# Patient Record
Sex: MALE | Race: WHITE | NOT HISPANIC OR LATINO | Employment: OTHER | URBAN - METROPOLITAN AREA
[De-identification: names, ages, dates, MRNs, and addresses within clinical notes are randomized per-mention and may not be internally consistent; named-entity substitution may affect disease eponyms.]

---

## 2017-03-17 ENCOUNTER — APPOINTMENT (EMERGENCY)
Dept: CT IMAGING | Facility: HOSPITAL | Age: 52
End: 2017-03-17
Payer: COMMERCIAL

## 2017-03-17 ENCOUNTER — HOSPITAL ENCOUNTER (EMERGENCY)
Facility: HOSPITAL | Age: 52
Discharge: HOME | End: 2017-03-17
Attending: EMERGENCY MEDICINE | Admitting: EMERGENCY MEDICINE
Payer: COMMERCIAL

## 2017-03-17 VITALS
SYSTOLIC BLOOD PRESSURE: 157 MMHG | RESPIRATION RATE: 18 BRPM | WEIGHT: 225.09 LBS | OXYGEN SATURATION: 94 % | BODY MASS INDEX: 33.34 KG/M2 | HEIGHT: 69 IN | HEART RATE: 88 BPM | TEMPERATURE: 97.6 F | DIASTOLIC BLOOD PRESSURE: 80 MMHG

## 2017-03-17 DIAGNOSIS — K21.9 ESOPHAGEAL REFLUX: ICD-10-CM

## 2017-03-17 DIAGNOSIS — K52.9 GASTROENTERITIS: ICD-10-CM

## 2017-03-17 DIAGNOSIS — R07.9 CHEST PAIN: Primary | ICD-10-CM

## 2017-03-17 LAB
ALBUMIN SERPL BCP-MCNC: 4.3 G/DL (ref 3.5–5)
ALP SERPL-CCNC: 58 U/L (ref 46–116)
ALT SERPL W P-5'-P-CCNC: 32 U/L (ref 12–78)
ANION GAP SERPL CALCULATED.3IONS-SCNC: 13 MMOL/L (ref 4–13)
AST SERPL W P-5'-P-CCNC: 16 U/L (ref 5–45)
ATRIAL RATE: 65 BPM
BASOPHILS # BLD AUTO: 0.02 THOUSANDS/ΜL (ref 0–0.1)
BASOPHILS NFR BLD AUTO: 0 % (ref 0–1)
BILIRUB DIRECT SERPL-MCNC: 0.1 MG/DL (ref 0–0.2)
BILIRUB SERPL-MCNC: 0.3 MG/DL (ref 0.2–1)
BUN SERPL-MCNC: 18 MG/DL (ref 5–25)
CALCIUM SERPL-MCNC: 9.1 MG/DL (ref 8.3–10.1)
CHLORIDE SERPL-SCNC: 101 MMOL/L (ref 100–108)
CO2 SERPL-SCNC: 26 MMOL/L (ref 21–32)
CREAT SERPL-MCNC: 1.15 MG/DL (ref 0.6–1.3)
EOSINOPHIL # BLD AUTO: 0.01 THOUSAND/ΜL (ref 0–0.61)
EOSINOPHIL NFR BLD AUTO: 0 % (ref 0–6)
ERYTHROCYTE [DISTWIDTH] IN BLOOD BY AUTOMATED COUNT: 12.7 % (ref 11.6–15.1)
GFR SERPL CREATININE-BSD FRML MDRD: >60 ML/MIN/1.73SQ M
GLUCOSE SERPL-MCNC: 157 MG/DL (ref 65–140)
HCT VFR BLD AUTO: 47.2 % (ref 36.5–49.3)
HGB BLD-MCNC: 16 G/DL (ref 12–17)
INR PPP: 1.01 (ref 0.86–1.16)
LIPASE SERPL-CCNC: 90 U/L (ref 73–393)
LYMPHOCYTES # BLD AUTO: 1.2 THOUSANDS/ΜL (ref 0.6–4.47)
LYMPHOCYTES NFR BLD AUTO: 10 % (ref 14–44)
MCH RBC QN AUTO: 30.2 PG (ref 26.8–34.3)
MCHC RBC AUTO-ENTMCNC: 33.9 G/DL (ref 31.4–37.4)
MCV RBC AUTO: 89 FL (ref 82–98)
MONOCYTES # BLD AUTO: 0.51 THOUSAND/ΜL (ref 0.17–1.22)
MONOCYTES NFR BLD AUTO: 4 % (ref 4–12)
NEUTROPHILS # BLD AUTO: 10.85 THOUSANDS/ΜL (ref 1.85–7.62)
NEUTS SEG NFR BLD AUTO: 86 % (ref 43–75)
NRBC BLD AUTO-RTO: 0 /100 WBCS
P AXIS: 65 DEGREES
PLATELET # BLD AUTO: 305 THOUSANDS/UL (ref 149–390)
PMV BLD AUTO: 9.7 FL (ref 8.9–12.7)
POTASSIUM SERPL-SCNC: 3.8 MMOL/L (ref 3.5–5.3)
PR INTERVAL: 166 MS
PROT SERPL-MCNC: 7.9 G/DL (ref 6.4–8.2)
PROTHROMBIN TIME: 13.2 SECONDS (ref 12–14.3)
QRS AXIS: -3 DEGREES
QRSD INTERVAL: 92 MS
QT INTERVAL: 372 MS
QTC INTERVAL: 386 MS
RBC # BLD AUTO: 5.3 MILLION/UL (ref 3.88–5.62)
SODIUM SERPL-SCNC: 140 MMOL/L (ref 136–145)
T WAVE AXIS: 55 DEGREES
TROPONIN I SERPL-MCNC: <0.02 NG/ML
VENTRICULAR RATE: 65 BPM
WBC # BLD AUTO: 12.66 THOUSAND/UL (ref 4.31–10.16)

## 2017-03-17 PROCEDURE — 85025 COMPLETE CBC W/AUTO DIFF WBC: CPT | Performed by: EMERGENCY MEDICINE

## 2017-03-17 PROCEDURE — 36415 COLL VENOUS BLD VENIPUNCTURE: CPT | Performed by: EMERGENCY MEDICINE

## 2017-03-17 PROCEDURE — 80076 HEPATIC FUNCTION PANEL: CPT | Performed by: EMERGENCY MEDICINE

## 2017-03-17 PROCEDURE — 71275 CT ANGIOGRAPHY CHEST: CPT

## 2017-03-17 PROCEDURE — 84484 ASSAY OF TROPONIN QUANT: CPT | Performed by: EMERGENCY MEDICINE

## 2017-03-17 PROCEDURE — 80048 BASIC METABOLIC PNL TOTAL CA: CPT | Performed by: EMERGENCY MEDICINE

## 2017-03-17 PROCEDURE — 83690 ASSAY OF LIPASE: CPT | Performed by: EMERGENCY MEDICINE

## 2017-03-17 PROCEDURE — 93005 ELECTROCARDIOGRAM TRACING: CPT | Performed by: EMERGENCY MEDICINE

## 2017-03-17 PROCEDURE — 85610 PROTHROMBIN TIME: CPT | Performed by: EMERGENCY MEDICINE

## 2017-03-17 PROCEDURE — 96375 TX/PRO/DX INJ NEW DRUG ADDON: CPT

## 2017-03-17 PROCEDURE — 74175 CTA ABDOMEN W/CONTRAST: CPT

## 2017-03-17 PROCEDURE — 96361 HYDRATE IV INFUSION ADD-ON: CPT

## 2017-03-17 PROCEDURE — 96374 THER/PROPH/DIAG INJ IV PUSH: CPT

## 2017-03-17 PROCEDURE — 99285 EMERGENCY DEPT VISIT HI MDM: CPT

## 2017-03-17 RX ORDER — ONDANSETRON 4 MG/1
4 TABLET, ORALLY DISINTEGRATING ORAL EVERY 8 HOURS PRN
Qty: 20 TABLET | Refills: 0 | Status: SHIPPED | OUTPATIENT
Start: 2017-03-17 | End: 2017-08-14

## 2017-03-17 RX ORDER — ONDANSETRON 2 MG/ML
4 INJECTION INTRAMUSCULAR; INTRAVENOUS ONCE
Status: COMPLETED | OUTPATIENT
Start: 2017-03-17 | End: 2017-03-17

## 2017-03-17 RX ORDER — SERTRALINE HYDROCHLORIDE 100 MG/1
100 TABLET, FILM COATED ORAL DAILY
COMMUNITY
End: 2022-03-08

## 2017-03-17 RX ORDER — OMEPRAZOLE 20 MG/1
20 CAPSULE, DELAYED RELEASE ORAL DAILY
COMMUNITY
End: 2018-12-11 | Stop reason: HOSPADM

## 2017-03-17 RX ORDER — BUPROPION HYDROCHLORIDE 75 MG/1
75 TABLET ORAL DAILY
COMMUNITY
End: 2020-02-08

## 2017-03-17 RX ORDER — AMLODIPINE BESYLATE 2.5 MG/1
2.5 TABLET ORAL DAILY
COMMUNITY
End: 2018-12-08

## 2017-03-17 RX ORDER — LORAZEPAM 2 MG/ML
1 INJECTION INTRAMUSCULAR ONCE
Status: COMPLETED | OUTPATIENT
Start: 2017-03-17 | End: 2017-03-17

## 2017-03-17 RX ADMIN — IOHEXOL 100 ML: 350 INJECTION, SOLUTION INTRAVENOUS at 11:19

## 2017-03-17 RX ADMIN — LORAZEPAM 1 MG: 2 INJECTION INTRAMUSCULAR; INTRAVENOUS at 10:39

## 2017-03-17 RX ADMIN — SODIUM CHLORIDE 1000 ML: 0.9 INJECTION, SOLUTION INTRAVENOUS at 12:15

## 2017-03-17 RX ADMIN — SODIUM CHLORIDE 1000 ML: 0.9 INJECTION, SOLUTION INTRAVENOUS at 10:39

## 2017-03-17 RX ADMIN — ONDANSETRON 4 MG: 2 INJECTION INTRAMUSCULAR; INTRAVENOUS at 10:51

## 2017-08-14 ENCOUNTER — APPOINTMENT (EMERGENCY)
Dept: CT IMAGING | Facility: HOSPITAL | Age: 52
End: 2017-08-14
Payer: COMMERCIAL

## 2017-08-14 ENCOUNTER — HOSPITAL ENCOUNTER (EMERGENCY)
Facility: HOSPITAL | Age: 52
Discharge: HOME | End: 2017-08-14
Payer: COMMERCIAL

## 2017-08-14 VITALS
HEART RATE: 82 BPM | HEIGHT: 69 IN | SYSTOLIC BLOOD PRESSURE: 130 MMHG | DIASTOLIC BLOOD PRESSURE: 70 MMHG | TEMPERATURE: 98.8 F | RESPIRATION RATE: 16 BRPM | OXYGEN SATURATION: 94 % | WEIGHT: 225.09 LBS | BODY MASS INDEX: 33.34 KG/M2

## 2017-08-14 DIAGNOSIS — R11.2 NAUSEA AND VOMITING: Primary | ICD-10-CM

## 2017-08-14 LAB
ALBUMIN SERPL BCP-MCNC: 4 G/DL (ref 3.5–5)
ALP SERPL-CCNC: 66 U/L (ref 46–116)
ALT SERPL W P-5'-P-CCNC: 30 U/L (ref 12–78)
ANION GAP SERPL CALCULATED.3IONS-SCNC: 10 MMOL/L (ref 4–13)
AST SERPL W P-5'-P-CCNC: 13 U/L (ref 5–45)
BASOPHILS # BLD AUTO: 0.03 THOUSANDS/ΜL (ref 0–0.1)
BASOPHILS NFR BLD AUTO: 0 % (ref 0–1)
BILIRUB SERPL-MCNC: 0.3 MG/DL (ref 0.2–1)
BILIRUB UR QL STRIP: NEGATIVE
BUN SERPL-MCNC: 20 MG/DL (ref 5–25)
CALCIUM SERPL-MCNC: 9.1 MG/DL (ref 8.3–10.1)
CHLORIDE SERPL-SCNC: 102 MMOL/L (ref 100–108)
CLARITY UR: CLEAR
CO2 SERPL-SCNC: 25 MMOL/L (ref 21–32)
COLOR UR: YELLOW
CREAT SERPL-MCNC: 1.08 MG/DL (ref 0.6–1.3)
EOSINOPHIL # BLD AUTO: 0 THOUSAND/ΜL (ref 0–0.61)
EOSINOPHIL NFR BLD AUTO: 0 % (ref 0–6)
ERYTHROCYTE [DISTWIDTH] IN BLOOD BY AUTOMATED COUNT: 12.5 % (ref 11.6–15.1)
GFR SERPL CREATININE-BSD FRML MDRD: 78 ML/MIN/1.73SQ M
GLUCOSE SERPL-MCNC: 133 MG/DL (ref 65–140)
GLUCOSE UR STRIP-MCNC: NEGATIVE MG/DL
HCT VFR BLD AUTO: 44.4 % (ref 36.5–49.3)
HGB BLD-MCNC: 15.2 G/DL (ref 12–17)
HGB UR QL STRIP.AUTO: NEGATIVE
KETONES UR STRIP-MCNC: NEGATIVE MG/DL
LEUKOCYTE ESTERASE UR QL STRIP: NEGATIVE
LIPASE SERPL-CCNC: 95 U/L (ref 73–393)
LYMPHOCYTES # BLD AUTO: 1.38 THOUSANDS/ΜL (ref 0.6–4.47)
LYMPHOCYTES NFR BLD AUTO: 10 % (ref 14–44)
MCH RBC QN AUTO: 30.3 PG (ref 26.8–34.3)
MCHC RBC AUTO-ENTMCNC: 34.2 G/DL (ref 31.4–37.4)
MCV RBC AUTO: 88 FL (ref 82–98)
MONOCYTES # BLD AUTO: 0.48 THOUSAND/ΜL (ref 0.17–1.22)
MONOCYTES NFR BLD AUTO: 3 % (ref 4–12)
NEUTROPHILS # BLD AUTO: 12.1 THOUSANDS/ΜL (ref 1.85–7.62)
NEUTS SEG NFR BLD AUTO: 86 % (ref 43–75)
NITRITE UR QL STRIP: NEGATIVE
NRBC BLD AUTO-RTO: 0 /100 WBCS
PH UR STRIP.AUTO: 6 [PH] (ref 4.5–8)
PLATELET # BLD AUTO: 287 THOUSANDS/UL (ref 149–390)
PMV BLD AUTO: 9.7 FL (ref 8.9–12.7)
POTASSIUM SERPL-SCNC: 4.1 MMOL/L (ref 3.5–5.3)
PROT SERPL-MCNC: 7.4 G/DL (ref 6.4–8.2)
PROT UR STRIP-MCNC: NEGATIVE MG/DL
RBC # BLD AUTO: 5.02 MILLION/UL (ref 3.88–5.62)
SODIUM SERPL-SCNC: 137 MMOL/L (ref 136–145)
SP GR UR STRIP.AUTO: <=1.005 (ref 1–1.03)
UROBILINOGEN UR QL STRIP.AUTO: 0.2 E.U./DL
WBC # BLD AUTO: 14.06 THOUSAND/UL (ref 4.31–10.16)

## 2017-08-14 PROCEDURE — 96375 TX/PRO/DX INJ NEW DRUG ADDON: CPT

## 2017-08-14 PROCEDURE — 80053 COMPREHEN METABOLIC PANEL: CPT | Performed by: NURSE PRACTITIONER

## 2017-08-14 PROCEDURE — 74177 CT ABD & PELVIS W/CONTRAST: CPT

## 2017-08-14 PROCEDURE — 96361 HYDRATE IV INFUSION ADD-ON: CPT

## 2017-08-14 PROCEDURE — 83690 ASSAY OF LIPASE: CPT | Performed by: NURSE PRACTITIONER

## 2017-08-14 PROCEDURE — 96374 THER/PROPH/DIAG INJ IV PUSH: CPT

## 2017-08-14 PROCEDURE — 99284 EMERGENCY DEPT VISIT MOD MDM: CPT

## 2017-08-14 PROCEDURE — 81003 URINALYSIS AUTO W/O SCOPE: CPT

## 2017-08-14 PROCEDURE — 85025 COMPLETE CBC W/AUTO DIFF WBC: CPT | Performed by: NURSE PRACTITIONER

## 2017-08-14 PROCEDURE — 36415 COLL VENOUS BLD VENIPUNCTURE: CPT | Performed by: NURSE PRACTITIONER

## 2017-08-14 PROCEDURE — 96376 TX/PRO/DX INJ SAME DRUG ADON: CPT

## 2017-08-14 RX ORDER — MORPHINE SULFATE 4 MG/ML
4 INJECTION, SOLUTION INTRAMUSCULAR; INTRAVENOUS ONCE
Status: COMPLETED | OUTPATIENT
Start: 2017-08-14 | End: 2017-08-14

## 2017-08-14 RX ORDER — ONDANSETRON 2 MG/ML
4 INJECTION INTRAMUSCULAR; INTRAVENOUS ONCE
Status: COMPLETED | OUTPATIENT
Start: 2017-08-14 | End: 2017-08-14

## 2017-08-14 RX ORDER — ONDANSETRON 4 MG/1
4 TABLET, ORALLY DISINTEGRATING ORAL EVERY 8 HOURS PRN
Qty: 20 TABLET | Refills: 0 | Status: SHIPPED | OUTPATIENT
Start: 2017-08-14 | End: 2018-12-08

## 2017-08-14 RX ORDER — DICYCLOMINE HCL 20 MG
20 TABLET ORAL 2 TIMES DAILY
Qty: 20 TABLET | Refills: 0 | Status: SHIPPED | OUTPATIENT
Start: 2017-08-14 | End: 2018-02-15 | Stop reason: ALTCHOICE

## 2017-08-14 RX ADMIN — IOHEXOL 100 ML: 350 INJECTION, SOLUTION INTRAVENOUS at 13:36

## 2017-08-14 RX ADMIN — SODIUM CHLORIDE 1000 ML: 0.9 INJECTION, SOLUTION INTRAVENOUS at 13:44

## 2017-08-14 RX ADMIN — MORPHINE SULFATE 4 MG: 4 INJECTION, SOLUTION INTRAMUSCULAR; INTRAVENOUS at 13:44

## 2017-08-14 RX ADMIN — MORPHINE SULFATE 4 MG: 4 INJECTION, SOLUTION INTRAMUSCULAR; INTRAVENOUS at 14:51

## 2017-08-14 RX ADMIN — ONDANSETRON 4 MG: 2 INJECTION INTRAMUSCULAR; INTRAVENOUS at 14:50

## 2017-08-14 RX ADMIN — ONDANSETRON 4 MG: 2 INJECTION INTRAMUSCULAR; INTRAVENOUS at 12:44

## 2017-12-05 ENCOUNTER — HOSPITAL ENCOUNTER (EMERGENCY)
Facility: HOSPITAL | Age: 52
Discharge: HOME | End: 2017-12-05
Attending: EMERGENCY MEDICINE | Admitting: EMERGENCY MEDICINE
Payer: COMMERCIAL

## 2017-12-05 ENCOUNTER — APPOINTMENT (EMERGENCY)
Dept: CT IMAGING | Facility: HOSPITAL | Age: 52
End: 2017-12-05
Payer: COMMERCIAL

## 2017-12-05 ENCOUNTER — APPOINTMENT (EMERGENCY)
Dept: RADIOLOGY | Facility: HOSPITAL | Age: 52
End: 2017-12-05
Payer: COMMERCIAL

## 2017-12-05 VITALS
SYSTOLIC BLOOD PRESSURE: 158 MMHG | HEART RATE: 65 BPM | BODY MASS INDEX: 32.21 KG/M2 | OXYGEN SATURATION: 98 % | RESPIRATION RATE: 18 BRPM | DIASTOLIC BLOOD PRESSURE: 96 MMHG | TEMPERATURE: 97.6 F | WEIGHT: 225 LBS | HEIGHT: 70 IN

## 2017-12-05 DIAGNOSIS — R11.2 NAUSEA AND VOMITING: ICD-10-CM

## 2017-12-05 DIAGNOSIS — R07.9 CHEST PAIN: ICD-10-CM

## 2017-12-05 DIAGNOSIS — R10.30 LOWER ABDOMINAL PAIN: ICD-10-CM

## 2017-12-05 DIAGNOSIS — K52.9 ENTERITIS: Primary | ICD-10-CM

## 2017-12-05 LAB
ALBUMIN SERPL BCP-MCNC: 4 G/DL (ref 3.5–5)
ALP SERPL-CCNC: 62 U/L (ref 46–116)
ALT SERPL W P-5'-P-CCNC: 34 U/L (ref 12–78)
ANION GAP SERPL CALCULATED.3IONS-SCNC: 11 MMOL/L (ref 4–13)
AST SERPL W P-5'-P-CCNC: 16 U/L (ref 5–45)
BACTERIA UR QL AUTO: ABNORMAL /HPF
BASOPHILS # BLD MANUAL: 0 THOUSAND/UL (ref 0–0.1)
BASOPHILS NFR MAR MANUAL: 0 % (ref 0–1)
BILIRUB SERPL-MCNC: 0.3 MG/DL (ref 0.2–1)
BILIRUB UR QL STRIP: NEGATIVE
BUN SERPL-MCNC: 16 MG/DL (ref 5–25)
CALCIUM SERPL-MCNC: 9.3 MG/DL (ref 8.3–10.1)
CHLORIDE SERPL-SCNC: 103 MMOL/L (ref 100–108)
CLARITY UR: CLEAR
CO2 SERPL-SCNC: 27 MMOL/L (ref 21–32)
COLOR UR: YELLOW
CREAT SERPL-MCNC: 0.87 MG/DL (ref 0.6–1.3)
EOSINOPHIL # BLD MANUAL: 0 THOUSAND/UL (ref 0–0.4)
EOSINOPHIL NFR BLD MANUAL: 0 % (ref 0–6)
ERYTHROCYTE [DISTWIDTH] IN BLOOD BY AUTOMATED COUNT: 12.4 % (ref 11.6–15.1)
FLUAV AG SPEC QL IA: NEGATIVE
FLUBV AG SPEC QL IA: NEGATIVE
GFR SERPL CREATININE-BSD FRML MDRD: 99 ML/MIN/1.73SQ M
GLUCOSE SERPL-MCNC: 148 MG/DL (ref 65–140)
GLUCOSE UR STRIP-MCNC: NEGATIVE MG/DL
HCT VFR BLD AUTO: 44.2 % (ref 36.5–49.3)
HGB BLD-MCNC: 14.8 G/DL (ref 12–17)
HGB UR QL STRIP.AUTO: NEGATIVE
KETONES UR STRIP-MCNC: ABNORMAL MG/DL
LACTATE SERPL-SCNC: 1.1 MMOL/L (ref 0.5–2)
LEUKOCYTE ESTERASE UR QL STRIP: NEGATIVE
LIPASE SERPL-CCNC: 110 U/L (ref 73–393)
LYMPHOCYTES # BLD AUTO: 0.54 THOUSAND/UL (ref 0.6–4.47)
LYMPHOCYTES # BLD AUTO: 5 % (ref 14–44)
MAGNESIUM SERPL-MCNC: 1.6 MG/DL (ref 1.6–2.6)
MCH RBC QN AUTO: 29.4 PG (ref 26.8–34.3)
MCHC RBC AUTO-ENTMCNC: 33.5 G/DL (ref 31.4–37.4)
MCV RBC AUTO: 88 FL (ref 82–98)
MONOCYTES # BLD AUTO: 0.22 THOUSAND/UL (ref 0–1.22)
MONOCYTES NFR BLD: 2 % (ref 4–12)
NEUTROPHILS # BLD MANUAL: 9.96 THOUSAND/UL (ref 1.85–7.62)
NEUTS BAND NFR BLD MANUAL: 6 % (ref 0–8)
NEUTS SEG NFR BLD AUTO: 86 % (ref 43–75)
NITRITE UR QL STRIP: NEGATIVE
NON-SQ EPI CELLS URNS QL MICRO: ABNORMAL /HPF
NRBC BLD AUTO-RTO: 0 /100 WBCS
PH UR STRIP.AUTO: 6 [PH] (ref 4.5–8)
PLATELET # BLD AUTO: 275 THOUSANDS/UL (ref 149–390)
PLATELET BLD QL SMEAR: ADEQUATE
PMV BLD AUTO: 10.1 FL (ref 8.9–12.7)
POTASSIUM SERPL-SCNC: 4 MMOL/L (ref 3.5–5.3)
PROT SERPL-MCNC: 7.5 G/DL (ref 6.4–8.2)
PROT UR STRIP-MCNC: ABNORMAL MG/DL
RBC # BLD AUTO: 5.04 MILLION/UL (ref 3.88–5.62)
RBC #/AREA URNS AUTO: ABNORMAL /HPF
SODIUM SERPL-SCNC: 141 MMOL/L (ref 136–145)
SP GR UR STRIP.AUTO: 1.02 (ref 1–1.03)
TOTAL CELLS COUNTED SPEC: 100
TROPONIN I SERPL-MCNC: <0.02 NG/ML
TROPONIN I SERPL-MCNC: <0.02 NG/ML
UROBILINOGEN UR QL STRIP.AUTO: 0.2 E.U./DL
VARIANT LYMPHS # BLD AUTO: 1 %
WBC # BLD AUTO: 10.83 THOUSAND/UL (ref 4.31–10.16)
WBC #/AREA URNS AUTO: ABNORMAL /HPF

## 2017-12-05 PROCEDURE — 71020 HB CHEST X-RAY 2VW FRONTAL&LATL: CPT

## 2017-12-05 PROCEDURE — 81001 URINALYSIS AUTO W/SCOPE: CPT | Performed by: EMERGENCY MEDICINE

## 2017-12-05 PROCEDURE — 99285 EMERGENCY DEPT VISIT HI MDM: CPT

## 2017-12-05 PROCEDURE — 83735 ASSAY OF MAGNESIUM: CPT | Performed by: EMERGENCY MEDICINE

## 2017-12-05 PROCEDURE — 80053 COMPREHEN METABOLIC PANEL: CPT | Performed by: EMERGENCY MEDICINE

## 2017-12-05 PROCEDURE — 83690 ASSAY OF LIPASE: CPT | Performed by: EMERGENCY MEDICINE

## 2017-12-05 PROCEDURE — 74177 CT ABD & PELVIS W/CONTRAST: CPT

## 2017-12-05 PROCEDURE — 83605 ASSAY OF LACTIC ACID: CPT | Performed by: EMERGENCY MEDICINE

## 2017-12-05 PROCEDURE — 36415 COLL VENOUS BLD VENIPUNCTURE: CPT | Performed by: EMERGENCY MEDICINE

## 2017-12-05 PROCEDURE — 96375 TX/PRO/DX INJ NEW DRUG ADDON: CPT

## 2017-12-05 PROCEDURE — 93005 ELECTROCARDIOGRAM TRACING: CPT | Performed by: EMERGENCY MEDICINE

## 2017-12-05 PROCEDURE — 84484 ASSAY OF TROPONIN QUANT: CPT | Performed by: EMERGENCY MEDICINE

## 2017-12-05 PROCEDURE — 96374 THER/PROPH/DIAG INJ IV PUSH: CPT

## 2017-12-05 PROCEDURE — 87798 DETECT AGENT NOS DNA AMP: CPT | Performed by: EMERGENCY MEDICINE

## 2017-12-05 PROCEDURE — 85007 BL SMEAR W/DIFF WBC COUNT: CPT | Performed by: EMERGENCY MEDICINE

## 2017-12-05 PROCEDURE — 85027 COMPLETE CBC AUTOMATED: CPT | Performed by: EMERGENCY MEDICINE

## 2017-12-05 PROCEDURE — 96361 HYDRATE IV INFUSION ADD-ON: CPT

## 2017-12-05 PROCEDURE — 87400 INFLUENZA A/B EACH AG IA: CPT | Performed by: EMERGENCY MEDICINE

## 2017-12-05 RX ORDER — ONDANSETRON 4 MG/1
4 TABLET, ORALLY DISINTEGRATING ORAL EVERY 6 HOURS PRN
Qty: 20 TABLET | Refills: 0 | Status: SHIPPED | OUTPATIENT
Start: 2017-12-05 | End: 2019-03-22

## 2017-12-05 RX ORDER — ONDANSETRON 2 MG/ML
INJECTION INTRAMUSCULAR; INTRAVENOUS
Status: COMPLETED
Start: 2017-12-05 | End: 2017-12-05

## 2017-12-05 RX ORDER — KETOROLAC TROMETHAMINE 30 MG/ML
15 INJECTION, SOLUTION INTRAMUSCULAR; INTRAVENOUS ONCE
Status: COMPLETED | OUTPATIENT
Start: 2017-12-05 | End: 2017-12-05

## 2017-12-05 RX ORDER — ONDANSETRON 2 MG/ML
4 INJECTION INTRAMUSCULAR; INTRAVENOUS ONCE
Status: COMPLETED | OUTPATIENT
Start: 2017-12-05 | End: 2017-12-05

## 2017-12-05 RX ORDER — ASPIRIN 81 MG/1
324 TABLET, CHEWABLE ORAL ONCE
Status: COMPLETED | OUTPATIENT
Start: 2017-12-05 | End: 2017-12-05

## 2017-12-05 RX ORDER — DICYCLOMINE HCL 20 MG
20 TABLET ORAL EVERY 8 HOURS PRN
Qty: 12 TABLET | Refills: 0 | Status: SHIPPED | OUTPATIENT
Start: 2017-12-05 | End: 2018-12-08

## 2017-12-05 RX ORDER — MAGNESIUM HYDROXIDE/ALUMINUM HYDROXICE/SIMETHICONE 120; 1200; 1200 MG/30ML; MG/30ML; MG/30ML
30 SUSPENSION ORAL ONCE
Status: COMPLETED | OUTPATIENT
Start: 2017-12-05 | End: 2017-12-05

## 2017-12-05 RX ORDER — TRAMADOL HYDROCHLORIDE 50 MG/1
50 TABLET ORAL EVERY 6 HOURS PRN
Qty: 10 TABLET | Refills: 0 | Status: SHIPPED | OUTPATIENT
Start: 2017-12-05 | End: 2017-12-05 | Stop reason: ALTCHOICE

## 2017-12-05 RX ORDER — HYDROCODONE BITARTRATE AND ACETAMINOPHEN 5; 325 MG/1; MG/1
1 TABLET ORAL EVERY 6 HOURS PRN
Qty: 10 TABLET | Refills: 0 | Status: SHIPPED | OUTPATIENT
Start: 2017-12-05 | End: 2017-12-15

## 2017-12-05 RX ADMIN — IOHEXOL 100 ML: 350 INJECTION, SOLUTION INTRAVENOUS at 18:01

## 2017-12-05 RX ADMIN — ONDANSETRON 4 MG: 2 INJECTION INTRAMUSCULAR; INTRAVENOUS at 17:12

## 2017-12-05 RX ADMIN — KETOROLAC TROMETHAMINE 15 MG: 30 INJECTION, SOLUTION INTRAMUSCULAR at 20:44

## 2017-12-05 RX ADMIN — HYDROMORPHONE HYDROCHLORIDE 1 MG: 1 INJECTION, SOLUTION INTRAMUSCULAR; INTRAVENOUS; SUBCUTANEOUS at 17:17

## 2017-12-05 RX ADMIN — SODIUM CHLORIDE 1000 ML: 0.9 INJECTION, SOLUTION INTRAVENOUS at 17:14

## 2017-12-05 RX ADMIN — SODIUM CHLORIDE 1000 ML: 0.9 INJECTION, SOLUTION INTRAVENOUS at 18:43

## 2017-12-05 RX ADMIN — ALUMINUM HYDROXIDE, MAGNESIUM HYDROXIDE, AND SIMETHICONE 30 ML: 200; 200; 20 SUSPENSION ORAL at 20:44

## 2017-12-05 RX ADMIN — LIDOCAINE HYDROCHLORIDE 10 ML: 20 SOLUTION ORAL; TOPICAL at 20:44

## 2017-12-05 RX ADMIN — ASPIRIN 81 MG 324 MG: 81 TABLET ORAL at 17:17

## 2017-12-05 NOTE — ED NOTES
Patient vomited the dose of asprin  Dr Israel Ramirez made aware       Trang Patrick, KASI  12/05/17 6898

## 2017-12-05 NOTE — ED PROVIDER NOTES
History  Chief Complaint   Patient presents with    Chest Pain     "I have chills, weakness, chest pain, nausea, vomiting, I just feel fucking horrible"     Patient is a 47year old male with past medical history of hypertension, ulcerative colitis, prior diverticulitis, GERD, presents to the emergency department with multiple complaints  Patient states early this morning he began having nausea and vomiting as well as abdominal pain  He states he has had at least 20 episodes of nonbilious and nonbloody vomiting since this morning  He also reports mid to lower central abdominal pain that comes and goes  He states the pain feels somewhat similar to prior diverticulitis attacks as well as prior ulcerative colitis attacks  He denies radiation of pain  No alleviating or aggravating factors  He also reports having chest pain that started 2 hours ago  He states he feels a tightness in the center of his chest as well as pain in the left side of his chest   He is unsure if it is worsened by exertion  He does report over the weekend he was doing a lot of heavy lifting and transferring of heavy logs and felt pain all over including his chest which required him to take a break and stop  He does report over the past 1 month he has had intermittent discomfort in his chest but again cannot comment on whether not it is exertional   He also reports feeling generally weak, malaise, and having subjective fever and chills and sweats today  He denies taking his temperature  On review of systems, he also reports mild dry cough for the past several days to 1 week as well as worsening shortness of breath today    He denies headache, dizziness or near syncope, URI symptoms, visual changes, diarrhea, constipation, bright red blood per rectum, melena, dysuria, frequency, hematuria, flank pain, testicular pain or swelling, skin rash or color change, leg swelling or pain, lateralizing extremity weakness or paresthesia or other focal neurologic deficits  He denies prior abdominal surgical history  Denies any known cardiac history  Denies smoking history  Denies alcohol use and states he quit 1 5 years ago  History provided by:  Patient   used: No    Chest Pain   Associated symptoms: abdominal pain, diaphoresis, fever, nausea, shortness of breath and vomiting    Associated symptoms: no back pain, no cough, no dizziness, no fatigue, no headache, no numbness, no palpitations and no weakness        Prior to Admission Medications   Prescriptions Last Dose Informant Patient Reported? Taking? amLODIPine (NORVASC) 2 5 mg tablet   Yes No   Sig: Take 2 5 mg by mouth daily   buPROPion (WELLBUTRIN) 75 mg tablet   Yes No   Sig: Take 75 mg by mouth 2 (two) times a day   dicyclomine (BENTYL) 20 mg tablet   No No   Sig: Take 1 tablet by mouth 2 (two) times a day   omeprazole (PriLOSEC) 20 mg delayed release capsule   Yes No   Sig: Take 20 mg by mouth daily   ondansetron (ZOFRAN-ODT) 4 mg disintegrating tablet   No No   Sig: Take 1 tablet by mouth every 8 (eight) hours as needed for nausea or vomiting for up to 20 doses   sertraline (ZOLOFT) 100 mg tablet   Yes No   Sig: Take 200 mg by mouth daily      Facility-Administered Medications: None       Past Medical History:   Diagnosis Date    Diverticulitis     GERD (gastroesophageal reflux disease)     Hypertension     UC (ulcerative colitis) ([facility] )        History reviewed  No pertinent surgical history  History reviewed  No pertinent family history  I have reviewed and agree with the history as documented  Social History   Substance Use Topics    Smoking status: Never Smoker    Smokeless tobacco: Not on file    Alcohol use No      Comment: quit 1 5 yrs ago        Review of Systems   Constitutional: Positive for chills, diaphoresis and fever  Negative for fatigue  HENT: Negative for congestion, ear pain, rhinorrhea and sore throat      Eyes: Negative for photophobia, pain and visual disturbance  Respiratory: Positive for chest tightness and shortness of breath  Negative for cough and wheezing  Cardiovascular: Positive for chest pain  Negative for palpitations  Gastrointestinal: Positive for abdominal pain, nausea and vomiting  Negative for constipation and diarrhea  Genitourinary: Negative for decreased urine volume, dysuria, flank pain, frequency and hematuria  Musculoskeletal: Negative for back pain, neck pain and neck stiffness  Skin: Negative for color change, pallor and rash  Allergic/Immunologic: Negative for immunocompromised state  Neurological: Negative for dizziness, syncope, weakness, light-headedness, numbness and headaches  Hematological: Negative for adenopathy  Psychiatric/Behavioral: Negative for confusion and decreased concentration  All other systems reviewed and are negative  Physical Exam  ED Triage Vitals   Temperature Pulse Respirations Blood Pressure SpO2   12/05/17 1636 12/05/17 1636 12/05/17 1636 12/05/17 1636 12/05/17 1636   97 6 °F (36 4 °C) 58 18 (!) 179/82 96 %      Temp src Heart Rate Source Patient Position - Orthostatic VS BP Location FiO2 (%)   -- 12/05/17 1829 12/05/17 1829 12/05/17 1829 --    Monitor Lying Right arm       Pain Score       12/05/17 1636       7         Vitals:    12/05/17 1636 12/05/17 1829 12/05/17 2047   BP: (!) 179/82 143/86 158/96   Pulse: 58 66 65   Resp: 18 17 18   Temp: 97 6 °F (36 4 °C)     SpO2: 96% 96% 98%   Weight: 102 kg (225 lb)     Height: 5' 10" (1 778 m)         Physical Exam   Constitutional: He is oriented to person, place, and time  He appears well-developed and well-nourished  No distress  HENT:   Head: Normocephalic and atraumatic  Mouth/Throat: Oropharynx is clear and moist  No oropharyngeal exudate  Eyes: Conjunctivae and EOM are normal  Pupils are equal, round, and reactive to light  Neck: Normal range of motion  Neck supple  No JVD present     Cardiovascular: Normal rate, regular rhythm, normal heart sounds and intact distal pulses  Exam reveals no gallop and no friction rub  No murmur heard  Pulmonary/Chest: Effort normal and breath sounds normal  No respiratory distress  He has no wheezes  He has no rales  Abdominal: Soft  Bowel sounds are normal  He exhibits no distension  There is tenderness  There is no rebound and no guarding  Mid-lower abdominal tenderness  Musculoskeletal: Normal range of motion  He exhibits no edema or tenderness  Lymphadenopathy:     He has no cervical adenopathy  Neurological: He is alert and oriented to person, place, and time  No cranial nerve deficit  No gross motor or sensory deficits  Skin: Skin is warm and dry  No rash noted  He is not diaphoretic  No pallor  Psychiatric: He has a normal mood and affect  His behavior is normal    Nursing note and vitals reviewed        ED Medications  Medications   sodium chloride 0 9 % bolus 1,000 mL (0 mL Intravenous Stopped 12/5/17 1829)   ondansetron (ZOFRAN) injection 4 mg (4 mg Intravenous Given 12/5/17 1712)   HYDROmorphone (DILAUDID) 1 mg/mL injection 1 mg (1 mg Intravenous Given 12/5/17 1717)   aspirin chewable tablet 324 mg (324 mg Oral Given 12/5/17 1717)   iohexol (OMNIPAQUE) 350 MG/ML injection (MULTI-DOSE) 100 mL (100 mL Intravenous Given 12/5/17 1801)   sodium chloride 0 9 % bolus 1,000 mL (0 mL Intravenous Stopped 12/5/17 2012)   ketorolac (TORADOL) 30 mg/mL injection 15 mg (15 mg Intravenous Given 12/5/17 2044)   aluminum-magnesium hydroxide-simethicone (MYLANTA) 200-200-20 mg/5 mL oral suspension 30 mL (30 mL Oral Given 12/5/17 2044)   lidocaine viscous (XYLOCAINE) 2 % mucosal solution 10 mL (10 mL Swish & Swallow Given 12/5/17 2044)       Diagnostic Studies  Results Reviewed     Procedure Component Value Units Date/Time    Troponin I [[id number]]  (Normal) Collected:  12/05/17 2002    Lab Status:  Final result Specimen:  Blood from Arm, Left Updated:  12/05/17 2028 Troponin I <0 02 ng/mL     Narrative:         [employer] Chemistry analyzer 99% cutoff is > 0 04 ng/mL in network labs    o cTnI 99% cutoff is useful only when applied to patients in the clinical setting of myocardial ischemia  o cTnI 99% cutoff should be interpreted in the context of clinical history, ECG findings and possibly cardiac imaging to establish correct diagnosis  o cTnI 99% cutoff may be suggestive but clearly not indicative of a coronary event without the clinical setting of myocardial ischemia  Urine Microscopic [[id number]]  (Abnormal) Collected:  12/05/17 1838    Lab Status:  Final result Specimen:  Urine from Urine, Clean Catch Updated:  12/05/17 1855     RBC, UA None Seen /hpf      WBC, UA 0-1 (A) /hpf      Epithelial Cells Occasional /hpf      Bacteria, UA Occasional /hpf     UA w Reflex to Microscopic w Reflex to Culture [[id number]]  (Abnormal) Collected:  12/05/17 1838    Lab Status:  Final result Specimen:  Urine from Urine, Clean Catch Updated:  12/05/17 1847     Color, UA Yellow     Clarity, UA Clear     Specific Gravity, UA 1 025     pH, UA 6 0     Leukocytes, UA Negative     Nitrite, UA Negative     Protein, UA 30 (1+) (A) mg/dl      Glucose, UA Negative mg/dl      Ketones, UA Trace (A) mg/dl      Urobilinogen, UA 0 2 E U /dl      Bilirubin, UA Negative     Blood, UA Negative    CBC and differential [[id number]]  (Abnormal) Collected:  12/05/17 1714    Lab Status:  Final result Specimen:  Blood from Arm, Right Updated:  12/05/17 1753     WBC 10 83 (H) Thousand/uL      RBC 5 04 Million/uL      Hemoglobin 14 8 g/dL      Hematocrit 44 2 %      MCV 88 fL      MCH 29 4 pg      MCHC 33 5 g/dL      RDW 12 4 %      MPV 10 1 fL      Platelets 558 Thousands/uL      nRBC 0 /100 WBCs     Narrative: This is an appended report  These results have been appended to a previously verified report      Lactic acid, plasma [[id number]]  (Normal) Collected:  12/05/17 1714    Lab Status:  Final result Specimen: Blood from Arm, Right Updated:  12/05/17 1746     LACTIC ACID 1 1 mmol/L     Narrative:         Result may be elevated if tourniquet was used during collection  Troponin I [[id number]]  (Normal) Collected:  12/05/17 1714    Lab Status:  Final result Specimen:  Blood from Arm, Right Updated:  12/05/17 1744     Troponin I <0 02 ng/mL     Narrative:         [employer] Chemistry analyzer 99% cutoff is > 0 04 ng/mL in network labs    o cTnI 99% cutoff is useful only when applied to patients in the clinical setting of myocardial ischemia  o cTnI 99% cutoff should be interpreted in the context of clinical history, ECG findings and possibly cardiac imaging to establish correct diagnosis  o cTnI 99% cutoff may be suggestive but clearly not indicative of a coronary event without the clinical setting of myocardial ischemia  Rapid Influenza Screen with Reflex PCR (indicated for patients <2mo of age) [[id number]]  (Normal) Collected:  12/05/17 1714    Lab Status:  Final result Specimen:  Nasopharyngeal from Nasopharyngeal Swab Updated:  12/05/17 1744     Rapid Influenza A Ag Negative     Rapid Influenza B Ag Negative    Influenza A/B and RSV by PCR (Indicated for patients > 2 mo of age) [[id number]] Collected:  12/05/17 1714    Lab Status:   In process Specimen:  Nasopharyngeal from Nasopharyngeal Swab Updated:  12/05/17 1744    Magnesium [[id number]]  (Normal) Collected:  12/05/17 1714    Lab Status:  Final result Specimen:  Blood from Arm, Right Updated:  12/05/17 1740     Magnesium 1 6 mg/dL     Comprehensive metabolic panel [[id number]]  (Abnormal) Collected:  12/05/17 1714    Lab Status:  Final result Specimen:  Blood from Arm, Right Updated:  12/05/17 1740     Sodium 141 mmol/L      Potassium 4 0 mmol/L      Chloride 103 mmol/L      CO2 27 mmol/L      Anion Gap 11 mmol/L      BUN 16 mg/dL      Creatinine 0 87 mg/dL      Glucose 148 (H) mg/dL      Calcium 9 3 mg/dL      AST 16 U/L      ALT 34 U/L      Alkaline Phosphatase 62 U/L Total Protein 7 5 g/dL      Albumin 4 0 g/dL      Total Bilirubin 0 30 mg/dL      eGFR 99 ml/min/1 73sq m     Narrative:         National Kidney Disease Education Program recommendations are as follows:  GFR calculation is accurate only with a steady state creatinine  Chronic Kidney disease less than 60 ml/min/1 73 sq  meters  Kidney failure less than 15 ml/min/1 73 sq  meters  Lipase [[id number]]  (Normal) Collected:  12/05/17 1714    Lab Status:  Final result Specimen:  Blood from Arm, Right Updated:  12/05/17 1740     Lipase 110 u/L                  XR chest 2 views   ED Interpretation by Royal Elayne DO (12/05 1829)   No acute abnormality in the chest       Final Result by Cherylene Maiers, MD (12/05 2114)      No active pulmonary disease  Workstation performed: [id number]         CT abdomen pelvis with contrast   Final Result by Eva Harris MD (12/05 3274)      Mild scattered areas of small bowel and proximal colonic thickening in keeping with a nonspecific enteritis/colitis  Extensive, predominantly distal, colonic diverticulosis without acute diverticulitis           Workstation performed: [id number]                    Procedures  ECG 12 Lead Documentation  Date/Time: 12/5/2017 4:57 PM  Performed by: [employer]  by: Lily Rucker     ECG reviewed by me, the ED Provider: yes    Patient location:  ED  Previous ECG:     Previous ECG:  Compared to current    Comparison ECG info:  3-    Similarity:  No change  Rate:     ECG rate:  52    ECG rate assessment: bradycardic    Rhythm:     Rhythm: sinus bradycardia      Rhythm comment:  With sinus arrhythmia  Ectopy:     Ectopy: none    QRS:     QRS axis:  Normal    QRS intervals:  Normal  Conduction:     Conduction: normal    ST segments:     ST segments:  Normal  T waves:     T waves: normal      ECG 12 Lead Documentation  Date/Time: 12/5/2017 8:56 PM  Performed by: [employer]  by: Awilda Ortiz E     ECG reviewed by me, the ED Provider: yes    Patient location:  ED  Previous ECG:     Previous ECG:  Compared to current    Similarity:  No change  Rate:     ECG rate:  61    ECG rate assessment: normal    Rhythm:     Rhythm: sinus rhythm      Rhythm comment:  Marked sinus arrhythmia  Ectopy:     Ectopy: none    QRS:     QRS axis:  Normal    QRS intervals:  Normal  Conduction:     Conduction: normal    ST segments:     ST segments:  Normal  T waves:     T waves: normal             Phone Contacts  ED Phone Contact    ED Course  ED Course as of Dec 06 0149   Tue Dec 05, 2017   2028 Repeat troponin and EKG unchanged  Troponin I: <0 02         HEART Risk Score    Flowsheet Row Most Recent Value   History  0 Filed at: 12/05/2017 1723   ECG  0 Filed at: 12/05/2017 1723   Age  1 Filed at: 12/05/2017 1723   Risk Factors  1 Filed at: 12/05/2017 1723   Troponin  0 Filed at: 12/05/2017 1723   Heart Score Risk Calculator   History  0 Filed at: 12/05/2017 1723   ECG  0 Filed at: 12/05/2017 1723   Age  1 Filed at: 12/05/2017 1723   Risk Factors  1 Filed at: 12/05/2017 1723   Troponin  0 Filed at: 12/05/2017 1723   HEART Score  2 Filed at: 12/05/2017 1723   HEART Score  2 Filed at: 12/05/2017 1723                            MDM  Number of Diagnoses or Management Options  Diagnosis management comments: Nausea and vomiting that started early this morning as well as abdominal pain  Chest pain started 2 hours ago  Most likely chest pain is musculoskeletal in origin given the constant retching all day  Patient does have cardiac risk factors so will ultimately check cardiac enzymes, EKG and keep for 3 hour delta troponin  HEART score 2  Will check abdominal labs and obtain a CT scan of the abdomen and pelvis  Will hydrate with IV fluid bolus, provide Zofran and Dilaudid for symptom relief  Dispo pending         Amount and/or Complexity of Data Reviewed  Clinical lab tests: ordered and reviewed  Tests in the radiology section of CPT®: ordered and reviewed  Tests in the medicine section of CPT®: ordered and reviewed  Independent visualization of images, tracings, or specimens: yes      CritCare Time    Disposition  Final diagnoses:   Enteritis   Nausea and vomiting   Lower abdominal pain   Chest pain     Time reflects when diagnosis was documented in both MDM as applicable and the Disposition within this note     Time User Action Codes Description Comment    12/5/2017  8:57 PM Joce Rosalba E Add [K52 9] Enteritis     12/5/2017  8:57 PM Joce Rosalba E Add [R11 2] Nausea and vomiting     12/5/2017  8:57 PM Joce Rosalba E Add [R10 30] Lower abdominal pain     12/5/2017  8:57 PM Joce Rosalba E Add [R07 9] Chest pain       ED Disposition     ED Disposition Condition Comment    Discharge  Koreen Burkitt discharge to home/self care      Condition at discharge: Stable        Follow-up Information     Follow up With Specialties Details Why Contact Info Additional 4 Terence Jeff MD Internal Medicine Schedule an appointment as soon as possible for a visit  [address] [phone]       [facility] Emergency Department Emergency Medicine Go to If symptoms worsen [address] [facility] [address] [place] ED, [address] [place], [place], [address] Jv Austin MD Gastroenterology Schedule an appointment as soon as possible for a visit As needed [address]/[facility] [address] [facility] [address]  [address]           Discharge Medication List as of 12/5/2017  9:01 PM      START taking these medications    Details   !! dicyclomine (BENTYL) 20 mg tablet Take 1 tablet by mouth every 8 (eight) hours as needed (abdominal pain), Starting Tue 12/5/2017, Print      !! ondansetron (ZOFRAN-ODT) 4 mg disintegrating tablet Take 1 tablet by mouth every 6 (six) hours as needed for nausea or vomiting, Starting Tue 12/5/2017, Print      traMADol (ULTRAM) 50 mg tablet Take 1 tablet by mouth every 6 (six) hours as needed for severe pain for up to 10 days, Starting Tue 12/5/2017, Until Fri 12/15/2017, Print       !! - Potential duplicate medications found  Please discuss with provider  CONTINUE these medications which have NOT CHANGED    Details   amLODIPine (NORVASC) 2 5 mg tablet Take 2 5 mg by mouth daily, Until Discontinued, Historical Med      buPROPion (WELLBUTRIN) 75 mg tablet Take 75 mg by mouth 2 (two) times a day, Until Discontinued, Historical Med      !! dicyclomine (BENTYL) 20 mg tablet Take 1 tablet by mouth 2 (two) times a day, Starting Mon 8/14/2017, Print      omeprazole (PriLOSEC) 20 mg delayed release capsule Take 20 mg by mouth daily, Until Discontinued, Historical Med      !! ondansetron (ZOFRAN-ODT) 4 mg disintegrating tablet Take 1 tablet by mouth every 8 (eight) hours as needed for nausea or vomiting for up to 20 doses, Starting Mon 8/14/2017, Print      sertraline (ZOLOFT) 100 mg tablet Take 200 mg by mouth daily, Until Discontinued, Historical Med       !! - Potential duplicate medications found  Please discuss with provider  No discharge procedures on file      ED Provider  Electronically Signed by           Shelby Sanches DO  12/06/17 8704

## 2017-12-06 LAB
ATRIAL RATE: 52 BPM
ATRIAL RATE: 61 BPM
FLUAV AG SPEC QL: NORMAL
FLUBV AG SPEC QL: NORMAL
P AXIS: 59 DEGREES
P AXIS: 64 DEGREES
PR INTERVAL: 160 MS
PR INTERVAL: 166 MS
QRS AXIS: 35 DEGREES
QRS AXIS: 42 DEGREES
QRSD INTERVAL: 88 MS
QRSD INTERVAL: 92 MS
QT INTERVAL: 412 MS
QT INTERVAL: 446 MS
QTC INTERVAL: 414 MS
QTC INTERVAL: 414 MS
RSV B RNA SPEC QL NAA+PROBE: NORMAL
T WAVE AXIS: 57 DEGREES
T WAVE AXIS: 64 DEGREES
VENTRICULAR RATE: 52 BPM
VENTRICULAR RATE: 61 BPM

## 2017-12-06 NOTE — DISCHARGE INSTRUCTIONS
Chest Pain   WHAT YOU NEED TO KNOW:   Chest pain can be caused by a range of conditions, from not serious to life-threatening  Chest pain can be a symptom of a digestive problem, such as acid reflux or a stomach ulcer  An anxiety attack or a strong emotion, such as anger, can also cause chest pain  Infection, inflammation, or a fracture in the bones or cartilage in your chest can cause pain or discomfort  Sometimes chest pain or pressure is caused by poor blood flow to your heart (angina)  Chest pain may also be caused by life-threatening conditions such as a heart attack or blood clot in your lungs  DISCHARGE INSTRUCTIONS:   Call 911 if:   · You have any of the following signs of a heart attack:      ¨ Squeezing, pressure, or pain in your chest that lasts longer than 5 minutes or returns    ¨ Discomfort or pain in your back, neck, jaw, stomach, or arm     ¨ Trouble breathing    ¨ Nausea or vomiting    ¨ Lightheadedness or a sudden cold sweat, especially with chest pain or trouble breathing    Return to the emergency department if:   · You have chest discomfort that gets worse, even with medicine  · You cough or vomit blood  · Your bowel movements are black or bloody  · You cannot stop vomiting, or it hurts to swallow  Contact your healthcare provider if:   · You have questions or concerns about your condition or care  Medicines:   · Medicines  may be given to treat the cause of your chest pain  Examples include pain medicine, anxiety medicine, or medicines to increase blood flow to your heart  · Do not take certain medicines without asking your healthcare provider first   These include NSAIDs, herbal or vitamin supplements, or hormones (estrogen or progestin)  · Take your medicine as directed  Contact your healthcare provider if you think your medicine is not helping or if you have side effects  Tell him or her if you are allergic to any medicine   Keep a list of the medicines, vitamins, and herbs you take  Include the amounts, and when and why you take them  Bring the list or the pill bottles to follow-up visits  Carry your medicine list with you in case of an emergency  Follow up with your healthcare provider within 72 hours, or as directed: You may need to return for more tests to find the cause of your chest pain  You may be referred to a specialist, such as a cardiologist or gastroenterologist  Write down your questions so you remember to ask them during your visits  Healthy living tips: The following are general healthy guidelines  If your chest pain is caused by a heart problem, your healthcare provider will give you specific guidelines to follow  · Do not smoke  Nicotine and other chemicals in cigarettes and cigars can cause lung and heart damage  Ask your healthcare provider for information if you currently smoke and need help to quit  E-cigarettes or smokeless tobacco still contain nicotine  Talk to your healthcare provider before you use these products  · Eat a variety of healthy, low-fat foods  Healthy foods include fruits, vegetables, whole-grain breads, low-fat dairy products, beans, lean meats, and fish  Ask for more information about a heart healthy diet  · Ask about activity  Your healthcare provider will tell you which activities to limit or avoid  Ask when you can drive, return to work, and have sex  Ask about the best exercise plan for you  · Maintain a healthy weight  Ask your healthcare provider how much you should weigh  Ask him or her to help you create a weight loss plan if you are overweight  · Get the flu and pneumonia vaccines  All adults should get the influenza (flu) vaccine  Get it every year as soon as it becomes available  The pneumococcal vaccine is given to adults aged 72 years or older  The vaccine is given every 5 years to prevent pneumococcal disease, such as pneumonia    © 2017 [employer] Information is for End User's use only and may not be sold, redistributed or otherwise used for commercial purposes  All illustrations and images included in CareNotes® are the copyrighted property of [employer]  or Mihir Castro  The above information is an  only  It is not intended as medical advice for individual conditions or treatments  Talk to your doctor, nurse or pharmacist before following any medical regimen to see if it is safe and effective for you  Acute Nausea and Vomiting   WHAT YOU NEED TO KNOW:   Acute nausea and vomiting start suddenly, worsen quickly, and last a short time  DISCHARGE INSTRUCTIONS:   Seek care immediately if:   · You see blood in your vomit or your bowel movements  · You have sudden, severe pain in your chest and upper abdomen after hard vomiting or retching  · You have swelling in your neck and chest      · You are dizzy, cold, and thirsty and your eyes and mouth are dry  · You are urinating very little or not at all  · You have muscle weakness, leg cramps, and trouble breathing  · Your heart is beating much faster than normal      · You continue to vomit for more than 48 hours  Contact your healthcare provider if:   · You have frequent dry heaves (vomiting but nothing comes out)  · Your nausea and vomiting does not get better or go away after you use medicine  · You have questions or concerns about your condition or treatment  Medicines: You may need any of the following:  · Medicines  may be given to calm your stomach and stop your vomiting  You may also need medicines to help you feel more relaxed or to stop nausea and vomiting caused by motion sickness  · Gastrointestinal stimulants  are used to help empty your stomach and bowels  This may help decrease nausea and vomiting  · Take your medicine as directed  Contact your healthcare provider if you think your medicine is not helping or if you have side effects   Tell him or her if you are allergic to any medicine  Keep a list of the medicines, vitamins, and herbs you take  Include the amounts, and when and why you take them  Bring the list or the pill bottles to follow-up visits  Carry your medicine list with you in case of an emergency  Prevent or manage acute nausea and vomiting:   · Do not drink alcohol  Alcohol may upset or irritate your stomach  Too much alcohol can also cause acute nausea and vomiting  · Control stress  Headaches due to stress may cause nausea and vomiting  Find ways to relax and manage your stress  Get more rest and sleep  · Drink more liquids as directed  Vomiting can lead to dehydration  It is important to drink more liquids to help replace lost body fluids  Ask your healthcare provider how much liquid to drink each day and which liquids are best for you  Your provider may recommend that you drink an oral rehydration solution (ORS)  ORS contains water, salts, and sugar that are needed to replace the lost body fluids  Ask what kind of ORS to use, how much to drink, and where to get it  · Eat smaller meals, more often  Eat small amounts of food every 2 to 3 hours, even if you are not hungry  Food in your stomach may decrease your nausea  · Talk to your healthcare provider before you take over-the-counter (OTC) medicines  These medicines can cause serious problems if you use certain other medicines, or you have a medical condition  You may have problems if you use too much or use them for longer than the label says  Follow directions on the label carefully  Follow up with your healthcare provider as directed:  Write down your questions so you remember to ask them during your visits  © 2017 [employer]  Information is for End User's use only and may not be sold, redistributed or otherwise used for commercial purposes   All illustrations and images included in CareNotes® are the copyrighted property of [employer] M [employer]  or [employer]  The above information is an  only  It is not intended as medical advice for individual conditions or treatments  Talk to your doctor, nurse or pharmacist before following any medical regimen to see if it is safe and effective for you  Enteritis   WHAT YOU NEED TO KNOW:   Enteritis is inflammation of the small intestine  It may be caused by eating foods or drinking liquids contaminated with a virus, bacteria, or parasites  It may also be caused by certain medicines, damage from radiation, and medical conditions such as Crohn disease  DISCHARGE INSTRUCTIONS:   Seek care immediately if:   · You cannot stop vomiting  · You have not urinated for 12 hours  Contact your healthcare provider if:   · You have a fever over 101 5  · You have blood or mucus in your bowel movements  · You continue to vomit or have diarrhea for more than 3 days, even after treatment  · You have a dry mouth and eyes, you are urinating less than usual, and you feel dizzy when you stand up  · Your mouth or eyes are dry  You are not urinating as much or as often  · You are losing weight without trying  · You have questions or concerns about your condition or care  Medicines:   · Medicines  may be given to fight an infection caused by bacteria or a parasite  You may also need medicines to slow or stop your diarrhea or vomiting  Do not take these medicines unless your healthcare provider say it is okay  Other medicines may be needed to treat medical conditions that are causing enteritis  · Take your medicine as directed  Contact your healthcare provider if you think your medicine is not helping or if you have side effects  Tell him of her if you are allergic to any medicine  Keep a list of the medicines, vitamins, and herbs you take  Include the amounts, and when and why you take them  Bring the list or the pill bottles to follow-up visits   Carry your medicine list with you in case of an emergency  Manage enteritis:   · Eat foods that help to decrease symptoms  Limit or avoid foods and liquids that are high in sugar, fat, and fiber to help relieve diarrhea  It may be helpful to avoid lactose  Lactose is a type of sugar that is found in milk products  You may be able to tolerate soups, broths, well-cooked vegetables, canned fruit, and baked or broiled meats  Ask your dietitian or healthcare provider if you should follow a special diet  You may need to avoid other foods if you have certain medical conditions such as celiac disease  · Drink liquids as directed  Ask how much liquid to drink each day and which liquids are best for you  It is important to prevent or treat dehydration  Even if you have been vomiting, suck on ice chips or take small sips of clear liquids often  Slowly increase the amount of clear liquids you drink  If you become dehydrated, you may need IV liquids  · Drink an oral rehydration solution (ORS) as directed  An ORS contains water, salts, and sugar that are needed to replace lost body fluids  Ask what kind of ORS to use, how much to drink, and where to get it  Prevent enteritis:  Enteritis that is caused by bacteria, parasites, or viruses can be prevented  The following may help to prevent this type of enteritis:  · Wash your hands often  Use soap and water  Wash your hands after you use the bathroom, change a child's diapers, or sneeze  Wash your hands before you prepare or eat food  · Clean surfaces and do laundry often  Wash your clothes and towels separately from the rest of the laundry  Clean surfaces in your home with antibacterial  or bleach  · Clean food thoroughly and cook safely  Wash raw vegetables before you cook  Cook meat, fish, and eggs fully  Do not use the same dishes for raw meat as you do for other foods  Refrigerate any leftover food immediately  · Be aware when you camp or travel  Drink only clean water   Do not drink from rivers or lakes unless you purify or boil the water first  When you travel, drink bottled water and do not add ice  Do not eat fruit that has not been peeled  Do not eat raw fish or meat that is not fully cooked  Follow up with your healthcare provider as directed:  Write down your questions so you remember to ask them during your visits  © 2017 [employer] Information is for End User's use only and may not be sold, redistributed or otherwise used for commercial purposes  All illustrations and images included in CareNotes® are the copyrighted property of [employer]  or Mihir Castro  The above information is an  only  It is not intended as medical advice for individual conditions or treatments  Talk to your doctor, nurse or pharmacist before following any medical regimen to see if it is safe and effective for you  Noncardiac Chest Pain   WHAT YOU NEED TO KNOW:   Noncardiac chest pain is pain or discomfort in your chest not caused by a heart problem  It may be caused by acid reflux, nerve or muscle problems within your esophagus, or chest wall, muscle, or rib pain  It may also be caused by panic attacks, anxiety, or depression  DISCHARGE INSTRUCTIONS:   Seek care immediately if:   · You have severe chest pain  Contact your healthcare provider if:   · Your chest pain does not get better, even with treatment  · You have questions or concerns about your condition or care  Medicines:   · Medicines  may be given to treat the cause of your chest pain  You may be given medicines to decrease pain, relieve anxiety, decrease acid reflux, or relax muscles in your esophagus  · Take your medicine as directed  Contact your healthcare provider if you think your medicine is not helping or if you have side effects  Tell him or her if you are allergic to any medicine  Keep a list of the medicines, vitamins, and herbs you take   Include the amounts, and when and why you take them  Bring the list or the pill bottles to follow-up visits  Carry your medicine list with you in case of an emergency  Cognitive therapy:  Cognitive therapy may be helpful if you have panic attacks, anxiety, or depression  It can help you change how you react to situations that tend to trigger your chest pain  Follow up with your healthcare provider as directed:  Write down your questions so you remember to ask them during your visits  © 2017 [employer] Information is for End User's use only and may not be sold, redistributed or otherwise used for commercial purposes  All illustrations and images included in CareNotes® are the copyrighted property of [employer]  or Mihir Castro  The above information is an  only  It is not intended as medical advice for individual conditions or treatments  Talk to your doctor, nurse or pharmacist before following any medical regimen to see if it is safe and effective for you

## 2017-12-06 NOTE — ED NOTES
D/c instructions and rx reviewed w/ pt  All questions and concerns addressed at this time         Scooby Pettit RN  12/05/17 2707

## 2017-12-09 ENCOUNTER — HOSPITAL ENCOUNTER (EMERGENCY)
Facility: HOSPITAL | Age: 52
Discharge: HOME | End: 2017-12-09
Attending: EMERGENCY MEDICINE
Payer: COMMERCIAL

## 2017-12-09 ENCOUNTER — APPOINTMENT (EMERGENCY)
Dept: RADIOLOGY | Facility: HOSPITAL | Age: 52
End: 2017-12-09
Payer: COMMERCIAL

## 2017-12-09 ENCOUNTER — APPOINTMENT (EMERGENCY)
Dept: CT IMAGING | Facility: HOSPITAL | Age: 52
End: 2017-12-09
Payer: COMMERCIAL

## 2017-12-09 VITALS
DIASTOLIC BLOOD PRESSURE: 92 MMHG | RESPIRATION RATE: 14 BRPM | SYSTOLIC BLOOD PRESSURE: 188 MMHG | TEMPERATURE: 98.4 F | HEART RATE: 49 BPM | OXYGEN SATURATION: 94 %

## 2017-12-09 DIAGNOSIS — R07.89 MUSCULOSKELETAL CHEST PAIN: ICD-10-CM

## 2017-12-09 DIAGNOSIS — R10.9 NONSPECIFIC ABDOMINAL PAIN: Primary | ICD-10-CM

## 2017-12-09 LAB
ALBUMIN SERPL BCP-MCNC: 4.4 G/DL (ref 3.5–5)
ALBUMIN SERPL BCP-MCNC: 4.4 G/DL (ref 3.5–5)
ALP SERPL-CCNC: 63 U/L (ref 46–116)
ALP SERPL-CCNC: 63 U/L (ref 46–116)
ALT SERPL W P-5'-P-CCNC: 33 U/L (ref 12–78)
ALT SERPL W P-5'-P-CCNC: 33 U/L (ref 12–78)
ANION GAP SERPL CALCULATED.3IONS-SCNC: 9 MMOL/L (ref 4–13)
AST SERPL W P-5'-P-CCNC: 12 U/L (ref 5–45)
AST SERPL W P-5'-P-CCNC: 12 U/L (ref 5–45)
BASOPHILS # BLD AUTO: 0.02 THOUSANDS/ΜL (ref 0–0.1)
BASOPHILS NFR BLD AUTO: 0 % (ref 0–1)
BILIRUB DIRECT SERPL-MCNC: 0.11 MG/DL (ref 0–0.2)
BILIRUB SERPL-MCNC: 0.3 MG/DL (ref 0.2–1)
BILIRUB SERPL-MCNC: 0.3 MG/DL (ref 0.2–1)
BUN SERPL-MCNC: 16 MG/DL (ref 5–25)
CALCIUM SERPL-MCNC: 9.9 MG/DL (ref 8.3–10.1)
CHLORIDE SERPL-SCNC: 101 MMOL/L (ref 100–108)
CO2 SERPL-SCNC: 30 MMOL/L (ref 21–32)
CREAT SERPL-MCNC: 1.1 MG/DL (ref 0.6–1.3)
EOSINOPHIL # BLD AUTO: 0.02 THOUSAND/ΜL (ref 0–0.61)
EOSINOPHIL NFR BLD AUTO: 0 % (ref 0–6)
ERYTHROCYTE [DISTWIDTH] IN BLOOD BY AUTOMATED COUNT: 12.2 % (ref 11.6–15.1)
GFR SERPL CREATININE-BSD FRML MDRD: 76 ML/MIN/1.73SQ M
GLUCOSE SERPL-MCNC: 146 MG/DL (ref 65–140)
HCT VFR BLD AUTO: 49.2 % (ref 36.5–49.3)
HGB BLD-MCNC: 16.4 G/DL (ref 12–17)
LIPASE SERPL-CCNC: 596 U/L (ref 73–393)
LYMPHOCYTES # BLD AUTO: 0.98 THOUSANDS/ΜL (ref 0.6–4.47)
LYMPHOCYTES NFR BLD AUTO: 10 % (ref 14–44)
MCH RBC QN AUTO: 29.4 PG (ref 26.8–34.3)
MCHC RBC AUTO-ENTMCNC: 33.3 G/DL (ref 31.4–37.4)
MCV RBC AUTO: 88 FL (ref 82–98)
MONOCYTES # BLD AUTO: 0.44 THOUSAND/ΜL (ref 0.17–1.22)
MONOCYTES NFR BLD AUTO: 4 % (ref 4–12)
NEUTROPHILS # BLD AUTO: 8.64 THOUSANDS/ΜL (ref 1.85–7.62)
NEUTS SEG NFR BLD AUTO: 85 % (ref 43–75)
NRBC BLD AUTO-RTO: 0 /100 WBCS
PLATELET # BLD AUTO: 326 THOUSANDS/UL (ref 149–390)
PMV BLD AUTO: 9.7 FL (ref 8.9–12.7)
POTASSIUM SERPL-SCNC: 4.2 MMOL/L (ref 3.5–5.3)
PROT SERPL-MCNC: 8.1 G/DL (ref 6.4–8.2)
PROT SERPL-MCNC: 8.1 G/DL (ref 6.4–8.2)
RBC # BLD AUTO: 5.58 MILLION/UL (ref 3.88–5.62)
SODIUM SERPL-SCNC: 140 MMOL/L (ref 136–145)
TROPONIN I SERPL-MCNC: <0.02 NG/ML
WBC # BLD AUTO: 10.17 THOUSAND/UL (ref 4.31–10.16)

## 2017-12-09 PROCEDURE — 83690 ASSAY OF LIPASE: CPT | Performed by: EMERGENCY MEDICINE

## 2017-12-09 PROCEDURE — 74177 CT ABD & PELVIS W/CONTRAST: CPT

## 2017-12-09 PROCEDURE — 71020 HB CHEST X-RAY 2VW FRONTAL&LATL: CPT

## 2017-12-09 PROCEDURE — 96361 HYDRATE IV INFUSION ADD-ON: CPT

## 2017-12-09 PROCEDURE — 84484 ASSAY OF TROPONIN QUANT: CPT

## 2017-12-09 PROCEDURE — 93005 ELECTROCARDIOGRAM TRACING: CPT

## 2017-12-09 PROCEDURE — 93005 ELECTROCARDIOGRAM TRACING: CPT | Performed by: EMERGENCY MEDICINE

## 2017-12-09 PROCEDURE — 85025 COMPLETE CBC W/AUTO DIFF WBC: CPT

## 2017-12-09 PROCEDURE — C9113 INJ PANTOPRAZOLE SODIUM, VIA: HCPCS | Performed by: EMERGENCY MEDICINE

## 2017-12-09 PROCEDURE — 96374 THER/PROPH/DIAG INJ IV PUSH: CPT

## 2017-12-09 PROCEDURE — 99285 EMERGENCY DEPT VISIT HI MDM: CPT

## 2017-12-09 PROCEDURE — 80076 HEPATIC FUNCTION PANEL: CPT | Performed by: EMERGENCY MEDICINE

## 2017-12-09 PROCEDURE — 36415 COLL VENOUS BLD VENIPUNCTURE: CPT

## 2017-12-09 PROCEDURE — 80053 COMPREHEN METABOLIC PANEL: CPT

## 2017-12-09 PROCEDURE — 96375 TX/PRO/DX INJ NEW DRUG ADDON: CPT

## 2017-12-09 RX ORDER — PANTOPRAZOLE SODIUM 40 MG/1
40 INJECTION, POWDER, FOR SOLUTION INTRAVENOUS ONCE
Status: COMPLETED | OUTPATIENT
Start: 2017-12-09 | End: 2017-12-09

## 2017-12-09 RX ORDER — ONDANSETRON 2 MG/ML
4 INJECTION INTRAMUSCULAR; INTRAVENOUS ONCE
Status: DISCONTINUED | OUTPATIENT
Start: 2017-12-09 | End: 2017-12-09

## 2017-12-09 RX ORDER — METOCLOPRAMIDE 10 MG/1
10 TABLET ORAL EVERY 6 HOURS
Qty: 30 TABLET | Refills: 0 | Status: SHIPPED | OUTPATIENT
Start: 2017-12-09 | End: 2018-02-15 | Stop reason: ALTCHOICE

## 2017-12-09 RX ORDER — METOCLOPRAMIDE HYDROCHLORIDE 5 MG/ML
10 INJECTION INTRAMUSCULAR; INTRAVENOUS ONCE
Status: COMPLETED | OUTPATIENT
Start: 2017-12-09 | End: 2017-12-09

## 2017-12-09 RX ORDER — DIPHENHYDRAMINE HYDROCHLORIDE 50 MG/ML
25 INJECTION INTRAMUSCULAR; INTRAVENOUS ONCE
Status: COMPLETED | OUTPATIENT
Start: 2017-12-09 | End: 2017-12-09

## 2017-12-09 RX ADMIN — DIPHENHYDRAMINE HYDROCHLORIDE 25 MG: 50 INJECTION, SOLUTION INTRAMUSCULAR; INTRAVENOUS at 14:14

## 2017-12-09 RX ADMIN — PANTOPRAZOLE SODIUM 40 MG: 40 INJECTION, POWDER, FOR SOLUTION INTRAVENOUS at 14:08

## 2017-12-09 RX ADMIN — IOHEXOL 100 ML: 350 INJECTION, SOLUTION INTRAVENOUS at 14:42

## 2017-12-09 RX ADMIN — SODIUM CHLORIDE 1000 ML: 0.9 INJECTION, SOLUTION INTRAVENOUS at 14:04

## 2017-12-09 RX ADMIN — METOCLOPRAMIDE 10 MG: 5 INJECTION, SOLUTION INTRAMUSCULAR; INTRAVENOUS at 14:21

## 2017-12-09 NOTE — DISCHARGE INSTRUCTIONS
Continue on parents all  Reglan every 6 hours if needed for nausea  Follow up with Gastroenterology for further diagnostic testing    Abdominal Pain   WHAT YOU NEED TO KNOW:   Abdominal pain can be dull, achy, or sharp  You may have pain in one area of your abdomen, or in your entire abdomen  Your pain may be caused by a condition such as constipation, food sensitivity or poisoning, infection, or a blockage  Abdominal pain can also be from a hernia, appendicitis, or an ulcer  Liver, gallbladder, or kidney conditions can also cause abdominal pain  The cause of your abdominal pain may be unknown  DISCHARGE INSTRUCTIONS:   Return to the emergency department if:   · You have new chest pain or shortness of breath  · You have pulsing pain in your upper abdomen or lower back that suddenly becomes constant  · Your pain is in the right lower abdominal area and worsens with movement  · You have a fever over 100 4°F (38°C) or shaking chills  · You are vomiting and cannot keep food or liquids down  · Your pain does not improve or gets worse over the next 8 to 12 hours  · You see blood in your vomit or bowel movements, or they look black and tarry  · Your skin or the whites of your eyes turn yellow  · You are a woman and have a large amount of vaginal bleeding that is not your monthly period  Contact your healthcare provider if:   · You have pain in your lower back  · You are a man and have pain in your testicles  · You have pain when you urinate  · You have questions or concerns about your condition or care  Follow up with your healthcare provider within 24 hours or as directed:  Write down your questions so you remember to ask them during your visits  Medicines:   · Medicines  may be given to calm your stomach and prevent vomiting or to decrease pain  Ask how to take pain medicine safely  · Take your medicine as directed    Contact your healthcare provider if you think your medicine is not helping or if you have side effects  Tell him of her if you are allergic to any medicine  Keep a list of the medicines, vitamins, and herbs you take  Include the amounts, and when and why you take them  Bring the list or the pill bottles to follow-up visits  Carry your medicine list with you in case of an emergency  © 2017 [employer] Information is for End User's use only and may not be sold, redistributed or otherwise used for commercial purposes  All illustrations and images included in CareNotes® are the copyrighted property of [employer]  or [employer]ihir [employer]  The above information is an  only  It is not intended as medical advice for individual conditions or treatments  Talk to your doctor, nurse or pharmacist before following any medical regimen to see if it is safe and effective for you

## 2017-12-09 NOTE — ED PROVIDER NOTES
History  Chief Complaint   Patient presents with    Chest Pain     Patient c/o chest pain that started this morning  Patients states he has also been vomiting on and off since Thursday  HPI patient is a 50-year-old male, history of previous problems with his stomach and colon in the past describes ulcerative colitis  Patient apparently seen here on December 5th, had a workup at that time no intra abdominal pathology found other than the patient's colitis and the patient also had chest pain at that time felt possibly to be musculoskeletal   He returns due to persistent epigastric pain which now he reports radiates up to his chest   He reports intermittent vomiting since being here on Thursday  Denies any shortness of breath  Denies any diaphoresis  Denies any known heart disease  He reports that his abdominal pain is a crampy left upper quadrant pain without radiation  Patient's chest pain is worse with breathing and somewhat with movement of his arms  Past medical history ulcerative colitis diverticulitis  Family history noncontributory  Social history, never smoker, denies drug abuse    Prior to Admission Medications   Prescriptions Last Dose Informant Patient Reported? Taking?    HYDROcodone-acetaminophen (NORCO) 5-325 mg per tablet   No No   Sig: Take 1 tablet by mouth every 6 (six) hours as needed for pain for up to 10 days Max Daily Amount: 4 tablets   amLODIPine (NORVASC) 2 5 mg tablet   Yes No   Sig: Take 2 5 mg by mouth daily   buPROPion (WELLBUTRIN) 75 mg tablet   Yes No   Sig: Take 75 mg by mouth 2 (two) times a day   dicyclomine (BENTYL) 20 mg tablet   No No   Sig: Take 1 tablet by mouth 2 (two) times a day   dicyclomine (BENTYL) 20 mg tablet   No No   Sig: Take 1 tablet by mouth every 8 (eight) hours as needed (abdominal pain)   omeprazole (PriLOSEC) 20 mg delayed release capsule   Yes No   Sig: Take 20 mg by mouth daily   ondansetron (ZOFRAN-ODT) 4 mg disintegrating tablet   No No   Sig: Take 1 tablet by mouth every 8 (eight) hours as needed for nausea or vomiting for up to 20 doses   ondansetron (ZOFRAN-ODT) 4 mg disintegrating tablet   No No   Sig: Take 1 tablet by mouth every 6 (six) hours as needed for nausea or vomiting   sertraline (ZOLOFT) 100 mg tablet   Yes No   Sig: Take 200 mg by mouth daily      Facility-Administered Medications: None       Past Medical History:   Diagnosis Date    Diverticulitis     GERD (gastroesophageal reflux disease)     Hypertension     [facility]        History reviewed  No pertinent surgical history  History reviewed  No pertinent family history  I have reviewed and agree with the history as documented  Social History   Substance Use Topics    Smoking status: Never Smoker    Smokeless tobacco: Never Used    Alcohol use No      Comment: quit 1 5 yrs ago        Review of Systems   Constitutional: Negative for diaphoresis, fatigue and fever  HENT: Negative for congestion, ear pain, nosebleeds and sore throat  Eyes: Negative for photophobia, pain, discharge and visual disturbance  Respiratory: Negative for cough, choking, chest tightness, shortness of breath and wheezing  Cardiovascular: Positive for chest pain  Negative for palpitations  Gastrointestinal: Positive for abdominal pain  Negative for abdominal distention, diarrhea and vomiting  Genitourinary: Negative for dysuria, flank pain and frequency  Musculoskeletal: Negative for back pain, gait problem and joint swelling  Skin: Negative for color change and rash  Neurological: Negative for dizziness, syncope and headaches  Psychiatric/Behavioral: Negative for behavioral problems and confusion  The patient is not nervous/anxious  All other systems reviewed and are negative        Physical Exam  ED Triage Vitals   Temperature Pulse Respirations Blood Pressure SpO2   12/09/17 1350 12/09/17 1343 12/09/17 1343 12/09/17 1349 12/09/17 1343   98 4 °F (36 9 °C) 55 18 (!) 204/107 97 %      Temp Source Heart Rate Source Patient Position - Orthostatic VS BP Location FiO2 (%)   12/09/17 1350 12/09/17 1343 -- -- --   Oral Monitor         Pain Score       --                  Orthostatic Vital Signs  Vitals:    12/09/17 1430 12/09/17 1500 12/09/17 1515 12/09/17 1530   BP: (!) 179/90 (!) 188/92     Pulse: 84 61 77 (!) 49       Physical Exam   Constitutional: He is oriented to person, place, and time  He appears well-developed and well-nourished  HENT:   Head: Normocephalic  Right Ear: External ear normal    Left Ear: External ear normal    Nose: Nose normal    Mouth/Throat: Oropharynx is clear and moist    Eyes: EOM and lids are normal  Pupils are equal, round, and reactive to light  Neck: Normal range of motion  Neck supple  Cardiovascular: Normal rate, regular rhythm, normal heart sounds and intact distal pulses  Pulmonary/Chest: Effort normal and breath sounds normal  No respiratory distress  Abdominal: Soft  Bowel sounds are normal  He exhibits no distension and no mass  There is tenderness  There is no rebound and no guarding  No hernia  There is mild epigastric pain without rebound or guarding,   Musculoskeletal: Normal range of motion  He exhibits no deformity  Neurological: He is alert and oriented to person, place, and time  Skin: Skin is warm and dry  Psychiatric: He has a normal mood and affect  Nursing note and vitals reviewed     Pulse oximetry 94% on room air adequate oxygenation, no hypoxia    ED Medications  Medications   sodium chloride 0 9 % bolus 1,000 mL (0 mL Intravenous Stopped 12/9/17 1523)   pantoprazole (PROTONIX) injection 40 mg (40 mg Intravenous Given 12/9/17 1408)   metoclopramide (REGLAN) injection 10 mg (10 mg Intravenous Given 12/9/17 1421)   diphenhydrAMINE (BENADRYL) injection 25 mg (25 mg Intravenous Given 12/9/17 1414)   iohexol (OMNIPAQUE) 350 MG/ML injection (MULTI-DOSE) 100 mL (100 mL Intravenous Given 12/9/17 1442) Diagnostic Studies  Results Reviewed     Procedure Component Value Units Date/Time    Troponin I [[id number]]  (Normal) Collected:  12/09/17 1351    Lab Status:  Final result Specimen:  Blood from Arm, Left Updated:  12/09/17 1425     Troponin I <0 02 ng/mL     Narrative:         [employer] Chemistry analyzer 99% cutoff is > 0 04 ng/mL in network labs    o cTnI 99% cutoff is useful only when applied to patients in the clinical setting of myocardial ischemia  o cTnI 99% cutoff should be interpreted in the context of clinical history, ECG findings and possibly cardiac imaging to establish correct diagnosis  o cTnI 99% cutoff may be suggestive but clearly not indicative of a coronary event without the clinical setting of myocardial ischemia  Comprehensive metabolic panel [[id number]]  (Abnormal) Collected:  12/09/17 1351    Lab Status:  Final result Specimen:  Blood from Arm, Left Updated:  12/09/17 1422     Sodium 140 mmol/L      Potassium 4 2 mmol/L      Chloride 101 mmol/L      CO2 30 mmol/L      Anion Gap 9 mmol/L      BUN 16 mg/dL      Creatinine 1 10 mg/dL      Glucose 146 (H) mg/dL      Calcium 9 9 mg/dL      AST 12 U/L      ALT 33 U/L      Alkaline Phosphatase 63 U/L      Total Protein 8 1 g/dL      Albumin 4 4 g/dL      Total Bilirubin 0 30 mg/dL      eGFR 76 ml/min/1 73sq m     Narrative:         National Kidney Disease Education Program recommendations are as follows:  GFR calculation is accurate only with a steady state creatinine  Chronic Kidney disease less than 60 ml/min/1 73 sq  meters  Kidney failure less than 15 ml/min/1 73 sq  meters      Lipase [[id number]]  (Abnormal) Collected:  12/09/17 1351    Lab Status:  Final result Specimen:  Blood from Arm, Left Updated:  12/09/17 1422     Lipase 596 (H) u/L     Hepatic function panel [[id number]]  (Normal) Collected:  12/09/17 1351    Lab Status:  Final result Specimen:  Blood from Arm, Left Updated:  12/09/17 1422     Total Bilirubin 0 30 mg/dL Bilirubin, Direct 0 11 mg/dL      Alkaline Phosphatase 63 U/L      AST 12 U/L      ALT 33 U/L      Total Protein 8 1 g/dL      Albumin 4 4 g/dL     CBC and differential [[id number]]  (Abnormal) Collected:  12/09/17 1351    Lab Status:  Final result Specimen:  Blood from Arm, Left Updated:  12/09/17 1401     WBC 10 17 (H) Thousand/uL      RBC 5 58 Million/uL      Hemoglobin 16 4 g/dL      Hematocrit 49 2 %      MCV 88 fL      MCH 29 4 pg      MCHC 33 3 g/dL      RDW 12 2 %      MPV 9 7 fL      Platelets 542 Thousands/uL      nRBC 0 /100 WBCs      Neutrophils Relative 85 (H) %      Lymphocytes Relative 10 (L) %      Monocytes Relative 4 %      Eosinophils Relative 0 %      Basophils Relative 0 %      Neutrophils Absolute 8 64 (H) Thousands/µL      Lymphocytes Absolute 0 98 Thousands/µL      Monocytes Absolute 0 44 Thousand/µL      Eosinophils Absolute 0 02 Thousand/µL      Basophils Absolute 0 02 Thousands/µL     Basic metabolic panel [[id number]]     Lab Status:  No result Specimen:  Blood                  CT abdomen pelvis w contrast   Final Result by Braxton Nielsen DO (12/09 1509)      No acute intra-abdominal abnormality  Colonic diverticulosis without evidence of diverticulitis  Mild prostatomegaly  Workstation performed: [id number]         X-ray chest 2 views   Final Result by Len Santizo MD (12/09 9223)      No active pulmonary disease           Workstation performed: [id number]                    Procedures  ECG 12 Lead Documentation  Date/Time: 12/9/2017 1:57 PM  Performed by: Su Ricci by: Monserrat Villanueva     Indications / Diagnosis:  Chest pain  ECG reviewed by me, the ED Provider: yes    Patient location:  ED  Previous ECG:     Previous ECG:  Compared to current    Comparison ECG info:  December 5, 2017    Similarity:  No change  Interpretation:     Interpretation: non-specific    Quality:     Tracing quality:  Limited by artifact  Rate:     ECG rate:  Sixty-two    ECG rate assessment: normal    Rhythm:     Rhythm: sinus rhythm    Ectopy:     Ectopy: none    Conduction:     Conduction: normal    ST segments:     ST segments:  Non-specific  Comments:      Normal sinus rhythm no acute ST-T wave changes no real change from prior EKG  Phone Contacts  ED Phone Contact    ED Course  ED Course         Chest x-ray: Chest x-ray showed a normal cardiac silhouette, no pneumothorax no infiltrates, No sign of pathology, interpreted by me, I was the primary   CT scan abdomen pelvis showed no acute pathology  Cardiac troponin was negative despite several days of symptoms  Patient's electrolytes were within normal limits  Patient's liver functions were normal    patient's white count was minimally elevated at 10 1, hemoglobin was normal at 16 4 no sign of anemia  patient has slightly elevated lipase at 596, nonspecific finding  No pancreatitis seen on CT scan  because the patient returned had persistent pain I repeated a CT scan showed no acute pathology  Patient was given Reglan here there was no further vomiting he felt markedly improved  We discussed outpatient treatment with Gastroenterology  MDM medical decision making 62-year-old male with recurrent abdominal pain, patient has history of feels of colitis, will restart his colitis medicines, minimal elevation in the lipase, discussed the patient he really needs to see Gastroenterology he agrees  Chest pain is worse with movement, EKG showed no acute changes, patient has normal cardiac troponin no sign of cardiac ischemia  I believe his pain is referred from his abdomen and also there is a component reflux  Patient improved with Reglan will treat with Reglan at home  We discussed follow-up with Gastroenterology  No indication for admission or further diagnostic testing    CritCare Time    Disposition  Final diagnoses:   Nonspecific abdominal pain   Musculoskeletal chest pain     Time reflects when diagnosis was documented in both MDM as applicable and the Disposition within this note     Time User Action Codes Description Comment    12/9/2017  3:34 PM Cesar Leggett Add [R10 9] Nonspecific abdominal pain     12/9/2017  3:34 PM Cesar Leggett Add [R07 89] Musculoskeletal chest pain       ED Disposition     ED Disposition Condition Comment    Discharge  Yue Gaston discharge to home/self care      Condition at discharge: Good        Follow-up Information     Follow up With Specialties Details Why Reagan Gallego III, MD Gastroenterology   [facility] [address]/[facility] [address]  [phone]          Discharge Medication List as of 12/9/2017  3:35 PM      START taking these medications    Details   metoclopramide (REGLAN) 10 mg tablet Take 1 tablet by mouth every 6 (six) hours, Starting Sat 12/9/2017, Print         CONTINUE these medications which have NOT CHANGED    Details   amLODIPine (NORVASC) 2 5 mg tablet Take 2 5 mg by mouth daily, Until Discontinued, Historical Med      buPROPion (WELLBUTRIN) 75 mg tablet Take 75 mg by mouth 2 (two) times a day, Until Discontinued, Historical Med      !! dicyclomine (BENTYL) 20 mg tablet Take 1 tablet by mouth 2 (two) times a day, Starting Mon 8/14/2017, Print      !! dicyclomine (BENTYL) 20 mg tablet Take 1 tablet by mouth every 8 (eight) hours as needed (abdominal pain), Starting Tue 12/5/2017, Print      HYDROcodone-acetaminophen (NORCO) 5-325 mg per tablet Take 1 tablet by mouth every 6 (six) hours as needed for pain for up to 10 days Max Daily Amount: 4 tablets, Starting Tue 12/5/2017, Until Fri 12/15/2017, Print      omeprazole (PriLOSEC) 20 mg delayed release capsule Take 20 mg by mouth daily, Until Discontinued, Historical Med      !! ondansetron (ZOFRAN-ODT) 4 mg disintegrating tablet Take 1 tablet by mouth every 8 (eight) hours as needed for nausea or vomiting for up to 20 doses, Starting Mon 8/14/2017, Print      !! ondansetron (ZOFRAN-ODT) 4 mg disintegrating tablet Take 1 tablet by mouth every 6 (six) hours as needed for nausea or vomiting, Starting Tue 12/5/2017, Print      sertraline (ZOLOFT) 100 mg tablet Take 200 mg by mouth daily, Until Discontinued, Historical Med       !! - Potential duplicate medications found  Please discuss with provider  No discharge procedures on file      ED Provider  Electronically Signed by           Eligio Garcia MD  12/09/17 4283

## 2017-12-10 LAB
ATRIAL RATE: 62 BPM
P AXIS: 67 DEGREES
PR INTERVAL: 160 MS
QRS AXIS: 22 DEGREES
QRSD INTERVAL: 86 MS
QT INTERVAL: 400 MS
QTC INTERVAL: 406 MS
T WAVE AXIS: 62 DEGREES
VENTRICULAR RATE: 62 BPM

## 2018-02-15 ENCOUNTER — ANESTHESIA EVENT (EMERGENCY)
Dept: PERIOP | Facility: HOSPITAL | Age: 53
End: 2018-02-15
Payer: COMMERCIAL

## 2018-02-15 ENCOUNTER — APPOINTMENT (EMERGENCY)
Dept: CT IMAGING | Facility: HOSPITAL | Age: 53
End: 2018-02-15
Payer: COMMERCIAL

## 2018-02-15 ENCOUNTER — HOSPITAL ENCOUNTER (OUTPATIENT)
Facility: HOSPITAL | Age: 53
Setting detail: OBSERVATION
LOS: 1 days | Discharge: HOME | End: 2018-02-15
Attending: EMERGENCY MEDICINE | Admitting: INTERNAL MEDICINE
Payer: COMMERCIAL

## 2018-02-15 ENCOUNTER — ANESTHESIA (EMERGENCY)
Dept: PERIOP | Facility: HOSPITAL | Age: 53
End: 2018-02-15
Payer: COMMERCIAL

## 2018-02-15 ENCOUNTER — TELEPHONE (OUTPATIENT)
Dept: GASTROENTEROLOGY | Facility: AMBULARY SURGERY CENTER | Age: 53
End: 2018-02-15

## 2018-02-15 VITALS
TEMPERATURE: 97.8 F | BODY MASS INDEX: 31.57 KG/M2 | OXYGEN SATURATION: 94 % | HEART RATE: 92 BPM | DIASTOLIC BLOOD PRESSURE: 77 MMHG | WEIGHT: 220 LBS | SYSTOLIC BLOOD PRESSURE: 118 MMHG | RESPIRATION RATE: 18 BRPM

## 2018-02-15 DIAGNOSIS — R10.9 ABDOMINAL PAIN: ICD-10-CM

## 2018-02-15 DIAGNOSIS — K92.0 VOMITING BLOOD: Primary | ICD-10-CM

## 2018-02-15 PROBLEM — K51.90 ULCERATIVE COLITIS WITHOUT COMPLICATIONS (HCC): Status: ACTIVE | Noted: 2018-02-15

## 2018-02-15 PROBLEM — K21.00 GASTROESOPHAGEAL REFLUX DISEASE WITH ESOPHAGITIS: Status: ACTIVE | Noted: 2018-02-15

## 2018-02-15 PROBLEM — E66.811 OBESITY (BMI 30.0-34.9): Status: ACTIVE | Noted: 2018-02-15

## 2018-02-15 PROBLEM — E66.9 OBESITY (BMI 30.0-34.9): Status: ACTIVE | Noted: 2018-02-15

## 2018-02-15 PROBLEM — E78.5 DYSLIPIDEMIA: Status: ACTIVE | Noted: 2018-02-15

## 2018-02-15 PROBLEM — I10 ESSENTIAL HYPERTENSION: Status: ACTIVE | Noted: 2018-02-15

## 2018-02-15 PROBLEM — F32.A ANXIETY AND DEPRESSION: Status: ACTIVE | Noted: 2018-02-15

## 2018-02-15 PROBLEM — K22.70 BARRETT'S ESOPHAGUS: Status: ACTIVE | Noted: 2018-02-15

## 2018-02-15 PROBLEM — F41.9 ANXIETY AND DEPRESSION: Status: ACTIVE | Noted: 2018-02-15

## 2018-02-15 LAB
ABO GROUP BLD: NORMAL
ALBUMIN SERPL BCP-MCNC: 4.4 G/DL (ref 3.5–5)
ALP SERPL-CCNC: 74 U/L (ref 46–116)
ALT SERPL W P-5'-P-CCNC: 29 U/L (ref 12–78)
ANION GAP SERPL CALCULATED.3IONS-SCNC: 15 MMOL/L (ref 4–13)
APTT PPP: 30 SECONDS (ref 23–35)
AST SERPL W P-5'-P-CCNC: 14 U/L (ref 5–45)
BASE EXCESS BLDA CALC-SCNC: -3 MMOL/L (ref -2–3)
BASOPHILS # BLD MANUAL: 0 THOUSAND/UL (ref 0–0.1)
BASOPHILS NFR MAR MANUAL: 0 % (ref 0–1)
BILIRUB SERPL-MCNC: 0.3 MG/DL (ref 0.2–1)
BLD GP AB SCN SERPL QL: NEGATIVE
BUN SERPL-MCNC: 20 MG/DL (ref 5–25)
CA-I BLD-SCNC: 1.23 MMOL/L (ref 1.12–1.32)
CALCIUM SERPL-MCNC: 9.7 MG/DL (ref 8.3–10.1)
CHLORIDE SERPL-SCNC: 101 MMOL/L (ref 100–108)
CO2 SERPL-SCNC: 23 MMOL/L (ref 21–32)
CREAT SERPL-MCNC: 1.19 MG/DL (ref 0.6–1.3)
EOSINOPHIL # BLD MANUAL: 0.15 THOUSAND/UL (ref 0–0.4)
EOSINOPHIL NFR BLD MANUAL: 1 % (ref 0–6)
ERYTHROCYTE [DISTWIDTH] IN BLOOD BY AUTOMATED COUNT: 12.4 % (ref 11.6–15.1)
GFR SERPL CREATININE-BSD FRML MDRD: 69 ML/MIN/1.73SQ M
GLUCOSE SERPL-MCNC: 158 MG/DL (ref 65–140)
GLUCOSE SERPL-MCNC: 180 MG/DL (ref 65–140)
HCO3 BLDA-SCNC: 24.3 MMOL/L (ref 22–28)
HCT VFR BLD AUTO: 48.3 % (ref 36.5–49.3)
HCT VFR BLD CALC: 43 % (ref 36.5–49.3)
HGB BLD-MCNC: 16.6 G/DL (ref 12–17)
HGB BLDA-MCNC: 14.6 G/DL (ref 12–17)
HOLD SPECIMEN: NORMAL
INR PPP: 1.03 (ref 0.86–1.16)
LACTATE SERPL-SCNC: 1.9 MMOL/L (ref 0.5–2)
LG PLATELETS BLD QL SMEAR: PRESENT
LIPASE SERPL-CCNC: 446 U/L (ref 73–393)
LYMPHOCYTES # BLD AUTO: 1.05 THOUSAND/UL (ref 0.6–4.47)
LYMPHOCYTES # BLD AUTO: 7 % (ref 14–44)
MCH RBC QN AUTO: 29.9 PG (ref 26.8–34.3)
MCHC RBC AUTO-ENTMCNC: 34.4 G/DL (ref 31.4–37.4)
MCV RBC AUTO: 87 FL (ref 82–98)
MONOCYTES # BLD AUTO: 0.45 THOUSAND/UL (ref 0–1.22)
MONOCYTES NFR BLD: 3 % (ref 4–12)
NEUTROPHILS # BLD MANUAL: 13.32 THOUSAND/UL (ref 1.85–7.62)
NEUTS SEG NFR BLD AUTO: 89 % (ref 43–75)
NRBC BLD AUTO-RTO: 0 /100 WBCS
PCO2 BLD: 26 MMOL/L (ref 21–32)
PCO2 BLD: 49 MM HG (ref 36–44)
PH BLD: 7.3 [PH] (ref 7.35–7.45)
PLATELET # BLD AUTO: 343 THOUSANDS/UL (ref 149–390)
PLATELET BLD QL SMEAR: ADEQUATE
PMV BLD AUTO: 9.8 FL (ref 8.9–12.7)
PO2 BLD: 223 MM HG (ref 75–129)
POTASSIUM BLD-SCNC: 4.1 MMOL/L (ref 3.5–5.3)
POTASSIUM SERPL-SCNC: 3.6 MMOL/L (ref 3.5–5.3)
PROT SERPL-MCNC: 8.1 G/DL (ref 6.4–8.2)
PROTHROMBIN TIME: 13.7 SECONDS (ref 12.1–14.4)
RBC # BLD AUTO: 5.55 MILLION/UL (ref 3.88–5.62)
RH BLD: POSITIVE
SAO2 % BLD FROM PO2: 100 % (ref 95–98)
SODIUM BLD-SCNC: 138 MMOL/L (ref 136–145)
SODIUM SERPL-SCNC: 139 MMOL/L (ref 136–145)
SPECIMEN EXPIRATION DATE: NORMAL
SPECIMEN SOURCE: ABNORMAL
TOTAL CELLS COUNTED SPEC: 100
WBC # BLD AUTO: 14.97 THOUSAND/UL (ref 4.31–10.16)

## 2018-02-15 PROCEDURE — 85014 HEMATOCRIT: CPT

## 2018-02-15 PROCEDURE — 74176 CT ABD & PELVIS W/O CONTRAST: CPT

## 2018-02-15 PROCEDURE — 83605 ASSAY OF LACTIC ACID: CPT | Performed by: EMERGENCY MEDICINE

## 2018-02-15 PROCEDURE — 99244 OFF/OP CNSLTJ NEW/EST MOD 40: CPT | Performed by: INTERNAL MEDICINE

## 2018-02-15 PROCEDURE — 84295 ASSAY OF SERUM SODIUM: CPT

## 2018-02-15 PROCEDURE — 86850 RBC ANTIBODY SCREEN: CPT | Performed by: EMERGENCY MEDICINE

## 2018-02-15 PROCEDURE — 96365 THER/PROPH/DIAG IV INF INIT: CPT

## 2018-02-15 PROCEDURE — 85730 THROMBOPLASTIN TIME PARTIAL: CPT | Performed by: EMERGENCY MEDICINE

## 2018-02-15 PROCEDURE — 82947 ASSAY GLUCOSE BLOOD QUANT: CPT

## 2018-02-15 PROCEDURE — 96361 HYDRATE IV INFUSION ADD-ON: CPT

## 2018-02-15 PROCEDURE — 96368 THER/DIAG CONCURRENT INF: CPT

## 2018-02-15 PROCEDURE — 85027 COMPLETE CBC AUTOMATED: CPT | Performed by: EMERGENCY MEDICINE

## 2018-02-15 PROCEDURE — 85007 BL SMEAR W/DIFF WBC COUNT: CPT | Performed by: EMERGENCY MEDICINE

## 2018-02-15 PROCEDURE — 43235 EGD DIAGNOSTIC BRUSH WASH: CPT | Performed by: INTERNAL MEDICINE

## 2018-02-15 PROCEDURE — C9113 INJ PANTOPRAZOLE SODIUM, VIA: HCPCS | Performed by: PHYSICIAN ASSISTANT

## 2018-02-15 PROCEDURE — 83690 ASSAY OF LIPASE: CPT | Performed by: EMERGENCY MEDICINE

## 2018-02-15 PROCEDURE — 86900 BLOOD TYPING SEROLOGIC ABO: CPT | Performed by: EMERGENCY MEDICINE

## 2018-02-15 PROCEDURE — 99235 HOSP IP/OBS SAME DATE MOD 70: CPT | Performed by: INTERNAL MEDICINE

## 2018-02-15 PROCEDURE — 84132 ASSAY OF SERUM POTASSIUM: CPT

## 2018-02-15 PROCEDURE — 86901 BLOOD TYPING SEROLOGIC RH(D): CPT | Performed by: EMERGENCY MEDICINE

## 2018-02-15 PROCEDURE — 96375 TX/PRO/DX INJ NEW DRUG ADDON: CPT

## 2018-02-15 PROCEDURE — 96366 THER/PROPH/DIAG IV INF ADDON: CPT

## 2018-02-15 PROCEDURE — 71250 CT THORAX DX C-: CPT

## 2018-02-15 PROCEDURE — 99285 EMERGENCY DEPT VISIT HI MDM: CPT

## 2018-02-15 PROCEDURE — C9113 INJ PANTOPRAZOLE SODIUM, VIA: HCPCS | Performed by: EMERGENCY MEDICINE

## 2018-02-15 PROCEDURE — 85610 PROTHROMBIN TIME: CPT | Performed by: EMERGENCY MEDICINE

## 2018-02-15 PROCEDURE — 82330 ASSAY OF CALCIUM: CPT

## 2018-02-15 PROCEDURE — 80053 COMPREHEN METABOLIC PANEL: CPT | Performed by: EMERGENCY MEDICINE

## 2018-02-15 PROCEDURE — 36415 COLL VENOUS BLD VENIPUNCTURE: CPT | Performed by: EMERGENCY MEDICINE

## 2018-02-15 PROCEDURE — 96376 TX/PRO/DX INJ SAME DRUG ADON: CPT

## 2018-02-15 PROCEDURE — 82803 BLOOD GASES ANY COMBINATION: CPT

## 2018-02-15 RX ORDER — ATORVASTATIN CALCIUM 20 MG/1
20 TABLET, FILM COATED ORAL DAILY
Status: DISCONTINUED | OUTPATIENT
Start: 2018-02-15 | End: 2018-02-15 | Stop reason: HOSPADM

## 2018-02-15 RX ORDER — PROPOFOL 10 MG/ML
INJECTION, EMULSION INTRAVENOUS AS NEEDED
Status: DISCONTINUED | OUTPATIENT
Start: 2018-02-15 | End: 2018-02-15 | Stop reason: SURG

## 2018-02-15 RX ORDER — SERTRALINE HYDROCHLORIDE 100 MG/1
200 TABLET, FILM COATED ORAL DAILY
Status: DISCONTINUED | OUTPATIENT
Start: 2018-02-15 | End: 2018-02-15 | Stop reason: HOSPADM

## 2018-02-15 RX ORDER — ONDANSETRON 2 MG/ML
4 INJECTION INTRAMUSCULAR; INTRAVENOUS ONCE
Status: COMPLETED | OUTPATIENT
Start: 2018-02-15 | End: 2018-02-15

## 2018-02-15 RX ORDER — SUCCINYLCHOLINE CHLORIDE 20 MG/ML
INJECTION INTRAMUSCULAR; INTRAVENOUS AS NEEDED
Status: DISCONTINUED | OUTPATIENT
Start: 2018-02-15 | End: 2018-02-15 | Stop reason: SURG

## 2018-02-15 RX ORDER — SODIUM CHLORIDE 9 MG/ML
75 INJECTION, SOLUTION INTRAVENOUS CONTINUOUS
Status: DISCONTINUED | OUTPATIENT
Start: 2018-02-15 | End: 2018-02-15 | Stop reason: HOSPADM

## 2018-02-15 RX ORDER — DICYCLOMINE HCL 20 MG
20 TABLET ORAL EVERY 8 HOURS PRN
Status: DISCONTINUED | OUTPATIENT
Start: 2018-02-15 | End: 2018-02-15 | Stop reason: HOSPADM

## 2018-02-15 RX ORDER — ALBUTEROL SULFATE 2.5 MG/3ML
2.5 SOLUTION RESPIRATORY (INHALATION) ONCE
Status: COMPLETED | OUTPATIENT
Start: 2018-02-15 | End: 2018-02-15

## 2018-02-15 RX ORDER — SODIUM CHLORIDE 9 MG/ML
INJECTION, SOLUTION INTRAVENOUS CONTINUOUS PRN
Status: DISCONTINUED | OUTPATIENT
Start: 2018-02-15 | End: 2018-02-15 | Stop reason: SURG

## 2018-02-15 RX ORDER — PANTOPRAZOLE SODIUM 40 MG/1
40 INJECTION, POWDER, FOR SOLUTION INTRAVENOUS EVERY 12 HOURS SCHEDULED
Status: DISCONTINUED | OUTPATIENT
Start: 2018-02-15 | End: 2018-02-15 | Stop reason: HOSPADM

## 2018-02-15 RX ORDER — PANTOPRAZOLE SODIUM 40 MG/1
40 INJECTION, POWDER, FOR SOLUTION INTRAVENOUS ONCE
Status: COMPLETED | OUTPATIENT
Start: 2018-02-15 | End: 2018-02-15

## 2018-02-15 RX ORDER — HYDROMORPHONE HCL 110MG/55ML
2 PATIENT CONTROLLED ANALGESIA SYRINGE INTRAVENOUS ONCE
Status: COMPLETED | OUTPATIENT
Start: 2018-02-15 | End: 2018-02-15

## 2018-02-15 RX ORDER — AMLODIPINE BESYLATE 2.5 MG/1
2.5 TABLET ORAL DAILY
Status: DISCONTINUED | OUTPATIENT
Start: 2018-02-15 | End: 2018-02-15 | Stop reason: HOSPADM

## 2018-02-15 RX ORDER — METOCLOPRAMIDE HYDROCHLORIDE 5 MG/ML
10 INJECTION INTRAMUSCULAR; INTRAVENOUS ONCE
Status: COMPLETED | OUTPATIENT
Start: 2018-02-15 | End: 2018-02-15

## 2018-02-15 RX ORDER — ATORVASTATIN CALCIUM 20 MG/1
20 TABLET, FILM COATED ORAL DAILY
COMMUNITY
End: 2019-01-07 | Stop reason: SDUPTHER

## 2018-02-15 RX ORDER — EPINEPHRINE 1 MG/ML
INJECTION, SOLUTION, CONCENTRATE INTRAVENOUS
Status: DISCONTINUED
Start: 2018-02-15 | End: 2018-02-15 | Stop reason: WASHOUT

## 2018-02-15 RX ORDER — BUPROPION HYDROCHLORIDE 75 MG/1
75 TABLET ORAL 2 TIMES DAILY
Status: DISCONTINUED | OUTPATIENT
Start: 2018-02-15 | End: 2018-02-15 | Stop reason: HOSPADM

## 2018-02-15 RX ADMIN — OCTREOTIDE ACETATE 50 MCG/HR: 500 INJECTION, SOLUTION INTRAVENOUS; SUBCUTANEOUS at 12:14

## 2018-02-15 RX ADMIN — SODIUM CHLORIDE 1000 ML: 0.9 INJECTION, SOLUTION INTRAVENOUS at 11:15

## 2018-02-15 RX ADMIN — PANTOPRAZOLE SODIUM 40 MG: 40 INJECTION, POWDER, FOR SOLUTION INTRAVENOUS at 11:14

## 2018-02-15 RX ADMIN — METOCLOPRAMIDE 10 MG: 5 INJECTION, SOLUTION INTRAMUSCULAR; INTRAVENOUS at 11:14

## 2018-02-15 RX ADMIN — HYDROMORPHONE HYDROCHLORIDE 2 MG: 2 INJECTION, SOLUTION INTRAMUSCULAR; INTRAVENOUS; SUBCUTANEOUS at 11:15

## 2018-02-15 RX ADMIN — PROPOFOL 200 MG: 10 INJECTION, EMULSION INTRAVENOUS at 14:15

## 2018-02-15 RX ADMIN — SODIUM CHLORIDE: 0.9 INJECTION, SOLUTION INTRAVENOUS at 14:04

## 2018-02-15 RX ADMIN — SODIUM CHLORIDE 500 ML: 0.9 INJECTION, SOLUTION INTRAVENOUS at 10:49

## 2018-02-15 RX ADMIN — SUCCINYLCHOLINE CHLORIDE 100 MG: 20 INJECTION, SOLUTION INTRAMUSCULAR; INTRAVENOUS at 14:15

## 2018-02-15 RX ADMIN — ONDANSETRON 4 MG: 2 INJECTION INTRAMUSCULAR; INTRAVENOUS at 10:51

## 2018-02-15 RX ADMIN — ALBUTEROL SULFATE 2.5 MG: 2.5 SOLUTION RESPIRATORY (INHALATION) at 14:45

## 2018-02-15 RX ADMIN — OCTREOTIDE ACETATE 50 MCG/HR: 500 INJECTION, SOLUTION INTRAVENOUS; SUBCUTANEOUS at 12:19

## 2018-02-15 RX ADMIN — SODIUM CHLORIDE 8 MG/HR: 9 INJECTION, SOLUTION INTRAVENOUS at 11:35

## 2018-02-15 NOTE — H&P
History and Physical - [facility]    Patient Information: Keven Dumont 47 y o  male MRN: [id number]  Unit/Bed#: [facility]IR[facility] OTOOLE Encounter: [id number]  Admitting Physician: Sariah Ledesma MD  PCP: Giuseppe Agosto MD  Date of Admission:  02/15/18    Assessment/Plan:    Hospital Problem List:     Principal Problem:    Vomiting blood  Active Problems:    Abdominal pain    Anxiety and depression    Diaz's esophagus    Essential hypertension    Dyslipidemia    Obesity (BMI 30 0-34 9)    Ulcerative colitis without complications ([facility] )    Gastroesophageal reflux disease with esophagitis    Present on Admission:   Vomiting blood   Abdominal pain   Anxiety and depression   Diaz's esophagus   Essential hypertension   Dyslipidemia   Obesity (BMI 30 0-34  9)   Ulcerative colitis without complications ([facility] )      Plan for the Primary Problem(s):  · Possible vomited blood/abdominal pain  Patient currently has no abdominal pain  His hemoglobin is stable  He is going to go for EGD  If his EGD is unremarkable, he could be discharged home on his home PPI  · History of Diaz's esophagus with dysplasia and previous treatments  Patient to follow up with his primary care physician and gastroenterologist    Plan for Additional Problems:   ·  anxiety/depression  Continue with home med  · Dyslipidemia  Continue with statin  · GERD:  Continue with PPI  · Obesity  Encouraged on healthy diet and exercise    VTE Prophylaxis: Pharmacologic VTE Prophylaxis contraindicated due to Possible hematemesis  / sequential compression device   Code Status:  Full Code  POLST: There is no POLST form on file for this patient (pre-hospital)    Anticipated Length of Stay:  Patient will be admitted on an Observation basis with an anticipated length of stay of  less than 2 midnights  Justification for Hospital Stay:  Possible hematemesis    Total Time for Visit, including Counseling / Coordination of Care: 1 hour    Greater than 50% of this total time spent on direct patient counseling and coordination of care  Chief Complaint:   Vomited blood    History of Present Illness:    Sunny Grossman is a 47 y o  male who presents with possibly vomiting blood/red material associated with abdominal pain  As per patient, he had EGD and colonoscopy yesterday at [place]  Both of these procedures were for surveillance  He also had couple of polyps removed from his colon  He comes in to the hospital today with upper abdominal pain and vomited-red material and possible blood  When I saw the patient in [facility], patient was totally asymptomatic  He had no abdominal pain  He was not even feeling nauseous  He denies any chest pain  He denies any fever or chills  Denies any headache  Denies being dizzy or lightheaded  Review of Systems    All systems are reviewed  Positive as per history of presenting illness  Patient answered no to all other questions  Past Medical and Surgical History:     Past Medical History:   Diagnosis Date    Diaz's esophagus     Diverticulitis     GERD (gastroesophageal reflux disease)     Hemorrhoid     Hyperlipidemia     Hypertension     [facility]ul[facility]) ([facility]        Past Surgical History:   Procedure Laterality Date    ABDOMINAL SURGERY      radio frequency ablation    CARPAL TUNNEL RELEASE      COLONOSCOPY      EGD AND COLONOSCOPY      TONSILLECTOMY         Meds/Allergies:    Prior to Admission medications    Medication Sig Start Date End Date Taking?  Authorizing Provider   atorvastatin (LIPITOR) 20 mg tablet Take 20 mg by mouth daily   Yes Historical Provider, MD   omeprazole (PriLOSEC) 20 mg delayed release capsule Take 20 mg by mouth daily   Yes Historical Provider, MD   ondansetron (ZOFRAN-ODT) 4 mg disintegrating tablet Take 1 tablet by mouth every 8 (eight) hours as needed for nausea or vomiting for up to 20 doses 8/14/17  Yes RICKEY Yan   ondansetron (ZOFRAN-ODT) 4 mg disintegrating tablet Take 1 tablet by mouth every 6 (six) hours as needed for nausea or vomiting 12/5/17  Yes Susi Spring,    sertraline (ZOLOFT) 100 mg tablet Take 200 mg by mouth daily   Yes Historical Provider, MD   amLODIPine (NORVASC) 2 5 mg tablet Take 2 5 mg by mouth daily    Historical Provider, MD   buPROPion (WELLBUTRIN) 75 mg tablet Take 75 mg by mouth 2 (two) times a day    Historical Provider, MD   dicyclomine (BENTYL) 20 mg tablet Take 1 tablet by mouth every 8 (eight) hours as needed (abdominal pain) 12/5/17   Davis Tierney, DO   dicyclomine (BENTYL) 20 mg tablet Take 1 tablet by mouth 2 (two) times a day 8/14/17 2/15/18  RICKEY Burgos   metoclopramide (REGLAN) 10 mg tablet Take 1 tablet by mouth every 6 (six) hours 12/9/17 2/15/18  Josy Smith MD     Reviewed as documented in admission med rec    Allergies: No Known Allergies    Social History:     Marital Status: /Civil Union   Occupation:  Currently not working  Patient Pre-hospital Living Situation:  With family  Patient Pre-hospital Level of Mobility:  Independent  Patient Pre-hospital Diet Restrictions:  Regular  Substance Use History:   History   Alcohol Use No     Comment: quit 1 5 yrs ago     History   Smoking Status    Former Smoker   Smokeless Tobacco    Never Used     History   Drug Use    Types: Marijuana     Comment: Occasional       Family History:    Family history is reviewed and is not contributory to current illness        Physical Exam      Vitals:   Blood Pressure: 110/56 (02/15/18 1445)  Pulse: 85 (02/15/18 1445)  Temperature: (!) 97 3 °F (36 3 °C) (02/15/18 1432)  Temp Source: Temporal (02/15/18 1310)  Respirations: 16 (02/15/18 1445)  Weight - Scale: 99 8 kg (220 lb) (02/15/18 1017)  SpO2: 95 % (02/15/18 1445)    Vital signs are reviewed as above  Constitutional:  Patient in bed  Patient laying comfortably in the stretcher in [facility]  Eyes: EOM grossly intact   Conjunctivae are normal   Patient has anicteric sclera  HENT: Oropharynx are slightly dry   Did not notice any significant lesions on the tongue  Head normocephalic  Neck: Neck is supple  I was not able to visualize any JVD at 90°  There is no significant lymphadenopathy  I also did not notice any significant thyromegaly  Cardiac: I did not hear any rubs or gallop  Patient appears to be in sinus rhythm  Respiratory: Patient not in significant respiratory distress  Air entry in general is good  GI: Abdomen is soft  It is grossly nontender  Bowel sounds are adequate  I was not able to appreciate any hepatosplenomegaly  Neurologic:  Patient is awake and alert  Neurological examination is grossly intact  No obvious focal neurological deficit noticed  Skin: Skin is warm and dry  Psychiatric: Mood and affect are pleasant  Musculoskeletal  Patient moving all extremities while in bed  Extremities: Patient has no significant cyanosis, clubbing, or lower extremity edema           Additional Data:     Lab Results: Reviewed as below      Results from last 7 days  Lab Units 02/15/18  1051   WBC Thousand/uL 14 97*   HEMOGLOBIN g/dL 16 6   HEMATOCRIT % 48 3   PLATELETS Thousands/uL 343   LYMPHO PCT % 7*   MONO PCT MAN % 3*   EOSINO PCT MANUAL % 1       Results from last 7 days  Lab Units 02/15/18  1051   SODIUM mmol/L 139   POTASSIUM mmol/L 3 6   CHLORIDE mmol/L 101   CO2 mmol/L 23   BUN mg/dL 20   CREATININE mg/dL 1 19   CALCIUM mg/dL 9 7   TOTAL PROTEIN g/dL 8 1   BILIRUBIN TOTAL mg/dL 0 30   ALK PHOS U/L 74   ALT U/L 29   AST U/L 14   GLUCOSE RANDOM mg/dL 180*       Results from last 7 days  Lab Units 02/15/18  1114   INR  1 03       Imaging: I have personally reviewed pertinent reports  Ct Chest Abdomen Pelvis Wo Contrast    Result Date: 2/15/2018  Narrative: CT CHEST, ABDOMEN AND PELVIS WITHOUT IV CONTRAST INDICATION:  Abdominal pain and vomiting blood  Colonoscopy yesterday  COMPARISON: CT scans from 3/17/2017 and 12/9/2017   TECHNIQUE: CT examination of the chest, abdomen and pelvis was performed without intravenous contrast   Axial, sagittal, and coronal 2D reformatted images were created from the source data and submitted for interpretation  Radiation dose length product (DLP) for this visit:  585 mGy-cm   This examination, like all CT scans performed in the [facility], was performed utilizing techniques to minimize radiation dose exposure, including the use of iterative reconstruction and automated exposure control  Enteric contrast was not administered  FINDINGS: CHEST LUNGS:  Lungs are clear  There is no tracheal or endobronchial lesion  PLEURA:  Unremarkable  HEART/GREAT VESSELS:  Unremarkable for patient's age  MEDIASTINUM AND RACHAEL:  Unremarkable  CHEST WALL AND LOWER NECK:   Unremarkable  ABDOMEN LIVER/BILIARY TREE:  Unremarkable  GALLBLADDER:  No calcified gallstones  No pericholecystic inflammatory change  SPLEEN:  Unremarkable  PANCREAS:  Unremarkable  ADRENAL GLANDS:  Unremarkable  KIDNEYS/URETERS:  Unremarkable  No hydronephrosis  STOMACH AND BOWEL:  There is colonic diverticulosis without evidence of acute diverticulitis  APPENDIX:  No findings to suggest appendicitis  ABDOMINOPELVIC CAVITY:  No ascites or free intraperitoneal air  No lymphadenopathy  VESSELS:  Unremarkable for patient's age  PELVIS REPRODUCTIVE ORGANS:  Unremarkable for patient's age  URINARY BLADDER:  Unremarkable  ABDOMINAL WALL/INGUINAL REGIONS:  Unremarkable  OSSEOUS STRUCTURES:  No acute fracture or destructive osseous lesion  Impression: No acute inflammatory process  Stable colonic diverticulosis  Workstation performed: [id number]       EKG, Pathology, and Other Studies Reviewed on Admission:   · EKG:  Sinus rhythm  Do not see any acute ischemic changes    Allscripts Records Reviewed: No     ** Please Note: Dragon 360 Dictation voice to text software may have been used in the creation of this document   **

## 2018-02-15 NOTE — ED PROVIDER NOTES
History  Chief Complaint   Patient presents with    Vomiting Blood     Pt had an EEG and Colonoscopy yesterday and has been vomiting blood ever since with abdominal pain mostly epigastric area     HPI   24-year-old white male with a history of having an EGD and colonoscopy yesterday at the [place] presents with abdominal pain and  vomiting of blood since this morning  Patient arrives with 2 bags full of the blood  Patient states he has a history of Diaz's esophagus and also some microcytic ulcerative colitis and was having abdominal pain starting this morning  Patient is lying prone on the stretcher and writhing in pain  Prior to Admission Medications   Prescriptions Last Dose Informant Patient Reported? Taking?    amLODIPine (NORVASC) 2 5 mg tablet 2/13/2018  Yes No   Sig: Take 2 5 mg by mouth daily   atorvastatin (LIPITOR) 20 mg tablet 2/12/2018  Yes Yes   Sig: Take 20 mg by mouth daily   buPROPion (WELLBUTRIN) 75 mg tablet 2/13/2018  Yes No   Sig: Take 75 mg by mouth 2 (two) times a day   dicyclomine (BENTYL) 20 mg tablet   No No   Sig: Take 1 tablet by mouth every 8 (eight) hours as needed (abdominal pain)   omeprazole (PriLOSEC) 20 mg delayed release capsule 2/14/2018 at 2000  Yes Yes   Sig: Take 20 mg by mouth daily   ondansetron (ZOFRAN-ODT) 4 mg disintegrating tablet 2/15/2018 at 0900  No Yes   Sig: Take 1 tablet by mouth every 8 (eight) hours as needed for nausea or vomiting for up to 20 doses   ondansetron (ZOFRAN-ODT) 4 mg disintegrating tablet 2/15/2018 at 0900  No Yes   Sig: Take 1 tablet by mouth every 6 (six) hours as needed for nausea or vomiting   sertraline (ZOLOFT) 100 mg tablet 2/14/2018 at 2000  Yes Yes   Sig: Take 200 mg by mouth daily      Facility-Administered Medications: None       Past Medical History:   Diagnosis Date    Diaz's esophagus     Diverticulitis     GERD (gastroesophageal reflux disease)     Hemorrhoid     Hyperlipidemia     Hypertension     [facility]ative [facility])        Past Surgical History:   Procedure Laterality Date    ABDOMINAL SURGERY      radio frequency ablation    CARPAL TUNNEL RELEASE      COLONOSCOPY      EGD AND COLONOSCOPY      TONSILLECTOMY         History reviewed  No pertinent family history  I have reviewed and agree with the history as documented  Social History   Substance Use Topics    Smoking status: Former Smoker    Smokeless tobacco: Never Used    Alcohol use No      Comment: quit 1 5 yrs ago        Review of Systems   Constitutional: Negative for chills and fever  HENT: Negative for congestion and rhinorrhea  Eyes: Negative for discharge and visual disturbance  Respiratory: Negative for shortness of breath and wheezing  Cardiovascular: Negative for chest pain and palpitations  Gastrointestinal: Positive for abdominal pain, nausea and vomiting  Negative for abdominal distention  Positive vomiting of blood   Endocrine: Negative for polydipsia and polyuria  Genitourinary: Negative for dysuria and hematuria  Musculoskeletal: Negative for arthralgias, gait problem and neck stiffness  Skin: Negative for rash and wound  Neurological: Negative for dizziness and headaches  Psychiatric/Behavioral: Negative for confusion and suicidal ideas  Physical Exam  ED Triage Vitals [02/15/18 1017]   Temperature Pulse Respirations Blood Pressure SpO2   97 6 °F (36 4 °C) 71 18 (!) 146/113 96 %      Temp Source Heart Rate Source Patient Position - Orthostatic VS BP Location FiO2 (%)   Oral Monitor Sitting Left arm --      Pain Score       Worst Possible Pain           Orthostatic Vital Signs  Vitals:    02/15/18 1500 02/15/18 1506 02/15/18 1515 02/15/18 1600   BP: 112/60  114/67 118/77   Pulse: 89 88 89 92   Patient Position - Orthostatic VS:           Physical Exam   Constitutional: He is oriented to person, place, and time  He appears well-developed and well-nourished  He appears distressed     50-year-old white male lying prone on the stretcher writhing in pain   HENT:   Head: Normocephalic and atraumatic  Mouth/Throat: Oropharynx is clear and moist    Eyes: EOM are normal  Pupils are equal, round, and reactive to light  Neck: Normal range of motion  Neck supple  Cardiovascular: Normal rate, regular rhythm and normal heart sounds  Pulmonary/Chest: Effort normal and breath sounds normal  No respiratory distress  He has no wheezes  He exhibits no tenderness  Abdominal: Soft  He exhibits no distension  There is tenderness  There is no rebound and no guarding  Slightly hyperactive bowel sounds; there is tenderness to the midepigastric region of the abdomen   Musculoskeletal: Normal range of motion  Neurological: He is alert and oriented to person, place, and time  No cranial nerve deficit  He exhibits normal muscle tone  Coordination normal    Skin: Skin is warm and dry  Psychiatric: He has a normal mood and affect  Nursing note and vitals reviewed        ED Medications  Medications   octreotide (SandoSTATIN) 500 mcg in sodium chloride 0 9 % 250 mL infusion (50 mcg/hr Intravenous New Bag 2/15/18 1219)   amLODIPine (NORVASC) tablet 2 5 mg (not administered)   atorvastatin (LIPITOR) tablet 20 mg (not administered)   buPROPion (WELLBUTRIN) tablet 75 mg (not administered)   dicyclomine (BENTYL) tablet 20 mg (not administered)   sertraline (ZOLOFT) tablet 200 mg (not administered)   sodium chloride 0 9 % infusion (not administered)   pantoprazole (PROTONIX) injection 40 mg (not administered)   ondansetron (ZOFRAN) injection 4 mg (4 mg Intravenous Given 2/15/18 1051)   sodium chloride 0 9 % bolus 500 mL (0 mL Intravenous Stopped 2/15/18 1119)   pantoprazole (PROTONIX) injection 40 mg (40 mg Intravenous Given 2/15/18 1114)   HYDROmorphone (DILAUDID) injection 2 mg (2 mg Intravenous Given 2/15/18 1115)   sodium chloride 0 9 % bolus 1,000 mL (0 mL Intravenous Stopped 2/15/18 1215)   metoclopramide (REGLAN) injection 10 mg (10 mg Intravenous Given 2/15/18 1114)   albuterol inhalation solution 2 5 mg (2 5 mg Nebulization Given 2/15/18 1445)       Diagnostic Studies  Results Reviewed     Procedure Component Value Units Date/Time    Comprehensive metabolic panel [[id number]]  (Abnormal) Collected:  02/15/18 1051    Lab Status:  Final result Specimen:  Blood from Arm, Left Updated:  02/15/18 1146     Sodium 139 mmol/L      Potassium 3 6 mmol/L      Chloride 101 mmol/L      CO2 23 mmol/L      Anion Gap 15 (H) mmol/L      BUN 20 mg/dL      Creatinine 1 19 mg/dL      Glucose 180 (H) mg/dL      Calcium 9 7 mg/dL      AST 14 U/L      ALT 29 U/L      Alkaline Phosphatase 74 U/L      Total Protein 8 1 g/dL      Albumin 4 4 g/dL      Total Bilirubin 0 30 mg/dL      eGFR 69 ml/min/1 73sq m     Narrative:         National Kidney Disease Education Program recommendations are as follows:  GFR calculation is accurate only with a steady state creatinine  Chronic Kidney disease less than 60 ml/min/1 73 sq  meters  Kidney failure less than 15 ml/min/1 73 sq  meters  Lipase [[id number]]  (Abnormal) Collected:  02/15/18 1051    Lab Status:  Final result Specimen:  Blood from Arm, Left Updated:  02/15/18 1146     Lipase 446 (H) u/L     Lactic acid, plasma [[id number]]  (Normal) Collected:  02/15/18 1105    Lab Status:  Final result Specimen:  Blood from Arm, Right Updated:  02/15/18 1139     LACTIC ACID 1 9 mmol/L     Narrative:         Result may be elevated if tourniquet was used during collection      CBC and differential [[id number]]  (Abnormal) Collected:  02/15/18 1051    Lab Status:  Final result Specimen:  Blood from Arm, Left Updated:  02/15/18 1127     WBC 14 97 (H) Thousand/uL      RBC 5 55 Million/uL      Hemoglobin 16 6 g/dL      Hematocrit 48 3 %      MCV 87 fL      MCH 29 9 pg      MCHC 34 4 g/dL      RDW 12 4 %      MPV 9 8 fL      Platelets 613 Thousands/uL      nRBC 0 /100 WBCs     Protime-INR [[id number]]  (Normal) Collected:  02/15/18 1114 Lab Status:  Final result Specimen:  Blood Updated:  02/15/18 1122     Protime 13 7 seconds      INR 1 03    APTT [[id number]]  (Normal) Collected:  02/15/18 1114    Lab Status:  Final result Specimen:  Blood Updated:  02/15/18 1122     PTT 30 seconds     Narrative: Therapeutic Heparin Range = 60-90 seconds    Trauma tubes on hold [[id number]] Collected:  02/15/18 1055    Lab Status:  Final result Specimen:  Blood Updated:  02/15/18 1059    Narrative: The following orders were created for panel order Trauma tubes on hold  Procedure                               Abnormality         Status                     ---------                               -----------         ------                     Gracia Cheko Top on EBYV[[id number]]                            Final result                 Please view results for these tests on the individual orders  CT chest abdomen pelvis wo contrast   Final Result by James Alaniz MD (02/15 1224)      No acute inflammatory process  Stable colonic diverticulosis  Workstation performed: [id number]                    Procedures  Procedures       Phone Contacts  ED Phone Contact    ED Course  ED Course      10:58 AM:  Consult to GI - spoke with Juan Harris, states she would like a Protonix drip, and she will be down to see pt  She states that pt  Will be the first on the list to have a scope this am    11:00 a m :  Spoke with Dr Nazario Grady  from [facility]  He states that he would like GI to call him if patient needs to be admitted after the exam     12:00 p m :  I re-examined patient and patient is feeling much better and is much more comfortable  MDM  The patient presented with a condition in which there was a high probability of imminent or life-threatening deterioration, and critical care services (excluding separately billable procedures) totalled 30-74 minutes  Differential diagnosis includes:  1  Hematemesis  2  Vomiting blood status post EGD yesterday/colonoscopy  3  Abdominal pain  4  Rule out bleeding ulcer  5  Rule out esophageal varices    Disposition  Final diagnoses:   Vomiting blood   Abdominal pain     Time reflects when diagnosis was documented in both MDM as applicable and the Disposition within this note     Time User Action Codes Description Comment    2/15/2018 11:07 AM Paige Kayser L Add [K92 0] Vomiting blood     2/15/2018 11:11 AM Efrem Escobar Add [R10 9] Abdominal pain       ED Disposition     ED Disposition Condition Comment    Transfer to Another [facility] should be transferred out to GI lab      Follow-up Information     Follow up With Specialties Details Why Susannah Suh MD Gastroenterology Follow up  [address] [facility] [place]  , MD Internal Medicine Call in 1 week(s)  [address] [facility] [place] [address]          Current Discharge Medication List      CONTINUE these medications which have NOT CHANGED    Details   atorvastatin (LIPITOR) 20 mg tablet Take 20 mg by mouth daily      omeprazole (PriLOSEC) 20 mg delayed release capsule Take 20 mg by mouth daily      !! ondansetron (ZOFRAN-ODT) 4 mg disintegrating tablet Take 1 tablet by mouth every 8 (eight) hours as needed for nausea or vomiting for up to 20 doses  Qty: 20 tablet, Refills: 0      !! ondansetron (ZOFRAN-ODT) 4 mg disintegrating tablet Take 1 tablet by mouth every 6 (six) hours as needed for nausea or vomiting  Qty: 20 tablet, Refills: 0      sertraline (ZOLOFT) 100 mg tablet Take 200 mg by mouth daily      amLODIPine (NORVASC) 2 5 mg tablet Take 2 5 mg by mouth daily      buPROPion (WELLBUTRIN) 75 mg tablet Take 75 mg by mouth 2 (two) times a day      dicyclomine (BENTYL) 20 mg tablet Take 1 tablet by mouth every 8 (eight) hours as needed (abdominal pain)  Qty: 12 tablet, Refills: 0       !! - Potential duplicate medications found  Please discuss with provider  No discharge procedures on file      ED Provider  Electronically Signed by           Dre Xavier DO  02/15/18 5700

## 2018-02-15 NOTE — ANESTHESIA PREPROCEDURE EVALUATION
Review of Systems/Medical History  Patient summary reviewed  Chart reviewed  No history of anesthetic complications     Cardiovascular  Exercise tolerance: good,  Hyperlipidemia, Hypertension ,    Pulmonary  Smoker (quit smoking cigarettes 1 5 years ago, occasional marijuana) , No recent URI ,        GI/Hepatic    GI bleeding (Actively vomiting bright red blood in ED, last episode ~12:00 today) , active and > 500ml blood loss, GERD ,   Comment: Had EGD yesterday at [place], do not have access to records  Ulcerative colitis and diverticulitis  Diaz's esophagus  Former heavy EtOH use, quit 2 5 years ago  Hx colonic polyps     Negative  ROS        Endo/Other  Negative endo/other ROS   Obesity    GYN       Hematology  No anemia ,  No coagulation disorder ,    Musculoskeletal  Negative musculoskeletal ROS        Neurology  Negative neurology ROS      Psychology   Negative psychology ROS              Physical Exam    Airway    Mallampati score: II  TM Distance: >3 FB  Neck ROM: full     Dental   No notable dental hx     Cardiovascular  Rhythm: regular, Rate: normal, Cardiovascular exam normal    Pulmonary  Pulmonary exam normal Breath sounds clear to auscultation,     Other Findings        Anesthesia Plan  ASA Score- 2 Emergent    Anesthesia Type- general with ASA Monitors  Additional Monitors:   Airway Plan: ETT  Plan Factors-    Induction- rapid sequence induction and intravenous  Postoperative Plan-     Informed Consent- Anesthetic plan and risks discussed with patient            Lab Results   Component Value Date    WBC 14 97 (H) 02/15/2018    HGB 16 6 02/15/2018    HCT 48 3 02/15/2018    MCV 87 02/15/2018     02/15/2018     Lab Results   Component Value Date    GLUCOSE 180 (H) 02/15/2018    CALCIUM 9 7 02/15/2018     02/15/2018    K 3 6 02/15/2018    CO2 23 02/15/2018     02/15/2018    BUN 20 02/15/2018    CREATININE 1 19 02/15/2018     Lab Results   Component Value Date    ALT 29 02/15/2018    AST 14 02/15/2018    ALKPHOS 74 02/15/2018    BILITOT 0 30 02/15/2018     Lab Results   Component Value Date    INR 1 03 02/15/2018    INR 1 01 03/17/2017    PROTIME 13 7 02/15/2018    PROTIME 13 2 03/17/2017     No results found for: HGBA1C

## 2018-02-15 NOTE — CONSULTS
Consultation - [facility]) [facility]  Keven Chrissiepepe 47 y o  male MRN: [id number]  Unit/Bed#: ED 21 Encounter: [id number]        Consults    Reason for Consult / Principal Problem: Hematemesis    HPI: Mr Kar Vasquez with a PMH of HTN, Diaz's Esophagus s/p RFA years ago, possible ulcerative colitis, internal hemorrhoids, HLD, presenting with multiple episodes of bloody emesis since this morning with associated epigastric pain  He had an EGD and colonoscopy yesterday at the [address] [place] [address] for a screening and he believes they removed 3 polyps from below and took biopsies in his esophagus  He denies the presence of gastric ulcers or esophageal varices to his knowledge  He denies any known liver problems but was previously an alcoholic  He has been sober for 2 5 years  He is not on anticoagulation or antiplatelet therapy  He denies any NSAID use except for occasional aspirin  REVIEW OF SYSTEMS: Negative except for as stated above    Historical Information   Past Medical History:   Diagnosis Date    Diaz's esophagus     Diverticulitis     GERD (gastroesophageal reflux disease)     Hemorrhoid     Hypertension     UC [facility]ulcerative colitis) ([facility] [address] )      Past Surgical History:   Procedure Laterality Date    ABDOMINAL SURGERY       Social History   History   Alcohol Use No     Comment: quit 1 5 yrs ago     History   Drug Use No     History   Smoking Status    Never Smoker   Smokeless Tobacco    Never Used     History reviewed  No pertinent family history      Meds/Allergies       (Not in a hospital admission)  Current Facility-Administered Medications   Medication Dose Route Frequency    octreotide (SandoSTATIN) 500 mcg in sodium chloride 0 9 % 250 mL infusion  50 mcg/hr Intravenous Continuous    pantoprazole (PROTONIX) 80 mg in sodium chloride 0 9 % 100 mL infusion  8 mg/hr Intravenous Continuous    sodium chloride 0 9 % bolus 1,000 mL  1,000 mL Intravenous Once       No Known Allergies        Objective     Blood pressure 167/92, pulse (!) 53, temperature 97 6 °F (36 4 °C), temperature source Oral, resp  rate 19, weight 99 8 kg (220 lb), SpO2 96 %  No intake or output data in the 24 hours ending 02/15/18 1132      PHYSICAL EXAM:      General Appearance:   Alert, oriented x3, (+) uncomfortable, holding abdomen   HENT[de-identified]   Eyes:  Normocephalic, atraumatic  Anicteric   Neck:  Supple, symmetrical, trachea midline   Lungs:   Clear to auscultation bilaterally, no respiratory distress   Heart[de-identified]   RRR, no murmur   Abdomen:   Non-distended, soft, BS active, (+) TTP in the epigastric region without guarding or rigidity   Rectal:   Deferred    Extremities:  No cyanosis or edema    Pulses:  2+ and symmetric all extremities    Skin:  No jaundice or pallor      Lab Results:     Results from last 7 days  Lab Units 02/15/18  1051   WBC Thousand/uL 14 97*   HEMOGLOBIN g/dL 16 6   HEMATOCRIT % 48 3   PLATELETS Thousands/uL 343   LYMPHO PCT % 7*   MONO PCT MAN % 3*   EOSINO PCT MANUAL % 1           Invalid input(s): LABALBU    Results from last 7 days  Lab Units 02/15/18  1114   INR  1 03           Imaging Studies: I have personally reviewed pertinent imaging studies  No results found      ASSESSMENT and PLAN:      Hematemesis  Epigastric Pain  - Acute onset of hematemesis this morning s/p EGD/colonoscopy yesterday at the [place], unclear what exactly was done in terms of biopsies v clip placement v APC  - Differential includes bleeding vessel in the area of biopsy v AVM v esophagitis v less likely bleeding esophageal varices   - STAT CT to rule out perforation from scopes yesterday due to epigastric pain, discussed with ER  - Ericka Hb is 16 6 and he is HD stable with appropriate blood pressure and heart rate  - Type and screen  - Keep NPO  - Plan for emergent EGD in the OR today  - Start protonix gtt and octreotide gtt  - Give 10 mg reglan to empty the stomach  - If ascites is present on CT, will give 1 dose of ceftriaxone 1 g for SBP prophylaxis      Patient will be seen and examined by Dr Ashley Howard  All vargas medical decisions were made by Dr Ashley Howard  Thank you for allowing us to participate in the care of this patient  We will follow with you closely

## 2018-02-15 NOTE — OP NOTE
**** GI/ENDOSCOPY REPORT ****     PATIENT NAME: Yoko Gar - VISIT ID:  Patient ID: [id number] YOB: 1965     INTRODUCTION: Esophagogastroduodenoscopy - A 47 male patient presents for   an inpatient Esophagogastroduodenoscopy at [facility]  INDICATIONS: Recent hematemesis  CONSENT: The benefits, risks, and alternatives to the procedure were   discussed and informed consent was obtained from the patient  PREPARATION:  EKG, pulse, pulse oximetry and blood pressure were monitored   throughout the procedure  ASA Classification: Class 2 - Patient has mild   to moderate systemic disturbance that may or may not be related to the   disorder requiring surgery  MEDICATIONS: Anesthesia administered by anesthesiologist      PROCEDURE:  The endoscope was passed without difficulty through the mouth   under direct visualization and advanced to the 2nd portion of the   duodenum  The scope was withdrawn and the mucosa was carefully examined  FINDINGS:   Esophagus: Moderately severe LA Class B esophagitis was found   in the distal third of the esophagus  Whtitish ulcerations noted in the   distal esophagus- suspect this is the biopsy site  Stomach: On   retroflexed view, the stomach appeared to be normal  The antrum, body of   the stomach, cardia, and fundus appeared to be normal   Duodenum: The   duodenal bulb, 1st portion of the duodenum, and 2nd portion of the   duodenum appeared to be normal      COMPLICATIONS: There were no complications  IMPRESSIONS: Moderately severe esophagitis seen in the distal third of the   esophagus  Whitish ulcerations noted in the distal esophagus- suspect   this is the biopsy site  Normal stomach  Normal antrum, body of the   stomach, cardia, and fundus  There was no evidence of old or fresh blood   seen  Normal duodenal bulb, 1st portion of the duodenum, and 2nd portion   of the duodenum  RECOMMENDATIONS: Return to floor   Avoid all non-steroidal   anti-inflammatory drugs (NSAID's) including but not limited to Ibuprofen,   Advil, Motrin, and Nuprin  Anti-reflux measures: Raise the head of the bed   4 to 6 inches  Avoid smoking  Avoid excess coffee, tea or other   caffeinated beverages  Avoid garments that fit tightly through the   abdomen  Avoid eating before bed  Start anti-reflux diet  Continue   protonix 40mg BID  Stop the octreotide gtt  Monitor H/H      ESTIMATED BLOOD LOSS: None  PATHOLOGY SPECIMENS: No     PROCEDURE CODES:     ICD-9 Codes: 578 0 Hematemesis 530 10 Esophagitis, unspecified     ICD-10 Codes: K92 0 Hematemesis K20 9 Esophagitis, unspecified     PERFORMED BY: RACHEL Mckeon  on 02/15/2018  Version 1, electronically signed by RACHEL Huitron  on 02/15/2018   at 14:28

## 2018-02-15 NOTE — DISCHARGE SUMMARY
Discharge Summary - [facility]    Patient Information: Sunny Grossman 47 y o  male MRN: [id number]  Unit/Bed#: OR [place] Encounter: [id number]    Discharging Physician / Practitioner: Real Serna MD  PCP: Janeth Cordero MD  Admission Date: 2/15/2018  Discharge Date: 02/15/18    Reason for Admission:  Vomited blood    Discharge Diagnoses:     Principal Problem:    Gastroesophageal reflux disease with esophagitis  Active Problems:    Anxiety and depression    Diaz's esophagus    Essential hypertension    Dyslipidemia    Obesity (BMI 30 0-34 9)    Ulcerative colitis without complications ([address] )  Resolved Problems:    Vomiting blood    Abdominal pain    Present on Admission:   (Resolved) Vomiting blood   (Resolved) Abdominal pain   Anxiety and depression   Diaz's esophagus   Essential hypertension   Dyslipidemia   Obesity (BMI 30 0-34  9)   [facility]ative [address] without complications [address] [address] [address]   Gastroesophageal reflux disease with esophagitis    Consultations During Hospital Stay:  · GI    Procedures Performed:     · EGD unremarkable    Significant Findings:     · Nothing major    Incidental Findings:   · As above     Test Results Pending at Discharge (will require follow up): · None     Outpatient Tests Requested:  · None    Complications:  None    Hospital Course:     Sunny Grossman is a 47 y o  male patient who originally presented to the hospital on 2/15/2018 due to vomited red material   He also had upper abdominal pain  It was thought that he was having some hematemesis  GI consult was obtained from the ER  He underwent EGD which really was unremarkable  He likely had some cranberry juice which he vomited with some upper abdominal pain  His symptoms are completely resolved now  As per GI, he can be discharge home with outpatient follow-up safely  I examined the patient in [facility] initially today  He would resume his home medications on discharge      Condition at Discharge: good Discharge Day Visit / Exam:     Please refer to full examination in history and physical         Discharge instructions/Information to patient and family:   See after visit summary for information provided to patient and family  Provisions for Follow-Up Care:  See after visit summary for information related to follow-up care and any pertinent home health orders  Disposition:     Home    For Discharges to [facility] SNF:   · Not Applicable to this Patient - Not Applicable to this Patient    Planned Readmission:  None     Discharge Statement:  I spent 25+ minutes in addition to time spent on history and physical in discharging the patient  This time was spent on the day of discharge  I had direct contact with the patient on the day of discharge  Greater than 50% of the total time was spent examining patient, answering all patient questions, arranging and discussing plan of care with patient as well as directly providing post-discharge instructions  Additional time then spent on discharge activities  Discharge Medications:  See after visit summary for reconciled discharge medications provided to patient and family  ** Please Note: Dragon 360 Dictation voice to text software may have been used in the creation of this document   **

## 2018-02-16 NOTE — CASE MANAGEMENT
Initial Clinical Review      Admission: Date/Time/Statement: 2/15/18 @ 1455    Orders Placed This Encounter   Procedures    Place in Observation     Standing Status:   Standing     Number of Occurrences:   1     Order Specific Question:   Admitting Physician     Answer:   Tawanna Cisneros [[id number]]     Order Specific Question:   Level of Care     Answer:   Med Surg [16]         ED: Date/Time/Mode of Arrival:   ED Arrival Information     Expected Arrival Acuity Means of Arrival Escorted By Service Admission Type    - 2/15/2018 10:05 Emergent Walk-In Family Member General Medicine Emergency    Arrival Complaint    Vomiting blood          Chief Complaint:   Chief Complaint   Patient presents with    Vomiting Blood     Pt had an EEG and Colonoscopy yesterday and has been vomiting blood ever since with abdominal pain mostly epigastric area       History of Vignesh Monk is a 47 y o  male who presents with possibly vomiting blood/red material associated with abdominal pain  As per patient, he had EGD and colonoscopy yesterday at [place]  Both of these procedures were for surveillance  He also had couple of polyps removed from his colon    He comes in to the hospital today with upper abdominal pain and vomited-red material and possible blood         ED Vital Signs:   ED Triage Vitals [02/15/18 1017]   Temperature Pulse Respirations Blood Pressure SpO2   97 6 °F (36 4 °C) 71 18 (!) 146/113 96 %      Temp Source Heart Rate Source Patient Position - Orthostatic VS BP Location FiO2 (%)   Oral Monitor Sitting Left arm --      Pain Score       Worst Possible Pain        Wt Readings from Last 1 Encounters:   02/15/18 99 8 kg (220 lb)       Vital Signs (abnormal):     Date/Time  Temp  Pulse  Resp  BP  SpO2  O2 Device     02/15/18 1600  97 8 °F (36 6 °C)  92  18  118/77  94 %  Nasal cannula     02/15/18 1515  --  89  19  114/67  96 %  Nasal cannula     02/15/18 1506  --  88   25  --  95 %  Nasal cannula     02/15/18 1500   97 4 °F (36 3 °C)  89  16  112/60  96 %  Nasal cannula     02/15/18 1445  --  85  16  110/56  95 %  Nasal cannula     02/15/18 1432   97 3 °F (36 3 °C)  86  16   84/52  95 %  Nasal cannula     02/15/18 1310  97 9 °F (36 6 °C)  80  22  131/73  95 %  None (Room air)     02/15/18 1130  --   50   26  165/72  94 %  --     02/15/18 1109  --   53  19  167/92  96 %  None (Room air)     02/15/18 1017  97 6 °F (36 4 °C)  71  18   146/113  96 %  None (Room air)           Abnormal Labs/Diagnostic Test Results:   Lab Units 02/15/18  1051   WBC Thousand/uL 14 97*   HEMOGLOBIN g/dL 16 6   HEMATOCRIT % 48 3   PLATELETS Thousands/uL 343     Lab Units 02/15/18  1051   SODIUM mmol/L 139   POTASSIUM mmol/L 3 6   CHLORIDE mmol/L 101   CO2 mmol/L 23   BUN mg/dL 20   CREATININE mg/dL 1 19   CALCIUM mg/dL 9 7   TOTAL PROTEIN g/dL 8 1   BILIRUBIN TOTAL mg/dL 0 30   ALK PHOS U/L 74   ALT U/L 29   AST U/L 14   GLUCOSE RANDOM mg/dL 180*      INR 1 03    CT chest, abd, pelvis  Impression: No acute inflammatory process   Stable colonic diverticulosis    ED Treatment:   Medication Administration from 02/15/2018 1005 to 02/15/2018 1408       Date/Time Order Dose Route Action Comments     02/15/2018 1051 ondansetron (ZOFRAN) injection 4 mg 4 mg Intravenous Given      02/15/2018 1049 sodium chloride 0 9 % bolus 500 mL 500 mL Intravenous New Bag      02/15/2018 1114 pantoprazole (PROTONIX) injection 40 mg 40 mg Intravenous Given      02/15/2018 1115 HYDROmorphone (DILAUDID) injection 2 mg 2 mg Intravenous Given      02/15/2018 1115 sodium chloride 0 9 % bolus 1,000 mL 1,000 mL Intravenous New Bag      02/15/2018 1135 pantoprazole (PROTONIX) 80 mg in sodium chloride 0 9 % 100 mL infusion 8 mg/hr Intravenous New Bag      02/15/2018 1114 metoclopramide (REGLAN) injection 10 mg 10 mg Intravenous Given      02/15/2018 1219 octreotide (SandoSTATIN) 500 mcg in sodium chloride 0 9 % 250 mL infusion 50 mcg/hr Intravenous New Bag      02/15/2018 1214 octreotide (SandoSTATIN) 500 mcg in sodium chloride 0 9 % 250 mL infusion 50 mcg/hr Intravenous New Bag           Past Medical/Surgical History: Active Ambulatory Problems     Diagnosis Date Noted    No Active Ambulatory Problems     Resolved Ambulatory Problems     Diagnosis Date Noted    No Resolved Ambulatory Problems     Past Medical History:   Diagnosis Date    Diaz's esophagus     Diverticulitis     GERD (gastroesophageal reflux disease)     Hemorrhoid     Hyperlipidemia     Hypertension     UC (ulcerative colitis) ([facility] )        Admitting Diagnosis: Vomiting blood [K92 0]  Abdominal pain [R10 9]    Age/Sex: 47 y o  male    Assessment/Plan  :   Hospital Problem List:    Principal Problem:    Vomiting blood  Active Problems:    Abdominal pain    Anxiety and depression    Diaz's esophagus    Essential hypertension    Dyslipidemia    Obesity (BMI 30 0-34 9)    Ulcerative colitis without complications ([facility])    Gastroesophageal reflux disease with esophagitis     Present on Admission:   Vomiting blood   Abdominal pain   Anxiety and depression   Diaz's esophagus   Essential hypertension   Dyslipidemia   Obesity (BMI 30 0-34  9)   Ulcerative colitis without complications ([facility] )        Plan for the Primary Problem(s):  · Possible vomited blood/abdominal pain  Patient currently has no abdominal pain  His hemoglobin is stable  He is going to go for EGD  If his EGD is unremarkable, he could be discharged home on his home PPI  ? History of Diaz's esophagus with dysplasia and previous treatments  Patient to follow up with his primary care physician and gastroenterologist     Plan for Additional Problems:   ·  anxiety/depression  Continue with home med  · Dyslipidemia  Continue with statin  · GERD:  Continue with PPI  · Obesity    Encouraged on healthy diet and exercise     VTE Prophylaxis: Pharmacologic VTE Prophylaxis contraindicated due to Possible hematemesis  / sequential compression device Anticipated Length of Stay:  Patient will be admitted on an Observation basis with an anticipated length of stay of  less than 2 midnights  Justification for Hospital Stay:  Possible hematemesis    Admission Orders:    Scheduled Meds:   Norvasc 2 5 mg PO q day  Lipitor 20 mg PO q day  Wellbutrin 75 mg PO BID    Continuous Infusions:   Medication Dose Route Frequency    octreotide (SandoSTATIN) 500 mcg in sodium chloride 0 9 % 250 mL infusion  50 mcg/hr Intravenous Continuous    pantoprazole (PROTONIX) 80 mg in sodium chloride 0 9 % 100 mL infusion  8 mg/hr Intravenous Continuous    sodium chloride 0 9 % bolus 1,000 mL  1,000 mL Intravenous Once       PRN Meds:  Dicyclomine 20 mg PO q 8 hrs prn abdominal pain    Gastroenterology consult note  Patient was seen and examined  I agree with the Advanced Practitioner's note and plan  47Year old male with Hx of Diaz's esophagus with HGD s/p RFA, microscopic colitis, HLD, with EGD and colonoscopy with biopsies at [place] yesterday comes in with hematemesis  He is not on any AC or antiplatelet therapy  Vitals on admission were stable, Hgb at 16 6, BUN 20      1  Hemetemsis: likely due to gastric / esophageal biopsies vs  PUD vs  MWT   -agree with continuing Protonix gtt  - monitor H/H and transfuse as necessary   - NPO    - IVF resuscitation as you are doing    - will plan for urgent EGD today for control of bleeding   - CT reviewed no acute pathology      Hematemesis  Epigastric Pain  - Acute onset of hematemesis this morning s/p EGD/colonoscopy yesterday at the [place], unclear what exactly was done in terms of biopsies v clip placement v APC  - Differential includes bleeding vessel in the area of biopsy v AVM v esophagitis v less likely bleeding esophageal varices   - STAT CT to rule out perforation from scopes yesterday due to epigastric pain, discussed with ER  - Trevkily Hb is 16 6 and he is HD stable with appropriate blood pressure and heart rate  - Type and screen  - Keep NPO  - Plan for emergent EGD in the OR today  - Start protonix gtt and octreotide gtt  - Give 10 mg reglan to empty the stomach  - If ascites is present on CT, will give 1 dose of ceftriaxone 1 g for SBP prophylaxis      EGD  IMPRESSIONS: Moderately severe esophagitis seen in the distal third of the   esophagus  Whitish ulcerations noted in the distal esophagus- suspect   this is the biopsy site  Normal stomach  Normal antrum, body of the   stomach, cardia, and fundus  There was no evidence of old or fresh blood   seen  Normal duodenal bulb, 1st portion of the duodenum, and 2nd portion   of the duodenum  RECOMMENDATIONS: Return to floor  Avoid all non-steroidal   anti-inflammatory drugs (NSAID's) including but not limited to Ibuprofen,   Advil, Motrin, and Nuprin  Anti-reflux measures: Raise the head of the bed   4 to 6 inches  Avoid smoking  Avoid excess coffee, tea or other   caffeinated beverages  Avoid garments that fit tightly through the   abdomen  Avoid eating before bed  Start anti-reflux diet  Continue   protonix 40mg BID  Stop the octreotide gtt  Monitor H/H      ESTIMATED BLOOD LOSS: None  PATHOLOGY SPECIMENS: No     PROCEDURE CODES:     ICD-9 Codes: 578 0 Hematemesis 530 10 Esophagitis     ICD-10 Codes: K92 0 Hematemesis K20 9 Esophagitis    Thank you,  [facility] in the [facility] by Mihir Castro for 2017  Network Utilization Review Department  Phone: [phone]; Fax [phone]  ATTENTION: The Network Utilization Review Department is now centralized for our 7 Facilities  Make a note that we have a new phone and fax numbers for our Department  Please call with any questions or concerns to [phone] and carefully follow the prompts so that you are directed to the right person   All voicemails are confidential  Fax any determinations, approvals, denials, and requests for initial or continue stay review clinical to [phone]  Due to HIGH CALL volume, it would be easier if you could please send faxed requests to expedite your requests and in part, help us provide discharge notifications faster

## 2018-02-17 ENCOUNTER — APPOINTMENT (EMERGENCY)
Dept: CT IMAGING | Facility: HOSPITAL | Age: 53
End: 2018-02-17
Payer: COMMERCIAL

## 2018-02-17 ENCOUNTER — HOSPITAL ENCOUNTER (EMERGENCY)
Facility: HOSPITAL | Age: 53
Discharge: HOME | End: 2018-02-17
Attending: EMERGENCY MEDICINE | Admitting: EMERGENCY MEDICINE
Payer: COMMERCIAL

## 2018-02-17 VITALS
HEART RATE: 78 BPM | DIASTOLIC BLOOD PRESSURE: 82 MMHG | BODY MASS INDEX: 31.84 KG/M2 | WEIGHT: 215 LBS | RESPIRATION RATE: 20 BRPM | OXYGEN SATURATION: 97 % | TEMPERATURE: 97.8 F | HEIGHT: 69 IN | SYSTOLIC BLOOD PRESSURE: 144 MMHG

## 2018-02-17 DIAGNOSIS — R10.9 ABDOMINAL PAIN: ICD-10-CM

## 2018-02-17 DIAGNOSIS — R11.10 VOMITING: ICD-10-CM

## 2018-02-17 DIAGNOSIS — R93.89 ABNORMAL FINDING ON CT SCAN: ICD-10-CM

## 2018-02-17 DIAGNOSIS — K20.90 ESOPHAGITIS: ICD-10-CM

## 2018-02-17 DIAGNOSIS — K29.70 GASTRITIS: Primary | ICD-10-CM

## 2018-02-17 LAB
ALBUMIN SERPL BCP-MCNC: 3.8 G/DL (ref 3.5–5)
ALP SERPL-CCNC: 60 U/L (ref 46–116)
ALT SERPL W P-5'-P-CCNC: 23 U/L (ref 12–78)
ANION GAP SERPL CALCULATED.3IONS-SCNC: 12 MMOL/L (ref 4–13)
AST SERPL W P-5'-P-CCNC: 11 U/L (ref 5–45)
BACTERIA UR QL AUTO: ABNORMAL /HPF
BASOPHILS # BLD AUTO: 0.04 THOUSANDS/ΜL (ref 0–0.1)
BASOPHILS NFR BLD AUTO: 0 % (ref 0–1)
BILIRUB SERPL-MCNC: 0.3 MG/DL (ref 0.2–1)
BILIRUB UR QL STRIP: NEGATIVE
BUN SERPL-MCNC: 15 MG/DL (ref 5–25)
CALCIUM SERPL-MCNC: 8.9 MG/DL (ref 8.3–10.1)
CHLORIDE SERPL-SCNC: 104 MMOL/L (ref 100–108)
CLARITY UR: CLEAR
CO2 SERPL-SCNC: 24 MMOL/L (ref 21–32)
COLOR UR: YELLOW
CREAT SERPL-MCNC: 1.02 MG/DL (ref 0.6–1.3)
EOSINOPHIL # BLD AUTO: 0.06 THOUSAND/ΜL (ref 0–0.61)
EOSINOPHIL NFR BLD AUTO: 0 % (ref 0–6)
ERYTHROCYTE [DISTWIDTH] IN BLOOD BY AUTOMATED COUNT: 12.2 % (ref 11.6–15.1)
GFR SERPL CREATININE-BSD FRML MDRD: 84 ML/MIN/1.73SQ M
GLUCOSE SERPL-MCNC: 155 MG/DL (ref 65–140)
GLUCOSE UR STRIP-MCNC: NEGATIVE MG/DL
HCT VFR BLD AUTO: 43.3 % (ref 36.5–49.3)
HGB BLD-MCNC: 14.9 G/DL (ref 12–17)
HGB UR QL STRIP.AUTO: ABNORMAL
KETONES UR STRIP-MCNC: ABNORMAL MG/DL
LEUKOCYTE ESTERASE UR QL STRIP: NEGATIVE
LIPASE SERPL-CCNC: 197 U/L (ref 73–393)
LYMPHOCYTES # BLD AUTO: 0.98 THOUSANDS/ΜL (ref 0.6–4.47)
LYMPHOCYTES NFR BLD AUTO: 7 % (ref 14–44)
MCH RBC QN AUTO: 29.9 PG (ref 26.8–34.3)
MCHC RBC AUTO-ENTMCNC: 34.4 G/DL (ref 31.4–37.4)
MCV RBC AUTO: 87 FL (ref 82–98)
MONOCYTES # BLD AUTO: 0.69 THOUSAND/ΜL (ref 0.17–1.22)
MONOCYTES NFR BLD AUTO: 5 % (ref 4–12)
MUCOUS THREADS UR QL AUTO: ABNORMAL
NEUTROPHILS # BLD AUTO: 12.5 THOUSANDS/ΜL (ref 1.85–7.62)
NEUTS SEG NFR BLD AUTO: 87 % (ref 43–75)
NITRITE UR QL STRIP: NEGATIVE
NON-SQ EPI CELLS URNS QL MICRO: ABNORMAL /HPF
NRBC BLD AUTO-RTO: 0 /100 WBCS
PH UR STRIP.AUTO: 5.5 [PH] (ref 4.5–8)
PLATELET # BLD AUTO: 259 THOUSANDS/UL (ref 149–390)
PMV BLD AUTO: 9.4 FL (ref 8.9–12.7)
POTASSIUM SERPL-SCNC: 3.1 MMOL/L (ref 3.5–5.3)
PROT SERPL-MCNC: 7 G/DL (ref 6.4–8.2)
PROT UR STRIP-MCNC: NEGATIVE MG/DL
RBC # BLD AUTO: 4.98 MILLION/UL (ref 3.88–5.62)
RBC #/AREA URNS AUTO: ABNORMAL /HPF
SODIUM SERPL-SCNC: 140 MMOL/L (ref 136–145)
SP GR UR STRIP.AUTO: 1.01 (ref 1–1.03)
UROBILINOGEN UR QL STRIP.AUTO: 0.2 E.U./DL
WBC # BLD AUTO: 14.33 THOUSAND/UL (ref 4.31–10.16)
WBC #/AREA URNS AUTO: ABNORMAL /HPF

## 2018-02-17 PROCEDURE — C9113 INJ PANTOPRAZOLE SODIUM, VIA: HCPCS | Performed by: EMERGENCY MEDICINE

## 2018-02-17 PROCEDURE — 74177 CT ABD & PELVIS W/CONTRAST: CPT

## 2018-02-17 PROCEDURE — 99284 EMERGENCY DEPT VISIT MOD MDM: CPT

## 2018-02-17 PROCEDURE — 96375 TX/PRO/DX INJ NEW DRUG ADDON: CPT

## 2018-02-17 PROCEDURE — 85025 COMPLETE CBC W/AUTO DIFF WBC: CPT | Performed by: EMERGENCY MEDICINE

## 2018-02-17 PROCEDURE — 96374 THER/PROPH/DIAG INJ IV PUSH: CPT

## 2018-02-17 PROCEDURE — 96361 HYDRATE IV INFUSION ADD-ON: CPT

## 2018-02-17 PROCEDURE — 80053 COMPREHEN METABOLIC PANEL: CPT | Performed by: EMERGENCY MEDICINE

## 2018-02-17 PROCEDURE — 36415 COLL VENOUS BLD VENIPUNCTURE: CPT | Performed by: EMERGENCY MEDICINE

## 2018-02-17 PROCEDURE — 81001 URINALYSIS AUTO W/SCOPE: CPT | Performed by: EMERGENCY MEDICINE

## 2018-02-17 PROCEDURE — 83690 ASSAY OF LIPASE: CPT | Performed by: EMERGENCY MEDICINE

## 2018-02-17 RX ORDER — MAGNESIUM HYDROXIDE/ALUMINUM HYDROXICE/SIMETHICONE 120; 1200; 1200 MG/30ML; MG/30ML; MG/30ML
30 SUSPENSION ORAL ONCE
Status: COMPLETED | OUTPATIENT
Start: 2018-02-17 | End: 2018-02-17

## 2018-02-17 RX ORDER — PANTOPRAZOLE SODIUM 40 MG/1
40 INJECTION, POWDER, FOR SOLUTION INTRAVENOUS ONCE
Status: COMPLETED | OUTPATIENT
Start: 2018-02-17 | End: 2018-02-17

## 2018-02-17 RX ORDER — HYDROCODONE BITARTRATE AND ACETAMINOPHEN 5; 325 MG/1; MG/1
1 TABLET ORAL EVERY 6 HOURS PRN
Qty: 6 TABLET | Refills: 0 | Status: SHIPPED | OUTPATIENT
Start: 2018-02-17 | End: 2018-02-27

## 2018-02-17 RX ORDER — ONDANSETRON 2 MG/ML
4 INJECTION INTRAMUSCULAR; INTRAVENOUS ONCE
Status: COMPLETED | OUTPATIENT
Start: 2018-02-17 | End: 2018-02-17

## 2018-02-17 RX ORDER — POTASSIUM CHLORIDE 20 MEQ/1
40 TABLET, EXTENDED RELEASE ORAL ONCE
Status: COMPLETED | OUTPATIENT
Start: 2018-02-17 | End: 2018-02-17

## 2018-02-17 RX ORDER — METOCLOPRAMIDE 10 MG/1
10 TABLET ORAL EVERY 6 HOURS PRN
Qty: 15 TABLET | Refills: 0 | Status: SHIPPED | OUTPATIENT
Start: 2018-02-17 | End: 2018-12-08

## 2018-02-17 RX ORDER — MAGNESIUM HYDROXIDE/ALUMINUM HYDROXICE/SIMETHICONE 120; 1200; 1200 MG/30ML; MG/30ML; MG/30ML
15 SUSPENSION ORAL EVERY 4 HOURS PRN
Qty: 355 ML | Refills: 0 | Status: SHIPPED | OUTPATIENT
Start: 2018-02-17 | End: 2018-12-08

## 2018-02-17 RX ADMIN — ONDANSETRON 4 MG: 2 INJECTION INTRAMUSCULAR; INTRAVENOUS at 05:40

## 2018-02-17 RX ADMIN — LIDOCAINE HYDROCHLORIDE 10 ML: 20 SOLUTION ORAL; TOPICAL at 05:53

## 2018-02-17 RX ADMIN — SODIUM CHLORIDE 1000 ML: 0.9 INJECTION, SOLUTION INTRAVENOUS at 05:39

## 2018-02-17 RX ADMIN — IOHEXOL 100 ML: 350 INJECTION, SOLUTION INTRAVENOUS at 06:27

## 2018-02-17 RX ADMIN — HYDROMORPHONE HYDROCHLORIDE 0.5 MG: 1 INJECTION, SOLUTION INTRAMUSCULAR; INTRAVENOUS; SUBCUTANEOUS at 05:54

## 2018-02-17 RX ADMIN — PANTOPRAZOLE SODIUM 40 MG: 40 INJECTION, POWDER, FOR SOLUTION INTRAVENOUS at 05:54

## 2018-02-17 RX ADMIN — POTASSIUM CHLORIDE 40 MEQ: 1500 TABLET, EXTENDED RELEASE ORAL at 07:48

## 2018-02-17 RX ADMIN — ALUMINUM HYDROXIDE, MAGNESIUM HYDROXIDE, AND SIMETHICONE 30 ML: 200; 200; 20 SUSPENSION ORAL at 05:53

## 2018-02-17 NOTE — ED PROVIDER NOTES
History  Chief Complaint   Patient presents with    Vomiting     Pt presents complaining of vomiting since wednesday with some blood in it   States he was seen here and scoped and told there was no blood  Pt presents to ED with garbage bags full of claimed vomit that appears orange  Pt also has an orange smoothie with him     Patient is a 59-year-old male with past medical history of esophagitis, Diaz's esophagus, hypertension, hyperlipidemia, depression, presents to the emergency department for diffuse abdominal pain as well as vomiting what he thinks is blood  According to medical records, patient was seen here on 02/15 for epigastric pain and vomiting blood  He was admitted to have an EGD performed which was performed on the same day and showed esophagitis and esophageal ulcers  There was no active bleeding and no old blood during endoscopy  He was discharged the same day with instruction to avoid NSAIDs, other blood thinners and to take omeprazole  He states his pain in the epigastric region has been coming and going however he woke up this morning with more diffuse and severe pain  He states he woke up at around 1:30 a m  and the pain was radiating all throughout his abdomen  He states it is an aching pain that waxes and wanes in severity  He also had 3 additional episodes of vomiting  He states he vomited blood and brought bags of his vomitus  The vomitus to my visualization appears orange in color and patient was noted to be drinking a peach colored drink this morning  There is no bright red blood, coffee grounds or evidence of blood clots  Patient also states he has been having subjective fever and chills but has not taken his temperature at home    He denies headache, dizziness or lightheadedness, syncope, URI symptoms, productive cough or hemoptysis, chest pain, palpitations, shortness of breath, change in bowel habits, bright red blood per rectum, melena, dysuria, frequency, hematuria, flank pain, skin rash or color change, extremity weakness or paresthesia or other focal neurologic deficits  He denies any recent travel outside the country or sick contacts  He reports he has been compliant with avoiding NSAIDs and has been taking his omeprazole  History provided by:  Patient and medical records   used: No    Vomiting   Associated symptoms: abdominal pain, chills and fever    Associated symptoms: no cough, no diarrhea, no headaches and no sore throat        Prior to Admission Medications   Prescriptions Last Dose Informant Patient Reported? Taking?    amLODIPine (NORVASC) 2 5 mg tablet   Yes No   Sig: Take 2 5 mg by mouth daily   atorvastatin (LIPITOR) 20 mg tablet   Yes No   Sig: Take 20 mg by mouth daily   buPROPion (WELLBUTRIN) 75 mg tablet   Yes No   Sig: Take 75 mg by mouth 2 (two) times a day   dicyclomine (BENTYL) 20 mg tablet   No No   Sig: Take 1 tablet by mouth every 8 (eight) hours as needed (abdominal pain)   omeprazole (PriLOSEC) 20 mg delayed release capsule   Yes No   Sig: Take 20 mg by mouth daily   ondansetron (ZOFRAN-ODT) 4 mg disintegrating tablet   No No   Sig: Take 1 tablet by mouth every 8 (eight) hours as needed for nausea or vomiting for up to 20 doses   ondansetron (ZOFRAN-ODT) 4 mg disintegrating tablet   No No   Sig: Take 1 tablet by mouth every 6 (six) hours as needed for nausea or vomiting   sertraline (ZOLOFT) 100 mg tablet   Yes No   Sig: Take 200 mg by mouth daily      Facility-Administered Medications: None       Past Medical History:   Diagnosis Date    Diaz's esophagus     Diverticulitis     GERD (gastroesophageal reflux disease)     Hemorrhoid     Hyperlipidemia     Hypertension     [facility]        Past Surgical History:   Procedure Laterality Date    ABDOMINAL SURGERY      radio frequency ablation    CARPAL TUNNEL RELEASE      COLONOSCOPY      EGD AND COLONOSCOPY      ESOPHAGOGASTRODUODENOSCOPY N/A 2/15/2018    Procedure: ESOPHAGOGASTRODUODENOSCOPY (EGD); Surgeon: Virginia Cerda MD;  Location: [facility];  Service: Gastroenterology    TONSILLECTOMY         History reviewed  No pertinent family history  I have reviewed and agree with the history as documented  Social History   Substance Use Topics    Smoking status: Former Smoker    Smokeless tobacco: Never Used    Alcohol use No      Comment: quit 1 5 yrs ago        Review of Systems   Constitutional: Positive for chills and fever  Negative for fatigue  HENT: Negative for congestion, ear pain, hearing loss, rhinorrhea, sore throat and tinnitus  Eyes: Negative for photophobia, pain and visual disturbance  Respiratory: Negative for cough, chest tightness, shortness of breath and wheezing  Cardiovascular: Negative for chest pain and palpitations  Gastrointestinal: Positive for abdominal pain, nausea and vomiting  Negative for abdominal distention, blood in stool, constipation and diarrhea         + Vomiting blood   Genitourinary: Negative for dysuria, flank pain, frequency and hematuria  Musculoskeletal: Negative for back pain, neck pain and neck stiffness  Skin: Negative for color change, pallor, rash and wound  Allergic/Immunologic: Negative for immunocompromised state  Neurological: Negative for dizziness, syncope, weakness, light-headedness, numbness and headaches  Hematological: Does not bruise/bleed easily  Psychiatric/Behavioral: Negative for confusion and decreased concentration  All other systems reviewed and are negative        Physical Exam  ED Triage Vitals [02/17/18 0512]   Temperature Pulse Respirations Blood Pressure SpO2   97 8 °F (36 6 °C) (!) 53 17 (!) 186/84 99 %      Temp Source Heart Rate Source Patient Position - Orthostatic VS BP Location FiO2 (%)   Oral Monitor Lying Right arm --      Pain Score       9         Vitals:    02/17/18 0512 02/17/18 0743   BP: (!) 186/84 144/82   BP Location: Right arm Right arm   Pulse: (!) 53 78   Resp: 17 20   Temp: 97 8 °F (36 6 °C)    TempSrc: Oral    SpO2: 99% 97%   Weight: 97 5 kg (215 lb)    Height: 5' 9" (1 753 m)      Physical Exam   Constitutional: He is oriented to person, place, and time  He appears well-developed and well-nourished  No distress  HENT:   Head: Normocephalic and atraumatic  Mouth/Throat: Oropharynx is clear and moist  No oropharyngeal exudate  Eyes: Conjunctivae and EOM are normal  Pupils are equal, round, and reactive to light  Neck: Normal range of motion  Neck supple  No JVD present  Cardiovascular: Normal rate, regular rhythm, normal heart sounds and intact distal pulses  Exam reveals no gallop and no friction rub  No murmur heard  Pulmonary/Chest: Effort normal and breath sounds normal  No respiratory distress  He has no wheezes  He has no rales  He exhibits no tenderness  Abdominal: Soft  Bowel sounds are normal  He exhibits no distension  There is tenderness  There is no rebound and no guarding  Diffuse upper abdominal tenderness, most notable in the epigastric region  Musculoskeletal: Normal range of motion  He exhibits no edema or tenderness  Neurological: He is alert and oriented to person, place, and time  No cranial nerve deficit  No gross motor or sensory deficits  Skin: Skin is warm and dry  No rash noted  He is not diaphoretic  No pallor  Psychiatric: He has a normal mood and affect  His behavior is normal    Nursing note and vitals reviewed        ED Medications  Medications   potassium chloride (K-DUR,KLOR-CON) CR tablet 40 mEq (not administered)   sodium chloride 0 9 % bolus 1,000 mL (0 mL Intravenous Stopped 2/17/18 8971)   ondansetron (ZOFRAN) injection 4 mg (4 mg Intravenous Given 2/17/18 6840)   HYDROmorphone (DILAUDID) injection 0 5 mg (0 5 mg Intravenous Given 2/17/18 2037)   lidocaine viscous (XYLOCAINE) 2 % mucosal solution 10 mL (10 mL Swish & Swallow Given 2/17/18 0398)   aluminum-magnesium hydroxide-simethicone (MYLANTA) 200-200-20 mg/5 mL oral suspension 30 mL (30 mL Oral Given 2/17/18 0553)   pantoprazole (PROTONIX) injection 40 mg (40 mg Intravenous Given 2/17/18 0554)   iohexol (OMNIPAQUE) 350 MG/ML injection (MULTI-DOSE) 100 mL (100 mL Intravenous Given 2/17/18 0627)       Diagnostic Studies  Results Reviewed     Procedure Component Value Units Date/Time    Comprehensive metabolic panel [[id number]]  (Abnormal) Collected:  02/17/18 0540    Lab Status:  Final result Specimen:  Blood from Arm, Right Updated:  02/17/18 0600     Sodium 140 mmol/L      Potassium 3 1 (L) mmol/L      Chloride 104 mmol/L      CO2 24 mmol/L      Anion Gap 12 mmol/L      BUN 15 mg/dL      Creatinine 1 02 mg/dL      Glucose 155 (H) mg/dL      Calcium 8 9 mg/dL      AST 11 U/L      ALT 23 U/L      Alkaline Phosphatase 60 U/L      Total Protein 7 0 g/dL      Albumin 3 8 g/dL      Total Bilirubin 0 30 mg/dL      eGFR 84 ml/min/1 73sq m     Narrative:         National Kidney Disease Education Program recommendations are as follows:  GFR calculation is accurate only with a steady state creatinine  Chronic Kidney disease less than 60 ml/min/1 73 sq  meters  Kidney failure less than 15 ml/min/1 73 sq  meters      Lipase [[id number]]  (Normal) Collected:  02/17/18 0540    Lab Status:  Final result Specimen:  Blood from Arm, Right Updated:  02/17/18 0600     Lipase 197 u/L     CBC and differential [[id number]]  (Abnormal) Collected:  02/17/18 0540    Lab Status:  Final result Specimen:  Blood from Arm, Right Updated:  02/17/18 0545     WBC 14 33 (H) Thousand/uL      RBC 4 98 Million/uL      Hemoglobin 14 9 g/dL      Hematocrit 43 3 %      MCV 87 fL      MCH 29 9 pg      MCHC 34 4 g/dL      RDW 12 2 %      MPV 9 4 fL      Platelets 821 Thousands/uL      nRBC 0 /100 WBCs      Neutrophils Relative 87 (H) %      Lymphocytes Relative 7 (L) %      Monocytes Relative 5 %      Eosinophils Relative 0 %      Basophils Relative 0 %      Neutrophils Absolute 12 50 (H) Thousands/µL      Lymphocytes Absolute 0 98 Thousands/µL      Monocytes Absolute 0 69 Thousand/µL      Eosinophils Absolute 0 06 Thousand/µL      Basophils Absolute 0 04 Thousands/µL     UA w Reflex to Microscopic [[id number]]     Lab Status:  No result Specimen:  Urine                  CT abdomen pelvis with contrast   Final Result by Savana Read MD (02/17 3340)      1  Increased dilatation of the pancreatic duct measuring up to 7 mm in the pancreatic head/neck  No obstructing lesion is identified and this is of uncertain etiology, particularly as the pancreatic duct within the body and tail appears normal   A    follow-up MRI of the pancreas with MRCP is recommended to exclude a ductal lesion  2   Diverticulosis coli  3   Stable mild distal esophageal thickening  4   Prostatomegaly  Workstation performed: [id number]                    Procedures  Procedures       Phone Contacts  ED Phone Contact    ED Course  ED Course as of Feb 17 0745   Sat Feb 17, 2018   0344 Leukocytosis unchanged from recent hospitalization  WBC: (!) 14 33   [id number] Updated patient about CT findings  Recommended he follow up with Dr Dereck Ferro with GI on Monday in regards to scheduling either an MRI or MRCP  I do not feel patient needs readmission at this time  His symptoms are improved and he has not had any further vomiting since my initial assessment  Discussed ED return parameters  MDM  Number of Diagnoses or Management Options  Diagnosis management comments: 50-year-old male re-presented to the emergency department complaining of epigastric and more diffuse abdominal pain as well as additional episodes of vomiting what he thinks is blood  Based on the bags of vomit he brought to the ED, I do not see evidence of hematemesis or coffee-ground emesis  Vomitus appears liquidy and orange in color consistent with his pH flavored shake his been drinking    He does have upper abdominal tenderness  Will repeat blood work and repeat abdominal CT scan  Will give IV fluids, GI cocktail, Zofran, Protonix and 0 5 mg of Dilaudid  If labs and CT unremarkable and patient's symptoms improved, I do not feel patient needs readmission and will have patient follow up with GI as an outpatient  Amount and/or Complexity of Data Reviewed  Clinical lab tests: reviewed and ordered  Tests in the radiology section of CPT®: ordered and reviewed  Review and summarize past medical records: yes  Independent visualization of images, tracings, or specimens: yes      CritCare Time    Disposition  Final diagnoses:   Gastritis   Esophagitis   Vomiting   Abdominal pain   Abnormal finding on CT scan     Time reflects when diagnosis was documented in both MDM as applicable and the Disposition within this note     Time User Action Codes Description Comment    2/17/2018  7:39 AM Sherrilee Glynn E Add [K29 70] Gastritis     2/17/2018  7:39 AM Sherrilee Glynn E Add [K20 9] Esophagitis     2/17/2018  7:39 AM Sherrilee Glynn E Add [R11 10] Vomiting     2/17/2018  7:39 AM Sherrilee Glynn E Add [R10 9] Abdominal pain     2/17/2018  7:40 AM Sherrilee [place] E Add [R93 8] Abnormal finding on CT scan       ED Disposition     ED Disposition Condition Comment    Discharge  Doloris Porras discharge to home/self care      Condition at discharge: Stable        Follow-up Information     Follow up With Specialties Details Why Contact Info    Minoo Zafar MD Internal Medicine Schedule an appointment as soon as possible for a visit   [address] [facility] [address]      Clarisa Aguilar MD Gastroenterology Schedule an appointment as soon as possible for a visit She discussed the abnormal CT scan findings [address]          Patient's Medications   Discharge Prescriptions    ALUMINUM-MAGNESIUM HYDROXIDE-SIMETHICONE (MYLANTA) 200-200-20 MG/5 ML SUSPENSION Take 15 mL by mouth every 4 (four) hours as needed for indigestion or heartburn       Start Date: 2/17/2018 End Date: --       Order Dose: 15 mL       Quantity: 355 mL    Refills: 0    HYDROCODONE-ACETAMINOPHEN (NORCO) 5-325 MG PER TABLET    Take 1 tablet by mouth every 6 (six) hours as needed (severe pain) for up to 10 days Max Daily Amount: 4 tablets       Start Date: 2/17/2018 End Date: 2/27/2018       Order Dose: 1 tablet       Quantity: 6 tablet    Refills: 0    METOCLOPRAMIDE (REGLAN) 10 MG TABLET    Take 1 tablet (10 mg total) by mouth every 6 (six) hours as needed (nausea or vomiting)       Start Date: 2/17/2018 End Date: --       Order Dose: 10 mg       Quantity: 15 tablet    Refills: 0     No discharge procedures on file      ED Provider  Electronically Signed by           Winifred Napier,   02/17/18 [address] [place] [address], DO  02/17/18 5098

## 2018-02-17 NOTE — DISCHARGE INSTRUCTIONS
CT ABDOMEN AND PELVIS WITH IV CONTRAST    INDICATION:  Vomiting    COMPARISON: 2/15/2018    TECHNIQUE:  CT examination of the abdomen and pelvis was performed  Axial, sagittal, and coronal 2D reformatted images were created from the source data and submitted for interpretation  Radiation dose length product (DLP) for this visit: [phone]  87 mGy-cm    This examination, like all CT scans performed in the [facility], was performed utilizing techniques to minimize radiation dose exposure, including the use of iterative   reconstruction and automated exposure control  IV Contrast:  100 mL of iohexol (OMNIPAQUE)  Enteric Contrast:  Enteric contrast was not administered  FINDINGS:    ABDOMEN    LOWER CHEST: Dillon Rodriguez is stable mild distal esophageal thickening  LIVER/BILIARY TREE:  Unremarkable  GALLBLADDER:  No calcified gallstones  No pericholecystic inflammatory change  SPLEEN:  Unremarkable  PANCREAS: Dillon Rodriguez is increased dilatation of the pancreatic duct within the pancreatic head and neck measuring up to 7 mm   The pancreatic duct within the body and tail appears normal in caliber   There is no common bile duct dilatation  ADRENAL GLANDS:  Unremarkable  KIDNEYS/URETERS:  Unremarkable  No hydronephrosis  STOMACH AND BOWEL: Dillon Rodriguez is colonic diverticulosis without evidence of acute diverticulitis  APPENDIX:  No findings to suggest appendicitis  ABDOMINOPELVIC CAVITY:  No ascites or free intraperitoneal air  No lymphadenopathy  VESSELS:  Unremarkable for patient's age  PELVIS    REPRODUCTIVE ORGANS:  The prostate is enlarged  URINARY BLADDER:  Unremarkable  ABDOMINAL WALL/INGUINAL REGIONS:  Unremarkable  OSSEOUS STRUCTURES:  No acute fracture or destructive osseous lesion     Impression       1   Increased dilatation of the pancreatic duct measuring up to 7 mm in the pancreatic head/neck   No obstructing lesion is identified and this is of uncertain etiology, particularly as the pancreatic duct within the body and tail appears normal   A   follow-up MRI of the pancreas with MRCP is recommended to exclude a ductal lesion  2   Diverticulosis coli  3   Stable mild distal esophageal thickening       4   Prostatomegaly  Esophagitis   WHAT YOU NEED TO KNOW:   Esophagitis is inflammation or irritation of the lining of the esophagus  DISCHARGE INSTRUCTIONS:   Call 911 for any of the following:   · You have chest pain that does not go away within a few minutes or gets worse  Seek care immediately if:   · You feel like you have food stuck in your throat and you cannot cough it out  Contact your healthcare provider if:   · You have new or worsening symptoms, even after treatment  · You have questions or concerns about your condition or care  Medicines:   · Medicines  may be given to fight an infection or to control stomach acid  · Take your medicine as directed  Contact your healthcare provider if you think your medicine is not helping or if you have side effects  Tell him or her if you are allergic to any medicine  Keep a list of the medicines, vitamins, and herbs you take  Include the amounts, and when and why you take them  Bring the list or the pill bottles to follow-up visits  Carry your medicine list with you in case of an emergency  Follow up with your healthcare provider as directed: You may need ongoing tests or treatment  Write down your questions so you remember to ask them during your visits  Do not smoke:  Nicotine and other chemicals in cigarettes and cigars can cause blood vessel and lung damage  Ask your healthcare provider for information if you currently smoke and need help to quit  E-cigarettes or smokeless tobacco still contain nicotine  Talk to your healthcare provider before you use these products  Do not drink alcohol:  Alcohol can irritate your esophagus   Talk to your healthcare provider if you need help to stop drinking  Keep batteries and similar objects out of the reach of children:  Babies often put items in their mouths to explore them  Button batteries are easy to swallow and can cause serious damage  Keep the battery covers of electronic devices such as remote controls taped closed  Store all batteries and toxic materials where children cannot get to them  Use childproof locks to keep children away from dangerous materials  Manage or prevent esophagitis:   · Limit or do not eat foods that can lead to esophagitis  Foods such as oranges and salsa can irritate your esophagus  Caffeine and chocolate can cause acid reflux  High-fat and fried foods make your stomach digest food more slowly  This increases the amount of stomach acid your esophagus is exposed to  Eat small meals, and drink water with your meals  Soft foods such as yogurt and applesauce may help soothe your throat  Do not eat for at least 3 hours before you go to bed  · Prevent acid reflux  Do not bend over unless it is necessary  Acid may back up into your esophagus when you bend over  If possible, keep the head of your bed elevated while you sleep  This will help keep acid from backing up  Manage stress  Stress can make your symptoms worse or cause stomach acid to back up  · Drink more liquid when you take pills  Drink a full glass of water when you take your pills  Ask your healthcare provider if you can take your pills at least an hour before you go to bed  © 2017 [employer] [employer] Information is for End User's use only and may not be sold, redistributed or otherwise used for commercial purposes  All illustrations and images included in CareNotes® are the copyrighted property of [employer]  or Mihir Castro  The above information is an  only  It is not intended as medical advice for individual conditions or treatments   Talk to your doctor, nurse or pharmacist before following any medical regimen to see if it is safe and effective for you  Gastritis   WHAT YOU NEED TO KNOW:   Gastritis is inflammation or irritation of the lining of your stomach  DISCHARGE INSTRUCTIONS:   Call 911 for any of the following:   · You develop chest pain or shortness of breath  Seek care immediately if:   · You vomit blood  · You have black or bloody bowel movements  · You have severe stomach or back pain  Contact your healthcare provider if:   · You have a fever  · You have new or worsening symptoms, even after treatment  · You have questions or concerns about your condition or care  Medicines:   · Medicines  may be given to help treat a bacterial infection or decrease stomach acid  · Take your medicine as directed  Contact your healthcare provider if you think your medicine is not helping or if you have side effects  Tell him or her if you are allergic to any medicine  Keep a list of the medicines, vitamins, and herbs you take  Include the amounts, and when and why you take them  Bring the list or the pill bottles to follow-up visits  Carry your medicine list with you in case of an emergency  Manage or prevent gastritis:   · Do not smoke  Nicotine and other chemicals in cigarettes and cigars can make your symptoms worse and cause lung damage  Ask your healthcare provider for information if you currently smoke and need help to quit  E-cigarettes or smokeless tobacco still contain nicotine  Talk to your healthcare provider before you use these products  · Do not drink alcohol  Alcohol can prevent healing and make your gastritis worse  Talk to your healthcare provider if you need help to stop drinking  · Do not take NSAIDs or aspirin unless directed  These and similar medicines can cause irritation  If your healthcare provider says it is okay to take NSAIDs, take them with food  · Do not eat foods that cause irritation  Foods such as oranges and salsa can cause burning or pain   Eat a variety of healthy foods  Examples include fruits (not citrus), vegetables, low-fat dairy products, beans, whole-grain breads, and lean meats and fish  Try to eat small meals, and drink water with your meals  Do not eat for at least 3 hours before you go to bed  · Find ways to relax and decrease stress  Stress can increase stomach acid and make gastritis worse  Activities such as yoga, meditation, or listening to music can help you relax  Spend time with friends, or do things you enjoy  Follow up with your healthcare provider as directed: You may need ongoing tests or treatment, or referral to a gastroenterologist  Write down your questions so you remember to ask them during your visits  © 2017 [employer] [employer] Information is for End User's use only and may not be sold, redistributed or otherwise used for commercial purposes  All illustrations and images included in CareNotes® are the copyrighted property of [employer]  or Mihir Castro  The above information is an  only  It is not intended as medical advice for individual conditions or treatments  Talk to your doctor, nurse or pharmacist before following any medical regimen to see if it is safe and effective for you  Acute Nausea and Vomiting   WHAT YOU NEED TO KNOW:   Acute nausea and vomiting start suddenly, worsen quickly, and last a short time  DISCHARGE INSTRUCTIONS:   Seek care immediately if:   · You see blood in your vomit or your bowel movements  · You have sudden, severe pain in your chest and upper abdomen after hard vomiting or retching  · You have swelling in your neck and chest      · You are dizzy, cold, and thirsty and your eyes and mouth are dry  · You are urinating very little or not at all  · You have muscle weakness, leg cramps, and trouble breathing  · Your heart is beating much faster than normal      · You continue to vomit for more than 48 hours    Contact your healthcare provider if:   · You have frequent dry heaves (vomiting but nothing comes out)  · Your nausea and vomiting does not get better or go away after you use medicine  · You have questions or concerns about your condition or treatment  Medicines: You may need any of the following:  · Medicines  may be given to calm your stomach and stop your vomiting  You may also need medicines to help you feel more relaxed or to stop nausea and vomiting caused by motion sickness  · Gastrointestinal stimulants  are used to help empty your stomach and bowels  This may help decrease nausea and vomiting  · Take your medicine as directed  Contact your healthcare provider if you think your medicine is not helping or if you have side effects  Tell him or her if you are allergic to any medicine  Keep a list of the medicines, vitamins, and herbs you take  Include the amounts, and when and why you take them  Bring the list or the pill bottles to follow-up visits  Carry your medicine list with you in case of an emergency  Prevent or manage acute nausea and vomiting:   · Do not drink alcohol  Alcohol may upset or irritate your stomach  Too much alcohol can also cause acute nausea and vomiting  · Control stress  Headaches due to stress may cause nausea and vomiting  Find ways to relax and manage your stress  Get more rest and sleep  · Drink more liquids as directed  Vomiting can lead to dehydration  It is important to drink more liquids to help replace lost body fluids  Ask your healthcare provider how much liquid to drink each day and which liquids are best for you  Your provider may recommend that you drink an oral rehydration solution (ORS)  ORS contains water, salts, and sugar that are needed to replace the lost body fluids  Ask what kind of ORS to use, how much to drink, and where to get it  · Eat smaller meals, more often  Eat small amounts of food every 2 to 3 hours, even if you are not hungry   Food in your stomach may decrease your nausea  · Talk to your healthcare provider before you take over-the-counter (OTC) medicines  These medicines can cause serious problems if you use certain other medicines, or you have a medical condition  You may have problems if you use too much or use them for longer than the label says  Follow directions on the label carefully  Follow up with your healthcare provider as directed:  Write down your questions so you remember to ask them during your visits  © 2017 [address] [facility] Information is for End User's use only and may not be sold, redistributed or otherwise used for commercial purposes  All illustrations and images included in CareNotes® are the copyrighted property of [employer]  or Mihir Castro  The above information is an  only  It is not intended as medical advice for individual conditions or treatments  Talk to your doctor, nurse or pharmacist before following any medical regimen to see if it is safe and effective for you

## 2018-02-20 ENCOUNTER — TELEPHONE (OUTPATIENT)
Dept: GASTROENTEROLOGY | Facility: CLINIC | Age: 53
End: 2018-02-20

## 2018-02-20 NOTE — TELEPHONE ENCOUNTER
He was seen in the hospital and started on medications  He should continue the medications the ER gave him which include omeprazole and mylanta, these can take a few weeks to fully work and improve his symptoms   We can make an appt for him but he also has a GI doctor at the [place] so he should make an appt with them if possible as that is his primary team

## 2018-11-09 ENCOUNTER — TELEPHONE (OUTPATIENT)
Dept: CARDIOLOGY CLINIC | Facility: CLINIC | Age: 53
End: 2018-11-09

## 2018-11-09 NOTE — TELEPHONE ENCOUNTER
Received referral from [place] for pt to schedule with one of our cardiologist, nany to call back   Was going to schedule with Dr Alan Toussaint

## 2018-12-08 ENCOUNTER — APPOINTMENT (EMERGENCY)
Dept: CT IMAGING | Facility: HOSPITAL | Age: 53
End: 2018-12-08
Payer: COMMERCIAL

## 2018-12-08 ENCOUNTER — APPOINTMENT (EMERGENCY)
Dept: RADIOLOGY | Facility: HOSPITAL | Age: 53
End: 2018-12-08
Payer: COMMERCIAL

## 2018-12-08 ENCOUNTER — HOSPITAL ENCOUNTER (OUTPATIENT)
Facility: HOSPITAL | Age: 53
Setting detail: OBSERVATION
Discharge: HOME | End: 2018-12-11
Attending: EMERGENCY MEDICINE | Admitting: INTERNAL MEDICINE
Payer: COMMERCIAL

## 2018-12-08 DIAGNOSIS — R93.5 ABNORMAL CT OF THE ABDOMEN: ICD-10-CM

## 2018-12-08 DIAGNOSIS — R10.9 ABDOMINAL PAIN: ICD-10-CM

## 2018-12-08 DIAGNOSIS — K52.9 ENTERITIS: Primary | ICD-10-CM

## 2018-12-08 DIAGNOSIS — R11.10 VOMITING: ICD-10-CM

## 2018-12-08 PROBLEM — R00.1 SINUS BRADYCARDIA: Status: ACTIVE | Noted: 2018-12-08

## 2018-12-08 PROBLEM — R11.2 NON-INTRACTABLE VOMITING WITH NAUSEA: Status: ACTIVE | Noted: 2018-12-08

## 2018-12-08 PROBLEM — D72.829 LEUKOCYTOSIS: Status: ACTIVE | Noted: 2018-12-08

## 2018-12-08 LAB
ALBUMIN SERPL BCP-MCNC: 3.9 G/DL (ref 3.5–5)
ALP SERPL-CCNC: 68 U/L (ref 46–116)
ALT SERPL W P-5'-P-CCNC: 45 U/L (ref 12–78)
ANION GAP SERPL CALCULATED.3IONS-SCNC: 9 MMOL/L (ref 4–13)
AST SERPL W P-5'-P-CCNC: 17 U/L (ref 5–45)
BASOPHILS # BLD AUTO: 0.03 THOUSANDS/ΜL (ref 0–0.1)
BASOPHILS NFR BLD AUTO: 0 % (ref 0–1)
BILIRUB SERPL-MCNC: 0.3 MG/DL (ref 0.2–1)
BUN SERPL-MCNC: 14 MG/DL (ref 5–25)
CALCIUM SERPL-MCNC: 9.1 MG/DL (ref 8.3–10.1)
CHLORIDE SERPL-SCNC: 104 MMOL/L (ref 100–108)
CO2 SERPL-SCNC: 27 MMOL/L (ref 21–32)
CREAT SERPL-MCNC: 1.01 MG/DL (ref 0.6–1.3)
EOSINOPHIL # BLD AUTO: 0 THOUSAND/ΜL (ref 0–0.61)
EOSINOPHIL NFR BLD AUTO: 0 % (ref 0–6)
ERYTHROCYTE [DISTWIDTH] IN BLOOD BY AUTOMATED COUNT: 12.5 % (ref 11.6–15.1)
GFR SERPL CREATININE-BSD FRML MDRD: 84 ML/MIN/1.73SQ M
GLUCOSE SERPL-MCNC: 133 MG/DL (ref 65–140)
HCT VFR BLD AUTO: 43.9 % (ref 36.5–49.3)
HGB BLD-MCNC: 14.8 G/DL (ref 12–17)
IMM GRANULOCYTES # BLD AUTO: 0.06 THOUSAND/UL (ref 0–0.2)
IMM GRANULOCYTES NFR BLD AUTO: 1 % (ref 0–2)
LIPASE SERPL-CCNC: 88 U/L (ref 73–393)
LYMPHOCYTES # BLD AUTO: 0.85 THOUSANDS/ΜL (ref 0.6–4.47)
LYMPHOCYTES NFR BLD AUTO: 7 % (ref 14–44)
MCH RBC QN AUTO: 30.1 PG (ref 26.8–34.3)
MCHC RBC AUTO-ENTMCNC: 33.7 G/DL (ref 31.4–37.4)
MCV RBC AUTO: 89 FL (ref 82–98)
MONOCYTES # BLD AUTO: 0.33 THOUSAND/ΜL (ref 0.17–1.22)
MONOCYTES NFR BLD AUTO: 3 % (ref 4–12)
NEUTROPHILS # BLD AUTO: 11.46 THOUSANDS/ΜL (ref 1.85–7.62)
NEUTS SEG NFR BLD AUTO: 89 % (ref 43–75)
NRBC BLD AUTO-RTO: 0 /100 WBCS
PLATELET # BLD AUTO: 303 THOUSANDS/UL (ref 149–390)
PMV BLD AUTO: 10.5 FL (ref 8.9–12.7)
POTASSIUM SERPL-SCNC: 4 MMOL/L (ref 3.5–5.3)
PROT SERPL-MCNC: 7.4 G/DL (ref 6.4–8.2)
RBC # BLD AUTO: 4.91 MILLION/UL (ref 3.88–5.62)
SODIUM SERPL-SCNC: 140 MMOL/L (ref 136–145)
TROPONIN I SERPL-MCNC: <0.02 NG/ML
TROPONIN I SERPL-MCNC: <0.02 NG/ML
WBC # BLD AUTO: 12.73 THOUSAND/UL (ref 4.31–10.16)

## 2018-12-08 PROCEDURE — 84484 ASSAY OF TROPONIN QUANT: CPT | Performed by: EMERGENCY MEDICINE

## 2018-12-08 PROCEDURE — 85025 COMPLETE CBC W/AUTO DIFF WBC: CPT | Performed by: EMERGENCY MEDICINE

## 2018-12-08 PROCEDURE — 74177 CT ABD & PELVIS W/CONTRAST: CPT

## 2018-12-08 PROCEDURE — 93005 ELECTROCARDIOGRAM TRACING: CPT

## 2018-12-08 PROCEDURE — 99285 EMERGENCY DEPT VISIT HI MDM: CPT

## 2018-12-08 PROCEDURE — 36415 COLL VENOUS BLD VENIPUNCTURE: CPT | Performed by: EMERGENCY MEDICINE

## 2018-12-08 PROCEDURE — 83690 ASSAY OF LIPASE: CPT | Performed by: EMERGENCY MEDICINE

## 2018-12-08 PROCEDURE — 96375 TX/PRO/DX INJ NEW DRUG ADDON: CPT

## 2018-12-08 PROCEDURE — 96376 TX/PRO/DX INJ SAME DRUG ADON: CPT

## 2018-12-08 PROCEDURE — 96365 THER/PROPH/DIAG IV INF INIT: CPT

## 2018-12-08 PROCEDURE — 96366 THER/PROPH/DIAG IV INF ADDON: CPT

## 2018-12-08 PROCEDURE — 99219 PR INITIAL OBSERVATION CARE/DAY 50 MINUTES: CPT | Performed by: INTERNAL MEDICINE

## 2018-12-08 PROCEDURE — 71046 X-RAY EXAM CHEST 2 VIEWS: CPT

## 2018-12-08 PROCEDURE — 96361 HYDRATE IV INFUSION ADD-ON: CPT

## 2018-12-08 PROCEDURE — 80053 COMPREHEN METABOLIC PANEL: CPT | Performed by: EMERGENCY MEDICINE

## 2018-12-08 RX ORDER — CLONAZEPAM 1 MG/1
1 TABLET ORAL
Status: DISCONTINUED | OUTPATIENT
Start: 2018-12-08 | End: 2018-12-11 | Stop reason: HOSPADM

## 2018-12-08 RX ORDER — MAGNESIUM SULFATE HEPTAHYDRATE 40 MG/ML
2 INJECTION, SOLUTION INTRAVENOUS ONCE
Status: COMPLETED | OUTPATIENT
Start: 2018-12-08 | End: 2018-12-08

## 2018-12-08 RX ORDER — ONDANSETRON 2 MG/ML
4 INJECTION INTRAMUSCULAR; INTRAVENOUS EVERY 6 HOURS PRN
Status: DISCONTINUED | OUTPATIENT
Start: 2018-12-08 | End: 2018-12-11 | Stop reason: HOSPADM

## 2018-12-08 RX ORDER — MORPHINE SULFATE 4 MG/ML
4 INJECTION, SOLUTION INTRAMUSCULAR; INTRAVENOUS ONCE
Status: COMPLETED | OUTPATIENT
Start: 2018-12-08 | End: 2018-12-08

## 2018-12-08 RX ORDER — DIPHENHYDRAMINE HYDROCHLORIDE 50 MG/ML
12.5 INJECTION INTRAMUSCULAR; INTRAVENOUS ONCE
Status: COMPLETED | OUTPATIENT
Start: 2018-12-08 | End: 2018-12-08

## 2018-12-08 RX ORDER — METOCLOPRAMIDE HYDROCHLORIDE 5 MG/ML
10 INJECTION INTRAMUSCULAR; INTRAVENOUS ONCE
Status: COMPLETED | OUTPATIENT
Start: 2018-12-08 | End: 2018-12-08

## 2018-12-08 RX ORDER — SODIUM CHLORIDE 9 MG/ML
100 INJECTION, SOLUTION INTRAVENOUS CONTINUOUS
Status: DISCONTINUED | OUTPATIENT
Start: 2018-12-08 | End: 2018-12-11 | Stop reason: HOSPADM

## 2018-12-08 RX ORDER — PANTOPRAZOLE SODIUM 40 MG/1
40 INJECTION, POWDER, FOR SOLUTION INTRAVENOUS EVERY 12 HOURS SCHEDULED
Status: DISCONTINUED | OUTPATIENT
Start: 2018-12-08 | End: 2018-12-11 | Stop reason: HOSPADM

## 2018-12-08 RX ORDER — ONDANSETRON 2 MG/ML
4 INJECTION INTRAMUSCULAR; INTRAVENOUS ONCE
Status: COMPLETED | OUTPATIENT
Start: 2018-12-08 | End: 2018-12-08

## 2018-12-08 RX ORDER — ASPIRIN 81 MG/1
81 TABLET ORAL DAILY
COMMUNITY
End: 2020-02-08

## 2018-12-08 RX ORDER — ACETAMINOPHEN 325 MG/1
650 TABLET ORAL EVERY 6 HOURS PRN
Status: DISCONTINUED | OUTPATIENT
Start: 2018-12-08 | End: 2018-12-11 | Stop reason: HOSPADM

## 2018-12-08 RX ADMIN — DIPHENHYDRAMINE HYDROCHLORIDE 12.5 MG: 50 INJECTION, SOLUTION INTRAMUSCULAR; INTRAVENOUS at 20:20

## 2018-12-08 RX ADMIN — METOCLOPRAMIDE 10 MG: 5 INJECTION, SOLUTION INTRAMUSCULAR; INTRAVENOUS at 20:21

## 2018-12-08 RX ADMIN — SODIUM CHLORIDE 1000 ML: 0.9 INJECTION, SOLUTION INTRAVENOUS at 19:00

## 2018-12-08 RX ADMIN — IOHEXOL 100 ML: 350 INJECTION, SOLUTION INTRAVENOUS at 21:07

## 2018-12-08 RX ADMIN — MORPHINE SULFATE 4 MG: 4 INJECTION INTRAVENOUS at 19:03

## 2018-12-08 RX ADMIN — MORPHINE SULFATE 4 MG: 4 INJECTION INTRAVENOUS at 20:17

## 2018-12-08 RX ADMIN — ONDANSETRON 4 MG: 2 INJECTION INTRAMUSCULAR; INTRAVENOUS at 19:02

## 2018-12-08 RX ADMIN — MAGNESIUM SULFATE IN WATER 2 G: 40 INJECTION, SOLUTION INTRAVENOUS at 20:28

## 2018-12-09 PROBLEM — R11.10 VOMITING: Status: ACTIVE | Noted: 2018-12-08

## 2018-12-09 LAB
ANION GAP SERPL CALCULATED.3IONS-SCNC: 7 MMOL/L (ref 4–13)
ATRIAL RATE: 54 BPM
BUN SERPL-MCNC: 8 MG/DL (ref 5–25)
CALCIUM SERPL-MCNC: 8.2 MG/DL (ref 8.3–10.1)
CHLORIDE SERPL-SCNC: 107 MMOL/L (ref 100–108)
CO2 SERPL-SCNC: 28 MMOL/L (ref 21–32)
CREAT SERPL-MCNC: 0.89 MG/DL (ref 0.6–1.3)
ERYTHROCYTE [DISTWIDTH] IN BLOOD BY AUTOMATED COUNT: 12.8 % (ref 11.6–15.1)
GFR SERPL CREATININE-BSD FRML MDRD: 97 ML/MIN/1.73SQ M
GLUCOSE SERPL-MCNC: 105 MG/DL (ref 65–140)
HCT VFR BLD AUTO: 38.8 % (ref 36.5–49.3)
HGB BLD-MCNC: 13 G/DL (ref 12–17)
INR PPP: 1.06 (ref 0.86–1.17)
MAGNESIUM SERPL-MCNC: 1.9 MG/DL (ref 1.6–2.6)
MCH RBC QN AUTO: 30.7 PG (ref 26.8–34.3)
MCHC RBC AUTO-ENTMCNC: 33.5 G/DL (ref 31.4–37.4)
MCV RBC AUTO: 92 FL (ref 82–98)
P AXIS: 44 DEGREES
PHOSPHATE SERPL-MCNC: 2.6 MG/DL (ref 2.7–4.5)
PLATELET # BLD AUTO: 259 THOUSANDS/UL (ref 149–390)
PMV BLD AUTO: 9.5 FL (ref 8.9–12.7)
POTASSIUM SERPL-SCNC: 3.7 MMOL/L (ref 3.5–5.3)
PR INTERVAL: 168 MS
PROTHROMBIN TIME: 13.7 SECONDS (ref 11.8–14.2)
QRS AXIS: 11 DEGREES
QRSD INTERVAL: 90 MS
QT INTERVAL: 428 MS
QTC INTERVAL: 405 MS
RBC # BLD AUTO: 4.24 MILLION/UL (ref 3.88–5.62)
SODIUM SERPL-SCNC: 142 MMOL/L (ref 136–145)
T WAVE AXIS: 52 DEGREES
VENTRICULAR RATE: 54 BPM
WBC # BLD AUTO: 9.67 THOUSAND/UL (ref 4.31–10.16)

## 2018-12-09 PROCEDURE — 84100 ASSAY OF PHOSPHORUS: CPT | Performed by: INTERNAL MEDICINE

## 2018-12-09 PROCEDURE — C9113 INJ PANTOPRAZOLE SODIUM, VIA: HCPCS | Performed by: INTERNAL MEDICINE

## 2018-12-09 PROCEDURE — 83735 ASSAY OF MAGNESIUM: CPT | Performed by: INTERNAL MEDICINE

## 2018-12-09 PROCEDURE — 93010 ELECTROCARDIOGRAM REPORT: CPT | Performed by: INTERNAL MEDICINE

## 2018-12-09 PROCEDURE — 85027 COMPLETE CBC AUTOMATED: CPT | Performed by: INTERNAL MEDICINE

## 2018-12-09 PROCEDURE — 99244 OFF/OP CNSLTJ NEW/EST MOD 40: CPT | Performed by: INTERNAL MEDICINE

## 2018-12-09 PROCEDURE — 99225 PR SBSQ OBSERVATION CARE/DAY 25 MINUTES: CPT | Performed by: INTERNAL MEDICINE

## 2018-12-09 PROCEDURE — 85610 PROTHROMBIN TIME: CPT | Performed by: INTERNAL MEDICINE

## 2018-12-09 PROCEDURE — 80048 BASIC METABOLIC PNL TOTAL CA: CPT | Performed by: INTERNAL MEDICINE

## 2018-12-09 RX ADMIN — MORPHINE SULFATE 2 MG: 2 INJECTION, SOLUTION INTRAMUSCULAR; INTRAVENOUS at 09:41

## 2018-12-09 RX ADMIN — ENOXAPARIN SODIUM 40 MG: 40 INJECTION SUBCUTANEOUS at 09:42

## 2018-12-09 RX ADMIN — SODIUM CHLORIDE 100 ML/HR: 0.9 INJECTION, SOLUTION INTRAVENOUS at 13:37

## 2018-12-09 RX ADMIN — PANTOPRAZOLE SODIUM 40 MG: 40 INJECTION, POWDER, FOR SOLUTION INTRAVENOUS at 00:15

## 2018-12-09 RX ADMIN — CLONAZEPAM 1 MG: 1 TABLET ORAL at 00:00

## 2018-12-09 RX ADMIN — PANTOPRAZOLE SODIUM 40 MG: 40 INJECTION, POWDER, FOR SOLUTION INTRAVENOUS at 21:09

## 2018-12-09 RX ADMIN — MORPHINE SULFATE 2 MG: 2 INJECTION, SOLUTION INTRAMUSCULAR; INTRAVENOUS at 15:57

## 2018-12-09 RX ADMIN — CLONAZEPAM 1 MG: 1 TABLET ORAL at 21:11

## 2018-12-09 RX ADMIN — ACETAMINOPHEN 650 MG: 325 TABLET ORAL at 21:11

## 2018-12-09 RX ADMIN — PANTOPRAZOLE SODIUM 40 MG: 40 INJECTION, POWDER, FOR SOLUTION INTRAVENOUS at 09:42

## 2018-12-09 RX ADMIN — SODIUM CHLORIDE 100 ML/HR: 0.9 INJECTION, SOLUTION INTRAVENOUS at 00:15

## 2018-12-09 NOTE — ED PROVIDER NOTES
History  Chief Complaint   Patient presents with    Chest Pain     pt states hes been having chest pain; states hes been vomiting all day; reports palpitations     HPI     68-year-old male with history of Diaz's esophagus followed by Gastroenterology, hypertension, hyperlipidemia, ulcerative colitis on budesonide  He presents for evaluation of epigastric abdominal pain radiating to his chest and left upper quadrant, as well as more than 15 episodes of nonbloody nonbilious vomiting  Also endorses palpitations  Abdominal pain started this morning when he started vomiting  Described as a severe aching sensation  Does radiate up to the chest especially with vomiting  Denies radiation of the pain to the back, arms, or jaw  No history of MI or other cardiac issues  Reports that he feels as if he is intermittently skipping a beat  Denies specific chest pain, or shortness of breath  No hematemesis  He has been having normal bowel movements, without diarrhea, blood in his stool, or black tarry stools  Denies fevers or chills  Of note, patient had a CT of his abdomen pelvis performed here in the emergency department for epigastric pain in February of 2018  At that point he had dilated pancreatic ducts, and was told to follow-up for MRCP but never did  Prior to Admission Medications   Prescriptions Last Dose Informant Patient Reported? Taking?    BUDESONIDE PO   Yes Yes   Sig: Take 3 mg by mouth 2 (two) times a day   aspirin (ECOTRIN LOW STRENGTH) 81 mg EC tablet   Yes Yes   Sig: Take 81 mg by mouth daily   atorvastatin (LIPITOR) 20 mg tablet   Yes Yes   Sig: Take 20 mg by mouth daily   buPROPion (WELLBUTRIN) 75 mg tablet   Yes Yes   Sig: Take 75 mg by mouth 2 (two) times a day   omeprazole (PriLOSEC) 20 mg delayed release capsule   Yes Yes   Sig: Take 20 mg by mouth daily   ondansetron (ZOFRAN-ODT) 4 mg disintegrating tablet   No No   Sig: Take 1 tablet by mouth every 6 (six) hours as needed for nausea or vomiting   sertraline (ZOLOFT) 100 mg tablet   Yes Yes   Sig: Take 200 mg by mouth daily      Facility-Administered Medications: None       Past Medical History:   Diagnosis Date    Diaz's esophagus     Diverticulitis     GERD (gastroesophageal reflux disease)     Hemorrhoid     Hyperlipidemia     Hypertension     UC (ulcerative colitis) ([facility] [address] )        Past Surgical History:   Procedure Laterality Date    ABDOMINAL SURGERY      radio frequency ablation    CARPAL TUNNEL RELEASE      COLONOSCOPY      EGD AND COLONOSCOPY      ESOPHAGOGASTRODUODENOSCOPY N/A 2/15/2018    Procedure: ESOPHAGOGASTRODUODENOSCOPY (EGD); Surgeon: Jack Villalobos MD;  Location: [facility];  Service: Gastroenterology    TONSILLECTOMY         No family history on file  I have reviewed and agree with the history as documented  Social History   Substance Use Topics    Smoking status: Former Smoker    Smokeless tobacco: Never Used    Alcohol use No      Comment: quit 1 5 yrs ago        Review of Systems   Constitutional: Negative for chills and fever  HENT: Negative for congestion  Eyes: Negative for visual disturbance  Respiratory: Negative for cough and shortness of breath  Cardiovascular: Positive for palpitations  Negative for chest pain and leg swelling  Gastrointestinal: Positive for abdominal pain, nausea and vomiting  Negative for blood in stool, constipation and diarrhea  Genitourinary: Negative for dysuria, frequency and hematuria  Musculoskeletal: Negative for arthralgias, back pain, neck pain and neck stiffness  Skin: Negative for rash  Neurological: Positive for headaches  Negative for dizziness, weakness, light-headedness and numbness  Psychiatric/Behavioral: Negative for agitation, behavioral problems and confusion  Physical Exam  Physical Exam   Constitutional: He is oriented to person, place, and time  He appears well-developed and well-nourished  No distress     HENT:   Head: Normocephalic and atraumatic  Right Ear: External ear normal    Left Ear: External ear normal    Nose: Nose normal    Mouth/Throat: Oropharynx is clear and moist    Eyes: Conjunctivae are normal    Neck: Normal range of motion  Neck supple  Cardiovascular: Normal heart sounds and intact distal pulses  Exam reveals no gallop and no friction rub  No murmur heard  Bradycardic to 54 bpm, then tachycardic to 100 bpm  Sinus arrhythmia  Pulmonary/Chest: Effort normal and breath sounds normal  No respiratory distress  He has no wheezes  He has no rales  Abdominal: Soft  Bowel sounds are normal  He exhibits no distension  There is tenderness (epigastrium)  There is guarding (voluntary)  Musculoskeletal: Normal range of motion  He exhibits no edema or deformity  Neurological: He is alert and oriented to person, place, and time  He exhibits normal muscle tone  Face symmetric, tongue midline, 5/5 strength in the proximal and distal upper and lower extremities bilaterally with intact sensation to light touch throughout  CN II-XII intact  Normal speech, normal gait  Skin: Skin is warm and dry  He is not diaphoretic         Vital Signs  ED Triage Vitals   Temperature Pulse Respirations Blood Pressure SpO2   12/08/18 1839 12/08/18 1836 12/08/18 1836 12/08/18 1836 12/08/18 1836   98 6 °F (37 °C) (!) 52 20 (!) 185/92 95 %      Temp Source Heart Rate Source Patient Position - Orthostatic VS BP Location FiO2 (%)   12/08/18 1839 12/08/18 1836 12/08/18 1836 12/08/18 1836 --   Oral Monitor Lying Right arm       Pain Score       12/08/18 1836       8           Vitals:    12/08/18 2000 12/08/18 2030 12/08/18 2130 12/08/18 2200   BP: (!) 172/86 153/75 119/61 112/64   Pulse: 59 90 104 104   Patient Position - Orthostatic VS: Lying Lying Lying Lying       Visual Acuity      ED Medications  Medications   ondansetron (ZOFRAN) injection 4 mg (4 mg Intravenous Given 12/8/18 1902)   sodium chloride 0 9 % bolus 1,000 mL (0 mL Intravenous Stopped 12/8/18 2027)   morphine (PF) 4 mg/mL injection 4 mg (4 mg Intravenous Given 12/8/18 1903)   morphine (PF) 4 mg/mL injection 4 mg (4 mg Intravenous Given 12/8/18 2017)   diphenhydrAMINE (BENADRYL) injection 12 5 mg (12 5 mg Intravenous Given 12/8/18 2020)   metoclopramide (REGLAN) injection 10 mg (10 mg Intravenous Given 12/8/18 2021)   magnesium sulfate 2 g/50 mL IVPB (premix) 2 g (0 g Intravenous Stopped 12/8/18 2159)   iohexol (OMNIPAQUE) 350 MG/ML injection (MULTI-DOSE) 100 mL (100 mL Intravenous Given 12/8/18 2107)       Diagnostic Studies  Results Reviewed     Procedure Component Value Units Date/Time    Troponin I [[id number]] Collected:  12/08/18 2202    Lab Status: In process Specimen:  Blood from Arm, Left Updated:  12/08/18 2209    Troponin I [[id number]]  (Normal) Collected:  12/08/18 1900    Lab Status:  Final result Specimen:  Blood from Arm, Left Updated:  12/08/18 2044     Troponin I <0 02 ng/mL     Comprehensive metabolic panel [[id number]] Collected:  12/08/18 1900    Lab Status:  Final result Specimen:  Blood from Arm, Left Updated:  12/08/18 2043     Sodium 140 mmol/L      Potassium 4 0 mmol/L      Chloride 104 mmol/L      CO2 27 mmol/L      ANION GAP 9 mmol/L      BUN 14 mg/dL      Creatinine 1 01 mg/dL      Glucose 133 mg/dL      Calcium 9 1 mg/dL      AST 17 U/L      ALT 45 U/L      Alkaline Phosphatase 68 U/L      Total Protein 7 4 g/dL      Albumin 3 9 g/dL      Total Bilirubin 0 30 mg/dL      eGFR 84 ml/min/1 73sq m     Narrative:         National Kidney Disease Education Program recommendations are as follows:  GFR calculation is accurate only with a steady state creatinine  Chronic Kidney disease less than 60 ml/min/1 73 sq  meters  Kidney failure less than 15 ml/min/1 73 sq  meters      Lipase [[id number]]  (Normal) Collected:  12/08/18 1900    Lab Status:  Final result Specimen:  Blood from Arm, Left Updated:  12/08/18 2043     Lipase 88 u/L     CBC and differential [[id number]]  (Abnormal) Collected:  12/08/18 1900    Lab Status:  Final result Specimen:  Blood from Arm, Left Updated:  12/08/18 2023     WBC 12 73 (H) Thousand/uL      RBC 4 91 Million/uL      Hemoglobin 14 8 g/dL      Hematocrit 43 9 %      MCV 89 fL      MCH 30 1 pg      MCHC 33 7 g/dL      RDW 12 5 %      MPV 10 5 fL      Platelets 495 Thousands/uL      nRBC 0 /100 WBCs      Neutrophils Relative 89 (H) %      Immat GRANS % 1 %      Lymphocytes Relative 7 (L) %      Monocytes Relative 3 (L) %      Eosinophils Relative 0 %      Basophils Relative 0 %      Neutrophils Absolute 11 46 (H) Thousands/µL      Immature Grans Absolute 0 06 Thousand/uL      Lymphocytes Absolute 0 85 Thousands/µL      Monocytes Absolute 0 33 Thousand/µL      Eosinophils Absolute 0 00 Thousand/µL      Basophils Absolute 0 03 Thousands/µL                  CT abdomen pelvis with contrast   Final Result by Caryle Fine, MD (12/08 2203)      1  Mild to moderate distention of the duodenum and proximal jejunum, nonspecific  Findings could be related to enteritis  Recommend continued follow-up if there is concern for developing small bowel obstruction  2  Persistent dilatation of the pancreatic duct at the head  Recommend follow-up with MRI/MRCP if not previously performed to exclude an underlying lesion  The study was marked in EPIC for significant notification  Workstation performed: [id number]         XR chest 2 views    (Results Pending)              Procedures  Procedures       Phone Contacts  ED Phone Contact    ED Course                               MDM  Number of Diagnoses or Management Options  Abdominal pain: new and requires workup  Enteritis: new and requires workup  Vomiting: new and requires workup  Diagnosis management comments: Patient appears uncomfortable  He is dry heaving  Afebrile and hemodynamically stable  Also endorsing palpitations    I personally interpreted his EKG, which shows sinus arrhythmia, rate bradycardic to this 54 beats per minute, normal axis, normal intervals, no ischemic changes  Patient then became tachycardic to 100 beats per minute  Abdominal exam with significant tenderness in the epigastric region  Initial troponin obtained which is undetectable, will obtain 2nd troponin, but doubt underlying cardiac etiology  Labs with leukocytosis to 12 73, normal hemoglobin  CMP and lipase unremarkable  CT abdomen pelvis obtained with mild to moderate distention of the duodenum and proximal jejunum which is nonspecific, likely related to enteritis, patient had a bowel movement this morning so I have a lower suspicion for small bowel obstruction  He has persistent dilatation of the pancreatic duct at the head, with recommendation for MRCP  The importance of this was discussed with the patient at bedside with recommendation that this be performed in follow-up  Patient required multiple doses of Zofran, reglan, and morphine in the ED for pain and vomiting  He continues to be nauseous but is no longer dry heaving  He did developed a headache in the emergency department that completely resolved with migraine cocktail, knee of nonfocal neurologic exam     Plan to admit for observation to Medicine for continued antiemetics and fluid administration as the patient is not yet tolerating oral intake           Amount and/or Complexity of Data Reviewed  Clinical lab tests: ordered and reviewed  Tests in the radiology section of CPT®: ordered and reviewed  Review and summarize past medical records: yes  Independent visualization of images, tracings, or specimens: yes    Patient Progress  Patient progress: improved    CritCare Time         Disposition  Final diagnoses:   Enteritis   Vomiting   Abdominal pain     Time reflects when diagnosis was documented in both MDM as applicable and the Disposition within this note     Time User Action Codes Description Comment    12/8/2018 10:20 PM Pat Albrecht [K52 9] Enteritis     12/8/2018 10:20 PM Ronnie Kami Add [R11 10] Vomiting     12/8/2018 10:20 PM Ronnie Jordiory Add [R10 9] Abdominal pain       ED Disposition     ED Disposition Condition Comment    Admit  Case was discussed with BRITTANY and the patient's admission status was agreed to be Admission Status: observation status to the service of Dr Miki Jang   Follow-up Information    None         Patient's Medications   Discharge Prescriptions    No medications on file     No discharge procedures on file      ED Provider  Electronically Signed by           Emmy Thomas MD  12/08/18 3083

## 2018-12-09 NOTE — PROGRESS NOTES
Pt refused SCD pumps  Explained why they were ordered and the possible risks of not having them on  Pt understood  He is ambulatory and independent

## 2018-12-09 NOTE — H&P
H&P- Glenny Candelario 1965, 46 y o  male MRN: [id number]  Unit/Bed#: ED 06 Encounter: [id number]  Primary Care Provider: David Elliott MD   Date and time admitted to hospital: 12/8/2018  6:33 PM        * Non-intractable vomiting with nausea   Assessment & Plan    Admit to observation services  Continue NPO, IV fluids, IV Protonix  Consult Gastroenterology, acute Care surgery  Hold most of the p o  Medications for tonight  Abdominal pain   Assessment & Plan    Most likely secondary to above  Patient had a bowel movement in the morning and is passing flatus  Acute Care surgery will be consulted  Anxiety and depression   Assessment & Plan    Patient will continue Klonopin as he takes it on a daily basis  Restart Zoloft once he is able to take by mouth  Obesity (BMI 30 0-34  9)   Assessment & Plan    Lifestyle modifications  Diaz's esophagus   Assessment & Plan    Continue the Protonix IV  Ulcerative colitis without complications (HCC)   Assessment & Plan    Resume budesonide once patient is able to take by mouth  Abnormal imaging of pancreas  Assessment & Plan  Persistent dilatation of the pancreatic duct at the head , unchanged from 02/17/2018  Consult GI for further recommendations  Leukocytosis   Assessment & Plan    Most likely secondary to nausea vomiting  Trend CBCD in a m  VTE PROPHYLAXIS:  Lovenox + SCDs    CODE STATUS: FULL    Anticipated Length of Stay:  Patient will be admitted on an Observation basis with an anticipated length of stay of  less than 2 midnights  Justification for Hospital Stay:  Intractable nausea vomiting abdominal pain      CHIEF COMPLAINT    · Intractable nausea vomiting with abdominal pain since today morning  HISTORY OF PRESENT ILLNESS  Glenny Candelario is a delightful 58-year-old gentleman with past medical history as below came to the hospital for intractable nausea vomiting abdominal pain since today morning    Patient states that he woke up today with extreme nausea  Blames it on the coffee that he had last night and again in the morning  Since then he has been vomiting and not being able to keep any of his medications of food down  As symptoms have been improved, patient is came to the ED  He states he passed flatus and had a bowel movement earlier today  Associated abdominal discomfort  Patient denies chest pain, PND, orthopnea, nausea, vomiting, diarrhea, constipation, blood in urine, fevers, chills, dysuria, new onset weakness, slurred speech, seizure-like activity,dizziness, syncope, fall, trauma to the head, recent travel or recent sick contacts  Unfortunately, CT abdomen pelvis reveals mild to moderate distention of the duodenum and proximal jejunum, nonspecific  Findings could be related to enteritis  Recommend continued follow-up if there is concern for developing small bowel obstruction  Persistent dilatation of the pancreatic duct at the head  Recommend follow-up with MRI/MRCP  REVIEW OF SYSTEMS  · A comprehensive 10 point review system conducted all negative except as mentioned in HPI       PMH/PSH    Past Medical History:   Diagnosis Date    Diaz's esophagus     Diverticulitis     GERD (gastroesophageal reflux disease)     Hemorrhoid     Hyperlipidemia     Hypertension     UC (ulcerative colitis) ([facility] [address] )        Past Surgical History:   Procedure Laterality Date    ABDOMINAL SURGERY      radio frequency ablation    CARPAL TUNNEL RELEASE      COLONOSCOPY      EGD AND COLONOSCOPY      ESOPHAGOGASTRODUODENOSCOPY N/A 2/15/2018    Procedure: ESOPHAGOGASTRODUODENOSCOPY (EGD); Surgeon: Rad Dickson MD;  [facility];  Service: Gastroenterology    TONSILLECTOMY         ALLERGIES  No Known Allergies    HOME MEDICATIONS  No current facility-administered medications on file prior to encounter        Current Outpatient Prescriptions on File Prior to Encounter   Medication Sig    atorvastatin (LIPITOR) 20 mg tablet Take 20 mg by mouth daily    buPROPion (WELLBUTRIN) 75 mg tablet Take 75 mg by mouth 2 (two) times a day    omeprazole (PriLOSEC) 20 mg delayed release capsule Take 20 mg by mouth daily    sertraline (ZOLOFT) 100 mg tablet Take 200 mg by mouth daily    ondansetron (ZOFRAN-ODT) 4 mg disintegrating tablet Take 1 tablet by mouth every 6 (six) hours as needed for nausea or vomiting    [DISCONTINUED] aluminum-magnesium hydroxide-simethicone (MYLANTA) 200-200-20 mg/5 mL suspension Take 15 mL by mouth every 4 (four) hours as needed for indigestion or heartburn    [DISCONTINUED] amLODIPine (NORVASC) 2 5 mg tablet Take 2 5 mg by mouth daily    [DISCONTINUED] dicyclomine (BENTYL) 20 mg tablet Take 1 tablet by mouth every 8 (eight) hours as needed (abdominal pain)    [DISCONTINUED] metoclopramide (REGLAN) 10 mg tablet Take 1 tablet (10 mg total) by mouth every 6 (six) hours as needed (nausea or vomiting)    [DISCONTINUED] ondansetron (ZOFRAN-ODT) 4 mg disintegrating tablet Take 1 tablet by mouth every 8 (eight) hours as needed for nausea or vomiting for up to 20 doses         SOCIAL HISTORY     Marital Status: /Civil Union   Substance Use History:   History   Alcohol Use No     Comment: quit 1 5 yrs ago     History   Smoking Status    Former Smoker   Smokeless Tobacco    Never Used     History   Drug Use    Types: Marijuana     Comment: Occasional       FAMILY HISTORY  · Reviewed noncontributory    OBJECTIVE    Vitals:   Blood Pressure: 119/72 (12/08/18 2230)  Pulse: 99 (12/08/18 2230)  Temperature: 98 6 °F (37 °C) (12/08/18 1839)  Temp Source: Oral (12/08/18 1839)  Respirations: 20 (12/08/18 2230)  Weight - Scale: 102 kg (225 lb 15 5 oz) (12/08/18 1840)  SpO2: 93 % (12/08/18 2230)    GENERAL: AAO x 3, obese  HEENT: atraumatic, normocephalic  Oral mucosa moist, no icterus, pallor  PERRLA +  Neck supple, no JVD, no lymphadenopathy, no thryomegaly  CHEST: B/L breath sounds heard  CVS: S1, S2   No cyanosis/clubbing or edema  ABDOMEN: Soft/obese/mild tenderness and jersey umbilical right region/high pitched bowel sounds  NEUROLOGICAL: CN II -XI grossly intact  No focal motor or sensory deficits  No signs of meningeal irritation or cerebellar dysfunction  EXTREMITIES: No cyanosis/clubbing or edema  LAB DATA  Ref   Range 12/8/2018 19:00 12/8/2018 19:14 12/8/2018 22:02   Latest Units: ml/min/1 73sq m 84     Latest Ref Range: 136 - 145 mmol/L 140     Latest Ref Range: 3 5 - 5 3 mmol/L 4 0     Latest Ref Range: 100 - 108 mmol/L 104     Latest Ref Range: 21 - 32 mmol/L 27     Latest Ref Range: 4 - 13 mmol/L 9     Latest Ref Range: 5 - 25 mg/dL 14     Latest Ref Range: 0 60 - 1 30 mg/dL 1 01     Latest Ref Range: 65 - 140 mg/dL 133     Latest Ref Range: 8 3 - 10 1 mg/dL 9 1     Latest Ref Range: 5 - 45 U/L 17     Latest Ref Range: 12 - 78 U/L 45     Latest Ref Range: 46 - 116 U/L 68     Latest Ref Range: 6 4 - 8 2 g/dL 7 4     Latest Ref Range: 3 5 - 5 0 g/dL 3 9     Latest Ref Range: 0 20 - 1 00 mg/dL 0 30     Latest Ref Range: 73 - 393 u/L 88     Latest Ref Range: <=0 04 ng/mL <0 02  <0 02   Latest Ref Range: 4 31 - 10 16 Thousand/uL 12 73 (H)     Latest Ref Range: 3 88 - 5 62 Million/uL 4 91     Latest Ref Range: 12 0 - 17 0 g/dL 14 8     Latest Ref Range: 36 5 - 49 3 % 43 9     Latest Ref Range: 82 - 98 fL 89     Latest Ref Range: 26 8 - 34 3 pg 30 1     Latest Ref Range: 31 4 - 37 4 g/dL 33 7     Latest Ref Range: 11 6 - 15 1 % 12 5     Latest Ref Range: 149 - 390 Thousands/uL 303     Latest Ref Range: 8 9 - 12 7 fL 10 5     Latest Units: /100 WBCs 0     Latest Ref Range: 43 - 75 % 89 (H)     Latest Ref Range: 0 - 2 % 1     Latest Ref Range: 14 - 44 % 7 (L)     Latest Ref Range: 4 - 12 % 3 (L)     Latest Ref Range: 0 - 6 % 0     Latest Ref Range: 0 - 1 % 0     Latest Ref Range: 0 00 - 0 20 Thousand/uL 0 06     Latest Ref Range: 1 85 - 7 62 Thousands/µL 11 46 (H)     Latest Ref Range: 0 60 - 4 47 Thousands/µL 0 85     Latest Ref Range: 0 17 - 1 22 Thousand/µL 0 33     Latest Ref Range: 0 00 - 0 61 Thousand/µL 0 00     Latest Ref Range: 0 00 - 0 10 Thousands/µL 0 03     Unknown  Rpt ((NONE))        Ct Abdomen Pelvis With Contrast    Result Date: 12/8/2018  Narrative: CT ABDOMEN AND PELVIS WITH IV CONTRAST INDICATION:   Epigastric abdominal pain, vomiting  COMPARISON:  CT of 2/17/2018 TECHNIQUE:  CT examination of the abdomen and pelvis was performed  Axial, sagittal, and coronal 2D reformatted images were created from the source data and submitted for interpretation  Radiation dose length product (DLP) for this visit:  691 mGy-cm   This examination, like all CT scans performed in the [facility], was performed utilizing techniques to minimize radiation dose exposure, including the use of iterative reconstruction and automated exposure control  IV Contrast:  100 mL of iohexol (OMNIPAQUE) Enteric Contrast:  Enteric contrast was not administered  FINDINGS: ABDOMEN LOWER CHEST:  Mild atelectatic changes noted at the left lung base  LIVER/BILIARY TREE:  Unremarkable  GALLBLADDER:  No calcified gallstones  No pericholecystic inflammatory change  SPLEEN:  Unremarkable  PANCREAS:  Persistent prominence of the pancreatic duct at the head measuring up to 6 mm in diameter, similar to the prior exam  ADRENAL GLANDS:  Unremarkable  KIDNEYS/URETERS:  Unremarkable  No hydronephrosis  STOMACH AND BOWEL:  Limited evaluation without enteric contrast   Mild-to-moderate distention of the duodenum and proximal jejunum  Colonic diverticulosis without acute diverticulitis  APPENDIX:  No findings to suggest appendicitis  ABDOMINOPELVIC CAVITY:  No ascites or free intraperitoneal air  No lymphadenopathy  VESSELS:  Unremarkable for patient's age  PELVIS REPRODUCTIVE ORGANS:  Prostate is mildly prominent  URINARY BLADDER:  Unremarkable  ABDOMINAL WALL/INGUINAL REGIONS:  Unremarkable   OSSEOUS STRUCTURES:  No acute fracture or destructive osseous lesion  Scattered degenerative spurring is noted of the spine  Impression: 1  Mild to moderate distention of the duodenum and proximal jejunum, nonspecific  Findings could be related to enteritis  Recommend continued follow-up if there is concern for developing small bowel obstruction  2  Persistent dilatation of the pancreatic duct at the head  Recommend follow-up with MRI/MRCP if not previously performed to exclude an underlying lesion  The study was marked in EPIC for significant notification  Workstation performed: [id number]       EKG, Pathology, and Other Studies Reviewed on Admission:  Sinus bradycardia  Total time spent in the process of admission, completion of records, counseling, coordination of care, discussion with patient/family was approximately 35 minutes  Sherren Curia, MD  HOSPITALIST SERVICES  12/8/2018      PLEASE NOTE:  This encounter was completed utilizing the [employer] Direct Speech Voice Recognition Software  Grammatical errors, random word insertions, pronoun errors and incomplete sentences are occasional consequences of the system due to software limitations, ambient noise and hardware issues  These may be missed by proof reading prior to affixing electronic signature  Any questions or concerns about the content, text or information contained within the body of this dictation should be directly addressed to the physician for clarification  Please do not hesitate to call me directly if you have any any questions or concerns

## 2018-12-09 NOTE — PROGRESS NOTES
[facility] Progress Note  Patient: Best Ambrose 46 y o  male   MRN: [id number]  PCP: Harley Titus MD  Unit/Bed#: -81 Encounter: [id number]  Date Of Visit: 18    Assessment:    Principal Problem:    Non-intractable vomiting with nausea  Active Problems:    Abdominal pain    Anxiety and depression    Diaz's esophagus    Obesity (BMI 30 0-34 9)    Ulcerative colitis without complications (HCC)    Sinus bradycardia    Leukocytosis    Abnormal CT of the abdomen    Vomiting      Plan:    · 1  Intractable nausea and vomiting in a patient with Diaz's esophagus high-grade dysplasia and hiatal hernia s/p radiofrequency ablation- GI following  Patient for EGD tomorrow to assess the duodenum and upper jejunum  On protonix  Will continue IVF  · 2  History of microscopic colitis  · 3  Diaz's esophagus- on protonix       VTE Pharmacologic Prophylaxis:   Pharmacologic: Enoxaparin (Lovenox)  Mechanical VTE Prophylaxis in Place: Yes    Patient Centered Rounds: I have performed bedside rounds with nursing staff today  Discussions with Specialists or Other Care Team Provider:     Education and Discussions with Family / Patient:     Time Spent for Care: 20 minutes  More than 50% of total time spent on counseling and coordination of care as described above  Current Length of Stay: 0 day(s)    Current Patient Status: Observation   Certification Statement: The patient will continue to require additional inpatient hospital stay due to Intractable nausea an vomiting  Discharge Plan / Estimated Discharge Date:     Code Status: Level 1 - Full Code      Subjective:   Patient seen and examined at bedside  Patient has no new complaints      Objective:     Vitals:   Temp (24hrs), Av 6 °F (37 °C), Min:98 5 °F (36 9 °C), Max:98 7 °F (37 1 °C)    Temp:  [98 5 °F (36 9 °C)-98 7 °F (37 1 °C)] 98 7 °F (37 1 °C)  HR:  [] 76  Resp:  [17-20] 20  BP: (112-185)/(61-92) 127/61  SpO2:  [91 %-97 %] 91 %  Body mass index is 33 37 kg/m²  Input and Output Summary (last 24 hours): Intake/Output Summary (Last 24 hours) at 12/09/18 1117  Last data filed at 12/08/18 2159   Gross per 24 hour   Intake             1050 ml   Output                0 ml   Net             1050 ml       Physical Exam:     Physical Exam   Constitutional: He is oriented to person, place, and time  He appears well-developed and well-nourished  HENT:   Head: Normocephalic and atraumatic  Eyes: Pupils are equal, round, and reactive to light  Conjunctivae and EOM are normal    Neck: Normal range of motion  Neck supple  No JVD present  No tracheal deviation present  No thyromegaly present  Cardiovascular: Normal rate, regular rhythm and normal heart sounds  Exam reveals no gallop  No murmur heard  Pulmonary/Chest: Effort normal and breath sounds normal  No respiratory distress  He has no wheezes  He has no rales  Abdominal: Soft  Bowel sounds are normal  He exhibits no distension  There is no tenderness  There is no rebound  Musculoskeletal: Normal range of motion  He exhibits no edema  Neurological: He is alert and oriented to person, place, and time  Skin: Skin is warm and dry  No rash noted  No erythema  No pallor  Vitals reviewed            Additional Data:     Labs:      Results from last 7 days  Lab Units 12/09/18  0633 12/08/18  1900   WBC Thousand/uL 9 67 12 73*   HEMOGLOBIN g/dL 13 0 14 8   HEMATOCRIT % 38 8 43 9   PLATELETS Thousands/uL 259 303   NEUTROS PCT %  --  89*   LYMPHS PCT %  --  7*   MONOS PCT %  --  3*   EOS PCT %  --  0       Results from last 7 days  Lab Units 12/09/18  0633 12/08/18  1900   POTASSIUM mmol/L 3 7 4 0   CHLORIDE mmol/L 107 104   CO2 mmol/L 28 27   BUN mg/dL 8 14   CREATININE mg/dL 0 89 1 01   CALCIUM mg/dL 8 2* 9 1   ALK PHOS U/L  --  68   ALT U/L  --  45   AST U/L  --  17       Results from last 7 days  Lab Units 12/09/18  0633   INR  1 06       * I Have Reviewed All Lab Data Listed Above  * Additional Pertinent Lab Tests Reviewed: [facility] Admission Reviewed    Imaging:    Imaging Reports Reviewed Today Include:   Imaging Personally Reviewed by Myself Includes:      Recent Cultures (last 7 days):           Last 24 Hours Medication List:     Current Facility-Administered Medications:  acetaminophen 650 mg Oral Q6H PRN Sadia Santoyo MD    clonazePAM 1 mg Oral HS Sadia Santoyo MD    enoxaparin 40 mg Subcutaneous Daily Rijesh R Romayne Dawson, MD    morphine injection 2 mg Intravenous Q4H PRN Sadia Santoyo MD    ondansetron 4 mg Intravenous Q6H PRN Shayne Caceres MD    pantoprazole 40 mg Intravenous Q12H [address] Sadia Santoyo MD    sodium chloride 100 mL/hr Intravenous Continuous Shayne Caceres MD Last Rate: 100 mL/hr (12/09/18 0015)        Today, Patient Was Seen By: Doroteo Meehan MD    ** Please Note: This note has been constructed using a voice recognition system   **

## 2018-12-09 NOTE — PLAN OF CARE
Knowledge Deficit     Patient/family/caregiver demonstrates understanding of disease process, treatment plan, medications, and discharge instructions Progressing        Nutrition/Hydration-ADULT     Nutrient/Hydration intake appropriate for improving, restoring or maintaining nutritional needs Progressing        PAIN - ADULT     Verbalizes/displays adequate comfort level or baseline comfort level Progressing

## 2018-12-09 NOTE — ASSESSMENT & PLAN NOTE
Admit to observation services  Continue NPO, IV fluids, IV Protonix  Consult Gastroenterology, acute Care surgery  Hold most of the p o  Medications for tonight

## 2018-12-09 NOTE — ASSESSMENT & PLAN NOTE
Patient will continue Klonopin as he takes it on a daily basis  Restart Zoloft once he is able to take by mouth

## 2018-12-09 NOTE — CONSULTS
GENERAL SURGERY CONSULT      Shawn Tyler 46 y o  male MRN: [id number]  Unit/Bed#: -01 Encounter: [id number]      Assessment/Plan   Patient Active Problem List   Diagnosis    Abdominal pain    Anxiety and depression    Diaz's esophagus    Essential hypertension    Dyslipidemia    Obesity (BMI 30 0-34  9)    Ulcerative colitis without complications (HCC)    Gastroesophageal reflux disease with esophagitis    Non-intractable vomiting with nausea    Sinus bradycardia    Leukocytosis    Abnormal CT of the abdomen    Vomiting     Patient with chronic history of abdominal pain nausea vomiting  I see no evidence on CT scan many concerning intra-abdominal pathology to assess taking surgical intervention  Patient seen by GI planning for endoscopy  Further management per GI no further surgical issues, please call if any future concerns  Chief Complaint nausea vomiting abdominal pain    HPI: Shawn Tyler is a 46y o  year old male who presents with acute onset of the nausea vomiting abdominal pain  Patient has complex previous GI history with UC Diaz's esophagus previous RFA  Patient states he has known hiatal hernia that was addressed during his RFA treatments  Patient has had persistent flatus  Denies any intra-abdominal surgical interventions  ROS:  12 Point ROS reviewed and negative except for:  Abdominal pain nausea vomiting    Historical Information   Past Medical History:   Diagnosis Date    Diaz's esophagus     Diverticulitis     GERD (gastroesophageal reflux disease)     Hemorrhoid     Hyperlipidemia     Hypertension     UC (ulcerative colitis) ([address] )      Past Surgical History:   Procedure Laterality Date    ABDOMINAL SURGERY      radio frequency ablation    CARPAL TUNNEL RELEASE      COLONOSCOPY      EGD AND COLONOSCOPY      ESOPHAGOGASTRODUODENOSCOPY N/A 2/15/2018    Procedure: ESOPHAGOGASTRODUODENOSCOPY (EGD);   Surgeon: Manny Flaherty MD;  Location: [facility];  Service: Gastroenterology    TONSILLECTOMY       Social History   History   Alcohol Use No     Comment: quit 1 5 yrs ago     History   Drug Use    Types: Marijuana     Comment: Occasional     History   Smoking Status    Former Smoker   Smokeless Tobacco    Never Used     Family History: no pertinent family history  No Known Allergies  Meds/Allergies   all current active meds have been reviewed      Objective   Vitals: Blood pressure 127/61, pulse 76, temperature 98 7 °F (37 1 °C), temperature source Oral, resp  rate 20, weight 102 kg (225 lb 15 5 oz), SpO2 91 %  ,Body mass index is 33 37 kg/m²  Intake/Output Summary (Last 24 hours) at 12/09/18 1148  Last data filed at 12/08/18 2159   Gross per 24 hour   Intake             1050 ml   Output                0 ml   Net             1050 ml     Invasive Devices     Peripheral Intravenous Line            Peripheral IV 12/08/18 Left Antecubital less than 1 day                Physical Exam:    General appearance: Awake alert oriented no acute distress  HEENT: Sclera clear, anicterus, no discharge  Neck: Trachea is midline, no adenopathy  Lungs: No respiratory distress, clear to auscultation bilaterally  Heart[de-identified] RRR  Abdomen: soft, nondistended, nontender  Extremities: No edema, nontender  Skin:No rashes or lesions  Neurologic: No weakness or loss of sensation  Psych: Affect appropriate    Imaging:  CT scan shows no significant bowel dilation no wall thickening  No evidence of any free fluid or air  No obvious acute intra-abdominal process      Raul Quiles MD  12/9/2018

## 2018-12-09 NOTE — UTILIZATION REVIEW
Initial Clinical Review    Admission: Date/Time/Statement: Observation 12/8 @ 2223    Orders Placed This Encounter   Procedures    Place in Observation (expected length of stay for this patient is less than two midnights)     Standing Status:   Standing     Number of Occurrences:   1     Order Specific Question:   Admitting Physician     Answer:   Marlon Hendrix [[id number]]     Order Specific Question:   Level of Care     Answer:   Med Surg [16]       ED: Date/Time/Mode of Arrival:   ED Arrival Information     Expected Arrival Acuity Means of Arrival Escorted By Service Admission Type    - 12/8/2018 18:31 Emergent Walk-In Family Member General Medicine Emergency    Arrival Complaint    chest pain          Chief Complaint:   Chief Complaint   Patient presents with    Chest Pain     pt states hes been having chest pain; states hes been vomiting all day; reports palpitations       History of Illness: 45y Male present for evaluation of epigastric abdominal pain radiating to his chest and left upper quadrant, as well as more than 15 episodes of nonbloody nonbilious vomiting  Pt with history of Diaz's esophagus    ED Vital Signs:   ED Triage Vitals   Temperature Pulse Respirations Blood Pressure SpO2   12/08/18 1839 12/08/18 1836 12/08/18 1836 12/08/18 1836 12/08/18 1836   98 6 °F (37 °C) (!) 52 20 (!) 185/92 95 %      Temp Source Heart Rate Source Patient Position - Orthostatic VS BP Location FiO2 (%)   12/08/18 1839 12/08/18 1836 12/08/18 1836 12/08/18 1836 --   Oral Monitor Lying Right arm       Pain Score       12/08/18 1836       8        Wt Readings from Last 1 Encounters:   12/08/18 102 kg (225 lb 15 5 oz)       Vital Signs (abnormal): B/P = 172/86    Abnormal Labs: Phos = 2 6  Wbc = 12 73    Diagnostic Test Results: CT Abd/ Pelvis -   Mild to moderate distention of the duodenum and proximal jejunum, nonspecific  Findings could be related to enteritis    Recommend continued follow-up if there is concern for developing small bowel obstruction  Persistent dilatation of the pancreatic duct at the head  Recommend follow-up with MRI/MRCP if not previously performed to exclude an underlying lesion  ED Treatment:   Medication Administration from 12/08/2018 1831 to 12/08/2018 2328       Date/Time Order Dose Route Action     12/08/2018 1902 ondansetron (ZOFRAN) injection 4 mg 4 mg Intravenous Given     12/08/2018 1900 sodium chloride 0 9 % bolus 1,000 mL 1,000 mL Intravenous New Bag     12/08/2018 1903 morphine (PF) 4 mg/mL injection 4 mg 4 mg Intravenous Given     12/08/2018 2017 morphine (PF) 4 mg/mL injection 4 mg 4 mg Intravenous Given     12/08/2018 2020 diphenhydrAMINE (BENADRYL) injection 12 5 mg 12 5 mg Intravenous Given     12/08/2018 2021 metoclopramide (REGLAN) injection 10 mg 10 mg Intravenous Given     12/08/2018 2028 magnesium sulfate 2 g/50 mL IVPB (premix) 2 g 2 g Intravenous New Bag     12/08/2018 2107 iohexol (OMNIPAQUE) 350 MG/ML injection (MULTI-DOSE) 100 mL 100 mL Intravenous Given          Past Medical/Surgical History:    Active Ambulatory Problems     Diagnosis Date Noted    Abdominal pain 02/15/2018    Anxiety and depression 02/15/2018    Diaz's esophagus 02/15/2018    Essential hypertension 02/15/2018    Dyslipidemia 02/15/2018    Obesity (BMI 30 0-34 9) 02/15/2018    Ulcerative colitis without complications ([facility] [address] ) 69/18/1734    Gastroesophageal reflux disease with esophagitis 02/15/2018     Resolved Ambulatory Problems     Diagnosis Date Noted    Vomiting blood 02/15/2018     Past Medical History:   Diagnosis Date    Diaz's esophagus     Diverticulitis     GERD (gastroesophageal reflux disease)     Hemorrhoid     Hyperlipidemia     Hypertension     UC (ulcerative colitis) ([facility] [address] )        Admitting Diagnosis: Enteritis [K52 9]  Vomiting [R11 10]  Chest pain [R07 9]  Abdominal pain [R10 9]  Abnormal CT of the abdomen [R93 5]    Age/Sex: 46 y o  male    Assessment/Plan:  45y Male to ED with history of Diaz's esophagus presents with intractable nausea, vomiting and abdominal pain since today  Patient states that he woke up today with extreme nausea  Blames it on the coffee that he had last night and again in the morning  Since then he has been vomiting and not being able to keep any of his medications of food down  He did have BM today  Unfortunately, CT abdomen pelvis reveals mild to moderate distention of the duodenum and proximal jejunum, nonspecific   Findings could be related to enteritis       Pt is being admitted with Non-intractable Vomiting with Nausea/ Abdominal pian/ Diaz's Esophagus/ History of Ulcerative colitis/ Leukocytosis  NPO  IVF  Iv Protonix  Gastroenterology and Acute Cre Surgery consult       12/9 Gastroenterology cons:      Admission Orders:  NPO; Sips with meds  12/10 EGD/ Enteroscopy -  to assess the duodenum and upper jejunum and his GE junction given his history of high-grade dysplasia status post radiofrequency ablation in 2010  Gastroenterology cons  Acute Care Surgery cons    Scheduled Meds:   Current Facility-Administered Medications:  clonazePAM 1 mg Oral HS   enoxaparin 40 mg Subcutaneous Daily     Continuous Infusions:   sodium chloride 100 mL/hr Last Rate: 100 mL/hr (12/09/18 0015)     PRN Meds:   acetaminophen    morphine injection    ondansetron

## 2018-12-09 NOTE — ASSESSMENT & PLAN NOTE
Persistent dilatation of the pancreatic duct at the head, since February 2018  Recommended MRI/MRCP, however, patient did not follow-up  Follow-up GI recommendations -- most likely outpatient workup

## 2018-12-09 NOTE — ASSESSMENT & PLAN NOTE
Most likely secondary to above  Patient had a bowel movement in the morning and is passing flatus  Acute Care surgery will be consulted

## 2018-12-10 ENCOUNTER — ANESTHESIA EVENT (OUTPATIENT)
Dept: PERIOP | Facility: HOSPITAL | Age: 53
End: 2018-12-10
Payer: COMMERCIAL

## 2018-12-10 ENCOUNTER — ANESTHESIA (OUTPATIENT)
Dept: PERIOP | Facility: HOSPITAL | Age: 53
End: 2018-12-10
Payer: COMMERCIAL

## 2018-12-10 PROCEDURE — 88305 TISSUE EXAM BY PATHOLOGIST: CPT | Performed by: PATHOLOGY

## 2018-12-10 PROCEDURE — 88342 IMHCHEM/IMCYTCHM 1ST ANTB: CPT | Performed by: PATHOLOGY

## 2018-12-10 PROCEDURE — C9113 INJ PANTOPRAZOLE SODIUM, VIA: HCPCS | Performed by: INTERNAL MEDICINE

## 2018-12-10 PROCEDURE — 99225 PR SBSQ OBSERVATION CARE/DAY 25 MINUTES: CPT | Performed by: INTERNAL MEDICINE

## 2018-12-10 PROCEDURE — 43239 EGD BIOPSY SINGLE/MULTIPLE: CPT | Performed by: INTERNAL MEDICINE

## 2018-12-10 RX ORDER — METOCLOPRAMIDE HYDROCHLORIDE 5 MG/ML
10 INJECTION INTRAMUSCULAR; INTRAVENOUS ONCE
Status: COMPLETED | OUTPATIENT
Start: 2018-12-10 | End: 2018-12-10

## 2018-12-10 RX ORDER — SODIUM CHLORIDE, SODIUM LACTATE, POTASSIUM CHLORIDE, CALCIUM CHLORIDE 600; 310; 30; 20 MG/100ML; MG/100ML; MG/100ML; MG/100ML
INJECTION, SOLUTION INTRAVENOUS CONTINUOUS PRN
Status: DISCONTINUED | OUTPATIENT
Start: 2018-12-10 | End: 2018-12-10 | Stop reason: SURG

## 2018-12-10 RX ORDER — BUPROPION HYDROCHLORIDE 75 MG/1
75 TABLET ORAL 2 TIMES DAILY
Status: DISCONTINUED | OUTPATIENT
Start: 2018-12-10 | End: 2018-12-11 | Stop reason: HOSPADM

## 2018-12-10 RX ORDER — PROPOFOL 10 MG/ML
INJECTION, EMULSION INTRAVENOUS AS NEEDED
Status: DISCONTINUED | OUTPATIENT
Start: 2018-12-10 | End: 2018-12-10 | Stop reason: SURG

## 2018-12-10 RX ORDER — LIDOCAINE HYDROCHLORIDE 10 MG/ML
INJECTION, SOLUTION INFILTRATION; PERINEURAL AS NEEDED
Status: DISCONTINUED | OUTPATIENT
Start: 2018-12-10 | End: 2018-12-10 | Stop reason: SURG

## 2018-12-10 RX ADMIN — ONDANSETRON 4 MG: 2 INJECTION INTRAMUSCULAR; INTRAVENOUS at 16:42

## 2018-12-10 RX ADMIN — MORPHINE SULFATE 2 MG: 2 INJECTION, SOLUTION INTRAMUSCULAR; INTRAVENOUS at 16:42

## 2018-12-10 RX ADMIN — CLONAZEPAM 1 MG: 1 TABLET ORAL at 22:27

## 2018-12-10 RX ADMIN — BUPROPION HYDROCHLORIDE 75 MG: 75 TABLET, FILM COATED ORAL at 20:34

## 2018-12-10 RX ADMIN — METOCLOPRAMIDE 10 MG: 5 INJECTION, SOLUTION INTRAMUSCULAR; INTRAVENOUS at 10:07

## 2018-12-10 RX ADMIN — PANTOPRAZOLE SODIUM 40 MG: 40 INJECTION, POWDER, FOR SOLUTION INTRAVENOUS at 20:35

## 2018-12-10 RX ADMIN — LIDOCAINE HYDROCHLORIDE 50 MG: 10 INJECTION, SOLUTION INFILTRATION; PERINEURAL at 09:45

## 2018-12-10 RX ADMIN — ONDANSETRON 4 MG: 2 INJECTION INTRAMUSCULAR; INTRAVENOUS at 09:10

## 2018-12-10 RX ADMIN — ACETAMINOPHEN 650 MG: 325 TABLET ORAL at 21:00

## 2018-12-10 RX ADMIN — MORPHINE SULFATE 2 MG: 2 INJECTION, SOLUTION INTRAMUSCULAR; INTRAVENOUS at 11:13

## 2018-12-10 RX ADMIN — PROPOFOL 150 MG: 10 INJECTION, EMULSION INTRAVENOUS at 09:46

## 2018-12-10 RX ADMIN — PROPOFOL 30 MG: 10 INJECTION, EMULSION INTRAVENOUS at 09:48

## 2018-12-10 RX ADMIN — SODIUM CHLORIDE, SODIUM LACTATE, POTASSIUM CHLORIDE, AND CALCIUM CHLORIDE: .6; .31; .03; .02 INJECTION, SOLUTION INTRAVENOUS at 09:43

## 2018-12-10 RX ADMIN — SERTRALINE HYDROCHLORIDE 100 MG: 50 TABLET ORAL at 15:34

## 2018-12-10 RX ADMIN — MORPHINE SULFATE 2 MG: 2 INJECTION, SOLUTION INTRAMUSCULAR; INTRAVENOUS at 09:11

## 2018-12-10 RX ADMIN — PANTOPRAZOLE SODIUM 40 MG: 40 INJECTION, POWDER, FOR SOLUTION INTRAVENOUS at 09:13

## 2018-12-10 NOTE — OP NOTE
ESOPHAGOGASTRODUODENOSCOPY    PROCEDURE: EGD/ Biopsy    INDICATIONS: Nausea and Vomiting    POST-OP DIAGNOSIS: See the impression below    SEDATION: Monitored anesthesia care, check anesthesia records    PHYSICAL EXAM:  Vitals:    12/10/18 0939   BP: (!) 172/95   Pulse: (!) 48   Resp: 16   Temp: 98 9 °F (37 2 °C)   SpO2: 98%     Body mass index is 33 37 kg/m²  General: NAD  Heart: S1 & S2 normal, RRR  Lungs: CTA, No rales or rhonchi  Abdomen: Soft, nontender, nondistended, good bowel sounds    CONSENT:  Informed consent was obtained for the procedure, including sedation after explaining the risks and benefits of the procedure  Risks including but not limited to bleeding, perforation, infection, aspiration were discussed in detail  Also explained about less than 100% sensitivity with the exam and other alternatives  PREPARATION:   EKG tracing, pulse oximetry, blood pressure were monitored throughout the procedure  Patient was identified by myself both verbally and by visual inspection of ID band  DESCRIPTION:   Patient was placed in the left lateral decubitus position and was sedated with the above medication  The gastroscope was introduced in to the oropharynx and the esophagus was intubated under direct visualization  Scope was passed down the esophagus up to 2nd part of the duodenum  A careful inspection was made as the gastroscope was withdrawn, including a retroflexed view of the stomach; findings and interventions are described below  The blood loss was minimal     FINDINGS:    #1  Esophagus and GEJ- the esophageal body appeared normal   A small 1 cm projection from the GE junction was noted and consistent with Diaz's esophagus  It was biopsied  A 2 cm hiatal hernia was noted  #2  Stomach- the cardia fundus body and antrum appeared normal   Multiple random biopsies were obtained      #3  Duodenum- the bulb and 2nd portion of the duodenum appeared normal   The upper jejunum appeared normal  Multiple random biopsies were obtained  IMPRESSIONS:    Short-segment Diaz's esophagus  Hiatal hernia  Normal duodenum on upper jejunum    RECOMMENDATIONS:   Continue PPI  Continue antiemetics  Clears diet  He can go home if he can tolerate liquids    COMPLICATIONS:  None; patient tolerated the procedure well    DISPOSITION: PACU  CONDITION: Stable    Karen Barriga MD  12/10/2018,9:53 AM

## 2018-12-10 NOTE — ANESTHESIA POSTPROCEDURE EVALUATION
Post-Op Assessment Note      CV Status:  Stable    Mental Status:  Alert and awake    Hydration Status:  Euvolemic    PONV Controlled:  Controlled    Airway Patency:  Patent    Post Op Vitals Reviewed: Yes          Staff: CRNA, Anesthesiologist           BP (!) 172/95 (12/10/18 0939)    Temp 98 9 °F (37 2 °C) (12/10/18 0939)    Pulse (!) 48 (12/10/18 0939)   Resp 16 (12/10/18 0939)    SpO2 98 % (12/10/18 0939)

## 2018-12-10 NOTE — UTILIZATION REVIEW
Continued Stay Review    Date: 12/10    Vital Signs: BP (!) 193/95 (BP Location: Left arm)   Pulse (!) 46   Temp 98 9 °F (37 2 °C) (Oral)   Resp 20   Wt 102 kg (225 lb 15 5 oz)   SpO2 96%   BMI 33 37 kg/m²     Medications:   Scheduled Meds:   Current Facility-Administered Medications:  acetaminophen 650 mg Oral Q6H PRN     clonazePAM 1 mg Oral HS     enoxaparin 40 mg Subcutaneous Daily     morphine injection 2 mg Intravenous Q4H PRN x2    ondansetron 4 mg Intravenous Q6H PRN x2    pantoprazole 40 mg Intravenous Q12H [address]     sodium chloride 100 mL/hr Intravenous Continuous  Last Rate: Stopped (12/10/18 1052)   Iv reglan x1    Abnormal Labs/Diagnostic Results: none     Age/Sex: 46 y o  male     · Assessment/Plan: 1  Intractable nausea and vomiting in a patient with Diaz's esophagus high-grade dysplasia and hiatal hernia s/p radiofrequency ablation- GI following  Patient for EGD today  · 2  History of microscopic colitis  · 3  Diaz's esophagus- on protonix  Discharge Plan: TBD     EGD  12/10  #1  Esophagus and GEJ- the esophageal body appeared normal   A small 1 cm projection from the GE junction was noted and consistent with Diaz's esophagus  It was biopsied  A 2 cm hiatal hernia was noted    #2  Stomach- the cardia fundus body and antrum appeared normal   Multiple random biopsies were obtained    #3  Duodenum- the bulb and 2nd portion of the duodenum appeared normal   The upper jejunum appeared normal   Multiple random biopsies were obtained      12/10  Adv to clear liq diet

## 2018-12-10 NOTE — NURSING NOTE
Received patient post EGD, c/o abdominal pain  PRN morphine was given at 0911 scheduled every 4 hours  [place]gent[place] text Dr Melva Sloan to notify patients requesting to speak with him and complaining of pain that is not tolerable  Patient is walking around room, yelling and irritated

## 2018-12-10 NOTE — PROGRESS NOTES
[facility] Progress Note  Patient: Yoav Strange 46 y o  male   MRN: [id number]  PCP: Dameon Ha MD  Unit/Bed#: -41 Encounter: [id number]  Date Of Visit: 12/10/18    Assessment:    Principal Problem:    Non-intractable vomiting with nausea  Active Problems:    Abdominal pain    Anxiety and depression    Diaz's esophagus    Obesity (BMI 30 0-34 9)    Ulcerative colitis without complications (HCC)    Sinus bradycardia    Leukocytosis    Abnormal CT of the abdomen    Vomiting      Plan:    · 1  Intractable nausea and vomiting in a patient with Diaz's esophagus high-grade dysplasia and hiatal hernia s/p radiofrequency ablation- GI following  Patient for EGD today  · 2  History of microscopic colitis  · 3  Diaz's esophagus- on protonix       VTE Pharmacologic Prophylaxis:   Pharmacologic: Enoxaparin (Lovenox)  Mechanical VTE Prophylaxis in Place: Yes    Patient Centered Rounds: I have performed bedside rounds with nursing staff today  Discussions with Specialists or Other Care Team Provider:     Education and Discussions with Family / Patient:     Time Spent for Care: 20 minutes  More than 50% of total time spent on counseling and coordination of care as described above  Current Length of Stay: 0 day(s)    Current Patient Status: Observation   Certification Statement: The patient will continue to require additional inpatient hospital stay due to Intractable nausea an vomiting  Discharge Plan / Estimated Discharge Date:     Code Status: Level 1 - Full Code      Subjective:   Patient seen and examined at bedside  Patient has no new complaints  Objective:     Vitals:   Temp (24hrs), Av °F (37 2 °C), Min:98 4 °F (36 9 °C), Max:99 5 °F (37 5 °C)    Temp:  [98 4 °F (36 9 °C)-99 5 °F (37 5 °C)] 98 9 °F (37 2 °C)  HR:  [46-77] 46  Resp:  [16-20] 20  BP: (115-193)/(59-96) 193/95  SpO2:  [92 %-98 %] 96 %  Body mass index is 33 37 kg/m²       Input and Output Summary (last 24 hours): Intake/Output Summary (Last 24 hours) at 12/10/18 1127  Last data filed at 12/10/18 1046   Gross per 24 hour   Intake             1120 ml   Output                0 ml   Net             1120 ml       Physical Exam:     Physical Exam   Constitutional: He is oriented to person, place, and time  He appears well-developed and well-nourished  HENT:   Head: Normocephalic and atraumatic  Eyes: Pupils are equal, round, and reactive to light  Conjunctivae and EOM are normal    Neck: Normal range of motion  Neck supple  No JVD present  No tracheal deviation present  No thyromegaly present  Cardiovascular: Normal rate, regular rhythm and normal heart sounds  Exam reveals no gallop  No murmur heard  Pulmonary/Chest: Effort normal and breath sounds normal  No respiratory distress  He has no wheezes  He has no rales  Abdominal: Soft  Bowel sounds are normal  He exhibits no distension  There is no tenderness  There is no rebound  Musculoskeletal: Normal range of motion  He exhibits no edema  Neurological: He is alert and oriented to person, place, and time  Skin: Skin is warm and dry  No rash noted  No erythema  No pallor  Vitals reviewed  Additional Data:     Labs:      Results from last 7 days  Lab Units 12/09/18  0633 12/08/18  1900   WBC Thousand/uL 9 67 12 73*   HEMOGLOBIN g/dL 13 0 14 8   HEMATOCRIT % 38 8 43 9   PLATELETS Thousands/uL 259 303   NEUTROS PCT %  --  89*   LYMPHS PCT %  --  7*   MONOS PCT %  --  3*   EOS PCT %  --  0       Results from last 7 days  Lab Units 12/09/18  0633 12/08/18  1900   POTASSIUM mmol/L 3 7 4 0   CHLORIDE mmol/L 107 104   CO2 mmol/L 28 27   BUN mg/dL 8 14   CREATININE mg/dL 0 89 1 01   CALCIUM mg/dL 8 2* 9 1   ALK PHOS U/L  --  68   ALT U/L  --  45   AST U/L  --  17       Results from last 7 days  Lab Units 12/09/18  0633   INR  1 06       * I Have Reviewed All Lab Data Listed Above  * Additional Pertinent Lab Tests Reviewed:  All Labs For Current Hospital Admission Reviewed    Imaging:    Imaging Reports Reviewed Today Include:   Imaging Personally Reviewed by Myself Includes:      Recent Cultures (last 7 days):           Last 24 Hours Medication List:     Current Facility-Administered Medications:  acetaminophen 650 mg Oral Q6H PRN Sadia Ryder MD    clonazePAM 1 mg Oral HS Sadia Gibson MD    enoxaparin 40 mg Subcutaneous Daily Sadia Gibson MD    morphine injection 2 mg Intravenous Q4H PRN Sadia Gibson MD    ondansetron 4 mg Intravenous Q6H PRN Ciro Hall MD    pantoprazole 40 mg Intravenous Q12H [address] Sadia Ryder MD    sodium chloride 100 mL/hr Intravenous Continuous Ciro Hall MD Last Rate: Stopped (12/10/18 1052)        Today, Patient Was Seen By: Ekta Hoover MD    ** Please Note: This note has been constructed using a voice recognition system   **

## 2018-12-10 NOTE — ANESTHESIA PREPROCEDURE EVALUATION
Review of Systems/Medical History  Patient summary reviewed  Chart reviewed  No history of anesthetic complications     Cardiovascular  EKG reviewed, Negative cardio ROS Exercise tolerance (METS): good,  Hyperlipidemia, Hypertension ,    Pulmonary  Negative pulmonary ROS Smoker (quit smoking cigarettes 1 5 years ago, occasional marijuana) , No recent URI ,        GI/Hepatic  Negative GI/hepatic ROS   GI bleeding (Actively vomiting bright red blood in ED, last episode ~12:00 today) , active and > 500ml blood loss, GERD , Esophageal disease hinojosa esophagus,        Negative  ROS        Endo/Other  Negative endo/other ROS   Obesity    GYN  Negative gynecology ROS          Hematology  Negative hematology ROS No anemia ,  No coagulation disorder ,    Musculoskeletal  Negative musculoskeletal ROS        Neurology  Negative neurology ROS      Psychology   Anxiety,              Physical Exam    Airway    Mallampati score: II  TM Distance: >3 FB  Neck ROM: full     Dental   No notable dental hx     Cardiovascular  Comment: Negative ROS, Rhythm: regular, Rate: normal, Cardiovascular exam normal    Pulmonary  Pulmonary exam normal Breath sounds clear to auscultation,     Other Findings        Anesthesia Plan  ASA Score- 2     Anesthesia Type- IV sedation with anesthesia with ASA Monitors  Additional Monitors:   Airway Plan:         Plan Factors-    Induction- rapid sequence induction and intravenous  Postoperative Plan-     Informed Consent- Anesthetic plan and risks discussed with patient  I personally reviewed this patient with the CRNA  Discussed and agreed on the Anesthesia Plan with the CRNA             Lab Results   Component Value Date    WBC 9 67 12/09/2018    HGB 13 0 12/09/2018    HCT 38 8 12/09/2018    MCV 92 12/09/2018     12/09/2018     Lab Results   Component Value Date    GLUCOSE 158 (H) 02/15/2018    CALCIUM 8 2 (L) 12/09/2018    K 3 7 12/09/2018    CO2 28 12/09/2018     12/09/2018 BUN 8 12/09/2018    CREATININE 0 89 12/09/2018     Lab Results   Component Value Date    ALT 45 12/08/2018    AST 17 12/08/2018    ALKPHOS 68 12/08/2018     Lab Results   Component Value Date    INR 1 06 12/09/2018    INR 1 03 02/15/2018    INR 1 01 03/17/2017    PROTIME 13 7 12/09/2018    PROTIME 13 7 02/15/2018    PROTIME 13 2 03/17/2017     No results found for: HGBA1C

## 2018-12-11 VITALS
OXYGEN SATURATION: 92 % | BODY MASS INDEX: 32.21 KG/M2 | TEMPERATURE: 99.2 F | DIASTOLIC BLOOD PRESSURE: 58 MMHG | SYSTOLIC BLOOD PRESSURE: 119 MMHG | HEIGHT: 70 IN | HEART RATE: 67 BPM | WEIGHT: 225 LBS | RESPIRATION RATE: 18 BRPM

## 2018-12-11 PROCEDURE — C9113 INJ PANTOPRAZOLE SODIUM, VIA: HCPCS | Performed by: INTERNAL MEDICINE

## 2018-12-11 PROCEDURE — 99217 PR OBSERVATION CARE DISCHARGE MANAGEMENT: CPT | Performed by: FAMILY MEDICINE

## 2018-12-11 RX ORDER — PANTOPRAZOLE SODIUM 40 MG/1
40 TABLET, DELAYED RELEASE ORAL DAILY
Qty: 30 TABLET | Refills: 0 | Status: SHIPPED | OUTPATIENT
Start: 2018-12-11 | End: 2019-03-22

## 2018-12-11 RX ADMIN — SODIUM CHLORIDE 100 ML/HR: 0.9 INJECTION, SOLUTION INTRAVENOUS at 01:34

## 2018-12-11 RX ADMIN — SERTRALINE HYDROCHLORIDE 100 MG: 50 TABLET ORAL at 08:46

## 2018-12-11 RX ADMIN — PANTOPRAZOLE SODIUM 40 MG: 40 INJECTION, POWDER, FOR SOLUTION INTRAVENOUS at 08:46

## 2018-12-11 RX ADMIN — BUPROPION HYDROCHLORIDE 75 MG: 75 TABLET, FILM COATED ORAL at 08:58

## 2018-12-11 NOTE — DISCHARGE SUMMARY
Discharge Summary - [facility]    Patient Information: Mila Aleman 46 y o  male MRN: [id number]  Unit/Bed#: -01 Encounter: [id number]    Discharging Physician / Practitioner: Jet Benítez MD  PCP: Margarito Iglesias MD  Admission Date: 12/8/2018  Discharge Date: 12/11/18    Disposition:     Home    Discharge Diagnoses:     Principal Problem:    Non-intractable vomiting with nausea  Active Problems:    Abdominal pain    Anxiety and depression    Diaz's esophagus    Obesity (BMI 30 0-34 9)    Ulcerative colitis without complications (HCC)    Sinus bradycardia    Leukocytosis    Abnormal CT of the abdomen    Vomiting  Resolved Problems:    * No resolved hospital problems  *      Consultations During Hospital Stay:  · Gastroenterology  · General surgery    Procedures Performed:     · Upper endoscopy    Significant Findings / Test Results:     CT abdomen: IMPRESSION:     1  Mild to moderate distention of the duodenum and proximal jejunum, nonspecific  Findings could be related to enteritis  Recommend continued follow-up if there is concern for developing small bowel obstruction  2  Persistent dilatation of the pancreatic duct at the head  Recommend follow-up with MRI/MRCP if not previously performed to exclude an underlying lesion  Incidental Findings:   ·     Test Results Pending at Discharge (will require follow up):   ·      Outpatient Tests Requested:    Complications:      Hospital Course:     Mila Aleman is a 46 y o  male patient who originally presented to the hospital on 12/8/2018 due to intractable nausea and vomiting, accompanied with abdominal pain  Patient admitted to the hospital started on IV fluid  Patient was found to have son CT finding reason why she was evaluated by General surgery which did not suggest any intervention  Patient was taken by Gastroenterology Department for upper endoscopy    Finding of short-segment Diaz esophagus and Hiatal hernia  Patient will be discharged on PPI  Condition at Discharge: stable     Discharge Day Visit / Exam:     Subjective:  I am better, no nausea or vomiting  Vitals: Blood Pressure: 119/58 (12/11/18 0654)  Pulse: 67 (12/11/18 0654)  Temperature: 99 2 °F (37 3 °C) (12/11/18 0654)  Temp Source: Oral (12/11/18 0654)  Respirations: 18 (12/11/18 0654)  Height: 5' 10" (177 8 cm) (12/10/18 2100)  Weight - Scale: 102 kg (225 lb) (12/10/18 2100)  SpO2: 92 % (12/11/18 0654)  Exam:   Physical Exam   Constitutional: He is oriented to person, place, and time  No distress  Neck: No JVD present  No tracheal deviation present  No thyromegaly present  Cardiovascular: Normal rate, regular rhythm, normal heart sounds and intact distal pulses  Exam reveals no gallop and no friction rub  No murmur heard  Pulmonary/Chest: Effort normal and breath sounds normal  No respiratory distress  He has no wheezes  He has no rales  He exhibits no tenderness  Abdominal: Soft  Bowel sounds are normal  He exhibits no distension and no mass  There is no tenderness  There is no rebound and no guarding  obese   Musculoskeletal: He exhibits no edema, tenderness or deformity  Lymphadenopathy:     He has no cervical adenopathy  Neurological: He is alert and oriented to person, place, and time  He has normal reflexes  He displays normal reflexes  No cranial nerve deficit  Coordination normal    Skin: Skin is warm  He is not diaphoretic  Psychiatric: He has a normal mood and affect  Discussion with Family:     Discharge instructions/Information to patient and family:   See after visit summary for information provided to patient and family  Provisions for Follow-Up Care:  See after visit summary for information related to follow-up care and any pertinent home health orders  Planned Readmission:    Discharge Statement:  I spent 30   minutes discharging the patient  This time was spent on the day of discharge   I had direct contact with the patient on the day of dischage  Greater than 50% of the total time was spent examining patient, answering all patient questions, arranging and discussing plan of care with patient as well as directly providing post-discharge instructions  Additional time then spent on discharge activities  Discharge Medications:  See after visit summary for reconciled discharge medications provided to patient and family        ** Please Note: This note has been constructed using a voice recognition system **

## 2018-12-11 NOTE — SOCIAL WORK
[facility]  Not readmission  Cm met w pt who is ind w adls  Has support at home  No POA or advanced directive  No other needs reported at this time  Observation notice provided    CM reviewed discharge planning process including the following: identifying help at home, patient preference for discharge planning needs, pharmacy preference, and availability of treatment team to discuss questions or concerns patient and/or family may have regarding understanding medications and recognizing signs and symptoms once discharged  CM also encouraged patient to follow up with all recommended appointments after discharge  Patient advised of importance for patient and family to participate in managing patients medical well being  CM name and role reviewed  Discharge Checklist reviewed and CM will continue to monitor for progress toward discharge goals in nursing and provider rounds

## 2018-12-11 NOTE — PLAN OF CARE
Problem: DISCHARGE PLANNING - CARE MANAGEMENT  Goal: Discharge to post-acute care or home with appropriate resources  INTERVENTIONS:  - Conduct assessment to determine patient/family and health care team treatment goals, and need for post-acute services based on payer coverage, community resources, and patient preferences, and barriers to discharge  - Address psychosocial, clinical, and financial barriers to discharge as identified in assessment in conjunction with the patient/family and health care team  - Arrange appropriate level of post-acute services according to patients   needs and preference and payer coverage in collaboration with the physician and health care team  - Communicate with and update the patient/family, physician, and health care team regarding progress on the discharge plan  - Arrange appropriate transportation to post-acute venues  Outcome: [facility]  Not readmission  Cm met w pt who is ind w adls  Has support at home  No POA or advanced directive  No other needs reported at this time  Observation notice provided    CM reviewed discharge planning process including the following: identifying help at home, patient preference for discharge planning needs, pharmacy preference, and availability of treatment team to discuss questions or concerns patient and/or family may have regarding understanding medications and recognizing signs and symptoms once discharged  CM also encouraged patient to follow up with all recommended appointments after discharge  Patient advised of importance for patient and family to participate in managing patients medical well being  CM name and role reviewed  Discharge Checklist reviewed and CM will continue to monitor for progress toward discharge goals in nursing and provider rounds

## 2019-01-07 ENCOUNTER — CONSULT (OUTPATIENT)
Dept: CARDIOLOGY CLINIC | Facility: CLINIC | Age: 54
End: 2019-01-07
Payer: COMMERCIAL

## 2019-01-07 VITALS
SYSTOLIC BLOOD PRESSURE: 124 MMHG | DIASTOLIC BLOOD PRESSURE: 80 MMHG | WEIGHT: 207 LBS | OXYGEN SATURATION: 97 % | HEIGHT: 70 IN | BODY MASS INDEX: 29.63 KG/M2 | HEART RATE: 91 BPM

## 2019-01-07 DIAGNOSIS — I20.9 ANGINA PECTORIS (HCC): Primary | ICD-10-CM

## 2019-01-07 DIAGNOSIS — E66.9 OBESITY (BMI 30.0-34.9): ICD-10-CM

## 2019-01-07 DIAGNOSIS — E78.5 DYSLIPIDEMIA: ICD-10-CM

## 2019-01-07 DIAGNOSIS — I10 ESSENTIAL HYPERTENSION: ICD-10-CM

## 2019-01-07 PROCEDURE — 99245 OFF/OP CONSLTJ NEW/EST HI 55: CPT | Performed by: INTERNAL MEDICINE

## 2019-01-07 RX ORDER — MIRTAZAPINE 7.5 MG/1
7.5 TABLET, FILM COATED ORAL
COMMUNITY
End: 2019-04-17

## 2019-01-07 RX ORDER — ATORVASTATIN CALCIUM 40 MG/1
40 TABLET, FILM COATED ORAL DAILY
Qty: 90 TABLET | Refills: 3 | Status: SHIPPED | OUTPATIENT
Start: 2019-01-07 | End: 2020-02-08

## 2019-01-07 RX ORDER — CLONAZEPAM 0.5 MG/1
0.5 TABLET ORAL
COMMUNITY

## 2019-01-07 RX ORDER — OMEPRAZOLE 10 MG/1
CAPSULE, DELAYED RELEASE ORAL
COMMUNITY
End: 2019-01-16 | Stop reason: HOSPADM

## 2019-01-07 RX ORDER — NITROGLYCERIN 0.4 MG/1
0.4 TABLET SUBLINGUAL
Qty: 30 TABLET | Refills: 3 | Status: SHIPPED | OUTPATIENT
Start: 2019-01-07 | End: 2021-04-12

## 2019-01-07 NOTE — LETTER
January 7, 2019     MD Fauzia Bee [address]  [address] [facility]    Patient: Sunny Grossman   YOB: 1965   Date of Visit: 1/7/2019       Dear Dr Israel Ramos: Thank you for referring Sunny Grossman to me for evaluation  Below are my notes for this consultation  If you have questions, please do not hesitate to call me  I look forward to following your patient along with you  Sincerely,        Autumn Rivero DO        CC: No Recipients  Autumn Rivero DO  1/7/2019  4:50 PM  Sign at close encounter                                             Cardiology Consultation     Sunny Grossman  [id number]  1965  [address]  [address]  [facility] [address]    Dear Dr Nora Acosta is a 58-year-old gentleman who has been referred to the [address] [place] [address] Place of Cardiology in [place] with the following issues:    1  Typical angina, accelerating  2  Dizziness with exertion, 3 episodes  3  Palpitations occurring in the setting of dehydration, after intractable nausea and vomiting  4  Hyperlipidemia  5  Hypertension  6  Strong family history of cardiovascular disease    Mr Autumn Thomson has noticed in the past several weeks that he is developing chest tightness  This chest tightness is typically induced by exertion or emotional distress  It is relieved with rest   He acknowledges some mild shortness of breath with it  He also admits to 3 episodes of dizziness with exertion  This dizziness was accompanied by his chest tightness  The most notable issue occurred [place] his [place]  He required several periods of rest and recovery prior to returning home  He denies any specific syncope or presyncope  He does admit to occasional palpitations, occurring in the setting of dehydration  The patient has a history of Diaz's esophagus and ulcerative colitis which have led to several significant GI issues    He has had episodes in the past of intractable nausea and vomiting  These typically lead to dehydration  When dehydrated, he is known to have palpitations and skipped beats  Again, no syncope  His chest tightness and dizziness does not appear to correlate with these episodes  Patient Active Problem List   Diagnosis    Abdominal pain    Anxiety and depression    Diaz's esophagus    Essential hypertension    Dyslipidemia    Obesity (BMI 30 0-34  9)    Ulcerative colitis without complications (HCC)    Gastroesophageal reflux disease with esophagitis    Non-intractable vomiting with nausea    Sinus bradycardia    Leukocytosis    Abnormal CT of the abdomen    Vomiting     Past Medical History:   Diagnosis Date    Diaz's esophagus     Diverticulitis     GERD (gastroesophageal reflux disease)     Hemorrhoid     Hyperlipidemia     Hypertension     [facility]      Social History     Social History    Marital status: /Civil Union     Spouse name: N/A    Number of children: N/A    Years of education: N/A     Occupational History    Not on file  Social History Main Topics    Smoking status: Former Smoker    Smokeless tobacco: Never Used    Alcohol use No      Comment: quit 1 5 yrs ago    Drug use: Yes     Types: Marijuana      Comment: Occasional    Sexual activity: No     Other Topics Concern    Not on file     Social History Narrative    No narrative on file      History reviewed  No pertinent family history  Past Surgical History:   Procedure Laterality Date    ABDOMINAL SURGERY      radio frequency ablation    CARPAL TUNNEL RELEASE      COLONOSCOPY      EGD AND COLONOSCOPY      ESOPHAGOGASTRODUODENOSCOPY N/A 2/15/2018    Procedure: ESOPHAGOGASTRODUODENOSCOPY (EGD); Surgeon: Tadeo Graham MD;  Location: [facility];  Service: Gastroenterology    ESOPHAGOGASTRODUODENOSCOPY N/A 12/10/2018    Procedure: ESOPHAGOGASTRODUODENOSCOPY (EGD);   Surgeon: Sammy Lucas MD;  Location: [facility];   Service: Gastroenterology    TONSILLECTOMY         Current Outpatient Prescriptions:     aspirin (ECOTRIN LOW STRENGTH) 81 mg EC tablet, Take 81 mg by mouth daily, Disp: , Rfl:     atorvastatin (LIPITOR) 40 mg tablet, Take 1 tablet (40 mg total) by mouth daily, Disp: 90 tablet, Rfl: 3    BUDESONIDE PO, Take 3 mg by mouth 2 (two) times a day, Disp: , Rfl:     buPROPion (WELLBUTRIN) 75 mg tablet, Take 75 mg by mouth 2 (two) times a day, Disp: , Rfl:     clonazePAM (KLONOPIN) 1 mg tablet, Take 0 5 mg by mouth 2 (two) times a day, Disp: , Rfl:     mirtazapine (REMERON) 7 5 MG tablet, Take 7 5 mg by mouth daily at bedtime, Disp: , Rfl:     omeprazole (PriLOSEC) 10 mg delayed release capsule, omeprazole 20 mg capsule,delayed release, Disp: , Rfl:     ondansetron (ZOFRAN-ODT) 4 mg disintegrating tablet, Take 1 tablet by mouth every 6 (six) hours as needed for nausea or vomiting, Disp: 20 tablet, Rfl: 0    pantoprazole (PROTONIX) 40 mg tablet, Take 1 tablet (40 mg total) by mouth daily, Disp: 30 tablet, Rfl: 0    sertraline (ZOLOFT) 100 mg tablet, Take 200 mg by mouth daily, Disp: , Rfl:     metoprolol tartrate (LOPRESSOR) 25 mg tablet, Take 1 tablet (25 mg total) by mouth every 12 (twelve) hours, Disp: 180 tablet, Rfl: 3    nitroglycerin (NITROSTAT) 0 4 mg SL tablet, Place 1 tablet (0 4 mg total) under the tongue every 5 (five) minutes as needed for chest pain, Disp: 30 tablet, Rfl: 3       Allergies   Allergen Reactions    No Active Allergies        Vitals:    01/07/19 1540   BP: 124/80   Pulse: 91   SpO2: 97%   Weight: 93 9 kg (207 lb)   Height: 5' 10" (1 778 m)       Labs:  Results for Echo Macario (MRN [id number]) as of 1/7/2019 16:43   12/9/2018 06:33   Sodium 142   Potassium 3 7   Chloride 107   CO2 28   Anion Gap 7   BUN 8   Creatinine 0 89   Glucose, Random 105   Calcium 8 2 (L)     Results for Echo Macario (MRN [id number]) as of 1/7/2019 16:43 12/9/2018 06:33   Phosphorus 2 6 (L)   Magnesium 1 9     Results for Ej Viera (MRN [id number]) as of 1/7/2019 16:43   12/9/2018 06:33   WBC 9 67   Red Blood Cell Count 4 24   Hemoglobin 13 0   HCT 38 8   MCV 92   MCH 30 7   MCHC 33 5   RDW 12 8   Platelet Count 634       Imaging:  No recent relevant imaging  Review of Systems:  Review of Systems   Constitutional: Negative  Respiratory: Positive for chest tightness  Cardiovascular: Positive for chest pain and palpitations  Gastrointestinal: Negative  Endocrine: Negative  Musculoskeletal: Negative  Skin: Negative  Neurological: Positive for dizziness  Hematological: Negative  Physical Exam:  Physical Exam   Constitutional: He is oriented to person, place, and time  He appears well-developed and well-nourished  HENT:   Head: Normocephalic and atraumatic  Eyes: Conjunctivae and EOM are normal    Neck: No hepatojugular reflux and no JVD present  Carotid bruit is not present  No tracheal deviation present  No thyromegaly present  Cardiovascular: Normal rate, regular rhythm, S1 normal and S2 normal   PMI is not displaced  Exam reveals no gallop  No murmur heard  Pulses:       Carotid pulses are 2+ on the right side, and 2+ on the left side  Radial pulses are 2+ on the right side, and 2+ on the left side  Femoral pulses are 2+ on the right side, and 2+ on the left side  Dorsalis pedis pulses are 2+ on the right side, and 2+ on the left side  Posterior tibial pulses are 2+ on the right side, and 2+ on the left side  Pulmonary/Chest: Breath sounds normal  No accessory muscle usage  No tachypnea  No respiratory distress  Abdominal: Soft  Bowel sounds are normal  He exhibits no distension  There is no tenderness  Musculoskeletal: He exhibits no edema     Lymphadenopathy:        Head (right side): No submental, no submandibular, no tonsillar, no preauricular, no posterior auricular and no occipital adenopathy present  Head (left side): No submental, no submandibular, no tonsillar, no preauricular, no posterior auricular and no occipital adenopathy present  He has no cervical adenopathy  Neurological: He is alert and oriented to person, place, and time  Skin: Skin is warm and dry  Psychiatric: He has a normal mood and affect  His behavior is normal  Judgment and thought content normal        Discussion/Summary:  Accelerating typical angina  Dizziness on exertion  Palpitations in the setting of dehydration  Hyperlipidemia  Hypertension  Strong family history of cardiovascular disease    Mr Angelique Florence has a very high pretest probability of obstructive coronary disease, with essentially 3/3 abeba Donato criteria met  At this point, I am not certain of the utility of noninvasive stress testing  We will proceed to coronary angiography to define his coronary anatomy  In the interim, start metoprolol 25 mg b i d  And increase atorvastatin to 40 mg daily  Nitroglycerin sublingual was prescribed as well  In regards to his dizziness, this may be a component of an anginal equivalent, however we should also rule out structural heart disease with an echocardiogram     Palpitations appear to be related to electrolyte disturbance, though again may be part of his underlying suspected ischemic burden  Hypertension is well controlled today  The patient is being transitioned to a high dose of a high potency statin  He will return to this office in 1-2 months, sometime after his coronary angiography  Thank you for the opportunity to consult on this patient  If you have any questions, please feel free to call my office

## 2019-01-07 NOTE — PROGRESS NOTES
Cardiology Consultation     Lui Ramos  [id number]  1965  [address]  [address]  [facility] [address]    Dear Dr Isidoro Parrish is a 48-year-old gentleman who has been referred to the [address] [place] [address] Place of Cardiology in [place] with the following issues:    1  Typical angina, accelerating  2  Dizziness with exertion, 3 episodes  3  Palpitations occurring in the setting of dehydration, after intractable nausea and vomiting  4  Hyperlipidemia  5  Hypertension  6  Strong family history of cardiovascular disease    Mr Javier Fuller has noticed in the past several weeks that he is developing chest tightness  This chest tightness is typically induced by exertion or emotional distress  It is relieved with rest   He acknowledges some mild shortness of breath with it  He also admits to 3 episodes of dizziness with exertion  This dizziness was accompanied by his chest tightness  The most notable issue occurred [place]  He required several periods of rest and recovery prior to returning home  He denies any specific syncope or presyncope  He does admit to occasional palpitations, occurring in the setting of dehydration  The patient has a history of Diaz's esophagus and ulcerative colitis which have led to several significant GI issues  He has had episodes in the past of intractable nausea and vomiting  These typically lead to dehydration  When dehydrated, he is known to have palpitations and skipped beats  Again, no syncope  His chest tightness and dizziness does not appear to correlate with these episodes  Patient Active Problem List   Diagnosis    Abdominal pain    Anxiety and depression    Diaz's esophagus    Essential hypertension    Dyslipidemia    Obesity (BMI 30 0-34  9)    Ulcerative colitis without complications (HCC)    Gastroesophageal reflux disease with esophagitis    Non-intractable vomiting with nausea    Sinus bradycardia    Leukocytosis    Abnormal CT of the abdomen    Vomiting     Past Medical History:   Diagnosis Date    Diaz's esophagus     Diverticulitis     GERD (gastroesophageal reflux disease)     Hemorrhoid     Hyperlipidemia     Hypertension     [facility] ([facility] )      Social History     Social History    Marital status: /Civil Union     Spouse name: N/A    Number of children: N/A    Years of education: N/A     Occupational History    Not on file  Social History Main Topics    Smoking status: Former Smoker    Smokeless tobacco: Never Used    Alcohol use No      Comment: quit 1 5 yrs ago    Drug use: Yes     Types: Marijuana      Comment: Occasional    Sexual activity: No     Other Topics Concern    Not on file     Social History Narrative    No narrative on file      History reviewed  No pertinent family history  Past Surgical History:   Procedure Laterality Date    ABDOMINAL SURGERY      radio frequency ablation    CARPAL TUNNEL RELEASE      COLONOSCOPY      EGD AND COLONOSCOPY      ESOPHAGOGASTRODUODENOSCOPY N/A 2/15/2018    Procedure: ESOPHAGOGASTRODUODENOSCOPY (EGD); Surgeon: Jack Villalobos MD;  [facility];  Service: Gastroenterology    ESOPHAGOGASTRODUODENOSCOPY N/A 12/10/2018    Procedure: ESOPHAGOGASTRODUODENOSCOPY (EGD); Surgeon: Sunshine Adhikari MD;  [facility];   Service: Gastroenterology    TONSILLECTOMY         Current Outpatient Prescriptions:     aspirin (ECOTRIN LOW STRENGTH) 81 mg EC tablet, Take 81 mg by mouth daily, Disp: , Rfl:     atorvastatin (LIPITOR) 40 mg tablet, Take 1 tablet (40 mg total) by mouth daily, Disp: 90 tablet, Rfl: 3    BUDESONIDE PO, Take 3 mg by mouth 2 (two) times a day, Disp: , Rfl:     buPROPion (WELLBUTRIN) 75 mg tablet, Take 75 mg by mouth 2 (two) times a day, Disp: , Rfl:     clonazePAM (KLONOPIN) 1 mg tablet, Take 0 5 mg by mouth 2 (two) times a day, Disp: , Rfl:     mirtazapine (REMERON) 7 5 MG tablet, Take 7 5 mg by mouth daily at bedtime, Disp: , Rfl:     omeprazole (PriLOSEC) 10 mg delayed release capsule, omeprazole 20 mg capsule,delayed release, Disp: , Rfl:     ondansetron (ZOFRAN-ODT) 4 mg disintegrating tablet, Take 1 tablet by mouth every 6 (six) hours as needed for nausea or vomiting, Disp: 20 tablet, Rfl: 0    pantoprazole (PROTONIX) 40 mg tablet, Take 1 tablet (40 mg total) by mouth daily, Disp: 30 tablet, Rfl: 0    sertraline (ZOLOFT) 100 mg tablet, Take 200 mg by mouth daily, Disp: , Rfl:     metoprolol tartrate (LOPRESSOR) 25 mg tablet, Take 1 tablet (25 mg total) by mouth every 12 (twelve) hours, Disp: 180 tablet, Rfl: 3    nitroglycerin (NITROSTAT) 0 4 mg SL tablet, Place 1 tablet (0 4 mg total) under the tongue every 5 (five) minutes as needed for chest pain, Disp: 30 tablet, Rfl: 3       Allergies   Allergen Reactions    No Active Allergies        Vitals:    01/07/19 1540   BP: 124/80   Pulse: 91   SpO2: 97%   Weight: 93 9 kg (207 lb)   Height: 5' 10" (1 778 m)       Labs:  Results for Renae Haddad (MRN [id number]) as of 1/7/2019 16:43   12/9/2018 06:33   Sodium 142   Potassium 3 7   Chloride 107   CO2 28   Anion Gap 7   BUN 8   Creatinine 0 89   Glucose, Random 105   Calcium 8 2 (L)     Results for Renae Haddad (MRN [id number]) as of 1/7/2019 16:43   12/9/2018 06:33   Phosphorus 2 6 (L)   Magnesium 1 9     Results for Renae Haddad (MRN [id number]) as of 1/7/2019 16:43   12/9/2018 06:33   WBC 9 67   Red Blood Cell Count 4 24   Hemoglobin 13 0   HCT 38 8   MCV 92   MCH 30 7   MCHC 33 5   RDW 12 8   Platelet Count 157       Imaging:  No recent relevant imaging  Review of Systems:  Review of Systems   Constitutional: Negative  Respiratory: Positive for chest tightness  Cardiovascular: Positive for chest pain and palpitations  Gastrointestinal: Negative      Endocrine: Negative  Musculoskeletal: Negative  Skin: Negative  Neurological: Positive for dizziness  Hematological: Negative  Physical Exam:  Physical Exam   Constitutional: He is oriented to person, place, and time  He appears well-developed and well-nourished  HENT:   Head: Normocephalic and atraumatic  Eyes: Conjunctivae and EOM are normal    Neck: No hepatojugular reflux and no JVD present  Carotid bruit is not present  No tracheal deviation present  No thyromegaly present  Cardiovascular: Normal rate, regular rhythm, S1 normal and S2 normal   PMI is not displaced  Exam reveals no gallop  No murmur heard  Pulses:       Carotid pulses are 2+ on the right side, and 2+ on the left side  Radial pulses are 2+ on the right side, and 2+ on the left side  Femoral pulses are 2+ on the right side, and 2+ on the left side  Dorsalis pedis pulses are 2+ on the right side, and 2+ on the left side  Posterior tibial pulses are 2+ on the right side, and 2+ on the left side  Pulmonary/Chest: Breath sounds normal  No accessory muscle usage  No tachypnea  No respiratory distress  Abdominal: Soft  Bowel sounds are normal  He exhibits no distension  There is no tenderness  Musculoskeletal: He exhibits no edema  Lymphadenopathy:        Head (right side): No submental, no submandibular, no tonsillar, no preauricular, no posterior auricular and no occipital adenopathy present  Head (left side): No submental, no submandibular, no tonsillar, no preauricular, no posterior auricular and no occipital adenopathy present  He has no cervical adenopathy  Neurological: He is alert and oriented to person, place, and time  Skin: Skin is warm and dry  Psychiatric: He has a normal mood and affect   His behavior is normal  Judgment and thought content normal        Discussion/Summary:  Accelerating typical angina  Dizziness on exertion  Palpitations in the setting of dehydration  Hyperlipidemia  Hypertension  Strong family history of cardiovascular disease    Mr Lian Mendieta has a very high pretest probability of obstructive coronary disease, with essentially 3/3 abeba Donato criteria met  At this point, I am not certain of the utility of noninvasive stress testing  We will proceed to coronary angiography to define his coronary anatomy  In the interim, start metoprolol 25 mg b i d  And increase atorvastatin to 40 mg daily  Nitroglycerin sublingual was prescribed as well  In regards to his dizziness, this may be a component of an anginal equivalent, however we should also rule out structural heart disease with an echocardiogram     Palpitations appear to be related to electrolyte disturbance, though again may be part of his underlying suspected ischemic burden  Hypertension is well controlled today  The patient is being transitioned to a high dose of a high potency statin  He will return to this office in 1-2 months, sometime after his coronary angiography  Thank you for the opportunity to consult on this patient  If you have any questions, please feel free to call my office

## 2019-01-08 ENCOUNTER — TELEPHONE (OUTPATIENT)
Dept: CARDIOLOGY CLINIC | Facility: CLINIC | Age: 54
End: 2019-01-08

## 2019-01-10 DIAGNOSIS — I20.9 ANGINA PECTORIS (HCC): Primary | ICD-10-CM

## 2019-01-14 ENCOUNTER — TELEPHONE (OUTPATIENT)
Dept: INTERVENTIONAL RADIOLOGY/VASCULAR | Facility: HOSPITAL | Age: 54
End: 2019-01-14

## 2019-01-14 RX ORDER — SODIUM CHLORIDE 9 MG/ML
50 INJECTION, SOLUTION INTRAVENOUS CONTINUOUS
Status: CANCELLED | OUTPATIENT
Start: 2019-01-16

## 2019-01-15 ENCOUNTER — TELEPHONE (OUTPATIENT)
Dept: SURGERY | Facility: HOSPITAL | Age: 54
End: 2019-01-15

## 2019-01-16 ENCOUNTER — HOSPITAL ENCOUNTER (OUTPATIENT)
Dept: INTERVENTIONAL RADIOLOGY/VASCULAR | Facility: HOSPITAL | Age: 54
Discharge: HOME | End: 2019-01-16
Attending: INTERNAL MEDICINE
Payer: OTHER GOVERNMENT

## 2019-01-16 VITALS
OXYGEN SATURATION: 93 % | HEIGHT: 69 IN | RESPIRATION RATE: 20 BRPM | TEMPERATURE: 98.2 F | HEART RATE: 67 BPM | SYSTOLIC BLOOD PRESSURE: 115 MMHG | DIASTOLIC BLOOD PRESSURE: 72 MMHG | WEIGHT: 216.71 LBS | BODY MASS INDEX: 32.1 KG/M2

## 2019-01-16 DIAGNOSIS — I20.9 ANGINA PECTORIS (HCC): ICD-10-CM

## 2019-01-16 PROCEDURE — 99152 MOD SED SAME PHYS/QHP 5/>YRS: CPT | Performed by: INTERNAL MEDICINE

## 2019-01-16 PROCEDURE — 93458 L HRT ARTERY/VENTRICLE ANGIO: CPT | Performed by: INTERNAL MEDICINE

## 2019-01-16 PROCEDURE — 99153 MOD SED SAME PHYS/QHP EA: CPT | Performed by: INTERNAL MEDICINE

## 2019-01-16 PROCEDURE — C1894 INTRO/SHEATH, NON-LASER: HCPCS | Performed by: INTERNAL MEDICINE

## 2019-01-16 PROCEDURE — C1769 GUIDE WIRE: HCPCS | Performed by: INTERNAL MEDICINE

## 2019-01-16 RX ORDER — LIDOCAINE HYDROCHLORIDE 10 MG/ML
INJECTION, SOLUTION INFILTRATION; PERINEURAL CODE/TRAUMA/SEDATION MEDICATION
Status: COMPLETED | OUTPATIENT
Start: 2019-01-16 | End: 2019-01-16

## 2019-01-16 RX ORDER — AMLODIPINE BESYLATE 5 MG/1
5 TABLET ORAL DAILY
Status: DISCONTINUED | OUTPATIENT
Start: 2019-01-16 | End: 2019-01-20 | Stop reason: HOSPADM

## 2019-01-16 RX ORDER — ATORVASTATIN CALCIUM 40 MG/1
80 TABLET, FILM COATED ORAL EVERY EVENING
Status: DISCONTINUED | OUTPATIENT
Start: 2019-01-16 | End: 2019-01-20 | Stop reason: HOSPADM

## 2019-01-16 RX ORDER — NITROGLYCERIN 20 MG/100ML
INJECTION INTRAVENOUS CODE/TRAUMA/SEDATION MEDICATION
Status: COMPLETED | OUTPATIENT
Start: 2019-01-16 | End: 2019-01-16

## 2019-01-16 RX ORDER — VERAPAMIL HCL 2.5 MG/ML
AMPUL (ML) INTRAVENOUS CODE/TRAUMA/SEDATION MEDICATION
Status: COMPLETED | OUTPATIENT
Start: 2019-01-16 | End: 2019-01-16

## 2019-01-16 RX ORDER — SODIUM CHLORIDE 9 MG/ML
50 INJECTION, SOLUTION INTRAVENOUS CONTINUOUS
Status: DISCONTINUED | OUTPATIENT
Start: 2019-01-16 | End: 2019-01-20 | Stop reason: HOSPADM

## 2019-01-16 RX ORDER — SODIUM CHLORIDE 9 MG/ML
125 INJECTION, SOLUTION INTRAVENOUS CONTINUOUS
Status: DISPENSED | OUTPATIENT
Start: 2019-01-16 | End: 2019-01-16

## 2019-01-16 RX ORDER — MIDAZOLAM HYDROCHLORIDE 1 MG/ML
INJECTION INTRAMUSCULAR; INTRAVENOUS CODE/TRAUMA/SEDATION MEDICATION
Status: COMPLETED | OUTPATIENT
Start: 2019-01-16 | End: 2019-01-16

## 2019-01-16 RX ORDER — FENTANYL CITRATE 50 UG/ML
INJECTION, SOLUTION INTRAMUSCULAR; INTRAVENOUS CODE/TRAUMA/SEDATION MEDICATION
Status: COMPLETED | OUTPATIENT
Start: 2019-01-16 | End: 2019-01-16

## 2019-01-16 RX ORDER — HEPARIN SODIUM 1000 [USP'U]/ML
INJECTION, SOLUTION INTRAVENOUS; SUBCUTANEOUS CODE/TRAUMA/SEDATION MEDICATION
Status: COMPLETED | OUTPATIENT
Start: 2019-01-16 | End: 2019-01-16

## 2019-01-16 RX ADMIN — HEPARIN SODIUM 5000 UNITS: 1000 INJECTION INTRAVENOUS; SUBCUTANEOUS at 09:40

## 2019-01-16 RX ADMIN — NITROGLYCERIN 200 MCG: 20 INJECTION INTRAVENOUS at 09:34

## 2019-01-16 RX ADMIN — LIDOCAINE HYDROCHLORIDE 2 ML: 10 INJECTION, SOLUTION INFILTRATION; PERINEURAL at 09:30

## 2019-01-16 RX ADMIN — IOHEXOL 80 ML: 350 INJECTION, SOLUTION INTRAVENOUS at 09:50

## 2019-01-16 RX ADMIN — SODIUM CHLORIDE 50 ML/HR: 0.9 INJECTION, SOLUTION INTRAVENOUS at 08:42

## 2019-01-16 RX ADMIN — FENTANYL CITRATE 25 MCG: 50 INJECTION, SOLUTION INTRAMUSCULAR; INTRAVENOUS at 09:31

## 2019-01-16 RX ADMIN — MIDAZOLAM HYDROCHLORIDE 0.5 MG: 1 INJECTION, SOLUTION INTRAMUSCULAR; INTRAVENOUS at 09:29

## 2019-01-16 RX ADMIN — MIDAZOLAM HYDROCHLORIDE 0.5 MG: 1 INJECTION, SOLUTION INTRAMUSCULAR; INTRAVENOUS at 09:31

## 2019-01-16 RX ADMIN — FENTANYL CITRATE 25 MCG: 50 INJECTION, SOLUTION INTRAMUSCULAR; INTRAVENOUS at 09:29

## 2019-01-16 RX ADMIN — VERAPAMIL HYDROCHLORIDE 2.5 MG: 2.5 INJECTION, SOLUTION INTRAVENOUS at 09:34

## 2019-01-16 NOTE — INTERVAL H&P NOTE
Update: (This section must be completed if the H&P was completed greater than 24 hrs to procedure or admission)    H&P reviewed  After examining the patient, I find no changed to the H&P since it had been written  Patient re-evaluated   Accept as history and physical     Manisha Linder, DO/January 16, 2019/9:04 AM

## 2019-01-16 NOTE — H&P (VIEW-ONLY)
Cardiology Consultation     Aman Light  [id number]  1965  [address]  [address] [place] [employer] [address]    Dear Dr Theron Fleischer is a 41-year-old gentleman who has been referred to the [address] [place] [address] Place of Cardiology in [place] with the following issues:    1  Typical angina, accelerating  2  Dizziness with exertion, 3 episodes  3  Palpitations occurring in the setting of dehydration, after intractable nausea and vomiting  4  Hyperlipidemia  5  Hypertension  6  Strong family history of cardiovascular disease    Mr Terry Sutherland has noticed in the past several weeks that he is developing chest tightness  This chest tightness is typically induced by exertion or emotional distress  It is relieved with rest   He acknowledges some mild shortness of breath with it  He also admits to 3 episodes of dizziness with exertion  This dizziness was accompanied by his chest tightness  The most notable issue [place]  He required several periods of rest and recovery prior to returning home  He denies any specific syncope or presyncope  He does admit to occasional palpitations, occurring in the setting of dehydration  The patient has a history of Diaz's esophagus and ulcerative colitis which have led to several significant GI issues  He has had episodes in the past of intractable nausea and vomiting  These typically lead to dehydration  When dehydrated, he is known to have palpitations and skipped beats  Again, no syncope  His chest tightness and dizziness does not appear to correlate with these episodes  Patient Active Problem List   Diagnosis    Abdominal pain    Anxiety and depression    Diaz's esophagus    Essential hypertension    Dyslipidemia    Obesity (BMI 30 0-34  9)    Ulcerative colitis without complications (HCC)    Gastroesophageal reflux disease with esophagitis    Non-intractable vomiting with nausea    Sinus bradycardia    Leukocytosis    Abnormal CT of the abdomen    Vomiting     Past Medical History:   Diagnosis Date    Diaz's esophagus     Diverticulitis     GERD (gastroesophageal reflux disease)     Hemorrhoid     Hyperlipidemia     Hypertension     [facility] )      Social History     Social History    Marital status: /Civil Union     Spouse name: N/A    Number of children: N/A    Years of education: N/A     Occupational History    Not on file  Social History Main Topics    Smoking status: Former Smoker    Smokeless tobacco: Never Used    Alcohol use No      Comment: quit 1 5 yrs ago    Drug use: Yes     Types: Marijuana      Comment: Occasional    Sexual activity: No     Other Topics Concern    Not on file     Social History Narrative    No narrative on file      History reviewed  No pertinent family history  Past Surgical History:   Procedure Laterality Date    ABDOMINAL SURGERY      radio frequency ablation    CARPAL TUNNEL RELEASE      COLONOSCOPY      EGD AND COLONOSCOPY      ESOPHAGOGASTRODUODENOSCOPY N/A 2/15/2018    Procedure: ESOPHAGOGASTRODUODENOSCOPY (EGD); Surgeon: Cathi Camilo MD;  [facility];  Service: Gastroenterology    ESOPHAGOGASTRODUODENOSCOPY N/A 12/10/2018    Procedure: ESOPHAGOGASTRODUODENOSCOPY (EGD); Surgeon: Adrianne Morales MD;  [facility];   Service: Gastroenterology    TONSILLECTOMY         Current Outpatient Prescriptions:     aspirin (ECOTRIN LOW STRENGTH) 81 mg EC tablet, Take 81 mg by mouth daily, Disp: , Rfl:     atorvastatin (LIPITOR) 40 mg tablet, Take 1 tablet (40 mg total) by mouth daily, Disp: 90 tablet, Rfl: 3    BUDESONIDE PO, Take 3 mg by mouth 2 (two) times a day, Disp: , Rfl:     buPROPion (WELLBUTRIN) 75 mg tablet, Take 75 mg by mouth 2 (two) times a day, Disp: , Rfl:     clonazePAM (KLONOPIN) 1 mg tablet, Take 0 5 mg by mouth 2 (two) times a day, Disp: , Rfl:     mirtazapine (REMERON) 7 5 MG tablet, Take 7 5 mg by mouth daily at bedtime, Disp: , Rfl:     omeprazole (PriLOSEC) 10 mg delayed release capsule, omeprazole 20 mg capsule,delayed release, Disp: , Rfl:     ondansetron (ZOFRAN-ODT) 4 mg disintegrating tablet, Take 1 tablet by mouth every 6 (six) hours as needed for nausea or vomiting, Disp: 20 tablet, Rfl: 0    pantoprazole (PROTONIX) 40 mg tablet, Take 1 tablet (40 mg total) by mouth daily, Disp: 30 tablet, Rfl: 0    sertraline (ZOLOFT) 100 mg tablet, Take 200 mg by mouth daily, Disp: , Rfl:     metoprolol tartrate (LOPRESSOR) 25 mg tablet, Take 1 tablet (25 mg total) by mouth every 12 (twelve) hours, Disp: 180 tablet, Rfl: 3    nitroglycerin (NITROSTAT) 0 4 mg SL tablet, Place 1 tablet (0 4 mg total) under the tongue every 5 (five) minutes as needed for chest pain, Disp: 30 tablet, Rfl: 3       Allergies   Allergen Reactions    No Active Allergies        Vitals:    01/07/19 1540   BP: 124/80   Pulse: 91   SpO2: 97%   Weight: 93 9 kg (207 lb)   Height: 5' 10" (1 778 m)       Labs:  Results for Ericka Light (MRN [id number]) as of 1/7/2019 16:43   12/9/2018 06:33   Sodium 142   Potassium 3 7   Chloride 107   CO2 28   Anion Gap 7   BUN 8   Creatinine 0 89   Glucose, Random 105   Calcium 8 2 (L)     Results for Ericka Light (MRN [id number]) as of 1/7/2019 16:43   12/9/2018 06:33   Phosphorus 2 6 (L)   Magnesium 1 9     Results for Ericka Light (MRN [id number]) as of 1/7/2019 16:43   12/9/2018 06:33   WBC 9 67   Red Blood Cell Count 4 24   Hemoglobin 13 0   HCT 38 8   MCV 92   MCH 30 7   MCHC 33 5   RDW 12 8   Platelet Count 724       Imaging:  No recent relevant imaging  Review of Systems:  Review of Systems   Constitutional: Negative  Respiratory: Positive for chest tightness  Cardiovascular: Positive for chest pain and palpitations  Gastrointestinal: Negative      Endocrine: Negative  Musculoskeletal: Negative  Skin: Negative  Neurological: Positive for dizziness  Hematological: Negative  Physical Exam:  Physical Exam   Constitutional: He is oriented to person, place, and time  He appears well-developed and well-nourished  HENT:   Head: Normocephalic and atraumatic  Eyes: Conjunctivae and EOM are normal    Neck: No hepatojugular reflux and no JVD present  Carotid bruit is not present  No tracheal deviation present  No thyromegaly present  Cardiovascular: Normal rate, regular rhythm, S1 normal and S2 normal   PMI is not displaced  Exam reveals no gallop  No murmur heard  Pulses:       Carotid pulses are 2+ on the right side, and 2+ on the left side  Radial pulses are 2+ on the right side, and 2+ on the left side  Femoral pulses are 2+ on the right side, and 2+ on the left side  Dorsalis pedis pulses are 2+ on the right side, and 2+ on the left side  Posterior tibial pulses are 2+ on the right side, and 2+ on the left side  Pulmonary/Chest: Breath sounds normal  No accessory muscle usage  No tachypnea  No respiratory distress  Abdominal: Soft  Bowel sounds are normal  He exhibits no distension  There is no tenderness  Musculoskeletal: He exhibits no edema  Lymphadenopathy:        Head (right side): No submental, no submandibular, no tonsillar, no preauricular, no posterior auricular and no occipital adenopathy present  Head (left side): No submental, no submandibular, no tonsillar, no preauricular, no posterior auricular and no occipital adenopathy present  He has no cervical adenopathy  Neurological: He is alert and oriented to person, place, and time  Skin: Skin is warm and dry  Psychiatric: He has a normal mood and affect   His behavior is normal  Judgment and thought content normal        Discussion/Summary:  Accelerating typical angina  Dizziness on exertion  Palpitations in the setting of dehydration  Hyperlipidemia  Hypertension  Strong family history of cardiovascular disease    Mr Sanford Hui has a very high pretest probability of obstructive coronary disease, with essentially 3/3 abeba Donato criteria met  At this point, I am not certain of the utility of noninvasive stress testing  We will proceed to coronary angiography to define his coronary anatomy  In the interim, start metoprolol 25 mg b i d  And increase atorvastatin to 40 mg daily  Nitroglycerin sublingual was prescribed as well  In regards to his dizziness, this may be a component of an anginal equivalent, however we should also rule out structural heart disease with an echocardiogram     Palpitations appear to be related to electrolyte disturbance, though again may be part of his underlying suspected ischemic burden  Hypertension is well controlled today  The patient is being transitioned to a high dose of a high potency statin  He will return to this office in 1-2 months, sometime after his coronary angiography  Thank you for the opportunity to consult on this patient  If you have any questions, please feel free to call my office

## 2019-01-17 ENCOUNTER — TELEPHONE (OUTPATIENT)
Dept: CARDIOLOGY CLINIC | Facility: CLINIC | Age: 54
End: 2019-01-17

## 2019-01-17 NOTE — TELEPHONE ENCOUNTER
Patient called [address] office  Dr Dc Davidson did a cath yesterday and was told he had a 70% blockage  Patient would like a call back w/ information on what the next step is    21  is where he can be reached

## 2019-01-18 NOTE — TELEPHONE ENCOUNTER
Returned patient's call  Updated him on condition  No medication changes at this time      [address]

## 2019-01-31 ENCOUNTER — TELEPHONE (OUTPATIENT)
Dept: CARDIOLOGY CLINIC | Facility: CLINIC | Age: 54
End: 2019-01-31

## 2019-01-31 NOTE — TELEPHONE ENCOUNTER
Call transferred to me, pt states that he is experiencing chest pain in the center of his chest, is feeling skipped beats, he is exhausted and is experiencing SOB and his son is home with the flu  Pt states that he took 2 Nitro earlier in the day and is going to have his wife take him to the ER

## 2019-02-11 ENCOUNTER — TELEPHONE (OUTPATIENT)
Dept: CARDIOLOGY CLINIC | Facility: CLINIC | Age: 54
End: 2019-02-11

## 2019-02-11 NOTE — TELEPHONE ENCOUNTER
Please start imdur 30 mg daily and re-evaluate  If CP/SOB persists after 2 weeks of therapy, we will perform PCI to the distal RCA      [address]

## 2019-02-11 NOTE — TELEPHONE ENCOUNTER
Pt stated that a blockage was found & that his medication was upped but he states that this isn't going to get rid of a blockage & that he does not feel any better  Pt is still experiencing SOB & would like to discuss more details with Dr Alana Johnson   Please advise

## 2019-02-13 DIAGNOSIS — I20.8 ANGINA AT REST (HCC): Primary | ICD-10-CM

## 2019-02-13 RX ORDER — ISOSORBIDE MONONITRATE 30 MG/1
30 TABLET, EXTENDED RELEASE ORAL DAILY
Qty: 14 TABLET | Refills: 0 | Status: SHIPPED | OUTPATIENT
Start: 2019-02-13 | End: 2019-03-22

## 2019-02-13 NOTE — TELEPHONE ENCOUNTER
Med pending  Pt verbally understood per Dr Myla López, IMDUR 30mg daily will be sent to the [employer] [facility] [employer] in [facility]  If chest pain and shortness of breath continue within those 2 weeks of therapy, he is make appt with Dr Myla López

## 2019-02-20 ENCOUNTER — APPOINTMENT (EMERGENCY)
Dept: CT IMAGING | Facility: HOSPITAL | Age: 54
End: 2019-02-20
Payer: COMMERCIAL

## 2019-02-20 ENCOUNTER — TELEPHONE (OUTPATIENT)
Dept: CARDIOLOGY CLINIC | Facility: CLINIC | Age: 54
End: 2019-02-20

## 2019-02-20 ENCOUNTER — APPOINTMENT (EMERGENCY)
Dept: RADIOLOGY | Facility: HOSPITAL | Age: 54
End: 2019-02-20
Payer: COMMERCIAL

## 2019-02-20 ENCOUNTER — HOSPITAL ENCOUNTER (OUTPATIENT)
Facility: HOSPITAL | Age: 54
Setting detail: OBSERVATION
Discharge: HOME | End: 2019-02-21
Attending: EMERGENCY MEDICINE | Admitting: GENERAL PRACTICE
Payer: COMMERCIAL

## 2019-02-20 DIAGNOSIS — R10.13 EPIGASTRIC PAIN: ICD-10-CM

## 2019-02-20 DIAGNOSIS — K22.711 BARRETT'S ESOPHAGUS WITH HIGH GRADE DYSPLASIA: ICD-10-CM

## 2019-02-20 DIAGNOSIS — I25.10 CAD IN NATIVE ARTERY: ICD-10-CM

## 2019-02-20 DIAGNOSIS — R93.5 ABNORMAL CT OF THE ABDOMEN: ICD-10-CM

## 2019-02-20 DIAGNOSIS — R07.9 CHEST PAIN: Primary | ICD-10-CM

## 2019-02-20 DIAGNOSIS — K57.92 DIVERTICULITIS: ICD-10-CM

## 2019-02-20 DIAGNOSIS — D72.829 LEUKOCYTOSIS: ICD-10-CM

## 2019-02-20 PROBLEM — I25.119 CHEST PAIN DUE TO CAD (HCC): Status: ACTIVE | Noted: 2019-02-20

## 2019-02-20 LAB
ALBUMIN SERPL BCP-MCNC: 3.8 G/DL (ref 3.5–5)
ALP SERPL-CCNC: 77 U/L (ref 46–116)
ALT SERPL W P-5'-P-CCNC: 22 U/L (ref 12–78)
ANION GAP SERPL CALCULATED.3IONS-SCNC: 11 MMOL/L (ref 4–13)
AST SERPL W P-5'-P-CCNC: 13 U/L (ref 5–45)
ATRIAL RATE: 49 BPM
ATRIAL RATE: 85 BPM
ATRIAL RATE: 90 BPM
ATRIAL RATE: 99 BPM
BASOPHILS # BLD AUTO: 0.03 THOUSANDS/ΜL (ref 0–0.1)
BASOPHILS NFR BLD AUTO: 0 % (ref 0–1)
BILIRUB DIRECT SERPL-MCNC: 0.09 MG/DL (ref 0–0.2)
BILIRUB SERPL-MCNC: 0.2 MG/DL (ref 0.2–1)
BUN SERPL-MCNC: 16 MG/DL (ref 5–25)
CALCIUM SERPL-MCNC: 9.3 MG/DL (ref 8.3–10.1)
CHLORIDE SERPL-SCNC: 103 MMOL/L (ref 100–108)
CHOLEST SERPL-MCNC: 287 MG/DL (ref 50–200)
CO2 SERPL-SCNC: 27 MMOL/L (ref 21–32)
CREAT SERPL-MCNC: 1.02 MG/DL (ref 0.6–1.3)
EOSINOPHIL # BLD AUTO: 0.03 THOUSAND/ΜL (ref 0–0.61)
EOSINOPHIL NFR BLD AUTO: 0 % (ref 0–6)
ERYTHROCYTE [DISTWIDTH] IN BLOOD BY AUTOMATED COUNT: 14.5 % (ref 11.6–15.1)
GFR SERPL CREATININE-BSD FRML MDRD: 83 ML/MIN/1.73SQ M
GLUCOSE SERPL-MCNC: 140 MG/DL (ref 65–140)
HCT VFR BLD AUTO: 42.7 % (ref 36.5–49.3)
HDLC SERPL-MCNC: 64 MG/DL (ref 40–60)
HGB BLD-MCNC: 13.6 G/DL (ref 12–17)
IMM GRANULOCYTES # BLD AUTO: 0.07 THOUSAND/UL (ref 0–0.2)
IMM GRANULOCYTES NFR BLD AUTO: 1 % (ref 0–2)
LDLC SERPL CALC-MCNC: 191 MG/DL (ref 0–100)
LIPASE SERPL-CCNC: 118 U/L (ref 73–393)
LYMPHOCYTES # BLD AUTO: 1.08 THOUSANDS/ΜL (ref 0.6–4.47)
LYMPHOCYTES NFR BLD AUTO: 10 % (ref 14–44)
MCH RBC QN AUTO: 28.8 PG (ref 26.8–34.3)
MCHC RBC AUTO-ENTMCNC: 31.9 G/DL (ref 31.4–37.4)
MCV RBC AUTO: 91 FL (ref 82–98)
MONOCYTES # BLD AUTO: 0.4 THOUSAND/ΜL (ref 0.17–1.22)
MONOCYTES NFR BLD AUTO: 4 % (ref 4–12)
NEUTROPHILS # BLD AUTO: 8.95 THOUSANDS/ΜL (ref 1.85–7.62)
NEUTS SEG NFR BLD AUTO: 85 % (ref 43–75)
NONHDLC SERPL-MCNC: 223 MG/DL
NRBC BLD AUTO-RTO: 0 /100 WBCS
P AXIS: 53 DEGREES
P AXIS: 64 DEGREES
P AXIS: 74 DEGREES
P AXIS: 77 DEGREES
PLATELET # BLD AUTO: 292 THOUSANDS/UL (ref 149–390)
PMV BLD AUTO: 9.7 FL (ref 8.9–12.7)
POTASSIUM SERPL-SCNC: 4.1 MMOL/L (ref 3.5–5.3)
PR INTERVAL: 166 MS
PR INTERVAL: 186 MS
PR INTERVAL: 192 MS
PR INTERVAL: 192 MS
PROT SERPL-MCNC: 7.7 G/DL (ref 6.4–8.2)
QRS AXIS: -19 DEGREES
QRS AXIS: 28 DEGREES
QRS AXIS: 6 DEGREES
QRS AXIS: 9 DEGREES
QRSD INTERVAL: 84 MS
QRSD INTERVAL: 88 MS
QRSD INTERVAL: 88 MS
QRSD INTERVAL: 90 MS
QT INTERVAL: 364 MS
QT INTERVAL: 404 MS
QT INTERVAL: 414 MS
QT INTERVAL: 440 MS
QTC INTERVAL: 397 MS
QTC INTERVAL: 453 MS
QTC INTERVAL: 460 MS
QTC INTERVAL: 467 MS
RBC # BLD AUTO: 4.72 MILLION/UL (ref 3.88–5.62)
SODIUM SERPL-SCNC: 141 MMOL/L (ref 136–145)
T WAVE AXIS: 41 DEGREES
T WAVE AXIS: 56 DEGREES
T WAVE AXIS: 57 DEGREES
T WAVE AXIS: 60 DEGREES
TRIGL SERPL-MCNC: 158 MG/DL
TROPONIN I SERPL-MCNC: <0.02 NG/ML
TROPONIN I SERPL-MCNC: <0.02 NG/ML
TSH SERPL DL<=0.05 MIU/L-ACNC: 1.97 UIU/ML (ref 0.36–3.74)
VENTRICULAR RATE: 49 BPM
VENTRICULAR RATE: 72 BPM
VENTRICULAR RATE: 78 BPM
VENTRICULAR RATE: 99 BPM
WBC # BLD AUTO: 10.56 THOUSAND/UL (ref 4.31–10.16)

## 2019-02-20 PROCEDURE — 96374 THER/PROPH/DIAG INJ IV PUSH: CPT

## 2019-02-20 PROCEDURE — 74177 CT ABD & PELVIS W/CONTRAST: CPT

## 2019-02-20 PROCEDURE — 80076 HEPATIC FUNCTION PANEL: CPT | Performed by: EMERGENCY MEDICINE

## 2019-02-20 PROCEDURE — 99220 PR INITIAL OBSERVATION CARE/DAY 70 MINUTES: CPT | Performed by: GENERAL PRACTICE

## 2019-02-20 PROCEDURE — 96361 HYDRATE IV INFUSION ADD-ON: CPT

## 2019-02-20 PROCEDURE — 84484 ASSAY OF TROPONIN QUANT: CPT | Performed by: EMERGENCY MEDICINE

## 2019-02-20 PROCEDURE — 84484 ASSAY OF TROPONIN QUANT: CPT | Performed by: PHYSICIAN ASSISTANT

## 2019-02-20 PROCEDURE — 96375 TX/PRO/DX INJ NEW DRUG ADDON: CPT

## 2019-02-20 PROCEDURE — 83690 ASSAY OF LIPASE: CPT | Performed by: EMERGENCY MEDICINE

## 2019-02-20 PROCEDURE — 80061 LIPID PANEL: CPT | Performed by: PHYSICIAN ASSISTANT

## 2019-02-20 PROCEDURE — 71046 X-RAY EXAM CHEST 2 VIEWS: CPT

## 2019-02-20 PROCEDURE — 84443 ASSAY THYROID STIM HORMONE: CPT | Performed by: EMERGENCY MEDICINE

## 2019-02-20 PROCEDURE — 93005 ELECTROCARDIOGRAM TRACING: CPT

## 2019-02-20 PROCEDURE — 93010 ELECTROCARDIOGRAM REPORT: CPT | Performed by: INTERNAL MEDICINE

## 2019-02-20 PROCEDURE — 85025 COMPLETE CBC W/AUTO DIFF WBC: CPT | Performed by: EMERGENCY MEDICINE

## 2019-02-20 PROCEDURE — 99285 EMERGENCY DEPT VISIT HI MDM: CPT

## 2019-02-20 PROCEDURE — 36415 COLL VENOUS BLD VENIPUNCTURE: CPT | Performed by: EMERGENCY MEDICINE

## 2019-02-20 PROCEDURE — 80048 BASIC METABOLIC PNL TOTAL CA: CPT | Performed by: EMERGENCY MEDICINE

## 2019-02-20 RX ORDER — ATORVASTATIN CALCIUM 40 MG/1
40 TABLET, FILM COATED ORAL DAILY
Status: DISCONTINUED | OUTPATIENT
Start: 2019-02-21 | End: 2019-02-21 | Stop reason: HOSPADM

## 2019-02-20 RX ORDER — CLONAZEPAM 0.5 MG/1
0.5 TABLET ORAL 2 TIMES DAILY
Status: DISCONTINUED | OUTPATIENT
Start: 2019-02-20 | End: 2019-02-21 | Stop reason: HOSPADM

## 2019-02-20 RX ORDER — SODIUM CHLORIDE 9 MG/ML
100 INJECTION, SOLUTION INTRAVENOUS CONTINUOUS
Status: DISCONTINUED | OUTPATIENT
Start: 2019-02-20 | End: 2019-02-21 | Stop reason: HOSPADM

## 2019-02-20 RX ORDER — ONDANSETRON 2 MG/ML
4 INJECTION INTRAMUSCULAR; INTRAVENOUS ONCE
Status: COMPLETED | OUTPATIENT
Start: 2019-02-20 | End: 2019-02-20

## 2019-02-20 RX ORDER — MIRTAZAPINE 15 MG/1
7.5 TABLET, FILM COATED ORAL
Status: DISCONTINUED | OUTPATIENT
Start: 2019-02-20 | End: 2019-02-21 | Stop reason: HOSPADM

## 2019-02-20 RX ORDER — PANTOPRAZOLE SODIUM 40 MG/1
40 TABLET, DELAYED RELEASE ORAL DAILY
Status: DISCONTINUED | OUTPATIENT
Start: 2019-02-21 | End: 2019-02-21 | Stop reason: HOSPADM

## 2019-02-20 RX ORDER — ISOSORBIDE MONONITRATE 30 MG/1
30 TABLET, EXTENDED RELEASE ORAL DAILY
Status: DISCONTINUED | OUTPATIENT
Start: 2019-02-21 | End: 2019-02-21 | Stop reason: HOSPADM

## 2019-02-20 RX ORDER — MAGNESIUM HYDROXIDE/ALUMINUM HYDROXICE/SIMETHICONE 120; 1200; 1200 MG/30ML; MG/30ML; MG/30ML
30 SUSPENSION ORAL ONCE
Status: COMPLETED | OUTPATIENT
Start: 2019-02-20 | End: 2019-02-20

## 2019-02-20 RX ORDER — SERTRALINE HYDROCHLORIDE 100 MG/1
200 TABLET, FILM COATED ORAL DAILY
Status: DISCONTINUED | OUTPATIENT
Start: 2019-02-21 | End: 2019-02-21 | Stop reason: HOSPADM

## 2019-02-20 RX ORDER — ASPIRIN 81 MG/1
81 TABLET ORAL DAILY
Status: DISCONTINUED | OUTPATIENT
Start: 2019-02-20 | End: 2019-02-21 | Stop reason: HOSPADM

## 2019-02-20 RX ORDER — NITROGLYCERIN 0.4 MG/1
0.4 TABLET SUBLINGUAL
Status: DISCONTINUED | OUTPATIENT
Start: 2019-02-20 | End: 2019-02-21 | Stop reason: HOSPADM

## 2019-02-20 RX ORDER — MORPHINE SULFATE 4 MG/ML
4 INJECTION, SOLUTION INTRAMUSCULAR; INTRAVENOUS ONCE
Status: COMPLETED | OUTPATIENT
Start: 2019-02-20 | End: 2019-02-20

## 2019-02-20 RX ORDER — HYDROMORPHONE HCL/PF 1 MG/ML
0.5 SYRINGE (ML) INJECTION ONCE
Status: COMPLETED | OUTPATIENT
Start: 2019-02-20 | End: 2019-02-20

## 2019-02-20 RX ORDER — ONDANSETRON 2 MG/ML
4 INJECTION INTRAMUSCULAR; INTRAVENOUS EVERY 6 HOURS PRN
Status: DISCONTINUED | OUTPATIENT
Start: 2019-02-20 | End: 2019-02-21 | Stop reason: HOSPADM

## 2019-02-20 RX ORDER — BUPROPION HYDROCHLORIDE 75 MG/1
75 TABLET ORAL 2 TIMES DAILY
Status: DISCONTINUED | OUTPATIENT
Start: 2019-02-20 | End: 2019-02-21 | Stop reason: HOSPADM

## 2019-02-20 RX ADMIN — SODIUM CHLORIDE 1000 ML: 0.9 INJECTION, SOLUTION INTRAVENOUS at 14:00

## 2019-02-20 RX ADMIN — MORPHINE SULFATE 4 MG: 4 INJECTION INTRAVENOUS at 14:02

## 2019-02-20 RX ADMIN — HYDROMORPHONE HYDROCHLORIDE 0.5 MG: 1 INJECTION, SOLUTION INTRAMUSCULAR; INTRAVENOUS; SUBCUTANEOUS at 16:14

## 2019-02-20 RX ADMIN — BUPROPION HYDROCHLORIDE 75 MG: 75 TABLET, FILM COATED ORAL at 19:04

## 2019-02-20 RX ADMIN — ONDANSETRON 4 MG: 2 INJECTION INTRAMUSCULAR; INTRAVENOUS at 14:01

## 2019-02-20 RX ADMIN — MIRTAZAPINE 7.5 MG: 15 TABLET, FILM COATED ORAL at 21:07

## 2019-02-20 RX ADMIN — CLONAZEPAM 0.5 MG: 0.5 TABLET ORAL at 19:01

## 2019-02-20 RX ADMIN — HYDROMORPHONE HYDROCHLORIDE 0.5 MG: 1 INJECTION, SOLUTION INTRAMUSCULAR; INTRAVENOUS; SUBCUTANEOUS at 15:48

## 2019-02-20 RX ADMIN — IOHEXOL 85 ML: 350 INJECTION, SOLUTION INTRAVENOUS at 16:10

## 2019-02-20 RX ADMIN — LIDOCAINE HYDROCHLORIDE 15 ML: 20 SOLUTION ORAL; TOPICAL at 14:04

## 2019-02-20 RX ADMIN — ALUMINUM HYDROXIDE, MAGNESIUM HYDROXIDE, AND SIMETHICONE 30 ML: 200; 200; 20 SUSPENSION ORAL at 14:04

## 2019-02-20 RX ADMIN — ASPIRIN 81 MG: 81 TABLET, COATED ORAL at 19:01

## 2019-02-20 RX ADMIN — METOPROLOL TARTRATE 12.5 MG: 25 TABLET ORAL at 21:07

## 2019-02-20 NOTE — ASSESSMENT & PLAN NOTE
Patient had a cardiac catheterization done last month without PCI which was significant for 1 vessel disease with up to 70% tubular stenosis of the distal RCA  He was recommended medical management at that time for that disease no intervention was performed    · Patient continues to have chest pain EKG did not show any ischemia  · Keep on telemetry monitoring  · Monitored troponins, initial troponin is 0 02  · Cardiology consulted  · Continue cardiac meds, aspirin daily

## 2019-02-20 NOTE — ED NOTES
Patient transported to [address] [place] [address], [address] [place] [address]  02/20/19 6822

## 2019-02-20 NOTE — PLAN OF CARE
Problem: PAIN - ADULT  Goal: Verbalizes/displays adequate comfort level or baseline comfort level  Description  Interventions:  - Encourage patient to monitor pain and request assistance  - Assess pain using appropriate pain scale  - Administer analgesics based on type and severity of pain and evaluate response  - Implement non-pharmacological measures as appropriate and evaluate response  - Consider cultural and social influences on pain and pain management  - Notify physician/advanced practitioner if interventions unsuccessful or patient reports new pain  Outcome: Progressing     Problem: INFECTION - ADULT  Goal: Absence or prevention of progression during hospitalization  Description  INTERVENTIONS:  - Assess and monitor for signs and symptoms of infection  - Monitor lab/diagnostic results  - Monitor all insertion sites, i e  indwelling lines, tubes, and drains  - Monitor endotracheal (as able) and nasal secretions for changes in amount and color  - [place] appropriate cooling/warming therapies per order  - Administer medications as ordered  - Instruct and encourage patient and family to use good hand hygiene technique  - Identify and instruct in appropriate isolation precautions for identified infection/condition  Outcome: Progressing     Problem: SAFETY ADULT  Goal: Patient will remain free of falls  Description  INTERVENTIONS:  - Assess patient frequently for physical needs  -  Identify cognitive and physical deficits and behaviors that affect risk of falls    -  [place] fall precautions as indicated by assessment   - Educate patient/family on patient safety including physical limitations  - Instruct patient to call for assistance with activity based on assessment  - Modify environment to reduce risk of injury  - Consider OT/PT consult to assist with strengthening/mobility  Outcome: Progressing  Goal: Maintain or return to baseline ADL function  Description  INTERVENTIONS:  -  Assess patient's ability to carry out ADLs; assess patient's baseline for ADL function and identify physical deficits which impact ability to perform ADLs (bathing, care of mouth/teeth, toileting, grooming, dressing, etc )  - Assess/evaluate cause of self-care deficits   - Assess range of motion  - Assess patient's mobility; develop plan if impaired  - Assess patient's need for assistive devices and provide as appropriate  - Encourage maximum independence but intervene and supervise when necessary  ¯ Involve family in performance of ADLs  ¯ Assess for home care needs following discharge   ¯ Request OT consult to assist with ADL evaluation and planning for discharge  ¯ Provide patient education as appropriate  Outcome: Progressing  Goal: Maintain or return mobility status to optimal level  Description  INTERVENTIONS:  - Assess patient's baseline mobility status (ambulation, transfers, stairs, etc )    - Identify cognitive and physical deficits and behaviors that affect mobility  - Identify mobility aids required to assist with transfers and/or ambulation (gait belt, sit-to-stand, lift, walker, cane, etc )  - [place] fall precautions as indicated by assessment  - Record patient progress and toleration of activity level on Mobility SBAR; progress patient to next Phase/Stage  - Instruct patient to call for assistance with activity based on assessment  - Request Rehabilitation consult to assist with strengthening/weightbearing, etc   Outcome: Progressing     Problem: DISCHARGE PLANNING  Goal: Discharge to home or other facility with appropriate resources  Description  INTERVENTIONS:  - Identify barriers to discharge w/patient and caregiver  - Arrange for needed discharge resources and transportation as appropriate  - Identify discharge learning needs (meds, wound care, etc )  - Arrange for interpretive services to assist at discharge as needed  - Refer to Case Management Department for coordinating discharge planning if the patient needs post-hospital services based on physician/advanced practitioner order or complex needs related to functional status, cognitive ability, or social support system  Outcome: Progressing     Problem: Knowledge Deficit  Goal: Patient/family/caregiver demonstrates understanding of disease process, treatment plan, medications, and discharge instructions  Description  Complete learning assessment and assess knowledge base    Interventions:  - Provide teaching at level of understanding  - Provide teaching via preferred learning methods  Outcome: Progressing

## 2019-02-20 NOTE — ED PROVIDER NOTES
History  Chief Complaint   Patient presents with    Chest Pain     pt c/o chest pain and vomiting that started about 0700 this morning  states that he took two nitro pills with some relief     41-year-old male with a history of CAD status post cardiac catheterization 1/19, Diaz's esophagus ulcerative colitis presenting for evaluation of chest pain nausea vomiting  Patient reports that around 7 o'clock this morning he drank coffee as he typically does he states that almost immediately after drinking coffee he had some discomfort in his chest he developed multiple episodes of nausea nonbloody nonbilious vomiting he states that he felt unwell he also states that he had 3-4 episodes of loose nonbloody stool generalized abdominal pain  He states that around 9 or 10 o'clock this morning developed discomfort in his chest and upper abdomen, he felt this was from vomiting, the discomfort in his chest is sometimes central sometimes left-sided described as uncomfortable, sometimes a pressure sensation it has been persistent since 9 or 10 o'clock this morning, he took a nitro with some improvement of his symptoms is here for further evaluation  Patient notes that he has ulcerative colitis and sometimes gets loose stool, he also has a history of Diaz's esophagus he is most concerned about his chest at this time  He did have cardiac catheterization done last month without PCI which was significant for 1 vessel disease with up to 70% tubular stenosis of the distal RCA  He was recommended medical management at that time for that disease no intervention was performed  He also had an EGD in December of 2018 which demonstrated small area consistent with Diaz's esophagus    In regards to his chest pain he reports that he had anginal-type symptoms prior to the cardiac catheterization he been doing well post cardiac catheterization without intervention until about a week ago he had recurrent of pain he was started on beta-blocker at that time he has had no discomfort since that time  Patient otherwise denies fevers recent viral symptoms complaints of headache cough production hemoptysis near syncope flank pain urinary symptoms joint pain swelling rashes history or risk factors signs symptoms for DVT or PE, patient has no other complaints or concerns otherwise denies a complete review systems as noted          Prior to Admission Medications   Prescriptions Last Dose Informant Patient Reported? Taking?    BUDESONIDE PO 2/20/2019 at Unknown time Self Yes Yes   Sig: Take 3 mg by mouth 2 (two) times a day   aspirin (ECOTRIN LOW STRENGTH) 81 mg EC tablet 2/20/2019 at Unknown time Self Yes Yes   Sig: Take 81 mg by mouth daily   atorvastatin (LIPITOR) 40 mg tablet 2/20/2019 at Unknown time  No Yes   Sig: Take 1 tablet (40 mg total) by mouth daily   buPROPion (WELLBUTRIN) 75 mg tablet 2/20/2019 at Unknown time Self Yes Yes   Sig: Take 75 mg by mouth 2 (two) times a day   clonazePAM (KLONOPIN) 1 mg tablet 2/20/2019 at Unknown time  Yes Yes   Sig: Take 0 5 mg by mouth 2 (two) times a day   isosorbide mononitrate (IMDUR) 30 mg 24 hr tablet 2/20/2019 at Unknown time  No Yes   Sig: Take 1 tablet (30 mg total) by mouth daily   metoprolol tartrate (LOPRESSOR) 25 mg tablet 2/20/2019 at Unknown time  No Yes   Sig: Take 1 tablet (25 mg total) by mouth every 12 (twelve) hours   mirtazapine (REMERON) 7 5 MG tablet 2/20/2019 at Unknown time  Yes Yes   Sig: Take 7 5 mg by mouth daily at bedtime   nitroglycerin (NITROSTAT) 0 4 mg SL tablet 2/20/2019 at Unknown time  No Yes   Sig: Place 1 tablet (0 4 mg total) under the tongue every 5 (five) minutes as needed for chest pain   ondansetron (ZOFRAN-ODT) 4 mg disintegrating tablet 2/20/2019 at Unknown time Self No Yes   Sig: Take 1 tablet by mouth every 6 (six) hours as needed for nausea or vomiting   pantoprazole (PROTONIX) 40 mg tablet 2/20/2019 at Unknown time Self No Yes   Sig: Take 1 tablet (40 mg total) by mouth daily   sertraline (ZOLOFT) 100 mg tablet 2019 at Unknown time Self Yes Yes   Sig: Take 200 mg by mouth daily      Facility-Administered Medications: None       Past Medical History:   Diagnosis Date    Diaz's esophagus     Coronary artery disease     Depression     Diverticulitis     GERD (gastroesophageal reflux disease)     Hemorrhoid     Hyperlipidemia     Hypertension     UC [facility]ulcerative colitis) ([facility] [address] )        Past Surgical History:   Procedure Laterality Date    ABDOMINAL SURGERY      radio frequency ablation    BONE GRAFT Left 2018    CARPAL TUNNEL RELEASE Right     COLONOSCOPY      EGD AND COLONOSCOPY      ESOPHAGOGASTRODUODENOSCOPY N/A 2/15/2018    Procedure: ESOPHAGOGASTRODUODENOSCOPY (EGD); Surgeon: Nereida Johnson MD;  Location: [facility];  Service: Gastroenterology    ESOPHAGOGASTRODUODENOSCOPY N/A 12/10/2018    Procedure: ESOPHAGOGASTRODUODENOSCOPY (EGD); Surgeon: Froylan Soto MD;  Location: [facility]; Service: Gastroenterology    TONSILLECTOMY         Family History   Problem Relation Age of Onset    Heart disease Father     Cancer Sister      I have reviewed and agree with the history as documented  Social History     Tobacco Use    Smoking status: Former Smoker     Last attempt to quit: 2007     Years since quittin 1    Smokeless tobacco: Former User     Quit date: 1998   Substance Use Topics    Alcohol use: No     Comment: quit 3 years    Drug use: Yes     Frequency: 3 0 times per week     Types: Marijuana     Comment: 1/15/2019        Review of Systems   Constitutional: Positive for appetite change and diaphoresis  Negative for chills and fever  HENT: Negative for rhinorrhea, sore throat, trouble swallowing and voice change  Eyes: Negative for photophobia and pain  Respiratory: Positive for shortness of breath  Negative for cough  Cardiovascular: Positive for chest pain  Negative for palpitations  Gastrointestinal: Positive for abdominal pain, diarrhea, nausea and vomiting  Genitourinary: Negative for dysuria, frequency and urgency  Musculoskeletal: Negative for arthralgias, back pain and myalgias  Skin: Negative for color change and rash  Neurological: Negative for dizziness, weakness and headaches  Hematological: Negative for adenopathy  Does not bruise/bleed easily  Psychiatric/Behavioral: Negative for agitation and behavioral problems  All other systems reviewed and are negative  Physical Exam  Physical Exam   Constitutional: He is oriented to person, place, and time  He appears well-developed and well-nourished  No distress  Well-appearing sitting upright conversational no acute distress   HENT:   Head: Normocephalic and atraumatic  Eyes: Pupils are equal, round, and reactive to light  EOM are normal    Neck: Normal range of motion  Neck supple  No tracheal deviation present  Cardiovascular: Normal rate, regular rhythm and normal heart sounds  Exam reveals no gallop and no friction rub  No murmur heard  Pulmonary/Chest: Effort normal and breath sounds normal  He has no wheezes  He has no rales  Abdominal: Soft  Bowel sounds are normal  He exhibits no distension  There is no tenderness  There is no rebound and no guarding  Patient has no abdominal tenderness on exam his abdomen is soft nontender nondistended without rebound or guarding   Musculoskeletal: Normal range of motion  He exhibits no edema or tenderness  No peripheral edema or calf pain or tenderness   Neurological: He is alert and oriented to person, place, and time  No cranial nerve deficit  He exhibits normal muscle tone  Coordination normal    Skin: Skin is warm and dry  No rash noted  Psychiatric: He has a normal mood and affect  His behavior is normal    Nursing note and vitals reviewed        Vital Signs  ED Triage Vitals [02/20/19 1337]   Temperature Pulse Respirations Blood Pressure SpO2   98 2 °F (36 8 °C) (!) 45 19 (!) 180/96 97 %      Temp Source Heart Rate Source Patient Position - Orthostatic VS BP Location FiO2 (%)   Oral Monitor Sitting Right arm --      Pain Score       9           Vitals:    02/20/19 1652 02/20/19 1842 02/20/19 1843 02/20/19 1852   BP: 129/73 138/77 138/77 138/77   Pulse: 96 87 91 86   Patient Position - Orthostatic VS: Lying          Visual Acuity      ED Medications  Medications   aspirin (ECOTRIN LOW STRENGTH) EC tablet 81 mg (81 mg Oral Given 2/20/19 1901)   atorvastatin (LIPITOR) tablet 40 mg (has no administration in time range)   buPROPion (WELLBUTRIN) tablet 75 mg (75 mg Oral Given 2/20/19 1904)   clonazePAM (KlonoPIN) tablet 0 5 mg (0 5 mg Oral Given 2/20/19 1901)   isosorbide mononitrate (IMDUR) 24 hr tablet 30 mg (has no administration in time range)   mirtazapine (REMERON) tablet 7 5 mg (7 5 mg Oral Given 2/20/19 2107)   nitroglycerin (NITROSTAT) SL tablet 0 4 mg (has no administration in time range)   pantoprazole (PROTONIX) EC tablet 40 mg (has no administration in time range)   sertraline (ZOLOFT) tablet 200 mg (has no administration in time range)   sodium chloride 0 9 % infusion (0 mL/hr Intravenous Hold 2/20/19 1858)   ondansetron (ZOFRAN) injection 4 mg (has no administration in time range)   enoxaparin (LOVENOX) subcutaneous injection 40 mg (has no administration in time range)   metoprolol tartrate (LOPRESSOR) partial tablet 12 5 mg (12 5 mg Oral Given 2/20/19 2107)   sodium chloride 0 9 % bolus 1,000 mL (0 mL Intravenous Stopped 2/20/19 1551)   ondansetron (ZOFRAN) injection 4 mg (4 mg Intravenous Given 2/20/19 1401)   morphine (PF) 4 mg/mL injection 4 mg (4 mg Intravenous Given 2/20/19 1402)   lidocaine viscous (XYLOCAINE) 2 % mucosal solution 15 mL (15 mL Swish & Swallow Given 2/20/19 1404)   aluminum-magnesium hydroxide-simethicone (MYLANTA) 200-200-20 mg/5 mL oral suspension 30 mL (30 mL Oral Given 2/20/19 3334)   HYDROmorphone (DILAUDID) injection 0 5 mg (0 5 mg Intravenous Given 2/20/19 1548)   HYDROmorphone (DILAUDID) injection 0 5 mg (0 5 mg Intravenous Given 2/20/19 1614)   iohexol (OMNIPAQUE) 350 MG/ML injection (MULTI-DOSE) 100 mL (85 mL Intravenous Given 2/20/19 1610)       Diagnostic Studies  Results Reviewed     Procedure Component Value Units Date/Time    Lipid panel [[id number]]  (Abnormal) Collected:  02/20/19 1400    Lab Status:  Final result Specimen:  Blood from Arm, Right Updated:  02/20/19 1849     Cholesterol 287 mg/dL      Triglycerides 158 mg/dL      HDL, Direct 64 mg/dL      LDL Calculated 191 mg/dL      Non-HDL-Chol (CHOL-HDL) 223 mg/dl     Hepatic function panel [[id number]]  (Normal) Collected:  02/20/19 1400    Lab Status:  Final result Specimen:  Blood from Arm, Right Updated:  02/20/19 1435     Total Bilirubin 0 20 mg/dL      Bilirubin, Direct 0 09 mg/dL      Alkaline Phosphatase 77 U/L      AST 13 U/L      ALT 22 U/L      Total Protein 7 7 g/dL      Albumin 3 8 g/dL     TSH [[id number]]  (Normal) Collected:  02/20/19 1400    Lab Status:  Final result Specimen:  Blood from Arm, Right Updated:  02/20/19 1435     TSH 3RD GENERATON 1 972 uIU/mL     Narrative:       Patients undergoing fluorescein dye angiography may retain small amounts of fluorescein in the body for 48-72 hours post procedure  Samples containing fluorescein can produce falsely depressed TSH values  If the patient had this procedure,a specimen should be resubmitted post fluorescein clearance      Lipase [[id number]]  (Normal) Collected:  02/20/19 1400    Lab Status:  Final result Specimen:  Blood from Arm, Right Updated:  02/20/19 1435     Lipase 118 u/L     Troponin I [[id number]]  (Normal) Collected:  02/20/19 1400    Lab Status:  Final result Specimen:  Blood from Arm, Right Updated:  02/20/19 1429     Troponin I <0 02 ng/mL     Basic metabolic panel [[id number]] Collected:  02/20/19 1400    Lab Status:  Final result Specimen:  Blood from Arm, Right Updated:  02/20/19 1428 Sodium 141 mmol/L      Potassium 4 1 mmol/L      Chloride 103 mmol/L      CO2 27 mmol/L      ANION GAP 11 mmol/L      BUN 16 mg/dL      Creatinine 1 02 mg/dL      Glucose 140 mg/dL      Calcium 9 3 mg/dL      eGFR 83 ml/min/1 73sq m     Narrative:       National Kidney Disease Education Program recommendations are as follows:  GFR calculation is accurate only with a steady state creatinine  Chronic Kidney disease less than 60 ml/min/1 73 sq  meters  Kidney failure less than 15 ml/min/1 73 sq  meters  CBC and differential [[id number]]  (Abnormal) Collected:  02/20/19 1400    Lab Status:  Final result Specimen:  Blood from Arm, Right Updated:  02/20/19 1411     WBC 10 56 Thousand/uL      RBC 4 72 Million/uL      Hemoglobin 13 6 g/dL      Hematocrit 42 7 %      MCV 91 fL      MCH 28 8 pg      MCHC 31 9 g/dL      RDW 14 5 %      MPV 9 7 fL      Platelets 478 Thousands/uL      nRBC 0 /100 WBCs      Neutrophils Relative 85 %      Immat GRANS % 1 %      Lymphocytes Relative 10 %      Monocytes Relative 4 %      Eosinophils Relative 0 %      Basophils Relative 0 %      Neutrophils Absolute 8 95 Thousands/µL      Immature Grans Absolute 0 07 Thousand/uL      Lymphocytes Absolute 1 08 Thousands/µL      Monocytes Absolute 0 40 Thousand/µL      Eosinophils Absolute 0 03 Thousand/µL      Basophils Absolute 0 03 Thousands/µL                  CT abdomen pelvis with contrast   Final Result by Tyrese Matos MD (02/20 1622)      Findings which could be compatible with minimal diverticulitis near the junction of sigmoid and descending colon  No suspicious secondary complications  Workstation performed: [id number]         XR chest 2 views   ED Interpretation by Giacomo Marcano DO (02/20 1640)   No definite acute abnormality      Final Result by Asiya Duran DO (02/20 1701)   No acute cardiopulmonary disease              Workstation performed: [id number]                    Procedures  Procedures       Phone Contacts  ED Phone Contact    ED Course  ED Course as of Feb 21 0014   Wed Feb 20, 2019   1411 EKG reviewed sinus bradycardia 49 beats per minute with sinus arrhythmia normal access normal intervals no acute ST or T-wave abnormality unchanged from EKG December 2018      1545 Patient now has morePain in his abdomen with nausea vomiting he is requesting pain medicine he also states intermittently has some mild discomfort in his chest as well, will repeat EKG, CT abdomen and pelvis is he is not improved, disposition pending      1549 Repeat EKG reviewed sinus rhythm 76 beats per minute mild increasing QT interval but no other acute ST or T-wave abnormality      1719 On re-evaluation the patient's main concern is acute onset chest pain after the symptoms started at the patient described as a central pressure with shortness of breath and diaphoresis after further discussion with the patient, use concerned of of his heart, his main concern is his chest discomfort again, his 1st troponin is negative, EKG is unchanged will observed with this known history of CAD heart score of 4            HEART Risk Score      Most Recent Value   History  1 Filed at: 02/20/2019 1719   ECG  0 Filed at: 02/20/2019 1719   Age  1 Filed at: 02/20/2019 1719   Risk Factors  2 Filed at: 02/20/2019 1719   Troponin  0 Filed at: 02/20/2019 1719   Heart Score Risk Calculator   History  1 Filed at: 02/20/2019 1719   ECG  0 Filed at: 02/20/2019 1719   Age  1 Filed at: 02/20/2019 1719   Risk Factors  2 Filed at: 02/20/2019 1719   Troponin  0 Filed at: 02/20/2019 1719   HEART Score  4 Filed at: 02/20/2019 1719   HEART Score  4 Filed at: 02/20/2019 1719                            MDM    Disposition  Final diagnoses:   Chest pain   CAD in native artery     Time reflects when diagnosis was documented in both MDM as applicable and the Disposition within this note     Time User Action Codes Description Comment    2/20/2019  5:20 PM Liza Pineda [R07 9] Chest pain     2/20/2019  5:21 PM Emiliano Leroy Add [I25 10] CAD in native artery     2/20/2019  6:07 PM Omar Winter Add [R10 13] Epigastric pain     2/20/2019  6:07 PM BereketgersonMattie Showers [J71 503] Diaz's esophagus with high grade dysplasia     2/20/2019  6:07 PM Omar Winter Add [F25 980] Leukocytosis     2/20/2019  6:07 PM RejiNighatlayla Add [R93 5] Abnormal CT of the abdomen       ED Disposition     ED Disposition Condition Date/Time Comment    Admit Stable Wed Feb 20, 2019  5:20 PM Case was discussed with Rayne and the patient's admission status was agreed to be Admission Status: observation status to the service of Dr Franny Paul           Follow-up Information    None         Current Discharge Medication List      CONTINUE these medications which have NOT CHANGED    Details   aspirin (ECOTRIN LOW STRENGTH) 81 mg EC tablet Take 81 mg by mouth daily      atorvastatin (LIPITOR) 40 mg tablet Take 1 tablet (40 mg total) by mouth daily  Qty: 90 tablet, Refills: 3    Associated Diagnoses: Angina pectoris (HCC)      BUDESONIDE PO Take 3 mg by mouth 2 (two) times a day      buPROPion (WELLBUTRIN) 75 mg tablet Take 75 mg by mouth 2 (two) times a day      clonazePAM (KLONOPIN) 1 mg tablet Take 0 5 mg by mouth 2 (two) times a day      isosorbide mononitrate (IMDUR) 30 mg 24 hr tablet Take 1 tablet (30 mg total) by mouth daily  Qty: 14 tablet, Refills: 0    Associated Diagnoses: Angina at rest (HCC)      metoprolol tartrate (LOPRESSOR) 25 mg tablet Take 1 tablet (25 mg total) by mouth every 12 (twelve) hours  Qty: 180 tablet, Refills: 3    Associated Diagnoses: Angina pectoris (HCC)      mirtazapine (REMERON) 7 5 MG tablet Take 7 5 mg by mouth daily at bedtime      nitroglycerin (NITROSTAT) 0 4 mg SL tablet Place 1 tablet (0 4 mg total) under the tongue every 5 (five) minutes as needed for chest pain  Qty: 30 tablet, Refills: 3    Associated Diagnoses: Angina pectoris (HCC)      ondansetron (ZOFRAN-ODT) 4 mg disintegrating tablet Take 1 tablet by mouth every 6 (six) hours as needed for nausea or vomiting  Qty: 20 tablet, Refills: 0      pantoprazole (PROTONIX) 40 mg tablet Take 1 tablet (40 mg total) by mouth daily  Qty: 30 tablet, Refills: 0    Associated Diagnoses: Enteritis      sertraline (ZOLOFT) 100 mg tablet Take 200 mg by mouth daily           No discharge procedures on file      ED Provider  Electronically Signed by           Agatha Taylor DO  02/21/19 0224

## 2019-02-20 NOTE — H&P
H&P- Megan Swan 1965, 46 y o  male MRN: [id number]    Unit/Bed#: -Ana Cristina Encounter: [id number]    Primary Care Provider: Kristen Sosa MD   Date and time admitted to hospital: 2/20/2019  1:36 PM        * Chest pain due to CAD  Assessment & Plan  Patient had a cardiac catheterization done last month without PCI which was significant for 1 vessel disease with up to 70% tubular stenosis of the distal RCA  He was recommended medical management at that time for that disease no intervention was performed  · Patient continues to have chest pain EKG did not show any ischemia  · Keep on telemetry monitoring  · Monitored troponins, initial troponin is 0 02  · Cardiology consulted  · Continue cardiac meds, aspirin daily      Abdominal pain  Assessment & Plan  · CT scan abdomen and pelvis revealed Findings compatible with minimal diverticulitis near the junction of sigmoid and descending colon   No suspicious secondary complications  This could be contributing to his leukocytosis  Liver enzymes and lipase are normal  · Patient does have a history of ulcerative colitis and Diaz's esophagus he follows up with his GI physician outpatient  · Will order Zofran p r n  · Clear liquid diet  · Will consult GI for further recommendations      Non-intractable vomiting with nausea  Assessment & Plan  · Clear liquid diet  · Ordered Zofran Q4h p r n   · IV fluids normal saline at 100 mL/hour    Essential hypertension  Assessment & Plan  · Stable at this time  · Continue home meds    Dyslipidemia  Assessment & Plan  · Continue with atorvastatin 40 daily  · Check lipid panel    Gastroesophageal reflux disease with esophagitis  Assessment & Plan  · Continue with Protonix daily  · Continue with Zofran p r n        VTE Prophylaxis: Enoxaparin (Lovenox)  / sequential compression device   Code Status:  Full code  POLST: There is no POLST form on file for this patient (pre-hospital)  Discussion with family: Patient    Anticipated Length of Stay:  Patient will be admitted on an Observation basis with an anticipated length of stay of   2 midnights  Justification for Hospital Stay:  Telemetry monitoring,  serial troponin, cardiology consult GI consult    Total Time for Visit, including Counseling / Coordination of Care: 1 hour  Greater than 50% of this total time spent on direct patient counseling and coordination of care  Chief Complaint:   Chest pain, nausea vomiting    History of Present Illness:    Glenny Candelario is a 46 y o  male with history of CAD status post cardiac catheterization 1 month ago, Diaz's esophagus, ulcerative colitis who presents with chest pain, nausea and vomiting, abdominal cramping  Patient states the symptoms started early this morning  He states that he developed some discomfort in his chest along with multiple episodes of vomiting  He denies hematemesis  He did believe that the chest pain was from vomiting however however he did feel left-sided discomfort and a pressure-like sensation which resolved after taking sublingual nitroglycerin  Patient had a cardiac catheterization done about a month ago which revealed 1 vessel disease with 70% stenosis of the distal RCA  No intervention was performed at the time it was recommended that it was managed medically  He is compliant with taking his medications except this morning because he said that he vomited all his morning meds  patient denies any fever, upper respiratory symptoms, syncope, dizziness  Review of Systems:    Review of Systems   Constitutional: Positive for appetite change  Negative for activity change, chills, diaphoresis, fatigue, fever and unexpected weight change  HENT: Negative  Eyes: Negative  Respiratory: Positive for chest tightness and shortness of breath  Negative for apnea, cough, choking, wheezing and stridor  Cardiovascular: Positive for chest pain and palpitations  Negative for leg swelling  Gastrointestinal: Positive for abdominal pain, nausea and vomiting  Negative for abdominal distention, anal bleeding, blood in stool, constipation, diarrhea and rectal pain  Endocrine: Negative  Genitourinary: Negative  Musculoskeletal: Negative  Skin: Negative  Allergic/Immunologic: Negative  Neurological: Negative  Psychiatric/Behavioral: Negative  Past Medical and Surgical History:     Past Medical History:   Diagnosis Date    Diaz's esophagus     Coronary artery disease     Depression     Diverticulitis     GERD (gastroesophageal reflux disease)     Hemorrhoid     Hyperlipidemia     Hypertension     UC (ulcerative colitis) ([facility] [address] )        Past Surgical History:   Procedure Laterality Date    ABDOMINAL SURGERY      radio frequency ablation    BONE GRAFT Left 2018    CARPAL TUNNEL RELEASE Right     COLONOSCOPY      EGD AND COLONOSCOPY      ESOPHAGOGASTRODUODENOSCOPY N/A 2/15/2018    Procedure: ESOPHAGOGASTRODUODENOSCOPY (EGD); Surgeon: Bisi Nava MD;  Location: [facility];  Service: Gastroenterology    ESOPHAGOGASTRODUODENOSCOPY N/A 12/10/2018    Procedure: ESOPHAGOGASTRODUODENOSCOPY (EGD); Surgeon: Soha Chung MD;  Location: [facility]; Service: Gastroenterology    TONSILLECTOMY         Meds/Allergies:    Prior to Admission medications    Medication Sig Start Date End Date Taking?  Authorizing Provider   aspirin (ECOTRIN LOW STRENGTH) 81 mg EC tablet Take 81 mg by mouth daily   Yes Historical Provider, MD   atorvastatin (LIPITOR) 40 mg tablet Take 1 tablet (40 mg total) by mouth daily 1/7/19  Yes Zeina Kennedy,    BUDESONIDE PO Take 3 mg by mouth 2 (two) times a day   Yes Historical Provider, MD   buPROPion (WELLBUTRIN) 75 mg tablet Take 75 mg by mouth 2 (two) times a day   Yes Historical Provider, MD   clonazePAM (KLONOPIN) 1 mg tablet Take 0 5 mg by mouth 2 (two) times a day   Yes Historical Provider, MD   isosorbide mononitrate (IMDUR) 30 mg 24 hr tablet Take 1 tablet (30 mg total) by mouth daily 19  Yes David Child, DO   metoprolol tartrate (LOPRESSOR) 25 mg tablet Take 1 tablet (25 mg total) by mouth every 12 (twelve) hours 19  Yes David Child, DO   mirtazapine (REMERON) 7 5 MG tablet Take 7 5 mg by mouth daily at bedtime   Yes Historical Provider, MD   nitroglycerin (NITROSTAT) 0 4 mg SL tablet Place 1 tablet (0 4 mg total) under the tongue every 5 (five) minutes as needed for chest pain 19  Yes David Child, DO   ondansetron (ZOFRAN-ODT) 4 mg disintegrating tablet Take 1 tablet by mouth every 6 (six) hours as needed for nausea or vomiting 17  Yes Susi Spring, DO   pantoprazole (PROTONIX) 40 mg tablet Take 1 tablet (40 mg total) by mouth daily 18  Yes Onur Denis MD   sertraline (ZOLOFT) 100 mg tablet Take 200 mg by mouth daily   Yes Historical Provider, MD     I have reviewed home medications with patient personally  Allergies:    Allergies   Allergen Reactions    No Active Allergies        Social History:     Marital Status: /Civil Union   Occupation:   Patient Pre-hospital Living Situation:  Lives at home with wife and kids  Patient Pre-hospital Level of Mobility:  Ambulates independently  Patient Pre-hospital Diet Restrictions:  No restrictions on diet  Substance Use History:   Social History     Substance and Sexual Activity   Alcohol Use No    Comment: quit 3 years     Social History     Tobacco Use   Smoking Status Former Smoker    Last attempt to quit: 2007    Years since quittin 1   Smokeless Tobacco Former User    Quit date: 1998     Social History     Substance and Sexual Activity   Drug Use Yes    Frequency: 3 0 times per week    Types: Marijuana    Comment: 1/15/2019       Family History:    Family History   Problem Relation Age of Onset    Heart disease Father     Cancer Sister        Physical Exam:     Vitals:   Blood Pressure: 129/73 (19 1652)  Pulse: 96 (02/20/19 1652)  Temperature: 98 2 °F (36 8 °C) (02/20/19 1337)  Temp Source: Oral (02/20/19 1337)  Respirations: 18 (02/20/19 1652)  Weight - Scale: 100 kg (220 lb 14 4 oz) (02/20/19 1337)  SpO2: 96 % (02/20/19 1652)    Physical Exam   Constitutional: He is oriented to person, place, and time  He appears well-developed and well-nourished  No distress  HENT:   Head: Normocephalic and atraumatic  Eyes: Pupils are equal, round, and reactive to light  Neck: Normal range of motion  Cardiovascular: Normal rate, regular rhythm, normal heart sounds and intact distal pulses  Exam reveals no gallop and no friction rub  No murmur heard  Pulmonary/Chest: Effort normal and breath sounds normal    Abdominal: Soft  He exhibits no mass  There is no rebound and no guarding  Mild left lower quadrant tenderness to palpation   Musculoskeletal: Normal range of motion  He exhibits no edema or deformity  Neurological: He is alert and oriented to person, place, and time  Skin: Skin is warm and dry  He is not diaphoretic  Additional Data:     Lab Results: I have personally reviewed pertinent reports  Results from last 7 days   Lab Units 02/20/19  1400   WBC Thousand/uL 10 56*   HEMOGLOBIN g/dL 13 6   HEMATOCRIT % 42 7   PLATELETS Thousands/uL 292   NEUTROS PCT % 85*   LYMPHS PCT % 10*   MONOS PCT % 4   EOS PCT % 0     Results from last 7 days   Lab Units 02/20/19  1400   SODIUM mmol/L 141   POTASSIUM mmol/L 4 1   CHLORIDE mmol/L 103   CO2 mmol/L 27   BUN mg/dL 16   CREATININE mg/dL 1 02   ANION GAP mmol/L 11   CALCIUM mg/dL 9 3   ALBUMIN g/dL 3 8   TOTAL BILIRUBIN mg/dL 0 20   ALK PHOS U/L 77   ALT U/L 22   AST U/L 13   GLUCOSE RANDOM mg/dL 140                       Imaging: I have personally reviewed pertinent reports        CT abdomen pelvis with contrast   Final Result by Rudi Litten, MD (02/20 1622)      Findings which could be compatible with minimal diverticulitis near the junction of sigmoid and descending colon  No suspicious secondary complications  Workstation performed: [id number]         XR chest 2 views   ED Interpretation by Houston Rodriguez DO (02/20 1640)   No definite acute abnormality      Final Result by Sharri Infante DO (02/20 1701)   No acute cardiopulmonary disease  Workstation performed: [id number]             EKG, Pathology, and Other Studies Reviewed on Admission:   · EKG:  NSR    Allscripts / Epic Records Reviewed: Yes     ** Please Note: This note has been constructed using a voice recognition system   **

## 2019-02-20 NOTE — ED NOTES
Attempted to call report to [facility] [place]; states giving meds and to please call back in 2-3minutes        Cassia Stinson RN  02/20/19 0096

## 2019-02-20 NOTE — ASSESSMENT & PLAN NOTE
· CT scan abdomen and pelvis revealed Findings compatible with minimal diverticulitis near the junction of sigmoid and descending colon   No suspicious secondary complications  This could be contributing to his leukocytosis  Liver enzymes and lipase are normal  · Patient does have a history of ulcerative colitis and Diaz's esophagus he follows up with his GI physician outpatient  · Will order Zofran p r n    · Clear liquid diet  · Will consult GI for further recommendations

## 2019-02-20 NOTE — TELEPHONE ENCOUNTER
Pt called [place] nurse line after ES office closed  He c/o chest pain, nausea  I told him to call 911 and go to ER  He said he was going to go to [place]  Please follow up with the patient      Thanks    Pt cb:  21 [phone]

## 2019-02-21 ENCOUNTER — TELEPHONE (OUTPATIENT)
Dept: GASTROENTEROLOGY | Facility: CLINIC | Age: 54
End: 2019-02-21

## 2019-02-21 VITALS
TEMPERATURE: 98.6 F | RESPIRATION RATE: 18 BRPM | DIASTOLIC BLOOD PRESSURE: 80 MMHG | WEIGHT: 220.9 LBS | HEART RATE: 84 BPM | SYSTOLIC BLOOD PRESSURE: 118 MMHG | OXYGEN SATURATION: 94 % | BODY MASS INDEX: 32.62 KG/M2

## 2019-02-21 LAB
ANION GAP SERPL CALCULATED.3IONS-SCNC: 6 MMOL/L (ref 4–13)
BASOPHILS # BLD AUTO: 0.03 THOUSANDS/ΜL (ref 0–0.1)
BASOPHILS NFR BLD AUTO: 0 % (ref 0–1)
BUN SERPL-MCNC: 11 MG/DL (ref 5–25)
CALCIUM SERPL-MCNC: 8.7 MG/DL (ref 8.3–10.1)
CHLORIDE SERPL-SCNC: 106 MMOL/L (ref 100–108)
CO2 SERPL-SCNC: 29 MMOL/L (ref 21–32)
CREAT SERPL-MCNC: 0.8 MG/DL (ref 0.6–1.3)
EOSINOPHIL # BLD AUTO: 0.15 THOUSAND/ΜL (ref 0–0.61)
EOSINOPHIL NFR BLD AUTO: 2 % (ref 0–6)
ERYTHROCYTE [DISTWIDTH] IN BLOOD BY AUTOMATED COUNT: 14.6 % (ref 11.6–15.1)
EST. AVERAGE GLUCOSE BLD GHB EST-MCNC: 114 MG/DL
GFR SERPL CREATININE-BSD FRML MDRD: 101 ML/MIN/1.73SQ M
GLUCOSE P FAST SERPL-MCNC: 95 MG/DL (ref 65–99)
GLUCOSE SERPL-MCNC: 95 MG/DL (ref 65–140)
HBA1C MFR BLD: 5.6 % (ref 4.2–6.3)
HCT VFR BLD AUTO: 37.7 % (ref 36.5–49.3)
HGB BLD-MCNC: 12 G/DL (ref 12–17)
IMM GRANULOCYTES # BLD AUTO: 0.05 THOUSAND/UL (ref 0–0.2)
IMM GRANULOCYTES NFR BLD AUTO: 1 % (ref 0–2)
LYMPHOCYTES # BLD AUTO: 2.46 THOUSANDS/ΜL (ref 0.6–4.47)
LYMPHOCYTES NFR BLD AUTO: 25 % (ref 14–44)
MAGNESIUM SERPL-MCNC: 1.9 MG/DL (ref 1.6–2.6)
MCH RBC QN AUTO: 29.1 PG (ref 26.8–34.3)
MCHC RBC AUTO-ENTMCNC: 31.8 G/DL (ref 31.4–37.4)
MCV RBC AUTO: 91 FL (ref 82–98)
MONOCYTES # BLD AUTO: 0.69 THOUSAND/ΜL (ref 0.17–1.22)
MONOCYTES NFR BLD AUTO: 7 % (ref 4–12)
NEUTROPHILS # BLD AUTO: 6.61 THOUSANDS/ΜL (ref 1.85–7.62)
NEUTS SEG NFR BLD AUTO: 65 % (ref 43–75)
NRBC BLD AUTO-RTO: 0 /100 WBCS
PHOSPHATE SERPL-MCNC: 2.3 MG/DL (ref 2.7–4.5)
PLATELET # BLD AUTO: 244 THOUSANDS/UL (ref 149–390)
PMV BLD AUTO: 10 FL (ref 8.9–12.7)
POTASSIUM SERPL-SCNC: 3.6 MMOL/L (ref 3.5–5.3)
RBC # BLD AUTO: 4.13 MILLION/UL (ref 3.88–5.62)
SODIUM SERPL-SCNC: 141 MMOL/L (ref 136–145)
TROPONIN I SERPL-MCNC: <0.02 NG/ML
WBC # BLD AUTO: 9.99 THOUSAND/UL (ref 4.31–10.16)

## 2019-02-21 PROCEDURE — 83735 ASSAY OF MAGNESIUM: CPT | Performed by: PHYSICIAN ASSISTANT

## 2019-02-21 PROCEDURE — 83036 HEMOGLOBIN GLYCOSYLATED A1C: CPT | Performed by: PHYSICIAN ASSISTANT

## 2019-02-21 PROCEDURE — 84484 ASSAY OF TROPONIN QUANT: CPT | Performed by: PHYSICIAN ASSISTANT

## 2019-02-21 PROCEDURE — 99245 OFF/OP CONSLTJ NEW/EST HI 55: CPT | Performed by: INTERNAL MEDICINE

## 2019-02-21 PROCEDURE — 80048 BASIC METABOLIC PNL TOTAL CA: CPT | Performed by: PHYSICIAN ASSISTANT

## 2019-02-21 PROCEDURE — 99217 PR OBSERVATION CARE DISCHARGE MANAGEMENT: CPT | Performed by: NURSE PRACTITIONER

## 2019-02-21 PROCEDURE — 99244 OFF/OP CNSLTJ NEW/EST MOD 40: CPT | Performed by: INTERNAL MEDICINE

## 2019-02-21 PROCEDURE — 84100 ASSAY OF PHOSPHORUS: CPT | Performed by: PHYSICIAN ASSISTANT

## 2019-02-21 PROCEDURE — 85025 COMPLETE CBC W/AUTO DIFF WBC: CPT | Performed by: PHYSICIAN ASSISTANT

## 2019-02-21 RX ORDER — CIPROFLOXACIN 500 MG/1
500 TABLET, FILM COATED ORAL EVERY 12 HOURS SCHEDULED
Qty: 14 TABLET | Refills: 0 | Status: SHIPPED | OUTPATIENT
Start: 2019-02-21 | End: 2019-02-21 | Stop reason: HOSPADM

## 2019-02-21 RX ORDER — METRONIDAZOLE 500 MG/1
500 TABLET ORAL EVERY 8 HOURS SCHEDULED
Qty: 21 TABLET | Refills: 0 | Status: SHIPPED | OUTPATIENT
Start: 2019-02-21 | End: 2019-02-21

## 2019-02-21 RX ORDER — METRONIDAZOLE 500 MG/1
500 TABLET ORAL EVERY 8 HOURS SCHEDULED
Qty: 21 TABLET | Refills: 0 | Status: SHIPPED | OUTPATIENT
Start: 2019-02-21 | End: 2019-02-21 | Stop reason: HOSPADM

## 2019-02-21 RX ORDER — CIPROFLOXACIN 500 MG/1
500 TABLET, FILM COATED ORAL EVERY 12 HOURS SCHEDULED
Qty: 14 TABLET | Refills: 0 | Status: SHIPPED | OUTPATIENT
Start: 2019-02-21 | End: 2019-02-21

## 2019-02-21 RX ORDER — METRONIDAZOLE 500 MG/1
500 TABLET ORAL EVERY 8 HOURS SCHEDULED
Status: DISCONTINUED | OUTPATIENT
Start: 2019-02-21 | End: 2019-02-21 | Stop reason: HOSPADM

## 2019-02-21 RX ORDER — AMOXICILLIN AND CLAVULANATE POTASSIUM 875; 125 MG/1; MG/1
1 TABLET, FILM COATED ORAL EVERY 12 HOURS SCHEDULED
Qty: 20 TABLET | Refills: 0 | OUTPATIENT
Start: 2019-02-21 | End: 2019-03-03

## 2019-02-21 RX ORDER — CIPROFLOXACIN 500 MG/1
500 TABLET, FILM COATED ORAL EVERY 12 HOURS SCHEDULED
Status: DISCONTINUED | OUTPATIENT
Start: 2019-02-21 | End: 2019-02-21 | Stop reason: HOSPADM

## 2019-02-21 RX ADMIN — METOPROLOL TARTRATE 12.5 MG: 25 TABLET ORAL at 10:38

## 2019-02-21 RX ADMIN — BUPROPION HYDROCHLORIDE 75 MG: 75 TABLET, FILM COATED ORAL at 10:38

## 2019-02-21 RX ADMIN — ENOXAPARIN SODIUM 40 MG: 40 INJECTION SUBCUTANEOUS at 10:39

## 2019-02-21 RX ADMIN — ASPIRIN 81 MG: 81 TABLET, COATED ORAL at 10:38

## 2019-02-21 RX ADMIN — CIPROFLOXACIN HYDROCHLORIDE 500 MG: 500 TABLET, FILM COATED ORAL at 11:06

## 2019-02-21 RX ADMIN — CLONAZEPAM 0.5 MG: 0.5 TABLET ORAL at 10:37

## 2019-02-21 RX ADMIN — DIBASIC SODIUM PHOSPHATE, MONOBASIC POTASSIUM PHOSPHATE AND MONOBASIC SODIUM PHOSPHATE 1 TABLET: 852; 155; 130 TABLET ORAL at 12:11

## 2019-02-21 RX ADMIN — SERTRALINE HYDROCHLORIDE 200 MG: 100 TABLET ORAL at 10:39

## 2019-02-21 RX ADMIN — PANTOPRAZOLE SODIUM 40 MG: 40 TABLET, DELAYED RELEASE ORAL at 05:41

## 2019-02-21 RX ADMIN — ISOSORBIDE MONONITRATE 30 MG: 30 TABLET, EXTENDED RELEASE ORAL at 10:37

## 2019-02-21 NOTE — DISCHARGE INSTR - AVS FIRST PAGE
Please complete your course of antibiotics as prescribed, please follow-up with your providers as instructed thank you for choosing [facility]

## 2019-02-21 NOTE — ASSESSMENT & PLAN NOTE
· CT scan abdomen and pelvis revealed Findings compatible with minimal diverticulitis near the junction of sigmoid and descending colon   No suspicious secondary complications  This could be contributing to his leukocytosis  Liver enzymes and lipase are normal  · Patient does have a history of ulcerative colitis and Diaz's esophagus he follows up with his GI physician outpatient    · Patient is now tolerating a regular diet  · Reports improvement in resolution of abdominal pain nausea and vomiting  · Patient is clear for discharge from a GI standpoint, will completed 10 day course of antibiotics

## 2019-02-21 NOTE — CONSULTS
Consultation - Cardiology Team Vandana Richardson 46 y o  male MRN: [id number]  Unit/Bed#: -01 Encounter: [id number]    Inpatient consult to Cardiology  Consult performed by: RICKEY Gillis  Consult ordered by: Lonny Massey PA-C          Physician Requesting Consult: Sofi Chun, *  Reason for Consult / Principal Problem:  Chest pain with history of CAD    HPI: Cardiologist Dr Bernarda Torres is a 46y o  year old male who has a history of hypertension, dyslipidemia, CAD with a catheterization with Dr Armando Gregory 1 month ago with findings of 48- 70% stenosis in the RCA, GERD, Diaz's esophagus, ulcerative colitis, diverticulosis who presents to the ED from home with nausea vomiting and chest pain  Patient describes the chest pain as tight and pressure-like in nature in the  epigastric area and was relieved with 2 nitroglycerin tabs  Post catheterization  the patient did continue to experience chest pain and shortness of breath and was started on Imdur 30 mg by Dr Armando Gregory on February 11th 2019  CT of the abdomen showed mild diverticulitis in the sigmoid and descending colon, chest x-ray was negative, EKGs showed sinus bradycardia and sinus arrhythmia    Troponins are negative x3     REVIEW OF SYSTEMS:  Constitutional:  Denies fever or chills   Eyes:  Denies change in visual acuity   HENT:  Denies nasal congestion or sore throat   Respiratory:  Denies cough, + shortness of breath   Cardiovascular:  + chest pain or edema   GI:  Denies abdominal pain,+ nausea, + vomiting, bloody stools or diarrhea   :  Denies dysuria, frequency, difficulty in micturition and nocturia  Musculoskeletal:  Denies back pain or joint pain   Neurologic:  Denies headache, focal weakness or sensory changes   Endocrine:  Denies polyuria or polydipsia   Lymphatic:  Denies swollen glands   Psychiatric:  Denies depression or anxiety     Historical Information   Past Medical History:   Diagnosis Date    Diaz's esophagus     Coronary artery disease     Depression     Diverticulitis     GERD (gastroesophageal reflux disease)     Hemorrhoid     Hyperlipidemia     Hypertension     [facility]ul[facility]ative [facility]) ([facility] [address] )      Past Surgical History:   Procedure Laterality Date    ABDOMINAL SURGERY      radio frequency ablation    BONE GRAFT Left 2018    CARPAL TUNNEL RELEASE Right     COLONOSCOPY      EGD AND COLONOSCOPY      ESOPHAGOGASTRODUODENOSCOPY N/A 2/15/2018    Procedure: ESOPHAGOGASTRODUODENOSCOPY (EGD); Surgeon: Cathi Camilo MD;  Location: [facility];  Service: Gastroenterology    ESOPHAGOGASTRODUODENOSCOPY N/A 12/10/2018    Procedure: ESOPHAGOGASTRODUODENOSCOPY (EGD); Surgeon: Adrianne Morales MD;  [facility];   Service: Gastroenterology    TONSILLECTOMY       Social History     Substance and Sexual Activity   Alcohol Use No    Comment: quit 3 years     Social History     Substance and Sexual Activity   Drug Use Yes    Frequency: 3 0 times per week    Types: Marijuana    Comment: 1/15/2019     Social History     Tobacco Use   Smoking Status Former Smoker    Last attempt to quit: 2007    Years since quittin 1   Smokeless Tobacco Former User    Quit date: 1998       Family History:   Family History   Problem Relation Age of Onset    Heart disease Father     Cancer Sister        MEDS & ALLERGIES:  all current active meds have been reviewed and current meds: Current Facility-Administered Medications   Medication Dose Route Frequency    aspirin (ECOTRIN LOW STRENGTH) EC tablet 81 mg  81 mg Oral Daily    atorvastatin (LIPITOR) tablet 40 mg  40 mg Oral Daily    buPROPion (WELLBUTRIN) tablet 75 mg  75 mg Oral BID    clonazePAM (KlonoPIN) tablet 0 5 mg  0 5 mg Oral BID    enoxaparin (LOVENOX) subcutaneous injection 40 mg  40 mg Subcutaneous Daily    isosorbide mononitrate (IMDUR) 24 hr tablet 30 mg  30 mg Oral Daily    metoprolol tartrate (LOPRESSOR) partial tablet 12 5 mg  12 5 mg Oral Q12H [address]    mirtazapine (REMERON) tablet 7 5 mg  7 5 mg Oral HS    nitroglycerin (NITROSTAT) SL tablet 0 4 mg  0 4 mg Sublingual Q5 Min PRN    ondansetron (ZOFRAN) injection 4 mg  4 mg Intravenous Q6H PRN    pantoprazole (PROTONIX) EC tablet 40 mg  40 mg Oral Daily    sertraline (ZOLOFT) tablet 200 mg  200 mg Oral Daily    sodium chloride 0 9 % infusion  100 mL/hr Intravenous Continuous       sodium chloride 100 mL/hr Last Rate: Stopped (19 1858)     Allergies   Allergen Reactions    No Active Allergies        OBJECTIVE:  Vitals:   Vitals:    19 0450   BP: 118/72   Pulse: 60   Resp:    Temp:    SpO2: 93%     Body mass index is 32 62 kg/m²  Systolic (68CSG), FF , Min:107 , WW     Diastolic (98HZS), VYY:63, Min:61, Max:96      Intake/Output Summary (Last 24 hours) at 2019 0951  Last data filed at 2019 1551  Gross per 24 hour   Intake 1000 ml   Output --   Net 1000 ml     Weight (last 2 days)     Date/Time   Weight    19 1337   100 (220 9)            Invasive Devices     Peripheral Intravenous Line            Peripheral IV 19 Right Antecubital less than 1 day                PHYSICAL EXAMS:  General:  Patient is not in acute distress, laying in the bed comfortably, awake, alert responding to commands  Head: Normocephalic, Atraumatic  HEENT: White sclera, pink conjunctiva,  PERRLA, pharynx benign  Neck:  Supple, no neck vein distention, thyromegaly, adenopathy  Respiratory: clear to P/A  Cardiovascular S1-S2 normal, No  Murmurs, thrills, gallops, rubs   Regular rhythm  GI:  Abdomen soft nontender   No hepatosplenomegaly, adenopathy, ascites,or rebound tenderness  Extremities: No edema, normal pulses, no calf tenderness, no joint deformities, no venous disease   Integument:  No skin rashes or ulceration  Lymphatic:  No cervical or inguinal lymphadenopathy  Neurologic:  Patient is awake alert, responding to command, well-oriented to time and place and person moving all extremities    LABORATORY RESULTS:  Results from last 7 days   Lab Units 02/21/19  0445 02/20/19  2135 02/20/19  1400   TROPONIN I ng/mL <0 02 <0 02 <0 02     CBC with diff: Results from last 7 days   Lab Units 02/21/19  0445 02/20/19  1400   WBC Thousand/uL 9 99 10 56*   HEMOGLOBIN g/dL 12 0 13 6   HEMATOCRIT % 37 7 42 7   MCV fL 91 91   PLATELETS Thousands/uL 244 292   MCH pg 29 1 28 8   MCHC g/dL 31 8 31 9   RDW % 14 6 14 5   MPV fL 10 0 9 7   NRBC AUTO /100 WBCs 0 0       CMP:  Results from last 7 days   Lab Units 02/21/19  0445 02/20/19  1400   POTASSIUM mmol/L 3 6 4 1   CHLORIDE mmol/L 106 103   CO2 mmol/L 29 27   BUN mg/dL 11 16   CREATININE mg/dL 0 80 1 02   CALCIUM mg/dL 8 7 9 3   AST U/L  --  13   ALT U/L  --  22   ALK PHOS U/L  --  77   EGFR ml/min/1 73sq m 101 83       BMP:  Results from last 7 days   Lab Units 02/21/19  0445 02/20/19  1400   POTASSIUM mmol/L 3 6 4 1   CHLORIDE mmol/L 106 103   CO2 mmol/L 29 27   BUN mg/dL 11 16   CREATININE mg/dL 0 80 1 02   CALCIUM mg/dL 8 7 9 3            Results from last 7 days   Lab Units 02/21/19  0445   MAGNESIUM mg/dL 1 9     Results from last 7 days   Lab Units 02/21/19  0445   HEMOGLOBIN A1C % 5 6     Results from last 7 days   Lab Units 02/20/19  1400   TSH 3RD GENERATON uIU/mL 1 972           Lipid Profile:   No results found for: CHOL  Lab Results   Component Value Date    HDL 64 (H) 02/20/2019     Lab Results   Component Value Date    LDLCALC 191 (H) 02/20/2019     Lab Results   Component Value Date    TRIG 158 (H) 02/20/2019           Imaging:   I have personally reviewed pertinent reports  EKG reviewed personally:  Sinus bradycardia and sinus arrhythmia      Telemetry reviewed personally:   Normal Sinus rhythm    Assessment/Plan:  1  Chest pain with history of CAD - suspected to be GI related in the setting of nausea and vomiting  Continue aspirin, Imdur, Lopressor, and p r n   Nitroglycerin  Follow-up with Cardiology, Dr Shahbaz Tom in 1 week    2  Dyslipidemia-  Continue Lipitor 40 mg    3  HTN- current we controlled  Continue metoprolol  25 mg b i d     4  Abdominal pain, nausea and vomiting with evidence of diverticulitis on CT scan  Continue treatment per primary service    This treatment plan was created by Yolie Body  Code Status: Level 1 - Full Code    Counseling / Coordination of Care  Total floor / unit time spent today 30 minutes  Greater than 50% of total time was spent with the patient and / or family counseling and / or coordination of care  A description of the counseling / coordination of care: Review of history, current assessment, development of a plan      [facility]  2/21/2019,9:51 AM

## 2019-02-21 NOTE — TELEPHONE ENCOUNTER
----- Message from Maryetta Meigs, PA-C sent at 2/21/2019 10:47 AM EST -----  Please set this patient up for a OV with me or Eloina in 4-6 weeks for hospital follow up  He will be discharged today  Thanks!

## 2019-02-21 NOTE — DISCHARGE INSTRUCTIONS
Abdominal Pain, Ambulatory Care   GENERAL INFORMATION:   Abdominal pain  can be dull, achy, or sharp  You may have pain in one area of your abdomen, or in your entire abdomen  Your pain may be caused by constipation, food sensitivity or poisoning, infection, or a blockage  Abdominal pain can also be caused by a hernia, appendicitis, or an ulcer  The cause of your abdominal pain may be unknown  Seek immediate care for the following symptoms:   · New chest pain or shortness of breath    · Pulsing pain in your upper abdomen or lower back that suddenly becomes constant    · Pain in the right lower abdominal area that worsens with movement    · Fever over 100 4°F (38°C) or shaking chills    · Vomiting and you cannot keep food or fluids down    · Pain does not improve or gets worse over the next 8 to 12 hours    · Blood in your vomit or bowel movements, or they look black and tarry    · Skin or the whites of your eyes turn yellow    · Large amount of vaginal bleeding that is not your monthly period  Treatment for abdominal pain  may include medicine to calm your stomach, prevent vomiting, or decrease pain  Follow up with your healthcare provider as directed:  Write down your questions so you remember to ask them during your visits  CARE AGREEMENT:   You have the right to help plan your care  Learn about your health condition and how it may be treated  Discuss treatment options with your caregivers to decide what care you want to receive  You always have the right to refuse treatment  The above information is an  only  It is not intended as medical advice for individual conditions or treatments  Talk to your doctor, nurse or pharmacist before following any medical regimen to see if it is safe and effective for you  © 2014 [address] is for End User's use only and may not be sold, redistributed or otherwise used for commercial purposes   All illustrations and images included in [facility] are the copyrighted property of [employer] M [employer]  or Mihir Castro  Acute Nausea and Vomiting   WHAT YOU NEED TO KNOW:   What is acute nausea and vomiting? Acute nausea and vomiting starts suddenly, gets worse quickly, and lasts a short time  What are some common causes of acute nausea and vomiting? · Food poisoning    · Large amounts of alcohol    · Certain medicines, too much of any medicine, or stopping a regular medicine too quickly    · Early stages of pregnancy    · Infection in the stomach, intestines, or other organs    · Trauma to the head    · Anxiety or stress    · Gastroparesis (a condition that prevents your stomach from emptying properly)    · Metabolic disorders, such as uremia or adrenal insufficiency  What causes acute nausea and vomiting with stomach pain? · Inflammation of the appendix, gallbladder, stomach, pancreas, kidneys, or other organs    · Gallstones    · Bacteria or a parasite in the digestive system    · Heart attack    · Stomach ulcers, or bowel blockage or twisting  What causes acute nausea and vomiting with other signs and symptoms? You may be sweating and have pale skin, problems with digestion, and more saliva than usual  These signs and symptoms may be caused by the following:  · Problems with your heart rate, blood flow to your heart muscle, blood pressure, or stomach fluid    · Increased pressure or bleeding in the brain    · Swelling of the tissue covering the brain    · Migraine or seizures    · Inner ear disorders that cause problems with balance  How is the cause of acute nausea and vomiting diagnosed? Your healthcare provider will examine you and ask about your medical history  Tell your provider about other signs and symptoms you have  Also tell your provider when you last had nausea and vomiting and how long it continued  Include if it happened before, during, or after a meal, and how much you ate   Your provider will need to know how much came out when you vomited, and if it was fast and forceful  Tell your provider if your vomit smelled like bowel movement or had blood or food in it  Tell your provider if the vomit was bright yellow  You may need any of the following:  · Blood tests  may be used to check for infection or inflammation  · X-ray, CT, or MRI pictures  may be used to find an injury or blockage  How may acute nausea and vomiting be treated? The first goal of treatment for nausea and vomiting is to prevent or treat dehydration  Treatment also depends on the cause of the nausea and vomiting  Any medical condition causing your nausea and vomiting will also be treated  Treatment is also aimed at stopping or preventing your signs and symptoms  You may need one or more of the following:  · Medicines  may be given to calm your stomach and stop your vomiting  You may also need medicines to help you feel more relaxed or to stop nausea and vomiting caused by motion sickness  Gastrointestinal stimulants may be used to help empty your stomach and bowels  This can help decrease your nausea and vomiting  · IV fluids  may be given to replace lost fluids and electrolytes  This may be needed it you cannot drink liquids  · Nasogastric (NG) tube: An NG tube is put into your nose, and passes down your throat until it reaches your stomach  Food and medicine may be given through an NG tube if you cannot take anything by mouth  The tube may instead be attached to suction if caregivers need to keep your stomach empty  What can I do to prevent or manage acute nausea and vomiting? · Do not drink alcohol  Alcohol may upset or irritate your stomach  Too much alcohol can also cause acute nausea and vomiting  · Control stress  Headaches due to stress may cause nausea and vomiting  Find ways to relax and manage your stress  Get more rest and sleep  · Drink more liquids as directed  Vomiting can lead to dehydration   It is important to drink more liquids to help replace lost body fluids  Ask your healthcare provider how much liquid to drink each day and which liquids are best for you  Your provider may recommend that you drink an oral rehydration solution (ORS)  ORS contains water, salts, and sugar that are needed to replace the lost body fluids  Ask what kind of ORS to use, how much to drink, and where to get it  · Eat smaller meals, more often  Eat small amounts of food every 2 to 3 hours, even if you are not hungry  Food in your stomach may decrease your nausea  · Talk to your healthcare provider before you take over-the-counter (OTC) medicines  These medicines can cause serious problems if you use certain other medicines, or you have a medical condition  You may have problems if you use too much or use them for longer than the label says  Follow directions on the label carefully  When should I seek immediate care? · You see blood in your vomit or your bowel movements  · You have sudden, severe pain in your chest and upper abdomen after hard vomiting or retching  · You have swelling in your neck and chest      · You are dizzy, cold, and thirsty, and your eyes and mouth are dry  · You are urinating very little or not at all  · You have muscle weakness, leg cramps, and trouble breathing  · Your heart is beating much faster than normal     · You continue to vomit for more than 48 hours  When should I contact my healthcare provider? · You have frequent dry heaves (vomiting but nothing comes out)  · Your nausea and vomiting does not get better or go away after you use medicine  · You have questions or concerns about your condition or care  CARE AGREEMENT:   You have the right to help plan your care  Learn about your health condition and how it may be treated  Discuss treatment options with your caregivers to decide what care you want to receive  You always have the right to refuse treatment   The above information is an  only  It is not intended as medical advice for individual conditions or treatments  Talk to your doctor, nurse or pharmacist before following any medical regimen to see if it is safe and effective for you  © 2017 [address] is for End User's use only and may not be sold, redistributed or otherwise used for commercial purposes  All illustrations and images included in CareNotes® are the copyrighted property of [employer] M [employer]  or Mihir Castro  Chest Pain, Ambulatory Care   Christiano MH: Evaluation of chest pain in primary care patients  Am Fam Physician 2011; 83(5):603-605  Sheila MC, Dmitry FOX, & Gia Ha: Emergency department and office-based evaluation of patients with chest pain  [facility] Clin Proc 2010; 85(3):284-299  Adhikari JS, Heath Sandhu et al: NICE guidance  Chest pain of recent onset: assessment and diagnosis of recent onset chest pain or discomfort of suspected cardiac origin  Heart 2010; 96(12):974-978  Desi Abreu E: Chest Pain  In: Ninfa Velasquez, Alan HENDRICKSON, [employer], et al, eds  [address] Emergency Medicine Consult, 4th ed  [address] [facility] [place], [place], 2011  [address] [facility]: An algorithm for the diagnosis and management of chest pain in primary care  Med Clin [place] 2010; 56(2):271-205  © 2014 [address] is for End User's use only and may not be sold, redistributed or otherwise used for commercial purposes  All illustrations and images included in CareNotes® are the copyrighted property of A D [employer] M [employer] Inc  or Mihir Castro  The above information is an  only  It is not intended as medical advice for individual conditions or treatments  Talk to your doctor, nurse or pharmacist before following any medical regimen to see if it is safe and effective for you      Chest Pain   WHAT YOU NEED TO KNOW:   Chest pain can be caused by a range of conditions, from not serious to life-threatening  Chest pain can be a symptom of a digestive problem, such as acid reflux or a stomach ulcer  An anxiety attack or a strong emotion, such as anger, can also cause chest pain  Infection, inflammation, or a fracture in the bones or cartilage in your chest can cause pain or discomfort  Sometimes chest pain or pressure is caused by poor blood flow to your heart (angina)  Chest pain may also be caused by life-threatening conditions such as a heart attack or blood clot in your lungs  DISCHARGE INSTRUCTIONS:   Call 911 if:   · You have any of the following signs of a heart attack:      ¨ Squeezing, pressure, or pain in your chest that lasts longer than 5 minutes or returns    ¨ Discomfort or pain in your back, neck, jaw, stomach, or arm     ¨ Trouble breathing    ¨ Nausea or vomiting    ¨ Lightheadedness or a sudden cold sweat, especially with chest pain or trouble breathing    Seek care immediately if:   · You have chest discomfort that gets worse, even with medicine  · You cough or vomit blood  · Your bowel movements are black or bloody  · You cannot stop vomiting, or it hurts to swallow  Contact your healthcare provider if:   · You have questions or concerns about your condition or care  Medicines:   · Medicines  may be given to treat the cause of your chest pain  Examples include pain medicine, anxiety medicine, or medicines to increase blood flow to your heart  · Do not take certain medicines without asking your healthcare provider first   These include NSAIDs, herbal or vitamin supplements, or hormones (estrogen or progestin)  · Take your medicine as directed  Contact your healthcare provider if you think your medicine is not helping or if you have side effects  Tell him or her if you are allergic to any medicine  Keep a list of the medicines, vitamins, and herbs you take  Include the amounts, and when and why you take them   Bring the list or the pill bottles to follow-up visits  Carry your medicine list with you in case of an emergency  Follow up with your healthcare provider within 72 hours, or as directed: You may need to return for more tests to find the cause of your chest pain  You may be referred to a specialist, such as a cardiologist or gastroenterologist  Write down your questions so you remember to ask them during your visits  Healthy living tips: The following are general healthy guidelines  If your chest pain is caused by a heart problem, your healthcare provider will give you specific guidelines to follow  · Do not smoke  Nicotine and other chemicals in cigarettes and cigars can cause lung and heart damage  Ask your healthcare provider for information if you currently smoke and need help to quit  E-cigarettes or smokeless tobacco still contain nicotine  Talk to your healthcare provider before you use these products  · Eat a variety of healthy, low-fat foods  Healthy foods include fruits, vegetables, whole-grain breads, low-fat dairy products, beans, lean meats, and fish  Ask for more information about a heart healthy diet  · Ask about activity  Your healthcare provider will tell you which activities to limit or avoid  Ask when you can drive, return to work, and have sex  Ask about the best exercise plan for you  · Maintain a healthy weight  Ask your healthcare provider how much you should weigh  Ask him or her to help you create a weight loss plan if you are overweight  © 2017 [address] Information is for End User's use only and may not be sold, redistributed or otherwise used for commercial purposes  All illustrations and images included in CareNotes® are the copyrighted property of [employer] Inc  or Mihir Castro  The above information is an  only  It is not intended as medical advice for individual conditions or treatments   Talk to your doctor, nurse or pharmacist before following any medical regimen to see if it is safe and effective for you  Coronary Artery Disease   WHAT YOU NEED TO KNOW:   Coronary artery disease (CAD) is narrowing of the arteries to your heart caused by a buildup of plaque  Plaque is made up of cholesterol and other substances  The narrowing in your arteries decreases the amount of blood that can flow to your heart  This causes your heart to get less oxygen  DISCHARGE INSTRUCTIONS:   Call 911 for any of the following:   · You have any of the following signs of a heart attack:      ¨ Squeezing, pressure, or pain in your chest that lasts longer than 5 minutes or returns    ¨ Discomfort or pain in your back, neck, jaw, stomach, or arm     ¨ Trouble breathing    ¨ Nausea or vomiting    ¨ Lightheadedness or a sudden cold sweat, especially with chest pain or trouble breathing    Contact your healthcare provider if:   · You have chest pain that is more frequent, or you have chest pain at rest     · You have questions or concerns about your condition or care  Cardiac rehabilitation:  Your healthcare provider may recommend that you attend cardiac rehabilitation (rehab)  This is a program run by specialists who will help you safely strengthen your heart and reduce the risk for more heart disease  The plan includes exercise, relaxation, stress management, and heart-healthy nutrition  Healthcare providers will also check to make sure any medicines you are taking are working  Medicines: You may  need any of the following:  · Blood pressure medicines  are given to lower your blood pressure  These medicines may include ACE inhibitors and beta-blockers  ACE inhibitors help keep your blood vessels relaxed and open, which helps keep blood flowing into your heart  Beta-blockers keep your heart pumping strongly and regularly  This helps keep your heart from working too hard to get oxygen  · Cholesterol medicines  help lower blood cholesterol levels      · Nitrates , such as nitroglycerin, relax the arteries of your heart so it gets more oxygen  They help to relieve your chest pain  · Antiplatelets , such as aspirin, help prevent blood clots  Take your antiplatelet medicine exactly as directed  These medicines make it more likely for you to bleed or bruise  If you are told to take aspirin, do not take acetaminophen or ibuprofen instead  · Blood thinners    help prevent blood clots  Examples of blood thinners include heparin and warfarin  Clots can cause strokes, heart attacks, and death  The following are general safety guidelines to follow while you are taking a blood thinner:    ¨ Watch for bleeding and bruising while you take blood thinners  Watch for bleeding from your gums or nose  Watch for blood in your urine and bowel movements  Use a soft washcloth on your skin, and a soft toothbrush to brush your teeth  This can keep your skin and gums from bleeding  If you shave, use an electric shaver  Do not play contact sports  ¨ Tell your dentist and other healthcare providers that you take anticoagulants  Wear a bracelet or necklace that says you take this medicine  ¨ Do not start or stop any medicines unless your healthcare provider tells you to  Many medicines cannot be used with blood thinners  ¨ Tell your healthcare provider right away if you forget to take the medicine, or if you take too much  ¨ Warfarin  is a blood thinner that you may need to take  The following are things you should be aware of if you take warfarin  § Foods and medicines can affect the amount of warfarin in your blood  Do not make major changes to your diet while you take warfarin  Warfarin works best when you eat about the same amount of vitamin K every day  Vitamin K is found in green leafy vegetables and certain other foods  Ask for more information about what to eat when you are taking warfarin      § You will need to see your healthcare provider for follow-up visits when you are on warfarin  You will need regular blood tests  These tests are used to decide how much medicine you need  · Do not take certain medicines without asking your healthcare provider first   These include NSAIDs, herbal or vitamin supplements, or hormones (estrogen or progestin)  · Take your medicine as directed  Contact your healthcare provider if you think your medicine is not helping or if you have side effects  Tell him or her if you are allergic to any medicine  Keep a list of the medicines, vitamins, and herbs you take  Include the amounts, and when and why you take them  Bring the list or the pill bottles to follow-up visits  Carry your medicine list with you in case of an emergency  Follow up with your healthcare provider as directed: You may need to return for other tests  You may also be referred to a cardiac surgeon  Write down your questions so you remember to ask them during your visits  Manage CAD:   · Do not smoke  Nicotine and other chemicals in cigarettes and cigars can cause heart and lung damage  Ask your healthcare provider for information if you currently smoke and need help to quit  E-cigarettes or smokeless tobacco still contain nicotine  Talk to your healthcare provider before you use these products  · Exercise regularly  Exercise at least 30 minutes each day, on most days of the week  Exercise helps to lower high cholesterol and high blood pressure  It can also help you maintain a healthy weight  Ask your healthcare provider about the kind of exercise you should do and how to get started  · Maintain a healthy weight  If you are overweight, talk to your healthcare provider about how to lose weight  A weight loss of 10% can improve your heart health  · Eat heart-healthy foods  Include fresh fruits and vegetables in your meal plan  Choose low-fat foods, such as skim or 1% fat milk, low-fat cheese and yogurt, fish, chicken (without skin), and lean meats   Eat two 4-ounce servings of fish high in omega-3 fats each week, such as salmon, fresh tuna, and herring  Do not eat foods that are high in sodium, such as canned foods, potato chips, salty snacks, and cold cuts  Put less table salt on your food  · Limit or do not drink alcohol  A drink of alcohol is 12 ounces of beer, 5 ounces of wine, or 1½ ounces of liquor  · Manage other health conditions  Follow your healthcare provider's advice on how to manage other conditions that can affect your heart health  These include diabetes, high blood pressure, and high cholesterol  You may need to take medicines for these conditions and make other lifestyle changes  · Ask if you should have a flu vaccine  The flu can be dangerous for a person who has CAD  The flu vaccine is available every year in the fall © 2017 [address] Information is for End User's use only and may not be sold, redistributed or otherwise used for commercial purposes  All illustrations and images included in CareNotes® are the copyrighted property of A [employer] A M [employer] Inc  or Mihir Castro  The above information is an  only  It is not intended as medical advice for individual conditions or treatments  Talk to your doctor, nurse or pharmacist before following any medical regimen to see if it is safe and effective for you  Gastroesophageal Reflux Disease   WHAT YOU NEED TO KNOW:   What is gastroesophageal reflux disease? Gastroesophageal reflux occurs when acid and food in the stomach back up into the esophagus  Gastroesophageal reflux disease (GERD) is reflux that occurs more than twice a week for a few weeks  It usually causes heartburn and other symptoms  GERD can cause other health problems over time if it is not treated  What causes GERD? GERD often occurs when the lower muscle (sphincter) of the esophagus does not close properly  The sphincter normally opens to let food into the stomach   It then closes to keep food and stomach acid in the stomach  If the sphincter does not close properly, stomach acid and food back up (reflux) into the esophagus  The following may increase your risk for GERD:  · Certain foods such as spicy foods, chocolate, foods that contain caffeine, peppermint, and fried foods    · Hiatal hernia    · Certain medicines such as calcium channel blockers (used to treat high blood pressure), allergy medicines, sedatives, or antidepressants    · Pregnancy or obesity    · Lying down after a meal    · Drinking alcohol or smoking  What are the signs and symptoms of GERD? Heartburn is the most common symptom of GERD  You may feel burning pain in your chest or below the breast bone  This usually occurs after meals and spreads to your neck, jaw, or shoulder  The pain gets better when you change positions  You may also have any of the following:  · Bitter or acid taste in your mouth    · Dry cough    · Trouble swallowing or pain with swallowing    · Hoarseness or sore throat    · Frequent burping or hiccups    · Feeling of fullness soon after you start eating  How is GERD diagnosed? Your healthcare provider will ask about your symptoms and when they started  Tell him or her about other medical conditions you have, your eating habits, and your activities  You may also need any of the following:  · Esophageal pH monitoring  is used to place a small probe inside your esophagus and stomach to check the amount of acid  · An endoscopy  is a procedure used to look at the inside of your esophagus and stomach  An endoscope is a bendable tube with a light and camera on the end  Your healthcare provider may remove a small sample of tissue and send it to a lab for tests  · Upper GI x-rays  are done to take pictures of your stomach and intestines (bowel)  You may be given a chalky liquid to drink before the pictures are taken  This liquid helps your stomach and intestines show up better on the x-rays  · Esophageal manometry  is a test that shows how your esophagus pushes food and fluid to your stomach  It also shows the pressures in your esophagus and stomach  It may show a hiatal hernia  How is GERD treated? · Medicines  are used to decrease stomach acid  Medicine may also be used to help your lower esophageal sphincter and stomach contract (tighten) more  · Surgery  is done to wrap the upper part of the stomach around the esophageal sphincter  This will strengthen the sphincter and prevent reflux  How can I help manage GERD? · Do not have foods or drinks that may increase heartburn  These include chocolate, peppermint, fried or fatty foods, drinks that contain caffeine, or carbonated drinks (soda)  Other foods include spicy foods, onions, tomatoes, and tomato-based foods  Do not have foods or drinks that can irritate your esophagus, such as citrus fruits, juices, and alcohol  · Do not eat large meals  When you eat a lot of food at one time, your stomach needs more acid to digest it  Eat 6 small meals each day instead of 3 large ones, and eat slowly  Do not eat meals 2 to 3 hours before bedtime  · Elevate the head of your bed  Place 6-inch blocks under the head of your bed frame  You may also use more than one pillow under your head and shoulders while you sleep  · Maintain a healthy weight  If you are overweight, weight loss may help relieve symptoms of GERD  · Do not smoke  Smoking weakens the lower esophageal sphincter and increases the risk of GERD  Ask your healthcare provider for information if you currently smoke and need help to quit  E-cigarettes or smokeless tobacco still contain nicotine  Talk to your healthcare provider before you use these products  · Do not wear clothing that is tight around your waist   Tight clothing can put pressure on your stomach and cause or worsen GERD symptoms  When should I seek immediate care?    · You feel full and cannot burp or vomit     · You have severe chest pain and sudden trouble breathing  · Your bowel movements are black, bloody, or tarry-looking  · Your vomit looks like coffee grounds or has blood in it  When should I contact my healthcare provider? · You vomit large amounts, or you vomit often  · You have trouble breathing after you vomit  · You have trouble swallowing, or pain with swallowing  · You are losing weight without trying  · Your symptoms get worse or do not improve with treatment  · You have questions or concerns about your condition or care  CARE AGREEMENT:   You have the right to help plan your care  Learn about your health condition and how it may be treated  Discuss treatment options with your caregivers to decide what care you want to receive  You always have the right to refuse treatment  The above information is an  only  It is not intended as medical advice for individual conditions or treatments  Talk to your doctor, nurse or pharmacist before following any medical regimen to see if it is safe and effective for you  © 2017 [employer] Information is for End User's use only and may not be sold, redistributed or otherwise used for commercial purposes  All illustrations and images included in CareNotes® are the copyrighted property of A D A M , Inc  or Mihir Castro

## 2019-02-21 NOTE — CONSULTS
Consultation - [facility]  Patrick Yoni 46 y o  male MRN: [id number]  Unit/Bed#: -01 Encounter: [id number]        Consults    Reason for Consult / Principal Problem: LLQ Pain, Nausea/Vomiting, Chest Pain    HPI: Mr Yanelis Burnham is a 45 yo M with a PMH of microscopic colitis on chronic budesonide, Diaz's esophagus s/p RFA in [place] in 2010, prior history of diverticulitis, remote history of alcohol abuse, CAD with cardiac cath 1 month ago showing 70% stenosis in the RCA without any intervention, presenting with acute onset of nausea, vomiting, and LLQ pain yesterday  He developed chest pain as well during his nausea/vomiting and due to his history of CAD he presented to the hospital  He reports since getting the CT scan his symptoms have resolved  He had a normal BM this morning, he no longer has LLQ pain  He denies and nausea, vomiting, melena, or hematochezia  He is going to try clear liquids right now  He is financially stressed currently and is supposed to be meeting a client today and is anxious to try to get to the meeting  His last colonoscopy was about 2 years ago at the [address] [place] [address] and he doesn't recall the results  He is concerned because his sister is currently being treated for stage IV colon cancer  His had an EGD/enteroscopy in December 2018 with Dr Jaylen Abernathy and this showed SSBE, 2 cm hiatal hernia, and normal duodenum/jejunum  His reflux symptoms are generally well controlled on omeprazole 20 mg daily      REVIEW OF SYSTEMS: Negative except for as stated above    Historical Information   Past Medical History:   Diagnosis Date    Diaz's esophagus     Coronary artery disease     Depression     Diverticulitis     GERD (gastroesophageal reflux disease)     Hemorrhoid     Hyperlipidemia     Hypertension     UC (ulcerative colitis) ([facility] [address] )      Past Surgical History:   Procedure Laterality Date    ABDOMINAL SURGERY      radio frequency ablation    BONE GRAFT Left 2018    CARPAL TUNNEL RELEASE Right     COLONOSCOPY      EGD AND COLONOSCOPY      ESOPHAGOGASTRODUODENOSCOPY N/A 2/15/2018    Procedure: ESOPHAGOGASTRODUODENOSCOPY (EGD); Surgeon: Ermias Mcdaniel MD;  Location: [facility];  Service: Gastroenterology    ESOPHAGOGASTRODUODENOSCOPY N/A 12/10/2018    Procedure: ESOPHAGOGASTRODUODENOSCOPY (EGD); Surgeon: Mitchel Saldana MD;  Location: [facility];   Service: Gastroenterology    TONSILLECTOMY       Social History   Social History     Substance and Sexual Activity   Alcohol Use No    Comment: quit 3 years     Social History     Substance and Sexual Activity   Drug Use Yes    Frequency: 3 0 times per week    Types: Marijuana    Comment: 1/15/2019     Social History     Tobacco Use   Smoking Status Former Smoker    Last attempt to quit: 2007    Years since quittin 1   Smokeless Tobacco Former User    Quit date: 1998     Family History   Problem Relation Age of Onset    Heart disease Father     Cancer Sister        Meds/Allergies     Medications Prior to Admission   Medication    aspirin (ECOTRIN LOW STRENGTH) 81 mg EC tablet    atorvastatin (LIPITOR) 40 mg tablet    BUDESONIDE PO    buPROPion (WELLBUTRIN) 75 mg tablet    clonazePAM (KLONOPIN) 1 mg tablet    isosorbide mononitrate (IMDUR) 30 mg 24 hr tablet    metoprolol tartrate (LOPRESSOR) 25 mg tablet    mirtazapine (REMERON) 7 5 MG tablet    nitroglycerin (NITROSTAT) 0 4 mg SL tablet    ondansetron (ZOFRAN-ODT) 4 mg disintegrating tablet    pantoprazole (PROTONIX) 40 mg tablet    sertraline (ZOLOFT) 100 mg tablet     Current Facility-Administered Medications   Medication Dose Route Frequency    aspirin (ECOTRIN LOW STRENGTH) EC tablet 81 mg  81 mg Oral Daily    atorvastatin (LIPITOR) tablet 40 mg  40 mg Oral Daily    buPROPion (WELLBUTRIN) tablet 75 mg  75 mg Oral BID    ciprofloxacin (CIPRO) tablet 500 mg  500 mg Oral Q12H [facility]    clonazePAM (KlonoPIN) tablet 0 5 mg  0 5 mg Oral BID    enoxaparin (LOVENOX) subcutaneous injection 40 mg  40 mg Subcutaneous Daily    isosorbide mononitrate (IMDUR) 24 hr tablet 30 mg  30 mg Oral Daily    metoprolol tartrate (LOPRESSOR) partial tablet 12 5 mg  12 5 mg Oral Q12H [address]    metroNIDAZOLE (FLAGYL) tablet 500 mg  500 mg Oral Q8H [address]    mirtazapine (REMERON) tablet 7 5 mg  7 5 mg Oral HS    nitroglycerin (NITROSTAT) SL tablet 0 4 mg  0 4 mg Sublingual Q5 Min PRN    ondansetron (ZOFRAN) injection 4 mg  4 mg Intravenous Q6H PRN    pantoprazole (PROTONIX) EC tablet 40 mg  40 mg Oral Daily    sertraline (ZOLOFT) tablet 200 mg  200 mg Oral Daily    sodium chloride 0 9 % infusion  100 mL/hr Intravenous Continuous       Allergies   Allergen Reactions    No Active Allergies            Objective     Blood pressure 118/72, pulse 60, temperature 98 6 °F (37 °C), resp  rate 18, weight 100 kg (220 lb 14 4 oz), SpO2 93 %        Intake/Output Summary (Last 24 hours) at 2/21/2019 1016  Last data filed at 2/20/2019 1551  Gross per 24 hour   Intake 1000 ml   Output --   Net 1000 ml         PHYSICAL EXAM:      General Appearance:   Alert, oriented x3, no acute distress, nontoxic and well appearing    HEENT:   Normocephalic, atraumatic   Neck:  Supple, symmetrical, trachea midline   Lungs:   Clear to auscultation bilaterally, no respiratory distress   Heart[de-identified]   RRR, no murmur   Abdomen:   Non-distended, soft, BS active, NTTP   Rectal:   Deferred    Extremities:  No cyanosis, clubbing or edema    Pulses:  2+ and symmetric all extremities    Skin:  No jaundice or pallor      Lab Results:   Results from last 7 days   Lab Units 02/21/19  0445   WBC Thousand/uL 9 99   HEMOGLOBIN g/dL 12 0   HEMATOCRIT % 37 7   PLATELETS Thousands/uL 244   NEUTROS PCT % 65   LYMPHS PCT % 25   MONOS PCT % 7   EOS PCT % 2     Results from last 7 days   Lab Units 02/21/19  0445 02/20/19  1400   POTASSIUM mmol/L 3 6 4 1   CHLORIDE mmol/L 106 103   CO2 mmol/L 29 27   BUN mg/dL 11 16   CREATININE mg/dL 0 80 1 02   CALCIUM mg/dL 8 7 9 3   ALK PHOS U/L  --  77   ALT U/L  --  22   AST U/L  --  13         Results from last 7 days   Lab Units 02/20/19  1400   LIPASE u/L 118       Imaging Studies: I have personally reviewed pertinent imaging studies  Xr Chest 2 Views  Result Date: 2/20/2019  Impression: No acute cardiopulmonary disease  Ct Abdomen Pelvis With Contrast  Result Date: 2/20/2019  Impression: Findings which could be compatible with minimal diverticulitis near the junction of sigmoid and descending colon  No suspicious secondary complications         ASSESSMENT and PLAN:    Principal Problem:    Chest pain due to CAD  Active Problems:    Abdominal pain    Essential hypertension    Dyslipidemia    Gastroesophageal reflux disease with esophagitis    Non-intractable vomiting with nausea    LLQ Abdominal Pain  Nausea/Vomiting  - CT on admission showed possible mild diverticulitis at the junction of the sigmoid/descending colon without any evidence for perforation or abscess  - Currently he is asymptomatic, benign abdominal exam, and trying a clear liquid diet  - Would start cipro/flagyl and plan for a 10 day course total  - If he tolerates cipro/flagyl as well as his clear liquid diet, he can safely be discharged from a GI Standpoint on a low fiber diet for then next 3-4 weeks  - We will arrange OP follow in 4-6 weeks and then a colonoscopy in 6-8 weeks, especially given his sister's history of colon cancer    Microscopic Colitis  - [facility]  - Will discuss this further as outpatient but generally use intermittent courses of budesonide with microscopic colitis  - Currently no active symptoms    History of Diaz's Esophagus  - Last EGD in December 2010 which visually appeared as SSBE but this was negative  - S/P RFA in 2010  - Continue omeprazole 20 mg daily indefinitely given his history of Diaz's    History of PD Prominence  - Noted on a CT in December, no mention of it on current admission CT  - Will schedule for MRI/MRCP at outpatient follow up v EUS       Patient will be seen and examined by Dr Demetra Melton  All vargas medical decisions were made by Dr Demetra Melton  Thank you for allowing us to participate in the care of this patient  We will follow with you closely

## 2019-02-21 NOTE — ASSESSMENT & PLAN NOTE
Patient had a cardiac catheterization done last month without PCI which was significant for 1 vessel disease with up to 70% tubular stenosis of the distal RCA  He was recommended medical management at that time for that disease no intervention was performed    · EKG did not show any ischemia  · No ectopy on telemetry  · Troponins are negative  · Cardiology consulted-no further interventions from Cardiology standpoint, follow-up with Dr Omar Desai 1-2 weeks  · Continue cardiac meds, aspirin daily

## 2019-02-21 NOTE — DISCHARGE SUMMARY
[address] [facility]  Discharge- Merlinda Carder 1965, 46 y o  male MRN: [id number]    Unit/Bed#: -Ana Cristina Encounter: [id number]    Primary Care Provider: Aaron Flannery MD   Date and time admitted to hospital: 2/20/2019  1:36 PM        * Chest pain due to CAD  Assessment & Plan  Patient had a cardiac catheterization done last month without PCI which was significant for 1 vessel disease with up to 70% tubular stenosis of the distal RCA  He was recommended medical management at that time for that disease no intervention was performed  · EKG did not show any ischemia  · No ectopy on telemetry  · Troponins are negative  · Cardiology consulted-no further interventions from Cardiology standpoint, follow-up with Dr Angy Caban 1-2 weeks  · Continue cardiac meds, aspirin daily      Abdominal pain  Assessment & Plan  · CT scan abdomen and pelvis revealed Findings compatible with minimal diverticulitis near the junction of sigmoid and descending colon   No suspicious secondary complications  This could be contributing to his leukocytosis  Liver enzymes and lipase are normal  · Patient does have a history of ulcerative colitis and Diaz's esophagus he follows up with his GI physician outpatient    · Patient is now tolerating a regular diet  · Reports improvement in resolution of abdominal pain nausea and vomiting  · Patient is clear for discharge from a GI standpoint, will completed 10 day course of antibiotics      Non-intractable vomiting with nausea  Assessment & Plan  · Tolerating a regular diet    Gastroesophageal reflux disease with esophagitis  Assessment & Plan  · Continue with Protonix daily      Dyslipidemia  Assessment & Plan  · Continue with atorvastatin 40 daily  · Cholesterol and triglycerides are still elevated recommended patient to follow up with Cardiology and his primary care provider within 1-2 weeks of discharge    Essential hypertension  Assessment & Plan  · Stable at this time  · Continue home meds     Discharging Physician / Practitioner: Daniel Greenwood, [facility]  PCP: Tasneem Roberts MD  Admission Date:   Admission Orders (From admission, onward)    Ordered        02/20/19 1722  Place in Observation  Once     Order ID Start Status   [id number] 02/20/19 1722 Completed              Discharge Date: 02/21/19    Resolved Problems  Date Reviewed: 2/20/2019    None          Consultations During Hospital Stay:  · Cardiology, GI    Procedures Performed:   · CT of abdomen and pelvis    Significant Findings / Test Results:   · Findings consistent with diverticulitis    Incidental Findings:   · None     Test Results Pending at Discharge (will require follow up): · None     Outpatient Tests Requested:  · Colonoscopy in 4-6 weeks    Complications:  None    Reason for Admission:  Chest pain/abdominal pain, nausea vomiting    Hospital Course:     Aman Light is a 46 y o  male patient who originally presented to the hospital on 2/20/2019 due to chest pain abdominal pain nausea and vomiting  Patient was evaluated for chest pain with negative troponins  No EKG changes  GI evaluated the patient believed to have pain related to minimal diverticulitis  Will be discharged on 10 days of antibiotics  Patient's rectal to follow up with GI Cardiology outpatient  Instructed to get colonoscopy in 4-6 weeks      Please see above list of diagnoses and related plan for additional information  Condition at Discharge: good     Discharge Day Visit / Exam:     Subjective:  Denies any chest pain chest tightness shortness of breath or difficulty breathing  Reports nausea vomiting abdominal pain have resolved  He was able to tolerate his diet  He appears comfortable sitting in bed  He understands follow-up in will do so    Vitals: Blood Pressure: 118/80 (02/21/19 0900)  Pulse: 84 (02/21/19 1037)  Temperature: 98 6 °F (37 °C) (02/20/19 1843)  Temp Source: Oral (02/20/19 1337)  Respirations: 18 (02/20/19 1843)  Weight - Scale: 100 kg (220 lb 14 4 oz) (02/20/19 1337)  SpO2: 94 % (02/21/19 0900)  Exam:   Physical Exam   Constitutional: He is oriented to person, place, and time  He appears well-developed  HENT:   Head: Normocephalic  Eyes: Pupils are equal, round, and reactive to light  Neck: Normal range of motion  Cardiovascular: Normal rate  Pulmonary/Chest: Effort normal    Abdominal: Soft  Musculoskeletal: Normal range of motion  Neurological: He is alert and oriented to person, place, and time  Skin: Skin is warm and dry  Psychiatric: He has a normal mood and affect  His behavior is normal    Nursing note and vitals reviewed  Discussion with Family: not availabe    Discharge instructions/Information to patient and family:   See after visit summary for information provided to patient and family  Provisions for Follow-Up Care:  See after visit summary for information related to follow-up care and any pertinent home health orders  Disposition:     Home    For Discharges to [facility] SNF:   · Not Applicable to this Patient - Not Applicable to this Patient    Planned Readmission: no     Discharge Statement:  I spent 30 minutes discharging the patient  This time was spent on the day of discharge  I had direct contact with the patient on the day of discharge  Greater than 50% of the total time was spent examining patient, answering all patient questions, arranging and discussing plan of care with patient as well as directly providing post-discharge instructions  Additional time then spent on discharge activities  Discharge Medications:See after visit summary for reconciled discharge medications provided to patient and family        ** Please Note: This note has been constructed using a voice recognition system **

## 2019-02-21 NOTE — ASSESSMENT & PLAN NOTE
· Continue with atorvastatin 40 daily  · Cholesterol and triglycerides are still elevated recommended patient to follow up with Cardiology and his primary care provider within 1-2 weeks of discharge

## 2019-02-21 NOTE — UTILIZATION REVIEW
Initial Clinical Review    Admission: Date/Time/Statement: OBS 2/20/19 @1722  Orders Placed This Encounter   Procedures    Place in Observation     Standing Status:   Standing     Number of Occurrences:   1     Order Specific Question:   Admitting Physician     Answer:   Alicia Babcock [[id number]]     Order Specific Question:   Level of Care     Answer:   Med Surg [16]     ED: Date/Time/Mode of Arrival:   ED Arrival Information     Expected Arrival Acuity Means of Arrival Escorted By Service Admission Type    - 2/20/2019 13:34 Emergent Walk-In Self General Medicine Emergency    Arrival Complaint    VOMITING        Chief Complaint:   Chief Complaint   Patient presents with    Chest Pain     pt c/o chest pain and vomiting that started about 0700 this morning  states that he took two nitro pills with some relief     Assessment/Plan: 46year old male to ED from home with complaints of chest pain and vomiting since this morning  Admitted under Observation for chest pain due to CAD, abdominal pain, and intraactable vomiting  Took nitro at home which offered some relief of chest pain  Known CAD, tele, trend trops, cards consult  Clear liquid diet with IV fluids, consult GI, IV zofran  ED Vital Signs:   ED Triage Vitals [02/20/19 1337]   Temperature Pulse Respirations Blood Pressure SpO2   98 2 °F (36 8 °C) (!) 45 19 (!) 180/96 97 %      Temp Source Heart Rate Source Patient Position - Orthostatic VS BP Location FiO2 (%)   Oral Monitor Sitting Right arm --      Pain Score       9        Wt Readings from Last 1 Encounters:   02/20/19 100 kg (220 lb 14 4 oz)     Vital Signs (abnormal):   180/96, 164/85, 138/77, 118/72  HR 45, 49, 48, 53,87, 81, 60  Pertinent Labs/Diagnostic Test Results:   Micaela 287  Triglycerides 158  HDL 64  LDL Direct 191  WBC 10 56, 9 99  Trop negative x3  EKG:  Sinus bradycardia with sinus arrhythmia  CXR:  No acute cardiopulmonary disease    CT A/P:  Findings which could be compatible with minimal diverticulitis near the junction of sigmoid and descending colon   ED Treatment:   Medication Administration from 02/20/2019 1334 to 02/20/2019 1804       Date/Time Order Dose Route Action     02/20/2019 1400 sodium chloride 0 9 % bolus 1,000 mL 1,000 mL Intravenous New Bag     02/20/2019 1401 ondansetron (ZOFRAN) injection 4 mg 4 mg Intravenous Given     02/20/2019 1402 morphine (PF) 4 mg/mL injection 4 mg 4 mg Intravenous Given     02/20/2019 1404 lidocaine viscous (XYLOCAINE) 2 % mucosal solution 15 mL 15 mL Swish & Swallow Given     02/20/2019 1404 aluminum-magnesium hydroxide-simethicone (MYLANTA) 200-200-20 mg/5 mL oral suspension 30 mL 30 mL Oral Given     02/20/2019 1548 HYDROmorphone (DILAUDID) injection 0 5 mg 0 5 mg Intravenous Given     02/20/2019 1614 HYDROmorphone (DILAUDID) injection 0 5 mg 0 5 mg Intravenous Given        Past Medical/Surgical History:    Active Ambulatory Problems     Diagnosis Date Noted    Abdominal pain 02/15/2018    Anxiety and depression 02/15/2018    Diaz's esophagus 02/15/2018    Essential hypertension 02/15/2018    Dyslipidemia 02/15/2018    Obesity (BMI 30 0-34 9) 02/15/2018    Ulcerative colitis without complications ([facility] [address] ) 49/41/5153    Gastroesophageal reflux disease with esophagitis 02/15/2018    Non-intractable vomiting with nausea 12/08/2018    Sinus bradycardia 12/08/2018    Leukocytosis 12/08/2018    Abnormal CT of the abdomen 12/08/2018    Vomiting 12/08/2018     Past Medical History:   Diagnosis Date    Diaz's esophagus     Coronary artery disease     Depression     Diverticulitis     GERD (gastroesophageal reflux disease)     Hemorrhoid     Hyperlipidemia     Hypertension     UC (ulcerative colitis) ([facility] [address] )      Admitting Diagnosis: Chest pain [R07 9]  CAD in native artery [I25 10]  Age/Sex: 46 y o  male    Admission Orders:  Tele  OOB as senthil  SCDs  Diet:  Clears  GI cons  Cards cons    Scheduled Meds:   Current Facility-Administered Medications:  aspirin 81 mg Oral Daily    atorvastatin 40 mg Oral Daily    buPROPion 75 mg Oral BID    clonazePAM 0 5 mg Oral BID    enoxaparin 40 mg Subcutaneous Daily    isosorbide mononitrate 30 mg Oral Daily    metoprolol tartrate 12 5 mg Oral Q12H [address]    mirtazapine 7 5 mg Oral HS    nitroglycerin 0 4 mg Sublingual Q5 Min PRN    ondansetron 4 mg Intravenous Q6H PRN    pantoprazole 40 mg Oral Daily    sertraline 200 mg Oral Daily    sodium chloride 100 mL/hr Intravenous Continuous Last Rate: Stopped (02/20/19 1176)

## 2019-02-22 ENCOUNTER — HOSPITAL ENCOUNTER (INPATIENT)
Facility: HOSPITAL | Age: 54
LOS: 4 days | Discharge: HOME | DRG: 244 | End: 2019-02-26
Attending: EMERGENCY MEDICINE | Admitting: INTERNAL MEDICINE
Payer: COMMERCIAL

## 2019-02-22 ENCOUNTER — APPOINTMENT (INPATIENT)
Dept: CT IMAGING | Facility: HOSPITAL | Age: 54
DRG: 244 | End: 2019-02-22
Payer: COMMERCIAL

## 2019-02-22 ENCOUNTER — TELEPHONE (OUTPATIENT)
Dept: GASTROENTEROLOGY | Facility: CLINIC | Age: 54
End: 2019-02-22

## 2019-02-22 ENCOUNTER — APPOINTMENT (EMERGENCY)
Dept: RADIOLOGY | Facility: HOSPITAL | Age: 54
DRG: 244 | End: 2019-02-22
Payer: COMMERCIAL

## 2019-02-22 DIAGNOSIS — R11.10 INTRACTABLE VOMITING: ICD-10-CM

## 2019-02-22 DIAGNOSIS — R10.32 LEFT LOWER QUADRANT PAIN: ICD-10-CM

## 2019-02-22 DIAGNOSIS — R00.1 SINUS BRADYCARDIA: ICD-10-CM

## 2019-02-22 DIAGNOSIS — K57.92 ACUTE DIVERTICULITIS: ICD-10-CM

## 2019-02-22 DIAGNOSIS — R07.9 CHEST PAIN: ICD-10-CM

## 2019-02-22 DIAGNOSIS — I25.119 CHEST PAIN DUE TO CAD (HCC): Primary | ICD-10-CM

## 2019-02-22 LAB
ALBUMIN SERPL BCP-MCNC: 3.7 G/DL (ref 3.5–5)
ALP SERPL-CCNC: 70 U/L (ref 46–116)
ALT SERPL W P-5'-P-CCNC: 27 U/L (ref 12–78)
ANION GAP SERPL CALCULATED.3IONS-SCNC: 11 MMOL/L (ref 4–13)
AST SERPL W P-5'-P-CCNC: 14 U/L (ref 5–45)
ATRIAL RATE: 49 BPM
BACTERIA UR QL AUTO: ABNORMAL /HPF
BASOPHILS # BLD AUTO: 0.02 THOUSANDS/ΜL (ref 0–0.1)
BASOPHILS NFR BLD AUTO: 0 % (ref 0–1)
BILIRUB SERPL-MCNC: 0.4 MG/DL (ref 0.2–1)
BILIRUB UR QL STRIP: NEGATIVE
BUN SERPL-MCNC: 17 MG/DL (ref 5–25)
CALCIUM SERPL-MCNC: 9.4 MG/DL (ref 8.3–10.1)
CAOX CRY URNS QL MICRO: ABNORMAL /HPF
CHLORIDE SERPL-SCNC: 104 MMOL/L (ref 100–108)
CLARITY UR: CLEAR
CO2 SERPL-SCNC: 25 MMOL/L (ref 21–32)
COLOR UR: YELLOW
CREAT SERPL-MCNC: 0.84 MG/DL (ref 0.6–1.3)
EOSINOPHIL # BLD AUTO: 0.01 THOUSAND/ΜL (ref 0–0.61)
EOSINOPHIL NFR BLD AUTO: 0 % (ref 0–6)
ERYTHROCYTE [DISTWIDTH] IN BLOOD BY AUTOMATED COUNT: 14.3 % (ref 11.6–15.1)
GFR SERPL CREATININE-BSD FRML MDRD: 99 ML/MIN/1.73SQ M
GLUCOSE SERPL-MCNC: 135 MG/DL (ref 65–140)
GLUCOSE UR STRIP-MCNC: NEGATIVE MG/DL
HCT VFR BLD AUTO: 40.9 % (ref 36.5–49.3)
HGB BLD-MCNC: 13.2 G/DL (ref 12–17)
HGB UR QL STRIP.AUTO: ABNORMAL
IMM GRANULOCYTES # BLD AUTO: 0.06 THOUSAND/UL (ref 0–0.2)
IMM GRANULOCYTES NFR BLD AUTO: 1 % (ref 0–2)
KETONES UR STRIP-MCNC: NEGATIVE MG/DL
LEUKOCYTE ESTERASE UR QL STRIP: NEGATIVE
LIPASE SERPL-CCNC: 255 U/L (ref 73–393)
LYMPHOCYTES # BLD AUTO: 0.82 THOUSANDS/ΜL (ref 0.6–4.47)
LYMPHOCYTES NFR BLD AUTO: 8 % (ref 14–44)
MCH RBC QN AUTO: 28.8 PG (ref 26.8–34.3)
MCHC RBC AUTO-ENTMCNC: 32.3 G/DL (ref 31.4–37.4)
MCV RBC AUTO: 89 FL (ref 82–98)
MONOCYTES # BLD AUTO: 0.36 THOUSAND/ΜL (ref 0.17–1.22)
MONOCYTES NFR BLD AUTO: 3 % (ref 4–12)
MUCOUS THREADS UR QL AUTO: ABNORMAL
NEUTROPHILS # BLD AUTO: 9.67 THOUSANDS/ΜL (ref 1.85–7.62)
NEUTS SEG NFR BLD AUTO: 88 % (ref 43–75)
NITRITE UR QL STRIP: NEGATIVE
NON-SQ EPI CELLS URNS QL MICRO: ABNORMAL /HPF
NRBC BLD AUTO-RTO: 0 /100 WBCS
P AXIS: 52 DEGREES
PH UR STRIP.AUTO: 6 [PH] (ref 4.5–8)
PLATELET # BLD AUTO: 268 THOUSANDS/UL (ref 149–390)
PMV BLD AUTO: 9.7 FL (ref 8.9–12.7)
POTASSIUM SERPL-SCNC: 4 MMOL/L (ref 3.5–5.3)
PR INTERVAL: 164 MS
PROT SERPL-MCNC: 7.5 G/DL (ref 6.4–8.2)
PROT UR STRIP-MCNC: ABNORMAL MG/DL
QRS AXIS: 19 DEGREES
QRSD INTERVAL: 86 MS
QT INTERVAL: 434 MS
QTC INTERVAL: 392 MS
RBC # BLD AUTO: 4.59 MILLION/UL (ref 3.88–5.62)
RBC #/AREA URNS AUTO: ABNORMAL /HPF
SODIUM SERPL-SCNC: 140 MMOL/L (ref 136–145)
SP GR UR STRIP.AUTO: >=1.03 (ref 1–1.03)
T WAVE AXIS: 39 DEGREES
TROPONIN I SERPL-MCNC: <0.02 NG/ML
UROBILINOGEN UR QL STRIP.AUTO: 0.2 E.U./DL
VENTRICULAR RATE: 49 BPM
WBC # BLD AUTO: 10.94 THOUSAND/UL (ref 4.31–10.16)
WBC #/AREA URNS AUTO: ABNORMAL /HPF

## 2019-02-22 PROCEDURE — 96375 TX/PRO/DX INJ NEW DRUG ADDON: CPT

## 2019-02-22 PROCEDURE — 36415 COLL VENOUS BLD VENIPUNCTURE: CPT | Performed by: EMERGENCY MEDICINE

## 2019-02-22 PROCEDURE — 93005 ELECTROCARDIOGRAM TRACING: CPT

## 2019-02-22 PROCEDURE — 93010 ELECTROCARDIOGRAM REPORT: CPT | Performed by: INTERNAL MEDICINE

## 2019-02-22 PROCEDURE — C9113 INJ PANTOPRAZOLE SODIUM, VIA: HCPCS | Performed by: INTERNAL MEDICINE

## 2019-02-22 PROCEDURE — 71046 X-RAY EXAM CHEST 2 VIEWS: CPT

## 2019-02-22 PROCEDURE — 85025 COMPLETE CBC W/AUTO DIFF WBC: CPT | Performed by: EMERGENCY MEDICINE

## 2019-02-22 PROCEDURE — 96374 THER/PROPH/DIAG INJ IV PUSH: CPT

## 2019-02-22 PROCEDURE — 81001 URINALYSIS AUTO W/SCOPE: CPT | Performed by: EMERGENCY MEDICINE

## 2019-02-22 PROCEDURE — 99223 1ST HOSP IP/OBS HIGH 75: CPT | Performed by: INTERNAL MEDICINE

## 2019-02-22 PROCEDURE — 99285 EMERGENCY DEPT VISIT HI MDM: CPT

## 2019-02-22 PROCEDURE — 74177 CT ABD & PELVIS W/CONTRAST: CPT

## 2019-02-22 PROCEDURE — 80053 COMPREHEN METABOLIC PANEL: CPT | Performed by: EMERGENCY MEDICINE

## 2019-02-22 PROCEDURE — 84484 ASSAY OF TROPONIN QUANT: CPT | Performed by: EMERGENCY MEDICINE

## 2019-02-22 PROCEDURE — 83690 ASSAY OF LIPASE: CPT | Performed by: EMERGENCY MEDICINE

## 2019-02-22 RX ORDER — ASPIRIN 81 MG/1
81 TABLET ORAL DAILY
Status: DISCONTINUED | OUTPATIENT
Start: 2019-02-22 | End: 2019-02-26 | Stop reason: HOSPADM

## 2019-02-22 RX ORDER — ONDANSETRON 2 MG/ML
4 INJECTION INTRAMUSCULAR; INTRAVENOUS ONCE
Status: COMPLETED | OUTPATIENT
Start: 2019-02-22 | End: 2019-02-22

## 2019-02-22 RX ORDER — MIRTAZAPINE 15 MG/1
7.5 TABLET, FILM COATED ORAL
Status: DISCONTINUED | OUTPATIENT
Start: 2019-02-22 | End: 2019-02-26 | Stop reason: HOSPADM

## 2019-02-22 RX ORDER — PANTOPRAZOLE SODIUM 40 MG/1
40 INJECTION, POWDER, FOR SOLUTION INTRAVENOUS
Status: DISCONTINUED | OUTPATIENT
Start: 2019-02-22 | End: 2019-02-23

## 2019-02-22 RX ORDER — MORPHINE SULFATE 4 MG/ML
4 INJECTION, SOLUTION INTRAMUSCULAR; INTRAVENOUS ONCE
Status: COMPLETED | OUTPATIENT
Start: 2019-02-22 | End: 2019-02-22

## 2019-02-22 RX ORDER — ACETAMINOPHEN 325 MG/1
650 TABLET ORAL EVERY 6 HOURS PRN
Status: DISCONTINUED | OUTPATIENT
Start: 2019-02-22 | End: 2019-02-26 | Stop reason: HOSPADM

## 2019-02-22 RX ORDER — ONDANSETRON 2 MG/ML
4 INJECTION INTRAMUSCULAR; INTRAVENOUS EVERY 6 HOURS PRN
Status: DISCONTINUED | OUTPATIENT
Start: 2019-02-22 | End: 2019-02-26 | Stop reason: HOSPADM

## 2019-02-22 RX ORDER — CIPROFLOXACIN 2 MG/ML
400 INJECTION, SOLUTION INTRAVENOUS EVERY 12 HOURS
Status: DISCONTINUED | OUTPATIENT
Start: 2019-02-22 | End: 2019-02-23

## 2019-02-22 RX ORDER — BUPROPION HYDROCHLORIDE 75 MG/1
75 TABLET ORAL 2 TIMES DAILY
Status: DISCONTINUED | OUTPATIENT
Start: 2019-02-22 | End: 2019-02-26 | Stop reason: HOSPADM

## 2019-02-22 RX ORDER — ISOSORBIDE MONONITRATE 30 MG/1
30 TABLET, EXTENDED RELEASE ORAL DAILY
Status: DISCONTINUED | OUTPATIENT
Start: 2019-02-22 | End: 2019-02-22

## 2019-02-22 RX ORDER — HYDRALAZINE HYDROCHLORIDE 20 MG/ML
5 INJECTION INTRAMUSCULAR; INTRAVENOUS EVERY 6 HOURS PRN
Status: DISCONTINUED | OUTPATIENT
Start: 2019-02-22 | End: 2019-02-26 | Stop reason: HOSPADM

## 2019-02-22 RX ORDER — ATORVASTATIN CALCIUM 40 MG/1
40 TABLET, FILM COATED ORAL DAILY
Status: DISCONTINUED | OUTPATIENT
Start: 2019-02-22 | End: 2019-02-26 | Stop reason: HOSPADM

## 2019-02-22 RX ORDER — SODIUM CHLORIDE 9 MG/ML
75 INJECTION, SOLUTION INTRAVENOUS CONTINUOUS
Status: DISCONTINUED | OUTPATIENT
Start: 2019-02-22 | End: 2019-02-24

## 2019-02-22 RX ADMIN — CIPROFLOXACIN 400 MG: 2 INJECTION, SOLUTION INTRAVENOUS at 16:04

## 2019-02-22 RX ADMIN — ONDANSETRON 4 MG: 2 INJECTION INTRAMUSCULAR; INTRAVENOUS at 12:36

## 2019-02-22 RX ADMIN — ASPIRIN 81 MG: 81 TABLET, COATED ORAL at 16:05

## 2019-02-22 RX ADMIN — SERTRALINE HYDROCHLORIDE 200 MG: 50 TABLET ORAL at 16:05

## 2019-02-22 RX ADMIN — BUPROPION HYDROCHLORIDE 75 MG: 75 TABLET, FILM COATED ORAL at 18:35

## 2019-02-22 RX ADMIN — ENOXAPARIN SODIUM 40 MG: 40 INJECTION SUBCUTANEOUS at 16:06

## 2019-02-22 RX ADMIN — SODIUM CHLORIDE 1000 ML: 0.9 INJECTION, SOLUTION INTRAVENOUS at 12:36

## 2019-02-22 RX ADMIN — PANTOPRAZOLE SODIUM 40 MG: 40 INJECTION, POWDER, FOR SOLUTION INTRAVENOUS at 16:05

## 2019-02-22 RX ADMIN — ACETAMINOPHEN 650 MG: 325 TABLET, FILM COATED ORAL at 19:58

## 2019-02-22 RX ADMIN — CLONAZEPAM 1.5 MG: 1 TABLET ORAL at 22:31

## 2019-02-22 RX ADMIN — METRONIDAZOLE 500 MG: 500 INJECTION, SOLUTION INTRAVENOUS at 20:00

## 2019-02-22 RX ADMIN — MORPHINE SULFATE 4 MG: 4 INJECTION INTRAVENOUS at 12:36

## 2019-02-22 RX ADMIN — ATORVASTATIN CALCIUM 40 MG: 40 TABLET, FILM COATED ORAL at 16:05

## 2019-02-22 RX ADMIN — IOHEXOL 100 ML: 350 INJECTION, SOLUTION INTRAVENOUS at 17:39

## 2019-02-22 RX ADMIN — MIRTAZAPINE 7.5 MG: 15 TABLET, FILM COATED ORAL at 22:31

## 2019-02-22 RX ADMIN — SODIUM CHLORIDE 75 ML/HR: 0.9 INJECTION, SOLUTION INTRAVENOUS at 16:04

## 2019-02-22 RX ADMIN — ONDANSETRON 4 MG: 2 INJECTION INTRAMUSCULAR; INTRAVENOUS at 15:25

## 2019-02-22 RX ADMIN — MORPHINE SULFATE 2 MG: 2 INJECTION, SOLUTION INTRAMUSCULAR; INTRAVENOUS at 15:24

## 2019-02-22 NOTE — TELEPHONE ENCOUNTER
Patient phone states "The number you dialed has calling restrictions"  Unable to leave   FYI: Patient is going to ED

## 2019-02-22 NOTE — PLAN OF CARE
Problem: PAIN - ADULT  Goal: Verbalizes/displays adequate comfort level or baseline comfort level  Description  Interventions:  - Encourage patient to monitor pain and request assistance  - Assess pain using appropriate pain scale  - Administer analgesics based on type and severity of pain and evaluate response  - Implement non-pharmacological measures as appropriate and evaluate response  - Consider cultural and social influences on pain and pain management  - Notify physician/advanced practitioner if interventions unsuccessful or patient reports new pain  Outcome: Progressing     Problem: INFECTION - ADULT  Goal: Absence or prevention of progression during hospitalization  Description  INTERVENTIONS:  - Assess and monitor for signs and symptoms of infection  - Monitor lab/diagnostic results  - Monitor all insertion sites, i e  indwelling lines, tubes, and drains  - Monitor endotracheal (as able) and nasal secretions for changes in amount and color  - [place] appropriate cooling/warming therapies per order  - Administer medications as ordered  - Instruct and encourage patient and family to use good hand hygiene technique  - Identify and instruct in appropriate isolation precautions for identified infection/condition  Outcome: Progressing     Problem: SAFETY ADULT  Goal: Patient will remain free of falls  Description  INTERVENTIONS:  - Assess patient frequently for physical needs  -  Identify cognitive and physical deficits and behaviors that affect risk of falls    -  [place] fall precautions as indicated by assessment   - Educate patient/family on patient safety including physical limitations  - Instruct patient to call for assistance with activity based on assessment  - Modify environment to reduce risk of injury  - Consider OT/PT consult to assist with strengthening/mobility  Outcome: Progressing  Goal: Maintain or return to baseline ADL function  Description  INTERVENTIONS:  -  Assess patient's ability to carry out ADLs; assess patient's baseline for ADL function and identify physical deficits which impact ability to perform ADLs (bathing, care of mouth/teeth, toileting, grooming, dressing, etc )  - Assess/evaluate cause of self-care deficits   - Assess range of motion  - Assess patient's mobility; develop plan if impaired  - Assess patient's need for assistive devices and provide as appropriate  - Encourage maximum independence but intervene and supervise when necessary  ¯ Involve family in performance of ADLs  ¯ Assess for home care needs following discharge   ¯ Request OT consult to assist with ADL evaluation and planning for discharge  ¯ Provide patient education as appropriate  Outcome: Progressing  Goal: Maintain or return mobility status to optimal level  Description  INTERVENTIONS:  - Assess patient's baseline mobility status (ambulation, transfers, stairs, etc )    - Identify cognitive and physical deficits and behaviors that affect mobility  - Identify mobility aids required to assist with transfers and/or ambulation (gait belt, sit-to-stand, lift, walker, cane, etc )  - [place] fall precautions as indicated by assessment  - Record patient progress and toleration of activity level on Mobility SBAR; progress patient to next Phase/Stage  - Instruct patient to call for assistance with activity based on assessment  - Request Rehabilitation consult to assist with strengthening/weightbearing, etc   Outcome: Progressing     Problem: DISCHARGE PLANNING  Goal: Discharge to home or other facility with appropriate resources  Description  INTERVENTIONS:  - Identify barriers to discharge w/patient and caregiver  - Arrange for needed discharge resources and transportation as appropriate  - Identify discharge learning needs (meds, wound care, etc )  - Arrange for interpretive services to assist at discharge as needed  - Refer to Case Management Department for coordinating discharge planning if the patient needs post-hospital services based on physician/advanced practitioner order or complex needs related to functional status, cognitive ability, or social support system  Outcome: Progressing     Problem: Knowledge Deficit  Goal: Patient/family/caregiver demonstrates understanding of disease process, treatment plan, medications, and discharge instructions  Description  Complete learning assessment and assess knowledge base    Interventions:  - Provide teaching at level of understanding  - Provide teaching via preferred learning methods  Outcome: Progressing

## 2019-02-22 NOTE — ED PROVIDER NOTES
History  Chief Complaint   Patient presents with    Vomiting     pt c/o vomiting since this morning  was recently seen here for the same     C/o nausea/vomiting with lower abd  pain since 2-3 days ago, then worse since this am       Pt  Was admitted for one night on 2/20/19 for chest pain and abd  Pain (discharged yest ) -  He had a CT a/p on 2/20 which showed:  Findings which could be compatible with minimal diverticulitis near the junction of sigmoid and descending colon  No suspicious secondary complications  He was placed on cipro and flagyl but isn't able to tolerate those meds due to vomiting  +fevers, feels sweaty  He's getting chest pain on and off since he was admitted  Recent cardiac cath  On 1/16/19 by Dr Judith Quintana:  Angiographic findings  Native coronary lesions:  ·Proximal RCA: Lesion 1: 20 % stenosis  ·Mid RCA: Lesion 1: 30 % stenosis  ·Distal RCA: Lesion 1: 50 % stenosis  Lesion 2: tubular, 70 % stenosis  Prior to Admission Medications   Prescriptions Last Dose Informant Patient Reported? Taking?    BUDESONIDE PO  Self Yes No   Sig: Take 3 mg by mouth 2 (two) times a day   amoxicillin-clavulanate (AUGMENTIN) 875-125 mg per tablet   No No   Sig: Take 1 tablet by mouth every 12 (twelve) hours for 10 days   aspirin (ECOTRIN LOW STRENGTH) 81 mg EC tablet  Self Yes No   Sig: Take 81 mg by mouth daily   atorvastatin (LIPITOR) 40 mg tablet   No No   Sig: Take 1 tablet (40 mg total) by mouth daily   buPROPion (WELLBUTRIN) 75 mg tablet  Self Yes No   Sig: Take 75 mg by mouth 2 (two) times a day   clonazePAM (KLONOPIN) 1 mg tablet   Yes No   Sig: Take 0 5 mg by mouth 2 (two) times a day   isosorbide mononitrate (IMDUR) 30 mg 24 hr tablet   No No   Sig: Take 1 tablet (30 mg total) by mouth daily   metoprolol tartrate (LOPRESSOR) 25 mg tablet   No No   Sig: Take 1 tablet (25 mg total) by mouth every 12 (twelve) hours   mirtazapine (REMERON) 7 5 MG tablet   Yes No   Sig: Take 7 5 mg by mouth daily at bedtime   nitroglycerin (NITROSTAT) 0 4 mg SL tablet   No No   Sig: Place 1 tablet (0 4 mg total) under the tongue every 5 (five) minutes as needed for chest pain   ondansetron (ZOFRAN-ODT) 4 mg disintegrating tablet  Self No No   Sig: Take 1 tablet by mouth every 6 (six) hours as needed for nausea or vomiting   pantoprazole (PROTONIX) 40 mg tablet  Self No No   Sig: Take 1 tablet (40 mg total) by mouth daily   sertraline (ZOLOFT) 100 mg tablet  Self Yes No   Sig: Take 200 mg by mouth daily      Facility-Administered Medications: None       Past Medical History:   Diagnosis Date    Diaz's esophagus     Coronary artery disease     Depression     Diverticulitis     GERD (gastroesophageal reflux disease)     Hemorrhoid     Hyperlipidemia     Hypertension     [facility]        Past Surgical History:   Procedure Laterality Date    ABDOMINAL SURGERY      radio frequency ablation    BONE GRAFT Left 2018    CARPAL TUNNEL RELEASE Right     COLONOSCOPY      EGD AND COLONOSCOPY      ESOPHAGOGASTRODUODENOSCOPY N/A 2/15/2018    Procedure: ESOPHAGOGASTRODUODENOSCOPY (EGD); Surgeon: Victorino Rucker MD;  Location: [facility];  Service: Gastroenterology    ESOPHAGOGASTRODUODENOSCOPY N/A 12/10/2018    Procedure: ESOPHAGOGASTRODUODENOSCOPY (EGD); Surgeon: Ignacia Fuentes MD;  Location: [facility]; Service: Gastroenterology    TONSILLECTOMY         Family History   Problem Relation Age of Onset    Heart disease Father     Cancer Sister      I have reviewed and agree with the history as documented      Social History     Tobacco Use    Smoking status: Former Smoker     Last attempt to quit: 2007     Years since quittin 1    Smokeless tobacco: Former User     Quit date: 1998   Substance Use Topics    Alcohol use: No     Comment: quit 3 years    Drug use: Yes     Frequency: 3 0 times per week     Types: Marijuana     Comment: 1/15/2019        Review of Systems Constitutional: Negative for appetite change, fatigue and fever  HENT: Negative for rhinorrhea and sore throat  Respiratory: Negative for cough, shortness of breath and wheezing  Cardiovascular: Negative for chest pain and leg swelling  Gastrointestinal: Positive for abdominal pain, nausea and vomiting  Negative for diarrhea  Genitourinary: Negative for dysuria and flank pain  Musculoskeletal: Negative for back pain and neck pain  Skin: Negative for rash  Neurological: Negative for syncope and headaches  Psychiatric/Behavioral:        Mood normal       Physical Exam  Physical Exam   Constitutional: He is oriented to person, place, and time  He appears well-developed and well-nourished  HENT:   Head: Normocephalic and atraumatic  Neck: Normal range of motion  Neck supple  Cardiovascular: Normal rate and regular rhythm  Pulmonary/Chest: Effort normal and breath sounds normal    Abdominal: Soft  Lower abdominal tenderness, no rebound/guarding   Musculoskeletal: Normal range of motion  Neurological: He is alert and oriented to person, place, and time  Skin: Skin is warm and dry  Nursing note and vitals reviewed        Vital Signs  ED Triage Vitals [02/22/19 1045]   Temperature Pulse Respirations Blood Pressure SpO2   98 3 °F (36 8 °C) (!) 45 16 (!) 192/94 97 %      Temp Source Heart Rate Source Patient Position - Orthostatic VS BP Location FiO2 (%)   Oral Monitor Lying Right arm --      Pain Score       8           Vitals:    02/22/19 1045 02/22/19 1048 02/22/19 1230 02/22/19 1403   BP: (!) 192/94 (!) 181/87 (!) 179/90 163/79   Pulse: (!) 45  (!) 50 (!) 50   Patient Position - Orthostatic VS: Lying Lying Lying Lying       Visual Acuity      ED Medications  Medications   aspirin (ECOTRIN LOW STRENGTH) EC tablet 81 mg (has no administration in time range)   atorvastatin (LIPITOR) tablet 40 mg (has no administration in time range)   buPROPion (WELLBUTRIN) tablet 75 mg (has no administration in time range)   mirtazapine (REMERON) tablet 7 5 mg (has no administration in time range)   sertraline (ZOLOFT) tablet 200 mg (has no administration in time range)   enoxaparin (LOVENOX) subcutaneous injection 40 mg (has no administration in time range)   acetaminophen (TYLENOL) tablet 650 mg (has no administration in time range)   ondansetron (ZOFRAN) injection 4 mg (4 mg Intravenous Given 2/22/19 1525)   pantoprazole (PROTONIX) injection 40 mg (has no administration in time range)   sodium chloride 0 9 % infusion (has no administration in time range)   ciprofloxacin (CIPRO) IVPB (premix) 400 mg (has no administration in time range)   metroNIDAZOLE (FLAGYL) IVPB (premix) 500 mg (has no administration in time range)   morphine injection 2 mg (2 mg Intravenous Given 2/22/19 1524)   hydrALAZINE (APRESOLINE) injection 5 mg (has no administration in time range)   sodium chloride 0 9 % bolus 1,000 mL (0 mL Intravenous Stopped 2/22/19 1336)   ondansetron (ZOFRAN) injection 4 mg (4 mg Intravenous Given 2/22/19 1236)   morphine (PF) 4 mg/mL injection 4 mg (4 mg Intravenous Given 2/22/19 1236)       Diagnostic Studies  Results Reviewed     Procedure Component Value Units Date/Time    Urine Microscopic [[id number]]  (Abnormal) Collected:  02/22/19 1405    Lab Status:  Final result Specimen:  Urine, Clean Catch Updated:  02/22/19 1428     RBC, UA 1-2 /hpf      WBC, UA None Seen /hpf      Epithelial Cells Occasional /hpf      Bacteria, UA Occasional /hpf      Ca Oxalate Christina, UA Occasional /hpf      MUCUS THREADS Occasional    UA w Reflex to Microscopic [[id number]]  (Abnormal) Collected:  02/22/19 1405    Lab Status:  Final result Specimen:  Urine, Clean Catch Updated:  02/22/19 1414     Color, UA Yellow     Clarity, UA Clear     Specific Gravity, UA >=1 030     pH, UA 6 0     Leukocytes, UA Negative     Nitrite, UA Negative     Protein, UA 30 (1+) mg/dl      Glucose, UA Negative mg/dl      Ketones, UA Negative mg/dl      Urobilinogen, UA 0 2 E U /dl      Bilirubin, UA Negative     Blood, UA Trace-Intact    Troponin I [[id number]]  (Normal) Collected:  02/22/19 1235    Lab Status:  Final result Specimen:  Blood from Arm, Right Updated:  02/22/19 1305     Troponin I <0 02 ng/mL     Comprehensive metabolic panel [[id number]] Collected:  02/22/19 1235    Lab Status:  Final result Specimen:  Blood from Arm, Right Updated:  02/22/19 1303     Sodium 140 mmol/L      Potassium 4 0 mmol/L      Chloride 104 mmol/L      CO2 25 mmol/L      ANION GAP 11 mmol/L      BUN 17 mg/dL      Creatinine 0 84 mg/dL      Glucose 135 mg/dL      Calcium 9 4 mg/dL      AST 14 U/L      ALT 27 U/L      Alkaline Phosphatase 70 U/L      Total Protein 7 5 g/dL      Albumin 3 7 g/dL      Total Bilirubin 0 40 mg/dL      eGFR 99 ml/min/1 73sq m     Narrative:       National Kidney Disease Education Program recommendations are as follows:  GFR calculation is accurate only with a steady state creatinine  Chronic Kidney disease less than 60 ml/min/1 73 sq  meters  Kidney failure less than 15 ml/min/1 73 sq  meters      Lipase [[id number]]  (Normal) Collected:  02/22/19 1235    Lab Status:  Final result Specimen:  Blood from Arm, Right Updated:  02/22/19 1303     Lipase 255 u/L     CBC and differential [[id number]]  (Abnormal) Collected:  02/22/19 1235    Lab Status:  Final result Specimen:  Blood from Arm, Right Updated:  02/22/19 1242     WBC 10 94 Thousand/uL      RBC 4 59 Million/uL      Hemoglobin 13 2 g/dL      Hematocrit 40 9 %      MCV 89 fL      MCH 28 8 pg      MCHC 32 3 g/dL      RDW 14 3 %      MPV 9 7 fL      Platelets 963 Thousands/uL      nRBC 0 /100 WBCs      Neutrophils Relative 88 %      Immat GRANS % 1 %      Lymphocytes Relative 8 %      Monocytes Relative 3 %      Eosinophils Relative 0 %      Basophils Relative 0 %      Neutrophils Absolute 9 67 Thousands/µL      Immature Grans Absolute 0 06 Thousand/uL      Lymphocytes Absolute 0 82 Thousands/µL      Monocytes Absolute 0 36 Thousand/µL      Eosinophils Absolute 0 01 Thousand/µL      Basophils Absolute 0 02 Thousands/µL                  XR chest 2 views   Final Result by Luis Manuel Morin MD (02/22 0844)      Patchy left basilar subsegmental atelectasis              Workstation performed: [id number]         CT abdomen pelvis w contrast    (Results Pending)              Procedures  ECG 12 Lead Documentation  Date/Time: 2/22/2019 11:47 AM  Performed by: Masood Underwood MD  Authorized by: Masood Underwood MD     Rate:     ECG rate:  49    ECG rate assessment: normal    Rhythm:     Rhythm: sinus rhythm    Comments:      No st elevation or depression           Phone Contacts  ED Phone Contact    ED Course                               MDM  Number of Diagnoses or Management Options     Amount and/or Complexity of Data Reviewed  Clinical lab tests: ordered and reviewed  Tests in the radiology section of CPT®: reviewed    Risk of Complications, Morbidity, and/or Mortality  Presenting problems: moderate  General comments: Patient admitted for IV antibiotics and further workup        Disposition  Final diagnoses:   Acute diverticulitis   Intractable vomiting     Time reflects when diagnosis was documented in both MDM as applicable and the Disposition within this note     Time User Action Codes Description Comment    2/22/2019  3:11 PM Starlene Emperor Add [R07 9,  I25 10] Chest pain due to CAD     2/22/2019  3:11 PM Starlene Emperor Modify [R07 9,  I25 10] Chest pain due to CAD     2/22/2019  3:11 PM Starlene Emperor Remove [R07 9,  I25 10] Chest pain due to CAD     2/22/2019  3:11 PM Starlene Emperor Add [R07 9,  I25 10] Chest pain due to CAD     2/22/2019  3:11 PM Starlene Emperor Modify [R07 9,  I25 10] Chest pain due to CAD     2/22/2019  3:36 PM Celia Gómez Add [K57 92] Acute diverticulitis     2/22/2019  3:36 PM Dejon Norwood Add [R11 10] Intractable vomiting       ED Disposition     ED Disposition Condition Date/Time Comment    Admit Stable Fri Feb 22, 2019  1:03 PM Case was discussed with Dr Brandy Israel, and the patient's admission status was agreed to be inpt /tele        Follow-up Information    None         Current Discharge Medication List      CONTINUE these medications which have NOT CHANGED    Details   amoxicillin-clavulanate (AUGMENTIN) 875-125 mg per tablet Take 1 tablet by mouth every 12 (twelve) hours for 10 days  Qty: 20 tablet, Refills: 0    Associated Diagnoses: Diverticulitis      aspirin (ECOTRIN LOW STRENGTH) 81 mg EC tablet Take 81 mg by mouth daily      atorvastatin (LIPITOR) 40 mg tablet Take 1 tablet (40 mg total) by mouth daily  Qty: 90 tablet, Refills: 3    Associated Diagnoses: Angina pectoris (HCC)      BUDESONIDE PO Take 3 mg by mouth 2 (two) times a day      buPROPion (WELLBUTRIN) 75 mg tablet Take 75 mg by mouth 2 (two) times a day      clonazePAM (KLONOPIN) 1 mg tablet Take 0 5 mg by mouth 2 (two) times a day      isosorbide mononitrate (IMDUR) 30 mg 24 hr tablet Take 1 tablet (30 mg total) by mouth daily  Qty: 14 tablet, Refills: 0    Associated Diagnoses: Angina at rest (HCC)      metoprolol tartrate (LOPRESSOR) 25 mg tablet Take 1 tablet (25 mg total) by mouth every 12 (twelve) hours  Qty: 180 tablet, Refills: 3    Associated Diagnoses: Angina pectoris (HCC)      mirtazapine (REMERON) 7 5 MG tablet Take 7 5 mg by mouth daily at bedtime      nitroglycerin (NITROSTAT) 0 4 mg SL tablet Place 1 tablet (0 4 mg total) under the tongue every 5 (five) minutes as needed for chest pain  Qty: 30 tablet, Refills: 3    Associated Diagnoses: Angina pectoris (HCC)      ondansetron (ZOFRAN-ODT) 4 mg disintegrating tablet Take 1 tablet by mouth every 6 (six) hours as needed for nausea or vomiting  Qty: 20 tablet, Refills: 0      pantoprazole (PROTONIX) 40 mg tablet Take 1 tablet (40 mg total) by mouth daily  Qty: 30 tablet, Refills: 0    Associated Diagnoses: Enteritis      sertraline (ZOLOFT) 100 mg tablet Take 200 mg by mouth daily           No discharge procedures on file      ED Provider  Electronically Signed by           Delmar Oliva MD  02/22/19 3701

## 2019-02-22 NOTE — H&P
History and Physical - [facility] Internal Medicine    Patient Information: Nila Tse 46 y o  male MRN: [id number]  Unit/Bed#: -01 Encounter: [id number]  Admitting Physician: Vijay Larson MD  PCP: Paloma Hussein MD  Date of Admission:  02/22/19    Assessment/Plan:    Hospital Problem List:     Principal Problem:    Abdominal pain  Active Problems:    Anxiety and depression    Diaz's esophagus    Essential hypertension    Dyslipidemia    Sinus bradycardia      1 ) Abdominal pain  -CT abdomen pelvis on prior admission revealed minimal diverticulitis new junction of sigmoid and descending colon, no suspicious secondary complications  -given patient's worsening symptoms and pain, will repeat CT abdomen pelvis to evaluate for abscess  -NPO, IV fluids, pain control  -empiric antibiotics with Cipro and Flagyl for diverticulitis    2 ) Chest pain  -possibly secondary to GERD from retching and diverticulitis however will rule out ACS given history of CAD  -history of cardiac catheterization without PCI significant for 70% tubular stenosis of distal RCA at which point patient was recommended for medical management  -patient troponins negative, no acute EKG changes  -will continue to monitor on telemetry, trend troponin  -continue aspirin, statin  -if patient has progressively worsening chest pain or change in troponins, will likely need cardiology evaluation    3 ) History of GERD  -continue Protonix IV    4 ) Hypertension  -will hold patient's Imdur and Lopressor given episodes of bradycardia, monitor blood pressure    5 ) Dyslipidemia  -continue statin    6 ) Depression  -continue with Zoloft, Wellbutrin    VTE Prophylaxis: Enoxaparin (Lovenox)  / sequential compression device   Code Status: FULL  POLST: There is no POLST form on file for this patient (pre-hospital)    Anticipated Length of Stay:  Patient will be admitted on an Inpatient basis with an anticipated length of stay of  Greater than 2 midnights  Justification for Hospital Stay: abdominal pain    Total Time for Visit, including Counseling / Coordination of Care: 30 minutes  Greater than 50% of this total time spent on direct patient counseling and coordination of care  Chief Complaint:   Abdominal pain, chest    History of Present Illness:    Mila Aleman is a 46 y o  male who presents with abdominal pain and chest pain  Patient has history of CAD status post cardiac catheterization 1 month ago, Diaz's esophagus, ulcerative colitis who was recently admitted and discharged on 02/21/2019  Patient was treated for chest pain, abdominal pain with GI and Cardiology consulted  Patient had some mild diverticulitis for which she was discharged on p o  Antibiotics  Patient also had cardiology evaluation with negative troponins and no EKG changes  Patient states upon returning home he had nausea, vomiting, fevers  He also states that he had left lower quadrant abdominal pain radiating to his entire abdomen and was unable to take his antibiotics  Patient also describes having midsternal chest pain, pressure-like in nature, 3/10 on pain scale, nonradiating with some associated dyspnea  Denies any palpitations, diaphoresis  Denies any diarrhea, dysuria  Upon arrival to the ED patient's vitals are stable, blood work is within normal limits  Patient will be admitted for further management and evaluation  Review of Systems:    Review of Systems   Constitutional: Positive for appetite change and fever  HENT: Negative  Eyes: Negative  Respiratory: Negative  Cardiovascular: Positive for chest pain  Gastrointestinal: Positive for abdominal pain and vomiting  Endocrine: Negative  Genitourinary: Negative  Musculoskeletal: Negative  Skin: Negative  Allergic/Immunologic: Negative  Neurological: Negative  Hematological: Negative  Psychiatric/Behavioral: Negative          Past Medical and Surgical History:     Past Medical History:   Diagnosis Date    Diaz's esophagus     Coronary artery disease     Depression     Diverticulitis     GERD (gastroesophageal reflux disease)     Hemorrhoid     Hyperlipidemia     Hypertension     UC (ulcerative colitis) ([address] )        Past Surgical History:   Procedure Laterality Date    ABDOMINAL SURGERY      radio frequency ablation    BONE GRAFT Left 2018    CARPAL TUNNEL RELEASE Right     COLONOSCOPY      EGD AND COLONOSCOPY      ESOPHAGOGASTRODUODENOSCOPY N/A 2/15/2018    Procedure: ESOPHAGOGASTRODUODENOSCOPY (EGD); Surgeon: Shelby Neri MD;  Location: [facility];  Service: Gastroenterology    ESOPHAGOGASTRODUODENOSCOPY N/A 12/10/2018    Procedure: ESOPHAGOGASTRODUODENOSCOPY (EGD); Surgeon: Ninfa Solo MD;  Location: [facility]; Service: Gastroenterology    TONSILLECTOMY         Meds/Allergies:    Prior to Admission medications    Medication Sig Start Date End Date Taking?  Authorizing Provider   amoxicillin-clavulanate (AUGMENTIN) 875-125 mg per tablet Take 1 tablet by mouth every 12 (twelve) hours for 10 days 2/21/19 3/3/19  RICKEY Baca   aspirin (ECOTRIN LOW STRENGTH) 81 mg EC tablet Take 81 mg by mouth daily    Historical Provider, MD   atorvastatin (LIPITOR) 40 mg tablet Take 1 tablet (40 mg total) by mouth daily 1/7/19   Kip Krabbe, DO   BUDESONIDE PO Take 3 mg by mouth 2 (two) times a day    Historical Provider, MD   buPROPion [facility]) 75 mg tablet Take 75 mg by mouth 2 (two) times a day    Historical Provider, MD   clonazePAM (KLONOPIN) 1 mg tablet Take 0 5 mg by mouth 2 (two) times a day    Historical Provider, MD   isosorbide mononitrate (IMDUR) 30 mg 24 hr tablet Take 1 tablet (30 mg total) by mouth daily 2/13/19   Kip Krabbe, DO   metoprolol tartrate (LOPRESSOR) 25 mg tablet Take 1 tablet (25 mg total) by mouth every 12 (twelve) hours 1/7/19   Kip Krabbe, DO   mirtazapine (REMERON) 7 5 MG tablet Take 7 5 mg by mouth daily at bedtime    Historical Provider, MD   nitroglycerin (NITROSTAT) 0 4 mg SL tablet Place 1 tablet (0 4 mg total) under the tongue every 5 (five) minutes as needed for chest pain 19   Keke Palma DO   ondansetron (ZOFRAN-ODT) 4 mg disintegrating tablet Take 1 tablet by mouth every 6 (six) hours as needed for nausea or vomiting 17   Verna Borden DO   pantoprazole (PROTONIX) 40 mg tablet Take 1 tablet (40 mg total) by mouth daily 18   Diane Garza MD   sertraline (ZOLOFT) 100 mg tablet Take 200 mg by mouth daily    Historical Provider, MD     I have reviewed home medications with patient personally  Allergies: Allergies   Allergen Reactions    No Active Allergies        Social History:     Social History     Substance and Sexual Activity   Alcohol Use No    Comment: quit 3 years     Social History     Tobacco Use   Smoking Status Former Smoker    Last attempt to quit: 2007    Years since quittin 1   Smokeless Tobacco Former User    Quit date: 1998     Social History     Substance and Sexual Activity   Drug Use Yes    Frequency: 3 0 times per week    Types: Marijuana    Comment: 1/15/2019       Family History:    Family History   Problem Relation Age of Onset    Heart disease Father     Cancer Sister        Physical Exam:     Vitals:   Blood Pressure: 163/79 (19 1403)  Pulse: (!) 50 (19 1403)  Temperature: 98 3 °F (36 8 °C) (19 1045)  Temp Source: Oral (19 1045)  Respirations: 22 (19 1403)  Weight - Scale: 101 kg (222 lb 7 1 oz) (19 1045)  SpO2: 93 % (19 1403)    Constitutional: Patient is oriented to person, place and time, no acute distress  HEENT:  Normocephalic, atraumatic, EOMI, PERRLA, no scleral icterus, no pallor, moist oral mucosa  Neck:  Supple, no masses, no thyromegaly, no bruits Normal range of motion     Lymph nodes:  No lymphadenopathy  Cardiovascular: Normal S1S2, RRR, No murmurs/rubs/gallops appreciated  Pulmonary:  Bilateral air entry, No rhonchi/rales/wheezing appreciated  Abdominal: Soft, Bowel sounds present, Non-tender, Non-distended, No rebound/guarding, no hepatomegaly   Musculoskeletal: No tenderness/abnormality   Extremities:  No cyanosis, clubbing or edema  Peripheral pulses palpable and equal bilaterally  Neurological: Cranial nerves II-XII grossly intact, sensation intact, otherwise no focal neurological symptoms  Skin: Skin is warm and dry, no rashes  Additional Data:     Lab Results: I have personally reviewed pertinent reports  Results from last 7 days   Lab Units 02/22/19  1235   WBC Thousand/uL 10 94*   HEMOGLOBIN g/dL 13 2   HEMATOCRIT % 40 9   PLATELETS Thousands/uL 268   NEUTROS PCT % 88*   LYMPHS PCT % 8*   MONOS PCT % 3*   EOS PCT % 0     Results from last 7 days   Lab Units 02/22/19  1235   POTASSIUM mmol/L 4 0   CHLORIDE mmol/L 104   CO2 mmol/L 25   BUN mg/dL 17   CREATININE mg/dL 0 84   CALCIUM mg/dL 9 4   ALK PHOS U/L 70   ALT U/L 27   AST U/L 14           Imaging: I have personally reviewed pertinent reports  Xr Chest 2 Views    Result Date: 2/22/2019  Narrative: CHEST INDICATION:   chest pain  COMPARISON:  2/20/2019 EXAM PERFORMED/VIEWS:  XR CHEST PA & LATERAL FINDINGS: Cardiomediastinal silhouette appears unremarkable  Patchy left basilar atelectasis  No pneumothorax or pleural effusion  Osseous structures appear within normal limits for patient age  Impression: Patchy left basilar subsegmental atelectasis  Workstation performed: [id number]     Xr Chest 2 Views    Result Date: 2/20/2019  Narrative: CHEST INDICATION:   Chest pain  COMPARISON:  December 8, 2018 EXAM PERFORMED/VIEWS:  XR CHEST PA & LATERAL Images: 2 FINDINGS:  The lungs are suboptimally aerated  No consolidation or large effusion  Cardiac size within normal limits  No vascular congestion or peribronchial thickening  No pneumothorax or free air       Impression: No acute cardiopulmonary disease  Workstation performed: [id number]     Ct Abdomen Pelvis With Contrast    Result Date: 2/20/2019  Narrative: CT ABDOMEN AND PELVIS WITH IV CONTRAST INDICATION:   Abdominal pain nausea vomiting  COMPARISON:  December 8, 2018 TECHNIQUE:  CT examination of the abdomen and pelvis was performed  Axial, sagittal, and coronal 2D reformatted images were created from the source data and submitted for interpretation  Radiation dose length product (DLP) for this visit:  706 65 mGy-cm   This examination, like all CT scans performed in the [facility], was performed utilizing techniques to minimize radiation dose exposure, including the use of iterative  reconstruction and automated exposure control  IV Contrast:  85 mL of iohexol (OMNIPAQUE) Enteric Contrast:  Enteric contrast was not administered  FINDINGS: ABDOMEN LOWER CHEST:  Atelectatic changes are present in the lung bases, left side more so than right  LIVER/BILIARY TREE:  Unremarkable  GALLBLADDER:  No calcified gallstones  No pericholecystic inflammatory change  SPLEEN:  Unremarkable  PANCREAS:  Unremarkable  ADRENAL GLANDS:  Unremarkable  KIDNEYS/URETERS:  Unremarkable  No hydronephrosis  STOMACH AND BOWEL:  There is sigmoid diverticulosis  There are questionable subtle inflammatory changes near the junction of the descending and sigmoid colon which could indicate very minimal diverticulitis  This is characterized as some minimal localized fat stranding near the colon best seen on image 79 series 601  The remainder of the bowel appears to be within normal limits  APPENDIX:  No findings to suggest appendicitis  ABDOMINOPELVIC CAVITY:  No ascites or free intraperitoneal air  No lymphadenopathy  VESSELS:  Unremarkable for patient's age  PELVIS REPRODUCTIVE ORGANS:  There is mild prostatic prominence  Prostate measures 4 4 x 5 6 cm  URINARY BLADDER:  Unremarkable  ABDOMINAL WALL/INGUINAL REGIONS:  Unremarkable   OSSEOUS STRUCTURES:  No acute fracture or destructive osseous lesion  Impression: Findings which could be compatible with minimal diverticulitis near the junction of sigmoid and descending colon  No suspicious secondary complications  Workstation performed: [id number]       EKG, Pathology, and Other Studies Reviewed on Admission:   · EKG: NSR at 70bmp, , no acute ST-T changes    Allscripts / Epic Records Reviewed: Yes     ** Please Note: This note has been constructed using a voice recognition system   **

## 2019-02-22 NOTE — TELEPHONE ENCOUNTER
Patient is still experiencing a serious vomiting issue and left side abdominal pain  Dara Soulier He is going to the ER  Dara Soulier

## 2019-02-23 PROBLEM — K52.839 MICROSCOPIC COLITIS: Status: ACTIVE | Noted: 2019-02-23

## 2019-02-23 PROBLEM — K57.92 DIVERTICULITIS: Status: ACTIVE | Noted: 2019-02-23

## 2019-02-23 PROBLEM — R07.9 CHEST PAIN: Status: ACTIVE | Noted: 2019-02-20

## 2019-02-23 LAB
ALBUMIN SERPL BCP-MCNC: 3 G/DL (ref 3.5–5)
ALP SERPL-CCNC: 50 U/L (ref 46–116)
ALT SERPL W P-5'-P-CCNC: 20 U/L (ref 12–78)
ANION GAP SERPL CALCULATED.3IONS-SCNC: 7 MMOL/L (ref 4–13)
AST SERPL W P-5'-P-CCNC: 11 U/L (ref 5–45)
BASOPHILS # BLD AUTO: 0.03 THOUSANDS/ΜL (ref 0–0.1)
BASOPHILS NFR BLD AUTO: 0 % (ref 0–1)
BILIRUB SERPL-MCNC: 0.5 MG/DL (ref 0.2–1)
BUN SERPL-MCNC: 11 MG/DL (ref 5–25)
CALCIUM SERPL-MCNC: 8.4 MG/DL (ref 8.3–10.1)
CHLORIDE SERPL-SCNC: 106 MMOL/L (ref 100–108)
CO2 SERPL-SCNC: 28 MMOL/L (ref 21–32)
CREAT SERPL-MCNC: 0.95 MG/DL (ref 0.6–1.3)
EOSINOPHIL # BLD AUTO: 0.14 THOUSAND/ΜL (ref 0–0.61)
EOSINOPHIL NFR BLD AUTO: 2 % (ref 0–6)
ERYTHROCYTE [DISTWIDTH] IN BLOOD BY AUTOMATED COUNT: 14.5 % (ref 11.6–15.1)
GFR SERPL CREATININE-BSD FRML MDRD: 90 ML/MIN/1.73SQ M
GLUCOSE SERPL-MCNC: 101 MG/DL (ref 65–140)
HCT VFR BLD AUTO: 34.2 % (ref 36.5–49.3)
HGB BLD-MCNC: 11 G/DL (ref 12–17)
IMM GRANULOCYTES # BLD AUTO: 0.03 THOUSAND/UL (ref 0–0.2)
IMM GRANULOCYTES NFR BLD AUTO: 0 % (ref 0–2)
LYMPHOCYTES # BLD AUTO: 2.11 THOUSANDS/ΜL (ref 0.6–4.47)
LYMPHOCYTES NFR BLD AUTO: 26 % (ref 14–44)
MCH RBC QN AUTO: 29.1 PG (ref 26.8–34.3)
MCHC RBC AUTO-ENTMCNC: 32.2 G/DL (ref 31.4–37.4)
MCV RBC AUTO: 91 FL (ref 82–98)
MONOCYTES # BLD AUTO: 0.75 THOUSAND/ΜL (ref 0.17–1.22)
MONOCYTES NFR BLD AUTO: 9 % (ref 4–12)
NEUTROPHILS # BLD AUTO: 5.06 THOUSANDS/ΜL (ref 1.85–7.62)
NEUTS SEG NFR BLD AUTO: 63 % (ref 43–75)
NRBC BLD AUTO-RTO: 0 /100 WBCS
PLATELET # BLD AUTO: 225 THOUSANDS/UL (ref 149–390)
PMV BLD AUTO: 9.8 FL (ref 8.9–12.7)
POTASSIUM SERPL-SCNC: 3.3 MMOL/L (ref 3.5–5.3)
PROT SERPL-MCNC: 6 G/DL (ref 6.4–8.2)
RBC # BLD AUTO: 3.78 MILLION/UL (ref 3.88–5.62)
SODIUM SERPL-SCNC: 141 MMOL/L (ref 136–145)
WBC # BLD AUTO: 8.12 THOUSAND/UL (ref 4.31–10.16)

## 2019-02-23 PROCEDURE — 85025 COMPLETE CBC W/AUTO DIFF WBC: CPT | Performed by: INTERNAL MEDICINE

## 2019-02-23 PROCEDURE — 80053 COMPREHEN METABOLIC PANEL: CPT | Performed by: INTERNAL MEDICINE

## 2019-02-23 PROCEDURE — 99232 SBSQ HOSP IP/OBS MODERATE 35: CPT | Performed by: PHYSICIAN ASSISTANT

## 2019-02-23 RX ORDER — METRONIDAZOLE 500 MG/1
500 TABLET ORAL EVERY 8 HOURS SCHEDULED
Status: DISCONTINUED | OUTPATIENT
Start: 2019-02-23 | End: 2019-02-23

## 2019-02-23 RX ORDER — PANTOPRAZOLE SODIUM 40 MG/1
40 TABLET, DELAYED RELEASE ORAL
Status: DISCONTINUED | OUTPATIENT
Start: 2019-02-23 | End: 2019-02-24

## 2019-02-23 RX ORDER — CIPROFLOXACIN 2 MG/ML
400 INJECTION, SOLUTION INTRAVENOUS EVERY 12 HOURS
Status: DISCONTINUED | OUTPATIENT
Start: 2019-02-23 | End: 2019-02-26 | Stop reason: HOSPADM

## 2019-02-23 RX ORDER — CIPROFLOXACIN 500 MG/1
500 TABLET, FILM COATED ORAL EVERY 12 HOURS SCHEDULED
Status: DISCONTINUED | OUTPATIENT
Start: 2019-02-23 | End: 2019-02-23

## 2019-02-23 RX ORDER — POTASSIUM CHLORIDE 20 MEQ/1
40 TABLET, EXTENDED RELEASE ORAL ONCE
Status: COMPLETED | OUTPATIENT
Start: 2019-02-23 | End: 2019-02-23

## 2019-02-23 RX ORDER — PANTOPRAZOLE SODIUM 40 MG/1
40 TABLET, DELAYED RELEASE ORAL
Status: DISCONTINUED | OUTPATIENT
Start: 2019-02-23 | End: 2019-02-23

## 2019-02-23 RX ADMIN — CIPROFLOXACIN 400 MG: 2 INJECTION, SOLUTION INTRAVENOUS at 16:19

## 2019-02-23 RX ADMIN — CLONAZEPAM 1.5 MG: 1 TABLET ORAL at 21:07

## 2019-02-23 RX ADMIN — ATORVASTATIN CALCIUM 40 MG: 40 TABLET, FILM COATED ORAL at 16:20

## 2019-02-23 RX ADMIN — METRONIDAZOLE 500 MG: 500 INJECTION, SOLUTION INTRAVENOUS at 20:53

## 2019-02-23 RX ADMIN — MIRTAZAPINE 7.5 MG: 15 TABLET, FILM COATED ORAL at 21:08

## 2019-02-23 RX ADMIN — POTASSIUM CHLORIDE 40 MEQ: 1500 TABLET, EXTENDED RELEASE ORAL at 12:44

## 2019-02-23 RX ADMIN — METRONIDAZOLE 500 MG: 500 INJECTION, SOLUTION INTRAVENOUS at 12:45

## 2019-02-23 RX ADMIN — BUPROPION HYDROCHLORIDE 75 MG: 75 TABLET, FILM COATED ORAL at 17:46

## 2019-02-23 RX ADMIN — ASPIRIN 81 MG: 81 TABLET, COATED ORAL at 08:10

## 2019-02-23 RX ADMIN — METRONIDAZOLE 500 MG: 500 INJECTION, SOLUTION INTRAVENOUS at 04:36

## 2019-02-23 RX ADMIN — SERTRALINE HYDROCHLORIDE 200 MG: 50 TABLET ORAL at 08:10

## 2019-02-23 RX ADMIN — ENOXAPARIN SODIUM 40 MG: 40 INJECTION SUBCUTANEOUS at 08:10

## 2019-02-23 RX ADMIN — SODIUM CHLORIDE 75 ML/HR: 0.9 INJECTION, SOLUTION INTRAVENOUS at 08:10

## 2019-02-23 RX ADMIN — SODIUM CHLORIDE 75 ML/HR: 0.9 INJECTION, SOLUTION INTRAVENOUS at 23:39

## 2019-02-23 RX ADMIN — CIPROFLOXACIN 400 MG: 2 INJECTION, SOLUTION INTRAVENOUS at 04:36

## 2019-02-23 RX ADMIN — PANTOPRAZOLE SODIUM 40 MG: 40 TABLET, DELAYED RELEASE ORAL at 16:20

## 2019-02-23 RX ADMIN — PANTOPRAZOLE SODIUM 40 MG: 40 TABLET, DELAYED RELEASE ORAL at 09:50

## 2019-02-23 RX ADMIN — BUPROPION HYDROCHLORIDE 75 MG: 75 TABLET, FILM COATED ORAL at 09:50

## 2019-02-23 NOTE — ASSESSMENT & PLAN NOTE
· History of Diaz's esophagus - last EGD in December  · Presented with N/V  · Will increase PPI to BID currently

## 2019-02-23 NOTE — ASSESSMENT & PLAN NOTE
· Patient admitted with acute diverticulitis  · Recently discharged but had worsening LLQ pain and N/V at home  · CT A/P with contrast shows pericolonic stranding at the sigmoid and descending colon, no abscess or perforation  · Continue Cipro and Flagyl IV  · Serial abdominal exams, supportive care/IVFs  · Trial of clear liquids  · He will need colonoscopy as outpatient in 4-6 weeks

## 2019-02-23 NOTE — UTILIZATION REVIEW
Initial Clinical Review    Admission: Date/Time/Statement: 2/22/19 @ 1302 INPATIENT  Orders Placed This Encounter   Procedures    Inpatient Admission (expected length of stay for this patient Order details is greater than two midnights)     Standing Status:   Standing     Number of Occurrences:   1     Order Specific Question:   Admitting Physician     Answer:   Josh Hoskins     Order Specific Question:   Level of Care     Answer:   Med Surg [16]     Order Specific Question:   Estimated length of stay     Answer:   More than 2 Midnights     Order Specific Question:   Certification     Answer:   I certify that inpatient services are medically necessary for this patient for a duration of greater than two midnights  See H&P and MD Progress Notes for additional information about the patient's course of treatment  ED: Date/Time/Mode of Arrival:   ED Arrival Information     Expected Arrival Acuity Means of Arrival Escorted By Service Admission Type    - 2/22/2019 10:37 Urgent Walk-In Self General Medicine Urgent    Arrival Complaint    VOMITING,AB PAIN        Chief Complaint   Patient presents with    Vomiting     pt c/o vomiting since this morning  was recently seen here for the same     Assessment/Plan: 47 y/o male with hx CAD, Diaz's esophagus, ulcerative colitis presents to ED from home with nausea, vomiting, lower abd pain for 2-3 days, worsening this am   Pt had recent observation for chest pain and abd pain  He had CT abd/pelvis on 2/20 which showed Findings which could be compatible with minimal diverticulitis near the junction of sigmoid and descending colon  He was placed on cipro and flagyl but isn't able to tolerate those meds due to vomiting  He also c/o fevers, diaphoresis, intermittent chest pain with associated SOB  On exam, pt has lower abdominal tenderness  Admitted as inpatient due to acute diverticulitis, chest pain  Rule out ACS given hx of CAD  Monitor on tele    Trend troponins  Cardiology eval   Hold Imdur and Lopressor given episodes of bradycardia  Continue IV cipro and flagyl  Continue IVF  Increase PPI to BID  ED Triage Vitals [02/22/19 1045]   Temperature Pulse Respirations Blood Pressure SpO2   98 3 °F (36 8 °C) (!) 45 16 (!) 192/94 97 %      Temp Source Heart Rate Source Patient Position - Orthostatic VS BP Location FiO2 (%)   Oral Monitor Lying Right arm --      Pain Score       8        Wt Readings from Last 1 Encounters:   02/22/19 101 kg (222 lb 7 1 oz)     Vital Signs (abnormal):   02/22/19 1946 99 7 °F (37 6 °C) 94 18 118/74 93 %    02/22/19 1405  75 27 163/79 93 %    02/22/19 1403  50  22 163/79 93 % None (Room air)   02/22/19 1230  50 22 179/90 93 % None (Room air)   02/22/19 1120      None (Room air)   02/22/19 1048    181/87       Pertinent Labs/Diagnostic Test Results:   K+ 4 0, 3 3  Alb 3 7, 3 0  Trop <0 02  WBC 10 94, 8 12  H/H 13 2/40 9, 11 0/34 2  UA trace blood, 30 protein, 1-2 RBC, occasional bacteria, occasional Ca oxalate crystals    EKG:  Sinus bradycardia  CXR:  Patchy left basilar subsegmental atelectasis  CT abd/pelvis:  Persistent mild pericolonic stranding at the junction of the sigmoid and descending colon, which may represent mild/early diverticulitis   No findings to suggest perforation  ED Treatment:   Medication Administration from 02/22/2019 1037 to 02/22/2019 1424       Date/Time Order Dose Route Action     02/22/2019 1236 sodium chloride 0 9 % bolus 1,000 mL 1,000 mL Intravenous New Bag     02/22/2019 1236 ondansetron (ZOFRAN) injection 4 mg 4 mg Intravenous Given     02/22/2019 1236 morphine (PF) 4 mg/mL injection 4 mg 4 mg Intravenous Given        Past Medical/Surgical History:    Active Ambulatory Problems     Diagnosis Date Noted    Abdominal pain 02/15/2018    Anxiety and depression 02/15/2018    Diaz's esophagus 02/15/2018    Essential hypertension 02/15/2018    Dyslipidemia 02/15/2018    Obesity (BMI 30 0-34 9) 02/15/2018    Ulcerative colitis without complications ([email or web address] 75 ) 71/97/0611    Gastroesophageal reflux disease with esophagitis 02/15/2018    Non-intractable vomiting with nausea 12/08/2018    Sinus bradycardia 12/08/2018    Leukocytosis 12/08/2018    Abnormal CT of the abdomen 12/08/2018    Vomiting 12/08/2018    Chest pain 02/20/2019     Past Medical History:   Diagnosis Date    Diaz's esophagus     Coronary artery disease     Depression     Diverticulitis     GERD (gastroesophageal reflux disease)     Hemorrhoid     Hyperlipidemia     Hypertension     UC (ulcerative colitis) ([email or web address] 75 )      Admitting Diagnosis: Vomiting [R11 10]  Age/Sex: 46 y o  male    Admission Orders:  SCDs  VS  Tele  Labs  Clear liquid diet  Cardiology consult  CT abd/pelvis    Scheduled Meds:   Current Facility-Administered Medications:  acetaminophen 650 mg Oral Q6H PRN x1   aspirin 81 mg Oral Daily   atorvastatin 40 mg Oral Daily   buPROPion 75 mg Oral BID   ciprofloxacin 400 mg Intravenous Q12H   clonazePAM 1 5 mg Oral HS   enoxaparin 40 mg Subcutaneous Daily   hydrALAZINE 5 mg Intravenous Q6H PRN   iohexol 50 mL Oral Once in imaging   metroNIDAZOLE 500 mg Intravenous Q8H   mirtazapine 7 5 mg Oral HS   morphine injection 2 mg Intravenous Q4H PRN x1   ondansetron 4 mg Intravenous Q6H PRN x1   pantoprazole 40 mg Oral BID AC   sertraline 200 mg Oral Daily   sodium chloride 75 mL/hr Intravenous Continuous     Network Utilization Review Department  Phone: [phone]; Fax [phone]  [email or web address]  ATTENTION: Please call with any questions or concerns to [phone]  and carefully listen to the prompts so that you are directed to the right person  Send all requests for admission clinical reviews, approved or denied determinations and any other requests to fax [phone]   All voicemails are confidential

## 2019-02-23 NOTE — ASSESSMENT & PLAN NOTE
· History of recurrent CP  · Likely currently secondary to retching from N/V   · PPI BID  · Telemetry, serial troponins  · H/O recent cardiac cath 1/16 with 70% stenosis tubular distal RCA, no PCI performed  · On Asa, statin as well as Lopressor and Imdur which were held on admission due to bradycardia - cardiology consulted for further recommendations

## 2019-02-23 NOTE — PROGRESS NOTES
The ciprofloxacin, metronidazole and pantoprazole has / have been converted to Oral per [facility] for Adults as approved by the Pharmacy and Therapeutics Committee  The patient met all eligible criteria:  3 Age = 24years old   2) Received at least one dose of the IV form   3) Receiving at least one other scheduled oral/enteral medication   4) Tolerating an oral/enteral diet   and did not have any exclusions:   1) Critical care patient   2) Active GI bleed (IF assessing H2RAs or PPIs)   3) Continuous tube feeding (IF assessing cipro, doxycycline, levofloxacin, minocycline, rifampin, or voriconazole)   4) Receiving PO vancomycin (IF assessing metronidazole)   5) Persistent nausea and/or vomiting   6) Ileus or gastrointestinal obstruction   7) Reshma/nasogastric tube set for continuous suction   8) Specific order not to automatically convert to PO (in the order's comments or if discussed in the most recent Infectious Disease or primary team's progress notes)

## 2019-02-23 NOTE — PROGRESS NOTES
Progress Note Edgardo Acevedo 1965, 46 y o  male MRN: [id number]    Unit/Bed#: -01 Encounter: [id number]    Primary Care Provider: Ketty Fong MD   Date and time admitted to hospital: 2/22/2019 10:41 AM        * Diverticulitis  Assessment & Plan  · Patient admitted with acute diverticulitis  · Recently discharged but had worsening LLQ pain and N/V at home  · CT A/P with contrast shows pericolonic stranding at the sigmoid and descending colon, no abscess or perforation  · Continue Cipro and Flagyl IV  · Serial abdominal exams, supportive care/IVFs  · Trial of clear liquids  · He will need colonoscopy as outpatient in 4-6 weeks  Chest pain  Assessment & Plan  · History of recurrent CP  · Likely currently secondary to retching from N/V   · PPI BID  · Telemetry, serial troponins  · H/O recent cardiac cath 1/16 with 70% stenosis tubular distal RCA, no PCI performed  · On Asa, statin as well as Lopressor and Imdur which were held on admission due to bradycardia - cardiology consulted for further recommendations  Microscopic colitis  Assessment & Plan  · Patient reports a history of microscopic colitis treated with Budesonide  Dyslipidemia  Assessment & Plan  · Continue Statin  Diaz's esophagus  Assessment & Plan  · History of Diaz's esophagus - last EGD in December  · Presented with N/V  · Will increase PPI to BID currently  Anxiety and depression  Assessment & Plan  · Continue Wellbutrin, Zoloft, Klonopin  VTE Pharmacologic Prophylaxis:   Pharmacologic: Enoxaparin (Lovenox)     Patient Centered Rounds: I have performed bedside rounds with nursing staff today  Discussions with Specialists or Other Care Team Provider: Awaiting cardiology consult    Education and Discussions with Family / Patient: Discussed plan of care with patient  Time Spent for Care: 30 minutes    More than 50% of total time spent on counseling and coordination of care as described above     Current Length of Stay: 1 day(s)    Current Patient Status: Inpatient   Certification Statement: The patient will continue to require additional inpatient hospital stay due to treatment of diverticulitis requiring IV antibiotics  Discharge Plan: Not medically cleared  Code Status: Level 1 - Full Code      Subjective:   Patient reports worsening abdominal pain and N/V at home after discharge  He also reports chest discomfort  No SOB  No hematemesis  He reports a small BM yesterday  Objective:     Vitals:   Temp (24hrs), Av 2 °F (37 3 °C), Min:98 6 °F (37 °C), Max:99 7 °F (37 6 °C)    Temp:  [98 6 °F (37 °C)-99 7 °F (37 6 °C)] 98 6 °F (37 °C)  HR:  [50-94] 67  Resp:  [17-27] 17  BP: (118-179)/(70-90) 119/70  SpO2:  [93 %] 93 %  Body mass index is 31 92 kg/m²  Input and Output Summary (last 24 hours): Intake/Output Summary (Last 24 hours) at 2019 1136  Last data filed at 2019 1336  Gross per 24 hour   Intake 1000 ml   Output    Net 1000 ml       Physical Exam:     Physical Exam   Constitutional: He is oriented to person, place, and time  No distress  HENT:   Head: Normocephalic and atraumatic  Eyes: No scleral icterus  Neck: Neck supple  Cardiovascular: Normal rate and regular rhythm  Pulmonary/Chest: Effort normal  No respiratory distress  He has no wheezes  He has no rales  Abdominal: Soft  Bowel sounds are normal  There is tenderness  There is no rebound and no guarding  TTP LLQ  Musculoskeletal: He exhibits no edema  Neurological: He is alert and oriented to person, place, and time  Skin: Skin is warm and dry  He is not diaphoretic  Vitals reviewed          Additional Data:     Labs:    Results from last 7 days   Lab Units 19  0511   WBC Thousand/uL 8 12   HEMOGLOBIN g/dL 11 0*   HEMATOCRIT % 34 2*   PLATELETS Thousands/uL 225   NEUTROS PCT % 63   LYMPHS PCT % 26   MONOS PCT % 9   EOS PCT % 2     Results from last 7 days   Lab Units 02/23/19  0511   POTASSIUM mmol/L 3 3*   CHLORIDE mmol/L 106   CO2 mmol/L 28   BUN mg/dL 11   CREATININE mg/dL 0 95   CALCIUM mg/dL 8 4   ALK PHOS U/L 50   ALT U/L 20   AST U/L 11           * I Have Reviewed All Lab Data Listed Above  * Additional Pertinent Lab Tests Reviewed: All Labs Within Last 24 Hours Reviewed    Imaging:    Imaging Reports Reviewed Today Include: CT A/P as noted above  Recent Cultures (last 7 days):           Last 24 Hours Medication List:     Current Facility-Administered Medications:  acetaminophen 650 mg Oral Q6H PRN Asia Cabral MD    aspirin 81 mg Oral Daily Asia Cabral MD    atorvastatin 40 mg Oral Daily Asia Cabral MD    buPROPion 75 mg Oral BID Asia Cabral MD    ciprofloxacin 400 mg Intravenous Q12H Christina Stevens PA-C    clonazePAM 1 5 mg Oral HS J Carlos Javier PA-C    enoxaparin 40 mg Subcutaneous Daily Asia Cabral MD    hydrALAZINE 5 mg Intravenous Q6H PRN Asia Cabral MD    iohexol 50 mL Oral Once in imaging Asia Cabral MD    metroNIDAZOLE 500 mg Intravenous Q8H Kendrick Javed PA-C    mirtazapine 7 5 mg Oral HS Asia Cabral MD    morphine injection 2 mg Intravenous Q4H PRN Asia Cabral MD    ondansetron 4 mg Intravenous Q6H PRN Asia Cabral MD    pantoprazole 40 mg Oral BID AC Kendrick Javed PA-C    potassium chloride 40 mEq Oral Once Kendrick Javed PA-C    sertraline 200 mg Oral Daily Asia Cabral MD    sodium chloride 75 mL/hr Intravenous Continuous Asia Cabral MD Last Rate: 75 mL/hr (02/23/19 0810)        Today, Patient Was Seen By: Kendrick Javed PA-C    ** Please Note: Dictation voice to text software may have been used in the creation of this document   **

## 2019-02-24 ENCOUNTER — APPOINTMENT (INPATIENT)
Dept: ULTRASOUND IMAGING | Facility: HOSPITAL | Age: 54
DRG: 244 | End: 2019-02-24
Payer: COMMERCIAL

## 2019-02-24 LAB
ANION GAP SERPL CALCULATED.3IONS-SCNC: 8 MMOL/L (ref 4–13)
BASOPHILS # BLD AUTO: 0.03 THOUSANDS/ΜL (ref 0–0.1)
BASOPHILS NFR BLD AUTO: 1 % (ref 0–1)
BUN SERPL-MCNC: 6 MG/DL (ref 5–25)
CALCIUM SERPL-MCNC: 8.5 MG/DL (ref 8.3–10.1)
CHLORIDE SERPL-SCNC: 107 MMOL/L (ref 100–108)
CO2 SERPL-SCNC: 27 MMOL/L (ref 21–32)
CREAT SERPL-MCNC: 0.92 MG/DL (ref 0.6–1.3)
EOSINOPHIL # BLD AUTO: 0.16 THOUSAND/ΜL (ref 0–0.61)
EOSINOPHIL NFR BLD AUTO: 3 % (ref 0–6)
ERYTHROCYTE [DISTWIDTH] IN BLOOD BY AUTOMATED COUNT: 14.4 % (ref 11.6–15.1)
GFR SERPL CREATININE-BSD FRML MDRD: 94 ML/MIN/1.73SQ M
GLUCOSE SERPL-MCNC: 127 MG/DL (ref 65–140)
HCT VFR BLD AUTO: 34.8 % (ref 36.5–49.3)
HGB BLD-MCNC: 11 G/DL (ref 12–17)
IMM GRANULOCYTES # BLD AUTO: 0.03 THOUSAND/UL (ref 0–0.2)
IMM GRANULOCYTES NFR BLD AUTO: 1 % (ref 0–2)
LYMPHOCYTES # BLD AUTO: 1.53 THOUSANDS/ΜL (ref 0.6–4.47)
LYMPHOCYTES NFR BLD AUTO: 32 % (ref 14–44)
MAGNESIUM SERPL-MCNC: 1.7 MG/DL (ref 1.6–2.6)
MCH RBC QN AUTO: 29 PG (ref 26.8–34.3)
MCHC RBC AUTO-ENTMCNC: 31.6 G/DL (ref 31.4–37.4)
MCV RBC AUTO: 92 FL (ref 82–98)
MONOCYTES # BLD AUTO: 0.46 THOUSAND/ΜL (ref 0.17–1.22)
MONOCYTES NFR BLD AUTO: 10 % (ref 4–12)
NEUTROPHILS # BLD AUTO: 2.55 THOUSANDS/ΜL (ref 1.85–7.62)
NEUTS SEG NFR BLD AUTO: 53 % (ref 43–75)
NRBC BLD AUTO-RTO: 0 /100 WBCS
PLATELET # BLD AUTO: 205 THOUSANDS/UL (ref 149–390)
PMV BLD AUTO: 9.5 FL (ref 8.9–12.7)
POTASSIUM SERPL-SCNC: 3.6 MMOL/L (ref 3.5–5.3)
RBC # BLD AUTO: 3.79 MILLION/UL (ref 3.88–5.62)
SODIUM SERPL-SCNC: 142 MMOL/L (ref 136–145)
WBC # BLD AUTO: 4.76 THOUSAND/UL (ref 4.31–10.16)

## 2019-02-24 PROCEDURE — 80048 BASIC METABOLIC PNL TOTAL CA: CPT | Performed by: PHYSICIAN ASSISTANT

## 2019-02-24 PROCEDURE — C9113 INJ PANTOPRAZOLE SODIUM, VIA: HCPCS | Performed by: PHYSICIAN ASSISTANT

## 2019-02-24 PROCEDURE — 76705 ECHO EXAM OF ABDOMEN: CPT

## 2019-02-24 PROCEDURE — 99254 IP/OBS CNSLTJ NEW/EST MOD 60: CPT | Performed by: INTERNAL MEDICINE

## 2019-02-24 PROCEDURE — 83735 ASSAY OF MAGNESIUM: CPT | Performed by: PHYSICIAN ASSISTANT

## 2019-02-24 PROCEDURE — 99232 SBSQ HOSP IP/OBS MODERATE 35: CPT | Performed by: PHYSICIAN ASSISTANT

## 2019-02-24 PROCEDURE — 85025 COMPLETE CBC W/AUTO DIFF WBC: CPT | Performed by: PHYSICIAN ASSISTANT

## 2019-02-24 RX ORDER — DICYCLOMINE HCL 20 MG
20 TABLET ORAL 3 TIMES DAILY PRN
Status: DISCONTINUED | OUTPATIENT
Start: 2019-02-24 | End: 2019-02-26 | Stop reason: HOSPADM

## 2019-02-24 RX ORDER — PANTOPRAZOLE SODIUM 40 MG/1
40 INJECTION, POWDER, FOR SOLUTION INTRAVENOUS EVERY 12 HOURS SCHEDULED
Status: DISCONTINUED | OUTPATIENT
Start: 2019-02-24 | End: 2019-02-26 | Stop reason: HOSPADM

## 2019-02-24 RX ORDER — METOCLOPRAMIDE HYDROCHLORIDE 5 MG/ML
10 INJECTION INTRAMUSCULAR; INTRAVENOUS EVERY 6 HOURS PRN
Status: DISCONTINUED | OUTPATIENT
Start: 2019-02-24 | End: 2019-02-26 | Stop reason: HOSPADM

## 2019-02-24 RX ADMIN — PANTOPRAZOLE SODIUM 40 MG: 40 INJECTION, POWDER, FOR SOLUTION INTRAVENOUS at 13:02

## 2019-02-24 RX ADMIN — ATORVASTATIN CALCIUM 40 MG: 40 TABLET, FILM COATED ORAL at 18:05

## 2019-02-24 RX ADMIN — ENOXAPARIN SODIUM 40 MG: 40 INJECTION SUBCUTANEOUS at 08:21

## 2019-02-24 RX ADMIN — ASPIRIN 81 MG: 81 TABLET, COATED ORAL at 08:21

## 2019-02-24 RX ADMIN — PANTOPRAZOLE SODIUM 40 MG: 40 TABLET, DELAYED RELEASE ORAL at 06:09

## 2019-02-24 RX ADMIN — METRONIDAZOLE 500 MG: 500 INJECTION, SOLUTION INTRAVENOUS at 21:37

## 2019-02-24 RX ADMIN — MORPHINE SULFATE 2 MG: 2 INJECTION, SOLUTION INTRAMUSCULAR; INTRAVENOUS at 09:48

## 2019-02-24 RX ADMIN — METOCLOPRAMIDE 10 MG: 5 INJECTION, SOLUTION INTRAMUSCULAR; INTRAVENOUS at 11:14

## 2019-02-24 RX ADMIN — BUPROPION HYDROCHLORIDE 75 MG: 75 TABLET, FILM COATED ORAL at 08:21

## 2019-02-24 RX ADMIN — ONDANSETRON 4 MG: 2 INJECTION INTRAMUSCULAR; INTRAVENOUS at 09:25

## 2019-02-24 RX ADMIN — CLONAZEPAM 1.5 MG: 1 TABLET ORAL at 21:29

## 2019-02-24 RX ADMIN — CIPROFLOXACIN 400 MG: 2 INJECTION, SOLUTION INTRAVENOUS at 18:06

## 2019-02-24 RX ADMIN — METRONIDAZOLE 500 MG: 500 INJECTION, SOLUTION INTRAVENOUS at 04:54

## 2019-02-24 RX ADMIN — CIPROFLOXACIN 400 MG: 2 INJECTION, SOLUTION INTRAVENOUS at 03:41

## 2019-02-24 RX ADMIN — SERTRALINE HYDROCHLORIDE 200 MG: 50 TABLET ORAL at 08:21

## 2019-02-24 RX ADMIN — METRONIDAZOLE 500 MG: 500 INJECTION, SOLUTION INTRAVENOUS at 13:03

## 2019-02-24 RX ADMIN — PANTOPRAZOLE SODIUM 40 MG: 40 INJECTION, POWDER, FOR SOLUTION INTRAVENOUS at 21:29

## 2019-02-24 RX ADMIN — MIRTAZAPINE 7.5 MG: 15 TABLET, FILM COATED ORAL at 21:29

## 2019-02-24 RX ADMIN — DICYCLOMINE HYDROCHLORIDE 20 MG: 20 TABLET ORAL at 11:14

## 2019-02-24 RX ADMIN — ACETAMINOPHEN 650 MG: 325 TABLET, FILM COATED ORAL at 11:14

## 2019-02-24 RX ADMIN — BUPROPION HYDROCHLORIDE 75 MG: 75 TABLET, FILM COATED ORAL at 18:06

## 2019-02-24 NOTE — PLAN OF CARE
Problem: PAIN - ADULT  Goal: Verbalizes/displays adequate comfort level or baseline comfort level  Description  Interventions:  - Encourage patient to monitor pain and request assistance  - Assess pain using appropriate pain scale  - Administer analgesics based on type and severity of pain and evaluate response  - Implement non-pharmacological measures as appropriate and evaluate response  - Consider cultural and social influences on pain and pain management  - Notify physician/advanced practitioner if interventions unsuccessful or patient reports new pain  2/24/2019 0129 by Anil Mendoza RN  Outcome: Progressing  2/24/2019 0129 by Anil Mendoza RN  Outcome: Progressing     Problem: INFECTION - ADULT  Goal: Absence or prevention of progression during hospitalization  Description  INTERVENTIONS:  - Assess and monitor for signs and symptoms of infection  - Monitor lab/diagnostic results  - Monitor all insertion sites, i e  indwelling lines, tubes, and drains  - Monitor endotracheal (as able) and nasal secretions for changes in amount and color  - [place] appropriate cooling/warming therapies per order  - Administer medications as ordered  - Instruct and encourage patient and family to use good hand hygiene technique  - Identify and instruct in appropriate isolation precautions for identified infection/condition  2/24/2019 0129 by Anil Mendoza RN  Outcome: Progressing  2/24/2019 0129 by Anil Mendoza RN  Outcome: Progressing     Problem: SAFETY ADULT  Goal: Patient will remain free of falls  Description  INTERVENTIONS:  - Assess patient frequently for physical needs  -  Identify cognitive and physical deficits and behaviors that affect risk of falls    -  [place] fall precautions as indicated by assessment   - Educate patient/family on patient safety including physical limitations  - Instruct patient to call for assistance with activity based on assessment  - Modify environment to reduce risk of injury  - Consider OT/PT consult to assist with strengthening/mobility  2/24/2019 0129 by Kd Padilla RN  Outcome: Progressing  2/24/2019 0129 by Kd Padilla RN  Outcome: Progressing  Goal: Maintain or return to baseline ADL function  Description  INTERVENTIONS:  -  Assess patient's ability to carry out ADLs; assess patient's baseline for ADL function and identify physical deficits which impact ability to perform ADLs (bathing, care of mouth/teeth, toileting, grooming, dressing, etc )  - Assess/evaluate cause of self-care deficits   - Assess range of motion  - Assess patient's mobility; develop plan if impaired  - Assess patient's need for assistive devices and provide as appropriate  - Encourage maximum independence but intervene and supervise when necessary  ¯ Involve family in performance of ADLs  ¯ Assess for home care needs following discharge   ¯ Request OT consult to assist with ADL evaluation and planning for discharge  ¯ Provide patient education as appropriate  2/24/2019 0129 by Kd Padilla RN  Outcome: Progressing  2/24/2019 0129 by Kd Padilla RN  Outcome: Progressing  Goal: Maintain or return mobility status to optimal level  Description  INTERVENTIONS:  - Assess patient's baseline mobility status (ambulation, transfers, stairs, etc )    - Identify cognitive and physical deficits and behaviors that affect mobility  - Identify mobility aids required to assist with transfers and/or ambulation (gait belt, sit-to-stand, lift, walker, cane, etc )  - [place] fall precautions as indicated by assessment  - Record patient progress and toleration of activity level on Mobility SBAR; progress patient to next Phase/Stage  - Instruct patient to call for assistance with activity based on assessment  - Request Rehabilitation consult to assist with strengthening/weightbearing, etc   2/24/2019 0129 by Kd Padilla RN  Outcome: Progressing  2/24/2019 0129 by Sridevi Bai RN  Outcome: Progressing     Problem: DISCHARGE PLANNING  Goal: Discharge to home or other facility with appropriate resources  Description  INTERVENTIONS:  - Identify barriers to discharge w/patient and caregiver  - Arrange for needed discharge resources and transportation as appropriate  - Identify discharge learning needs (meds, wound care, etc )  - Arrange for interpretive services to assist at discharge as needed  - Refer to Case Management Department for coordinating discharge planning if the patient needs post-hospital services based on physician/advanced practitioner order or complex needs related to functional status, cognitive ability, or social support system  2/24/2019 0129 by Sridevi Bai RN  Outcome: Progressing  2/24/2019 0129 by Sridevi Bai RN  Outcome: Progressing     Problem: Knowledge Deficit  Goal: Patient/family/caregiver demonstrates understanding of disease process, treatment plan, medications, and discharge instructions  Description  Complete learning assessment and assess knowledge base    Interventions:  - Provide teaching at level of understanding  - Provide teaching via preferred learning methods  2/24/2019 0129 by Sridevi Bai RN  Outcome: Progressing  2/24/2019 0129 by Sridevi Bai RN  Outcome: Progressing     Problem: GASTROINTESTINAL - ADULT  Goal: Minimal or absence of nausea and/or vomiting  Description  INTERVENTIONS:  - Administer IV fluids as ordered to ensure adequate hydration  - Maintain NPO status until nausea and vomiting are resolved  - Nasogastric tube as ordered  - Administer ordered antiemetic medications as needed  - Provide nonpharmacologic comfort measures as appropriate  - Advance diet as tolerated, if ordered  - Nutrition services referral to assist patient with adequate nutrition and appropriate food choices  2/24/2019 0129 by Sridevi Bai RN  Outcome: Progressing  2/24/2019 0129 by Bharath Johnson RN  Outcome: Progressing  Goal: Maintains or returns to baseline bowel function  Description  INTERVENTIONS:  - Assess bowel function  - Encourage oral fluids to ensure adequate hydration  - Administer IV fluids as ordered to ensure adequate hydration  - Administer ordered medications as needed  - Encourage mobilization and activity  - Nutrition services referral to assist patient with appropriate food choices  2/24/2019 0129 by Bharath Johnson RN  Outcome: Progressing  2/24/2019 0129 by Bharath Johnson RN  Outcome: Progressing  Goal: Maintains adequate nutritional intake  Description  INTERVENTIONS:  - Monitor percentage of each meal consumed  - Identify factors contributing to decreased intake, treat as appropriate  - Assist with meals as needed  - Monitor I&O, WT and lab values  - Obtain nutrition services referral as needed  2/24/2019 0129 by Bharath Johnson RN  Outcome: Progressing  2/24/2019 0129 by Bharath Johnson RN  Outcome: Progressing

## 2019-02-24 NOTE — PROGRESS NOTES
Progress Note Kacey Meng 1965, 46 y o  male MRN: [id number]    Unit/Bed#: -01 Encounter: [id number]    Primary Care Provider: Nicolás Hicks MD   Date and time admitted to hospital: 2/22/2019 10:41 AM        * Diverticulitis  Assessment & Plan  · Patient admitted with acute diverticulitis  · Recently discharged but had worsening LLQ pain and N/V at home  · CT A/P with contrast shows pericolonic stranding at the sigmoid and descending colon, no abscess or perforation, no obstruction  · Continue Cipro and Flagyl IV  · Serial abdominal exams, supportive care  · Diet was advanced but patient had return of N/V  Prior CT showed pancreatic ductal dilation and MRI was recommended as outpatient  Will check US/MRI now due to ongoing N/V  Protonix switched to IV BID (he has known Diaz's)  Reglan added  · He will need colonoscopy as outpatient in 4-6 weeks  Chest pain  Assessment & Plan  · History of recurrent CP  · Likely currently secondary to retching from N/V   · PPI BID  · Telemetry, Troponins negative  · H/O recent cardiac cath 1/16 with 70% stenosis tubular distal RCA, no PCI performed  · On Asa, statin  Also, on Lopressor and Imdur which were held on admission due to bradycardia - cardiology consulted for further recommendations  Microscopic colitis  Assessment & Plan  · Patient reports a history of microscopic colitis treated with Budesonide  Dyslipidemia  Assessment & Plan  · Continue Statin  Diaz's esophagus  Assessment & Plan  · History of Diaz's esophagus - last EGD in December  · Presented with N/V  · Will increase PPI to BID currently  Anxiety and depression  Assessment & Plan  · Continue Wellbutrin, Zoloft, Klonopin  VTE Pharmacologic Prophylaxis:   Pharmacologic: Enoxaparin (Lovenox)  Mechanical VTE Prophylaxis in Place: Yes    Patient Centered Rounds: I discussed with nurse outside of patient's room        Education and Discussions with Family / Patient: Discussed with patient  Time Spent for Care: 30 minutes  More than 50% of total time spent on counseling and coordination of care as described above  Current Length of Stay: 2 day(s)    Current Patient Status: Inpatient   Certification Statement: The patient will continue to require additional inpatient hospital stay due to treatment of intractable N/V, diverticulitis  Discharge Plan: Not medically cleared  Code Status: Level 1 - Full Code      Subjective:   Patient was feeling much better this am and pain was improved and he was tolerating liquids  Diet was then advanced however, then he reports he had significant nausea and vomiting  No SOB  He had a lose BM this am     Objective:     Vitals:   Temp (24hrs), Av 1 °F (36 7 °C), Min:97 7 °F (36 5 °C), Max:98 4 °F (36 9 °C)    Temp:  [97 7 °F (36 5 °C)-98 4 °F (36 9 °C)] 98 4 °F (36 9 °C)  HR:  [54-70] 60  Resp:  [18] 18  BP: ()/(43-75) 124/75  SpO2:  [91 %-94 %] 94 %  Body mass index is 31 92 kg/m²  Input and Output Summary (last 24 hours): Intake/Output Summary (Last 24 hours) at 2019 1258  Last data filed at 2019 9492  Gross per 24 hour   Intake 3008 75 ml   Output    Net 3008 75 ml       Physical Exam:     Physical Exam   Constitutional: He is oriented to person, place, and time  No distress  HENT:   Head: Normocephalic and atraumatic  Eyes: No scleral icterus  Cardiovascular: Normal rate and regular rhythm  S1S2  Pulmonary/Chest: Effort normal  No respiratory distress  He has no wheezes  He has no rales  Abdominal: Soft  Bowel sounds are normal  There is no rebound and no guarding  TTP LLQ  Musculoskeletal: He exhibits no edema  Neurological: He is alert and oriented to person, place, and time  Skin: Skin is warm and dry  He is not diaphoretic  Vitals reviewed           Additional Data:     Labs:    Results from last 7 days   Lab Units 19  0431   WBC Thousand/uL 4 76   HEMOGLOBIN g/dL 11 0*   HEMATOCRIT % 34 8*   PLATELETS Thousands/uL 205   NEUTROS PCT % 53   LYMPHS PCT % 32   MONOS PCT % 10   EOS PCT % 3     Results from last 7 days   Lab Units 02/24/19  0431 02/23/19  0511   POTASSIUM mmol/L 3 6 3 3*   CHLORIDE mmol/L 107 106   CO2 mmol/L 27 28   BUN mg/dL 6 11   CREATININE mg/dL 0 92 0 95   CALCIUM mg/dL 8 5 8 4   ALK PHOS U/L  --  50   ALT U/L  --  20   AST U/L  --  11           * I Have Reviewed All Lab Data Listed Above  * Additional Pertinent Lab Tests Reviewed: All Labs Within Last 24 Hours Reviewed    Imaging:    Imaging Reports Reviewed Today Include: No new imaging yet today  Recent Cultures (last 7 days):           Last 24 Hours Medication List:     Current Facility-Administered Medications:  acetaminophen 650 mg Oral Q6H PRN Contreras Delarosa MD    aspirin 81 mg Oral Daily Contreras Delarosa MD    atorvastatin 40 mg Oral Daily Contreras Delarosa MD    buPROPion 75 mg Oral BID Contreras Delarosa MD    ciprofloxacin 400 mg Intravenous Q12H Hue Moscoso PA-C Last Rate: 400 mg (02/24/19 0341)   clonazePAM 1 5 mg Oral HS J Carlos Javier PA-C    dicyclomine 20 mg Oral TID PRN Hue Moscoso PA-C    enoxaparin 40 mg Subcutaneous Daily Contreras Delarosa MD    hydrALAZINE 5 mg Intravenous Q6H PRN Contreras Delarosa MD    iohexol 50 mL Oral Once in imaging Contreras Delarosa MD    metoclopramide 10 mg Intravenous Q6H PRN Hue Moscoso PA-C    metroNIDAZOLE 500 mg Intravenous Q8H Hue Moscoso PA-C Last Rate: 500 mg (02/24/19 0454)   mirtazapine 7 5 mg Oral HS Contreras Delarosa MD    morphine injection 2 mg Intravenous Q4H PRN Contreras Delarosa MD    ondansetron 4 mg Intravenous Q6H PRN Contreras Delarosa MD    pantoprazole 40 mg Intravenous Q12H [address] Christina Stevens PA-C    sertraline 200 mg Oral Daily Contreras Delarosa MD         Today, Patient Was Seen By: Hue Moscoso PA-C    ** Please Note: Dictation voice to text software may have been used in the creation of this document   **

## 2019-02-24 NOTE — CONSULTS
Consultation - Cardiology Team One  Mila Aleman 46 y o  male MRN: [id number]  Unit/Bed#: -01 Encounter: [id number]    Inpatient consult to Cardiology  Consult performed by: Adri Pacheco PA-C  Consult ordered by: Suzy Richardson PA-C          Physician Requesting Consult: Beti Chen MD  Reason for Consult / Principal Problem:  Sinus bradycardia    HPI: Cardiologist Dr Hi Junior is a 46y o  year old male who has a history of hypertension, nonobstructive CAD and ulcerativ colitis and Diaz's esophagus presents to ED with nausea vomiting epigastric and abdominal pain  Recently evaluated by Dr Serena Herrera for typical anginal symptoms and underwent cardiac catheterization on 01/16/2019  Was found to have moderate sequential lesions in the RCA, no PCI was performed  He subsequently continued to have chest discomfort and was started on Imdur with some relief of his symptoms  He states that the pain was similar to when he had catheterization performed in the past   He had been worked up with negative troponins and normal EKG  Patient was evaluated and cleared for discharge from cardiac standpoint however developed episodes of sinus bradycardia and was reconsulted to evaluate  Patient denies any worsening/changed chest pain, palpitations, shortness of breath, orthopnea, PND, pain or swelling extremities  Patient states that he is still having episodes of nausea and vomiting since admission was evaluated by GI for mild colitis and started on Cipro Flagyl with plan for colonoscopy in 4-6 weeks       REVIEW OF SYSTEMS:  Constitutional:  Denies fever or chills   Eyes:  Denies change in visual acuity   HENT:  Denies nasal congestion or sore throat   Respiratory:  Denies cough or shortness of breath   Cardiovascular:  Denies chest pain or edema   GI:  Denies abdominal pain, nausea, vomiting, bloody stools or diarrhea   :  Denies dysuria, frequency, difficulty in micturition and nocturia  Musculoskeletal:  Denies back pain or joint pain   Neurologic:  Denies headache, focal weakness or sensory changes   Endocrine:  Denies polyuria or polydipsia   Lymphatic:  Denies swollen glands   Psychiatric:  Denies depression or anxiety     Historical Information   Past Medical History:   Diagnosis Date    Diaz's esophagus     Coronary artery disease     Depression     Diverticulitis     GERD (gastroesophageal reflux disease)     Hemorrhoid     Hyperlipidemia     Hypertension     UC [facility]ulcerative colitis) ([facility] [address] )      Past Surgical History:   Procedure Laterality Date    ABDOMINAL SURGERY      radio frequency ablation    BONE GRAFT Left 2018    CARPAL TUNNEL RELEASE Right     COLONOSCOPY      EGD AND COLONOSCOPY      ESOPHAGOGASTRODUODENOSCOPY N/A 2/15/2018    Procedure: ESOPHAGOGASTRODUODENOSCOPY (EGD); Surgeon: Nereida Johnson MD;  Location: [facility];  Service: Gastroenterology    ESOPHAGOGASTRODUODENOSCOPY N/A 12/10/2018    Procedure: ESOPHAGOGASTRODUODENOSCOPY (EGD); Surgeon: Froylan Soto MD;  Location: [facility];   Service: Gastroenterology    TONSILLECTOMY       Social History     Substance and Sexual Activity   Alcohol Use Never    Frequency: Never    Comment: quit 3 years     Social History     Substance and Sexual Activity   Drug Use Yes    Frequency: 3 0 times per week    Types: Marijuana    Comment: 1/15/2019     Social History     Tobacco Use   Smoking Status Former Smoker    Last attempt to quit: 2007    Years since quittin 1   Smokeless Tobacco Former User    Quit date: 1998       Family History:   Family History   Problem Relation Age of Onset    Heart disease Father     Cancer Sister        MEDS & ALLERGIES:  all current active meds have been reviewed and current meds:   Current Facility-Administered Medications   Medication Dose Route Frequency    acetaminophen (TYLENOL) tablet 650 mg  650 mg Oral Q6H PRN    aspirin (ECOTRIN LOW STRENGTH) EC tablet 81 mg  81 mg Oral Daily    atorvastatin (LIPITOR) tablet 40 mg  40 mg Oral Daily    buPROPion (WELLBUTRIN) tablet 75 mg  75 mg Oral BID    ciprofloxacin (CIPRO) IVPB (premix) 400 mg  400 mg Intravenous Q12H    clonazePAM (KlonoPIN) tablet 1 5 mg  1 5 mg Oral HS    enoxaparin (LOVENOX) subcutaneous injection 40 mg  40 mg Subcutaneous Daily    hydrALAZINE (APRESOLINE) injection 5 mg  5 mg Intravenous Q6H PRN    iohexol (OMNIPAQUE) 240 MG/ML solution 50 mL  50 mL Oral Once in imaging    metroNIDAZOLE (FLAGYL) IVPB (premix) 500 mg  500 mg Intravenous Q8H    mirtazapine (REMERON) tablet 7 5 mg  7 5 mg Oral HS    morphine injection 2 mg  2 mg Intravenous Q4H PRN    ondansetron (ZOFRAN) injection 4 mg  4 mg Intravenous Q6H PRN    pantoprazole (PROTONIX) EC tablet 40 mg  40 mg Oral BID AC    sertraline (ZOLOFT) tablet 200 mg  200 mg Oral Daily        Allergies   Allergen Reactions    No Active Allergies        OBJECTIVE:  Vitals:   Vitals:    02/24/19 0803   BP: 124/75   Pulse: 60   Resp: 18   Temp: 98 4 °F (36 9 °C)   SpO2:      Body mass index is 31 92 kg/m²  Systolic (97OYW), LWN:292 , Min:95 , FQC:842     Diastolic (41JNO), NLJ:48, Min:43, Max:75      Intake/Output Summary (Last 24 hours) at 2/24/2019 0847  Last data filed at 2/24/2019 4101  Gross per 24 hour   Intake 3008 75 ml   Output    Net 3008 75 ml     Weight (last 2 days)     Date/Time   Weight    02/22/19 1045   101 (222 44)            Invasive Devices     Peripheral Intravenous Line            Peripheral IV 02/22/19 Right Antecubital 2 days                PHYSICAL EXAMS:  General:  Patient is not in acute distress, laying in the bed comfortably, awake, alert responding to commands  Head: Normocephalic, Atraumatic     HEENT: White sclera, pink conjunctiva,  PERRLA,pharynx benign  Neck:  Supple, no neck vein distention, carotids+2/+2 no bruits, thyromegaly, adenopathy  Respiratory: clear to P/A  Cardiovascular:  PMI normal, S1-S2 normal, No  Murmurs, thrills, gallops, rubs   Regular rhythm  GI:  Abdomen soft nontender  No hepatosplenomegaly, adenopathy, ascites,or rebound tenderness  Extremities: No edema, normal pulses, no calf tenderness, no joint deformities, no venous disease   Integument:  No skin rashes or ulceration  Lymphatic:  No cervical or inguinal lymphadenopathy  Neurologic:  Patient is awake alert, responding to command, well-oriented to time and place and person moving all extremities    LABORATORY RESULTS:  Results from last 7 days   Lab Units 02/22/19  1235 02/21/19  0445 02/20/19  2135   TROPONIN I ng/mL <0 02 <0 02 <0 02     CBC with diff:   Results from last 7 days   Lab Units 02/24/19  0431 02/23/19  0511 02/22/19  1235   WBC Thousand/uL 4 76 8 12 10 94*   HEMOGLOBIN g/dL 11 0* 11 0* 13 2   HEMATOCRIT % 34 8* 34 2* 40 9   MCV fL 92 91 89   PLATELETS Thousands/uL 205 225 268   MCH pg 29 0 29 1 28 8   MCHC g/dL 31 6 32 2 32 3   RDW % 14 4 14 5 14 3   MPV fL 9 5 9 8 9 7   NRBC AUTO /100 WBCs 0 0 0       CMP:  Results from last 7 days   Lab Units 02/24/19  0431 02/23/19  0511 02/22/19  1235  02/20/19  1400   POTASSIUM mmol/L 3 6 3 3* 4 0   < > 4 1   CHLORIDE mmol/L 107 106 104   < > 103   CO2 mmol/L 27 28 25   < > 27   BUN mg/dL 6 11 17   < > 16   CREATININE mg/dL 0 92 0 95 0 84   < > 1 02   CALCIUM mg/dL 8 5 8 4 9 4   < > 9 3   AST U/L  --  11 14  --  13   ALT U/L  --  20 27  --  22   ALK PHOS U/L  --  50 70  --  77   EGFR ml/min/1 73sq m 94 90 99   < > 83    < > = values in this interval not displayed         BMP:  Results from last 7 days   Lab Units 02/24/19  0431 02/23/19  0511 02/22/19  1235   POTASSIUM mmol/L 3 6 3 3* 4 0   CHLORIDE mmol/L 107 106 104   CO2 mmol/L 27 28 25   BUN mg/dL 6 11 17   CREATININE mg/dL 0 92 0 95 0 84   CALCIUM mg/dL 8 5 8 4 9 4            Results from last 7 days   Lab Units 02/24/19  0431 02/21/19  0445   MAGNESIUM mg/dL 1 7 1 9     Results from last 7 days   Lab Units 02/21/19  0445 HEMOGLOBIN A1C % 5 6     Results from last 7 days   Lab Units 02/20/19  1400   TSH 3RD GENERATON uIU/mL 1 972           Lipid Profile:   No results found for: CHOL  Lab Results   Component Value Date    HDL 64 (H) 02/20/2019     Lab Results   Component Value Date    LDLCALC 191 (H) 02/20/2019     Lab Results   Component Value Date    TRIG 158 (H) 02/20/2019       EKG reviewed personally:  Sinus bradycardia    Telemetry reviewed personally:   Sinus bradycardia    Assessment/Plan:  1- Asymptomatic sinus bradycardia, likely due to increased vagal tone/output  Patient having active episodes of vomiting and retching  Denies chest pain or discomfort, palpitations, shortness of breath  Negative troponins  No ischemic or dynamic EKG changes  On telemetry in sinus bradycardia heart rates as low as high 40s, 1 episode of sinus tachycardia; continue to monitor overnight  Initiate beta-blocker therapy when stable, could possibly do as outpatient    2- Nonobstructive CAD, stable  Patient to follow-up with Dr Kapil Casanova as outpatient and will assess the need for elective PCI of RCA  Continue medications    3- hypertension, stable  Continue medications    4- hyperlipidemia, continue statin  Code Status: Level 1 - Full Code    Counseling / Coordination of Care  Total floor / unit time spent today  minutes  Greater than 50% of total time was spent with the patient and / or family counseling and / or coordination of care  A description of the counseling / coordination of care: Review of history, current assessment, development of a plan      Fareed Hart PA-C  1/67/4154,2:14 AM

## 2019-02-24 NOTE — PLAN OF CARE
Problem: PAIN - ADULT  Goal: Verbalizes/displays adequate comfort level or baseline comfort level  Description  Interventions:  - Encourage patient to monitor pain and request assistance  - Assess pain using appropriate pain scale  - Administer analgesics based on type and severity of pain and evaluate response  - Implement non-pharmacological measures as appropriate and evaluate response  - Consider cultural and social influences on pain and pain management  - Notify physician/advanced practitioner if interventions unsuccessful or patient reports new pain  Outcome: Progressing     Problem: INFECTION - ADULT  Goal: Absence or prevention of progression during hospitalization  Description  INTERVENTIONS:  - Assess and monitor for signs and symptoms of infection  - Monitor lab/diagnostic results  - Monitor all insertion sites, i e  indwelling lines, tubes, and drains  - Monitor endotracheal (as able) and nasal secretions for changes in amount and color  - [place] appropriate cooling/warming therapies per order  - Administer medications as ordered  - Instruct and encourage patient and family to use good hand hygiene technique  - Identify and instruct in appropriate isolation precautions for identified infection/condition  Outcome: Progressing     Problem: SAFETY ADULT  Goal: Patient will remain free of falls  Description  INTERVENTIONS:  - Assess patient frequently for physical needs  -  Identify cognitive and physical deficits and behaviors that affect risk of falls    -  [place] fall precautions as indicated by assessment   - Educate patient/family on patient safety including physical limitations  - Instruct patient to call for assistance with activity based on assessment  - Modify environment to reduce risk of injury  - Consider OT/PT consult to assist with strengthening/mobility  Outcome: Progressing  Goal: Maintain or return to baseline ADL function  Description  INTERVENTIONS:  -  Assess patient's ability to carry out ADLs; assess patient's baseline for ADL function and identify physical deficits which impact ability to perform ADLs (bathing, care of mouth/teeth, toileting, grooming, dressing, etc )  - Assess/evaluate cause of self-care deficits   - Assess range of motion  - Assess patient's mobility; develop plan if impaired  - Assess patient's need for assistive devices and provide as appropriate  - Encourage maximum independence but intervene and supervise when necessary  ¯ Involve family in performance of ADLs  ¯ Assess for home care needs following discharge   ¯ Request OT consult to assist with ADL evaluation and planning for discharge  ¯ Provide patient education as appropriate  Outcome: Progressing  Goal: Maintain or return mobility status to optimal level  Description  INTERVENTIONS:  - Assess patient's baseline mobility status (ambulation, transfers, stairs, etc )    - Identify cognitive and physical deficits and behaviors that affect mobility  - Identify mobility aids required to assist with transfers and/or ambulation (gait belt, sit-to-stand, lift, walker, cane, etc )  - [place] fall precautions as indicated by assessment  - Record patient progress and toleration of activity level on Mobility SBAR; progress patient to next Phase/Stage  - Instruct patient to call for assistance with activity based on assessment  - Request Rehabilitation consult to assist with strengthening/weightbearing, etc   Outcome: Progressing     Problem: DISCHARGE PLANNING  Goal: Discharge to home or other facility with appropriate resources  Description  INTERVENTIONS:  - Identify barriers to discharge w/patient and caregiver  - Arrange for needed discharge resources and transportation as appropriate  - Identify discharge learning needs (meds, wound care, etc )  - Arrange for interpretive services to assist at discharge as needed  - Refer to Case Management Department for coordinating discharge planning if the patient needs post-hospital services based on physician/advanced practitioner order or complex needs related to functional status, cognitive ability, or social support system  Outcome: Progressing     Problem: Knowledge Deficit  Goal: Patient/family/caregiver demonstrates understanding of disease process, treatment plan, medications, and discharge instructions  Description  Complete learning assessment and assess knowledge base    Interventions:  - Provide teaching at level of understanding  - Provide teaching via preferred learning methods  Outcome: Progressing     Problem: GASTROINTESTINAL - ADULT  Goal: Minimal or absence of nausea and/or vomiting  Description  INTERVENTIONS:  - Administer IV fluids as ordered to ensure adequate hydration  - Maintain NPO status until nausea and vomiting are resolved  - Nasogastric tube as ordered  - Administer ordered antiemetic medications as needed  - Provide nonpharmacologic comfort measures as appropriate  - Advance diet as tolerated, if ordered  - Nutrition services referral to assist patient with adequate nutrition and appropriate food choices  Outcome: Progressing  Goal: Maintains or returns to baseline bowel function  Description  INTERVENTIONS:  - Assess bowel function  - Encourage oral fluids to ensure adequate hydration  - Administer IV fluids as ordered to ensure adequate hydration  - Administer ordered medications as needed  - Encourage mobilization and activity  - Nutrition services referral to assist patient with appropriate food choices  Outcome: Progressing  Goal: Maintains adequate nutritional intake  Description  INTERVENTIONS:  - Monitor percentage of each meal consumed  - Identify factors contributing to decreased intake, treat as appropriate  - Assist with meals as needed  - Monitor I&O, WT and lab values  - Obtain nutrition services referral as needed  Outcome: Progressing

## 2019-02-24 NOTE — ASSESSMENT & PLAN NOTE
· Patient admitted with acute diverticulitis  · Recently discharged but had worsening LLQ pain and N/V at home  · CT A/P with contrast shows pericolonic stranding at the sigmoid and descending colon, no abscess or perforation, no obstruction  · Continue Cipro and Flagyl IV  · Serial abdominal exams, supportive care  · Diet was advanced as he had reported improvement but then had return of N/V  Prior CT showed pancreatic ductal dilation and MRI was recommended as outpatient  Will check US/MRI now due to ongoing N/V  Protonix switched to IV BID (he has known Diaz's)  Reglan added  · He will need colonoscopy as outpatient in 4-6 weeks

## 2019-02-24 NOTE — PROGRESS NOTES
Patient reports nausea and pain in his abdomen unrelieved by antiemetic and pain medication  He also had 2 episodes of emesis  SLIM notified and new orders received

## 2019-02-24 NOTE — ASSESSMENT & PLAN NOTE
· History of recurrent CP  · Likely currently secondary to retching from N/V   · PPI BID  · Telemetry, Troponins negative  · H/O recent cardiac cath 1/16 with 70% stenosis tubular distal RCA, no PCI performed  · On Asa, statin  Also, on Lopressor and Imdur which were held on admission due to bradycardia - cardiology consulted for further recommendations

## 2019-02-25 ENCOUNTER — APPOINTMENT (INPATIENT)
Dept: MRI IMAGING | Facility: HOSPITAL | Age: 54
DRG: 244 | End: 2019-02-25
Payer: COMMERCIAL

## 2019-02-25 PROBLEM — J98.11 ATELECTASIS: Status: ACTIVE | Noted: 2019-02-25

## 2019-02-25 LAB
ANION GAP SERPL CALCULATED.3IONS-SCNC: 7 MMOL/L (ref 4–13)
BASOPHILS # BLD AUTO: 0.03 THOUSANDS/ΜL (ref 0–0.1)
BASOPHILS NFR BLD AUTO: 0 % (ref 0–1)
BUN SERPL-MCNC: 6 MG/DL (ref 5–25)
CALCIUM SERPL-MCNC: 8.8 MG/DL (ref 8.3–10.1)
CHLORIDE SERPL-SCNC: 108 MMOL/L (ref 100–108)
CO2 SERPL-SCNC: 27 MMOL/L (ref 21–32)
CREAT SERPL-MCNC: 0.85 MG/DL (ref 0.6–1.3)
EOSINOPHIL # BLD AUTO: 0.14 THOUSAND/ΜL (ref 0–0.61)
EOSINOPHIL NFR BLD AUTO: 2 % (ref 0–6)
ERYTHROCYTE [DISTWIDTH] IN BLOOD BY AUTOMATED COUNT: 14.2 % (ref 11.6–15.1)
GFR SERPL CREATININE-BSD FRML MDRD: 99 ML/MIN/1.73SQ M
GLUCOSE SERPL-MCNC: 107 MG/DL (ref 65–140)
HCT VFR BLD AUTO: 37 % (ref 36.5–49.3)
HGB BLD-MCNC: 12.1 G/DL (ref 12–17)
IMM GRANULOCYTES # BLD AUTO: 0.03 THOUSAND/UL (ref 0–0.2)
IMM GRANULOCYTES NFR BLD AUTO: 0 % (ref 0–2)
LYMPHOCYTES # BLD AUTO: 1.61 THOUSANDS/ΜL (ref 0.6–4.47)
LYMPHOCYTES NFR BLD AUTO: 24 % (ref 14–44)
MCH RBC QN AUTO: 29.4 PG (ref 26.8–34.3)
MCHC RBC AUTO-ENTMCNC: 32.7 G/DL (ref 31.4–37.4)
MCV RBC AUTO: 90 FL (ref 82–98)
MONOCYTES # BLD AUTO: 0.67 THOUSAND/ΜL (ref 0.17–1.22)
MONOCYTES NFR BLD AUTO: 10 % (ref 4–12)
NEUTROPHILS # BLD AUTO: 4.27 THOUSANDS/ΜL (ref 1.85–7.62)
NEUTS SEG NFR BLD AUTO: 64 % (ref 43–75)
NRBC BLD AUTO-RTO: 0 /100 WBCS
PLATELET # BLD AUTO: 230 THOUSANDS/UL (ref 149–390)
PMV BLD AUTO: 9.8 FL (ref 8.9–12.7)
POTASSIUM SERPL-SCNC: 3.5 MMOL/L (ref 3.5–5.3)
RBC # BLD AUTO: 4.12 MILLION/UL (ref 3.88–5.62)
SODIUM SERPL-SCNC: 142 MMOL/L (ref 136–145)
WBC # BLD AUTO: 6.75 THOUSAND/UL (ref 4.31–10.16)

## 2019-02-25 PROCEDURE — A9585 GADOBUTROL INJECTION: HCPCS | Performed by: PHYSICIAN ASSISTANT

## 2019-02-25 PROCEDURE — C9113 INJ PANTOPRAZOLE SODIUM, VIA: HCPCS | Performed by: PHYSICIAN ASSISTANT

## 2019-02-25 PROCEDURE — 85025 COMPLETE CBC W/AUTO DIFF WBC: CPT | Performed by: PHYSICIAN ASSISTANT

## 2019-02-25 PROCEDURE — 74183 MRI ABD W/O CNTR FLWD CNTR: CPT

## 2019-02-25 PROCEDURE — 80048 BASIC METABOLIC PNL TOTAL CA: CPT | Performed by: PHYSICIAN ASSISTANT

## 2019-02-25 PROCEDURE — 99232 SBSQ HOSP IP/OBS MODERATE 35: CPT | Performed by: PHYSICIAN ASSISTANT

## 2019-02-25 RX ADMIN — METRONIDAZOLE 500 MG: 500 INJECTION, SOLUTION INTRAVENOUS at 04:42

## 2019-02-25 RX ADMIN — ATORVASTATIN CALCIUM 40 MG: 40 TABLET, FILM COATED ORAL at 17:45

## 2019-02-25 RX ADMIN — CIPROFLOXACIN 400 MG: 2 INJECTION, SOLUTION INTRAVENOUS at 05:47

## 2019-02-25 RX ADMIN — MIRTAZAPINE 7.5 MG: 15 TABLET, FILM COATED ORAL at 22:10

## 2019-02-25 RX ADMIN — ASPIRIN 81 MG: 81 TABLET, COATED ORAL at 09:10

## 2019-02-25 RX ADMIN — PANTOPRAZOLE SODIUM 40 MG: 40 INJECTION, POWDER, FOR SOLUTION INTRAVENOUS at 20:22

## 2019-02-25 RX ADMIN — BUPROPION HYDROCHLORIDE 75 MG: 75 TABLET, FILM COATED ORAL at 09:13

## 2019-02-25 RX ADMIN — METRONIDAZOLE 500 MG: 500 INJECTION, SOLUTION INTRAVENOUS at 14:47

## 2019-02-25 RX ADMIN — GADOBUTROL 10 ML: 604.72 INJECTION INTRAVENOUS at 13:21

## 2019-02-25 RX ADMIN — BUPROPION HYDROCHLORIDE 75 MG: 75 TABLET, FILM COATED ORAL at 17:45

## 2019-02-25 RX ADMIN — CIPROFLOXACIN 400 MG: 2 INJECTION, SOLUTION INTRAVENOUS at 17:45

## 2019-02-25 RX ADMIN — METOCLOPRAMIDE 10 MG: 5 INJECTION, SOLUTION INTRAMUSCULAR; INTRAVENOUS at 08:57

## 2019-02-25 RX ADMIN — CLONAZEPAM 1.5 MG: 1 TABLET ORAL at 20:22

## 2019-02-25 RX ADMIN — PANTOPRAZOLE SODIUM 40 MG: 40 INJECTION, POWDER, FOR SOLUTION INTRAVENOUS at 09:10

## 2019-02-25 RX ADMIN — SERTRALINE HYDROCHLORIDE 200 MG: 50 TABLET ORAL at 09:10

## 2019-02-25 RX ADMIN — ENOXAPARIN SODIUM 40 MG: 40 INJECTION SUBCUTANEOUS at 09:10

## 2019-02-25 RX ADMIN — METRONIDAZOLE 500 MG: 500 INJECTION, SOLUTION INTRAVENOUS at 20:22

## 2019-02-25 NOTE — ASSESSMENT & PLAN NOTE
· History of Diaz's esophagus - last EGD in December  · Presented with N/V  He has a history of recurrent intractable nausea and vomiting  · PPI increased to BID currently  · US showed a normal gallbladder  · Prior CT Scan showed pancreatic ductal dilation and MRI/MRCP was recommended as outpatient - however, due to recurrent N/V and additional hospitalization, will check now  · Reglan added  · He will need follow up with GI as outpatient  If continued N/V and MRI negative, consider GES as outpatient

## 2019-02-25 NOTE — PROGRESS NOTES
Progress Note Trudy Jackson 1965, 46 y o  male MRN: [id number]    Unit/Bed#: -01 Encounter: [id number]    Primary Care Provider: Paloma Hussein MD   Date and time admitted to hospital: 2/22/2019 10:41 AM        * Diverticulitis  Assessment & Plan  · Patient was admitted with acute diverticulitis  · Recently discharged but had worsening LLQ pain and N/V at home  · CT A/P with contrast shows pericolonic stranding at the sigmoid and descending colon, no abscess or perforation, no obstruction  · Continue Cipro and Flagyl IV  · Serial abdominal exams, supportive care  Bentyl added  · Diet was advanced yesterday as he had reported improvement but then had return of N/V and pain and he was switched back to a liquid diet  He reports improvement again today  On PPI IV BID and Reglan added  Will try advancing diet again today to low residue diet  · US showed a normal gallbladder  · Prior CT showed pancreatic ductal dilation and MRI was recommended as outpatient  Will check MRI/MRCP due to recurrent N/V  Chest pain  Assessment & Plan  · History of recurrent CP  · Likely currently secondary to retching from N/V  He has a history of recurrent N/V   · PPI BID  He has Diaz's esophagus and a HH and had recent EGD in December  · Troponins negative  · H/O recent cardiac cath 1/16 with 70% stenosis tubular distal RCA, no PCI performed  · On Asa, statin  Was on Lopressor and Imdur which were held on admission due to bradycardia  Appreciate cardiology input, will continue to hold during treatment of acute diverticulitis until follow up with Dr Amaris Dickson as outpatient to restart  Atelectasis  Assessment & Plan  · Atelectasis noted on CXR and CT   · Incentive spirometry encouraged  · No complaints of SOB  Microscopic colitis  Assessment & Plan  · Patient reports a history of microscopic colitis treated with Budesonide  Dyslipidemia  Assessment & Plan  · Continue Statin      Diaz's esophagus  Assessment & Plan  · History of Diaz's esophagus - last EGD in December  · Presented with N/V  He has a history of recurrent intractable nausea and vomiting  · PPI increased to BID currently  · US showed a normal gallbladder  · Prior CT Scan showed pancreatic ductal dilation and MRI/MRCP was recommended as outpatient - however, due to recurrent N/V and additional hospitalization, will check now  · Reglan added  · He will need follow up with GI as outpatient  If continued N/V and MRI negative, consider GES as outpatient  Anxiety and depression  Assessment & Plan  · Continue Wellbutrin, Zoloft, Klonopin  VTE Pharmacologic Prophylaxis:   Pharmacologic: Enoxaparin (Lovenox)    Patient Centered Rounds: I discussed with nurse outside of patient's room  Discussions with Specialists or Other Care Team Provider: [address] cardiology input  Education and Discussions with Family / Patient: Discussed with patient  Time Spent for Care: 30 minutes  More than 50% of total time spent on counseling and coordination of care as described above  Current Length of Stay: 3 day(s)    Current Patient Status: Inpatient   Certification Statement: The patient will continue to require additional inpatient hospital stay due to need for MRI  Discharge Plan: Anticipate discharge in next 24 hours if MRI negative and tolerating diet  Code Status: Level 1 - Full Code      Subjective:   Patient reports his N/V and abdominal pain has currently improved and he is tolerating the liquids  He had a loose BM yesterday  He has a history of recurrent N/V in the past and was previously admitted for this  Objective:     Vitals:   Temp (24hrs), Av 6 °F (37 °C), Min:98 2 °F (36 8 °C), Max:99 °F (37 2 °C)    Temp:  [98 2 °F (36 8 °C)-99 °F (37 2 °C)] 99 °F (37 2 °C)  HR:  [46-81] 60  Resp:  [16-20] 18  BP: (107-168)/(68-85) 121/73  SpO2:  [91 %-92 %] 92 %  Body mass index is 31 92 kg/m²       Input and Output Summary (last 24 hours): Intake/Output Summary (Last 24 hours) at 2/25/2019 4073  Last data filed at 2/24/2019 1814  Gross per 24 hour   Intake 720 ml   Output    Net 720 ml       Physical Exam:     Physical Exam   Constitutional: He is oriented to person, place, and time  No distress  HENT:   Head: Normocephalic and atraumatic  Eyes: No scleral icterus  Neck: Neck supple  Cardiovascular: Normal rate and regular rhythm  Pulmonary/Chest: Effort normal  No respiratory distress  He has no wheezes  He has no rales  Abdominal: Soft  Bowel sounds are normal  He exhibits no distension  There is no tenderness  Musculoskeletal: He exhibits no edema  Neurological: He is alert and oriented to person, place, and time  Skin: Skin is warm and dry  He is not diaphoretic  Vitals reviewed  Additional Data:     Labs:    Results from last 7 days   Lab Units 02/25/19  0450   WBC Thousand/uL 6 75   HEMOGLOBIN g/dL 12 1   HEMATOCRIT % 37 0   PLATELETS Thousands/uL 230   NEUTROS PCT % 64   LYMPHS PCT % 24   MONOS PCT % 10   EOS PCT % 2     Results from last 7 days   Lab Units 02/25/19  0450  02/23/19  0511   POTASSIUM mmol/L 3 5   < > 3 3*   CHLORIDE mmol/L 108   < > 106   CO2 mmol/L 27   < > 28   BUN mg/dL 6   < > 11   CREATININE mg/dL 0 85   < > 0 95   CALCIUM mg/dL 8 8   < > 8 4   ALK PHOS U/L  --   --  50   ALT U/L  --   --  20   AST U/L  --   --  11    < > = values in this interval not displayed  * I Have Reviewed All Lab Data Listed Above  * Additional Pertinent Lab Tests Reviewed: All Labs Within Last 24 Hours Reviewed    Imaging:    Imaging Reports Reviewed Today Include: US showed a normal gallbladder      Recent Cultures (last 7 days):           Last 24 Hours Medication List:     Current Facility-Administered Medications:  acetaminophen 650 mg Oral Q6H PRN Contreras Delarosa MD    aspirin 81 mg Oral Daily Contreras Delarosa MD    atorvastatin 40 mg Oral Daily Contreras Delarosa MD buPROPion 75 mg Oral BID Shashi Anne MD    ciprofloxacin 400 mg Intravenous Q12H Krystin John PA-C Last Rate: 400 mg (02/25/19 0547)   clonazePAM 1 5 mg Oral HS J Carlos Javeir PA-C    dicyclomine 20 mg Oral TID PRN Krystin John PA-C    enoxaparin 40 mg Subcutaneous Daily Shashi Anne MD    hydrALAZINE 5 mg Intravenous Q6H PRN Shashi Anne MD    iohexol 50 mL Oral Once in imaging Shashi Anne MD    metoclopramide 10 mg Intravenous Q6H PRN Krystin John PA-C    metroNIDAZOLE 500 mg Intravenous Q8H Krystin John PA-C Last Rate: 500 mg (02/25/19 0442)   mirtazapine 7 5 mg Oral HS Shashi Anne MD    morphine injection 2 mg Intravenous Q4H PRN Shashi Anne MD    ondansetron 4 mg Intravenous Q6H PRN Shashi Anne MD    pantoprazole 40 mg Intravenous Q12H [address] Christina Stevens PA-C    sertraline 200 mg Oral Daily Shashi Anne MD         Today, Patient Was Seen By: Krystin John PA-C    ** Please Note: Dictation voice to text software may have been used in the creation of this document   **

## 2019-02-25 NOTE — ASSESSMENT & PLAN NOTE
· History of recurrent CP  · Likely currently secondary to retching from N/V  He has a history of recurrent N/V   · PPI BID  He has Diaz's esophagus and a HH and had recent EGD in December  · Troponins negative  · H/O recent cardiac cath 1/16 with 70% stenosis tubular distal RCA, no PCI performed  · On Asa, statin  Was on Lopressor and Imdur which were held on admission due to bradycardia  Appreciate cardiology input, will continue to hold during treatment of acute diverticulitis until follow up with Dr Shahbaz Tom as outpatient to restart

## 2019-02-25 NOTE — PROGRESS NOTES
Patient tolerating his lo fiber lo residue diet without pain, nausea or vomiting  Continues to have some loose stool x 2 today

## 2019-02-25 NOTE — ASSESSMENT & PLAN NOTE
· Patient was admitted with acute diverticulitis  · Recently discharged but had worsening LLQ pain and N/V at home  · CT A/P with contrast shows pericolonic stranding at the sigmoid and descending colon, no abscess or perforation, no obstruction  · Continue Cipro and Flagyl IV  · Serial abdominal exams, supportive care  Bentyl added  · Diet was advanced yesterday as he had reported improvement but then had return of N/V and pain and he was switched back to a liquid diet  He reports improvement again today  On PPI IV BID and Reglan added  Will try advancing diet again today to low residue diet  · US showed a normal gallbladder  · Prior CT showed pancreatic ductal dilation and MRI was recommended as outpatient    Will check MRI/MRCP due to recurrent N/V

## 2019-02-26 VITALS
HEIGHT: 70 IN | DIASTOLIC BLOOD PRESSURE: 95 MMHG | SYSTOLIC BLOOD PRESSURE: 124 MMHG | BODY MASS INDEX: 31.85 KG/M2 | HEART RATE: 68 BPM | WEIGHT: 222.44 LBS | TEMPERATURE: 98.2 F | OXYGEN SATURATION: 93 % | RESPIRATION RATE: 18 BRPM

## 2019-02-26 LAB
ANION GAP SERPL CALCULATED.3IONS-SCNC: 7 MMOL/L (ref 4–13)
BASOPHILS # BLD AUTO: 0.04 THOUSANDS/ΜL (ref 0–0.1)
BASOPHILS NFR BLD AUTO: 1 % (ref 0–1)
BUN SERPL-MCNC: 10 MG/DL (ref 5–25)
CALCIUM SERPL-MCNC: 8.6 MG/DL (ref 8.3–10.1)
CHLORIDE SERPL-SCNC: 108 MMOL/L (ref 100–108)
CO2 SERPL-SCNC: 28 MMOL/L (ref 21–32)
CREAT SERPL-MCNC: 0.97 MG/DL (ref 0.6–1.3)
EOSINOPHIL # BLD AUTO: 0.2 THOUSAND/ΜL (ref 0–0.61)
EOSINOPHIL NFR BLD AUTO: 3 % (ref 0–6)
ERYTHROCYTE [DISTWIDTH] IN BLOOD BY AUTOMATED COUNT: 14.4 % (ref 11.6–15.1)
GFR SERPL CREATININE-BSD FRML MDRD: 88 ML/MIN/1.73SQ M
GLUCOSE SERPL-MCNC: 101 MG/DL (ref 65–140)
HCT VFR BLD AUTO: 37.8 % (ref 36.5–49.3)
HGB BLD-MCNC: 12.2 G/DL (ref 12–17)
IMM GRANULOCYTES # BLD AUTO: 0.07 THOUSAND/UL (ref 0–0.2)
IMM GRANULOCYTES NFR BLD AUTO: 1 % (ref 0–2)
LYMPHOCYTES # BLD AUTO: 1.54 THOUSANDS/ΜL (ref 0.6–4.47)
LYMPHOCYTES NFR BLD AUTO: 23 % (ref 14–44)
MCH RBC QN AUTO: 29.2 PG (ref 26.8–34.3)
MCHC RBC AUTO-ENTMCNC: 32.3 G/DL (ref 31.4–37.4)
MCV RBC AUTO: 90 FL (ref 82–98)
MONOCYTES # BLD AUTO: 0.69 THOUSAND/ΜL (ref 0.17–1.22)
MONOCYTES NFR BLD AUTO: 10 % (ref 4–12)
NEUTROPHILS # BLD AUTO: 4.31 THOUSANDS/ΜL (ref 1.85–7.62)
NEUTS SEG NFR BLD AUTO: 62 % (ref 43–75)
NRBC BLD AUTO-RTO: 0 /100 WBCS
PLATELET # BLD AUTO: 216 THOUSANDS/UL (ref 149–390)
PMV BLD AUTO: 10 FL (ref 8.9–12.7)
POTASSIUM SERPL-SCNC: 3.8 MMOL/L (ref 3.5–5.3)
RBC # BLD AUTO: 4.18 MILLION/UL (ref 3.88–5.62)
SODIUM SERPL-SCNC: 143 MMOL/L (ref 136–145)
WBC # BLD AUTO: 6.85 THOUSAND/UL (ref 4.31–10.16)

## 2019-02-26 PROCEDURE — 80048 BASIC METABOLIC PNL TOTAL CA: CPT | Performed by: PHYSICIAN ASSISTANT

## 2019-02-26 PROCEDURE — C9113 INJ PANTOPRAZOLE SODIUM, VIA: HCPCS | Performed by: PHYSICIAN ASSISTANT

## 2019-02-26 PROCEDURE — 99239 HOSP IP/OBS DSCHRG MGMT >30: CPT | Performed by: PHYSICIAN ASSISTANT

## 2019-02-26 PROCEDURE — 85025 COMPLETE CBC W/AUTO DIFF WBC: CPT | Performed by: PHYSICIAN ASSISTANT

## 2019-02-26 RX ORDER — DICYCLOMINE HCL 20 MG
20 TABLET ORAL 3 TIMES DAILY PRN
Qty: 30 TABLET | Refills: 0 | Status: SHIPPED | OUTPATIENT
Start: 2019-02-26 | End: 2019-03-22

## 2019-02-26 RX ADMIN — ENOXAPARIN SODIUM 40 MG: 40 INJECTION SUBCUTANEOUS at 10:07

## 2019-02-26 RX ADMIN — CIPROFLOXACIN 400 MG: 2 INJECTION, SOLUTION INTRAVENOUS at 05:21

## 2019-02-26 RX ADMIN — BUPROPION HYDROCHLORIDE 75 MG: 75 TABLET, FILM COATED ORAL at 10:20

## 2019-02-26 RX ADMIN — SERTRALINE HYDROCHLORIDE 200 MG: 50 TABLET ORAL at 10:06

## 2019-02-26 RX ADMIN — PANTOPRAZOLE SODIUM 40 MG: 40 INJECTION, POWDER, FOR SOLUTION INTRAVENOUS at 10:07

## 2019-02-26 RX ADMIN — METRONIDAZOLE 500 MG: 500 INJECTION, SOLUTION INTRAVENOUS at 04:19

## 2019-02-26 RX ADMIN — ASPIRIN 81 MG: 81 TABLET, COATED ORAL at 10:06

## 2019-02-26 NOTE — DISCHARGE INSTRUCTIONS
Low Fiber Diet   WHAT YOU NEED TO KNOW:   A low-fiber diet limits foods that are high in fiber  Fiber is the part of fruits, vegetables, and grains that is not broken down by your body  You may need to follow this diet after surgery on your intestines  You may also need to follow this diet for certain conditions such as Crohn disease or ulcerative colitis  Ask your healthcare provider or dietitian how much fiber you can have each day  DISCHARGE INSTRUCTIONS:   Foods to include:  Read food labels to check the amount of fiber that are found in foods  Some foods that are low in fiber include the following:  · Grains:  Choose grains that have less than 2 grams of fiber in each serving   Examples include the following:     ¨ Cream of wheat and finely ground grits    ¨ Dry cereal made from rice     ¨ White bread, white pasta, and white rice    ¨ Crackers, bagels, and rolls made from white or refined flour    · Other foods:      ¨ Canned and well-cooked fruit without skins or seeds, and juice without pulp    ¨ Ripe bananas and melons    ¨ Canned and well-cooked vegetables without skins or seeds, and vegetable juice    ¨ Cow's milk, lactose-free milk, soy milk, and rice milk    ¨ Yogurt without nuts, fruit, or granola    ¨ Eggs, poultry (such as chicken and turkey), fish, and tender, ground, well-cooked beef     ¨ Tofu and smooth peanut butter    ¨ Broth and strained soups made of low-fiber foods  Foods to avoid:   · Breads, cereals, crackers, and pasta made with whole wheat or whole grains (such as whole oats)    · [employer], wild rice, quinoa, kasha, and barley    · All fresh fruit with skin, except banana and melons     · Dried fruits and fruit juice with pulp    · Canned pineapple    · Raw vegetables    · Nuts, seeds, and popcorn    · Beans, nuts, peas, and lentils    · Tough meats    · Coconut and avocado  What else you should know about a low-fiber diet:  A low-fiber diet can decrease the amount of bowel movements you have  Drink liquids as directed to avoid constipation  Ask how much liquid to drink each day and which liquids are best for you  © 2017 [employer] Information is for End User's use only and may not be sold, redistributed or otherwise used for commercial purposes  All illustrations and images included in CareNotes® are the copyrighted property of [employer]  or Mihir Castro  The above information is an  only  It is not intended as medical advice for individual conditions or treatments  Talk to your doctor, nurse or pharmacist before following any medical regimen to see if it is safe and effective for you

## 2019-02-26 NOTE — DISCHARGE SUMMARY
Discharge- Lui Ramos 1965, 46 y o  male MRN: [id number]    Unit/Bed#: -01 Encounter: [id number]    Primary Care Provider: Liliya Hernandez MD   Date and time admitted to hospital: 2/22/2019 10:41 AM    * Diverticulitis  Assessment & Plan  · Patient initially presented to the hospital 2/20 with acute diverticulitis, was discharged 2/21 and returned to the hospital 2/22 secondary to ongoing pain  Repeat imaging showed no evidence of perforation, abscess, or complication  Patient was placed back on IV antibiotics with improvement of symptoms  MRI/MRCP did not show any further complications  · Patient is stable for discharge at this time, tolerating low-fiber diet without any increase in pain/nausea or vomiting  · Complete out course with Augmentin as prescribed at previous discharge, recommend 10 days total treatment through 03/02/2019  · Continue Bentyl as needed, low-fiber diet, and adequate hydration  Continue with Reglan as needed for nausea/vomiting  · Patient will need outpatient GI follow-up for colonoscopy in 4-6 weeks, referral requested  Abdominal pain  Assessment & Plan  · Secondary to above, improved    Diaz's esophagus  Assessment & Plan  · History of Diaz's esophagus - last EGD in December 2018  Continue PPI therapy  Follow-up GI outpatient  Consider gastric emptying study if symptoms persist, outpatient  Microscopic colitis  Assessment & Plan  · Patient reports a history of microscopic colitis treated with Budesonide  Essential hypertension  Assessment & Plan  · Blood pressure is stable, continue current regimen  He has metoprolol and Imdur on hold secondary to bradycardia, per Cardiology will continue to hold on discharge until follow-up within 1 week with Dr Betsy Madera  Sinus bradycardia  Assessment & Plan  · Intermittent history of sinus bradycardia, improved with holding beta-blocker and long-acting nitrate    See above    Anxiety and depression  Assessment & Plan  · Continue Wellbutrin, Zoloft, Klonopin  Symptoms stable throughout hospitalization  Atelectasis  Assessment & Plan  · Atelectasis noted on CXR and CT  Continue deep breathing exercises/incentive spirometer  No shortness of breath, hypoxia, or chest pain  Chest pain  Assessment & Plan  · History of recurrent CP  Likely exacerbated by nausea/vomiting with history of recurrent N/V   · Troponins negative this admission  Recent cardiac cath 1/16 with 70% stenosis tubular distal RCA, no PCI performed  · Continue aspirin, statin  Per cardiology consultation, continue to hold beta-blocker and long-acting nitrate until follow-up with Cardiology outpatient  Dyslipidemia  Assessment & Plan  · Continue high-dose statin  Most recent cholesterol uncontrolled: 287, , HDL 64,   · Lipitor has been increased from 20 to 40 mg recently, recommend continue to increase to 80 mg as tolerated outpatient      Discharging Physician / Practitioner: Chau Foster PA-C  PCP: Kristen Sosa MD  Admission Date:   Admission Orders (From admission, onward)    Ordered        02/22/19 1302  Inpatient Admission (expected length of stay for this patient Order details is greater than two midnights)  Once     Order ID Start Status   [id number] 02/22/19 1303 Completed              Discharge Date: 02/26/19    Resolved Problems  Date Reviewed: 2/26/2019    None          Consultations During Hospital Stay:  · Cardiology - Dr Romayne Daring  · GI consulted previous admission     Procedures Performed:   · CT abdomen/pelvis:  Uncomplicated diverticulitis  · Ultrasound GB:  Top normal liver, normal GB and biliary ducts, unremarkable right kidney and pancreas  · MRI/MRCP:  Unremarkable, no pancreatic mass no ductal enlargement    Significant Findings / Test Results:   Results for Tyrese Navarro (MRN [id number]) as of 2/26/2019 11:46   Ref   Range 2/26/2019 05:00   eGFR Latest Units: ml/min/1 73sq m 88   Sodium Latest Ref Range: 136 - 145 mmol/L 143   Potassium Latest Ref Range: 3 5 - 5 3 mmol/L 3 8   Chloride Latest Ref Range: 100 - 108 mmol/L 108   CO2 Latest Ref Range: 21 - 32 mmol/L 28   Anion Gap Latest Ref Range: 4 - 13 mmol/L 7   BUN Latest Ref Range: 5 - 25 mg/dL 10   Creatinine Latest Ref Range: 0 60 - 1 30 mg/dL 0 97   Glucose, Random Latest Ref Range: 65 - 140 mg/dL 101   Calcium Latest Ref Range: 8 3 - 10 1 mg/dL 8 6   WBC Latest Ref Range: 4 31 - 10 16 Thousand/uL 6 85   Red Blood Cell Count Latest Ref Range: 3 88 - 5 62 Million/uL 4 18   Hemoglobin Latest Ref Range: 12 0 - 17 0 g/dL 12 2   HCT Latest Ref Range: 36 5 - 49 3 % 37 8   MCV Latest Ref Range: 82 - 98 fL 90   MCH Latest Ref Range: 26 8 - 34 3 pg 29 2   MCHC Latest Ref Range: 31 4 - 37 4 g/dL 32 3   RDW Latest Ref Range: 11 6 - 15 1 % 14 4   Platelet Count Latest Ref Range: 149 - 390 Thousands/uL 216   MPV Latest Ref Range: 8 9 - 12 7 fL 10 0   nRBC Latest Units: /100 WBCs 0       Incidental Findings:   ·      Test Results Pending at Discharge (will require follow up):   ·      Outpatient Tests Requested:  · Gastroenterology follow-up - colonoscopy recommended in 4-6 weeks, consider gastric emptying study if nausea/vomiting continues  · Cardiology follow-up - readdress medications, beta-blocker and Imdur currently on hold    Complications:  Bradycardia    Reason for Admission:  Diverticulitis, abdominal    Hospital Course:     aKyla Kumar is a 46 y o  male patient who originally presented to the hospital on 2/22/2019 due to left lower quadrant abdominal pain, nausea and vomiting  Patient's past medical history significant for Diaz's esophagus, microscopic colitis/ulcerative colitis, and CAD with last catheterization 01/2019 at which point no intervention was conducted however there was noted multiple areas of stenosis along RCA  Patient admitted for IV antibiotics and cardiology evaluation secondary to chest pain and bradycardia    Patient was seen by Gastroenterology when initially presented to the hospital 2/20-21, at that time he was cleared for discharge on Augmentin for treatment of uncomplicated diverticulitis x10 days with outpatient follow-up  Cardiology evaluated the patient this hospitalization  Troponins normal, EKG unchanged  Bradycardia resolved with discontinuation of beta-blocker/Imdur  They do not recommend any further workup this hospitalization, follow up outpatient with Dr Christina Armando to discuss medications and re-initiation of beta-blocker given known CAD  Patient is aware of reasons to return to the ER including unrelenting chest pain, fever, bloody diarrhea, or intractable nausea and vomiting  Patient is in stable condition, cleared for discharge  Complete 10 days total of antibiotics through 3/2, follow-up Gastroenterology and Cardiology as above  Return to the ER for any concerning symptoms  Please see above list of diagnoses and related plan for additional information  Condition at Discharge: good     Discharge Day Visit / Exam:     Subjective:  Patient seen, feeling well  Bowel movements are becoming more formed  No further nausea or vomiting  Tolerating diet  No fevers or chills  No chest pain  Vitals: Blood Pressure: 119/71 (02/26/19 0019)  Pulse: 68 (02/26/19 0019)  Temperature: 98 24 °F (36 8 °C) (02/26/19 0019)  Temp Source: Oral (02/26/19 0019)  Respirations: 18 (02/26/19 0019)  Height: 5' 10" (177 8 cm) (02/22/19 1948)  Weight - Scale: 101 kg (222 lb 7 1 oz) (02/22/19 1045)  SpO2: 93 % (02/25/19 0754)  Exam:   Physical Exam   Constitutional: Vital signs are normal  He appears well-developed and well-nourished  No distress  Cardiovascular: Normal rate, regular rhythm, S1 normal, S2 normal, normal heart sounds and intact distal pulses  No murmur heard  Pulmonary/Chest: Effort normal and breath sounds normal  No respiratory distress  He has no wheezes  He has no rhonchi  He has no rales  Abdominal: Soft  Bowel sounds are normal  He exhibits no distension  There is no tenderness  Musculoskeletal: He exhibits no edema  Psychiatric: He has a normal mood and affect  Nursing note and vitals reviewed  Discussion with Family:  None present, patient comfortable discharge planning all questions answered    Discharge instructions/Information to patient and family:   See after visit summary for information provided to patient and family  Provisions for Follow-Up Care:  See after visit summary for information related to follow-up care and any pertinent home health orders  Disposition:     Home      Planned Readmission:  None     Discharge Statement:  I spent approximately 40 minutes discharging the patient  This time was spent on the day of discharge  I had direct contact with the patient on the day of discharge  Greater than 50% of the total time was spent examining patient, answering all patient questions, arranging and discussing plan of care with patient as well as directly providing post-discharge instructions  Additional time then spent on discharge activities  Discharge Medications:  See after visit summary for reconciled discharge medications provided to patient and family        ** Please Note: This note has been constructed using a voice recognition system **

## 2019-02-26 NOTE — DISCHARGE INSTR - AVS FIRST PAGE
** Please continue to hold your metoprolol and Imdur until follow-up with Dr Akilah Chavez  Their office will call you  ** Please resume Augmentin for treatment of diverticulitis, to complete a course through 03/02/2019  ** Please follow-up with the gastroenterologist in several weeks, you will need a repeat colonoscopy in 4-6 weeks once symptoms have completely resolved  Their office will call you

## 2019-02-26 NOTE — ASSESSMENT & PLAN NOTE
· Blood pressure is stable, continue current regimen  He has metoprolol and Imdur on hold secondary to bradycardia, per Cardiology will continue to hold on discharge until follow-up within 1 week with Dr Christina Armando

## 2019-02-26 NOTE — SOCIAL WORK
Cm met with patient at bedside, patient alert and oriented during interview  Patient reports residing in a private home with his wife and 5yr old son  Patient is completely independent with ADL's, no dme use, vna or str  Patient reports going to [address] [place] [address] for PCP follow up and also fills his medications through their pharmacy as well  Patient stated at times he fills his prescriptions at [facility] pharmacy with no co-payment barriers  Patient denied any MH/SA and stated his wife would be able to make medical decisions on his behalf if he is unable to do so  CM reviewed discharge planning process including the following: identifying help at home, patient preference for discharge planning needs, pharmacy preference, and availability of treatment team to discuss questions or concerns patient and/or family may have regarding understanding medications and recognizing signs and symptoms once discharged  CM also encouraged patient to follow up with all recommended appointments after discharge  Patient advised of importance for patient and family to participate in managing patients medical well being  CM name and role reviewed  Discharge Checklist reviewed and CM will continue to monitor for progress toward discharge goals in nursing and provider rounds

## 2019-02-26 NOTE — PLAN OF CARE
Problem: DISCHARGE PLANNING - CARE MANAGEMENT  Goal: Discharge to post-acute care or home with appropriate resources  Description  INTERVENTIONS:  - Conduct assessment to determine patient/family and health care team treatment goals, and need for post-acute services based on payer coverage, community resources, and patient preferences, and barriers to discharge  - Address psychosocial, clinical, and financial barriers to discharge as identified in assessment in conjunction with the patient/family and health care team  - Arrange appropriate level of post-acute services according to patient?s   needs and preference and payer coverage in collaboration with the physician and health care team  - Communicate with and update the patient/family, physician, and health care team regarding progress on the discharge plan  - Arrange appropriate transportation to post-acute venues  Outcome: Progressing  Note:   No needs at this time

## 2019-02-26 NOTE — ASSESSMENT & PLAN NOTE
· Atelectasis noted on CXR and CT  Continue deep breathing exercises/incentive spirometer  No shortness of breath, hypoxia, or chest pain

## 2019-02-26 NOTE — ASSESSMENT & PLAN NOTE
· History of Diaz's esophagus - last EGD in December 2018  Continue PPI therapy  Follow-up GI outpatient  Consider gastric emptying study if symptoms persist, outpatient

## 2019-02-26 NOTE — ASSESSMENT & PLAN NOTE
· Intermittent history of sinus bradycardia, improved with holding beta-blocker and long-acting nitrate    See above

## 2019-02-26 NOTE — ASSESSMENT & PLAN NOTE
· Continue high-dose statin  Most recent cholesterol uncontrolled: 287, , HDL 64,      · Lipitor has been increased from 20 to 40 mg recently, recommend continue to increase to 80 mg as tolerated outpatient

## 2019-02-26 NOTE — ASSESSMENT & PLAN NOTE
· Patient initially presented to the hospital 2/20 with acute diverticulitis, was discharged 02/21 and return to the hospital 2/22 secondary to ongoing pain  Repeat imaging showed no evidence of perforation, abscess, or complication  Patient was placed back on IV antibiotics with improvement of symptoms  MRI/MRCP did not show any further complications  · Patient is stable for discharge at this time, tolerating low-fiber diet without any increase in pain/nausea or vomiting  · Complete out course with Augmentin as prescribed at previous discharge, recommend 10 days total treatment through 03/02/2019  · Continue Bentyl as needed, low-fiber diet, and adequate hydration  Continue with Reglan as needed for nausea/vomiting  · Patient will need outpatient GI follow-up for colonoscopy in 4-6 weeks, referral requested

## 2019-02-26 NOTE — ASSESSMENT & PLAN NOTE
· History of recurrent CP  Likely exacerbated by nausea/vomiting with history of recurrent N/V   · Troponins negative this admission  Recent cardiac cath 1/16 with 70% stenosis tubular distal RCA, no PCI performed  · Continue aspirin, statin  Per cardiology consultation, continue to hold beta-blocker and long-acting nitrate until follow-up with Cardiology outpatient

## 2019-03-04 ENCOUNTER — TELEPHONE (OUTPATIENT)
Dept: CARDIOLOGY CLINIC | Facility: CLINIC | Age: 54
End: 2019-03-04

## 2019-03-18 ENCOUNTER — TELEPHONE (OUTPATIENT)
Dept: CARDIOLOGY CLINIC | Facility: CLINIC | Age: 54
End: 2019-03-18

## 2019-03-18 NOTE — TELEPHONE ENCOUNTER
Spoke with patient he states it is a intermittent sharp pain in the center of his chest  He says it hurts when he reaches and takes a deep breath but it goes away quickly  unlike the dull achy chest pain he felt before  He was not having any pain while on the phone with me nor is he having any other symptoms  He said he isnt taking any cardiac medication other than the atorvastatin  I advised patient if he feels the pain again he should try the nitro and see if that relieves the pain, also if the pain gets worse or becomes severe he is to report to the ER immediately  Patient has appointment on 3/29 Please advise for further instructions for patient

## 2019-03-22 ENCOUNTER — HOSPITAL ENCOUNTER (EMERGENCY)
Facility: HOSPITAL | Age: 54
Discharge: HOME | End: 2019-03-22
Attending: EMERGENCY MEDICINE
Payer: COMMERCIAL

## 2019-03-22 ENCOUNTER — APPOINTMENT (EMERGENCY)
Dept: RADIOLOGY | Facility: HOSPITAL | Age: 54
End: 2019-03-22
Payer: COMMERCIAL

## 2019-03-22 VITALS
WEIGHT: 219.14 LBS | DIASTOLIC BLOOD PRESSURE: 74 MMHG | OXYGEN SATURATION: 94 % | HEART RATE: 74 BPM | RESPIRATION RATE: 18 BRPM | HEIGHT: 69 IN | SYSTOLIC BLOOD PRESSURE: 119 MMHG | BODY MASS INDEX: 32.46 KG/M2 | TEMPERATURE: 98.2 F

## 2019-03-22 DIAGNOSIS — B34.9 VIRAL SYNDROME: Primary | ICD-10-CM

## 2019-03-22 DIAGNOSIS — I25.10 CAD (CORONARY ARTERY DISEASE): ICD-10-CM

## 2019-03-22 LAB
ALBUMIN SERPL BCP-MCNC: 3.8 G/DL (ref 3.5–5)
ALP SERPL-CCNC: 69 U/L (ref 46–116)
ALT SERPL W P-5'-P-CCNC: 31 U/L (ref 12–78)
ANION GAP SERPL CALCULATED.3IONS-SCNC: 13 MMOL/L (ref 4–13)
APTT PPP: 32 SECONDS (ref 26–38)
AST SERPL W P-5'-P-CCNC: 16 U/L (ref 5–45)
ATRIAL RATE: 91 BPM
BASOPHILS # BLD AUTO: 0.03 THOUSANDS/ΜL (ref 0–0.1)
BASOPHILS NFR BLD AUTO: 0 % (ref 0–1)
BILIRUB DIRECT SERPL-MCNC: 0.08 MG/DL (ref 0–0.2)
BILIRUB SERPL-MCNC: 0.3 MG/DL (ref 0.2–1)
BUN SERPL-MCNC: 14 MG/DL (ref 5–25)
CALCIUM SERPL-MCNC: 9 MG/DL (ref 8.3–10.1)
CHLORIDE SERPL-SCNC: 101 MMOL/L (ref 100–108)
CO2 SERPL-SCNC: 25 MMOL/L (ref 21–32)
CREAT SERPL-MCNC: 1.05 MG/DL (ref 0.6–1.3)
EOSINOPHIL # BLD AUTO: 0.07 THOUSAND/ΜL (ref 0–0.61)
EOSINOPHIL NFR BLD AUTO: 1 % (ref 0–6)
ERYTHROCYTE [DISTWIDTH] IN BLOOD BY AUTOMATED COUNT: 14.3 % (ref 11.6–15.1)
GFR SERPL CREATININE-BSD FRML MDRD: 80 ML/MIN/1.73SQ M
GLUCOSE SERPL-MCNC: 164 MG/DL (ref 65–140)
HCT VFR BLD AUTO: 42.4 % (ref 36.5–49.3)
HGB BLD-MCNC: 13.9 G/DL (ref 12–17)
IMM GRANULOCYTES # BLD AUTO: 0.02 THOUSAND/UL (ref 0–0.2)
IMM GRANULOCYTES NFR BLD AUTO: 0 % (ref 0–2)
INR PPP: 1 (ref 0.86–1.17)
LYMPHOCYTES # BLD AUTO: 1.05 THOUSANDS/ΜL (ref 0.6–4.47)
LYMPHOCYTES NFR BLD AUTO: 15 % (ref 14–44)
MCH RBC QN AUTO: 29.2 PG (ref 26.8–34.3)
MCHC RBC AUTO-ENTMCNC: 32.8 G/DL (ref 31.4–37.4)
MCV RBC AUTO: 89 FL (ref 82–98)
MONOCYTES # BLD AUTO: 0.55 THOUSAND/ΜL (ref 0.17–1.22)
MONOCYTES NFR BLD AUTO: 8 % (ref 4–12)
NEUTROPHILS # BLD AUTO: 5.23 THOUSANDS/ΜL (ref 1.85–7.62)
NEUTS SEG NFR BLD AUTO: 76 % (ref 43–75)
NRBC BLD AUTO-RTO: 0 /100 WBCS
NT-PROBNP SERPL-MCNC: 35 PG/ML
P AXIS: 75 DEGREES
PLATELET # BLD AUTO: 230 THOUSANDS/UL (ref 149–390)
PMV BLD AUTO: 10.1 FL (ref 8.9–12.7)
POTASSIUM SERPL-SCNC: 3.9 MMOL/L (ref 3.5–5.3)
PR INTERVAL: 178 MS
PROT SERPL-MCNC: 7.3 G/DL (ref 6.4–8.2)
PROTHROMBIN TIME: 13.1 SECONDS (ref 11.8–14.2)
QRS AXIS: -22 DEGREES
QRSD INTERVAL: 88 MS
QT INTERVAL: 354 MS
QTC INTERVAL: 435 MS
RBC # BLD AUTO: 4.76 MILLION/UL (ref 3.88–5.62)
SODIUM SERPL-SCNC: 139 MMOL/L (ref 136–145)
T WAVE AXIS: 58 DEGREES
TROPONIN I SERPL-MCNC: <0.02 NG/ML
VENTRICULAR RATE: 91 BPM
WBC # BLD AUTO: 6.95 THOUSAND/UL (ref 4.31–10.16)

## 2019-03-22 PROCEDURE — 85025 COMPLETE CBC W/AUTO DIFF WBC: CPT | Performed by: PHYSICIAN ASSISTANT

## 2019-03-22 PROCEDURE — 80076 HEPATIC FUNCTION PANEL: CPT | Performed by: PHYSICIAN ASSISTANT

## 2019-03-22 PROCEDURE — 83880 ASSAY OF NATRIURETIC PEPTIDE: CPT | Performed by: PHYSICIAN ASSISTANT

## 2019-03-22 PROCEDURE — 85730 THROMBOPLASTIN TIME PARTIAL: CPT | Performed by: PHYSICIAN ASSISTANT

## 2019-03-22 PROCEDURE — 71046 X-RAY EXAM CHEST 2 VIEWS: CPT

## 2019-03-22 PROCEDURE — 93010 ELECTROCARDIOGRAM REPORT: CPT | Performed by: INTERNAL MEDICINE

## 2019-03-22 PROCEDURE — 99285 EMERGENCY DEPT VISIT HI MDM: CPT

## 2019-03-22 PROCEDURE — 36415 COLL VENOUS BLD VENIPUNCTURE: CPT | Performed by: PHYSICIAN ASSISTANT

## 2019-03-22 PROCEDURE — 84484 ASSAY OF TROPONIN QUANT: CPT | Performed by: PHYSICIAN ASSISTANT

## 2019-03-22 PROCEDURE — 85610 PROTHROMBIN TIME: CPT | Performed by: PHYSICIAN ASSISTANT

## 2019-03-22 PROCEDURE — 80048 BASIC METABOLIC PNL TOTAL CA: CPT | Performed by: PHYSICIAN ASSISTANT

## 2019-03-22 PROCEDURE — 93005 ELECTROCARDIOGRAM TRACING: CPT

## 2019-03-22 NOTE — ED PROVIDER NOTES
History  Chief Complaint   Patient presents with    Shortness of Breath     Patient c/o SOB over the last 2 day, along with cough  Patient states gets worse with exertion  Patient also states he has a 70% coronary artery blockage and is scheduled to see Dr Tish Don on the 29th of this month  50-year-old male with past medical history significant for hypertension and known coronary artery disease with 70% lesion in his right coronary artery presents to the emergency department with chief complaint of shortness of breath  Onset of symptoms reported as 2 days ago  Location of symptoms reported as the chest   Quality is described as shortness of breath described as feeling out of breath with minimal activity which is not the patient's baseline  He reports associated cough  Denies fevers  Denies lower extremity swelling  Denies persistent chest pain  Positive for runny nose and congestion  Modifying factors:  Patient reports exertional activity exacerbate symptoms  Context:  Patient was recently seen and evaluated for diverticulitis  He was hospitalized at the end of February for the symptoms  He was found to have a 70% coronary artery lesion for which she is scheduled with Cardiology later this month for intervention  He reports multiple sick contacts including family members at home with cough cold and flu symptoms  He reports he has developed similar symptoms including sinus congestion runny nose but he notes increasing fatigue with minimal activity, and increasing shortness of breath with minimal to no exertional activity which patient states is not his baseline and is worrisome  He is unsure if it is a related to his cough and cold symptoms  He has not had to take more than 1 dose of nitroglycerin to relieve occasional sharp chest pain  Reviewed past visits via epic:  Patient last seen on February 22, 2019 for evaluation of chest pain and diverticulitis        History provided by: Patient   used: No    Shortness of Breath   Associated symptoms: cough and sore throat    Associated symptoms: no abdominal pain, no chest pain, no diaphoresis, no ear pain, no fever, no headaches, no neck pain, no rash, no vomiting and no wheezing        Prior to Admission Medications   Prescriptions Last Dose Informant Patient Reported? Taking? BUDESONIDE PO  Self Yes Yes   Sig: Take 3 mg by mouth 2 (two) times a day   aspirin (ECOTRIN LOW STRENGTH) 81 mg EC tablet  Self Yes Yes   Sig: Take 81 mg by mouth daily   atorvastatin (LIPITOR) 40 mg tablet   No Yes   Sig: Take 1 tablet (40 mg total) by mouth daily   buPROPion (WELLBUTRIN) 75 mg tablet  Self Yes Yes   Sig: Take 75 mg by mouth daily    clonazePAM (KLONOPIN) 1 mg tablet   Yes Yes   Sig: Take 1 5 mg by mouth daily at bedtime    mirtazapine (REMERON) 7 5 MG tablet   Yes Yes   Sig: Take 7 5 mg by mouth daily at bedtime   nitroglycerin (NITROSTAT) 0 4 mg SL tablet   No Yes   Sig: Place 1 tablet (0 4 mg total) under the tongue every 5 (five) minutes as needed for chest pain   sertraline (ZOLOFT) 100 mg tablet  Self Yes Yes   Sig: Take 200 mg by mouth daily      Facility-Administered Medications: None       Past Medical History:   Diagnosis Date    Diaz's esophagus     Coronary artery disease     Depression     Diverticulitis     GERD (gastroesophageal reflux disease)     Hemorrhoid     Hyperlipidemia     Hypertension     UC (ulcerative colitis) ([facility] [address] )        Past Surgical History:   Procedure Laterality Date    ABDOMINAL SURGERY      radio frequency ablation    BONE GRAFT Left 2018    CARPAL TUNNEL RELEASE Right     COLONOSCOPY      EGD AND COLONOSCOPY      ESOPHAGOGASTRODUODENOSCOPY N/A 2/15/2018    Procedure: ESOPHAGOGASTRODUODENOSCOPY (EGD);   Surgeon: Emmanuelle Bass MD;  Location: [facility] OR;  Service: Gastroenterology    ESOPHAGOGASTRODUODENOSCOPY N/A 12/10/2018    Procedure: ESOPHAGOGASTRODUODENOSCOPY (EGD); Surgeon: Santiago Hinds MD;  Location: [facility]; Service: Gastroenterology    TONSILLECTOMY         Family History   Problem Relation Age of Onset    Heart disease Father     Cancer Sister      I have reviewed and agree with the history as documented  Social History     Tobacco Use    Smoking status: Former Smoker     Last attempt to quit: 2007     Years since quittin 1    Smokeless tobacco: Former User     Quit date: 1998   Substance Use Topics    Alcohol use: Never     Frequency: Never     Comment: quit 3 years    Drug use: Yes     Frequency: 3 0 times per week     Types: Marijuana     Comment: 1/15/2019        Review of Systems   Constitutional: Positive for fatigue  Negative for activity change, appetite change, chills, diaphoresis, fever and unexpected weight change  HENT: Positive for congestion, postnasal drip, rhinorrhea and sore throat  Negative for dental problem, drooling, ear discharge, ear pain, facial swelling, hearing loss, mouth sores, nosebleeds, sinus pressure, sinus pain, sneezing, tinnitus, trouble swallowing and voice change  Eyes: Negative for photophobia, pain, discharge, redness, itching and visual disturbance  Respiratory: Positive for cough and shortness of breath  Negative for apnea, choking, chest tightness, wheezing and stridor  Cardiovascular: Negative for chest pain, palpitations and leg swelling  Gastrointestinal: Negative for abdominal distention, abdominal pain, anal bleeding, blood in stool, constipation, diarrhea, nausea, rectal pain and vomiting  Endocrine: Negative for cold intolerance, heat intolerance, polydipsia, polyphagia and polyuria  Genitourinary: Negative for difficulty urinating, dysuria, flank pain, frequency, hematuria and urgency  Musculoskeletal: Negative for arthralgias, back pain, gait problem, joint swelling, myalgias, neck pain and neck stiffness  Skin: Negative for color change, pallor, rash and wound  Allergic/Immunologic: Negative for environmental allergies, food allergies and immunocompromised state  Neurological: Negative for dizziness, tremors, seizures, syncope, facial asymmetry, speech difficulty, weakness, light-headedness, numbness and headaches  Hematological: Negative for adenopathy  Does not bruise/bleed easily  Psychiatric/Behavioral: Negative for agitation, confusion and hallucinations  The patient is not nervous/anxious  All other systems reviewed and are negative  Physical Exam  Physical Exam   Constitutional: He is oriented to person, place, and time  He appears well-developed and well-nourished  No distress  /81 (BP Location: Right arm)   Pulse 91   Temp 98 2 °F (36 8 °C) (Oral)   Resp (!) 24   Ht 5' 9" (1 753 m)   Wt 99 4 kg (219 lb 2 2 oz)   SpO2 95%   BMI 32 36 kg/m²      HENT:   Head: Normocephalic and atraumatic  Right Ear: External ear normal    Left Ear: External ear normal    Mouth/Throat: No oropharyngeal exudate  There is yellow mucous drainage noted to the posterior pharynx  Uvula is midline without deviation  Tonsils without edema or purulent exudate  Nasal turbinates are injected and edematous bilaterally with yellow mucous nasal drainage noted   Eyes: Pupils are equal, round, and reactive to light  Conjunctivae and EOM are normal  Right eye exhibits no discharge  Left eye exhibits no discharge  No scleral icterus  Neck: Normal range of motion  Neck supple  No JVD present  No tracheal deviation present  Cardiovascular: Normal rate and regular rhythm  Pulmonary/Chest: Effort normal  No stridor  No respiratory distress  He has no rales  He exhibits no tenderness  Breath sounds present bilaterally on auscultation  Scattered rhonchi bilaterally  No retractions or accessory muscle use  Abdominal: Soft  Bowel sounds are normal  He exhibits no distension and no mass  There is no tenderness  There is no rebound and no guarding  No hernia  Musculoskeletal: Normal range of motion  He exhibits no edema, tenderness or deformity  Lymphadenopathy:     He has no cervical adenopathy  Neurological: He is alert and oriented to person, place, and time  He has normal reflexes  He displays normal reflexes  No cranial nerve deficit or sensory deficit  He exhibits normal muscle tone  Coordination normal    Skin: Skin is warm and dry  Capillary refill takes less than 2 seconds  No rash noted  He is not diaphoretic  No erythema  No pallor  Psychiatric: He has a normal mood and affect  His behavior is normal  Judgment and thought content normal    Nursing note and vitals reviewed        Vital Signs  ED Triage Vitals [03/22/19 1004]   Temperature Pulse Respirations Blood Pressure SpO2   98 2 °F (36 8 °C) 98 20 143/72 94 %      Temp Source Heart Rate Source Patient Position - Orthostatic VS BP Location FiO2 (%)   Oral Monitor Sitting Right arm --      Pain Score       --           Vitals:    03/22/19 1004 03/22/19 1030 03/22/19 1200 03/22/19 1400   BP: 143/72 139/81 123/80 119/74   Pulse: 98 91 80 74   Patient Position - Orthostatic VS: Sitting Sitting Sitting Sitting         Visual Acuity      ED Medications  Medications - No data to display    Diagnostic Studies  Results Reviewed     Procedure Component Value Units Date/Time    Troponin I [[id number]]  (Normal) Collected:  03/22/19 1038    Lab Status:  Final result Specimen:  Blood from Arm, Left Updated:  03/22/19 1121     Troponin I <0 02 ng/mL     Basic metabolic panel [[id number]]  (Abnormal) Collected:  03/22/19 1038    Lab Status:  Final result Specimen:  Blood from Arm, Left Updated:  03/22/19 1115     Sodium 139 mmol/L      Potassium 3 9 mmol/L      Chloride 101 mmol/L      CO2 25 mmol/L      ANION GAP 13 mmol/L      BUN 14 mg/dL      Creatinine 1 05 mg/dL      Glucose 164 mg/dL      Calcium 9 0 mg/dL      eGFR 80 ml/min/1 73sq m     Narrative:       National Kidney Disease Education Program recommendations are as follows:  GFR calculation is accurate only with a steady state creatinine  Chronic Kidney disease less than 60 ml/min/1 73 sq  meters  Kidney failure less than 15 ml/min/1 73 sq  meters      Hepatic function panel [[id number]]  (Normal) Collected:  03/22/19 1038    Lab Status:  Final result Specimen:  Blood from Arm, Left Updated:  03/22/19 1115     Total Bilirubin 0 30 mg/dL      Bilirubin, Direct 0 08 mg/dL      Alkaline Phosphatase 69 U/L      AST 16 U/L      ALT 31 U/L      Total Protein 7 3 g/dL      Albumin 3 8 g/dL     B-type natriuretic peptide [[id number]]  (Normal) Collected:  03/22/19 1038    Lab Status:  Final result Specimen:  Blood from Arm, Left Updated:  03/22/19 1115     NT-proBNP 35 pg/mL     Protime-INR [[id number]]  (Normal) Collected:  03/22/19 1038    Lab Status:  Final result Specimen:  Blood from Arm, Left Updated:  03/22/19 1105     Protime 13 1 seconds      INR 1 00    APTT [[id number]]  (Normal) Collected:  03/22/19 1038    Lab Status:  Final result Specimen:  Blood from Arm, Left Updated:  03/22/19 1105     PTT 32 seconds     CBC and differential [[id number]]  (Abnormal) Collected:  03/22/19 1038    Lab Status:  Final result Specimen:  Blood from Arm, Left Updated:  03/22/19 1052     WBC 6 95 Thousand/uL      RBC 4 76 Million/uL      Hemoglobin 13 9 g/dL      Hematocrit 42 4 %      MCV 89 fL      MCH 29 2 pg      MCHC 32 8 g/dL      RDW 14 3 %      MPV 10 1 fL      Platelets 053 Thousands/uL      nRBC 0 /100 WBCs      Neutrophils Relative 76 %      Immat GRANS % 0 %      Lymphocytes Relative 15 %      Monocytes Relative 8 %      Eosinophils Relative 1 %      Basophils Relative 0 %      Neutrophils Absolute 5 23 Thousands/µL      Immature Grans Absolute 0 02 Thousand/uL      Lymphocytes Absolute 1 05 Thousands/µL      Monocytes Absolute 0 55 Thousand/µL      Eosinophils Absolute 0 07 Thousand/µL      Basophils Absolute 0 03 Thousands/µL                  XR chest 2 views Final Result by Dominguez Dowd MD (03/22 0956)      No acute cardiopulmonary disease  Workstation performed: [id number]                    Procedures  ECG 12 Lead Documentation  Date/Time: 3/22/2019 10:10 AM  Performed by: Abdi Mcdaniel PA-C  Authorized by: Abdi Mcdaniel PA-C     Indications / Diagnosis:  C/p  ECG reviewed by me, the ED Provider: yes    Patient location:  ED  Previous ECG:     Previous ECG:  Compared to current    Comparison ECG info:  February 22, 2019    Similarity:  Changes noted    Comparison to cardiac monitor: Yes    Interpretation:     Interpretation: normal    Rate:     ECG rate:  91    ECG rate assessment: normal    Rhythm:     Rhythm: sinus rhythm    Ectopy:     Ectopy: PVCs    QRS:     QRS axis:  Normal    QRS intervals:  Normal  Conduction:     Conduction: normal    ST segments:     ST segments:  Normal  T waves:     T waves: normal             Phone Contacts  ED Phone Contact    ED Course                               MDM  Number of Diagnoses or Management Options  CAD (coronary artery disease): new and requires workup  Viral syndrome: new and requires workup  Diagnosis management comments: ddx includes but is not limited to:  URI, RSV, sinusitis, OE, OM, serous otitis media,  flu, allergies, viral syndrome, asthma, bronchitis, pneumonia, unstable angina, acute coronary syndrome, pleural effusion, pericardial effusion, pericarditis, congestive heart failure, renal failure, consider but doubt interstitial lung disease, pertussis  Plan workup including labs, EKG and chest x-ray for further evaluation of symptoms  Lab results reviewed  CBC demonstrates normal wbc at 6 9  Hemoglobin of 13 9 and hematocrit of 42 4 are normal   No anemia  INR is normal at 1 0  No coagulopathy  BNP is normal at 35, troponin is normal at less than 0 02    Hepatic function panel was reviewed and normal   Basic metabolic panel was reviewed, normal, potassium of 3 9 is normal, creatinine of 1 0 is normal     Chest x-ray images independently visualized and interpreted by me demonstrate no acute infiltrate or pneumothorax  I reviewed all test results with the patient at bedside  Long discussion with patient  His symptoms appear most consistent with a viral or influenza like syndrome  The patient was initially concerned given his cardiac history and pending cardiac re-evaluation that his symptoms may be cardiac in origin  Symptoms are atypical for cardiac presentation  No abnormalities on his EKG, troponin and BNP are normal   I did discuss the case with Cardiology, Dr Iris Garcia  Cardiology agrees patient is stable for discharge with outpatient follow-up for pending cardiac re-evaluation as symptoms are atypical for ischemia  I reviewed all these test results with patient at bedside  He was relieved with his results  He is comfortable with discharge home and outpatient follow-up for further evaluation  I instructed him to keep his cardiology appointment this coming week  Reviewed reasons to return to ed  Patient verbalized understanding of diagnosis and agreement with discharge plan of care as well as understanding of reasons to return to ed            Amount and/or Complexity of Data Reviewed  Clinical lab tests: ordered and reviewed  Tests in the radiology section of CPT®: ordered and reviewed  Tests in the medicine section of CPT®: ordered and reviewed  Discussion of test results with the performing providers: yes  Obtain history from someone other than the patient: yes  Review and summarize past medical records: yes  Discuss the patient with other providers: yes  Independent visualization of images, tracings, or specimens: yes    Patient Progress  Patient progress: stable      Disposition  Final diagnoses:   Viral syndrome   CAD (coronary artery disease)     Time reflects when diagnosis was documented in both MDM as applicable and the Disposition within this note Time User Action Codes Description Comment    3/22/2019  2:20 PM Catarino Winston Add [B34 9] Viral syndrome     3/22/2019  2:20 PM Catarino Winston Add [I25 10] CAD (coronary artery disease)       ED Disposition     ED Disposition Condition Date/Time Comment    Discharge Stable Fri Mar 22, 2019  2:19 PM Brandin Richardson discharge to home/self care              Follow-up Information     Follow up With Specialties Details Why Contact Info Additional 4 Terence Jeff MD Internal Medicine Call in 1 day for further evaluation of symptoms [address] [facility] [phone] [facility] Emergency Department Emergency Medicine Go to  If symptoms worsen [address] [facility] Raquel Cid [address] ED, [address] [place], [place], [address] [place] [address],  Cardiology Call in 1 day for further evaluation of symptoms [address] [id number]  [phone]             Discharge Medication List as of 3/22/2019  2:22 PM      START taking these medications    Details   DM-APAP-CPM (CORICIDIN HBP FLU) -2 MG TABS Take 2 tablets by mouth every 6 (six) hours as needed (cold symptoms ), Starting Fri 3/22/2019, Print         CONTINUE these medications which have NOT CHANGED    Details   aspirin (ECOTRIN LOW STRENGTH) 81 mg EC tablet Take 81 mg by mouth daily, Historical Med      atorvastatin (LIPITOR) 40 mg tablet Take 1 tablet (40 mg total) by mouth daily, Starting Mon 1/7/2019, Normal      BUDESONIDE PO Take 3 mg by mouth 2 (two) times a day, Historical Med      buPROPion (WELLBUTRIN) 75 mg tablet Take 75 mg by mouth daily , Historical Med      clonazePAM (KLONOPIN) 1 mg tablet Take 1 5 mg by mouth daily at bedtime , Historical Med      mirtazapine (REMERON) 7 5 MG tablet Take 7 5 mg by mouth daily at bedtime, Historical Med      nitroglycerin (NITROSTAT) 0 4 mg SL tablet Place 1 tablet (0 4 mg total) under the tongue every 5 (five) minutes as needed for chest pain, Starting Mon 1/7/2019, Normal      sertraline (ZOLOFT) 100 mg tablet Take 200 mg by mouth daily, Historical Med           No discharge procedures on file      ED Provider  Electronically Signed by           Tomas So PA-C  03/25/19 7703

## 2019-03-25 NOTE — TELEPHONE ENCOUNTER
Tried to call patient x3 - message states - number has calling restrictions, unable to leave message  Will try again later

## 2019-03-26 NOTE — TELEPHONE ENCOUNTER
Tried to call patient - this time I was able to leave a message advising pt to call back to follow-up

## 2019-03-27 NOTE — TELEPHONE ENCOUNTER
Spoke with patient - stated that he did get Dr Grace Gregory message on Friday  Has been feeling better  Will follow-up on Friday

## 2019-03-29 ENCOUNTER — OFFICE VISIT (OUTPATIENT)
Dept: CARDIOLOGY CLINIC | Facility: CLINIC | Age: 54
End: 2019-03-29
Payer: OTHER GOVERNMENT

## 2019-03-29 VITALS
SYSTOLIC BLOOD PRESSURE: 138 MMHG | HEIGHT: 69 IN | WEIGHT: 213 LBS | HEART RATE: 91 BPM | OXYGEN SATURATION: 97 % | DIASTOLIC BLOOD PRESSURE: 90 MMHG | BODY MASS INDEX: 31.55 KG/M2

## 2019-03-29 DIAGNOSIS — I10 ESSENTIAL HYPERTENSION: ICD-10-CM

## 2019-03-29 DIAGNOSIS — E78.5 DYSLIPIDEMIA: ICD-10-CM

## 2019-03-29 DIAGNOSIS — I25.118 CORONARY ARTERY DISEASE OF NATIVE ARTERY OF NATIVE HEART WITH STABLE ANGINA PECTORIS (HCC): Primary | ICD-10-CM

## 2019-03-29 DIAGNOSIS — R07.9 CHEST PAIN: ICD-10-CM

## 2019-03-29 PROCEDURE — 99213 OFFICE O/P EST LOW 20 MIN: CPT | Performed by: INTERNAL MEDICINE

## 2019-03-29 NOTE — PROGRESS NOTES
Cardiology Follow Up    Sergio Fulling  1965  [id number]  [address]    Dear Dr Fan Karley is a 50-year-old gentleman who has been referred to the [address] [place] [address] Cardiology in [place] with the following issues:     1  Typical angina, accelerating  2  Dizziness with exertion, 3 episodes  3  Palpitations occurring in the setting of dehydration, after intractable nausea and vomiting  4  Hyperlipidemia  5  Hypertension  6  Strong family history of cardiovascular disease      Interval History:  Since our last visit, Mr Lacie Joya has been hospitalized with diverticulitis as well as an episode of shortness of breath  Both episodes have resolved after IV antibiotics and blood pressure control  He continues to be short of breath  He has been tried on metoprolol as well as isosorbide mononitrate  He became bradycardic on metoprolol and could not tolerated  On indoor, he felt very sluggish and short of breath  He was very tired and also could not tolerate this  He continues to have chest pain and shortness of breath with exertion, particularly walking up hills  Patient Active Problem List   Diagnosis    Abdominal pain    Anxiety and depression    Diaz's esophagus    Essential hypertension    Dyslipidemia    Obesity (BMI 30 0-34  9)    Ulcerative colitis without complications (HCC)    Gastroesophageal reflux disease with esophagitis    Non-intractable vomiting with nausea    Sinus bradycardia    Leukocytosis    Abnormal CT of the abdomen    Vomiting    Chest pain    Diverticulitis    Microscopic colitis    Atelectasis     Past Medical History:   Diagnosis Date    Diaz's esophagus     Coronary artery disease     Depression     Diverticulitis     GERD (gastroesophageal reflux disease)     Hemorrhoid     Hyperlipidemia     Hypertension     UC [facility]ulcerative colitis) ([facility] [address] ) Social History     Socioeconomic History    Marital status: /Civil Union     Spouse name: Not on file    Number of children: Not on file    Years of education: Not on file    Highest education level: Not on file   Occupational History    Not on file   Social Needs    Financial resource strain: Not on file    Food insecurity:     Worry: Not on file     Inability: Not on file    Transportation needs:     Medical: Not on file     Non-medical: Not on file   Tobacco Use    Smoking status: Former Smoker     Last attempt to quit: 2007     Years since quittin 2    Smokeless tobacco: Former User     Quit date: 1998   Substance and Sexual Activity    Alcohol use: Never     Frequency: Never     Comment: quit 3 years    Drug use: Yes     Frequency: 3 0 times per week     Types: Marijuana     Comment: 1/15/2019    Sexual activity: Never   Lifestyle    Physical activity:     Days per week: Not on file     Minutes per session: Not on file    Stress: Not on file   Relationships    Social connections:     Talks on phone: Not on file     Gets together: Not on file     Attends Yarsani service: Not on file     Active member of club or organization: Not on file     Attends meetings of clubs or organizations: Not on file     Relationship status: Not on file    Intimate partner violence:     Fear of current or ex partner: Not on file     Emotionally abused: Not on file     Physically abused: Not on file     Forced sexual activity: Not on file   Other Topics Concern    Not on file   Social History Narrative    Not on file      Family History   Problem Relation Age of Onset    Heart disease Father     Cancer Sister      Past Surgical History:   Procedure Laterality Date    ABDOMINAL SURGERY      radio frequency ablation    BONE GRAFT Left 2018    CARPAL TUNNEL RELEASE Right     COLONOSCOPY      EGD AND COLONOSCOPY      ESOPHAGOGASTRODUODENOSCOPY N/A 2/15/2018    Procedure: ESOPHAGOGASTRODUODENOSCOPY (EGD); Surgeon: Virginia Cerda MD;  [facility];  Service: Gastroenterology    ESOPHAGOGASTRODUODENOSCOPY N/A 12/10/2018    Procedure: ESOPHAGOGASTRODUODENOSCOPY (EGD); Surgeon: Barnie Gosselin, MD;  Location: [facility];   Service: Gastroenterology    TONSILLECTOMY         Current Outpatient Medications:     aspirin (ECOTRIN LOW STRENGTH) 81 mg EC tablet, Take 81 mg by mouth daily, Disp: , Rfl:     atorvastatin (LIPITOR) 40 mg tablet, Take 1 tablet (40 mg total) by mouth daily, Disp: 90 tablet, Rfl: 3    BUDESONIDE PO, Take 3 mg by mouth 2 (two) times a day, Disp: , Rfl:     buPROPion (WELLBUTRIN) 75 mg tablet, Take 75 mg by mouth daily , Disp: , Rfl:     clonazePAM (KLONOPIN) 1 mg tablet, Take 1 5 mg by mouth daily at bedtime , Disp: , Rfl:     mirtazapine (REMERON) 7 5 MG tablet, Take 7 5 mg by mouth daily at bedtime, Disp: , Rfl:     nitroglycerin (NITROSTAT) 0 4 mg SL tablet, Place 1 tablet (0 4 mg total) under the tongue every 5 (five) minutes as needed for chest pain, Disp: 30 tablet, Rfl: 3    sertraline (ZOLOFT) 100 mg tablet, Take 200 mg by mouth daily, Disp: , Rfl:     DM-APAP-CPM (CORICIDIN HBP FLU) -2 MG TABS, Take 2 tablets by mouth every 6 (six) hours as needed (cold symptoms ) (Patient not taking: Reported on 3/29/2019), Disp: 40 each, Rfl: 0       Allergies   Allergen Reactions    No Active Allergies        Labs:  Results for Danetta Orts (MRN [id number]) as of 3/29/2019 16:49   3/22/2019 10:38   Sodium 139   Potassium 3 9   Chloride 101   CO2 25   Anion Gap 13   BUN 14   Creatinine 1 05   Glucose, Random 164 (H)   Calcium 9 0   AST 16   ALT 31   Alkaline Phosphatase 69   Total Protein 7 3   Albumin 3 8   TOTAL BILIRUBIN 0 30   eGFR 80   BILIRUBIN DIRECT 0 08     Results for Danetta Orts (MRN [id number]) as of 3/29/2019 16:49   3/22/2019 10:38   WBC 6 95   Red Blood Cell Count 4 76   Hemoglobin 13 9   HCT 42 4   MCV 89   MCH 29 2   MCHC 32 8   RDW 14 3   Platelet Count 560           Review of Systems:  Review of Systems   Constitutional: Negative  Respiratory: Positive for shortness of breath  Cardiovascular: Positive for chest pain  Gastrointestinal: Negative  Endocrine: Negative  Musculoskeletal: Negative  Skin: Negative  Neurological: Negative  Hematological: Negative  Physical Exam:  Physical Exam   Constitutional: He is oriented to person, place, and time  He appears well-developed and well-nourished  HENT:   Head: Normocephalic and atraumatic  Eyes: Conjunctivae and EOM are normal    Neck: No hepatojugular reflux and no JVD present  Carotid bruit is not present  No tracheal deviation present  No thyromegaly present  Cardiovascular: Normal rate, regular rhythm, S1 normal and S2 normal  PMI is not displaced  Exam reveals no gallop  No murmur heard  Pulses:       Carotid pulses are 2+ on the right side, and 2+ on the left side  Radial pulses are 2+ on the right side, and 2+ on the left side  Femoral pulses are 2+ on the right side, and 2+ on the left side  Dorsalis pedis pulses are 2+ on the right side, and 2+ on the left side  Posterior tibial pulses are 2+ on the right side, and 2+ on the left side  Pulmonary/Chest: Breath sounds normal  No accessory muscle usage  No tachypnea  No respiratory distress  Abdominal: Soft  Bowel sounds are normal  He exhibits no distension  There is no tenderness  Musculoskeletal: He exhibits no edema  Lymphadenopathy:        Head (right side): No submental, no submandibular, no tonsillar, no preauricular, no posterior auricular and no occipital adenopathy present  Head (left side): No submental, no submandibular, no tonsillar, no preauricular, no posterior auricular and no occipital adenopathy present  He has no cervical adenopathy  Neurological: He is alert and oriented to person, place, and time  Skin: Skin is warm and dry  Psychiatric: He has a normal mood and affect  His behavior is normal  Judgment and thought content normal        Discussion/Summary:  Known coronary artery disease, 50% mid RCA, 70% distal RCA  Continued stable angina, life-limiting  Inability to tolerate antianginal medications  Hypertension  Hyperlipidemia    Mr  Clearly is unfortunately unable to tolerate numerous antianginal medications  Given his continued symptoms which are limiting his activities of daily living, we will proceed to coronary angiography with intention to stent his distal RCA 70% lesion  Preoperative labs were ordered and an order was placed for coronary angiography  This will be scheduled at my next available outpatient catheterization appointment  He will follow up in this office in 3 months, sooner if any acute issues arise  Thank you for the opportunity to consult on this patient  If you have any questions, please feel free to call my office

## 2019-03-29 NOTE — PATIENT INSTRUCTIONS
You have been scheduled for cardiac catheterization and intervention  He will be called to schedule your appointment  Please follow up in this office in 3 months, sooner if any acute issues arise  If you experience any significant chest pain, chest pain at rest or chest pain that is not relieved by nitroglycerin, please report to the nearest emergency room

## 2019-03-29 NOTE — LETTER
March 29, 2019     Becky Broussard, [address] [facility]  [address] [facility]    Patient: Angelique Monge   YOB: 1965   Date of Visit: 3/29/2019       Dear Dr Leticia Israel: Thank you for referring Angelique Monge to me for evaluation  Below are my notes for this consultation  If you have questions, please do not hesitate to call me  I look forward to following your patient along with you  Sincerely,        Cecilia Stevenson DO        CC: No Recipients  Cecilia Stevenson DO  3/29/2019  5:03 PM  Sign at close encounter                                             Cardiology Follow Up    Angelique Monge  1965  [id number]  [address] [place] [address]    Dear Dr Pawel Ontiveros is a 21-year-old gentleman who has been referred to the [address] [place] [address] Cardiology in [place] with the following issues:     1  Typical angina, accelerating  2  Dizziness with exertion, 3 episodes  3  Palpitations occurring in the setting of dehydration, after intractable nausea and vomiting  4  Hyperlipidemia  5  Hypertension  6  Strong family history of cardiovascular disease      Interval History:  Since our last visit, Mr Jade Blanco has been hospitalized with diverticulitis as well as an episode of shortness of breath  Both episodes have resolved after IV antibiotics and blood pressure control  He continues to be short of breath  He has been tried on metoprolol as well as isosorbide mononitrate  He became bradycardic on metoprolol and could not tolerated  On indoor, he felt very sluggish and short of breath  He was very tired and also could not tolerate this  He continues to have chest pain and shortness of breath with exertion, particularly walking up hills  Patient Active Problem List   Diagnosis    Abdominal pain    Anxiety and depression    Diaz's esophagus    Essential hypertension    Dyslipidemia    Obesity (BMI 30 0-34  9)    Ulcerative colitis without complications ([place] [address] )    Gastroesophageal reflux disease with esophagitis    Non-intractable vomiting with nausea    Sinus bradycardia    Leukocytosis    Abnormal CT of the abdomen    Vomiting    Chest pain    Diverticulitis    Microscopic colitis    Atelectasis     Past Medical History:   Diagnosis Date    Diaz's esophagus     Coronary artery disease     Depression     Diverticulitis     GERD (gastroesophageal reflux disease)     Hemorrhoid     Hyperlipidemia     Hypertension     UC (ulcerative colitis) ([place] [address] )      Social History     Socioeconomic History    Marital status: /Civil Union     Spouse name: Not on file    Number of children: Not on file    Years of education: Not on file    Highest education level: Not on file   Occupational History    Not on file   Social Needs    Financial resource strain: Not on file    Food insecurity:     Worry: Not on file     Inability: Not on file    Transportation needs:     Medical: Not on file     Non-medical: Not on file   Tobacco Use    Smoking status: Former Smoker     Last attempt to quit: 2007     Years since quittin 2    Smokeless tobacco: Former User     Quit date: 1998   Substance and Sexual Activity    Alcohol use: Never     Frequency: Never     Comment: quit 3 years    Drug use: Yes     Frequency: 3 0 times per week     Types: Marijuana     Comment: 1/15/2019    Sexual activity: Never   Lifestyle    Physical activity:     Days per week: Not on file     Minutes per session: Not on file    Stress: Not on file   Relationships    Social connections:     Talks on phone: Not on file     Gets together: Not on file     Attends Mandaen service: Not on file     Active member of club or organization: Not on file     Attends meetings of clubs or organizations: Not on file     Relationship status: Not on file    Intimate partner violence:     Fear of current or ex partner: Not on file     Emotionally abused: Not on file     Physically abused: Not on file     Forced sexual activity: Not on file   Other Topics Concern    Not on file   Social History Narrative    Not on file      Family History   Problem Relation Age of Onset    Heart disease Father     Cancer Sister      Past Surgical History:   Procedure Laterality Date    ABDOMINAL SURGERY      radio frequency ablation    BONE GRAFT Left 2018    CARPAL TUNNEL RELEASE Right     COLONOSCOPY      EGD AND COLONOSCOPY      ESOPHAGOGASTRODUODENOSCOPY N/A 2/15/2018    Procedure: ESOPHAGOGASTRODUODENOSCOPY (EGD); Surgeon: Delmis Huston MD;  [facility]: [facility];  Service: Gastroenterology    ESOPHAGOGASTRODUODENOSCOPY N/A 12/10/2018    Procedure: ESOPHAGOGASTRODUODENOSCOPY (EGD); Surgeon: María Elena Weathers MD;  [facility];   Service: Gastroenterology    TONSILLECTOMY         Current Outpatient Medications:     aspirin (ECOTRIN LOW STRENGTH) 81 mg EC tablet, Take 81 mg by mouth daily, Disp: , Rfl:     atorvastatin (LIPITOR) 40 mg tablet, Take 1 tablet (40 mg total) by mouth daily, Disp: 90 tablet, Rfl: 3    BUDESONIDE PO, Take 3 mg by mouth 2 (two) times a day, Disp: , Rfl:     buPROPion (WELLBUTRIN) 75 mg tablet, Take 75 mg by mouth daily , Disp: , Rfl:     clonazePAM (KLONOPIN) 1 mg tablet, Take 1 5 mg by mouth daily at bedtime , Disp: , Rfl:     mirtazapine (REMERON) 7 5 MG tablet, Take 7 5 mg by mouth daily at bedtime, Disp: , Rfl:     nitroglycerin (NITROSTAT) 0 4 mg SL tablet, Place 1 tablet (0 4 mg total) under the tongue every 5 (five) minutes as needed for chest pain, Disp: 30 tablet, Rfl: 3    sertraline (ZOLOFT) 100 mg tablet, Take 200 mg by mouth daily, Disp: , Rfl:     DM-APAP-CPM (CORICIDIN HBP FLU) -2 MG TABS, Take 2 tablets by mouth every 6 (six) hours as needed (cold symptoms ) (Patient not taking: Reported on 3/29/2019), Disp: 40 each, Rfl: 0       Allergies   Allergen Reactions    No Active Allergies        Labs:  Results for Tyrese Navarro (MRN [id number]) as of 3/29/2019 16:49   3/22/2019 10:38   Sodium 139   Potassium 3 9   Chloride 101   CO2 25   Anion Gap 13   BUN 14   Creatinine 1 05   Glucose, Random 164 (H)   Calcium 9 0   AST 16   ALT 31   Alkaline Phosphatase 69   Total Protein 7 3   Albumin 3 8   TOTAL BILIRUBIN 0 30   eGFR 80   BILIRUBIN DIRECT 0 08     Results for Tyrese Navarro (MRN [id number]) as of 3/29/2019 16:49   3/22/2019 10:38   WBC 6 95   Red Blood Cell Count 4 76   Hemoglobin 13 9   HCT 42 4   MCV 89   MCH 29 2   MCHC 32 8   RDW 14 3   Platelet Count 336           Review of Systems:  Review of Systems   Constitutional: Negative  Respiratory: Positive for shortness of breath  Cardiovascular: Positive for chest pain  Gastrointestinal: Negative  Endocrine: Negative  Musculoskeletal: Negative  Skin: Negative  Neurological: Negative  Hematological: Negative  Physical Exam:  Physical Exam   Constitutional: He is oriented to person, place, and time  He appears well-developed and well-nourished  HENT:   Head: Normocephalic and atraumatic  Eyes: Conjunctivae and EOM are normal    Neck: No hepatojugular reflux and no JVD present  Carotid bruit is not present  No tracheal deviation present  No thyromegaly present  Cardiovascular: Normal rate, regular rhythm, S1 normal and S2 normal  PMI is not displaced  Exam reveals no gallop  No murmur heard  Pulses:       Carotid pulses are 2+ on the right side, and 2+ on the left side  Radial pulses are 2+ on the right side, and 2+ on the left side  Femoral pulses are 2+ on the right side, and 2+ on the left side  Dorsalis pedis pulses are 2+ on the right side, and 2+ on the left side  Posterior tibial pulses are 2+ on the right side, and 2+ on the left side  Pulmonary/Chest: Breath sounds normal  No accessory muscle usage  No tachypnea  No respiratory distress  Abdominal: Soft   Bowel sounds are normal  He exhibits no distension  There is no tenderness  Musculoskeletal: He exhibits no edema  Lymphadenopathy:        Head (right side): No submental, no submandibular, no tonsillar, no preauricular, no posterior auricular and no occipital adenopathy present  Head (left side): No submental, no submandibular, no tonsillar, no preauricular, no posterior auricular and no occipital adenopathy present  He has no cervical adenopathy  Neurological: He is alert and oriented to person, place, and time  Skin: Skin is warm and dry  Psychiatric: He has a normal mood and affect  His behavior is normal  Judgment and thought content normal        Discussion/Summary:  Known coronary artery disease, 50% mid RCA, 70% distal RCA  Continued stable angina, life-limiting  Inability to tolerate antianginal medications  Hypertension  Hyperlipidemia    Mr Fry is unfortunately unable to tolerate numerous antianginal medications  Given his continued symptoms which are limiting his activities of daily living, we will proceed to coronary angiography with intention to stent his distal RCA 70% lesion  Preoperative labs were ordered and an order was placed for coronary angiography  This will be scheduled at my next available outpatient catheterization appointment  He will follow up in this office in 3 months, sooner if any acute issues arise  Thank you for the opportunity to consult on this patient  If you have any questions, please feel free to call my office

## 2019-04-01 ENCOUNTER — TELEPHONE (OUTPATIENT)
Dept: CARDIOLOGY CLINIC | Facility: CLINIC | Age: 54
End: 2019-04-01

## 2019-04-01 DIAGNOSIS — R07.9 CHEST PAIN, UNSPECIFIED TYPE: Primary | ICD-10-CM

## 2019-04-02 ENCOUNTER — APPOINTMENT (OUTPATIENT)
Dept: LAB | Facility: HOSPITAL | Age: 54
End: 2019-04-02
Attending: INTERNAL MEDICINE
Payer: COMMERCIAL

## 2019-04-02 DIAGNOSIS — R07.9 CHEST PAIN: ICD-10-CM

## 2019-04-02 LAB
ANION GAP SERPL CALCULATED.3IONS-SCNC: 5 MMOL/L (ref 4–13)
BASOPHILS # BLD AUTO: 0.04 THOUSANDS/ΜL (ref 0–0.1)
BASOPHILS NFR BLD AUTO: 1 % (ref 0–1)
BUN SERPL-MCNC: 15 MG/DL (ref 5–25)
CALCIUM SERPL-MCNC: 8.8 MG/DL (ref 8.3–10.1)
CHLORIDE SERPL-SCNC: 103 MMOL/L (ref 100–108)
CO2 SERPL-SCNC: 31 MMOL/L (ref 21–32)
CREAT SERPL-MCNC: 0.88 MG/DL (ref 0.6–1.3)
EOSINOPHIL # BLD AUTO: 0.21 THOUSAND/ΜL (ref 0–0.61)
EOSINOPHIL NFR BLD AUTO: 3 % (ref 0–6)
ERYTHROCYTE [DISTWIDTH] IN BLOOD BY AUTOMATED COUNT: 14.1 % (ref 11.6–15.1)
GFR SERPL CREATININE-BSD FRML MDRD: 97 ML/MIN/1.73SQ M
GLUCOSE SERPL-MCNC: 79 MG/DL (ref 65–140)
HCT VFR BLD AUTO: 39.4 % (ref 36.5–49.3)
HGB BLD-MCNC: 12.9 G/DL (ref 12–17)
IMM GRANULOCYTES # BLD AUTO: 0.03 THOUSAND/UL (ref 0–0.2)
IMM GRANULOCYTES NFR BLD AUTO: 0 % (ref 0–2)
INR PPP: 1.01 (ref 0.86–1.17)
LYMPHOCYTES # BLD AUTO: 2.16 THOUSANDS/ΜL (ref 0.6–4.47)
LYMPHOCYTES NFR BLD AUTO: 29 % (ref 14–44)
MCH RBC QN AUTO: 28.9 PG (ref 26.8–34.3)
MCHC RBC AUTO-ENTMCNC: 32.7 G/DL (ref 31.4–37.4)
MCV RBC AUTO: 88 FL (ref 82–98)
MONOCYTES # BLD AUTO: 0.59 THOUSAND/ΜL (ref 0.17–1.22)
MONOCYTES NFR BLD AUTO: 8 % (ref 4–12)
NEUTROPHILS # BLD AUTO: 4.55 THOUSANDS/ΜL (ref 1.85–7.62)
NEUTS SEG NFR BLD AUTO: 59 % (ref 43–75)
NRBC BLD AUTO-RTO: 0 /100 WBCS
PLATELET # BLD AUTO: 229 THOUSANDS/UL (ref 149–390)
PMV BLD AUTO: 10.3 FL (ref 8.9–12.7)
POTASSIUM SERPL-SCNC: 4.2 MMOL/L (ref 3.5–5.3)
PROTHROMBIN TIME: 13.2 SECONDS (ref 11.8–14.2)
RBC # BLD AUTO: 4.47 MILLION/UL (ref 3.88–5.62)
SODIUM SERPL-SCNC: 139 MMOL/L (ref 136–145)
WBC # BLD AUTO: 7.58 THOUSAND/UL (ref 4.31–10.16)

## 2019-04-02 PROCEDURE — 80048 BASIC METABOLIC PNL TOTAL CA: CPT

## 2019-04-02 PROCEDURE — 85025 COMPLETE CBC W/AUTO DIFF WBC: CPT

## 2019-04-02 PROCEDURE — 85610 PROTHROMBIN TIME: CPT

## 2019-04-02 PROCEDURE — 36415 COLL VENOUS BLD VENIPUNCTURE: CPT

## 2019-04-15 ENCOUNTER — TELEPHONE (OUTPATIENT)
Dept: INTERVENTIONAL RADIOLOGY/VASCULAR | Facility: HOSPITAL | Age: 54
End: 2019-04-15

## 2019-04-15 RX ORDER — SODIUM CHLORIDE 9 MG/ML
75 INJECTION, SOLUTION INTRAVENOUS CONTINUOUS
Status: CANCELLED | OUTPATIENT
Start: 2019-04-15

## 2019-04-16 ENCOUNTER — TELEPHONE (OUTPATIENT)
Dept: SURGERY | Facility: HOSPITAL | Age: 54
End: 2019-04-16

## 2019-04-17 ENCOUNTER — HOSPITAL ENCOUNTER (OUTPATIENT)
Dept: INTERVENTIONAL RADIOLOGY/VASCULAR | Facility: HOSPITAL | Age: 54
Discharge: HOME | End: 2019-04-18
Attending: INTERNAL MEDICINE | Admitting: INTERNAL MEDICINE
Payer: OTHER GOVERNMENT

## 2019-04-17 DIAGNOSIS — I25.118 CORONARY ARTERY DISEASE OF NATIVE ARTERY OF NATIVE HEART WITH STABLE ANGINA PECTORIS (HCC): Primary | ICD-10-CM

## 2019-04-17 DIAGNOSIS — R07.9 CHEST PAIN, UNSPECIFIED TYPE: ICD-10-CM

## 2019-04-17 LAB
KCT BLD-ACNC: 230 SEC (ref 89–137)
KCT BLD-ACNC: 254 SEC (ref 89–137)
SPECIMEN SOURCE: ABNORMAL
SPECIMEN SOURCE: ABNORMAL

## 2019-04-17 PROCEDURE — 93458 L HRT ARTERY/VENTRICLE ANGIO: CPT | Performed by: INTERNAL MEDICINE

## 2019-04-17 PROCEDURE — 99153 MOD SED SAME PHYS/QHP EA: CPT | Performed by: INTERNAL MEDICINE

## 2019-04-17 PROCEDURE — C1887 CATHETER, GUIDING: HCPCS | Performed by: INTERNAL MEDICINE

## 2019-04-17 PROCEDURE — C9600 PERC DRUG-EL COR STENT SING: HCPCS | Performed by: INTERNAL MEDICINE

## 2019-04-17 PROCEDURE — C1769 GUIDE WIRE: HCPCS | Performed by: INTERNAL MEDICINE

## 2019-04-17 PROCEDURE — C1725 CATH, TRANSLUMIN NON-LASER: HCPCS | Performed by: INTERNAL MEDICINE

## 2019-04-17 PROCEDURE — 85347 COAGULATION TIME ACTIVATED: CPT

## 2019-04-17 PROCEDURE — 99152 MOD SED SAME PHYS/QHP 5/>YRS: CPT | Performed by: INTERNAL MEDICINE

## 2019-04-17 PROCEDURE — C1874 STENT, COATED/COV W/DEL SYS: HCPCS

## 2019-04-17 PROCEDURE — C1894 INTRO/SHEATH, NON-LASER: HCPCS | Performed by: INTERNAL MEDICINE

## 2019-04-17 PROCEDURE — 92928 PRQ TCAT PLMT NTRAC ST 1 LES: CPT | Performed by: INTERNAL MEDICINE

## 2019-04-17 RX ORDER — CLOPIDOGREL BISULFATE 75 MG/1
75 TABLET ORAL DAILY
Qty: 90 TABLET | Refills: 3 | Status: SHIPPED | OUTPATIENT
Start: 2019-04-17 | End: 2020-05-07

## 2019-04-17 RX ORDER — NITROGLYCERIN 0.4 MG/1
0.4 TABLET SUBLINGUAL
Status: DISCONTINUED | OUTPATIENT
Start: 2019-04-17 | End: 2019-04-18 | Stop reason: HOSPADM

## 2019-04-17 RX ORDER — HEPARIN SODIUM 1000 [USP'U]/ML
INJECTION, SOLUTION INTRAVENOUS; SUBCUTANEOUS CODE/TRAUMA/SEDATION MEDICATION
Status: COMPLETED | OUTPATIENT
Start: 2019-04-17 | End: 2019-04-17

## 2019-04-17 RX ORDER — ASPIRIN 81 MG/1
81 TABLET, CHEWABLE ORAL DAILY
Status: DISCONTINUED | OUTPATIENT
Start: 2019-04-18 | End: 2019-04-17

## 2019-04-17 RX ORDER — SERTRALINE HYDROCHLORIDE 100 MG/1
200 TABLET, FILM COATED ORAL DAILY
Status: DISCONTINUED | OUTPATIENT
Start: 2019-04-18 | End: 2019-04-18 | Stop reason: HOSPADM

## 2019-04-17 RX ORDER — PANTOPRAZOLE SODIUM 40 MG/1
40 TABLET, DELAYED RELEASE ORAL
Status: DISCONTINUED | OUTPATIENT
Start: 2019-04-18 | End: 2019-04-18 | Stop reason: HOSPADM

## 2019-04-17 RX ORDER — ASPIRIN 81 MG/1
81 TABLET ORAL DAILY
Status: DISCONTINUED | OUTPATIENT
Start: 2019-04-18 | End: 2019-04-18 | Stop reason: HOSPADM

## 2019-04-17 RX ORDER — CLOPIDOGREL BISULFATE 75 MG/1
75 TABLET ORAL DAILY
Status: DISCONTINUED | OUTPATIENT
Start: 2019-04-18 | End: 2019-04-18 | Stop reason: HOSPADM

## 2019-04-17 RX ORDER — SODIUM CHLORIDE 9 MG/ML
125 INJECTION, SOLUTION INTRAVENOUS CONTINUOUS
Status: DISPENSED | OUTPATIENT
Start: 2019-04-17 | End: 2019-04-17

## 2019-04-17 RX ORDER — SODIUM CHLORIDE 9 MG/ML
75 INJECTION, SOLUTION INTRAVENOUS CONTINUOUS
Status: DISCONTINUED | OUTPATIENT
Start: 2019-04-17 | End: 2019-04-18 | Stop reason: HOSPADM

## 2019-04-17 RX ORDER — ATORVASTATIN CALCIUM 40 MG/1
40 TABLET, FILM COATED ORAL DAILY
Status: DISCONTINUED | OUTPATIENT
Start: 2019-04-18 | End: 2019-04-18 | Stop reason: HOSPADM

## 2019-04-17 RX ORDER — BUPROPION HYDROCHLORIDE 75 MG/1
75 TABLET ORAL DAILY
Status: DISCONTINUED | OUTPATIENT
Start: 2019-04-18 | End: 2019-04-18 | Stop reason: HOSPADM

## 2019-04-17 RX ORDER — CLOPIDOGREL BISULFATE 75 MG/1
TABLET ORAL CODE/TRAUMA/SEDATION MEDICATION
Status: COMPLETED | OUTPATIENT
Start: 2019-04-17 | End: 2019-04-17

## 2019-04-17 RX ORDER — MIDAZOLAM HYDROCHLORIDE 1 MG/ML
INJECTION INTRAMUSCULAR; INTRAVENOUS CODE/TRAUMA/SEDATION MEDICATION
Status: COMPLETED | OUTPATIENT
Start: 2019-04-17 | End: 2019-04-17

## 2019-04-17 RX ORDER — OMEPRAZOLE 20 MG/1
20 CAPSULE, DELAYED RELEASE ORAL DAILY
COMMUNITY
End: 2019-07-17 | Stop reason: HOSPADM

## 2019-04-17 RX ORDER — VERAPAMIL HCL 2.5 MG/ML
AMPUL (ML) INTRAVENOUS CODE/TRAUMA/SEDATION MEDICATION
Status: COMPLETED | OUTPATIENT
Start: 2019-04-17 | End: 2019-04-17

## 2019-04-17 RX ORDER — FENTANYL CITRATE 50 UG/ML
INJECTION, SOLUTION INTRAMUSCULAR; INTRAVENOUS CODE/TRAUMA/SEDATION MEDICATION
Status: COMPLETED | OUTPATIENT
Start: 2019-04-17 | End: 2019-04-17

## 2019-04-17 RX ORDER — LIDOCAINE HYDROCHLORIDE 10 MG/ML
INJECTION, SOLUTION INFILTRATION; PERINEURAL CODE/TRAUMA/SEDATION MEDICATION
Status: COMPLETED | OUTPATIENT
Start: 2019-04-17 | End: 2019-04-17

## 2019-04-17 RX ORDER — NITROGLYCERIN 20 MG/100ML
INJECTION INTRAVENOUS CODE/TRAUMA/SEDATION MEDICATION
Status: COMPLETED | OUTPATIENT
Start: 2019-04-17 | End: 2019-04-17

## 2019-04-17 RX ADMIN — SODIUM CHLORIDE 75 ML/HR: 0.9 INJECTION, SOLUTION INTRAVENOUS at 09:20

## 2019-04-17 RX ADMIN — MIDAZOLAM HYDROCHLORIDE 1 MG: 1 INJECTION, SOLUTION INTRAMUSCULAR; INTRAVENOUS at 11:08

## 2019-04-17 RX ADMIN — IODIXANOL 60 ML: 320 INJECTION, SOLUTION INTRAVASCULAR at 11:46

## 2019-04-17 RX ADMIN — FENTANYL CITRATE 50 MCG: 50 INJECTION, SOLUTION INTRAMUSCULAR; INTRAVENOUS at 11:08

## 2019-04-17 RX ADMIN — HEPARIN SODIUM 4000 UNITS: 1000 INJECTION INTRAVENOUS; SUBCUTANEOUS at 11:33

## 2019-04-17 RX ADMIN — LIDOCAINE HYDROCHLORIDE 2 ML: 10 INJECTION, SOLUTION INFILTRATION; PERINEURAL at 11:14

## 2019-04-17 RX ADMIN — CLOPIDOGREL 600 MG: 75 TABLET, FILM COATED ORAL at 11:46

## 2019-04-17 RX ADMIN — VERAPAMIL HYDROCHLORIDE 2.5 MG: 2.5 INJECTION, SOLUTION INTRAVENOUS at 11:15

## 2019-04-17 RX ADMIN — NITROGLYCERIN 200 MCG: 20 INJECTION INTRAVENOUS at 11:15

## 2019-04-17 RX ADMIN — SODIUM CHLORIDE 75 ML/HR: 0.9 INJECTION, SOLUTION INTRAVENOUS at 21:10

## 2019-04-17 RX ADMIN — CLONAZEPAM 1.5 MG: 1 TABLET ORAL at 21:10

## 2019-04-17 RX ADMIN — HEPARIN SODIUM 10000 UNITS: 1000 INJECTION INTRAVENOUS; SUBCUTANEOUS at 11:16

## 2019-04-18 VITALS
SYSTOLIC BLOOD PRESSURE: 131 MMHG | DIASTOLIC BLOOD PRESSURE: 74 MMHG | RESPIRATION RATE: 18 BRPM | WEIGHT: 218.7 LBS | OXYGEN SATURATION: 94 % | HEART RATE: 68 BPM | BODY MASS INDEX: 32.39 KG/M2 | TEMPERATURE: 98.2 F | HEIGHT: 69 IN

## 2019-04-18 PROCEDURE — 99217 PR OBSERVATION CARE DISCHARGE MANAGEMENT: CPT | Performed by: INTERNAL MEDICINE

## 2019-04-18 RX ORDER — CLOPIDOGREL BISULFATE 75 MG/1
75 TABLET ORAL DAILY
Qty: 30 TABLET | Refills: 5 | Status: CANCELLED | OUTPATIENT
Start: 2019-04-19

## 2019-04-18 RX ADMIN — CLOPIDOGREL BISULFATE 75 MG: 75 TABLET ORAL at 08:51

## 2019-04-18 RX ADMIN — PANTOPRAZOLE SODIUM 40 MG: 40 TABLET, DELAYED RELEASE ORAL at 05:54

## 2019-04-18 RX ADMIN — ASPIRIN 81 MG: 81 TABLET, COATED ORAL at 08:51

## 2019-04-18 RX ADMIN — BUPROPION HYDROCHLORIDE 75 MG: 75 TABLET, FILM COATED ORAL at 08:51

## 2019-04-18 RX ADMIN — SERTRALINE HYDROCHLORIDE 200 MG: 100 TABLET ORAL at 08:51

## 2019-04-18 RX ADMIN — ATORVASTATIN CALCIUM 40 MG: 40 TABLET, FILM COATED ORAL at 08:51

## 2019-04-25 ENCOUNTER — OFFICE VISIT (OUTPATIENT)
Dept: CARDIOLOGY CLINIC | Facility: CLINIC | Age: 54
End: 2019-04-25
Payer: OTHER GOVERNMENT

## 2019-04-25 VITALS
DIASTOLIC BLOOD PRESSURE: 70 MMHG | OXYGEN SATURATION: 95 % | SYSTOLIC BLOOD PRESSURE: 118 MMHG | WEIGHT: 217 LBS | BODY MASS INDEX: 32.14 KG/M2 | HEIGHT: 69 IN | HEART RATE: 93 BPM

## 2019-04-25 DIAGNOSIS — I25.10 CORONARY ARTERY DISEASE INVOLVING NATIVE HEART WITHOUT ANGINA PECTORIS, UNSPECIFIED VESSEL OR LESION TYPE: Primary | ICD-10-CM

## 2019-04-25 DIAGNOSIS — E78.00 PURE HYPERCHOLESTEROLEMIA: ICD-10-CM

## 2019-04-25 DIAGNOSIS — I10 ESSENTIAL HYPERTENSION: ICD-10-CM

## 2019-04-25 PROCEDURE — 99213 OFFICE O/P EST LOW 20 MIN: CPT | Performed by: PHYSICIAN ASSISTANT

## 2019-05-17 ENCOUNTER — APPOINTMENT (EMERGENCY)
Dept: CT IMAGING | Facility: HOSPITAL | Age: 54
End: 2019-05-17
Payer: COMMERCIAL

## 2019-05-17 ENCOUNTER — HOSPITAL ENCOUNTER (EMERGENCY)
Facility: HOSPITAL | Age: 54
Discharge: HOME | End: 2019-05-17
Attending: EMERGENCY MEDICINE | Admitting: EMERGENCY MEDICINE
Payer: COMMERCIAL

## 2019-05-17 ENCOUNTER — APPOINTMENT (EMERGENCY)
Dept: RADIOLOGY | Facility: HOSPITAL | Age: 54
End: 2019-05-17
Payer: COMMERCIAL

## 2019-05-17 VITALS
TEMPERATURE: 98.5 F | WEIGHT: 215.39 LBS | SYSTOLIC BLOOD PRESSURE: 117 MMHG | HEART RATE: 95 BPM | RESPIRATION RATE: 18 BRPM | BODY MASS INDEX: 31.81 KG/M2 | OXYGEN SATURATION: 94 % | DIASTOLIC BLOOD PRESSURE: 69 MMHG

## 2019-05-17 DIAGNOSIS — K57.92 ACUTE DIVERTICULITIS: Primary | ICD-10-CM

## 2019-05-17 DIAGNOSIS — E83.42 HYPOMAGNESEMIA: ICD-10-CM

## 2019-05-17 DIAGNOSIS — E87.6 HYPOKALEMIA: ICD-10-CM

## 2019-05-17 LAB
ALBUMIN SERPL BCP-MCNC: 4 G/DL (ref 3.5–5)
ALP SERPL-CCNC: 69 U/L (ref 46–116)
ALT SERPL W P-5'-P-CCNC: 26 U/L (ref 12–78)
ANION GAP SERPL CALCULATED.3IONS-SCNC: 12 MMOL/L (ref 4–13)
APTT PPP: 27 SECONDS (ref 26–38)
AST SERPL W P-5'-P-CCNC: 14 U/L (ref 5–45)
ATRIAL RATE: 111 BPM
BACTERIA UR QL AUTO: NORMAL /HPF
BASOPHILS # BLD AUTO: 0.02 THOUSANDS/ΜL (ref 0–0.1)
BASOPHILS NFR BLD AUTO: 0 % (ref 0–1)
BILIRUB DIRECT SERPL-MCNC: 0.07 MG/DL (ref 0–0.2)
BILIRUB SERPL-MCNC: 0.3 MG/DL (ref 0.2–1)
BILIRUB UR QL STRIP: NEGATIVE
BUN SERPL-MCNC: 13 MG/DL (ref 5–25)
CALCIUM SERPL-MCNC: 9.3 MG/DL (ref 8.3–10.1)
CHLORIDE SERPL-SCNC: 103 MMOL/L (ref 100–108)
CLARITY UR: CLEAR
CO2 SERPL-SCNC: 24 MMOL/L (ref 21–32)
COLOR UR: YELLOW
CREAT SERPL-MCNC: 1.02 MG/DL (ref 0.6–1.3)
EOSINOPHIL # BLD AUTO: 0 THOUSAND/ΜL (ref 0–0.61)
EOSINOPHIL NFR BLD AUTO: 0 % (ref 0–6)
ERYTHROCYTE [DISTWIDTH] IN BLOOD BY AUTOMATED COUNT: 13.1 % (ref 11.6–15.1)
GFR SERPL CREATININE-BSD FRML MDRD: 83 ML/MIN/1.73SQ M
GLUCOSE SERPL-MCNC: 176 MG/DL (ref 65–140)
GLUCOSE UR STRIP-MCNC: NEGATIVE MG/DL
HCT VFR BLD AUTO: 45 % (ref 36.5–49.3)
HGB BLD-MCNC: 15.3 G/DL (ref 12–17)
HGB UR QL STRIP.AUTO: NEGATIVE
IMM GRANULOCYTES # BLD AUTO: 0.03 THOUSAND/UL (ref 0–0.2)
IMM GRANULOCYTES NFR BLD AUTO: 0 % (ref 0–2)
INR PPP: 0.95 (ref 0.86–1.17)
KETONES UR STRIP-MCNC: ABNORMAL MG/DL
LACTATE SERPL-SCNC: 1.9 MMOL/L (ref 0.5–2)
LEUKOCYTE ESTERASE UR QL STRIP: NEGATIVE
LIPASE SERPL-CCNC: 66 U/L (ref 73–393)
LYMPHOCYTES # BLD AUTO: 0.56 THOUSANDS/ΜL (ref 0.6–4.47)
LYMPHOCYTES NFR BLD AUTO: 5 % (ref 14–44)
MAGNESIUM SERPL-MCNC: 1.5 MG/DL (ref 1.6–2.6)
MCH RBC QN AUTO: 29.8 PG (ref 26.8–34.3)
MCHC RBC AUTO-ENTMCNC: 34 G/DL (ref 31.4–37.4)
MCV RBC AUTO: 88 FL (ref 82–98)
MONOCYTES # BLD AUTO: 0.32 THOUSAND/ΜL (ref 0.17–1.22)
MONOCYTES NFR BLD AUTO: 3 % (ref 4–12)
NEUTROPHILS # BLD AUTO: 9.66 THOUSANDS/ΜL (ref 1.85–7.62)
NEUTS SEG NFR BLD AUTO: 92 % (ref 43–75)
NITRITE UR QL STRIP: NEGATIVE
NON-SQ EPI CELLS URNS QL MICRO: NORMAL /HPF
NRBC BLD AUTO-RTO: 0 /100 WBCS
NT-PROBNP SERPL-MCNC: 203 PG/ML
P AXIS: 67 DEGREES
PH UR STRIP.AUTO: 6.5 [PH]
PLATELET # BLD AUTO: 268 THOUSANDS/UL (ref 149–390)
PMV BLD AUTO: 9.7 FL (ref 8.9–12.7)
POTASSIUM SERPL-SCNC: 3.4 MMOL/L (ref 3.5–5.3)
PR INTERVAL: 180 MS
PROT SERPL-MCNC: 7.6 G/DL (ref 6.4–8.2)
PROT UR STRIP-MCNC: ABNORMAL MG/DL
PROTHROMBIN TIME: 12.6 SECONDS (ref 11.8–14.2)
QRS AXIS: -36 DEGREES
QRSD INTERVAL: 86 MS
QT INTERVAL: 340 MS
QTC INTERVAL: 462 MS
RBC # BLD AUTO: 5.14 MILLION/UL (ref 3.88–5.62)
RBC #/AREA URNS AUTO: NORMAL /HPF
SODIUM SERPL-SCNC: 139 MMOL/L (ref 136–145)
SP GR UR STRIP.AUTO: 1.01 (ref 1–1.03)
T WAVE AXIS: 69 DEGREES
TROPONIN I SERPL-MCNC: <0.02 NG/ML
UROBILINOGEN UR QL STRIP.AUTO: 0.2 E.U./DL
VENTRICULAR RATE: 111 BPM
WBC # BLD AUTO: 10.59 THOUSAND/UL (ref 4.31–10.16)
WBC #/AREA URNS AUTO: NORMAL /HPF

## 2019-05-17 PROCEDURE — 83735 ASSAY OF MAGNESIUM: CPT | Performed by: EMERGENCY MEDICINE

## 2019-05-17 PROCEDURE — 85610 PROTHROMBIN TIME: CPT | Performed by: EMERGENCY MEDICINE

## 2019-05-17 PROCEDURE — 85025 COMPLETE CBC W/AUTO DIFF WBC: CPT | Performed by: EMERGENCY MEDICINE

## 2019-05-17 PROCEDURE — 93010 ELECTROCARDIOGRAM REPORT: CPT | Performed by: INTERNAL MEDICINE

## 2019-05-17 PROCEDURE — 96375 TX/PRO/DX INJ NEW DRUG ADDON: CPT

## 2019-05-17 PROCEDURE — 85730 THROMBOPLASTIN TIME PARTIAL: CPT | Performed by: EMERGENCY MEDICINE

## 2019-05-17 PROCEDURE — 84484 ASSAY OF TROPONIN QUANT: CPT | Performed by: EMERGENCY MEDICINE

## 2019-05-17 PROCEDURE — 36415 COLL VENOUS BLD VENIPUNCTURE: CPT | Performed by: EMERGENCY MEDICINE

## 2019-05-17 PROCEDURE — 96366 THER/PROPH/DIAG IV INF ADDON: CPT

## 2019-05-17 PROCEDURE — 83880 ASSAY OF NATRIURETIC PEPTIDE: CPT | Performed by: EMERGENCY MEDICINE

## 2019-05-17 PROCEDURE — 99284 EMERGENCY DEPT VISIT MOD MDM: CPT | Performed by: EMERGENCY MEDICINE

## 2019-05-17 PROCEDURE — 81001 URINALYSIS AUTO W/SCOPE: CPT | Performed by: EMERGENCY MEDICINE

## 2019-05-17 PROCEDURE — 99284 EMERGENCY DEPT VISIT MOD MDM: CPT

## 2019-05-17 PROCEDURE — 80048 BASIC METABOLIC PNL TOTAL CA: CPT | Performed by: EMERGENCY MEDICINE

## 2019-05-17 PROCEDURE — 71046 X-RAY EXAM CHEST 2 VIEWS: CPT

## 2019-05-17 PROCEDURE — 80076 HEPATIC FUNCTION PANEL: CPT | Performed by: EMERGENCY MEDICINE

## 2019-05-17 PROCEDURE — 93005 ELECTROCARDIOGRAM TRACING: CPT

## 2019-05-17 PROCEDURE — 70450 CT HEAD/BRAIN W/O DYE: CPT

## 2019-05-17 PROCEDURE — 96365 THER/PROPH/DIAG IV INF INIT: CPT

## 2019-05-17 PROCEDURE — 83690 ASSAY OF LIPASE: CPT | Performed by: EMERGENCY MEDICINE

## 2019-05-17 PROCEDURE — 74177 CT ABD & PELVIS W/CONTRAST: CPT

## 2019-05-17 PROCEDURE — 83605 ASSAY OF LACTIC ACID: CPT | Performed by: EMERGENCY MEDICINE

## 2019-05-17 PROCEDURE — 96361 HYDRATE IV INFUSION ADD-ON: CPT

## 2019-05-17 RX ORDER — HYDROMORPHONE HCL/PF 1 MG/ML
1 SYRINGE (ML) INJECTION ONCE
Status: COMPLETED | OUTPATIENT
Start: 2019-05-17 | End: 2019-05-17

## 2019-05-17 RX ORDER — HYDROCODONE BITARTRATE AND ACETAMINOPHEN 5; 325 MG/1; MG/1
1 TABLET ORAL EVERY 6 HOURS PRN
Qty: 10 TABLET | Refills: 0 | Status: SHIPPED | OUTPATIENT
Start: 2019-05-17 | End: 2019-05-27

## 2019-05-17 RX ORDER — DICYCLOMINE HCL 20 MG
20 TABLET ORAL EVERY 8 HOURS PRN
Qty: 12 TABLET | Refills: 0 | Status: SHIPPED | OUTPATIENT
Start: 2019-05-17 | End: 2019-10-01 | Stop reason: ALTCHOICE

## 2019-05-17 RX ORDER — ONDANSETRON 2 MG/ML
4 INJECTION INTRAMUSCULAR; INTRAVENOUS ONCE
Status: COMPLETED | OUTPATIENT
Start: 2019-05-17 | End: 2019-05-17

## 2019-05-17 RX ORDER — AMOXICILLIN AND CLAVULANATE POTASSIUM 875; 125 MG/1; MG/1
1 TABLET, FILM COATED ORAL ONCE
Status: COMPLETED | OUTPATIENT
Start: 2019-05-17 | End: 2019-05-17

## 2019-05-17 RX ORDER — MAGNESIUM SULFATE HEPTAHYDRATE 40 MG/ML
2 INJECTION, SOLUTION INTRAVENOUS ONCE
Status: COMPLETED | OUTPATIENT
Start: 2019-05-17 | End: 2019-05-17

## 2019-05-17 RX ORDER — AMOXICILLIN AND CLAVULANATE POTASSIUM 875; 125 MG/1; MG/1
1 TABLET, FILM COATED ORAL EVERY 12 HOURS SCHEDULED
Qty: 20 TABLET | Refills: 0 | Status: SHIPPED | OUTPATIENT
Start: 2019-05-17 | End: 2019-05-27

## 2019-05-17 RX ORDER — DICYCLOMINE HCL 20 MG
20 TABLET ORAL ONCE
Status: COMPLETED | OUTPATIENT
Start: 2019-05-17 | End: 2019-05-17

## 2019-05-17 RX ORDER — ONDANSETRON 4 MG/1
4 TABLET, ORALLY DISINTEGRATING ORAL EVERY 8 HOURS PRN
Qty: 20 TABLET | Refills: 0 | Status: SHIPPED | OUTPATIENT
Start: 2019-05-17 | End: 2019-10-22 | Stop reason: SDUPTHER

## 2019-05-17 RX ORDER — POTASSIUM CHLORIDE 20 MEQ/1
40 TABLET, EXTENDED RELEASE ORAL ONCE
Status: COMPLETED | OUTPATIENT
Start: 2019-05-17 | End: 2019-05-17

## 2019-05-17 RX ORDER — KETOROLAC TROMETHAMINE 30 MG/ML
15 INJECTION, SOLUTION INTRAMUSCULAR; INTRAVENOUS ONCE
Status: COMPLETED | OUTPATIENT
Start: 2019-05-17 | End: 2019-05-17

## 2019-05-17 RX ADMIN — DICYCLOMINE HYDROCHLORIDE 20 MG: 20 TABLET ORAL at 16:52

## 2019-05-17 RX ADMIN — IOHEXOL 100 ML: 350 INJECTION, SOLUTION INTRAVENOUS at 17:21

## 2019-05-17 RX ADMIN — ONDANSETRON 4 MG: 2 INJECTION INTRAMUSCULAR; INTRAVENOUS at 16:16

## 2019-05-17 RX ADMIN — AMOXICILLIN AND CLAVULANATE POTASSIUM 1 TABLET: 875; 125 TABLET, FILM COATED ORAL at 18:29

## 2019-05-17 RX ADMIN — SODIUM CHLORIDE 1000 ML: 0.9 INJECTION, SOLUTION INTRAVENOUS at 16:15

## 2019-05-17 RX ADMIN — HYDROMORPHONE HYDROCHLORIDE 1 MG: 1 INJECTION, SOLUTION INTRAMUSCULAR; INTRAVENOUS; SUBCUTANEOUS at 17:54

## 2019-05-17 RX ADMIN — MAGNESIUM SULFATE HEPTAHYDRATE 2 G: 40 INJECTION, SOLUTION INTRAVENOUS at 17:54

## 2019-05-17 RX ADMIN — KETOROLAC TROMETHAMINE 15 MG: 30 INJECTION, SOLUTION INTRAMUSCULAR at 16:51

## 2019-05-17 RX ADMIN — POTASSIUM CHLORIDE 40 MEQ: 1500 TABLET, EXTENDED RELEASE ORAL at 17:53

## 2019-07-06 NOTE — ANESTHESIA POSTPROCEDURE EVALUATION
Post-Op Assessment Note      CV Status:  Stable    Mental Status:  Alert and awake    Hydration Status:  Euvolemic    PONV Controlled:  Controlled    Airway Patency:  Patent    Post Op Vitals Reviewed: Yes          Staff: CRNA, Anesthesiologist           /56 (02/15/18 1445)    Temp     Pulse 85 (02/15/18 1445)   Resp 16 (02/15/18 1445)    SpO2 95 % (02/15/18 1445)
Received pt in room 6, ambulatory, pt A&Ox4, respirations even and unlabored b/l. Pt c/o upper abd pain rad to lower pelvic area since 7pm with nausea and dry heaving. Denies diarrhea, fever. Abdomen soft, nondistended, diffused tenderness. Denies pmhx. IVL 20g Angiocath placed on right AC. Labs sent. Urine sent. Awaiting MD mancilla. Will continue to monitor.

## 2019-07-14 ENCOUNTER — APPOINTMENT (EMERGENCY)
Dept: CT IMAGING | Facility: HOSPITAL | Age: 54
End: 2019-07-14
Payer: MEDICARE

## 2019-07-14 ENCOUNTER — HOSPITAL ENCOUNTER (EMERGENCY)
Facility: HOSPITAL | Age: 54
Discharge: HOME | End: 2019-07-14
Attending: EMERGENCY MEDICINE | Admitting: EMERGENCY MEDICINE
Payer: MEDICARE

## 2019-07-14 VITALS
SYSTOLIC BLOOD PRESSURE: 169 MMHG | HEART RATE: 55 BPM | OXYGEN SATURATION: 94 % | DIASTOLIC BLOOD PRESSURE: 83 MMHG | RESPIRATION RATE: 16 BRPM | TEMPERATURE: 97.9 F

## 2019-07-14 DIAGNOSIS — K52.9 COLITIS: Primary | ICD-10-CM

## 2019-07-14 LAB
ALBUMIN SERPL BCP-MCNC: 4.1 G/DL (ref 3.5–5)
ALP SERPL-CCNC: 74 U/L (ref 46–116)
ALT SERPL W P-5'-P-CCNC: 22 U/L (ref 12–78)
ANION GAP SERPL CALCULATED.3IONS-SCNC: 8 MMOL/L (ref 4–13)
AST SERPL W P-5'-P-CCNC: 14 U/L (ref 5–45)
BASOPHILS # BLD AUTO: 0.05 THOUSANDS/ΜL (ref 0–0.1)
BASOPHILS NFR BLD AUTO: 1 % (ref 0–1)
BILIRUB DIRECT SERPL-MCNC: 0.08 MG/DL (ref 0–0.2)
BILIRUB SERPL-MCNC: 0.3 MG/DL (ref 0.2–1)
BUN SERPL-MCNC: 20 MG/DL (ref 5–25)
CALCIUM SERPL-MCNC: 9.1 MG/DL (ref 8.3–10.1)
CHLORIDE SERPL-SCNC: 105 MMOL/L (ref 100–108)
CO2 SERPL-SCNC: 28 MMOL/L (ref 21–32)
CREAT SERPL-MCNC: 1 MG/DL (ref 0.6–1.3)
EOSINOPHIL # BLD AUTO: 0.04 THOUSAND/ΜL (ref 0–0.61)
EOSINOPHIL NFR BLD AUTO: 0 % (ref 0–6)
ERYTHROCYTE [DISTWIDTH] IN BLOOD BY AUTOMATED COUNT: 12.9 % (ref 11.6–15.1)
GFR SERPL CREATININE-BSD FRML MDRD: 85 ML/MIN/1.73SQ M
GLUCOSE SERPL-MCNC: 156 MG/DL (ref 65–140)
HCT VFR BLD AUTO: 44.1 % (ref 36.5–49.3)
HGB BLD-MCNC: 14.5 G/DL (ref 12–17)
IMM GRANULOCYTES # BLD AUTO: 0.07 THOUSAND/UL (ref 0–0.2)
IMM GRANULOCYTES NFR BLD AUTO: 1 % (ref 0–2)
LIPASE SERPL-CCNC: 79 U/L (ref 73–393)
LYMPHOCYTES # BLD AUTO: 1.31 THOUSANDS/ΜL (ref 0.6–4.47)
LYMPHOCYTES NFR BLD AUTO: 13 % (ref 14–44)
MCH RBC QN AUTO: 29.2 PG (ref 26.8–34.3)
MCHC RBC AUTO-ENTMCNC: 32.9 G/DL (ref 31.4–37.4)
MCV RBC AUTO: 89 FL (ref 82–98)
MONOCYTES # BLD AUTO: 0.62 THOUSAND/ΜL (ref 0.17–1.22)
MONOCYTES NFR BLD AUTO: 6 % (ref 4–12)
NEUTROPHILS # BLD AUTO: 7.68 THOUSANDS/ΜL (ref 1.85–7.62)
NEUTS SEG NFR BLD AUTO: 79 % (ref 43–75)
NRBC BLD AUTO-RTO: 0 /100 WBCS
PLATELET # BLD AUTO: 317 THOUSANDS/UL (ref 149–390)
PMV BLD AUTO: 9.6 FL (ref 8.9–12.7)
POTASSIUM SERPL-SCNC: 3.8 MMOL/L (ref 3.5–5.3)
PROT SERPL-MCNC: 7.6 G/DL (ref 6.4–8.2)
RBC # BLD AUTO: 4.97 MILLION/UL (ref 3.88–5.62)
SODIUM SERPL-SCNC: 141 MMOL/L (ref 136–145)
TROPONIN I SERPL-MCNC: <0.02 NG/ML
TROPONIN I SERPL-MCNC: <0.02 NG/ML
WBC # BLD AUTO: 9.77 THOUSAND/UL (ref 4.31–10.16)

## 2019-07-14 PROCEDURE — 96375 TX/PRO/DX INJ NEW DRUG ADDON: CPT

## 2019-07-14 PROCEDURE — 74175 CTA ABDOMEN W/CONTRAST: CPT

## 2019-07-14 PROCEDURE — 83690 ASSAY OF LIPASE: CPT | Performed by: EMERGENCY MEDICINE

## 2019-07-14 PROCEDURE — 36415 COLL VENOUS BLD VENIPUNCTURE: CPT | Performed by: EMERGENCY MEDICINE

## 2019-07-14 PROCEDURE — 85025 COMPLETE CBC W/AUTO DIFF WBC: CPT | Performed by: EMERGENCY MEDICINE

## 2019-07-14 PROCEDURE — 99284 EMERGENCY DEPT VISIT MOD MDM: CPT | Performed by: EMERGENCY MEDICINE

## 2019-07-14 PROCEDURE — 84484 ASSAY OF TROPONIN QUANT: CPT | Performed by: EMERGENCY MEDICINE

## 2019-07-14 PROCEDURE — 96376 TX/PRO/DX INJ SAME DRUG ADON: CPT

## 2019-07-14 PROCEDURE — 71275 CT ANGIOGRAPHY CHEST: CPT

## 2019-07-14 PROCEDURE — 80048 BASIC METABOLIC PNL TOTAL CA: CPT | Performed by: EMERGENCY MEDICINE

## 2019-07-14 PROCEDURE — 80076 HEPATIC FUNCTION PANEL: CPT | Performed by: EMERGENCY MEDICINE

## 2019-07-14 PROCEDURE — 93005 ELECTROCARDIOGRAM TRACING: CPT

## 2019-07-14 PROCEDURE — 99285 EMERGENCY DEPT VISIT HI MDM: CPT

## 2019-07-14 PROCEDURE — 96374 THER/PROPH/DIAG INJ IV PUSH: CPT

## 2019-07-14 RX ORDER — FENTANYL CITRATE 50 UG/ML
100 INJECTION, SOLUTION INTRAMUSCULAR; INTRAVENOUS ONCE
Status: DISCONTINUED | OUTPATIENT
Start: 2019-07-14 | End: 2019-07-14 | Stop reason: HOSPADM

## 2019-07-14 RX ORDER — FENTANYL CITRATE 50 UG/ML
100 INJECTION, SOLUTION INTRAMUSCULAR; INTRAVENOUS ONCE
Status: COMPLETED | OUTPATIENT
Start: 2019-07-14 | End: 2019-07-14

## 2019-07-14 RX ORDER — ONDANSETRON 2 MG/ML
4 INJECTION INTRAMUSCULAR; INTRAVENOUS ONCE
Status: COMPLETED | OUTPATIENT
Start: 2019-07-14 | End: 2019-07-14

## 2019-07-14 RX ORDER — ONDANSETRON 4 MG/1
4 TABLET, ORALLY DISINTEGRATING ORAL EVERY 8 HOURS PRN
Qty: 20 TABLET | Refills: 0 | Status: SHIPPED | OUTPATIENT
Start: 2019-07-14 | End: 2019-07-17 | Stop reason: HOSPADM

## 2019-07-14 RX ORDER — DICYCLOMINE HCL 20 MG
20 TABLET ORAL 2 TIMES DAILY
Qty: 20 TABLET | Refills: 0 | Status: SHIPPED | OUTPATIENT
Start: 2019-07-14 | End: 2019-07-17 | Stop reason: HOSPADM

## 2019-07-14 RX ADMIN — IOHEXOL 100 ML: 350 INJECTION, SOLUTION INTRAVENOUS at 14:54

## 2019-07-14 RX ADMIN — ONDANSETRON 4 MG: 2 INJECTION INTRAMUSCULAR; INTRAVENOUS at 14:46

## 2019-07-14 RX ADMIN — FENTANYL CITRATE 100 MCG: 50 INJECTION INTRAMUSCULAR; INTRAVENOUS at 17:10

## 2019-07-14 RX ADMIN — FENTANYL CITRATE 100 MCG: 50 INJECTION INTRAMUSCULAR; INTRAVENOUS at 15:03

## 2019-07-16 ENCOUNTER — TELEPHONE (OUTPATIENT)
Dept: GASTROENTEROLOGY | Facility: CLINIC | Age: 54
End: 2019-07-16

## 2019-07-16 ENCOUNTER — HOSPITAL ENCOUNTER (OUTPATIENT)
Facility: HOSPITAL | Age: 54
Setting detail: OBSERVATION
Discharge: HOME | End: 2019-07-17
Attending: EMERGENCY MEDICINE | Admitting: HOSPITALIST
Payer: MEDICARE

## 2019-07-16 DIAGNOSIS — K52.9 COLITIS: Primary | ICD-10-CM

## 2019-07-16 DIAGNOSIS — R07.9 CHEST PAIN: ICD-10-CM

## 2019-07-16 DIAGNOSIS — K22.711 BARRETT'S ESOPHAGUS WITH HIGH GRADE DYSPLASIA: ICD-10-CM

## 2019-07-16 LAB
ALBUMIN SERPL BCP-MCNC: 4.3 G/DL (ref 3.5–5)
ALP SERPL-CCNC: 73 U/L (ref 46–116)
ALT SERPL W P-5'-P-CCNC: 16 U/L (ref 12–78)
ANION GAP SERPL CALCULATED.3IONS-SCNC: 10 MMOL/L (ref 4–13)
AST SERPL W P-5'-P-CCNC: 12 U/L (ref 5–45)
ATRIAL RATE: 45 BPM
BASOPHILS # BLD AUTO: 0.07 THOUSANDS/ΜL (ref 0–0.1)
BASOPHILS NFR BLD AUTO: 1 % (ref 0–1)
BILIRUB SERPL-MCNC: 0.4 MG/DL (ref 0.2–1)
BUN SERPL-MCNC: 19 MG/DL (ref 5–25)
CALCIUM SERPL-MCNC: 9.8 MG/DL (ref 8.3–10.1)
CHLORIDE SERPL-SCNC: 101 MMOL/L (ref 100–108)
CO2 SERPL-SCNC: 29 MMOL/L (ref 21–32)
CREAT SERPL-MCNC: 1.19 MG/DL (ref 0.6–1.3)
EOSINOPHIL # BLD AUTO: 0.1 THOUSAND/ΜL (ref 0–0.61)
EOSINOPHIL NFR BLD AUTO: 1 % (ref 0–6)
ERYTHROCYTE [DISTWIDTH] IN BLOOD BY AUTOMATED COUNT: 13 % (ref 11.6–15.1)
GFR SERPL CREATININE-BSD FRML MDRD: 69 ML/MIN/1.73SQ M
GLUCOSE SERPL-MCNC: 147 MG/DL (ref 65–140)
HCT VFR BLD AUTO: 48.4 % (ref 36.5–49.3)
HGB BLD-MCNC: 15.9 G/DL (ref 12–17)
HOLD SPECIMEN: NORMAL
IMM GRANULOCYTES # BLD AUTO: 0.1 THOUSAND/UL (ref 0–0.2)
IMM GRANULOCYTES NFR BLD AUTO: 1 % (ref 0–2)
LIPASE SERPL-CCNC: 401 U/L (ref 73–393)
LYMPHOCYTES # BLD AUTO: 1.63 THOUSANDS/ΜL (ref 0.6–4.47)
LYMPHOCYTES NFR BLD AUTO: 12 % (ref 14–44)
MCH RBC QN AUTO: 29.1 PG (ref 26.8–34.3)
MCHC RBC AUTO-ENTMCNC: 32.9 G/DL (ref 31.4–37.4)
MCV RBC AUTO: 89 FL (ref 82–98)
MONOCYTES # BLD AUTO: 1.16 THOUSAND/ΜL (ref 0.17–1.22)
MONOCYTES NFR BLD AUTO: 8 % (ref 4–12)
NEUTROPHILS # BLD AUTO: 11.08 THOUSANDS/ΜL (ref 1.85–7.62)
NEUTS SEG NFR BLD AUTO: 77 % (ref 43–75)
NRBC BLD AUTO-RTO: 0 /100 WBCS
P AXIS: 63 DEGREES
PLATELET # BLD AUTO: 266 THOUSANDS/UL (ref 149–390)
PLATELET # BLD AUTO: 347 THOUSANDS/UL (ref 149–390)
PMV BLD AUTO: 9.3 FL (ref 8.9–12.7)
PMV BLD AUTO: 9.4 FL (ref 8.9–12.7)
POTASSIUM SERPL-SCNC: 3.7 MMOL/L (ref 3.5–5.3)
PR INTERVAL: 168 MS
PROT SERPL-MCNC: 8.1 G/DL (ref 6.4–8.2)
QRS AXIS: 47 DEGREES
QRSD INTERVAL: 94 MS
QT INTERVAL: 450 MS
QTC INTERVAL: 389 MS
RBC # BLD AUTO: 5.46 MILLION/UL (ref 3.88–5.62)
SODIUM SERPL-SCNC: 140 MMOL/L (ref 136–145)
T WAVE AXIS: 57 DEGREES
TROPONIN I SERPL-MCNC: <0.02 NG/ML
VENTRICULAR RATE: 45 BPM
WBC # BLD AUTO: 14.14 THOUSAND/UL (ref 4.31–10.16)

## 2019-07-16 PROCEDURE — 99285 EMERGENCY DEPT VISIT HI MDM: CPT

## 2019-07-16 PROCEDURE — 96375 TX/PRO/DX INJ NEW DRUG ADDON: CPT

## 2019-07-16 PROCEDURE — 80053 COMPREHEN METABOLIC PANEL: CPT | Performed by: EMERGENCY MEDICINE

## 2019-07-16 PROCEDURE — 99285 EMERGENCY DEPT VISIT HI MDM: CPT | Performed by: EMERGENCY MEDICINE

## 2019-07-16 PROCEDURE — 96376 TX/PRO/DX INJ SAME DRUG ADON: CPT

## 2019-07-16 PROCEDURE — 84484 ASSAY OF TROPONIN QUANT: CPT | Performed by: HOSPITALIST

## 2019-07-16 PROCEDURE — 93005 ELECTROCARDIOGRAM TRACING: CPT

## 2019-07-16 PROCEDURE — 84484 ASSAY OF TROPONIN QUANT: CPT | Performed by: EMERGENCY MEDICINE

## 2019-07-16 PROCEDURE — 93010 ELECTROCARDIOGRAM REPORT: CPT | Performed by: INTERNAL MEDICINE

## 2019-07-16 PROCEDURE — 96374 THER/PROPH/DIAG INJ IV PUSH: CPT

## 2019-07-16 PROCEDURE — 36415 COLL VENOUS BLD VENIPUNCTURE: CPT

## 2019-07-16 PROCEDURE — 96361 HYDRATE IV INFUSION ADD-ON: CPT

## 2019-07-16 PROCEDURE — 99220 PR INITIAL OBSERVATION CARE/DAY 70 MINUTES: CPT | Performed by: HOSPITALIST

## 2019-07-16 PROCEDURE — 85049 AUTOMATED PLATELET COUNT: CPT | Performed by: HOSPITALIST

## 2019-07-16 PROCEDURE — 85025 COMPLETE CBC W/AUTO DIFF WBC: CPT | Performed by: EMERGENCY MEDICINE

## 2019-07-16 PROCEDURE — 83690 ASSAY OF LIPASE: CPT | Performed by: EMERGENCY MEDICINE

## 2019-07-16 RX ORDER — ONDANSETRON 2 MG/ML
4 INJECTION INTRAMUSCULAR; INTRAVENOUS EVERY 6 HOURS PRN
Status: DISCONTINUED | OUTPATIENT
Start: 2019-07-16 | End: 2019-07-17 | Stop reason: HOSPADM

## 2019-07-16 RX ORDER — MORPHINE SULFATE 4 MG/ML
4 INJECTION, SOLUTION INTRAMUSCULAR; INTRAVENOUS ONCE
Status: COMPLETED | OUTPATIENT
Start: 2019-07-16 | End: 2019-07-16

## 2019-07-16 RX ORDER — LIDOCAINE 50 MG/G
1 PATCH TOPICAL DAILY
Status: DISCONTINUED | OUTPATIENT
Start: 2019-07-17 | End: 2019-07-17 | Stop reason: HOSPADM

## 2019-07-16 RX ORDER — HEPARIN SODIUM 5000 [USP'U]/ML
5000 INJECTION, SOLUTION INTRAVENOUS; SUBCUTANEOUS EVERY 8 HOURS SCHEDULED
Status: DISCONTINUED | OUTPATIENT
Start: 2019-07-16 | End: 2019-07-17 | Stop reason: HOSPADM

## 2019-07-16 RX ORDER — HYDROMORPHONE HCL/PF 1 MG/ML
0.5 SYRINGE (ML) INJECTION EVERY 4 HOURS PRN
Status: DISCONTINUED | OUTPATIENT
Start: 2019-07-16 | End: 2019-07-17 | Stop reason: HOSPADM

## 2019-07-16 RX ORDER — CLONAZEPAM 0.5 MG/1
1.5 TABLET ORAL
Status: CANCELLED | OUTPATIENT
Start: 2019-07-16

## 2019-07-16 RX ORDER — CLOPIDOGREL BISULFATE 75 MG/1
75 TABLET ORAL DAILY
Status: DISCONTINUED | OUTPATIENT
Start: 2019-07-17 | End: 2019-07-17 | Stop reason: HOSPADM

## 2019-07-16 RX ORDER — HYDROMORPHONE HCL/PF 1 MG/ML
1 SYRINGE (ML) INJECTION ONCE
Status: COMPLETED | OUTPATIENT
Start: 2019-07-16 | End: 2019-07-16

## 2019-07-16 RX ORDER — LORAZEPAM 2 MG/ML
1 INJECTION INTRAMUSCULAR EVERY 6 HOURS PRN
Status: DISCONTINUED | OUTPATIENT
Start: 2019-07-16 | End: 2019-07-16

## 2019-07-16 RX ORDER — HYDROMORPHONE HCL/PF 1 MG/ML
1 SYRINGE (ML) INJECTION EVERY 4 HOURS PRN
Status: DISCONTINUED | OUTPATIENT
Start: 2019-07-16 | End: 2019-07-17 | Stop reason: HOSPADM

## 2019-07-16 RX ORDER — DEXTROSE AND SODIUM CHLORIDE 5; .9 G/100ML; G/100ML
75 INJECTION, SOLUTION INTRAVENOUS CONTINUOUS
Status: DISCONTINUED | OUTPATIENT
Start: 2019-07-16 | End: 2019-07-17 | Stop reason: HOSPADM

## 2019-07-16 RX ORDER — HYDROMORPHONE HCL/PF 1 MG/ML
0.5 SYRINGE (ML) INJECTION ONCE
Status: COMPLETED | OUTPATIENT
Start: 2019-07-16 | End: 2019-07-16

## 2019-07-16 RX ORDER — NITROGLYCERIN 0.4 MG/1
0.4 TABLET SUBLINGUAL
Status: DISCONTINUED | OUTPATIENT
Start: 2019-07-16 | End: 2019-07-17 | Stop reason: HOSPADM

## 2019-07-16 RX ORDER — ASPIRIN 81 MG/1
81 TABLET ORAL DAILY
Status: DISCONTINUED | OUTPATIENT
Start: 2019-07-17 | End: 2019-07-17 | Stop reason: HOSPADM

## 2019-07-16 RX ORDER — BUPROPION HYDROCHLORIDE 75 MG/1
75 TABLET ORAL DAILY
Status: CANCELLED | OUTPATIENT
Start: 2019-07-17

## 2019-07-16 RX ORDER — SERTRALINE HYDROCHLORIDE 100 MG/1
200 TABLET, FILM COATED ORAL DAILY
Status: CANCELLED | OUTPATIENT
Start: 2019-07-17

## 2019-07-16 RX ORDER — ONDANSETRON 2 MG/ML
4 INJECTION INTRAMUSCULAR; INTRAVENOUS ONCE
Status: COMPLETED | OUTPATIENT
Start: 2019-07-16 | End: 2019-07-16

## 2019-07-16 RX ORDER — PANTOPRAZOLE SODIUM 40 MG/1
40 INJECTION, POWDER, FOR SOLUTION INTRAVENOUS
Status: DISCONTINUED | OUTPATIENT
Start: 2019-07-17 | End: 2019-07-17

## 2019-07-16 RX ORDER — ONDANSETRON 2 MG/ML
4 INJECTION INTRAMUSCULAR; INTRAVENOUS EVERY 4 HOURS PRN
Status: DISCONTINUED | OUTPATIENT
Start: 2019-07-16 | End: 2019-07-16

## 2019-07-16 RX ADMIN — HYDROMORPHONE HYDROCHLORIDE 1 MG: 1 INJECTION, SOLUTION INTRAMUSCULAR; INTRAVENOUS; SUBCUTANEOUS at 21:39

## 2019-07-16 RX ADMIN — ONDANSETRON 4 MG: 2 INJECTION INTRAMUSCULAR; INTRAVENOUS at 14:27

## 2019-07-16 RX ADMIN — HYDROMORPHONE HYDROCHLORIDE 1 MG: 1 INJECTION, SOLUTION INTRAMUSCULAR; INTRAVENOUS; SUBCUTANEOUS at 15:16

## 2019-07-16 RX ADMIN — CLONAZEPAM 1.5 MG: 1 TABLET ORAL at 21:39

## 2019-07-16 RX ADMIN — MORPHINE SULFATE 4 MG: 4 INJECTION INTRAVENOUS at 14:21

## 2019-07-16 RX ADMIN — HYDROMORPHONE HYDROCHLORIDE 0.5 MG: 1 INJECTION, SOLUTION INTRAMUSCULAR; INTRAVENOUS; SUBCUTANEOUS at 22:55

## 2019-07-16 RX ADMIN — SODIUM CHLORIDE 1000 ML: 0.9 INJECTION, SOLUTION INTRAVENOUS at 13:34

## 2019-07-16 RX ADMIN — HEPARIN SODIUM 5000 UNITS: 5000 INJECTION INTRAVENOUS; SUBCUTANEOUS at 18:35

## 2019-07-16 RX ADMIN — HYDROMORPHONE HYDROCHLORIDE 0.5 MG: 1 INJECTION, SOLUTION INTRAMUSCULAR; INTRAVENOUS; SUBCUTANEOUS at 22:33

## 2019-07-16 RX ADMIN — ONDANSETRON 4 MG: 2 INJECTION INTRAMUSCULAR; INTRAVENOUS at 13:34

## 2019-07-16 RX ADMIN — DEXTROSE AND SODIUM CHLORIDE 75 ML/HR: 5; .9 INJECTION, SOLUTION INTRAVENOUS at 18:36

## 2019-07-16 RX ADMIN — HEPARIN SODIUM 5000 UNITS: 5000 INJECTION INTRAVENOUS; SUBCUTANEOUS at 22:34

## 2019-07-16 NOTE — TELEPHONE ENCOUNTER
Winnie Fontaine - Patient called lmom for OV    Called patient back spoke with Dominic Salazar patient was in the ER 07/16/19

## 2019-07-16 NOTE — ED PROVIDER NOTES
History  Chief Complaint   Patient presents with    Abdominal Pain     pt states to have "hx of colitis" of and was in our ED 2 days ago with "flare up" pt has upcoming appt with gastro dr  pt states to have been vomiting since early this morning and has not been "able to keep medications, water or food down"      47 y/o male, hx of UC on chronic budesonide, hinojosa's esophagus s/p RFA, CAD, HTN, and HLD, presents to the ED for diffuse abd pain x 3 days  Patient reports that pain started 3 days ago and that he was seen here for similar 2 days ago  He had lab work and CT a chest/abdomen/pelvis which showed possible colitis flare  He was discharged home with Zofran and Bentyl and states that pain did improve initially  He states that this morning pain acutely worsened and nausea/vomiting had worsened as well  He denies any diarrhea/constipation, fevers/chills, chest pain, or shortness of breath  He states that he is currently on a Medrol Dosepak for back pain, which he finishes in a few days  He states that he was admitted for similar about 6 months ago, at which time was his last colonoscopy/EGD  He denies any bloody stools or urinary symptoms  He denies any recent travel, bad food intake, or known sick contacts        History provided by:  Patient  Abdominal Pain   Pain location:  Generalized  Pain quality: gnawing    Pain radiates to:  Does not radiate  Pain severity:  Moderate  Onset quality:  Sudden  Duration:  3 days  Progression:  Worsening  Chronicity:  Recurrent  Context: previous surgery    Context: not recent travel    Relieved by:  None tried  Worsened by:  Nothing  Ineffective treatments: steroids, zofran, bentyl   Associated symptoms: nausea, shortness of breath and vomiting    Associated symptoms: no chest pain, no chills, no constipation, no cough, no diarrhea, no dysuria, no fever, no hematuria and no sore throat        Prior to Admission Medications   Prescriptions Last Dose Informant Patient Reported? Taking?    BUDESONIDE PO  Self Yes No   Sig: Take 3 mg by mouth 2 (two) times a day   DM-APAP-CPM (CORICIDIN HBP FLU) -2 MG TABS  Self No No   Sig: Take 2 tablets by mouth every 6 (six) hours as needed (cold symptoms )   Patient not taking: Reported on 3/29/2019   aspirin (ECOTRIN LOW STRENGTH) 81 mg EC tablet  Self Yes No   Sig: Take 81 mg by mouth daily   atorvastatin (LIPITOR) 40 mg tablet  Self No No   Sig: Take 1 tablet (40 mg total) by mouth daily   buPROPion (WELLBUTRIN) 75 mg tablet  Self Yes No   Sig: Take 75 mg by mouth daily    clonazePAM (KLONOPIN) 1 mg tablet  Self Yes No   Sig: Take 1 5 mg by mouth daily at bedtime    clopidogrel (PLAVIX) 75 mg tablet   No No   Sig: Take 1 tablet (75 mg total) by mouth daily   dicyclomine (BENTYL) 20 mg tablet   No No   Sig: Take 1 tablet (20 mg total) by mouth every 8 (eight) hours as needed (abdominal cramping or diarrhea)   dicyclomine (BENTYL) 20 mg tablet   No No   Sig: Take 1 tablet (20 mg total) by mouth 2 (two) times a day   nitroglycerin (NITROSTAT) 0 4 mg SL tablet  Self No No   Sig: Place 1 tablet (0 4 mg total) under the tongue every 5 (five) minutes as needed for chest pain   omeprazole (PriLOSEC) 20 mg delayed release capsule   Yes No   Sig: Take 20 mg by mouth daily   ondansetron (ZOFRAN-ODT) 4 mg disintegrating tablet   No No   Sig: Take 1 tablet (4 mg total) by mouth every 8 (eight) hours as needed for nausea or vomiting   ondansetron (ZOFRAN-ODT) 4 mg disintegrating tablet   No No   Sig: Take 1 tablet (4 mg total) by mouth every 8 (eight) hours as needed for nausea or vomiting   sertraline (ZOLOFT) 100 mg tablet  Self Yes No   Sig: Take 200 mg by mouth daily      Facility-Administered Medications: None       Past Medical History:   Diagnosis Date    Diaz's esophagus     Coronary artery disease     Depression     Diverticulitis     GERD (gastroesophageal reflux disease)     Hemorrhoid     Hyperlipidemia     Hypertension     UC (ulcerative colitis) ([facility] [address] )        Past Surgical History:   Procedure Laterality Date    ABDOMINAL SURGERY      radio frequency ablation    BONE GRAFT Left 2018    CARPAL TUNNEL RELEASE Right     COLONOSCOPY      EGD AND COLONOSCOPY      ESOPHAGOGASTRODUODENOSCOPY N/A 2/15/2018    Procedure: ESOPHAGOGASTRODUODENOSCOPY (EGD); Surgeon: Kennedi Tomlinson MD;  Location: [facility];  Service: Gastroenterology    ESOPHAGOGASTRODUODENOSCOPY N/A 12/10/2018    Procedure: ESOPHAGOGASTRODUODENOSCOPY (EGD); Surgeon: Mino Arzola MD;  Location: [facility]; Service: Gastroenterology    TONSILLECTOMY         Family History   Problem Relation Age of Onset    Heart disease Father     Cancer Sister      I have reviewed and agree with the history as documented  Social History     Tobacco Use    Smoking status: Former Smoker     Last attempt to quit: 2007     Years since quittin 5    Smokeless tobacco: Former User     Quit date: 1998   Substance Use Topics    Alcohol use: Never     Frequency: Never     Comment: quit 3 years    Drug use: Yes     Frequency: 3 0 times per week     Types: Marijuana     Comment: 19        Review of Systems   Constitutional: Negative for chills and fever  HENT: Negative for congestion, ear pain and sore throat  Eyes: Negative for pain and visual disturbance  Respiratory: Positive for shortness of breath  Negative for cough and wheezing  Cardiovascular: Negative for chest pain and leg swelling  Gastrointestinal: Positive for abdominal pain, nausea and vomiting  Negative for constipation and diarrhea  Genitourinary: Negative for dysuria, frequency, hematuria and urgency  Musculoskeletal: Negative for neck pain and neck stiffness  Skin: Negative for rash and wound  Neurological: Negative for weakness, numbness and headaches  Psychiatric/Behavioral: Negative for agitation and confusion     All other systems reviewed and are negative  Physical Exam  Physical Exam   Constitutional: He is oriented to person, place, and time  He appears well-developed and well-nourished  HENT:   Head: Normocephalic and atraumatic  Eyes: Pupils are equal, round, and reactive to light  EOM are normal    Neck: Normal range of motion  Neck supple  Cardiovascular: Normal rate and regular rhythm  Pulmonary/Chest: Effort normal and breath sounds normal  No stridor  No respiratory distress  He has no wheezes  He has no rales  Abdominal: Soft  Bowel sounds are normal  There is generalized tenderness  There is no rigidity, no rebound and no guarding  Musculoskeletal: Normal range of motion  Neurological: He is alert and oriented to person, place, and time  No focal deficits   Skin: Skin is warm and dry  Nursing note and vitals reviewed        Vital Signs  ED Triage Vitals [07/16/19 1238]   Temperature Pulse Respirations Blood Pressure SpO2   97 6 °F (36 4 °C) (!) 46 21 (!) 203/99 97 %      Temp Source Heart Rate Source Patient Position - Orthostatic VS BP Location FiO2 (%)   Oral Monitor Sitting Left arm --      Pain Score       8           Vitals:    07/16/19 1238 07/16/19 1721   BP: (!) 203/99 128/70   Pulse: (!) 46 75   Patient Position - Orthostatic VS: Sitting Lying         Visual Acuity      ED Medications  Medications   ondansetron (ZOFRAN) injection 4 mg (has no administration in time range)   dextrose 5 % and sodium chloride 0 9 % infusion (has no administration in time range)   HYDROmorphone (DILAUDID) injection 0 5 mg (has no administration in time range)   HYDROmorphone (DILAUDID) injection 1 mg (has no administration in time range)   lidocaine (LIDODERM) 5 % patch 1 patch (has no administration in time range)   aspirin (ECOTRIN LOW STRENGTH) EC tablet 81 mg (has no administration in time range)   clopidogrel (PLAVIX) tablet 75 mg (has no administration in time range)   nitroglycerin (NITROSTAT) SL tablet 0 4 mg (has no administration in time range)   pantoprazole (PROTONIX) injection 40 mg (has no administration in time range)   LORazepam (ATIVAN) 2 mg/mL injection 1 mg (has no administration in time range)   heparin (porcine) subcutaneous injection 5,000 Units (has no administration in time range)   ondansetron (ZOFRAN) injection 4 mg (4 mg Intravenous Given 7/16/19 1334)   sodium chloride 0 9 % bolus 1,000 mL (1,000 mL Intravenous New Bag 7/16/19 1334)   morphine (PF) 4 mg/mL injection 4 mg (4 mg Intravenous Given 7/16/19 1421)   ondansetron (ZOFRAN) injection 4 mg (4 mg Intravenous Given 7/16/19 1427)   HYDROmorphone (DILAUDID) injection 1 mg (1 mg Intravenous Given 7/16/19 1516)       Diagnostic Studies  Results Reviewed     Procedure Component Value Units Date/Time    Platelet count [[id number]]     Lab Status:  No result Specimen:  Blood     Troponin I [[id number]]     Lab Status:  No result Specimen:  Blood     Troponin I [[id number]]  (Normal) Collected:  07/16/19 1334    Lab Status:  Final result Specimen:  Blood from Arm, Left Updated:  07/16/19 1446     Troponin I <0 02 ng/mL     Comprehensive metabolic panel [[id number]]  (Abnormal) Collected:  07/16/19 1334    Lab Status:  Final result Specimen:  Blood from Arm, Left Updated:  07/16/19 1411     Sodium 140 mmol/L      Potassium 3 7 mmol/L      Chloride 101 mmol/L      CO2 29 mmol/L      ANION GAP 10 mmol/L      BUN 19 mg/dL      Creatinine 1 19 mg/dL      Glucose 147 mg/dL      Calcium 9 8 mg/dL      AST 12 U/L      ALT 16 U/L      Alkaline Phosphatase 73 U/L      Total Protein 8 1 g/dL      Albumin 4 3 g/dL      Total Bilirubin 0 40 mg/dL      eGFR 69 ml/min/1 73sq m     Narrative:       [place] guidelines for Chronic Kidney Disease (CKD):     Stage 1 with normal or high GFR (GFR > 90 mL/min/1 73 square meters)    Stage 2 Mild CKD (GFR = 60-89 mL/min/1 73 square meters)    Stage 3A Moderate CKD (GFR = 45-59 mL/min/1 73 square meters)   Stage 3B Moderate CKD (GFR = 30-44 mL/min/1 73 square meters)    Stage 4 Severe CKD (GFR = 15-29 mL/min/1 73 square meters)    Stage 5 End Stage CKD (GFR <15 mL/min/1 73 square meters)  Note: GFR calculation is accurate only with a steady state creatinine    Lipase [[id number]]  (Abnormal) Collected:  07/16/19 1334    Lab Status:  Final result Specimen:  Blood from Arm, Left Updated:  07/16/19 1411     Lipase 401 u/L     CBC and differential [[id number]]  (Abnormal) Collected:  07/16/19 1334    Lab Status:  Final result Specimen:  Blood from Arm, Left Updated:  07/16/19 1343     WBC 14 14 Thousand/uL      RBC 5 46 Million/uL      Hemoglobin 15 9 g/dL      Hematocrit 48 4 %      MCV 89 fL      MCH 29 1 pg      MCHC 32 9 g/dL      RDW 13 0 %      MPV 9 3 fL      Platelets 675 Thousands/uL      nRBC 0 /100 WBCs      Neutrophils Relative 77 %      Immat GRANS % 1 %      Lymphocytes Relative 12 %      Monocytes Relative 8 %      Eosinophils Relative 1 %      Basophils Relative 1 %      Neutrophils Absolute 11 08 Thousands/µL      Immature Grans Absolute 0 10 Thousand/uL      Lymphocytes Absolute 1 63 Thousands/µL      Monocytes Absolute 1 16 Thousand/µL      Eosinophils Absolute 0 10 Thousand/µL      Basophils Absolute 0 07 Thousands/µL                  No orders to display              Procedures  ECG 12 Lead Documentation Only  Date/Time: 7/16/2019 2:57 PM  Performed by: Naty Diaz DO  Authorized by: Naty Diaz DO     Indications / Diagnosis:  Chest pain   Patient location:  ED  Previous ECG:     Previous ECG:  Compared to current    Comparison ECG info:  7/14/19    Similarity:  No change  Rate:     ECG rate:  50    ECG rate assessment: bradycardic    Rhythm:     Rhythm: sinus bradycardia    Ectopy:     Ectopy: none    QRS:     QRS axis:  Left    QRS intervals:  Normal  ST segments:     ST segments:  Non-specific  T waves:     T waves: normal             ED Course  ED Course as of Jul 16 1727   Tue Jul 16, 2019   1415 Diffuse abd pain x 3 days  Nausea and vomiting x multiple- pain worse today  Was seen here 2 days ago for same- had ct c/abd/pel  No bloody diarrhea or melena  1500 Spoke with GI- ben- does not recommend any steroids or antibiotics at this time since patient does not have diarrhea  Does recommend admission for observation  HEART Risk Score      Most Recent Value   History  0 Filed at: 07/16/2019 1608   ECG  1 Filed at: 07/16/2019 1608   Age  1 Filed at: 07/16/2019 1608   Risk Factors  2 Filed at: 07/16/2019 1608   Troponin  0 Filed at: 07/16/2019 1608   Heart Score Risk Calculator   History  0 Filed at: 07/16/2019 1608   ECG  1 Filed at: 07/16/2019 1608   Age  1 Filed at: 07/16/2019 1608   Risk Factors  2 Filed at: 07/16/2019 1608   Troponin  0 Filed at: 07/16/2019 1608   HEART Score  4 Filed at: 07/16/2019 1608   HEART Score  4 Filed at: 07/16/2019 1608                            MDM  Number of Diagnoses or Management Options  Colitis: new and requires workup  Diagnosis management comments: Patient with abd pain- was seen here 2 days ago for same- diagnosed with possible colitis flare- will get labs, give pain meds, and get trop/ ekg  Will admit for further management and GI consult  Patient reevaluated and feels improved  Patient updated on results of tests and plan of care including admission to hospital for further evaluation of presenting complaint  Patient demonstrates verbal understanding and agrees with plan  Report to Dr Liu Comment  with SLIM for continuation of patient care          Amount and/or Complexity of Data Reviewed  Clinical lab tests: ordered and reviewed  Tests in the radiology section of CPT®: ordered and reviewed  Tests in the medicine section of CPT®: ordered and reviewed  Discussion of test results with the performing providers: yes  Decide to obtain previous medical records or to obtain history from someone other than the patient: yes  Obtain history from someone other than the patient: yes  Review and summarize past medical records: yes  Discuss the patient with other providers: yes  Independent visualization of images, tracings, or specimens: yes    Patient Progress  Patient progress: improved      Disposition  Final diagnoses:   Colitis     Time reflects when diagnosis was documented in both MDM as applicable and the Disposition within this note     Time User Action Codes Description Comment    7/16/2019  4:05 PM Celso Lowery Add [K52 9] Colitis     7/16/2019  5:05 PM Robert Pennington Add [R07 9] Chest pain       ED Disposition     ED Disposition Condition Date/Time Comment    Admit Stable Tue Jul 16, 2019  4:05 PM Case was discussed with BRITTANY and the patient's admission status was agreed to be Admission Status: observation status to the service of Dr Duane Bihari  Follow-up Information    None         Patient's Medications   Discharge Prescriptions    No medications on file     No discharge procedures on file      ED Provider  Electronically Signed by           Essie Meier DO  07/16/19 6664

## 2019-07-16 NOTE — ASSESSMENT & PLAN NOTE
-will keep patient NPO for now  -will order IV Zofran p r n  for nausea and vomiting  -consult to GI was placed and will follow up recommendations

## 2019-07-16 NOTE — PLAN OF CARE
Problem: PAIN - ADULT  Goal: Verbalizes/displays adequate comfort level or baseline comfort level  Description  Interventions:  - Encourage patient to monitor pain and request assistance  - Assess pain using appropriate pain scale  - Administer analgesics based on type and severity of pain and evaluate response  - Implement non-pharmacological measures as appropriate and evaluate response  - Consider cultural and social influences on pain and pain management  - Notify physician/advanced practitioner if interventions unsuccessful or patient reports new pain  Outcome: Progressing     Problem: INFECTION - ADULT  Goal: Absence or prevention of progression during hospitalization  Description  INTERVENTIONS:  - Assess and monitor for signs and symptoms of infection  - Monitor lab/diagnostic results  - Monitor all insertion sites, i e  indwelling lines, tubes, and drains  - Monitor endotracheal (as able) and nasal secretions for changes in amount and color  - [place] appropriate cooling/warming therapies per order  - Administer medications as ordered  - Instruct and encourage patient and family to use good hand hygiene technique  - Identify and instruct in appropriate isolation precautions for identified infection/condition  Outcome: Progressing  Goal: Absence of fever/infection during neutropenic period  Description  INTERVENTIONS:  - Monitor WBC  - Implement neutropenic guidelines  Outcome: Progressing     Problem: SAFETY ADULT  Goal: Patient will remain free of falls  Description  INTERVENTIONS:  - Assess patient frequently for physical needs  -  Identify cognitive and physical deficits and behaviors that affect risk of falls    -  [place] fall precautions as indicated by assessment   - Educate patient/family on patient safety including physical limitations  - Instruct patient to call for assistance with activity based on assessment  - Modify environment to reduce risk of injury  - Consider OT/PT consult to assist with strengthening/mobility  Outcome: Progressing  Goal: Maintain or return to baseline ADL function  Description  INTERVENTIONS:  -  Assess patient's ability to carry out ADLs; assess patient's baseline for ADL function and identify physical deficits which impact ability to perform ADLs (bathing, care of mouth/teeth, toileting, grooming, dressing, etc )  - Assess/evaluate cause of self-care deficits   - Assess range of motion  - Assess patient's mobility; develop plan if impaired  - Assess patient's need for assistive devices and provide as appropriate  - Encourage maximum independence but intervene and supervise when necessary  ¯ Involve family in performance of ADLs  ¯ Assess for home care needs following discharge   ¯ Request OT consult to assist with ADL evaluation and planning for discharge  ¯ Provide patient education as appropriate  Outcome: Progressing  Goal: Maintain or return mobility status to optimal level  Description  INTERVENTIONS:  - Assess patient's baseline mobility status (ambulation, transfers, stairs, etc )    - Identify cognitive and physical deficits and behaviors that affect mobility  - Identify mobility aids required to assist with transfers and/or ambulation (gait belt, sit-to-stand, lift, walker, cane, etc )  - [place] fall precautions as indicated by assessment  - Record patient progress and toleration of activity level on Mobility SBAR; progress patient to next Phase/Stage  - Instruct patient to call for assistance with activity based on assessment  - Request Rehabilitation consult to assist with strengthening/weightbearing, etc   Outcome: Progressing     Problem: DISCHARGE PLANNING  Goal: Discharge to home or other facility with appropriate resources  Description  INTERVENTIONS:  - Identify barriers to discharge w/patient and caregiver  - Arrange for needed discharge resources and transportation as appropriate  - Identify discharge learning needs (meds, wound care, etc )  - Arrange for interpretive services to assist at discharge as needed  - Refer to Case Management Department for coordinating discharge planning if the patient needs post-hospital services based on physician/advanced practitioner order or complex needs related to functional status, cognitive ability, or social support system  Outcome: Progressing     Problem: Knowledge Deficit  Goal: Patient/family/caregiver demonstrates understanding of disease process, treatment plan, medications, and discharge instructions  Description  Complete learning assessment and assess knowledge base    Interventions:  - Provide teaching at level of understanding  - Provide teaching via preferred learning methods  Outcome: Progressing

## 2019-07-16 NOTE — H&P
H&P- Vish Brantley 1965, 46 y o  male MRN: [id number]    Unit/Bed#: [facility]- Encounter: [id number]    Primary Care Provider: Bernice San MD   Date and time admitted to hospital: 7/16/2019  1:19 PM        * Non-intractable vomiting with nausea  Assessment & Plan  -will keep patient NPO for now  -will order IV Zofran p r n  for nausea and vomiting  -consult to GI was placed and will follow up recommendations    Microscopic colitis  Assessment & Plan  -ED physician spoke with GI and recommended not giving any steroids at this time  -follow-up with GIs recommendations    Abdominal pain  Assessment & Plan  -will give IV Dilaudid p r n  For pain  Patient was given morphine in the ED which provided little relief to his abdominal pain    Chest pain  Assessment & Plan  Patient was complaining of left-sided chest pain, nonradiating  -will monitor the patient in telemetry  -will consult Cardiology since the patient is having chest pain in the setting of recent stent  -since the patient recently had a stent will need to continue aspirin plus Plavix    Patient was counseled and he understands the importance of continuing with these meds despite his current symptoms    Diaz's esophagus  Assessment & Plan  -continue PPI    Anxiety and depression  Assessment & Plan  -resume oral meds when no longer NPO  -order IV Ativan p r n  for anxiety        [facility] - History and Physical:       Vish Brantley 46 y o  male MRN: [id number]  Unit/Bed#: [facility]- Encounter: [id number]  Admitting Physician: Chata Singh MD  PCP: Bernice San MD  Date of Admission:  07/16/19        Assessment and Plan:     * Non-intractable vomiting with nausea  Assessment & Plan  -will keep patient NPO for now  -will order IV Zofran p r n  for nausea and vomiting  -consult to GI was placed and will follow up recommendations    Microscopic colitis  Assessment & Plan  -ED physician spoke with GI and recommended not giving any steroids at this time  -follow-up with GIs recommendations    Abdominal pain  Assessment & Plan  -will give IV Dilaudid p r n  For pain  Patient was given morphine in the ED which provided little relief to his abdominal pain    Chest pain  Assessment & Plan  Patient was complaining of left-sided chest pain, nonradiating  -will monitor the patient in telemetry  -will consult Cardiology since the patient is having chest pain in the setting of recent stent  -since the patient recently had a stent will need to continue aspirin plus Plavix  Patient was counseled and he understands the importance of continuing with these meds despite his current symptoms    Diaz's esophagus  Assessment & Plan  -continue PPI    Anxiety and depression  Assessment & Plan  -resume oral meds when no longer NPO  -order IV Ativan p r n  for anxiety            VTE Prophylaxis: Heparin  / sequential compression device   Code Status: Full  POLST: There is no POLST form on file for this patient (pre-hospital)    Anticipated Length of Stay:  Patient will be admitted on an Observation basis with an anticipated length of stay of  2 midnights  Justification for Hospital Stay:  Abdominal pain with nausea vomiting/ Chest pain    Total Time for Visit, including Counseling / Coordination of Care: 30 minutes  Greater than 50% of this total time spent on direct patient counseling and coordination of care  Chief Complaint:   Nausea vomiting/abdominal discomfort    History of Present Illness:    Kristine Noble is a 46 y o  male who presents with abdominal pain as well as nausea and vomiting which started 3 days ago and that he was seen here for similar symptoms 2 days ago  He was discharged home from the ED with Zofran and Bentyl and states that pain did improve initially  He stated that this morning pain acutely worsened and nausea/vomiting had worsened as well    He was given IV morphine which alleviated his pain partially but then the pain was alleviated with IV Dilaudid  Also, the patient had cardiac stents placed about 3 months ago and he also complained of left-sided chest pain  The chest pain was left-sided, nonradiating and it occurred only once  He denies any diarrhea/constipation, fevers/chills, shortness of breath  lastly, he recently completed a Medrol Dosepak for back pain  Review of Systems:    Review of Systems   Constitutional: Negative  HENT: Negative  Eyes: Negative  Respiratory: Negative  Cardiovascular: Positive for chest pain  Gastrointestinal: Positive for abdominal pain, nausea and vomiting  Genitourinary: Negative  Musculoskeletal: Positive for back pain  Skin: Negative  Neurological: Negative  Psychiatric/Behavioral: Negative  Past Medical and Surgical History:     Past Medical History:   Diagnosis Date    Diaz's esophagus     Coronary artery disease     Depression     Diverticulitis     GERD (gastroesophageal reflux disease)     Hemorrhoid     Hyperlipidemia     Hypertension     UC (ulcerative colitis) ([facility] [address] )        Past Surgical History:   Procedure Laterality Date    ABDOMINAL SURGERY      radio frequency ablation    BONE GRAFT Left 2018    CARPAL TUNNEL RELEASE Right     COLONOSCOPY      EGD AND COLONOSCOPY      ESOPHAGOGASTRODUODENOSCOPY N/A 2/15/2018    Procedure: ESOPHAGOGASTRODUODENOSCOPY (EGD); Surgeon: Kennedi Tomlinson MD;  Location: [facility];  Service: Gastroenterology    ESOPHAGOGASTRODUODENOSCOPY N/A 12/10/2018    Procedure: ESOPHAGOGASTRODUODENOSCOPY (EGD); Surgeon: Mino Arzola MD;  Location: [facility]; Service: Gastroenterology    TONSILLECTOMY         Meds/Allergies:    Prior to Admission medications    Medication Sig Start Date End Date Taking?  Authorizing Provider   aspirin (ECOTRIN LOW STRENGTH) 81 mg EC tablet Take 81 mg by mouth daily    Historical Provider, MD   atorvastatin (LIPITOR) 40 mg tablet Take 1 tablet (40 mg total) by mouth daily 1/7/19   Madeline Elliott DO   BUDESONIDE PO Take 3 mg by mouth 2 (two) times a day    Historical Provider, MD   buPROPion [facility]) 75 mg tablet Take 75 mg by mouth daily     Historical Provider, MD   clonazePAM (KLONOPIN) 1 mg tablet Take 1 5 mg by mouth daily at bedtime     Historical Provider, MD   clopidogrel (PLAVIX) 75 mg tablet Take 1 tablet (75 mg total) by mouth daily 4/17/19   Madeline Elliott DO   dicyclomine (BENTYL) 20 mg tablet Take 1 tablet (20 mg total) by mouth every 8 (eight) hours as needed (abdominal cramping or diarrhea) 5/17/19   Anthony Blank DO   dicyclomine (BENTYL) 20 mg tablet Take 1 tablet (20 mg total) by mouth 2 (two) times a day 7/14/19   Celestino Boyle,    DM-APAP-CPM (CORICIDIN HBP FLU) -2 MG TABS Take 2 tablets by mouth every 6 (six) hours as needed (cold symptoms )  Patient not taking: Reported on 3/29/2019 3/22/19   Onur Padilla PA-C   nitroglycerin (NITROSTAT) 0 4 mg SL tablet Place 1 tablet (0 4 mg total) under the tongue every 5 (five) minutes as needed for chest pain 1/7/19   Madeline Elliott DO   omeprazole (PriLOSEC) 20 mg delayed release capsule Take 20 mg by mouth daily    Historical Provider, MD   ondansetron (ZOFRAN-ODT) 4 mg disintegrating tablet Take 1 tablet (4 mg total) by mouth every 8 (eight) hours as needed for nausea or vomiting 5/17/19   Anthony Blank DO   ondansetron (ZOFRAN-ODT) 4 mg disintegrating tablet Take 1 tablet (4 mg total) by mouth every 8 (eight) hours as needed for nausea or vomiting 7/14/19   Celestino Boyle DO   sertraline (ZOLOFT) 100 mg tablet Take 200 mg by mouth daily    Historical Provider, MD CHESTER have reviewed home medications with patient personally  Allergies:    Allergies   Allergen Reactions    No Active Allergies        Social History:     Marital Status: /Civil Union     Social History     Substance and Sexual Activity   Alcohol Use Never    Frequency: Never    Comment: quit 3 years     Social History     Tobacco Use   Smoking Status Former Smoker    Last attempt to quit: 2007    Years since quittin 5   Smokeless Tobacco Former User    Quit date: 1998     Social History     Substance and Sexual Activity   Drug Use Yes    Frequency: 3 0 times per week    Types: Marijuana    Comment: 19       Family History:    non-contributory    Physical Exam:     Vitals:   Blood Pressure: 142/90 (19 1736)  Pulse: 82 (19 1736)  Temperature: 98 8 °F (37 1 °C) (19 173)  Temp Source: Oral (19 173)  Respirations: 18 (19 173)  Height: 5' 9" (175 3 cm) (19 1238)  Weight - Scale: 96 4 kg (212 lb 9 6 oz) (19 1238)  SpO2: 96 % (19 173)    Physical Exam   Constitutional: He appears well-developed and well-nourished  HENT:   Head: Normocephalic and atraumatic  Eyes: Pupils are equal, round, and reactive to light  EOM are normal    Neck: Neck supple  Cardiovascular: Normal rate and regular rhythm  Pulmonary/Chest: Breath sounds normal    Abdominal: Soft  Bowel sounds are normal    Musculoskeletal: Normal range of motion  Neurological: He is alert  Skin: Skin is warm and dry  Psychiatric: His behavior is normal          Additional Data:     Lab Results: I have personally reviewed pertinent reports  Results from last 7 days   Lab Units 19  1334   WBC Thousand/uL 14 14*   HEMOGLOBIN g/dL 15 9   HEMATOCRIT % 48 4   PLATELETS Thousands/uL 347   NEUTROS PCT % 77*   LYMPHS PCT % 12*   MONOS PCT % 8   EOS PCT % 1     Results from last 7 days   Lab Units 19  1334   SODIUM mmol/L 140   POTASSIUM mmol/L 3 7   CHLORIDE mmol/L 101   CO2 mmol/L 29   BUN mg/dL 19   CREATININE mg/dL 1 19   ANION GAP mmol/L 10   CALCIUM mg/dL 9 8   ALBUMIN g/dL 4 3   TOTAL BILIRUBIN mg/dL 0 40   ALK PHOS U/L 73   ALT U/L 16   AST U/L 12   GLUCOSE RANDOM mg/dL 147*                       Imaging: I have personally reviewed pertinent reports        No orders to display       EKG, Pathology, and Other Studies Reviewed on Admission:       Allscripts / Epic Records Reviewed: Yes     ** Please Note: This note has been constructed using a voice recognition system   **

## 2019-07-16 NOTE — ASSESSMENT & PLAN NOTE
-ED physician spoke with GI and recommended not giving any steroids at this time  -follow-up with GIs recommendations

## 2019-07-16 NOTE — ASSESSMENT & PLAN NOTE
Patient was complaining of left-sided chest pain, nonradiating  -will monitor the patient in telemetry  -will consult Cardiology since the patient is having chest pain in the setting of recent stent  -since the patient recently had a stent will need to continue aspirin plus Plavix    Patient was counseled and he understands the importance of continuing with these meds despite his current symptoms

## 2019-07-16 NOTE — ED NOTES
SBAR    1  CHIEF COMPLAINT- abdominal pain   2  ORIENTATION- alert and oriented  3  ABNORMAL LABS- lipase   4  MEDS/DRIPS- none  5  LAST NARCOTIC- diluadid 1516  6  IV LINES- 18 R  7  ISOLATION- none  8  SKIN- intact  9  AMBULATION- independent  10  TRAUMA Y/N: N  11   ED NUMBER [id number]       Crow Gabriel RN  96/71/83 [id number]

## 2019-07-16 NOTE — ASSESSMENT & PLAN NOTE
-will give IV Dilaudid p r n  For pain    Patient was given morphine in the ED which provided little relief to his abdominal pain

## 2019-07-17 VITALS
WEIGHT: 212.6 LBS | DIASTOLIC BLOOD PRESSURE: 69 MMHG | RESPIRATION RATE: 18 BRPM | SYSTOLIC BLOOD PRESSURE: 105 MMHG | BODY MASS INDEX: 31.49 KG/M2 | OXYGEN SATURATION: 94 % | TEMPERATURE: 98.8 F | HEIGHT: 69 IN | HEART RATE: 65 BPM

## 2019-07-17 LAB
ATRIAL RATE: 50 BPM
P AXIS: 61 DEGREES
PR INTERVAL: 122 MS
QRS AXIS: 3 DEGREES
QRSD INTERVAL: 100 MS
QT INTERVAL: 430 MS
QTC INTERVAL: 392 MS
T WAVE AXIS: 56 DEGREES
TROPONIN I SERPL-MCNC: <0.02 NG/ML
VENTRICULAR RATE: 50 BPM

## 2019-07-17 PROCEDURE — 99217 PR OBSERVATION CARE DISCHARGE MANAGEMENT: CPT | Performed by: PHYSICIAN ASSISTANT

## 2019-07-17 PROCEDURE — 84484 ASSAY OF TROPONIN QUANT: CPT | Performed by: HOSPITALIST

## 2019-07-17 PROCEDURE — 93010 ELECTROCARDIOGRAM REPORT: CPT | Performed by: INTERNAL MEDICINE

## 2019-07-17 PROCEDURE — C9113 INJ PANTOPRAZOLE SODIUM, VIA: HCPCS | Performed by: HOSPITALIST

## 2019-07-17 PROCEDURE — 99214 OFFICE O/P EST MOD 30 MIN: CPT | Performed by: INTERNAL MEDICINE

## 2019-07-17 RX ORDER — SUCRALFATE ORAL 1 G/10ML
1000 SUSPENSION ORAL
Status: DISCONTINUED | OUTPATIENT
Start: 2019-07-17 | End: 2019-07-17 | Stop reason: HOSPADM

## 2019-07-17 RX ORDER — SUCRALFATE ORAL 1 G/10ML
1000 SUSPENSION ORAL
Qty: 420 ML | Refills: 0 | Status: SHIPPED | OUTPATIENT
Start: 2019-07-17 | End: 2019-10-01 | Stop reason: ALTCHOICE

## 2019-07-17 RX ORDER — PANTOPRAZOLE SODIUM 40 MG/1
40 INJECTION, POWDER, FOR SOLUTION INTRAVENOUS EVERY 12 HOURS SCHEDULED
Status: DISCONTINUED | OUTPATIENT
Start: 2019-07-17 | End: 2019-07-17 | Stop reason: HOSPADM

## 2019-07-17 RX ORDER — PANTOPRAZOLE SODIUM 40 MG/1
40 TABLET, DELAYED RELEASE ORAL 2 TIMES DAILY
Qty: 60 TABLET | Refills: 0 | Status: SHIPPED | OUTPATIENT
Start: 2019-07-17 | End: 2019-10-01 | Stop reason: ALTCHOICE

## 2019-07-17 RX ADMIN — ASPIRIN 81 MG: 81 TABLET, COATED ORAL at 08:52

## 2019-07-17 RX ADMIN — HEPARIN SODIUM 5000 UNITS: 5000 INJECTION INTRAVENOUS; SUBCUTANEOUS at 06:15

## 2019-07-17 RX ADMIN — PANTOPRAZOLE SODIUM 40 MG: 40 INJECTION, POWDER, FOR SOLUTION INTRAVENOUS at 08:52

## 2019-07-17 RX ADMIN — LIDOCAINE 1 PATCH: 50 PATCH TOPICAL at 11:39

## 2019-07-17 RX ADMIN — DEXTROSE AND SODIUM CHLORIDE 75 ML/HR: 5; .9 INJECTION, SOLUTION INTRAVENOUS at 07:45

## 2019-07-17 RX ADMIN — SUCRALFATE 1000 MG: 1 SUSPENSION ORAL at 11:39

## 2019-07-17 RX ADMIN — CLOPIDOGREL BISULFATE 75 MG: 75 TABLET ORAL at 08:52

## 2019-07-17 NOTE — PLAN OF CARE
Problem: PAIN - ADULT  Goal: Verbalizes/displays adequate comfort level or baseline comfort level  Description  Interventions:  - Encourage patient to monitor pain and request assistance  - Assess pain using appropriate pain scale  - Administer analgesics based on type and severity of pain and evaluate response  - Implement non-pharmacological measures as appropriate and evaluate response  - Consider cultural and social influences on pain and pain management  - Notify physician/advanced practitioner if interventions unsuccessful or patient reports new pain  Outcome: Progressing     Problem: INFECTION - ADULT  Goal: Absence or prevention of progression during hospitalization  Description  INTERVENTIONS:  - Assess and monitor for signs and symptoms of infection  - Monitor lab/diagnostic results  - Monitor all insertion sites, i e  indwelling lines, tubes, and drains  - Monitor endotracheal (as able) and nasal secretions for changes in amount and color  - Alfred appropriate cooling/warming therapies per order  - Administer medications as ordered  - Instruct and encourage patient and family to use good hand hygiene technique  - Identify and instruct in appropriate isolation precautions for identified infection/condition  Outcome: Progressing  Goal: Absence of fever/infection during neutropenic period  Description  INTERVENTIONS:  - Monitor WBC  - Implement neutropenic guidelines  Outcome: Progressing     Problem: SAFETY ADULT  Goal: Patient will remain free of falls  Description  INTERVENTIONS:  - Assess patient frequently for physical needs  -  Identify cognitive and physical deficits and behaviors that affect risk of falls    -  Alfred fall precautions as indicated by assessment   - Educate patient/family on patient safety including physical limitations  - Instruct patient to call for assistance with activity based on assessment  - Modify environment to reduce risk of injury  - Consider OT/PT consult to assist with strengthening/mobility  Outcome: Progressing  Goal: Maintain or return to baseline ADL function  Description  INTERVENTIONS:  -  Assess patient's ability to carry out ADLs; assess patient's baseline for ADL function and identify physical deficits which impact ability to perform ADLs (bathing, care of mouth/teeth, toileting, grooming, dressing, etc )  - Assess/evaluate cause of self-care deficits   - Assess range of motion  - Assess patient's mobility; develop plan if impaired  - Assess patient's need for assistive devices and provide as appropriate  - Encourage maximum independence but intervene and supervise when necessary  ¯ Involve family in performance of ADLs  ¯ Assess for home care needs following discharge   ¯ Request OT consult to assist with ADL evaluation and planning for discharge  ¯ Provide patient education as appropriate  Outcome: Progressing  Goal: Maintain or return mobility status to optimal level  Description  INTERVENTIONS:  - Assess patient's baseline mobility status (ambulation, transfers, stairs, etc )    - Identify cognitive and physical deficits and behaviors that affect mobility  - Identify mobility aids required to assist with transfers and/or ambulation (gait belt, sit-to-stand, lift, walker, cane, etc )  - [place] fall precautions as indicated by assessment  - Record patient progress and toleration of activity level on Mobility SBAR; progress patient to next Phase/Stage  - Instruct patient to call for assistance with activity based on assessment  - Request Rehabilitation consult to assist with strengthening/weightbearing, etc   Outcome: Progressing     Problem: DISCHARGE PLANNING  Goal: Discharge to home or other facility with appropriate resources  Description  INTERVENTIONS:  - Identify barriers to discharge w/patient and caregiver  - Arrange for needed discharge resources and transportation as appropriate  - Identify discharge learning needs (meds, wound care, etc )  - Arrange for interpretive services to assist at discharge as needed  - Refer to Case Management Department for coordinating discharge planning if the patient needs post-hospital services based on physician/advanced practitioner order or complex needs related to functional status, cognitive ability, or social support system  Outcome: Progressing     Problem: Knowledge Deficit  Goal: Patient/family/caregiver demonstrates understanding of disease process, treatment plan, medications, and discharge instructions  Description  Complete learning assessment and assess knowledge base  Interventions:  - Provide teaching at level of understanding  - Provide teaching via preferred learning methods  Outcome: Progressing     Problem: Potential for Falls  Goal: Patient will remain free of falls  Description  INTERVENTIONS:  - Assess patient frequently for physical needs  -  Identify cognitive and physical deficits and behaviors that affect risk of falls    -  [place] fall precautions as indicated by assessment   - Educate patient/family on patient safety including physical limitations  - Instruct patient to call for assistance with activity based on assessment  - Modify environment to reduce risk of injury  - Consider OT/PT consult to assist with strengthening/mobility  Outcome: Progressing

## 2019-07-17 NOTE — UTILIZATION REVIEW
Initial Clinical Review    Admission: Date/Time/Statement: OBS  7/16  1606    Orders Placed This Encounter   Procedures    Place in Observation     Standing Status:   Standing     Number of Occurrences:   1     Order Specific Question:   Admitting Physician     Answer:   Zamzam Blanca [[id number]]     Order Specific Question:   Level of Care     Answer:   Med Surg [[id number]]     ED Arrival Information     Expected Arrival Acuity Means of Arrival Escorted By Service Admission Type    - 7/16/2019 12:36 Urgent Walk-In Spouse General Medicine Urgent    Arrival Complaint    -        Chief Complaint   Patient presents with    Abdominal Pain     pt states to have "hx of colitis" of and was in our ED 2 days ago with "flare up" pt has upcoming appt with gastro dr  pt states to have been vomiting since early this morning and has not been "able to keep medications, water or food down"      Assessment/Plan: 46 y o  male who presents with abdominal pain as well as nausea and vomiting which started 3 days ago and that he was seen here for similar symptoms 2 days ago  [facility] was discharged home from the ED with Zofran and Bentyl and states that pain did improve initially  [facility] stated that this morning pain acutely worsened and nausea/vomiting had worsened as well  He was given IV morphine which alleviated his pain partially but then the pain was alleviated with IV Dilaudid  Also, the patient had cardiac stents placed about 3 months ago and he also complained of left-sided chest pain  The chest pain was left-sided, nonradiating and it occurred only once  He denies any diarrhea/constipation, fevers/chills, shortness of breath    lastly, he recently completed a Medrol Dosepak for back pain   * Non-intractable vomiting with nausea  Assessment & Plan  -will keep patient NPO for now  -will order IV Zofran p r n  for nausea and vomiting  -consult to GI was placed and will follow up recommendations   Microscopic colitis  Assessment & Plan  -ED physician spoke with GI and recommended not giving any steroids at this time  -follow-up with GIs recommendations   Abdominal pain  Assessment & Plan  -will give IV Dilaudid p r n  For pain  Patient was given morphine in the ED which provided little relief to his abdominal pain   Chest pain  Assessment & Plan  Patient was complaining of left-sided chest pain, nonradiating  -will monitor the patient in telemetry  -will consult Cardiology since the patient is having chest pain in the setting of recent stent  -since the patient recently had a stent will need to continue aspirin plus Plavix  Patient was counseled and he understands the importance of continuing with these meds despite his current symptoms   Diaz's esophagus  Assessment & Plan  -continue PPI   Anxiety and depression  Assessment & Plan  -resume oral meds when no longer NPO  -order IV Ativan p r n  for anxiety   VTE Prophylaxis: Heparin  / sequential compression device   Code Status: Full  POLST: There is no POLST form on file for this patient (pre-hospital)   Anticipated Length of Stay:  Patient will be admitted on an Observation basis with an anticipated length of stay of  2 midnights     Justification for Hospital Stay:  Abdominal pain with nausea vomiting/ Chest pain  ED Triage Vitals [07/16/19 1238]   Temperature Pulse Respirations Blood Pressure SpO2   97 6 °F (36 4 °C) (!) 46 21 (!) 203/99 97 %      Temp Source Heart Rate Source Patient Position - Orthostatic VS BP Location FiO2 (%)   Oral Monitor Sitting Left arm --      Pain Score       8        Wt Readings from Last 1 Encounters:   07/16/19 96 4 kg (212 lb 9 6 oz)     Additional Vital Signs:   07/17/19 0728  98 5 °F (36 9 °C)  64  18  106/59  --  90 %  None (Room air)  Lying   07/17/19 0300  98 5 °F (36 9 °C)  60  18  115/52  75  90 %  None (Room air)  Lying   07/16/19 2300  99 1 °F (37 3 °C)  81  17  132/86  105  94 %  None (Room air)  Lying   07/16/19 1900  98 8 °F (37 1 °C)  82  18  121/74 --  95 %  None (Room air)  Lying   07/16/19 1736  98 8 °F (37 1 °C)  82  18  142/90  --  96 %  None (Room air)  Lying   07/16/19 1721  --  75  16  128/70  --  95 %  None (Room         Pertinent Labs/Diagnostic Test Results:   Results from last 7 days   Lab Units 07/16/19  1831 07/16/19  1334 07/14/19  1446   WBC Thousand/uL  --  14 14* 9 77   HEMOGLOBIN g/dL  --  15 9 14 5   HEMATOCRIT %  --  48 4 44 1   PLATELETS Thousands/uL 266 347 317   NEUTROS ABS Thousands/µL  --  11 08* 7 68*     Results from last 7 days   Lab Units 07/16/19  1334 07/14/19  1446   SODIUM mmol/L 140 141   POTASSIUM mmol/L 3 7 3 8   CHLORIDE mmol/L 101 105   CO2 mmol/L 29 28   ANION GAP mmol/L 10 8   BUN mg/dL 19 20   CREATININE mg/dL 1 19 1 00   EGFR ml/min/1 73sq m 69 85   CALCIUM mg/dL 9 8 9 1     Results from last 7 days   Lab Units 07/16/19  1334 07/14/19  1446   AST U/L 12 14   ALT U/L 16 22   ALK PHOS U/L 73 74   TOTAL PROTEIN g/dL 8 1 7 6   ALBUMIN g/dL 4 3 4 1   TOTAL BILIRUBIN mg/dL 0 40 0 30   BILIRUBIN DIRECT mg/dL  --  0 08     Results from last 7 days   Lab Units 07/16/19  1334 07/14/19  1446   GLUCOSE RANDOM mg/dL 147* 156*     Results from last 7 days   Lab Units 07/17/19  0030 07/16/19  2142 07/16/19  1831 07/16/19  1334 07/14/19  1846   TROPONIN I ng/mL <0 02 <0 02 <0 02 <0 02 <0 02     Results from last 7 days   Lab Units 07/16/19  1334 07/14/19  1446   LIPASE u/L 401* 79     ED Treatment:   Medication Administration from 07/16/2019 1236 to 07/16/2019 1735       Date/Time Order Dose Route Comments     07/16/2019 1334 ondansetron (ZOFRAN) injection 4 mg 4 mg Intravenous      07/16/2019 1334 sodium chloride 0 9 % bolus 1,000 mL 1,000 mL Intravenous      07/16/2019 1421 morphine (PF) 4 mg/mL injection 4 mg 4 mg Intravenous      07/16/2019 1427 ondansetron (ZOFRAN) injection 4 mg 4 mg Intravenous      07/16/2019 1516 HYDROmorphone (DILAUDID) injection 1 mg 1 mg Intravenous         Past Medical History:   Diagnosis Date    Diaz's esophagus     Coronary artery disease     Depression     Diverticulitis     GERD (gastroesophageal reflux disease)     Hemorrhoid     Hyperlipidemia     Hypertension     UC [facility]ul[facility]ative colitis) ([facility] [address] )      Present on Admission:   Non-intractable vomiting with nausea   Abdominal pain   Diaz's esophagus   Anxiety and depression   Microscopic colitis   Chest pain      Admitting Diagnosis: Colitis [K52 9]  Chest pain [R07 9]  Abdominal pain [R10 9]  Age/Sex: 46 y o  male  Admission Orders:    Current Facility-Administered Medications:  aspirin 81 mg Oral Daily     clonazePAM 1 5 mg Oral HS PRN     clopidogrel 75 mg Oral Daily     dextrose 5 % and sodium chloride 0 9 % 75 mL/hr Intravenous Continuous     heparin (porcine) 5,000 Units Subcutaneous Q8H [facility]     HYDROmorphone 0 5 mg Intravenous Q4H PRN x1    HYDROmorphone 1 mg Intravenous Q4H PRN x1    lidocaine 1 patch Topical Daily     nitroglycerin 0 4 mg Sublingual Q5 Min PRN     ondansetron 4 mg Intravenous Q6H PRN     pantoprazole 40 mg Intravenous Q24H [facility]       SCD  Up and oOB as sherri   Tele  NPO    IP CONSULT TO GASTROENTEROLOGY  IP CONSULT TO CARDIOLOGY    Network Utilization Review Department  Phone: [phone]; Fax [phone]  [email or web address]  ATTENTION: Please call with any questions or concerns to [phone]  and carefully listen to the prompts so that you are directed to the right person  Send all requests for admission clinical reviews, approved or denied determinations and any other requests to fax [phone]   All voicemails are confidential

## 2019-07-17 NOTE — ED PROVIDER NOTES
History  Chief Complaint   Patient presents with    Abdominal Pain     Patient reports lower abdominal pain, nausea, vomiting  Patient has hx of diverticulitis, UC   Chest Pain     Patient reports dull chest pain since 1130  Hx of CAD  46year old male patient presents emergency department for evaluation of abdominal pain  The patient states that he has had issues like this previously but this is worse than it had been  The patient be evaluated with a differential diagnosis to include but not be limited to colitis, gastroenteritis, appendicitis, cholecystitis, diverticulitis  History provided by:  Patient   used: No    Abdominal Pain   Pain location:  Generalized  Pain quality: aching    Pain radiates to:  Does not radiate  Pain severity:  Moderate  Onset quality:  Gradual  Timing:  Constant  Progression:  Worsening  Context: not alcohol use, not awakening from sleep, not previous surgeries, not recent illness, not recent sexual activity and not sick contacts    Relieved by:  Nothing  Worsened by:  Nothing  Associated symptoms: no chest pain, no cough, no diarrhea, no fatigue and no sore throat        Prior to Admission Medications   Prescriptions Last Dose Informant Patient Reported? Taking?    BUDESONIDE PO  Self Yes No   Sig: Take 3 mg by mouth 2 (two) times a day   DM-APAP-CPM (CORICIDIN HBP FLU) -2 MG TABS  Self No No   Sig: Take 2 tablets by mouth every 6 (six) hours as needed (cold symptoms )   Patient not taking: Reported on 3/29/2019   aspirin (ECOTRIN LOW STRENGTH) 81 mg EC tablet  Self Yes No   Sig: Take 81 mg by mouth daily   atorvastatin (LIPITOR) 40 mg tablet  Self No No   Sig: Take 1 tablet (40 mg total) by mouth daily   buPROPion (WELLBUTRIN) 75 mg tablet  Self Yes No   Sig: Take 75 mg by mouth daily    clonazePAM (KLONOPIN) 1 mg tablet  Self Yes No   Sig: Take 1 5 mg by mouth daily at bedtime    clopidogrel (PLAVIX) 75 mg tablet   No No   Sig: Take 1 tablet (75 mg total) by mouth daily   dicyclomine (BENTYL) 20 mg tablet   No No   Sig: Take 1 tablet (20 mg total) by mouth every 8 (eight) hours as needed (abdominal cramping or diarrhea)   nitroglycerin (NITROSTAT) 0 4 mg SL tablet  Self No No   Sig: Place 1 tablet (0 4 mg total) under the tongue every 5 (five) minutes as needed for chest pain   omeprazole (PriLOSEC) 20 mg delayed release capsule   Yes No   Sig: Take 20 mg by mouth daily   ondansetron (ZOFRAN-ODT) 4 mg disintegrating tablet   No No   Sig: Take 1 tablet (4 mg total) by mouth every 8 (eight) hours as needed for nausea or vomiting   sertraline (ZOLOFT) 100 mg tablet  Self Yes No   Sig: Take 200 mg by mouth daily      Facility-Administered Medications: None       Past Medical History:   Diagnosis Date    Diaz's esophagus     Coronary artery disease     Depression     Diverticulitis     GERD (gastroesophageal reflux disease)     Hemorrhoid     Hyperlipidemia     Hypertension     Microscopic colitis        Past Surgical History:   Procedure Laterality Date    ABDOMINAL SURGERY      radio frequency ablation    BONE GRAFT Left 2018    CARPAL TUNNEL RELEASE Right     COLONOSCOPY      EGD AND COLONOSCOPY      ESOPHAGOGASTRODUODENOSCOPY N/A 2/15/2018    Procedure: ESOPHAGOGASTRODUODENOSCOPY (EGD); Surgeon: Jordin Mancia MD;  Location: [facility];  Service: Gastroenterology    ESOPHAGOGASTRODUODENOSCOPY N/A 12/10/2018    Procedure: ESOPHAGOGASTRODUODENOSCOPY (EGD); Surgeon: Jono Rodas MD;  Location: [facility]; Service: Gastroenterology    TONSILLECTOMY         Family History   Problem Relation Age of Onset    Heart disease Father     Cancer Sister      I have reviewed and agree with the history as documented      Social History     Tobacco Use    Smoking status: Former Smoker     Last attempt to quit: 2007     Years since quittin 5    Smokeless tobacco: Former User     Quit date: 1998   Substance Use Topics    Alcohol use: Never     Frequency: Never     Comment: quit 3 years    Drug use: Yes     Frequency: 3 0 times per week     Types: Marijuana     Comment: 4/16/19        Review of Systems   Constitutional: Negative for fatigue  HENT: Negative for sore throat  Respiratory: Negative for cough  Cardiovascular: Negative for chest pain  Gastrointestinal: Positive for abdominal pain  Negative for diarrhea  All other systems reviewed and are negative  Physical Exam  Physical Exam   Constitutional: He is oriented to person, place, and time  He appears well-developed and well-nourished  No distress  HENT:   Head: Normocephalic and atraumatic  Right Ear: External ear normal    Left Ear: External ear normal    Eyes: Conjunctivae and EOM are normal  Right eye exhibits no discharge  Left eye exhibits no discharge  No scleral icterus  Neck: Normal range of motion  Neck supple  No JVD present  No tracheal deviation present  No thyromegaly present  Cardiovascular: Normal rate and regular rhythm  Pulmonary/Chest: Effort normal and breath sounds normal  No stridor  No respiratory distress  He has no wheezes  He has no rales  Abdominal: Soft  Bowel sounds are normal  He exhibits no distension  There is no tenderness  Musculoskeletal: Normal range of motion  He exhibits no edema, tenderness or deformity  Neurological: He is alert and oriented to person, place, and time  No cranial nerve deficit  Coordination normal    Skin: Skin is warm and dry  He is not diaphoretic  Psychiatric: He has a normal mood and affect  His behavior is normal    Nursing note and vitals reviewed        Vital Signs  ED Triage Vitals   Temperature Pulse Respirations Blood Pressure SpO2   07/14/19 1416 07/14/19 1416 07/14/19 1416 07/14/19 1416 07/14/19 1416   97 9 °F (36 6 °C) (!) 46 16 (!) 192/94 96 %      Temp Source Heart Rate Source Patient Position - Orthostatic VS BP Location FiO2 (%)   07/14/19 1416 07/14/19 1416 07/14/19 1416 07/14/19 1416 --   Oral Monitor Sitting Left arm       Pain Score       07/14/19 1503       9           Vitals:    07/14/19 1506 07/14/19 1528 07/14/19 1615 07/14/19 1700   BP: (!) 186/91  161/74 169/83   Pulse: 70 56 91 55   Patient Position - Orthostatic VS: Lying  Lying Lying         Visual Acuity      ED Medications  Medications   fentanyl citrate (PF) 100 MCG/2ML 100 mcg (100 mcg Intravenous Given 7/14/19 1503)   ondansetron (ZOFRAN) injection 4 mg (4 mg Intravenous Given 7/14/19 1446)   iohexol (OMNIPAQUE) 350 MG/ML injection (MULTI-DOSE) 100 mL (100 mL Intravenous Given 7/14/19 1454)   fentanyl citrate (PF) 100 MCG/2ML 100 mcg (100 mcg Intravenous Given 7/14/19 1710)       Diagnostic Studies  Results Reviewed     Procedure Component Value Units Date/Time    Troponin I [[id number]]  (Normal) Collected:  07/14/19 1846    Lab Status:  Final result Specimen:  Blood from Arm, Right Updated:  07/14/19 1911     Troponin I <0 02 ng/mL     Troponin I [[id number]]  (Normal) Collected:  07/14/19 1446    Lab Status:  Final result Specimen:  Blood from Arm, Left Updated:  07/14/19 1512     Troponin I <0 02 ng/mL     Basic metabolic panel [[id number]]  (Abnormal) Collected:  07/14/19 1446    Lab Status:  Final result Specimen:  Blood from Arm, Left Updated:  07/14/19 1509     Sodium 141 mmol/L      Potassium 3 8 mmol/L      Chloride 105 mmol/L      CO2 28 mmol/L      ANION GAP 8 mmol/L      BUN 20 mg/dL      Creatinine 1 00 mg/dL      Glucose 156 mg/dL      Calcium 9 1 mg/dL      eGFR 85 ml/min/1 73sq m     Narrative:       [place] guidelines for Chronic Kidney Disease (CKD):     Stage 1 with normal or high GFR (GFR > 90 mL/min/1 73 square meters)    Stage 2 Mild CKD (GFR = 60-89 mL/min/1 73 square meters)    Stage 3A Moderate CKD (GFR = 45-59 mL/min/1 73 square meters)    Stage 3B Moderate CKD (GFR = 30-44 mL/min/1 73 square meters)    Stage 4 Severe CKD (GFR = 15-29 mL/min/1 73 square meters)    Stage 5 End Stage CKD (GFR <15 mL/min/1 73 square meters)  Note: GFR calculation is accurate only with a steady state creatinine    Hepatic function panel [[id number]]  (Normal) Collected:  07/14/19 1446    Lab Status:  Final result Specimen:  Blood from Arm, Left Updated:  07/14/19 1509     Total Bilirubin 0 30 mg/dL      Bilirubin, Direct 0 08 mg/dL      Alkaline Phosphatase 74 U/L      AST 14 U/L      ALT 22 U/L      Total Protein 7 6 g/dL      Albumin 4 1 g/dL     Lipase [[id number]]  (Normal) Collected:  07/14/19 1446    Lab Status:  Final result Specimen:  Blood from Arm, Left Updated:  07/14/19 1509     Lipase 79 u/L     CBC and differential [[id number]]  (Abnormal) Collected:  07/14/19 1446    Lab Status:  Final result Specimen:  Blood from Arm, Left Updated:  07/14/19 1508     WBC 9 77 Thousand/uL      RBC 4 97 Million/uL      Hemoglobin 14 5 g/dL      Hematocrit 44 1 %      MCV 89 fL      MCH 29 2 pg      MCHC 32 9 g/dL      RDW 12 9 %      MPV 9 6 fL      Platelets 909 Thousands/uL      nRBC 0 /100 WBCs      Neutrophils Relative 79 %      Immat GRANS % 1 %      Lymphocytes Relative 13 %      Monocytes Relative 6 %      Eosinophils Relative 0 %      Basophils Relative 1 %      Neutrophils Absolute 7 68 Thousands/µL      Immature Grans Absolute 0 07 Thousand/uL      Lymphocytes Absolute 1 31 Thousands/µL      Monocytes Absolute 0 62 Thousand/µL      Eosinophils Absolute 0 04 Thousand/µL      Basophils Absolute 0 05 Thousands/µL                  CTA dissection protocol chest and abdomen   Final Result by Cindy Goldberg DO (07/14 1533)   No evidence of aortic aneurysm or dissection  Mild esophagitis with tiny sliding hiatal hernia  Limited evaluation of GI tract without oral contrast       Visualized large bowel is collapsed with mild circumferential wall thickening which may be artifactual   Consider colitis in the proper clinical scenario    Scattered colonic diverticulosis without convincing evidence of diverticulitis  Workstation performed: [id number]                    Procedures  Procedures       ED Course         HEART Risk Score      Most Recent Value   History  0 Filed at: 07/14/2019 1717   ECG  1 Filed at: 07/14/2019 1717   Age  1 Filed at: 07/14/2019 1717   Risk Factors  1 Filed at: 07/14/2019 1717   Troponin  0 Filed at: 07/14/2019 1717   Heart Score Risk Calculator   History  0 Filed at: 07/14/2019 1717   ECG  1 Filed at: 07/14/2019 1717   Age  1 Filed at: 07/14/2019 1717   Risk Factors  1 Filed at: 07/14/2019 1717   Troponin  0 Filed at: 07/14/2019 1717   HEART Score  3 Filed at: 07/14/2019 1717   HEART Score  3 Filed at: 07/14/2019 1717                            MDM  Number of Diagnoses or Management Options  Colitis: new and requires workup     Amount and/or Complexity of Data Reviewed  Clinical lab tests: ordered and reviewed  Tests in the radiology section of CPT®: ordered and reviewed  Decide to obtain previous medical records or to obtain history from someone other than the patient: yes  Review and summarize past medical records: yes    Patient Progress  Patient progress: stable      Disposition  Final diagnoses:   Colitis     Time reflects when diagnosis was documented in both MDM as applicable and the Disposition within this note     Time User Action Codes Description Comment    7/14/2019  7:16 PM Dg Anton Add [K52 9] Colitis       ED Disposition     ED Disposition Condition Date/Time Comment    Discharge Stable Sun Jul 14, 2019  7:16 PM Kristine Noble discharge to home/self care              Follow-up Information     Follow up With Specialties Details Why Deja Velasco MD Internal Medicine   Attn: RACHEL Amaro   0 [facility] [address]  [address] [id number]            Discharge Medication List as of 7/14/2019  7:16 PM      CONTINUE these medications which have NOT CHANGED    Details   aspirin (ECOTRIN LOW STRENGTH) 81 mg EC tablet Take 81 mg by mouth daily, Historical Med      atorvastatin (LIPITOR) 40 mg tablet Take 1 tablet (40 mg total) by mouth daily, Starting Mon 1/7/2019, Normal      BUDESONIDE PO Take 3 mg by mouth 2 (two) times a day, Historical Med      buPROPion (WELLBUTRIN) 75 mg tablet Take 75 mg by mouth daily , Historical Med      clonazePAM (KLONOPIN) 1 mg tablet Take 1 5 mg by mouth daily at bedtime , Historical Med      clopidogrel (PLAVIX) 75 mg tablet Take 1 tablet (75 mg total) by mouth daily, Starting Wed 4/17/2019, Normal      dicyclomine (BENTYL) 20 mg tablet Take 1 tablet (20 mg total) by mouth every 8 (eight) hours as needed (abdominal cramping or diarrhea), Starting Fri 5/17/2019, Print      DM-APAP-CPM (CORICIDIN HBP FLU) -2 MG TABS Take 2 tablets by mouth every 6 (six) hours as needed (cold symptoms ), Starting Fri 3/22/2019, Print      nitroglycerin (NITROSTAT) 0 4 mg SL tablet Place 1 tablet (0 4 mg total) under the tongue every 5 (five) minutes as needed for chest pain, Starting Mon 1/7/2019, Normal      omeprazole (PriLOSEC) 20 mg delayed release capsule Take 20 mg by mouth daily, Historical Med      ondansetron (ZOFRAN-ODT) 4 mg disintegrating tablet Take 1 tablet (4 mg total) by mouth every 8 (eight) hours as needed for nausea or vomiting, Starting Fri 5/17/2019, Print      sertraline (ZOLOFT) 100 mg tablet Take 200 mg by mouth daily, Historical Med           No discharge procedures on file      ED Provider  Electronically Signed by           Zaina Gonzalez DO  07/17/19 4045

## 2019-07-17 NOTE — CONSULTS
Consultation - [facility]) [facility]  Juliana Wellington 46 y o  male MRN: [id number]  Unit/Bed#: -01 Encounter: [id number]        Consults    Reason for Consult / Principal Problem: Abdominal Pain, N/V    HPI: Mr Lucille Baxter is a 47 yo M with a PMH of microscopic colitis on [facility], Diaz's esophagus despite RFA in the past, prior alcohol abuse, presenting for evaluation of upper/perimbilical abdominal pain with nausea and vomiting for the past several days he reports similar symptoms every 3 months for over 10 years of unclear etiology  His last colonoscopy was in 2018 with the [place] and this showed microscopic colitis, polyps, and IH  He had an EGD in December 2018 with Dr Cipriano Sparrow showed Diaz's esophagus  He reports normal bowel movements without any melena or hematochezia  He recently had a PCI in April and is on aspirin and Plavix  He takes omeprazole daily for his Diaz's esophagus  He does use medical marijauna  REVIEW OF SYSTEMS: Negative except for as stated above      Historical Information   Past Medical History:   Diagnosis Date    Diaz's esophagus     Coronary artery disease     Depression     Diverticulitis     GERD (gastroesophageal reflux disease)     Hemorrhoid     Hyperlipidemia     Hypertension     Microscopic colitis      Past Surgical History:   Procedure Laterality Date    ABDOMINAL SURGERY      radio frequency ablation    BONE GRAFT Left 2018    CARPAL TUNNEL RELEASE Right     COLONOSCOPY      EGD AND COLONOSCOPY      ESOPHAGOGASTRODUODENOSCOPY N/A 2/15/2018    Procedure: ESOPHAGOGASTRODUODENOSCOPY (EGD); Surgeon: Amy Thomas MD;  [facility];  Service: Gastroenterology    ESOPHAGOGASTRODUODENOSCOPY N/A 12/10/2018    Procedure: ESOPHAGOGASTRODUODENOSCOPY (EGD); Surgeon: Giana Preston MD;  [facility];   Service: Gastroenterology    TONSILLECTOMY       Social History   Social History     Substance and Sexual Activity   Alcohol Use Never    Frequency: Never    Comment: quit 3 years     Social History     Substance and Sexual Activity   Drug Use Yes    Frequency: 3 0 times per week    Types: Marijuana    Comment: 19     Social History     Tobacco Use   Smoking Status Former Smoker    Last attempt to quit: 2007    Years since quittin 5   Smokeless Tobacco Former User    Quit date: 1998     Family History   Problem Relation Age of Onset    Heart disease Father     Cancer Sister        Meds/Allergies     Medications Prior to Admission   Medication    aspirin (ECOTRIN LOW STRENGTH) 81 mg EC tablet    atorvastatin (LIPITOR) 40 mg tablet    BUDESONIDE PO    buPROPion (WELLBUTRIN) 75 mg tablet    clonazePAM (KLONOPIN) 1 mg tablet    clopidogrel (PLAVIX) 75 mg tablet    dicyclomine (BENTYL) 20 mg tablet    dicyclomine (BENTYL) 20 mg tablet    DM-APAP-CPM (CORICIDIN HBP FLU) -2 MG TABS    nitroglycerin (NITROSTAT) 0 4 mg SL tablet    omeprazole (PriLOSEC) 20 mg delayed release capsule    ondansetron (ZOFRAN-ODT) 4 mg disintegrating tablet    ondansetron (ZOFRAN-ODT) 4 mg disintegrating tablet    sertraline (ZOLOFT) 100 mg tablet     Current Facility-Administered Medications   Medication Dose Route Frequency    aspirin (ECOTRIN LOW STRENGTH) EC tablet 81 mg  81 mg Oral Daily    clonazePAM (KlonoPIN) tablet 1 5 mg  1 5 mg Oral HS PRN    clopidogrel (PLAVIX) tablet 75 mg  75 mg Oral Daily    dextrose 5 % and sodium chloride 0 9 % infusion  75 mL/hr Intravenous Continuous    heparin (porcine) subcutaneous injection 5,000 Units  5,000 Units Subcutaneous Q8H [address]    HYDROmorphone (DILAUDID) injection 0 5 mg  0 5 mg Intravenous Q4H PRN    HYDROmorphone (DILAUDID) injection 1 mg  1 mg Intravenous Q4H PRN    lidocaine (LIDODERM) 5 % patch 1 patch  1 patch Topical Daily    nitroglycerin (NITROSTAT) SL tablet 0 4 mg  0 4 mg Sublingual Q5 Min PRN    ondansetron (ZOFRAN) injection 4 mg  4 mg Intravenous Q6H PRN    pantoprazole (PROTONIX) injection 40 mg  40 mg Intravenous Q12H [address]    sucralfate (CARAFATE) oral suspension 1,000 mg  1,000 mg Oral TID AC       Allergies   Allergen Reactions    No Active Allergies            Objective     Blood pressure 106/59, pulse 64, temperature 98 5 °F (36 9 °C), temperature source Oral, resp  rate 18, height 5' 9" (1 753 m), weight 96 4 kg (212 lb 9 6 oz), SpO2 90 %        Intake/Output Summary (Last 24 hours) at 7/17/2019 1200  Last data filed at 7/17/2019 1100  Gross per 24 hour   Intake 1886 25 ml   Output 1000 ml   Net 886 25 ml         PHYSICAL EXAM:      General Appearance:   Alert, cooperative, no distress, appears stated age    HEENT:   Normocephalic, atraumatic, anicteric      Neck:  Supple, symmetrical, trachea midline   Lungs:   Clear to auscultation bilaterally; no rales, rhonchi or wheezing; respirations unlabored    Heart[de-identified]   RRR, no murmur   Abdomen:   Soft, non-tender, non-distended; normal bowel sounds; no masses, no organomegaly    Rectal:   Deferred    Extremities:  No cyanosis, clubbing or edema    Pulses:  2+ and symmetric all extremities    Skin:  Skin color, texture, turgor normal, no rashes or lesions    Lymph nodes:  No palpable cervical, axillary or inguinal lymphadenopathy        Lab Results:   Results from last 7 days   Lab Units 07/16/19  1831 07/16/19  1334   WBC Thousand/uL  --  14 14*   HEMOGLOBIN g/dL  --  15 9   HEMATOCRIT %  --  48 4   PLATELETS Thousands/uL 266 347   NEUTROS PCT %  --  77*   LYMPHS PCT %  --  12*   MONOS PCT %  --  8   EOS PCT %  --  1     Results from last 7 days   Lab Units 07/16/19  1334   POTASSIUM mmol/L 3 7   CHLORIDE mmol/L 101   CO2 mmol/L 29   BUN mg/dL 19   CREATININE mg/dL 1 19   CALCIUM mg/dL 9 8   ALK PHOS U/L 73   ALT U/L 16   AST U/L 12         Results from last 7 days   Lab Units 07/16/19  1334   LIPASE u/L 401*       Imaging Studies: I have personally reviewed pertinent imaging studies  No results found  ASSESSMENT and PLAN:      Periumbilical/Upper Abdominal Pain  Nausea/Vomiting  Diaz's Esophagus  - CTA on admission showed esophagitis, small sliding hiatal hernia, no vascular disease in the SMA/HILARIO or celiac, and questionable colitis  - He has no lower GI symptoms and is on chronic budesonide so low suspicion this is true colitis or flare of microscopic colitis  - Suspect this may be 2/2 gastritis from DAPT started in April for PCI and exacerbation of known esophagitis/Diaz's esophagus but differential also includes possible cannabinoid induced hyperemesis syndrome or cyclical vomiting syndrome given this happens [id number]  - Increase protonix 40 mg BID, continue this on discharge  - Carafate 1 g TID, ensure this is given at least 1 hour apart from ASA/plaix  - Trial clear liquid diet and advance as tolerated, okay for discharge from GI standpoint if he tolerates PO      Microscopic Colitis  - [facility], on chronic budesonide  - Discussed repeat colonoscopy as outpatient due to questionable CT findings and he is supposed to have another colonoscopy after a diverticulitis episode earlier this year but this will be complicated by his DAPT for PCI in April  - Will arrange follow up as outpatient to discuss management of microscopic colitis and tapering off of budesonide, may consider using symptomatic management or trial of mesalamine agent      The patient will be seen by Dr Sandoval Raphael  The patient is stable for discharge from GI Standpoint

## 2019-07-17 NOTE — DISCHARGE INSTRUCTIONS
It is important to follow up with your primary physician, the gastroenterologist, and the cardiologist as an outpatient

## 2019-07-17 NOTE — ASSESSMENT & PLAN NOTE
· Patient was complaining of left-sided chest pain which has resolved  GI etiology suspected/noncardiac  · Serial Troponins negative and was monitored on Telemetry  · No further cardiac work up needed at present per Cardiology  · Continue Asa, Plavix, Statin  History of CAD with recent PCI in April  · Follow up with Cardiology as an outpatient

## 2019-07-17 NOTE — CONSULTS
Consultation - Cardiology Team One  Lisa Arevalo 46 y o  male MRN: [id number]  Unit/Bed#: -01 Encounter: [id number]    Consults    Physician Requesting Consult: Ruby Lang MD  Reason for Consult / Principal Problem:  Chest pain    Assessment/Plan:    1  Chest pain with history of CAD and PCI  -likely secondary to esophagitis, nausea and vomiting  -continue aspirin, Lipitor, Plavix  -continue telemetry monitoring  -continue cardiac diet    2  Hyperlipidemia  -continue statin and cardiac diet    3  Hypertension, currently well controlled  - continue to monitor blood pressures     4  Abdominal pain with nausea and vomiting  -management per [facility]    HPI: Cardiologist Dr Kat Olvera is a 46y o  year old male who has a history of  hyperlipidemia, hypertension, GERD, ulcerative colitis, former tobacco abuse,  Diaz's esophagus, diverticulitis and coronary artery disease with PCI x2 in March of 2019 who presented to the emergency room on 07/16/2018 for abdominal pain, nausea and vomiting  While he was here he also complained of left-sided chest pain, nonradiating associated with shortness of breath and diaphoresis  He states it was hard to discern if the shortness of breath and diaphoresis is from vomiting or not  He also states that this pain is not similar to the pain he had before he had stenting done, as that pain was on his right chest at that time  Patient's troponins were less than 0 02 x 6  EKG showed sinus bradycardia with nonspecific T-wave abnormalities  CT scan of his abdomen showed questionable colitis diverticulosis, CT of the chest was positive for 7 days  Patient was seen in the outpatient cardiologist office on 04/2019 at that time no changes are made to his medication regimen he was given the okay to start cardiac rehab        REVIEW OF SYSTEMS:  Constitutional:  Denies fever or chills   Eyes:  Denies change in visual acuity   HENT:  Denies nasal congestion or sore throat   Respiratory:  Denies cough or shortness of breath   Cardiovascular:  + chest pain, denies edema   GI:  + abdominal pain, nausea, vomiting, denies bloody stools or diarrhea   :  Denies dysuria, frequency, difficulty in micturition and nocturia  Musculoskeletal:  Denies back pain or joint pain   Neurologic:  Denies headache, focal weakness or sensory changes   Endocrine:  Denies polyuria or polydipsia   Lymphatic:  Denies swollen glands   Psychiatric:  Denies depression or anxiety     Historical Information   Past Medical History:   Diagnosis Date    Diaz's esophagus     Coronary artery disease     Depression     Diverticulitis     GERD (gastroesophageal reflux disease)     Hemorrhoid     Hyperlipidemia     Hypertension     Microscopic colitis      Past Surgical History:   Procedure Laterality Date    ABDOMINAL SURGERY      radio frequency ablation    BONE GRAFT Left 2018    CARPAL TUNNEL RELEASE Right     COLONOSCOPY      EGD AND COLONOSCOPY      ESOPHAGOGASTRODUODENOSCOPY N/A 2/15/2018    Procedure: ESOPHAGOGASTRODUODENOSCOPY (EGD); Surgeon: Mack Flor MD;  Location: [facility];  Service: Gastroenterology    ESOPHAGOGASTRODUODENOSCOPY N/A 12/10/2018    Procedure: ESOPHAGOGASTRODUODENOSCOPY (EGD); Surgeon: Anabell Alvarez MD;  Location: [facility];   Service: Gastroenterology    TONSILLECTOMY       Social History     Substance and Sexual Activity   Alcohol Use Never    Frequency: Never    Comment: quit 3 years     Social History     Substance and Sexual Activity   Drug Use Yes    Frequency: 3 0 times per week    Types: Marijuana    Comment: 19     Social History     Tobacco Use   Smoking Status Former Smoker    Last attempt to quit: 2007    Years since quittin 5   Smokeless Tobacco Former User    Quit date: 1998       Family History:   Family History   Problem Relation Age of Onset    Heart disease Father     Cancer Sister MEDS & ALLERGIES:  all current active meds have been reviewed and current meds: Current Facility-Administered Medications   Medication Dose Route Frequency    aspirin (ECOTRIN LOW STRENGTH) EC tablet 81 mg  81 mg Oral Daily    clonazePAM (KlonoPIN) tablet 1 5 mg  1 5 mg Oral HS PRN    clopidogrel (PLAVIX) tablet 75 mg  75 mg Oral Daily    dextrose 5 % and sodium chloride 0 9 % infusion  75 mL/hr Intravenous Continuous    heparin (porcine) subcutaneous injection 5,000 Units  5,000 Units Subcutaneous Q8H [address]    HYDROmorphone (DILAUDID) injection 0 5 mg  0 5 mg Intravenous Q4H PRN    HYDROmorphone (DILAUDID) injection 1 mg  1 mg Intravenous Q4H PRN    lidocaine (LIDODERM) 5 % patch 1 patch  1 patch Topical Daily    nitroglycerin (NITROSTAT) SL tablet 0 4 mg  0 4 mg Sublingual Q5 Min PRN    ondansetron (ZOFRAN) injection 4 mg  4 mg Intravenous Q6H PRN    pantoprazole (PROTONIX) injection 40 mg  40 mg Intravenous Q12H MIYA    sucralfate (CARAFATE) oral suspension 1,000 mg  1,000 mg Oral TID AC       dextrose 5 % and sodium chloride 0 9 % 75 mL/hr Last Rate: 75 mL/hr (07/17/19 0745)     Allergies   Allergen Reactions    No Active Allergies        OBJECTIVE:  Vitals:   Vitals:    07/17/19 1100   BP: 105/69   Pulse: 65   Resp: 18   Temp: 98 8 °F (37 1 °C)   SpO2: 94%     Body mass index is 31 4 kg/m²      Systolic (93KYD), MGQ:418 , Min:105 , TBT:902     Diastolic (99PNU), DGH:39, Min:52, Max:90      Intake/Output Summary (Last 24 hours) at 7/17/2019 1318  Last data filed at 7/17/2019 1100  Gross per 24 hour   Intake 1886 25 ml   Output 1000 ml   Net 886 25 ml     Weight (last 2 days)     Date/Time   Weight    07/16/19 1238   96 4 (212 6)            Invasive Devices     Peripheral Intravenous Line            Peripheral IV 07/14/19 Left Antecubital 2 days    Peripheral IV 07/16/19 Left Antecubital less than 1 day                PHYSICAL EXAMS:  General:  Patient is not in acute distress, laying in the bed comfortably, awake, alert responding to commands  Head: Normocephalic, Atraumatic  HEENT: White sclera, pink conjunctiva,  PERRLA,pharynx benign  Neck:  Supple, no neck vein distention, carotids+2/+2 no bruits, thyromegaly, adenopathy  Respiratory: clear to P/A  Cardiovascular:  PMI normal, S1-S2 normal, No  Murmurs, thrills, gallops, rubs   Regular rhythm  GI:  Abdomen soft nontender   No hepatosplenomegaly, adenopathy, ascites,or rebound tenderness  Extremities: No edema, normal pulses, no calf tenderness, no joint deformities, no venous disease   Integument:  No skin rashes or ulceration  Lymphatic:  No cervical or inguinal lymphadenopathy  Neurologic:  Patient is awake alert, responding to command, well-oriented to time and place and person moving all extremities    LABORATORY RESULTS:  Results from last 7 days   Lab Units 07/17/19  0030 07/16/19  2142 07/16/19  1831   TROPONIN I ng/mL <0 02 <0 02 <0 02     CBC with diff: Results from last 7 days   Lab Units 07/16/19  1831 07/16/19  1334 07/14/19  1446   WBC Thousand/uL  --  14 14* 9 77   HEMOGLOBIN g/dL  --  15 9 14 5   HEMATOCRIT %  --  48 4 44 1   MCV fL  --  89 89   PLATELETS Thousands/uL 266 347 317   MCH pg  --  29 1 29 2   MCHC g/dL  --  32 9 32 9   RDW %  --  13 0 12 9   MPV fL 9 4 9 3 9 6   NRBC AUTO /100 WBCs  --  0 0       CMP:  Results from last 7 days   Lab Units 07/16/19  1334 07/14/19  1446   POTASSIUM mmol/L 3 7 3 8   CHLORIDE mmol/L 101 105   CO2 mmol/L 29 28   BUN mg/dL 19 20   CREATININE mg/dL 1 19 1 00   CALCIUM mg/dL 9 8 9 1   AST U/L 12 14   ALT U/L 16 22   ALK PHOS U/L 73 74   EGFR ml/min/1 73sq m 69 85       BMP:  Results from last 7 days   Lab Units 07/16/19  1334 07/14/19  1446   POTASSIUM mmol/L 3 7 3 8   CHLORIDE mmol/L 101 105   CO2 mmol/L 29 28   BUN mg/dL 19 20   CREATININE mg/dL 1 19 1 00   CALCIUM mg/dL 9 8 9 1                              Lipid Profile:   No results found for: CHOL  Lab Results   Component Value Date    HDL 64 (H) 02/20/2019     Lab Results   Component Value Date    LDLCALC 191 (H) 02/20/2019     Lab Results   Component Value Date    TRIG 158 (H) 02/20/2019       Cardiac testing:   No results found for this or any previous visit  No results found for this or any previous visit  No procedure found  No results found for this or any previous visit  Imaging:   I have personally reviewed pertinent reports  EKG reviewed personally:  Sinus bradycardia    Telemetry reviewed personally:   Sinus rhythm in 80s      Code Status: Level 1 - Full Code    Counseling / Coordination of Care  Total floor / unit time spent today 15 minutes  Greater than 50% of total time was spent with the patient and / or family counseling and / or coordination of care  A description of the counseling / coordination of care: Review of history, current assessment, development of a plan      [facility]  7/17/2019,1:18 PM

## 2019-07-17 NOTE — SOCIAL WORK
CATALINA met with pt and wife Min Caldwell at bedside  Pt to be discharged home w/no needs  He is refusing [facility] services  Wife Min Caldwell will transport home  Min Caldwell has been in contact with Dr Andrea Najera and she will set up a discharge appoint

## 2019-07-17 NOTE — DISCHARGE SUMMARY
Discharge- Aline Guallpa 1965, 46 y o  male MRN: [id number]    Unit/Bed#: -01 Encounter: [id number]    Primary Care Provider: Paula Jorge MD   Date and time admitted to hospital: 7/16/2019  1:19 PM        * Non-intractable vomiting with nausea  Assessment & Plan  · Patient presented with intractable nausea, vomiting and abdominal pain  He has a history of similar episodes in the past   Symptoms have now resolved  · CTA on admission showed esophagitis, a small sliding hiatal hernia, and questionable colitis  · He has no diarrhea and is on chronic Budesonide so unlikely to be an infectious colitis or flare of his microscopic colitis  · ? Possibly secondary to esophagitis or gastritis which was exacerbated by his Asa/Plavix which was started in April after having a PCI  He has known Diaz's esophagus  Also, consider cyclical vomiting syndrome  · Continue Protonix 40mg po BID upon discharge and a Carafate course (separate at least an hour from the Asa/Plavix)  · He is stable for discharge later this afternoon as long as he is able to tolerate a diet  · Follow up with GI as an outpatient  Chest pain  Assessment & Plan  · Patient was complaining of left-sided chest pain which has resolved  GI etiology suspected/noncardiac  · Serial Troponins negative and was monitored on Telemetry  · No further cardiac work up needed at present per Cardiology  · Continue Asa, Plavix, Statin  History of CAD with recent PCI in April  · Follow up with Cardiology as an outpatient  Microscopic colitis  Assessment & Plan  · On chronic Budesonide therapy  · Follow up with GI as outpatient  Diaz's esophagus  Assessment & Plan  · Continue PPI - increased dose to BID at present  · Follow up with GI as outaptient  Anxiety and depression  Assessment & Plan  · Continue home regimen          Discharging Physician / Practitioner: Rebecca Payton PA-C  PCP: Paula Jorge MD  Admission Date: Admission Orders (From admission, onward)    Ordered        07/16/19 1606  Place in Observation  Once             Discharge Date: 07/17/19    Resolved Problems  Date Reviewed: 7/17/2019    None          Consultations During Hospital Stay:  · Cardiology  · Gastroenterology    Procedures Performed:   · None    Significant Findings / Test Results:   · CTA on admission showed esophagitis, a small sliding hiatal hernia, and questionable colitis  · Serial troponins negative  Incidental Findings:   · None    Test Results Pending at Discharge (will require follow up): · None     Outpatient Tests Requested:  · Follow up with PCP, GI, and Cardiology    Complications:  None    Reason for Admission:  Intractable nausea and vomiting    Hospital Course:     Rody De Santiago is a 46 y o  male patient who originally presented to the hospital on 7/16/2019 due to intractable nausea and vomiting, abdominal pain, and chest pain  Patient has a history coronary artery disease status post PCI in April on aspirin and Plavix, history of Diaz's esophagus, and history of microscopic colitis on chronic budesonide therapy  He presented with intractable nausea and vomiting as well as complaints of pain  No diarrhea  No TAYLOR or SOB  CTA on admission showed esophagitis, a small hiatal hernia, and question of possible colitis  Patient received supportive measures and his symptoms resolved  He was seen in consultation by Cardiology and chest pain was suspected to be GI in origin  Serial troponins were negative and patient was monitored on telemetry  Patient was seen in consultation by Gastroenterology  Patient's PPI was recommended to be increased to twice a day and Carafate course was added  Patient was suspected to possibly have an exacerbation esophagitis or gastritis due to his recent initiation of aspirin and Plavix therapy for his PCI     Patient will need follow-up with his primary care physician, Gastroenterology, and Cardiology as an outpatient  He will be discharged home on an increased PPI dose of Protonix 40 mg twice a day as well as the Carafate course  Patient's diet will be advanced today and patient will discharged later this afternoon as long as he is able to tolerate a diet without difficulty  Please see above list of diagnoses and related plan for additional information  Condition at Discharge: stable     Discharge Day Visit / Exam:     Subjective:  Patient reports he feels much better and he is eager for discharge today  Nausea and vomiting has subsided  CP and abdominal pain resolved  No diarrhea  No SOB  No rectal bleeding  He understands me must be able to tolerate a diet before discharge this afternoon  Vitals: Blood Pressure: 105/69 (07/17/19 1100)  Pulse: 65 (07/17/19 1100)  Temperature: 98 8 °F (37 1 °C) (07/17/19 1100)  Temp Source: Oral (07/17/19 1100)  Respirations: 18 (07/17/19 1100)  Height: 5' 9" (175 3 cm) (07/16/19 1238)  Weight - Scale: 96 4 kg (212 lb 9 6 oz) (07/16/19 1238)  SpO2: 94 % (07/17/19 1100)  Exam:   Physical Exam   Constitutional: He is oriented to person, place, and time  No distress  HENT:   Head: Normocephalic and atraumatic  Eyes: No scleral icterus  Cardiovascular: Normal rate and regular rhythm  Pulmonary/Chest: Effort normal and breath sounds normal  No respiratory distress  He has no wheezes  He has no rales  Abdominal: Soft  Bowel sounds are normal  There is no tenderness  Musculoskeletal: He exhibits no edema  Neurological: He is alert and oriented to person, place, and time  Skin: He is not diaphoretic  Vitals reviewed  Discharge instructions/Information to patient and family:   See after visit summary for information provided to patient and family  Provisions for Follow-Up Care:  See after visit summary for information related to follow-up care and any pertinent home health orders        Disposition:     Home    For Discharges to [facility] SNF:   · Not Applicable to this Patient - Not Applicable to this Patient    Planned Readmission: None     Discharge Statement:  I spent 40 minutes discharging the patient  This time was spent on the day of discharge  I had direct contact with the patient on the day of discharge  Greater than 50% of the total time was spent examining patient, answering all patient questions, arranging and discussing plan of care with patient as well as directly providing post-discharge instructions  Additional time then spent on discharge activities  Discharge Medications:  See after visit summary for reconciled discharge medications provided to patient and family        ** Please Note: This note has been constructed using a voice recognition system **

## 2019-07-17 NOTE — NURSING NOTE
Pt d/c'd to home with all belongings  AVS, follow ups, and prescriptions given and reviewed with pt  Pt stated understanding  All questions answered at this time

## 2019-07-17 NOTE — ASSESSMENT & PLAN NOTE
· Patient presented with intractable nausea, vomiting and abdominal pain  He has a history of similar episodes in the past   Symptoms have now resolved  · CTA on admission showed esophagitis, a small sliding hiatal hernia, and questionable colitis  · He has no diarrhea and is on chronic Budesonide so unlikely to be an infectious colitis or flare of his microscopic colitis  · ? Possibly secondary to esophagitis or gastritis which was exacerbated by his Asa/Plavix which was started in April after having a PCI  He has known Diaz's esophagus  Also, consider cyclical vomiting syndrome  · Continue Protonix 40mg po BID upon discharge and a Carafate course (separate at least an hour from the Asa/Plavix)  · He is stable for discharge later this afternoon as long as he is able to tolerate a diet  · Follow up with GI as an outpatient

## 2019-07-17 NOTE — SOCIAL WORK
CM met w/ pt  Notified of OBS status and provided OBS notice  Signed OBS in pt chart  Pt currently lives in [place] w/ wife Bruno De Santiago) and 11- yo son in a ranch style home w/ 5 DENA or a ramp  Pt uses no DME and is indpt w/ ADLs  Pt stated no Hx of VNA, inpt rehab, or SA  Pt disclosed Dx of depression, anxiety, and PTSD and receives treatment from [place]  Pt uses [facility] in [address] [place] [address] or [place] for meds and has no problem obtaining meds  Pt uses Dr Mindy Richardson or the [facility]) for PCP  Pt has no POA and declined info and stated that his wife would make health care decisions for him if he was unable to  Wife will provide transp home for d/c  Pt is concerned about being d/c today due to having multiple appts tomorrow w/ the [facility]  Pt stated that these appts have been scheduled for almost 3 months and that if he does not attend he may be at risk of losing his benefits  CM spoke with CATALINA Palmer in order to have pt prioritized  CM will f/u w/ provider and pt  CM reviewed discharge planning process including the following: identifying help at home, patient preference for discharge planning needs, pharmacy preference, and availability of treatment team to discuss questions or concerns patient and/or family may have regarding understanding medications and recognizing signs and symptoms once discharged  CM also encouraged patient to follow up with all recommended appointments after discharge  Patient advised of importance for patient and family to participate in managing patients medical well being  CM name and role reviewed  Discharge Checklist reviewed and CM will continue to monitor for progress toward discharge goals in nursing and provider rounds  No needs anticipated

## 2019-09-23 RX ORDER — AMLODIPINE BESYLATE 5 MG/1
TABLET ORAL
COMMUNITY
End: 2019-10-01 | Stop reason: ALTCHOICE

## 2019-09-23 RX ORDER — FLUTICASONE PROPIONATE 50 MCG
SPRAY, SUSPENSION (ML) NASAL
COMMUNITY
End: 2019-10-01 | Stop reason: ALTCHOICE

## 2019-09-23 RX ORDER — LISINOPRIL 10 MG/1
TABLET ORAL
COMMUNITY
End: 2019-10-01 | Stop reason: ALTCHOICE

## 2019-09-23 RX ORDER — BUSPIRONE HYDROCHLORIDE 10 MG/1
TABLET ORAL
COMMUNITY
Start: 2014-12-30 | End: 2020-02-08

## 2019-09-23 RX ORDER — LEVOFLOXACIN 250 MG/1
TABLET ORAL
COMMUNITY
End: 2019-10-01 | Stop reason: ALTCHOICE

## 2019-09-23 RX ORDER — SILDENAFIL 100 MG/1
TABLET, FILM COATED ORAL
COMMUNITY
End: 2019-10-01 | Stop reason: ALTCHOICE

## 2019-09-23 RX ORDER — ALPRAZOLAM 1 MG/1
TABLET ORAL
COMMUNITY
End: 2019-10-01 | Stop reason: ALTCHOICE

## 2019-09-23 RX ORDER — ZALEPLON 10 MG/1
CAPSULE ORAL
COMMUNITY
Start: 2014-12-30 | End: 2019-10-01 | Stop reason: ALTCHOICE

## 2019-09-23 RX ORDER — VALACYCLOVIR HYDROCHLORIDE 1 G/1
TABLET, FILM COATED ORAL
COMMUNITY
End: 2019-10-01 | Stop reason: ALTCHOICE

## 2019-09-23 RX ORDER — SILDENAFIL 50 MG/1
TABLET, FILM COATED ORAL
COMMUNITY
Start: 2014-11-08 | End: 2019-10-01 | Stop reason: ALTCHOICE

## 2019-09-23 RX ORDER — OXYCODONE HYDROCHLORIDE AND ACETAMINOPHEN 5; 325 MG/1; MG/1
TABLET ORAL
COMMUNITY
End: 2020-02-08

## 2019-09-23 RX ORDER — LISINOPRIL 20 MG/1
TABLET ORAL
COMMUNITY
End: 2019-10-01 | Stop reason: ALTCHOICE

## 2019-09-23 RX ORDER — ALPRAZOLAM 0.5 MG/1
TABLET ORAL
COMMUNITY
Start: 2014-12-30 | End: 2019-10-01 | Stop reason: ALTCHOICE

## 2019-09-23 RX ORDER — PEG-3350, SODIUM SULFATE, SODIUM CHLORIDE, POTASSIUM CHLORIDE, SODIUM ASCORBATE AND ASCORBIC ACID 7.5-2.691G
KIT ORAL
COMMUNITY
End: 2020-02-08

## 2019-09-23 RX ORDER — VALACYCLOVIR HYDROCHLORIDE 500 MG/1
TABLET, FILM COATED ORAL
COMMUNITY
End: 2019-10-01 | Stop reason: ALTCHOICE

## 2019-09-23 RX ORDER — AMLODIPINE BESYLATE 2.5 MG/1
TABLET ORAL
COMMUNITY
End: 2019-10-01 | Stop reason: ALTCHOICE

## 2019-09-23 RX ORDER — METHOCARBAMOL 750 MG/1
TABLET, FILM COATED ORAL
COMMUNITY
End: 2019-10-01 | Stop reason: ALTCHOICE

## 2019-09-24 ENCOUNTER — PREP FOR PROCEDURE (OUTPATIENT)
Dept: GASTROENTEROLOGY | Facility: CLINIC | Age: 54
End: 2019-09-24

## 2019-09-24 ENCOUNTER — OFFICE VISIT (OUTPATIENT)
Dept: GASTROENTEROLOGY | Facility: CLINIC | Age: 54
End: 2019-09-24
Payer: MEDICARE

## 2019-09-24 VITALS
BODY MASS INDEX: 32.35 KG/M2 | HEIGHT: 69 IN | WEIGHT: 218.4 LBS | SYSTOLIC BLOOD PRESSURE: 110 MMHG | DIASTOLIC BLOOD PRESSURE: 78 MMHG | HEART RATE: 78 BPM

## 2019-09-24 DIAGNOSIS — R11.11 NON-INTRACTABLE VOMITING WITHOUT NAUSEA, UNSPECIFIED VOMITING TYPE: ICD-10-CM

## 2019-09-24 DIAGNOSIS — R10.84 GENERALIZED ABDOMINAL PAIN: Primary | ICD-10-CM

## 2019-09-24 PROCEDURE — 99214 OFFICE O/P EST MOD 30 MIN: CPT | Performed by: INTERNAL MEDICINE

## 2019-09-24 RX ORDER — OMEPRAZOLE 20 MG/1
20 CAPSULE, DELAYED RELEASE ORAL DAILY
COMMUNITY
End: 2019-12-06 | Stop reason: SDUPTHER

## 2019-09-24 RX ORDER — DICYCLOMINE HCL 20 MG
20 TABLET ORAL EVERY 6 HOURS
Qty: 60 TABLET | Refills: 3 | Status: SHIPPED | OUTPATIENT
Start: 2019-09-24 | End: 2019-10-01 | Stop reason: ALTCHOICE

## 2019-09-24 NOTE — PROGRESS NOTES
[facility] - Outpatient Consultation  Kal  46 y o  male MRN: [id number]  Encounter: [id number]          ASSESSMENT AND PLAN:      1  Generalized abdominal pain      2  Non-intractable vomiting without nausea, unspecified vomiting type    Colonoscopy with biopsies    Bentyl 20 mg tid prn pain  High fiber diet daily with fruits, vegetables, grains  ______________________________________________________________________    HPI:  This 46year old male has the underlying history of microscopic colitis  He has been through three colonoscopies and he tells me that was told on two separate occasions, based on biopsies, that he had microscopic colitis  Currently he is having diarrhea 2-3 days per week  He states that he has recurring abdominal pains about twice weekly  He has episodes of nausea and vomiting which cycles about once a month for about 4 months and then he can go for about 4-6 months without any vomiting  He does smoke marijuana every weekend because it helps with the pains  His last colonoscopy was four years ago  He admits to colon polyps  He was in the ER about four months ago at [facility] for abdominal pain  CT scan did suggest possible diverticulitis  REVIEW OF SYSTEMS:    CONSTITUTIONAL: Denies any fever, chills, rigors, and weight loss  HEENT: No earache or tinnitus  Denies hearing loss or visual disturbances  CARDIOVASCULAR: No chest pain or palpitations  RESPIRATORY: Denies any cough, hemoptysis, shortness of breath or dyspnea on exertion  GASTROINTESTINAL: As noted in the History of Present Illness  GENITOURINARY: No problems with urination  Denies any hematuria or dysuria  NEUROLOGIC: No dizziness or vertigo, denies headaches  MUSCULOSKELETAL: Denies any muscle or joint pain  SKIN: Denies skin rashes or itching  ENDOCRINE: Denies excessive thirst  Denies intolerance to heat or cold    PSYCHOSOCIAL: Denies depression or anxiety  Denies any recent memory loss  Historical Information   Past Medical History:   Diagnosis Date    Diaz's esophagus     Coronary artery disease     Depression     Diverticulitis     GERD (gastroesophageal reflux disease)     Hemorrhoid     History of heart artery stent     Hyperlipidemia     Hypertension     Microscopic colitis      Past Surgical History:   Procedure Laterality Date    ABDOMINAL SURGERY      radio frequency ablation    BONE GRAFT Left 2018    CARPAL TUNNEL RELEASE Right     COLONOSCOPY      EGD AND COLONOSCOPY      ESOPHAGOGASTRODUODENOSCOPY N/A 2/15/2018    Procedure: ESOPHAGOGASTRODUODENOSCOPY (EGD); Surgeon: Linda Pham MD;  [facility];  Service: Gastroenterology    ESOPHAGOGASTRODUODENOSCOPY N/A 12/10/2018    Procedure: ESOPHAGOGASTRODUODENOSCOPY (EGD); Surgeon: Carole Lindo MD;  [facility];   Service: Gastroenterology    TONSILLECTOMY       Social History   Social History     Substance and Sexual Activity   Alcohol Use Never    Frequency: Never    Comment: quit 3 years     Social History     Substance and Sexual Activity   Drug Use Yes    Frequency: 3 0 times per week    Types: Marijuana    Comment: 18     Social History     Tobacco Use   Smoking Status Former Smoker    Last attempt to quit: 2007    Years since quittin 6   Smokeless Tobacco Former User    Quit date: 1998     Family History   Problem Relation Age of Onset    Heart disease Father     Cancer Sister        Meds/Allergies       Current Outpatient Medications:     ALPRAZolam (XANAX) 0 5 mg tablet    ALPRAZolam (XANAX) 1 mg tablet    amLODIPine (NORVASC) 2 5 mg tablet    amLODIPine (NORVASC) 5 mg tablet    aspirin (ECOTRIN LOW STRENGTH) 81 mg EC tablet    atorvastatin (LIPITOR) 40 mg tablet    BUDESONIDE PO    buPROPion (WELLBUTRIN) 75 mg tablet    busPIRone (BUSPAR) 10 mg tablet    clonazePAM (KLONOPIN) 1 mg tablet   clopidogrel (PLAVIX) 75 mg tablet    dicyclomine (BENTYL) 20 mg tablet    fluticasone (FLONASE) 50 mcg/act nasal spray    levofloxacin (LEVAQUIN) 250 mg tablet    lisinopril (ZESTRIL) 10 mg tablet    lisinopril (ZESTRIL) 20 mg tablet    methocarbamol (ROBAXIN) 750 mg tablet    nitroglycerin (NITROSTAT) 0 4 mg SL tablet    omeprazole (PriLOSEC) 20 mg delayed release capsule    ondansetron (ZOFRAN-ODT) 4 mg disintegrating tablet    oxyCODONE-acetaminophen (PERCOCET) 5-325 mg per tablet    PEG 3350-KCl-NaCl-NaSulf-Na Asc-C (MOVIPREP) 100 g    sertraline (ZOLOFT) 100 mg tablet    sildenafil (VIAGRA) 100 mg tablet    sildenafil (VIAGRA) 50 MG tablet    sucralfate (CARAFATE) 1 g/10 mL suspension    valACYclovir (VALTREX) 1,000 mg tablet    valACYclovir (VALTREX) 500 mg tablet    zaleplon (SONATA) 10 MG capsule    pantoprazole (PROTONIX) 40 mg tablet    Allergies   Allergen Reactions    No Active Allergies            Objective     Blood pressure 110/78, pulse 78, height 5' 9" (1 753 m), weight 99 1 kg (218 lb 6 4 oz)  Body mass index is 32 25 kg/m²  PHYSICAL EXAM:      General Appearance:   Alert, cooperative, no distress   HEENT:   Normocephalic, atraumatic, anicteric      Neck:  Supple, symmetrical, trachea midline   Lungs:   Clear to auscultation bilaterally; no rales, rhonchi or wheezing; respirations unlabored    Heart[de-identified]   Regular rate and rhythm; no murmur, rub, or gallop  Abdomen:   Soft, non-tender, non-distended; normal bowel sounds; no masses, no organomegaly    Genitalia:   Deferred    Rectal:   Deferred    Extremities:  No cyanosis, clubbing or edema    Pulses:  2+ and symmetric    Skin:  No jaundice, rashes, or lesions    Lymph nodes:  No palpable cervical lymphadenopathy        Lab Results:   No visits with results within 1 Day(s) from this visit     Latest known visit with results is:   Admission on 07/16/2019, Discharged on 07/17/2019   Component Date Value    WBC 07/16/2019 14 14*    RBC 07/16/2019 5 46     Hemoglobin 07/16/2019 15 9     Hematocrit 07/16/2019 48 4     MCV 07/16/2019 89     MCH 07/16/2019 29 1     MCHC 07/16/2019 32 9     RDW 07/16/2019 13 0     MPV 07/16/2019 9 3     Platelets 97/48/1638 347     nRBC 07/16/2019 0     Neutrophils Relative 07/16/2019 77*    Immat GRANS % 07/16/2019 1     Lymphocytes Relative 07/16/2019 12*    Monocytes Relative 07/16/2019 8     Eosinophils Relative 07/16/2019 1     Basophils Relative 07/16/2019 1     Neutrophils Absolute 07/16/2019 11 08*    Immature Grans Absolute 07/16/2019 0 10     Lymphocytes Absolute 07/16/2019 1 63     Monocytes Absolute 07/16/2019 1 16     Eosinophils Absolute 07/16/2019 0 10     Basophils Absolute 07/16/2019 0 07     Sodium 07/16/2019 140     Potassium 07/16/2019 3 7     Chloride 07/16/2019 101     CO2 07/16/2019 29     ANION GAP 07/16/2019 10     BUN 07/16/2019 19     Creatinine 07/16/2019 1 19     Glucose 07/16/2019 147*    Calcium 07/16/2019 9 8     AST 07/16/2019 12     ALT 07/16/2019 16     Alkaline Phosphatase 07/16/2019 73     Total Protein 07/16/2019 8 1     Albumin 07/16/2019 4 3     Total Bilirubin 07/16/2019 0 40     eGFR 07/16/2019 69     Lipase 07/16/2019 401*    Extra Tube 07/16/2019 Hold for add-ons       Troponin I 07/16/2019 <0 02     Troponin I 07/16/2019 <0 02     Platelets 39/70/7872 266     MPV 07/16/2019 9 4     Troponin I 07/16/2019 <0 02     Troponin I 07/17/2019 <0 02     Ventricular Rate 07/16/2019 50     Atrial Rate 07/16/2019 50     AK Interval 07/16/2019 122     QRSD Interval 07/16/2019 100     QT Interval 07/16/2019 430     QTC Interval 07/16/2019 392     P Axis 07/16/2019 61     QRS Axis 07/16/2019 3     T Wave Axis 07/16/2019 56          Radiology Results:     CT abdomen pelvis with contrast   Status: Final result   PACS Images      Show images for CT abdomen pelvis with contrast   Study Result     CT ABDOMEN AND PELVIS WITH IV CONTRAST     INDICATION:   Lower abdominal pain, nausea and vomiting           COMPARISON:  None      TECHNIQUE:  CT examination of the abdomen and pelvis was performed  Axial, sagittal, and coronal 2D reformatted images were created from the source data and submitted for interpretation      Radiation dose length product (DLP) for this visit:  610 mGy-cm   This examination, like all CT scans performed in the [facility], was performed utilizing techniques to minimize radiation dose exposure, including the use of iterative   reconstruction and automated exposure control      IV Contrast:  100 mL of iohexol (OMNIPAQUE)  Enteric Contrast:  Enteric contrast was not administered      FINDINGS:     ABDOMEN     LOWER CHEST:  Clear lung bases      LIVER/BILIARY TREE:  Unremarkable      GALLBLADDER:  No calcified gallstones  No pericholecystic inflammatory change      SPLEEN:  Unremarkable      PANCREAS:  Unremarkable      ADRENAL GLANDS:  Unremarkable      KIDNEYS/URETERS:  No pyelonephritis or obstructive uropathy  One or more sharply circumscribed subcentimeter renal hypodensities are noted  These lesions are too small to accurately characterize, but are statistically most likely to represent benign cortical renal cyst(s)  According to the guidelines published in   the [facility] Paper of the ACR Incidental Findings Committee (Radiology 2010), no further workup of these lesions is recommended      STOMACH AND BOWEL:  Trace mesenteric fat stranding adjacent to diverticuli in the distal descending and proximal sigmoid colon  No evidence of colitis or bowel obstruction      APPENDIX:  A normal appendix was visualized      ABDOMINOPELVIC CAVITY:  No ascites or free intraperitoneal air   No lymphadenopathy      VESSELS:  No abdominal aortic aneurysm or dissection      PELVIS     REPRODUCTIVE ORGANS:  No significant prostate enlargement      URINARY BLADDER:  Unremarkable      ABDOMINAL WALL/INGUINAL REGIONS: Unremarkable      OSSEOUS STRUCTURES:  No acute fracture or destructive osseous lesion      IMPRESSION:     Very subtle inflammatory change adjacent to diverticuli in the distal descending and proximal sigmoid colon and represent early or mild diverticulitis            CTA dissection protocol chest and abdomen   Status: Final result   PACS Images      Show images for CTA dissection protocol chest and abdomen   Study Result     CTA - CHEST AND ABDOMEN  - WITHOUT AND WITH IV CONTRAST     INDICATION:   abdominal pain and bradycardia      COMPARISON:  May 17, 2019     TECHNIQUE: CT examination of the chest and abdomen was performed both prior to and after the administration of intravenous contrast   Thin section angiographic arterial phase post contrast technique was used in order to evaluate for aortic dissection  3D reformatted images and volume rendering were performed on an independent workstation  Additionally, axial, sagittal, and coronal 2D reformatted images were created from the source data and submitted for interpretation      Radiation dose length product (DLP) for this visit:  8955 mGy-cm   This examination, like all CT scans performed in the [facility], was performed utilizing techniques to minimize radiation dose exposure, including the use of iterative   reconstruction and automated exposure control      IV Contrast:  100 mL of iohexol (OMNIPAQUE)  Enteric Contrast: Enteric contrast was not administered      FINDINGS:      AORTA: There is no aortic dissection or intramural hematoma  There is no aortic aneurysm  Very minimal calcified atherosclerotic plaque distally  The hepatic artery arises separately from the celiac trunk  SMA, single right renal artery, duplicated   left renal arteries, and HILARIO are all patent at their origins and as far as they can be followed  Bilateral common iliac arteries are also patent and normal caliber with mild atherosclerosis noted    No retroperitoneal hemorrhage or inflammation      CHEST     LUNGS:  Minimal atelectatic changes in the left lower lobe      PLEURA:  Unremarkable      HEART/GREAT VESSELS:  Cardiac size within normal limits  No vascular congestion      MEDIASTINUM AND RACHAEL:  Mild diffuse esophageal wall thickening compatible with esophagitis  Tiny sliding hiatal hernia  No lymphadenopathy      CHEST WALL AND LOWER NECK: 1 3 cm hypoechoic nodule  Incidental discovery of one or more thyroid nodule(s) measuring less than 1 5 cm and without suspicious features is noted in this patient who is above 28years old; according to guidelines published   in the February 2015 white paper on incidental thyroid nodules in the Journal of the [employer] of Radiology [facility]), no further evaluation is recommended       ABDOMEN     LIVER/BILIARY TREE:  Unremarkable      GALLBLADDER:  No calcified gallstones  No pericholecystic inflammatory change      SPLEEN:  Unremarkable      PANCREAS:  Unremarkable      ADRENAL GLANDS:  Unremarkable      KIDNEYS/URETERS:  Unremarkable  No hydronephrosis      VISUALIZED STOMACH, BOWEL AND APPENDIX:  Limited evaluation of GI tract without oral contrast   The visualized large bowel is collapsed with mild circumferential wall thickening which may be artifactual   Scattered colonic diverticulosis without   convincing evidence of focal diverticulitis  VISUALIZED ABDOMINOPELVIC CAVITY:  No ascites or free intraperitoneal air  No lymphadenopathy  ABDOMINAL WALL:  Tiny fat-containing umbilical hernia      OSSEOUS STRUCTURES:  No acute fracture or destructive osseous lesion         IMPRESSION:  No evidence of aortic aneurysm or dissection      Mild esophagitis with tiny sliding hiatal hernia      Limited evaluation of GI tract without oral contrast      Visualized large bowel is collapsed with mild circumferential wall thickening which may be artifactual   Consider colitis in the proper clinical scenario  Scattered colonic diverticulosis without convincing evidence of diverticulitis                   Workstation performed: [id number]

## 2019-10-01 ENCOUNTER — HOSPITAL ENCOUNTER (OUTPATIENT)
Dept: GASTROENTEROLOGY | Facility: HOSPITAL | Age: 54
Setting detail: OUTPATIENT SURGERY
Discharge: HOME | End: 2019-10-01
Attending: INTERNAL MEDICINE | Admitting: INTERNAL MEDICINE
Payer: MEDICARE

## 2019-10-01 ENCOUNTER — ANESTHESIA EVENT (OUTPATIENT)
Dept: GASTROENTEROLOGY | Facility: HOSPITAL | Age: 54
End: 2019-10-01

## 2019-10-01 ENCOUNTER — ANESTHESIA (OUTPATIENT)
Dept: GASTROENTEROLOGY | Facility: HOSPITAL | Age: 54
End: 2019-10-01

## 2019-10-01 VITALS
DIASTOLIC BLOOD PRESSURE: 59 MMHG | HEIGHT: 70 IN | OXYGEN SATURATION: 95 % | WEIGHT: 212.3 LBS | RESPIRATION RATE: 17 BRPM | SYSTOLIC BLOOD PRESSURE: 97 MMHG | TEMPERATURE: 97.3 F | HEART RATE: 69 BPM | BODY MASS INDEX: 30.39 KG/M2

## 2019-10-01 DIAGNOSIS — R10.84 GENERALIZED ABDOMINAL PAIN: ICD-10-CM

## 2019-10-01 DIAGNOSIS — R11.11 NON-INTRACTABLE VOMITING WITHOUT NAUSEA, UNSPECIFIED VOMITING TYPE: ICD-10-CM

## 2019-10-01 PROCEDURE — 88305 TISSUE EXAM BY PATHOLOGIST: CPT | Performed by: PATHOLOGY

## 2019-10-01 PROCEDURE — 45380 COLONOSCOPY AND BIOPSY: CPT | Performed by: INTERNAL MEDICINE

## 2019-10-01 RX ORDER — PROPOFOL 10 MG/ML
INJECTION, EMULSION INTRAVENOUS AS NEEDED
Status: DISCONTINUED | OUTPATIENT
Start: 2019-10-01 | End: 2019-10-01 | Stop reason: SURG

## 2019-10-01 RX ORDER — SODIUM CHLORIDE, SODIUM LACTATE, POTASSIUM CHLORIDE, CALCIUM CHLORIDE 600; 310; 30; 20 MG/100ML; MG/100ML; MG/100ML; MG/100ML
INJECTION, SOLUTION INTRAVENOUS CONTINUOUS PRN
Status: DISCONTINUED | OUTPATIENT
Start: 2019-10-01 | End: 2019-10-01 | Stop reason: SURG

## 2019-10-01 RX ORDER — SODIUM CHLORIDE, SODIUM LACTATE, POTASSIUM CHLORIDE, CALCIUM CHLORIDE 600; 310; 30; 20 MG/100ML; MG/100ML; MG/100ML; MG/100ML
125 INJECTION, SOLUTION INTRAVENOUS CONTINUOUS
Status: CANCELLED | OUTPATIENT
Start: 2019-10-01

## 2019-10-01 RX ADMIN — SODIUM CHLORIDE, SODIUM LACTATE, POTASSIUM CHLORIDE, AND CALCIUM CHLORIDE: .6; .31; .03; .02 INJECTION, SOLUTION INTRAVENOUS at 10:42

## 2019-10-01 RX ADMIN — PROPOFOL 100 MG: 10 INJECTION, EMULSION INTRAVENOUS at 10:49

## 2019-10-01 RX ADMIN — PROPOFOL 100 MG: 10 INJECTION, EMULSION INTRAVENOUS at 10:46

## 2019-10-01 RX ADMIN — PROPOFOL 100 MG: 10 INJECTION, EMULSION INTRAVENOUS at 10:47

## 2019-10-01 RX ADMIN — PROPOFOL 100 MG: 10 INJECTION, EMULSION INTRAVENOUS at 10:50

## 2019-10-01 NOTE — H&P
History and Physical - [facility]  Cheryl Caceres 46 y o  male MRN: [id number]      HPI: Cheryl Caceres is a 46y o  year old male who presents for diarrhea, prior history of colon polyps      REVIEW OF SYSTEMS: Per the HPI, and otherwise unremarkable  Historical Information   Past Medical History:   Diagnosis Date    Diaz's esophagus     Colon polyp     Coronary artery disease     Depression     Diverticulitis     GERD (gastroesophageal reflux disease)     Hemorrhoid     History of heart artery stent     Hyperlipidemia     Hypertension     Microscopic colitis      Past Surgical History:   Procedure Laterality Date    ABDOMINAL SURGERY      radio frequency ablation    BONE GRAFT Left 2018    CARPAL TUNNEL RELEASE Right     COLONOSCOPY      EGD AND COLONOSCOPY      ESOPHAGOGASTRODUODENOSCOPY N/A 2/15/2018    Procedure: ESOPHAGOGASTRODUODENOSCOPY (EGD); Surgeon: Shahana Tineo MD;  [facility];  Service: Gastroenterology    ESOPHAGOGASTRODUODENOSCOPY N/A 12/10/2018    Procedure: ESOPHAGOGASTRODUODENOSCOPY (EGD); Surgeon: Charlene Bermeo MD;  [facility];   Service: Gastroenterology    TONSILLECTOMY       Social History   Social History     Substance and Sexual Activity   Alcohol Use Never    Frequency: Never    Comment: quit 3 years     Social History     Substance and Sexual Activity   Drug Use Yes    Frequency: 3 0 times per week    Types: Marijuana    Comment: last used      Social History     Tobacco Use   Smoking Status Former Smoker    Last attempt to quit: 2007    Years since quittin 7   Smokeless Tobacco Former User    Quit date: 1998     Family History   Problem Relation Age of Onset    Heart disease Father     Cancer Sister        Meds/Allergies       (Not in a hospital admission)    Allergies   Allergen Reactions    No Active Allergies        Objective     Blood pressure 136/88, pulse 96, temperature 97 8 °F (36 6 °C), temperature source Temporal, resp  rate 18, height 5' 10" (1 778 m), weight 96 3 kg (212 lb 4 9 oz), SpO2 96 %  PHYSICAL EXAM    Gen: NAD  CV: RRR  CHEST: Clear  ABD: soft, NT/ND  EXT: no edema      ASSESSMENT/PLAN:  This is a 46y o  year old male here for colonoscopy with biopsies, and he is stable and optimized for his procedure

## 2019-10-01 NOTE — ANESTHESIA POSTPROCEDURE EVALUATION
Post-Op Assessment Note    CV Status:  Stable  Pain Score: 1    Pain management: adequate     Mental Status:  Alert   Hydration Status:  Stable   PONV Controlled:  Controlled   Airway Patency:  Patent   Post Op Vitals Reviewed: Yes      Staff: Anesthesiologist, CRNA           BP      Temp      Pulse     Resp      SpO2

## 2019-10-01 NOTE — DISCHARGE INSTRUCTIONS
Colonoscopy   WHAT YOU NEED TO KNOW:   A colonoscopy is a procedure to examine the inside of your colon (intestine) with a scope  Polyps or tissue growths may have been removed during your colonoscopy  It is normal to feel bloated and to have some abdominal discomfort  You should be passing gas  If you have hemorrhoids or you had polyps removed, you may have a small amount of bleeding  DISCHARGE INSTRUCTIONS:   Seek care immediately if:   · You have a large amount of bright red blood in your bowel movements  · Your abdomen is hard and firm and you have severe pain  · You have sudden trouble breathing  Contact your healthcare provider if:   · You develop a rash or hives  · You have a fever within 24 hours of your procedure  · You have not had a bowel movement for 3 days after your procedure  · You have questions or concerns about your condition or care  Activity:   · Do not lift, strain, or run  for 3 days after your procedure  · Rest after your procedure  You have been given medicine to relax you  Do not  drive or make important decisions until the day after your procedure  Return to your normal activity as directed  · Relieve gas and discomfort from bloating  by lying on your right side with a heating pad on your abdomen  You may need to take short walks to help the gas move out  Eat small meals until bloating is relieved  If you had polyps removed: For 7 days after your procedure:  · Do not  take aspirin  · Do not  go on long car rides  Help prevent constipation:   · Eat a variety of healthy foods  Healthy foods include fruit, vegetables, whole-grain breads, low-fat dairy products, beans, lean meat, and fish  Ask if you need to be on a special diet  Your healthcare provider may recommend that you eat high-fiber foods such as cooked beans  Fiber helps you have regular bowel movements  · Drink liquids as directed    Adults should drink between 9 and 13 eight-ounce cups of liquid every day  Ask what amount is best for you  For most people, good liquids to drink are water, juice, and milk  · Exercise as directed  Talk to your healthcare provider about the best exercise plan for you  Exercise can help prevent constipation, decrease your blood pressure and improve your health  Follow up with your healthcare provider as directed:  Write down your questions so you remember to ask them during your visits  © 2017 [facility] Information is for End User's use only and may not be sold, redistributed or otherwise used for commercial purposes  All illustrations and images included in CareNotes® are the copyrighted property of A D A M , Inc  or Mihir Castro  The above information is an  only  It is not intended as medical advice for individual conditions or treatments  Talk to your doctor, nurse or pharmacist before following any medical regimen to see if it is safe and effective for you

## 2019-10-01 NOTE — ANESTHESIA PREPROCEDURE EVALUATION
Review of Systems/Medical History  Patient summary reviewed  Chart reviewed      Cardiovascular  Hyperlipidemia, Hypertension , CAD , CAD status: 2VD, Angina Stable,    Pulmonary  Negative pulmonary ROS        GI/Hepatic    GERD ,        Negative  ROS        Endo/Other  Negative endo/other ROS      GYN  Negative gynecology ROS          Hematology  Negative hematology ROS      Musculoskeletal  Negative musculoskeletal ROS        Neurology  Negative neurology ROS      Psychology   Anxiety, Depression ,     Comment: ETOH abuse          Physical Exam    Airway    Mallampati score: II  TM Distance: >3 FB  Neck ROM: full     Dental   No notable dental hx     Cardiovascular  Rhythm: regular, Rate: normal, Cardiovascular exam normal    Pulmonary  Pulmonary exam normal Breath sounds clear to auscultation,     Other Findings        Anesthesia Plan  ASA Score- 2     Anesthesia Type- IV sedation with anesthesia with ASA Monitors  Additional Monitors:   Airway Plan:         Plan Factors-    Induction- intravenous  Postoperative Plan- Plan for postoperative opioid use  Informed Consent- Anesthetic plan and risks discussed with patient  I personally reviewed this patient with the CRNA  Discussed and agreed on the Anesthesia Plan with the CRNA  Navi Kramer

## 2019-10-03 ENCOUNTER — APPOINTMENT (EMERGENCY)
Dept: CT IMAGING | Facility: HOSPITAL | Age: 54
End: 2019-10-03
Payer: MEDICARE

## 2019-10-03 ENCOUNTER — HOSPITAL ENCOUNTER (EMERGENCY)
Facility: HOSPITAL | Age: 54
Discharge: HOME | End: 2019-10-03
Attending: EMERGENCY MEDICINE | Admitting: EMERGENCY MEDICINE
Payer: MEDICARE

## 2019-10-03 VITALS
DIASTOLIC BLOOD PRESSURE: 77 MMHG | SYSTOLIC BLOOD PRESSURE: 152 MMHG | OXYGEN SATURATION: 95 % | HEART RATE: 98 BPM | RESPIRATION RATE: 16 BRPM | TEMPERATURE: 97.4 F

## 2019-10-03 DIAGNOSIS — R11.2 NAUSEA & VOMITING: Primary | ICD-10-CM

## 2019-10-03 DIAGNOSIS — K52.9 ENTERITIS: ICD-10-CM

## 2019-10-03 LAB
ALBUMIN SERPL BCP-MCNC: 4.2 G/DL (ref 3.5–5)
ALP SERPL-CCNC: 71 U/L (ref 46–116)
ALT SERPL W P-5'-P-CCNC: 20 U/L (ref 12–78)
ANION GAP SERPL CALCULATED.3IONS-SCNC: 14 MMOL/L (ref 4–13)
APTT PPP: 31 SECONDS (ref 23–37)
AST SERPL W P-5'-P-CCNC: 12 U/L (ref 5–45)
BASOPHILS # BLD AUTO: 0.02 THOUSANDS/ΜL (ref 0–0.1)
BASOPHILS NFR BLD AUTO: 0 % (ref 0–1)
BILIRUB DIRECT SERPL-MCNC: 0.09 MG/DL (ref 0–0.2)
BILIRUB SERPL-MCNC: 0.2 MG/DL (ref 0.2–1)
BUN SERPL-MCNC: 18 MG/DL (ref 5–25)
CALCIUM SERPL-MCNC: 9.3 MG/DL (ref 8.3–10.1)
CHLORIDE SERPL-SCNC: 103 MMOL/L (ref 100–108)
CO2 SERPL-SCNC: 25 MMOL/L (ref 21–32)
CREAT SERPL-MCNC: 1.04 MG/DL (ref 0.6–1.3)
EOSINOPHIL # BLD AUTO: 0.02 THOUSAND/ΜL (ref 0–0.61)
EOSINOPHIL NFR BLD AUTO: 0 % (ref 0–6)
ERYTHROCYTE [DISTWIDTH] IN BLOOD BY AUTOMATED COUNT: 12.7 % (ref 11.6–15.1)
GFR SERPL CREATININE-BSD FRML MDRD: 81 ML/MIN/1.73SQ M
GLUCOSE SERPL-MCNC: 182 MG/DL (ref 65–140)
HCT VFR BLD AUTO: 46.3 % (ref 36.5–49.3)
HGB BLD-MCNC: 15.4 G/DL (ref 12–17)
IMM GRANULOCYTES # BLD AUTO: 0.09 THOUSAND/UL (ref 0–0.2)
IMM GRANULOCYTES NFR BLD AUTO: 1 % (ref 0–2)
INR PPP: 0.98 (ref 0.84–1.19)
LACTATE SERPL-SCNC: 0.9 MMOL/L (ref 0.5–2)
LIPASE SERPL-CCNC: 106 U/L (ref 73–393)
LYMPHOCYTES # BLD AUTO: 0.78 THOUSANDS/ΜL (ref 0.6–4.47)
LYMPHOCYTES NFR BLD AUTO: 6 % (ref 14–44)
MCH RBC QN AUTO: 29.8 PG (ref 26.8–34.3)
MCHC RBC AUTO-ENTMCNC: 33.3 G/DL (ref 31.4–37.4)
MCV RBC AUTO: 90 FL (ref 82–98)
MONOCYTES # BLD AUTO: 0.29 THOUSAND/ΜL (ref 0.17–1.22)
MONOCYTES NFR BLD AUTO: 2 % (ref 4–12)
NEUTROPHILS # BLD AUTO: 12.33 THOUSANDS/ΜL (ref 1.85–7.62)
NEUTS SEG NFR BLD AUTO: 91 % (ref 43–75)
NRBC BLD AUTO-RTO: 0 /100 WBCS
PLATELET # BLD AUTO: 337 THOUSANDS/UL (ref 149–390)
PMV BLD AUTO: 10.2 FL (ref 8.9–12.7)
POTASSIUM SERPL-SCNC: 3.6 MMOL/L (ref 3.5–5.3)
PROT SERPL-MCNC: 8.2 G/DL (ref 6.4–8.2)
PROTHROMBIN TIME: 13 SECONDS (ref 11.6–14.5)
RBC # BLD AUTO: 5.16 MILLION/UL (ref 3.88–5.62)
SODIUM SERPL-SCNC: 142 MMOL/L (ref 136–145)
TROPONIN I SERPL-MCNC: <0.02 NG/ML
WBC # BLD AUTO: 13.53 THOUSAND/UL (ref 4.31–10.16)

## 2019-10-03 PROCEDURE — 93005 ELECTROCARDIOGRAM TRACING: CPT

## 2019-10-03 PROCEDURE — 36415 COLL VENOUS BLD VENIPUNCTURE: CPT | Performed by: EMERGENCY MEDICINE

## 2019-10-03 PROCEDURE — 80076 HEPATIC FUNCTION PANEL: CPT | Performed by: EMERGENCY MEDICINE

## 2019-10-03 PROCEDURE — 85730 THROMBOPLASTIN TIME PARTIAL: CPT | Performed by: EMERGENCY MEDICINE

## 2019-10-03 PROCEDURE — 96361 HYDRATE IV INFUSION ADD-ON: CPT

## 2019-10-03 PROCEDURE — 83690 ASSAY OF LIPASE: CPT | Performed by: EMERGENCY MEDICINE

## 2019-10-03 PROCEDURE — 99285 EMERGENCY DEPT VISIT HI MDM: CPT | Performed by: EMERGENCY MEDICINE

## 2019-10-03 PROCEDURE — 83605 ASSAY OF LACTIC ACID: CPT | Performed by: EMERGENCY MEDICINE

## 2019-10-03 PROCEDURE — 85025 COMPLETE CBC W/AUTO DIFF WBC: CPT | Performed by: EMERGENCY MEDICINE

## 2019-10-03 PROCEDURE — 84484 ASSAY OF TROPONIN QUANT: CPT | Performed by: EMERGENCY MEDICINE

## 2019-10-03 PROCEDURE — 74177 CT ABD & PELVIS W/CONTRAST: CPT

## 2019-10-03 PROCEDURE — 80048 BASIC METABOLIC PNL TOTAL CA: CPT | Performed by: EMERGENCY MEDICINE

## 2019-10-03 PROCEDURE — 96374 THER/PROPH/DIAG INJ IV PUSH: CPT

## 2019-10-03 PROCEDURE — 85610 PROTHROMBIN TIME: CPT | Performed by: EMERGENCY MEDICINE

## 2019-10-03 PROCEDURE — 99284 EMERGENCY DEPT VISIT MOD MDM: CPT

## 2019-10-03 PROCEDURE — 96375 TX/PRO/DX INJ NEW DRUG ADDON: CPT

## 2019-10-03 RX ORDER — FAMOTIDINE 20 MG/1
20 TABLET, FILM COATED ORAL ONCE
Status: DISCONTINUED | OUTPATIENT
Start: 2019-10-03 | End: 2019-10-03 | Stop reason: HOSPADM

## 2019-10-03 RX ORDER — METOCLOPRAMIDE HYDROCHLORIDE 5 MG/ML
10 INJECTION INTRAMUSCULAR; INTRAVENOUS ONCE
Status: COMPLETED | OUTPATIENT
Start: 2019-10-03 | End: 2019-10-03

## 2019-10-03 RX ORDER — MORPHINE SULFATE 10 MG/ML
6 INJECTION, SOLUTION INTRAMUSCULAR; INTRAVENOUS ONCE
Status: COMPLETED | OUTPATIENT
Start: 2019-10-03 | End: 2019-10-03

## 2019-10-03 RX ORDER — PROCHLORPERAZINE MALEATE 10 MG
10 TABLET ORAL 2 TIMES DAILY PRN
Qty: 10 TABLET | Refills: 1 | Status: SHIPPED | OUTPATIENT
Start: 2019-10-03 | End: 2020-02-08

## 2019-10-03 RX ADMIN — METOCLOPRAMIDE 10 MG: 5 INJECTION, SOLUTION INTRAMUSCULAR; INTRAVENOUS at 13:49

## 2019-10-03 RX ADMIN — MORPHINE SULFATE 6 MG: 10 INJECTION INTRAVENOUS at 13:50

## 2019-10-03 RX ADMIN — SODIUM CHLORIDE 1000 ML: 0.9 INJECTION, SOLUTION INTRAVENOUS at 13:55

## 2019-10-03 RX ADMIN — IOHEXOL 100 ML: 350 INJECTION, SOLUTION INTRAVENOUS at 16:33

## 2019-10-03 NOTE — ED NOTES
Pt requesting "tums or something for my stomach"  Dr Victorino Garcia made aware      Patel Reaves, KASI  10/03/19 1315

## 2019-10-03 NOTE — ED PROVIDER NOTES
History  Chief Complaint   Patient presents with    Vomiting     Pt presents with c/o vomiting sinec this morning, unable to keep anything down     Pt comes to ED c/o vomiting and LLQ abd pain which began this morning  Yesterday he reports having a colonoscopy  He denies fever and chills  LLQ abd pain aching, constant, radiates to upper L side of abdomen, associated w nausea and vomiting  He denies black or bloody stool  He denies urinary sxs  He denies CP and SOB  No other sxs or concerns at this time  Prior to Admission Medications   Prescriptions Last Dose Informant Patient Reported? Taking?    BUDESONIDE PO  Self Yes No   Sig: Take 3 mg by mouth 2 (two) times a day   PEG 3350-KCl-NaCl-NaSulf-Na Asc-C (MOVIPREP) 100 g  Self Yes No   Sig: MoviPrep 100 gram-7 5 gram-2 691 gram oral powder packet   aspirin (ECOTRIN LOW STRENGTH) 81 mg EC tablet  Self Yes No   Sig: Take 81 mg by mouth daily   atorvastatin (LIPITOR) 40 mg tablet  Self No No   Sig: Take 1 tablet (40 mg total) by mouth daily   buPROPion (WELLBUTRIN) 75 mg tablet  Self Yes No   Sig: Take 75 mg by mouth daily    busPIRone (BUSPAR) 10 mg tablet  Self Yes No   Sig: buspirone 10 mg tablet   clonazePAM (KLONOPIN) 1 mg tablet  Self Yes No   Sig: Take 1 5 mg by mouth daily at bedtime    clopidogrel (PLAVIX) 75 mg tablet  Self No No   Sig: Take 1 tablet (75 mg total) by mouth daily   nitroglycerin (NITROSTAT) 0 4 mg SL tablet  Self No No   Sig: Place 1 tablet (0 4 mg total) under the tongue every 5 (five) minutes as needed for chest pain   omeprazole (PriLOSEC) 20 mg delayed release capsule  Self Yes No   Sig: Take 20 mg by mouth daily   ondansetron (ZOFRAN-ODT) 4 mg disintegrating tablet  Self No No   Sig: Take 1 tablet (4 mg total) by mouth every 8 (eight) hours as needed for nausea or vomiting   oxyCODONE-acetaminophen (PERCOCET) 5-325 mg per tablet  Self Yes No   Sig: oxycodone-acetaminophen 5 mg-325 mg tablet   sertraline (ZOLOFT) 100 mg tablet Self Yes No   Sig: Take 200 mg by mouth daily      Facility-Administered Medications: None       Past Medical History:   Diagnosis Date    Diaz's esophagus     Colon polyp     Coronary artery disease     Depression     Diverticulitis     GERD (gastroesophageal reflux disease)     Hemorrhoid     History of heart artery stent     Hyperlipidemia     Hypertension     Microscopic colitis        Past Surgical History:   Procedure Laterality Date    ABDOMINAL SURGERY      radio frequency ablation    BONE GRAFT Left 2018    CARPAL TUNNEL RELEASE Right     COLONOSCOPY      EGD AND COLONOSCOPY      ESOPHAGOGASTRODUODENOSCOPY N/A 2/15/2018    Procedure: ESOPHAGOGASTRODUODENOSCOPY (EGD); Surgeon: Kathy Guerrier MD;  [facility];  Service: Gastroenterology    ESOPHAGOGASTRODUODENOSCOPY N/A 12/10/2018    Procedure: ESOPHAGOGASTRODUODENOSCOPY (EGD); Surgeon: Osa Eisenmenger, MD;  Location[facility]; Service: Gastroenterology    TONSILLECTOMY         Family History   Problem Relation Age of Onset    Heart disease Father     Cancer Sister      I have reviewed and agree with the history as documented  Social History     Tobacco Use    Smoking status: Former Smoker     Last attempt to quit: 2007     Years since quittin 7    Smokeless tobacco: Former User     Quit date: 1998   Substance Use Topics    Alcohol use: Never     Frequency: Never     Comment: quit 3 years    Drug use: Yes     Frequency: 3 0 times per week     Types: Marijuana     Comment: last used         Review of Systems   Constitutional: Negative for chills and fever  Respiratory: Negative for chest tightness and shortness of breath  Gastrointestinal: Positive for abdominal pain, nausea and vomiting  Negative for abdominal distention  All other systems reviewed and are negative  Physical Exam  Physical Exam   Constitutional: He is oriented to person, place, and time   He appears well-developed and well-nourished  No distress  HENT:   Head: Normocephalic and atraumatic  Eyes: Pupils are equal, round, and reactive to light  Conjunctivae and EOM are normal    Neck: Normal range of motion  Neck supple  No JVD present  Cardiovascular: Normal rate, regular rhythm, normal heart sounds and intact distal pulses  Exam reveals no gallop and no friction rub  No murmur heard  Pulmonary/Chest: Effort normal and breath sounds normal  No stridor  No respiratory distress  He has no wheezes  He has no rales  Abdominal: Soft  He exhibits no distension  There is no tenderness  Musculoskeletal: Normal range of motion  He exhibits no edema, tenderness or deformity  Neurological: He is alert and oriented to person, place, and time  No cranial nerve deficit or sensory deficit  He exhibits normal muscle tone  Coordination normal    Skin: Skin is warm and dry  Capillary refill takes less than 2 seconds  He is not diaphoretic  Nursing note and vitals reviewed        Vital Signs  ED Triage Vitals   Temperature Pulse Respirations Blood Pressure SpO2   10/03/19 1313 10/03/19 1313 10/03/19 1313 10/03/19 1313 10/03/19 1313   (!) 97 4 °F (36 3 °C) (!) 46 18 (!) 192/86 98 %      Temp Source Heart Rate Source Patient Position - Orthostatic VS BP Location FiO2 (%)   10/03/19 1313 10/03/19 1313 10/03/19 1313 10/03/19 1313 --   Oral Monitor Sitting Left arm       Pain Score       10/03/19 1350       9           Vitals:    10/03/19 1313 10/03/19 1500 10/03/19 1730   BP: (!) 192/86 158/79 152/77   Pulse: (!) 46 (!) 47 98   Patient Position - Orthostatic VS: Sitting Sitting Sitting         Visual Acuity      ED Medications  Medications   famotidine (PEPCID) tablet 20 mg (has no administration in time range)   sodium chloride 0 9 % bolus 1,000 mL (0 mL Intravenous Stopped 10/3/19 1538)   metoclopramide (REGLAN) injection 10 mg (10 mg Intravenous Given 10/3/19 1349)   morphine (PF) 10 mg/mL injection 6 mg (6 mg Intravenous Given 10/3/19 1350)   iohexol (OMNIPAQUE) 350 MG/ML injection (MULTI-DOSE) 100 mL (100 mL Intravenous Given 10/3/19 1633)       Diagnostic Studies  Results Reviewed     Procedure Component Value Units Date/Time    Lactic acid, plasma x2 [[id number]]  (Normal) Collected:  10/03/19 1353    Lab Status:  Final result Specimen:  Blood from Arm, Left Updated:  10/03/19 1617     LACTIC ACID 0 9 mmol/L     Narrative:       Result may be elevated if tourniquet was used during collection      Troponin I [[id number]]  (Normal) Collected:  10/03/19 1353    Lab Status:  Final result Specimen:  Blood from Arm, Left Updated:  10/03/19 1613     Troponin I <0 02 ng/mL     CBC and differential [[id number]]  (Abnormal) Collected:  10/03/19 1353    Lab Status:  Final result Specimen:  Blood from Arm, Left Updated:  10/03/19 1611     WBC 13 53 Thousand/uL      RBC 5 16 Million/uL      Hemoglobin 15 4 g/dL      Hematocrit 46 3 %      MCV 90 fL      MCH 29 8 pg      MCHC 33 3 g/dL      RDW 12 7 %      MPV 10 2 fL      Platelets 523 Thousands/uL      nRBC 0 /100 WBCs      Neutrophils Relative 91 %      Immat GRANS % 1 %      Lymphocytes Relative 6 %      Monocytes Relative 2 %      Eosinophils Relative 0 %      Basophils Relative 0 %      Neutrophils Absolute 12 33 Thousands/µL      Immature Grans Absolute 0 09 Thousand/uL      Lymphocytes Absolute 0 78 Thousands/µL      Monocytes Absolute 0 29 Thousand/µL      Eosinophils Absolute 0 02 Thousand/µL      Basophils Absolute 0 02 Thousands/µL     Basic metabolic panel [[id number]]  (Abnormal) Collected:  10/03/19 1353    Lab Status:  Final result Specimen:  Blood from Arm, Left Updated:  10/03/19 1611     Sodium 142 mmol/L      Potassium 3 6 mmol/L      Chloride 103 mmol/L      CO2 25 mmol/L      ANION GAP 14 mmol/L      BUN 18 mg/dL      Creatinine 1 04 mg/dL      Glucose 182 mg/dL      Calcium 9 3 mg/dL      eGFR 81 ml/min/1 73sq m     Narrative:       National Kidney Disease Foundation guidelines for Chronic Kidney Disease (CKD):     Stage 1 with normal or high GFR (GFR > 90 mL/min/1 73 square meters)    Stage 2 Mild CKD (GFR = 60-89 mL/min/1 73 square meters)    Stage 3A Moderate CKD (GFR = 45-59 mL/min/1 73 square meters)    Stage 3B Moderate CKD (GFR = 30-44 mL/min/1 73 square meters)    Stage 4 Severe CKD (GFR = 15-29 mL/min/1 73 square meters)    Stage 5 End Stage CKD (GFR <15 mL/min/1 73 square meters)  Note: GFR calculation is accurate only with a steady state creatinine    Hepatic function panel [[id number]]  (Normal) Collected:  10/03/19 1353    Lab Status:  Final result Specimen:  Blood from Arm, Left Updated:  10/03/19 1611     Total Bilirubin 0 20 mg/dL      Bilirubin, Direct 0 09 mg/dL      Alkaline Phosphatase 71 U/L      AST 12 U/L      ALT 20 U/L      Total Protein 8 2 g/dL      Albumin 4 2 g/dL     Lipase [[id number]]  (Normal) Collected:  10/03/19 1353    Lab Status:  Final result Specimen:  Blood from Arm, Left Updated:  10/03/19 1611     Lipase 106 u/L     Protime-INR [[id number]]  (Normal) Collected:  10/03/19 1353    Lab Status:  Final result Specimen:  Blood from Arm, Left Updated:  10/03/19 1601     Protime 13 0 seconds      INR 0 98    APTT [[id number]]  (Normal) Collected:  10/03/19 1353    Lab Status:  Final result Specimen:  Blood from Arm, Left Updated:  10/03/19 1601     PTT 31 seconds     Lactic acid, plasma x2 [[id number]] Collected:  10/03/19 1353    Lab Status:  No result Specimen:  Blood from Arm, Left     UA w Reflex to Microscopic [[id number]]     Lab Status:  No result Specimen:  Urine                  CT abdomen pelvis with contrast   Final Result by Chen Miller MD (10/03 1717)      Mildly thickened loops of small bowel throughout the midabdomen suspicious for a mild enteritis  No bowel obstruction or bowel pneumatosis  The study was marked in [facility] for immediate notification        Workstation performed: [id number] Procedures  ECG 12 Lead Documentation Only  Date/Time: 10/3/2019 1:48 PM  Performed by: Hali Lay MD  Authorized by: Hali Lay MD     Indications / Diagnosis:  Abd pain  ECG reviewed by me, the ED Provider: yes    Patient location:  ED  Previous ECG:     Previous ECG:  Compared to current    Similarity:  No change  Interpretation:     Interpretation: normal    Rate:     ECG rate:  45    ECG rate assessment: bradycardic    Rhythm:     Rhythm: sinus bradycardia    Comments:      SB, normal ekg, unchanged from prior  ED Course  ED Course as of Oct 03 1810   Thu Oct 03, 2019   1505 3:05 PM reexamined, sleeping, appears comfortable, no distress  MDM  Number of Diagnoses or Management Options  Enteritis:   Nausea & vomiting:   Diagnosis management comments: Pt with nausea and vomiting and abd pain s/p recent colonoscopy  CT with findings suggestive of enteritis, no SBO  Exam benign  Pt agreeable to discharge and plan to fill rx for compazine but after my discussion with him he became upset and was reportedly swearing at nurses, stormed out of the ER yelling, left his discharge paperwork including rx  I went out to parking lot with his papers to give him his rx, pt yelling at ED staff and refuses to take his rx and discharge instructions          Amount and/or Complexity of Data Reviewed  Clinical lab tests: reviewed and ordered  Tests in the radiology section of CPT®: reviewed and ordered  Tests in the medicine section of CPT®: ordered and reviewed  Independent visualization of images, tracings, or specimens: yes        Disposition  Final diagnoses:   Nausea & vomiting   Enteritis     Time reflects when diagnosis was documented in both MDM as applicable and the Disposition within this note     Time User Action Codes Description Comment    10/3/2019  5:25 PM Kasia Kinsey Add [R11 2] Nausea & vomiting     10/3/2019  5:25 PM Woo Cabrera Add [K52 9] Enteritis ED Disposition     ED Disposition Condition Date/Time Comment    Discharge Stable Thu Oct 3, 2019  5:25 PM Claudio Rosales discharge to home/self care  Follow-up Information     Follow up With Specialties Details Why Contact Info Additional Information    [address] [facility] Emergency Department Emergency Medicine  If symptoms worsen [address] [facility] [address] ED, [address], [facility]          Patient's Medications   Discharge Prescriptions    PROCHLORPERAZINE (COMPAZINE) 10 MG TABLET    Take 1 tablet (10 mg total) by mouth 2 (two) times a day as needed for nausea or vomiting       Start Date: 10/3/2019 End Date: --       Order Dose: 10 mg       Quantity: 10 tablet    Refills: 1     No discharge procedures on file      ED Provider  Electronically Signed by           Tahir Nathan MD  10/03/19 4021

## 2019-10-04 LAB
ATRIAL RATE: 45 BPM
P AXIS: 46 DEGREES
PR INTERVAL: 176 MS
QRS AXIS: 22 DEGREES
QRSD INTERVAL: 92 MS
QT INTERVAL: 478 MS
QTC INTERVAL: 413 MS
T WAVE AXIS: 28 DEGREES
VENTRICULAR RATE: 45 BPM

## 2019-10-04 PROCEDURE — 93010 ELECTROCARDIOGRAM REPORT: CPT | Performed by: INTERNAL MEDICINE

## 2019-10-07 ENCOUNTER — TELEPHONE (OUTPATIENT)
Dept: GASTROENTEROLOGY | Facility: CLINIC | Age: 54
End: 2019-10-07

## 2019-10-07 NOTE — TELEPHONE ENCOUNTER
----- Message from Efra Anne PA-C sent at 10/5/2019 10:23 AM EDT -----  Please inform patient that polyp is benign  Recall colon in 5 years time Thanks

## 2019-10-14 ENCOUNTER — TELEPHONE (OUTPATIENT)
Dept: CARDIOLOGY CLINIC | Facility: CLINIC | Age: 54
End: 2019-10-14

## 2019-10-14 NOTE — TELEPHONE ENCOUNTER
No specific recommendations at this time  Fluctuating heart rate was likely a vagal response to feeling ill  The neck pain is very non-specific  Emotional stress can exacerbate angina  I would ask him to keep track of this and let us know if it is getting any worse  He should continue to take his ASA and plavix daily, as prescribed  If his symptoms worsen, he should be seen in the office sooner      [address]

## 2019-10-14 NOTE — TELEPHONE ENCOUNTER
S/w pt, states that this weekend after a stressful situation with his wife that he started to experience chest pain and took Nitro  Pt stated that the 3rd Nitro worked and he felt fine  Pt states that he does believe that the chest pain comes on a lot with stress and that it can be anxiety related (pt states that he takes Klonopin and it works sometimes)  Pt states that at times he feels pain around his left neck, shoulder area but he has been doing more physical work lately and has been watching some children as well  Pt had a colonoscopy on 10/1/19 and his HR decreased to 38 and was fluctuating up to 80 and was accompanied with vomiting  Pt stated that after a while his HR stabilized and has not had any issues since  Pt also stated that he has been experiencing intermittent SOB on exertion with no swelling  Pt also had a dizzy spell this morning, other than that pt states that currently he feels fine  Pt is aware that if symptoms persist to go to the ER  Pt has an appt with you on 12/2/19  Please advise with any recommendations   Thank you

## 2019-10-14 NOTE — TELEPHONE ENCOUNTER
Pt said yesterday, Francois 10/13/19 he was having a stressful situation with his wife and was getting chest pain  He took 2 nitro and still got a sharp pain and took a 3rd  He said the 3rd nitro worked  Pt experienced SOB as well  Pt had stopped taking plavix for 5 days for a colonoscopy  He then got a stomach virus and his heart rate dropped to the high 30's  Pt said this morning while he was opening his sliding glass door he felt off balance  Transferred call to [facility]

## 2019-10-22 ENCOUNTER — TELEPHONE (OUTPATIENT)
Dept: GASTROENTEROLOGY | Facility: CLINIC | Age: 54
End: 2019-10-22

## 2019-10-22 DIAGNOSIS — K57.92 ACUTE DIVERTICULITIS: ICD-10-CM

## 2019-10-22 RX ORDER — ONDANSETRON 4 MG/1
4 TABLET, ORALLY DISINTEGRATING ORAL EVERY 8 HOURS PRN
Qty: 60 TABLET | Refills: 3 | Status: SHIPPED | OUTPATIENT
Start: 2019-10-22 | End: 2019-11-15 | Stop reason: SDUPTHER

## 2019-10-22 NOTE — TELEPHONE ENCOUNTER
Dr Aston Bravo - Patient called lmom - patient had colonoscopy  Patient was throwing up on Sunday  Patient would like to speak with someone about condition and get a refill on his  Medication   Please call Roz Hook at [phone] ty

## 2019-10-22 NOTE — TELEPHONE ENCOUNTER
Patient with history of microscopic ulcerative colitis, Diaz's esophagus - RFA, nausea, vomiting, diarrhea, diverticulosis/diverticulitis, marijuana ussage  He states he is feeling well, no longer vomiting, chills or sweats  He only needs a refill for zofran    He is calling to request a refill for his zofran, 4 mg, take 1 by mouth every 8 hours as needed  Thank you

## 2019-11-14 ENCOUNTER — TELEPHONE (OUTPATIENT)
Dept: CARDIOLOGY CLINIC | Facility: CLINIC | Age: 54
End: 2019-11-14

## 2019-11-14 NOTE — TELEPHONE ENCOUNTER
Given his known history of CAD, I think that increasing his atorvastatin to 80 mg is a good idea  Please have his lipids redrawn in 6 weeks so that we can monitor the effect  They should be drawn in our lab so we have access to them      [address]

## 2019-11-14 NOTE — TELEPHONE ENCOUNTER
Spoke with patient labs were done at the [facility] 3 weeks ago  We do not have the copies of the lab work, the patient said he would get them done again if the doctor would like him to since he feels they are too old now anyway  He states he has no increased his atorvastatin yet he would like his doctors feedback first before making the change  Please advise

## 2019-11-14 NOTE — TELEPHONE ENCOUNTER
S/w pt and he verbally understood per Dr Arun Simeon he is to increase his atorvastatin to 80mg daily and to have his lipids redrawn in 6 weeks to monitor effect  He will be having labs done at a [facility] facility so we can have better access

## 2019-11-14 NOTE — TELEPHONE ENCOUNTER
Pt said that he recently saw his pcp and his cholesterol levels were high so his doctor wanted him to double his Atorvastatin to 80mg daily  Pt wants to make sure that Dr Deo Mata is ok with this?  Please advise

## 2019-11-15 ENCOUNTER — OFFICE VISIT (OUTPATIENT)
Dept: GASTROENTEROLOGY | Facility: CLINIC | Age: 54
End: 2019-11-15
Payer: MEDICARE

## 2019-11-15 VITALS
DIASTOLIC BLOOD PRESSURE: 78 MMHG | HEIGHT: 70 IN | SYSTOLIC BLOOD PRESSURE: 130 MMHG | WEIGHT: 213.2 LBS | BODY MASS INDEX: 30.52 KG/M2 | HEART RATE: 84 BPM

## 2019-11-15 DIAGNOSIS — R10.30 LOWER ABDOMINAL PAIN: ICD-10-CM

## 2019-11-15 DIAGNOSIS — K52.839 MICROSCOPIC COLITIS, UNSPECIFIED MICROSCOPIC COLITIS TYPE: Primary | ICD-10-CM

## 2019-11-15 PROCEDURE — 99214 OFFICE O/P EST MOD 30 MIN: CPT | Performed by: PHYSICIAN ASSISTANT

## 2019-11-15 RX ORDER — DICYCLOMINE HCL 20 MG
20 TABLET ORAL EVERY 6 HOURS
Qty: 60 TABLET | Refills: 3 | Status: SHIPPED | OUTPATIENT
Start: 2019-11-15 | End: 2020-02-10 | Stop reason: HOSPADM

## 2019-11-15 RX ORDER — ONDANSETRON 4 MG/1
4 TABLET, ORALLY DISINTEGRATING ORAL EVERY 8 HOURS PRN
Qty: 60 TABLET | Refills: 3 | Status: SHIPPED | OUTPATIENT
Start: 2019-11-15 | End: 2019-12-06 | Stop reason: SDUPTHER

## 2019-11-15 RX ORDER — BUDESONIDE 3 MG/1
3 CAPSULE, COATED PELLETS ORAL DAILY
Qty: 90 CAPSULE | Refills: 3 | Status: SHIPPED | OUTPATIENT
Start: 2019-11-15 | End: 2020-02-08

## 2019-11-15 NOTE — LETTER
November 15, 2019     Carlota Mackenzie MD  Attn: RACHEL Ornelas   [address] [place] [address]    Patient: Aurelio Farias   YOB: 1965   Date of Visit: 11/15/2019       Dear Dr Eddy Cazares: Thank you for referring Aurelio Farias to me for evaluation  Below are my notes for this consultation  If you have questions, please do not hesitate to call me  I look forward to following your patient along with you  Sincerely,        Olga Taveras PA-C        CC: No Recipients  Angelina Sepulveda  11/15/2019  8:49 AM  Sign at close encounter  [facility] - Outpatient Follow-up Note  Aurelio Farias 53 y o  male MRN: [id number]  Encounter: [id number]          ASSESSMENT AND PLAN:      1  Microscopic colitis, unspecified microscopic colitis type  2  Lower abdominal pain  Will restart budesonide  Will start Zofran p r n     Will give script for Bentyl  Follow-up in 4 weeks  ______________________________________________________________________    SUBJECTIVE:    19-year-old female who is here for follow-up of microscopic colitis  Patient reports that over the last several weeks he has been suffering with multiple bowel movements a day and severe lower abdominal pain  He reports that the only thing and generally helps his microscopic colitis is budesonide course  He reports no blood in his stool or no melena  He is up-to-date with his colonoscopy he did have a benign polyp removed a month ago  He denies any significant weight loss  He does report nausea with the pain  REVIEW OF SYSTEMS IS OTHERWISE NEGATIVE        Historical Information   Past Medical History:   Diagnosis Date    Diaz's esophagus     Colon polyp     Coronary artery disease     Depression     Diverticulitis     GERD (gastroesophageal reflux disease)     Hemorrhoid     History of heart artery stent     Hyperlipidemia     Hypertension     Microscopic colitis Past Surgical History:   Procedure Laterality Date    ABDOMINAL SURGERY      radio frequency ablation    BONE GRAFT Left 2018    CARPAL TUNNEL RELEASE Right     COLONOSCOPY      EGD AND COLONOSCOPY      ESOPHAGOGASTRODUODENOSCOPY N/A 2/15/2018    Procedure: ESOPHAGOGASTRODUODENOSCOPY (EGD); Surgeon: Angelica Jeffers MD;  [facility];  Service: Gastroenterology    ESOPHAGOGASTRODUODENOSCOPY N/A 12/10/2018    Procedure: ESOPHAGOGASTRODUODENOSCOPY (EGD); Surgeon: Warren Lockhart MD;  [facility];   Service: Gastroenterology    TONSILLECTOMY       Social History   Social History     Substance and Sexual Activity   Alcohol Use Never    Frequency: Never    Comment: quit 3 years     Social History     Substance and Sexual Activity   Drug Use Yes    Frequency: 3 0 times per week    Types: Marijuana    Comment: last used      Social History     Tobacco Use   Smoking Status Former Smoker    Last attempt to quit: 2007    Years since quittin 8   Smokeless Tobacco Former User    Quit date: 1998     Family History   Problem Relation Age of Onset    Heart disease Father     Cancer Sister        Meds/Allergies       Current Outpatient Medications:     aspirin (ECOTRIN LOW STRENGTH) 81 mg EC tablet    atorvastatin (LIPITOR) 40 mg tablet    BUDESONIDE PO    buPROPion (WELLBUTRIN) 75 mg tablet    busPIRone (BUSPAR) 10 mg tablet    clonazePAM (KLONOPIN) 1 mg tablet    clopidogrel (PLAVIX) 75 mg tablet    nitroglycerin (NITROSTAT) 0 4 mg SL tablet    omeprazole (PriLOSEC) 20 mg delayed release capsule    ondansetron (ZOFRAN-ODT) 4 mg disintegrating tablet    oxyCODONE-acetaminophen (PERCOCET) 5-325 mg per tablet    PEG 3350-KCl-NaCl-NaSulf-Na Asc-C (MOVIPREP) 100 g    prochlorperazine (COMPAZINE) 10 mg tablet    sertraline (ZOLOFT) 100 mg tablet    budesonide (ENTOCORT EC) 3 MG capsule    dicyclomine (BENTYL) 20 mg tablet    Allergies   Allergen Reactions  No Active Allergies            Objective     Blood pressure 130/78, pulse 84, height 5' 9 5" (1 765 m), weight 96 7 kg (213 lb 3 2 oz)  Body mass index is 31 03 kg/m²  PHYSICAL EXAM:      General Appearance:   Alert, cooperative, no distress   HEENT:   Normocephalic, atraumatic, anicteric      Neck:  Supple, symmetrical, trachea midline   Lungs:   Clear to auscultation bilaterally; no rales, rhonchi or wheezing; respirations unlabored    Heart[de-identified]   Regular rate and rhythm; no murmur, rub, or gallop  Abdomen:   Soft, non-tender, non-distended; normal bowel sounds; no masses, no organomegaly    Genitalia:   Deferred    Rectal:   Deferred    Extremities:  No cyanosis, clubbing or edema    Pulses:  2+ and symmetric    Skin:  No jaundice, rashes, or lesions    Lymph nodes:  No palpable cervical lymphadenopathy        Lab Results:   No visits with results within 1 Day(s) from this visit     Latest known visit with results is:   Admission on 10/03/2019, Discharged on 10/03/2019   Component Date Value    Sodium 10/03/2019 142     Potassium 10/03/2019 3 6     Chloride 10/03/2019 103     CO2 10/03/2019 25     ANION GAP 10/03/2019 14*    BUN 10/03/2019 18     Creatinine 10/03/2019 1 04     Glucose 10/03/2019 182*    Calcium 10/03/2019 9 3     eGFR 10/03/2019 81     Protime 10/03/2019 13 0     INR 10/03/2019 0 98     PTT 10/03/2019 31     WBC 10/03/2019 13 53*    RBC 10/03/2019 5 16     Hemoglobin 10/03/2019 15 4     Hematocrit 10/03/2019 46 3     MCV 10/03/2019 90     MCH 10/03/2019 29 8     MCHC 10/03/2019 33 3     RDW 10/03/2019 12 7     MPV 10/03/2019 10 2     Platelets 25/15/7914 337     nRBC 10/03/2019 0     Neutrophils Relative 10/03/2019 91*    Immat GRANS % 10/03/2019 1     Lymphocytes Relative 10/03/2019 6*    Monocytes Relative 10/03/2019 2*    Eosinophils Relative 10/03/2019 0     Basophils Relative 10/03/2019 0     Neutrophils Absolute 10/03/2019 12 33*    Immature Grans Absolute 10/03/2019 0 09     Lymphocytes Absolute 10/03/2019 0 78     Monocytes Absolute 10/03/2019 0 29     Eosinophils Absolute 10/03/2019 0 02     Basophils Absolute 10/03/2019 0 02     Total Bilirubin 10/03/2019 0 20     Bilirubin, Direct 10/03/2019 0 09     Alkaline Phosphatase 10/03/2019 71     AST 10/03/2019 12     ALT 10/03/2019 20     Total Protein 10/03/2019 8 2     Albumin 10/03/2019 4 2     LACTIC ACID 10/03/2019 0 9     Troponin I 10/03/2019 <0 02     Lipase 10/03/2019 106     Ventricular Rate 10/03/2019 45     Atrial Rate 10/03/2019 45     LA Interval 10/03/2019 176     QRSD Interval 10/03/2019 92     QT Interval 10/03/2019 478     QTC Interval 10/03/2019 413     P Axis 10/03/2019 46     QRS Axis 10/03/2019 22     T Wave [place] 10/03/2019 28          Radiology Results:   No results found

## 2019-11-15 NOTE — PROGRESS NOTES
[facility] - Outpatient Follow-up Note  Luis Brantley 53 y o  male MRN: [id number]  Encounter: [id number]          ASSESSMENT AND PLAN:      1  Microscopic colitis, unspecified microscopic colitis type  2  Lower abdominal pain  Will restart budesonide  Will start Zofran p r n     Will give script for Bentyl  Follow-up in 4 weeks  ______________________________________________________________________    SUBJECTIVE:    58-year-old female who is here for follow-up of microscopic colitis  Patient reports that over the last several weeks he has been suffering with multiple bowel movements a day and severe lower abdominal pain  He reports that the only thing and generally helps his microscopic colitis is budesonide course  He reports no blood in his stool or no melena  He is up-to-date with his colonoscopy he did have a benign polyp removed a month ago  He denies any significant weight loss  He does report nausea with the pain  REVIEW OF SYSTEMS IS OTHERWISE NEGATIVE  Historical Information   Past Medical History:   Diagnosis Date    Diaz's esophagus     Colon polyp     Coronary artery disease     Depression     Diverticulitis     GERD (gastroesophageal reflux disease)     Hemorrhoid     History of heart artery stent     Hyperlipidemia     Hypertension     Microscopic colitis      Past Surgical History:   Procedure Laterality Date    ABDOMINAL SURGERY      radio frequency ablation    BONE GRAFT Left 2018    CARPAL TUNNEL RELEASE Right     COLONOSCOPY      EGD AND COLONOSCOPY      ESOPHAGOGASTRODUODENOSCOPY N/A 2/15/2018    Procedure: ESOPHAGOGASTRODUODENOSCOPY (EGD); Surgeon: Angelica Jeffers MD;  Location: [facility];  Service: Gastroenterology    ESOPHAGOGASTRODUODENOSCOPY N/A 12/10/2018    Procedure: ESOPHAGOGASTRODUODENOSCOPY (EGD); Surgeon: Warren Lockhart MD;  Location: [facility];   Service: Gastroenterology    TONSILLECTOMY Social History   Social History     Substance and Sexual Activity   Alcohol Use Never    Frequency: Never    Comment: quit 3 years     Social History     Substance and Sexual Activity   Drug Use Yes    Frequency: 3 0 times per week    Types: Marijuana    Comment: last used      Social History     Tobacco Use   Smoking Status Former Smoker    Last attempt to quit: 2007    Years since quittin 8   Smokeless Tobacco Former User    Quit date: 1998     Family History   Problem Relation Age of Onset    Heart disease Father     Cancer Sister        Meds/Allergies       Current Outpatient Medications:     aspirin (ECOTRIN LOW STRENGTH) 81 mg EC tablet    atorvastatin (LIPITOR) 40 mg tablet    BUDESONIDE PO    buPROPion (WELLBUTRIN) 75 mg tablet    busPIRone (BUSPAR) 10 mg tablet    clonazePAM (KLONOPIN) 1 mg tablet    clopidogrel (PLAVIX) 75 mg tablet    nitroglycerin (NITROSTAT) 0 4 mg SL tablet    omeprazole (PriLOSEC) 20 mg delayed release capsule    ondansetron (ZOFRAN-ODT) 4 mg disintegrating tablet    oxyCODONE-acetaminophen (PERCOCET) 5-325 mg per tablet    PEG 3350-KCl-NaCl-NaSulf-Na Asc-C (MOVIPREP) 100 g    prochlorperazine (COMPAZINE) 10 mg tablet    sertraline (ZOLOFT) 100 mg tablet    budesonide (ENTOCORT EC) 3 MG capsule    dicyclomine (BENTYL) 20 mg tablet    Allergies   Allergen Reactions    No Active Allergies            Objective     Blood pressure 130/78, pulse 84, height 5' 9 5" (1 765 m), weight 96 7 kg (213 lb 3 2 oz)  Body mass index is 31 03 kg/m²  PHYSICAL EXAM:      General Appearance:   Alert, cooperative, no distress   HEENT:   Normocephalic, atraumatic, anicteric      Neck:  Supple, symmetrical, trachea midline   Lungs:   Clear to auscultation bilaterally; no rales, rhonchi or wheezing; respirations unlabored    Heart[de-identified]   Regular rate and rhythm; no murmur, rub, or gallop     Abdomen:   Soft, non-tender, non-distended; normal bowel sounds; no masses, no organomegaly    Genitalia:   Deferred    Rectal:   Deferred    Extremities:  No cyanosis, clubbing or edema    Pulses:  2+ and symmetric    Skin:  No jaundice, rashes, or lesions    Lymph nodes:  No palpable cervical lymphadenopathy        Lab Results:   No visits with results within 1 Day(s) from this visit     Latest known visit with results is:   Admission on 10/03/2019, Discharged on 10/03/2019   Component Date Value    Sodium 10/03/2019 142     Potassium 10/03/2019 3 6     Chloride 10/03/2019 103     CO2 10/03/2019 25     ANION GAP 10/03/2019 14*    BUN 10/03/2019 18     Creatinine 10/03/2019 1 04     Glucose 10/03/2019 182*    Calcium 10/03/2019 9 3     eGFR 10/03/2019 81     Protime 10/03/2019 13 0     INR 10/03/2019 0 98     PTT 10/03/2019 31     WBC 10/03/2019 13 53*    RBC 10/03/2019 5 16     Hemoglobin 10/03/2019 15 4     Hematocrit 10/03/2019 46 3     MCV 10/03/2019 90     MCH 10/03/2019 29 8     MCHC 10/03/2019 33 3     RDW 10/03/2019 12 7     MPV 10/03/2019 10 2     Platelets 17/30/1443 337     nRBC 10/03/2019 0     Neutrophils Relative 10/03/2019 91*    Immat GRANS % 10/03/2019 1     Lymphocytes Relative 10/03/2019 6*    Monocytes Relative 10/03/2019 2*    Eosinophils Relative 10/03/2019 0     Basophils Relative 10/03/2019 0     Neutrophils Absolute 10/03/2019 12 33*    Immature Grans Absolute 10/03/2019 0 09     Lymphocytes Absolute 10/03/2019 0 78     Monocytes Absolute 10/03/2019 0 29     Eosinophils Absolute 10/03/2019 0 02     Basophils Absolute 10/03/2019 0 02     Total Bilirubin 10/03/2019 0 20     Bilirubin, Direct 10/03/2019 0 09     Alkaline Phosphatase 10/03/2019 71     AST 10/03/2019 12     ALT 10/03/2019 20     Total Protein 10/03/2019 8 2     Albumin 10/03/2019 4 2     LACTIC ACID 10/03/2019 0 9     Troponin I 10/03/2019 <0 02     Lipase 10/03/2019 106     Ventricular Rate 10/03/2019 45     Atrial Rate 10/03/2019 45     NC Interval 10/03/2019 176     QRSD Interval 10/03/2019 92     QT Interval 10/03/2019 478     QTC Interval 10/03/2019 413     P Axis 10/03/2019 46     QRS Axis 10/03/2019 22     T Wave [place] 10/03/2019 28          Radiology Results:   No results found

## 2019-11-15 NOTE — PATIENT INSTRUCTIONS
Colitis   WHAT YOU NEED TO KNOW:   Colitis is swelling and irritation of your colon  Colitis may be caused by ulcers or a problem with your immune system  Bacteria, a virus, or a parasite may also cause colitis  The cause may not be known  You may have diarrhea, abdominal pain, fever, or blood or mucus in your bowel movement  DISCHARGE INSTRUCTIONS:   Return to the emergency department if:   · You have sudden trouble breathing  · Your bowel movements are black or have blood in them  · You have blood in your vomit  · You have severe abdominal pain or your abdomen is swollen and feels hard  · You have any of the following signs of dehydration:     ¨ Dizziness or weakness    ¨ Dry mouth, cracked lips, or severe thirst    ¨ Fast heartbeat or breathing    ¨ Urinating very little or not at all  Contact your healthcare provider if:   · Your symptoms get worse or do not go away  · You have a fever, chills, cough, or feel weak and achy  · You suddenly lose weight without trying  · You have questions or concerns about your condition or care  Medicines:   · Medicines  may be given to decrease inflammation in your colon and treat diarrhea  · Take your medicine as directed  Contact your healthcare provider if you think your medicine is not helping or if you have side effects  Tell him of her if you are allergic to any medicine  Keep a list of the medicines, vitamins, and herbs you take  Include the amounts, and when and why you take them  Bring the list or the pill bottles to follow-up visits  Carry your medicine list with you in case of an emergency  Manage your symptoms:   · Drink liquids as directed  to help prevent dehydration  Good liquids to drink include water, juice, and broth  Ask how much liquid to drink each day  You may need to drink an oral rehydration solution (ORS)  An ORS contains a balance of water, salt, and sugar to replace body fluids lost during diarrhea       · Eat a variety of healthy foods  Healthy foods include fruits, vegetables, whole-grain breads, beans, low-fat dairy products, lean meats, and fish  You may need to eat several small meals throughout the day instead of large meals  Avoid spicy foods, caffeine, chocolate, and foods high in fat  · Talk to your healthcare provider before you take NSAIDs  NSAIDs can cause worsen your symptoms if ulcers are causing your colitis  · Start to exercise when you feel better  Regular exercise helps your bowels work normally  Ask about the best exercise plan for you  Follow up with your healthcare provider as directed: You may need to return for a colonoscopy or other tests  Write down how often you have a bowel movements and what they look like  Bring this to your follow-up visits  Write down your questions so you remember to ask them during your visits  © 2017 [employer] Information is for End User's use only and may not be sold, redistributed or otherwise used for commercial purposes  All illustrations and images included in CareNotes® are the copyrighted property of [employer]  or Mihir [employer]  The above information is an  only  It is not intended as medical advice for individual conditions or treatments  Talk to your doctor, nurse or pharmacist before following any medical regimen to see if it is safe and effective for you

## 2019-11-21 ENCOUNTER — OFFICE VISIT (OUTPATIENT)
Dept: OBGYN CLINIC | Facility: MEDICAL CENTER | Age: 54
End: 2019-11-21
Payer: MEDICARE

## 2019-11-21 ENCOUNTER — APPOINTMENT (OUTPATIENT)
Dept: RADIOLOGY | Facility: MEDICAL CENTER | Age: 54
End: 2019-11-21
Payer: MEDICARE

## 2019-11-21 VITALS
HEIGHT: 70 IN | WEIGHT: 214 LBS | BODY MASS INDEX: 30.64 KG/M2 | HEART RATE: 65 BPM | SYSTOLIC BLOOD PRESSURE: 122 MMHG | DIASTOLIC BLOOD PRESSURE: 77 MMHG

## 2019-11-21 DIAGNOSIS — M18.11 ARTHRITIS OF CARPOMETACARPAL (CMC) JOINT OF RIGHT THUMB: ICD-10-CM

## 2019-11-21 DIAGNOSIS — M18.11 ARTHRITIS OF CARPOMETACARPAL (CMC) JOINT OF RIGHT THUMB: Primary | ICD-10-CM

## 2019-11-21 PROCEDURE — 20600 DRAIN/INJ JOINT/BURSA W/O US: CPT | Performed by: ORTHOPAEDIC SURGERY

## 2019-11-21 PROCEDURE — 99204 OFFICE O/P NEW MOD 45 MIN: CPT | Performed by: ORTHOPAEDIC SURGERY

## 2019-11-21 PROCEDURE — 73140 X-RAY EXAM OF FINGER(S): CPT

## 2019-11-21 RX ORDER — BETAMETHASONE SODIUM PHOSPHATE AND BETAMETHASONE ACETATE 3; 3 MG/ML; MG/ML
3 INJECTION, SUSPENSION INTRA-ARTICULAR; INTRALESIONAL; INTRAMUSCULAR; SOFT TISSUE
Status: COMPLETED | OUTPATIENT
Start: 2019-11-21 | End: 2019-11-21

## 2019-11-21 RX ORDER — LIDOCAINE HYDROCHLORIDE 10 MG/ML
0.5 INJECTION, SOLUTION INFILTRATION; PERINEURAL
Status: COMPLETED | OUTPATIENT
Start: 2019-11-21 | End: 2019-11-21

## 2019-11-21 RX ADMIN — LIDOCAINE HYDROCHLORIDE 0.5 ML: 10 INJECTION, SOLUTION INFILTRATION; PERINEURAL at 10:48

## 2019-11-21 RX ADMIN — BETAMETHASONE SODIUM PHOSPHATE AND BETAMETHASONE ACETATE 3 MG: 3; 3 INJECTION, SUSPENSION INTRA-ARTICULAR; INTRALESIONAL; INTRAMUSCULAR; SOFT TISSUE at 10:48

## 2019-11-21 NOTE — PROGRESS NOTES
Chief Complaint     Right thumb pain      History of the Present Illness     Aristides Levy is a 53 y o  male who is right-hand-dominant who presents with right thumb pain  He notes is been going on for many years  He had contralateral thumb pain that felt the same and was treated at the [place] with injections and then a thumb fusion at presumably the carpometacarpal joint  He notes that the pain in his right wrist is at the base of the thumb  It is worse with gripping  He has never had an injection or any surgery on this thumb  He has had a carpal tunnel release on the right side  He has minimal residual numbness and tingling  Past Medical History:   Diagnosis Date    Diaz's esophagus     Colon polyp     Coronary artery disease     Depression     Diverticulitis     GERD (gastroesophageal reflux disease)     Hemorrhoid     History of heart artery stent     Hyperlipidemia     Hypertension     Microscopic colitis        Past Surgical History:   Procedure Laterality Date    ABDOMINAL SURGERY      radio frequency ablation    BONE GRAFT Left 2018    CARPAL TUNNEL RELEASE Right     COLONOSCOPY      EGD AND COLONOSCOPY      ESOPHAGOGASTRODUODENOSCOPY N/A 2/15/2018    Procedure: ESOPHAGOGASTRODUODENOSCOPY (EGD); Surgeon: Nya Ferreira MD;  [facility];  Service: Gastroenterology    ESOPHAGOGASTRODUODENOSCOPY N/A 12/10/2018    Procedure: ESOPHAGOGASTRODUODENOSCOPY (EGD); Surgeon: Monty Nolasco MD;  [facility];   Service: Gastroenterology    TONSILLECTOMY         Allergies   Allergen Reactions    No Active Allergies        Current Outpatient Medications on File Prior to Visit   Medication Sig Dispense Refill    aspirin (ECOTRIN LOW STRENGTH) 81 mg EC tablet Take 81 mg by mouth daily      atorvastatin (LIPITOR) 40 mg tablet Take 1 tablet (40 mg total) by mouth daily 90 tablet 3    budesonide (ENTOCORT EC) 3 MG capsule Take 1 capsule (3 mg total) by mouth daily 3 po daily for 1 month, 2 po daily for 2 weeks, 1 po daily for 2 weeks  90 capsule 3    BUDESONIDE PO Take 3 mg by mouth 2 (two) times a day      buPROPion (WELLBUTRIN) 75 mg tablet Take 75 mg by mouth daily       busPIRone (BUSPAR) 10 mg tablet buspirone 10 mg tablet      clonazePAM (KLONOPIN) 1 mg tablet Take 1 5 mg by mouth daily at bedtime       clopidogrel (PLAVIX) 75 mg tablet Take 1 tablet (75 mg total) by mouth daily 90 tablet 3    dicyclomine (BENTYL) 20 mg tablet Take 1 tablet (20 mg total) by mouth every 6 (six) hours 60 tablet 3    nitroglycerin (NITROSTAT) 0 4 mg SL tablet Place 1 tablet (0 4 mg total) under the tongue every 5 (five) minutes as needed for chest pain 30 tablet 3    omeprazole (PriLOSEC) 20 mg delayed release capsule Take 20 mg by mouth daily      ondansetron (ZOFRAN-ODT) 4 mg disintegrating tablet Take 1 tablet (4 mg total) by mouth every 8 (eight) hours as needed for nausea or vomiting 60 tablet 3    oxyCODONE-acetaminophen (PERCOCET) 5-325 mg per tablet oxycodone-acetaminophen 5 mg-325 mg tablet      PEG 3350-KCl-NaCl-NaSulf-Na Asc-C (MOVIPREP) 100 g MoviPrep 100 gram-7 5 gram-2 691 gram oral powder packet      prochlorperazine (COMPAZINE) 10 mg tablet Take 1 tablet (10 mg total) by mouth 2 (two) times a day as needed for nausea or vomiting 10 tablet 1    sertraline (ZOLOFT) 100 mg tablet Take 200 mg by mouth daily       No current facility-administered medications on file prior to visit          Social History     Tobacco Use    Smoking status: Former Smoker     Last attempt to quit: 2007     Years since quittin 8    Smokeless tobacco: Former User     Quit date: 1998   Substance Use Topics    Alcohol use: Never     Frequency: Never     Comment: quit 3 years    Drug use: Yes     Frequency: 3 0 times per week     Types: Marijuana     Comment: last used        Family History   Problem Relation Age of Onset    Heart disease Father     Cancer Sister Review of Systems     As stated in the HPI  All other systems were reviewed and are negative  Physical Exam     /77   Pulse 65   Ht 5' 10" (1 778 m)   Wt 97 1 kg (214 lb)   BMI 30 71 kg/m²     GENERAL: This is a well-developed, well-nourished, age-appropriate patient in no acute distress  The patient is alert and oriented x3  Pleasant and cooperative  Eyes: Anicteric sclerae  Extraocular movements appear intact  HENT: Nares are patent with no drainage  Lungs: There is equal chest rise on inspection  Breathing is non-labored with no audible wheezing  Cardiovascular: No cyanosis  No upper extremity lymphadema  Skin: Skin is warm to touch  No obvious skin lesions or rashes other than described below  Neurologic: No ataxia  Psychiatric: Mood and affect are appropriate  Examination of right upper extremity reveals tenderness at the thumb [facility] joint  Positive grind and adduction test   Sensation intact in the radial ulnar aspect of the thumb  No tenderness at the MCP joint A1 pulley or IP joint  Decreased range of motion with circumduction and opposition  Brisk capillary refill  Data Review     Results Reviewed     None             Imaging: Three-view imaging of the right thumb taken in the office and personally reviewed by me today shows evidence of carpometacarpal joint arthritis    Assessment and Plan      Diagnoses and all orders for this visit:    Arthritis of carpometacarpal (CMC) joint of right thumb  -     XR wrist 3+ vw right; Future             54-year-old male with CMC arthritis of the right thumb  I have discussed the natural history of thumb carpometacarpal arthritis as well as the treatment options  We discussed that arthritis in the thumb can be treated similarly to how arthritis is treated in many parts of the body with conservative management and then surgical intervention if the nonsurgical options do not succeeded bringing sustained pain relief and good function  The nonsurgical options include oral anti-inflammatory medication, braces, hand therapy, and injectable medications such as corticosteroid  These can all be trialed and repeated multiple times  We have also discussed the surgical management of thumb carpometacarpal arthritis which include thumb carpometacarpal joint fusion or arthroplasty using LRTI or suspensionplasty techniques  We discussed that since he had a fusion on the left side that if he were to ever wants surgical intervention that likely a fusion on the right would also be performed  We could also perform an LR TI our suspension plasty as well and those are viable options for 53 an over male  We did discuss maximizing conservative management  We placed him into with [employer] today and proceed with the injection of the [facility]  Risks of the injection including pain, infection, tendon rupture, progression of arthritis, fat atrophy, depigmentation, and worsening of symptoms were all discussed with the patient  There was good understanding by the patient and they wish to proceed  Follow Up:  P r n      To Do Next Visit:     PROCEDURES PERFORMED:  Small joint arthrocentesis: R thumb CMC  Date/Time: 11/21/2019 10:48 AM  Consent given by: patient  Site marked: site marked  Timeout: Immediately prior to procedure a time out was called to verify the correct patient, procedure, equipment, support staff and site/side marked as required   Supporting Documentation  Indications: pain   Procedure Details  Location: thumb - R thumb CMC  Preparation: Patient was prepped and draped in the usual sterile fashion  Needle size: 25 G  Medications administered: 0 5 mL lidocaine 1 %; 3 mg betamethasone acetate-betamethasone sodium phosphate 6 (3-3) mg/mL    Patient tolerance: patient tolerated the procedure well with no immediate complications  Dressing:  Sterile dressing applied

## 2019-11-22 ENCOUNTER — APPOINTMENT (EMERGENCY)
Dept: RADIOLOGY | Facility: HOSPITAL | Age: 54
DRG: 313 | End: 2019-11-22
Payer: MEDICARE

## 2019-11-22 ENCOUNTER — HOSPITAL ENCOUNTER (INPATIENT)
Facility: HOSPITAL | Age: 54
LOS: 1 days | Discharge: LEFT BEFORE DISCHARGE | DRG: 313 | End: 2019-11-23
Attending: EMERGENCY MEDICINE | Admitting: STUDENT IN AN ORGANIZED HEALTH CARE EDUCATION/TRAINING PROGRAM
Payer: MEDICARE

## 2019-11-22 ENCOUNTER — TELEPHONE (OUTPATIENT)
Dept: CARDIOLOGY CLINIC | Facility: CLINIC | Age: 54
End: 2019-11-22

## 2019-11-22 DIAGNOSIS — R07.9 CHEST PAIN, UNSPECIFIED TYPE: ICD-10-CM

## 2019-11-22 DIAGNOSIS — R07.89 OTHER CHEST PAIN: Primary | ICD-10-CM

## 2019-11-22 DIAGNOSIS — I25.10 CORONARY ARTERY DISEASE: ICD-10-CM

## 2019-11-22 LAB
ALBUMIN SERPL BCP-MCNC: 3.9 G/DL (ref 3.5–5)
ALP SERPL-CCNC: 59 U/L (ref 46–116)
ALT SERPL W P-5'-P-CCNC: 20 U/L (ref 12–78)
ANION GAP SERPL CALCULATED.3IONS-SCNC: 13 MMOL/L (ref 4–13)
AST SERPL W P-5'-P-CCNC: 12 U/L (ref 5–45)
BASOPHILS # BLD AUTO: 0.03 THOUSANDS/ΜL (ref 0–0.1)
BASOPHILS NFR BLD AUTO: 0 % (ref 0–1)
BILIRUB SERPL-MCNC: 0.3 MG/DL (ref 0.2–1)
BUN SERPL-MCNC: 12 MG/DL (ref 5–25)
CALCIUM SERPL-MCNC: 9 MG/DL (ref 8.3–10.1)
CHLORIDE SERPL-SCNC: 106 MMOL/L (ref 100–108)
CO2 SERPL-SCNC: 24 MMOL/L (ref 21–32)
CREAT SERPL-MCNC: 0.93 MG/DL (ref 0.6–1.3)
EOSINOPHIL # BLD AUTO: 0.09 THOUSAND/ΜL (ref 0–0.61)
EOSINOPHIL NFR BLD AUTO: 1 % (ref 0–6)
ERYTHROCYTE [DISTWIDTH] IN BLOOD BY AUTOMATED COUNT: 12.9 % (ref 11.6–15.1)
GFR SERPL CREATININE-BSD FRML MDRD: 92 ML/MIN/1.73SQ M
GLUCOSE SERPL-MCNC: 93 MG/DL (ref 65–140)
HCT VFR BLD AUTO: 41.3 % (ref 36.5–49.3)
HGB BLD-MCNC: 13.6 G/DL (ref 12–17)
IMM GRANULOCYTES # BLD AUTO: 0.06 THOUSAND/UL (ref 0–0.2)
IMM GRANULOCYTES NFR BLD AUTO: 0 % (ref 0–2)
LYMPHOCYTES # BLD AUTO: 2.71 THOUSANDS/ΜL (ref 0.6–4.47)
LYMPHOCYTES NFR BLD AUTO: 20 % (ref 14–44)
MCH RBC QN AUTO: 29.3 PG (ref 26.8–34.3)
MCHC RBC AUTO-ENTMCNC: 32.9 G/DL (ref 31.4–37.4)
MCV RBC AUTO: 89 FL (ref 82–98)
MONOCYTES # BLD AUTO: 0.62 THOUSAND/ΜL (ref 0.17–1.22)
MONOCYTES NFR BLD AUTO: 5 % (ref 4–12)
NEUTROPHILS # BLD AUTO: 9.85 THOUSANDS/ΜL (ref 1.85–7.62)
NEUTS SEG NFR BLD AUTO: 74 % (ref 43–75)
NRBC BLD AUTO-RTO: 0 /100 WBCS
PLATELET # BLD AUTO: 258 THOUSANDS/UL (ref 149–390)
PLATELET # BLD AUTO: 311 THOUSANDS/UL (ref 149–390)
PMV BLD AUTO: 9.6 FL (ref 8.9–12.7)
PMV BLD AUTO: 9.7 FL (ref 8.9–12.7)
POTASSIUM SERPL-SCNC: 3.5 MMOL/L (ref 3.5–5.3)
PROT SERPL-MCNC: 7.3 G/DL (ref 6.4–8.2)
RBC # BLD AUTO: 4.64 MILLION/UL (ref 3.88–5.62)
SODIUM SERPL-SCNC: 143 MMOL/L (ref 136–145)
TROPONIN I SERPL-MCNC: <0.02 NG/ML
TROPONIN I SERPL-MCNC: <0.02 NG/ML
WBC # BLD AUTO: 13.36 THOUSAND/UL (ref 4.31–10.16)

## 2019-11-22 PROCEDURE — 99285 EMERGENCY DEPT VISIT HI MDM: CPT | Performed by: EMERGENCY MEDICINE

## 2019-11-22 PROCEDURE — 36415 COLL VENOUS BLD VENIPUNCTURE: CPT | Performed by: EMERGENCY MEDICINE

## 2019-11-22 PROCEDURE — 99220 PR INITIAL OBSERVATION CARE/DAY 70 MINUTES: CPT | Performed by: INTERNAL MEDICINE

## 2019-11-22 PROCEDURE — 80053 COMPREHEN METABOLIC PANEL: CPT | Performed by: EMERGENCY MEDICINE

## 2019-11-22 PROCEDURE — 93005 ELECTROCARDIOGRAM TRACING: CPT

## 2019-11-22 PROCEDURE — 85025 COMPLETE CBC W/AUTO DIFF WBC: CPT | Performed by: EMERGENCY MEDICINE

## 2019-11-22 PROCEDURE — 85049 AUTOMATED PLATELET COUNT: CPT | Performed by: INTERNAL MEDICINE

## 2019-11-22 PROCEDURE — 84484 ASSAY OF TROPONIN QUANT: CPT | Performed by: EMERGENCY MEDICINE

## 2019-11-22 PROCEDURE — 84484 ASSAY OF TROPONIN QUANT: CPT | Performed by: INTERNAL MEDICINE

## 2019-11-22 PROCEDURE — 99285 EMERGENCY DEPT VISIT HI MDM: CPT

## 2019-11-22 PROCEDURE — 71045 X-RAY EXAM CHEST 1 VIEW: CPT

## 2019-11-22 RX ORDER — ONDANSETRON 2 MG/ML
4 INJECTION INTRAMUSCULAR; INTRAVENOUS EVERY 6 HOURS PRN
Status: DISCONTINUED | OUTPATIENT
Start: 2019-11-22 | End: 2019-11-23 | Stop reason: HOSPADM

## 2019-11-22 RX ORDER — DICYCLOMINE HCL 20 MG
20 TABLET ORAL EVERY 6 HOURS
Status: DISCONTINUED | OUTPATIENT
Start: 2019-11-22 | End: 2019-11-23 | Stop reason: HOSPADM

## 2019-11-22 RX ORDER — BUSPIRONE HYDROCHLORIDE 5 MG/1
10 TABLET ORAL DAILY
Status: DISCONTINUED | OUTPATIENT
Start: 2019-11-23 | End: 2019-11-23 | Stop reason: HOSPADM

## 2019-11-22 RX ORDER — BUDESONIDE 3 MG/1
9 CAPSULE, COATED PELLETS ORAL DAILY
Status: DISCONTINUED | OUTPATIENT
Start: 2019-11-23 | End: 2019-11-23 | Stop reason: HOSPADM

## 2019-11-22 RX ORDER — ATORVASTATIN CALCIUM 40 MG/1
40 TABLET, FILM COATED ORAL DAILY
Status: DISCONTINUED | OUTPATIENT
Start: 2019-11-23 | End: 2019-11-23 | Stop reason: HOSPADM

## 2019-11-22 RX ORDER — ACETAMINOPHEN 325 MG/1
650 TABLET ORAL EVERY 6 HOURS PRN
Status: DISCONTINUED | OUTPATIENT
Start: 2019-11-23 | End: 2019-11-23 | Stop reason: HOSPADM

## 2019-11-22 RX ORDER — NITROGLYCERIN 0.4 MG/1
0.4 TABLET SUBLINGUAL
Status: DISCONTINUED | OUTPATIENT
Start: 2019-11-22 | End: 2019-11-23 | Stop reason: HOSPADM

## 2019-11-22 RX ORDER — PANTOPRAZOLE SODIUM 40 MG/1
40 TABLET, DELAYED RELEASE ORAL
Status: DISCONTINUED | OUTPATIENT
Start: 2019-11-23 | End: 2019-11-23 | Stop reason: HOSPADM

## 2019-11-22 RX ORDER — CLOPIDOGREL BISULFATE 75 MG/1
75 TABLET ORAL DAILY
Status: DISCONTINUED | OUTPATIENT
Start: 2019-11-23 | End: 2019-11-23 | Stop reason: HOSPADM

## 2019-11-22 RX ORDER — ACETAMINOPHEN 325 MG/1
650 TABLET ORAL ONCE
Status: COMPLETED | OUTPATIENT
Start: 2019-11-22 | End: 2019-11-22

## 2019-11-22 RX ORDER — PREDNISONE 20 MG/1
20 TABLET ORAL ONCE
Status: COMPLETED | OUTPATIENT
Start: 2019-11-22 | End: 2019-11-22

## 2019-11-22 RX ORDER — ASPIRIN 81 MG/1
243 TABLET, CHEWABLE ORAL ONCE
Status: COMPLETED | OUTPATIENT
Start: 2019-11-22 | End: 2019-11-22

## 2019-11-22 RX ORDER — BUPROPION HYDROCHLORIDE 75 MG/1
75 TABLET ORAL DAILY
Status: DISCONTINUED | OUTPATIENT
Start: 2019-11-23 | End: 2019-11-23 | Stop reason: HOSPADM

## 2019-11-22 RX ORDER — ASPIRIN 81 MG/1
324 TABLET ORAL DAILY
Status: DISCONTINUED | OUTPATIENT
Start: 2019-11-23 | End: 2019-11-23 | Stop reason: HOSPADM

## 2019-11-22 RX ADMIN — ACETAMINOPHEN 650 MG: 325 TABLET, FILM COATED ORAL at 19:14

## 2019-11-22 RX ADMIN — PREDNISONE 20 MG: 20 TABLET ORAL at 21:48

## 2019-11-22 RX ADMIN — CLONAZEPAM 1.5 MG: 1 TABLET ORAL at 21:48

## 2019-11-22 RX ADMIN — ASPIRIN 81 MG 243 MG: 81 TABLET ORAL at 15:58

## 2019-11-22 NOTE — TELEPHONE ENCOUNTER
Pt called with right sided chest pain and headache  He informed me has did take nitro at 1:10p,1:25p and 1:43 with no relief  Patient Denies SOB, Jaw Pain, etc  Pt verbally understood he is to report to the ER if symptoms continue or worsen  He said he would call back in 10 mnts to follow up with me if he has not had any relief by then and will report to the ER  Kylei, Pt did say he had a cortisone shot on his right hand yesterday

## 2019-11-22 NOTE — TELEPHONE ENCOUNTER
Pt called & said that Dr Luly Alonzo had put 2 stents in him & he's currently having chest px on the right side & took 2 nitro's & hasnt felt any different   Spoke to Willian Gusman & she said to transfer the call to her

## 2019-11-22 NOTE — ED PROVIDER NOTES
Pt Name: Max Hearn  MRN: [id number]  [place] 1965  Age/Sex: 53 y o  male  Date of evaluation: 11/22/2019  PCP: Jae Shine MD    [address] [place] [address]    Chief Complaint   Patient presents with    Chest Pain     Patient c/o chest pain that he started around 1 pm today  HPI    53 y o  male presenting with chest pain  Patient complains of a moderate to severe pressure in the right upper chest, similar to his pain prior to previous stents, unchanged with pressing on the area or stretching, possibly worse with walking or exerting himself, unrelieved by nitroglycerin  Patient has taken 1 baby of so far today  He states he communicated with Dr Rudi Lua of Cardiology who told him to go to the ER for evaluation  He denies trauma, fever, shortness of breath nausea vomiting diarrhea, other symptoms  HPI      Past Medical and Surgical History    Past Medical History:   Diagnosis Date    Diaz's esophagus     Colon polyp     Coronary artery disease     Depression     Diverticulitis     GERD (gastroesophageal reflux disease)     Hemorrhoid     History of heart artery stent     Hyperlipidemia     Hypertension     Microscopic colitis        Past Surgical History:   Procedure Laterality Date    ABDOMINAL SURGERY      radio frequency ablation    BONE GRAFT Left 2018    CARPAL TUNNEL RELEASE Right     COLONOSCOPY      EGD AND COLONOSCOPY      ESOPHAGOGASTRODUODENOSCOPY N/A 2/15/2018    Procedure: ESOPHAGOGASTRODUODENOSCOPY (EGD); Surgeon: Ham Linder MD;  Location: [facility];  Service: Gastroenterology    ESOPHAGOGASTRODUODENOSCOPY N/A 12/10/2018    Procedure: ESOPHAGOGASTRODUODENOSCOPY (EGD); Surgeon: Margarita Landaverde MD;  Location: [facility];   Service: Gastroenterology    TONSILLECTOMY         Family History   Problem Relation Age of Onset    Heart disease Father     Cancer Sister        Social History     Tobacco Use    Smoking status: Former Smoker     Last attempt to quit: 2007     Years since quittin 8    Smokeless tobacco: Former User     Quit date: 1998   Substance Use Topics    Alcohol use: Never     Frequency: Never     Comment: quit 3 years    Drug use: Yes     Frequency: 3 0 times per week     Types: Marijuana     Comment: last used            Allergies    Allergies   Allergen Reactions    No Active Allergies        Home Medications    Prior to Admission medications    Medication Sig Start Date End Date Taking? Authorizing Provider   aspirin (ECOTRIN LOW STRENGTH) 81 mg EC tablet Take 81 mg by mouth daily    Historical Provider, MD   atorvastatin (LIPITOR) 40 mg tablet Take 1 tablet (40 mg total) by mouth daily 19   Cherelle Moura DO   budesonide (ENTOCORT EC) 3 MG capsule Take 1 capsule (3 mg total) by mouth daily 3 po daily for 1 month, 2 po daily for 2 weeks, 1 po daily for 2 weeks   11/15/19   Gayatri Bar PA-C   BUDESONIDE PO Take 3 mg by mouth 2 (two) times a day    Historical Provider, MD   buPROPion [facility]) 75 mg tablet Take 75 mg by mouth daily     Historical Provider, MD   busPIRone (BUSPAR) 10 mg tablet buspirone 10 mg tablet 14   Historical Provider, MD   clonazePAM (KLONOPIN) 1 mg tablet Take 1 5 mg by mouth daily at bedtime     Historical Provider, MD   clopidogrel (PLAVIX) 75 mg tablet Take 1 tablet (75 mg total) by mouth daily 19   Cherelle Moura DO   dicyclomine (BENTYL) 20 mg tablet Take 1 tablet (20 mg total) by mouth every 6 (six) hours 11/15/19   Brissa Arreguin PA-C   nitroglycerin (NITROSTAT) 0 4 mg SL tablet Place 1 tablet (0 4 mg total) under the tongue every 5 (five) minutes as needed for chest pain 19   Cherelle Moura DO   omeprazole (PriLOSEC) 20 mg delayed release capsule Take 20 mg by mouth daily    Historical Provider, MD   ondansetron (ZOFRAN-ODT) 4 mg disintegrating tablet Take 1 tablet (4 mg total) by mouth every 8 (eight) hours as needed for nausea or vomiting 11/15/19   Brissa Arreguin PA-C   oxyCODONE-acetaminophen (PERCOCET) 5-325 mg per tablet oxycodone-acetaminophen 5 mg-325 mg tablet    Historical Provider, MD   PEG 3350-KCl-NaCl-NaSulf-Na Asc-C (MOVIPREP) 100 g MoviPrep 100 gram-7 5 gram-2 691 gram oral powder packet    Historical Provider, MD   prochlorperazine (COMPAZINE) 10 mg tablet Take 1 tablet (10 mg total) by mouth 2 (two) times a day as needed for nausea or vomiting 10/3/19   Iggy Russ MD   sertraline (ZOLOFT) 100 mg tablet Take 200 mg by mouth daily    Historical Provider, MD           Review of Systems    Review of Systems   Constitutional: Negative for appetite change, chills and diaphoresis  HENT: Negative for drooling, facial swelling, trouble swallowing and voice change  Respiratory: Negative for apnea, shortness of breath and wheezing  Cardiovascular: Positive for chest pain  Negative for leg swelling  Gastrointestinal: Negative for abdominal distention, abdominal pain, diarrhea, nausea and vomiting  Genitourinary: Negative for dysuria and urgency  Musculoskeletal: Negative for arthralgias, back pain, gait problem and neck pain  Skin: Negative for color change, rash and wound  Neurological: Negative for seizures, speech difficulty, weakness and headaches  Psychiatric/Behavioral: Negative for agitation, behavioral problems and dysphoric mood  The patient is not nervous/anxious  All other systems reviewed and negative  Physical Exam      ED Triage Vitals   Temperature Pulse Respirations Blood Pressure SpO2   11/22/19 1900 11/22/19 1442 11/22/19 1442 11/22/19 1442 11/22/19 1442   98 5 °F (36 9 °C) 74 18 136/65 97 %      Temp Source Heart Rate Source Patient Position - Orthostatic VS BP Location FiO2 (%)   11/22/19 1900 11/22/19 1442 11/22/19 1442 11/22/19 1442 --   Oral Monitor Sitting Left arm       Pain Score       11/22/19 1700       No Pain               Physical Exam   Constitutional: He is oriented to person, place, and time   He appears well-developed and well-nourished  HENT:   Head: Normocephalic and atraumatic  Nose: Nose normal    Mouth/Throat: Oropharynx is clear and moist    Eyes: Pupils are equal, round, and reactive to light  Conjunctivae and EOM are normal    Neck: Normal range of motion  Neck supple  No tracheal deviation present  Cardiovascular: Normal rate, regular rhythm, normal heart sounds and intact distal pulses  No murmur heard  Pulmonary/Chest: Effort normal and breath sounds normal  No stridor  No respiratory distress  He has no wheezes  He has no rales  Abdominal: Soft  He exhibits no distension  There is no tenderness  There is no rebound and no guarding  Musculoskeletal: Normal range of motion  He exhibits no edema or deformity  Neurological: He is alert and oriented to person, place, and time  Skin: Skin is warm and dry  No rash noted  Psychiatric: He has a normal mood and affect  His behavior is normal  Judgment and thought content normal    Nursing note and vitals reviewed  Diagnostic Results  EKG Interpretation    Rate:  71  BPM  Rhythm:  Normal sinus rhythm   Axis:  Normal   Intervals: Normal, no blocks, QTc  428 ms  Q waves:  Normal   T waves:  Normal   ST segments:  No significant elevations or depressions     Impression:  Normal sinus rhythm without evidence of acute ischemia      EKG for comparison:  No substantial changes from EKG dated 3 October 2019    EKG interpreted by me         Labs:    Results Reviewed     Procedure Component Value Units Date/Time    Troponin I [[id number]]  (Normal) Collected:  11/22/19 1549    Lab Status:  Final result Specimen:  Blood from Arm, Right Updated:  11/22/19 1622     Troponin I <0 02 ng/mL     Comprehensive metabolic panel [[id number]] Collected:  11/22/19 1549    Lab Status:  Final result Specimen:  Blood from Arm, Right Updated:  11/22/19 1618     Sodium 143 mmol/L      Potassium 3 5 mmol/L      Chloride 106 mmol/L      CO2 24 mmol/L ANION GAP 13 mmol/L      BUN 12 mg/dL      Creatinine 0 93 mg/dL      Glucose 93 mg/dL      Calcium 9 0 mg/dL      AST 12 U/L      ALT 20 U/L      Alkaline Phosphatase 59 U/L      Total Protein 7 3 g/dL      Albumin 3 9 g/dL      Total Bilirubin 0 30 mg/dL      eGFR 92 ml/min/1 73sq m     Narrative:       National Kidney Disease Foundation guidelines for Chronic Kidney Disease (CKD):     Stage 1 with normal or high GFR (GFR > 90 mL/min/1 73 square meters)    Stage 2 Mild CKD (GFR = 60-89 mL/min/1 73 square meters)    Stage 3A Moderate CKD (GFR = 45-59 mL/min/1 73 square meters)    Stage 3B Moderate CKD (GFR = 30-44 mL/min/1 73 square meters)    Stage 4 Severe CKD (GFR = 15-29 mL/min/1 73 square meters)    Stage 5 End Stage CKD (GFR <15 mL/min/1 73 square meters)  Note: GFR calculation is accurate only with a steady state creatinine    CBC and differential [[id number]]  (Abnormal) Collected:  11/22/19 1547    Lab Status:  Final result Specimen:  Blood from Arm, Right Updated:  11/22/19 6511     WBC 13 36 Thousand/uL      RBC 4 64 Million/uL      Hemoglobin 13 6 g/dL      Hematocrit 41 3 %      MCV 89 fL      MCH 29 3 pg      MCHC 32 9 g/dL      RDW 12 9 %      MPV 9 6 fL      Platelets 450 Thousands/uL      nRBC 0 /100 WBCs      Neutrophils Relative 74 %      Immat GRANS % 0 %      Lymphocytes Relative 20 %      Monocytes Relative 5 %      Eosinophils Relative 1 %      Basophils Relative 0 %      Neutrophils Absolute 9 85 Thousands/µL      Immature Grans Absolute 0 06 Thousand/uL      Lymphocytes Absolute 2 71 Thousands/µL      Monocytes Absolute 0 62 Thousand/µL      Eosinophils Absolute 0 09 Thousand/µL      Basophils Absolute 0 03 Thousands/µL           All labs reviewed and utilized in the medical decision making process    Radiology:    XR chest 1 view portable   ED Interpretation   No acute Disease      Final Result      Mild left lower lobe subsegmental atelectasis              Workstation performed: [id number]             All radiology studies independently viewed by me and interpreted by the radiologist     Procedure    Procedures        ED Course of Care and Re-Assessments    Patient given 243 mg of aspirin as he had already taken 180 mg aspirin today  Medications   aspirin (ECOTRIN LOW STRENGTH) EC tablet 324 mg (has no administration in time range)   atorvastatin (LIPITOR) tablet 40 mg (has no administration in time range)   buPROPion (WELLBUTRIN) tablet 75 mg (has no administration in time range)   clonazePAM (KlonoPIN) tablet 1 5 mg (has no administration in time range)   clopidogrel (PLAVIX) tablet 75 mg (has no administration in time range)   dicyclomine (BENTYL) tablet 20 mg (has no administration in time range)   nitroglycerin (NITROSTAT) SL tablet 0 4 mg (has no administration in time range)   pantoprazole (PROTONIX) EC tablet 40 mg (has no administration in time range)   sertraline (ZOLOFT) tablet 200 mg (has no administration in time range)   budesonide (ENTOCORT EC) capsule 9 mg (has no administration in time range)   busPIRone (BUSPAR) tablet 10 mg (has no administration in time range)   ondansetron (ZOFRAN) injection 4 mg (has no administration in time range)   enoxaparin (LOVENOX) subcutaneous injection 40 mg (has no administration in time range)   acetaminophen (TYLENOL) tablet 650 mg (has no administration in time range)   morphine injection 2 mg (has no administration in time range)   aspirin chewable tablet 243 mg (243 mg Oral Given 11/22/19 1558)   acetaminophen (TYLENOL) tablet 650 mg (650 mg Oral Given 11/22/19 1914)           FINAL IMPRESSION    Final diagnoses:   Other chest pain         DISPOSITION/PLAN    Chest pain similar index chest pain prior stenting as above  Vital signs and examination overall reassuring, EKG not acutely ischemic    Workup overall reassuring emergency department but given risk factors and concerning history of chest pain, heart score 5, admitted for cardiac observation, hemodynamically stable and comfortable at that time  Time reflects when diagnosis was documented in both MDM as applicable and the Disposition within this note     Time User Action Codes Description Comment    11/22/2019  5:27 PM Mone Dawn Add [R07 89] Other chest pain     11/22/2019  7:04 PM Zenaida Chapin Add [R07 9] Chest pain, unspecified type     11/22/2019  7:04 PM Zenaida Chapin B Add [I25 10] Coronary artery disease       ED Disposition     ED Disposition Condition Date/Time Comment    Admit Stable Fri Nov 22, 2019  5:27 PM Case was discussed with BRITTANY and the patient's admission status was agreed to be Admission Status: observation status to the service of Dr Niurka Mendez   Follow-up Information    None           PATIENT REFERRED TO:    No follow-up provider specified  DISCHARGE MEDICATIONS:    Current Discharge Medication List      CONTINUE these medications which have NOT CHANGED    Details   aspirin (ECOTRIN LOW STRENGTH) 81 mg EC tablet Take 81 mg by mouth daily      atorvastatin (LIPITOR) 40 mg tablet Take 1 tablet (40 mg total) by mouth daily  Qty: 90 tablet, Refills: 3    Associated Diagnoses: Angina pectoris ([address] )      ! ! budesonide (ENTOCORT EC) 3 MG capsule Take 1 capsule (3 mg total) by mouth daily 3 po daily for 1 month, 2 po daily for 2 weeks, 1 po daily for 2 weeks  Qty: 90 capsule, Refills: 3    Associated Diagnoses: Microscopic colitis, unspecified microscopic colitis type      !!  BUDESONIDE PO Take 3 mg by mouth 2 (two) times a day      buPROPion (WELLBUTRIN) 75 mg tablet Take 75 mg by mouth daily       busPIRone (BUSPAR) 10 mg tablet buspirone 10 mg tablet      clonazePAM (KLONOPIN) 1 mg tablet Take 1 5 mg by mouth daily at bedtime       clopidogrel (PLAVIX) 75 mg tablet Take 1 tablet (75 mg total) by mouth daily  Qty: 90 tablet, Refills: 3    Associated Diagnoses: Coronary artery disease of native artery of native heart with stable angina pectoris ([address] )      dicyclomine (BENTYL) 20 mg tablet Take 1 tablet (20 mg total) by mouth every 6 (six) hours  Qty: 60 tablet, Refills: 3    Associated Diagnoses: Microscopic colitis, unspecified microscopic colitis type      nitroglycerin (NITROSTAT) 0 4 mg SL tablet Place 1 tablet (0 4 mg total) under the tongue every 5 (five) minutes as needed for chest pain  Qty: 30 tablet, Refills: 3    Associated Diagnoses: Angina pectoris (HCC)      omeprazole (PriLOSEC) 20 mg delayed release capsule Take 20 mg by mouth daily      ondansetron (ZOFRAN-ODT) 4 mg disintegrating tablet Take 1 tablet (4 mg total) by mouth every 8 (eight) hours as needed for nausea or vomiting  Qty: 60 tablet, Refills: 3    Associated Diagnoses: Microscopic colitis, unspecified microscopic colitis type      oxyCODONE-acetaminophen (PERCOCET) 5-325 mg per tablet oxycodone-acetaminophen 5 mg-325 mg tablet      PEG 3350-KCl-NaCl-NaSulf-Na Asc-C (MOVIPREP) 100 g MoviPrep 100 gram-7 5 gram-2 691 gram oral powder packet      prochlorperazine (COMPAZINE) 10 mg tablet Take 1 tablet (10 mg total) by mouth 2 (two) times a day as needed for nausea or vomiting  Qty: 10 tablet, Refills: 1    Associated Diagnoses: Nausea & vomiting; Enteritis      sertraline (ZOLOFT) 100 mg tablet Take 200 mg by mouth daily       ! ! - Potential duplicate medications found  Please discuss with provider  No discharge procedures on file           MD Shiv Mukherjee MD  11/22/19 2026

## 2019-11-23 LAB
ANION GAP SERPL CALCULATED.3IONS-SCNC: 11 MMOL/L (ref 4–13)
ATRIAL RATE: 71 BPM
BASOPHILS # BLD AUTO: 0.02 THOUSANDS/ΜL (ref 0–0.1)
BASOPHILS NFR BLD AUTO: 0 % (ref 0–1)
BUN SERPL-MCNC: 10 MG/DL (ref 5–25)
CALCIUM SERPL-MCNC: 8.8 MG/DL (ref 8.3–10.1)
CHLORIDE SERPL-SCNC: 108 MMOL/L (ref 100–108)
CHOLEST SERPL-MCNC: 158 MG/DL (ref 50–200)
CO2 SERPL-SCNC: 25 MMOL/L (ref 21–32)
CREAT SERPL-MCNC: 0.88 MG/DL (ref 0.6–1.3)
EOSINOPHIL # BLD AUTO: 0.01 THOUSAND/ΜL (ref 0–0.61)
EOSINOPHIL NFR BLD AUTO: 0 % (ref 0–6)
ERYTHROCYTE [DISTWIDTH] IN BLOOD BY AUTOMATED COUNT: 12.9 % (ref 11.6–15.1)
GFR SERPL CREATININE-BSD FRML MDRD: 97 ML/MIN/1.73SQ M
GLUCOSE P FAST SERPL-MCNC: 113 MG/DL (ref 65–99)
GLUCOSE SERPL-MCNC: 113 MG/DL (ref 65–140)
HCT VFR BLD AUTO: 41.1 % (ref 36.5–49.3)
HDLC SERPL-MCNC: 51 MG/DL
HGB BLD-MCNC: 13.3 G/DL (ref 12–17)
IMM GRANULOCYTES # BLD AUTO: 0.02 THOUSAND/UL (ref 0–0.2)
IMM GRANULOCYTES NFR BLD AUTO: 0 % (ref 0–2)
LDLC SERPL CALC-MCNC: 88 MG/DL (ref 0–100)
LYMPHOCYTES # BLD AUTO: 0.82 THOUSANDS/ΜL (ref 0.6–4.47)
LYMPHOCYTES NFR BLD AUTO: 13 % (ref 14–44)
MCH RBC QN AUTO: 29.1 PG (ref 26.8–34.3)
MCHC RBC AUTO-ENTMCNC: 32.4 G/DL (ref 31.4–37.4)
MCV RBC AUTO: 90 FL (ref 82–98)
MONOCYTES # BLD AUTO: 0.27 THOUSAND/ΜL (ref 0.17–1.22)
MONOCYTES NFR BLD AUTO: 4 % (ref 4–12)
NEUTROPHILS # BLD AUTO: 5.3 THOUSANDS/ΜL (ref 1.85–7.62)
NEUTS SEG NFR BLD AUTO: 83 % (ref 43–75)
NONHDLC SERPL-MCNC: 107 MG/DL
NRBC BLD AUTO-RTO: 0 /100 WBCS
P AXIS: 70 DEGREES
PLATELET # BLD AUTO: 268 THOUSANDS/UL (ref 149–390)
PMV BLD AUTO: 9.6 FL (ref 8.9–12.7)
POTASSIUM SERPL-SCNC: 3.9 MMOL/L (ref 3.5–5.3)
PR INTERVAL: 184 MS
QRS AXIS: 7 DEGREES
QRSD INTERVAL: 96 MS
QT INTERVAL: 394 MS
QTC INTERVAL: 428 MS
RBC # BLD AUTO: 4.57 MILLION/UL (ref 3.88–5.62)
SODIUM SERPL-SCNC: 144 MMOL/L (ref 136–145)
T WAVE AXIS: 64 DEGREES
TRIGL SERPL-MCNC: 94 MG/DL
TROPONIN I SERPL-MCNC: <0.02 NG/ML
VENTRICULAR RATE: 71 BPM
WBC # BLD AUTO: 6.44 THOUSAND/UL (ref 4.31–10.16)

## 2019-11-23 PROCEDURE — 84484 ASSAY OF TROPONIN QUANT: CPT | Performed by: INTERNAL MEDICINE

## 2019-11-23 PROCEDURE — 80061 LIPID PANEL: CPT | Performed by: INTERNAL MEDICINE

## 2019-11-23 PROCEDURE — 93010 ELECTROCARDIOGRAM REPORT: CPT | Performed by: INTERNAL MEDICINE

## 2019-11-23 PROCEDURE — 99222 1ST HOSP IP/OBS MODERATE 55: CPT | Performed by: INTERNAL MEDICINE

## 2019-11-23 PROCEDURE — 85025 COMPLETE CBC W/AUTO DIFF WBC: CPT | Performed by: INTERNAL MEDICINE

## 2019-11-23 PROCEDURE — 80048 BASIC METABOLIC PNL TOTAL CA: CPT | Performed by: INTERNAL MEDICINE

## 2019-11-23 PROCEDURE — 99232 SBSQ HOSP IP/OBS MODERATE 35: CPT | Performed by: STUDENT IN AN ORGANIZED HEALTH CARE EDUCATION/TRAINING PROGRAM

## 2019-11-23 RX ADMIN — BUSPIRONE HYDROCHLORIDE 10 MG: 5 TABLET ORAL at 10:01

## 2019-11-23 RX ADMIN — PANTOPRAZOLE SODIUM 40 MG: 40 TABLET, DELAYED RELEASE ORAL at 05:21

## 2019-11-23 RX ADMIN — ASPIRIN 324 MG: 81 TABLET, COATED ORAL at 10:01

## 2019-11-23 RX ADMIN — ENOXAPARIN SODIUM 40 MG: 40 INJECTION SUBCUTANEOUS at 10:02

## 2019-11-23 RX ADMIN — SERTRALINE HYDROCHLORIDE 200 MG: 50 TABLET ORAL at 10:01

## 2019-11-23 RX ADMIN — ATORVASTATIN CALCIUM 40 MG: 40 TABLET, FILM COATED ORAL at 10:01

## 2019-11-23 RX ADMIN — CLOPIDOGREL BISULFATE 75 MG: 75 TABLET ORAL at 10:01

## 2019-11-23 RX ADMIN — BUPROPION HYDROCHLORIDE 75 MG: 75 TABLET, FILM COATED ORAL at 10:02

## 2019-11-23 NOTE — ASSESSMENT & PLAN NOTE
· Normal blood pressures  · Patient not being maintained on any blood pressure medication  · For possible maintenance blood pressure medication, if blood pressures are persistently high

## 2019-11-23 NOTE — PLAN OF CARE
Problem: PAIN - ADULT  Goal: Verbalizes/displays adequate comfort level or baseline comfort level  Description  Interventions:  - Encourage patient to monitor pain and request assistance  - Assess pain using appropriate pain scale  - Administer analgesics based on type and severity of pain and evaluate response  - Implement non-pharmacological measures as appropriate and evaluate response  - Consider cultural and social influences on pain and pain management  - Notify physician/advanced practitioner if interventions unsuccessful or patient reports new pain  Outcome: Progressing     Problem: SAFETY ADULT  Goal: Patient will remain free of falls  Description  INTERVENTIONS:  - Assess patient frequently for physical needs  -  Identify cognitive and physical deficits and behaviors that affect risk of falls    -  [place] fall precautions as indicated by assessment   - Educate patient/family on patient safety including physical limitations  - Instruct patient to call for assistance with activity based on assessment  - Modify environment to reduce risk of injury  - Consider OT/PT consult to assist with strengthening/mobility  Outcome: Progressing  Goal: Maintain or return to baseline ADL function  Description  INTERVENTIONS:  -  Assess patient's ability to carry out ADLs; assess patient's baseline for ADL function and identify physical deficits which impact ability to perform ADLs (bathing, care of mouth/teeth, toileting, grooming, dressing, etc )  - Assess/evaluate cause of self-care deficits   - Assess range of motion  - Assess patient's mobility; develop plan if impaired  - Assess patient's need for assistive devices and provide as appropriate  - Encourage maximum independence but intervene and supervise when necessary  - Involve family in performance of ADLs  - Assess for home care needs following discharge   - Consider OT consult to assist with ADL evaluation and planning for discharge  - Provide patient education as appropriate  Outcome: Progressing  Goal: Maintain or return mobility status to optimal level  Description  INTERVENTIONS:  - Assess patient's baseline mobility status (ambulation, transfers, stairs, etc )    - Identify cognitive and physical deficits and behaviors that affect mobility  - Identify mobility aids required to assist with transfers and/or ambulation (gait belt, sit-to-stand, lift, walker, cane, etc )  - [place] fall precautions as indicated by assessment  - Record patient progress and toleration of activity level on Mobility SBAR; progress patient to next Phase/Stage  - Instruct patient to call for assistance with activity based on assessment  - Consider rehabilitation consult to assist with strengthening/weightbearing, etc   Outcome: Progressing     Problem: DISCHARGE PLANNING  Goal: Discharge to home or other facility with appropriate resources  Description  INTERVENTIONS:  - Identify barriers to discharge w/patient and caregiver  - Arrange for needed discharge resources and transportation as appropriate  - Identify discharge learning needs (meds, wound care, etc )  - Arrange for interpretive services to assist at discharge as needed  - Refer to Case Management Department for coordinating discharge planning if the patient needs post-hospital services based on physician/advanced practitioner order or complex needs related to functional status, cognitive ability, or social support system  Outcome: Progressing     Problem: Knowledge Deficit  Goal: Patient/family/caregiver demonstrates understanding of disease process, treatment plan, medications, and discharge instructions  Description  Complete learning assessment and assess knowledge base    Interventions:  - Provide teaching at level of understanding  - Provide teaching via preferred learning methods  Outcome: Progressing     Problem: CARDIOVASCULAR - ADULT  Goal: Maintains optimal cardiac output and hemodynamic stability  Description  INTERVENTIONS:  - Monitor I/O, vital signs and rhythm  - Monitor for S/S and trends of decreased cardiac output  - Administer and titrate ordered vasoactive medications to optimize hemodynamic stability  - Assess quality of pulses, skin color and temperature  - Assess for signs of decreased coronary artery perfusion  - Instruct patient to report change in severity of symptoms  Outcome: Progressing  Goal: Absence of cardiac dysrhythmias or at baseline rhythm  Description  INTERVENTIONS:  - Continuous cardiac monitoring, vital signs, obtain 12 lead EKG if ordered  - Administer antiarrhythmic and heart rate control medications as ordered  - Monitor electrolytes and administer replacement therapy as ordered  Outcome: Progressing     Problem: HEMATOLOGIC - ADULT  Goal: Maintains hematologic stability  Description  INTERVENTIONS  - Assess for signs and symptoms of bleeding or hemorrhage  - Monitor labs  - Administer supportive blood products/factors as ordered and appropriate  Outcome: Progressing     Problem: Potential for Falls  Goal: Patient will remain free of falls  Description  INTERVENTIONS:  - Assess patient frequently for physical needs  -  Identify cognitive and physical deficits and behaviors that affect risk of falls    -  [place] fall precautions as indicated by assessment   - Educate patient/family on patient safety including physical limitations  - Instruct patient to call for assistance with activity based on assessment  - Modify environment to reduce risk of injury  - Consider OT/PT consult to assist with strengthening/mobility  Outcome: Progressing

## 2019-11-23 NOTE — UTILIZATION REVIEW
Initial Clinical Review    Admission: Date/Time/Statement: OBS  11/22 1727 converted to IP on 11/23 @ 1440    Admitting Physician Erick Russo    Level of Care Med Surg    Estimated length of stay More than 2 Midnights    Certification I certify that inpatient services are medically necessary for this patient for a duration of greater than two midnights  See H&P and MD Progress Notes for additional information about the patient's course of treatment  ED Arrival Information     Expected Arrival Acuity Means of Arrival Escorted By Service Admission Type    - 11/22/2019 14:36 Emergent Walk-In [facility] General Medicine Emergency    Arrival Complaint    Chest pain        Chief Complaint   Patient presents with    Chest Pain     Patient c/o chest pain that he started around 1 pm today  Assessment/Plan: 53 y o  male who presents to the emergency room [facility] [place] with complaints of chest pains  Medical history significant for coronary artery disease, status post drug-eluting stent in the RCA, March of this year, dyslipidemia, hypertension  According to the patient, he started having chest pains earlier today  Patient described the pain to be like somebody poking him on the chest with a billiard stick  Patient localized the pain to be on his right anterior chest area  In fact, patient alejandra a sharpie where the exact area of the pain is  Patient claims that the pain was on and off and no predilection with rest or with exertion/ambulation  Patient claims having occasional shortness of breath  Patient claims that he felt cold when he had the pain  Presently, patient claims of some headaches  Patient told me that the chest pain that he experienced today, is the same pain that he had last March when a cardiac catheterization and a drug-eluting stent was done and placed    Patient admits to occasional on and off abdominal pains and diarrhea, which he has chronically due to his ulcerative colitis and recently was diagnosed with microscopic colitis  He told me he is on budesonide and presently at 9 mg once a day  Patient denies any other symptoms other the ones mentioned above    * Chest pain  Assessment & Plan  · Rule out acute coronary syndrome  · Patient came in with right-sided anterior chest pain, which he claims the same pain that he had when he had the cardiac catheterization and drug-eluting stent to the RCA last March of this year  Patient described the pain to be like somebody poking him on the chest with a billiard stick  Patient demarcated the area with a sharpie  This was associated with some shortness of breath  Patient called his cardiologist and he was sent here by his cardiologist, Dr Espinoza Truong  · TREVON score of 4   · First troponin is negative  Continue with 2 more troponins  · EKG did not reveal any ischemic changes  · Continue cardiovascular medications  · Continue aspirin full dose for now  Continue Plavix  · Nitroglycerin p r n     · Oxygen p r n  Unk Kay · Cardiology consult with Dr Espinoza Truong  · Pain control    Coronary artery disease  Assessment & Plan  · Status post drug-eluting stent to the RCA, March of this year  · Continue cardiovascular medications  · Please see plans under coronary artery disease    Atelectasis  Assessment & Plan  · Patient has history of atelectasis even before  · Incentive spirometry  · Oxygen p r n      Microscopic colitis  Assessment & Plan  · Patient has history of colitis, both microscopic as well as ulcerative colitis  · Patient has history of chronic diarrhea  According to him, he is still having diarrhea  · Continue patient's budesonide  According to him, presently he is on 9 mg once a day of the budesonide  · Outpatient follow-up with gastroenterologist/primary care physician    Leukocytosis  Assessment & Plan  · Based on my review of patient's previous CBCs, chronic    · Possibly due to the fact that patient takes oral budesonide  · Monitor  · For further workup and management if this worsens significantly    Dyslipidemia  Assessment & Plan  · Continue statin medication  · Check lipid profile if not done recently    Essential hypertension  Assessment & Plan  · Normal blood pressures  · Patient not being maintained on any blood pressure medication  · For possible maintenance blood pressure medication, if blood pressures are persistently high   VTE Prophylaxis: Enoxaparin (Lovenox)  / sequential compression device   Code Status:  Full code  POLST: POLST form is not discussed and not completed at this time    Anticipated Length of Stay:  Patient will be admitted on an Observation basis with an anticipated length of stay of  less than 2 midnights  Justification for Hospital Stay:  Due to the above findings and plans  11/23 Cardiology consult   1  Chest pain  -right-sided chest pain  -troponins less than 0 02 x 3  -given patient's history and risk factors, exercise stress echocardiogram to be performed tomorrow  -continue aspirin, statin and Plavix  -no beta-blocker secondary to intolerance due to bradycardia, cannot tolerate Imdur either secondary to feeling sluggish and short of breath  -telemetry monitor  -cardiac diet   2  CAD S/P KARLA x2 in 04/2019  -continue aspirin, statin and Plavix  -no beta-blocker secondary to intolerance due to bradycardia, cannot tolerate Imdur either secondary to feeling sluggish and short of breath  -telemetry monitor  -cardiac diet   3  Hypertension, currently controlled  -continue to monitor blood pressures   4   Hyperlipidemia  -continue statin        ED Triage Vitals   Temperature Pulse Respirations Blood Pressure SpO2   11/22/19 1900 11/22/19 1442 11/22/19 1442 11/22/19 1442 11/22/19 1442   98 5 °F (36 9 °C) 74 18 136/65 97 %      Temp Source Heart Rate Source Patient Position - Orthostatic VS BP Location FiO2 (%)   11/22/19 1900 11/22/19 1442 11/22/19 1442 11/22/19 1442 --   Oral Monitor Sitting Left arm       Pain Score       11/22/19 1700       No Pain        Wt Readings from Last 1 Encounters:   11/22/19 95 kg (209 lb 7 oz)     Additional Vital Signs:   11/23/19 06:56:04  97 8 °F (36 6 °C)  --  16  126/73  91  --  --  --   11/23/19 03:21:49  97 8 °F (36 6 °C)  --  --  110/60  77  --  --  --   11/23/19 00:07:39  97 8 °F (36 6 °C)  --  --  125/100  108  --  --  --   11/22/19 2016  98 4 °F (36 9 °C)  70  18  120/67  --  --  None (Room air)  Lying   11/22/19 2000  --  66  24Abnormal   125/73  --  97 %  None (Room air)  Sitting   11/22/19 1900  98 5 °F (36 9 °C)  62  16  133/72  --  95 %  None (Room air)  Lying   11/22/19 1730  --  64  19  131/81  --  96 %  None (Room air)  Lying   11/22/19 1700  --  65  22  131/76  --  96 %  None (Room air)  Lying   11/22/19 1600  --  69  18  111/80  --  96 %  None (Room air         Pertinent Labs/Diagnostic Test Results:   11/22 PCXR Mild left lower lobe subsegmental atelectasis    Results from last 7 days   Lab Units 11/23/19  0452 11/22/19  2107 11/22/19  1549   WBC Thousand/uL 6 44  --  13 36*   HEMOGLOBIN g/dL 13 3  --  13 6   HEMATOCRIT % 41 1  --  41 3   PLATELETS Thousands/uL 268 258 311   NEUTROS ABS Thousands/µL 5 30  --  9 85*         Results from last 7 days   Lab Units 11/23/19  0452 11/22/19  1549   SODIUM mmol/L 144 143   POTASSIUM mmol/L 3 9 3 5   CHLORIDE mmol/L 108 106   CO2 mmol/L 25 24   ANION GAP mmol/L 11 13   BUN mg/dL 10 12   CREATININE mg/dL 0 88 0 93   EGFR ml/min/1 73sq m 97 92   CALCIUM mg/dL 8 8 9 0     Results from last 7 days   Lab Units 11/22/19  1549   AST U/L 12   ALT U/L 20   ALK PHOS U/L 59   TOTAL PROTEIN g/dL 7 3   ALBUMIN g/dL 3 9   TOTAL BILIRUBIN mg/dL 0 30     Results from last 7 days   Lab Units 11/23/19  0452 11/22/19  1549   GLUCOSE RANDOM mg/dL 113 93     Results from last 7 days   Lab Units 11/23/19  0452 11/22/19  2107 11/22/19  1549   TROPONIN I ng/mL <0 02 <0 02 <0 02     ED Treatment:   Medication Administration from 11/22/2019 1436 to 11/22/2019 2013       Date/Time Order Dose Route Action Action by Comments     11/22/2019 1558 aspirin chewable tablet 243 mg 243 mg Oral Given Karen Rosa RN      11/22/2019 1914 acetaminophen (TYLENOL) tablet 650 mg 650 mg Oral Given Janeth Osorio RN         Past Medical History:   Diagnosis Date    Diaz's esophagus     Colon polyp     Coronary artery disease     Depression     Diverticulitis     GERD (gastroesophageal reflux disease)     Hemorrhoid     History of heart artery stent     Hyperlipidemia     Hypertension     Microscopic colitis      Present on Admission:   Chest pain   Essential hypertension   Dyslipidemia   Microscopic colitis   Coronary artery disease   Leukocytosis   Atelectasis      Admitting Diagnosis: Other chest pain [R07 89]  Coronary artery disease [I25 10]  Chest pain [R07 9]  Chest pain, unspecified type [R07 9]  Age/Sex: 53 y o  male  Admission Orders:  Scheduled Medications:    Medications:  aspirin 324 mg Oral Daily   atorvastatin 40 mg Oral Daily   budesonide 9 mg Oral Daily   buPROPion 75 mg Oral Daily   busPIRone 10 mg Oral Daily   clonazePAM 1 5 mg Oral HS   clopidogrel 75 mg Oral Daily   dicyclomine 20 mg Oral Q6H   enoxaparin 40 mg Subcutaneous Daily   pantoprazole 40 mg Oral Early Morning   sertraline 200 mg Oral Daily     Continuous IV Infusions:     PRN Meds:    acetaminophen 650 mg Oral Q6H PRN   morphine injection 2 mg Intravenous Q4H PRN   nitroglycerin 0 4 mg Sublingual Q5 Min PRN   ondansetron 4 mg Intravenous Q6H PRN     Tele   Cardiac diet       IP CONSULT TO CARDIOLOGY    Network Utilization Review Department  [email or web address]  ATTENTION: Please call with any questions or concerns to [phone] and carefully listen to the prompts so that you are directed to the right person   All voicemails are confidential   Janae Menjivar all requests for admission clinical reviews, approved or denied determinations and any other requests to dedicated fax number below belonging to the campus where the patient is receiving treatment    FACILITY NAME UR FAX NUMBER   ADMISSION DENIALS (Administrative/Medical Necessity) [address] [facility] (Maternity/NICU/Pediatrics) [phone]   [facility] [address] [facility] 300 [facility] [phone]   [address] [facility] [address] [facility] [phone]   Forestine Bitter [address] [facility] [address] [place] [address] [phone]

## 2019-11-23 NOTE — PROGRESS NOTES
At approximately [phone] this RN entered room 211 to find patient back in bed resting  This RN acknowledged that the patient was back he replied "better to ask for forgiveness than permission" I informed patient that we will need to get him back to the emergency room to reregister- to which he replied "If I have to go to the ED it will be good (explicit) bye  This RN spoke to hospital supervisor, patient access, registration, David Richards 20 and provider to readmit patient  Due to elopement, patient required readmission to ED  Provider relayed this information to patient who became aggressive and exited the hospital [place]  Hospital security notified and escorted patient

## 2019-11-23 NOTE — ASSESSMENT & PLAN NOTE
· Status post drug-eluting stent to the RCA, March of this year  · Continue cardiovascular medications

## 2019-11-23 NOTE — ASSESSMENT & PLAN NOTE
· Patient has history of atelectasis even before  · Incentive spirometry  · Oxygen p r n  Ricardo Brunner

## 2019-11-23 NOTE — H&P
H&P- Missy Stairs 1965, 53 y o  male MRN: [id number]    Unit/Bed#: ED 08 Encounter: [id number]    Primary Care Provider: Vernell Savage MD   Date and time admitted to hospital: 11/22/2019  3:24 PM        * Chest pain  Assessment & Plan  · Rule out acute coronary syndrome  · Patient came in with right-sided anterior chest pain, which he claims the same pain that he had when he had the cardiac catheterization and drug-eluting stent to the RCA last March of this year  Patient described the pain to be like somebody poking him on the chest with a billiard stick  Patient demarcated the area with a sharpie  This was associated with some shortness of breath  Patient called his cardiologist and he was sent here by his cardiologist, Dr Hao Anguiano  · TREVON score of 4   · First troponin is negative  Continue with 2 more troponins  · EKG did not reveal any ischemic changes  · Continue cardiovascular medications  · Continue aspirin full dose for now  Continue Plavix  · Nitroglycerin p r n     · Oxygen p r n  Aida Galeano · Cardiology consult with Dr Hao Anguiano  · Pain control  Coronary artery disease  Assessment & Plan  · Status post drug-eluting stent to the RCA, March of this year  · Continue cardiovascular medications  · Please see plans under coronary artery disease  Atelectasis  Assessment & Plan  · Patient has history of atelectasis even before  · Incentive spirometry  · Oxygen p r n       Microscopic colitis  Assessment & Plan  · Patient has history of colitis, both microscopic as well as ulcerative colitis  · Patient has history of chronic diarrhea  According to him, he is still having diarrhea  · Continue patient's budesonide  According to him, presently he is on 9 mg once a day of the budesonide  · Outpatient follow-up with gastroenterologist/primary care physician  Leukocytosis  Assessment & Plan  · Based on my review of patient's previous CBCs, chronic    · Possibly due to the fact that patient takes oral budesonide  · Monitor  · For further workup and management if this worsens significantly  Dyslipidemia  Assessment & Plan  · Continue statin medication  · Check lipid profile if not done recently  Essential hypertension  Assessment & Plan  · Normal blood pressures  · Patient not being maintained on any blood pressure medication  · For possible maintenance blood pressure medication, if blood pressures are persistently high  VTE Prophylaxis: Enoxaparin (Lovenox)  / sequential compression device   Code Status:  Full code  POLST: POLST form is not discussed and not completed at this time  Anticipated Length of Stay:  Patient will be admitted on an Observation basis with an anticipated length of stay of  less than 2 midnights  Justification for Hospital Stay:  Due to the above findings and plans  Total Time for Visit, including Counseling / Coordination of Care: 1 hour  Greater than 50% of this total time spent on direct patient counseling and coordination of care  Chief Complaint:   Chest pain  History of Present Illness:    Luis Brantley is a 53 y o  male who presents to the emergency room [facility] with complaints of chest pains  Medical history significant for coronary artery disease, status post drug-eluting stent in the RCA, March of this year, dyslipidemia, hypertension  According to the patient, he started having chest pains earlier today  Patient described the pain to be like somebody poking him on the chest with a billiard stick  Patient localized the pain to be on his right anterior chest area  In fact, patient alejandra a sharpie where the exact area of the pain is  Patient claims that the pain was on and off and no predilection with rest or with exertion/ambulation  Patient claims having occasional shortness of breath  Patient claims that he felt cold when he had the pain  Presently, patient claims of some headaches    Patient told me that the chest pain that he experienced today, is the same pain that he had last March when a cardiac catheterization and a drug-eluting stent was done and placed  Patient admits to occasional on and off abdominal pains and diarrhea, which he has chronically due to his ulcerative colitis and recently was diagnosed with microscopic colitis  He told me he is on budesonide and presently at 9 mg once a day  Patient denies any other symptoms other the ones mentioned above  Review of Systems:    Review of Systems     Ten point review systems done and they were negative except for the ones I mentioned my history of present illness  Patient denies any dizziness or any loss of consciousness  Patient denies any fever or chills or any cough or colds  Patient denies any nausea, or any vomiting episodes  Patient denies any urinary symptoms  Past Medical and Surgical History:     Past Medical History:   Diagnosis Date    Diaz's esophagus     Colon polyp     Coronary artery disease     Depression     Diverticulitis     GERD (gastroesophageal reflux disease)     Hemorrhoid     History of heart artery stent     Hyperlipidemia     Hypertension     Microscopic colitis        Past Surgical History:   Procedure Laterality Date    ABDOMINAL SURGERY      radio frequency ablation    BONE GRAFT Left 2018    CARPAL TUNNEL RELEASE Right     COLONOSCOPY      EGD AND COLONOSCOPY      ESOPHAGOGASTRODUODENOSCOPY N/A 2/15/2018    Procedure: ESOPHAGOGASTRODUODENOSCOPY (EGD); Surgeon: Loulou Hilton MD;  Location: [facility];  Service: Gastroenterology    ESOPHAGOGASTRODUODENOSCOPY N/A 12/10/2018    Procedure: ESOPHAGOGASTRODUODENOSCOPY (EGD); Surgeon: Tawanda Arciniega MD;  Location: [facility]; Service: Gastroenterology    TONSILLECTOMY         Meds/Allergies:    Prior to Admission medications    Medication Sig Start Date End Date Taking?  Authorizing Provider   aspirin (ECOTRIN LOW STRENGTH) 81 mg EC tablet Take 81 mg by mouth daily    Historical Provider, MD   atorvastatin (LIPITOR) 40 mg tablet Take 1 tablet (40 mg total) by mouth daily 1/7/19   Dekkun Police, DO   budesonide (ENTOCORT EC) 3 MG capsule Take 1 capsule (3 mg total) by mouth daily 3 po daily for 1 month, 2 po daily for 2 weeks, 1 po daily for 2 weeks   11/15/19   Shaina Jiang PA-C   BUDESONIDE PO Take 3 mg by mouth 2 (two) times a day    Historical Provider, MD   buPROPion [facility]) 75 mg tablet Take 75 mg by mouth daily     Historical Provider, MD   busPIRone (BUSPAR) 10 mg tablet buspirone 10 mg tablet 12/30/14   Historical Provider, MD   clonazePAM (KLONOPIN) 1 mg tablet Take 1 5 mg by mouth daily at bedtime     Historical Provider, MD   clopidogrel (PLAVIX) 75 mg tablet Take 1 tablet (75 mg total) by mouth daily 4/17/19   Dekkun Police, DO   dicyclomine (BENTYL) 20 mg tablet Take 1 tablet (20 mg total) by mouth every 6 (six) hours 11/15/19   Shaina Jiang PA-C   nitroglycerin (NITROSTAT) 0 4 mg SL tablet Place 1 tablet (0 4 mg total) under the tongue every 5 (five) minutes as needed for chest pain 1/7/19   Dekkun Police, DO   omeprazole (PriLOSEC) 20 mg delayed release capsule Take 20 mg by mouth daily    Historical Provider, MD   ondansetron (ZOFRAN-ODT) 4 mg disintegrating tablet Take 1 tablet (4 mg total) by mouth every 8 (eight) hours as needed for nausea or vomiting 11/15/19   Shaina Jiang PA-C   oxyCODONE-acetaminophen (PERCOCET) 5-325 mg per tablet oxycodone-acetaminophen 5 mg-325 mg tablet    Historical Provider, MD   PEG 3350-KCl-NaCl-NaSulf-Na Asc-C (MOVIPREP) 100 g MoviPrep 100 gram-7 5 gram-2 691 gram oral powder packet    Historical Provider, MD   prochlorperazine (COMPAZINE) 10 mg tablet Take 1 tablet (10 mg total) by mouth 2 (two) times a day as needed for nausea or vomiting 10/3/19   Nicolás Min MD   sertraline (ZOLOFT) 100 mg tablet Take 200 mg by mouth daily    Historical Provider, MD     Medication list was reviewed  Allergies: Allergies   Allergen Reactions    No Active Allergies        Social History:     Marital Status: /Civil Union     Social History     Substance and Sexual Activity   Alcohol Use Never    Frequency: Never    Comment: quit 3 years     Social History     Tobacco Use   Smoking Status Former Smoker    Last attempt to quit: 2007    Years since quittin 8   Smokeless Tobacco Former User    Quit date: 1998     Social History     Substance and Sexual Activity   Drug Use Yes    Frequency: 3 0 times per week    Types: Marijuana    Comment: last used        Family History:    Family History   Problem Relation Age of Onset    Heart disease Father     Cancer Sister        Physical Exam:     Vitals:   Blood Pressure: 133/72 (19)  Pulse: 62 (19)  Temperature: 98 5 °F (36 9 °C) (19)  Temp Source: Oral (19)  Respirations: 16 (19)  Weight - Scale: 95 kg (209 lb 7 oz) (19)  SpO2: 95 % (19)    Physical Exam   Constitutional: He is oriented to person, place, and time  He appears well-developed and well-nourished  No distress  HENT:   Head: Normocephalic and atraumatic  Mouth/Throat: Oropharynx is clear and moist  No oropharyngeal exudate  Eyes: Conjunctivae are normal  Right eye exhibits no discharge  Left eye exhibits no discharge  No scleral icterus  Neck: No JVD present  No tracheal deviation present  No thyromegaly present  Cardiovascular: Normal rate, regular rhythm and normal heart sounds  Exam reveals no gallop and no friction rub  No murmur heard  Pulmonary/Chest: Effort normal and breath sounds normal  No stridor  No respiratory distress  He has no wheezes  He has no rales  He exhibits no tenderness  Abdominal: Soft  Bowel sounds are normal  He exhibits no distension and no mass  There is no tenderness  There is no rebound and no guarding     Musculoskeletal: He exhibits no edema, tenderness or deformity  Neurological: He is alert and oriented to person, place, and time  Skin: Skin is warm  No rash noted  He is not diaphoretic  No erythema  No pallor  Psychiatric: He has a normal mood and affect  His behavior is normal  Thought content normal    Vitals reviewed  Additional Data:     Lab Results: I have personally reviewed pertinent reports  Results from last 7 days   Lab Units 11/22/19  1549   WBC Thousand/uL 13 36*   HEMOGLOBIN g/dL 13 6   HEMATOCRIT % 41 3   PLATELETS Thousands/uL 311   NEUTROS PCT % 74   LYMPHS PCT % 20   MONOS PCT % 5   EOS PCT % 1     Results from last 7 days   Lab Units 11/22/19  1549   SODIUM mmol/L 143   POTASSIUM mmol/L 3 5   CHLORIDE mmol/L 106   CO2 mmol/L 24   BUN mg/dL 12   CREATININE mg/dL 0 93   ANION GAP mmol/L 13   CALCIUM mg/dL 9 0   ALBUMIN g/dL 3 9   TOTAL BILIRUBIN mg/dL 0 30   ALK PHOS U/L 59   ALT U/L 20   AST U/L 12   GLUCOSE RANDOM mg/dL 93                       Imaging: I have personally reviewed pertinent reports  XR chest 1 view portable   ED Interpretation by Maria Isabel Solano MD (11/22 1629)   No acute Disease      Final Result by Ricardo Ramsey MD (11/22 1655)      Mild left lower lobe subsegmental atelectasis  Workstation performed: [id number]             EKG, Pathology, and Other Studies Reviewed on Admission:   · EKG:  From my reading of patient's EKG:  Normal sinus rhythm at 71 per minute  Compared to previous EKG dated October 3, 2019, patient does not have any sinus bradycardia anymore (at that time, patient's heart rate was 45 per minute)  [facility]     ** Please Note: This note has been constructed using a voice recognition system   **

## 2019-11-23 NOTE — ED NOTES
1  CC: Chest Pain  Hx of stents    2  Admission related to injury?: No    3  Orientation status: A&O x4    4  Abnormal labs/ focused assessment/ vitals:  WBC: 13 36    5  Medications/ drips: n/a  See MAR    6  Narcotic time/ pain medications: n/a  See MAR    7  IV lines/drains/ etc :  20g R Arm    8  Isolation status: none    9  Skin: clean/dry/intact    10  Ambulation status: assist x1    11   ED phone number: 2020 [facility], [facility]  11/22/19 9494

## 2019-11-23 NOTE — ASSESSMENT & PLAN NOTE
· Patient has history of atelectasis even before  · Incentive spirometry  · Oxygen p r n  Jazmin Rodriguez

## 2019-11-23 NOTE — ASSESSMENT & PLAN NOTE
· Based on my review of patient's previous CBCs, chronic  · Possibly due to the fact that patient takes oral budesonide  · Monitor  · For further workup and management if this worsens significantly

## 2019-11-23 NOTE — PLAN OF CARE
Problem: PAIN - ADULT  Goal: Verbalizes/displays adequate comfort level or baseline comfort level  Description  Interventions:  - Encourage patient to monitor pain and request assistance  - Assess pain using appropriate pain scale  - Administer analgesics based on type and severity of pain and evaluate response  - Implement non-pharmacological measures as appropriate and evaluate response  - Consider cultural and social influences on pain and pain management  - Notify physician/advanced practitioner if interventions unsuccessful or patient reports new pain  Outcome: Progressing     Problem: SAFETY ADULT  Goal: Patient will remain free of falls  Description  INTERVENTIONS:  - Assess patient frequently for physical needs  -  Identify cognitive and physical deficits and behaviors that affect risk of falls    -  [place] fall precautions as indicated by assessment   - Educate patient/family on patient safety including physical limitations  - Instruct patient to call for assistance with activity based on assessment  - Modify environment to reduce risk of injury  - Consider OT/PT consult to assist with strengthening/mobility  Outcome: Progressing  Goal: Maintain or return to baseline ADL function  Description  INTERVENTIONS:  -  Assess patient's ability to carry out ADLs; assess patient's baseline for ADL function and identify physical deficits which impact ability to perform ADLs (bathing, care of mouth/teeth, toileting, grooming, dressing, etc )  - Assess/evaluate cause of self-care deficits   - Assess range of motion  - Assess patient's mobility; develop plan if impaired  - Assess patient's need for assistive devices and provide as appropriate  - Encourage maximum independence but intervene and supervise when necessary  - Involve family in performance of ADLs  - Assess for home care needs following discharge   - Consider OT consult to assist with ADL evaluation and planning for discharge  - Provide patient education as appropriate  Outcome: Progressing  Goal: Maintain or return mobility status to optimal level  Description  INTERVENTIONS:  - Assess patient's baseline mobility status (ambulation, transfers, stairs, etc )    - Identify cognitive and physical deficits and behaviors that affect mobility  - Identify mobility aids required to assist with transfers and/or ambulation (gait belt, sit-to-stand, lift, walker, cane, etc )  - [place] fall precautions as indicated by assessment  - Record patient progress and toleration of activity level on Mobility SBAR; progress patient to next Phase/Stage  - Instruct patient to call for assistance with activity based on assessment  - Consider rehabilitation consult to assist with strengthening/weightbearing, etc   Outcome: Progressing     Problem: DISCHARGE PLANNING  Goal: Discharge to home or other facility with appropriate resources  Description  INTERVENTIONS:  - Identify barriers to discharge w/patient and caregiver  - Arrange for needed discharge resources and transportation as appropriate  - Identify discharge learning needs (meds, wound care, etc )  - Arrange for interpretive services to assist at discharge as needed  - Refer to Case Management Department for coordinating discharge planning if the patient needs post-hospital services based on physician/advanced practitioner order or complex needs related to functional status, cognitive ability, or social support system  Outcome: Progressing     Problem: Knowledge Deficit  Goal: Patient/family/caregiver demonstrates understanding of disease process, treatment plan, medications, and discharge instructions  Description  Complete learning assessment and assess knowledge base    Interventions:  - Provide teaching at level of understanding  - Provide teaching via preferred learning methods  Outcome: Progressing     Problem: CARDIOVASCULAR - ADULT  Goal: Maintains optimal cardiac output and hemodynamic stability  Description  INTERVENTIONS:  - Monitor I/O, vital signs and rhythm  - Monitor for S/S and trends of decreased cardiac output  - Administer and titrate ordered vasoactive medications to optimize hemodynamic stability  - Assess quality of pulses, skin color and temperature  - Assess for signs of decreased coronary artery perfusion  - Instruct patient to report change in severity of symptoms  Outcome: Progressing  Goal: Absence of cardiac dysrhythmias or at baseline rhythm  Description  INTERVENTIONS:  - Continuous cardiac monitoring, vital signs, obtain 12 lead EKG if ordered  - Administer antiarrhythmic and heart rate control medications as ordered  - Monitor electrolytes and administer replacement therapy as ordered  Outcome: Progressing     Problem: HEMATOLOGIC - ADULT  Goal: Maintains hematologic stability  Description  INTERVENTIONS  - Assess for signs and symptoms of bleeding or hemorrhage  - Monitor labs  - Administer supportive blood products/factors as ordered and appropriate  Outcome: Progressing

## 2019-11-23 NOTE — PROGRESS NOTES
Upon entering room, pt was dressed and stated he is leaving to get his medication from home and that he will be back  This RN suggested a friend or family bring med in- pt said his family was unreliable,  requested to remove tele, IV and Masimo before pt leaves  Pt stated "lady no one is touching me" and proceeded to the elevator  At the elevator I asked to remove equipment once again , pt refused stating unless I physically stop him he is leaving  Security, provider and supervisor made aware  Addendum  7:41 AM  Ezequiel Etienne RN     Pt was ordered budesonide capsule with morning medication( which is not available in the hospital), pt was made aware on admission and by this RN during AM med pass, he stated that if he remains in the hospital he will ask a family member to bring it it

## 2019-11-23 NOTE — ASSESSMENT & PLAN NOTE
· Rule out acute coronary syndrome  · Patient came in with right-sided anterior chest pain, which he claims the same pain that he had when he had the cardiac catheterization and drug-eluting stent to the RCA last March of this year  Patient described the pain to be like somebody poking him on the chest with a billiard stick  Patient demarcated the area with a sharpie  This was associated with some shortness of breath  Patient called his cardiologist and he was sent here by his cardiologist, Dr Magdalena Mishra  · TREVON score of 4   · First troponin is negative  Continue with 2 more troponins  · EKG did not reveal any ischemic changes  · Continue cardiovascular medications  · Continue aspirin full dose for now  Continue Plavix  · Nitroglycerin p r n     · Oxygen p r n  [facility] · Cardiology consult with Dr Magdalena Mishra  · Pain control

## 2019-11-23 NOTE — PROGRESS NOTES
Progress Note [facility] 1965, 53 y o  male MRN: [id number]    Unit/Bed#: -01 Encounter: [id number]    Primary Care Provider: Connie Lambert MD   Date and time admitted to hospital: 11/22/2019  3:24 PM        Coronary artery disease  Assessment & Plan  · Status post drug-eluting stent to the RCA, March of this year  · Continue cardiovascular medications  Atelectasis  Assessment & Plan  · Patient has history of atelectasis even before  · Incentive spirometry  · Oxygen p r n       Microscopic colitis  Assessment & Plan  · Patient has history of colitis, both microscopic as well as ulcerative colitis  · Patient has history of chronic diarrhea  According to him, he is still having diarrhea  · Continue patient's budesonide  According to him, presently he is on 9 mg once a day of the budesonide  · Outpatient follow-up with gastroenterologist/primary care physician  Leukocytosis  Assessment & Plan  · Based on my review of patient's previous CBCs, chronic  · Possibly due to the fact that patient takes oral budesonide  · Monitor, now resolved      Dyslipidemia  Assessment & Plan  Lipid panel with good control  Continue lipitor    Essential hypertension  Assessment & Plan  Intermittently hypotensive, continue to monitor  Hold any BP medications     * Chest pain  Assessment & Plan  Endorsing right sided chest pain, TREVON score 4  Cardiology consulted, see recommendations  Plan for stress test in AM  ACS ruled out, continue to monitor  Restart home meds         VTE Pharmacologic Prophylaxis:   Pharmacologic: Enoxaparin (Lovenox)  Mechanical VTE Prophylaxis in Place: Yes    Patient Centered Rounds: I have performed bedside rounds with nursing staff today  Discussions with Specialists or Other Care Team Provider: Cardiology    Education and Discussions with Family / Patient: Chest pain, stress test    Time Spent for Care: 30 minutes    More than 50% of total time spent on counseling and coordination of care as described above  Current Length of Stay: 0 day(s)    Current Patient Status: Inpatient   Certification Statement: The patient will continue to require additional inpatient hospital stay due to Chest pain and ischemia evaluation    Discharge Plan: 24 hours    Code Status: Level 1 - Full Code      Subjective:   Patient feels well today  Objective:     Vitals:   Temp (24hrs), Av 2 °F (36 8 °C), Min:97 8 °F (36 6 °C), Max:98 8 °F (37 1 °C)    Temp:  [97 8 °F (36 6 °C)-98 8 °F (37 1 °C)] 98 8 °F (37 1 °C)  HR:  [62-85] 85  Resp:  [16-24] 18  BP: ()/() 92/64  SpO2:  [95 %-97 %] 96 %  Body mass index is 33 8 kg/m²  Input and Output Summary (last 24 hours): Intake/Output Summary (Last 24 hours) at 2019 1445  Last data filed at 2019 0900  Gross per 24 hour   Intake 240 ml   Output --   Net 240 ml       Physical Exam:     Physical Exam   Constitutional: He is oriented to person, place, and time  He appears well-developed and well-nourished  Cardiovascular: Normal rate and regular rhythm  Pulmonary/Chest: Effort normal and breath sounds normal    Abdominal: Soft  Bowel sounds are normal    Neurological: He is alert and oriented to person, place, and time  Skin: Skin is warm and dry  Psychiatric: He has a normal mood and affect   His behavior is normal          Additional Data:     Labs:    Results from last 7 days   Lab Units 19  0452   WBC Thousand/uL 6 44   HEMOGLOBIN g/dL 13 3   HEMATOCRIT % 41 1   PLATELETS Thousands/uL 268   NEUTROS PCT % 83*   LYMPHS PCT % 13*   MONOS PCT % 4   EOS PCT % 0     Results from last 7 days   Lab Units 19  0452 19  1549   SODIUM mmol/L 144 143   POTASSIUM mmol/L 3 9 3 5   CHLORIDE mmol/L 108 106   CO2 mmol/L 25 24   BUN mg/dL 10 12   CREATININE mg/dL 0 88 0 93   ANION GAP mmol/L 11 13   CALCIUM mg/dL 8 8 9 0   ALBUMIN g/dL  --  3 9   TOTAL BILIRUBIN mg/dL  --  0 30   ALK PHOS U/L  --  59   ALT U/L  -- 20   AST U/L  --  12   GLUCOSE RANDOM mg/dL 113 93                           * I Have Reviewed All Lab Data Listed Above  * Additional Pertinent Lab Tests Reviewed: [place] 65 Admission Reviewed    Imaging:    Imaging Reports Reviewed Today Include: CXR  Imaging Personally Reviewed by Myself Includes:  CXR    Recent Cultures (last 7 days):           Last 24 Hours Medication List:     Current Facility-Administered Medications:  acetaminophen 650 mg Oral Q6H PRN Zenaida Chapin MD   aspirin 324 mg Oral Daily Zenaida Chapin MD   atorvastatin 40 mg Oral Daily Zenaida Chapin MD   budesonide 9 mg Oral Daily Zenaida Chapin MD   buPROPion 75 mg Oral Daily Zenaida Chapin MD   busPIRone 10 mg Oral Daily Zenaida Chapin MD   clonazePAM 1 5 mg Oral HS Zenaida Chapin MD   clopidogrel 75 mg Oral Daily Zenaida Chapin MD   dicyclomine 20 mg Oral Q6H Zenaida Chapin MD   enoxaparin 40 mg Subcutaneous Daily Zenadia Chapin MD   morphine injection 2 mg Intravenous Q4H PRN Zenaida Chapin MD   nitroglycerin 0 4 mg Sublingual Q5 Min PRN Zenaida Chapin MD   ondansetron 4 mg Intravenous Q6H PRN Zenaida Chapin MD   pantoprazole 40 mg Oral Early Morning Zenaida Chapin MD   sertraline 200 mg Oral Daily [place] Hazel Chapin MD        Today, Patient Was Seen By: RACHEL Simms      ** Please Note: Dictation voice to text software may have been used in the creation of this document   **

## 2019-11-23 NOTE — ASSESSMENT & PLAN NOTE
· Status post drug-eluting stent to the RCA, March of this year  · Continue cardiovascular medications  · Please see plans under coronary artery disease

## 2019-11-23 NOTE — ASSESSMENT & PLAN NOTE
Endorsing right sided chest pain, TREVON score 4  Cardiology consulted, see recommendations  Plan for stress test in AM  ACS ruled out, continue to monitor  Restart home meds

## 2019-11-23 NOTE — ASSESSMENT & PLAN NOTE
· Patient has history of colitis, both microscopic as well as ulcerative colitis  · Patient has history of chronic diarrhea  According to him, he is still having diarrhea  · Continue patient's budesonide  According to him, presently he is on 9 mg once a day of the budesonide  · Outpatient follow-up with gastroenterologist/primary care physician

## 2019-11-23 NOTE — ASSESSMENT & PLAN NOTE
· Based on my review of patient's previous CBCs, chronic  · Possibly due to the fact that patient takes oral budesonide    · Monitor, now resolved

## 2019-11-25 ENCOUNTER — TELEPHONE (OUTPATIENT)
Dept: GASTROENTEROLOGY | Facility: CLINIC | Age: 54
End: 2019-11-25

## 2019-11-25 VITALS
OXYGEN SATURATION: 96 % | HEIGHT: 66 IN | DIASTOLIC BLOOD PRESSURE: 66 MMHG | RESPIRATION RATE: 19 BRPM | TEMPERATURE: 98.6 F | SYSTOLIC BLOOD PRESSURE: 128 MMHG | BODY MASS INDEX: 33.66 KG/M2 | HEART RATE: 69 BPM | WEIGHT: 209.44 LBS

## 2019-11-25 NOTE — TELEPHONE ENCOUNTER
Gayatri pt - Pt wanted [employer] to know he was in the hospital over the weekend and had a bad experience  They would not give him his budesonide while he was there which led to some altercations involving security  Pt's diarrhea has still not subsided, he would like to speak to [employer], please call [phone]   Ty

## 2019-11-26 NOTE — TELEPHONE ENCOUNTER
Spoke to patient  He reports he is now back on budesonide  He reports improvement but still some diarrhea  I encouraged him to stay on the medication and we will follow up on the 16th

## 2019-12-05 ENCOUNTER — TELEPHONE (OUTPATIENT)
Dept: GASTROENTEROLOGY | Facility: CLINIC | Age: 54
End: 2019-12-05

## 2019-12-05 NOTE — TELEPHONE ENCOUNTER
Gayatri - Patient called lmom - patient's diarrhea is not subsiding, been going on for weeks  Cramps, lower abdomen still hurts   Please call Tico Whiteside at [phone] - Patient has been taking his busesonide

## 2019-12-06 ENCOUNTER — APPOINTMENT (EMERGENCY)
Dept: CT IMAGING | Facility: HOSPITAL | Age: 54
End: 2019-12-06
Payer: MEDICARE

## 2019-12-06 ENCOUNTER — OFFICE VISIT (OUTPATIENT)
Dept: GASTROENTEROLOGY | Facility: CLINIC | Age: 54
End: 2019-12-06
Payer: MEDICARE

## 2019-12-06 ENCOUNTER — HOSPITAL ENCOUNTER (EMERGENCY)
Facility: HOSPITAL | Age: 54
Discharge: HOME | End: 2019-12-06
Attending: EMERGENCY MEDICINE
Payer: MEDICARE

## 2019-12-06 VITALS
HEART RATE: 89 BPM | SYSTOLIC BLOOD PRESSURE: 164 MMHG | RESPIRATION RATE: 18 BRPM | WEIGHT: 208.11 LBS | OXYGEN SATURATION: 97 % | DIASTOLIC BLOOD PRESSURE: 84 MMHG | BODY MASS INDEX: 33.59 KG/M2

## 2019-12-06 DIAGNOSIS — K52.839 MICROSCOPIC COLITIS, UNSPECIFIED MICROSCOPIC COLITIS TYPE: ICD-10-CM

## 2019-12-06 DIAGNOSIS — K57.92 DIVERTICULITIS: Primary | ICD-10-CM

## 2019-12-06 DIAGNOSIS — R11.11 NON-INTRACTABLE VOMITING WITHOUT NAUSEA, UNSPECIFIED VOMITING TYPE: ICD-10-CM

## 2019-12-06 DIAGNOSIS — R19.7 DIARRHEA, UNSPECIFIED TYPE: Primary | ICD-10-CM

## 2019-12-06 DIAGNOSIS — R10.30 LOWER ABDOMINAL PAIN: ICD-10-CM

## 2019-12-06 LAB
ALBUMIN SERPL BCP-MCNC: 4.2 G/DL (ref 3.5–5)
ALP SERPL-CCNC: 70 U/L (ref 46–116)
ALT SERPL W P-5'-P-CCNC: 20 U/L (ref 12–78)
ANION GAP SERPL CALCULATED.3IONS-SCNC: 12 MMOL/L (ref 4–13)
AST SERPL W P-5'-P-CCNC: 10 U/L (ref 5–45)
ATRIAL RATE: 57 BPM
BASOPHILS # BLD MANUAL: 0 THOUSAND/UL (ref 0–0.1)
BASOPHILS NFR MAR MANUAL: 0 % (ref 0–1)
BILIRUB DIRECT SERPL-MCNC: 0.06 MG/DL (ref 0–0.2)
BILIRUB SERPL-MCNC: 0.4 MG/DL (ref 0.2–1)
BUN SERPL-MCNC: 11 MG/DL (ref 5–25)
CALCIUM SERPL-MCNC: 9.6 MG/DL (ref 8.3–10.1)
CHLORIDE SERPL-SCNC: 104 MMOL/L (ref 100–108)
CO2 SERPL-SCNC: 25 MMOL/L (ref 21–32)
CREAT SERPL-MCNC: 0.99 MG/DL (ref 0.6–1.3)
EOSINOPHIL # BLD MANUAL: 0.16 THOUSAND/UL (ref 0–0.4)
EOSINOPHIL NFR BLD MANUAL: 1 % (ref 0–6)
ERYTHROCYTE [DISTWIDTH] IN BLOOD BY AUTOMATED COUNT: 13.1 % (ref 11.6–15.1)
GFR SERPL CREATININE-BSD FRML MDRD: 85 ML/MIN/1.73SQ M
GLUCOSE SERPL-MCNC: 154 MG/DL (ref 65–140)
HCT VFR BLD AUTO: 44.8 % (ref 36.5–49.3)
HGB BLD-MCNC: 14.6 G/DL (ref 12–17)
LIPASE SERPL-CCNC: 49 U/L (ref 73–393)
LYMPHOCYTES # BLD AUTO: 1.13 THOUSAND/UL (ref 0.6–4.47)
LYMPHOCYTES # BLD AUTO: 7 % (ref 14–44)
MCH RBC QN AUTO: 28.8 PG (ref 26.8–34.3)
MCHC RBC AUTO-ENTMCNC: 32.6 G/DL (ref 31.4–37.4)
MCV RBC AUTO: 88 FL (ref 82–98)
MONOCYTES # BLD AUTO: 0.16 THOUSAND/UL (ref 0–1.22)
MONOCYTES NFR BLD: 1 % (ref 4–12)
NEUTROPHILS # BLD MANUAL: 14.71 THOUSAND/UL (ref 1.85–7.62)
NEUTS BAND NFR BLD MANUAL: 3 % (ref 0–8)
NEUTS SEG NFR BLD AUTO: 88 % (ref 43–75)
NRBC BLD AUTO-RTO: 0 /100 WBCS
P AXIS: 64 DEGREES
PLATELET # BLD AUTO: 300 THOUSANDS/UL (ref 149–390)
PLATELET BLD QL SMEAR: ADEQUATE
PMV BLD AUTO: 9.6 FL (ref 8.9–12.7)
POTASSIUM SERPL-SCNC: 3.8 MMOL/L (ref 3.5–5.3)
PR INTERVAL: 174 MS
PROT SERPL-MCNC: 7.7 G/DL (ref 6.4–8.2)
QRS AXIS: 19 DEGREES
QRSD INTERVAL: 90 MS
QT INTERVAL: 420 MS
QTC INTERVAL: 408 MS
RBC # BLD AUTO: 5.07 MILLION/UL (ref 3.88–5.62)
SODIUM SERPL-SCNC: 141 MMOL/L (ref 136–145)
T WAVE AXIS: 59 DEGREES
TOTAL CELLS COUNTED SPEC: 100
TROPONIN I SERPL-MCNC: <0.02 NG/ML
VENTRICULAR RATE: 57 BPM
WBC # BLD AUTO: 16.16 THOUSAND/UL (ref 4.31–10.16)

## 2019-12-06 PROCEDURE — 99214 OFFICE O/P EST MOD 30 MIN: CPT | Performed by: PHYSICIAN ASSISTANT

## 2019-12-06 PROCEDURE — 96361 HYDRATE IV INFUSION ADD-ON: CPT

## 2019-12-06 PROCEDURE — 93010 ELECTROCARDIOGRAM REPORT: CPT | Performed by: INTERNAL MEDICINE

## 2019-12-06 PROCEDURE — 96374 THER/PROPH/DIAG INJ IV PUSH: CPT

## 2019-12-06 PROCEDURE — 93005 ELECTROCARDIOGRAM TRACING: CPT

## 2019-12-06 PROCEDURE — 85027 COMPLETE CBC AUTOMATED: CPT | Performed by: EMERGENCY MEDICINE

## 2019-12-06 PROCEDURE — 74177 CT ABD & PELVIS W/CONTRAST: CPT

## 2019-12-06 PROCEDURE — 96375 TX/PRO/DX INJ NEW DRUG ADDON: CPT

## 2019-12-06 PROCEDURE — 85007 BL SMEAR W/DIFF WBC COUNT: CPT | Performed by: EMERGENCY MEDICINE

## 2019-12-06 PROCEDURE — 80076 HEPATIC FUNCTION PANEL: CPT | Performed by: EMERGENCY MEDICINE

## 2019-12-06 PROCEDURE — 84484 ASSAY OF TROPONIN QUANT: CPT | Performed by: EMERGENCY MEDICINE

## 2019-12-06 PROCEDURE — 99284 EMERGENCY DEPT VISIT MOD MDM: CPT | Performed by: EMERGENCY MEDICINE

## 2019-12-06 PROCEDURE — 80048 BASIC METABOLIC PNL TOTAL CA: CPT | Performed by: EMERGENCY MEDICINE

## 2019-12-06 PROCEDURE — 36415 COLL VENOUS BLD VENIPUNCTURE: CPT | Performed by: EMERGENCY MEDICINE

## 2019-12-06 PROCEDURE — 83690 ASSAY OF LIPASE: CPT | Performed by: EMERGENCY MEDICINE

## 2019-12-06 PROCEDURE — 99284 EMERGENCY DEPT VISIT MOD MDM: CPT

## 2019-12-06 RX ORDER — CIPROFLOXACIN 500 MG/1
500 TABLET, FILM COATED ORAL ONCE
Status: COMPLETED | OUTPATIENT
Start: 2019-12-06 | End: 2019-12-06

## 2019-12-06 RX ORDER — OXYCODONE HYDROCHLORIDE AND ACETAMINOPHEN 5; 325 MG/1; MG/1
1 TABLET ORAL EVERY 6 HOURS PRN
Qty: 15 TABLET | Refills: 0 | Status: ON HOLD | OUTPATIENT
Start: 2019-12-06 | End: 2020-02-10 | Stop reason: CLARIF

## 2019-12-06 RX ORDER — ONDANSETRON 4 MG/1
4 TABLET, ORALLY DISINTEGRATING ORAL EVERY 8 HOURS PRN
Qty: 60 TABLET | Refills: 3 | Status: SHIPPED | OUTPATIENT
Start: 2019-12-06 | End: 2021-04-07

## 2019-12-06 RX ORDER — ONDANSETRON 4 MG/1
4 TABLET, ORALLY DISINTEGRATING ORAL EVERY 8 HOURS PRN
Qty: 60 TABLET | Refills: 3 | Status: SHIPPED | OUTPATIENT
Start: 2019-12-06 | End: 2019-12-06 | Stop reason: SDUPTHER

## 2019-12-06 RX ORDER — MORPHINE SULFATE 10 MG/ML
6 INJECTION, SOLUTION INTRAMUSCULAR; INTRAVENOUS ONCE
Status: COMPLETED | OUTPATIENT
Start: 2019-12-06 | End: 2019-12-06

## 2019-12-06 RX ORDER — METRONIDAZOLE 500 MG/1
500 TABLET ORAL 3 TIMES DAILY
Qty: 30 TABLET | Refills: 0 | Status: SHIPPED | OUTPATIENT
Start: 2019-12-06 | End: 2019-12-16

## 2019-12-06 RX ORDER — OMEPRAZOLE 20 MG/1
20 CAPSULE, DELAYED RELEASE ORAL DAILY
Qty: 60 CAPSULE | Refills: 3 | Status: SHIPPED | OUTPATIENT
Start: 2019-12-06 | End: 2022-06-04

## 2019-12-06 RX ORDER — METRONIDAZOLE 500 MG/1
500 TABLET ORAL ONCE
Status: COMPLETED | OUTPATIENT
Start: 2019-12-06 | End: 2019-12-06

## 2019-12-06 RX ORDER — ONDANSETRON 2 MG/ML
4 INJECTION INTRAMUSCULAR; INTRAVENOUS ONCE
Status: COMPLETED | OUTPATIENT
Start: 2019-12-06 | End: 2019-12-06

## 2019-12-06 RX ORDER — CIPROFLOXACIN 500 MG/1
500 TABLET, FILM COATED ORAL 2 TIMES DAILY
Qty: 20 TABLET | Refills: 0 | Status: SHIPPED | OUTPATIENT
Start: 2019-12-06 | End: 2019-12-16

## 2019-12-06 RX ORDER — ONDANSETRON 4 MG/1
4 TABLET, ORALLY DISINTEGRATING ORAL EVERY 8 HOURS PRN
Qty: 15 TABLET | Refills: 0 | Status: SHIPPED | OUTPATIENT
Start: 2019-12-06 | End: 2020-02-08

## 2019-12-06 RX ADMIN — ONDANSETRON 4 MG: 2 INJECTION INTRAMUSCULAR; INTRAVENOUS at 16:12

## 2019-12-06 RX ADMIN — MORPHINE SULFATE 6 MG: 10 INJECTION INTRAVENOUS at 16:04

## 2019-12-06 RX ADMIN — METRONIDAZOLE 500 MG: 500 TABLET ORAL at 18:13

## 2019-12-06 RX ADMIN — SODIUM CHLORIDE 1000 ML: 0.9 INJECTION, SOLUTION INTRAVENOUS at 16:51

## 2019-12-06 RX ADMIN — CIPROFLOXACIN HYDROCHLORIDE 500 MG: 500 TABLET, FILM COATED ORAL at 18:13

## 2019-12-06 RX ADMIN — IOHEXOL 100 ML: 350 INJECTION, SOLUTION INTRAVENOUS at 17:19

## 2019-12-06 NOTE — ED PROVIDER NOTES
History  Chief Complaint   Patient presents with    Abdominal Pain     Patient reports lower abdominal pain for the last two weeks but has increasingly got worse  Patient reports ~30 episodes of vomiting today  HPI  Patient is a 50-year-old male, he gives a history of intermittent abdominal pain over the last 2 weeks  Patient describes a fairly sharp cramping sensation in his left side primarily left lower quadrant he reports that seems to sometimes peak and [place]  Episode was associated with some vomiting today  He reports that he intermittently has diarrhea  Patient gives a history of both ulcerative colitis and diverticulitis  He reports that he has had medication treatment for both in the past   Patient is followed by a local gastroenterologist   He reports intermittent crampy pain which today became increasingly severe so he came to the hospital   Patient reports occasional pain that radiates up into his chest   Past medical history of ulcer colitis, diverticulitis, hypertension hyperlipidemia,  Family history noncontributory  Social history, former smoker, denies drug abuse  Prior to Admission Medications   Prescriptions Last Dose Informant Patient Reported? Taking? BUDESONIDE PO  Self Yes No   Sig: Take 3 mg by mouth 2 (two) times a day   PEG 3350-KCl-NaCl-NaSulf-Na Asc-C (MOVIPREP) 100 g  Self Yes No   Sig: MoviPrep 100 gram-7 5 gram-2 691 gram oral powder packet   aspirin (ECOTRIN LOW STRENGTH) 81 mg EC tablet  Self Yes No   Sig: Take 81 mg by mouth daily   atorvastatin (LIPITOR) 40 mg tablet  Self No No   Sig: Take 1 tablet (40 mg total) by mouth daily   Patient taking differently: Take 80 mg by mouth daily    buPROPion (WELLBUTRIN) 75 mg tablet  Self Yes No   Sig: Take 75 mg by mouth daily    budesonide (ENTOCORT EC) 3 MG capsule   No No   Sig: Take 1 capsule (3 mg total) by mouth daily 3 po daily for 1 month, 2 po daily for 2 weeks, 1 po daily for 2 weeks     busPIRone (BUSPAR) 10 mg tablet Self Yes No   Sig: buspirone 10 mg tablet   clonazePAM (KLONOPIN) 1 mg tablet  Self Yes No   Sig: Take 1 5 mg by mouth daily at bedtime    clopidogrel (PLAVIX) 75 mg tablet  Self No No   Sig: Take 1 tablet (75 mg total) by mouth daily   dicyclomine (BENTYL) 20 mg tablet   No No   Sig: Take 1 tablet (20 mg total) by mouth every 6 (six) hours   Patient taking differently: Take 20 mg by mouth every 6 (six) hours as needed    nitroglycerin (NITROSTAT) 0 4 mg SL tablet  Self No No   Sig: Place 1 tablet (0 4 mg total) under the tongue every 5 (five) minutes as needed for chest pain   omeprazole (PriLOSEC) 20 mg delayed release capsule   No No   Sig: Take 1 capsule (20 mg total) by mouth daily   ondansetron (ZOFRAN-ODT) 4 mg disintegrating tablet   No No   Sig: Take 1 tablet (4 mg total) by mouth every 8 (eight) hours as needed for nausea or vomiting   oxyCODONE-acetaminophen (PERCOCET) 5-325 mg per tablet  Self Yes No   Sig: oxycodone-acetaminophen 5 mg-325 mg tablet   prochlorperazine (COMPAZINE) 10 mg tablet  Self No No   Sig: Take 1 tablet (10 mg total) by mouth 2 (two) times a day as needed for nausea or vomiting   Patient not taking: Reported on 11/22/2019   sertraline (ZOLOFT) 100 mg tablet  Self Yes No   Sig: Take 200 mg by mouth daily      Facility-Administered Medications: None       Past Medical History:   Diagnosis Date    Diaz's esophagus     Colon polyp     Coronary artery disease     Depression     Diverticulitis     GERD (gastroesophageal reflux disease)     Hemorrhoid     History of heart artery stent     Hyperlipidemia     Hypertension     Microscopic colitis        Past Surgical History:   Procedure Laterality Date    ABDOMINAL SURGERY      radio frequency ablation    BONE GRAFT Left 2018    CARPAL TUNNEL RELEASE Right     COLONOSCOPY      EGD AND COLONOSCOPY      ESOPHAGOGASTRODUODENOSCOPY N/A 2/15/2018    Procedure: ESOPHAGOGASTRODUODENOSCOPY (EGD);   Surgeon: Lupillo Flannery MD; Location: [facility];  Service: Gastroenterology    ESOPHAGOGASTRODUODENOSCOPY N/A 12/10/2018    Procedure: ESOPHAGOGASTRODUODENOSCOPY (EGD); Surgeon: Precious Ortega MD;  Location: [facility]; Service: Gastroenterology    TONSILLECTOMY         Family History   Problem Relation Age of Onset    Heart disease Father     Cancer Sister      I have reviewed and agree with the history as documented  Social History     Tobacco Use    Smoking status: Former Smoker     Last attempt to quit: 2007     Years since quittin 8    Smokeless tobacco: Former User     Quit date: 1998   Substance Use Topics    Alcohol use: Never     Frequency: Never     Comment: quit 3 years    Drug use: Yes     Frequency: 3 0 times per week     Types: Marijuana     Comment: last used         Review of Systems   Constitutional: Negative for diaphoresis, fatigue and fever  HENT: Negative for congestion, ear pain, nosebleeds and sore throat  Eyes: Negative for photophobia, pain, discharge and visual disturbance  Respiratory: Negative for cough, choking, chest tightness, shortness of breath and wheezing  Cardiovascular: Negative for chest pain and palpitations  Gastrointestinal: Positive for abdominal pain, diarrhea, nausea and vomiting  Negative for abdominal distention  Genitourinary: Negative for dysuria, flank pain and frequency  Musculoskeletal: Negative for back pain, gait problem and joint swelling  Skin: Negative for color change and rash  Neurological: Negative for dizziness, syncope and headaches  Psychiatric/Behavioral: Negative for behavioral problems and confusion  The patient is not nervous/anxious  All other systems reviewed and are negative  Physical Exam  Physical Exam   Constitutional: He is oriented to person, place, and time  He appears well-developed and well-nourished  HENT:   Head: Normocephalic     Right Ear: External ear normal    Left Ear: External ear normal  Nose: Nose normal    Mouth/Throat: Oropharynx is clear and moist    Eyes: Pupils are equal, round, and reactive to light  EOM and lids are normal    Neck: Normal range of motion  Neck supple  Cardiovascular: Normal rate, regular rhythm, normal heart sounds and intact distal pulses  Pulmonary/Chest: Effort normal and breath sounds normal  No respiratory distress  Abdominal: Soft  Bowel sounds are normal  There is tenderness  There is mild left lower quadrant tenderness without rebound or guarding   Musculoskeletal: Normal range of motion  He exhibits no deformity  Neurological: He is alert and oriented to person, place, and time  Skin: Skin is warm and dry  Psychiatric: He has a normal mood and affect  Nursing note and vitals reviewed    Pulse oximetry is normal at 97% adequate oxygenation, there is no hypoxia    Vital Signs  ED Triage Vitals   Temp Pulse Respirations Blood Pressure SpO2   -- 12/06/19 1553 12/06/19 1553 12/06/19 1730 12/06/19 1553    61 18 164/84 96 %      Temp src Heart Rate Source Patient Position - Orthostatic VS BP Location FiO2 (%)   -- 12/06/19 1553 12/06/19 1553 12/06/19 1553 --    Monitor Lying Right arm       Pain Score       12/06/19 1553       Worst Possible Pain           Vitals:    12/06/19 1553 12/06/19 1730   BP:  164/84   Pulse: 61 89   Patient Position - Orthostatic VS: Lying Lying         Visual Acuity      ED Medications  Medications   morphine (PF) 10 mg/mL injection 6 mg (6 mg Intravenous Given 12/6/19 1604)   ondansetron (ZOFRAN) injection 4 mg (4 mg Intravenous Given 12/6/19 1612)   sodium chloride 0 9 % bolus 1,000 mL (0 mL Intravenous Stopped 12/6/19 1817)   iohexol (OMNIPAQUE) 350 MG/ML injection (MULTI-DOSE) 100 mL (100 mL Intravenous Given 12/6/19 1719)   ciprofloxacin (CIPRO) tablet 500 mg (500 mg Oral Given 12/6/19 1813)   metroNIDAZOLE (FLAGYL) tablet 500 mg (500 mg Oral Given 12/6/19 1813)       Diagnostic Studies  Results Reviewed     Procedure Component Value Units Date/Time    CBC and differential [[id number]]  (Abnormal) Collected:  12/06/19 1603    Lab Status:  Final result Specimen:  Blood from Arm, Right Updated:  12/06/19 1651     WBC 16 16 Thousand/uL      RBC 5 07 Million/uL      Hemoglobin 14 6 g/dL      Hematocrit 44 8 %      MCV 88 fL      MCH 28 8 pg      MCHC 32 6 g/dL      RDW 13 1 %      MPV 9 6 fL      Platelets 269 Thousands/uL      nRBC 0 /100 WBCs     Troponin I [[id number]]  (Normal) Collected:  12/06/19 1603    Lab Status:  Final result Specimen:  Blood from Arm, Right Updated:  12/06/19 1634     Troponin I <0 02 ng/mL     Basic metabolic panel [[id number]]  (Abnormal) Collected:  12/06/19 1603    Lab Status:  Final result Specimen:  Blood from Arm, Right Updated:  12/06/19 1633     Sodium 141 mmol/L      Potassium 3 8 mmol/L      Chloride 104 mmol/L      CO2 25 mmol/L      ANION GAP 12 mmol/L      BUN 11 mg/dL      Creatinine 0 99 mg/dL      Glucose 154 mg/dL      Calcium 9 6 mg/dL      eGFR 85 ml/min/1 73sq m     Narrative:       [place] guidelines for Chronic Kidney Disease (CKD):     Stage 1 with normal or high GFR (GFR > 90 mL/min/1 73 square meters)    Stage 2 Mild CKD (GFR = 60-89 mL/min/1 73 square meters)    Stage 3A Moderate CKD (GFR = 45-59 mL/min/1 73 square meters)    Stage 3B Moderate CKD (GFR = 30-44 mL/min/1 73 square meters)    Stage 4 Severe CKD (GFR = 15-29 mL/min/1 73 square meters)    Stage 5 End Stage CKD (GFR <15 mL/min/1 73 square meters)  Note: GFR calculation is accurate only with a steady state creatinine    Hepatic function panel [[id number]]  (Normal) Collected:  12/06/19 1603    Lab Status:  Final result Specimen:  Blood from Arm, Right Updated:  12/06/19 1633     Total Bilirubin 0 40 mg/dL      Bilirubin, Direct 0 06 mg/dL      Alkaline Phosphatase 70 U/L      AST 10 U/L      ALT 20 U/L      Total Protein 7 7 g/dL      Albumin 4 2 g/dL     Lipase [[id number]]  (Abnormal) Collected:  12/06/19 1603    Lab Status:  Final result Specimen:  Blood from Arm, Right Updated:  12/06/19 1633     Lipase 49 u/L                  CT abdomen pelvis with contrast   Final Result by Satya Boyd MD (12/06 3579)      Pericolonic inflammatory in relation of the small segment of the lower descending colon adjacent to the sigmoid colon compatible with diverticulitis   No free air   No pneumatosis   Consider colonoscopy when the acute illness subsides   The study was marked in EPIC for immediate notification  Workstation performed: [id number]                    Procedures  ECG 12 Lead Documentation Only  Date/Time: 12/6/2019 10:39 PM  Performed by: Bettina Farias MD  Authorized by: Bettina Farias MD     Indications / Diagnosis:   epigastric pain radiating up  ECG reviewed by me, the ED Provider: yes    Patient location:  ED  Previous ECG:     Previous ECG:  Compared to current    Comparison ECG info:   November 22, 2019    Similarity:  No change  Interpretation:     Interpretation: non-specific    Rate:     ECG rate:   57  Rhythm:     Rhythm: sinus bradycardia    Comments:       Normal sinus rhythm acute ST-T wave changes, no change from prior EKG  ED Course          diagnostic testing showed elevated [place] of [address], hemoglobin was normal at 14 no sign of bleeding, patient had some pain which radiated up into his chest so EKG and cardiac troponin were done they were normal   Patient's electrolytes showed normal BUN creatinine no sign of renal dysfunction, blood sugar was minimally elevated at 154  liver functions were within normal limits, no sign of hepatic injury or hepatitis cholestatic jaundice  Patient's lipase was normal no sign of pancreatitis     CT scan abdomen pelvis showed pericolic inflammation in the area along the sigmoid colon consistent with diverticulitis  I reviewed with the patient and gave him a copy of the report    Long discussion with patient, he has what appears to be acute diverticulitis with an elevated white count offered admission  The patient did not want to be admitted at this time  We discussed antibiotics and I would give the patient antibiotics here  Discussed the risk of perforation or abscess that may require drainage of patient is not hospitalized he understands this and accepts the risk  Patient does want to go home  We discussed treatment with Cipro and Flagyl as an outpatient  We discussed analgesics  We discussed nausea medicine  We discussed the need to follow up with his gastroenterologist as soon as possible  [facility]  Medical decision making 50-year-old male, history of ulcer colitis and diverticulitis presents with lower abdominal pain, markedly improved with hydration antiemetics here  Diagnostic testing consistent with diverticulitis  I did offer the patient admission as he does have a focal area of inflammation there is no abscess there is no perforation  I reviewed the CT with patient gave him a copy of the report  Patient did not want to stay in the hospital   He refused admission at this time  He wants to be treated as an outpatient  I feel at this point that would be appropriate I do not believe he has to sign out against medical advice  We discussed the need to continue with oral antibiotics  We discussed if his pain would worsen he would develop worsening symptoms  He had fever or worsening symptoms of infection he should return to hospital immediately for IV antibiotics  We discussed follow-up with his gastroenterologist   We discussed extensively indications to return    We discussed extensively the degree of his disease the possibility of perforation abscess or colectomy    Disposition  Final diagnoses:   Diverticulitis     Time reflects when diagnosis was documented in both MDM as applicable and the Disposition within this note     Time User Action Codes Description Comment    12/6/2019  6:00 PM Shaina Jarvis Reanna Leader [U30 78] Diverticulitis       ED Disposition     ED Disposition Condition Date/Time Comment    Discharge Stable Fri Dec 6, 2019  6:00 PM Scottie Chirinos discharge to home/self care  Follow-up Information     [facility]   [address]            Discharge Medication List as of 12/6/2019  6:05 PM      START taking these medications    Details   ciprofloxacin (CIPRO) 500 mg tablet Take 1 tablet (500 mg total) by mouth 2 (two) times a day for 10 days, Starting Fri 12/6/2019, Until Mon 12/16/2019, Normal      metroNIDAZOLE (FLAGYL) 500 mg tablet Take 1 tablet (500 mg total) by mouth 3 (three) times a day for 10 days, Starting Fri 12/6/2019, Until Mon 12/16/2019, Normal      !! ondansetron (ZOFRAN ODT) 4 mg disintegrating tablet Take 1 tablet (4 mg total) by mouth every 8 (eight) hours as needed for nausea or vomiting, Starting Fri 12/6/2019, Normal      !! oxyCODONE-acetaminophen (PERCOCET) 5-325 mg per tablet Take 1 tablet by mouth every 6 (six) hours as needed for severe pain for up to 15 dosesMax Daily Amount: 4 tablets, Starting Fri 12/6/2019, Normal       !! - Potential duplicate medications found  Please discuss with provider  CONTINUE these medications which have NOT CHANGED    Details   aspirin (ECOTRIN LOW STRENGTH) 81 mg EC tablet Take 81 mg by mouth daily, Historical Med      atorvastatin (LIPITOR) 40 mg tablet Take 1 tablet (40 mg total) by mouth daily, Starting Mon 1/7/2019, Normal      !! budesonide (ENTOCORT EC) 3 MG capsule Take 1 capsule (3 mg total) by mouth daily 3 po daily for 1 month, 2 po daily for 2 weeks, 1 po daily for 2 weeks  , Starting Fri 11/15/2019, Normal      !!  BUDESONIDE PO Take 3 mg by mouth 2 (two) times a day, Historical Med      buPROPion (WELLBUTRIN) 75 mg tablet Take 75 mg by mouth daily , Historical Med      busPIRone (BUSPAR) 10 mg tablet buspirone 10 mg tablet, Historical Med      clonazePAM (KLONOPIN) 1 mg tablet Take 1 5 mg by mouth daily at bedtime , Historical Med      clopidogrel (PLAVIX) 75 mg tablet Take 1 tablet (75 mg total) by mouth daily, Starting Wed 4/17/2019, Normal      dicyclomine (BENTYL) 20 mg tablet Take 1 tablet (20 mg total) by mouth every 6 (six) hours, Starting Fri 11/15/2019, Normal      nitroglycerin (NITROSTAT) 0 4 mg SL tablet Place 1 tablet (0 4 mg total) under the tongue every 5 (five) minutes as needed for chest pain, Starting Mon 1/7/2019, Normal      omeprazole (PriLOSEC) 20 mg delayed release capsule Take 1 capsule (20 mg total) by mouth daily, Starting Fri 12/6/2019, Normal      !! ondansetron (ZOFRAN-ODT) 4 mg disintegrating tablet Take 1 tablet (4 mg total) by mouth every 8 (eight) hours as needed for nausea or vomiting, Starting Fri 12/6/2019, Normal      !! oxyCODONE-acetaminophen (PERCOCET) 5-325 mg per tablet oxycodone-acetaminophen 5 mg-325 mg tablet, Historical Med      PEG 3350-KCl-NaCl-NaSulf-Na Asc-C (MOVIPREP) 100 g MoviPrep 100 gram-7 5 gram-2 691 gram oral powder packet, Historical Med      prochlorperazine (COMPAZINE) 10 mg tablet Take 1 tablet (10 mg total) by mouth 2 (two) times a day as needed for nausea or vomiting, Starting Thu 10/3/2019, Print      sertraline (ZOLOFT) 100 mg tablet Take 200 mg by mouth daily, Historical Med       !! - Potential duplicate medications found  Please discuss with provider  No discharge procedures on file      ED Provider  Electronically Signed by           Federica Grover MD  12/07/19 4817

## 2019-12-06 NOTE — PROGRESS NOTES
[facility] - Outpatient Follow-up Note  Marine Green 53 y o  male MRN: [id number]  Encounter: [id number]          ASSESSMENT AND PLAN:      1  Microscopic colitis, unspecified microscopic colitis type  Was improving on Budesonide until admitted to [facility] and did not have it for a few days  He now has 10-15 watery stools daily and budesonide is not helping    2  Diarrhea, unspecified type  Stool cultures  Colonoscopy in September was normal    3  Non-intractable vomiting without nausea, unspecified vomiting type  EGD last year was normal  Ondansetron ODT provided    4  Lower abdominal pain  Severe  Dicyclomine helps minimally  Use Donnatol - samples provided  Recent history of diverticulitis  He needs a CT ASAP    He is actively vomiting in the office  He had to leave the office appt to use the bathroom    He will go to the ER for quick evaluation - CT, labs as well as IVF and antiemetic  Will call patient on Monday to f/u    The patient likely has cyclical vomiting syndome  Workup To date includes CT, EGD x 2, Colonoscopy, CTA, MRI/MRCP, US    HE should have HIDA CCK and GES to complete the workup    Unfortunately, this patient is already maintained on Sertraline, Buspar and Clonazepam thus we will have to avoid the mainstay of treatment for CVS which is TCA therapy  Would continue PPI BID, antiemetics prn, antispasmotics prn  He should not be on chronic budesonide therapy as this is a steroid  Avoid narcotic pain mediation  Could try Reglan TID-QID  He should be taking L-Carnitine and Co-Q-10 - I could not have a good discussion with him about this today as he could not focus on the visit   ______________________________________________________________________    SUBJECTIVE:  40-year-old male with a significant gastrointestinal history significant for Diaz's esophagus with high-grade dysplasia, diverticulitis , microscopic colitis and cyclical vomiting syndrome presents for evaluation of severe pain, diarrhea and vomiting  He was 1st evaluated by [facility] in February of 2018 at which time he was suffering from hematemesis  EGD showed moderately severe esophagitis and whitish ulcerations in the distal esophagus likely secondary to recent biopsy  He has a history of Diaz's esophagus with high-grade dysplasia requiring radiofrequency ablation treated at the [facility]  He reported a history of alcohol abuse at that time however he claims at present he has been sober for over 4 years  He was then seen in December 2018 by Dr Suni Colbert for intractable nausea and vomiting  He performed a repeat endoscopy on December 10, 2018 which showed a 1 cm tongue of Diaz's esophagus and a 2 cm hiatal hernia  Random biopsies were taken of the stomach and duodenum  He was then admitted once again to [facility] in February of 2019 at which time he was evaluated by Dr Linda Javier him  A CT scan during that admission documented diverticulitis and he was treated appropriately with antibiotics  He was then admitted once again to [facility] in July of 2019 with intractable nausea and vomiting  No further procedures were performed at that time and it was felt that he was suffering from cyclical vomiting syndrome  His vomiting quickly resolved and he was discharged quickly  His pantoprazole was increased to twice daily and he was started on Carafate 3 times daily  He underwent a routine colonoscopy in September of 2019 with Dr Anabell Orr which showed diverticulosis and a small polyp which was removed  He followed up with Shaina Jiang on November 15th for his microscopic colitis  This was diagnosed many years ago at the [place] in [place]  He was treated with chronic budesonide therapy  He also treats with recreational marijuana  He was put back on his budesonide at this visit as he was having diarrhea       A Few days later he was admitted to [place] [address] [address] with chest pain and his budesonide was stopped  He reports that he only missed a few days of it however after this is diarrhea became severe  Since that time he has been having 10-12 watery stools daily  Reports severe lower abdominal pain  He reports fevers and chills  He is suffering from intractable vomiting  He denies any hematemesis rectal bleeding or melena  During the visit the patient is lying on the bed frequently sitting up to actively vomit  REVIEW OF SYSTEMS IS OTHERWISE NEGATIVE  Historical Information   Past Medical History:   Diagnosis Date    Diaz's esophagus     Colon polyp     Coronary artery disease     Depression     Diverticulitis     GERD (gastroesophageal reflux disease)     Hemorrhoid     History of heart artery stent     Hyperlipidemia     Hypertension     Microscopic colitis      Past Surgical History:   Procedure Laterality Date    ABDOMINAL SURGERY      radio frequency ablation    BONE GRAFT Left 2018    CARPAL TUNNEL RELEASE Right     COLONOSCOPY      EGD AND COLONOSCOPY      ESOPHAGOGASTRODUODENOSCOPY N/A 2/15/2018    Procedure: ESOPHAGOGASTRODUODENOSCOPY (EGD); Surgeon: Jayy Fraser MD;  Location: [facility];  Service: Gastroenterology    ESOPHAGOGASTRODUODENOSCOPY N/A 12/10/2018    Procedure: ESOPHAGOGASTRODUODENOSCOPY (EGD); Surgeon: Peewee Ku MD;  Location: [facility];   Service: Gastroenterology    TONSILLECTOMY       Social History   Social History     Substance and Sexual Activity   Alcohol Use Never    Frequency: Never    Comment: quit 3 years     Social History     Substance and Sexual Activity   Drug Use Yes    Frequency: 3 0 times per week    Types: Marijuana    Comment: last used      Social History     Tobacco Use   Smoking Status Former Smoker    Last attempt to quit: 2007    Years since quittin 8   Smokeless Tobacco Former User    Quit date: 1998     Family History   Problem Relation Age of Onset    Heart disease Father     Cancer Sister        Meds/Allergies       Current Outpatient Medications:     aspirin (ECOTRIN LOW STRENGTH) 81 mg EC tablet    atorvastatin (LIPITOR) 40 mg tablet    budesonide (ENTOCORT EC) 3 MG capsule    BUDESONIDE PO    buPROPion (WELLBUTRIN) 75 mg tablet    busPIRone (BUSPAR) 10 mg tablet    clonazePAM (KLONOPIN) 1 mg tablet    clopidogrel (PLAVIX) 75 mg tablet    dicyclomine (BENTYL) 20 mg tablet    nitroglycerin (NITROSTAT) 0 4 mg SL tablet    omeprazole (PriLOSEC) 20 mg delayed release capsule    ondansetron (ZOFRAN-ODT) 4 mg disintegrating tablet    oxyCODONE-acetaminophen (PERCOCET) 5-325 mg per tablet    PEG 3350-KCl-NaCl-NaSulf-Na Asc-C (MOVIPREP) 100 g    prochlorperazine (COMPAZINE) 10 mg tablet    sertraline (ZOLOFT) 100 mg tablet    Allergies   Allergen Reactions    No Active Allergies            Objective     There were no vitals taken for this visit  There is no height or weight on file to calculate BMI  PHYSICAL EXAM:      General Appearance:   Alert, cooperative, no distress   HEENT:   Normocephalic, atraumatic, anicteric      Neck:  Supple, symmetrical, trachea midline   Lungs:   Clear to auscultation bilaterally; no rales, rhonchi or wheezing; respirations unlabored    Heart[de-identified]   Regular rate and rhythm; no murmur, rub, or gallop  Abdomen:   Soft, non-tender, non-distended; normal bowel sounds; no masses, no organomegaly    Genitalia:   Deferred    Rectal:   Deferred    Extremities:  No cyanosis, clubbing or edema    Pulses:  2+ and symmetric    Skin:  No jaundice, rashes, or lesions    Lymph nodes:  No palpable cervical lymphadenopathy        Lab Results:   No visits with results within 1 Day(s) from this visit     Latest known visit with results is:   Admission on 11/22/2019, Discharged on 11/23/2019   Component Date Value    WBC 11/22/2019 13 36*    RBC 11/22/2019 4 64     Hemoglobin 11/22/2019 13 6     Hematocrit 11/22/2019 41 3     MCV 11/22/2019 89     MCH 11/22/2019 29 3     MCHC 11/22/2019 32 9     RDW 11/22/2019 12 9     MPV 11/22/2019 9 6     Platelets 93/77/0033 311     nRBC 11/22/2019 0     Neutrophils Relative 11/22/2019 74     Immat GRANS % 11/22/2019 0     Lymphocytes Relative 11/22/2019 20     Monocytes Relative 11/22/2019 5     Eosinophils Relative 11/22/2019 1     Basophils Relative 11/22/2019 0     Neutrophils Absolute 11/22/2019 9 85*    Immature Grans Absolute 11/22/2019 0 06     Lymphocytes Absolute 11/22/2019 2 71     Monocytes Absolute 11/22/2019 0 62     Eosinophils Absolute 11/22/2019 0 09     Basophils Absolute 11/22/2019 0 03     Sodium 11/22/2019 143     Potassium 11/22/2019 3 5     Chloride 11/22/2019 106     CO2 11/22/2019 24     ANION GAP 11/22/2019 13     BUN 11/22/2019 12     Creatinine 11/22/2019 0 93     Glucose 11/22/2019 93     Calcium 11/22/2019 9 0     AST 11/22/2019 12     ALT 11/22/2019 20     Alkaline Phosphatase 11/22/2019 59     Total Protein 11/22/2019 7 3     Albumin 11/22/2019 3 9     Total Bilirubin 11/22/2019 0 30     eGFR 11/22/2019 92     Troponin I 11/22/2019 <0 02     Troponin I 11/22/2019 <0 02     Troponin I 11/23/2019 <0 02     Platelets 98/81/7405 258     MPV 11/22/2019 9 7     WBC 11/23/2019 6 44     RBC 11/23/2019 4 57     Hemoglobin 11/23/2019 13 3     Hematocrit 11/23/2019 41 1     MCV 11/23/2019 90     MCH 11/23/2019 29 1     MCHC 11/23/2019 32 4     RDW 11/23/2019 12 9     MPV 11/23/2019 9 6     Platelets 13/31/2688 268     nRBC 11/23/2019 0     Neutrophils Relative 11/23/2019 83*    Immat GRANS % 11/23/2019 0     Lymphocytes Relative 11/23/2019 13*    Monocytes Relative 11/23/2019 4     Eosinophils Relative 11/23/2019 0     Basophils Relative 11/23/2019 0     Neutrophils Absolute 11/23/2019 5 30     Immature Grans Absolute 11/23/2019 0 02     Lymphocytes Absolute 11/23/2019 0 82     Monocytes Absolute 11/23/2019 0 27     Eosinophils Absolute 11/23/2019 0 01     Basophils Absolute 11/23/2019 0 02     Sodium 11/23/2019 144     Potassium 11/23/2019 3 9     Chloride 11/23/2019 108     CO2 11/23/2019 25     ANION GAP 11/23/2019 11     BUN 11/23/2019 10     Creatinine 11/23/2019 0 88     Glucose 11/23/2019 113     Glucose, Fasting 11/23/2019 113*    Calcium 11/23/2019 8 8     eGFR 11/23/2019 97     Cholesterol 11/23/2019 158     Triglycerides 11/23/2019 94     HDL, Direct 11/23/2019 51     LDL Calculated 11/23/2019 88     Non-HDL-Chol (CHOL-HDL) 11/23/2019 107     Ventricular Rate 11/22/2019 71     Atrial Rate 11/22/2019 71     IL Interval 11/22/2019 184     QRSD Interval 11/22/2019 96     QT Interval 11/22/2019 394     QTC Interval 11/22/2019 428     P Axis 11/22/2019 70     QRS Axis 11/22/2019 7     T Wave Axis 11/22/2019 64          Radiology Results:   Xr Chest 1 View Portable    Result Date: 11/22/2019  Narrative: CHEST INDICATION:   chest pain  COMPARISON:  May 17, 2019 EXAM PERFORMED/VIEWS:  XR CHEST PORTABLE FINDINGS: Cardiomediastinal silhouette appears unremarkable  There is mild subsegmental atelectasis in the left lower lobe  The lungs and pleural spaces are otherwise clear  Osseous structures appear within normal limits for patient age  Impression: Mild left lower lobe subsegmental atelectasis  Workstation performed: [id number]     Xr Thumb Right First Digit-min 2v    Result Date: 11/23/2019  Narrative: RIGHT FINGER INDICATION:   M18 11: Unilateral primary osteoarthritis of first carpometacarpal joint, right hand  Right hand pain COMPARISON:  None VIEWS:  XR THUMB RIGHT FIRST DIGIT-MIN 2V For the purposes of institution wide universal language the following terms will apply: (thumb=1st digit/finger, index finger=2nd digit/finger, long finger=3rd digit/finger, ring=4th digit/finger and small finger=5th digit/finger) FINDINGS: There is no acute fracture or dislocation   There is severe osteoarthritis of the 1st carpometacarpal joint with joint space narrowing, marginal spurring and subluxation  Mild to moderate osteoarthritis involves the 1st interphalangeal and metacarpophalangeal joints, and there is also arthritis of the distal interphalangeal joints of the 2nd through 5th fingers and 5th PIP joint No lytic or blastic lesion  Soft tissues are unremarkable  Impression: Multiarticular right hand osteoarthritis    Severe osteoarthritis of the 1st carpometacarpal joint Workstation performed: [id number]

## 2019-12-06 NOTE — DISCHARGE INSTRUCTIONS
[place] diet  Cipro twice daily to fight infection  Metronidazole 3 times daily to fight infection  Zofran if needed for nausea  Percocet 1 every 6 hours if needed for pain caution narcotic will make a sleepy  Tylenol for mild pain  Follow up with your gastroenterologist or return increasing pain fever any problems

## 2019-12-09 ENCOUNTER — TELEPHONE (OUTPATIENT)
Dept: GASTROENTEROLOGY | Facility: CLINIC | Age: 54
End: 2019-12-09

## 2019-12-09 NOTE — TELEPHONE ENCOUNTER
Tarik Carrion - Patient called went to [address] [facility] ER - DX with Diverticulitis  Doing Better   Would like to speak with Tarik Carrion about this please call Roly Lozano at 21  ty

## 2019-12-16 ENCOUNTER — OFFICE VISIT (OUTPATIENT)
Dept: GASTROENTEROLOGY | Facility: CLINIC | Age: 54
End: 2019-12-16
Payer: MEDICARE

## 2019-12-16 VITALS
HEART RATE: 80 BPM | DIASTOLIC BLOOD PRESSURE: 80 MMHG | HEIGHT: 70 IN | SYSTOLIC BLOOD PRESSURE: 120 MMHG | WEIGHT: 206 LBS | BODY MASS INDEX: 29.49 KG/M2

## 2019-12-16 DIAGNOSIS — K57.92 ACUTE DIVERTICULITIS: ICD-10-CM

## 2019-12-16 DIAGNOSIS — K52.839 MICROSCOPIC COLITIS, UNSPECIFIED MICROSCOPIC COLITIS TYPE: Primary | ICD-10-CM

## 2019-12-16 PROCEDURE — 99213 OFFICE O/P EST LOW 20 MIN: CPT | Performed by: PHYSICIAN ASSISTANT

## 2019-12-16 NOTE — PROGRESS NOTES
[facility]  Scott Ying 53 y o  male MRN: [id number]  Encounter: [id number]          ASSESSMENT AND PLAN:      1  Microscopic colitis, unspecified microscopic colitis type  2  Acute diverticulitis  Will start Bentyl b i d     Will complete antibiotic course for diverticulitis  No plans for repeat colonoscopy at this time  Patient will call the office with return of symptoms  ______________________________________________________________________    SUBJECTIVE:    58-year-old male who is here for follow-up of microscopic colitis and a recent attack of diverticulitis  Patient was in the [facility] Emergency Department on December 6, 2019 and diagnosed with diverticulitis  Patient is on treatment currently with antibiotics  Patient is reporting improvement  He is still reporting some abdominal cramping but all in all he is better  He denies any nausea vomiting  He reports his weight is stable  He denies any melena or rectal bleeding  Patient's colonoscopy from September 2019 was reviewed  REVIEW OF SYSTEMS IS OTHERWISE NEGATIVE  Historical Information   Past Medical History:   Diagnosis Date    Diaz's esophagus     Colon polyp     Coronary artery disease     Depression     Diverticulitis     GERD (gastroesophageal reflux disease)     Hemorrhoid     History of heart artery stent     Hyperlipidemia     Hypertension     Microscopic colitis      Past Surgical History:   Procedure Laterality Date    ABDOMINAL SURGERY      radio frequency ablation    BONE GRAFT Left 2018    CARPAL TUNNEL RELEASE Right     COLONOSCOPY      EGD AND COLONOSCOPY      ESOPHAGOGASTRODUODENOSCOPY N/A 2/15/2018    Procedure: ESOPHAGOGASTRODUODENOSCOPY (EGD); Surgeon: Africa Baez MD;  Location: [facility];  Service: Gastroenterology    ESOPHAGOGASTRODUODENOSCOPY N/A 12/10/2018    Procedure: ESOPHAGOGASTRODUODENOSCOPY (EGD);   Surgeon: Brian Sanders III, MD;  Location: [facility]; Service: Gastroenterology    TONSILLECTOMY       Social History   Social History     Substance and Sexual Activity   Alcohol Use Never    Frequency: Never    Comment: quit 3 years     Social History     Substance and Sexual Activity   Drug Use Yes    Frequency: 3 0 times per week    Types: Marijuana    Comment: last used      Social History     Tobacco Use   Smoking Status Former Smoker    Last attempt to quit: 2007    Years since quittin 9   Smokeless Tobacco Former User    Quit date: 1998     Family History   Problem Relation Age of Onset    Heart disease Father     Cancer Sister        Meds/Allergies       Current Outpatient Medications:     aspirin (ECOTRIN LOW STRENGTH) 81 mg EC tablet    atorvastatin (LIPITOR) 40 mg tablet    budesonide (ENTOCORT EC) 3 MG capsule    BUDESONIDE PO    buPROPion (WELLBUTRIN) 75 mg tablet    busPIRone (BUSPAR) 10 mg tablet    ciprofloxacin (CIPRO) 500 mg tablet    clonazePAM (KLONOPIN) 1 mg tablet    clopidogrel (PLAVIX) 75 mg tablet    dicyclomine (BENTYL) 20 mg tablet    metroNIDAZOLE (FLAGYL) 500 mg tablet    nitroglycerin (NITROSTAT) 0 4 mg SL tablet    omeprazole (PriLOSEC) 20 mg delayed release capsule    ondansetron (ZOFRAN ODT) 4 mg disintegrating tablet    ondansetron (ZOFRAN-ODT) 4 mg disintegrating tablet    oxyCODONE-acetaminophen (PERCOCET) 5-325 mg per tablet    oxyCODONE-acetaminophen (PERCOCET) 5-325 mg per tablet    PEG 3350-KCl-NaCl-NaSulf-Na Asc-C (MOVIPREP) 100 g    prochlorperazine (COMPAZINE) 10 mg tablet    sertraline (ZOLOFT) 100 mg tablet    Allergies   Allergen Reactions    No Active Allergies            Objective     Blood pressure 120/80, pulse 80, height 5' 10" (1 778 m), weight 93 4 kg (206 lb)  Body mass index is 29 56 kg/m²        PHYSICAL EXAM:      General Appearance:   Alert, cooperative, no distress   HEENT:   Normocephalic, atraumatic, anicteric      Neck:  Supple, symmetrical, trachea midline   Lungs:   Clear to auscultation bilaterally; no rales, rhonchi or wheezing; respirations unlabored    Heart[de-identified]   Regular rate and rhythm; no murmur, rub, or gallop  Abdomen:   Soft, non-tender, non-distended; normal bowel sounds; no masses, no organomegaly    Genitalia:   Deferred    Rectal:   Deferred    Extremities:  No cyanosis, clubbing or edema    Pulses:  2+ and symmetric    Skin:  No jaundice, rashes, or lesions    Lymph nodes:  No palpable cervical lymphadenopathy        Lab Results:   No visits with results within 1 Day(s) from this visit     Latest known visit with results is:   Admission on 12/06/2019, Discharged on 12/06/2019   Component Date Value    WBC 12/06/2019 16 16*    RBC 12/06/2019 5 07     Hemoglobin 12/06/2019 14 6     Hematocrit 12/06/2019 44 8     MCV 12/06/2019 88     MCH 12/06/2019 28 8     MCHC 12/06/2019 32 6     RDW 12/06/2019 13 1     MPV 12/06/2019 9 6     Platelets 65/97/5363 300     nRBC 12/06/2019 0     Sodium 12/06/2019 141     Potassium 12/06/2019 3 8     Chloride 12/06/2019 104     CO2 12/06/2019 25     ANION GAP 12/06/2019 12     BUN 12/06/2019 11     Creatinine 12/06/2019 0 99     Glucose 12/06/2019 154*    Calcium 12/06/2019 9 6     eGFR 12/06/2019 85     Troponin I 12/06/2019 <0 02     Total Bilirubin 12/06/2019 0 40     Bilirubin, Direct 12/06/2019 0 06     Alkaline Phosphatase 12/06/2019 70     AST 12/06/2019 10     ALT 12/06/2019 20     Total Protein 12/06/2019 7 7     Albumin 12/06/2019 4 2     Lipase 12/06/2019 49*    Segmented % 12/06/2019 88*    Bands % 12/06/2019 3     Lymphocytes % 12/06/2019 7*    Monocytes % 12/06/2019 1*    Eosinophils, % 12/06/2019 1     Basophils % 12/06/2019 0     Absolute Neutrophils 12/06/2019 14 71*    Lymphocytes Absolute 12/06/2019 1 13     Monocytes Absolute 12/06/2019 0 16     Eosinophils Absolute 12/06/2019 0 16     Basophils Absolute 12/06/2019 0 00     Total Counted 12/06/2019 100     Platelet Estimate 35/77/2019 Adequate     Ventricular Rate 12/06/2019 57     Atrial Rate 12/06/2019 57     NH Interval 12/06/2019 174     QRSD Interval 12/06/2019 90     QT Interval 12/06/2019 420     QTC Interval 12/06/2019 408     P Axis 12/06/2019 64     QRS Axis 12/06/2019 19     T Wave Axis 12/06/2019 59          Radiology Results:   Xr Chest 1 View Portable    Result Date: 11/22/2019  Narrative: CHEST INDICATION:   chest pain  COMPARISON:  May 17, 2019 EXAM PERFORMED/VIEWS:  XR CHEST PORTABLE FINDINGS: Cardiomediastinal silhouette appears unremarkable  There is mild subsegmental atelectasis in the left lower lobe  The lungs and pleural spaces are otherwise clear  Osseous structures appear within normal limits for patient age  Impression: Mild left lower lobe subsegmental atelectasis  Workstation performed: [id number]     Xr Thumb Right First Digit-min 2v    Result Date: 11/23/2019  Narrative: RIGHT FINGER INDICATION:   M18 11: Unilateral primary osteoarthritis of first carpometacarpal joint, right hand  Right hand pain COMPARISON:  None VIEWS:  XR THUMB RIGHT FIRST DIGIT-MIN 2V For the purposes of institution wide universal language the following terms will apply: (thumb=1st digit/finger, index finger=2nd digit/finger, long finger=3rd digit/finger, ring=4th digit/finger and small finger=5th digit/finger) FINDINGS: There is no acute fracture or dislocation  There is severe osteoarthritis of the 1st carpometacarpal joint with joint space narrowing, marginal spurring and subluxation  Mild to moderate osteoarthritis involves the 1st interphalangeal and metacarpophalangeal joints, and there is also arthritis of the distal interphalangeal joints of the 2nd through 5th fingers and 5th PIP joint No lytic or blastic lesion  Soft tissues are unremarkable  Impression: Multiarticular right hand osteoarthritis    Severe osteoarthritis of the 1st carpometacarpal joint Workstation performed: [id number]     Ct Abdomen Pelvis With Contrast    Result Date: 12/6/2019  Narrative: CT ABDOMEN AND PELVIS WITH IV CONTRAST INDICATION:   Diffuse abdominal pain history of ulcer colitis, history of diverticulitis  COMPARISON:  October 3, 2019 TECHNIQUE:  CT examination of the abdomen and pelvis was performed  Axial, sagittal, and coronal 2D reformatted images were created from the source data and submitted for interpretation  Radiation dose length product (DLP) for this visit:  580 mGy-cm   This examination, like all CT scans performed in the [facility], was performed utilizing techniques to minimize radiation dose exposure, including the use of iterative reconstruction and automated exposure control  IV Contrast:  100 mL of iohexol (OMNIPAQUE) Enteric Contrast:  Enteric contrast was not administered  FINDINGS: ABDOMEN LOWER CHEST:  No clinically significant abnormality identified in the visualized lower chest  LIVER/BILIARY TREE:  Unremarkable  GALLBLADDER:  No calcified gallstones  No pericholecystic inflammatory change  SPLEEN:  Unremarkable  PANCREAS: Mild dilation of the dorsal pancreatic duct seen measuring about 7 mm, unchanged ADRENAL GLANDS:  Unremarkable  KIDNEYS/URETERS:  Unremarkable  No hydronephrosis  STOMACH AND BOWEL:  Diverticulosis seen  There is pericolonic inflammatory stranding in relation to the small segment of the distal descending colon at its junction with the sigmoid colon compatible with diverticulitis There is no pneumatosis and there is no free air There is no pericolonic fluid collection Mild chronic diffuse colonic wall thickening related to patient's underlying ulcerative colitis APPENDIX:  No findings to suggest appendicitis  ABDOMINOPELVIC CAVITY:  No ascites or free intraperitoneal air  No lymphadenopathy  VESSELS:  The celiac trunk, SMA are patent Iliac vessels are patent PELVIS REPRODUCTIVE ORGANS:  Unremarkable for patient's age  URINARY BLADDER:  Unremarkable  ABDOMINAL WALL/INGUINAL REGIONS:  Unremarkable  OSSEOUS STRUCTURES:  No acute fracture or destructive osseous lesion  Impression: Pericolonic inflammatory in relation of the small segment of the lower descending colon adjacent to the sigmoid colon compatible with diverticulitis No free air No pneumatosis Consider colonoscopy when the acute illness subsides The study was marked in EPIC for immediate notification   Workstation performed: [id number]

## 2019-12-16 NOTE — PATIENT INSTRUCTIONS
Diverticulitis   WHAT YOU NEED TO KNOW:   Diverticulitis is a condition that causes small pockets along your intestine called diverticula to become inflamed or infected  This is caused by hard bowel movements, food, or bacteria that get stuck in the pockets  DISCHARGE INSTRUCTIONS:   Return to the emergency department if:   · You have bowel movement or foul-smelling discharge leaking from your vagina or in your urine  · You have severe diarrhea  · You urinate less than usual or not at all  · You are not able to have a bowel movement  · You cannot stop vomiting  · You have severe abdominal pain, a fever, and your abdomen is larger than usual      · You have new or increased blood in your bowel movements  Contact your healthcare provider if:   · You have pain when you urinate  · Your symptoms get worse or do not go away  · You have questions or concerns about your condition or care  Medicines:   · Antibiotics  may be given to help treat a bacterial infection  · Prescription pain medicine  may be given  Ask your healthcare provider how to take this medicine safely  Some prescription pain medicines contain acetaminophen  Do not take other medicines that contain acetaminophen without talking to your healthcare provider  Too much acetaminophen may cause liver damage  Prescription pain medicine may cause constipation  Ask your healthcare provider how to prevent or treat constipation  · Take your medicine as directed  Contact your healthcare provider if you think your medicine is not helping or if you have side effects  Tell him or her if you are allergic to any medicine  Keep a list of the medicines, vitamins, and herbs you take  Include the amounts, and when and why you take them  Bring the list or the pill bottles to follow-up visits  Carry your medicine list with you in case of an emergency  Clear liquid diet:  A clear liquid diet includes any liquids that you can see through  Examples include water, ginger-itz, cranberry or apple juice, frozen fruit ice, or broth  Stay on a clear liquid diet until your symptoms are gone, or as directed  Follow up with your healthcare provider as directed: You may need to return for a colonoscopy  When your symptoms are gone, you may need a low-fat, high-fiber diet to prevent diverticulitis from developing again  Your healthcare provider or dietitian can help you create meal plans  Write down your questions so you remember to ask them during your visits  © 2017 [facility] Information is for End User's use only and may not be sold, redistributed or otherwise used for commercial purposes  All illustrations and images included in CareNotes® are the copyrighted property of [employer]  or [facility]  The above information is an  only  It is not intended as medical advice for individual conditions or treatments  Talk to your doctor, nurse or pharmacist before following any medical regimen to see if it is safe and effective for you

## 2020-02-05 ENCOUNTER — APPOINTMENT (EMERGENCY)
Dept: CT IMAGING | Facility: HOSPITAL | Age: 55
End: 2020-02-05
Payer: COMMERCIAL

## 2020-02-05 ENCOUNTER — HOSPITAL ENCOUNTER (EMERGENCY)
Facility: HOSPITAL | Age: 55
Discharge: HOME | End: 2020-02-05
Attending: EMERGENCY MEDICINE | Admitting: EMERGENCY MEDICINE
Payer: COMMERCIAL

## 2020-02-05 VITALS
RESPIRATION RATE: 18 BRPM | BODY MASS INDEX: 30.15 KG/M2 | SYSTOLIC BLOOD PRESSURE: 150 MMHG | OXYGEN SATURATION: 93 % | WEIGHT: 210.1 LBS | TEMPERATURE: 97.9 F | HEART RATE: 75 BPM | DIASTOLIC BLOOD PRESSURE: 70 MMHG

## 2020-02-05 DIAGNOSIS — K85.90 PANCREATITIS: Primary | ICD-10-CM

## 2020-02-05 DIAGNOSIS — R11.2 NAUSEA & VOMITING: ICD-10-CM

## 2020-02-05 LAB
ALBUMIN SERPL BCP-MCNC: 3.7 G/DL (ref 3.5–5)
ALP SERPL-CCNC: 64 U/L (ref 46–116)
ALT SERPL W P-5'-P-CCNC: 27 U/L (ref 12–78)
ANION GAP SERPL CALCULATED.3IONS-SCNC: 11 MMOL/L (ref 4–13)
AST SERPL W P-5'-P-CCNC: 16 U/L (ref 5–45)
BASOPHILS # BLD AUTO: 0.03 THOUSANDS/ΜL (ref 0–0.1)
BASOPHILS NFR BLD AUTO: 0 % (ref 0–1)
BILIRUB SERPL-MCNC: 0.3 MG/DL (ref 0.2–1)
BILIRUB UR QL STRIP: NEGATIVE
BUN SERPL-MCNC: 17 MG/DL (ref 5–25)
CALCIUM SERPL-MCNC: 8.7 MG/DL (ref 8.3–10.1)
CHLORIDE SERPL-SCNC: 101 MMOL/L (ref 100–108)
CLARITY UR: CLEAR
CO2 SERPL-SCNC: 28 MMOL/L (ref 21–32)
COLOR UR: YELLOW
CREAT SERPL-MCNC: 1 MG/DL (ref 0.6–1.3)
EOSINOPHIL # BLD AUTO: 0.1 THOUSAND/ΜL (ref 0–0.61)
EOSINOPHIL NFR BLD AUTO: 1 % (ref 0–6)
ERYTHROCYTE [DISTWIDTH] IN BLOOD BY AUTOMATED COUNT: 13.2 % (ref 11.6–15.1)
GFR SERPL CREATININE-BSD FRML MDRD: 84 ML/MIN/1.73SQ M
GLUCOSE SERPL-MCNC: 143 MG/DL (ref 65–140)
GLUCOSE UR STRIP-MCNC: NEGATIVE MG/DL
HCT VFR BLD AUTO: 42.3 % (ref 36.5–49.3)
HGB BLD-MCNC: 13.9 G/DL (ref 12–17)
HGB UR QL STRIP.AUTO: NEGATIVE
IMM GRANULOCYTES # BLD AUTO: 0.04 THOUSAND/UL (ref 0–0.2)
IMM GRANULOCYTES NFR BLD AUTO: 0 % (ref 0–2)
KETONES UR STRIP-MCNC: NEGATIVE MG/DL
LEUKOCYTE ESTERASE UR QL STRIP: NEGATIVE
LIPASE SERPL-CCNC: 1402 U/L (ref 73–393)
LYMPHOCYTES # BLD AUTO: 1.31 THOUSANDS/ΜL (ref 0.6–4.47)
LYMPHOCYTES NFR BLD AUTO: 10 % (ref 14–44)
MCH RBC QN AUTO: 29.7 PG (ref 26.8–34.3)
MCHC RBC AUTO-ENTMCNC: 32.9 G/DL (ref 31.4–37.4)
MCV RBC AUTO: 90 FL (ref 82–98)
MONOCYTES # BLD AUTO: 0.66 THOUSAND/ΜL (ref 0.17–1.22)
MONOCYTES NFR BLD AUTO: 5 % (ref 4–12)
NEUTROPHILS # BLD AUTO: 10.78 THOUSANDS/ΜL (ref 1.85–7.62)
NEUTS SEG NFR BLD AUTO: 84 % (ref 43–75)
NITRITE UR QL STRIP: NEGATIVE
NRBC BLD AUTO-RTO: 0 /100 WBCS
PH UR STRIP.AUTO: 6 [PH]
PLATELET # BLD AUTO: 246 THOUSANDS/UL (ref 149–390)
PMV BLD AUTO: 9.9 FL (ref 8.9–12.7)
POTASSIUM SERPL-SCNC: 3.1 MMOL/L (ref 3.5–5.3)
PROT SERPL-MCNC: 7.3 G/DL (ref 6.4–8.2)
PROT UR STRIP-MCNC: NEGATIVE MG/DL
RBC # BLD AUTO: 4.68 MILLION/UL (ref 3.88–5.62)
SODIUM SERPL-SCNC: 140 MMOL/L (ref 136–145)
SP GR UR STRIP.AUTO: <=1.005 (ref 1–1.03)
UROBILINOGEN UR QL STRIP.AUTO: 0.2 E.U./DL
WBC # BLD AUTO: 12.92 THOUSAND/UL (ref 4.31–10.16)

## 2020-02-05 PROCEDURE — 85025 COMPLETE CBC W/AUTO DIFF WBC: CPT | Performed by: PHYSICIAN ASSISTANT

## 2020-02-05 PROCEDURE — 99285 EMERGENCY DEPT VISIT HI MDM: CPT | Performed by: PHYSICIAN ASSISTANT

## 2020-02-05 PROCEDURE — 96361 HYDRATE IV INFUSION ADD-ON: CPT

## 2020-02-05 PROCEDURE — 96365 THER/PROPH/DIAG IV INF INIT: CPT

## 2020-02-05 PROCEDURE — 36415 COLL VENOUS BLD VENIPUNCTURE: CPT | Performed by: PHYSICIAN ASSISTANT

## 2020-02-05 PROCEDURE — 81003 URINALYSIS AUTO W/O SCOPE: CPT | Performed by: PHYSICIAN ASSISTANT

## 2020-02-05 PROCEDURE — 83690 ASSAY OF LIPASE: CPT | Performed by: PHYSICIAN ASSISTANT

## 2020-02-05 PROCEDURE — 74177 CT ABD & PELVIS W/CONTRAST: CPT

## 2020-02-05 PROCEDURE — 96375 TX/PRO/DX INJ NEW DRUG ADDON: CPT

## 2020-02-05 PROCEDURE — 99284 EMERGENCY DEPT VISIT MOD MDM: CPT

## 2020-02-05 PROCEDURE — 96376 TX/PRO/DX INJ SAME DRUG ADON: CPT

## 2020-02-05 PROCEDURE — 96366 THER/PROPH/DIAG IV INF ADDON: CPT

## 2020-02-05 PROCEDURE — 80053 COMPREHEN METABOLIC PANEL: CPT | Performed by: PHYSICIAN ASSISTANT

## 2020-02-05 RX ORDER — MORPHINE SULFATE 10 MG/ML
6 INJECTION, SOLUTION INTRAMUSCULAR; INTRAVENOUS ONCE
Status: COMPLETED | OUTPATIENT
Start: 2020-02-05 | End: 2020-02-05

## 2020-02-05 RX ORDER — HYDROMORPHONE HCL/PF 1 MG/ML
0.5 SYRINGE (ML) INJECTION ONCE
Status: COMPLETED | OUTPATIENT
Start: 2020-02-05 | End: 2020-02-05

## 2020-02-05 RX ORDER — ONDANSETRON 2 MG/ML
4 INJECTION INTRAMUSCULAR; INTRAVENOUS ONCE
Status: COMPLETED | OUTPATIENT
Start: 2020-02-05 | End: 2020-02-05

## 2020-02-05 RX ORDER — ONDANSETRON 2 MG/ML
4 INJECTION INTRAMUSCULAR; INTRAVENOUS EVERY 4 HOURS PRN
Status: DISCONTINUED | OUTPATIENT
Start: 2020-02-05 | End: 2020-02-05 | Stop reason: HOSPADM

## 2020-02-05 RX ORDER — SODIUM CHLORIDE, SODIUM LACTATE, POTASSIUM CHLORIDE, CALCIUM CHLORIDE 600; 310; 30; 20 MG/100ML; MG/100ML; MG/100ML; MG/100ML
1000 INJECTION, SOLUTION INTRAVENOUS ONCE
Status: COMPLETED | OUTPATIENT
Start: 2020-02-05 | End: 2020-02-05

## 2020-02-05 RX ORDER — POTASSIUM CHLORIDE 14.9 MG/ML
20 INJECTION INTRAVENOUS ONCE
Status: COMPLETED | OUTPATIENT
Start: 2020-02-05 | End: 2020-02-05

## 2020-02-05 RX ORDER — ONDANSETRON 4 MG/1
4 TABLET, FILM COATED ORAL EVERY 8 HOURS PRN
Qty: 20 TABLET | Refills: 0 | Status: SHIPPED | OUTPATIENT
Start: 2020-02-05 | End: 2020-02-08

## 2020-02-05 RX ORDER — OXYCODONE HYDROCHLORIDE 5 MG/1
5 TABLET ORAL EVERY 4 HOURS PRN
Qty: 15 TABLET | Refills: 0 | Status: ON HOLD | OUTPATIENT
Start: 2020-02-05 | End: 2020-02-10 | Stop reason: CLARIF

## 2020-02-05 RX ADMIN — IOHEXOL 100 ML: 350 INJECTION, SOLUTION INTRAVENOUS at 09:26

## 2020-02-05 RX ADMIN — ONDANSETRON 4 MG: 2 INJECTION INTRAMUSCULAR; INTRAVENOUS at 08:33

## 2020-02-05 RX ADMIN — ONDANSETRON 4 MG: 2 INJECTION INTRAMUSCULAR; INTRAVENOUS at 12:02

## 2020-02-05 RX ADMIN — HYDROMORPHONE HYDROCHLORIDE 0.5 MG: 1 INJECTION, SOLUTION INTRAMUSCULAR; INTRAVENOUS; SUBCUTANEOUS at 12:04

## 2020-02-05 RX ADMIN — SODIUM CHLORIDE, SODIUM LACTATE, POTASSIUM CHLORIDE, AND CALCIUM CHLORIDE 1000 ML: .6; .31; .03; .02 INJECTION, SOLUTION INTRAVENOUS at 10:16

## 2020-02-05 RX ADMIN — MORPHINE SULFATE 6 MG: 10 INJECTION INTRAVENOUS at 08:34

## 2020-02-05 RX ADMIN — POTASSIUM CHLORIDE 20 MEQ: 200 INJECTION, SOLUTION INTRAVENOUS at 10:16

## 2020-02-05 RX ADMIN — SODIUM CHLORIDE 1000 ML: 0.9 INJECTION, SOLUTION INTRAVENOUS at 08:33

## 2020-02-05 NOTE — ED PROVIDER NOTES
History  Chief Complaint   Patient presents with    Vomiting     Pt c/o N/V and intermittent fevers  Patient is a 41-year-old male presents emergency department complaining of nausea diarrhea vomiting low abdominal pains and intermittent subjective fevers at home  He states symptoms have been ongoing for last 2-3 days  On day 1 the pains were severe with frequent episodes of vomiting  On day 2 the pains seems to have gone away and then on day 3, today, his symptoms returned  He has a history significant for diverticulitis  He states that his current abdominal pains feel very similar to previous diverticulitis flare-ups in the past   He states that these pains are worse than previous episodes  He reports multiple episodes of large volumes of emesis at home  He appears slightly ill and in mild distress due to his pain  He is cooperative alert oriented  He has tried taking Zofran at home with minimal relief of his symptoms  He reports being previously hospitalized for his diverticulitis  He denies any other complaints at this time  He denies any known blood in his bowel movements or emesis      No known allergies          Prior to Admission Medications   Prescriptions Last Dose Informant Patient Reported? Taking?    BUDESONIDE PO  Self Yes No   Sig: Take 3 mg by mouth 2 (two) times a day   PEG 3350-KCl-NaCl-NaSulf-Na Asc-C (MOVIPREP) 100 g  Self Yes No   Sig: MoviPrep 100 gram-7 5 gram-2 691 gram oral powder packet   aspirin (ECOTRIN LOW STRENGTH) 81 mg EC tablet  Self Yes No   Sig: Take 81 mg by mouth daily   atorvastatin (LIPITOR) 40 mg tablet  Self No No   Sig: Take 1 tablet (40 mg total) by mouth daily   Patient taking differently: Take 80 mg by mouth daily    buPROPion (WELLBUTRIN) 75 mg tablet  Self Yes No   Sig: Take 75 mg by mouth daily    budesonide (ENTOCORT EC) 3 MG capsule   No No   Sig: Take 1 capsule (3 mg total) by mouth daily 3 po daily for 1 month, 2 po daily for 2 weeks, 1 po daily for 2 weeks     busPIRone (BUSPAR) 10 mg tablet  Self Yes No   Sig: buspirone 10 mg tablet   clonazePAM (KLONOPIN) 1 mg tablet  Self Yes No   Sig: Take 1 5 mg by mouth daily at bedtime    clopidogrel (PLAVIX) 75 mg tablet  Self No No   Sig: Take 1 tablet (75 mg total) by mouth daily   dicyclomine (BENTYL) 20 mg tablet   No No   Sig: Take 1 tablet (20 mg total) by mouth every 6 (six) hours   Patient taking differently: Take 20 mg by mouth every 6 (six) hours as needed    nitroglycerin (NITROSTAT) 0 4 mg SL tablet  Self No No   Sig: Place 1 tablet (0 4 mg total) under the tongue every 5 (five) minutes as needed for chest pain   omeprazole (PriLOSEC) 20 mg delayed release capsule   No No   Sig: Take 1 capsule (20 mg total) by mouth daily   ondansetron (ZOFRAN ODT) 4 mg disintegrating tablet   No No   Sig: Take 1 tablet (4 mg total) by mouth every 8 (eight) hours as needed for nausea or vomiting   ondansetron (ZOFRAN-ODT) 4 mg disintegrating tablet   No No   Sig: Take 1 tablet (4 mg total) by mouth every 8 (eight) hours as needed for nausea or vomiting   oxyCODONE-acetaminophen (PERCOCET) 5-325 mg per tablet  Self Yes No   Sig: oxycodone-acetaminophen 5 mg-325 mg tablet   oxyCODONE-acetaminophen (PERCOCET) 5-325 mg per tablet   No No   Sig: Take 1 tablet by mouth every 6 (six) hours as needed for severe pain for up to 15 dosesMax Daily Amount: 4 tablets   prochlorperazine (COMPAZINE) 10 mg tablet  Self No No   Sig: Take 1 tablet (10 mg total) by mouth 2 (two) times a day as needed for nausea or vomiting   sertraline (ZOLOFT) 100 mg tablet  Self Yes No   Sig: Take 200 mg by mouth daily      Facility-Administered Medications: None       Past Medical History:   Diagnosis Date    Diaz's esophagus     Colon polyp     Coronary artery disease     Depression     Diverticulitis     GERD (gastroesophageal reflux disease)     Hemorrhoid     History of heart artery stent     Hyperlipidemia     Hypertension     Microscopic colitis        Past Surgical History:   Procedure Laterality Date    ABDOMINAL SURGERY      radio frequency ablation    BONE GRAFT Left 2018    CARPAL TUNNEL RELEASE Right     COLONOSCOPY      EGD AND COLONOSCOPY      ESOPHAGOGASTRODUODENOSCOPY N/A 2/15/2018    Procedure: ESOPHAGOGASTRODUODENOSCOPY (EGD); Surgeon: Kevin Moreno MD;  Location: [facility];  Service: Gastroenterology    ESOPHAGOGASTRODUODENOSCOPY N/A 12/10/2018    Procedure: ESOPHAGOGASTRODUODENOSCOPY (EGD); Surgeon: Cherise Neumann MD;  Location: [facility]; Service: Gastroenterology    TONSILLECTOMY         Family History   Problem Relation Age of Onset    Heart disease Father     Cancer Sister      I have reviewed and agree with the history as documented  Social History     Tobacco Use    Smoking status: Former Smoker     Last attempt to quit: 2007     Years since quittin 0    Smokeless tobacco: Former User     Quit date: 1998   Substance Use Topics    Alcohol use: Not Currently     Frequency: Never     Comment: quit 3 years    Drug use: Yes     Frequency: 3 0 times per week     Types: Marijuana     Comment: last used         Review of Systems   Constitutional: Positive for chills and fever  Gastrointestinal: Positive for diarrhea and nausea  All other systems reviewed and are negative  Physical Exam  Physical Exam   Constitutional: He is oriented to person, place, and time  He appears well-developed and well-nourished  He is cooperative  He does not appear ill  No distress  HENT:   Head: Normocephalic and atraumatic  Right Ear: External ear normal    Left Ear: External ear normal    Nose: Nose normal    Mouth/Throat: Oropharynx is clear and moist    Eyes: Pupils are equal, round, and reactive to light  EOM are normal    Neck: Normal range of motion  Neck supple  Cardiovascular: Normal rate, regular rhythm, normal heart sounds and intact distal pulses   Exam reveals no gallop and no friction rub  No murmur heard  Pulmonary/Chest: Effort normal and breath sounds normal  No stridor  No respiratory distress  He has no wheezes  He has no rales  Abdominal: Soft  Normal appearance  He exhibits no distension  Bowel sounds are decreased  There is tenderness in the epigastric area, periumbilical area, suprapubic area, left upper quadrant and left lower quadrant  There is guarding  Decreased bowel sounds  some guarding over the left lower and suprapubic area  Musculoskeletal: Normal range of motion  He exhibits no tenderness  Neurological: He is alert and oriented to person, place, and time  Skin: Skin is warm  Capillary refill takes less than 2 seconds  Nursing note and vitals reviewed        Vital Signs  ED Triage Vitals   Temperature Pulse Respirations Blood Pressure SpO2   02/05/20 0801 02/05/20 0757 02/05/20 0757 02/05/20 0757 02/05/20 0757   97 9 °F (36 6 °C) (!) 45 18 (!) 189/95 94 %      Temp Source Heart Rate Source Patient Position - Orthostatic VS BP Location FiO2 (%)   02/05/20 0801 02/05/20 0757 02/05/20 0757 02/05/20 0757 --   Oral Monitor Lying Right arm       Pain Score       02/05/20 0757       8           Vitals:    02/05/20 0757 02/05/20 0837 02/05/20 0911 02/05/20 1206   BP: (!) 189/95 (!) 175/98 (!) 182/89 150/70   Pulse: (!) 45 55 (!) 48 75   Patient Position - Orthostatic VS: Lying Lying Lying Lying         Visual Acuity      ED Medications  Medications   sodium chloride 0 9 % bolus 1,000 mL (0 mL Intravenous Stopped 2/5/20 0933)   morphine (PF) 10 mg/mL injection 6 mg (6 mg Intravenous Given 2/5/20 0834)   ondansetron (ZOFRAN) injection 4 mg (4 mg Intravenous Given 2/5/20 0833)   iohexol (OMNIPAQUE) 350 MG/ML injection (MULTI-DOSE) 100 mL (100 mL Intravenous Given 2/5/20 0926)   lactated ringers infusion 1,000 mL (0 mL Intravenous Stopped 2/5/20 1230)   potassium chloride 20 mEq IVPB (premix) (0 mEq Intravenous Stopped 2/5/20 1213)   HYDROmorphone (DILAUDID) injection 0 5 mg (0 5 mg Intravenous Given 2/5/20 1204)       Diagnostic Studies  Results Reviewed     Procedure Component Value Units Date/Time    UA (URINE) with reflex to Scope [[id number]] Collected:  02/05/20 1114    Lab Status:  Final result Specimen:  Urine, Clean Catch Updated:  02/05/20 1144     Color, UA Yellow     Clarity, UA Clear     Specific Gravity, UA <=1 005     pH, UA 6 0     Leukocytes, UA Negative     Nitrite, UA Negative     Protein, UA Negative mg/dl      Glucose, UA Negative mg/dl      Ketones, UA Negative mg/dl      Urobilinogen, UA 0 2 E U /dl      Bilirubin, UA Negative     Blood, UA Negative    Lipase [[id number]]  (Abnormal) Collected:  02/05/20 0832    Lab Status:  Final result Specimen:  Blood from Arm, Right Updated:  02/05/20 0913     Lipase 1,402 u/L     CMP [[id number]]  (Abnormal) Collected:  02/05/20 0832    Lab Status:  Final result Specimen:  Blood from Arm, Right Updated:  02/05/20 0903     Sodium 140 mmol/L      Potassium 3 1 mmol/L      Chloride 101 mmol/L      CO2 28 mmol/L      ANION GAP 11 mmol/L      BUN 17 mg/dL      Creatinine 1 00 mg/dL      Glucose 143 mg/dL      Calcium 8 7 mg/dL      AST 16 U/L      ALT 27 U/L      Alkaline Phosphatase 64 U/L      Total Protein 7 3 g/dL      Albumin 3 7 g/dL      Total Bilirubin 0 30 mg/dL      eGFR 84 ml/min/1 73sq m     Narrative:       [place] guidelines for Chronic Kidney Disease (CKD):     Stage 1 with normal or high GFR (GFR > 90 mL/min/1 73 square meters)    Stage 2 Mild CKD (GFR = 60-89 mL/min/1 73 square meters)    Stage 3A Moderate CKD (GFR = 45-59 mL/min/1 73 square meters)    Stage 3B Moderate CKD (GFR = 30-44 mL/min/1 73 square meters)    Stage 4 Severe CKD (GFR = 15-29 mL/min/1 73 square meters)    Stage 5 End Stage CKD (GFR <15 mL/min/1 73 square meters)  Note: GFR calculation is accurate only with a steady state creatinine    CBC and differential [[id number]]  (Abnormal) Collected: 02/05/20 0832    Lab Status:  Final result Specimen:  Blood from Arm, Right Updated:  02/05/20 0844     WBC 12 92 Thousand/uL      RBC 4 68 Million/uL      Hemoglobin 13 9 g/dL      Hematocrit 42 3 %      MCV 90 fL      MCH 29 7 pg      MCHC 32 9 g/dL      RDW 13 2 %      MPV 9 9 fL      Platelets 021 Thousands/uL      nRBC 0 /100 WBCs      Neutrophils Relative 84 %      Immat GRANS % 0 %      Lymphocytes Relative 10 %      Monocytes Relative 5 %      Eosinophils Relative 1 %      Basophils Relative 0 %      Neutrophils Absolute 10 78 Thousands/µL      Immature Grans Absolute 0 04 Thousand/uL      Lymphocytes Absolute 1 31 Thousands/µL      Monocytes Absolute 0 66 Thousand/µL      Eosinophils Absolute 0 10 Thousand/µL      Basophils Absolute 0 03 Thousands/µL                  CT Abdomen pelvis with contrast   Final Result by Aramis Swain MD (02/05 8331)      No CT findings of acute abnormality in the abdomen or pelvis  Colonic diverticulosis without evidence of acute diverticulitis  Workstation performed: [id number]                    Procedures  Procedures         ED Course  ED Course as of Feb 05 1757 Wed Feb 05, 2020   7969 Lipase(!): 1,402   1055 Patient reports improvement in nausea and pain is now 4/10                                  MDM  Number of Diagnoses or Management Options  Nausea & vomiting: new and requires workup  Pancreatitis: new and requires workup  Diagnosis management comments: Pancreatitis  Significantly elevated lipase at 1402  Patient has a history of alcoholism however he states that he stopped drinking several years ago  He states that he had a pancreas issue many years ago when there was a   "blocked duct"  Patient reporting significant improvement in pain and nausea with treatment in the emergency department  Prescriptions will be provided for symptomatic relief  Admission for this patient symptoms were discussed    Patient elected for outpatient treatment at this time  He verbalized understanding that he must return with worsening of his symptoms  He is well-appearing in no acute distress  Patient educated regarding their diagnosis and given return and follow-up instructions  Patient is understanding and in agreement with the treatment plan  There are no questions at the time of discharge  Amount and/or Complexity of Data Reviewed  Clinical lab tests: ordered and reviewed  Tests in the radiology section of CPT®: ordered and reviewed  Independent visualization of images, tracings, or specimens: yes    Risk of Complications, Morbidity, and/or Mortality  Presenting problems: moderate  Diagnostic procedures: low  Management options: low    Patient Progress  Patient progress: improved        Disposition  Final diagnoses:   Nausea & vomiting   Pancreatitis     Time reflects when diagnosis was documented in both MDM as applicable and the Disposition within this note     Time User Action Codes Description Comment    2/5/2020  9:51 AM William Javed Add [R11 2] Nausea & vomiting     2/5/2020  9:51 AM William Javed Add [K85 90] Pancreatitis     2/5/2020  9:51 AM William Javed Modify [R11 2] Nausea & vomiting     2/5/2020  9:51 AM William Javed Modify [K85 90] Pancreatitis       ED Disposition     ED Disposition Condition Date/Time Comment    Discharge Stable Wed Feb 5, 2020 12:47 PM Alicia Orozco discharge to home/self care              Follow-up Information     Follow up With Specialties Details Why Elaine Zheng MD Internal Medicine   Attn: RACHEL Galvan   [place] [id number]            Discharge Medication List as of 2/5/2020 12:52 PM      START taking these medications    Details   ondansetron (ZOFRAN) 4 mg tablet Take 1 tablet (4 mg total) by mouth every 8 (eight) hours as needed for nausea or vomiting, Starting Wed 2/5/2020, Normal      oxyCODONE (ROXICODONE) 5 mg immediate release tablet Take 1 tablet (5 mg total) by mouth every 4 (four) hours as needed for moderate pain for up to 15 dosesMax Daily Amount: 30 mg, Starting Wed 2/5/2020, Normal         CONTINUE these medications which have NOT CHANGED    Details   aspirin (ECOTRIN LOW STRENGTH) 81 mg EC tablet Take 81 mg by mouth daily, Historical Med      atorvastatin (LIPITOR) 40 mg tablet Take 1 tablet (40 mg total) by mouth daily, Starting Mon 1/7/2019, Normal      !! budesonide (ENTOCORT EC) 3 MG capsule Take 1 capsule (3 mg total) by mouth daily 3 po daily for 1 month, 2 po daily for 2 weeks, 1 po daily for 2 weeks  , Starting Fri 11/15/2019, Normal      !!  BUDESONIDE PO Take 3 mg by mouth 2 (two) times a day, Historical Med      buPROPion (WELLBUTRIN) 75 mg tablet Take 75 mg by mouth daily , Historical Med      busPIRone (BUSPAR) 10 mg tablet buspirone 10 mg tablet, Historical Med      clonazePAM (KLONOPIN) 1 mg tablet Take 1 5 mg by mouth daily at bedtime , Historical Med      clopidogrel (PLAVIX) 75 mg tablet Take 1 tablet (75 mg total) by mouth daily, Starting Wed 4/17/2019, Normal      dicyclomine (BENTYL) 20 mg tablet Take 1 tablet (20 mg total) by mouth every 6 (six) hours, Starting Fri 11/15/2019, Normal      nitroglycerin (NITROSTAT) 0 4 mg SL tablet Place 1 tablet (0 4 mg total) under the tongue every 5 (five) minutes as needed for chest pain, Starting Mon 1/7/2019, Normal      omeprazole (PriLOSEC) 20 mg delayed release capsule Take 1 capsule (20 mg total) by mouth daily, Starting Fri 12/6/2019, Normal      !! ondansetron (ZOFRAN ODT) 4 mg disintegrating tablet Take 1 tablet (4 mg total) by mouth every 8 (eight) hours as needed for nausea or vomiting, Starting Fri 12/6/2019, Normal      !! ondansetron (ZOFRAN-ODT) 4 mg disintegrating tablet Take 1 tablet (4 mg total) by mouth every 8 (eight) hours as needed for nausea or vomiting, Starting Fri 12/6/2019, Normal      !! oxyCODONE-acetaminophen (PERCOCET) 5-325 mg per tablet oxycodone-acetaminophen 5 mg-325 mg tablet, Historical Med      !! oxyCODONE-acetaminophen (PERCOCET) 5-325 mg per tablet Take 1 tablet by mouth every 6 (six) hours as needed for severe pain for up to 15 dosesMax Daily Amount: 4 tablets, Starting Fri 12/6/2019, Normal      PEG 3350-KCl-NaCl-NaSulf-Na Asc-C (MOVIPREP) 100 g MoviPrep 100 gram-7 5 gram-2 691 gram oral powder packet, Historical Med      prochlorperazine (COMPAZINE) 10 mg tablet Take 1 tablet (10 mg total) by mouth 2 (two) times a day as needed for nausea or vomiting, Starting u 10/3/2019, Print      sertraline (ZOLOFT) 100 mg tablet Take 200 mg by mouth daily, Historical Med       !! - Potential duplicate medications found  Please discuss with provider  No discharge procedures on file      ED Provider  Electronically Signed by           Azul Breaux PA-C  02/05/20 1809

## 2020-02-06 ENCOUNTER — TELEPHONE (OUTPATIENT)
Dept: GASTROENTEROLOGY | Facility: CLINIC | Age: 55
End: 2020-02-06

## 2020-02-06 NOTE — TELEPHONE ENCOUNTER
Song Wheeler pt- Pt was in the hospital recently and was diagnosed with pancreatitis and was told that if he started to feel really sick again that he should go back  Pt wanted to make Gayatri aware of this  Pt also wants to know what he should do  Please call [phone]   ty

## 2020-02-07 ENCOUNTER — OFFICE VISIT (OUTPATIENT)
Dept: GASTROENTEROLOGY | Facility: CLINIC | Age: 55
End: 2020-02-07
Payer: COMMERCIAL

## 2020-02-07 ENCOUNTER — PREP FOR PROCEDURE (OUTPATIENT)
Dept: GASTROENTEROLOGY | Facility: CLINIC | Age: 55
End: 2020-02-07

## 2020-02-07 ENCOUNTER — TELEPHONE (OUTPATIENT)
Dept: CARDIOLOGY CLINIC | Facility: CLINIC | Age: 55
End: 2020-02-07

## 2020-02-07 VITALS
BODY MASS INDEX: 29.78 KG/M2 | DIASTOLIC BLOOD PRESSURE: 70 MMHG | HEIGHT: 70 IN | WEIGHT: 208 LBS | HEART RATE: 92 BPM | SYSTOLIC BLOOD PRESSURE: 122 MMHG

## 2020-02-07 DIAGNOSIS — R74.8 ELEVATED LIPASE: ICD-10-CM

## 2020-02-07 DIAGNOSIS — R11.2 NON-INTRACTABLE VOMITING WITH NAUSEA: Primary | ICD-10-CM

## 2020-02-07 DIAGNOSIS — R10.84 GENERALIZED ABDOMINAL PAIN: ICD-10-CM

## 2020-02-07 DIAGNOSIS — R10.84 GENERALIZED ABDOMINAL PAIN: Primary | ICD-10-CM

## 2020-02-07 PROCEDURE — 99213 OFFICE O/P EST LOW 20 MIN: CPT | Performed by: PHYSICIAN ASSISTANT

## 2020-02-07 NOTE — LETTER
February 7, 2020     Rossy Chapa MD  Attn: RACHEL Alberts   [address] [address]    Patient: Bobby Russell   YOB: 1965   Date of Visit: 2/7/2020       Dear Dr Isaac Velarde: Thank you for referring Bobby Russell to me for evaluation  Below are my notes for this consultation  If you have questions, please do not hesitate to call me  I look forward to following your patient along with you  Sincerely,        Denia Dumont PA-C        CC: MD Denia Friedman PA-C  2/7/2020 11:19 AM  Sign at close encounter  [facility] - Outpatient Follow-up Note  Bobby Russell 53 y o  male MRN: [id number]  Encounter: [id number]          ASSESSMENT AND PLAN:      1  Non-intractable vomiting with nausea  2  Generalized abdominal pain  3  Elevated lipase  Will plan MRI MRCP  Will do EGD with biopsy  Will continue current medication regimen  Patient will return to the nearest emergency department if symptoms persist or return     ______________________________________________________________________    SUBJECTIVE:    59-year-old male who is here for hospital follow-up  Patient was in the emergency room 4 days ago with a chief complaint of severe abdominal pain nausea vomiting  Patient reports his nausea and vomiting would not stop  The reports the his lipase was elevated  Patient's CT scan did not show any evidence of pancreatitis  Patient reports the pain became so severe that he could not take it anymore that is why he came to the emergency department  Patient denies any significant melena or rectal bleeding  Patient denies any diarrhea constipation  Patient is currently on medications As prescribed  Patient has had a history of pancreatitis in the past   Patient also has a history of a CBD stone  REVIEW OF SYSTEMS IS OTHERWISE NEGATIVE        Historical Information   Past Medical History:   Diagnosis Date    Diaz's esophagus     Colon polyp     Coronary artery disease     Depression     Diverticulitis     GERD (gastroesophageal reflux disease)     Hemorrhoid     History of heart artery stent     Hyperlipidemia     Hypertension     Microscopic colitis      Past Surgical History:   Procedure Laterality Date    ABDOMINAL SURGERY      radio frequency ablation    BONE GRAFT Left 2018    CARPAL TUNNEL RELEASE Right     COLONOSCOPY      EGD AND COLONOSCOPY      ESOPHAGOGASTRODUODENOSCOPY N/A 2/15/2018    Procedure: ESOPHAGOGASTRODUODENOSCOPY (EGD); Surgeon: Katharina Oh MD;  Location: [facility];  Service: Gastroenterology    ESOPHAGOGASTRODUODENOSCOPY N/A 12/10/2018    Procedure: ESOPHAGOGASTRODUODENOSCOPY (EGD); Surgeon: Tremayne Tabares MD;  [facility]: [facility];   Service: Gastroenterology    TONSILLECTOMY       Social History   Social History     Substance and Sexual Activity   Alcohol Use Not Currently    Frequency: Never    Comment: quit 3 years     Social History     Substance and Sexual Activity   Drug Use Yes    Frequency: 3 0 times per week    Types: Marijuana    Comment: last used      Social History     Tobacco Use   Smoking Status Former Smoker    Last attempt to quit: 2007    Years since quittin 0   Smokeless Tobacco Former User    Quit date: 1998     Family History   Problem Relation Age of Onset    Heart disease Father     Cancer Sister        Meds/Allergies       Current Outpatient Medications:     aspirin (ECOTRIN LOW STRENGTH) 81 mg EC tablet    atorvastatin (LIPITOR) 40 mg tablet    azithromycin (ZITHROMAX) 250 mg tablet    budesonide (ENTOCORT EC) 3 MG capsule    BUDESONIDE PO    buPROPion (WELLBUTRIN SR) 150 mg 12 hr tablet    buPROPion (WELLBUTRIN) 75 mg tablet    busPIRone (BUSPAR) 10 mg tablet    clonazePAM (KLONOPIN) 1 mg tablet    clopidogrel (PLAVIX) 75 mg tablet    dicyclomine (BENTYL) 20 mg tablet    fluticasone (FLONASE) 50 mcg/act nasal spray    nitroglycerin (NITROSTAT) 0 4 mg SL tablet    omeprazole (PriLOSEC) 20 mg delayed release capsule    ondansetron (ZOFRAN ODT) 4 mg disintegrating tablet    ondansetron (ZOFRAN) 4 mg tablet    ondansetron (ZOFRAN-ODT) 4 mg disintegrating tablet    oxyCODONE (ROXICODONE) 5 mg immediate release tablet    oxyCODONE-acetaminophen (PERCOCET) 5-325 mg per tablet    oxyCODONE-acetaminophen (PERCOCET) 5-325 mg per tablet    PEG 3350-KCl-NaCl-NaSulf-Na Asc-C (MOVIPREP) 100 g    prochlorperazine (COMPAZINE) 10 mg tablet    sertraline (ZOLOFT) 100 mg tablet    Allergies   Allergen Reactions    No Active Allergies            Objective     Blood pressure 122/70, pulse 92, height 5' 10" (1 778 m), weight 94 3 kg (208 lb)  Body mass index is 29 84 kg/m²  PHYSICAL EXAM:      General Appearance:   Alert, cooperative, no distress   HEENT:   Normocephalic, atraumatic, anicteric      Neck:  Supple, symmetrical, trachea midline   Lungs:   Clear to auscultation bilaterally; no rales, rhonchi or wheezing; respirations unlabored    Heart[de-identified]   Regular rate and rhythm; no murmur, rub, or gallop  Abdomen:   Soft, non-tender, non-distended; normal bowel sounds; no masses, no organomegaly    Genitalia:   Deferred    Rectal:   Deferred    Extremities:  No cyanosis, clubbing or edema    Pulses:  2+ and symmetric    Skin:  No jaundice, rashes, or lesions    Lymph nodes:  No palpable cervical lymphadenopathy        Lab Results:   No visits with results within 1 Day(s) from this visit     Latest known visit with results is:   Admission on 02/05/2020, Discharged on 02/05/2020   Component Date Value    WBC 02/05/2020 12 92*    RBC 02/05/2020 4 68     Hemoglobin 02/05/2020 13 9     Hematocrit 02/05/2020 42 3     MCV 02/05/2020 90     MCH 02/05/2020 29 7     MCHC 02/05/2020 32 9     RDW 02/05/2020 13 2     MPV 02/05/2020 9 9     Platelets 03/13/9979 246     nRBC 02/05/2020 0     Neutrophils Relative 02/05/2020 84*    Immat GRANS % 02/05/2020 0     Lymphocytes Relative 02/05/2020 10*    Monocytes Relative 02/05/2020 5     Eosinophils Relative 02/05/2020 1     Basophils Relative 02/05/2020 0     Neutrophils Absolute 02/05/2020 10 78*    Immature Grans Absolute 02/05/2020 0 04     Lymphocytes Absolute 02/05/2020 1 31     Monocytes Absolute 02/05/2020 0 66     Eosinophils Absolute 02/05/2020 0 10     Basophils Absolute 02/05/2020 0 03     Sodium 02/05/2020 140     Potassium 02/05/2020 3 1*    Chloride 02/05/2020 101     CO2 02/05/2020 28     ANION GAP 02/05/2020 11     BUN 02/05/2020 17     Creatinine 02/05/2020 1 00     Glucose 02/05/2020 143*    Calcium 02/05/2020 8 7     AST 02/05/2020 16     ALT 02/05/2020 27     Alkaline Phosphatase 02/05/2020 64     Total Protein 02/05/2020 7 3     Albumin 02/05/2020 3 7     Total Bilirubin 02/05/2020 0 30     eGFR 02/05/2020 84     Lipase 02/05/2020 1,402*    Color, UA 02/05/2020 Yellow     Clarity, UA 02/05/2020 Clear     Specific Gravity, UA 02/05/2020 <=1 005     pH, UA 02/05/2020 6 0     Leukocytes, UA 02/05/2020 Negative     Nitrite, UA 02/05/2020 Negative     Protein, UA 02/05/2020 Negative     Glucose, UA 02/05/2020 Negative     Ketones, UA 02/05/2020 Negative     Urobilinogen, UA 02/05/2020 0 2     Bilirubin, UA 02/05/2020 Negative     Blood, UA 02/05/2020 Negative          Radiology Results:   Ct Abdomen Pelvis With Contrast    Result Date: 2/5/2020  Narrative: CT ABDOMEN AND PELVIS WITH IV CONTRAST INDICATION:   Nausea/vomiting Abdominal pain, acute, nonlocalized  COMPARISON:  CT 12/6/2019  MRI 2/25/2019  TECHNIQUE:  CT examination of the abdomen and pelvis was performed  Axial, sagittal, and coronal 2D reformatted images were created from the source data and submitted for interpretation  Radiation dose length product (DLP) for this visit:  578 mGy-cm     This examination, like all CT scans performed in the [facility], was performed utilizing techniques to minimize radiation dose exposure, including the use of iterative reconstruction and automated exposure control  IV Contrast:  100 mL of iohexol (OMNIPAQUE) Enteric Contrast:  Enteric contrast was not administered  FINDINGS: ABDOMEN LOWER CHEST:  No clinically significant abnormality identified in the visualized lower chest  LIVER/BILIARY TREE:  Unremarkable  GALLBLADDER:  No calcified gallstones  No pericholecystic inflammatory change  SPLEEN:  Unremarkable  PANCREAS: Unremarkable  ADRENAL GLANDS:  Unremarkable  KIDNEYS/URETERS:  Unremarkable  No hydronephrosis  STOMACH AND BOWEL:  Colonic diverticulosis without evidence of acute diverticulitis  No disproportionate dilation of small or large bowel loops  APPENDIX:  A normal appendix was visualized  ABDOMINOPELVIC CAVITY:  No ascites or free intraperitoneal air  No lymphadenopathy  VESSELS:  Unremarkable for patient's age  PELVIS REPRODUCTIVE ORGANS:  Mildly enlarged prostate  Symmetric seminal vesicles  URINARY BLADDER:  Unremarkable  ABDOMINAL WALL/INGUINAL REGIONS:  Unremarkable  OSSEOUS STRUCTURES:  No acute fracture or destructive osseous lesion  Impression: No CT findings of acute abnormality in the abdomen or pelvis  Colonic diverticulosis without evidence of acute diverticulitis   Workstation performed: [id number]

## 2020-02-07 NOTE — PROGRESS NOTES
[facility] - Outpatient Follow-up Note  Julio Lambert 53 y o  male MRN: [id number]  Encounter: [id number]          ASSESSMENT AND PLAN:      1  Non-intractable vomiting with nausea  2  Generalized abdominal pain  3  Elevated lipase  Will plan MRI MRCP  Will do EGD with biopsy  Will continue current medication regimen  Patient will return to the nearest emergency department if symptoms persist or return     ______________________________________________________________________    SUBJECTIVE:    54-year-old male who is here for hospital follow-up  Patient was in the emergency room 4 days ago with a chief complaint of severe abdominal pain nausea vomiting  Patient reports his nausea and vomiting would not stop  The reports the his lipase was elevated  Patient's CT scan did not show any evidence of pancreatitis  Patient reports the pain became so severe that he could not take it anymore that is why he came to the emergency department  Patient denies any significant melena or rectal bleeding  Patient denies any diarrhea constipation  Patient is currently on medications As prescribed  Patient has had a history of pancreatitis in the past   Patient also has a history of a CBD stone  REVIEW OF SYSTEMS IS OTHERWISE NEGATIVE  Historical Information   Past Medical History:   Diagnosis Date    Diaz's esophagus     Colon polyp     Coronary artery disease     Depression     Diverticulitis     GERD (gastroesophageal reflux disease)     Hemorrhoid     History of heart artery stent     Hyperlipidemia     Hypertension     Microscopic colitis      Past Surgical History:   Procedure Laterality Date    ABDOMINAL SURGERY      radio frequency ablation    BONE GRAFT Left 2018    CARPAL TUNNEL RELEASE Right     COLONOSCOPY      EGD AND COLONOSCOPY      ESOPHAGOGASTRODUODENOSCOPY N/A 2/15/2018    Procedure: ESOPHAGOGASTRODUODENOSCOPY (EGD);   Surgeon: Julian Perkins Bogdan House MD;  [facility];  Service: Gastroenterology    ESOPHAGOGASTRODUODENOSCOPY N/A 12/10/2018    Procedure: ESOPHAGOGASTRODUODENOSCOPY (EGD); Surgeon: Tawanda Arciniega MD;  [facility];   Service: Gastroenterology    TONSILLECTOMY       Social History   Social History     Substance and Sexual Activity   Alcohol Use Not Currently    Frequency: Never    Comment: quit 3 years     Social History     Substance and Sexual Activity   Drug Use Yes    Frequency: 3 0 times per week    Types: Marijuana    Comment: last used      Social History     Tobacco Use   Smoking Status Former Smoker    Last attempt to quit: 2007    Years since quittin 0   Smokeless Tobacco Former User    Quit date: 1998     Family History   Problem Relation Age of Onset    Heart disease Father     Cancer Sister        Meds/Allergies       Current Outpatient Medications:     aspirin (ECOTRIN LOW STRENGTH) 81 mg EC tablet    atorvastatin (LIPITOR) 40 mg tablet    azithromycin (ZITHROMAX) 250 mg tablet    budesonide (ENTOCORT EC) 3 MG capsule    BUDESONIDE PO    buPROPion (WELLBUTRIN SR) 150 mg 12 hr tablet    buPROPion (WELLBUTRIN) 75 mg tablet    busPIRone (BUSPAR) 10 mg tablet    clonazePAM (KLONOPIN) 1 mg tablet    clopidogrel (PLAVIX) 75 mg tablet    dicyclomine (BENTYL) 20 mg tablet    fluticasone (FLONASE) 50 mcg/act nasal spray    nitroglycerin (NITROSTAT) 0 4 mg SL tablet    omeprazole (PriLOSEC) 20 mg delayed release capsule    ondansetron (ZOFRAN ODT) 4 mg disintegrating tablet    ondansetron (ZOFRAN) 4 mg tablet    ondansetron (ZOFRAN-ODT) 4 mg disintegrating tablet    oxyCODONE (ROXICODONE) 5 mg immediate release tablet    oxyCODONE-acetaminophen (PERCOCET) 5-325 mg per tablet    oxyCODONE-acetaminophen (PERCOCET) 5-325 mg per tablet    PEG 3350-KCl-NaCl-NaSulf-Na Asc-C (MOVIPREP) 100 g    prochlorperazine (COMPAZINE) 10 mg tablet    sertraline (ZOLOFT) 100 mg tablet    Allergies   Allergen Reactions    No Active Allergies            Objective     Blood pressure 122/70, pulse 92, height 5' 10" (1 778 m), weight 94 3 kg (208 lb)  Body mass index is 29 84 kg/m²  PHYSICAL EXAM:      General Appearance:   Alert, cooperative, no distress   HEENT:   Normocephalic, atraumatic, anicteric      Neck:  Supple, symmetrical, trachea midline   Lungs:   Clear to auscultation bilaterally; no rales, rhonchi or wheezing; respirations unlabored    Heart[de-identified]   Regular rate and rhythm; no murmur, rub, or gallop  Abdomen:   Soft, non-tender, non-distended; normal bowel sounds; no masses, no organomegaly    Genitalia:   Deferred    Rectal:   Deferred    Extremities:  No cyanosis, clubbing or edema    Pulses:  2+ and symmetric    Skin:  No jaundice, rashes, or lesions    Lymph nodes:  No palpable cervical lymphadenopathy        Lab Results:   No visits with results within 1 Day(s) from this visit     Latest known visit with results is:   Admission on 02/05/2020, Discharged on 02/05/2020   Component Date Value    WBC 02/05/2020 12 92*    RBC 02/05/2020 4 68     Hemoglobin 02/05/2020 13 9     Hematocrit 02/05/2020 42 3     MCV 02/05/2020 90     MCH 02/05/2020 29 7     MCHC 02/05/2020 32 9     RDW 02/05/2020 13 2     MPV 02/05/2020 9 9     Platelets 05/78/2410 246     nRBC 02/05/2020 0     Neutrophils Relative 02/05/2020 84*    Immat GRANS % 02/05/2020 0     Lymphocytes Relative 02/05/2020 10*    Monocytes Relative 02/05/2020 5     Eosinophils Relative 02/05/2020 1     Basophils Relative 02/05/2020 0     Neutrophils Absolute 02/05/2020 10 78*    Immature Grans Absolute 02/05/2020 0 04     Lymphocytes Absolute 02/05/2020 1 31     Monocytes Absolute 02/05/2020 0 66     Eosinophils Absolute 02/05/2020 0 10     Basophils Absolute 02/05/2020 0 03     Sodium 02/05/2020 140     Potassium 02/05/2020 3 1*    Chloride 02/05/2020 101     CO2 02/05/2020 28     ANION GAP 02/05/2020 11     BUN 02/05/2020 17     Creatinine 02/05/2020 1 00     Glucose 02/05/2020 143*    Calcium 02/05/2020 8 7     AST 02/05/2020 16     ALT 02/05/2020 27     Alkaline Phosphatase 02/05/2020 64     Total Protein 02/05/2020 7 3     Albumin 02/05/2020 3 7     Total Bilirubin 02/05/2020 0 30     eGFR 02/05/2020 84     Lipase 02/05/2020 1,402*    Color, UA 02/05/2020 Yellow     Clarity, UA 02/05/2020 Clear     Specific Gravity, UA 02/05/2020 <=1 005     pH, UA 02/05/2020 6 0     Leukocytes, UA 02/05/2020 Negative     Nitrite, UA 02/05/2020 Negative     Protein, UA 02/05/2020 Negative     Glucose, UA 02/05/2020 Negative     Ketones, UA 02/05/2020 Negative     Urobilinogen, UA 02/05/2020 0 2     Bilirubin, UA 02/05/2020 Negative     Blood, UA 02/05/2020 Negative          Radiology Results:   Ct Abdomen Pelvis With Contrast    Result Date: 2/5/2020  Narrative: CT ABDOMEN AND PELVIS WITH IV CONTRAST INDICATION:   Nausea/vomiting Abdominal pain, acute, nonlocalized  COMPARISON:  CT 12/6/2019  MRI 2/25/2019  TECHNIQUE:  CT examination of the abdomen and pelvis was performed  Axial, sagittal, and coronal 2D reformatted images were created from the source data and submitted for interpretation  Radiation dose length product (DLP) for this visit:  578 mGy-cm   This examination, like all CT scans performed in the [place], was performed utilizing techniques to minimize radiation dose exposure, including the use of iterative reconstruction and automated exposure control  IV Contrast:  100 mL of iohexol (OMNIPAQUE) Enteric Contrast:  Enteric contrast was not administered  FINDINGS: ABDOMEN LOWER CHEST:  No clinically significant abnormality identified in the visualized lower chest  LIVER/BILIARY TREE:  Unremarkable  GALLBLADDER:  No calcified gallstones  No pericholecystic inflammatory change  SPLEEN:  Unremarkable  PANCREAS: Unremarkable  ADRENAL GLANDS:  Unremarkable  KIDNEYS/URETERS:  Unremarkable  No hydronephrosis  STOMACH AND BOWEL:  Colonic diverticulosis without evidence of acute diverticulitis  No disproportionate dilation of small or large bowel loops  APPENDIX:  A normal appendix was visualized  ABDOMINOPELVIC CAVITY:  No ascites or free intraperitoneal air  No lymphadenopathy  VESSELS:  Unremarkable for patient's age  PELVIS REPRODUCTIVE ORGANS:  Mildly enlarged prostate  Symmetric seminal vesicles  URINARY BLADDER:  Unremarkable  ABDOMINAL WALL/INGUINAL REGIONS:  Unremarkable  OSSEOUS STRUCTURES:  No acute fracture or destructive osseous lesion  Impression: No CT findings of acute abnormality in the abdomen or pelvis  Colonic diverticulosis without evidence of acute diverticulitis   Workstation performed: [id number]

## 2020-02-07 NOTE — PATIENT INSTRUCTIONS
Abdominal Pain   WHAT YOU NEED TO KNOW:   Abdominal pain can be dull, achy, or sharp  You may have pain in one area of your abdomen, or in your entire abdomen  Your pain may be caused by a condition such as constipation, food sensitivity or poisoning, infection, or a blockage  Abdominal pain can also be from a hernia, appendicitis, or an ulcer  Liver, gallbladder, or kidney conditions can also cause abdominal pain  The cause of your abdominal pain may be unknown  DISCHARGE INSTRUCTIONS:   Return to the emergency department if:   · You have new chest pain or shortness of breath  · You have pulsing pain in your upper abdomen or lower back that suddenly becomes constant  · Your pain is in the right lower abdominal area and worsens with movement  · You have a fever over 100 4°F (38°C) or shaking chills  · You are vomiting and cannot keep food or liquids down  · Your pain does not improve or gets worse over the next 8 to 12 hours  · You see blood in your vomit or bowel movements, or they look black and tarry  · Your skin or the whites of your eyes turn yellow  · You are a woman and have a large amount of vaginal bleeding that is not your monthly period  Contact your healthcare provider if:   · You have pain in your lower back  · You are a man and have pain in your testicles  · You have pain when you urinate  · You have questions or concerns about your condition or care  Follow up with your healthcare provider within 24 hours or as directed:  Write down your questions so you remember to ask them during your visits  Medicines:   · Medicines  may be given to calm your stomach and prevent vomiting or to decrease pain  Ask how to take pain medicine safely  · Take your medicine as directed  Contact your healthcare provider if you think your medicine is not helping or if you have side effects  Tell him of her if you are allergic to any medicine   Keep a list of the medicines, vitamins, and herbs you take  Include the amounts, and when and why you take them  Bring the list or the pill bottles to follow-up visits  Carry your medicine list with you in case of an emergency  © 2017 [employer] Information is for End User's use only and may not be sold, redistributed or otherwise used for commercial purposes  All illustrations and images included in CareNotes® are the copyrighted property of [employer]  or Mihir Castro  The above information is an  only  It is not intended as medical advice for individual conditions or treatments  Talk to your doctor, nurse or pharmacist before following any medical regimen to see if it is safe and effective for you

## 2020-02-07 NOTE — TELEPHONE ENCOUNTER
Pt called stating he has a gastro procedure scheduled for 02/11, and they would like to know if pt can stop his plavix

## 2020-02-08 ENCOUNTER — HOSPITAL ENCOUNTER (INPATIENT)
Facility: HOSPITAL | Age: 55
LOS: 1 days | Discharge: HOME | DRG: 382 | End: 2020-02-10
Attending: EMERGENCY MEDICINE | Admitting: FAMILY MEDICINE
Payer: COMMERCIAL

## 2020-02-08 ENCOUNTER — TELEPHONE (OUTPATIENT)
Dept: OTHER | Facility: HOSPITAL | Age: 55
End: 2020-02-08

## 2020-02-08 ENCOUNTER — APPOINTMENT (EMERGENCY)
Dept: CT IMAGING | Facility: HOSPITAL | Age: 55
DRG: 382 | End: 2020-02-08
Payer: COMMERCIAL

## 2020-02-08 ENCOUNTER — TELEPHONE (OUTPATIENT)
Dept: OTHER | Facility: OTHER | Age: 55
End: 2020-02-08

## 2020-02-08 DIAGNOSIS — R11.2 NON-INTRACTABLE VOMITING WITH NAUSEA: ICD-10-CM

## 2020-02-08 DIAGNOSIS — R10.9 ABDOMINAL PAIN: ICD-10-CM

## 2020-02-08 DIAGNOSIS — R19.7 DIARRHEA: ICD-10-CM

## 2020-02-08 DIAGNOSIS — I25.119 CORONARY ARTERY DISEASE WITH ANGINA PECTORIS, UNSPECIFIED VESSEL OR LESION TYPE, UNSPECIFIED WHETHER NATIVE OR TRANSPLANTED HEART (HCC): ICD-10-CM

## 2020-02-08 DIAGNOSIS — R11.2 INTRACTABLE VOMITING WITH NAUSEA: Primary | ICD-10-CM

## 2020-02-08 DIAGNOSIS — K22.711 BARRETT'S ESOPHAGUS WITH HIGH GRADE DYSPLASIA: ICD-10-CM

## 2020-02-08 DIAGNOSIS — R74.8 ELEVATED LIPASE: ICD-10-CM

## 2020-02-08 LAB
ALBUMIN SERPL BCP-MCNC: 3.8 G/DL (ref 3.5–5)
ALP SERPL-CCNC: 67 U/L (ref 46–116)
ALT SERPL W P-5'-P-CCNC: 25 U/L (ref 12–78)
ANION GAP SERPL CALCULATED.3IONS-SCNC: 7 MMOL/L (ref 4–13)
AST SERPL W P-5'-P-CCNC: 14 U/L (ref 5–45)
BASOPHILS # BLD AUTO: 0.04 THOUSANDS/ΜL (ref 0–0.1)
BASOPHILS NFR BLD AUTO: 0 % (ref 0–1)
BILIRUB DIRECT SERPL-MCNC: 0.08 MG/DL (ref 0–0.2)
BILIRUB SERPL-MCNC: 0.3 MG/DL (ref 0.2–1)
BILIRUB UR QL STRIP: NEGATIVE
BUN SERPL-MCNC: 14 MG/DL (ref 5–25)
CALCIUM SERPL-MCNC: 9 MG/DL (ref 8.3–10.1)
CHLORIDE SERPL-SCNC: 103 MMOL/L (ref 100–108)
CLARITY UR: CLEAR
CO2 SERPL-SCNC: 32 MMOL/L (ref 21–32)
COLOR UR: YELLOW
CREAT SERPL-MCNC: 0.98 MG/DL (ref 0.6–1.3)
EOSINOPHIL # BLD AUTO: 0.06 THOUSAND/ΜL (ref 0–0.61)
EOSINOPHIL NFR BLD AUTO: 1 % (ref 0–6)
ERYTHROCYTE [DISTWIDTH] IN BLOOD BY AUTOMATED COUNT: 12.9 % (ref 11.6–15.1)
GFR SERPL CREATININE-BSD FRML MDRD: 86 ML/MIN/1.73SQ M
GLUCOSE SERPL-MCNC: 130 MG/DL (ref 65–140)
GLUCOSE UR STRIP-MCNC: NEGATIVE MG/DL
HCT VFR BLD AUTO: 43.9 % (ref 36.5–49.3)
HGB BLD-MCNC: 14.3 G/DL (ref 12–17)
HGB UR QL STRIP.AUTO: NEGATIVE
IMM GRANULOCYTES # BLD AUTO: 0.04 THOUSAND/UL (ref 0–0.2)
IMM GRANULOCYTES NFR BLD AUTO: 0 % (ref 0–2)
KETONES UR STRIP-MCNC: NEGATIVE MG/DL
LACTATE SERPL-SCNC: 1.4 MMOL/L (ref 0.5–2)
LEUKOCYTE ESTERASE UR QL STRIP: NEGATIVE
LIPASE SERPL-CCNC: 278 U/L (ref 73–393)
LYMPHOCYTES # BLD AUTO: 1.18 THOUSANDS/ΜL (ref 0.6–4.47)
LYMPHOCYTES NFR BLD AUTO: 13 % (ref 14–44)
MAGNESIUM SERPL-MCNC: 1.7 MG/DL (ref 1.6–2.6)
MCH RBC QN AUTO: 29.6 PG (ref 26.8–34.3)
MCHC RBC AUTO-ENTMCNC: 32.6 G/DL (ref 31.4–37.4)
MCV RBC AUTO: 91 FL (ref 82–98)
MONOCYTES # BLD AUTO: 0.61 THOUSAND/ΜL (ref 0.17–1.22)
MONOCYTES NFR BLD AUTO: 7 % (ref 4–12)
NEUTROPHILS # BLD AUTO: 7.01 THOUSANDS/ΜL (ref 1.85–7.62)
NEUTS SEG NFR BLD AUTO: 79 % (ref 43–75)
NITRITE UR QL STRIP: NEGATIVE
NRBC BLD AUTO-RTO: 0 /100 WBCS
PH UR STRIP.AUTO: 6.5 [PH]
PLATELET # BLD AUTO: 284 THOUSANDS/UL (ref 149–390)
PMV BLD AUTO: 9.4 FL (ref 8.9–12.7)
POTASSIUM SERPL-SCNC: 4.1 MMOL/L (ref 3.5–5.3)
PROT SERPL-MCNC: 7.4 G/DL (ref 6.4–8.2)
PROT UR STRIP-MCNC: NEGATIVE MG/DL
RBC # BLD AUTO: 4.83 MILLION/UL (ref 3.88–5.62)
SODIUM SERPL-SCNC: 142 MMOL/L (ref 136–145)
SP GR UR STRIP.AUTO: 1.01 (ref 1–1.03)
TSH SERPL DL<=0.05 MIU/L-ACNC: 0.87 UIU/ML (ref 0.36–3.74)
UROBILINOGEN UR QL STRIP.AUTO: 0.2 E.U./DL
WBC # BLD AUTO: 8.94 THOUSAND/UL (ref 4.31–10.16)

## 2020-02-08 PROCEDURE — 81003 URINALYSIS AUTO W/O SCOPE: CPT | Performed by: EMERGENCY MEDICINE

## 2020-02-08 PROCEDURE — 96375 TX/PRO/DX INJ NEW DRUG ADDON: CPT

## 2020-02-08 PROCEDURE — 99220 PR INITIAL OBSERVATION CARE/DAY 70 MINUTES: CPT | Performed by: INTERNAL MEDICINE

## 2020-02-08 PROCEDURE — 96374 THER/PROPH/DIAG INJ IV PUSH: CPT

## 2020-02-08 PROCEDURE — 96376 TX/PRO/DX INJ SAME DRUG ADON: CPT

## 2020-02-08 PROCEDURE — 83690 ASSAY OF LIPASE: CPT | Performed by: EMERGENCY MEDICINE

## 2020-02-08 PROCEDURE — 80076 HEPATIC FUNCTION PANEL: CPT | Performed by: EMERGENCY MEDICINE

## 2020-02-08 PROCEDURE — 83605 ASSAY OF LACTIC ACID: CPT | Performed by: EMERGENCY MEDICINE

## 2020-02-08 PROCEDURE — 83735 ASSAY OF MAGNESIUM: CPT | Performed by: EMERGENCY MEDICINE

## 2020-02-08 PROCEDURE — 96361 HYDRATE IV INFUSION ADD-ON: CPT

## 2020-02-08 PROCEDURE — C9113 INJ PANTOPRAZOLE SODIUM, VIA: HCPCS | Performed by: EMERGENCY MEDICINE

## 2020-02-08 PROCEDURE — 36415 COLL VENOUS BLD VENIPUNCTURE: CPT | Performed by: EMERGENCY MEDICINE

## 2020-02-08 PROCEDURE — 96360 HYDRATION IV INFUSION INIT: CPT

## 2020-02-08 PROCEDURE — 85025 COMPLETE CBC W/AUTO DIFF WBC: CPT | Performed by: EMERGENCY MEDICINE

## 2020-02-08 PROCEDURE — 99285 EMERGENCY DEPT VISIT HI MDM: CPT

## 2020-02-08 PROCEDURE — 74177 CT ABD & PELVIS W/CONTRAST: CPT

## 2020-02-08 PROCEDURE — 84443 ASSAY THYROID STIM HORMONE: CPT | Performed by: INTERNAL MEDICINE

## 2020-02-08 PROCEDURE — 99285 EMERGENCY DEPT VISIT HI MDM: CPT | Performed by: EMERGENCY MEDICINE

## 2020-02-08 PROCEDURE — 80048 BASIC METABOLIC PNL TOTAL CA: CPT | Performed by: EMERGENCY MEDICINE

## 2020-02-08 RX ORDER — ATORVASTATIN CALCIUM 80 MG/1
80 TABLET, FILM COATED ORAL DAILY
COMMUNITY
End: 2022-06-21 | Stop reason: SDUPTHER

## 2020-02-08 RX ORDER — SODIUM CHLORIDE 9 MG/ML
100 INJECTION, SOLUTION INTRAVENOUS CONTINUOUS
Status: DISPENSED | OUTPATIENT
Start: 2020-02-08 | End: 2020-02-10

## 2020-02-08 RX ORDER — HYDROMORPHONE HCL/PF 1 MG/ML
0.5 SYRINGE (ML) INJECTION ONCE
Status: COMPLETED | OUTPATIENT
Start: 2020-02-08 | End: 2020-02-08

## 2020-02-08 RX ORDER — SERTRALINE HYDROCHLORIDE 100 MG/1
200 TABLET, FILM COATED ORAL DAILY
Status: DISCONTINUED | OUTPATIENT
Start: 2020-02-09 | End: 2020-02-10 | Stop reason: HOSPADM

## 2020-02-08 RX ORDER — HYDROCORTISONE ACETATE 25 MG/1
25 SUPPOSITORY RECTAL 3 TIMES DAILY
Status: DISCONTINUED | OUTPATIENT
Start: 2020-02-08 | End: 2020-02-10

## 2020-02-08 RX ORDER — ONDANSETRON 2 MG/ML
4 INJECTION INTRAMUSCULAR; INTRAVENOUS EVERY 4 HOURS PRN
Status: DISCONTINUED | OUTPATIENT
Start: 2020-02-08 | End: 2020-02-10 | Stop reason: HOSPADM

## 2020-02-08 RX ORDER — BUPROPION HYDROCHLORIDE 150 MG/1
300 TABLET ORAL DAILY
Status: DISCONTINUED | OUTPATIENT
Start: 2020-02-09 | End: 2020-02-10 | Stop reason: HOSPADM

## 2020-02-08 RX ORDER — ONDANSETRON 2 MG/ML
4 INJECTION INTRAMUSCULAR; INTRAVENOUS ONCE
Status: COMPLETED | OUTPATIENT
Start: 2020-02-08 | End: 2020-02-08

## 2020-02-08 RX ORDER — MAGNESIUM HYDROXIDE/ALUMINUM HYDROXICE/SIMETHICONE 120; 1200; 1200 MG/30ML; MG/30ML; MG/30ML
30 SUSPENSION ORAL ONCE
Status: COMPLETED | OUTPATIENT
Start: 2020-02-08 | End: 2020-02-08

## 2020-02-08 RX ORDER — ATORVASTATIN CALCIUM 40 MG/1
80 TABLET, FILM COATED ORAL DAILY
Status: DISCONTINUED | OUTPATIENT
Start: 2020-02-09 | End: 2020-02-10 | Stop reason: HOSPADM

## 2020-02-08 RX ORDER — PREDNISONE 20 MG/1
40 TABLET ORAL DAILY
Status: DISCONTINUED | OUTPATIENT
Start: 2020-02-08 | End: 2020-02-10

## 2020-02-08 RX ORDER — DICYCLOMINE HCL 20 MG
20 TABLET ORAL
Status: DISCONTINUED | OUTPATIENT
Start: 2020-02-08 | End: 2020-02-10 | Stop reason: HOSPADM

## 2020-02-08 RX ORDER — PANTOPRAZOLE SODIUM 40 MG/1
40 TABLET, DELAYED RELEASE ORAL
Status: DISCONTINUED | OUTPATIENT
Start: 2020-02-09 | End: 2020-02-09

## 2020-02-08 RX ORDER — OXYCODONE HYDROCHLORIDE 5 MG/1
5 TABLET ORAL EVERY 4 HOURS PRN
Status: DISCONTINUED | OUTPATIENT
Start: 2020-02-08 | End: 2020-02-10 | Stop reason: HOSPADM

## 2020-02-08 RX ORDER — ACETAMINOPHEN 325 MG/1
650 TABLET ORAL EVERY 6 HOURS PRN
Status: DISCONTINUED | OUTPATIENT
Start: 2020-02-08 | End: 2020-02-10 | Stop reason: HOSPADM

## 2020-02-08 RX ORDER — PANTOPRAZOLE SODIUM 40 MG/1
40 INJECTION, POWDER, FOR SOLUTION INTRAVENOUS ONCE
Status: COMPLETED | OUTPATIENT
Start: 2020-02-08 | End: 2020-02-08

## 2020-02-08 RX ADMIN — ALUMINUM HYDROXIDE, MAGNESIUM HYDROXIDE, AND SIMETHICONE 30 ML: 200; 200; 20 SUSPENSION ORAL at 15:11

## 2020-02-08 RX ADMIN — HYDROMORPHONE HYDROCHLORIDE 0.5 MG: 1 INJECTION, SOLUTION INTRAMUSCULAR; INTRAVENOUS; SUBCUTANEOUS at 12:40

## 2020-02-08 RX ADMIN — HYDROMORPHONE HYDROCHLORIDE 0.5 MG: 1 INJECTION, SOLUTION INTRAMUSCULAR; INTRAVENOUS; SUBCUTANEOUS at 13:11

## 2020-02-08 RX ADMIN — DICYCLOMINE HYDROCHLORIDE 20 MG: 20 TABLET ORAL at 17:14

## 2020-02-08 RX ADMIN — IOHEXOL 100 ML: 350 INJECTION, SOLUTION INTRAVENOUS at 13:25

## 2020-02-08 RX ADMIN — CLONAZEPAM 1.5 MG: 0.5 TABLET ORAL at 20:07

## 2020-02-08 RX ADMIN — DICYCLOMINE HYDROCHLORIDE 20 MG: 20 TABLET ORAL at 21:55

## 2020-02-08 RX ADMIN — ONDANSETRON 4 MG: 2 INJECTION INTRAMUSCULAR; INTRAVENOUS at 12:40

## 2020-02-08 RX ADMIN — SODIUM CHLORIDE 100 ML/HR: 0.9 INJECTION, SOLUTION INTRAVENOUS at 17:04

## 2020-02-08 RX ADMIN — ENOXAPARIN SODIUM 40 MG: 40 INJECTION SUBCUTANEOUS at 17:04

## 2020-02-08 RX ADMIN — SODIUM CHLORIDE 2000 ML: 0.9 INJECTION, SOLUTION INTRAVENOUS at 12:37

## 2020-02-08 RX ADMIN — PANTOPRAZOLE SODIUM 40 MG: 40 INJECTION, POWDER, FOR SOLUTION INTRAVENOUS at 15:11

## 2020-02-08 RX ADMIN — PREDNISONE 40 MG: 20 TABLET ORAL at 17:04

## 2020-02-08 RX ADMIN — ONDANSETRON 4 MG: 2 INJECTION INTRAMUSCULAR; INTRAVENOUS at 17:04

## 2020-02-08 NOTE — ED PROVIDER NOTES
History  Chief Complaint   Patient presents with    Abdominal Pain     pt presents to ed with abd pain and vomiting that started this am, states he was treated for pancratitis about a week ago  +n/v/d    Vomiting     Patient is a 21-year-old male with past medical surgical history significant for coronary artery disease status post stent, Diaz's esophagus, GERD, hypertension, hyperlipidemia, microscopic colitis, diverticulitis, presents to the emergency department complaining of worsening abdominal pain, nausea, vomiting and diarrhea  Patient reports he was seen here 3 days ago for similar symptoms and was diagnosed with acute pancreatitis  He was feeling better after nausea medicine and pain medication in the ED so he was discharged home as he had to go to his mother's  the next day  He reports his symptoms never fully went away however initially improved but over the past 1-2 days it got worse again  He reports severe pain starting in his bilateral lower abdomen radiating into his epigastrium  He reports uncontrolled nausea and vomiting despite taking Zofran this morning and has been unable to keep any food or fluids down  He also reports multiple episodes of diarrhea, nonbloody  He has been taking oxycodone that was prescribed to him in the ED 3 days ago but that also is not helping his pain and he is having a difficult time keeping medications down  He reports having chills but denies any known fever  He denies any headache, dizziness or near syncope, cough, URI symptoms, chest pain, palpitations, dyspnea, abdominal distension, hematemesis, blood per rectum or melena, dysuria, change in urinary frequency, hematuria, flank pain, skin rash or color change, extremity weakness or paresthesia or other focal neurologic deficits  Denies any known sick contacts  Patient denies any current alcohol use and states he quit drinking alcohol 4+ years ago        According to records from prior ED visit 3 days ago, lipase at that time was 1402  CT scan was unremarkable  History provided by:  Patient   used: No    Abdominal Pain   Associated symptoms: chills, diarrhea, nausea and vomiting    Associated symptoms: no chest pain, no constipation, no cough, no dysuria, no fever, no hematuria, no shortness of breath and no sore throat    Vomiting   Associated symptoms: abdominal pain, chills and diarrhea    Associated symptoms: no cough, no fever, no headaches and no sore throat        Prior to Admission Medications   Prescriptions Last Dose Informant Patient Reported?  Taking?   atorvastatin (LIPITOR) 80 mg tablet   Yes Yes   Sig: Take 80 mg by mouth daily   buPROPion (WELLBUTRIN SR) 150 mg 12 hr tablet  Self Yes No   Sig: Take 300 mg by mouth daily    clonazePAM (KLONOPIN) 0 5 mg tablet  Self Yes No   Sig: Take 1 5 mg by mouth daily at bedtime    clopidogrel (PLAVIX) 75 mg tablet  Self No No   Sig: Take 1 tablet (75 mg total) by mouth daily   dicyclomine (BENTYL) 20 mg tablet  Self No No   Sig: Take 1 tablet (20 mg total) by mouth every 6 (six) hours   Patient taking differently: Take 20 mg by mouth every 6 (six) hours as needed    nitroglycerin (NITROSTAT) 0 4 mg SL tablet  Self No No   Sig: Place 1 tablet (0 4 mg total) under the tongue every 5 (five) minutes as needed for chest pain   omeprazole (PriLOSEC) 20 mg delayed release capsule  Self No No   Sig: Take 1 capsule (20 mg total) by mouth daily   ondansetron (ZOFRAN-ODT) 4 mg disintegrating tablet  Self No No   Sig: Take 1 tablet (4 mg total) by mouth every 8 (eight) hours as needed for nausea or vomiting   oxyCODONE (ROXICODONE) 5 mg immediate release tablet  Self No No   Sig: Take 1 tablet (5 mg total) by mouth every 4 (four) hours as needed for moderate pain for up to 15 dosesMax Daily Amount: 30 mg   oxyCODONE-acetaminophen (PERCOCET) 5-325 mg per tablet  Self No No   Sig: Take 1 tablet by mouth every 6 (six) hours as needed for severe pain for up to 15 dosesMax Daily Amount: 4 tablets   sertraline (ZOLOFT) 100 mg tablet  Self Yes No   Sig: Take 200 mg by mouth daily      Facility-Administered Medications: None       Past Medical History:   Diagnosis Date    Diaz's esophagus     Colon polyp     Coronary artery disease     Depression     Diverticulitis     GERD (gastroesophageal reflux disease)     Hemorrhoid     History of heart artery stent     Hyperlipidemia     Hypertension     Microscopic colitis        Past Surgical History:   Procedure Laterality Date    ABDOMINAL SURGERY      radio frequency ablation    BONE GRAFT Left 2018    CARPAL TUNNEL RELEASE Right     COLONOSCOPY      EGD AND COLONOSCOPY      ESOPHAGOGASTRODUODENOSCOPY N/A 2/15/2018    Procedure: ESOPHAGOGASTRODUODENOSCOPY (EGD); Surgeon: Javid Costello MD;  Location: [facility];  Service: Gastroenterology    ESOPHAGOGASTRODUODENOSCOPY N/A 12/10/2018    Procedure: ESOPHAGOGASTRODUODENOSCOPY (EGD); Surgeon: Mary Kessler MD;  Location: [facility]; Service: Gastroenterology    TONSILLECTOMY         Family History   Problem Relation Age of Onset    Heart disease Father     Cancer Sister      I have reviewed and agree with the history as documented  Social History     Tobacco Use    Smoking status: Former Smoker     Last attempt to quit: 2007     Years since quittin 0    Smokeless tobacco: Former User     Quit date: 1998   Substance Use Topics    Alcohol use: Not Currently     Frequency: Never     Comment: quit 3 years    Drug use: Yes     Frequency: 3 0 times per week     Types: Marijuana     Comment: last used         Review of Systems   Constitutional: Positive for chills  Negative for fever  HENT: Negative for congestion, ear pain, rhinorrhea and sore throat  Respiratory: Negative for cough and shortness of breath  Cardiovascular: Negative for chest pain and palpitations     Gastrointestinal: Positive for abdominal pain, diarrhea, nausea and vomiting  Negative for abdominal distention, blood in stool and constipation  Genitourinary: Negative for dysuria, flank pain, frequency and hematuria  Musculoskeletal: Negative for back pain, neck pain and neck stiffness  Skin: Negative for color change, pallor, rash and wound  Allergic/Immunologic: Negative for immunocompromised state  Neurological: Negative for dizziness, syncope, weakness, light-headedness, numbness and headaches  Hematological: Negative for adenopathy  Psychiatric/Behavioral: Negative for confusion and decreased concentration  All other systems reviewed and are negative  Physical Exam  Physical Exam   Constitutional: He is oriented to person, place, and time  He appears well-developed and well-nourished  No distress  HENT:   Head: Normocephalic and atraumatic  Mouth/Throat: Oropharynx is clear and moist    Eyes: Pupils are equal, round, and reactive to light  Conjunctivae and EOM are normal    Neck: Normal range of motion  Neck supple  No JVD present  Cardiovascular: Normal rate, regular rhythm, normal heart sounds and intact distal pulses  Exam reveals no gallop and no friction rub  No murmur heard  Pulmonary/Chest: Effort normal and breath sounds normal  No respiratory distress  He has no wheezes  He has no rales  He exhibits no tenderness  Abdominal: Soft  Bowel sounds are normal  He exhibits no distension  There is tenderness  There is no rebound and no guarding  Diffuse abdominal tenderness, most significant in the epigastrium and bilateral upper quadrants  Musculoskeletal: Normal range of motion  He exhibits no edema or tenderness  Neurological: He is alert and oriented to person, place, and time  No gross motor or sensory deficits  Skin: Skin is warm and dry  No rash noted  He is not diaphoretic  No pallor  Psychiatric: He has a normal mood and affect   His behavior is normal    Nursing note and vitals reviewed        Vital Signs  ED Triage Vitals   Temperature Pulse Respirations Blood Pressure SpO2   02/08/20 1207 02/08/20 1207 02/08/20 1207 02/08/20 1207 02/08/20 1207   98 8 °F (37 1 °C) 63 17 (!) 198/96 99 %      Temp Source Heart Rate Source Patient Position - Orthostatic VS BP Location FiO2 (%)   02/08/20 1207 02/08/20 1207 02/08/20 1540 02/08/20 1207 --   Oral Monitor Lying Right arm       Pain Score       02/08/20 1207       Worst Possible Pain         Vitals:    02/08/20 1415 02/08/20 1430 02/08/20 1445 02/08/20 1540   BP: 149/82   141/71   BP Location:    Left arm   Pulse: 78 56 91 83   Resp:    19   Temp:    98 °F (36 7 °C)   TempSrc:    Oral   SpO2: 94% 94% 97% 95%   Weight:    95 kg (209 lb 7 oz)   Height:    5' 10" (1 778 m)       Visual Acuity      ED Medications  Medications   hydrocortisone (ANUSOL-HC) rectal suppository 25 mg (has no administration in time range)   predniSONE tablet 40 mg (has no administration in time range)   atorvastatin (LIPITOR) tablet 80 mg (has no administration in time range)   buPROPion (WELLBUTRIN XL) 24 hr tablet 300 mg (has no administration in time range)   clonazePAM (KlonoPIN) tablet 1 5 mg (has no administration in time range)   dicyclomine (BENTYL) tablet 20 mg (has no administration in time range)   pantoprazole (PROTONIX) EC tablet 40 mg (has no administration in time range)   sertraline (ZOLOFT) tablet 200 mg (has no administration in time range)   sodium chloride 0 9 % infusion (has no administration in time range)   acetaminophen (TYLENOL) tablet 650 mg (has no administration in time range)   magnesium hydroxide (MILK OF MAGNESIA) 400 mg/5 mL oral suspension 30 mL (has no administration in time range)   ondansetron (ZOFRAN) injection 4 mg (has no administration in time range)   oxyCODONE (ROXICODONE) IR tablet 5 mg (has no administration in time range)   enoxaparin (LOVENOX) subcutaneous injection 40 mg (has no administration in time range)   sodium chloride 0 9 % bolus 2,000 mL (2,000 mL Intravenous New Bag 2/8/20 1237)   ondansetron (ZOFRAN) injection 4 mg (4 mg Intravenous Given 2/8/20 1240)   HYDROmorphone (DILAUDID) injection 0 5 mg (0 5 mg Intravenous Given 2/8/20 1240)   HYDROmorphone (DILAUDID) injection 0 5 mg (0 5 mg Intravenous Given 2/8/20 1311)   iohexol (OMNIPAQUE) 350 MG/ML injection (SINGLE-DOSE) 100 mL (100 mL Intravenous Given 2/8/20 1325)   pantoprazole (PROTONIX) injection 40 mg (40 mg Intravenous Given 2/8/20 1511)   aluminum-magnesium hydroxide-simethicone (MYLANTA) 200-200-20 mg/5 mL oral suspension 30 mL (30 mL Oral Given 2/8/20 1511)       Diagnostic Studies  Results Reviewed     Procedure Component Value Units Date/Time    UA (URINE) with reflex to Scope [[id number]] Collected:  02/08/20 1458    Lab Status:  Final result Specimen:  Urine, Clean Catch Updated:  02/08/20 1518     Color, UA Yellow     Clarity, UA Clear     Specific Gravity, UA 1 010     pH, UA 6 5     Leukocytes, UA Negative     Nitrite, UA Negative     Protein, UA Negative mg/dl      Glucose, UA Negative mg/dl      Ketones, UA Negative mg/dl      Urobilinogen, UA 0 2 E U /dl      Bilirubin, UA Negative     Blood, UA Negative    Lactic acid, plasma [[id number]]  (Normal) Collected:  02/08/20 1239    Lab Status:  Final result Specimen:  Blood from Arm, Right Updated:  02/08/20 1306     LACTIC ACID 1 4 mmol/L     Narrative:       Result may be elevated if tourniquet was used during collection      Lipase [[id number]]  (Normal) Collected:  02/08/20 1239    Lab Status:  Final result Specimen:  Blood from Arm, Right Updated:  02/08/20 1303     Lipase 278 u/L     Hepatic function panel [[id number]]  (Normal) Collected:  02/08/20 1239    Lab Status:  Final result Specimen:  Blood from Arm, Right Updated:  02/08/20 1303     Total Bilirubin 0 30 mg/dL      Bilirubin, Direct 0 08 mg/dL      Alkaline Phosphatase 67 U/L      AST 14 U/L      ALT 25 U/L      Total Protein 7 4 g/dL      Albumin 3 8 g/dL     Basic metabolic panel [[id number]] Collected:  02/08/20 1239    Lab Status:  Final result Specimen:  Blood from Arm, Right Updated:  02/08/20 1303     Sodium 142 mmol/L      Potassium 4 1 mmol/L      Chloride 103 mmol/L      CO2 32 mmol/L      ANION GAP 7 mmol/L      BUN 14 mg/dL      Creatinine 0 98 mg/dL      Glucose 130 mg/dL      Calcium 9 0 mg/dL      eGFR 86 ml/min/1 73sq m     Narrative:       [place] guidelines for Chronic Kidney Disease (CKD):     Stage 1 with normal or high GFR (GFR > 90 mL/min/1 73 square meters)    Stage 2 Mild CKD (GFR = 60-89 mL/min/1 73 square meters)    Stage 3A Moderate CKD (GFR = 45-59 mL/min/1 73 square meters)    Stage 3B Moderate CKD (GFR = 30-44 mL/min/1 73 square meters)    Stage 4 Severe CKD (GFR = 15-29 mL/min/1 73 square meters)    Stage 5 End Stage CKD (GFR <15 mL/min/1 73 square meters)  Note: GFR calculation is accurate only with a steady state creatinine    Magnesium [[id number]]  (Normal) Collected:  02/08/20 1239    Lab Status:  Final result Specimen:  Blood from Arm, Right Updated:  02/08/20 1303     Magnesium 1 7 mg/dL     CBC and differential [[id number]]  (Abnormal) Collected:  02/08/20 1239    Lab Status:  Final result Specimen:  Blood from Arm, Right Updated:  02/08/20 1248     WBC 8 94 Thousand/uL      RBC 4 83 Million/uL      Hemoglobin 14 3 g/dL      Hematocrit 43 9 %      MCV 91 fL      MCH 29 6 pg      MCHC 32 6 g/dL      RDW 12 9 %      MPV 9 4 fL      Platelets 443 Thousands/uL      nRBC 0 /100 WBCs      Neutrophils Relative 79 %      Immat GRANS % 0 %      Lymphocytes Relative 13 %      Monocytes Relative 7 %      Eosinophils Relative 1 %      Basophils Relative 0 %      Neutrophils Absolute 7 01 Thousands/µL      Immature Grans Absolute 0 04 Thousand/uL      Lymphocytes Absolute 1 18 Thousands/µL      Monocytes Absolute 0 61 Thousand/µL      Eosinophils Absolute 0 06 Thousand/µL      Basophils Absolute 0 04 Thousands/µL                  CT abdomen pelvis with contrast   Final Result by Monico Salgado MD (02/08 1349)       No acute findings in the abdomen or pelvis  No CT findings to suggest acute pancreatitis  Workstation performed: [id number]                    Procedures  Procedures         ED Course  ED Course as of Feb 08 1635   Sat Feb 08, 2020   1319 Lipase decreased from 3 days ago at which time it was 1402  Lipase: 278   1411 Patient reassessed and updated of normal blood work and CT scan  Differential still includes acute pancreatitis versus gastroenteritis  He is scheduled for an EGD with his gastroenterologist on Tuesday  Will PO challenge in ED prior to disposition decision  [id number] Patient reassessed and states he threw up after trying to drink fluids and it tasted very acidic  He is now complaining of heartburn  Recommended observation admission for symptom control and patient is agreeable  Will give Protonix and Maalox  MDM  Number of Diagnoses or Management Options  Diagnosis management comments: 60-year-old male presents with acutely worsening abdominal pain, nausea, vomiting and diarrhea after he was recently seen here 3 days ago and diagnosed with acute pancreatitis  Most likely patient has recurrent or untreated pancreatitis however differential also includes acute gastroenteritis, diverticulitis, appendicitis, biliary colic or cholecystitis  Will workup with abdominal labs and re-image abdomen and pelvis  Will give IV fluids, Dilaudid and Zofran for symptomatic relief         Amount and/or Complexity of Data Reviewed  Clinical lab tests: reviewed and ordered  Tests in the radiology section of CPT®: ordered and reviewed  Review and summarize past medical records: yes  Independent visualization of images, tracings, or specimens: yes          Disposition  Final diagnoses:   Intractable vomiting with nausea   Abdominal pain   Diarrhea     Time reflects when diagnosis was documented in both MDM as applicable and the Disposition within this note     Time User Action Codes Description Comment    2/8/2020  3:04 PM Delfin Ores E Add [R11 2] Intractable vomiting with nausea     2/8/2020  3:04 PM Delfin Ores E Add [R10 9] Abdominal pain     2/8/2020  3:04 PM Delfin Ores E Add [R19 7] Diarrhea       ED Disposition     ED Disposition Condition Date/Time Comment    Admit Stable Sat Feb 8, 2020  3:04 PM Case was discussed with BRITTANY and the patient's admission status was agreed to be Admission Status: observation status to the service of Dr Dyson Covington           Follow-up Information    None         Current Discharge Medication List      CONTINUE these medications which have NOT CHANGED    Details   atorvastatin (LIPITOR) 80 mg tablet Take 80 mg by mouth daily      buPROPion (WELLBUTRIN SR) 150 mg 12 hr tablet Take 300 mg by mouth daily       clonazePAM (KLONOPIN) 0 5 mg tablet Take 1 5 mg by mouth daily at bedtime       clopidogrel (PLAVIX) 75 mg tablet Take 1 tablet (75 mg total) by mouth daily  Qty: 90 tablet, Refills: 3    Associated Diagnoses: Coronary artery disease of native artery of native heart with stable angina pectoris (HCC)      dicyclomine (BENTYL) 20 mg tablet Take 1 tablet (20 mg total) by mouth every 6 (six) hours  Qty: 60 tablet, Refills: 3    Associated Diagnoses: Microscopic colitis, unspecified microscopic colitis type      nitroglycerin (NITROSTAT) 0 4 mg SL tablet Place 1 tablet (0 4 mg total) under the tongue every 5 (five) minutes as needed for chest pain  Qty: 30 tablet, Refills: 3    Associated Diagnoses: Angina pectoris (HCC)      omeprazole (PriLOSEC) 20 mg delayed release capsule Take 1 capsule (20 mg total) by mouth daily  Qty: 60 capsule, Refills: 3    Associated Diagnoses: Diarrhea, unspecified type      ondansetron (ZOFRAN-ODT) 4 mg disintegrating tablet Take 1 tablet (4 mg total) by mouth every 8 (eight) hours as needed for nausea or vomiting  Qty: 60 tablet, Refills: 3    Associated Diagnoses: Microscopic colitis, unspecified microscopic colitis type      oxyCODONE (ROXICODONE) 5 mg immediate release tablet Take 1 tablet (5 mg total) by mouth every 4 (four) hours as needed for moderate pain for up to 15 dosesMax Daily Amount: 30 mg  Qty: 15 tablet, Refills: 0    Associated Diagnoses: Pancreatitis      oxyCODONE-acetaminophen (PERCOCET) 5-325 mg per tablet Take 1 tablet by mouth every 6 (six) hours as needed for severe pain for up to 15 dosesMax Daily Amount: 4 tablets  Qty: 15 tablet, Refills: 0    Associated Diagnoses: Diverticulitis      sertraline (ZOLOFT) 100 mg tablet Take 200 mg by mouth daily           No discharge procedures on file      ED Provider  Electronically Signed by           Miguel Reno DO  02/08/20 0418

## 2020-02-08 NOTE — ASSESSMENT & PLAN NOTE
· Microscopic colitis currently off cycle for systemic and rectal steroids  · Was feeling well on budesonide suppositories until taken off cycle 3-4 weeks ago  · Will trial prednisone and rectal steroids  Schedule dicyclomine    · Have gastroenterology evaluate as he is known to their service and has been scheduled intervention

## 2020-02-08 NOTE — ASSESSMENT & PLAN NOTE
· Elevated lipase likely secondary to nausea vomiting  No evidence of pancreatitis      Results from last 7 days   Lab Units 02/08/20  1239 02/05/20  0832   LIPASE u/L 278 1,402*

## 2020-02-08 NOTE — ED NOTES
1 CC                                                Abdominal pain along with nausea and vomitting  2  Orientation status                        Alert and oriented  3  Abnormal labs, tests, vitals          T 98 8, HR 91, R 18, /82  4  Medication drips                            none  5  Time of last narcotics                   Dilaudid 0 5mg @13:11pm  6  IV lines, drains, tubes                   #18 right ac  7  Isolation status                              none  8  Skin                                             wdl  9  Ambulation status                   Ambulates no assistance  10   ED phone number                #[phone]     Lety Aguilar RN  02/08/20 8456

## 2020-02-08 NOTE — H&P
H&P- Michael Still 1965, 53 y o  male MRN: [id number]  Unit/Bed#: ED 24 Encounter: [id number]  Primary Care Provider: Leda Flores MD   Date and time admitted to hospital: 2/8/2020 12:02 PM        Assessment and Plan  * Non-intractable vomiting with nausea  Assessment & Plan  · Intractable nausea and vomiting  Currently off cycle for systemic rectal steroids for microscopic colitis  · Was seen in the emergency department 2/5/2020 and subsequently GI office 2/7/2020 with plans for EGD and MRCP  · Start NSS and trial prednisone with rectal steroids  · Gastroenterology to evaluate    Elevated lipase  Assessment & Plan  · Elevated lipase likely secondary to nausea vomiting  No evidence of pancreatitis  Results from last 7 days   Lab Units 02/08/20  1239 02/05/20  0832   LIPASE u/L 278 1,402*       Hyperlipidemia  Assessment & Plan  · Hyperlipidemia continue atorvastatin    Coronary artery disease  Assessment & Plan  · Coronary artery disease status post stent  No chest pain  · Ran out of aspirin  Was advised by gastroenterology to hold clopidogrel for scheduled EGD  Microscopic colitis  Assessment & Plan  · Microscopic colitis currently off cycle for systemic and rectal steroids  · Was feeling well on budesonide suppositories until taken off cycle 3-4 weeks ago  · Will trial prednisone and rectal steroids  Schedule dicyclomine  · Have gastroenterology evaluate as he is known to their service and has been scheduled intervention    Anxiety and depression  Assessment & Plan  · Anxiety and depression mood stable on wellbutrin and sertraline  · Reviewed PDMP  On clonazepam 0 5 mg 90 tablets 30 days    VTE Prophylaxis: Enoxaparin (Lovenox)  Code Status:  Level one full code  Anticipated Length of Stay:  Patient will be admitted on an Observation basis with an anticipated length of stay of  less than 2 midnights       Justification for Hospital Stay: Non-intractable vomiting with nausea  Total Time for Visit, including Counseling / Coordination of Care: xx mins  Greater than 50% of this total time spent on direct patient counseling and coordination of care  Chief Complaint:     Chief Complaint   Patient presents with    Abdominal Pain     pt presents to ed with abd pain and vomiting that started this am, states he was treated for pancratitis about a week ago  +n/v/d    Vomiting     History of Present Illness:    Jazz Vega is a 53 y o  male who presents with worsening abdominal pain  The patient has known diagnosis of microscopic colitis and on on/off cycles of budesonide pills and suppositories  Currently on off cycle for last 3-4 weeks  Since being off he has had intractable nausea and vomiting with lower abdominal cramping  This has led to epigastric pain from vomiting  He was originally seen here in the emergency department on 2020 and was found have elevated lipase without evidence of pancreatitis on CT scan  He left the emergency department as his mom's  was the next day  He subsequently followed up with gastroenterology on 2020 where it was recommended he have EGD  He continues to have nausea and vomiting with generalized abdominal pain and re-presented to the emergency department where admission was requested      Review of Systems:  History obtained from chart review and the patient  General ROS: negative for - chills or fever  Psychological ROS: negative for - disorientation or hallucinations  Ophthalmic ROS: negative for - eye pain or loss of vision  Respiratory ROS: negative for - cough or shortness of breath  Cardiovascular ROS: negative for - chest pain  Gastrointestinal ROS: positive for - abdominal pain, change in bowel habits, change in stools and nausea/vomiting  Genito-Urinary ROS: negative for - hematuria  Musculoskeletal ROS: negative for - muscle pain  Neurological ROS: negative for - seizures or tremors  Otherwise, all other 12 point review of systems normal     Past Medical and Surgical History:   Past Medical History:   Diagnosis Date    Diaz's esophagus     Colon polyp     Coronary artery disease     Depression     Diverticulitis     GERD (gastroesophageal reflux disease)     Hemorrhoid     History of heart artery stent     Hyperlipidemia     Hypertension     Microscopic colitis      Past Surgical History:   Procedure Laterality Date    ABDOMINAL SURGERY      radio frequency ablation    BONE GRAFT Left 2018    CARPAL TUNNEL RELEASE Right     COLONOSCOPY      EGD AND COLONOSCOPY      ESOPHAGOGASTRODUODENOSCOPY N/A 2/15/2018    Procedure: ESOPHAGOGASTRODUODENOSCOPY (EGD); Surgeon: Jadyn Fuchs MD;  Location: [facility];  Service: Gastroenterology    ESOPHAGOGASTRODUODENOSCOPY N/A 12/10/2018    Procedure: ESOPHAGOGASTRODUODENOSCOPY (EGD); Surgeon: Ramandeep Keith MD;  Location: [facility]; Service: Gastroenterology    TONSILLECTOMY       Meds/Allergies: Allergies: Allergies   Allergen Reactions    No Active Allergies      Prior to Admission Medications   Prescriptions Last Dose Informant Patient Reported?  Taking?   atorvastatin (LIPITOR) 80 mg tablet   Yes Yes   Sig: Take 80 mg by mouth daily   buPROPion (WELLBUTRIN SR) 150 mg 12 hr tablet  Self Yes No   Sig: Take 300 mg by mouth daily    clonazePAM (KLONOPIN) 0 5 mg tablet  Self Yes No   Sig: Take 1 5 mg by mouth daily at bedtime    clopidogrel (PLAVIX) 75 mg tablet  Self No No   Sig: Take 1 tablet (75 mg total) by mouth daily   dicyclomine (BENTYL) 20 mg tablet  Self No No   Sig: Take 1 tablet (20 mg total) by mouth every 6 (six) hours   Patient taking differently: Take 20 mg by mouth every 6 (six) hours as needed    nitroglycerin (NITROSTAT) 0 4 mg SL tablet  Self No No   Sig: Place 1 tablet (0 4 mg total) under the tongue every 5 (five) minutes as needed for chest pain   omeprazole (PriLOSEC) 20 mg delayed release capsule  Self No No   Sig: Take 1 capsule (20 mg total) by mouth daily   ondansetron (ZOFRAN-ODT) 4 mg disintegrating tablet  Self No No   Sig: Take 1 tablet (4 mg total) by mouth every 8 (eight) hours as needed for nausea or vomiting   oxyCODONE (ROXICODONE) 5 mg immediate release tablet  Self No No   Sig: Take 1 tablet (5 mg total) by mouth every 4 (four) hours as needed for moderate pain for up to 15 dosesMax Daily Amount: 30 mg   oxyCODONE-acetaminophen (PERCOCET) 5-325 mg per tablet  Self No No   Sig: Take 1 tablet by mouth every 6 (six) hours as needed for severe pain for up to 15 dosesMax Daily Amount: 4 tablets   sertraline (ZOLOFT) 100 mg tablet  Self Yes No   Sig: Take 200 mg by mouth daily      Facility-Administered Medications: None     Social History:     Social History     Socioeconomic History    Marital status: /Civil Union     Spouse name: Not on file    Number of children: Not on file    Years of education: Not on file    Highest education level: Not on file   Occupational History    Not on file   Social Needs    Financial resource strain: Not on file    Food insecurity:     Worry: Not on file     Inability: Not on file    Transportation needs:     Medical: Not on file     Non-medical: Not on file   Tobacco Use    Smoking status: Former Smoker     Last attempt to quit: 2007     Years since quittin 0    Smokeless tobacco: Former User     Quit date: 1998   Substance and Sexual Activity    Alcohol use: Not Currently     Frequency: Never     Comment: quit 3 years    Drug use: Yes     Frequency: 3 0 times per week     Types: Marijuana     Comment: last used     Sexual activity: Never   Lifestyle    Physical activity:     Days per week: Not on file     Minutes per session: Not on file    Stress: Not on file   Relationships    Social connections:     Talks on phone: Not on file     Gets together: Not on file     Attends Sabianism service: Not on file     Active member of club or organization: Not on file     Attends meetings of clubs or organizations: Not on file     Relationship status: Not on file    Intimate partner violence:     Fear of current or ex partner: Not on file     Emotionally abused: Not on file     Physically abused: Not on file     Forced sexual activity: Not on file   Other Topics Concern    Not on file   Social History Narrative    Not on file     Patient Pre-hospital Living Situation:   Patient Pre-hospital Level of Mobility:   Patient Pre-hospital Diet Restrictions:     Family History:  Family History   Problem Relation Age of Onset    Heart disease Father     Cancer Sister      Physical Exam:   Vitals:   Blood Pressure: 149/82 (02/08/20 1415)  Pulse: 91 (02/08/20 1445)  Temperature: 98 8 °F (37 1 °C) (02/08/20 1207)  Temp Source: Oral (02/08/20 1207)  Respirations: 18 (02/08/20 1414)  Height: 5' 10" (177 8 cm) (02/08/20 1207)  Weight - Scale: 95 kg (209 lb 7 oz) (02/08/20 1207)  SpO2: 97 % (02/08/20 1445)    General appearance: alert, appears stated age and cooperative  Skin: Skin color, texture, turgor normal  No rashes or lesions  Head: Normocephalic, without obvious abnormality, atraumatic  Eyes: conjunctivae/corneas clear  PERRL, EOM's intact  Lungs: clear to auscultation bilaterally  Heart: regular rate and rhythm  Abdomen: Soft lower quadrant tenderness bilaterally to palpation no rebound or guarding  Back: symmetric, no curvature  ROM normal  No CVA tenderness  Extremities: extremities normal, atraumatic, no cyanosis or edema  Neurologic: Grossly normal  Psychiatric:  Appropriate mood    Lab Results: I have personally reviewed pertinent reports      Results from last 7 days   Lab Units 02/08/20  1239   WBC Thousand/uL 8 94   HEMOGLOBIN g/dL 14 3   HEMATOCRIT % 43 9   PLATELETS Thousands/uL 284   NEUTROS PCT % 79*   LYMPHS PCT % 13*   MONOS PCT % 7   EOS PCT % 1     Results from last 7 days   Lab Units 02/08/20  1239 02/05/20  0832   SODIUM mmol/L 142 140   POTASSIUM mmol/L 4 1 3 1*   CHLORIDE mmol/L 103 101   CO2 mmol/L 32 28   ANION GAP mmol/L 7 11   BUN mg/dL 14 17   CREATININE mg/dL 0 98 1 00   CALCIUM mg/dL 9 0 8 7   ALBUMIN g/dL 3 8 3 7   TOTAL BILIRUBIN mg/dL 0 30 0 30   ALK PHOS U/L 67 64   ALT U/L 25 27   AST U/L 14 16   EGFR ml/min/1 73sq m 86 84   GLUCOSE RANDOM mg/dL 130 143*             Results from last 7 days   Lab Units 02/08/20  1239   LACTIC ACID mmol/L 1 4              Results from last 7 days   Lab Units 02/08/20  1458   COLOR UA  Yellow   CLARITY UA  Clear   SPEC GRAV UA  1 010   PH UA  6 5   LEUKOCYTES UA  Negative   NITRITE UA  Negative   GLUCOSE UA mg/dl Negative   KETONES UA mg/dl Negative   BILIRUBIN UA  Negative   BLOOD UA  Negative               Imaging: I have personally reviewed pertinent films in PACS  Ct Abdomen Pelvis With Contrast  Result Date: 2/8/2020  Impression:  No acute findings in the abdomen or pelvis  No CT findings to suggest acute pancreatitis  Workstation performed: [id number]       EKG, Pathology, and Other Studies Reviewed on Admission:   Reviewed previous records    [facility]/ [facility] Records Reviewed: Yes    ** Please Note: This note has been constructed using a voice recognition system   **

## 2020-02-08 NOTE — ASSESSMENT & PLAN NOTE
· Coronary artery disease status post stent  No chest pain  · Ran out of aspirin  Was advised by gastroenterology to hold clopidogrel for scheduled EGD

## 2020-02-08 NOTE — PLAN OF CARE
Problem: Nutrition/Hydration-ADULT  Goal: Nutrient/Hydration intake appropriate for improving, restoring or maintaining nutritional needs  Description  Monitor and assess patient's nutrition/hydration status for malnutrition  Collaborate with interdisciplinary team and initiate plan and interventions as ordered  Monitor patient's weight and dietary intake as ordered or per policy  Utilize nutrition screening tool and intervene as necessary  Determine patient's food preferences and provide high-protein, high-caloric foods as appropriate  INTERVENTIONS:  - Monitor oral intake, urinary output, labs, and treatment plans  - Assess nutrition and hydration status and recommend course of action  - Evaluate amount of meals eaten  - Assist patient with eating if necessary   - Allow adequate time for meals  - Recommend/ encourage appropriate diets, oral nutritional supplements, and vitamin/mineral supplements  - Order, calculate, and assess calorie counts as needed  - Recommend, monitor, and adjust tube feedings and TPN/PPN based on assessed needs  - Assess need for intravenous fluids  - Provide specific nutrition/hydration education as appropriate  - Include patient/family/caregiver in decisions related to nutrition  Outcome: Progressing     Problem: Knowledge Deficit  Goal: Patient/family/caregiver demonstrates understanding of disease process, treatment plan, medications, and discharge instructions  Description  Complete learning assessment and assess knowledge base    Interventions:  - Provide teaching at level of understanding  - Provide teaching via preferred learning methods  Outcome: Progressing

## 2020-02-08 NOTE — TELEPHONE ENCOUNTER
Received page that the patient is having epigastric pain and vomiting  Called patient back and he is in the ER currently  Advised him that they will evaluate him and decide if he needs admission and GI consult

## 2020-02-08 NOTE — ASSESSMENT & PLAN NOTE
· Anxiety and depression mood stable on wellbutrin and sertraline  · Reviewed PDMP    On clonazepam 0 5 mg 90 tablets 30 days

## 2020-02-08 NOTE — ASSESSMENT & PLAN NOTE
· Intractable nausea and vomiting  Currently off cycle for systemic rectal steroids for microscopic colitis    · Was seen in the emergency department 2/5/2020 and subsequently GI office 2/7/2020 with plans for EGD and MRCP  · Start NSS and trial prednisone with rectal steroids  · Gastroenterology to evaluate

## 2020-02-08 NOTE — TELEPHONE ENCOUNTER
[facility] @   TO [address] [place] [address]  A/S/ PT Demian Joe/[birth year]/REASON PATIENT IS HAVING ALOT OF [place] ABDOMIN PAIN AND VOMITING

## 2020-02-09 ENCOUNTER — APPOINTMENT (OUTPATIENT)
Dept: MRI IMAGING | Facility: HOSPITAL | Age: 55
DRG: 382 | End: 2020-02-09
Payer: COMMERCIAL

## 2020-02-09 LAB
ALBUMIN SERPL BCP-MCNC: 3 G/DL (ref 3.5–5)
ALP SERPL-CCNC: 48 U/L (ref 46–116)
ALT SERPL W P-5'-P-CCNC: 18 U/L (ref 12–78)
ANION GAP SERPL CALCULATED.3IONS-SCNC: 7 MMOL/L (ref 4–13)
AST SERPL W P-5'-P-CCNC: 11 U/L (ref 5–45)
BILIRUB SERPL-MCNC: 0.3 MG/DL (ref 0.2–1)
BUN SERPL-MCNC: 7 MG/DL (ref 5–25)
CALCIUM SERPL-MCNC: 8 MG/DL (ref 8.3–10.1)
CHLORIDE SERPL-SCNC: 107 MMOL/L (ref 100–108)
CO2 SERPL-SCNC: 28 MMOL/L (ref 21–32)
CREAT SERPL-MCNC: 0.76 MG/DL (ref 0.6–1.3)
ERYTHROCYTE [DISTWIDTH] IN BLOOD BY AUTOMATED COUNT: 12.9 % (ref 11.6–15.1)
GFR SERPL CREATININE-BSD FRML MDRD: 103 ML/MIN/1.73SQ M
GLUCOSE P FAST SERPL-MCNC: 108 MG/DL (ref 65–99)
GLUCOSE SERPL-MCNC: 108 MG/DL (ref 65–140)
HCT VFR BLD AUTO: 36.3 % (ref 36.5–49.3)
HGB BLD-MCNC: 12.1 G/DL (ref 12–17)
MCH RBC QN AUTO: 29.9 PG (ref 26.8–34.3)
MCHC RBC AUTO-ENTMCNC: 33.3 G/DL (ref 31.4–37.4)
MCV RBC AUTO: 90 FL (ref 82–98)
PLATELET # BLD AUTO: 233 THOUSANDS/UL (ref 149–390)
PMV BLD AUTO: 9.8 FL (ref 8.9–12.7)
POTASSIUM SERPL-SCNC: 3.8 MMOL/L (ref 3.5–5.3)
PROT SERPL-MCNC: 6 G/DL (ref 6.4–8.2)
RBC # BLD AUTO: 4.05 MILLION/UL (ref 3.88–5.62)
SODIUM SERPL-SCNC: 142 MMOL/L (ref 136–145)
WBC # BLD AUTO: 7.91 THOUSAND/UL (ref 4.31–10.16)

## 2020-02-09 PROCEDURE — 99232 SBSQ HOSP IP/OBS MODERATE 35: CPT | Performed by: NURSE PRACTITIONER

## 2020-02-09 PROCEDURE — A9585 GADOBUTROL INJECTION: HCPCS | Performed by: INTERNAL MEDICINE

## 2020-02-09 PROCEDURE — 80053 COMPREHEN METABOLIC PANEL: CPT | Performed by: INTERNAL MEDICINE

## 2020-02-09 PROCEDURE — 76377 3D RENDER W/INTRP POSTPROCES: CPT

## 2020-02-09 PROCEDURE — 74183 MRI ABD W/O CNTR FLWD CNTR: CPT

## 2020-02-09 PROCEDURE — 99255 IP/OBS CONSLTJ NEW/EST HI 80: CPT | Performed by: INTERNAL MEDICINE

## 2020-02-09 PROCEDURE — 85027 COMPLETE CBC AUTOMATED: CPT | Performed by: INTERNAL MEDICINE

## 2020-02-09 RX ORDER — PANTOPRAZOLE SODIUM 40 MG/1
40 TABLET, DELAYED RELEASE ORAL
Status: DISCONTINUED | OUTPATIENT
Start: 2020-02-09 | End: 2020-02-10 | Stop reason: HOSPADM

## 2020-02-09 RX ORDER — SUCRALFATE ORAL 1 G/10ML
1000 SUSPENSION ORAL
Status: DISCONTINUED | OUTPATIENT
Start: 2020-02-09 | End: 2020-02-10 | Stop reason: HOSPADM

## 2020-02-09 RX ADMIN — SODIUM CHLORIDE 100 ML/HR: 0.9 INJECTION, SOLUTION INTRAVENOUS at 03:04

## 2020-02-09 RX ADMIN — BUPROPION 300 MG: 150 TABLET, EXTENDED RELEASE ORAL at 10:03

## 2020-02-09 RX ADMIN — SERTRALINE HYDROCHLORIDE 200 MG: 100 TABLET ORAL at 10:08

## 2020-02-09 RX ADMIN — DICYCLOMINE HYDROCHLORIDE 20 MG: 20 TABLET ORAL at 22:01

## 2020-02-09 RX ADMIN — GADOBUTROL 9 ML: 604.72 INJECTION INTRAVENOUS at 15:00

## 2020-02-09 RX ADMIN — DICYCLOMINE HYDROCHLORIDE 20 MG: 20 TABLET ORAL at 10:03

## 2020-02-09 RX ADMIN — ENOXAPARIN SODIUM 40 MG: 40 INJECTION SUBCUTANEOUS at 10:04

## 2020-02-09 RX ADMIN — PANTOPRAZOLE SODIUM 40 MG: 40 TABLET, DELAYED RELEASE ORAL at 17:11

## 2020-02-09 RX ADMIN — PANTOPRAZOLE SODIUM 40 MG: 40 TABLET, DELAYED RELEASE ORAL at 05:40

## 2020-02-09 RX ADMIN — OXYCODONE HYDROCHLORIDE 5 MG: 5 TABLET ORAL at 22:05

## 2020-02-09 RX ADMIN — CLONAZEPAM 1.5 MG: 0.5 TABLET ORAL at 17:11

## 2020-02-09 RX ADMIN — DICYCLOMINE HYDROCHLORIDE 20 MG: 20 TABLET ORAL at 17:11

## 2020-02-09 RX ADMIN — PREDNISONE 40 MG: 20 TABLET ORAL at 10:03

## 2020-02-09 RX ADMIN — SODIUM CHLORIDE 100 ML/HR: 0.9 INJECTION, SOLUTION INTRAVENOUS at 17:16

## 2020-02-09 NOTE — PROGRESS NOTES
[facility] Internal movement  Progress Note Jhonatan Clint 1965, 53 y o  male MRN: [id number]    Unit/Bed#: -01 Encounter: [id number]    Primary Care Provider: Nahum De La Fuente MD   Date and time admitted to hospital: 2/8/2020 12:02 PM    * Non-intractable vomiting with nausea  Assessment & Plan  · Intractable nausea and vomiting  Currently off cycle for systemic rectal steroids for microscopic colitis  · Was seen in the emergency department 2/5/2020 and subsequently GI office 2/7/2020  · EGD tomorrow with Dr Dutch Byrd, NPO after midnight  · MRI/MRCP will be performed in the next 24 hours  · Start NSS and trial prednisone with rectal steroids  · Gastroenterology following  · Continue antiemetics    Elevated lipase  Assessment & Plan  · Elevated lipase likely secondary to nausea vomiting  No evidence of pancreatitis  Results from last 7 days   Lab Units 02/08/20  1239 02/05/20  0832   LIPASE u/L 278 1,402*   MRI/MRCP was done in February of 2019 showed no evidence of pancreatic mass no pancreatic ductal enlargement  Plan for MRI/MRCP tomorrow  Continue NPO  Also planning for EGD tomorrow  Continue IV fluids  Symptom management    Hyperlipidemia  Assessment & Plan  · Hyperlipidemia continue atorvastatin    Coronary artery disease  Assessment & Plan  · Coronary artery disease status post stent  No chest pain  · Ran out of aspirin  Was advised by gastroenterology to hold clopidogrel for scheduled EGD  Microscopic colitis  Assessment & Plan  · Microscopic colitis currently off cycle for systemic and rectal steroids  · Was feeling well on budesonide suppositories until taken off cycle 3-4 weeks ago  · Will trial prednisone and rectal steroids  Schedule dicyclomine  · GI consulted    Anxiety and depression  Assessment & Plan  · Anxiety and depression mood stable on wellbutrin and sertraline  · Reviewed PDMP    On clonazepam 0 5 mg 90 tablets 30 days       VTE Pharmacologic Prophylaxis: Pharmacologic: Enoxaparin (Lovenox)  Mechanical VTE Prophylaxis in Place: Yes    Patient Centered Rounds: I have performed bedside rounds with nursing staff today  Discussions with Specialists or Other Care Team Provider:  Reviewed H&P reviewed GI notes discussed with case management primary RN    Education and Discussions with Family / Patient:  Educated patient on current plan of care denies any additional questions or concerns at this time    Time Spent for Care: 20 minutes  More than 50% of total time spent on counseling and coordination of care as described above  Current Length of Stay: 0 day(s)    Current Patient Status: Observation   Certification Statement: The patient will continue to require additional inpatient hospital stay due to EGD planned for tomorrow MRI/MRCP planned for tomorrow symptom management of abdominal pain and nausea    Discharge Plan / Estimated Discharge Date:  Next 24 hours pending improvement of symptoms and completion of EGD an MRI      Code Status: Level 1 - Full Code      Subjective:   Denies any chest pain chest tightness shortness of breath or difficulty breathing reports that he still having some mild abdominal discomfort agreeable to plan tomorrow  Complaining of more nausea than abdominal pain about all as overall improved    Objective:     Vitals:   Temp (24hrs), Av 1 °F (36 7 °C), Min:97 7 °F (36 5 °C), Max:98 5 °F (36 9 °C)    Temp:  [97 7 °F (36 5 °C)-98 5 °F (36 9 °C)] 98 5 °F (36 9 °C)  HR:  [54-91] 70  Resp:  [18-19] 18  BP: (135-149)/(65-82) 135/65  SpO2:  [91 %-97 %] 91 %  Body mass index is 30 05 kg/m²  Input and Output Summary (last 24 hours):     No intake or output data in the 24 hours ending 20 1245    Physical Exam:     Physical Exam   Constitutional: He is oriented to person, place, and time  He appears well-developed and well-nourished  HENT:   Head: Normocephalic  Eyes: Pupils are equal, round, and reactive to light     Neck: Normal range of motion  Cardiovascular: Normal rate  Pulmonary/Chest: Effort normal    Abdominal: Soft  Bowel sounds are normal  There is tenderness  Musculoskeletal: Normal range of motion  Neurological: He is alert and oriented to person, place, and time  Skin: Skin is warm and dry  Psychiatric: He has a normal mood and affect  His behavior is normal  Thought content normal    Nursing note and vitals reviewed  Additional Data:     Labs:    Results from last 7 days   Lab Units 02/09/20  0502 02/08/20  1239   WBC Thousand/uL 7 91 8 94   HEMOGLOBIN g/dL 12 1 14 3   HEMATOCRIT % 36 3* 43 9   PLATELETS Thousands/uL 233 284   NEUTROS PCT %  --  79*   LYMPHS PCT %  --  13*   MONOS PCT %  --  7   EOS PCT %  --  1     Results from last 7 days   Lab Units 02/09/20  0502   POTASSIUM mmol/L 3 8   CHLORIDE mmol/L 107   CO2 mmol/L 28   BUN mg/dL 7   CREATININE mg/dL 0 76   CALCIUM mg/dL 8 0*   ALK PHOS U/L 48   ALT U/L 18   AST U/L 11           * I Have Reviewed All Lab Data Listed Above  * Additional Pertinent Lab Tests Reviewed:  Lesly 66 Admission Reviewed  Recent Cultures (last 7 days):           Last 24 Hours Medication List:     Current Facility-Administered Medications:  acetaminophen 650 mg Oral Q6H PRN Jonas Benedict, DO    atorvastatin 80 mg Oral Daily Adi Lou, DO    buPROPion 300 mg Oral Daily Adi Lou, DO    clonazePAM 1 5 mg Oral HS RICKEY Franklin    dicyclomine 20 mg Oral 4x Daily (AC & HS) Adi Lou, DO    enoxaparin 40 mg Subcutaneous Q24H [facility] Adi Lou, DO    hydrocortisone 25 mg Rectal TID Jonas Benedict, DO    magnesium hydroxide 30 mL Oral BID PRN Jonas Benedict, DO    ondansetron 4 mg Intravenous Q4H PRN Jonas Benedict, DO    oxyCODONE 5 mg Oral Q4H PRN Adi Lou, DO    pantoprazole 40 mg Oral BID INNA Sanders PA-C    predniSONE 40 mg Oral Daily Adi Lou, DO    sertraline 200 mg Oral Daily Adi Lou, DO    sodium chloride 100 mL/hr Intravenous Continuous Cmapos Nicole DO Last Rate: 100 mL/hr (02/09/20 0304)   sucralfate 1,000 mg Oral 4x Daily (AC & HS) Juliana Sanders PA-C         Today, Patient Was Seen By: RICKEY Cortez    ** Please Note: Dragon 360 Dictation voice to text software may have been used in the creation of this document   **

## 2020-02-09 NOTE — ASSESSMENT & PLAN NOTE
· Elevated lipase likely secondary to nausea vomiting  No evidence of pancreatitis  Results from last 7 days   Lab Units 02/08/20  1239 02/05/20  0832   LIPASE u/L 278 1,402*   MRI/MRCP was done in February of 2019 showed no evidence of pancreatic mass no pancreatic ductal enlargement    Plan for MRI/MRCP tomorrow  Continue NPO  Also planning for EGD tomorrow  Continue IV fluids  Symptom management

## 2020-02-09 NOTE — ASSESSMENT & PLAN NOTE
· Intractable nausea and vomiting  Currently off cycle for systemic rectal steroids for microscopic colitis    · Was seen in the emergency department 2/5/2020 and subsequently GI office 2/7/2020  · EGD tomorrow with Dr Jaylen Goetz, NPO after midnight  · MRI/MRCP will be performed in the next 24 hours  · Start NSS and trial prednisone with rectal steroids  · Gastroenterology following  · Continue antiemetics

## 2020-02-09 NOTE — CONSULTS
Consultation - [facility]) [facility]  Suhas Reese 53 y o  male MRN: [id number]  Unit/Bed#: -01 Encounter: [id number]        Consults    Reason for Consult / Principal Problem:  Chronic intermittent nausea and vomiting, generalized abdominal pain    HPI:  This 53-year-old male with a past medical history of Diaz's esophagus, coronary artery disease, diverticulosis with known complications of diverticulitis, depression, gastroesophageal reflux disease, coronary artery disease status post stenting, hyperlipidemia, essential hypertension, and microscopic colitis presented to the emergency room with a complaint of recurring severe abdominal pain in association with recurring episodes of both nausea and vomiting  Patient has a longstanding history of both abdominal pain as well as nausea and vomiting over the past 15-20 years  Most recently he was in our emergency room on February 5, 2020  The primary complaint then was severe abdominal pain in association with nausea and vomiting  CT scan of the abdomen and pelvis was performed on that day and no significant findings were identified  The patient was seen in my office with my physician assistant, Valeriy Hernandes, on February 7, 2020  His complaint at that time was that the pain had become so severe that he could no longer tolerated  He has a past medical history of pancreatitis as well as the common bile duct stone  The plan was to repeat MRI with MRCP on outpatient basis as well as perform esophagogastroduodenoscopy  This examination was schedule with me on February 11, 2020  Patient also has a prior history of microscopic colitis which was being treated both with systemic budesonide as well as budesonide suppositories  Currently he is cycle off of both of these medications  He denies any diarrhea or constipation  He denies any rectal bleeding  He further denies heartburn or dysphagia        REVIEW OF SYSTEMS:    CONSTITUTIONAL: Denies any fever, chills, or rigors  Good appetite, and no recent weight loss  HEENT: No earache or tinnitus  Denies hearing loss or visual disturbances  CARDIOVASCULAR: No chest pain or palpitations  RESPIRATORY: Denies any cough, hemoptysis, shortness of breath or dyspnea on exertion  GASTROINTESTINAL: As noted in the History of Present Illness  GENITOURINARY: No problems with urination  Denies any hematuria or dysuria  NEUROLOGIC: No dizziness or vertigo, denies headaches  MUSCULOSKELETAL: Denies any muscle or joint pain  SKIN: Denies skin rashes or itching  ENDOCRINE: Denies excessive thirst  Denies intolerance to heat or cold  PSYCHOSOCIAL: Denies depression or anxiety  Denies any recent memory loss  Historical Information   Past Medical History:   Diagnosis Date    Diaz's esophagus     Colon polyp     Coronary artery disease     Depression     Diverticulitis     GERD (gastroesophageal reflux disease)     Hemorrhoid     History of heart artery stent     Hyperlipidemia     Hypertension     Microscopic colitis      Past Surgical History:   Procedure Laterality Date    ABDOMINAL SURGERY      radio frequency ablation    BONE GRAFT Left 2018    CARPAL TUNNEL RELEASE Right     COLONOSCOPY      EGD AND COLONOSCOPY      ESOPHAGOGASTRODUODENOSCOPY N/A 2/15/2018    Procedure: ESOPHAGOGASTRODUODENOSCOPY (EGD); Surgeon: Winnie Barnard MD;  Location: [facility];  Service: Gastroenterology    ESOPHAGOGASTRODUODENOSCOPY N/A 12/10/2018    Procedure: ESOPHAGOGASTRODUODENOSCOPY (EGD); Surgeon: Leroy Duckworth MD;  Location: [facility];   Service: Gastroenterology    TONSILLECTOMY       Social History   Social History     Substance and Sexual Activity   Alcohol Use Not Currently    Frequency: Never    Comment: quit 3 years     Social History     Substance and Sexual Activity   Drug Use Yes    Frequency: 3 0 times per week    Types: Marijuana    Comment: last used 9/29     Social History Tobacco Use   Smoking Status Former Smoker    Packs/day: 0 50    Last attempt to quit: 2007    Years since quittin 0   Smokeless Tobacco Former User    Quit date: 1998     Family History   Problem Relation Age of Onset    Heart disease Father     Cancer Sister        Meds/Allergies     Medications Prior to Admission   Medication    atorvastatin (LIPITOR) 80 mg tablet    buPROPion (WELLBUTRIN SR) 150 mg 12 hr tablet    clonazePAM (KLONOPIN) 0 5 mg tablet    clopidogrel (PLAVIX) 75 mg tablet    dicyclomine (BENTYL) 20 mg tablet    omeprazole (PriLOSEC) 20 mg delayed release capsule    ondansetron (ZOFRAN-ODT) 4 mg disintegrating tablet    oxyCODONE (ROXICODONE) 5 mg immediate release tablet    oxyCODONE-acetaminophen (PERCOCET) 5-325 mg per tablet    sertraline (ZOLOFT) 100 mg tablet    nitroglycerin (NITROSTAT) 0 4 mg SL tablet     Current Facility-Administered Medications   Medication Dose Route Frequency    acetaminophen (TYLENOL) tablet 650 mg  650 mg Oral Q6H PRN    atorvastatin (LIPITOR) tablet 80 mg  80 mg Oral Daily    buPROPion (WELLBUTRIN XL) 24 hr tablet 300 mg  300 mg Oral Daily    clonazePAM (KlonoPIN) tablet 1 5 mg  1 5 mg Oral HS    dicyclomine (BENTYL) tablet 20 mg  20 mg Oral 4x Daily (AC & HS)    enoxaparin (LOVENOX) subcutaneous injection 40 mg  40 mg Subcutaneous Q24H MIYA    hydrocortisone (ANUSOL-HC) rectal suppository 25 mg  25 mg Rectal TID    magnesium hydroxide (MILK OF MAGNESIA) 400 mg/5 mL oral suspension 30 mL  30 mL Oral BID PRN    ondansetron (ZOFRAN) injection 4 mg  4 mg Intravenous Q4H PRN    oxyCODONE (ROXICODONE) IR tablet 5 mg  5 mg Oral Q4H PRN    pantoprazole (PROTONIX) EC tablet 40 mg  40 mg Oral BID AC    predniSONE tablet 40 mg  40 mg Oral Daily    sertraline (ZOLOFT) tablet 200 mg  200 mg Oral Daily    sodium chloride 0 9 % infusion  100 mL/hr Intravenous Continuous    sucralfate (CARAFATE) oral suspension 1,000 mg  1,000 mg Oral 4x Daily (AC & HS)       Allergies   Allergen Reactions    No Active Allergies            Objective     Blood pressure 135/65, pulse 70, temperature 98 5 °F (36 9 °C), temperature source Oral, resp  rate 18, height 5' 10" (1 778 m), weight 95 kg (209 lb 7 oz), SpO2 91 %  No intake or output data in the 24 hours ending 02/09/20 1037      PHYSICAL EXAM:      General Appearance:   Alert, cooperative, no distress, appears stated age    [de-identified]:   Normocephalic, atraumatic, anicteric, PERRLA   Neck:  Supple, symmetrical, trachea midline, no adenopathy;    thyroid: no enlargement/tenderness/nodules; no carotid  bruit or JVD    Lungs:   Clear to auscultation bilaterally; no rales, rhonchi or wheezing; respirations unlabored    Heart[de-identified]   S1 and S2 normal; regular rate and rhythm; no murmur, rub, or gallop  Abdomen:   Soft, non-tender, non-distended; normal bowel sounds; no masses, no organomegaly    Genitalia:   Deferred    Rectal:   Deferred    Extremities:  No cyanosis, clubbing or edema    Pulses:  2+ and symmetric all extremities    Skin:  Skin color, texture, turgor normal, no rashes or lesions    Lymph nodes:  No palpable cervical, axillary or inguinal lymphadenopathy        Lab Results:   Results from last 7 days   Lab Units 02/09/20  0502 02/08/20  1239   WBC Thousand/uL 7 91 8 94   HEMOGLOBIN g/dL 12 1 14 3   HEMATOCRIT % 36 3* 43 9   PLATELETS Thousands/uL 233 284   NEUTROS PCT %  --  79*   LYMPHS PCT %  --  13*   MONOS PCT %  --  7   EOS PCT %  --  1     Results from last 7 days   Lab Units 02/09/20  0502   POTASSIUM mmol/L 3 8   CHLORIDE mmol/L 107   CO2 mmol/L 28   BUN mg/dL 7   CREATININE mg/dL 0 76   CALCIUM mg/dL 8 0*   ALK PHOS U/L 48   ALT U/L 18   AST U/L 11         Results from last 7 days   Lab Units 02/08/20  1239   LIPASE u/L 278       Imaging Studies: I have personally reviewed pertinent imaging studies      Ct Abdomen Pelvis With Contrast    Result Date: 2/8/2020  Impression:  No acute findings in the abdomen or pelvis  No CT findings to suggest acute pancreatitis  Workstation performed: [id number]       ASSESSMENT and PLAN:      1  Chronic intermittent nausea and vomiting  - ongoing for 15-20 years  - EGD was planned with me on Tuesday, Jan 11, 2020 and this will be cancelled  - EGD tomorrow with Dr Jennifer Matute, [place] after midnight  - MRI/MRCP will be performed in the next 24 hours  - Patient followed regularly in my office by my PA, Karyn Ho  - Continue antiemetics    2  Microscopic/lymphocytic colitis  - currently off cycle for systemic and rectal steroids (rectal Budesonide)    3  History of recent increased lipase level  - lipase was 1,402 on 2/5/20  - lipase yesterday was 278  - CT scan was performed on February 5, 2020 showed colonic diverticulosis without evidence of diverticulitis  The pancreas was normal   - MRI/MRCP that was performed February 25, 2019 showed no evidence of a pancreatic mass  There was no pancreatic ductal enlargement  The appearance of pancreatic ductal enlargement as seen on previous CT examinations appeared to have been artifactual    4  Generalized abdominal pain, chronic and intermittent

## 2020-02-09 NOTE — ASSESSMENT & PLAN NOTE
· Microscopic colitis currently off cycle for systemic and rectal steroids  · Was feeling well on budesonide suppositories until taken off cycle 3-4 weeks ago  · Will trial prednisone and rectal steroids  Schedule dicyclomine    · GI consulted

## 2020-02-09 NOTE — UTILIZATION REVIEW
Initial Clinical Review    Admission: Date/Time/Statement: Admission Orders (From admission, onward)     Ordered        20 1505  Place in Observation  Once                   Orders Placed This Encounter   Procedures    Place in Observation     Standing Status:   Standing     Number of Occurrences:   1     Order Specific Question:   Admitting Physician     Answer:   Javon Garcia     Order Specific Question:   Level of Care     Answer:   Med Surg [16]     ED Arrival Information     Expected Arrival Acuity Means of Arrival Escorted By Service Admission Type    - 2020 11:55 Urgent Walk-In Self General Medicine Urgent    Arrival Complaint    abdominal pain,vomiting        Chief Complaint   Patient presents with    Abdominal Pain     pt presents to ed with abd pain and vomiting that started this am, states he was treated for pancratitis about a week ago  +n/v/d    Vomiting     HPI:   Cheryl Caceres is a 53 y o  male who presented to the ED from home with worsening abdominal pain  The patient has known diagnosis of microscopic colitis and on on/off cycles of budesonide pills and suppositories  Currently on off cycle for last 3-4 weeks  Since being off he has had intractable nausea and vomiting with lower abdominal cramping  This has led to epigastric pain from vomiting  He was originally seen here in the emergency department on 2020 and was found have elevated lipase without evidence of pancreatitis on CT scan  He left the emergency department as his mom's  was the next day  He subsequently followed up with gastroenterology on 2020 where it was recommended he have EGD  He continues to have nausea and vomiting with generalized abdominal pain and re-presented to the emergency department  CTAP with no acute findings, lipase elevated at 1,402,  potassium 3 1      Plan: Admit to Observation for evaluation and treatment of Intractable nausea and vomiting, elevated lipase, microscopic colitis:   NSS and trial prednisone with rectal steroids, schedule dicyclomine, consult Gastroenterology  2/9 Gastroenterology consult: EGD tomorrow, MRI/MRCP in next 24 hours, continue antiemetics  2/9 Internal Medicine note: Continue  IV fluids, NPO tonight for EGD tomorrow  MRI/MRCP pending  Patient c/o nausea, some mild abdominal discomfort today  ED Triage Vitals   Temperature Pulse Respirations Blood Pressure SpO2   02/08/20 1207 02/08/20 1207 02/08/20 1207 02/08/20 1207 02/08/20 1207   98 8 °F (37 1 °C) 63 17 (!) 198/96 99 %      Temp Source Heart Rate Source Patient Position - Orthostatic VS BP Location FiO2 (%)   02/08/20 1207 02/08/20 1207 02/08/20 1540 02/08/20 1207 --   Oral Monitor Lying Right arm       Pain Score       02/08/20 1207       Worst Possible Pain        Wt Readings from Last 1 Encounters:   02/08/20 95 kg (209 lb 7 oz)     Additional Vital Signs:     Date/Time  Temp  Pulse  Resp  BP  SpO2  O2 Device    02/09/20 1553  99 1   77  19  124/75  93 %  None (Room air)        Date/Time  Temp  Pulse  Resp  BP   SpO2  O2 Device    02/09/20 0741  98 5   70  18  135/65   91 %  None (Room air)    02/08/20 2323  97 7   79  19  136/72   94 %  None (Room air)    02/08/20 1540  98   83  19  141/71   95 %  None (Room air)    02/08/20 1415  --  78  --  149/82   94 %  --    02/08/20 1414  --  60  18  149/82   95 %  None (Room air)        Pertinent Labs/Diagnostic Test Results:     2/8 CTAP:  No acute findings in the abdomen or pelvis  No CT findings to suggest acute pancreatitis      Results from last 7 days   Lab Units 02/09/20  0502 02/08/20  1239 02/05/20  0832   WBC Thousand/uL 7 91 8 94 12 92*   HEMOGLOBIN g/dL 12 1 14 3 13 9   HEMATOCRIT % 36 3* 43 9 42 3   PLATELETS Thousands/uL 233 284 246   NEUTROS ABS Thousands/µL  --  7 01 10 78*         Results from last 7 days   Lab Units 02/09/20  0502 02/08/20  1239 02/05/20  0832   SODIUM mmol/L 142 142 140   POTASSIUM mmol/L 3 8 4 1 3 1* CHLORIDE mmol/L 107 103 101   CO2 mmol/L 28 32 28   ANION GAP mmol/L 7 7 11   BUN mg/dL 7 14 17   CREATININE mg/dL 0 76 0 98 1 00   EGFR ml/min/1 73sq m 103 86 84   CALCIUM mg/dL 8 0* 9 0 8 7   MAGNESIUM mg/dL  --  1 7  --      Results from last 7 days   Lab Units 02/09/20  0502 02/08/20  1239 02/05/20  0832   AST U/L 11 14 16   ALT U/L 18 25 27   ALK PHOS U/L 48 67 64   TOTAL PROTEIN g/dL 6 0* 7 4 7 3   ALBUMIN g/dL 3 0* 3 8 3 7   TOTAL BILIRUBIN mg/dL 0 30 0 30 0 30   BILIRUBIN DIRECT mg/dL  --  0 08  --          Results from last 7 days   Lab Units 02/09/20  0502 02/08/20  1239 02/05/20  0832   GLUCOSE RANDOM mg/dL 108 130 143*       Results from last 7 days   Lab Units 02/08/20  1239   TSH 3RD GENERATON uIU/mL 0 874         Results from last 7 days   Lab Units 02/08/20  1239   LACTIC ACID mmol/L 1 4       Results from last 7 days   Lab Units 02/08/20  1239 02/05/20  0832   LIPASE u/L 278 1,402*             Results from last 7 days   Lab Units 02/08/20  1458 02/05/20  1114   CLARITY UA  Clear Clear   COLOR UA  Yellow Yellow   SPEC GRAV UA  1 010 <=1 005   PH UA  6 5 6 0   GLUCOSE UA mg/dl Negative Negative   KETONES UA mg/dl Negative Negative   BLOOD UA  Negative Negative   PROTEIN UA mg/dl Negative Negative   NITRITE UA  Negative Negative   BILIRUBIN UA  Negative Negative   UROBILINOGEN UA E U /dl 0 2 0 2   LEUKOCYTES UA  Negative Negative         ED Treatment:   Medication Administration from 02/08/2020 1155 to 02/08/2020 1539       Date/Time Order Dose Route Action     02/08/2020 1237 sodium chloride 0 9 % bolus 2,000 mL 2,000 mL Intravenous New Bag     02/08/2020 1240 ondansetron (ZOFRAN) injection 4 mg 4 mg Intravenous Given     02/08/2020 1240 HYDROmorphone (DILAUDID) injection 0 5 mg 0 5 mg Intravenous Given     02/08/2020 1311 HYDROmorphone (DILAUDID) injection 0 5 mg 0 5 mg Intravenous Given     02/08/2020 1511 pantoprazole (PROTONIX) injection 40 mg 40 mg Intravenous Given     02/08/2020 1511 aluminum-magnesium hydroxide-simethicone (MYLANTA) 200-200-20 mg/5 mL oral suspension 30 mL 30 mL Oral Given        Past Medical History:   Diagnosis Date    Diaz's esophagus     Colon polyp     Coronary artery disease     Depression     Diverticulitis     GERD (gastroesophageal reflux disease)     Hemorrhoid     History of heart artery stent     Hyperlipidemia     Hypertension     Microscopic colitis      Present on Admission:   Anxiety and depression   Coronary artery disease   Microscopic colitis   Hyperlipidemia   Non-intractable vomiting with nausea   Elevated lipase      Admitting Diagnosis: Diarrhea [R19 7]  Abdominal pain [R10 9]  Intractable vomiting with nausea [R11 2]  Age/Sex: 53 y o  male       Admission Orders: NPO, consult Gastroenterology  Scheduled Medications:    Medications:  atorvastatin 80 mg Oral Daily   buPROPion 300 mg Oral Daily   clonazePAM 1 5 mg Oral HS   dicyclomine 20 mg Oral 4x Daily (AC & HS)   enoxaparin 40 mg Subcutaneous Q24H MIYA   hydrocortisone 25 mg Rectal TID   pantoprazole 40 mg Oral BID AC   predniSONE 40 mg Oral Daily   sertraline 200 mg Oral Daily   sucralfate 1,000 mg Oral 4x Daily (AC & HS)     Continuous IV Infusions:    sodium chloride 100 mL/hr Intravenous Continuous     PRN Meds:    acetaminophen 650 mg Oral Q6H PRN   magnesium hydroxide 30 mL Oral BID PRN   ondansetron 4 mg Intravenous Q4H PRN   oxyCODONE 5 mg Oral Q4H PRN         Network Utilization Review Department  [email or web address]  ATTENTION: Please call with any questions or concerns to [phone] and carefully listen to the prompts so that you are directed to the right person  All voicemails are confidential   Fransisca Rust all requests for admission clinical reviews, approved or denied determinations and any other requests to dedicated fax number below belonging to the campus where the patient is receiving treatment   List of dedicated fax numbers for the Facilities:  FACILITY NAME UR FAX NUMBER   ADMISSION DENIALS (Administrative/Medical Necessity) [phone]   PARENT CHILD HEALTH (Maternity/NICU/Pediatrics) [phone]   [facility] [phone]   [facility] [phone]   [facility] [phone]   [address] [facility] [address] [facility] [phone]   [facility]  [phone]   [address] [facility] [address]  [address] [facility] [address] [facility] [phone]

## 2020-02-09 NOTE — PHYSICIAN ADVISOR
Current patient class: Observation  The patient is currently on Hospital Day: 2 at [address]      This patient was originally admitted to the hospital under observation status  After admission the patient was reevaluated and determined to require further hospitalization  After review of the relevant documentation, labs, vital signs and test results, the patient is appropriate for INPATIENT ADMISSION  Admission to the hospital as an inpatient is a complex decision making process which requires the practitioner to consider the patients presenting complaint, history and physical examination and all relevant testing  With this in mind, in this case, the patient was deemed appropriate for INPATIENT ADMISSION  After review of the documentation and testing available at the time of the admission I concur with this clinical determination of medical necessity  Rationale is as follows: The patient is a 43-year-old male with known microscopic colitis who presented to the emergency room 02/08/2020 with a chief complaint of severe abdominal pain with nausea and vomiting which has been intractable  He was initially seen in the emergency room on 02/05/2020 for the same complaint with finding of elevated lipase at 1400  but no acute changes on CT of abdomen and pelvis  He followed up with outpatient gastroenterology on 02/07/2020 with planned MRCP and EGD  Unfortunately patient's pain was so severe he presented back to the ER  He received intravenous Zofran, intravenous Dilaudid for pain control  He is treated with rectal in oral steroids  Gastroenterology is consulted  Patient underwent repeat CT scan of abdomen and pelvis and today underwent MRCP  Results of that are pending  Currently he is on intravenous fluids and awaiting EGD tomorrow  Abdominal pain and nausea persist but are better controlled than on admission  At this time inpatient level of care is appropriate      The patients vitals on arrival were ED Triage Vitals   Temperature Pulse Respirations Blood Pressure SpO2   02/08/20 1207 02/08/20 1207 02/08/20 1207 02/08/20 1207 02/08/20 1207   98 8 °F (37 1 °C) 63 17 (!) 198/96 99 %      Temp Source Heart Rate Source Patient Position - Orthostatic VS BP Location FiO2 (%)   02/08/20 1207 02/08/20 1207 02/08/20 1540 02/08/20 1207 --   Oral Monitor Lying Right arm       Pain Score       02/08/20 1207       Worst Possible Pain           Past Medical History:   Diagnosis Date    Diaz's esophagus     Colon polyp     Coronary artery disease     Depression     Diverticulitis     GERD (gastroesophageal reflux disease)     Hemorrhoid     History of heart artery stent     Hyperlipidemia     Hypertension     Microscopic colitis      Past Surgical History:   Procedure Laterality Date    ABDOMINAL SURGERY      radio frequency ablation    BONE GRAFT Left 2018    CARPAL TUNNEL RELEASE Right     COLONOSCOPY      EGD AND COLONOSCOPY      ESOPHAGOGASTRODUODENOSCOPY N/A 2/15/2018    Procedure: ESOPHAGOGASTRODUODENOSCOPY (EGD); Surgeon: vEelyne Koroma MD;  Location: [facility];  Service: Gastroenterology    ESOPHAGOGASTRODUODENOSCOPY N/A 12/10/2018    Procedure: ESOPHAGOGASTRODUODENOSCOPY (EGD); Surgeon: Jamarcus Edwards MD;  Location: [facility];   Service: Gastroenterology    TONSILLECTOMY         The patient was admitted to the hospital at N/A on N/A for the following diagnosis:  Diarrhea [R19 7]  Abdominal pain [R10 9]  Intractable vomiting with nausea [R11 2]    Consults have been placed to:   IP CONSULT TO GASTROENTEROLOGY    Vitals:    02/08/20 1540 02/08/20 2323 02/09/20 0741 02/09/20 1553   BP: 141/71 136/72 135/65 124/75   BP Location: Left arm Left arm Left arm Right arm   Pulse: 83 79 70 77   Resp: 19 19 18 19   Temp: 98 °F (36 7 °C) 97 7 °F (36 5 °C) 98 5 °F (36 9 °C) 99 1 °F (37 3 °C)   TempSrc: Oral Oral Oral Oral   SpO2: 95% 94% 91% 93%   Weight: 95 kg (209 lb 7 oz)      Height: 5' 10" (1 778 m)          Most recent labs:    Recent Labs     02/08/20  1239 02/09/20  0502   WBC 8 94 7 91   HGB 14 3 12 1   HCT 43 9 36 3*    233   K 4 1 3 8   CALCIUM 9 0 8 0*   BUN 14 7   CREATININE 0 98 0 76   LIPASE 278  --    AST 14 11   ALT 25 18   ALKPHOS 67 48       Scheduled Meds:  Current Facility-Administered Medications:  acetaminophen 650 mg Oral Q6H PRN Campos Ang, DO    atorvastatin 80 mg Oral Daily Adi Lou, DO    buPROPion 300 mg Oral Daily Adi Lou, DO    clonazePAM 1 5 mg Oral HS RICKEY Francois    dicyclomine 20 mg Oral 4x Daily (AC & HS) Adi Lou, DO    enoxaparin 40 mg Subcutaneous Q24H [address] Adi Carimne, DO    hydrocortisone 25 mg Rectal TID Campos Corey, DO    magnesium hydroxide 30 mL Oral BID PRN Campos Nicole, DO    ondansetron 4 mg Intravenous Q4H PRN Campos Ang, DO    oxyCODONE 5 mg Oral Q4H PRN Adi Lou, DO    pantoprazole 40 mg Oral BID AC Juliana Sanders PA-C    predniSONE 40 mg Oral Daily Adi Lou, DO    sertraline 200 mg Oral Daily Adi Lou, DO    sodium chloride 100 mL/hr Intravenous Continuous Adi Lou,  Last Rate: 100 mL/hr (02/09/20 1716)   sucralfate 1,000 mg Oral 4x Daily (AC & HS) Juliana Sanders PA-C      Continuous Infusions:  sodium chloride 100 mL/hr Last Rate: 100 mL/hr (02/09/20 1716)     PRN Meds:   acetaminophen    magnesium hydroxide    ondansetron    oxyCODONE    Surgical procedures (if appropriate):

## 2020-02-10 ENCOUNTER — ANESTHESIA EVENT (INPATIENT)
Dept: GASTROENTEROLOGY | Facility: HOSPITAL | Age: 55
DRG: 382 | End: 2020-02-10
Payer: COMMERCIAL

## 2020-02-10 ENCOUNTER — APPOINTMENT (INPATIENT)
Dept: GASTROENTEROLOGY | Facility: HOSPITAL | Age: 55
DRG: 382 | End: 2020-02-10
Payer: COMMERCIAL

## 2020-02-10 ENCOUNTER — ANESTHESIA (INPATIENT)
Dept: GASTROENTEROLOGY | Facility: HOSPITAL | Age: 55
DRG: 382 | End: 2020-02-10
Payer: COMMERCIAL

## 2020-02-10 VITALS
BODY MASS INDEX: 29.92 KG/M2 | DIASTOLIC BLOOD PRESSURE: 74 MMHG | SYSTOLIC BLOOD PRESSURE: 124 MMHG | HEART RATE: 56 BPM | RESPIRATION RATE: 18 BRPM | OXYGEN SATURATION: 99 % | HEIGHT: 70 IN | WEIGHT: 209 LBS | TEMPERATURE: 98.7 F

## 2020-02-10 PROBLEM — E87.6 HYPOKALEMIA: Status: ACTIVE | Noted: 2020-02-10

## 2020-02-10 PROBLEM — R74.8 ELEVATED LIPASE: Status: RESOLVED | Noted: 2020-02-08 | Resolved: 2020-02-10

## 2020-02-10 LAB
ANION GAP SERPL CALCULATED.3IONS-SCNC: 9 MMOL/L (ref 4–13)
BUN SERPL-MCNC: 6 MG/DL (ref 5–25)
CALCIUM SERPL-MCNC: 8.4 MG/DL (ref 8.3–10.1)
CHLORIDE SERPL-SCNC: 109 MMOL/L (ref 100–108)
CO2 SERPL-SCNC: 27 MMOL/L (ref 21–32)
CREAT SERPL-MCNC: 0.82 MG/DL (ref 0.6–1.3)
ERYTHROCYTE [DISTWIDTH] IN BLOOD BY AUTOMATED COUNT: 13.1 % (ref 11.6–15.1)
GFR SERPL CREATININE-BSD FRML MDRD: 100 ML/MIN/1.73SQ M
GLUCOSE SERPL-MCNC: 101 MG/DL (ref 65–140)
HCT VFR BLD AUTO: 36.5 % (ref 36.5–49.3)
HGB BLD-MCNC: 11.8 G/DL (ref 12–17)
MCH RBC QN AUTO: 29.1 PG (ref 26.8–34.3)
MCHC RBC AUTO-ENTMCNC: 32.3 G/DL (ref 31.4–37.4)
MCV RBC AUTO: 90 FL (ref 82–98)
PLATELET # BLD AUTO: 243 THOUSANDS/UL (ref 149–390)
PMV BLD AUTO: 9.2 FL (ref 8.9–12.7)
POTASSIUM SERPL-SCNC: 3.2 MMOL/L (ref 3.5–5.3)
RBC # BLD AUTO: 4.06 MILLION/UL (ref 3.88–5.62)
SODIUM SERPL-SCNC: 145 MMOL/L (ref 136–145)
WBC # BLD AUTO: 9.34 THOUSAND/UL (ref 4.31–10.16)

## 2020-02-10 PROCEDURE — 43239 EGD BIOPSY SINGLE/MULTIPLE: CPT | Performed by: INTERNAL MEDICINE

## 2020-02-10 PROCEDURE — 0DB68ZX EXCISION OF STOMACH, VIA NATURAL OR ARTIFICIAL OPENING ENDOSCOPIC, DIAGNOSTIC: ICD-10-PCS | Performed by: INTERNAL MEDICINE

## 2020-02-10 PROCEDURE — 80048 BASIC METABOLIC PNL TOTAL CA: CPT | Performed by: NURSE PRACTITIONER

## 2020-02-10 PROCEDURE — 0DB98ZX EXCISION OF DUODENUM, VIA NATURAL OR ARTIFICIAL OPENING ENDOSCOPIC, DIAGNOSTIC: ICD-10-PCS | Performed by: INTERNAL MEDICINE

## 2020-02-10 PROCEDURE — 99239 HOSP IP/OBS DSCHRG MGMT >30: CPT | Performed by: PHYSICIAN ASSISTANT

## 2020-02-10 PROCEDURE — 85027 COMPLETE CBC AUTOMATED: CPT | Performed by: NURSE PRACTITIONER

## 2020-02-10 PROCEDURE — 88305 TISSUE EXAM BY PATHOLOGIST: CPT | Performed by: PATHOLOGY

## 2020-02-10 PROCEDURE — 0DB38ZX EXCISION OF LOWER ESOPHAGUS, VIA NATURAL OR ARTIFICIAL OPENING ENDOSCOPIC, DIAGNOSTIC: ICD-10-PCS | Performed by: INTERNAL MEDICINE

## 2020-02-10 RX ORDER — SODIUM CHLORIDE, SODIUM LACTATE, POTASSIUM CHLORIDE, CALCIUM CHLORIDE 600; 310; 30; 20 MG/100ML; MG/100ML; MG/100ML; MG/100ML
125 INJECTION, SOLUTION INTRAVENOUS CONTINUOUS
Status: CANCELLED | OUTPATIENT
Start: 2020-02-10

## 2020-02-10 RX ORDER — POTASSIUM CHLORIDE 20 MEQ/1
40 TABLET, EXTENDED RELEASE ORAL ONCE
Status: COMPLETED | OUTPATIENT
Start: 2020-02-10 | End: 2020-02-10

## 2020-02-10 RX ORDER — SUCRALFATE ORAL 1 G/10ML
1000 SUSPENSION ORAL
Qty: 420 ML | Refills: 0 | Status: SHIPPED | OUTPATIENT
Start: 2020-02-10 | End: 2020-05-07

## 2020-02-10 RX ORDER — PROPOFOL 10 MG/ML
INJECTION, EMULSION INTRAVENOUS AS NEEDED
Status: DISCONTINUED | OUTPATIENT
Start: 2020-02-10 | End: 2020-02-10 | Stop reason: SURG

## 2020-02-10 RX ORDER — DICYCLOMINE HCL 20 MG
20 TABLET ORAL
Qty: 90 TABLET | Refills: 0 | Status: SHIPPED | OUTPATIENT
Start: 2020-02-10 | End: 2021-03-03 | Stop reason: SDUPTHER

## 2020-02-10 RX ORDER — ASPIRIN 81 MG/1
81 TABLET ORAL DAILY
Refills: 0
Start: 2020-02-10 | End: 2022-06-07

## 2020-02-10 RX ORDER — POTASSIUM CHLORIDE 14.9 MG/ML
20 INJECTION INTRAVENOUS ONCE
Status: COMPLETED | OUTPATIENT
Start: 2020-02-10 | End: 2020-02-10

## 2020-02-10 RX ADMIN — SODIUM CHLORIDE 100 ML/HR: 0.9 INJECTION, SOLUTION INTRAVENOUS at 12:20

## 2020-02-10 RX ADMIN — PROPOFOL 30 MG: 10 INJECTION, EMULSION INTRAVENOUS at 11:34

## 2020-02-10 RX ADMIN — PANTOPRAZOLE SODIUM 40 MG: 40 TABLET, DELAYED RELEASE ORAL at 13:15

## 2020-02-10 RX ADMIN — CLONAZEPAM 1.5 MG: 0.5 TABLET ORAL at 17:41

## 2020-02-10 RX ADMIN — SUCRALFATE 1000 MG: 1 SUSPENSION ORAL at 17:40

## 2020-02-10 RX ADMIN — DICYCLOMINE HYDROCHLORIDE 20 MG: 20 TABLET ORAL at 13:14

## 2020-02-10 RX ADMIN — POTASSIUM CHLORIDE 20 MEQ: 200 INJECTION, SOLUTION INTRAVENOUS at 13:14

## 2020-02-10 RX ADMIN — PROPOFOL 120 MG: 10 INJECTION, EMULSION INTRAVENOUS at 11:32

## 2020-02-10 RX ADMIN — POTASSIUM CHLORIDE 40 MEQ: 1500 TABLET, EXTENDED RELEASE ORAL at 13:14

## 2020-02-10 RX ADMIN — LIDOCAINE HYDROCHLORIDE 100 MG: 20 INJECTION, SOLUTION INTRAVENOUS at 11:32

## 2020-02-10 RX ADMIN — SUCRALFATE 1000 MG: 1 SUSPENSION ORAL at 13:14

## 2020-02-10 RX ADMIN — PANTOPRAZOLE SODIUM 40 MG: 40 TABLET, DELAYED RELEASE ORAL at 17:41

## 2020-02-10 RX ADMIN — DICYCLOMINE HYDROCHLORIDE 20 MG: 20 TABLET ORAL at 17:46

## 2020-02-10 NOTE — ASSESSMENT & PLAN NOTE
· K+ 3 2, likely secondary to GI loss from vomiting   · Replete with 40 mEq KDUR and IV potassium 20 mEq  · Monitor

## 2020-02-10 NOTE — PLAN OF CARE
Problem: Nutrition/Hydration-ADULT  Goal: Nutrient/Hydration intake appropriate for improving, restoring or maintaining nutritional needs  Description  Monitor and assess patient's nutrition/hydration status for malnutrition  Collaborate with interdisciplinary team and initiate plan and interventions as ordered  Monitor patient's weight and dietary intake as ordered or per policy  Utilize nutrition screening tool and intervene as necessary  Determine patient's food preferences and provide high-protein, high-caloric foods as appropriate  INTERVENTIONS:  - Monitor oral intake, urinary output, labs, and treatment plans  - Assess nutrition and hydration status and recommend course of action  - Evaluate amount of meals eaten  - Assist patient with eating if necessary   - Allow adequate time for meals  - Recommend/ encourage appropriate diets, oral nutritional supplements, and vitamin/mineral supplements  - Order, calculate, and assess calorie counts as needed  - Recommend, monitor, and adjust tube feedings and TPN/PPN based on assessed needs  - Assess need for intravenous fluids  - Provide specific nutrition/hydration education as appropriate  - Include patient/family/caregiver in decisions related to nutrition  Outcome: Progressing     Problem: Knowledge Deficit  Goal: Patient/family/caregiver demonstrates understanding of disease process, treatment plan, medications, and discharge instructions  Description  Complete learning assessment and assess knowledge base  Interventions:  - Provide teaching at level of understanding  - Provide teaching via preferred learning methods  Outcome: Progressing     Problem: Potential for Falls  Goal: Patient will remain free of falls  Description  INTERVENTIONS:  - Assess patient frequently for physical needs  -  Identify cognitive and physical deficits and behaviors that affect risk of falls    -  Jarales fall precautions as indicated by assessment   - Educate patient/family on patient safety including physical limitations  - Instruct patient to call for assistance with activity based on assessment  - Modify environment to reduce risk of injury  - Consider OT/PT consult to assist with strengthening/mobility  Outcome: Progressing

## 2020-02-10 NOTE — ASSESSMENT & PLAN NOTE
· Pt presented with intractable N/V  · Currently off cycle for systemic rectal steroids for microscopic colitis    · GI following,  · MRI/MRCP negative   · EGD to be performed today, follow up results    · Trial prednisone   · Continue antiemetics

## 2020-02-10 NOTE — ASSESSMENT & PLAN NOTE
· Pt with history of microscopic colitis currently off cycle for systemic and rectal steroids  · Was feeling well on budesonide suppositories until taken off cycle 3-4 weeks ago    · GI following,  · Pt with history of chronic intermittent N/V  · Currently being trialed on prednisone 40 mg daily   · Scheduled bentyl and carafate

## 2020-02-10 NOTE — ASSESSMENT & PLAN NOTE
· Mood currently stable  · Continue Wellbutrin 300 mg daily, Zoloft 200 mg daily   · Reviewed PDMP, pt also appears to be on PRN clonazepam 0 5 mg   · Monitor

## 2020-02-10 NOTE — ASSESSMENT & PLAN NOTE
· Pt with CAD s/p 2 stents placed on 4/17/19  · Follows with cardiology as an outpatient   · Pt maintained on ASA/plavix outpatient, however patient reports he ran out of ASA  · Plavix currently on hold for EGD, resume per GI   · Continue statin  · Pt currently asymptomatic, denies any chest pain or shortness of breath   · Monitor

## 2020-02-10 NOTE — ASSESSMENT & PLAN NOTE
· Elevated lipase likely secondary to nausea/vomiting  No evidence of pancreatitis    · Resolved  · MRI/MRCP negative for acute findings   · EGD to be performed today   · GI following    Results from last 7 days   Lab Units 02/08/20  1239 02/05/20  0832   LIPASE u/L 278 1,402*

## 2020-02-10 NOTE — DISCHARGE INSTRUCTIONS
Please follow up with your primary care provider within one week, obtain BMP to monitor electrolytes   Please follow up with GI in 3-4 weeks   If continued diarrhea, continue budesonide per GI recommendations  Continue aspirin and plavix  If you begin to have any worsening abdominal pain, nausea, vomiting, diarrhea, fevers please come back to the hospital for further evaluation  Acute Nausea and Vomiting, Ambulatory Care   GENERAL INFORMATION:   Acute nausea and vomiting  starts suddenly, gets worse quickly, and lasts a short time  Nausea and vomiting may be caused by pregnancy, alcohol, infection, or medicines  Common related symptoms include the following:   · Fever    · Abdominal swelling    · Pain, tenderness, or a lump in the abdomen    · Splashing sounds heard in your stomach when you move  Seek immediate care for the following symptoms:   · Blood in your vomit or bowel movements    · Sudden, severe pain in your chest and upper abdomen after hard vomiting    · Dizziness, dry mouth, and thirst    · Urinating very little or not at all    · Muscle weakness, leg cramps, and trouble breathing    · A heart beat that is faster than normal    · Vomiting for more than 48 hours  Treatment for acute nausea and vomiting  may include medicines to calm your stomach and stop the vomiting  You may need IV fluids if you are dehydrated  Manage your nausea and vomiting:   · Drink liquids as directed to prevent dehydration  Ask how much liquid to drink each day and which liquids are best for you  You may need to drink an oral rehydration solution (ORS)  ORS contains water, salts, and sugar that are needed to replace the lost body fluids  Ask what kind of ORS to use, how much to drink, and where to get it  · Eat smaller meals, more often  Eat small amounts of food every 2 to 3 hours, even if you are not hungry  Food in your stomach may help decrease your nausea  · Avoid stress    Find ways to relax and manage your stress  Headaches due to stress may cause nausea and vomiting  Get more rest and sleep  Follow up with your healthcare provider as directed:  Write down your questions so you remember to ask them during your visits  CARE AGREEMENT:   You have the right to help plan your care  Learn about your health condition and how it may be treated  Discuss treatment options with your caregivers to decide what care you want to receive  You always have the right to refuse treatment  The above information is an  only  It is not intended as medical advice for individual conditions or treatments  Talk to your doctor, nurse or pharmacist before following any medical regimen to see if it is safe and effective for you  © 2014 [address] is for End User's use only and may not be sold, redistributed or otherwise used for commercial purposes  All illustrations and images included in CareNotes® are the copyrighted property of A D [employer] M [employer]  or Mhiir Castro

## 2020-02-10 NOTE — ANESTHESIA POSTPROCEDURE EVALUATION
Post-Op Assessment Note    CV Status:  Stable       Mental Status:  Alert and awake   Hydration Status:  Stable   PONV Controlled:  None   Airway Patency:  Patent   Post Op Vitals Reviewed: Yes      Staff: CRNA           BP   128/71   Temp      Pulse  73   Resp 17   SpO2   99%    Post procedure VS noted above, SV non obstructed on RA

## 2020-02-10 NOTE — DISCHARGE SUMMARY
Discharge- Susan Zafar 1965, 53 y o  male MRN: [id number]    Unit/Bed#: -01 Encounter: [id number]    Primary Care Provider: Nikunj Garrido MD   Date and time admitted to hospital: 2/8/2020 12:02 PM      DOS: 2/10/2020    * Intractable vomiting with nausea  Assessment & Plan  · Pt presented with intractable N/V, resolved  · Currently off cycle for systemic rectal steroids for microscopic colitis  · Possibly secondary to cyclical vomiting syndrome  · GI following,  · MRI/MRCP negative   · EGD to be performed today, follow up results    · No need for additional steroids at this point per discussion with GI  · Continue antiemetics, follow up with GI closely outpatient     Hypokalemia  Assessment & Plan  · K+ 3 2, likely secondary to GI loss from vomiting   · Replete with 40 mEq KDUR and IV potassium 20 mEq  · Monitor, BMP as outpatient     Elevated lipase  Assessment & Plan  · Elevated lipase likely secondary to nausea/vomiting  No evidence of pancreatitis  · Resolved, level now WNL  · MRI/MRCP negative for acute findings   · EGD to be performed today   · GI following    Results from last 7 days   Lab Units 02/08/20  1239 02/05/20  0832   LIPASE u/L 278 1,402*       Hyperlipidemia  Assessment & Plan  · Continue Lipitor 80 mg daily     Coronary artery disease  Assessment & Plan  · Pt with CAD s/p 2 stents placed on 4/17/19  · Follows with cardiology as an outpatient   · Pt maintained on ASA/plavix outpatient, however patient reports he ran out of ASA  · Plavix currently on hold for EGD, resume ASA and plavix on discharge per discussion with GI  · Continue statin  · Pt currently asymptomatic, denies any chest pain or shortness of breath   · Monitor     Microscopic colitis  Assessment & Plan  · Pt with history of microscopic colitis currently off cycle for systemic and rectal steroids  · Was feeling well on budesonide suppositories until taken off cycle 3-4 weeks ago    · GI following,  · Pt with history of chronic intermittent N/V  · No need for PO prednisone at this time, discontinued   · Scheduled bentyl and carafate    Anxiety and depression  Assessment & Plan  · Mood currently stable  · Continue Wellbutrin 300 mg daily, Zoloft 200 mg daily   · Reviewed PDMP, pt also appears to be on PRN clonazepam 0 5 mg   · Monitor      Discharging Physician / Practitioner: Delvis Baeza PA-C  PCP: Christina Stevenson MD  Admission Date:   Admission Orders (From admission, onward)     Ordered        02/09/20 1802  Inpatient Admission  Once         02/08/20 1505  Place in Observation  Once                   Discharge Date: 02/10/20    Resolved Problems  Date Reviewed: 2/10/2020    None          Consultations During Hospital Stay:  · GI    Procedures Performed:   · CT abdomen pelvis 2/8-no acute findings in the abdomen or pelvis  No CT findings to suggest acute pancreatitis  · MRI/MRCP 2/9-no choledocholithiasis and no biliary dilation  Stable MRI  · EGD 2/10-short-segment Diaz's esophagus  Small hiatal hernia  Significant Findings / Test Results:   · Potassium 3 2 on discharge, received p o  And IV supplementation prior to  · Lactic negative  · TSH WNL  · UA unremarkable    Incidental Findings:   · None     Test Results Pending at Discharge (will require follow up): · Pathology results from EGD biopsies     Outpatient Tests Requested:  · Per PCP/GI, recommend outpatient follow-up with BMP to monitor electrolytes    Complications:  None    Reason for Admission:  Nausea and vomiting    Hospital Course:     Neptali Chandra is a 53 y o  male patient significant past medical history of microscopic colitis, anxiety depression, CAD status post stents, hyperlipidemia who originally presented to the hospital on 2/8/2020 due to intractable nausea and vomiting with abdominal pain  Patient has history of chronic nausea, vomiting and diarrhea    He follows with GI closely as an outpatient and is on cycles of budesonide pills and suppositories  He is currently on an off cycle  He was seen in the emergency department on 02/05 and noted to have an elevated lipase level, however no CT findings for acute pancreatitis  Therefore he was discharged from the ER and followed up with GI on 02/07 what was recommended that he have an EGD performed  This was to be set up outpatient, however patient continued to have nausea and vomiting and therefore was admitted to the hospital   He was placed on prednisone 40 mg daily which was then discontinued per discussion with GI as patient is to follow-up with budesonide suppositories for diarrhea  He was given IV fluid resuscitation and evaluated by GI who recommended MRI/MRCP which was negative for acute findings  He was also advised to have EEG performed while inpatient  This showed known Diaz's esophagus as well as a small hiatal hernia  It is believed that patient's symptoms may be secondary to cyclical vomiting syndrome and was recommended supportive care  Biopsies were taken and patient was cleared for discharge from GI standpoint  Patient's nausea and vomiting resolved  He was able tolerate a diet prior to discharge  He continued to have episodes of diarrhea and was recommended to continue budesonide suppositories as scheduled as an outpatient  Patient was discharged in stable condition  For additional information please refer to medical records  Medication changes include: Addition of Bentyl and Carafate  Patient advised to restart his aspirin and Plavix as this was held prior to EGD  The patient, initially admitted to the hospital as inpatient, was discharged earlier than expected given the following: tolerating diet, improvement of symptoms  Please see above list of diagnoses and related plan for additional information  Condition at Discharge: stable     Discharge Day Visit / Exam:     Subjective:  Pt reports that he is feeling better   Has not had any vomiting since 2/8 when he was admitted to the hospital  Reports that he ate his lunch today without any difficulties  Reports his abdominal pain is very minimal, does continue to have persistent diarrhea which is chronic for him  Reports that he usually uses the budesonide for this  Reports he goes multiple times a day, denies any history of c  Diff  Denies any blood in the stool  Denies any chest pain or shortness of breath  Vitals: Blood Pressure: 127/78 (02/10/20 1225)  Pulse: 62 (02/10/20 1225)  Temperature: 97 8 °F (36 6 °C) (02/10/20 1225)  Temp Source: Oral (02/10/20 1225)  Respirations: 18 (02/10/20 1225)  Height: 5' 10" (177 8 cm) (02/08/20 1540)  Weight - Scale: 95 kg (209 lb 7 oz) (02/08/20 1540)  SpO2: 96 % (02/10/20 1225)  Exam:   Physical Exam   Constitutional: No distress  Pt is in no acute distress sitting in his hospital bed resting comfortably  Non-toxic appearing  Pleasant and cooperative  HENT:   Head: Normocephalic and atraumatic  Eyes: Pupils are equal, round, and reactive to light  Conjunctivae are normal    Cardiovascular: Normal rate, regular rhythm and intact distal pulses  Pulmonary/Chest: Effort normal and breath sounds normal  No stridor  No respiratory distress  He has no wheezes  Abdominal: Soft  Bowel sounds are normal  He exhibits no distension  There is tenderness (mild to palpation left lower quadrant )  There is no guarding  Musculoskeletal: He exhibits no edema  Neurological: He is alert  Skin: Skin is warm and dry  He is not diaphoretic  No erythema  Psychiatric: He has a normal mood and affect  Vitals reviewed  Discussion with Family: Discussed with patient at bedside regarding plan of care  Advised follow up with GI and PCP  Advised that he can resume his aspirin and plavix for his CAD  Discussed symptoms to prompt return to the ER  Pt in agreement to plan and verbalized understanding       Discharge instructions/Information to patient and family:   See after visit summary for information provided to patient and family  Provisions for Follow-Up Care:  See after visit summary for information related to follow-up care and any pertinent home health orders  Disposition:     Home    For Discharges to [facility]:   · Not Applicable to this Patient - Not Applicable to this Patient    Planned Readmission: None      Discharge Statement:  I spent 40 minutes discharging the patient  This time was spent on the day of discharge  I had direct contact with the patient on the day of discharge  Greater than 50% of the total time was spent examining patient, answering all patient questions, arranging and discussing plan of care with patient as well as directly providing post-discharge instructions  Additional time then spent on discharge activities  Discharge Medications:  See after visit summary for reconciled discharge medications provided to patient and family        ** Please Note: This note has been constructed using a voice recognition system **

## 2020-02-12 ENCOUNTER — TELEPHONE (OUTPATIENT)
Dept: GASTROENTEROLOGY | Facility: CLINIC | Age: 55
End: 2020-02-12

## 2020-02-12 NOTE — TELEPHONE ENCOUNTER
----- Message from Korina Foster MD sent at 2/12/2020  9:39 AM EST -----  Please tell him that all of the biopsies are negative

## 2020-02-27 ENCOUNTER — OFFICE VISIT (OUTPATIENT)
Dept: CARDIOLOGY CLINIC | Facility: CLINIC | Age: 55
End: 2020-02-27
Payer: MEDICARE

## 2020-02-27 VITALS
HEIGHT: 70 IN | DIASTOLIC BLOOD PRESSURE: 72 MMHG | HEART RATE: 85 BPM | OXYGEN SATURATION: 98 % | SYSTOLIC BLOOD PRESSURE: 118 MMHG | WEIGHT: 213 LBS | BODY MASS INDEX: 30.49 KG/M2

## 2020-02-27 DIAGNOSIS — E78.5 HYPERLIPIDEMIA, UNSPECIFIED HYPERLIPIDEMIA TYPE: ICD-10-CM

## 2020-02-27 DIAGNOSIS — I25.119 CORONARY ARTERY DISEASE WITH ANGINA PECTORIS, UNSPECIFIED VESSEL OR LESION TYPE, UNSPECIFIED WHETHER NATIVE OR TRANSPLANTED HEART (HCC): Primary | ICD-10-CM

## 2020-02-27 DIAGNOSIS — I10 ESSENTIAL HYPERTENSION: ICD-10-CM

## 2020-02-27 DIAGNOSIS — I10 HYPERTENSION: ICD-10-CM

## 2020-02-27 PROCEDURE — 99213 OFFICE O/P EST LOW 20 MIN: CPT | Performed by: INTERNAL MEDICINE

## 2020-02-27 NOTE — PROGRESS NOTES
Cardiology Follow Up    Aristides Levy  1965  [id number]  CARDIOVASC PHYSICIAN  [facility] [address]  [phone]  [phone]      Dear Dr Valentín Mullen a 50-year-old gentleman who has been referred to the [address] [place] [address] of Cardiology in [place] with the following issues:     1  CAD s/p PCI to 70% RCA ([place] 3 0 x 15 KARLA) on 4/17/2019  2  Hyperlipidemia  3  Hypertension  4  Strong family history of cardiovascular disease      Interval History:  Since our last visit, Mr Angel Robbins has undergone cardiac catheterization which did show a 70% lesion in his RCA  Initially, this was managed medically, but he continued to have significant anginal symptoms in spite of maximum medical management  Ultimately, we elected to pursue percutaneous intervention and he received a stent to the RCA with relief of his angina  He presents today for routine follow-up without any significant complaints  He has recently been diagnosed with irritable bowel syndrome and is taking appropriate medications including dicyclomine and a combination pill called Donnatal (phenobarbital, hyoscyamine, atropine and scopolamine)    Patient Active Problem List   Diagnosis    Abdominal pain    Anxiety and depression    Diaz's esophagus    Essential hypertension    Dyslipidemia    Obesity (BMI 30 0-34  9)    Ulcerative colitis without complications (HCC)    Gastroesophageal reflux disease with esophagitis    Non-intractable vomiting with nausea    Sinus bradycardia    Leukocytosis    Abnormal CT of the abdomen    Vomiting    Chest pain    Diverticulitis    Microscopic colitis    Atelectasis    Coronary artery disease    Depression    Hyperlipidemia    Hypertension    GERD (gastroesophageal reflux disease)    Hypokalemia     Past Medical History:   Diagnosis Date    Diaz's esophagus     Colon polyp     Coronary artery disease     Depression     Diverticulitis     GERD (gastroesophageal reflux disease)     Hemorrhoid     History of heart artery stent     Hyperlipidemia     Hypertension     Microscopic colitis      Social History     Socioeconomic History    Marital status: /Civil Union     Spouse name: Not on file    Number of children: Not on file    Years of education: Not on file    Highest education level: Not on file   Occupational History    Not on file   Social Needs    Financial resource strain: Not on file    Food insecurity:     Worry: Not on file     Inability: Not on file    Transportation needs:     Medical: Not on file     Non-medical: Not on file   Tobacco Use    Smoking status: Former Smoker     Packs/day: 0 50     Last attempt to quit: 2007     Years since quittin 1    Smokeless tobacco: Former User     Quit date: 1998   Substance and Sexual Activity    Alcohol use: Not Currently     Frequency: Never     Comment: quit 3 years    Drug use: Yes     Frequency: 3 0 times per week     Types: Marijuana     Comment: last used     Sexual activity: Never   Lifestyle    Physical activity:     Days per week: Not on file     Minutes per session: Not on file    Stress: Not on file   Relationships    Social connections:     Talks on phone: Not on file     Gets together: Not on file     Attends Jainism service: Not on file     Active member of club or organization: Not on file     Attends meetings of clubs or organizations: Not on file     Relationship status: Not on file    Intimate partner violence:     Fear of current or ex partner: Not on file     Emotionally abused: Not on file     Physically abused: Not on file     Forced sexual activity: Not on file   Other Topics Concern    Not on file   Social History Narrative    Not on file      Family History   Problem Relation Age of Onset    Heart disease Father     Cancer Sister      Past Surgical History:   Procedure Laterality Date    ABDOMINAL SURGERY      radio frequency ablation    BONE GRAFT Left 2018    CARPAL TUNNEL RELEASE Right     COLONOSCOPY      EGD AND COLONOSCOPY      ESOPHAGOGASTRODUODENOSCOPY N/A 2/15/2018    Procedure: ESOPHAGOGASTRODUODENOSCOPY (EGD); Surgeon: Evelyne Koroma MD;  [facility]: [facility];  Service: Gastroenterology    ESOPHAGOGASTRODUODENOSCOPY N/A 12/10/2018    Procedure: ESOPHAGOGASTRODUODENOSCOPY (EGD); Surgeon: Jamarcus Edwards MD;  Location: [facility];   Service: Gastroenterology    TONSILLECTOMY         Current Outpatient Medications:     aspirin (ECOTRIN LOW STRENGTH) 81 mg EC tablet, Take 1 tablet (81 mg total) by mouth daily, Disp: , Rfl: 0    atorvastatin (LIPITOR) 80 mg tablet, Take 80 mg by mouth daily, Disp: , Rfl:     buPROPion (WELLBUTRIN SR) 150 mg 12 hr tablet, Take 300 mg by mouth daily , Disp: , Rfl:     clonazePAM (KLONOPIN) 0 5 mg tablet, Take 1 5 mg by mouth daily at bedtime , Disp: , Rfl:     clopidogrel (PLAVIX) 75 mg tablet, Take 1 tablet (75 mg total) by mouth daily, Disp: 90 tablet, Rfl: 3    dicyclomine (BENTYL) 20 mg tablet, Take 1 tablet (20 mg total) by mouth 4 (four) times a day (before meals and at bedtime), Disp: 90 tablet, Rfl: 0    nitroglycerin (NITROSTAT) 0 4 mg SL tablet, Place 1 tablet (0 4 mg total) under the tongue every 5 (five) minutes as needed for chest pain, Disp: 30 tablet, Rfl: 3    omeprazole (PriLOSEC) 20 mg delayed release capsule, Take 1 capsule (20 mg total) by mouth daily, Disp: 60 capsule, Rfl: 3    ondansetron (ZOFRAN-ODT) 4 mg disintegrating tablet, Take 1 tablet (4 mg total) by mouth every 8 (eight) hours as needed for nausea or vomiting, Disp: 60 tablet, Rfl: 3    sertraline (ZOLOFT) 100 mg tablet, Take 200 mg by mouth daily, Disp: , Rfl:     sucralfate (CARAFATE) 1 g/10 mL suspension, Take 10 mL (1,000 mg total) by mouth 4 (four) times a day (before meals and at bedtime) (Patient not taking: Reported on 2/27/2020), Disp: 420 mL, Rfl: 0       Allergies   Allergen Reactions    No Active Allergies        Labs:  Results for Primus Cassette (MRN [id number]) as of 2/27/2020 09:57   2/10/2020 05:30   Sodium 145   Potassium 3 2 (L)   Chloride 109 (H)   CO2 27   Anion Gap 9   BUN 6   Creatinine 0 82   Glucose, Random 101   Calcium 8 4   eGFR 100     Results for Prim Cassette (MRN [id number]) as of 2/27/2020 09:57   2/10/2020 05:30   WBC 9 34   Red Blood Cell Count 4 06   Hemoglobin 11 8 (L)   HCT 36 5   MCV 90   MCH 29 1   MCHC 32 3   RDW 13 1   Platelet Count 108         Imaging:  MRI abdomen, 02/09/2020  IMPRESSION:  No choledocholithiasis and no biliary dilation  Stable MRI      Review of Systems:  Review of Systems   Constitutional: Negative  Respiratory: Negative  Cardiovascular: Negative  Gastrointestinal: Negative  Endocrine: Negative  Musculoskeletal: Negative  Skin: Negative  Neurological: Negative  Hematological: Negative  /72 (BP Location: Left arm, Patient Position: Sitting, Cuff Size: Standard)   Pulse 85   Ht 5' 10" (1 778 m)   Wt 96 6 kg (213 lb)   SpO2 98%   BMI 30 56 kg/m²     Physical Exam:  Physical Exam   Constitutional: He is oriented to person, place, and time  He appears well-developed and well-nourished  HENT:   Head: Normocephalic and atraumatic  Eyes: Conjunctivae and EOM are normal    Neck: No hepatojugular reflux and no JVD present  Carotid bruit is not present  No tracheal deviation present  No thyromegaly present  Cardiovascular: Normal rate, regular rhythm, S1 normal and S2 normal  PMI is not displaced  Exam reveals no gallop  No murmur heard  Pulses:       Carotid pulses are 2+ on the right side, and 2+ on the left side  Radial pulses are 2+ on the right side, and 2+ on the left side  Pulmonary/Chest: Breath sounds normal  No accessory muscle usage  No tachypnea  No respiratory distress  Musculoskeletal: He exhibits no edema     Lymphadenopathy:        Head (right side): No submental, no submandibular, no tonsillar, no preauricular, no posterior auricular and no occipital adenopathy present  Head (left side): No submental, no submandibular, no tonsillar, no preauricular, no posterior auricular and no occipital adenopathy present  He has no cervical adenopathy  Neurological: He is alert and oriented to person, place, and time  Skin: Skin is warm and dry  Psychiatric: He has a normal mood and affect  His behavior is normal  Judgment and thought content normal            Discussion/Summary:  Coronary artery disease status post PCI to the RCA  Hypertension  Hyperlipidemia        Mr  Peter Vasquez is doing very well from a cardiovascular perspective  He does have some bleeding issues with his clopidogrel, however has less than 6 weeks left of continue clopidogrel therapy  I encouraged him to continue his clopidogrel through the full 12 months and he may stop in April of this year  Continue aspirin lifelong  Continue atorvastatin at current dosing for secondary prevention  His primary problem now appears to be more related to GI issues and colitis  This is being managed from a GI standpoint  I would like him to return to our office in 6 months for routine follow-up, sooner if any acute issues arise  Thank you for the opportunity to consult on this patient  If you have any questions, please feel free to call my office

## 2020-02-27 NOTE — PATIENT INSTRUCTIONS
No medication changes at this time  Please follow up in our office in 6 months, sooner if any acute issues arise

## 2020-02-27 NOTE — LETTER
February 27, 2020     Betzy Montano MD  [address] [facility] [address]    Patient: Ximena Wheeler   YOB: 1965   Date of Visit: 2/27/2020       Dear Dr Chanel Tracy:    Thank you for referring Ximena Wheeler to me for evaluation  Below are my notes for this consultation  If you have questions, please do not hesitate to call me  I look forward to following your patient along with you  Sincerely,        Gopal Safe, DO        CC: No Recipients  [place] Safe, DO  2/27/2020 10:02 AM  Sign at close encounter                                             Cardiology Follow Up    Ximena Wheeler  1965  [id number]  [address] PHYSICIAN  [facility] [address]  [phone]  [phone]      Dear Dr Blanca Pennington a 50-year-old gentleman who has been referred to the [facility] Department of Cardiology in [place] with the following issues:     1  CAD s/p PCI to 70% RCA ([place] 3 0 x 15 KARLA) on 4/17/2019  2  Hyperlipidemia  3  Hypertension  4  Strong family history of cardiovascular disease      Interval History:  Since our last visit, Mr  Doroteo Trejo has undergone cardiac catheterization which did show a 70% lesion in his RCA  Initially, this was managed medically, but he continued to have significant anginal symptoms in spite of maximum medical management  Ultimately, we elected to pursue percutaneous intervention and he received a stent to the RCA with relief of his angina  He presents today for routine follow-up without any significant complaints  He has recently been diagnosed with irritable bowel syndrome and is taking appropriate medications including dicyclomine and a combination pill called Donnatal (phenobarbital, hyoscyamine, atropine and scopolamine)    Patient Active Problem List   Diagnosis    Abdominal pain    Anxiety and depression    Idaz's esophagus    Essential hypertension    Dyslipidemia    Obesity (BMI 30 0-34  9)    Ulcerative colitis without complications (HCC)    Gastroesophageal reflux disease with esophagitis    Non-intractable vomiting with nausea    Sinus bradycardia    Leukocytosis    Abnormal CT of the abdomen    Vomiting    Chest pain    Diverticulitis    Microscopic colitis    Atelectasis    Coronary artery disease    Depression    Hyperlipidemia    Hypertension    GERD (gastroesophageal reflux disease)    Hypokalemia     Past Medical History:   Diagnosis Date    Diaz's esophagus     Colon polyp     Coronary artery disease     Depression     Diverticulitis     GERD (gastroesophageal reflux disease)     Hemorrhoid     History of heart artery stent     Hyperlipidemia     Hypertension     Microscopic colitis      Social History     Socioeconomic History    Marital status: /Civil Union     Spouse name: Not on file    Number of children: Not on file    Years of education: Not on file    Highest education level: Not on file   Occupational History    Not on file   Social Needs    Financial resource strain: Not on file    Food insecurity:     Worry: Not on file     Inability: Not on file    Transportation needs:     Medical: Not on file     Non-medical: Not on file   Tobacco Use    Smoking status: Former Smoker     Packs/day: 0 50     Last attempt to quit: 2007     Years since quittin 1    Smokeless tobacco: Former User     Quit date: 1998   Substance and Sexual Activity    Alcohol use: Not Currently     Frequency: Never     Comment: quit 3 years    Drug use: Yes     Frequency: 3 0 times per week     Types: Marijuana     Comment: last used     Sexual activity: Never   Lifestyle    Physical activity:     Days per week: Not on file     Minutes per session: Not on file    Stress: Not on file   Relationships    Social connections:     Talks on phone: Not on file     Gets together: Not on file     Attends Cheondoism service: Not on file     Active member of club or organization: Not on file     Attends meetings of clubs or organizations: Not on file     Relationship status: Not on file    Intimate partner violence:     Fear of current or ex partner: Not on file     Emotionally abused: Not on file     Physically abused: Not on file     Forced sexual activity: Not on file   Other Topics Concern    Not on file   Social History Narrative    Not on file      Family History   Problem Relation Age of Onset    Heart disease Father     Cancer Sister      Past Surgical History:   Procedure Laterality Date    ABDOMINAL SURGERY      radio frequency ablation    BONE GRAFT Left 2018    CARPAL TUNNEL RELEASE Right     COLONOSCOPY      EGD AND COLONOSCOPY      ESOPHAGOGASTRODUODENOSCOPY N/A 2/15/2018    Procedure: ESOPHAGOGASTRODUODENOSCOPY (EGD); Surgeon: Niurka Blancas MD;  [facility]: [facility];  Service: Gastroenterology    ESOPHAGOGASTRODUODENOSCOPY N/A 12/10/2018    Procedure: ESOPHAGOGASTRODUODENOSCOPY (EGD); Surgeon: Lev Lepe MD;  [facility];   Service: Gastroenterology    TONSILLECTOMY         Current Outpatient Medications:     aspirin (ECOTRIN LOW STRENGTH) 81 mg EC tablet, Take 1 tablet (81 mg total) by mouth daily, Disp: , Rfl: 0    atorvastatin (LIPITOR) 80 mg tablet, Take 80 mg by mouth daily, Disp: , Rfl:     buPROPion (WELLBUTRIN SR) 150 mg 12 hr tablet, Take 300 mg by mouth daily , Disp: , Rfl:     clonazePAM (KLONOPIN) 0 5 mg tablet, Take 1 5 mg by mouth daily at bedtime , Disp: , Rfl:     clopidogrel (PLAVIX) 75 mg tablet, Take 1 tablet (75 mg total) by mouth daily, Disp: 90 tablet, Rfl: 3    dicyclomine (BENTYL) 20 mg tablet, Take 1 tablet (20 mg total) by mouth 4 (four) times a day (before meals and at bedtime), Disp: 90 tablet, Rfl: 0    nitroglycerin (NITROSTAT) 0 4 mg SL tablet, Place 1 tablet (0 4 mg total) under the tongue every 5 (five) minutes as needed for chest pain, Disp: 30 tablet, Rfl: 3    omeprazole (PriLOSEC) 20 mg delayed release capsule, Take 1 capsule (20 mg total) by mouth daily, Disp: 60 capsule, Rfl: 3    ondansetron (ZOFRAN-ODT) 4 mg disintegrating tablet, Take 1 tablet (4 mg total) by mouth every 8 (eight) hours as needed for nausea or vomiting, Disp: 60 tablet, Rfl: 3    sertraline (ZOLOFT) 100 mg tablet, Take 200 mg by mouth daily, Disp: , Rfl:     sucralfate (CARAFATE) 1 g/10 mL suspension, Take 10 mL (1,000 mg total) by mouth 4 (four) times a day (before meals and at bedtime) (Patient not taking: Reported on 2/27/2020), Disp: 420 mL, Rfl: 0       Allergies   Allergen Reactions    No Active Allergies        Labs:  Results for Casi Yang (MRN [id number]) as of 2/27/2020 09:57   2/10/2020 05:30   Sodium 145   Potassium 3 2 (L)   Chloride 109 (H)   CO2 27   Anion Gap 9   BUN 6   Creatinine 0 82   Glucose, Random 101   Calcium 8 4   eGFR 100     Results for Casi Yang (MRN [id number]) as of 2/27/2020 09:57   2/10/2020 05:30   WBC 9 34   Red Blood Cell Count 4 06   Hemoglobin 11 8 (L)   HCT 36 5   MCV 90   MCH 29 1   MCHC 32 3   RDW 13 1   Platelet Count 864         Imaging:  MRI abdomen, 02/09/2020  IMPRESSION:  No choledocholithiasis and no biliary dilation  Stable MRI      Review of Systems:  Review of Systems   Constitutional: Negative  Respiratory: Negative  Cardiovascular: Negative  Gastrointestinal: Negative  Endocrine: Negative  Musculoskeletal: Negative  Skin: Negative  Neurological: Negative  Hematological: Negative  /72 (BP Location: Left arm, Patient Position: Sitting, Cuff Size: Standard)   Pulse 85   Ht 5' 10" (1 778 m)   Wt 96 6 kg (213 lb)   SpO2 98%   BMI 30 56 kg/m²      Physical Exam:  Physical Exam   Constitutional: He is oriented to person, place, and time  He appears well-developed and well-nourished  HENT:   Head: Normocephalic and atraumatic  Eyes: Conjunctivae and EOM are normal    Neck: No hepatojugular reflux and no JVD present   Carotid bruit is not present  No tracheal deviation present  No thyromegaly present  Cardiovascular: Normal rate, regular rhythm, S1 normal and S2 normal  PMI is not displaced  Exam reveals no gallop  No murmur heard  Pulses:       Carotid pulses are 2+ on the right side, and 2+ on the left side  Radial pulses are 2+ on the right side, and 2+ on the left side  Pulmonary/Chest: Breath sounds normal  No accessory muscle usage  No tachypnea  No respiratory distress  Musculoskeletal: He exhibits no edema  Lymphadenopathy:        Head (right side): No submental, no submandibular, no tonsillar, no preauricular, no posterior auricular and no occipital adenopathy present  Head (left side): No submental, no submandibular, no tonsillar, no preauricular, no posterior auricular and no occipital adenopathy present  He has no cervical adenopathy  Neurological: He is alert and oriented to person, place, and time  Skin: Skin is warm and dry  Psychiatric: He has a normal mood and affect  His behavior is normal  Judgment and thought content normal            Discussion/Summary:  Coronary artery disease status post PCI to the RCA  Hypertension  Hyperlipidemia        Mr Eladio Mayers is doing very well from a cardiovascular perspective  He does have some bleeding issues with his clopidogrel, however has less than 6 weeks left of continue clopidogrel therapy  I encouraged him to continue his clopidogrel through the full 12 months and he may stop in April of this year  Continue aspirin lifelong  Continue atorvastatin at current dosing for secondary prevention  His primary problem now appears to be more related to GI issues and colitis  This is being managed from a GI standpoint  I would like him to return to our office in 6 months for routine follow-up, sooner if any acute issues arise  Thank you for the opportunity to consult on this patient  If you have any questions, please feel free to call my office

## 2020-03-03 NOTE — TELEPHONE ENCOUNTER
Breast Care for the Breast Feeding Mother   WHAT YOU SHOULD KNOW:   Your breasts will go through normal changes while you are breastfeeding  Sometimes breast and nipple problems can develop while you are breastfeeding  Learn about changes that are normal and those that may be a problem  Breast care can help you prevent and manage problems so you and your baby can enjoy the benefits of breastfeeding  INSTRUCTIONS:   Breast changes while you are breastfeeding:   · For the first few days after your baby is born, your body makes a small amount of breast milk (colostrum)  Within about 2 to 5 days, your body will begin making mature milk  It may take up to 10 days or longer for mature milk to come in  When your mature milk comes in, your breasts will become full and firm  They may feel tender  · Breastfeeding your baby will decrease the full feeling in your breasts  You may feel a tingly sensation during feedings as milk is released from your breasts  This is called the milk let-down reflex  After 7 or more days, the fullness may feel like it has decreased  Your nipples should look the same as they did before you started breastfeeding  Breasts that feel full before and empty after breastfeeding are signs that breastfeeding is going well  Breast problems that can occur while you are breastfeeding:   · Nipple soreness  may occur when you begin to breastfeed your baby  You may also have nipple soreness if your baby is not latched on to your breast correctly  Correct positioning and latch-on may decrease or stop the pain in your nipples  Work with your caregivers to help your baby latch on correctly  It may also be helpful to place warm, wet compresses on your nipples to help decrease pain  · Plugged milk ducts  may cause painful breast lumps  Plugged ducts may be caused by not emptying your breasts completely during feedings   When your baby pauses during breastfeeding, massage and gently squeeze your breast  Gentle Called pt, VM is full  Will try again later  massage may unplug a blocked milk duct  Pump out any milk left in your breasts after your baby is done breastfeeding  Avoid wearing tight tops, tight bras, or under-wire bras, because they may put pressure on your breasts  · Engorgement  may occur as your milk comes in soon after you begin breastfeeding  Engorgement may cause your breasts to become swollen and painful  Your breasts may also become engorged if you miss a feeding or you do not breastfeed on demand  The best way to decrease engorgement symptoms is to empty your breasts by feeding your baby often  Engorgement can make it hard for your baby to latch on to your breast  If this happens, express a small amount of milk and then have your baby latch on  Cold compresses, gel packs, or ice packs on your breasts can help decrease pain and swelling  Ask your caregiver how often and how long you should use cold, or ice packs  · A breast infection called mastitis  can develop if you have plugged milk ducts or engorgement  Mastitis causes your breasts to become red, swollen, and painful  You may also have flu-like symptoms, such as chills and a fever  Place heat on your breasts to help decrease the pain  You may want to place a moist, warm cloth on the painful breast or both of your breasts  Ask how often to do this  Your primary healthcare provider [facility]) may suggest that you take an NSAID, such as ibuprofen, to decrease pain and swelling  He may also order antibiotics to treat mastitis  Ask about feeding your baby when you have a breast infection  How to help prevent or manage breast problems while you are breastfeeding:   · Learn how to position your baby and latch him on correctly  To latch your baby correctly to your breast, make sure that his mouth covers most of your areola (dark area around your nipple)  He should not be attached only to the nipple  Your baby is latched on well if you feel comfortable and do not feel pain   A correct latch helps him get enough milk and can help to prevent sore nipples and other breast problems  There are several breastfeeding positions that you can try  Find the position that works best for you and your baby  Ask your caregiver for more information about how to hold and breastfeed your baby  · Prevent biting  Your baby may get teeth at about 1to 3months of age  To help prevent biting, break his suction once he is finished or if he has fallen asleep  To break his suction, slip a finger into the side of his mouth  If your baby bites you, respond with surprise or unhappiness  Offer praise when he does not bite you  · Breastfeed your baby regularly  Feed your baby 8 to 12 times a day  You may need to wake up your baby at night to feed him  It is okay to feed from 1 or both breasts at each feeding  Your baby should breastfeed from both breasts equally over the course of a day  If your baby only feeds from 1 side during a feeding, offer your other breast to him first for the next feeding  · Schedule and keep follow-up visits  Talk to your baby's pediatrician or your PHP during follow-up visits if you have breast problems  Caregivers may suggest that you, or you and your partner, attend classes on breastfeeding  You also may want to join a breastfeeding support group  Caregivers may suggest that you see a lactation consultant  This is a caregiver who can help you with breastfeeding  Contact your PHP if:   · You have a fever and chills  · You have body aches and you feel like you do not have any energy  · One or both of your breasts is red, swollen or hard, painful, and feels warm or hot  · You have breast engorgement that does not get better within 24 hours  · You see or feel a lump in your breast that hurts when you touch it  · You have nipple pain during breastfeeding or between feedings  · Your nipples are red, dry, cracked, bleeding, or they have scabs on them       · You have questions or concerns about your condition or care  © 2014 [address] is for End User's use only and may not be sold, redistributed or otherwise used for commercial purposes  All illustrations and images included in CareNotes® are the copyrighted property of [employer]  or [employer]  The above information is an  only  It is not intended as medical advice for individual conditions or treatments  Talk to your doctor, nurse or pharmacist before following any medical regimen to see if it is safe and effective for you  Vaginal Delivery   AMBULATORY CARE:   What you need to know about vaginal delivery:  A vaginal delivery occurs when your baby is born through your vagina (birth canal)  How to prepare for vaginal delivery: You will not be able to eat while you are in active labor  Your healthcare provider can give you medicines for pain relief if you chose to have them  You may need medicine to induce (start) your labor if your labor is not moving forward  You may need to move in bed, stand, or walk to help your baby move into position for birth  What will happen during vaginal delivery:   · You can move into several positions during delivery  You can lie on your back, have your feet up in stirrups, or squat  You may feel pressure on your rectum  This pressure is caused by the movement of your baby's head down the birth canal  You may feel the urge to push  Your healthcare provider will tell you when to push, and guide your baby out of the birth canal  Healthcare providers may use forceps or suction to help deliver your baby  You may also need an episiotomy (incision) to make the vaginal opening larger  This will make more room for your baby  · After your baby is born, your healthcare provider will put clamps on the cord that connects your baby to the placenta  The cord is then cut   Your uterus continues to contract after delivery to push out the placenta  Your healthcare provider may close your episiotomy incision or any tears with stitches  What will happen after vaginal delivery:   · Healthcare providers will examine your baby  You may be able to hold your baby soon after he or she is born  After healthcare providers have checked that you and your baby are okay, you may be taken to another room  · A healthcare provider may massage your abdomen several times to make your uterus firm  This can be uncomfortable  You may have abdominal pains for up to 3 days after you give birth because your uterus is still antonio  The contractions help release blood from inside your uterus so it shrinks back to its normal size  These contractions may hurt more while you breastfeed your baby  · Your healthcare provider may suggest you get out of bed to sit in a chair or walk  Activity can help prevent blood clots  · You may be able to go home within 24 to 48 hours after delivery  If you need support at home, ask your healthcare provider about home visits by another healthcare provider  This healthcare provider can help you learn about breastfeeding, bottle feeding, baby care, and perineum care  Follow up with your healthcare provider:  Most women need to return 6 weeks after a vaginal delivery  Ask your healthcare provider how to care for your wounds or stitches, if you have them  Write down your questions so you remember to ask them during your visits  Seek care immediately if:   · Your leg feels warm, tender, and painful  It may look swollen and red  · You have a fever  · You are urinating very little, or not at all  · You have heavy vaginal bleeding that fills 1 or more sanitary pads in 1 hour  · You feel weak, dizzy, or faint  Contact your healthcare provider if:   · Your abdominal or perineal pain does not go away, or gets worse  · You feel depressed      · You have questions or concerns about your condition or care   Medicines:  · NSAIDs , such as ibuprofen, help decrease swelling, pain, and fever  This medicine is available with or without a doctor's order  NSAIDs can cause stomach bleeding or kidney problems in certain people  If you take blood thinner medicine, always ask your healthcare provider if NSAIDs are safe for you  Always read the medicine label and follow directions  · Stool softeners  make it easier for you to have a bowel movement  You may need this medicine to treat or prevent constipation  · Take your medicine as directed  Contact your healthcare provider if you think your medicine is not helping or if you have side effects  Tell him or her if you are allergic to any medicine  Keep a list of the medicines, vitamins, and herbs you take  Include the amounts, and when and why you take them  Bring the list or the pill bottles to follow-up visits  Carry your medicine list with you in case of an emergency  Activity:  Rest as much as possible  Try to keep all activities short  You may be able to do some exercise soon after you have your baby  Talk with your healthcare provider before you start exercising  If you work outside the home, ask when you can return to your job  Kegel exercises:  Kegel exercises may help your vaginal and rectal muscles heal faster  You can do Kegel exercises by tightening and relaxing the muscles around your vagina  Kegel exercises help make the muscles stronger  Breast care:  When your milk comes in, your breasts may feel full and hard  Ask how to care for your breasts, even if you are not breastfeeding  Constipation:  You may have constipation for a period of time after you have your baby  Do not try to push the bowel movement out if it is too hard  High-fiber foods and extra liquids can help you prevent constipation  Examples of high-fiber foods are fruit and bran  Prune juice and water are good liquids to drink   You may also be told to take over-the-counter fiber and stool softener medicines  Take these items as directed  Ask how to prevent or treat hemorrhoids  Perineum care: Your perineum is the area between your vagina and anus  Keep the area clean and dry  This will help it heal and prevent infection  Wash the area gently with soap and water when you bathe or shower  Rinse your perineum with warm water after you urinate or have a bowel movement  Your healthcare provider may suggest you use a warm sitz bath to help decrease pain  To take a sitz bath, fill a bathtub with 4 to 6 inches of warm water  You may also use a sitz bath pan that fits inside the toilet  Sit in the sitz bath for 20 minutes  Do this 2 to 3 times a day, or as directed  The warm water can help decrease pain and swelling  Vaginal discharge: You will have vaginal discharge, called lochia, after your delivery  The lochia is red or dark brown with clots for 1 to 3 days after the birth  The amount will decrease and turn pale pink or brown for 3 to 10 days  It will turn white or yellow on the 10th or 14th day  Lochia is usually gone within 3 weeks  Use a sanitary pad rather than a tampon to prevent a vaginal infection  You will have lochia for up to 3 weeks after your baby is born  Monthly periods: Your period may start again within 7 to 9 weeks after your baby is born  If you are breastfeeding, it may take longer for your period to start again  You can still get pregnant again even though you do not have your monthly period  Talk with your healthcare provider about a birth control method if you do not want to get pregnant  Mood changes: Many new mothers have some kind of mood changes after delivery  Some of these changes occur because of lack of sleep, hormone changes, and caring for a new baby  Some mood changes can be more serious, such as postpartum depression  Talk with your healthcare provider if you feel unable to care for yourself or your baby    Sexual activity:  Do not have sex until your healthcare provider says it is okay  You may notice you have a decreased desire for sex, or sex may be painful  You may need to use a vaginal lubricant (gel) to help make sex more comfortable  © 2017 [address] Information is for End User's use only and may not be sold, redistributed or otherwise used for commercial purposes  All illustrations and images included in CareNotes® are the copyrighted property of [employer]  or [employer] Xie  The above information is an  only  It is not intended as medical advice for individual conditions or treatments  Talk to your doctor, nurse or pharmacist before following any medical regimen to see if it is safe and effective for you

## 2020-03-31 ENCOUNTER — TELEMEDICINE (OUTPATIENT)
Dept: GASTROENTEROLOGY | Facility: CLINIC | Age: 55
End: 2020-03-31
Payer: MEDICARE

## 2020-03-31 ENCOUNTER — TELEPHONE (OUTPATIENT)
Dept: GASTROENTEROLOGY | Facility: CLINIC | Age: 55
End: 2020-03-31

## 2020-03-31 DIAGNOSIS — K21.00 GASTROESOPHAGEAL REFLUX DISEASE WITH ESOPHAGITIS: Primary | ICD-10-CM

## 2020-03-31 DIAGNOSIS — R10.30 LOWER ABDOMINAL PAIN: ICD-10-CM

## 2020-03-31 DIAGNOSIS — R19.7 DIARRHEA, UNSPECIFIED TYPE: ICD-10-CM

## 2020-03-31 PROCEDURE — 99212 OFFICE O/P EST SF 10 MIN: CPT | Performed by: PHYSICIAN ASSISTANT

## 2020-03-31 RX ORDER — BUPROPION HYDROCHLORIDE 150 MG/1
300 TABLET, EXTENDED RELEASE ORAL DAILY
Qty: 60 TABLET | Refills: 3 | Status: SHIPPED | OUTPATIENT
Start: 2020-03-31 | End: 2021-01-06

## 2020-03-31 NOTE — PROGRESS NOTES
Virtual Brief Visit    Problem List Items Addressed This Visit        Digestive    Gastroesophageal reflux disease with esophagitis - Primary    Relevant Medications    buPROPion (WELLBUTRIN SR) 150 mg 12 hr tablet       Other    Abdominal pain    Relevant Medications    buPROPion (WELLBUTRIN SR) 150 mg 12 hr tablet      Other Visit Diagnoses     Diarrhea, unspecified type        Relevant Medications    buPROPion (WELLBUTRIN SR) 150 mg 12 hr tablet              1  Gastroesophageal reflux disease with esophagitis  2  Diarrhea, unspecified type  3  Lower abdominal pain  At the present time patient is doing well  Will continue all medications at this time  No plans for any further GI procedures at this time  Reason for visit is follow up  Encounter provider Britni Cole PA-C    Provider located at [address] [facility] [address] [facility] [address] [facility] [address]  [phone]      Recent Visits  No visits were found meeting these conditions  Showing recent visits within past 7 days and meeting all other requirements     Today's Visits  Date Type Provider Dept   03/31/20 Telephone [facility] [address] [address]   03/31/20 Telemedicine Gayatri Bey PA-C [facility] [address] today's visits and meeting all other requirements     Future Appointments  Date Type Provider Dept   03/31/20 Telephone [id number] [facility]   Showing future appointments within next 150 days and meeting all other requirements        After connecting through [employer] Teams, the patient was identified by name and date of birth  Kendra Michael was informed that this is a telemedicine visit and that the visit is being conducted through [employer]  My office door was closed  No one else was in the room  He acknowledged consent and understanding of privacy and security of the video platform   The patient has agreed to participate and understands they can discontinue the visit at any time  Patient is aware this is a billable service  Subjective  Aurelio Farias is a 53 y o  male with GERD and IBS  Patient reports that he just wanted to check in to let us know how he is doing with his GERD, IBS, and microscopic colitis  He reports that for right now he is actually doing well  His symptoms are under control  He is responding to Bentyl twice a day  He denies nausea or vomiting  He reports that the last Budesonide course helped his diarrhea  He denies melena or RB  Past Medical History:   Diagnosis Date    Diaz's esophagus     Colon polyp     Coronary artery disease     Depression     Diverticulitis     GERD (gastroesophageal reflux disease)     Hemorrhoid     History of heart artery stent     Hyperlipidemia     Hypertension     Microscopic colitis        Past Surgical History:   Procedure Laterality Date    ABDOMINAL SURGERY      radio frequency ablation    BONE GRAFT Left 2018    CARPAL TUNNEL RELEASE Right     COLONOSCOPY      EGD AND COLONOSCOPY      ESOPHAGOGASTRODUODENOSCOPY N/A 2/15/2018    Procedure: ESOPHAGOGASTRODUODENOSCOPY (EGD); Surgeon: Abdullahi Conner MD;  Location: [facility];  Service: Gastroenterology    ESOPHAGOGASTRODUODENOSCOPY N/A 12/10/2018    Procedure: ESOPHAGOGASTRODUODENOSCOPY (EGD); Surgeon: Adriano Anne MD;  Location: [facility];   Service: Gastroenterology    TONSILLECTOMY         Current Outpatient Medications   Medication Sig Dispense Refill    aspirin (ECOTRIN LOW STRENGTH) 81 mg EC tablet Take 1 tablet (81 mg total) by mouth daily  0    atorvastatin (LIPITOR) 80 mg tablet Take 80 mg by mouth daily      buPROPion (WELLBUTRIN SR) 150 mg 12 hr tablet Take 2 tablets (300 mg total) by mouth daily 60 tablet 3    clonazePAM (KLONOPIN) 0 5 mg tablet Take 1 5 mg by mouth daily at bedtime       clopidogrel (PLAVIX) 75 mg tablet Take 1 tablet (75 mg total) by mouth daily 90 tablet 3    dicyclomine (BENTYL) 20 mg tablet Take 1 tablet (20 mg total) by mouth 4 (four) times a day (before meals and at bedtime) 90 tablet 0    nitroglycerin (NITROSTAT) 0 4 mg SL tablet Place 1 tablet (0 4 mg total) under the tongue every 5 (five) minutes as needed for chest pain 30 tablet 3    omeprazole (PriLOSEC) 20 mg delayed release capsule Take 1 capsule (20 mg total) by mouth daily 60 capsule 3    ondansetron (ZOFRAN-ODT) 4 mg disintegrating tablet Take 1 tablet (4 mg total) by mouth every 8 (eight) hours as needed for nausea or vomiting 60 tablet 3    sertraline (ZOLOFT) 100 mg tablet Take 200 mg by mouth daily      sucralfate (CARAFATE) 1 g/10 mL suspension Take 10 mL (1,000 mg total) by mouth 4 (four) times a day (before meals and at bedtime) (Patient not taking: Reported on 2/27/2020) 420 mL 0     No current facility-administered medications for this visit  Allergies   Allergen Reactions    No Active Allergies        ROS: Pertinent positives above  I spent 8 minutes with the patient during this visit

## 2020-03-31 NOTE — TELEPHONE ENCOUNTER
----- Message from Margarita Landaverde MD sent at 3/31/2020  7:42 AM EDT -----   Please call this patient and offer him a video visit follow-up with [facility]

## 2020-04-22 ENCOUNTER — TELEPHONE (OUTPATIENT)
Dept: CARDIOLOGY CLINIC | Facility: CLINIC | Age: 55
End: 2020-04-22

## 2020-05-07 ENCOUNTER — TELEPHONE (OUTPATIENT)
Dept: GASTROENTEROLOGY | Facility: CLINIC | Age: 55
End: 2020-05-07

## 2020-05-07 ENCOUNTER — APPOINTMENT (EMERGENCY)
Dept: CT IMAGING | Facility: HOSPITAL | Age: 55
End: 2020-05-07
Payer: COMMERCIAL

## 2020-05-07 ENCOUNTER — HOSPITAL ENCOUNTER (EMERGENCY)
Facility: HOSPITAL | Age: 55
Discharge: HOME | End: 2020-05-07
Attending: EMERGENCY MEDICINE | Admitting: EMERGENCY MEDICINE
Payer: COMMERCIAL

## 2020-05-07 VITALS
TEMPERATURE: 95.7 F | DIASTOLIC BLOOD PRESSURE: 75 MMHG | BODY MASS INDEX: 30.56 KG/M2 | OXYGEN SATURATION: 93 % | RESPIRATION RATE: 20 BRPM | HEART RATE: 49 BPM | SYSTOLIC BLOOD PRESSURE: 150 MMHG | WEIGHT: 212.96 LBS

## 2020-05-07 DIAGNOSIS — R10.9 NONSPECIFIC ABDOMINAL PAIN: Primary | ICD-10-CM

## 2020-05-07 DIAGNOSIS — R11.10 VOMITING: ICD-10-CM

## 2020-05-07 LAB
ALBUMIN SERPL BCP-MCNC: 4 G/DL (ref 3.5–5)
ALP SERPL-CCNC: 62 U/L (ref 46–116)
ALT SERPL W P-5'-P-CCNC: 25 U/L (ref 12–78)
ANION GAP SERPL CALCULATED.3IONS-SCNC: 12 MMOL/L (ref 4–13)
AST SERPL W P-5'-P-CCNC: 14 U/L (ref 5–45)
ATRIAL RATE: 56 BPM
BASOPHILS # BLD AUTO: 0.02 THOUSANDS/ΜL (ref 0–0.1)
BASOPHILS NFR BLD AUTO: 0 % (ref 0–1)
BILIRUB DIRECT SERPL-MCNC: 0.08 MG/DL (ref 0–0.2)
BILIRUB SERPL-MCNC: 0.3 MG/DL (ref 0.2–1)
BUN SERPL-MCNC: 15 MG/DL (ref 5–25)
CALCIUM SERPL-MCNC: 9 MG/DL (ref 8.3–10.1)
CHLORIDE SERPL-SCNC: 107 MMOL/L (ref 100–108)
CO2 SERPL-SCNC: 25 MMOL/L (ref 21–32)
CREAT SERPL-MCNC: 1.16 MG/DL (ref 0.6–1.3)
EOSINOPHIL # BLD AUTO: 0.01 THOUSAND/ΜL (ref 0–0.61)
EOSINOPHIL NFR BLD AUTO: 0 % (ref 0–6)
ERYTHROCYTE [DISTWIDTH] IN BLOOD BY AUTOMATED COUNT: 13.1 % (ref 11.6–15.1)
GFR SERPL CREATININE-BSD FRML MDRD: 70 ML/MIN/1.73SQ M
GLUCOSE SERPL-MCNC: 159 MG/DL (ref 65–140)
HCT VFR BLD AUTO: 42.6 % (ref 36.5–49.3)
HGB BLD-MCNC: 13.9 G/DL (ref 12–17)
IMM GRANULOCYTES # BLD AUTO: 0.06 THOUSAND/UL (ref 0–0.2)
IMM GRANULOCYTES NFR BLD AUTO: 1 % (ref 0–2)
INR PPP: 0.92 (ref 0.84–1.19)
LYMPHOCYTES # BLD AUTO: 0.96 THOUSANDS/ΜL (ref 0.6–4.47)
LYMPHOCYTES NFR BLD AUTO: 8 % (ref 14–44)
MCH RBC QN AUTO: 29 PG (ref 26.8–34.3)
MCHC RBC AUTO-ENTMCNC: 32.6 G/DL (ref 31.4–37.4)
MCV RBC AUTO: 89 FL (ref 82–98)
MONOCYTES # BLD AUTO: 0.42 THOUSAND/ΜL (ref 0.17–1.22)
MONOCYTES NFR BLD AUTO: 4 % (ref 4–12)
NEUTROPHILS # BLD AUTO: 10.14 THOUSANDS/ΜL (ref 1.85–7.62)
NEUTS SEG NFR BLD AUTO: 87 % (ref 43–75)
NRBC BLD AUTO-RTO: 0 /100 WBCS
P AXIS: 61 DEGREES
PLATELET # BLD AUTO: 274 THOUSANDS/UL (ref 149–390)
PMV BLD AUTO: 10 FL (ref 8.9–12.7)
POTASSIUM SERPL-SCNC: 3.9 MMOL/L (ref 3.5–5.3)
PR INTERVAL: 182 MS
PROT SERPL-MCNC: 7.3 G/DL (ref 6.4–8.2)
PROTHROMBIN TIME: 12.4 SECONDS (ref 11.6–14.5)
QRS AXIS: 53 DEGREES
QRSD INTERVAL: 90 MS
QT INTERVAL: 452 MS
QTC INTERVAL: 436 MS
RBC # BLD AUTO: 4.8 MILLION/UL (ref 3.88–5.62)
SODIUM SERPL-SCNC: 144 MMOL/L (ref 136–145)
T WAVE AXIS: 58 DEGREES
TROPONIN I SERPL-MCNC: <0.02 NG/ML
VENTRICULAR RATE: 56 BPM
WBC # BLD AUTO: 11.61 THOUSAND/UL (ref 4.31–10.16)

## 2020-05-07 PROCEDURE — 84484 ASSAY OF TROPONIN QUANT: CPT | Performed by: EMERGENCY MEDICINE

## 2020-05-07 PROCEDURE — 74177 CT ABD & PELVIS W/CONTRAST: CPT

## 2020-05-07 PROCEDURE — 85025 COMPLETE CBC W/AUTO DIFF WBC: CPT | Performed by: EMERGENCY MEDICINE

## 2020-05-07 PROCEDURE — 93010 ELECTROCARDIOGRAM REPORT: CPT | Performed by: INTERNAL MEDICINE

## 2020-05-07 PROCEDURE — 85610 PROTHROMBIN TIME: CPT | Performed by: EMERGENCY MEDICINE

## 2020-05-07 PROCEDURE — 80048 BASIC METABOLIC PNL TOTAL CA: CPT | Performed by: EMERGENCY MEDICINE

## 2020-05-07 PROCEDURE — 80076 HEPATIC FUNCTION PANEL: CPT | Performed by: EMERGENCY MEDICINE

## 2020-05-07 PROCEDURE — 99285 EMERGENCY DEPT VISIT HI MDM: CPT | Performed by: EMERGENCY MEDICINE

## 2020-05-07 PROCEDURE — 36415 COLL VENOUS BLD VENIPUNCTURE: CPT | Performed by: EMERGENCY MEDICINE

## 2020-05-07 PROCEDURE — C9113 INJ PANTOPRAZOLE SODIUM, VIA: HCPCS | Performed by: EMERGENCY MEDICINE

## 2020-05-07 PROCEDURE — 96375 TX/PRO/DX INJ NEW DRUG ADDON: CPT

## 2020-05-07 PROCEDURE — 99284 EMERGENCY DEPT VISIT MOD MDM: CPT

## 2020-05-07 PROCEDURE — 96361 HYDRATE IV INFUSION ADD-ON: CPT

## 2020-05-07 PROCEDURE — 96374 THER/PROPH/DIAG INJ IV PUSH: CPT

## 2020-05-07 PROCEDURE — 93005 ELECTROCARDIOGRAM TRACING: CPT

## 2020-05-07 RX ORDER — METOCLOPRAMIDE HYDROCHLORIDE 5 MG/ML
10 INJECTION INTRAMUSCULAR; INTRAVENOUS ONCE
Status: COMPLETED | OUTPATIENT
Start: 2020-05-07 | End: 2020-05-07

## 2020-05-07 RX ORDER — HYDROMORPHONE HCL/PF 1 MG/ML
0.5 SYRINGE (ML) INJECTION ONCE
Status: COMPLETED | OUTPATIENT
Start: 2020-05-07 | End: 2020-05-07

## 2020-05-07 RX ORDER — PANTOPRAZOLE SODIUM 40 MG/1
40 INJECTION, POWDER, FOR SOLUTION INTRAVENOUS ONCE
Status: COMPLETED | OUTPATIENT
Start: 2020-05-07 | End: 2020-05-07

## 2020-05-07 RX ORDER — DIPHENHYDRAMINE HYDROCHLORIDE 50 MG/ML
25 INJECTION INTRAMUSCULAR; INTRAVENOUS ONCE
Status: COMPLETED | OUTPATIENT
Start: 2020-05-07 | End: 2020-05-07

## 2020-05-07 RX ADMIN — IOHEXOL 100 ML: 350 INJECTION, SOLUTION INTRAVENOUS at 15:29

## 2020-05-07 RX ADMIN — SODIUM CHLORIDE 1000 ML: 0.9 INJECTION, SOLUTION INTRAVENOUS at 14:31

## 2020-05-07 RX ADMIN — PANTOPRAZOLE SODIUM 40 MG: 40 INJECTION, POWDER, FOR SOLUTION INTRAVENOUS at 15:14

## 2020-05-07 RX ADMIN — METOCLOPRAMIDE HYDROCHLORIDE 10 MG: 5 INJECTION INTRAMUSCULAR; INTRAVENOUS at 14:33

## 2020-05-07 RX ADMIN — HYDROMORPHONE HYDROCHLORIDE 0.5 MG: 1 INJECTION, SOLUTION INTRAMUSCULAR; INTRAVENOUS; SUBCUTANEOUS at 14:38

## 2020-05-07 RX ADMIN — DIPHENHYDRAMINE HYDROCHLORIDE 25 MG: 50 INJECTION, SOLUTION INTRAMUSCULAR; INTRAVENOUS at 14:31

## 2020-05-10 ENCOUNTER — NURSE TRIAGE (OUTPATIENT)
Dept: OTHER | Facility: OTHER | Age: 55
End: 2020-05-10

## 2020-05-10 DIAGNOSIS — K51.00 ULCERATIVE PANCOLITIS WITHOUT COMPLICATION (HCC): Primary | ICD-10-CM

## 2020-05-10 RX ORDER — METOCLOPRAMIDE 10 MG/1
10 TABLET ORAL 3 TIMES DAILY PRN
Qty: 60 TABLET | Refills: 0 | Status: SHIPPED | OUTPATIENT
Start: 2020-05-10 | End: 2021-04-05 | Stop reason: CLARIF

## 2020-05-11 ENCOUNTER — TELEPHONE (OUTPATIENT)
Dept: GASTROENTEROLOGY | Facility: CLINIC | Age: 55
End: 2020-05-11

## 2020-05-13 ENCOUNTER — TELEPHONE (OUTPATIENT)
Dept: GASTROENTEROLOGY | Facility: CLINIC | Age: 55
End: 2020-05-13

## 2020-05-13 ENCOUNTER — APPOINTMENT (EMERGENCY)
Dept: CT IMAGING | Facility: HOSPITAL | Age: 55
End: 2020-05-13
Payer: COMMERCIAL

## 2020-05-13 ENCOUNTER — HOSPITAL ENCOUNTER (EMERGENCY)
Facility: HOSPITAL | Age: 55
Discharge: HOME | End: 2020-05-13
Attending: EMERGENCY MEDICINE | Admitting: EMERGENCY MEDICINE
Payer: COMMERCIAL

## 2020-05-13 VITALS
RESPIRATION RATE: 18 BRPM | HEIGHT: 70 IN | BODY MASS INDEX: 30.49 KG/M2 | SYSTOLIC BLOOD PRESSURE: 137 MMHG | WEIGHT: 212.96 LBS | OXYGEN SATURATION: 95 % | DIASTOLIC BLOOD PRESSURE: 78 MMHG | TEMPERATURE: 98.3 F | HEART RATE: 93 BPM

## 2020-05-13 DIAGNOSIS — R10.84 GENERALIZED ABDOMINAL PAIN: Primary | ICD-10-CM

## 2020-05-13 DIAGNOSIS — R11.11 NON-INTRACTABLE VOMITING WITHOUT NAUSEA, UNSPECIFIED VOMITING TYPE: ICD-10-CM

## 2020-05-13 LAB
ALBUMIN SERPL BCP-MCNC: 4 G/DL (ref 3.5–5)
ALP SERPL-CCNC: 75 U/L (ref 46–116)
ALT SERPL W P-5'-P-CCNC: 25 U/L (ref 12–78)
ANION GAP SERPL CALCULATED.3IONS-SCNC: 10 MMOL/L (ref 4–13)
AST SERPL W P-5'-P-CCNC: 13 U/L (ref 5–45)
ATRIAL RATE: 67 BPM
BASOPHILS # BLD AUTO: 0.03 THOUSANDS/ΜL (ref 0–0.1)
BASOPHILS NFR BLD AUTO: 0 % (ref 0–1)
BILIRUB SERPL-MCNC: 0.4 MG/DL (ref 0.2–1)
BILIRUB UR QL STRIP: NEGATIVE
BUN SERPL-MCNC: 13 MG/DL (ref 5–25)
CALCIUM SERPL-MCNC: 8.8 MG/DL (ref 8.3–10.1)
CHLORIDE SERPL-SCNC: 103 MMOL/L (ref 100–108)
CLARITY UR: CLEAR
CO2 SERPL-SCNC: 29 MMOL/L (ref 21–32)
COLOR UR: YELLOW
CREAT SERPL-MCNC: 1.12 MG/DL (ref 0.6–1.3)
EOSINOPHIL # BLD AUTO: 0.02 THOUSAND/ΜL (ref 0–0.61)
EOSINOPHIL NFR BLD AUTO: 0 % (ref 0–6)
ERYTHROCYTE [DISTWIDTH] IN BLOOD BY AUTOMATED COUNT: 13.1 % (ref 11.6–15.1)
GFR SERPL CREATININE-BSD FRML MDRD: 74 ML/MIN/1.73SQ M
GLUCOSE SERPL-MCNC: 118 MG/DL (ref 65–140)
GLUCOSE UR STRIP-MCNC: NEGATIVE MG/DL
HCT VFR BLD AUTO: 43.8 % (ref 36.5–49.3)
HGB BLD-MCNC: 14.5 G/DL (ref 12–17)
HGB UR QL STRIP.AUTO: NEGATIVE
IMM GRANULOCYTES # BLD AUTO: 0.05 THOUSAND/UL (ref 0–0.2)
IMM GRANULOCYTES NFR BLD AUTO: 1 % (ref 0–2)
KETONES UR STRIP-MCNC: NEGATIVE MG/DL
LEUKOCYTE ESTERASE UR QL STRIP: NEGATIVE
LIPASE SERPL-CCNC: 98 U/L (ref 73–393)
LYMPHOCYTES # BLD AUTO: 0.87 THOUSANDS/ΜL (ref 0.6–4.47)
LYMPHOCYTES NFR BLD AUTO: 10 % (ref 14–44)
MCH RBC QN AUTO: 29.3 PG (ref 26.8–34.3)
MCHC RBC AUTO-ENTMCNC: 33.1 G/DL (ref 31.4–37.4)
MCV RBC AUTO: 89 FL (ref 82–98)
MONOCYTES # BLD AUTO: 0.42 THOUSAND/ΜL (ref 0.17–1.22)
MONOCYTES NFR BLD AUTO: 5 % (ref 4–12)
NEUTROPHILS # BLD AUTO: 7.02 THOUSANDS/ΜL (ref 1.85–7.62)
NEUTS SEG NFR BLD AUTO: 84 % (ref 43–75)
NITRITE UR QL STRIP: NEGATIVE
NRBC BLD AUTO-RTO: 0 /100 WBCS
P AXIS: 70 DEGREES
PH UR STRIP.AUTO: 6.5 [PH]
PLATELET # BLD AUTO: 257 THOUSANDS/UL (ref 149–390)
PMV BLD AUTO: 9.9 FL (ref 8.9–12.7)
POTASSIUM SERPL-SCNC: 3.8 MMOL/L (ref 3.5–5.3)
PR INTERVAL: 176 MS
PROT SERPL-MCNC: 7.5 G/DL (ref 6.4–8.2)
PROT UR STRIP-MCNC: NEGATIVE MG/DL
QRS AXIS: 20 DEGREES
QRSD INTERVAL: 90 MS
QT INTERVAL: 398 MS
QTC INTERVAL: 420 MS
RBC # BLD AUTO: 4.95 MILLION/UL (ref 3.88–5.62)
SODIUM SERPL-SCNC: 142 MMOL/L (ref 136–145)
SP GR UR STRIP.AUTO: 1.01 (ref 1–1.03)
T WAVE AXIS: 58 DEGREES
TROPONIN I SERPL-MCNC: <0.02 NG/ML
UROBILINOGEN UR QL STRIP.AUTO: 0.2 E.U./DL
VENTRICULAR RATE: 67 BPM
WBC # BLD AUTO: 8.41 THOUSAND/UL (ref 4.31–10.16)

## 2020-05-13 PROCEDURE — 36415 COLL VENOUS BLD VENIPUNCTURE: CPT | Performed by: EMERGENCY MEDICINE

## 2020-05-13 PROCEDURE — 85025 COMPLETE CBC W/AUTO DIFF WBC: CPT | Performed by: EMERGENCY MEDICINE

## 2020-05-13 PROCEDURE — 83690 ASSAY OF LIPASE: CPT | Performed by: EMERGENCY MEDICINE

## 2020-05-13 PROCEDURE — C9113 INJ PANTOPRAZOLE SODIUM, VIA: HCPCS | Performed by: EMERGENCY MEDICINE

## 2020-05-13 PROCEDURE — 93005 ELECTROCARDIOGRAM TRACING: CPT

## 2020-05-13 PROCEDURE — 81003 URINALYSIS AUTO W/O SCOPE: CPT | Performed by: EMERGENCY MEDICINE

## 2020-05-13 PROCEDURE — 80053 COMPREHEN METABOLIC PANEL: CPT | Performed by: EMERGENCY MEDICINE

## 2020-05-13 PROCEDURE — 99285 EMERGENCY DEPT VISIT HI MDM: CPT

## 2020-05-13 PROCEDURE — 96375 TX/PRO/DX INJ NEW DRUG ADDON: CPT

## 2020-05-13 PROCEDURE — 96361 HYDRATE IV INFUSION ADD-ON: CPT

## 2020-05-13 PROCEDURE — 99284 EMERGENCY DEPT VISIT MOD MDM: CPT | Performed by: EMERGENCY MEDICINE

## 2020-05-13 PROCEDURE — 93010 ELECTROCARDIOGRAM REPORT: CPT | Performed by: INTERNAL MEDICINE

## 2020-05-13 PROCEDURE — 96374 THER/PROPH/DIAG INJ IV PUSH: CPT

## 2020-05-13 PROCEDURE — 84484 ASSAY OF TROPONIN QUANT: CPT | Performed by: EMERGENCY MEDICINE

## 2020-05-13 PROCEDURE — 74177 CT ABD & PELVIS W/CONTRAST: CPT

## 2020-05-13 RX ORDER — ONDANSETRON 2 MG/ML
4 INJECTION INTRAMUSCULAR; INTRAVENOUS ONCE
Status: COMPLETED | OUTPATIENT
Start: 2020-05-13 | End: 2020-05-13

## 2020-05-13 RX ORDER — MAGNESIUM HYDROXIDE/ALUMINUM HYDROXICE/SIMETHICONE 120; 1200; 1200 MG/30ML; MG/30ML; MG/30ML
30 SUSPENSION ORAL ONCE
Status: COMPLETED | OUTPATIENT
Start: 2020-05-13 | End: 2020-05-13

## 2020-05-13 RX ORDER — KETOROLAC TROMETHAMINE 30 MG/ML
15 INJECTION, SOLUTION INTRAMUSCULAR; INTRAVENOUS ONCE
Status: COMPLETED | OUTPATIENT
Start: 2020-05-13 | End: 2020-05-13

## 2020-05-13 RX ORDER — SODIUM CHLORIDE 9 MG/ML
1000 INJECTION, SOLUTION INTRAVENOUS ONCE
Status: COMPLETED | OUTPATIENT
Start: 2020-05-13 | End: 2020-05-13

## 2020-05-13 RX ORDER — HYDROMORPHONE HCL/PF 1 MG/ML
0.5 SYRINGE (ML) INJECTION ONCE
Status: COMPLETED | OUTPATIENT
Start: 2020-05-13 | End: 2020-05-13

## 2020-05-13 RX ORDER — PANTOPRAZOLE SODIUM 40 MG/1
40 INJECTION, POWDER, FOR SOLUTION INTRAVENOUS ONCE
Status: COMPLETED | OUTPATIENT
Start: 2020-05-13 | End: 2020-05-13

## 2020-05-13 RX ORDER — DICYCLOMINE HCL 20 MG
20 TABLET ORAL 2 TIMES DAILY PRN
Qty: 20 TABLET | Refills: 0 | Status: SHIPPED | OUTPATIENT
Start: 2020-05-13 | End: 2021-09-17

## 2020-05-13 RX ORDER — LIDOCAINE HYDROCHLORIDE 20 MG/ML
15 SOLUTION OROPHARYNGEAL ONCE
Status: COMPLETED | OUTPATIENT
Start: 2020-05-13 | End: 2020-05-13

## 2020-05-13 RX ORDER — DICYCLOMINE HCL 20 MG
20 TABLET ORAL ONCE
Status: COMPLETED | OUTPATIENT
Start: 2020-05-13 | End: 2020-05-13

## 2020-05-13 RX ORDER — ONDANSETRON 4 MG/1
4 TABLET, ORALLY DISINTEGRATING ORAL EVERY 8 HOURS PRN
Qty: 20 TABLET | Refills: 0 | Status: SHIPPED | OUTPATIENT
Start: 2020-05-13 | End: 2021-04-05 | Stop reason: CLARIF

## 2020-05-13 RX ADMIN — DICYCLOMINE HYDROCHLORIDE 20 MG: 20 TABLET ORAL at 14:52

## 2020-05-13 RX ADMIN — ONDANSETRON 4 MG: 2 INJECTION INTRAMUSCULAR; INTRAVENOUS at 14:52

## 2020-05-13 RX ADMIN — IOHEXOL 100 ML: 350 INJECTION, SOLUTION INTRAVENOUS at 15:30

## 2020-05-13 RX ADMIN — ALUMINUM HYDROXIDE, MAGNESIUM HYDROXIDE, AND SIMETHICONE 30 ML: 200; 200; 20 SUSPENSION ORAL at 16:42

## 2020-05-13 RX ADMIN — HYDROMORPHONE HYDROCHLORIDE 0.5 MG: 1 INJECTION, SOLUTION INTRAMUSCULAR; INTRAVENOUS; SUBCUTANEOUS at 16:19

## 2020-05-13 RX ADMIN — SODIUM CHLORIDE 1000 ML/HR: 0.9 INJECTION, SOLUTION INTRAVENOUS at 14:46

## 2020-05-13 RX ADMIN — PANTOPRAZOLE SODIUM 40 MG: 40 INJECTION, POWDER, FOR SOLUTION INTRAVENOUS at 16:18

## 2020-05-13 RX ADMIN — KETOROLAC TROMETHAMINE 15 MG: 30 INJECTION, SOLUTION INTRAMUSCULAR at 14:52

## 2020-05-13 RX ADMIN — LIDOCAINE HYDROCHLORIDE 15 ML: 20 SOLUTION ORAL; TOPICAL at 16:42

## 2020-06-08 NOTE — CONSULTS
Consultation - [facility]  Yoav Strange 46 y o  male MRN: [id number]  Unit/Bed#: -01 Encounter: [id number]      Inpatient consult to gastroenterology  Consult performed by: Jaime Jordan  Consult ordered by: Joshua Francois          Reason for Consult / Principal Problem:  Intractable nausea and vomiting    HPI: Yoav Strange is a 46y o  year old male who presents with intractable nausea and vomiting since yesterday morning  He reports that he awoke yesterday morning and felt weak and then had acute onset of nausea and vomiting which lasted all day  He was unable to keep any of his medications down he was unable to take in fluids and he vomited all of his medications up  He had no melena or bleeding  He had no hematemesis  He reports he has a long complicated GI history  He has a history of Diaz's esophagus with high-grade dysplasia with a hiatal hernia status post radiofrequency ablation in [place] in 2010 x3 procedures  His reflux is under control on omeprazole 20 mg daily  In addition he reports a long history of microscopic colitis  He is treated intermittently with budesonide  He currently is seeing a [address] [place] [address] doctor in [facility]  His last colonoscopy had polyps removed and internal hemorrhoids and microscopic colitis which was active in 2018 this year about 9 months ago  He also reports a long history of alcohol abuse he has had no alcohol for 3 years  He has never been told he had a pancreas duct abnormality or pancreatitis  He does use intermittent medical marijuana for treatment of his microscopic colitis but this never caused any GI problems or nausea vomiting before  He has a long history of recurrent nausea and vomiting for over 10 years and the at these episodes will be similar to yesterday's episodes and will happen about every 3 or 5 months    These have episodes happened before he ever had any marijuana or medical marijuana for treatment of his Pt wife presented to the front stating that the patient has dementia and that she would need to be with him. Wife Sloane Acuña [phone]     Dayo Bartlett  06/08/20 3051     microscopic colitis  His sister had esophageal cancer  REVIEW OF SYSTEMS:     CONSTITUTIONAL: Denies any fever, chills, or rigors  Good appetite, and no recent weight loss  HEENT: No earache or tinnitus  Denies hearing loss or visual disturbances  CARDIOVASCULAR: No chest pain or palpitations  RESPIRATORY: Denies any cough, hemoptysis, shortness of breath or dyspnea on exertion  GASTROINTESTINAL: As noted in the History of Present Illness  GENITOURINARY: No problems with urination  Denies any hematuria or dysuria  NEUROLOGIC: No dizziness or vertigo, denies headaches  MUSCULOSKELETAL: Denies any muscle or joint pain  SKIN: Denies skin rashes or itching  ENDOCRINE: Denies excessive thirst  Denies intolerance to heat or cold  PSYCHOSOCIAL: Denies depression or anxiety  Denies any recent memory loss  Historical Information   Past Medical History:   Diagnosis Date    Diaz's esophagus     Diverticulitis     GERD (gastroesophageal reflux disease)     Hemorrhoid     Hyperlipidemia     Hypertension     [facility]      Past Surgical History:   Procedure Laterality Date    ABDOMINAL SURGERY      radio frequency ablation    CARPAL TUNNEL RELEASE      COLONOSCOPY      EGD AND COLONOSCOPY      ESOPHAGOGASTRODUODENOSCOPY N/A 2/15/2018    Procedure: ESOPHAGOGASTRODUODENOSCOPY (EGD); Surgeon: Sayra Coats MD;  Location: [facility];  Service: Gastroenterology    TONSILLECTOMY       Social History   History   Alcohol Use No     Comment: quit 1 5 yrs ago     History   Drug Use    Types: Marijuana     Comment: Occasional     History   Smoking Status    Former Smoker   Smokeless Tobacco    Never Used     No family history on file      Meds/Allergies     Prescriptions Prior to Admission   Medication    aspirin (ECOTRIN LOW STRENGTH) 81 mg EC tablet    atorvastatin (LIPITOR) 20 mg tablet    BUDESONIDE PO    buPROPion (WELLBUTRIN) 75 mg tablet    omeprazole (PriLOSEC) 20 mg delayed release capsule    sertraline (ZOLOFT) 100 mg tablet    ondansetron (ZOFRAN-ODT) 4 mg disintegrating tablet     Current Facility-Administered Medications   Medication Dose Route Frequency    acetaminophen (TYLENOL) tablet 650 mg  650 mg Oral Q6H PRN    clonazePAM (KlonoPIN) tablet 1 mg  1 mg Oral HS    enoxaparin (LOVENOX) subcutaneous injection 40 mg  40 mg Subcutaneous Daily    morphine injection 2 mg  2 mg Intravenous Q4H PRN    ondansetron (ZOFRAN) injection 4 mg  4 mg Intravenous Q6H PRN    pantoprazole (PROTONIX) injection 40 mg  40 mg Intravenous Q12H [facility]    sodium chloride 0 9 % infusion  100 mL/hr Intravenous Continuous       No Known Allergies    Objective   Blood pressure 127/61, pulse 76, temperature 98 7 °F (37 1 °C), temperature source Oral, resp  rate 20, weight 102 kg (225 lb 15 5 oz), SpO2 91 %  Intake/Output Summary (Last 24 hours) at 12/09/18 1039  Last data filed at 12/08/18 2159   Gross per 24 hour   Intake             1050 ml   Output                0 ml   Net             1050 ml         PHYSICAL EXAM:      General Appearance:   Alert, cooperative, no distress, appears stated age    HEENT:   Normocephalic, atraumatic, anicteric      Neck:  Supple, symmetrical, trachea midline, no adenopathy;    thyroid: no enlargement/tenderness/nodules; no carotid  bruit or JVD    Lungs:   Clear to auscultation bilaterally; no rales, rhonchi or wheezing; respirations unlabored    Heart[de-identified]   S1 and S2 normal; regular rate and rhythm; no murmur, rub, or gallop     Abdomen:   Soft, non-tender, non-distended; normal bowel sounds; no masses, no organomegaly    Genitalia:   Deferred    Rectal:   Deferred    Extremities:  No cyanosis, clubbing or edema    Pulses:  2+ and symmetric all extremities    Skin:  Skin color, texture, turgor normal, no rashes or lesions    Lymph nodes:  No palpable cervical, axillary or inguinal lymphadenopathy        Lab Results:   Admission on 12/08/2018   Component Date Value    WBC 12/08/2018 12 73*    RBC 12/08/2018 4 91     Hemoglobin 12/08/2018 14 8     Hematocrit 12/08/2018 43 9     MCV 12/08/2018 89     MCH 12/08/2018 30 1     MCHC 12/08/2018 33 7     RDW 12/08/2018 12 5     MPV 12/08/2018 10 5     Platelets 07/62/3399 303     nRBC 12/08/2018 0     Neutrophils Relative 12/08/2018 89*    Immat GRANS % 12/08/2018 1     Lymphocytes Relative 12/08/2018 7*    Monocytes Relative 12/08/2018 3*    Eosinophils Relative 12/08/2018 0     Basophils Relative 12/08/2018 0     Neutrophils Absolute 12/08/2018 11 46*    Immature Grans Absolute 12/08/2018 0 06     Lymphocytes Absolute 12/08/2018 0 85     Monocytes Absolute 12/08/2018 0 33     Eosinophils Absolute 12/08/2018 0 00     Basophils Absolute 12/08/2018 0 03     Sodium 12/08/2018 140     Potassium 12/08/2018 4 0     Chloride 12/08/2018 104     CO2 12/08/2018 27     ANION GAP 12/08/2018 9     BUN 12/08/2018 14     Creatinine 12/08/2018 1 01     Glucose 12/08/2018 133     Calcium 12/08/2018 9 1     AST 12/08/2018 17     ALT 12/08/2018 45     Alkaline Phosphatase 12/08/2018 68     Total Protein 12/08/2018 7 4     Albumin 12/08/2018 3 9     Total Bilirubin 12/08/2018 0 30     eGFR 12/08/2018 84     Lipase 12/08/2018 88     Troponin I 12/08/2018 <0 02     Troponin I 12/08/2018 <0 02     Sodium 12/09/2018 142     Potassium 12/09/2018 3 7     Chloride 12/09/2018 107     CO2 12/09/2018 28     ANION GAP 12/09/2018 7     BUN 12/09/2018 8     Creatinine 12/09/2018 0 89     Glucose 12/09/2018 105     Calcium 12/09/2018 8 2*    eGFR 12/09/2018 97     Magnesium 12/09/2018 1 9     Phosphorus 12/09/2018 2 6*    WBC 12/09/2018 9 67     RBC 12/09/2018 4 24     Hemoglobin 12/09/2018 13 0     Hematocrit 12/09/2018 38 8     MCV 12/09/2018 92     MCH 12/09/2018 30 7     MCHC 12/09/2018 33 5     RDW 12/09/2018 12 8     Platelets 63/89/8130 259     MPV 12/09/2018 9 5     Protime 12/09/2018 13 7     INR 12/09/2018 1 06        Imaging Studies: I have personally reviewed pertinent imaging studies  I have personally reviewed his CT images from yesterday where his pancreatic duct was dilated    ASSESSMENT & PLAN:  Principal Problem:    Non-intractable vomiting with nausea  Active Problems:    Abdominal pain    Anxiety and depression    Diaz's esophagus    Obesity (BMI 30 0-34 9)    Ulcerative colitis without complications (HCC)    Sinus bradycardia    Leukocytosis    Abnormal CT of the abdomen    Vomiting      He is a 62-year-old male with a long complicated gastrointestinal history with Diaz's esophagus high-grade dysplasia and hiatal hernia status post radiofrequency ablation in 2010, a long history of microscopic colitis treated with medical marijuana and budesonide, a remote history of alcohol abuse, and a 10 year history of the cyclical vomiting syndrome similar to yesterday's episode with uncontrolled nausea and vomiting and dehydration  His CT showed does mild dilatation of the pancreatic duct in addition to upper jejunal and duodenal dilation  IV hydration and antiemetics  PPI  I am going to give him a clears diet  EGD/enteroscopy tomorrow to assess the duodenum and upper jejunum and his GE junction given his history of high-grade dysplasia status post radiofrequency ablation in 2010  NPO after midnight  He obviously will need outpatient follow-up to follow up his chronic GI problems and I do think he would benefit from an endoscopic ultrasound to assess the pancreas and pancreatic duct      Royce Lowe MD  12/9/2018,10:39 AM

## 2020-06-21 ENCOUNTER — APPOINTMENT (EMERGENCY)
Dept: CT IMAGING | Facility: HOSPITAL | Age: 55
End: 2020-06-21
Payer: COMMERCIAL

## 2020-06-21 ENCOUNTER — HOSPITAL ENCOUNTER (EMERGENCY)
Facility: HOSPITAL | Age: 55
Discharge: HOME | End: 2020-06-21
Attending: EMERGENCY MEDICINE | Admitting: EMERGENCY MEDICINE
Payer: COMMERCIAL

## 2020-06-21 VITALS
WEIGHT: 218.26 LBS | TEMPERATURE: 97.8 F | HEART RATE: 84 BPM | RESPIRATION RATE: 18 BRPM | BODY MASS INDEX: 31.32 KG/M2 | SYSTOLIC BLOOD PRESSURE: 160 MMHG | OXYGEN SATURATION: 95 % | DIASTOLIC BLOOD PRESSURE: 83 MMHG

## 2020-06-21 DIAGNOSIS — R10.9 ABDOMINAL PAIN: Primary | ICD-10-CM

## 2020-06-21 DIAGNOSIS — R07.9 CHEST PAIN, UNSPECIFIED TYPE: ICD-10-CM

## 2020-06-21 LAB
ALBUMIN SERPL BCP-MCNC: 4 G/DL (ref 3.5–5)
ALP SERPL-CCNC: 66 U/L (ref 46–116)
ALT SERPL W P-5'-P-CCNC: 23 U/L (ref 12–78)
ANION GAP SERPL CALCULATED.3IONS-SCNC: 9 MMOL/L (ref 4–13)
AST SERPL W P-5'-P-CCNC: 15 U/L (ref 5–45)
BASOPHILS # BLD AUTO: 0.01 THOUSANDS/ΜL (ref 0–0.1)
BASOPHILS NFR BLD AUTO: 0 % (ref 0–1)
BILIRUB SERPL-MCNC: 0.4 MG/DL (ref 0.2–1)
BUN SERPL-MCNC: 13 MG/DL (ref 5–25)
CALCIUM SERPL-MCNC: 9 MG/DL (ref 8.3–10.1)
CHLORIDE SERPL-SCNC: 107 MMOL/L (ref 100–108)
CO2 SERPL-SCNC: 26 MMOL/L (ref 21–32)
CREAT SERPL-MCNC: 0.98 MG/DL (ref 0.6–1.3)
EOSINOPHIL # BLD AUTO: 0 THOUSAND/ΜL (ref 0–0.61)
EOSINOPHIL NFR BLD AUTO: 0 % (ref 0–6)
ERYTHROCYTE [DISTWIDTH] IN BLOOD BY AUTOMATED COUNT: 13.1 % (ref 11.6–15.1)
GFR SERPL CREATININE-BSD FRML MDRD: 86 ML/MIN/1.73SQ M
GLUCOSE SERPL-MCNC: 165 MG/DL (ref 65–140)
HCT VFR BLD AUTO: 42.5 % (ref 36.5–49.3)
HGB BLD-MCNC: 14 G/DL (ref 12–17)
IMM GRANULOCYTES # BLD AUTO: 0.04 THOUSAND/UL (ref 0–0.2)
IMM GRANULOCYTES NFR BLD AUTO: 0 % (ref 0–2)
LIPASE SERPL-CCNC: 101 U/L (ref 73–393)
LYMPHOCYTES # BLD AUTO: 0.63 THOUSANDS/ΜL (ref 0.6–4.47)
LYMPHOCYTES NFR BLD AUTO: 5 % (ref 14–44)
MCH RBC QN AUTO: 29.2 PG (ref 26.8–34.3)
MCHC RBC AUTO-ENTMCNC: 32.9 G/DL (ref 31.4–37.4)
MCV RBC AUTO: 89 FL (ref 82–98)
MONOCYTES # BLD AUTO: 0.28 THOUSAND/ΜL (ref 0.17–1.22)
MONOCYTES NFR BLD AUTO: 2 % (ref 4–12)
NEUTROPHILS # BLD AUTO: 11.1 THOUSANDS/ΜL (ref 1.85–7.62)
NEUTS SEG NFR BLD AUTO: 93 % (ref 43–75)
NRBC BLD AUTO-RTO: 0 /100 WBCS
PLATELET # BLD AUTO: 254 THOUSANDS/UL (ref 149–390)
PMV BLD AUTO: 9.8 FL (ref 8.9–12.7)
POTASSIUM SERPL-SCNC: 3.9 MMOL/L (ref 3.5–5.3)
PROT SERPL-MCNC: 7.3 G/DL (ref 6.4–8.2)
RBC # BLD AUTO: 4.8 MILLION/UL (ref 3.88–5.62)
SODIUM SERPL-SCNC: 142 MMOL/L (ref 136–145)
TROPONIN I SERPL-MCNC: <0.02 NG/ML
WBC # BLD AUTO: 12.06 THOUSAND/UL (ref 4.31–10.16)

## 2020-06-21 PROCEDURE — C9113 INJ PANTOPRAZOLE SODIUM, VIA: HCPCS | Performed by: EMERGENCY MEDICINE

## 2020-06-21 PROCEDURE — 96376 TX/PRO/DX INJ SAME DRUG ADON: CPT

## 2020-06-21 PROCEDURE — 84484 ASSAY OF TROPONIN QUANT: CPT | Performed by: EMERGENCY MEDICINE

## 2020-06-21 PROCEDURE — 83690 ASSAY OF LIPASE: CPT | Performed by: EMERGENCY MEDICINE

## 2020-06-21 PROCEDURE — 36415 COLL VENOUS BLD VENIPUNCTURE: CPT | Performed by: EMERGENCY MEDICINE

## 2020-06-21 PROCEDURE — 80053 COMPREHEN METABOLIC PANEL: CPT | Performed by: EMERGENCY MEDICINE

## 2020-06-21 PROCEDURE — 99285 EMERGENCY DEPT VISIT HI MDM: CPT

## 2020-06-21 PROCEDURE — 96361 HYDRATE IV INFUSION ADD-ON: CPT

## 2020-06-21 PROCEDURE — 93005 ELECTROCARDIOGRAM TRACING: CPT

## 2020-06-21 PROCEDURE — 96374 THER/PROPH/DIAG INJ IV PUSH: CPT

## 2020-06-21 PROCEDURE — 85025 COMPLETE CBC W/AUTO DIFF WBC: CPT | Performed by: EMERGENCY MEDICINE

## 2020-06-21 PROCEDURE — 96375 TX/PRO/DX INJ NEW DRUG ADDON: CPT

## 2020-06-21 PROCEDURE — 74177 CT ABD & PELVIS W/CONTRAST: CPT

## 2020-06-21 PROCEDURE — 99285 EMERGENCY DEPT VISIT HI MDM: CPT | Performed by: EMERGENCY MEDICINE

## 2020-06-21 RX ORDER — HYDROMORPHONE HCL/PF 1 MG/ML
0.5 SYRINGE (ML) INJECTION ONCE
Status: COMPLETED | OUTPATIENT
Start: 2020-06-21 | End: 2020-06-21

## 2020-06-21 RX ORDER — LIDOCAINE HYDROCHLORIDE 20 MG/ML
15 SOLUTION OROPHARYNGEAL ONCE
Status: COMPLETED | OUTPATIENT
Start: 2020-06-21 | End: 2020-06-21

## 2020-06-21 RX ORDER — ONDANSETRON 2 MG/ML
4 INJECTION INTRAMUSCULAR; INTRAVENOUS ONCE
Status: COMPLETED | OUTPATIENT
Start: 2020-06-21 | End: 2020-06-21

## 2020-06-21 RX ORDER — PANTOPRAZOLE SODIUM 40 MG/1
40 INJECTION, POWDER, FOR SOLUTION INTRAVENOUS ONCE
Status: COMPLETED | OUTPATIENT
Start: 2020-06-21 | End: 2020-06-21

## 2020-06-21 RX ORDER — SUCRALFATE 1 G/1
1 TABLET ORAL 4 TIMES DAILY
Qty: 60 TABLET | Refills: 0 | Status: SHIPPED | OUTPATIENT
Start: 2020-06-21 | End: 2021-04-05 | Stop reason: CLARIF

## 2020-06-21 RX ORDER — MAGNESIUM HYDROXIDE/ALUMINUM HYDROXICE/SIMETHICONE 120; 1200; 1200 MG/30ML; MG/30ML; MG/30ML
30 SUSPENSION ORAL ONCE
Status: COMPLETED | OUTPATIENT
Start: 2020-06-21 | End: 2020-06-21

## 2020-06-21 RX ADMIN — HYDROMORPHONE HYDROCHLORIDE 0.5 MG: 1 INJECTION, SOLUTION INTRAMUSCULAR; INTRAVENOUS; SUBCUTANEOUS at 15:49

## 2020-06-21 RX ADMIN — FAMOTIDINE 20 MG: 10 INJECTION INTRAVENOUS at 15:49

## 2020-06-21 RX ADMIN — ONDANSETRON 4 MG: 2 INJECTION INTRAMUSCULAR; INTRAVENOUS at 15:49

## 2020-06-21 RX ADMIN — SODIUM CHLORIDE 1000 ML: 0.9 INJECTION, SOLUTION INTRAVENOUS at 17:37

## 2020-06-21 RX ADMIN — LIDOCAINE HYDROCHLORIDE 15 ML: 20 SOLUTION ORAL; TOPICAL at 17:40

## 2020-06-21 RX ADMIN — PANTOPRAZOLE SODIUM 40 MG: 40 INJECTION, POWDER, FOR SOLUTION INTRAVENOUS at 15:48

## 2020-06-21 RX ADMIN — ALUMINUM HYDROXIDE, MAGNESIUM HYDROXIDE, AND SIMETHICONE 30 ML: 200; 200; 20 SUSPENSION ORAL at 17:40

## 2020-06-21 RX ADMIN — SODIUM CHLORIDE 1000 ML: 0.9 INJECTION, SOLUTION INTRAVENOUS at 15:59

## 2020-06-21 RX ADMIN — HYDROMORPHONE HYDROCHLORIDE 0.5 MG: 1 INJECTION, SOLUTION INTRAMUSCULAR; INTRAVENOUS; SUBCUTANEOUS at 17:40

## 2020-06-21 RX ADMIN — IOHEXOL 100 ML: 350 INJECTION, SOLUTION INTRAVENOUS at 16:47

## 2020-06-22 LAB
ATRIAL RATE: 53 BPM
P AXIS: 48 DEGREES
PR INTERVAL: 170 MS
QRS AXIS: 19 DEGREES
QRSD INTERVAL: 90 MS
QT INTERVAL: 432 MS
QTC INTERVAL: 405 MS
T WAVE AXIS: 41 DEGREES
VENTRICULAR RATE: 53 BPM

## 2020-06-22 PROCEDURE — 93010 ELECTROCARDIOGRAM REPORT: CPT | Performed by: INTERNAL MEDICINE

## 2020-06-23 ENCOUNTER — TELEPHONE (OUTPATIENT)
Dept: GASTROENTEROLOGY | Facility: CLINIC | Age: 55
End: 2020-06-23

## 2020-07-15 ENCOUNTER — OFFICE VISIT (OUTPATIENT)
Dept: GASTROENTEROLOGY | Facility: CLINIC | Age: 55
End: 2020-07-15
Payer: MEDICARE

## 2020-07-15 ENCOUNTER — APPOINTMENT (OUTPATIENT)
Dept: LAB | Facility: HOSPITAL | Age: 55
End: 2020-07-15
Payer: MEDICARE

## 2020-07-15 VITALS
DIASTOLIC BLOOD PRESSURE: 70 MMHG | TEMPERATURE: 98.2 F | HEART RATE: 95 BPM | BODY MASS INDEX: 29.35 KG/M2 | WEIGHT: 205 LBS | SYSTOLIC BLOOD PRESSURE: 118 MMHG | HEIGHT: 70 IN

## 2020-07-15 DIAGNOSIS — R19.7 DIARRHEA, UNSPECIFIED TYPE: Primary | ICD-10-CM

## 2020-07-15 DIAGNOSIS — R19.7 DIARRHEA, UNSPECIFIED TYPE: ICD-10-CM

## 2020-07-15 DIAGNOSIS — R10.32 LLQ PAIN: ICD-10-CM

## 2020-07-15 LAB
ALBUMIN SERPL BCP-MCNC: 3.8 G/DL (ref 3.5–5)
ALP SERPL-CCNC: 70 U/L (ref 46–116)
ALT SERPL W P-5'-P-CCNC: 21 U/L (ref 12–78)
ANION GAP SERPL CALCULATED.3IONS-SCNC: 11 MMOL/L (ref 4–13)
AST SERPL W P-5'-P-CCNC: 12 U/L (ref 5–45)
BASOPHILS # BLD AUTO: 0.02 THOUSANDS/ΜL (ref 0–0.1)
BASOPHILS NFR BLD AUTO: 0 % (ref 0–1)
BILIRUB SERPL-MCNC: 0.5 MG/DL (ref 0.2–1)
BUN SERPL-MCNC: 10 MG/DL (ref 5–25)
CALCIUM SERPL-MCNC: 9.3 MG/DL (ref 8.3–10.1)
CHLORIDE SERPL-SCNC: 104 MMOL/L (ref 100–108)
CO2 SERPL-SCNC: 25 MMOL/L (ref 21–32)
CREAT SERPL-MCNC: 1.04 MG/DL (ref 0.6–1.3)
EOSINOPHIL # BLD AUTO: 0.04 THOUSAND/ΜL (ref 0–0.61)
EOSINOPHIL NFR BLD AUTO: 0 % (ref 0–6)
ERYTHROCYTE [DISTWIDTH] IN BLOOD BY AUTOMATED COUNT: 12.7 % (ref 11.6–15.1)
ERYTHROCYTE [SEDIMENTATION RATE] IN BLOOD: 33 MM/HOUR (ref 2–10)
GFR SERPL CREATININE-BSD FRML MDRD: 80 ML/MIN/1.73SQ M
GLUCOSE P FAST SERPL-MCNC: 119 MG/DL (ref 65–99)
HCT VFR BLD AUTO: 41.9 % (ref 36.5–49.3)
HGB BLD-MCNC: 13.9 G/DL (ref 12–17)
IMM GRANULOCYTES # BLD AUTO: 0.06 THOUSAND/UL (ref 0–0.2)
IMM GRANULOCYTES NFR BLD AUTO: 0 % (ref 0–2)
LYMPHOCYTES # BLD AUTO: 1.12 THOUSANDS/ΜL (ref 0.6–4.47)
LYMPHOCYTES NFR BLD AUTO: 8 % (ref 14–44)
MCH RBC QN AUTO: 29.4 PG (ref 26.8–34.3)
MCHC RBC AUTO-ENTMCNC: 33.2 G/DL (ref 31.4–37.4)
MCV RBC AUTO: 89 FL (ref 82–98)
MONOCYTES # BLD AUTO: 0.92 THOUSAND/ΜL (ref 0.17–1.22)
MONOCYTES NFR BLD AUTO: 7 % (ref 4–12)
NEUTROPHILS # BLD AUTO: 11.35 THOUSANDS/ΜL (ref 1.85–7.62)
NEUTS SEG NFR BLD AUTO: 85 % (ref 43–75)
NRBC BLD AUTO-RTO: 0 /100 WBCS
PLATELET # BLD AUTO: 277 THOUSANDS/UL (ref 149–390)
PMV BLD AUTO: 10.2 FL (ref 8.9–12.7)
POTASSIUM SERPL-SCNC: 4.2 MMOL/L (ref 3.5–5.3)
PROT SERPL-MCNC: 7.4 G/DL (ref 6.4–8.2)
RBC # BLD AUTO: 4.72 MILLION/UL (ref 3.88–5.62)
SODIUM SERPL-SCNC: 140 MMOL/L (ref 136–145)
WBC # BLD AUTO: 13.51 THOUSAND/UL (ref 4.31–10.16)

## 2020-07-15 PROCEDURE — 99213 OFFICE O/P EST LOW 20 MIN: CPT | Performed by: PHYSICIAN ASSISTANT

## 2020-07-15 PROCEDURE — 85025 COMPLETE CBC W/AUTO DIFF WBC: CPT

## 2020-07-15 PROCEDURE — 36415 COLL VENOUS BLD VENIPUNCTURE: CPT

## 2020-07-15 PROCEDURE — 80053 COMPREHEN METABOLIC PANEL: CPT

## 2020-07-15 PROCEDURE — 85652 RBC SED RATE AUTOMATED: CPT

## 2020-07-15 RX ORDER — BUPROPION HYDROCHLORIDE 150 MG/1
TABLET, EXTENDED RELEASE ORAL
COMMUNITY
Start: 2020-07-15 | End: 2021-04-05 | Stop reason: CLARIF

## 2020-07-15 RX ORDER — BUDESONIDE 3 MG/1
CAPSULE, COATED PELLETS ORAL
COMMUNITY
Start: 2020-04-09 | End: 2020-08-27 | Stop reason: SDUPTHER

## 2020-07-15 NOTE — LETTER
July 15, 2020     Ninfa Hart MD  [address] [place] [address]    Patient: Shawn Tyler   YOB: 1965   Date of Visit: 7/15/2020       Dear Dr Hough Minium:    Thank you for referring Shawn Tyler to me for evaluation  Below are my notes for this consultation  If you have questions, please do not hesitate to call me  I look forward to following your patient along with you  Sincerely,        Salvador Cervantes PA-C        CC: No Recipients  Angelina Louis  7/15/2020 10:07 AM  Sign at close encounter  [facility] - Outpatient Follow-up Note  Shawn Tyler 53 y o  male MRN: [id number]  Encounter: [id number]          ASSESSMENT AND PLAN:      1  Diarrhea, unspecified type  2  LLQ pain  Will send patient for laboratories and stool cultures  Will have patient continue taking omeprazole 20 mg daily  I have explained the patient that he can utilize Zofran 4 mg every 6-8 hours as needed for nausea  Will start dicyclomine 20 mg 3 to 4 times a day routinely until patient is symptomatically feeling better  No plans for any endoscopic evaluation at this time  Patient has had multiple imaging studies within the last year  Follow-up as needed  ______________________________________________________________________    SUBJECTIVE:    48-year-old male who is here with acute onset of left lower quadrant abdominal pain and diarrhea that started on Monday night  Patient reports that his symptoms yesterday amplified and he began to vomit  He reports that he went to a liquid diet including coconut water and medicated was Zofran 3 times yesterday  Patient is no longer vomiting but is still reporting nausea  Patient reports diarrhea but then tells me today that he has not had a bowel movement as of yet  He denies any melena or rectal bleeding  Patient does have a history of microscopic colitis in the past   Patient reports that now he is on a bland diet    He denies any triggers at the present time  He denies any antibiotic use  He denies any travel  He reports the pain in his left lower quadrant radiates to his right lower quadrant and mid abdomen  Patient denies any fevers or chills  Patient denies any weight loss  REVIEW OF SYSTEMS IS OTHERWISE NEGATIVE  Historical Information   Past Medical History:   Diagnosis Date    Diaz's esophagus     Colon polyp     Coronary artery disease     Depression     Diverticulitis     GERD (gastroesophageal reflux disease)     Hemorrhoid     History of heart artery stent     Hyperlipidemia     Hypertension     Microscopic colitis      Past Surgical History:   Procedure Laterality Date    ABDOMINAL SURGERY      radio frequency ablation    BONE GRAFT Left 2018    CARPAL TUNNEL RELEASE Right     COLONOSCOPY      EGD AND COLONOSCOPY      ESOPHAGOGASTRODUODENOSCOPY N/A 2/15/2018    Procedure: ESOPHAGOGASTRODUODENOSCOPY (EGD); Surgeon: Jack Villalobos MD;  Location: [facility];  Service: Gastroenterology    ESOPHAGOGASTRODUODENOSCOPY N/A 12/10/2018    Procedure: ESOPHAGOGASTRODUODENOSCOPY (EGD); Surgeon: Sunshine Adhikari MD;  Location: [facility];   Service: Gastroenterology    TONSILLECTOMY       Social History   Social History     Substance and Sexual Activity   Alcohol Use Not Currently    Frequency: Never    Comment: quit 5 years     Social History     Substance and Sexual Activity   Drug Use Yes    Frequency: 3 0 times per week    Types: Marijuana    Comment: last used  (medical)     Social History     Tobacco Use   Smoking Status Former Smoker    Packs/day: 0 50    Last attempt to quit: 2007    Years since quittin 5   Smokeless Tobacco Former User    Quit date: 1998   Tobacco Comment    quit 10 yrs ago     Family History   Problem Relation Age of Onset    Heart disease Father     Cancer Sister        Meds/Allergies       Current Outpatient Medications:     aspirin (ECOTRIN LOW STRENGTH) 81 mg EC tablet    atorvastatin (LIPITOR) 80 mg tablet    budesonide (ENTOCORT EC) 3 MG capsule    buPROPion (WELLBUTRIN SR) 150 mg 12 hr tablet    clonazePAM (KLONOPIN) 0 5 mg tablet    dicyclomine (BENTYL) 20 mg tablet    dicyclomine (BENTYL) 20 mg tablet    metoclopramide (REGLAN) 10 mg tablet    nitroglycerin (NITROSTAT) 0 4 mg SL tablet    omeprazole (PriLOSEC) 20 mg delayed release capsule    ondansetron (ZOFRAN-ODT) 4 mg disintegrating tablet    ondansetron (ZOFRAN-ODT) 4 mg disintegrating tablet    sertraline (ZOLOFT) 100 mg tablet    sucralfate (CARAFATE) 1 g tablet    buPROPion (WELLBUTRIN SR) 150 mg 12 hr tablet    Allergies   Allergen Reactions    Acetaminophen-Codeine     Niacin      Other reaction(s): Flushing    No Active Allergies     Rosuvastatin Myalgia    Oxycodone-Acetaminophen Rash and Hives    Shellfish Allergy Rash and Lip Swelling           Objective     Blood pressure 118/70, pulse 95, temperature 98 2 °F (36 8 °C), temperature source Tympanic, height 5' 10" (1 778 m), weight 93 kg (205 lb)  Body mass index is 29 41 kg/m²  PHYSICAL EXAM:      General Appearance:   Alert, cooperative, no distress   HEENT:   Normocephalic, atraumatic, anicteric      Neck:  Supple, symmetrical, trachea midline   Lungs:   Clear to auscultation bilaterally; no rales, rhonchi or wheezing; respirations unlabored    Heart[de-identified]   Regular rate and rhythm; no murmur, rub, or gallop  Abdomen:   Soft, non-tender, non-distended; normal bowel sounds; no masses, no organomegaly    Genitalia:   Deferred    Rectal:   Deferred    Extremities:  No cyanosis, clubbing or edema    Pulses:  2+ and symmetric    Skin:  No jaundice, rashes, or lesions    Lymph nodes:  No palpable cervical lymphadenopathy        Lab Results:   No visits with results within 1 Day(s) from this visit     Latest known visit with results is:   Admission on 06/21/2020, Discharged on 06/21/2020   Component Date Value    WBC 06/21/2020 12 06*    RBC 06/21/2020 4 80     Hemoglobin 06/21/2020 14 0     Hematocrit 06/21/2020 42 5     MCV 06/21/2020 89     MCH 06/21/2020 29 2     MCHC 06/21/2020 32 9     RDW 06/21/2020 13 1     MPV 06/21/2020 9 8     Platelets 97/24/1962 254     nRBC 06/21/2020 0     Neutrophils Relative 06/21/2020 93*    Immat GRANS % 06/21/2020 0     Lymphocytes Relative 06/21/2020 5*    Monocytes Relative 06/21/2020 2*    Eosinophils Relative 06/21/2020 0     Basophils Relative 06/21/2020 0     Neutrophils Absolute 06/21/2020 11 10*    Immature Grans Absolute 06/21/2020 0 04     Lymphocytes Absolute 06/21/2020 0 63     Monocytes Absolute 06/21/2020 0 28     Eosinophils Absolute 06/21/2020 0 00     Basophils Absolute 06/21/2020 0 01     Sodium 06/21/2020 142     Potassium 06/21/2020 3 9     Chloride 06/21/2020 107     CO2 06/21/2020 26     ANION GAP 06/21/2020 9     BUN 06/21/2020 13     Creatinine 06/21/2020 0 98     Glucose 06/21/2020 165*    Calcium 06/21/2020 9 0     AST 06/21/2020 15     ALT 06/21/2020 23     Alkaline Phosphatase 06/21/2020 66     Total Protein 06/21/2020 7 3     Albumin 06/21/2020 4 0     Total Bilirubin 06/21/2020 0 40     eGFR 06/21/2020 86     Lipase 06/21/2020 101     Troponin I 06/21/2020 <0 02     Ventricular Rate 06/21/2020 53     Atrial Rate 06/21/2020 53     AK Interval 06/21/2020 170     QRSD Interval 06/21/2020 90     QT Interval 06/21/2020 432     QTC Interval 06/21/2020 405     P Axis 06/21/2020 48     QRS Axis 06/21/2020 19     T Wave Axis 06/21/2020 41          Radiology Results:   Ct Abdomen Pelvis With Contrast    Result Date: 6/21/2020  Narrative: CT ABDOMEN AND PELVIS WITH IV CONTRAST INDICATION:   LLQ abdominal pain, diverticulitis suspected  ED triage's note reviewed, chest pain beginning this afternoon with abdominal pain    Leukocytosis >12 COMPARISON:  CT abdomen and pelvis 5/13/20 TECHNIQUE:  CT examination of the abdomen and pelvis was performed  Axial, sagittal, and coronal 2D reformatted images were created from the source data and submitted for interpretation  Radiation dose length product (DLP) for this visit:  684 mGy-cm   This examination, like all CT scans performed in the [facility], was performed utilizing techniques to minimize radiation dose exposure, including the use of iterative reconstruction and automated exposure control  IV Contrast:  100 mL of iohexol (OMNIPAQUE) Enteric Contrast:  Enteric contrast was not administered  FINDINGS: ABDOMEN LOWER CHEST:  Asymmetric groundglass opacities are noted in the left lower lobe base LIVER/BILIARY TREE:  Unremarkable  GALLBLADDER:  No calcified gallstones  No pericholecystic inflammatory change  SPLEEN:  Unremarkable  PANCREAS:  Unremarkable  ADRENAL GLANDS:  Unremarkable  KIDNEYS/URETERS:  Unremarkable  No hydronephrosis  STOMACH AND BOWEL:  Unremarkable  APPENDIX:  A normal appendix was visualized  ABDOMINOPELVIC CAVITY:  No ascites  No pneumoperitoneum  No lymphadenopathy  VESSELS:  Unremarkable for patient's age  PELVIS REPRODUCTIVE ORGANS:  Unremarkable for patient's age  URINARY BLADDER:  Unremarkable  ABDOMINAL WALL/INGUINAL REGIONS:  Unremarkable  OSSEOUS STRUCTURES:  No acute fracture or destructive osseous lesion  Impression: 1  No acute abnormality in the abdomen and pelvis 2  Groundglass opacities in the left lung base  While this may represent atelectasis, given asymmetry, consider infectious/inflammatory etiologies, including viral pneumonia The study was marked in EPIC for immediate notification   Workstation performed: [id number]

## 2020-07-15 NOTE — PATIENT INSTRUCTIONS
Nutrition Tips for Relief of Diarrhea   WHAT YOU NEED TO KNOW:   There are diet changes you can make to help relieve or stop diarrhea  These changes include limiting or avoiding foods and liquids that are high in sugar, fat, fiber, and lactose  Lactose is a sugar found in milk products  Milk products can cause diarrhea in people who are lactose intolerant  You should also drink extra liquids to replace fluids that are lost when you have diarrhea  Diarrhea can lead to dehydration  DISCHARGE INSTRUCTIONS:   Foods to limit or avoid:   · Dairy:      ¨ Whole milk    ¨ Half-and-half, cream, and sour cream    ¨ Regular (whole milk) ice cream    · Grains:      ¨ Whole wheat and whole grain breads, pasta, cereals, and crackers    ¨ Brown and wild rice    ¨ Breads and cereals with seeds or nuts    ¨ Popcorn    · Fruit and vegetables:      ¨ All raw fruits, except bananas and melon    ¨ Dried fruits, including prunes and raisins    ¨ Canned fruit in heavy syrup    ¨ Prune juice and any fruit juice with pulp    ¨ Raw vegetables, except lettuce     ¨ Fried vegetables    ¨ Corn, raw and cooked broccoli, cabbage, cauliflower, and gio greens    · Protein:      ¨ Fried meat, poultry, and fish    ¨ High-fat luncheon meats, such as bologna    ¨ Fatty meats, such as sausage, szymanski, and hot dogs    ¨ Beans and nuts    · Liquids:      ¨ Sodas and fruit-flavored drinks    ¨ Drinks that contain caffeine, such as energy drinks, coffee, and tea     ¨ Drinks that contain alcohol or sugar alcohol, such as sorbitol  Foods and liquids you may eat or drink:  Most people can tolerate the foods and liquids listed below  If any of them make your symptoms worse, stop eating or drinking them until you feel better  If you are lactose intolerant, avoid milk products    · Dairy:      ¨ Skim or low-fat milk or evaporated milk    ¨ Soy milk or buttermilk     ¨ Low-fat, part-skim, and aged cheese    ¨ Yogurt, low-fat ice cream, or sherbert    · Grains: (Choose foods with less than 2 grams of dietary fiber per serving )     ¨ White or refined flour breads, bagels, pasta, and crackers    ¨ Cold or hot cereals made from white or refined flour such as puffed rice, cornflakes, or cream of wheat    ¨ White rice    · Fruit and vegetables:      ¨ Bananas or melon    ¨ Fruit juice without pulp, except prune juice    ¨ Canned fruit in juice or light syrup    ¨ Lettuce and most well-cooked vegetables without seeds or skins     ¨ Strained vegetable juice    · Protein:      ¨ Tender, well-cooked meat, poultry, or fish    ¨ Well-cooked eggs or soy foods (cooked without added fat)    ¨ Smooth nut butters    · Fats:  (Limit fats to less than 8 teaspoons a day)     ¨ Oil, butter, or margarine, or mayonnaise    ¨ Cream cheese or salad dressings    · Liquids:      ¨ For infants, breast milk or formula    ¨ Oral rehydration solution     ¨ Decaffeinated coffee or caffeine-free teas    ¨ Soft drinks without caffeine  Other guidelines to follow:   · Drink liquids as directed  You may need to drink more liquids than usual to prevent dehydration  Ask how much liquid to drink each day and which liquids are best for you  You may need to drink an oral rehydration solution (ORS)  An ORS helps replace fluids and electrolytes that you lose when you have diarrhea  · Eat small meals or snacks every 3 to 4 hours  instead of large meals  Continue eating even if you still have diarrhea  Your diarrhea will continue for a few days but should gradually go away  © 2017 [employer] [employer] Information is for End User's use only and may not be sold, redistributed or otherwise used for commercial purposes  All illustrations and images included in CareNotes® are the copyrighted property of [employer] , [employer]  or Mihir Castro  The above information is an  only  It is not intended as medical advice for individual conditions or treatments   Talk to your doctor, nurse or pharmacist before following any medical regimen to see if it is safe and effective for you

## 2020-07-15 NOTE — PROGRESS NOTES
[facility] - Outpatient Follow-up Note  Reese Cook 53 y o  male MRN: [id number]  Encounter: [id number]          ASSESSMENT AND PLAN:      1  Diarrhea, unspecified type  2  LLQ pain  Will send patient for laboratories and stool cultures  Will have patient continue taking omeprazole 20 mg daily  I have explained the patient that he can utilize Zofran 4 mg every 6-8 hours as needed for nausea  Will start dicyclomine 20 mg 3 to 4 times a day routinely until patient is symptomatically feeling better  No plans for any endoscopic evaluation at this time  Patient has had multiple imaging studies within the last year  Follow-up as needed  ______________________________________________________________________    SUBJECTIVE:    53-year-old male who is here with acute onset of left lower quadrant abdominal pain and diarrhea that started on Monday night  Patient reports that his symptoms yesterday amplified and he began to vomit  He reports that he went to a liquid diet including coconut water and medicated was Zofran 3 times yesterday  Patient is no longer vomiting but is still reporting nausea  Patient reports diarrhea but then tells me today that he has not had a bowel movement as of yet  He denies any melena or rectal bleeding  Patient does have a history of microscopic colitis in the past   Patient reports that now he is on a bland diet  He denies any triggers at the present time  He denies any antibiotic use  He denies any travel  He reports the pain in his left lower quadrant radiates to his right lower quadrant and mid abdomen  Patient denies any fevers or chills  Patient denies any weight loss  REVIEW OF SYSTEMS IS OTHERWISE NEGATIVE        Historical Information   Past Medical History:   Diagnosis Date    Diaz's esophagus     Colon polyp     Coronary artery disease     Depression     Diverticulitis     GERD (gastroesophageal reflux disease)     Hemorrhoid     History of heart artery stent     Hyperlipidemia     Hypertension     Microscopic colitis      Past Surgical History:   Procedure Laterality Date    ABDOMINAL SURGERY      radio frequency ablation    BONE GRAFT Left 2018    CARPAL TUNNEL RELEASE Right     COLONOSCOPY      EGD AND COLONOSCOPY      ESOPHAGOGASTRODUODENOSCOPY N/A 2/15/2018    Procedure: ESOPHAGOGASTRODUODENOSCOPY (EGD); Surgeon: Nena Antony MD;  Location: [facility];  Service: Gastroenterology    ESOPHAGOGASTRODUODENOSCOPY N/A 12/10/2018    Procedure: ESOPHAGOGASTRODUODENOSCOPY (EGD); Surgeon: Danial Saldivar MD;  Location: [facility];   Service: Gastroenterology    TONSILLECTOMY       Social History   Social History     Substance and Sexual Activity   Alcohol Use Not Currently    Frequency: Never    Comment: quit 5 years     Social History     Substance and Sexual Activity   Drug Use Yes    Frequency: 3 0 times per week    Types: Marijuana    Comment: last used  (medical)     Social History     Tobacco Use   Smoking Status Former Smoker    Packs/day: 0 50    Last attempt to quit: 2007    Years since quittin 5   Smokeless Tobacco Former User    Quit date: 1998   Tobacco Comment    quit 10 yrs ago     Family History   Problem Relation Age of Onset    Heart disease Father     Cancer Sister        Meds/Allergies       Current Outpatient Medications:     aspirin (ECOTRIN LOW STRENGTH) 81 mg EC tablet    atorvastatin (LIPITOR) 80 mg tablet    budesonide (ENTOCORT EC) 3 MG capsule    buPROPion (WELLBUTRIN SR) 150 mg 12 hr tablet    clonazePAM (KLONOPIN) 0 5 mg tablet    dicyclomine (BENTYL) 20 mg tablet    dicyclomine (BENTYL) 20 mg tablet    metoclopramide (REGLAN) 10 mg tablet    nitroglycerin (NITROSTAT) 0 4 mg SL tablet    omeprazole (PriLOSEC) 20 mg delayed release capsule    ondansetron (ZOFRAN-ODT) 4 mg disintegrating tablet    ondansetron (ZOFRAN-ODT) 4 mg disintegrating tablet    sertraline (ZOLOFT) 100 mg tablet    sucralfate (CARAFATE) 1 g tablet    buPROPion (WELLBUTRIN SR) 150 mg 12 hr tablet    Allergies   Allergen Reactions    Acetaminophen-Codeine     Niacin      Other reaction(s): Flushing    No Active Allergies     Rosuvastatin Myalgia    Oxycodone-Acetaminophen Rash and Hives    Shellfish Allergy Rash and Lip Swelling           Objective     Blood pressure 118/70, pulse 95, temperature 98 2 °F (36 8 °C), temperature source Tympanic, height 5' 10" (1 778 m), weight 93 kg (205 lb)  Body mass index is 29 41 kg/m²  PHYSICAL EXAM:      General Appearance:   Alert, cooperative, no distress   HEENT:   Normocephalic, atraumatic, anicteric      Neck:  Supple, symmetrical, trachea midline   Lungs:   Clear to auscultation bilaterally; no rales, rhonchi or wheezing; respirations unlabored    Heart[de-identified]   Regular rate and rhythm; no murmur, rub, or gallop  Abdomen:   Soft, non-tender, non-distended; normal bowel sounds; no masses, no organomegaly    Genitalia:   Deferred    Rectal:   Deferred    Extremities:  No cyanosis, clubbing or edema    Pulses:  2+ and symmetric    Skin:  No jaundice, rashes, or lesions    Lymph nodes:  No palpable cervical lymphadenopathy        Lab Results:   No visits with results within 1 Day(s) from this visit     Latest known visit with results is:   Admission on 06/21/2020, Discharged on 06/21/2020   Component Date Value    WBC 06/21/2020 12 06*    RBC 06/21/2020 4 80     Hemoglobin 06/21/2020 14 0     Hematocrit 06/21/2020 42 5     MCV 06/21/2020 89     MCH 06/21/2020 29 2     MCHC 06/21/2020 32 9     RDW 06/21/2020 13 1     MPV 06/21/2020 9 8     Platelets 65/47/4894 254     nRBC 06/21/2020 0     Neutrophils Relative 06/21/2020 93*    Immat GRANS % 06/21/2020 0     Lymphocytes Relative 06/21/2020 5*    Monocytes Relative 06/21/2020 2*    Eosinophils Relative 06/21/2020 0     Basophils Relative 06/21/2020 0     Neutrophils Absolute 06/21/2020 11 10*    Immature Grans Absolute 06/21/2020 0 04     Lymphocytes Absolute 06/21/2020 0 63     Monocytes Absolute 06/21/2020 0 28     Eosinophils Absolute 06/21/2020 0 00     Basophils Absolute 06/21/2020 0 01     Sodium 06/21/2020 142     Potassium 06/21/2020 3 9     Chloride 06/21/2020 107     CO2 06/21/2020 26     ANION GAP 06/21/2020 9     BUN 06/21/2020 13     Creatinine 06/21/2020 0 98     Glucose 06/21/2020 165*    Calcium 06/21/2020 9 0     AST 06/21/2020 15     ALT 06/21/2020 23     Alkaline Phosphatase 06/21/2020 66     Total Protein 06/21/2020 7 3     Albumin 06/21/2020 4 0     Total Bilirubin 06/21/2020 0 40     eGFR 06/21/2020 86     Lipase 06/21/2020 101     Troponin I 06/21/2020 <0 02     Ventricular Rate 06/21/2020 53     Atrial Rate 06/21/2020 53     ND Interval 06/21/2020 170     QRSD Interval 06/21/2020 90     QT Interval 06/21/2020 432     QTC Interval 06/21/2020 405     P Axis 06/21/2020 48     QRS Axis 06/21/2020 19     T Wave Axis 06/21/2020 41          Radiology Results:   Ct Abdomen Pelvis With Contrast    Result Date: 6/21/2020  Narrative: CT ABDOMEN AND PELVIS WITH IV CONTRAST INDICATION:   LLQ abdominal pain, diverticulitis suspected  ED triage's note reviewed, chest pain beginning this afternoon with abdominal pain  Leukocytosis >12 COMPARISON:  CT abdomen and pelvis 5/13/20 TECHNIQUE:  CT examination of the abdomen and pelvis was performed  Axial, sagittal, and coronal 2D reformatted images were created from the source data and submitted for interpretation  Radiation dose length product (DLP) for this visit:  684 mGy-cm   This examination, like all CT scans performed in the [facility], was performed utilizing techniques to minimize radiation dose exposure, including the use of iterative reconstruction and automated exposure control   IV Contrast:  100 mL of iohexol (OMNIPAQUE) Enteric Contrast:  Enteric contrast was not administered  FINDINGS: ABDOMEN LOWER CHEST:  Asymmetric groundglass opacities are noted in the left lower lobe base LIVER/BILIARY TREE:  Unremarkable  GALLBLADDER:  No calcified gallstones  No pericholecystic inflammatory change  SPLEEN:  Unremarkable  PANCREAS:  Unremarkable  ADRENAL GLANDS:  Unremarkable  KIDNEYS/URETERS:  Unremarkable  No hydronephrosis  STOMACH AND BOWEL:  Unremarkable  APPENDIX:  A normal appendix was visualized  ABDOMINOPELVIC CAVITY:  No ascites  No pneumoperitoneum  No lymphadenopathy  VESSELS:  Unremarkable for patient's age  PELVIS REPRODUCTIVE ORGANS:  Unremarkable for patient's age  URINARY BLADDER:  Unremarkable  ABDOMINAL WALL/INGUINAL REGIONS:  Unremarkable  OSSEOUS STRUCTURES:  No acute fracture or destructive osseous lesion  Impression: 1  No acute abnormality in the abdomen and pelvis 2  Groundglass opacities in the left lung base  While this may represent atelectasis, given asymmetry, consider infectious/inflammatory etiologies, including viral pneumonia The study was marked in EPIC for immediate notification   Workstation performed: [id number]

## 2020-07-16 ENCOUNTER — APPOINTMENT (OUTPATIENT)
Dept: LAB | Facility: HOSPITAL | Age: 55
End: 2020-07-16
Payer: MEDICARE

## 2020-07-16 ENCOUNTER — TELEPHONE (OUTPATIENT)
Dept: GASTROENTEROLOGY | Facility: CLINIC | Age: 55
End: 2020-07-16

## 2020-07-16 DIAGNOSIS — R19.7 DIARRHEA, UNSPECIFIED TYPE: ICD-10-CM

## 2020-07-16 PROCEDURE — 87205 SMEAR GRAM STAIN: CPT

## 2020-07-16 PROCEDURE — 87505 NFCT AGENT DETECTION GI: CPT

## 2020-07-16 NOTE — TELEPHONE ENCOUNTER
----- Message from Alicia Monzon PA-C sent at 7/16/2020 11:06 AM EDT -----  Please inform patient that his WBC count was slightly elevated as well as inflammatory marker  Will await stool cultures  Thanks

## 2020-07-16 NOTE — TELEPHONE ENCOUNTER
----- Message from Hardy Sher PA-C sent at 7/16/2020 11:06 AM EDT -----  Please inform patient that his WBC count was slightly elevated as well as inflammatory marker  Will await stool cultures  Thanks

## 2020-07-17 ENCOUNTER — TELEPHONE (OUTPATIENT)
Dept: GASTROENTEROLOGY | Facility: CLINIC | Age: 55
End: 2020-07-17

## 2020-07-17 LAB
C DIFF TOX GENS STL QL NAA+PROBE: NEGATIVE
CAMPYLOBACTER DNA SPEC NAA+PROBE: NORMAL
SALMONELLA DNA SPEC QL NAA+PROBE: NORMAL
SHIGA TOXIN STX GENE SPEC NAA+PROBE: NORMAL
SHIGELLA DNA SPEC QL NAA+PROBE: NORMAL
WBC STL QL MICRO: NORMAL

## 2020-07-17 NOTE — TELEPHONE ENCOUNTER
----- Message from Yolanda Dominguez PA-C sent at 7/17/2020  3:09 PM EDT -----  Please inform patient that stool cultures are negative Thanks

## 2020-07-28 ENCOUNTER — TELEPHONE (OUTPATIENT)
Dept: GASTROENTEROLOGY | Facility: CLINIC | Age: 55
End: 2020-07-28

## 2020-07-28 NOTE — TELEPHONE ENCOUNTER
Pt is looking for BW results and is it ok to take Osteo Bi-flex  Please advise  ty    The Stool test resulted and attempted to call pt with results  Will call pt

## 2020-07-28 NOTE — TELEPHONE ENCOUNTER
Attempted to call pt to let me know it is okay for him to take Osteo Bi-flex and his stool cultures were all negative  But his VM is full and was unable to leave a message  Will try call back later

## 2020-07-28 NOTE — TELEPHONE ENCOUNTER
Gayatri/Edvin - patient called for results of BW and Stool Test  Also would like to know if it is okay to take osteo Bi flex   Please call Sandrine Corea at [phone]

## 2020-08-07 ENCOUNTER — TELEPHONE (OUTPATIENT)
Dept: CARDIOLOGY CLINIC | Facility: CLINIC | Age: 55
End: 2020-08-07

## 2020-08-07 NOTE — TELEPHONE ENCOUNTER
Pt called & said that he see's Dr Tosha Eason & pt is having an issue w/ px on the right side of his chest about 4" below the nipple area by the rib cage & wanted to know was it could be   I explained to pt that Dr Tosha Eason is not w/ [facility] anymore & I gave pt options of our other 3 doctors & pt said that he wants only an [place] doct; I gave pt the phone # to [facility]

## 2020-08-08 ENCOUNTER — HOSPITAL ENCOUNTER (EMERGENCY)
Facility: HOSPITAL | Age: 55
Discharge: HOME | End: 2020-08-08
Attending: EMERGENCY MEDICINE | Admitting: EMERGENCY MEDICINE
Payer: COMMERCIAL

## 2020-08-08 ENCOUNTER — APPOINTMENT (EMERGENCY)
Dept: RADIOLOGY | Facility: HOSPITAL | Age: 55
End: 2020-08-08
Payer: COMMERCIAL

## 2020-08-08 VITALS
RESPIRATION RATE: 22 BRPM | OXYGEN SATURATION: 98 % | TEMPERATURE: 97.8 F | HEART RATE: 77 BPM | SYSTOLIC BLOOD PRESSURE: 109 MMHG | DIASTOLIC BLOOD PRESSURE: 78 MMHG

## 2020-08-08 DIAGNOSIS — R07.9 CHEST PAIN, UNSPECIFIED TYPE: Primary | ICD-10-CM

## 2020-08-08 DIAGNOSIS — R00.2 PALPITATIONS: ICD-10-CM

## 2020-08-08 LAB
ALBUMIN SERPL BCP-MCNC: 3.7 G/DL (ref 3.5–5)
ALP SERPL-CCNC: 64 U/L (ref 46–116)
ALT SERPL W P-5'-P-CCNC: 21 U/L (ref 12–78)
ANION GAP SERPL CALCULATED.3IONS-SCNC: 9 MMOL/L (ref 4–13)
AST SERPL W P-5'-P-CCNC: 14 U/L (ref 5–45)
ATRIAL RATE: 64 BPM
BASOPHILS # BLD AUTO: 0.03 THOUSANDS/ΜL (ref 0–0.1)
BASOPHILS NFR BLD AUTO: 0 % (ref 0–1)
BILIRUB SERPL-MCNC: 0.3 MG/DL (ref 0.2–1)
BUN SERPL-MCNC: 12 MG/DL (ref 5–25)
CALCIUM SERPL-MCNC: 8.5 MG/DL (ref 8.3–10.1)
CHLORIDE SERPL-SCNC: 106 MMOL/L (ref 100–108)
CO2 SERPL-SCNC: 26 MMOL/L (ref 21–32)
CREAT SERPL-MCNC: 1.08 MG/DL (ref 0.6–1.3)
EOSINOPHIL # BLD AUTO: 0.1 THOUSAND/ΜL (ref 0–0.61)
EOSINOPHIL NFR BLD AUTO: 1 % (ref 0–6)
ERYTHROCYTE [DISTWIDTH] IN BLOOD BY AUTOMATED COUNT: 12.9 % (ref 11.6–15.1)
GFR SERPL CREATININE-BSD FRML MDRD: 77 ML/MIN/1.73SQ M
GLUCOSE SERPL-MCNC: 138 MG/DL (ref 65–140)
HCT VFR BLD AUTO: 40.4 % (ref 36.5–49.3)
HGB BLD-MCNC: 13.2 G/DL (ref 12–17)
IMM GRANULOCYTES # BLD AUTO: 0.02 THOUSAND/UL (ref 0–0.2)
IMM GRANULOCYTES NFR BLD AUTO: 0 % (ref 0–2)
LYMPHOCYTES # BLD AUTO: 1.73 THOUSANDS/ΜL (ref 0.6–4.47)
LYMPHOCYTES NFR BLD AUTO: 23 % (ref 14–44)
MCH RBC QN AUTO: 28.9 PG (ref 26.8–34.3)
MCHC RBC AUTO-ENTMCNC: 32.7 G/DL (ref 31.4–37.4)
MCV RBC AUTO: 89 FL (ref 82–98)
MONOCYTES # BLD AUTO: 0.48 THOUSAND/ΜL (ref 0.17–1.22)
MONOCYTES NFR BLD AUTO: 6 % (ref 4–12)
NEUTROPHILS # BLD AUTO: 5.29 THOUSANDS/ΜL (ref 1.85–7.62)
NEUTS SEG NFR BLD AUTO: 70 % (ref 43–75)
NRBC BLD AUTO-RTO: 0 /100 WBCS
P AXIS: 80 DEGREES
PLATELET # BLD AUTO: 275 THOUSANDS/UL (ref 149–390)
PMV BLD AUTO: 9.6 FL (ref 8.9–12.7)
POTASSIUM SERPL-SCNC: 3.6 MMOL/L (ref 3.5–5.3)
PR INTERVAL: 178 MS
PROT SERPL-MCNC: 6.8 G/DL (ref 6.4–8.2)
QRS AXIS: 42 DEGREES
QRSD INTERVAL: 92 MS
QT INTERVAL: 410 MS
QTC INTERVAL: 422 MS
RBC # BLD AUTO: 4.56 MILLION/UL (ref 3.88–5.62)
SODIUM SERPL-SCNC: 141 MMOL/L (ref 136–145)
T WAVE AXIS: 66 DEGREES
TROPONIN I SERPL-MCNC: <0.02 NG/ML
TROPONIN I SERPL-MCNC: <0.02 NG/ML
VENTRICULAR RATE: 64 BPM
WBC # BLD AUTO: 7.65 THOUSAND/UL (ref 4.31–10.16)

## 2020-08-08 PROCEDURE — 71045 X-RAY EXAM CHEST 1 VIEW: CPT

## 2020-08-08 PROCEDURE — 93010 ELECTROCARDIOGRAM REPORT: CPT | Performed by: INTERNAL MEDICINE

## 2020-08-08 PROCEDURE — 99285 EMERGENCY DEPT VISIT HI MDM: CPT

## 2020-08-08 PROCEDURE — 93005 ELECTROCARDIOGRAM TRACING: CPT

## 2020-08-08 PROCEDURE — 99285 EMERGENCY DEPT VISIT HI MDM: CPT | Performed by: EMERGENCY MEDICINE

## 2020-08-08 PROCEDURE — 85025 COMPLETE CBC W/AUTO DIFF WBC: CPT | Performed by: EMERGENCY MEDICINE

## 2020-08-08 PROCEDURE — 84484 ASSAY OF TROPONIN QUANT: CPT | Performed by: EMERGENCY MEDICINE

## 2020-08-08 PROCEDURE — 36415 COLL VENOUS BLD VENIPUNCTURE: CPT

## 2020-08-08 PROCEDURE — 80053 COMPREHEN METABOLIC PANEL: CPT | Performed by: EMERGENCY MEDICINE

## 2020-08-08 RX ORDER — ASPIRIN 81 MG/1
324 TABLET, CHEWABLE ORAL ONCE
Status: COMPLETED | OUTPATIENT
Start: 2020-08-08 | End: 2020-08-08

## 2020-08-08 RX ADMIN — ASPIRIN 81 MG 324 MG: 81 TABLET ORAL at 13:17

## 2020-08-08 NOTE — ED PROVIDER NOTES
Pt Name: Julian Toro  MRN: [id number]  [place] 1965  Age/Sex: 53 y o  male  Date of evaluation: 8/8/2020  PCP: Galo Pringle MD    [address] [place] [address]    Chief Complaint   Patient presents with    Chest Pain         HPI    53 y o  male presenting with chest pain and palpitations  Patient states that the pain is dull, mild to moderate in intensity, in the right upper chest, improved with exercise and activity and worse at rest, present over the past 3 weeks and coming and going intermittently without clear provocation, more steady over the past 2-3 days  Also complains of some left-sided upper chest pain over that same time frame with similar characteristics  Patient states the chest pain has been going on for weeks and does not bother him quite as much as a relatively usual thing for him, he was more concerned because he felt like his heart rate was irregular over the course the day with 15 or 16 normal beats followed by a skipped beat  He denies fever, nausea, vomiting, diarrhea, shortness of breath, abdominal pain, trauma, other symptoms  HPI      Past Medical and Surgical History    Past Medical History:   Diagnosis Date    Diaz's esophagus     Colon polyp     Coronary artery disease     Depression     Diverticulitis     GERD (gastroesophageal reflux disease)     Hemorrhoid     History of heart artery stent     Hyperlipidemia     Hypertension     Microscopic colitis        Past Surgical History:   Procedure Laterality Date    ABDOMINAL SURGERY      radio frequency ablation    BONE GRAFT Left 2018    CARPAL TUNNEL RELEASE Right     COLONOSCOPY      EGD AND COLONOSCOPY      ESOPHAGOGASTRODUODENOSCOPY N/A 2/15/2018    Procedure: ESOPHAGOGASTRODUODENOSCOPY (EGD); Surgeon: Harman Hogan MD;  Location: [facility];  Service: Gastroenterology    ESOPHAGOGASTRODUODENOSCOPY N/A 12/10/2018    Procedure: ESOPHAGOGASTRODUODENOSCOPY (EGD);   Surgeon: Ivana Leonard MD;  Location: [facility]; Service: Gastroenterology    TONSILLECTOMY         Family History   Problem Relation Age of Onset    Heart disease Father     Cancer Sister        Social History     Tobacco Use    Smoking status: Former Smoker     Packs/day: 0 50     Last attempt to quit: 2007     Years since quittin 5    Smokeless tobacco: Former User     Quit date: 1998    Tobacco comment: quit 10 yrs ago   Substance Use Topics    Alcohol use: Not Currently     Frequency: Never     Comment: quit 5 years    Drug use: Yes     Frequency: 3 0 times per week     Types: Marijuana     Comment: last used  (medical)           Allergies    Allergies   Allergen Reactions    Acetaminophen-Codeine     Niacin      Other reaction(s): Flushing    No Active Allergies     Rosuvastatin Myalgia    Oxycodone-Acetaminophen Rash and Hives    Shellfish Allergy Rash and Lip Swelling       Home Medications    Prior to Admission medications    Medication Sig Start Date End Date Taking?  Authorizing Provider   aspirin (ECOTRIN LOW STRENGTH) 81 mg EC tablet Take 1 tablet (81 mg total) by mouth daily 2/10/20   Chloe Thompson PA-C   atorvastatin (LIPITOR) 80 mg tablet Take 80 mg by mouth daily    Historical Provider, MD   budesonide (ENTOCORT EC) 3 MG capsule  20   Historical Provider, MD   buPROPion [facility]) 150 mg 12 hr tablet Take 2 tablets (300 mg total) by mouth daily 3/31/20 4/30/20  Yolanda Dominguez PA-C   buPROPion [facility]) 150 mg 12 hr tablet  7/15/20   Historical Provider, MD   clonazePAM (KLONOPIN) 0 5 mg tablet Take 1 5 mg by mouth daily at bedtime     Historical Provider, MD   dicyclomine (BENTYL) 20 mg tablet Take 1 tablet (20 mg total) by mouth 4 (four) times a day (before meals and at bedtime) 2/10/20   Chloe Thompson PA-C   dicyclomine (BENTYL) 20 mg tablet Take 1 tablet (20 mg total) by mouth 2 (two) times a day as needed (pain) 20   Edgar Ochoa MD   metoclopramide (REGLAN) 10 mg tablet Take 1 tablet (10 mg total) by mouth 3 (three) times a day as needed (abdominal pain) 5/10/20   Dex Garcia MD   nitroglycerin (NITROSTAT) 0 4 mg SL tablet Place 1 tablet (0 4 mg total) under the tongue every 5 (five) minutes as needed for chest pain 1/7/19   Cecilia Stevenson DO   omeprazole (PriLOSEC) 20 mg delayed release capsule Take 1 capsule (20 mg total) by mouth daily 12/6/19   Campos Castaneda PA-C   ondansetron (ZOFRAN-ODT) 4 mg disintegrating tablet Take 1 tablet (4 mg total) by mouth every 8 (eight) hours as needed for nausea or vomiting 12/6/19   Campos Castaneda PA-C   ondansetron (ZOFRAN-ODT) 4 mg disintegrating tablet Take 1 tablet (4 mg total) by mouth every 8 (eight) hours as needed for nausea 5/13/20   Clayton Goddard MD   sertraline (ZOLOFT) 100 mg tablet Take 200 mg by mouth daily    Historical Provider, MD   sucralfate (CARAFATE) 1 g tablet Take 1 tablet (1 g total) by mouth 4 (four) times a day 6/21/20   Sabrina Rodriguez MD           Review of Systems    Review of Systems   Constitutional: Negative for appetite change, chills and diaphoresis  HENT: Negative for drooling, facial swelling, trouble swallowing and voice change  Respiratory: Positive for chest tightness  Negative for apnea, shortness of breath and wheezing  Cardiovascular: Positive for palpitations  Negative for chest pain and leg swelling  Gastrointestinal: Negative for abdominal distention, abdominal pain, diarrhea, nausea and vomiting  Genitourinary: Negative for dysuria and urgency  Musculoskeletal: Negative for arthralgias, back pain, gait problem and neck pain  Skin: Negative for color change, rash and wound  Neurological: Negative for seizures, speech difficulty, weakness and headaches  Psychiatric/Behavioral: Negative for agitation, behavioral problems and dysphoric mood  The patient is not nervous/anxious  All other systems reviewed and negative      Physical Exam      ED Triage Vitals   Temperature Pulse Respirations Blood Pressure SpO2   08/08/20 1241 08/08/20 1241 08/08/20 1241 08/08/20 1241 08/08/20 1241   97 8 °F (36 6 °C) 79 16 131/87 97 %      Temp Source Heart Rate Source Patient Position - Orthostatic VS BP Location FiO2 (%)   08/08/20 1241 08/08/20 1241 08/08/20 1345 08/08/20 1345 --   Oral Monitor Sitting Right arm       Pain Score       08/08/20 1507       No Pain               Physical Exam  Vitals signs and nursing note reviewed  Constitutional:       Appearance: He is well-developed  HENT:      Head: Normocephalic and atraumatic  Eyes:      Conjunctiva/sclera: Conjunctivae normal       Pupils: Pupils are equal, round, and reactive to light  Neck:      Musculoskeletal: Normal range of motion and neck supple  Trachea: No tracheal deviation  Cardiovascular:      Rate and Rhythm: Normal rate and regular rhythm  Heart sounds: Normal heart sounds  No murmur  Pulmonary:      Effort: Pulmonary effort is normal  No respiratory distress  Breath sounds: Normal breath sounds  No stridor  No wheezing or rales  Abdominal:      General: There is no distension  Palpations: Abdomen is soft  Tenderness: There is no abdominal tenderness  There is no guarding or rebound  Musculoskeletal: Normal range of motion  General: No deformity  Skin:     General: Skin is warm and dry  Findings: No rash  Neurological:      Mental Status: He is alert and oriented to person, place, and time  Psychiatric:         Behavior: Behavior normal          Thought Content:  Thought content normal          Judgment: Judgment normal               Diagnostic Results  EKG Interpretation 1249    Rate:  79  BPM  Rhythm:  Normal Sinus Rhythm   Axis:  Normal   Intervals: Normal, no blocks, QTc  428 ms  Q waves:  No pathologic Q waves   T waves:  Normal   ST segments:  No significant elevations or depressions     Impression:  Normal sinus rhythm without evidence of acute ischemia or significant arrhythmia      EKG for comparison:  EKG dated 21 June 2020 similar in character no major changes    EKG interpreted by me  EKG Interpretation 1553    Rate:  64  BPM  Rhythm:  Normal Sinus Rhythm   Axis:  Normal   Intervals: Normal, no blocks, QTc  422 ms  Q waves:  No pathologic Q waves   T waves:  Normal   ST segments:  No significant elevations or depressions     Impression:  Normal sinus rhythm without evidence of acute ischemia or significant arrhythmia      EKG for comparison:  No change from prior EKG today    EKG interpreted by me         Labs:    Results Reviewed     Procedure Component Value Units Date/Time    Troponin I [[id number]]  (Normal) Collected:  08/08/20 1553    Lab Status:  Final result Specimen:  Blood from Arm, Left Updated:  08/08/20 1619     Troponin I <0 02 ng/mL     CBC and differential [[id number]] Collected:  08/08/20 1300    Lab Status:  Final result Specimen:  Blood from Arm, Left Updated:  08/08/20 1352     WBC 7 65 Thousand/uL      RBC 4 56 Million/uL      Hemoglobin 13 2 g/dL      Hematocrit 40 4 %      MCV 89 fL      MCH 28 9 pg      MCHC 32 7 g/dL      RDW 12 9 %      MPV 9 6 fL      Platelets 937 Thousands/uL      nRBC 0 /100 WBCs      Neutrophils Relative 70 %      Immat GRANS % 0 %      Lymphocytes Relative 23 %      Monocytes Relative 6 %      Eosinophils Relative 1 %      Basophils Relative 0 %      Neutrophils Absolute 5 29 Thousands/µL      Immature Grans Absolute 0 02 Thousand/uL      Lymphocytes Absolute 1 73 Thousands/µL      Monocytes Absolute 0 48 Thousand/µL      Eosinophils Absolute 0 10 Thousand/µL      Basophils Absolute 0 03 Thousands/µL     Troponin I [[id number]]  (Normal) Collected:  08/08/20 1300    Lab Status:  Final result Specimen:  Blood from Arm, Left Updated:  08/08/20 1339     Troponin I <0 02 ng/mL     Comprehensive metabolic panel [[id number]] Collected:  08/08/20 1300    Lab Status:  Final result Specimen:  Blood from Arm, Left Updated:  08/08/20 1337     Sodium 141 mmol/L      Potassium 3 6 mmol/L      Chloride 106 mmol/L      CO2 26 mmol/L      ANION GAP 9 mmol/L      BUN 12 mg/dL      Creatinine 1 08 mg/dL      Glucose 138 mg/dL      Calcium 8 5 mg/dL      AST 14 U/L      ALT 21 U/L      Alkaline Phosphatase 64 U/L      Total Protein 6 8 g/dL      Albumin 3 7 g/dL      Total Bilirubin 0 30 mg/dL      eGFR 77 ml/min/1 73sq m     Narrative:       [place] guidelines for Chronic Kidney Disease (CKD):     Stage 1 with normal or high GFR (GFR > 90 mL/min/1 73 square meters)    Stage 2 Mild CKD (GFR = 60-89 mL/min/1 73 square meters)    Stage 3A Moderate CKD (GFR = 45-59 mL/min/1 73 square meters)    Stage 3B Moderate CKD (GFR = 30-44 mL/min/1 73 square meters)    Stage 4 Severe CKD (GFR = 15-29 mL/min/1 73 square meters)    Stage 5 End Stage CKD (GFR <15 mL/min/1 73 square meters)  Note: GFR calculation is accurate only with a steady state creatinine          All labs reviewed and utilized in the medical decision making process    Radiology:    XR chest 1 view portable   Final Result      No acute cardiopulmonary disease  Workstation performed: [id number]             All radiology studies independently viewed by me and interpreted by the radiologist     Procedure    Procedures        ED Course of Care and Re-Assessments      Given aspirin  After discussion of risks and benefits, patient offered admission but declined, plan formed for delta troponin with discharge if that 2nd troponin and EKG are reassuring  No significant ectopy on continues cardiac monitoring  Medications   aspirin chewable tablet 324 mg (324 mg Oral Given 8/8/20 1317)           FINAL IMPRESSION    Final diagnoses:   Chest pain, unspecified type   Palpitations         DISPOSITION/PLAN    Chest pain palpitations above    Based on duration of chest pain over several weeks as well as improvement with exertion and reassuring EKGs and negative dull stroke, very low suspicion for ACS  Low suspicion for PE at this time either  No near-syncope or syncope, overall low suspicion for malignant arrhythmia  After observation and negative troponin delta trop, patient declined admission and discharged with strict return precautions to follow-up with primary care doctor and cardiologist on Thursday as previously scheduled after discussion of risks and benefits  Hemodynamically stable and comfortable at time of discharge  Time reflects when diagnosis was documented in both MDM as applicable and the Disposition within this note     Time User Action Codes Description Comment    8/8/2020  4:30 PM Shay Oka Add [R07 9] Chest pain, unspecified type     8/8/2020  4:30 PM Shay Oka Add [R00 2] Palpitations       ED Disposition     ED Disposition Condition Date/Time Comment    Discharge Stable Sat Aug 8, 2020  4:30 PM Aman Light discharge to home/self care  Follow-up Information     Follow up With Specialties Details Why Contact Info Additional Information    Chelsi Monzon MD Internal Medicine Call in 2 days To discuss this visit and further care [address] Cardiology Call in 2 days To discuss this visit and further followup, either as scheduled on Thursday or sooner if they would like to see you more quickly   [address] [place] [address] Cardiology [address] [facility] [address], [address] [place] [address], [id number] [phone]            PATIENT REFERRED TO:    Chelsi Monzon MD  [address] [place] [address] [address]   [phone]    Call in 2 days  To discuss this visit and further care    [facility] [place] [id number] [phone]  Call in 2 days  To discuss this visit and further followup, either as scheduled on Thursday or sooner if they would like to see you more quickly        DISCHARGE MEDICATIONS:    Discharge Medication List as of 8/8/2020  4:32 PM      CONTINUE these medications which have NOT CHANGED    Details   aspirin (ECOTRIN LOW STRENGTH) 81 mg EC tablet Take 1 tablet (81 mg total) by mouth daily, Starting Mon 2/10/2020, No Print      atorvastatin (LIPITOR) 80 mg tablet Take 80 mg by mouth daily, Historical Med      budesonide (ENTOCORT EC) 3 MG capsule Starting Thu 4/9/2020, Historical Med      buPROPion (WELLBUTRIN SR) 150 mg 12 hr tablet Starting Wed 7/15/2020, Historical Med      clonazePAM (KLONOPIN) 0 5 mg tablet Take 1 5 mg by mouth daily at bedtime , Historical Med      !! dicyclomine (BENTYL) 20 mg tablet Take 1 tablet (20 mg total) by mouth 4 (four) times a day (before meals and at bedtime), Starting Mon 2/10/2020, Normal      !! dicyclomine (BENTYL) 20 mg tablet Take 1 tablet (20 mg total) by mouth 2 (two) times a day as needed (pain), Starting Wed 5/13/2020, Print      metoclopramide (REGLAN) 10 mg tablet Take 1 tablet (10 mg total) by mouth 3 (three) times a day as needed (abdominal pain), Starting Sun 5/10/2020, Normal      nitroglycerin (NITROSTAT) 0 4 mg SL tablet Place 1 tablet (0 4 mg total) under the tongue every 5 (five) minutes as needed for chest pain, Starting Mon 1/7/2019, Normal      omeprazole (PriLOSEC) 20 mg delayed release capsule Take 1 capsule (20 mg total) by mouth daily, Starting Fri 12/6/2019, Normal      !! ondansetron (ZOFRAN-ODT) 4 mg disintegrating tablet Take 1 tablet (4 mg total) by mouth every 8 (eight) hours as needed for nausea or vomiting, Starting Fri 12/6/2019, Normal      !! ondansetron (ZOFRAN-ODT) 4 mg disintegrating tablet Take 1 tablet (4 mg total) by mouth every 8 (eight) hours as needed for nausea, Starting Wed 5/13/2020, Print      sertraline (ZOLOFT) 100 mg tablet Take 200 mg by mouth daily, Historical Med      sucralfate (CARAFATE) 1 g tablet Take 1 tablet (1 g total) by mouth 4 (four) times a day, Starting Sun 6/21/2020, Print       !! - Potential duplicate medications found  Please discuss with provider  No discharge procedures on file           MD Sloane Liu MD  08/08/20 5731

## 2020-08-09 LAB
ATRIAL RATE: 79 BPM
P AXIS: 69 DEGREES
PR INTERVAL: 178 MS
QRS AXIS: -6 DEGREES
QRSD INTERVAL: 92 MS
QT INTERVAL: 374 MS
QTC INTERVAL: 428 MS
T WAVE AXIS: 48 DEGREES
VENTRICULAR RATE: 79 BPM

## 2020-08-09 PROCEDURE — 93010 ELECTROCARDIOGRAM REPORT: CPT | Performed by: INTERNAL MEDICINE

## 2020-08-10 ENCOUNTER — OFFICE VISIT (OUTPATIENT)
Dept: CARDIOLOGY CLINIC | Facility: CLINIC | Age: 55
End: 2020-08-10
Payer: OTHER GOVERNMENT

## 2020-08-10 VITALS
SYSTOLIC BLOOD PRESSURE: 120 MMHG | BODY MASS INDEX: 29.2 KG/M2 | DIASTOLIC BLOOD PRESSURE: 80 MMHG | HEART RATE: 76 BPM | HEIGHT: 70 IN | WEIGHT: 204 LBS

## 2020-08-10 DIAGNOSIS — R07.9 CHEST PAIN, UNSPECIFIED TYPE: ICD-10-CM

## 2020-08-10 DIAGNOSIS — E78.5 HYPERLIPIDEMIA, UNSPECIFIED HYPERLIPIDEMIA TYPE: ICD-10-CM

## 2020-08-10 DIAGNOSIS — E78.5 DYSLIPIDEMIA: ICD-10-CM

## 2020-08-10 DIAGNOSIS — I25.119 CORONARY ARTERY DISEASE WITH ANGINA PECTORIS, UNSPECIFIED VESSEL OR LESION TYPE, UNSPECIFIED WHETHER NATIVE OR TRANSPLANTED HEART (HCC): Primary | ICD-10-CM

## 2020-08-10 PROCEDURE — 99214 OFFICE O/P EST MOD 30 MIN: CPT | Performed by: INTERNAL MEDICINE

## 2020-08-10 NOTE — PROGRESS NOTES
[facility]     Julian Toro / 53 y o  male / : 1965 / MRN: [id number]  Date of Visit: 08/10/20    ASSESSMENT/PLAN     Coronary artery disease  · S/p KARLA x2 to distal and proximal RCA 2019   · On ASA and high intensity atorvastatin    Hyperlipidemia  · Tolerating high intensity statin  · Lipid panel 2019 - total 158, , HDL 51, LDL 88    Chest pain  · Somewhat atypical- no symptoms with exertion, however reminiscent of pre-stent symptoms   · Stress echo to be arranged      If stress test nonischemic, f/u in 1 year    SUBJECTIVE:  HPI: Julian Toro is a 53 y o  male who presents for follow-up regarding CAD s/p KARLA x2 to RCA in  and HLD  Prior to stenting he had pressure in the center of his chest associated w/ "skipped beats " In the last month he reports mild chest pressure lasting for hours at a time, not associated w/ exertion  The sensation reminds him of what he had prior to stenting  Over the weekend he developed a similar sensation, prompting ED evaluation  Troponin levels were negative and EKG was normal      He has been more tired in the last couple of months but otherwise denies change in exertional tolerance or exertional chest pain/dyspnea  He has been under a lot of stress related to his marriage and reports that the pain is often worse during periods of high anxiety related to this  He is a retired        Cardiac testing:    Cardiac cath 2019 - 70% proximal RCA, 70% distal RCA    Allergies   Allergen Reactions    Acetaminophen-Codeine     Niacin      Other reaction(s): Flushing    No Active Allergies     Rosuvastatin Myalgia    Oxycodone-Acetaminophen Rash and Hives    Shellfish Allergy Rash and Lip Swelling         Current Outpatient Medications:     aspirin (ECOTRIN LOW STRENGTH) 81 mg EC tablet, Take 1 tablet (81 mg total) by mouth daily, Disp: , Rfl: 0    atorvastatin (LIPITOR) 80 mg tablet, Take 80 mg by mouth daily, Disp: , Rfl:     buPROPion (WELLBUTRIN SR) 150 mg 12 hr tablet, Take 2 tablets (300 mg total) by mouth daily, Disp: 60 tablet, Rfl: 3    clonazePAM (KLONOPIN) 0 5 mg tablet, Take 1 5 mg by mouth daily at bedtime , Disp: , Rfl:     dicyclomine (BENTYL) 20 mg tablet, Take 1 tablet (20 mg total) by mouth 4 (four) times a day (before meals and at bedtime) (Patient taking differently: Take 20 mg by mouth daily at bedtime ), Disp: 90 tablet, Rfl: 0    Glucosamine-Chondroitin (OSTEO BI-FLEX REGULAR STRENGTH PO), Take by mouth 2 (two) times a day, Disp: , Rfl:     nitroglycerin (NITROSTAT) 0 4 mg SL tablet, Place 1 tablet (0 4 mg total) under the tongue every 5 (five) minutes as needed for chest pain, Disp: 30 tablet, Rfl: 3    omeprazole (PriLOSEC) 20 mg delayed release capsule, Take 1 capsule (20 mg total) by mouth daily, Disp: 60 capsule, Rfl: 3    ondansetron (ZOFRAN-ODT) 4 mg disintegrating tablet, Take 1 tablet (4 mg total) by mouth every 8 (eight) hours as needed for nausea or vomiting, Disp: 60 tablet, Rfl: 3    sertraline (ZOLOFT) 100 mg tablet, Take 100 mg by mouth daily , Disp: , Rfl:     sucralfate (CARAFATE) 1 g tablet, Take 1 tablet (1 g total) by mouth 4 (four) times a day (Patient taking differently: Take 1 g by mouth as needed ), Disp: 60 tablet, Rfl: 0    budesonide (ENTOCORT EC) 3 MG capsule, , Disp: , Rfl:     buPROPion (WELLBUTRIN SR) 150 mg 12 hr tablet, , Disp: , Rfl:     dicyclomine (BENTYL) 20 mg tablet, Take 1 tablet (20 mg total) by mouth 2 (two) times a day as needed (pain) (Patient not taking: Reported on 8/10/2020), Disp: 20 tablet, Rfl: 0    metoclopramide (REGLAN) 10 mg tablet, Take 1 tablet (10 mg total) by mouth 3 (three) times a day as needed (abdominal pain) (Patient not taking: Reported on 8/10/2020), Disp: 60 tablet, Rfl: 0    ondansetron (ZOFRAN-ODT) 4 mg disintegrating tablet, Take 1 tablet (4 mg total) by mouth every 8 (eight) hours as needed for nausea (Patient not taking: Reported on 8/10/2020), Disp: 20 tablet, Rfl: 0    Past Medical History:   Diagnosis Date    Diaz's esophagus     Colon polyp     Coronary artery disease     Depression     Diverticulitis     GERD (gastroesophageal reflux disease)     Hemorrhoid     History of heart artery stent     Hyperlipidemia     Hypertension     Microscopic colitis        Family History   Problem Relation Age of Onset    Heart disease Father     Cancer Sister        Past Surgical History:   Procedure Laterality Date    ABDOMINAL SURGERY      radio frequency ablation    BONE GRAFT Left 2018    CARPAL TUNNEL RELEASE Right     COLONOSCOPY      EGD AND COLONOSCOPY      ESOPHAGOGASTRODUODENOSCOPY N/A 2/15/2018    Procedure: ESOPHAGOGASTRODUODENOSCOPY (EGD); Surgeon: Frida Carballo MD;  [facility]: [facility];  Service: Gastroenterology    ESOPHAGOGASTRODUODENOSCOPY N/A 12/10/2018    Procedure: ESOPHAGOGASTRODUODENOSCOPY (EGD); Surgeon: Anabell Machado MD;  [facility]: [facility];   Service: Gastroenterology    TONSILLECTOMY         Social History     Socioeconomic History    Marital status: /Civil Union     Spouse name: Not on file    Number of children: Not on file    Years of education: Not on file    Highest education level: Not on file   Occupational History    Not on file   Social Needs    Financial resource strain: Not on file    Food insecurity     Worry: Not on file     Inability: Not on file    Transportation needs     Medical: Not on file     Non-medical: Not on file   Tobacco Use    Smoking status: Former Smoker     Packs/day: 0 50     Last attempt to quit: 2007     Years since quittin 5    Smokeless tobacco: Former User     Quit date: 1998    Tobacco comment: quit 10 yrs ago   Substance and Sexual Activity    Alcohol use: Not Currently     Frequency: Never     Comment: quit 5 years    Drug use: Yes     Frequency: 3 0 times per week     Types: Marijuana     Comment: last used 5/13 (medical)    Sexual activity: Never   Lifestyle    Physical activity     Days per week: Not on file     Minutes per session: Not on file    Stress: Not on file   Relationships    Social connections     Talks on phone: Not on file     Gets together: Not on file     Attends Spiritism service: Not on file     Active member of club or organization: Not on file     Attends meetings of clubs or organizations: Not on file     Relationship status: Not on file    Intimate partner violence     Fear of current or ex partner: Not on file     Emotionally abused: Not on file     Physically abused: Not on file     Forced sexual activity: Not on file   Other Topics Concern    Not on file   Social History Narrative    Not on file       Review of Systems   Constitution: Positive for malaise/fatigue  HENT: Negative  Eyes: Negative  Cardiovascular: Positive for chest pain  Negative for claudication, dyspnea on exertion, irregular heartbeat, leg swelling, near-syncope, orthopnea, palpitations, paroxysmal nocturnal dyspnea and syncope  Respiratory: Negative for cough, shortness of breath and wheezing  Endocrine: Negative  Skin: Negative  Musculoskeletal: Negative  Gastrointestinal: Negative  Genitourinary: Negative  Neurological: Negative for dizziness and light-headedness  Psychiatric/Behavioral: The patient is nervous/anxious  OBJECTIVE:  Vitals: /80   Pulse 76   Ht 5' 10" (1 778 m)   Wt 92 5 kg (204 lb)   BMI 29 27 kg/m²     Physical exam:   Physical Exam   Constitutional: He is oriented to person, place, and time  He appears well-developed and well-nourished  No distress  HENT:   Head: Normocephalic and atraumatic  Eyes: EOM are normal  No scleral icterus  Neck: Normal range of motion  Neck supple  No JVD present  Cardiovascular: Normal rate, regular rhythm, S1 normal and S2 normal    No murmur heard    Pulmonary/Chest: Effort normal and breath sounds normal  He has no wheezes  He has no rales  Abdominal: Soft  Bowel sounds are normal    Musculoskeletal:         General: No edema  Neurological: He is alert and oriented to person, place, and time  Skin: Skin is warm and dry  Psychiatric: He has a normal mood and affect   His behavior is normal        Lab Results:   Lab Results   Component Value Date    HGBA1C 5 6 02/21/2019     No results found for: CHOL  Lab Results   Component Value Date    HDL 51 11/23/2019    HDL 64 (H) 02/20/2019     Lab Results   Component Value Date    LDLCALC 88 11/23/2019    LDLCALC 191 (H) 02/20/2019     Lab Results   Component Value Date    TRIG 94 11/23/2019    TRIG 158 (H) 02/20/2019     No results found for: CHOLHDL

## 2020-08-10 NOTE — ASSESSMENT & PLAN NOTE
· Somewhat atypical- no symptoms with exertion, however reminiscent of pre-stent symptoms   · Stress echo to be arranged

## 2020-08-20 ENCOUNTER — HOSPITAL ENCOUNTER (OUTPATIENT)
Dept: NON INVASIVE DIAGNOSTICS | Facility: HOSPITAL | Age: 55
Discharge: HOME | End: 2020-08-20
Payer: MEDICARE

## 2020-08-20 DIAGNOSIS — I25.119 CORONARY ARTERY DISEASE WITH ANGINA PECTORIS, UNSPECIFIED VESSEL OR LESION TYPE, UNSPECIFIED WHETHER NATIVE OR TRANSPLANTED HEART (HCC): ICD-10-CM

## 2020-08-20 DIAGNOSIS — R07.9 CHEST PAIN, UNSPECIFIED TYPE: ICD-10-CM

## 2020-08-20 LAB
CHEST PAIN STATEMENT: NORMAL
MAX DIASTOLIC BP: 62 MMHG
MAX HEART RATE: 162 BPM
MAX PREDICTED HEART RATE: 165 BPM
MAX. SYSTOLIC BP: 144 MMHG
PROTOCOL NAME: NORMAL
REASON FOR TERMINATION: NORMAL
TARGET HR FORMULA: NORMAL
TEST INDICATION: NORMAL
TIME IN EXERCISE PHASE: NORMAL

## 2020-08-20 PROCEDURE — 93350 STRESS TTE ONLY: CPT

## 2020-08-20 PROCEDURE — 93351 STRESS TTE COMPLETE: CPT | Performed by: INTERNAL MEDICINE

## 2020-08-21 ENCOUNTER — TELEPHONE (OUTPATIENT)
Dept: CARDIOLOGY CLINIC | Facility: CLINIC | Age: 55
End: 2020-08-21

## 2020-08-21 DIAGNOSIS — I25.119 CORONARY ARTERY DISEASE WITH ANGINA PECTORIS, UNSPECIFIED VESSEL OR LESION TYPE, UNSPECIFIED WHETHER NATIVE OR TRANSPLANTED HEART (HCC): Primary | ICD-10-CM

## 2020-08-21 NOTE — TELEPHONE ENCOUNTER
Marco Antonio Eriksson called, he said he was told his stress test may require further testing so he is calling us to see what his next step is  Please advise

## 2020-08-26 ENCOUNTER — HOSPITAL ENCOUNTER (OUTPATIENT)
Dept: NON INVASIVE DIAGNOSTICS | Facility: CLINIC | Age: 55
Discharge: HOME | End: 2020-08-26
Payer: MEDICARE

## 2020-08-26 DIAGNOSIS — I25.119 CORONARY ARTERY DISEASE WITH ANGINA PECTORIS, UNSPECIFIED VESSEL OR LESION TYPE, UNSPECIFIED WHETHER NATIVE OR TRANSPLANTED HEART (HCC): ICD-10-CM

## 2020-08-26 PROCEDURE — 78452 HT MUSCLE IMAGE SPECT MULT: CPT

## 2020-08-26 PROCEDURE — 93017 CV STRESS TEST TRACING ONLY: CPT

## 2020-08-26 PROCEDURE — 93018 CV STRESS TEST I&R ONLY: CPT | Performed by: INTERNAL MEDICINE

## 2020-08-26 PROCEDURE — 93016 CV STRESS TEST SUPVJ ONLY: CPT | Performed by: INTERNAL MEDICINE

## 2020-08-26 PROCEDURE — G1004 CDSM NDSC: HCPCS

## 2020-08-26 PROCEDURE — 78452 HT MUSCLE IMAGE SPECT MULT: CPT | Performed by: INTERNAL MEDICINE

## 2020-08-26 PROCEDURE — A9502 TC99M TETROFOSMIN: HCPCS

## 2020-08-27 DIAGNOSIS — K52.838 OTHER MICROSCOPIC COLITIS: Primary | ICD-10-CM

## 2020-08-27 RX ORDER — BUDESONIDE 3 MG/1
CAPSULE, COATED PELLETS ORAL
Qty: 270 CAPSULE | Refills: 0 | Status: SHIPPED | OUTPATIENT
Start: 2020-08-27 | End: 2020-12-22

## 2020-08-27 NOTE — TELEPHONE ENCOUNTER
Gayatri/Edvin - patient called would like to speak with gayatri - did not want to schedule OV  Patient is concerned that he is still experiencing diarrhea, 12 times a day,  and is starting to get pain  Patient went to Cardio and in addition to stress test, they did a test of patient abdomin  Are there any other options   Please call Dakotah Edmondson at 21 [phone] ty

## 2020-08-27 NOTE — TELEPHONE ENCOUNTER
Spoke with patient  History of diarrhea, LLQ pain, microscopic colitis    Patient c/o worsening diarrhea 12 episodes a day, 2 episodes a day of just mucous  Ongoing for 2 months  Denies n/v, fever, chills, melena, hematochezia  Following a bland diet  Imodium caused severe cramping, lomotil did not work, stool studies are normal, and CBC CMP is normal  Bentl has been causing extreme drowsiness, and he has only been able to take a half tablet occationally  Bentyl 10mg also causes drowsiness  He is eating and drinking well  Last budesonide course was in April 2020  Any suggestions?

## 2020-08-31 ENCOUNTER — HOSPITAL ENCOUNTER (EMERGENCY)
Facility: HOSPITAL | Age: 55
Discharge: HOME | End: 2020-08-31
Attending: EMERGENCY MEDICINE | Admitting: EMERGENCY MEDICINE
Payer: MEDICARE

## 2020-08-31 ENCOUNTER — TELEPHONE (OUTPATIENT)
Dept: GASTROENTEROLOGY | Facility: CLINIC | Age: 55
End: 2020-08-31

## 2020-08-31 VITALS
RESPIRATION RATE: 16 BRPM | WEIGHT: 204 LBS | HEART RATE: 88 BPM | OXYGEN SATURATION: 94 % | BODY MASS INDEX: 29.27 KG/M2 | DIASTOLIC BLOOD PRESSURE: 67 MMHG | TEMPERATURE: 97.9 F | SYSTOLIC BLOOD PRESSURE: 112 MMHG

## 2020-08-31 DIAGNOSIS — R11.2 NAUSEA VOMITING AND DIARRHEA: ICD-10-CM

## 2020-08-31 DIAGNOSIS — R19.7 NAUSEA VOMITING AND DIARRHEA: ICD-10-CM

## 2020-08-31 DIAGNOSIS — R10.9 ABDOMINAL PAIN: Primary | ICD-10-CM

## 2020-08-31 LAB
ALBUMIN SERPL BCP-MCNC: 4.5 G/DL (ref 3.5–5)
ALP SERPL-CCNC: 66 U/L (ref 46–116)
ALT SERPL W P-5'-P-CCNC: 21 U/L (ref 12–78)
ANION GAP SERPL CALCULATED.3IONS-SCNC: 12 MMOL/L (ref 4–13)
AST SERPL W P-5'-P-CCNC: 14 U/L (ref 5–45)
BASOPHILS # BLD MANUAL: 0 THOUSAND/UL (ref 0–0.1)
BASOPHILS NFR MAR MANUAL: 0 % (ref 0–1)
BILIRUB SERPL-MCNC: 0.4 MG/DL (ref 0.2–1)
BUN SERPL-MCNC: 12 MG/DL (ref 5–25)
CALCIUM SERPL-MCNC: 9.5 MG/DL (ref 8.3–10.1)
CHLORIDE SERPL-SCNC: 103 MMOL/L (ref 100–108)
CO2 SERPL-SCNC: 28 MMOL/L (ref 21–32)
CREAT SERPL-MCNC: 1.15 MG/DL (ref 0.6–1.3)
EOSINOPHIL # BLD MANUAL: 0 THOUSAND/UL (ref 0–0.4)
EOSINOPHIL NFR BLD MANUAL: 0 % (ref 0–6)
ERYTHROCYTE [DISTWIDTH] IN BLOOD BY AUTOMATED COUNT: 13.3 % (ref 11.6–15.1)
GFR SERPL CREATININE-BSD FRML MDRD: 71 ML/MIN/1.73SQ M
GLUCOSE SERPL-MCNC: 168 MG/DL (ref 65–140)
HCT VFR BLD AUTO: 44.4 % (ref 36.5–49.3)
HGB BLD-MCNC: 14.4 G/DL (ref 12–17)
LIPASE SERPL-CCNC: 61 U/L (ref 73–393)
LYMPHOCYTES # BLD AUTO: 1.36 THOUSAND/UL (ref 0.6–4.47)
LYMPHOCYTES # BLD AUTO: 10 % (ref 14–44)
MCH RBC QN AUTO: 28.9 PG (ref 26.8–34.3)
MCHC RBC AUTO-ENTMCNC: 32.4 G/DL (ref 31.4–37.4)
MCV RBC AUTO: 89 FL (ref 82–98)
MONOCYTES # BLD AUTO: 0.41 THOUSAND/UL (ref 0–1.22)
MONOCYTES NFR BLD: 3 % (ref 4–12)
NEUTROPHILS # BLD MANUAL: 11.7 THOUSAND/UL (ref 1.85–7.62)
NEUTS SEG NFR BLD AUTO: 86 % (ref 43–75)
NRBC BLD AUTO-RTO: 0 /100 WBCS
PLATELET # BLD AUTO: 340 THOUSANDS/UL (ref 149–390)
PLATELET BLD QL SMEAR: ABNORMAL
PMV BLD AUTO: 9.3 FL (ref 8.9–12.7)
POTASSIUM SERPL-SCNC: 3.6 MMOL/L (ref 3.5–5.3)
PROT SERPL-MCNC: 8 G/DL (ref 6.4–8.2)
RBC # BLD AUTO: 4.99 MILLION/UL (ref 3.88–5.62)
SODIUM SERPL-SCNC: 143 MMOL/L (ref 136–145)
TOTAL CELLS COUNTED SPEC: 100
VARIANT LYMPHS # BLD AUTO: 1 %
WBC # BLD AUTO: 13.6 THOUSAND/UL (ref 4.31–10.16)

## 2020-08-31 PROCEDURE — 80053 COMPREHEN METABOLIC PANEL: CPT | Performed by: EMERGENCY MEDICINE

## 2020-08-31 PROCEDURE — 83690 ASSAY OF LIPASE: CPT | Performed by: EMERGENCY MEDICINE

## 2020-08-31 PROCEDURE — 99284 EMERGENCY DEPT VISIT MOD MDM: CPT

## 2020-08-31 PROCEDURE — 85007 BL SMEAR W/DIFF WBC COUNT: CPT | Performed by: EMERGENCY MEDICINE

## 2020-08-31 PROCEDURE — U0003 INFECTIOUS AGENT DETECTION BY NUCLEIC ACID (DNA OR RNA); SEVERE ACUTE RESPIRATORY SYNDROME CORONAVIRUS 2 (SARS-COV-2) (CORONAVIRUS DISEASE [COVID-19]), AMPLIFIED PROBE TECHNIQUE, MAKING USE OF HIGH THROUGHPUT TECHNOLOGIES AS DESCRIBED BY CMS-2020-01-R: HCPCS | Performed by: EMERGENCY MEDICINE

## 2020-08-31 PROCEDURE — 99284 EMERGENCY DEPT VISIT MOD MDM: CPT | Performed by: EMERGENCY MEDICINE

## 2020-08-31 PROCEDURE — C9113 INJ PANTOPRAZOLE SODIUM, VIA: HCPCS | Performed by: EMERGENCY MEDICINE

## 2020-08-31 PROCEDURE — 96374 THER/PROPH/DIAG INJ IV PUSH: CPT

## 2020-08-31 PROCEDURE — 96361 HYDRATE IV INFUSION ADD-ON: CPT

## 2020-08-31 PROCEDURE — 85027 COMPLETE CBC AUTOMATED: CPT | Performed by: EMERGENCY MEDICINE

## 2020-08-31 PROCEDURE — 96375 TX/PRO/DX INJ NEW DRUG ADDON: CPT

## 2020-08-31 PROCEDURE — 36415 COLL VENOUS BLD VENIPUNCTURE: CPT | Performed by: EMERGENCY MEDICINE

## 2020-08-31 RX ORDER — DICYCLOMINE HCL 20 MG
20 TABLET ORAL EVERY 6 HOURS
Qty: 20 TABLET | Refills: 0 | Status: SHIPPED | OUTPATIENT
Start: 2020-08-31 | End: 2021-04-07

## 2020-08-31 RX ORDER — ONDANSETRON 2 MG/ML
4 INJECTION INTRAMUSCULAR; INTRAVENOUS ONCE
Status: COMPLETED | OUTPATIENT
Start: 2020-08-31 | End: 2020-08-31

## 2020-08-31 RX ORDER — ONDANSETRON 4 MG/1
4 TABLET, ORALLY DISINTEGRATING ORAL EVERY 8 HOURS PRN
Qty: 12 TABLET | Refills: 0 | Status: SHIPPED | OUTPATIENT
Start: 2020-08-31 | End: 2020-09-30 | Stop reason: ALTCHOICE

## 2020-08-31 RX ORDER — PANTOPRAZOLE SODIUM 40 MG/1
40 INJECTION, POWDER, FOR SOLUTION INTRAVENOUS ONCE
Status: COMPLETED | OUTPATIENT
Start: 2020-08-31 | End: 2020-08-31

## 2020-08-31 RX ORDER — MORPHINE SULFATE 4 MG/ML
4 INJECTION, SOLUTION INTRAMUSCULAR; INTRAVENOUS ONCE
Status: COMPLETED | OUTPATIENT
Start: 2020-08-31 | End: 2020-08-31

## 2020-08-31 RX ORDER — MAGNESIUM HYDROXIDE/ALUMINUM HYDROXICE/SIMETHICONE 120; 1200; 1200 MG/30ML; MG/30ML; MG/30ML
30 SUSPENSION ORAL ONCE
Status: COMPLETED | OUTPATIENT
Start: 2020-08-31 | End: 2020-08-31

## 2020-08-31 RX ORDER — HYDROMORPHONE HCL/PF 1 MG/ML
0.5 SYRINGE (ML) INJECTION ONCE
Status: COMPLETED | OUTPATIENT
Start: 2020-08-31 | End: 2020-08-31

## 2020-08-31 RX ADMIN — SODIUM CHLORIDE 1000 ML: 0.9 INJECTION, SOLUTION INTRAVENOUS at 19:18

## 2020-08-31 RX ADMIN — MORPHINE SULFATE 4 MG: 4 INJECTION INTRAVENOUS at 16:56

## 2020-08-31 RX ADMIN — PANTOPRAZOLE SODIUM 40 MG: 40 INJECTION, POWDER, FOR SOLUTION INTRAVENOUS at 16:57

## 2020-08-31 RX ADMIN — HYDROMORPHONE HYDROCHLORIDE 0.5 MG: 1 INJECTION, SOLUTION INTRAMUSCULAR; INTRAVENOUS; SUBCUTANEOUS at 17:46

## 2020-08-31 RX ADMIN — ALUMINUM HYDROXIDE, MAGNESIUM HYDROXIDE, AND SIMETHICONE 30 ML: 200; 200; 20 SUSPENSION ORAL at 17:45

## 2020-08-31 RX ADMIN — ONDANSETRON 4 MG: 2 INJECTION INTRAMUSCULAR; INTRAVENOUS at 16:55

## 2020-08-31 RX ADMIN — SODIUM CHLORIDE 1000 ML: 0.9 INJECTION, SOLUTION INTRAVENOUS at 16:55

## 2020-08-31 NOTE — ED PROVIDER NOTES
History  Chief Complaint   Patient presents with    Flu Symptoms     Pt states he started with flu like symptoms this morning as well as diarrhea due to a new diagnosis of ulcerative colitis        History provided by:  Patient  Vomiting   Severity:  Moderate  Duration:  1 day  Timing:  Constant  Quality:  Stomach contents  Progression:  Unchanged  Chronicity:  Recurrent  Recent urination:  Decreased  Context: not post-tussive and not self-induced    Relieved by:  Nothing  Worsened by:  Nothing  Ineffective treatments:  None tried  Associated symptoms: abdominal pain (waves of cramps), chills, diarrhea, headaches and sore throat (from repetitive vomiting)    Associated symptoms: no cough, no fever and no URI    Abdominal pain:     Location:  Generalized    Quality: cramping and gnawing      Severity:  Moderate    Onset quality:  Gradual    Duration:  1 day    Timing:  Constant    Progression:  Unchanged    Chronicity:  Recurrent (Pt has h/o microscopic colitis and will get periods of vomiting/diarrhea related to it  Started on oral budesonide to treat by GI recently )  Risk factors: no alcohol use and no diabetes        Prior to Admission Medications   Prescriptions Last Dose Informant Patient Reported? Taking? Glucosamine-Chondroitin (OSTEO BI-FLEX REGULAR STRENGTH PO)   Yes No   Sig: Take by mouth 2 (two) times a day   aspirin (ECOTRIN LOW STRENGTH) 81 mg EC tablet  Self No No   Sig: Take 1 tablet (81 mg total) by mouth daily   atorvastatin (LIPITOR) 80 mg tablet  Self Yes No   Sig: Take 80 mg by mouth daily   buPROPion (WELLBUTRIN SR) 150 mg 12 hr tablet   No No   Sig: Take 2 tablets (300 mg total) by mouth daily   buPROPion (WELLBUTRIN SR) 150 mg 12 hr tablet  Self Yes No   budesonide (ENTOCORT EC) 3 MG capsule   No No   Sig: Take 3 capsules (9 mg total) by mouth daily for 60 days, THEN 2 capsules (6 mg total) daily for 30 days, THEN 1 capsule (3 mg total) daily     clonazePAM (KLONOPIN) 0 5 mg tablet  Self Yes No   Sig: Take 1 5 mg by mouth daily at bedtime    dicyclomine (BENTYL) 20 mg tablet  Self No No   Sig: Take 1 tablet (20 mg total) by mouth 4 (four) times a day (before meals and at bedtime)   Patient taking differently: Take 20 mg by mouth daily at bedtime    dicyclomine (BENTYL) 20 mg tablet  Self No No   Sig: Take 1 tablet (20 mg total) by mouth 2 (two) times a day as needed (pain)   Patient not taking: Reported on 8/10/2020   metoclopramide (REGLAN) 10 mg tablet  Self No No   Sig: Take 1 tablet (10 mg total) by mouth 3 (three) times a day as needed (abdominal pain)   Patient not taking: Reported on 8/10/2020   nitroglycerin (NITROSTAT) 0 4 mg SL tablet  Self No No   Sig: Place 1 tablet (0 4 mg total) under the tongue every 5 (five) minutes as needed for chest pain   omeprazole (PriLOSEC) 20 mg delayed release capsule  Self No No   Sig: Take 1 capsule (20 mg total) by mouth daily   ondansetron (ZOFRAN-ODT) 4 mg disintegrating tablet  Self No No   Sig: Take 1 tablet (4 mg total) by mouth every 8 (eight) hours as needed for nausea or vomiting   ondansetron (ZOFRAN-ODT) 4 mg disintegrating tablet  Self No No   Sig: Take 1 tablet (4 mg total) by mouth every 8 (eight) hours as needed for nausea   Patient not taking: Reported on 8/10/2020   sertraline (ZOLOFT) 100 mg tablet  Self Yes No   Sig: Take 100 mg by mouth daily    sucralfate (CARAFATE) 1 g tablet  Self No No   Sig: Take 1 tablet (1 g total) by mouth 4 (four) times a day   Patient taking differently: Take 1 g by mouth as needed       Facility-Administered Medications: None       Past Medical History:   Diagnosis Date    Diaz's esophagus     Colon polyp     Coronary artery disease     Depression     Diverticulitis     GERD (gastroesophageal reflux disease)     Hemorrhoid     History of heart artery stent     Hyperlipidemia     Hypertension     Microscopic colitis        Past Surgical History:   Procedure Laterality Date    ABDOMINAL SURGERY radio frequency ablation    BONE GRAFT Left 2018    CARPAL TUNNEL RELEASE Right     COLONOSCOPY      EGD AND COLONOSCOPY      ESOPHAGOGASTRODUODENOSCOPY N/A 2/15/2018    Procedure: ESOPHAGOGASTRODUODENOSCOPY (EGD); Surgeon: Sayra Coats MD;  [facility]: [facility];  Service: Gastroenterology    ESOPHAGOGASTRODUODENOSCOPY N/A 12/10/2018    Procedure: ESOPHAGOGASTRODUODENOSCOPY (EGD); Surgeon: Elan Villalta MD;  [facility]; Service: Gastroenterology    TONSILLECTOMY         Family History   Problem Relation Age of Onset    Heart disease Father     Cancer Sister      I have reviewed and agree with the history as documented  E-Cigarette/Vaping    E-Cigarette Use Never User      E-Cigarette/Vaping Substances    Nicotine No     THC No     CBD No     Flavoring No     Other No     Unknown No      Social History     Tobacco Use    Smoking status: Former Smoker     Packs/day: 0 50     Last attempt to quit: 2007     Years since quittin 6    Smokeless tobacco: Former User     Quit date: 1998    Tobacco comment: quit 10 yrs ago   Substance Use Topics    Alcohol use: Not Currently     Frequency: Never     Comment: quit 5 years    Drug use: Yes     Frequency: 3 0 times per week     Types: Marijuana     Comment: last used  (medical)       Review of Systems   Constitutional: Positive for chills  Negative for fever  HENT: Positive for sore throat (from repetitive vomiting)  Respiratory: Negative for cough  Gastrointestinal: Positive for abdominal pain (waves of cramps), diarrhea and vomiting  Neurological: Positive for headaches  All other systems reviewed and are negative  Physical Exam  Physical Exam  Vitals signs and nursing note reviewed  Constitutional:       General: He is not in acute distress  Appearance: He is well-developed  He is not diaphoretic  HENT:      Head: Normocephalic and atraumatic        Nose: Nose normal  Mouth/Throat:      Mouth: Mucous membranes are dry  Eyes:      Conjunctiva/sclera: Conjunctivae normal    Neck:      Musculoskeletal: Normal range of motion and neck supple  Cardiovascular:      Rate and Rhythm: Normal rate and regular rhythm  Heart sounds: Normal heart sounds  Pulmonary:      Effort: Pulmonary effort is normal  No respiratory distress  Breath sounds: Normal breath sounds  Abdominal:      General: There is no distension  Palpations: Abdomen is soft  Tenderness: There is no abdominal tenderness  Musculoskeletal: Normal range of motion  Skin:     General: Skin is warm and dry  Neurological:      General: No focal deficit present  Mental Status: He is alert and oriented to person, place, and time     Psychiatric:         Mood and Affect: Mood normal          Vital Signs  ED Triage Vitals   Temperature Pulse Respirations Blood Pressure SpO2   08/31/20 1621 08/31/20 1619 08/31/20 1619 08/31/20 1619 08/31/20 1619   97 9 °F (36 6 °C) (!) 44 18 (!) 181/84 99 %      Temp Source Heart Rate Source Patient Position - Orthostatic VS BP Location FiO2 (%)   08/31/20 1621 08/31/20 1619 08/31/20 1700 08/31/20 1619 --   Oral Monitor Lying Right arm       Pain Score       08/31/20 1656       8           Vitals:    08/31/20 1619 08/31/20 1700 08/31/20 1800   BP: (!) 181/84 158/88 122/65   Pulse: (!) 44 64 95   Patient Position - Orthostatic VS:  Lying Lying         Visual Acuity      ED Medications  Medications   sodium chloride 0 9 % bolus 1,000 mL (1,000 mL Intravenous New Bag 8/31/20 1918)   sodium chloride 0 9 % bolus 1,000 mL (0 mL Intravenous Stopped 8/31/20 1808)   ondansetron (ZOFRAN) injection 4 mg (4 mg Intravenous Given 8/31/20 1655)   morphine (PF) 4 mg/mL injection 4 mg (4 mg Intravenous Given 8/31/20 1656)   pantoprazole (PROTONIX) injection 40 mg (40 mg Intravenous Given 8/31/20 1657)   aluminum-magnesium hydroxide-simethicone (MYLANTA) 200-200-20 mg/5 mL oral suspension 30 mL (30 mL Oral Given 8/31/20 1745)   HYDROmorphone (DILAUDID) injection 0 5 mg (0 5 mg Intravenous Given 8/31/20 1746)       Diagnostic Studies  Results Reviewed     Procedure Component Value Units Date/Time    Comprehensive metabolic panel [[id number]]  (Abnormal) Collected:  08/31/20 1655    Lab Status:  Final result Specimen:  Blood from Arm, Right Updated:  08/31/20 1804     Sodium 143 mmol/L      Potassium 3 6 mmol/L      Chloride 103 mmol/L      CO2 28 mmol/L      ANION GAP 12 mmol/L      BUN 12 mg/dL      Creatinine 1 15 mg/dL      Glucose 168 mg/dL      Calcium 9 5 mg/dL      AST 14 U/L      ALT 21 U/L      Alkaline Phosphatase 66 U/L      Total Protein 8 0 g/dL      Albumin 4 5 g/dL      Total Bilirubin 0 40 mg/dL      eGFR 71 ml/min/1 73sq m     Narrative:       [place] guidelines for Chronic Kidney Disease (CKD):     Stage 1 with normal or high GFR (GFR > 90 mL/min/1 73 square meters)    Stage 2 Mild CKD (GFR = 60-89 mL/min/1 73 square meters)    Stage 3A Moderate CKD (GFR = 45-59 mL/min/1 73 square meters)    Stage 3B Moderate CKD (GFR = 30-44 mL/min/1 73 square meters)    Stage 4 Severe CKD (GFR = 15-29 mL/min/1 73 square meters)    Stage 5 End Stage CKD (GFR <15 mL/min/1 73 square meters)  Note: GFR calculation is accurate only with a steady state creatinine    CBC and differential [[id number]]  (Abnormal) Collected:  08/31/20 1655    Lab Status:  Final result Specimen:  Blood from Arm, Right Updated:  08/31/20 1757     WBC 13 60 Thousand/uL      RBC 4 99 Million/uL      Hemoglobin 14 4 g/dL      Hematocrit 44 4 %      MCV 89 fL      MCH 28 9 pg      MCHC 32 4 g/dL      RDW 13 3 %      MPV 9 3 fL      Platelets 654 Thousands/uL      nRBC 0 /100 WBCs     Lipase [[id number]]  (Abnormal) Collected:  08/31/20 1655    Lab Status:  Final result Specimen:  Blood from Arm, Right Updated:  08/31/20 1752     Lipase 61 u/L     Novel Coronavirus (COVID-19), PCR [facility] [[id number]] Collected:  08/31/20 1657    Lab Status: In process Specimen:  Nares from Nose Updated:  08/31/20 1700                 No orders to display              Procedures  Procedures         ED Course  ED Course as of Aug 31 1933   [facility] Aug 31, 2020   1931 Pt abd pain resolved and feeling much improved  Finishing up a second liter of fluids  Feels well enough to go  States he gets these sx and pain often with colitis flare and it was non-focal pain and resolved and no fever so he did not want CT scan  D/w him worsening si/sx like worsening abd pain, fever, continued vomiting/diarrhea to return to ED immediately  US AUDIT      Most Recent Value   Initial Alcohol Screen: US AUDIT-C    1  How often do you have a drink containing alcohol?  0 Filed at: 08/31/2020 1659   2  How many drinks containing alcohol do you have on a typical day you are drinking? 0 Filed at: 08/31/2020 1659   3a  Male UNDER 65: How often do you have five or more drinks on one occasion? 0 Filed at: 08/31/2020 1659   Audit-C Score  0 Filed at: 08/31/2020 1659                  HERMINIA/DAST-10      Most Recent Value   How many times in the past year have you    Used an illegal drug or used a prescription medication for non-medical reasons?   Never Filed at: 08/31/2020 1659                                [facility]  Number of Diagnoses or Management Options  Abdominal pain: new and requires workup  Nausea vomiting and diarrhea: new and requires workup     Amount and/or Complexity of Data Reviewed  Clinical lab tests: ordered and reviewed  Review and summarize past medical records: yes    Risk of Complications, Morbidity, and/or Mortality  Presenting problems: high    Patient Progress  Patient progress: improved        Disposition  Final diagnoses:   Abdominal pain   Nausea vomiting and diarrhea     Time reflects when diagnosis was documented in both MDM as applicable and the Disposition within this note     Time User Action Codes Description Comment    8/31/2020  7:30 PM Alexandre RAMOS Add [R10 9] Abdominal pain     8/31/2020  7:30 PM Alexandre RAMOS Add [R11 2,  R19 7] Nausea vomiting and diarrhea       ED Disposition     ED Disposition Condition Date/Time Comment    Discharge Stable Mon Aug 31, 2020  7:30 PM Jaydon Hernandez discharge to home/self care  Follow-up Information     Follow up With Specialties Details Why Contact Info Additional [address] [facility] Emergency Department Emergency Medicine Go to  If symptoms worsen [address] [facility] [address], [address] [facility], [place], [place], [address]  [facility] Gastro[facility]ology Call in 1 day For follow-up appointment  [address] [facility], [address] [facility], [facility] [address], [place], [place], [address] [facility]          Patient's Medications   Discharge Prescriptions    DICYCLOMINE (BENTYL) 20 MG TABLET    Take 1 tablet (20 mg total) by mouth every 6 (six) hours for 20 doses       Start Date: 8/31/2020 End Date: 9/5/2020       Order Dose: 20 mg       Quantity: 20 tablet    Refills: 0    ONDANSETRON (ZOFRAN-ODT) 4 MG DISINTEGRATING TABLET    Take 1 tablet (4 mg total) by mouth every 8 (eight) hours as needed for nausea or vomiting       Start Date: 8/31/2020 End Date: 9/30/2020       Order Dose: 4 mg       Quantity: 12 tablet    Refills: 0     No discharge procedures on file      PDMP Review       Value Time User    PDMP Reviewed  Yes 2/10/2020  3:50 PM Rosey Izaguirre PA-C          ED Provider  Electronically Signed by           Princess Mathieu MD  08/31/20 9932

## 2020-08-31 NOTE — TELEPHONE ENCOUNTER
Ely Pate pt-  Patient L/M  He has been vomiting all day     No other information     Uses: [phone]  Please phone [phone] to advise

## 2020-09-01 LAB — SARS-COV-2 RNA SPEC QL NAA+PROBE: NOT DETECTED

## 2020-09-02 ENCOUNTER — APPOINTMENT (EMERGENCY)
Dept: CT IMAGING | Facility: HOSPITAL | Age: 55
End: 2020-09-02
Payer: MEDICARE

## 2020-09-02 ENCOUNTER — HOSPITAL ENCOUNTER (EMERGENCY)
Facility: HOSPITAL | Age: 55
Discharge: HOME | End: 2020-09-02
Attending: EMERGENCY MEDICINE | Admitting: EMERGENCY MEDICINE
Payer: MEDICARE

## 2020-09-02 VITALS
OXYGEN SATURATION: 93 % | WEIGHT: 207.01 LBS | BODY MASS INDEX: 29.7 KG/M2 | TEMPERATURE: 98.4 F | SYSTOLIC BLOOD PRESSURE: 183 MMHG | DIASTOLIC BLOOD PRESSURE: 87 MMHG | HEART RATE: 61 BPM | RESPIRATION RATE: 18 BRPM

## 2020-09-02 DIAGNOSIS — K21.00 GASTROESOPHAGEAL REFLUX DISEASE WITH ESOPHAGITIS: ICD-10-CM

## 2020-09-02 DIAGNOSIS — R10.9 ABDOMINAL PAIN: Primary | ICD-10-CM

## 2020-09-02 LAB
ALBUMIN SERPL BCP-MCNC: 3.9 G/DL (ref 3.5–5)
ALP SERPL-CCNC: 61 U/L (ref 46–116)
ALT SERPL W P-5'-P-CCNC: 22 U/L (ref 12–78)
ANION GAP SERPL CALCULATED.3IONS-SCNC: 10 MMOL/L (ref 4–13)
AST SERPL W P-5'-P-CCNC: 13 U/L (ref 5–45)
BASOPHILS # BLD AUTO: 0.05 THOUSANDS/ΜL (ref 0–0.1)
BASOPHILS NFR BLD AUTO: 0 % (ref 0–1)
BILIRUB SERPL-MCNC: 0.4 MG/DL (ref 0.2–1)
BUN SERPL-MCNC: 10 MG/DL (ref 5–25)
CALCIUM SERPL-MCNC: 9 MG/DL (ref 8.3–10.1)
CHLORIDE SERPL-SCNC: 104 MMOL/L (ref 100–108)
CO2 SERPL-SCNC: 27 MMOL/L (ref 21–32)
CREAT SERPL-MCNC: 1.08 MG/DL (ref 0.6–1.3)
EOSINOPHIL # BLD AUTO: 0.1 THOUSAND/ΜL (ref 0–0.61)
EOSINOPHIL NFR BLD AUTO: 1 % (ref 0–6)
ERYTHROCYTE [DISTWIDTH] IN BLOOD BY AUTOMATED COUNT: 13.3 % (ref 11.6–15.1)
GFR SERPL CREATININE-BSD FRML MDRD: 77 ML/MIN/1.73SQ M
GLUCOSE SERPL-MCNC: 136 MG/DL (ref 65–140)
HCT VFR BLD AUTO: 41.8 % (ref 36.5–49.3)
HGB BLD-MCNC: 13.5 G/DL (ref 12–17)
IMM GRANULOCYTES # BLD AUTO: 0.03 THOUSAND/UL (ref 0–0.2)
IMM GRANULOCYTES NFR BLD AUTO: 0 % (ref 0–2)
LIPASE SERPL-CCNC: 54 U/L (ref 73–393)
LYMPHOCYTES # BLD AUTO: 1.56 THOUSANDS/ΜL (ref 0.6–4.47)
LYMPHOCYTES NFR BLD AUTO: 14 % (ref 14–44)
MCH RBC QN AUTO: 28.8 PG (ref 26.8–34.3)
MCHC RBC AUTO-ENTMCNC: 32.3 G/DL (ref 31.4–37.4)
MCV RBC AUTO: 89 FL (ref 82–98)
MONOCYTES # BLD AUTO: 0.85 THOUSAND/ΜL (ref 0.17–1.22)
MONOCYTES NFR BLD AUTO: 8 % (ref 4–12)
NEUTROPHILS # BLD AUTO: 8.54 THOUSANDS/ΜL (ref 1.85–7.62)
NEUTS SEG NFR BLD AUTO: 77 % (ref 43–75)
NRBC BLD AUTO-RTO: 0 /100 WBCS
PLATELET # BLD AUTO: 333 THOUSANDS/UL (ref 149–390)
PMV BLD AUTO: 9.4 FL (ref 8.9–12.7)
POTASSIUM SERPL-SCNC: 3.6 MMOL/L (ref 3.5–5.3)
PROT SERPL-MCNC: 7 G/DL (ref 6.4–8.2)
RBC # BLD AUTO: 4.69 MILLION/UL (ref 3.88–5.62)
SODIUM SERPL-SCNC: 141 MMOL/L (ref 136–145)
WBC # BLD AUTO: 11.13 THOUSAND/UL (ref 4.31–10.16)

## 2020-09-02 PROCEDURE — 96361 HYDRATE IV INFUSION ADD-ON: CPT

## 2020-09-02 PROCEDURE — 85025 COMPLETE CBC W/AUTO DIFF WBC: CPT | Performed by: EMERGENCY MEDICINE

## 2020-09-02 PROCEDURE — 99284 EMERGENCY DEPT VISIT MOD MDM: CPT

## 2020-09-02 PROCEDURE — 99285 EMERGENCY DEPT VISIT HI MDM: CPT | Performed by: EMERGENCY MEDICINE

## 2020-09-02 PROCEDURE — 96376 TX/PRO/DX INJ SAME DRUG ADON: CPT

## 2020-09-02 PROCEDURE — 83690 ASSAY OF LIPASE: CPT | Performed by: EMERGENCY MEDICINE

## 2020-09-02 PROCEDURE — 36415 COLL VENOUS BLD VENIPUNCTURE: CPT | Performed by: EMERGENCY MEDICINE

## 2020-09-02 PROCEDURE — 96372 THER/PROPH/DIAG INJ SC/IM: CPT

## 2020-09-02 PROCEDURE — G1004 CDSM NDSC: HCPCS

## 2020-09-02 PROCEDURE — 96374 THER/PROPH/DIAG INJ IV PUSH: CPT

## 2020-09-02 PROCEDURE — 74177 CT ABD & PELVIS W/CONTRAST: CPT

## 2020-09-02 PROCEDURE — 80053 COMPREHEN METABOLIC PANEL: CPT | Performed by: EMERGENCY MEDICINE

## 2020-09-02 PROCEDURE — 96375 TX/PRO/DX INJ NEW DRUG ADDON: CPT

## 2020-09-02 RX ORDER — MORPHINE SULFATE 4 MG/ML
4 INJECTION, SOLUTION INTRAMUSCULAR; INTRAVENOUS ONCE
Status: COMPLETED | OUTPATIENT
Start: 2020-09-02 | End: 2020-09-02

## 2020-09-02 RX ORDER — FAMOTIDINE 20 MG/1
20 TABLET, FILM COATED ORAL 2 TIMES DAILY
Qty: 30 TABLET | Refills: 0 | Status: SHIPPED | OUTPATIENT
Start: 2020-09-02 | End: 2021-04-05 | Stop reason: CLARIF

## 2020-09-02 RX ORDER — KETOROLAC TROMETHAMINE 30 MG/ML
15 INJECTION, SOLUTION INTRAMUSCULAR; INTRAVENOUS ONCE
Status: COMPLETED | OUTPATIENT
Start: 2020-09-02 | End: 2020-09-02

## 2020-09-02 RX ORDER — METOCLOPRAMIDE 10 MG/1
10 TABLET ORAL EVERY 6 HOURS
Qty: 30 TABLET | Refills: 0 | Status: SHIPPED | OUTPATIENT
Start: 2020-09-02 | End: 2020-12-04 | Stop reason: SDUPTHER

## 2020-09-02 RX ORDER — ONDANSETRON 2 MG/ML
4 INJECTION INTRAMUSCULAR; INTRAVENOUS ONCE
Status: COMPLETED | OUTPATIENT
Start: 2020-09-02 | End: 2020-09-02

## 2020-09-02 RX ADMIN — MORPHINE SULFATE 4 MG: 4 INJECTION INTRAVENOUS at 09:53

## 2020-09-02 RX ADMIN — ONDANSETRON 4 MG: 2 INJECTION INTRAMUSCULAR; INTRAVENOUS at 08:19

## 2020-09-02 RX ADMIN — IOHEXOL 100 ML: 350 INJECTION, SOLUTION INTRAVENOUS at 08:33

## 2020-09-02 RX ADMIN — KETOROLAC TROMETHAMINE 15 MG: 30 INJECTION, SOLUTION INTRAMUSCULAR at 08:21

## 2020-09-02 RX ADMIN — SODIUM CHLORIDE 1000 ML: 0.9 INJECTION, SOLUTION INTRAVENOUS at 08:18

## 2020-09-02 RX ADMIN — MORPHINE SULFATE 4 MG: 4 INJECTION INTRAVENOUS at 09:04

## 2020-09-02 NOTE — ED PROVIDER NOTES
History  Chief Complaint   Patient presents with    Abdominal Pain     C/O INCREASED ABDOMINAL PAIN AND VOMITING     24-year-old male presenting to emergency department for evaluation abdominal pain  Patient has history of ulcerative colitis  Was seen 2 days ago for abdominal pain nausea and vomiting, felt like a standard flare to him, he felt better after medications and labs were normal, he declined CT imaging at that time, he states around 4:00 a m  This morning, had further nausea and vomiting, describes severe sharp pain in his epigastric radiating to his bilateral upper quadrants  No fevers chills, no diarrhea or constipation, no blood in stool  Prior to Admission Medications   Prescriptions Last Dose Informant Patient Reported? Taking? Glucosamine-Chondroitin (OSTEO BI-FLEX REGULAR STRENGTH PO)   Yes No   Sig: Take by mouth 2 (two) times a day   aspirin (ECOTRIN LOW STRENGTH) 81 mg EC tablet  Self No No   Sig: Take 1 tablet (81 mg total) by mouth daily   atorvastatin (LIPITOR) 80 mg tablet  Self Yes No   Sig: Take 80 mg by mouth daily   buPROPion (WELLBUTRIN SR) 150 mg 12 hr tablet   No No   Sig: Take 2 tablets (300 mg total) by mouth daily   buPROPion (WELLBUTRIN SR) 150 mg 12 hr tablet  Self Yes No   budesonide (ENTOCORT EC) 3 MG capsule   No No   Sig: Take 3 capsules (9 mg total) by mouth daily for 60 days, THEN 2 capsules (6 mg total) daily for 30 days, THEN 1 capsule (3 mg total) daily     clonazePAM (KLONOPIN) 0 5 mg tablet  Self Yes No   Sig: Take 1 5 mg by mouth daily at bedtime    dicyclomine (BENTYL) 20 mg tablet  Self No No   Sig: Take 1 tablet (20 mg total) by mouth 4 (four) times a day (before meals and at bedtime)   Patient taking differently: Take 20 mg by mouth daily at bedtime    dicyclomine (BENTYL) 20 mg tablet  Self No No   Sig: Take 1 tablet (20 mg total) by mouth 2 (two) times a day as needed (pain)   Patient not taking: Reported on 8/10/2020   dicyclomine (BENTYL) 20 mg tablet   No No   Sig: Take 1 tablet (20 mg total) by mouth every 6 (six) hours for 20 doses   metoclopramide (REGLAN) 10 mg tablet  Self No No   Sig: Take 1 tablet (10 mg total) by mouth 3 (three) times a day as needed (abdominal pain)   Patient not taking: Reported on 8/10/2020   nitroglycerin (NITROSTAT) 0 4 mg SL tablet  Self No No   Sig: Place 1 tablet (0 4 mg total) under the tongue every 5 (five) minutes as needed for chest pain   omeprazole (PriLOSEC) 20 mg delayed release capsule  Self No No   Sig: Take 1 capsule (20 mg total) by mouth daily   ondansetron (ZOFRAN-ODT) 4 mg disintegrating tablet  Self No No   Sig: Take 1 tablet (4 mg total) by mouth every 8 (eight) hours as needed for nausea or vomiting   ondansetron (ZOFRAN-ODT) 4 mg disintegrating tablet  Self No No   Sig: Take 1 tablet (4 mg total) by mouth every 8 (eight) hours as needed for nausea   Patient not taking: Reported on 8/10/2020   ondansetron (ZOFRAN-ODT) 4 mg disintegrating tablet   No No   Sig: Take 1 tablet (4 mg total) by mouth every 8 (eight) hours as needed for nausea or vomiting   sertraline (ZOLOFT) 100 mg tablet  Self Yes No   Sig: Take 100 mg by mouth daily    sucralfate (CARAFATE) 1 g tablet  Self No No   Sig: Take 1 tablet (1 g total) by mouth 4 (four) times a day   Patient taking differently: Take 1 g by mouth as needed       Facility-Administered Medications: None       Past Medical History:   Diagnosis Date    Diaz's esophagus     Colon polyp     Coronary artery disease     Depression     Diverticulitis     GERD (gastroesophageal reflux disease)     Hemorrhoid     History of heart artery stent     Hyperlipidemia     Hypertension     Microscopic colitis        Past Surgical History:   Procedure Laterality Date    ABDOMINAL SURGERY      radio frequency ablation    BONE GRAFT Left 2018    CARPAL TUNNEL RELEASE Right     COLONOSCOPY      EGD AND COLONOSCOPY      ESOPHAGOGASTRODUODENOSCOPY N/A 2/15/2018 Procedure: ESOPHAGOGASTRODUODENOSCOPY (EGD); Surgeon: Nena Antony MD;  Location: [facility];  Service: Gastroenterology    ESOPHAGOGASTRODUODENOSCOPY N/A 12/10/2018    Procedure: ESOPHAGOGASTRODUODENOSCOPY (EGD); Surgeon: Danial Saldivar MD;  Location: [facility]; Service: Gastroenterology    TONSILLECTOMY         Family History   Problem Relation Age of Onset    Heart disease Father     Cancer Sister      I have reviewed and agree with the history as documented  E-Cigarette/Vaping    E-Cigarette Use Never User      E-Cigarette/Vaping Substances    Nicotine No     THC No     CBD No     Flavoring No     Other No     Unknown No      Social History     Tobacco Use    Smoking status: Former Smoker     Packs/day: 0 50     Last attempt to quit: 2007     Years since quittin 6    Smokeless tobacco: Former User     Quit date: 1998    Tobacco comment: quit 10 yrs ago   Substance Use Topics    Alcohol use: Not Currently     Frequency: Never     Comment: quit 5 years    Drug use: Yes     Frequency: 3 0 times per week     Types: Marijuana     Comment: last used  (medical)       Review of Systems   Constitutional: Negative for appetite change, chills, fatigue and fever  HENT: Negative for sneezing and sore throat  Eyes: Negative for visual disturbance  Respiratory: Negative for cough, choking, chest tightness, shortness of breath and wheezing  Cardiovascular: Negative for chest pain and palpitations  Gastrointestinal: Positive for abdominal pain, nausea and vomiting  Negative for constipation and diarrhea  Genitourinary: Negative for difficulty urinating and dysuria  Neurological: Negative for dizziness, weakness, light-headedness, numbness and headaches  All other systems reviewed and are negative  Physical Exam  Physical Exam  Vitals signs and nursing note reviewed  Constitutional:       General: He is not in acute distress       Appearance: He is well-developed  He is not diaphoretic  HENT:      Head: Normocephalic and atraumatic  Eyes:      Pupils: Pupils are equal, round, and reactive to light  Neck:      Vascular: No JVD  Trachea: No tracheal deviation  Cardiovascular:      Rate and Rhythm: Normal rate and regular rhythm  Heart sounds: Normal heart sounds  No murmur  No friction rub  No gallop  Pulmonary:      Effort: Pulmonary effort is normal  No respiratory distress  Breath sounds: Normal breath sounds  No wheezing or rales  Abdominal:      General: Bowel sounds are normal  There is no distension  Palpations: Abdomen is soft  Tenderness: There is abdominal tenderness in the right upper quadrant, epigastric area and left upper quadrant  There is no guarding or rebound  Skin:     General: Skin is warm and dry  Coloration: Skin is not pale  Neurological:      Mental Status: He is alert and oriented to person, place, and time  Cranial Nerves: No cranial nerve deficit  Motor: No abnormal muscle tone     Psychiatric:         Behavior: Behavior normal          Vital Signs  ED Triage Vitals [09/02/20 0758]   Temperature Pulse Respirations Blood Pressure SpO2   98 4 °F (36 9 °C) 55 18 (!) 208/99 95 %      Temp Source Heart Rate Source Patient Position - Orthostatic VS BP Location FiO2 (%)   Oral Monitor Sitting Right arm --      Pain Score       9           Vitals:    09/02/20 0758 09/02/20 0845   BP: (!) 208/99 (!) 176/88   Pulse: 55 76   Patient Position - Orthostatic VS: Sitting Sitting         Visual Acuity      ED Medications  Medications   sodium chloride 0 9 % bolus 1,000 mL (1,000 mL Intravenous New Bag 9/2/20 0818)   ketorolac (TORADOL) injection 15 mg (15 mg Intramuscular Given 9/2/20 0821)   ondansetron (ZOFRAN) injection 4 mg (4 mg Intravenous Given 9/2/20 0819)   iohexol (OMNIPAQUE) 350 MG/ML injection (MULTI-DOSE) 100 mL (100 mL Intravenous Given 9/2/20 0833)   morphine (PF) 4 mg/mL injection 4 mg (4 mg Intravenous Given 9/2/20 0904)       Diagnostic Studies  Results Reviewed     Procedure Component Value Units Date/Time    Comprehensive metabolic panel [[id number]] Collected:  09/02/20 0818    Lab Status:  Final result Specimen:  Blood from Arm, Right Updated:  09/02/20 0903     Sodium 141 mmol/L      Potassium 3 6 mmol/L      Chloride 104 mmol/L      CO2 27 mmol/L      ANION GAP 10 mmol/L      BUN 10 mg/dL      Creatinine 1 08 mg/dL      Glucose 136 mg/dL      Calcium 9 0 mg/dL      AST 13 U/L      ALT 22 U/L      Alkaline Phosphatase 61 U/L      Total Protein 7 0 g/dL      Albumin 3 9 g/dL      Total Bilirubin 0 40 mg/dL      eGFR 77 ml/min/1 73sq m     Narrative:       [place] guidelines for Chronic Kidney Disease (CKD):     Stage 1 with normal or high GFR (GFR > 90 mL/min/1 73 square meters)    Stage 2 Mild CKD (GFR = 60-89 mL/min/1 73 square meters)    Stage 3A Moderate CKD (GFR = 45-59 mL/min/1 73 square meters)    Stage 3B Moderate CKD (GFR = 30-44 mL/min/1 73 square meters)    Stage 4 Severe CKD (GFR = 15-29 mL/min/1 73 square meters)    Stage 5 End Stage CKD (GFR <15 mL/min/1 73 square meters)  Note: GFR calculation is accurate only with a steady state creatinine    Lipase [[id number]]  (Abnormal) Collected:  09/02/20 0818    Lab Status:  Final result Specimen:  Blood from Arm, Right Updated:  09/02/20 0903     Lipase 54 u/L     CBC and differential [[id number]]  (Abnormal) Collected:  09/02/20 0818    Lab Status:  Final result Specimen:  Blood from Arm, Right Updated:  09/02/20 0835     WBC 11 13 Thousand/uL      RBC 4 69 Million/uL      Hemoglobin 13 5 g/dL      Hematocrit 41 8 %      MCV 89 fL      MCH 28 8 pg      MCHC 32 3 g/dL      RDW 13 3 %      MPV 9 4 fL      Platelets 269 Thousands/uL      nRBC 0 /100 WBCs      Neutrophils Relative 77 %      Immat GRANS % 0 %      Lymphocytes Relative 14 %      Monocytes Relative 8 %      Eosinophils Relative 1 % Basophils Relative 0 %      Neutrophils Absolute 8 54 Thousands/µL      Immature Grans Absolute 0 03 Thousand/uL      Lymphocytes Absolute 1 56 Thousands/µL      Monocytes Absolute 0 85 Thousand/µL      Eosinophils Absolute 0 10 Thousand/µL      Basophils Absolute 0 05 Thousands/µL                  CT abdomen pelvis with contrast   Final Result by Elle Harris MD (09/02 0207)      No evidence of acute abdominopelvic abnormality  Workstation performed: [id number]                    Procedures  Procedures         ED Course       US AUDIT      Most Recent Value   Initial Alcohol Screen: US AUDIT-C    1  How often do you have a drink containing alcohol?  0 Filed at: 09/02/2020 0804   2  How many drinks containing alcohol do you have on a typical day you are drinking? 0 Filed at: 09/02/2020 0804   3a  Male UNDER 65: How often do you have five or more drinks on one occasion? 0 Filed at: 09/02/2020 0804   3b  FEMALE Any Age, or MALE 65+: How often do you have 4 or more drinks on one occassion? 0 Filed at: 09/02/2020 0804   Audit-C Score  0 Filed at: 09/02/2020 0804                  HERMINIA/DAST-10      Most Recent Value   How many times in the past year have you    Used an illegal drug or used a prescription medication for non-medical reasons? Never Filed at: 09/02/2020 0803                                MDM  Number of Diagnoses or Management Options  Diagnosis management comments: 50-year-old male with abdominal pain  Will check labs, will check CT scan to rule out surgical pathology, recommend a follow-up with his GI doctor for further management of his ulcerative colitis          Disposition  Final diagnoses:   Abdominal pain     Time reflects when diagnosis was documented in both MDM as applicable and the Disposition within this note     Time User Action Codes Description Comment    9/2/2020  9:42 AM Nirmala Guevara Add [R10 9] Abdominal pain       ED Disposition     ED Disposition Condition Date/Time Comment    Discharge Stable Wed Sep 2, 2020  9:42 AM Joan Fairmount discharge to home/self care  Follow-up Information     Follow up With Specialties Details Why Contact Info Additional [address] [facility]   [address] [facility] [id number]  [address] [place] [address] [facility], [address] [facility]  [address] [place], [place], [address] [facility]          Patient's Medications   Discharge Prescriptions    No medications on file     No discharge procedures on file      PDMP Review       Value Time User    PDMP Reviewed  Yes 2/10/2020  3:50 PM Allie Robison PA-C          ED Provider  Electronically Signed by           Yuni Kevin MD  09/02/20 6358

## 2020-09-02 NOTE — ED NOTES
Patient waiting for spouse to give him ride home     Celeste Zuniga, [address] [facility]  09/02/20 7768

## 2020-10-20 ENCOUNTER — OFFICE VISIT (OUTPATIENT)
Dept: DERMATOLOGY | Facility: CLINIC | Age: 55
End: 2020-10-20
Payer: MEDICARE

## 2020-10-20 VITALS — WEIGHT: 217.8 LBS | TEMPERATURE: 97.6 F | BODY MASS INDEX: 31.18 KG/M2 | HEIGHT: 70 IN

## 2020-10-20 DIAGNOSIS — L57.0 KERATOSIS, ACTINIC: ICD-10-CM

## 2020-10-20 DIAGNOSIS — L81.4 LENTIGINES: ICD-10-CM

## 2020-10-20 DIAGNOSIS — L73.9 FOLLICULITIS: Primary | ICD-10-CM

## 2020-10-20 PROCEDURE — 17000 DESTRUCT PREMALG LESION: CPT | Performed by: DERMATOLOGY

## 2020-10-20 PROCEDURE — 99203 OFFICE O/P NEW LOW 30 MIN: CPT | Performed by: DERMATOLOGY

## 2020-10-20 PROCEDURE — 17003 DESTRUCT PREMALG LES 2-14: CPT | Performed by: DERMATOLOGY

## 2020-11-17 DIAGNOSIS — L73.9 FOLLICULITIS: Primary | ICD-10-CM

## 2020-11-17 DIAGNOSIS — L73.9 FOLLICULITIS: ICD-10-CM

## 2020-11-17 RX ORDER — CLINDAMYCIN PHOSPHATE 11.9 MG/ML
SOLUTION TOPICAL 2 TIMES DAILY
Qty: 60 ML | Refills: 11 | Status: SHIPPED | OUTPATIENT
Start: 2020-11-17 | End: 2021-04-05 | Stop reason: CLARIF

## 2020-11-17 RX ORDER — CLINDAMYCIN PHOSPHATE 11.9 MG/ML
SOLUTION TOPICAL 2 TIMES DAILY
Qty: 60 ML | Refills: 11 | Status: SHIPPED | OUTPATIENT
Start: 2020-11-17 | End: 2020-11-17 | Stop reason: SDUPTHER

## 2020-11-29 ENCOUNTER — HOSPITAL ENCOUNTER (EMERGENCY)
Facility: HOSPITAL | Age: 55
Discharge: HOME | End: 2020-11-29
Attending: EMERGENCY MEDICINE | Admitting: EMERGENCY MEDICINE
Payer: MEDICARE

## 2020-11-29 VITALS
HEART RATE: 70 BPM | SYSTOLIC BLOOD PRESSURE: 176 MMHG | OXYGEN SATURATION: 96 % | RESPIRATION RATE: 14 BRPM | WEIGHT: 215.83 LBS | BODY MASS INDEX: 30.9 KG/M2 | HEIGHT: 70 IN | TEMPERATURE: 98 F | DIASTOLIC BLOOD PRESSURE: 97 MMHG

## 2020-11-29 DIAGNOSIS — R11.2 NAUSEA & VOMITING: Primary | ICD-10-CM

## 2020-11-29 DIAGNOSIS — E86.0 DEHYDRATION: ICD-10-CM

## 2020-11-29 DIAGNOSIS — R10.84 GENERALIZED ABDOMINAL PAIN: ICD-10-CM

## 2020-11-29 LAB
ALBUMIN SERPL BCP-MCNC: 4.4 G/DL (ref 3.5–5)
ALP SERPL-CCNC: 72 U/L (ref 46–116)
ALT SERPL W P-5'-P-CCNC: 25 U/L (ref 12–78)
ANION GAP SERPL CALCULATED.3IONS-SCNC: 13 MMOL/L (ref 4–13)
AST SERPL W P-5'-P-CCNC: 17 U/L (ref 5–45)
ATRIAL RATE: 67 BPM
BASOPHILS # BLD AUTO: 0.04 THOUSANDS/ΜL (ref 0–0.1)
BASOPHILS NFR BLD AUTO: 0 % (ref 0–1)
BILIRUB SERPL-MCNC: 0.5 MG/DL (ref 0.2–1)
BUN SERPL-MCNC: 22 MG/DL (ref 5–25)
CALCIUM SERPL-MCNC: 9.2 MG/DL (ref 8.3–10.1)
CHLORIDE SERPL-SCNC: 98 MMOL/L (ref 100–108)
CO2 SERPL-SCNC: 26 MMOL/L (ref 21–32)
CREAT SERPL-MCNC: 1.42 MG/DL (ref 0.6–1.3)
EOSINOPHIL # BLD AUTO: 0.05 THOUSAND/ΜL (ref 0–0.61)
EOSINOPHIL NFR BLD AUTO: 1 % (ref 0–6)
ERYTHROCYTE [DISTWIDTH] IN BLOOD BY AUTOMATED COUNT: 13.2 % (ref 11.6–15.1)
GFR SERPL CREATININE-BSD FRML MDRD: 55 ML/MIN/1.73SQ M
GLUCOSE SERPL-MCNC: 144 MG/DL (ref 65–140)
HCT VFR BLD AUTO: 46.6 % (ref 36.5–49.3)
HGB BLD-MCNC: 15.2 G/DL (ref 12–17)
IMM GRANULOCYTES # BLD AUTO: 0.07 THOUSAND/UL (ref 0–0.2)
IMM GRANULOCYTES NFR BLD AUTO: 1 % (ref 0–2)
LYMPHOCYTES # BLD AUTO: 1.83 THOUSANDS/ΜL (ref 0.6–4.47)
LYMPHOCYTES NFR BLD AUTO: 17 % (ref 14–44)
MCH RBC QN AUTO: 28.7 PG (ref 26.8–34.3)
MCHC RBC AUTO-ENTMCNC: 32.6 G/DL (ref 31.4–37.4)
MCV RBC AUTO: 88 FL (ref 82–98)
MONOCYTES # BLD AUTO: 0.88 THOUSAND/ΜL (ref 0.17–1.22)
MONOCYTES NFR BLD AUTO: 8 % (ref 4–12)
NEUTROPHILS # BLD AUTO: 8.09 THOUSANDS/ΜL (ref 1.85–7.62)
NEUTS SEG NFR BLD AUTO: 73 % (ref 43–75)
NRBC BLD AUTO-RTO: 0 /100 WBCS
P AXIS: 70 DEGREES
PLATELET # BLD AUTO: 301 THOUSANDS/UL (ref 149–390)
PMV BLD AUTO: 9.5 FL (ref 8.9–12.7)
POTASSIUM SERPL-SCNC: 4.1 MMOL/L (ref 3.5–5.3)
PR INTERVAL: 182 MS
PROT SERPL-MCNC: 8 G/DL (ref 6.4–8.2)
QRS AXIS: 5 DEGREES
QRSD INTERVAL: 90 MS
QT INTERVAL: 378 MS
QTC INTERVAL: 399 MS
RBC # BLD AUTO: 5.3 MILLION/UL (ref 3.88–5.62)
SODIUM SERPL-SCNC: 137 MMOL/L (ref 136–145)
T WAVE AXIS: 49 DEGREES
TROPONIN I SERPL-MCNC: <0.02 NG/ML
VENTRICULAR RATE: 67 BPM
WBC # BLD AUTO: 10.96 THOUSAND/UL (ref 4.31–10.16)

## 2020-11-29 PROCEDURE — 85025 COMPLETE CBC W/AUTO DIFF WBC: CPT | Performed by: NURSE PRACTITIONER

## 2020-11-29 PROCEDURE — 96365 THER/PROPH/DIAG IV INF INIT: CPT

## 2020-11-29 PROCEDURE — 96375 TX/PRO/DX INJ NEW DRUG ADDON: CPT

## 2020-11-29 PROCEDURE — C9113 INJ PANTOPRAZOLE SODIUM, VIA: HCPCS | Performed by: NURSE PRACTITIONER

## 2020-11-29 PROCEDURE — 96366 THER/PROPH/DIAG IV INF ADDON: CPT

## 2020-11-29 PROCEDURE — 99284 EMERGENCY DEPT VISIT MOD MDM: CPT

## 2020-11-29 PROCEDURE — 80053 COMPREHEN METABOLIC PANEL: CPT | Performed by: NURSE PRACTITIONER

## 2020-11-29 PROCEDURE — 99285 EMERGENCY DEPT VISIT HI MDM: CPT | Performed by: NURSE PRACTITIONER

## 2020-11-29 PROCEDURE — 93005 ELECTROCARDIOGRAM TRACING: CPT

## 2020-11-29 PROCEDURE — 36415 COLL VENOUS BLD VENIPUNCTURE: CPT | Performed by: NURSE PRACTITIONER

## 2020-11-29 PROCEDURE — 84484 ASSAY OF TROPONIN QUANT: CPT | Performed by: NURSE PRACTITIONER

## 2020-11-29 PROCEDURE — 96376 TX/PRO/DX INJ SAME DRUG ADON: CPT

## 2020-11-29 PROCEDURE — 93010 ELECTROCARDIOGRAM REPORT: CPT | Performed by: INTERNAL MEDICINE

## 2020-11-29 RX ORDER — SODIUM CHLORIDE, SODIUM GLUCONATE, SODIUM ACETATE, POTASSIUM CHLORIDE, MAGNESIUM CHLORIDE, SODIUM PHOSPHATE, DIBASIC, AND POTASSIUM PHOSPHATE .53; .5; .37; .037; .03; .012; .00082 G/100ML; G/100ML; G/100ML; G/100ML; G/100ML; G/100ML; G/100ML
1000 INJECTION, SOLUTION INTRAVENOUS ONCE
Status: COMPLETED | OUTPATIENT
Start: 2020-11-29 | End: 2020-11-29

## 2020-11-29 RX ORDER — FENTANYL CITRATE 50 UG/ML
100 INJECTION, SOLUTION INTRAMUSCULAR; INTRAVENOUS ONCE
Status: COMPLETED | OUTPATIENT
Start: 2020-11-29 | End: 2020-11-29

## 2020-11-29 RX ORDER — PANTOPRAZOLE SODIUM 40 MG/1
40 INJECTION, POWDER, FOR SOLUTION INTRAVENOUS ONCE
Status: COMPLETED | OUTPATIENT
Start: 2020-11-29 | End: 2020-11-29

## 2020-11-29 RX ORDER — ONDANSETRON 2 MG/ML
4 INJECTION INTRAMUSCULAR; INTRAVENOUS ONCE
Status: COMPLETED | OUTPATIENT
Start: 2020-11-29 | End: 2020-11-29

## 2020-11-29 RX ORDER — DICYCLOMINE HCL 20 MG
20 TABLET ORAL 3 TIMES DAILY PRN
Qty: 30 TABLET | Refills: 0 | Status: SHIPPED | OUTPATIENT
Start: 2020-11-29 | End: 2021-04-05 | Stop reason: CLARIF

## 2020-11-29 RX ORDER — LIDOCAINE HYDROCHLORIDE 20 MG/ML
10 SOLUTION OROPHARYNGEAL ONCE
Status: COMPLETED | OUTPATIENT
Start: 2020-11-29 | End: 2020-11-29

## 2020-11-29 RX ORDER — ONDANSETRON 4 MG/1
4 TABLET, ORALLY DISINTEGRATING ORAL EVERY 8 HOURS PRN
Qty: 20 TABLET | Refills: 0 | Status: SHIPPED | OUTPATIENT
Start: 2020-11-29 | End: 2021-04-07

## 2020-11-29 RX ORDER — MAGNESIUM HYDROXIDE/ALUMINUM HYDROXICE/SIMETHICONE 120; 1200; 1200 MG/30ML; MG/30ML; MG/30ML
30 SUSPENSION ORAL ONCE
Status: COMPLETED | OUTPATIENT
Start: 2020-11-29 | End: 2020-11-29

## 2020-11-29 RX ADMIN — ALUMINA, MAGNESIA, AND SIMETHICONE ORAL SUSPENSION REGULAR STRENGTH 30 ML: 1200; 1200; 120 SUSPENSION ORAL at 12:22

## 2020-11-29 RX ADMIN — LIDOCAINE HYDROCHLORIDE 10 ML: 20 SOLUTION ORAL; TOPICAL at 12:22

## 2020-11-29 RX ADMIN — SODIUM CHLORIDE, SODIUM GLUCONATE, SODIUM ACETATE, POTASSIUM CHLORIDE, MAGNESIUM CHLORIDE, SODIUM PHOSPHATE, DIBASIC, AND POTASSIUM PHOSPHATE 1000 ML: .53; .5; .37; .037; .03; .012; .00082 INJECTION, SOLUTION INTRAVENOUS at 12:13

## 2020-11-29 RX ADMIN — FENTANYL CITRATE 100 MCG: 50 INJECTION INTRAMUSCULAR; INTRAVENOUS at 12:16

## 2020-11-29 RX ADMIN — PANTOPRAZOLE SODIUM 40 MG: 40 INJECTION, POWDER, FOR SOLUTION INTRAVENOUS at 12:19

## 2020-11-29 RX ADMIN — FENTANYL CITRATE 100 MCG: 50 INJECTION, SOLUTION INTRAMUSCULAR; INTRAVENOUS at 13:40

## 2020-11-29 RX ADMIN — SODIUM CHLORIDE, SODIUM GLUCONATE, SODIUM ACETATE, POTASSIUM CHLORIDE, MAGNESIUM CHLORIDE, SODIUM PHOSPHATE, DIBASIC, AND POTASSIUM PHOSPHATE 1000 ML: .53; .5; .37; .037; .03; .012; .00082 INJECTION, SOLUTION INTRAVENOUS at 12:55

## 2020-11-29 RX ADMIN — ONDANSETRON 4 MG: 2 INJECTION INTRAMUSCULAR; INTRAVENOUS at 12:13

## 2020-11-30 ENCOUNTER — HOSPITAL ENCOUNTER (EMERGENCY)
Facility: HOSPITAL | Age: 55
Discharge: HOME | End: 2020-11-30
Attending: EMERGENCY MEDICINE | Admitting: EMERGENCY MEDICINE
Payer: MEDICARE

## 2020-11-30 ENCOUNTER — APPOINTMENT (EMERGENCY)
Dept: CT IMAGING | Facility: HOSPITAL | Age: 55
End: 2020-11-30
Payer: MEDICARE

## 2020-11-30 ENCOUNTER — TELEPHONE (OUTPATIENT)
Dept: GASTROENTEROLOGY | Facility: CLINIC | Age: 55
End: 2020-11-30

## 2020-11-30 VITALS
WEIGHT: 214 LBS | OXYGEN SATURATION: 98 % | SYSTOLIC BLOOD PRESSURE: 135 MMHG | DIASTOLIC BLOOD PRESSURE: 80 MMHG | BODY MASS INDEX: 30.64 KG/M2 | HEIGHT: 70 IN | HEART RATE: 92 BPM | TEMPERATURE: 97.9 F | RESPIRATION RATE: 22 BRPM

## 2020-11-30 DIAGNOSIS — R11.2 NAUSEA AND VOMITING, INTRACTABILITY OF VOMITING NOT SPECIFIED, UNSPECIFIED VOMITING TYPE: Primary | ICD-10-CM

## 2020-11-30 DIAGNOSIS — R10.9 ABDOMINAL PAIN, UNSPECIFIED ABDOMINAL LOCATION: ICD-10-CM

## 2020-11-30 LAB
ALBUMIN SERPL BCP-MCNC: 3.9 G/DL (ref 3.5–5)
ALP SERPL-CCNC: 62 U/L (ref 46–116)
ALT SERPL W P-5'-P-CCNC: 21 U/L (ref 12–78)
AMPHETAMINES SERPL QL SCN: NEGATIVE
ANION GAP SERPL CALCULATED.3IONS-SCNC: 7 MMOL/L (ref 4–13)
AST SERPL W P-5'-P-CCNC: 9 U/L (ref 5–45)
BARBITURATES UR QL: NEGATIVE
BASOPHILS # BLD AUTO: 0.02 THOUSANDS/ΜL (ref 0–0.1)
BASOPHILS NFR BLD AUTO: 0 % (ref 0–1)
BENZODIAZ UR QL: NEGATIVE
BILIRUB SERPL-MCNC: 0.4 MG/DL (ref 0.2–1)
BILIRUB UR QL STRIP: NEGATIVE
BUN SERPL-MCNC: 14 MG/DL (ref 5–25)
CALCIUM SERPL-MCNC: 8.9 MG/DL (ref 8.3–10.1)
CHLORIDE SERPL-SCNC: 103 MMOL/L (ref 100–108)
CLARITY UR: CLEAR
CO2 SERPL-SCNC: 29 MMOL/L (ref 21–32)
COCAINE UR QL: NEGATIVE
COLOR UR: YELLOW
CREAT SERPL-MCNC: 1.04 MG/DL (ref 0.6–1.3)
EOSINOPHIL # BLD AUTO: 0.03 THOUSAND/ΜL (ref 0–0.61)
EOSINOPHIL NFR BLD AUTO: 0 % (ref 0–6)
ERYTHROCYTE [DISTWIDTH] IN BLOOD BY AUTOMATED COUNT: 13 % (ref 11.6–15.1)
GFR SERPL CREATININE-BSD FRML MDRD: 80 ML/MIN/1.73SQ M
GLUCOSE SERPL-MCNC: 142 MG/DL (ref 65–140)
GLUCOSE UR STRIP-MCNC: NEGATIVE MG/DL
HCT VFR BLD AUTO: 42.5 % (ref 36.5–49.3)
HGB BLD-MCNC: 14 G/DL (ref 12–17)
HGB UR QL STRIP.AUTO: NEGATIVE
IMM GRANULOCYTES # BLD AUTO: 0.05 THOUSAND/UL (ref 0–0.2)
IMM GRANULOCYTES NFR BLD AUTO: 1 % (ref 0–2)
KETONES UR STRIP-MCNC: NEGATIVE MG/DL
LEUKOCYTE ESTERASE UR QL STRIP: NEGATIVE
LIPASE SERPL-CCNC: 126 U/L (ref 73–393)
LYMPHOCYTES # BLD AUTO: 1.01 THOUSANDS/ΜL (ref 0.6–4.47)
LYMPHOCYTES NFR BLD AUTO: 11 % (ref 14–44)
MAGNESIUM SERPL-MCNC: 2 MG/DL (ref 1.6–2.6)
MCH RBC QN AUTO: 28.9 PG (ref 26.8–34.3)
MCHC RBC AUTO-ENTMCNC: 32.9 G/DL (ref 31.4–37.4)
MCV RBC AUTO: 88 FL (ref 82–98)
METHADONE UR QL: NEGATIVE
MONOCYTES # BLD AUTO: 0.52 THOUSAND/ΜL (ref 0.17–1.22)
MONOCYTES NFR BLD AUTO: 6 % (ref 4–12)
NEUTROPHILS # BLD AUTO: 7.6 THOUSANDS/ΜL (ref 1.85–7.62)
NEUTS SEG NFR BLD AUTO: 82 % (ref 43–75)
NITRITE UR QL STRIP: NEGATIVE
NRBC BLD AUTO-RTO: 0 /100 WBCS
OPIATES UR QL SCN: NEGATIVE
OXYCODONE+OXYMORPHONE UR QL SCN: NEGATIVE
PCP UR QL: NEGATIVE
PH UR STRIP.AUTO: 7 [PH]
PHOSPHATE SERPL-MCNC: 2.6 MG/DL (ref 2.7–4.5)
PLATELET # BLD AUTO: 237 THOUSANDS/UL (ref 149–390)
PMV BLD AUTO: 9.1 FL (ref 8.9–12.7)
POTASSIUM SERPL-SCNC: 4.2 MMOL/L (ref 3.5–5.3)
PROT SERPL-MCNC: 7.2 G/DL (ref 6.4–8.2)
PROT UR STRIP-MCNC: NEGATIVE MG/DL
RBC # BLD AUTO: 4.85 MILLION/UL (ref 3.88–5.62)
SODIUM SERPL-SCNC: 139 MMOL/L (ref 136–145)
SP GR UR STRIP.AUTO: 1.01 (ref 1–1.03)
THC UR QL: POSITIVE
TROPONIN I SERPL-MCNC: <0.02 NG/ML
UROBILINOGEN UR QL STRIP.AUTO: 0.2 E.U./DL
WBC # BLD AUTO: 9.23 THOUSAND/UL (ref 4.31–10.16)

## 2020-11-30 PROCEDURE — 83690 ASSAY OF LIPASE: CPT | Performed by: EMERGENCY MEDICINE

## 2020-11-30 PROCEDURE — 99285 EMERGENCY DEPT VISIT HI MDM: CPT | Performed by: EMERGENCY MEDICINE

## 2020-11-30 PROCEDURE — 96372 THER/PROPH/DIAG INJ SC/IM: CPT

## 2020-11-30 PROCEDURE — 74177 CT ABD & PELVIS W/CONTRAST: CPT

## 2020-11-30 PROCEDURE — 80053 COMPREHEN METABOLIC PANEL: CPT | Performed by: EMERGENCY MEDICINE

## 2020-11-30 PROCEDURE — 96375 TX/PRO/DX INJ NEW DRUG ADDON: CPT

## 2020-11-30 PROCEDURE — 81003 URINALYSIS AUTO W/O SCOPE: CPT | Performed by: EMERGENCY MEDICINE

## 2020-11-30 PROCEDURE — 96361 HYDRATE IV INFUSION ADD-ON: CPT

## 2020-11-30 PROCEDURE — 80307 DRUG TEST PRSMV CHEM ANLYZR: CPT | Performed by: EMERGENCY MEDICINE

## 2020-11-30 PROCEDURE — 83735 ASSAY OF MAGNESIUM: CPT | Performed by: EMERGENCY MEDICINE

## 2020-11-30 PROCEDURE — 99285 EMERGENCY DEPT VISIT HI MDM: CPT

## 2020-11-30 PROCEDURE — 96374 THER/PROPH/DIAG INJ IV PUSH: CPT

## 2020-11-30 PROCEDURE — 84100 ASSAY OF PHOSPHORUS: CPT | Performed by: EMERGENCY MEDICINE

## 2020-11-30 PROCEDURE — 93005 ELECTROCARDIOGRAM TRACING: CPT

## 2020-11-30 PROCEDURE — 85025 COMPLETE CBC W/AUTO DIFF WBC: CPT | Performed by: EMERGENCY MEDICINE

## 2020-11-30 PROCEDURE — G1004 CDSM NDSC: HCPCS

## 2020-11-30 PROCEDURE — 84484 ASSAY OF TROPONIN QUANT: CPT | Performed by: EMERGENCY MEDICINE

## 2020-11-30 PROCEDURE — 36415 COLL VENOUS BLD VENIPUNCTURE: CPT | Performed by: EMERGENCY MEDICINE

## 2020-11-30 RX ORDER — SUCRALFATE 1 G/1
1 TABLET ORAL ONCE
Status: COMPLETED | OUTPATIENT
Start: 2020-11-30 | End: 2020-11-30

## 2020-11-30 RX ORDER — METOCLOPRAMIDE HYDROCHLORIDE 5 MG/ML
10 INJECTION INTRAMUSCULAR; INTRAVENOUS ONCE
Status: DISCONTINUED | OUTPATIENT
Start: 2020-11-30 | End: 2020-11-30

## 2020-11-30 RX ORDER — OLANZAPINE 10 MG/1
5 INJECTION, POWDER, LYOPHILIZED, FOR SOLUTION INTRAMUSCULAR ONCE
Status: COMPLETED | OUTPATIENT
Start: 2020-11-30 | End: 2020-11-30

## 2020-11-30 RX ORDER — HYDROMORPHONE HCL/PF 1 MG/ML
0.2 SYRINGE (ML) INJECTION ONCE
Status: COMPLETED | OUTPATIENT
Start: 2020-11-30 | End: 2020-11-30

## 2020-11-30 RX ORDER — FENTANYL CITRATE 50 UG/ML
25 INJECTION, SOLUTION INTRAMUSCULAR; INTRAVENOUS ONCE
Status: DISCONTINUED | OUTPATIENT
Start: 2020-11-30 | End: 2020-11-30

## 2020-11-30 RX ORDER — KETOROLAC TROMETHAMINE 30 MG/ML
15 INJECTION, SOLUTION INTRAMUSCULAR; INTRAVENOUS ONCE
Status: COMPLETED | OUTPATIENT
Start: 2020-11-30 | End: 2020-11-30

## 2020-11-30 RX ORDER — ONDANSETRON 2 MG/ML
4 INJECTION INTRAMUSCULAR; INTRAVENOUS ONCE
Status: COMPLETED | OUTPATIENT
Start: 2020-11-30 | End: 2020-11-30

## 2020-11-30 RX ADMIN — SODIUM CHLORIDE 1000 ML: 0.9 INJECTION, SOLUTION INTRAVENOUS at 13:37

## 2020-11-30 RX ADMIN — HYDROMORPHONE HYDROCHLORIDE 0.2 MG: 1 INJECTION, SOLUTION INTRAMUSCULAR; INTRAVENOUS; SUBCUTANEOUS at 15:08

## 2020-11-30 RX ADMIN — SUCRALFATE 1 G: 1 TABLET ORAL at 13:52

## 2020-11-30 RX ADMIN — OLANZAPINE 5 MG: 10 INJECTION, POWDER, FOR SOLUTION INTRAMUSCULAR at 14:51

## 2020-11-30 RX ADMIN — WATER 10 ML: 1 INJECTION INTRAMUSCULAR; INTRAVENOUS; SUBCUTANEOUS at 14:51

## 2020-11-30 RX ADMIN — IOHEXOL 100 ML: 350 INJECTION, SOLUTION INTRAVENOUS at 13:48

## 2020-11-30 RX ADMIN — KETOROLAC TROMETHAMINE 15 MG: 30 INJECTION, SOLUTION INTRAMUSCULAR at 15:05

## 2020-11-30 RX ADMIN — ONDANSETRON 4 MG: 2 INJECTION INTRAMUSCULAR; INTRAVENOUS at 13:38

## 2020-12-01 LAB
ATRIAL RATE: 57 BPM
P AXIS: 38 DEGREES
PR INTERVAL: 170 MS
QRS AXIS: 17 DEGREES
QRSD INTERVAL: 90 MS
QT INTERVAL: 402 MS
QTC INTERVAL: 391 MS
T WAVE AXIS: 54 DEGREES
VENTRICULAR RATE: 57 BPM

## 2020-12-01 PROCEDURE — 93010 ELECTROCARDIOGRAM REPORT: CPT | Performed by: INTERNAL MEDICINE

## 2020-12-04 ENCOUNTER — TELEPHONE (OUTPATIENT)
Dept: GASTROENTEROLOGY | Facility: CLINIC | Age: 55
End: 2020-12-04

## 2020-12-04 DIAGNOSIS — K21.00 GASTROESOPHAGEAL REFLUX DISEASE WITH ESOPHAGITIS: ICD-10-CM

## 2020-12-04 DIAGNOSIS — K21.00 GASTROESOPHAGEAL REFLUX DISEASE WITH ESOPHAGITIS WITHOUT HEMORRHAGE: ICD-10-CM

## 2020-12-04 RX ORDER — PROCHLORPERAZINE 25 MG
25 SUPPOSITORY, RECTAL RECTAL EVERY 12 HOURS PRN
Qty: 12 SUPPOSITORY | Refills: 0 | Status: SHIPPED | OUTPATIENT
Start: 2020-12-04 | End: 2021-04-07

## 2020-12-04 RX ORDER — METOCLOPRAMIDE 10 MG/1
10 TABLET ORAL EVERY 6 HOURS
Qty: 60 TABLET | Refills: 0 | Status: SHIPPED | OUTPATIENT
Start: 2020-12-04 | End: 2021-04-05 | Stop reason: CLARIF

## 2020-12-08 ENCOUNTER — TELEMEDICINE (OUTPATIENT)
Dept: GASTROENTEROLOGY | Facility: CLINIC | Age: 55
End: 2020-12-08
Payer: MEDICARE

## 2020-12-08 DIAGNOSIS — R10.84 GENERALIZED ABDOMINAL PAIN: ICD-10-CM

## 2020-12-08 DIAGNOSIS — R11.2 NAUSEA AND VOMITING, INTRACTABILITY OF VOMITING NOT SPECIFIED, UNSPECIFIED VOMITING TYPE: Primary | ICD-10-CM

## 2020-12-08 DIAGNOSIS — R11.15 CYCLICAL VOMITING: ICD-10-CM

## 2020-12-08 DIAGNOSIS — K52.838 OTHER MICROSCOPIC COLITIS: ICD-10-CM

## 2020-12-08 PROCEDURE — 99442 PR PHYS/QHP TELEPHONE EVALUATION 11-20 MIN: CPT | Performed by: PHYSICIAN ASSISTANT

## 2020-12-09 ENCOUNTER — TELEPHONE (OUTPATIENT)
Dept: DERMATOLOGY | Facility: CLINIC | Age: 55
End: 2020-12-09

## 2020-12-10 ENCOUNTER — OFFICE VISIT (OUTPATIENT)
Dept: DERMATOLOGY | Facility: CLINIC | Age: 55
End: 2020-12-10
Payer: MEDICARE

## 2020-12-10 VITALS — WEIGHT: 221.2 LBS | BODY MASS INDEX: 31.67 KG/M2 | TEMPERATURE: 97 F | HEIGHT: 70 IN

## 2020-12-10 DIAGNOSIS — L57.0 ACTINIC KERATOSES: Primary | ICD-10-CM

## 2020-12-10 PROCEDURE — 17000 DESTRUCT PREMALG LESION: CPT | Performed by: DERMATOLOGY

## 2020-12-10 PROCEDURE — 17003 DESTRUCT PREMALG LES 2-14: CPT | Performed by: DERMATOLOGY

## 2020-12-22 ENCOUNTER — IMMUNIZATIONS (OUTPATIENT)
Dept: FAMILY MEDICINE CLINIC | Facility: HOSPITAL | Age: 55
End: 2020-12-22
Payer: MEDICARE

## 2020-12-22 DIAGNOSIS — Z23 ENCOUNTER FOR IMMUNIZATION: ICD-10-CM

## 2020-12-22 DIAGNOSIS — K52.838 OTHER MICROSCOPIC COLITIS: ICD-10-CM

## 2020-12-22 PROCEDURE — 91300 SARS-COV-2 / COVID-19 MRNA VACCINE (PFIZER-BIONTECH) 30 MCG: CPT

## 2020-12-22 PROCEDURE — 0001A SARS-COV-2 / COVID-19 MRNA VACCINE (PFIZER-BIONTECH) 30 MCG: CPT

## 2020-12-22 RX ORDER — BUDESONIDE 3 MG/1
CAPSULE, COATED PELLETS ORAL
Qty: 255 CAPSULE | Refills: 1 | Status: SHIPPED | OUTPATIENT
Start: 2020-12-22 | End: 2021-04-06 | Stop reason: CLARIF

## 2021-01-06 DIAGNOSIS — K21.00 GASTROESOPHAGEAL REFLUX DISEASE WITH ESOPHAGITIS: ICD-10-CM

## 2021-01-06 DIAGNOSIS — R19.7 DIARRHEA, UNSPECIFIED TYPE: ICD-10-CM

## 2021-01-06 DIAGNOSIS — R10.30 LOWER ABDOMINAL PAIN: ICD-10-CM

## 2021-01-06 RX ORDER — BUPROPION HYDROCHLORIDE 150 MG/1
TABLET, EXTENDED RELEASE ORAL
Qty: 60 TABLET | Refills: 3 | Status: SHIPPED | OUTPATIENT
Start: 2021-01-06 | End: 2021-09-17

## 2021-01-10 ENCOUNTER — IMMUNIZATIONS (OUTPATIENT)
Dept: FAMILY MEDICINE CLINIC | Facility: HOSPITAL | Age: 56
End: 2021-01-10

## 2021-01-10 DIAGNOSIS — Z23 ENCOUNTER FOR IMMUNIZATION: ICD-10-CM

## 2021-01-10 PROCEDURE — 0002A SARS-COV-2 / COVID-19 MRNA VACCINE (PFIZER-BIONTECH) 30 MCG: CPT

## 2021-01-10 PROCEDURE — 91300 SARS-COV-2 / COVID-19 MRNA VACCINE (PFIZER-BIONTECH) 30 MCG: CPT

## 2021-03-03 DIAGNOSIS — R10.84 GENERALIZED ABDOMINAL PAIN: ICD-10-CM

## 2021-03-03 DIAGNOSIS — R11.2 NON-INTRACTABLE VOMITING WITH NAUSEA: ICD-10-CM

## 2021-03-03 RX ORDER — DICYCLOMINE HCL 20 MG
20 TABLET ORAL
Qty: 90 TABLET | Refills: 0 | Status: SHIPPED | OUTPATIENT
Start: 2021-03-03 | End: 2021-04-05 | Stop reason: CLARIF

## 2021-03-08 ENCOUNTER — HOSPITAL ENCOUNTER (EMERGENCY)
Facility: HOSPITAL | Age: 56
Discharge: HOME | End: 2021-03-08
Attending: EMERGENCY MEDICINE
Payer: MEDICARE

## 2021-03-08 ENCOUNTER — APPOINTMENT (EMERGENCY)
Dept: RADIOLOGY | Facility: HOSPITAL | Age: 56
End: 2021-03-08
Payer: MEDICARE

## 2021-03-08 VITALS
RESPIRATION RATE: 18 BRPM | TEMPERATURE: 98.2 F | HEART RATE: 81 BPM | SYSTOLIC BLOOD PRESSURE: 136 MMHG | DIASTOLIC BLOOD PRESSURE: 92 MMHG | OXYGEN SATURATION: 96 %

## 2021-03-08 DIAGNOSIS — S46.009A ROTATOR CUFF INJURY: Primary | ICD-10-CM

## 2021-03-08 PROCEDURE — 73030 X-RAY EXAM OF SHOULDER: CPT

## 2021-03-08 PROCEDURE — 99283 EMERGENCY DEPT VISIT LOW MDM: CPT

## 2021-03-08 PROCEDURE — 99285 EMERGENCY DEPT VISIT HI MDM: CPT | Performed by: EMERGENCY MEDICINE

## 2021-03-08 PROCEDURE — 96372 THER/PROPH/DIAG INJ SC/IM: CPT

## 2021-03-08 RX ORDER — KETOROLAC TROMETHAMINE 30 MG/ML
15 INJECTION, SOLUTION INTRAMUSCULAR; INTRAVENOUS ONCE
Status: COMPLETED | OUTPATIENT
Start: 2021-03-08 | End: 2021-03-08

## 2021-03-08 RX ORDER — OXYCODONE HYDROCHLORIDE 5 MG/1
5 TABLET ORAL EVERY 4 HOURS PRN
Qty: 12 TABLET | Refills: 0 | Status: SHIPPED | OUTPATIENT
Start: 2021-03-08 | End: 2021-04-05 | Stop reason: CLARIF

## 2021-03-08 RX ORDER — MORPHINE SULFATE 4 MG/ML
4 INJECTION, SOLUTION INTRAMUSCULAR; INTRAVENOUS ONCE
Status: COMPLETED | OUTPATIENT
Start: 2021-03-08 | End: 2021-03-08

## 2021-03-08 RX ORDER — ACETAMINOPHEN 325 MG/1
975 TABLET ORAL ONCE
Status: COMPLETED | OUTPATIENT
Start: 2021-03-08 | End: 2021-03-08

## 2021-03-08 RX ADMIN — ACETAMINOPHEN 975 MG: 325 TABLET ORAL at 17:19

## 2021-03-08 RX ADMIN — MORPHINE SULFATE 4 MG: 4 INJECTION INTRAVENOUS at 17:59

## 2021-03-08 RX ADMIN — KETOROLAC TROMETHAMINE 15 MG: 30 INJECTION, SOLUTION INTRAMUSCULAR at 17:21

## 2021-03-08 NOTE — ED NOTES
Adelaida Bertrand RN  03/08/21 2551
Discharged reviewed with pt  Pt verbalized understanding and has no further questions at this time  Pt ambulatory off unit with steady gait       Valente Benson, KASI  03/08/21 0530
Patient returned from [facility] Ruiz Khan RN  03/08/21 1862
Patient transported to Angelica Kay RN  03/08/21 9900
Provider at bedside       Tiffanie Navarro RN  03/08/21 7957
Detail Level: Detailed
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
## 2021-03-09 ENCOUNTER — OFFICE VISIT (OUTPATIENT)
Dept: OBGYN CLINIC | Facility: CLINIC | Age: 56
End: 2021-03-09
Payer: MEDICARE

## 2021-03-09 VITALS — HEIGHT: 70 IN | WEIGHT: 225 LBS | RESPIRATION RATE: 20 BRPM | BODY MASS INDEX: 32.21 KG/M2

## 2021-03-09 DIAGNOSIS — M24.812 INTERNAL DERANGEMENT OF LEFT SHOULDER: Primary | ICD-10-CM

## 2021-03-09 PROCEDURE — 99214 OFFICE O/P EST MOD 30 MIN: CPT | Performed by: ORTHOPAEDIC SURGERY
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
## 2021-03-10 ENCOUNTER — TELEPHONE (OUTPATIENT)
Dept: OBGYN CLINIC | Facility: HOSPITAL | Age: 56
End: 2021-03-10

## 2021-03-10 NOTE — TELEPHONE ENCOUNTER
Patient may take MRI order form to [facility] if he can get in earlier  Otherwise, follow up as scheduled  Continue OTC medications and ice application for pain relief

## 2021-03-10 NOTE — TELEPHONE ENCOUNTER
Patient sees Dr Calvin Flores    Patient just wanted to let you know that he has a MRI scheduled for 03/16 but he feels it is to far out, patient said he can go to the [facility] and get one sooner, what would you like to the patient to do? Patient is in a lot of pain      Taina  42  - [phone]

## 2021-03-16 ENCOUNTER — HOSPITAL ENCOUNTER (OUTPATIENT)
Dept: MRI IMAGING | Facility: CLINIC | Age: 56
Discharge: HOME | End: 2021-03-16
Payer: MEDICARE

## 2021-03-16 DIAGNOSIS — M24.812 INTERNAL DERANGEMENT OF LEFT SHOULDER: ICD-10-CM

## 2021-03-16 PROCEDURE — 73221 MRI JOINT UPR EXTREM W/O DYE: CPT

## 2021-03-16 PROCEDURE — G1004 CDSM NDSC: HCPCS

## 2021-03-22 ENCOUNTER — APPOINTMENT (EMERGENCY)
Dept: RADIOLOGY | Facility: HOSPITAL | Age: 56
End: 2021-03-22
Payer: MEDICARE

## 2021-03-22 ENCOUNTER — TELEPHONE (OUTPATIENT)
Dept: OBGYN CLINIC | Facility: HOSPITAL | Age: 56
End: 2021-03-22

## 2021-03-22 ENCOUNTER — HOSPITAL ENCOUNTER (EMERGENCY)
Facility: HOSPITAL | Age: 56
Discharge: HOME | End: 2021-03-22
Attending: EMERGENCY MEDICINE | Admitting: EMERGENCY MEDICINE
Payer: MEDICARE

## 2021-03-22 VITALS
OXYGEN SATURATION: 97 % | RESPIRATION RATE: 16 BRPM | WEIGHT: 223.11 LBS | TEMPERATURE: 98.6 F | DIASTOLIC BLOOD PRESSURE: 92 MMHG | HEART RATE: 95 BPM | SYSTOLIC BLOOD PRESSURE: 143 MMHG | BODY MASS INDEX: 32.01 KG/M2

## 2021-03-22 DIAGNOSIS — M23.91 INTERNAL DERANGEMENT OF RIGHT KNEE: Primary | ICD-10-CM

## 2021-03-22 PROCEDURE — 99283 EMERGENCY DEPT VISIT LOW MDM: CPT

## 2021-03-22 PROCEDURE — 99284 EMERGENCY DEPT VISIT MOD MDM: CPT | Performed by: PHYSICIAN ASSISTANT

## 2021-03-22 PROCEDURE — 73564 X-RAY EXAM KNEE 4 OR MORE: CPT

## 2021-03-22 NOTE — TELEPHONE ENCOUNTER
Patient is calling to let Dr Adriano Overton know that he is at the ED because his rt knee blew out  Patient is asking if there is any way of us putting an order in for an mri of rt knee while he is there  He states that he didn't do anything to it  It had been hurting but it blew out while walking up steps          21

## 2021-03-22 NOTE — ED PROVIDER NOTES
History  Chief Complaint   Patient presents with    Knee Pain     pt c/o right knee pain since yesterday  states that he is unable to bear weight  denies any acute injury     63 y o  male with past medical history significant for barretts esophagus, CAD, ulcerative colitis, and hyperlipidemia presents to ED with chief complaint of Right knee injury  Onset of symptoms reported as 1 day ago  Location of symptoms reported as right knee  Quality is reported as sharp pain/sensation of "giving out"  Severity is reported as moderate  Associated symptoms: denies right ankle or foot pain  Denies paralysis, paraesthesia to RLE  Denies rash  Denies right hip pain  Positive for right knee pain  Modifying factors: walking and weight bearing exacerbate pain    Context:  Patient states he was walking out of a meeting at [employer] yesterday and going up 1 step when he had a sudden pain to the inside of his right after which it felt like the knee was giving out on him  Following that episode he reports every time he bore weight on the knee it felt like it was going to give out on him  He tried any a drainage knee sleeve without improvement in symptoms  He denies acute fall or trauma to the knee  Denies any prior issues with right knee pain  Reports he is currently following with Dr Shauna Tony, Orthopedics regarding bone spurs and labral tear in his left shoulder  States he had an appointment with Ortho tomorrow regarding shoulder  Was questioning if he could possibly get MRI of knee today in anticipation of ortho appointment tomorrow  Reviewed past medical history and visits via [facility]: patient last seen in ed on 3/8/21 for evaluation of rotator cuff tendinitis  History provided by:  Patient   used: No    Knee Pain  Associated symptoms: no back pain, no fatigue, no fever and no neck pain        Prior to Admission Medications   Prescriptions Last Dose Informant Patient Reported? Taking? Glucosamine-Chondroitin (OSTEO BI-FLEX REGULAR STRENGTH PO)  Self Yes No   Sig: Take by mouth 2 (two) times a day   aspirin (ECOTRIN LOW STRENGTH) 81 mg EC tablet  Self No No   Sig: Take 1 tablet (81 mg total) by mouth daily   atorvastatin (LIPITOR) 80 mg tablet  Self Yes No   Sig: Take 80 mg by mouth daily   buPROPion (WELLBUTRIN SR) 150 mg 12 hr tablet  Self Yes No   buPROPion (WELLBUTRIN SR) 150 mg 12 hr tablet  Self No No   Sig: TAKE 2 TABLETS BY MOUTH DAILY   budesonide (ENTOCORT EC) 3 MG capsule  Self No No   Sig: TAKE 3 CAPSULES (9 MG TOTAL) BY MOUTH DAILY FOR 60 DAYS, THEN 2 CAPSULES (6 MG TOTAL) DAILY FOR 30 DAYS, THEN 1 CAPSULE (3 MG TOTAL) DAILY     clindamycin (CLEOCIN T) 1 % external solution  Self No No   Sig: Apply topically 2 (two) times a day   Patient not taking: Reported on 11/30/2020   clonazePAM (KLONOPIN) 0 5 mg tablet  Self Yes No   Sig: Take 1 5 mg by mouth daily at bedtime    dicyclomine (BENTYL) 20 mg tablet  Self No No   Sig: Take 1 tablet (20 mg total) by mouth 2 (two) times a day as needed (pain)   dicyclomine (BENTYL) 20 mg tablet   No No   Sig: Take 1 tablet (20 mg total) by mouth every 6 (six) hours for 20 doses   dicyclomine (BENTYL) 20 mg tablet  Self No No   Sig: Take 1 tablet (20 mg total) by mouth 3 (three) times a day as needed (For intestinal cramping and diarrhea) for up to 30 doses   dicyclomine (BENTYL) 20 mg tablet  Self No No   Sig: Take 1 tablet (20 mg total) by mouth 4 (four) times a day (before meals and at bedtime)   famotidine (PEPCID) 20 mg tablet  Self No No   Sig: Take 1 tablet (20 mg total) by mouth 2 (two) times a day   metoclopramide (REGLAN) 10 mg tablet  Self No No   Sig: Take 1 tablet (10 mg total) by mouth 3 (three) times a day as needed (abdominal pain)   metoclopramide (REGLAN) 10 mg tablet  Self No No   Sig: Take 1 tablet (10 mg total) by mouth every 6 (six) hours   Patient not taking: Reported on 12/10/2020   nitroglycerin (NITROSTAT) 0 4 mg SL tablet Self No No   Sig: Place 1 tablet (0 4 mg total) under the tongue every 5 (five) minutes as needed for chest pain   omeprazole (PriLOSEC) 20 mg delayed release capsule  Self No No   Sig: Take 1 capsule (20 mg total) by mouth daily   ondansetron (ZOFRAN-ODT) 4 mg disintegrating tablet  Self No No   Sig: Take 1 tablet (4 mg total) by mouth every 8 (eight) hours as needed for nausea or vomiting   ondansetron (ZOFRAN-ODT) 4 mg disintegrating tablet  Self No No   Sig: Take 1 tablet (4 mg total) by mouth every 8 (eight) hours as needed for nausea   ondansetron (ZOFRAN-ODT) 4 mg disintegrating tablet   No No   Sig: Take 1 tablet (4 mg total) by mouth every 8 (eight) hours as needed for nausea or vomiting   ondansetron (ZOFRAN-ODT) 4 mg disintegrating tablet  Self No No   Sig: Take 1 tablet (4 mg total) by mouth every 8 (eight) hours as needed for nausea or vomiting for up to 20 doses   oxyCODONE (ROXICODONE) 5 mg immediate release tablet  Self No No   Sig: Take 1 tablet (5 mg total) by mouth every 4 (four) hours as needed for moderate pain for up to 12 dosesMax Daily Amount: 30 mg   prochlorperazine (COMPAZINE) 25 mg suppository  Self No No   Sig: Insert 1 suppository (25 mg total) into the rectum every 12 (twelve) hours as needed for nausea or vomiting   sertraline (ZOLOFT) 100 mg tablet  Self Yes No   Sig: Take 100 mg by mouth daily    sucralfate (CARAFATE) 1 g tablet  Self No No   Sig: Take 1 tablet (1 g total) by mouth 4 (four) times a day      Facility-Administered Medications: None       Past Medical History:   Diagnosis Date    Diaz's esophagus     Colon polyp     Coronary artery disease     Depression     Diverticulitis     GERD (gastroesophageal reflux disease)     Hemorrhoid     History of heart artery stent     Hyperlipidemia     Hypertension     Microscopic colitis        Past Surgical History:   Procedure Laterality Date    ABDOMINAL SURGERY      radio frequency ablation    BONE GRAFT Left 2018  CARPAL TUNNEL RELEASE Right     COLONOSCOPY      EGD AND COLONOSCOPY      ESOPHAGOGASTRODUODENOSCOPY N/A 2/15/2018    Procedure: ESOPHAGOGASTRODUODENOSCOPY (EGD); Surgeon: Lupillo Flannery MD;  Location: [facility];  Service: Gastroenterology    ESOPHAGOGASTRODUODENOSCOPY N/A 12/10/2018    Procedure: ESOPHAGOGASTRODUODENOSCOPY (EGD); Surgeon: Precious Ortega MD;  Location: [facility]; Service: Gastroenterology    TONSILLECTOMY         Family History   Problem Relation Age of Onset    Heart disease Father     Melanoma Father     Cancer Sister     Skin cancer Sister     Skin cancer Mother      I have reviewed and agree with the history as documented  E-Cigarette/Vaping    E-Cigarette Use Never User      E-Cigarette/Vaping Substances    Nicotine No     THC No     CBD No     Flavoring No     Other No     Unknown No      Social History     Tobacco Use    Smoking status: Former Smoker     Packs/day: 0 50     Quit date: 2007     Years since quittin 1    Smokeless tobacco: Former User     Quit date: 1998    Tobacco comment: quit 10 yrs ago   Substance Use Topics    Alcohol use: Not Currently     Frequency: Never     Comment: quit 5 years    Drug use: Yes     Frequency: 3 0 times per week     Types: Marijuana     Comment: last used  (medical)       Review of Systems   Constitutional: Negative for activity change, appetite change, chills, diaphoresis, fatigue, fever and unexpected weight change  HENT: Negative for congestion, dental problem, drooling, ear discharge, ear pain, facial swelling, hearing loss, mouth sores, nosebleeds, postnasal drip, rhinorrhea, sinus pressure, sinus pain, sneezing, sore throat, tinnitus, trouble swallowing and voice change  Eyes: Negative for photophobia, pain, discharge, redness, itching and visual disturbance  Respiratory: Negative for cough, chest tightness, shortness of breath and wheezing      Cardiovascular: Negative for chest pain, palpitations and leg swelling  Gastrointestinal: Negative for abdominal distention, abdominal pain, constipation, diarrhea, nausea and vomiting  Endocrine: Negative for cold intolerance, heat intolerance, polydipsia, polyphagia and polyuria  Genitourinary: Negative for difficulty urinating, dysuria, flank pain, frequency, hematuria and urgency  Musculoskeletal: Positive for arthralgias and gait problem  Negative for back pain, joint swelling, myalgias, neck pain and neck stiffness  Skin: Negative for color change, pallor, rash and wound  Allergic/Immunologic: Negative for environmental allergies, food allergies and immunocompromised state  Neurological: Negative for dizziness, tremors, seizures, syncope, facial asymmetry, speech difficulty, weakness, light-headedness, numbness and headaches  Hematological: Negative for adenopathy  Does not bruise/bleed easily  Psychiatric/Behavioral: Negative for agitation, confusion and hallucinations  The patient is not nervous/anxious  All other systems reviewed and are negative  Physical Exam  Physical Exam  Vitals signs and nursing note reviewed  Constitutional:       General: He is not in acute distress  Appearance: Normal appearance  Comments: /92 (BP Location: Right arm)   Pulse 95   Temp 98 6 °F (37 °C) (Oral)   Resp 16   Wt 101 kg (223 lb 1 7 oz)   SpO2 97%   BMI 32 01 kg/m²      HENT:      Head: Normocephalic and atraumatic  Right Ear: External ear normal       Left Ear: External ear normal       Nose: Nose normal    Eyes:      General: No scleral icterus  Right eye: No discharge  Left eye: No discharge  Conjunctiva/sclera: Conjunctivae normal    Neck:      Musculoskeletal: Normal range of motion and neck supple  No neck rigidity or muscular tenderness  Cardiovascular:      Rate and Rhythm: Normal rate  Pulses: Normal pulses     Pulmonary:      Effort: Pulmonary effort is normal  No respiratory distress  Musculoskeletal:         General: Tenderness present  No deformity  Comments: Right Knee exam:  No gross deformity, mild diffuse swelling noted to anteriomedial knee  No overlying warmth or erythema  Patella midline without dislocation  Patellar tendon is nontender to palpation  Quadriceps and hamstrings are nontender to palpation  Medial joint line is tender to palpation  Lateral joint line is nontender to palpation  There is some prepatellar bursal swelling and tenderness to palpation to anterior inferomedial surface of knee  Anterior drawers test negative for laxity  No clicking or locking on ROM of knee  Posterior calf is nontender to palpation  No palpable cords or swelling  Lower leg compartments are all warm soft and well perfused  Right ankle and foot are normal inspection, nontender to palpation with full range of motion  Dorsalis pedis pulse and posterior tibial pulse 2/4 normal   Capillary refills less than 3 seconds to all toes  Strength is 5/5 and normal to the right knee into the right foot  The right hip is normal inspection, nontender to palpation all surfaces with full range of motion  Skin:     Capillary Refill: Capillary refill takes less than 2 seconds  Coloration: Skin is not jaundiced or pale  Findings: No erythema or rash  Neurological:      General: No focal deficit present  Mental Status: He is alert and oriented to person, place, and time  Mental status is at baseline  Cranial Nerves: No cranial nerve deficit  Sensory: No sensory deficit        Gait: Gait normal    Psychiatric:         Mood and Affect: Mood normal          Behavior: Behavior normal          Vital Signs  ED Triage Vitals   Temperature Pulse Respirations Blood Pressure SpO2   03/22/21 0911 03/22/21 0911 03/22/21 0911 03/22/21 0913 03/22/21 0911   98 6 °F (37 °C) 95 16 143/92 97 %      Temp Source Heart Rate Source Patient Position - Orthostatic VS BP Location FiO2 (%)   03/22/21 0911 03/22/21 0911 03/22/21 0913 03/22/21 0913 --   Oral Monitor Sitting Right arm       Pain Score       --                  Vitals:    03/22/21 0911 03/22/21 0913   BP:  143/92   Pulse: 95    Patient Position - Orthostatic VS:  Sitting         Visual Acuity      ED Medications  Medications - No data to display    Diagnostic Studies  Results Reviewed     None                 XR knee 4+ views Right injury    (Results Pending)              Procedures  Procedures         ED Course                                           MDM  Number of Diagnoses or Management Options  Internal derangement of right knee: new and requires workup  Diagnosis management comments: differential diagnosis includes but is not limited to: Fracture, sprain, strain, internal injury, osteoarthritis, arthritis, dislocation, patellar injury, Baker's cyst  Suspect possible internal derangement of R knee  Plan check xray - if negative for fracture, plan knee immobilizer, crutches, RICE and outpatient ortho follow up for further evaluation  Xray images Right knee independently visualized and interpreted by me - no acute fracture or dislocation  Results discussed with patient at bedside  Amount and/or Complexity of Data Reviewed  Tests in the radiology section of CPT®: ordered and reviewed  Discussion of test results with the performing providers: yes  Review and summarize past medical records: yes  Independent visualization of images, tracings, or specimens: yes    Risk of Complications, Morbidity, and/or Mortality  General comments: ED course:  50-year-old male presents to the emergency department after walking out of a meeting from [employer] yesterday and going up 1 step and feeling sudden pain in his right knee and a sensation that the knee was given out  He has been using a neoprene knee sleeve for the past 24 hours and taking Tylenol for treatment of pain without improvement in symptoms    He denies any prior knee injuries  He states he cannot walk without a limp and feels like it is going to give out  X-ray images demonstrate no acute fracture dislocation  Discussed with patient symptoms appear most consistent with possible internal derangement of the right knee  Discussed cannot exclude meniscus or ligamentous injury to the knee  Discussed treatment plan including rest, ice, elevation, use of crutches, use of knee immobilizer  Will add Voltaren gel to knee for pain  Instructed to follow up with primary care physician Orthopedics 1-2 days  The patient has an appointment scheduled with Orthopedics tomorrow are ready  Patient educated regarding use of crutches and knee immobilizer  Reviewed reasons to return to ed  Patient verbalized understanding of diagnosis and agreement with discharge plan of care as well as understanding of reasons to return to ed  I have reasonably determine that electronically prescribing a controlled substance would be impractical for the patient to obtain the controlled substance prescribed by electronic prescription or would cause an untimely delay resulting in an adverse impact on the patient's medical condition        Patient was seen during the outbreak of the corona virus epidemic   Resources are limited due to the severity of patient illnesses associated with virus   Testing is also limited at this time   Discussed with patient at the time of this evaluation   Due to the fact that limited resources are available -treatment options are limited  Splint check: location right knee,   Type static knee immobilizer, SILT, NVI, cap refill less than 3 seconds  Skin intact without redness or breakdown  Splint applied by ed tech  Splint checked by me         Patient Progress  Patient progress: stable      Disposition  Final diagnoses:   Internal derangement of right knee     Time reflects when diagnosis was documented in both MDM as applicable and the Disposition within this note     Time User Action Codes Description Comment    3/22/2021  9:43 AM Kendra Reidk Add [M23 91] Internal derangement of right knee       ED Disposition     ED Disposition Condition Date/Time Comment    Discharge Stable Mon Mar 22, 2021  9:43 AM Nunu Foster discharge to home/self care  Follow-up Information     Follow up With Specialties Details Why Contact Info Additional Information    Elicia Lopez MD Internal Medicine Call in 1 day for further evaluation of symptoms [address] [facility] 7[address] [facility] Emergency Department Emergency Medicine Call in 1 day If symptoms worsen, for further evaluation of symptoms 100 34 [facility] 109 [facility] Emergency Department, [address] [place] [address], [address] [place] [address],  Orthopedic Surgery Go in 1 day for further evaluation of symptoms [address] [place] [id number]  [phone]             Patient's Medications   Discharge Prescriptions    DICLOFENAC SODIUM (VOLTAREN) 1 %    Apply 2 g topically 4 (four) times a day for 7 days       Start Date: 3/22/2021 End Date: 3/29/2021       Order Dose: 2 g       Quantity: 1 Tube    Refills: 0     No discharge procedures on file      PDMP Review       Value Time User    PDMP Reviewed  Yes 2/10/2020  3:50 PM Masoud Moyer PA-C          ED Provider  Electronically Signed by           Niyah Henry PA-C  03/22/21 3245

## 2021-03-23 ENCOUNTER — OFFICE VISIT (OUTPATIENT)
Dept: OBGYN CLINIC | Facility: CLINIC | Age: 56
End: 2021-03-23
Payer: MEDICARE

## 2021-03-23 ENCOUNTER — APPOINTMENT (OUTPATIENT)
Dept: RADIOLOGY | Facility: CLINIC | Age: 56
End: 2021-03-23
Payer: MEDICARE

## 2021-03-23 VITALS
DIASTOLIC BLOOD PRESSURE: 89 MMHG | HEIGHT: 70 IN | HEART RATE: 93 BPM | RESPIRATION RATE: 20 BRPM | SYSTOLIC BLOOD PRESSURE: 133 MMHG | BODY MASS INDEX: 31.92 KG/M2 | WEIGHT: 223 LBS

## 2021-03-23 DIAGNOSIS — M75.112 INCOMPLETE TEAR OF LEFT ROTATOR CUFF, UNSPECIFIED WHETHER TRAUMATIC: ICD-10-CM

## 2021-03-23 DIAGNOSIS — M23.91 INTERNAL DERANGEMENT OF RIGHT KNEE: ICD-10-CM

## 2021-03-23 DIAGNOSIS — M25.561 ACUTE PAIN OF RIGHT KNEE: ICD-10-CM

## 2021-03-23 DIAGNOSIS — M25.561 ACUTE PAIN OF RIGHT KNEE: Primary | ICD-10-CM

## 2021-03-23 PROCEDURE — 73560 X-RAY EXAM OF KNEE 1 OR 2: CPT

## 2021-03-23 PROCEDURE — 99213 OFFICE O/P EST LOW 20 MIN: CPT | Performed by: ORTHOPAEDIC SURGERY

## 2021-03-23 NOTE — PROGRESS NOTES
Patient Name:  Ashish Luke  MRN:  [id number]    Assessment & Plan     1  Acute pain of right knee  -     XR knee 1 or 2 vw right; Future; Expected date: 03/23/2021    2  Internal derangement of right knee  -     MRI knee right  wo contrast; Future; Expected date: 03/23/2021    3  Incomplete tear of left rotator cuff, unspecified whether traumatic  -     Ambulatory referral to Physical Therapy; Future        63year old male with Left shoulder partial infraspinatus tear, Hill-Sachs lesion and Right knee  Internal derangement  In light of recent trauma to right knee, effusion and equivocal special testing, will move forward with MRI of right knee to rule out ligamentous pathology  Patient verbalized understanding and agreeable with above plan  In regards to left shoulder,  Brief discussion had with patient discussing conservative versus surgical management of rotator cuff tears  Can continue with conservative management with physical therapy, advised patient may be at risk for complete rotator cuff tear  Advised to move forward with MRI of right knee to rule out any ligamentous injury, meniscus tear that also would require surgical intervention  In the interim, We will place order for physical therapy for left shoulder for range of motion, strengthening scapulothoracic and postural exercises with modalities as needed  Patient will follow up in office once right knee MRI has been resulted and will further discuss surgical intervention of left shoulder  History of the Present Illness   Ashish Luke is a 63 y o  male with  Left shoulder pain and new onset right knee pain  At last appointment, patient was scheduled for MRI of left shoulder secondary to acute injury, pain and decreased range of motion  Today, patient reports his left shoulder pain has greatly improved over the past 2 days  He is able to perform overhead range of motion exercises, yet still complains of pain with certain motions     Two days ago, patient was walking up the stairs of a [employer] when his right knee gave out  Denies hearing or feeling a popping, Locking, or catching sensations  Patient reports immediate pain, swelling, and inability to bear full weight on right lower extremity  Patient reported to urgent care where x-rays were taken demonstrating no acute fracture, dislocation or significant degenerative change  Today, patient reports mild improvement since initial injury and ability to bear full  Weight despite use of 1 crutch  Patient reports he has been taking Tylenol over-the-counter every 4 hours for left shoulder pain and has only  Applied ice to right knee approximately 3 times since injury  Denies numbness and tingling rule left upper extremity, right lower extremity, history of shoulder dislocations  Requiring reduction  Review of Systems     Review of Systems   Constitutional: Negative for chills and fever  HENT: Negative for ear pain and sore throat  Eyes: Negative for pain and visual disturbance  Respiratory: Negative for cough and shortness of breath  Cardiovascular: Negative for chest pain and palpitations  Gastrointestinal: Negative for abdominal pain and vomiting  Genitourinary: Negative for dysuria and hematuria  Musculoskeletal: Negative for back pain and myalgias  Skin: Negative for color change and rash  Neurological: Negative for seizures and syncope  All other systems reviewed and are negative  Physical Exam     /89   Pulse 93   Resp 20   Ht 5' 10" (1 778 m)   Wt 101 kg (223 lb)   BMI 32 00 kg/m²     Right knee: Range of motion from  0 to  100 with pain at end range flexion and terminal knee extension  There is  no crepitus with range of motion  There is  moderate effusion  There is tenderness over the Medial, posterior medial joint line  There is  intact quadriceps strength and tone  The patient  Can perform a straight leg raise    No gross anterior translation of proximal tibia with anterior drawer; posterior drawer test negative  Equivocal Lachman, mild laxity noted  There is pain noted with valgus stress at 0 and 30 degrees, but no laxity noted with varus or valgus stress at 0 or 30  Degrees of knee flexion  Ree's testing is equivocal medially  The patient is neurovascular intact distally  Left shoulder: Active range of motion to 160 degrees forward flexion, 140 degrees abduction, 45-50 degrees external rotation, and internal rotation to L5  There is mild tenderness present over the proximal biceps tendon, greater tuberosity of humerus  Empty can testing is positive with weakess and some pain  There is 4/5 strength with external rotation testing at the side  Belly press test is negative  López test is positive  Big Horn's test is positive  Speed's test is equivocal  Load and shift with guarding and pain, without laxity  The patient is neurovascularly intact distally in the extremity  Data Review     I have personally reviewed pertinent films in PACS, and my interpretation follows  MRI performed 2021 of Left shoulder demonstrates complete full thickness tear of infraspinatus with 9mm of retraction of tendon, biceps tendonitis  Standing Xrays taken today of Right knee demonstrates mild medial joint space narrowing without significant degenerative changes noted  No acute fracture or dislocation noted       Social History     Tobacco Use    Smoking status: Former Smoker     Packs/day: 0 50     Quit date: 2007     Years since quittin 2    Smokeless tobacco: Former User     Quit date: 1998    Tobacco comment: quit 10 yrs ago   Substance Use Topics    Alcohol use: Not Currently     Frequency: Never     Comment: quit 5 years    Drug use: Yes     Frequency: 3 0 times per week     Types: Marijuana           Procedures   None    Daysi Primvamsi, DO

## 2021-03-29 ENCOUNTER — HOSPITAL ENCOUNTER (OUTPATIENT)
Dept: RADIOLOGY | Facility: IMAGING CENTER | Age: 56
Discharge: HOME | End: 2021-03-29
Payer: MEDICARE

## 2021-03-29 DIAGNOSIS — M23.91 INTERNAL DERANGEMENT OF RIGHT KNEE: ICD-10-CM

## 2021-03-29 PROCEDURE — 73721 MRI JNT OF LWR EXTRE W/O DYE: CPT

## 2021-04-01 ENCOUNTER — OFFICE VISIT (OUTPATIENT)
Dept: OBGYN CLINIC | Facility: CLINIC | Age: 56
End: 2021-04-01
Payer: MEDICARE

## 2021-04-01 VITALS
SYSTOLIC BLOOD PRESSURE: 124 MMHG | WEIGHT: 224 LBS | HEART RATE: 81 BPM | BODY MASS INDEX: 32.07 KG/M2 | HEIGHT: 70 IN | DIASTOLIC BLOOD PRESSURE: 82 MMHG

## 2021-04-01 DIAGNOSIS — S46.812A TEAR OF LEFT INFRASPINATUS TENDON, INITIAL ENCOUNTER: Primary | ICD-10-CM

## 2021-04-01 DIAGNOSIS — M25.512 LEFT SHOULDER PAIN, UNSPECIFIED CHRONICITY: ICD-10-CM

## 2021-04-01 DIAGNOSIS — M25.461 EFFUSION OF RIGHT KNEE: ICD-10-CM

## 2021-04-01 DIAGNOSIS — S83.241A OTHER TEAR OF MEDIAL MENISCUS OF RIGHT KNEE AS CURRENT INJURY, INITIAL ENCOUNTER: ICD-10-CM

## 2021-04-01 PROCEDURE — 20610 DRAIN/INJ JOINT/BURSA W/O US: CPT | Performed by: ORTHOPAEDIC SURGERY

## 2021-04-01 PROCEDURE — 99214 OFFICE O/P EST MOD 30 MIN: CPT | Performed by: ORTHOPAEDIC SURGERY

## 2021-04-01 RX ORDER — CHLORHEXIDINE GLUCONATE 4 G/100ML
SOLUTION TOPICAL DAILY PRN
Status: CANCELLED | OUTPATIENT
Start: 2021-04-01

## 2021-04-01 RX ORDER — CHLORHEXIDINE GLUCONATE 0.12 MG/ML
15 RINSE ORAL ONCE
Status: CANCELLED | OUTPATIENT
Start: 2021-04-01 | End: 2021-04-01

## 2021-04-01 RX ADMIN — BUPIVACAINE HYDROCHLORIDE 2 ML: 2.5 INJECTION, SOLUTION INFILTRATION; PERINEURAL at 12:40

## 2021-04-01 RX ADMIN — METHYLPREDNISOLONE ACETATE 2 ML: 40 INJECTION, SUSPENSION INTRA-ARTICULAR; INTRALESIONAL; INTRAMUSCULAR; SOFT TISSUE at 12:40

## 2021-04-01 RX ADMIN — LIDOCAINE HYDROCHLORIDE 2 ML: 10 INJECTION, SOLUTION INFILTRATION; PERINEURAL at 12:40

## 2021-04-01 NOTE — H&P (VIEW-ONLY)
Patient Name:  Ashish Luke  MRN:  [id number]    Assessment & Plan     1  Tear of left infraspinatus tendon, initial encounter  -     Case request operating room: REPAIR ROTATOR CUFF  ARTHROSCOPIC; POSSIBLE BICEPS TENOTOMY, POSSIBLE ACROMIOPLASTY; Standing  -     Ambulatory referral to [facility]; Future  -     Basic metabolic panel; Future  -     CBC and differential; Future  -     EKG 12 lead; Future  -     XR chest pa & lateral; Future; Expected date: 04/01/2021  -     Case request operating room: REPAIR ROTATOR CUFF  ARTHROSCOPIC; POSSIBLE BICEPS TENOTOMY, POSSIBLE ACROMIOPLASTY    2  Left shoulder pain, unspecified chronicity   -     CBC and differential; Future    3  Other tear of medial meniscus of right knee as current injury, initial encounter  -     Large joint arthrocentesis    4  Effusion of right knee  -     Large joint arthrocentesis              69-year-old male with left shoulder infraspinatus tear as well as right knee medial meniscal root tear  At this time the patient complains of more shoulder pain the knee pain  He denies any mechanical symptoms in the right knee no locking or catching  No instability in the right knee  Aspiration of the right knee was performed today yielded 25 cc of clear yellow synovial fluid  Using the same portal cortisone injection was administered into the right knee today as well this was tolerated well by the patient  Explained to the patient that there is increased risk of arthritis with medial meniscal root tear if it is not treated  Explained to the patient that we will re-evaluate the right knee after left shoulder surgery and consider medial meniscal root repair at that time  As far as the left shoulder infraspinatus tear is concerned,I have discussed in great detail with the patient risks and benefits of both operative and non operative intervention   The patient has had many years of pain and weakness and subjective instability episodes despite multiple conservative modalities  He has elected to proceed forward with operative intervention  Risks involved with surgery, including but not limited to, infection, neurovascular injury, residual pain, need for subsequent surgery,  Failure of repair, retear, shoulder stiffness were discussed in great detail  The patient understands and would like to proceed forward with  left shoulder arthroscopic rotator cuff repair  Also consented for possible biceps tenotomy possible acromioplasty based on intraoperative  Arthroscopic evaluation  He will need standard clearance and screening labs and tests  Explained the patient he will be nonweightbearing and no active use in the shoulder in postop sling for 6 weeks after surgery  Gerhardt Sep History of the Present Illness   Mary Kate Chan is a 63 y o  male with right knee and left shoulder pain  He is here for MRI review of the R knee as well as to talk about the left shoulder  He reports no locking or clicking in the right knee since the initial injury, no repeat injury since the initial injury  He has pain in the medial aspect of the knee  Sharp, intermittent, and mild  No instability  In the left shoulder  He continues to have anterior and lateral shoulder pain and associated weakness with shoulder and abduction  Pain is moderate to severe worse with lifting and overhead activities  Feels that 1 week ago it may have dislocated at work he had a sharp pain in the left anterior shoulder with his shoulder forward flexed, had sharp pain for several minutes but didn't feel a pop  No numbness or tingling in the LUE or RLE  Review of Systems     Review of Systems   Constitutional: Negative for chills, fever and unexpected weight change  HENT: Negative for hearing loss, nosebleeds and sore throat  Eyes: Negative for pain, redness and visual disturbance  Respiratory: Negative for cough, shortness of breath and wheezing      Cardiovascular: Negative for chest pain, palpitations and leg swelling  Gastrointestinal: Negative for abdominal pain, nausea and vomiting  Endocrine: Negative for polydipsia and polyuria  Genitourinary: Negative for dysuria and hematuria  Musculoskeletal:          As noted in HPI   Skin: Negative for rash and wound  Neurological: Negative for dizziness, numbness and headaches  Psychiatric/Behavioral: Negative for decreased concentration, dysphoric mood and suicidal ideas  The patient is not nervous/anxious  Physical Exam     /82   Pulse 81   Ht 5' 10" (1 778 m)   Wt 102 kg (224 lb)   BMI 32 14 kg/m²     Left shoulder    Active range of motion to 160 degrees forward flexion, 160degrees abduction, 50 degrees external rotation, and internal rotation to lower thoracic  Passive range of motion to 90 ER/IR  There is no tenderness present over the biceps tendon or AC joint  Empty can testing is positive  There is 4-/5 strength with external rotation testing at the side  Belly press test is  negative  López test is positive  [place]ibo's test is positive  Speed's test is equivocal   Negative horn blowers  There is a negative cross-arm adduction test    Negative apprehension  Negative load and shift    Right knee:    Range of motion from 0 to 130  With pain with terminal flexion  There is  no crepitus with range of motion  There is  A small effusion  There is tenderness over the  Medial and posterior medial joint line  There is  normal quadriceps strength and tone  The patient  can perform a straight leg raise  There is  no varus or valgus instability  Ree's testing is  negative  The patient is neurovascular intact distally  The patient is neurovascularly intact distally in the extremity  Data Review     I have personally reviewed pertinent films in PACS, and my interpretation follows       MRI of the right knee  From 03/29/2021demonstrates moderate to severe cartilage thinning in the trochlea, No articular cartilage loss in the medial compartment  There was a meniscal root tear with extrusion without flip fragment  Small joint effusion    Social History     Tobacco Use    Smoking status: Former Smoker     Packs/day: 0 50     Quit date: 2007     Years since quittin 2    Smokeless tobacco: Former User     Quit date: 1998    Tobacco comment: quit 10 yrs ago   Substance Use Topics    Alcohol use: Not Currently     Frequency: Never     Comment: quit 5 years    Drug use: Yes     Frequency: 3 0 times per week     Types: Marijuana           Large joint arthrocentesis: R knee  Universal Protocol:  Consent given by: patient  Time out: Immediately prior to procedure a "time out" was called to verify the correct patient, procedure, equipment, support staff and site/side marked as required    Site marked: the operative site was marked  Supporting Documentation  Indications: pain   Procedure Details  Location: knee - R knee  Preparation: Patient was prepped and draped in the usual sterile fashion  Needle size: 22 G  Approach: anterolateral  Medications administered: 2 mL bupivacaine 0 25 %; 2 mL lidocaine 1 %; 2 mL methylPREDNISolone acetate 40 mg/mL    Aspirate amount: 25 mL  Aspirate: clear, serous and yellow    Patient tolerance: patient tolerated the procedure well with no immediate complications  Dressing:  Sterile dressing applied          Liz Mayers PA-C

## 2021-04-01 NOTE — PROGRESS NOTES
Patient Name:  Patrick Vallejo  MRN:  [id number]    Assessment & Plan     1  Tear of left infraspinatus tendon, initial encounter  -     Case request operating room: REPAIR ROTATOR CUFF  ARTHROSCOPIC; POSSIBLE BICEPS TENOTOMY, POSSIBLE ACROMIOPLASTY; Standing  -     Ambulatory referral to [facility]; Future  -     Basic metabolic panel; Future  -     CBC and differential; Future  -     EKG 12 lead; Future  -     XR chest pa & lateral; Future; Expected date: 04/01/2021  -     Case request operating room: REPAIR ROTATOR CUFF  ARTHROSCOPIC; POSSIBLE BICEPS TENOTOMY, POSSIBLE ACROMIOPLASTY    2  Left shoulder pain, unspecified chronicity   -     CBC and differential; Future    3  Other tear of medial meniscus of right knee as current injury, initial encounter  -     Large joint arthrocentesis    4  Effusion of right knee  -     Large joint arthrocentesis              78-year-old male with left shoulder infraspinatus tear as well as right knee medial meniscal root tear  At this time the patient complains of more shoulder pain the knee pain  He denies any mechanical symptoms in the right knee no locking or catching  No instability in the right knee  Aspiration of the right knee was performed today yielded 25 cc of clear yellow synovial fluid  Using the same portal cortisone injection was administered into the right knee today as well this was tolerated well by the patient  Explained to the patient that there is increased risk of arthritis with medial meniscal root tear if it is not treated  Explained to the patient that we will re-evaluate the right knee after left shoulder surgery and consider medial meniscal root repair at that time  As far as the left shoulder infraspinatus tear is concerned,I have discussed in great detail with the patient risks and benefits of both operative and non operative intervention   The patient has had many years of pain and weakness and subjective instability episodes despite multiple conservative modalities  He has elected to proceed forward with operative intervention  Risks involved with surgery, including but not limited to, infection, neurovascular injury, residual pain, need for subsequent surgery,  Failure of repair, retear, shoulder stiffness were discussed in great detail  The patient understands and would like to proceed forward with  left shoulder arthroscopic rotator cuff repair  Also consented for possible biceps tenotomy possible acromioplasty based on intraoperative  Arthroscopic evaluation  He will need standard clearance and screening labs and tests  Explained the patient he will be nonweightbearing and no active use in the shoulder in postop sling for 6 weeks after surgery  Brionna Organ History of the Present Illness   Ellen Porras is a 63 y o  male with right knee and left shoulder pain  He is here for MRI review of the R knee as well as to talk about the left shoulder  He reports no locking or clicking in the right knee since the initial injury, no repeat injury since the initial injury  He has pain in the medial aspect of the knee  Sharp, intermittent, and mild  No instability  In the left shoulder  He continues to have anterior and lateral shoulder pain and associated weakness with shoulder and abduction  Pain is moderate to severe worse with lifting and overhead activities  Feels that 1 week ago it may have dislocated at work he had a sharp pain in the left anterior shoulder with his shoulder forward flexed, had sharp pain for several minutes but didn't feel a pop  No numbness or tingling in the LUE or RLE  Review of Systems     Review of Systems   Constitutional: Negative for chills, fever and unexpected weight change  HENT: Negative for hearing loss, nosebleeds and sore throat  Eyes: Negative for pain, redness and visual disturbance  Respiratory: Negative for cough, shortness of breath and wheezing      Cardiovascular: Negative for chest pain, palpitations and leg swelling  Gastrointestinal: Negative for abdominal pain, nausea and vomiting  Endocrine: Negative for polydipsia and polyuria  Genitourinary: Negative for dysuria and hematuria  Musculoskeletal:          As noted in HPI   Skin: Negative for rash and wound  Neurological: Negative for dizziness, numbness and headaches  Psychiatric/Behavioral: Negative for decreased concentration, dysphoric mood and suicidal ideas  The patient is not nervous/anxious  Physical Exam     /82   Pulse 81   Ht 5' 10" (1 778 m)   Wt 102 kg (224 lb)   BMI 32 14 kg/m²     Left shoulder    Active range of motion to 160 degrees forward flexion, 160degrees abduction, 50 degrees external rotation, and internal rotation to lower thoracic  Passive range of motion to 90 ER/IR  There is no tenderness present over the biceps tendon or AC joint  Empty can testing is positive  There is 4-/5 strength with external rotation testing at the side  Belly press test is  negative  López test is positive  Nolan's test is positive  Speed's test is equivocal   Negative horn blowers  There is a negative cross-arm adduction test    Negative apprehension  Negative load and shift    Right knee:    Range of motion from 0 to 130  With pain with terminal flexion  There is  no crepitus with range of motion  There is  A small effusion  There is tenderness over the  Medial and posterior medial joint line  There is  normal quadriceps strength and tone  The patient  can perform a straight leg raise  There is  no varus or valgus instability  Ree's testing is  negative  The patient is neurovascular intact distally  The patient is neurovascularly intact distally in the extremity  Data Review     I have personally reviewed pertinent films in PACS, and my interpretation follows       MRI of the right knee  From 03/29/2021demonstrates moderate to severe cartilage thinning in the trochlea, No articular cartilage loss in the medial compartment  There was a meniscal root tear with extrusion without flip fragment  Small joint effusion    Social History     Tobacco Use    Smoking status: Former Smoker     Packs/day: 0 50     Quit date: 2007     Years since quittin 2    Smokeless tobacco: Former User     Quit date: 1998    Tobacco comment: quit 10 yrs ago   Substance Use Topics    Alcohol use: Not Currently     Frequency: Never     Comment: quit 5 years    Drug use: Yes     Frequency: 3 0 times per week     Types: Marijuana           Large joint arthrocentesis: R knee  Universal Protocol:  Consent given by: patient  Time out: Immediately prior to procedure a "time out" was called to verify the correct patient, procedure, equipment, support staff and site/side marked as required    Site marked: the operative site was marked  Supporting Documentation  Indications: pain   Procedure Details  Location: knee - R knee  Preparation: Patient was prepped and draped in the usual sterile fashion  Needle size: 22 G  Approach: anterolateral  Medications administered: 2 mL bupivacaine 0 25 %; 2 mL lidocaine 1 %; 2 mL methylPREDNISolone acetate 40 mg/mL    Aspirate amount: 25 mL  Aspirate: clear, serous and yellow    Patient tolerance: patient tolerated the procedure well with no immediate complications  Dressing:  Sterile dressing applied          Linda Calle PA-C

## 2021-04-02 RX ORDER — METHYLPREDNISOLONE ACETATE 40 MG/ML
2 INJECTION, SUSPENSION INTRA-ARTICULAR; INTRALESIONAL; INTRAMUSCULAR; SOFT TISSUE
Status: COMPLETED | OUTPATIENT
Start: 2021-04-01 | End: 2021-04-01

## 2021-04-02 RX ORDER — BUPIVACAINE HYDROCHLORIDE 2.5 MG/ML
2 INJECTION, SOLUTION INFILTRATION; PERINEURAL
Status: COMPLETED | OUTPATIENT
Start: 2021-04-01 | End: 2021-04-01

## 2021-04-02 RX ORDER — LIDOCAINE HYDROCHLORIDE 10 MG/ML
2 INJECTION, SOLUTION INFILTRATION; PERINEURAL
Status: COMPLETED | OUTPATIENT
Start: 2021-04-01 | End: 2021-04-01

## 2021-04-05 ENCOUNTER — APPOINTMENT (EMERGENCY)
Dept: RADIOLOGY | Facility: HOSPITAL | Age: 56
End: 2021-04-05
Payer: MEDICARE

## 2021-04-05 ENCOUNTER — APPOINTMENT (EMERGENCY)
Dept: CT IMAGING | Facility: HOSPITAL | Age: 56
End: 2021-04-05
Payer: MEDICARE

## 2021-04-05 ENCOUNTER — HOSPITAL ENCOUNTER (OUTPATIENT)
Facility: HOSPITAL | Age: 56
Setting detail: OBSERVATION
Discharge: HOME | End: 2021-04-06
Attending: EMERGENCY MEDICINE | Admitting: INTERNAL MEDICINE
Payer: MEDICARE

## 2021-04-05 DIAGNOSIS — R73.9 ELEVATED BLOOD SUGAR: ICD-10-CM

## 2021-04-05 DIAGNOSIS — R11.2 NAUSEA AND VOMITING: ICD-10-CM

## 2021-04-05 DIAGNOSIS — R10.9 ABDOMINAL PAIN: ICD-10-CM

## 2021-04-05 DIAGNOSIS — R07.9 CHEST PAIN: Primary | ICD-10-CM

## 2021-04-05 DIAGNOSIS — Z13.6 ENCOUNTER FOR SCREENING FOR CARDIOVASCULAR DISORDERS: ICD-10-CM

## 2021-04-05 LAB
ALBUMIN SERPL BCP-MCNC: 3.9 G/DL (ref 3.5–5)
ALP SERPL-CCNC: 75 U/L (ref 46–116)
ALT SERPL W P-5'-P-CCNC: 29 U/L (ref 12–78)
ANION GAP SERPL CALCULATED.3IONS-SCNC: 10 MMOL/L (ref 4–13)
AST SERPL W P-5'-P-CCNC: 11 U/L (ref 5–45)
ATRIAL RATE: 62 BPM
BASOPHILS # BLD AUTO: 0.03 THOUSANDS/ΜL (ref 0–0.1)
BASOPHILS NFR BLD AUTO: 0 % (ref 0–1)
BILIRUB SERPL-MCNC: 0.31 MG/DL (ref 0.2–1)
BUN SERPL-MCNC: 18 MG/DL (ref 5–25)
CALCIUM SERPL-MCNC: 9 MG/DL (ref 8.3–10.1)
CHLORIDE SERPL-SCNC: 103 MMOL/L (ref 100–108)
CO2 SERPL-SCNC: 27 MMOL/L (ref 21–32)
CREAT SERPL-MCNC: 1.08 MG/DL (ref 0.6–1.3)
EOSINOPHIL # BLD AUTO: 0.02 THOUSAND/ΜL (ref 0–0.61)
EOSINOPHIL NFR BLD AUTO: 0 % (ref 0–6)
ERYTHROCYTE [DISTWIDTH] IN BLOOD BY AUTOMATED COUNT: 13.3 % (ref 11.6–15.1)
FLUAV RNA RESP QL NAA+PROBE: NEGATIVE
FLUBV RNA RESP QL NAA+PROBE: NEGATIVE
GFR SERPL CREATININE-BSD FRML MDRD: 76 ML/MIN/1.73SQ M
GLUCOSE SERPL-MCNC: 138 MG/DL (ref 65–140)
HCT VFR BLD AUTO: 43.8 % (ref 36.5–49.3)
HGB BLD-MCNC: 14.2 G/DL (ref 12–17)
IMM GRANULOCYTES # BLD AUTO: 0.09 THOUSAND/UL (ref 0–0.2)
IMM GRANULOCYTES NFR BLD AUTO: 1 % (ref 0–2)
LACTATE SERPL-SCNC: 1.4 MMOL/L (ref 0.5–2)
LIPASE SERPL-CCNC: 87 U/L (ref 73–393)
LYMPHOCYTES # BLD AUTO: 1.11 THOUSANDS/ΜL (ref 0.6–4.47)
LYMPHOCYTES NFR BLD AUTO: 8 % (ref 14–44)
MCH RBC QN AUTO: 28.1 PG (ref 26.8–34.3)
MCHC RBC AUTO-ENTMCNC: 32.4 G/DL (ref 31.4–37.4)
MCV RBC AUTO: 87 FL (ref 82–98)
MONOCYTES # BLD AUTO: 0.57 THOUSAND/ΜL (ref 0.17–1.22)
MONOCYTES NFR BLD AUTO: 4 % (ref 4–12)
NEUTROPHILS # BLD AUTO: 12.5 THOUSANDS/ΜL (ref 1.85–7.62)
NEUTS SEG NFR BLD AUTO: 87 % (ref 43–75)
NRBC BLD AUTO-RTO: 0 /100 WBCS
NT-PROBNP SERPL-MCNC: 45 PG/ML
P AXIS: 67 DEGREES
PLATELET # BLD AUTO: 310 THOUSANDS/UL (ref 149–390)
PLATELET # BLD AUTO: 324 THOUSANDS/UL (ref 149–390)
PMV BLD AUTO: 9.2 FL (ref 8.9–12.7)
PMV BLD AUTO: 9.3 FL (ref 8.9–12.7)
POTASSIUM SERPL-SCNC: 4.1 MMOL/L (ref 3.5–5.3)
PR INTERVAL: 176 MS
PROT SERPL-MCNC: 7.4 G/DL (ref 6.4–8.2)
QRS AXIS: 8 DEGREES
QRSD INTERVAL: 96 MS
QT INTERVAL: 422 MS
QTC INTERVAL: 428 MS
RBC # BLD AUTO: 5.06 MILLION/UL (ref 3.88–5.62)
RSV RNA RESP QL NAA+PROBE: NEGATIVE
SARS-COV-2 RNA RESP QL NAA+PROBE: NEGATIVE
SODIUM SERPL-SCNC: 140 MMOL/L (ref 136–145)
T WAVE AXIS: 57 DEGREES
TROPONIN I SERPL-MCNC: <0.02 NG/ML
VENTRICULAR RATE: 62 BPM
WBC # BLD AUTO: 14.32 THOUSAND/UL (ref 4.31–10.16)

## 2021-04-05 PROCEDURE — 74174 CTA ABD&PLVS W/CONTRAST: CPT

## 2021-04-05 PROCEDURE — 84484 ASSAY OF TROPONIN QUANT: CPT | Performed by: PHYSICIAN ASSISTANT

## 2021-04-05 PROCEDURE — 83880 ASSAY OF NATRIURETIC PEPTIDE: CPT | Performed by: EMERGENCY MEDICINE

## 2021-04-05 PROCEDURE — 96376 TX/PRO/DX INJ SAME DRUG ADON: CPT

## 2021-04-05 PROCEDURE — 83690 ASSAY OF LIPASE: CPT | Performed by: EMERGENCY MEDICINE

## 2021-04-05 PROCEDURE — 83605 ASSAY OF LACTIC ACID: CPT | Performed by: EMERGENCY MEDICINE

## 2021-04-05 PROCEDURE — 80061 LIPID PANEL: CPT

## 2021-04-05 PROCEDURE — 93005 ELECTROCARDIOGRAM TRACING: CPT

## 2021-04-05 PROCEDURE — 85025 COMPLETE CBC W/AUTO DIFF WBC: CPT | Performed by: EMERGENCY MEDICINE

## 2021-04-05 PROCEDURE — 71275 CT ANGIOGRAPHY CHEST: CPT

## 2021-04-05 PROCEDURE — 85049 AUTOMATED PLATELET COUNT: CPT | Performed by: PHYSICIAN ASSISTANT

## 2021-04-05 PROCEDURE — 96361 HYDRATE IV INFUSION ADD-ON: CPT

## 2021-04-05 PROCEDURE — 80053 COMPREHEN METABOLIC PANEL: CPT | Performed by: EMERGENCY MEDICINE

## 2021-04-05 PROCEDURE — 84484 ASSAY OF TROPONIN QUANT: CPT | Performed by: INTERNAL MEDICINE

## 2021-04-05 PROCEDURE — 0241U HB NFCT DS VIR RESP RNA 4 TRGT: CPT | Performed by: EMERGENCY MEDICINE

## 2021-04-05 PROCEDURE — G1004 CDSM NDSC: HCPCS

## 2021-04-05 PROCEDURE — 99285 EMERGENCY DEPT VISIT HI MDM: CPT | Performed by: EMERGENCY MEDICINE

## 2021-04-05 PROCEDURE — 71045 X-RAY EXAM CHEST 1 VIEW: CPT

## 2021-04-05 PROCEDURE — 84484 ASSAY OF TROPONIN QUANT: CPT | Performed by: EMERGENCY MEDICINE

## 2021-04-05 PROCEDURE — 93010 ELECTROCARDIOGRAM REPORT: CPT | Performed by: INTERNAL MEDICINE

## 2021-04-05 PROCEDURE — 96374 THER/PROPH/DIAG INJ IV PUSH: CPT

## 2021-04-05 PROCEDURE — 99219 PR INITIAL OBSERVATION CARE/DAY 50 MINUTES: CPT | Performed by: PHYSICIAN ASSISTANT

## 2021-04-05 PROCEDURE — 96375 TX/PRO/DX INJ NEW DRUG ADDON: CPT

## 2021-04-05 PROCEDURE — 36415 COLL VENOUS BLD VENIPUNCTURE: CPT | Performed by: EMERGENCY MEDICINE

## 2021-04-05 PROCEDURE — 99285 EMERGENCY DEPT VISIT HI MDM: CPT

## 2021-04-05 RX ORDER — ACETAMINOPHEN 325 MG/1
650 TABLET ORAL EVERY 6 HOURS PRN
Status: DISCONTINUED | OUTPATIENT
Start: 2021-04-05 | End: 2021-04-06 | Stop reason: HOSPADM

## 2021-04-05 RX ORDER — ONDANSETRON 2 MG/ML
4 INJECTION INTRAMUSCULAR; INTRAVENOUS EVERY 4 HOURS PRN
Status: DISCONTINUED | OUTPATIENT
Start: 2021-04-05 | End: 2021-04-06 | Stop reason: HOSPADM

## 2021-04-05 RX ORDER — SODIUM CHLORIDE 9 MG/ML
75 INJECTION, SOLUTION INTRAVENOUS CONTINUOUS
Status: DISCONTINUED | OUTPATIENT
Start: 2021-04-05 | End: 2021-04-06 | Stop reason: HOSPADM

## 2021-04-05 RX ORDER — CLONAZEPAM 0.5 MG/1
0.5 TABLET ORAL 2 TIMES DAILY PRN
Status: DISCONTINUED | OUTPATIENT
Start: 2021-04-05 | End: 2021-04-05

## 2021-04-05 RX ORDER — ASPIRIN 81 MG/1
324 TABLET, CHEWABLE ORAL ONCE
Status: COMPLETED | OUTPATIENT
Start: 2021-04-05 | End: 2021-04-05

## 2021-04-05 RX ORDER — ONDANSETRON 2 MG/ML
4 INJECTION INTRAMUSCULAR; INTRAVENOUS ONCE
Status: COMPLETED | OUTPATIENT
Start: 2021-04-05 | End: 2021-04-05

## 2021-04-05 RX ORDER — MORPHINE SULFATE 10 MG/ML
6 INJECTION, SOLUTION INTRAMUSCULAR; INTRAVENOUS ONCE
Status: COMPLETED | OUTPATIENT
Start: 2021-04-05 | End: 2021-04-05

## 2021-04-05 RX ORDER — SODIUM CHLORIDE 9 MG/ML
3 INJECTION INTRAVENOUS
Status: DISCONTINUED | OUTPATIENT
Start: 2021-04-05 | End: 2021-04-06 | Stop reason: HOSPADM

## 2021-04-05 RX ORDER — MORPHINE SULFATE 4 MG/ML
4 INJECTION, SOLUTION INTRAMUSCULAR; INTRAVENOUS EVERY 4 HOURS PRN
Status: DISCONTINUED | OUTPATIENT
Start: 2021-04-05 | End: 2021-04-06 | Stop reason: HOSPADM

## 2021-04-05 RX ORDER — ASPIRIN 81 MG/1
81 TABLET ORAL DAILY
Status: DISCONTINUED | OUTPATIENT
Start: 2021-04-06 | End: 2021-04-06 | Stop reason: HOSPADM

## 2021-04-05 RX ORDER — AMLODIPINE BESYLATE 5 MG/1
5 TABLET ORAL DAILY
Status: DISCONTINUED | OUTPATIENT
Start: 2021-04-05 | End: 2021-04-06 | Stop reason: HOSPADM

## 2021-04-05 RX ORDER — MORPHINE SULFATE 4 MG/ML
4 INJECTION, SOLUTION INTRAMUSCULAR; INTRAVENOUS ONCE
Status: COMPLETED | OUTPATIENT
Start: 2021-04-05 | End: 2021-04-05

## 2021-04-05 RX ORDER — CLONAZEPAM 0.5 MG/1
0.5 TABLET ORAL EVERY 12 HOURS SCHEDULED
Status: DISCONTINUED | OUTPATIENT
Start: 2021-04-05 | End: 2021-04-06 | Stop reason: HOSPADM

## 2021-04-05 RX ORDER — DICYCLOMINE HCL 20 MG
20 TABLET ORAL 2 TIMES DAILY PRN
Status: DISCONTINUED | OUTPATIENT
Start: 2021-04-05 | End: 2021-04-05

## 2021-04-05 RX ORDER — BUPROPION HYDROCHLORIDE 150 MG/1
300 TABLET ORAL DAILY
Status: DISCONTINUED | OUTPATIENT
Start: 2021-04-06 | End: 2021-04-06 | Stop reason: HOSPADM

## 2021-04-05 RX ORDER — PANTOPRAZOLE SODIUM 40 MG/1
40 TABLET, DELAYED RELEASE ORAL
Status: DISCONTINUED | OUTPATIENT
Start: 2021-04-06 | End: 2021-04-06

## 2021-04-05 RX ORDER — ATORVASTATIN CALCIUM 40 MG/1
80 TABLET, FILM COATED ORAL DAILY
Status: DISCONTINUED | OUTPATIENT
Start: 2021-04-06 | End: 2021-04-06 | Stop reason: HOSPADM

## 2021-04-05 RX ORDER — DICYCLOMINE HCL 20 MG
20 TABLET ORAL 2 TIMES DAILY PRN
Status: DISCONTINUED | OUTPATIENT
Start: 2021-04-05 | End: 2021-04-06 | Stop reason: HOSPADM

## 2021-04-05 RX ORDER — HYDRALAZINE HYDROCHLORIDE 20 MG/ML
5 INJECTION INTRAMUSCULAR; INTRAVENOUS EVERY 6 HOURS PRN
Status: DISCONTINUED | OUTPATIENT
Start: 2021-04-05 | End: 2021-04-06 | Stop reason: HOSPADM

## 2021-04-05 RX ORDER — SERTRALINE HYDROCHLORIDE 100 MG/1
100 TABLET, FILM COATED ORAL DAILY
Status: DISCONTINUED | OUTPATIENT
Start: 2021-04-06 | End: 2021-04-06 | Stop reason: HOSPADM

## 2021-04-05 RX ADMIN — IOHEXOL 100 ML: 350 INJECTION, SOLUTION INTRAVENOUS at 15:51

## 2021-04-05 RX ADMIN — SODIUM CHLORIDE 1000 ML: 0.9 INJECTION, SOLUTION INTRAVENOUS at 15:16

## 2021-04-05 RX ADMIN — ONDANSETRON 4 MG: 2 INJECTION INTRAMUSCULAR; INTRAVENOUS at 15:16

## 2021-04-05 RX ADMIN — MORPHINE SULFATE 4 MG: 4 INJECTION INTRAVENOUS at 16:43

## 2021-04-05 RX ADMIN — SODIUM CHLORIDE 75 ML/HR: 0.9 INJECTION, SOLUTION INTRAVENOUS at 19:46

## 2021-04-05 RX ADMIN — ONDANSETRON 4 MG: 2 INJECTION INTRAMUSCULAR; INTRAVENOUS at 20:15

## 2021-04-05 RX ADMIN — CLONAZEPAM 0.5 MG: 0.5 TABLET ORAL at 20:35

## 2021-04-05 RX ADMIN — MORPHINE SULFATE 6 MG: 10 INJECTION INTRAVENOUS at 15:16

## 2021-04-05 RX ADMIN — MORPHINE SULFATE 4 MG: 4 INJECTION INTRAVENOUS at 20:15

## 2021-04-05 RX ADMIN — ASPIRIN 324 MG: 81 TABLET, CHEWABLE ORAL at 16:58

## 2021-04-05 NOTE — ASSESSMENT & PLAN NOTE
· Patient with history of CAD status post KARLA x2 to distal and proximal RCA in 2019  · Follows with cardiology as an outpatient  · Continue aspirin, statin  · Patient presenting with chest pain, troponin negative x1, trend serially  · Telemetry monitoring  · Cardiology consultation  · Monitor, additional plan as above

## 2021-04-05 NOTE — ASSESSMENT & PLAN NOTE
· Patient presented with left-sided chest pain following multiple bouts of vomiting  · Patient reports the sensation is a dull ache, intermittent  · Patient with history of PCI x2 in 2019  · TREVON score 3  · Obtain cardiology consultation  · Troponin x1 negative, trend serially  · Telemetry monitoring  · Improvement noted status post IV morphine in the ER

## 2021-04-05 NOTE — ASSESSMENT & PLAN NOTE
· Patient reports chronic history of intermittent diarrhea, microscopic colitis/Diaz's esophagus   · Patient was having N/V/D outpatient, possibly viral gastroenteritis  · Pt reports foul smelling stools, obtain c  Diff and enteric stool panel   · Continue regular diet as tolerated  · Supportive care, status post IV fluid bolus in the ER  · Continue gentle IV fluid hydration overnight  · Continue patient's p o   Budesonide taper as outlined by GI  · Last episode of vomiting several hours ago, monitor closely  · Continue Bentyl and PPI   · Follows with GI outpatient, recently recommended to obtain gastric emptying study in the outpatient setting

## 2021-04-05 NOTE — ASSESSMENT & PLAN NOTE
· Hypertensive urgency noted on admission with BP of 181/85  · Does not appear to be on any antihypertensives at home  · Will start on p o   Amlodipine 5 mg daily  · Unable to start beta-blocker in setting of history of baseline sinus bradycardia  · P r n  IV hydralazine for SBP greater than 180

## 2021-04-05 NOTE — ASSESSMENT & PLAN NOTE
· POA, WBC 14 32 on admission  · Does have fluctuating leukocytosis as an outpatient per review of prior record  · Could be reactive in setting of recent nausea/vomiting versus viral gastroenteritis versus outpatient budesonide therapy  · Continue supportive care  · COVID swab negative  · CTA dissection protocol unremarkable  · Chest x-ray unremarkable  · Monitor CBC in the a m

## 2021-04-05 NOTE — ASSESSMENT & PLAN NOTE
· Mood currently appears stable  · Continue Wellbutrin 300 mg daily and Zoloft 100 mg daily  · Monitor

## 2021-04-05 NOTE — H&P
[address] [facility]  H&P- Nila Tse 1965, 63 y o  male MRN: [id number]  Unit/Bed#: ED 07 Encounter: [id number]  Primary Care Provider: Cesario Gonzalez MD   Date and time admitted to hospital: 4/5/2021  2:21 PM       DOS: 4/5/2021  * Chest pain  Assessment & Plan  · Patient presented with left-sided chest pain following multiple bouts of vomiting  · Patient reports the sensation is a dull ache, intermittent  · Patient with history of PCI x2 in 2019  · TREVON score 3  · Obtain cardiology consultation  · Troponin x1 negative, trend serially  · Telemetry monitoring  · Improvement noted status post IV morphine in the ER    Coronary artery disease  Assessment & Plan  · Patient with history of CAD status post KARLA x2 to distal and proximal RCA in 2019  · Follows with cardiology as an outpatient  · Continue aspirin, statin  · Patient presenting with chest pain, troponin negative x1, trend serially  · Telemetry monitoring  · Cardiology consultation  · Monitor, additional plan as above    Leukocytosis  Assessment & Plan  · POA, WBC 14 32 on admission  · Does have fluctuating leukocytosis as an outpatient per review of prior record  · Could be reactive in setting of recent nausea/vomiting versus viral gastroenteritis versus outpatient budesonide therapy  · Continue supportive care  · COVID swab negative  · CTA dissection protocol unremarkable  · Chest x-ray unremarkable  · Monitor CBC in the a m  Non-intractable vomiting with nausea  Assessment & Plan  · Patient reports chronic history of intermittent diarrhea, microscopic colitis/Diaz's esophagus   · Patient was having N/V/D outpatient, possibly viral gastroenteritis  · Pt reports foul smelling stools, obtain c  Diff and enteric stool panel   · Continue regular diet as tolerated  · Supportive care, status post IV fluid bolus in the ER  · Continue gentle IV fluid hydration overnight  · Continue patient's p o   Budesonide taper as outlined by GI  · Last episode of vomiting several hours ago, monitor closely  · Continue Bentyl and PPI   · Follows with GI outpatient, recently recommended to obtain gastric emptying study in the outpatient setting    Obesity (BMI 30 0-34  9)  Assessment & Plan  · As evidenced by BMI of 31 57  · Encourage lifestyle modification and weight loss    Dyslipidemia  Assessment & Plan  · Continue Lipitor 80 mg daily    Essential hypertension  Assessment & Plan  · Hypertensive urgency noted on admission with BP of 181/85  · Does not appear to be on any antihypertensives at home  · Will start on p o  Amlodipine 5 mg daily  · Unable to start beta-blocker in setting of history of baseline sinus bradycardia  · P r n  IV hydralazine for SBP greater than 180    Anxiety and depression  Assessment & Plan  · Mood currently appears stable  · Continue Wellbutrin 300 mg daily and Zoloft 100 mg daily  · Monitor    VTE Prophylaxis: Enoxaparin (Lovenox)  / sequential compression device   Code Status:  Level 1-full code  POLST: There is no POLST form on file for this patient (pre-hospital)  Discussion with family:  Discussed with patient at bedside regarding plan of care    Anticipated Length of Stay:  Patient will be admitted on an Observation basis with an anticipated length of stay of  < 2 midnights  Justification for Hospital Stay:  Patient presenting with chest pain, nausea and vomiting requiring continuous cardiac monitoring and Cardiology consultation    Total Time for Visit, including Counseling / Coordination of Care: 30 minutes  Greater than 50% of this total time spent on direct patient counseling and coordination of care      Chief Complaint:  "I was having a lot of vomiting "    History of Present Illness:    Enedina Moore is a 63 y o  male with significant past medical history of Diaz's esophagus, CAD status post PCI, depression and anxiety, GERD, hyperlipidemia, hypertension, microscopic colitis who presents with nausea, vomiting and diarrhea with associated chest pain x1 day  Patient reports that he began to not feel well yesterday but states that he pushed through secondary to it being his son's birthday  He reports that he woke up at 5:00 a m  This morning and started with significant episodes of nausea, vomiting and foul-smelling diarrhea  He reports that he he has had intermittent episodes of this for several years for which he follows up with GI  He states that he has microscopic colitis and history of Diaz's esophagus  He reports that the last episode of vomiting was about 2:00 a m  Today, he denies any nausea but does not feel like eating anything currently  He reports that after he had multiple episodes of vomiting and retching he developed a left-sided dull aching pain  He reports that this has been intermittent, recently received morphine with improvement  Denied any blood in the stool or vomitus today  Review of Systems:    Review of Systems   Constitutional: Positive for appetite change and fatigue  Negative for chills, diaphoresis and fever  HENT: Negative for congestion, sinus pressure, sneezing, sore throat and tinnitus  Eyes: Negative  Negative for visual disturbance  Respiratory: Positive for shortness of breath  Negative for cough, chest tightness and wheezing  Cardiovascular: Positive for chest pain  Negative for palpitations and leg swelling  Gastrointestinal: Positive for abdominal pain, diarrhea, nausea and vomiting  Negative for constipation  Genitourinary: Negative for difficulty urinating, dysuria, hematuria and urgency  Musculoskeletal: Negative for back pain, joint swelling and myalgias  Skin: Negative for color change, pallor and rash  Neurological: Negative for dizziness, light-headedness and headaches         Past Medical and Surgical History:     Past Medical History:   Diagnosis Date    Diaz's esophagus     Colon polyp     Coronary artery disease     Depression     Diverticulitis     GERD (gastroesophageal reflux disease)     Hemorrhoid     History of heart artery stent     Hyperlipidemia     Hypertension     Microscopic colitis        Past Surgical History:   Procedure Laterality Date    ABDOMINAL SURGERY      radio frequency ablation    BONE GRAFT Left 2018    CARPAL TUNNEL RELEASE Right     COLONOSCOPY      EGD AND COLONOSCOPY      ESOPHAGOGASTRODUODENOSCOPY N/A 2/15/2018    Procedure: ESOPHAGOGASTRODUODENOSCOPY (EGD); Surgeon: Eric Aleman MD;  Location: [facility];  Service: Gastroenterology    ESOPHAGOGASTRODUODENOSCOPY N/A 12/10/2018    Procedure: ESOPHAGOGASTRODUODENOSCOPY (EGD); Surgeon: Anthony Brennan MD;  Location: [facility]; Service: Gastroenterology    TONSILLECTOMY         Meds/Allergies:    Prior to Admission medications    Medication Sig Start Date End Date Taking? Authorizing Provider   aspirin (ECOTRIN LOW STRENGTH) 81 mg EC tablet Take 1 tablet (81 mg total) by mouth daily 2/10/20   Clive Osborn PA-C   atorvastatin (LIPITOR) 80 mg tablet Take 80 mg by mouth daily    Historical Provider, MD   budesonide (ENTOCORT EC) 3 MG capsule TAKE 3 CAPSULES (9 MG TOTAL) BY MOUTH DAILY FOR 60 DAYS, THEN 2 CAPSULES (6 MG TOTAL) DAILY FOR 30 DAYS, THEN 1 CAPSULE (3 MG TOTAL) DAILY   12/22/20 4/21/21  Janneth Chen PA-C   buPROPion [facility] SR) 150 mg 12 hr tablet  7/15/20   Historical Provider, MD   buPROPion [facility]) 150 mg 12 hr tablet TAKE 2 TABLETS BY MOUTH DAILY 1/6/21   Janneth Chen PA-C   clindamycin (CLEOCIN T) 1 % external solution Apply topically 2 (two) times a day  Patient not taking: Reported on 4/1/2021 11/17/20   Cristi Mata MD   clonazePAM (KLONOPIN) 0 5 mg tablet Take 1 5 mg by mouth daily at bedtime     Historical Provider, MD   Diclofenac Sodium (VOLTAREN) 1 % Apply 2 g topically 4 (four) times a day for 7 days  Patient not taking: Reported on 3/23/2021 3/22/21 3/29/21  Catrina Maldonado PA-C   dicyclomine (BENTYL) 20 mg tablet Take 1 tablet (20 mg total) by mouth 2 (two) times a day as needed (pain) 5/13/20   Arabella Joyce MD   dicyclomine (BENTYL) 20 mg tablet Take 1 tablet (20 mg total) by mouth every 6 (six) hours for 20 doses 8/31/20 9/5/20  Kameron Fleming MD   dicyclomine (BENTYL) 20 mg tablet Take 1 tablet (20 mg total) by mouth 3 (three) times a day as needed (For intestinal cramping and diarrhea) for up to 30 doses  Patient not taking: Reported on 4/1/2021 11/29/20   RICKEY Lubin   dicyclomine (BENTYL) 20 mg tablet Take 1 tablet (20 mg total) by mouth 4 (four) times a day (before meals and at bedtime)  Patient not taking: Reported on 4/1/2021 3/3/21   Bruno Quinonez PA-C   famotidine (PEPCID) 20 mg tablet Take 1 tablet (20 mg total) by mouth 2 (two) times a day  Patient not taking: Reported on 3/23/2021 9/2/20   Brennan Contreras MD   Glucosamine-Chondroitin (OSTEO BI-FLEX REGULAR STRENGTH PO) Take by mouth 2 (two) times a day    Historical Provider, MD   metoclopramide (REGLAN) 10 mg tablet Take 1 tablet (10 mg total) by mouth 3 (three) times a day as needed (abdominal pain)  Patient not taking: Reported on 4/1/2021 5/10/20   Felipe Clark MD   metoclopramide (REGLAN) 10 mg tablet Take 1 tablet (10 mg total) by mouth every 6 (six) hours  Patient not taking: Reported on 4/1/2021 12/4/20   Bruno Quinonez PA-C   nitroglycerin (NITROSTAT) 0 4 mg SL tablet Place 1 tablet (0 4 mg total) under the tongue every 5 (five) minutes as needed for chest pain 1/7/19   Camilla Pacheco DO   omeprazole (PriLOSEC) 20 mg delayed release capsule Take 1 capsule (20 mg total) by mouth daily 12/6/19   Bakari Martínez PA-C   ondansetron (ZOFRAN-ODT) 4 mg disintegrating tablet Take 1 tablet (4 mg total) by mouth every 8 (eight) hours as needed for nausea or vomiting 12/6/19   Bakari Martínez PA-C   ondansetron (ZOFRAN-ODT) 4 mg disintegrating tablet Take 1 tablet (4 mg total) by mouth every 8 (eight) hours as needed for nausea  Patient not taking: Reported on 2021   Ashli Sanchez MD   ondansetron (ZOFRAN-ODT) 4 mg disintegrating tablet Take 1 tablet (4 mg total) by mouth every 8 (eight) hours as needed for nausea or vomiting 20  Flex Weston MD   ondansetron (ZOFRAN-ODT) 4 mg disintegrating tablet Take 1 tablet (4 mg total) by mouth every 8 (eight) hours as needed for nausea or vomiting for up to 20 doses 20   RICKEY Briggs   oxyCODONE (ROXICODONE) 5 mg immediate release tablet Take 1 tablet (5 mg total) by mouth every 4 (four) hours as needed for moderate pain for up to 12 dosesMax Daily Amount: 30 mg  Patient not taking: Reported on 3/23/2021 3/8/21   Kimberly Pricne DO   prochlorperazine (COMPAZINE) 25 mg suppository Insert 1 suppository (25 mg total) into the rectum every 12 (twelve) hours as needed for nausea or vomiting 20   Kalin Diallo PA-C   sertraline (ZOLOFT) 100 mg tablet Take 100 mg by mouth daily     Historical Provider, MD   sucralfate (CARAFATE) 1 g tablet Take 1 tablet (1 g total) by mouth 4 (four) times a day 20   Taylor Lamas MD     I have reviewed home medications with patient personally  Allergies:    Allergies   Allergen Reactions    No Active Allergies     Rosuvastatin Myalgia       Social History:     Marital Status: /Civil Union   Occupation:   Patient Pre-hospital Living Situation: Pt lives at home  Patient Pre-hospital Level of Mobility: Full mobility   Patient Pre-hospital Diet Restrictions: none  Substance Use History:   Social History     Substance and Sexual Activity   Alcohol Use Not Currently    Frequency: Never    Comment: quit 5 years     Social History     Tobacco Use   Smoking Status Former Smoker    Packs/day: 0 50    Quit date: 2007    Years since quittin 2   Smokeless Tobacco Former User    Quit date: 1998   Tobacco Comment    quit 10 yrs ago     Social History     Substance and Sexual Activity   Drug Use Yes    Frequency: 3 0 times per week    Types: Marijuana       Family History:    non-contributory    Physical Exam:     Vitals:   Blood Pressure: (!) 175/89 (04/05/21 1830)  Pulse: 83 (04/05/21 1830)  Temperature: 98 3 °F (36 8 °C) (04/05/21 1830)  Temp Source: Oral (04/05/21 1830)  Respirations: 18 (04/05/21 1830)  Height: 5' 10" (177 8 cm) (04/05/21 1418)  Weight - Scale: 99 8 kg (220 lb) (04/05/21 1418)  SpO2: 95 % (04/05/21 1830)    Physical Exam  Vitals signs reviewed  Constitutional:       General: He is not in acute distress  Appearance: He is not toxic-appearing  Comments: Patient is in no acute distress lying in his hospital bed resting comfortably   HENT:      Head: Normocephalic and atraumatic  Eyes:      Conjunctiva/sclera: Conjunctivae normal       Pupils: Pupils are equal, round, and reactive to light  Cardiovascular:      Rate and Rhythm: Normal rate and regular rhythm  Pulses: Normal pulses  Comments: No chest wall tenderness to palpation  Pulmonary:      Effort: Pulmonary effort is normal  No respiratory distress  Breath sounds: Normal breath sounds  No wheezing  Abdominal:      General: Bowel sounds are normal  There is no distension  Palpations: Abdomen is soft  Tenderness: There is no abdominal tenderness  There is no guarding  Musculoskeletal:      Right lower leg: No edema  Left lower leg: No edema  Skin:     General: Skin is warm  Findings: No erythema  Neurological:      Mental Status: He is alert  Psychiatric:         Mood and Affect: Mood normal          Additional Data:     Lab Results: I have personally reviewed pertinent reports        Results from last 7 days   Lab Units 04/05/21  1511   WBC Thousand/uL 14 32*   HEMOGLOBIN g/dL 14 2   HEMATOCRIT % 43 8   PLATELETS Thousands/uL 324   NEUTROS PCT % 87*   LYMPHS PCT % 8*   MONOS PCT % 4   EOS PCT % 0     Results from last 7 days   Lab Units 04/05/21  1511 SODIUM mmol/L 140   POTASSIUM mmol/L 4 1   CHLORIDE mmol/L 103   CO2 mmol/L 27   BUN mg/dL 18   CREATININE mg/dL 1 08   ANION GAP mmol/L 10   CALCIUM mg/dL 9 0   ALBUMIN g/dL 3 9   TOTAL BILIRUBIN mg/dL 0 31   ALK PHOS U/L 75   ALT U/L 29   AST U/L 11   GLUCOSE RANDOM mg/dL 138                 Results from last 7 days   Lab Units 04/05/21  1512   LACTIC ACID mmol/L 1 4       Imaging: I have personally reviewed pertinent reports  CTA dissection protocol chest/abdomen/pelvis   Final Result by Pam Cohen MD (04/05 5587)   Motion artifacts in the aortic root  No aneurysm or dissection involving the thoracic, abdominal aorta and branches  Workstation performed: [id number]         X-ray chest 1 view portable   Final Result by Aria Chavez MD (04/05 5833)   No acute cardiopulmonary disease  Findings are stable               Workstation performed: [id number]             EKG, Pathology, and Other Studies Reviewed on Admission:   · EKG: No ischemic change   · CTA and cXR results reviewed     Allscripts / [facility] Records Reviewed: Yes     ** Please Note: This note has been constructed using a voice recognition system   **

## 2021-04-05 NOTE — ED PROVIDER NOTES
History  Chief Complaint   Patient presents with    Chest Pain     Pt reports CP and SOB that began at around 1100 this am  Also c/o abd pain, fatigue, vomitting, and dirrhea for two days   Shortness of Breath     62 y/o male, hx of CAD s/p stent placement, HTN, and HLD, presents to the ED for cp, sob, and pain, and nausea/vomiting  Patient states that he has chronic GI issues  States that he has chronic N/V and abd pain  He reports that this morning around 7am he developed nausea and vomtiing x multiple episodes of NBNB emesis  He states that he also developed left sided chest pressure since 10am this morning  States that it is an intermittent chest pressure that radiates to his L jaw  Nothing worsens or improves it  He also states that he has L shoulder pain, which he states is not new and 2/2 recent rotator cuff tear that he needs to get surgery on  He denies any f/c, d/c, urinary symptoms, or cough  No known recent illnesses  States that he does have associated sob and diaphoresis  No other complaints  History provided by:  Patient  Chest Pain  Pain location:  L chest  Pain quality: pressure    Pain radiates to:  L jaw  Pain radiates to the back: no    Pain severity:  Moderate  Onset quality:  Sudden  Timing:  Intermittent  Progression:  Worsening  Chronicity:  New  Relieved by:  None tried  Worsened by:  Nothing tried  Ineffective treatments:  None tried  Associated symptoms: abdominal pain, nausea, shortness of breath and vomiting    Associated symptoms: no back pain, no cough, no fever, no headache, no numbness and no weakness    Risk factors: coronary artery disease and hypertension    Shortness of Breath  Associated symptoms: abdominal pain, chest pain and vomiting    Associated symptoms: no cough, no ear pain, no fever, no headaches, no neck pain, no rash, no sore throat and no wheezing        Prior to Admission Medications   Prescriptions Last Dose Informant Patient Reported? Taking? Diclofenac Sodium (VOLTAREN) 1 %  Self No No   Sig: Apply 2 g topically 4 (four) times a day for 7 days   Patient not taking: Reported on 3/23/2021   Glucosamine-Chondroitin (OSTEO BI-FLEX REGULAR STRENGTH PO)  Self Yes No   Sig: Take by mouth 2 (two) times a day   aspirin (ECOTRIN LOW STRENGTH) 81 mg EC tablet  Self No No   Sig: Take 1 tablet (81 mg total) by mouth daily   atorvastatin (LIPITOR) 80 mg tablet  Self Yes No   Sig: Take 80 mg by mouth daily   buPROPion (WELLBUTRIN SR) 150 mg 12 hr tablet  Self Yes No   buPROPion (WELLBUTRIN SR) 150 mg 12 hr tablet  Self No No   Sig: TAKE 2 TABLETS BY MOUTH DAILY   budesonide (ENTOCORT EC) 3 MG capsule  Self No No   Sig: TAKE 3 CAPSULES (9 MG TOTAL) BY MOUTH DAILY FOR 60 DAYS, THEN 2 CAPSULES (6 MG TOTAL) DAILY FOR 30 DAYS, THEN 1 CAPSULE (3 MG TOTAL) DAILY     clindamycin (CLEOCIN T) 1 % external solution  Self No No   Sig: Apply topically 2 (two) times a day   Patient not taking: Reported on 4/1/2021   clonazePAM (KLONOPIN) 0 5 mg tablet  Self Yes No   Sig: Take 1 5 mg by mouth daily at bedtime    dicyclomine (BENTYL) 20 mg tablet  Self No No   Sig: Take 1 tablet (20 mg total) by mouth 2 (two) times a day as needed (pain)   dicyclomine (BENTYL) 20 mg tablet   No No   Sig: Take 1 tablet (20 mg total) by mouth every 6 (six) hours for 20 doses   dicyclomine (BENTYL) 20 mg tablet  Self No No   Sig: Take 1 tablet (20 mg total) by mouth 3 (three) times a day as needed (For intestinal cramping and diarrhea) for up to 30 doses   Patient not taking: Reported on 4/1/2021   dicyclomine (BENTYL) 20 mg tablet  Self No No   Sig: Take 1 tablet (20 mg total) by mouth 4 (four) times a day (before meals and at bedtime)   Patient not taking: Reported on 4/1/2021   famotidine (PEPCID) 20 mg tablet  Self No No   Sig: Take 1 tablet (20 mg total) by mouth 2 (two) times a day   Patient not taking: Reported on 3/23/2021   metoclopramide (REGLAN) 10 mg tablet  Self No No   Sig: Take 1 tablet (10 mg total) by mouth 3 (three) times a day as needed (abdominal pain)   Patient not taking: Reported on 4/1/2021   metoclopramide (REGLAN) 10 mg tablet  Self No No   Sig: Take 1 tablet (10 mg total) by mouth every 6 (six) hours   Patient not taking: Reported on 4/1/2021   nitroglycerin (NITROSTAT) 0 4 mg SL tablet  Self No No   Sig: Place 1 tablet (0 4 mg total) under the tongue every 5 (five) minutes as needed for chest pain   omeprazole (PriLOSEC) 20 mg delayed release capsule  Self No No   Sig: Take 1 capsule (20 mg total) by mouth daily   ondansetron (ZOFRAN-ODT) 4 mg disintegrating tablet  Self No No   Sig: Take 1 tablet (4 mg total) by mouth every 8 (eight) hours as needed for nausea or vomiting   ondansetron (ZOFRAN-ODT) 4 mg disintegrating tablet  Self No No   Sig: Take 1 tablet (4 mg total) by mouth every 8 (eight) hours as needed for nausea   Patient not taking: Reported on 4/1/2021   ondansetron (ZOFRAN-ODT) 4 mg disintegrating tablet   No No   Sig: Take 1 tablet (4 mg total) by mouth every 8 (eight) hours as needed for nausea or vomiting   ondansetron (ZOFRAN-ODT) 4 mg disintegrating tablet  Self No No   Sig: Take 1 tablet (4 mg total) by mouth every 8 (eight) hours as needed for nausea or vomiting for up to 20 doses   oxyCODONE (ROXICODONE) 5 mg immediate release tablet  Self No No   Sig: Take 1 tablet (5 mg total) by mouth every 4 (four) hours as needed for moderate pain for up to 12 dosesMax Daily Amount: 30 mg   Patient not taking: Reported on 3/23/2021   prochlorperazine (COMPAZINE) 25 mg suppository  Self No No   Sig: Insert 1 suppository (25 mg total) into the rectum every 12 (twelve) hours as needed for nausea or vomiting   sertraline (ZOLOFT) 100 mg tablet  Self Yes No   Sig: Take 100 mg by mouth daily    sucralfate (CARAFATE) 1 g tablet  Self No No   Sig: Take 1 tablet (1 g total) by mouth 4 (four) times a day      Facility-Administered Medications: None       Past Medical History: Diagnosis Date    Diaz's esophagus     Colon polyp     Coronary artery disease     Depression     Diverticulitis     GERD (gastroesophageal reflux disease)     Hemorrhoid     History of heart artery stent     Hyperlipidemia     Hypertension     Microscopic colitis        Past Surgical History:   Procedure Laterality Date    ABDOMINAL SURGERY      radio frequency ablation    BONE GRAFT Left 2018    CARPAL TUNNEL RELEASE Right     COLONOSCOPY      EGD AND COLONOSCOPY      ESOPHAGOGASTRODUODENOSCOPY N/A 2/15/2018    Procedure: ESOPHAGOGASTRODUODENOSCOPY (EGD); Surgeon: Ruba Leonard MD;  Location: [facility];  Service: Gastroenterology    ESOPHAGOGASTRODUODENOSCOPY N/A 12/10/2018    Procedure: ESOPHAGOGASTRODUODENOSCOPY (EGD); Surgeon: Mee Nazario MD;  Location: [facility]; Service: Gastroenterology    TONSILLECTOMY         Family History   Problem Relation Age of Onset    Heart disease Father     Melanoma Father     Cancer Sister     Skin cancer Sister     Skin cancer Mother      I have reviewed and agree with the history as documented  E-Cigarette/Vaping    E-Cigarette Use Never User      E-Cigarette/Vaping Substances    Nicotine No     THC No     CBD No     Flavoring No     Other No     Unknown No      Social History     Tobacco Use    Smoking status: Former Smoker     Packs/day: 0 50     Quit date: 2007     Years since quittin 2    Smokeless tobacco: Former User     Quit date: 1998    Tobacco comment: quit 10 yrs ago   Substance Use Topics    Alcohol use: Not Currently     Frequency: Never     Comment: quit 5 years    Drug use: Yes     Frequency: 3 0 times per week     Types: Marijuana       Review of Systems   Constitutional: Negative for chills and fever  HENT: Negative for congestion, ear pain and sore throat  Eyes: Negative for pain and visual disturbance  Respiratory: Positive for shortness of breath   Negative for cough and wheezing  Cardiovascular: Positive for chest pain  Negative for leg swelling  Gastrointestinal: Positive for abdominal pain, nausea and vomiting  Negative for diarrhea  Genitourinary: Negative for dysuria, frequency, hematuria and urgency  Musculoskeletal: Negative for back pain, neck pain and neck stiffness  Skin: Negative for rash and wound  Neurological: Negative for weakness, numbness and headaches  Psychiatric/Behavioral: Negative for agitation and confusion  All other systems reviewed and are negative  Physical Exam  Physical Exam  Vitals signs and nursing note reviewed  Constitutional:       Appearance: He is well-developed  HENT:      Head: Normocephalic and atraumatic  Eyes:      Pupils: Pupils are equal, round, and reactive to light  Neck:      Musculoskeletal: Normal range of motion and neck supple  Cardiovascular:      Rate and Rhythm: Normal rate and regular rhythm  Pulmonary:      Effort: Pulmonary effort is normal       Breath sounds: Normal breath sounds  Abdominal:      General: Bowel sounds are normal       Palpations: Abdomen is soft  Tenderness: There is abdominal tenderness in the epigastric area  Musculoskeletal: Normal range of motion  Skin:     General: Skin is warm and dry  Neurological:      General: No focal deficit present  Mental Status: He is alert and oriented to person, place, and time        Comments: No focal deficits         Vital Signs  ED Triage Vitals   Temperature Pulse Respirations Blood Pressure SpO2   04/05/21 1418 04/05/21 1418 04/05/21 1418 04/05/21 1418 04/05/21 1418   97 5 °F (36 4 °C) 69 12 (!) 173/100 99 %      Temp Source Heart Rate Source Patient Position - Orthostatic VS BP Location FiO2 (%)   04/05/21 1418 04/05/21 1418 04/05/21 1418 04/05/21 1418 --   Oral Monitor Sitting Right arm       Pain Score       04/05/21 1626       9           Vitals:    04/05/21 1418 04/05/21 1626   BP: (!) 173/100 (!) 181/85   Pulse: 69 76   Patient Position - Orthostatic VS: Sitting Lying         Visual Acuity      ED Medications  Medications   sodium chloride (PF) 0 9 % injection 3 mL (has no administration in time range)   ondansetron (ZOFRAN) injection 4 mg (4 mg Intravenous Given 4/5/21 1516)   morphine (PF) 10 mg/mL injection 6 mg (6 mg Intravenous Given 4/5/21 1516)   sodium chloride 0 9 % bolus 1,000 mL (1,000 mL Intravenous New Bag 4/5/21 1516)   iohexol (OMNIPAQUE) 350 MG/ML injection (MULTI-DOSE) 100 mL (100 mL Intravenous Given 4/5/21 1551)   morphine (PF) 4 mg/mL injection 4 mg (4 mg Intravenous Given 4/5/21 1643)   aspirin chewable tablet 324 mg (324 mg Oral Given 4/5/21 1658)       Diagnostic Studies  Results Reviewed     Procedure Component Value Units Date/Time    Lipase [[id number]]  (Normal) Collected: 04/05/21 1511    Lab Status: Final result Specimen: Blood from Arm, Left Updated: 04/05/21 1709     Lipase 87 u/L     [facility] [[id number]]  (Normal) Collected: 04/05/21 1511    Lab Status: Final result Specimen: Nares from Nasopharyngeal Swab Updated: 04/05/21 1618     SARS-CoV-2 Negative     INFLUENZA A PCR Negative     INFLUENZA B PCR Negative     RSV PCR Negative    Narrative: This test has been authorized by FDA under an EUA (Emergency Use Assay) for use by authorized laboratories  Clinical caution and judgement should be used with the interpretation of these results with consideration of the clinical impression and other laboratory testing  Testing reported as "Positive" or "Negative" has been proven to be accurate according to standard laboratory validation requirements  All testing is performed with control materials showing appropriate reactivity at standard intervals      NT-BNP PRO [[id number]]  (Normal) Collected: 04/05/21 1511    Lab Status: Final result Specimen: Blood from Arm, Left Updated: 04/05/21 1548     NT-proBNP 45 pg/mL     Comprehensive metabolic panel [[id number]] Collected: 04/05/21 1511    Lab Status: Final result Specimen: Blood from Arm, Left Updated: 04/05/21 1539     Sodium 140 mmol/L      Potassium 4 1 mmol/L      Chloride 103 mmol/L      CO2 27 mmol/L      ANION GAP 10 mmol/L      BUN 18 mg/dL      Creatinine 1 08 mg/dL      Glucose 138 mg/dL      Calcium 9 0 mg/dL      AST 11 U/L      ALT 29 U/L      Alkaline Phosphatase 75 U/L      Total Protein 7 4 g/dL      Albumin 3 9 g/dL      Total Bilirubin 0 31 mg/dL      eGFR 76 ml/min/1 73sq m     Narrative:      [facility] guidelines for Chronic Kidney Disease (CKD):     Stage 1 with normal or high GFR (GFR > 90 mL/min/1 73 square meters)    Stage 2 Mild CKD (GFR = 60-89 mL/min/1 73 square meters)    Stage 3A Moderate CKD (GFR = 45-59 mL/min/1 73 square meters)    Stage 3B Moderate CKD (GFR = 30-44 mL/min/1 73 square meters)    Stage 4 Severe CKD (GFR = 15-29 mL/min/1 73 square meters)    Stage 5 End Stage CKD (GFR <15 mL/min/1 73 square meters)  Note: GFR calculation is accurate only with a steady state creatinine    Lactic acid, plasma [[id number]]  (Normal) Collected: 04/05/21 1512    Lab Status: Final result Specimen: Blood from Arm, Right Updated: 04/05/21 1538     LACTIC ACID 1 4 mmol/L     Narrative:      Result may be elevated if tourniquet was used during collection      Troponin I [[id number]]  (Normal) Collected: 04/05/21 1511    Lab Status: Final result Specimen: Blood from Arm, Left Updated: 04/05/21 1536     Troponin I <0 02 ng/mL     CBC and differential [[id number]]  (Abnormal) Collected: 04/05/21 1511    Lab Status: Final result Specimen: Blood from Arm, Left Updated: 04/05/21 1522     WBC 14 32 Thousand/uL      RBC 5 06 Million/uL      Hemoglobin 14 2 g/dL      Hematocrit 43 8 %      MCV 87 fL      MCH 28 1 pg      MCHC 32 4 g/dL      RDW 13 3 %      MPV 9 2 fL      Platelets 993 Thousands/uL      nRBC 0 /100 WBCs      Neutrophils Relative 87 %      Immat GRANS % 1 % Lymphocytes Relative 8 %      Monocytes Relative 4 %      Eosinophils Relative 0 %      Basophils Relative 0 %      Neutrophils Absolute 12 50 Thousands/µL      Immature Grans Absolute 0 09 Thousand/uL      Lymphocytes Absolute 1 11 Thousands/µL      Monocytes Absolute 0 57 Thousand/µL      Eosinophils Absolute 0 02 Thousand/µL      Basophils Absolute 0 03 Thousands/µL                  CTA dissection protocol chest/abdomen/pelvis   Final Result by Catie Wood MD (04/05 6827)   Motion artifacts in the aortic root  No aneurysm or dissection involving the thoracic, abdominal aorta and branches  Workstation performed: [id number]         X-ray chest 1 view portable   Final Result by Krista Fernandez MD (04/05 1303)   No acute cardiopulmonary disease        Findings are stable               Workstation performed: [id number]                    Procedures  ECG 12 Lead Documentation Only    Date/Time: 4/5/2021 3:14 PM  Performed by: Verlean Paget, DO  Authorized by: Verlean Paget, DO     Indications / Diagnosis:  Chest pain   Patient location:  ED  Previous ECG:     Previous ECG:  Compared to current    Comparison ECG info:  11/30/20    Similarity:  No change  Rate:     ECG rate:  62    ECG rate assessment: normal    Rhythm:     Rhythm: sinus rhythm    Ectopy:     Ectopy: none    QRS:     QRS axis:  Normal    QRS intervals:  Normal  ST segments:     ST segments:  Normal  T waves:     T waves: normal               ED Course             HEART Risk Score      Most Recent Value   Heart Score Risk Calculator   History  1 Filed at: 04/05/2021 1637   ECG  1 Filed at: 04/05/2021 1637   Age  1 Filed at: 04/05/2021 1637   Risk Factors  2 Filed at: 04/05/2021 1637   Troponin  0 Filed at: 04/05/2021 1637   HEART Score  5 Filed at: 04/05/2021 1637                      SBIRT 22yo+      Most Recent Value   SBIRT (23 yo +)   In order to provide better care to our patients, we are screening all of our patients for alcohol and drug use  Would it be okay to ask you these screening questions? Yes Filed at: 04/05/2021 1616   Initial Alcohol Screen: US AUDIT-C    1  How often do you have a drink containing alcohol?  0 Filed at: 04/05/2021 1616   2  How many drinks containing alcohol do you have on a typical day you are drinking? 0 Filed at: 04/05/2021 1616   3a  Male UNDER 65: How often do you have five or more drinks on one occasion? 0 Filed at: 04/05/2021 1616   Audit-C Score  0 Filed at: 04/05/2021 1616   HERMINIA: How many times in the past year have you    Used an illegal drug or used a prescription medication for non-medical reasons? Never Filed at: 04/05/2021 1616                    MDM  Number of Diagnoses or Management Options  Abdominal pain: new and requires workup  Chest pain: new and requires workup  Nausea and vomiting: new and requires workup  Diagnosis management comments: Patient with chest pain, abd pain, and n/v- will get labs, ct scan, trop/ ekg, and admit  Patient reevaluated and feels improved  Patient updated on results of tests and plan of care including admission to hospital for further evaluation of presenting complaint  Patient demonstrates verbal understanding and agrees with plan  Report to Dr Shay Lozano  with BRITTANY for continuation of patient care          Amount and/or Complexity of Data Reviewed  Clinical lab tests: ordered and reviewed  Tests in the radiology section of CPT®: ordered and reviewed  Tests in the medicine section of CPT®: ordered and reviewed  Discussion of test results with the performing providers: yes  Decide to obtain previous medical records or to obtain history from someone other than the patient: yes  Obtain history from someone other than the patient: yes  Review and summarize past medical records: yes  Discuss the patient with other providers: yes  Independent visualization of images, tracings, or specimens: yes    Patient Progress  Patient progress: improved      Disposition  Final diagnoses:   Chest pain   Abdominal pain   Nausea and vomiting     Time reflects when diagnosis was documented in both MDM as applicable and the Disposition within this note     Time User Action Codes Description Comment    4/5/2021  5:07 PM Anthony Fill A Add [R07 9] Chest pain     4/5/2021  5:07 PM Swengel Fill A Add [R10 9] Abdominal pain     4/5/2021  5:07 PM Anthony Fill A Add [R11 2] Nausea and vomiting       ED Disposition     ED Disposition Condition Date/Time Comment    Admit Stable Mon Apr 5, 2021  5:08 PM Case was discussed with BRITTANY and the patient's admission status was agreed to be Admission Status: observation status to the service of Dr Maria Dolores Robertson   Follow-up Information    None         Patient's Medications   Discharge Prescriptions    No medications on file     No discharge procedures on file      PDMP Review       Value Time User    PDMP Reviewed  Yes 2/10/2020  3:50 PM Clive Osborn PA-C          ED Provider  Electronically Signed by           Lenore Tesfaye DO  04/05/21 7616

## 2021-04-06 ENCOUNTER — TELEPHONE (OUTPATIENT)
Dept: OBGYN CLINIC | Facility: HOSPITAL | Age: 56
End: 2021-04-06

## 2021-04-06 ENCOUNTER — TELEPHONE (OUTPATIENT)
Dept: OBGYN CLINIC | Facility: CLINIC | Age: 56
End: 2021-04-06

## 2021-04-06 VITALS
DIASTOLIC BLOOD PRESSURE: 74 MMHG | TEMPERATURE: 98.3 F | HEART RATE: 82 BPM | BODY MASS INDEX: 31.5 KG/M2 | SYSTOLIC BLOOD PRESSURE: 120 MMHG | RESPIRATION RATE: 18 BRPM | OXYGEN SATURATION: 96 % | WEIGHT: 220 LBS | HEIGHT: 70 IN

## 2021-04-06 LAB
ERYTHROCYTE [DISTWIDTH] IN BLOOD BY AUTOMATED COUNT: 13.5 % (ref 11.6–15.1)
HCT VFR BLD AUTO: 36.4 % (ref 36.5–49.3)
HGB BLD-MCNC: 12 G/DL (ref 12–17)
MCH RBC QN AUTO: 28.5 PG (ref 26.8–34.3)
MCHC RBC AUTO-ENTMCNC: 33 G/DL (ref 31.4–37.4)
MCV RBC AUTO: 87 FL (ref 82–98)
PLATELET # BLD AUTO: 280 THOUSANDS/UL (ref 149–390)
PMV BLD AUTO: 9.4 FL (ref 8.9–12.7)
RBC # BLD AUTO: 4.21 MILLION/UL (ref 3.88–5.62)
TROPONIN I SERPL-MCNC: <0.02 NG/ML
WBC # BLD AUTO: 10.35 THOUSAND/UL (ref 4.31–10.16)

## 2021-04-06 PROCEDURE — 99217 PR OBSERVATION CARE DISCHARGE MANAGEMENT: CPT | Performed by: INTERNAL MEDICINE

## 2021-04-06 PROCEDURE — 99215 OFFICE O/P EST HI 40 MIN: CPT | Performed by: INTERNAL MEDICINE

## 2021-04-06 PROCEDURE — C9113 INJ PANTOPRAZOLE SODIUM, VIA: HCPCS | Performed by: PHYSICIAN ASSISTANT

## 2021-04-06 PROCEDURE — 83036 HEMOGLOBIN GLYCOSYLATED A1C: CPT

## 2021-04-06 PROCEDURE — 85027 COMPLETE CBC AUTOMATED: CPT | Performed by: PHYSICIAN ASSISTANT

## 2021-04-06 PROCEDURE — 84484 ASSAY OF TROPONIN QUANT: CPT | Performed by: PHYSICIAN ASSISTANT

## 2021-04-06 PROCEDURE — 36415 COLL VENOUS BLD VENIPUNCTURE: CPT | Performed by: PHYSICIAN ASSISTANT

## 2021-04-06 RX ORDER — MAGNESIUM HYDROXIDE/ALUMINUM HYDROXICE/SIMETHICONE 120; 1200; 1200 MG/30ML; MG/30ML; MG/30ML
30 SUSPENSION ORAL EVERY 4 HOURS PRN
Status: DISCONTINUED | OUTPATIENT
Start: 2021-04-06 | End: 2021-04-06 | Stop reason: HOSPADM

## 2021-04-06 RX ORDER — PANTOPRAZOLE SODIUM 40 MG/1
40 TABLET, DELAYED RELEASE ORAL
Status: DISCONTINUED | OUTPATIENT
Start: 2021-04-06 | End: 2021-04-06

## 2021-04-06 RX ORDER — PANTOPRAZOLE SODIUM 40 MG/1
40 INJECTION, POWDER, FOR SOLUTION INTRAVENOUS
Status: DISCONTINUED | OUTPATIENT
Start: 2021-04-06 | End: 2021-04-06 | Stop reason: HOSPADM

## 2021-04-06 RX ORDER — ONDANSETRON 4 MG/1
4 TABLET, ORALLY DISINTEGRATING ORAL EVERY 6 HOURS PRN
Qty: 20 TABLET | Refills: 0 | Status: SHIPPED | OUTPATIENT
Start: 2021-04-06 | End: 2021-04-07

## 2021-04-06 RX ADMIN — PANTOPRAZOLE SODIUM 40 MG: 40 INJECTION, POWDER, FOR SOLUTION INTRAVENOUS at 01:05

## 2021-04-06 RX ADMIN — ACETAMINOPHEN 650 MG: 325 TABLET, FILM COATED ORAL at 10:41

## 2021-04-06 RX ADMIN — ACETAMINOPHEN 650 MG: 325 TABLET, FILM COATED ORAL at 05:06

## 2021-04-06 RX ADMIN — BUPROPION 300 MG: 150 TABLET, EXTENDED RELEASE ORAL at 10:30

## 2021-04-06 RX ADMIN — CLONAZEPAM 0.5 MG: 0.5 TABLET ORAL at 10:29

## 2021-04-06 RX ADMIN — PANTOPRAZOLE SODIUM 40 MG: 40 TABLET, DELAYED RELEASE ORAL at 00:33

## 2021-04-06 RX ADMIN — SERTRALINE HYDROCHLORIDE 100 MG: 100 TABLET ORAL at 10:30

## 2021-04-06 RX ADMIN — ALUMINA, MAGNESIA, AND SIMETHICONE ORAL SUSPENSION REGULAR STRENGTH 30 ML: 1200; 1200; 120 SUSPENSION ORAL at 00:33

## 2021-04-06 RX ADMIN — ALUMINA, MAGNESIA, AND SIMETHICONE ORAL SUSPENSION REGULAR STRENGTH 30 ML: 1200; 1200; 120 SUSPENSION ORAL at 05:06

## 2021-04-06 RX ADMIN — ASPIRIN 81 MG: 81 TABLET, DELAYED RELEASE ORAL at 10:29

## 2021-04-06 RX ADMIN — PANTOPRAZOLE SODIUM 40 MG: 40 INJECTION, POWDER, FOR SOLUTION INTRAVENOUS at 10:31

## 2021-04-06 NOTE — ASSESSMENT & PLAN NOTE
· Hypertensive urgency noted on admission with BP of 181/85  · Does not appear to be on any antihypertensives at home  · BP well controlled at present  · Will recommend outpt follow up

## 2021-04-06 NOTE — UTILIZATION REVIEW
Initial Clinical Review    Admission: Date/Time/Statement:   Admission Orders (From admission, onward)     Ordered        04/05/21 1707  Place in Observation  Once                   Orders Placed This Encounter   Procedures    Place in Observation     Standing Status:   Standing     Number of Occurrences:   1     Order Specific Question:   Level of Care     Answer:   Med Surg [16]     ED Arrival Information     Expected Arrival Acuity Means of Arrival Escorted By Service Admission Type    - 4/5/2021 14:12 Urgent Walk-In Self General Medicine Urgent    Arrival Complaint    Chest Pain/Vomiting        Chief Complaint   Patient presents with    Chest Pain     Pt reports CP and SOB that began at around 1100 this am  Also c/o abd pain, fatigue, vomitting, and dirrhea for two days   Shortness of Breath     Assessment/Plan: 63year old male to the ED from home with complaints of chest pain, shortness of breath which started 4 hours prior to arrival  Admitted under observation for chest pain, CAD, vomiting, nausea, leukocytosis  He awoke on day of arrival with chest pain, nausea, vomiting, foul smelling diarrhea  He arrives with abdominal tenderness  CT ab/pelvis shows no abnormality  Initial troponin neg  Continue to trend  Monitor tele  GIven IV morphine in the ED with improvement of pain  WBCs elevated  COVID neg  Started on IV fluids  REgular diet  Elevated WBCs could be reactive in the setting of recent nausea, vomiting, viral gastroenteritis     ED Triage Vitals   Temperature Pulse Respirations Blood Pressure SpO2   04/05/21 1418 04/05/21 1418 04/05/21 1418 04/05/21 1418 04/05/21 1418   97 5 °F (36 4 °C) 69 12 (!) 173/100 99 %      Temp Source Heart Rate Source Patient Position - Orthostatic VS BP Location FiO2 (%)   04/05/21 1418 04/05/21 1418 04/05/21 1418 04/05/21 1418 --   Oral Monitor Sitting Right arm       Pain Score       04/05/21 1626       9          Wt Readings from Last 1 Encounters:   04/05/21 99 8 kg (220 lb)     Additional Vital Signs:   Date/Time  Temp  Pulse  Resp  BP  MAP (mmHg)  SpO2  O2 Device Patient Position - Orthostatic VS   04/06/21 0530  --  82  18  139/82  --  92 %  None (Room air) Lying   04/05/21 2300  --  101  18  137/81  99  91 %  None (Room air) Lying   04/05/21 2200  --  76  18  161/86  117  94 %  None (Room air) Lying   04/05/21 2100  --  88  17  175/84Abnormal   120  96 %  None (Room air) Lying   04/05/21 1930  --  94  20  138/82  105  96 %  None (Room air) --   04/05/21 1830  98 3 °F (36 8 °C)  83  18  175/89Abnormal   126  95 %  None (Room air) Lying   04/05/21 1626  97 5 °F (36 4 °C)  76  16  181/85Abnormal   --  97 %  None (Room air) Lying   04/05/21 1615  --  --  --  --  --  --  None (Room air) --   04/05/21 1418  97 5 °F (36 4 °C)  69  12  173/100Abnormal             Pertinent Labs/Diagnostic Test Results:   4/5 CTA dissection C/A/P: Motion artifacts in the aortic root  No aneurysm or dissection involving the thoracic, abdominal aorta and branches  4/5 CXR: No acute cardiopulmonary disease       Results from last 7 days   Lab Units 04/05/21  1511   SARS-COV-2  Negative     Results from last 7 days   Lab Units 04/06/21  0526 04/05/21 2032 04/05/21  1511   WBC Thousand/uL 10 35*  --  14 32*   HEMOGLOBIN g/dL 12 0  --  14 2   HEMATOCRIT % 36 4*  --  43 8   PLATELETS Thousands/uL 280 310 324   NEUTROS ABS Thousands/µL  --   --  12 50*         Results from last 7 days   Lab Units 04/05/21  1511   SODIUM mmol/L 140   POTASSIUM mmol/L 4 1   CHLORIDE mmol/L 103   CO2 mmol/L 27   ANION GAP mmol/L 10   BUN mg/dL 18   CREATININE mg/dL 1 08   EGFR ml/min/1 73sq m 76   CALCIUM mg/dL 9 0     Results from last 7 days   Lab Units 04/05/21  1511   AST U/L 11   ALT U/L 29   ALK PHOS U/L 75   TOTAL PROTEIN g/dL 7 4   ALBUMIN g/dL 3 9   TOTAL BILIRUBIN mg/dL 0 31         Results from last 7 days   Lab Units 04/05/21  1511   GLUCOSE RANDOM mg/dL 138         Results from last 7 days   Lab Units 04/06/21  0223 04/05/21 2032 04/05/21  1828 04/05/21  1511   TROPONIN I ng/mL <0 02 <0 02 <0 02 <0 02     Results from last 7 days   Lab Units 04/05/21  1512   LACTIC ACID mmol/L 1 4         Results from last 7 days   Lab Units 04/05/21  1511   NT-PRO BNP pg/mL 45       Results from last 7 days   Lab Units 04/05/21  1511   LIPASE u/L 87         Results from last 7 days   Lab Units 04/05/21  1511   INFLUENZA A PCR  Negative   INFLUENZA B PCR  Negative   RSV PCR  Negative     ED Treatment:   Medication Administration from 04/05/2021 1412 to 04/06/2021 0821       Date/Time Order Dose Route Action     04/05/2021 1516 ondansetron (ZOFRAN) injection 4 mg 4 mg Intravenous Given     04/05/2021 1516 morphine (PF) 10 mg/mL injection 6 mg 6 mg Intravenous Given     04/05/2021 1516 sodium chloride 0 9 % bolus 1,000 mL 1,000 mL Intravenous New Bag     04/05/2021 1643 morphine (PF) 4 mg/mL injection 4 mg 4 mg Intravenous Given     04/05/2021 1658 aspirin chewable tablet 324 mg 324 mg Oral Given     04/05/2021 1946 sodium chloride 0 9 % infusion 75 mL/hr Intravenous New Bag     04/06/2021 0506 acetaminophen (TYLENOL) tablet 650 mg 650 mg Oral Given     04/05/2021 2015 ondansetron (ZOFRAN) injection 4 mg 4 mg Intravenous Given     04/05/2021 2015 morphine (PF) 4 mg/mL injection 4 mg 4 mg Intravenous Given     04/05/2021 2035 clonazePAM (KlonoPIN) tablet 0 5 mg 0 5 mg Oral Given     04/06/2021 0506 aluminum-magnesium hydroxide-simethicone (MYLANTA) oral suspension 30 mL 30 mL Oral Given     04/06/2021 0033 aluminum-magnesium hydroxide-simethicone (MYLANTA) oral suspension 30 mL 30 mL Oral Given     04/06/2021 0033 pantoprazole (PROTONIX) EC tablet 40 mg 40 mg Oral Given     04/06/2021 0105 pantoprazole (PROTONIX) injection 40 mg 40 mg Intravenous Given        Past Medical History:   Diagnosis Date    Diaz's esophagus     Colon polyp     Coronary artery disease     Depression     Diverticulitis     GERD (gastroesophageal reflux disease)     Hemorrhoid     History of heart artery stent     Hyperlipidemia     Hypertension     Microscopic colitis        Admitting Diagnosis: Chest pain [R07 9]  SOB (shortness of breath) [R06 02]  Age/Sex: 63 y o  male  Admission Orders:  Up and OOB  Tele  C-diff, stool enteric bacterial panel    Scheduled Medications:  amLODIPine, 5 mg, Oral, Daily  aspirin, 81 mg, Oral, Daily  atorvastatin, 80 mg, Oral, Daily  buPROPion, 300 mg, Oral, Daily  clonazePAM, 0 5 mg, Oral, Q12H MIYA  enoxaparin, 40 mg, Subcutaneous, Daily  NON FORMULARY, , Oral, Daily  pantoprazole, 40 mg, Intravenous, Q24H MIYA  sertraline, 100 mg, Oral, Daily      Continuous IV Infusions:  sodium chloride, 75 mL/hr, Intravenous, Continuous      PRN Meds:  acetaminophen, 650 mg, Oral, Q6H PRN  aluminum-magnesium hydroxide-simethicone, 30 mL, Oral, Q4H PRN  dicyclomine, 20 mg, Oral, BID PRN  hydrALAZINE, 5 mg, Intravenous, Q6H PRN  morphine injection, 4 mg, Intravenous, Q4H PRN  ondansetron, 4 mg, Intravenous, Q4H PRN  sodium chloride (PF), 3 mL, Intravenous, Q1H PRN        IP CONSULT TO CARDIOLOGY    Network Utilization Review Department  ATTENTION: Please call with any questions or concerns to [phone] and carefully listen to the prompts so that you are directed to the right person  All voicemails are confidential   Ina Skipper all requests for admission clinical reviews, approved or denied determinations and any other requests to dedicated fax number below belonging to the campus where the patient is receiving treatment   List of dedicated fax numbers for the Facilities:  [address] DENIALS (Administrative/Medical Necessity) [phone]   [address] [place] [address] (Maternity/NICU/Pediatrics) 261 [facility],[address] [phone] 501 [facility]) [address] [address] [address] [phone]   [address]51 [phone]

## 2021-04-06 NOTE — ASSESSMENT & PLAN NOTE
· Patient with history of CAD status post KARLA x2 to distal and proximal RCA in 2019  · Follows with cardiology as an outpatient  · Continue aspirin, statin  · Patient presenting with chest pain, troponin negative x1, trend serially  · Telemetry monitoring  · Cardiology on board   · Monitor, additional plan as above

## 2021-04-06 NOTE — DISCHARGE SUMMARY
[address] [facility]  Discharge- Kg Sexton 1965, 63 y o  male MRN: [id number]  Unit/Bed#: ED 07 Encounter: [id number]  Primary Care Provider: Paige Gutierrez MD   Date and time admitted to hospital: 4/5/2021  2:21 PM    Coronary artery disease  Assessment & Plan  · Patient with history of CAD status post KARAL x2 to distal and proximal RCA in 2019  · Follows with cardiology as an outpatient  · Continue aspirin, statin  · Patient presenting with chest pain, troponin negative x1, trend serially  · Telemetry monitoring  · Cardiology on board   · Monitor, additional plan as above    Leukocytosis  Assessment & Plan  · POA, WBC 14 32 on admission  · Resolved  · Afebrile      Non-intractable vomiting with nausea  Assessment & Plan  · Now resolved  · Hx of microscopic colitis - follows with GI  Tolerating diet well       Obesity (BMI 30 0-34  9)  Assessment & Plan  · As evidenced by BMI of 31 57  · Encourage lifestyle modification and weight loss    Dyslipidemia  Assessment & Plan  · Continue Lipitor 80 mg daily    Essential hypertension  Assessment & Plan  · Hypertensive urgency noted on admission with BP of 181/85  · Does not appear to be on any antihypertensives at home  · BP well controlled at present  · Will recommend outpt follow up    Anxiety and depression  Assessment & Plan  · Mood currently appears stable  · Continue Wellbutrin 300 mg daily and Zoloft 100 mg daily    * Chest pain  Assessment & Plan  · Patient presented with left-sided chest pain following multiple bouts of vomiting  · Patient reports the sensation is a dull ache, intermittent  · Patient with history of PCI x2 in 2019  · TREVON score 3  · Troponin x3 negative  · No ekg changes  · Pain improved overnight  · Pain very atypical for cardiac origin  · Cardio seen the patient - recommend 2 month outpt fup      Discharging Resident Physician: Gaviota Kaminski MD  Attending: Kiah Coronado MD  PCP: Paige Gutierrez MD  Admission Date: 4/5/2021  Discharge Date: 04/06/21    Disposition:     Home    Reason for Admission: Chest pain    Consultations During Hospital Stay:  · cardiology    Procedures Performed:     · none    Significant Findings / Test Results:     · none    Incidental Findings:   · None     Test Results Pending at Discharge (will require follow up): · None     Outpatient Tests Requested:  · None    Complications:  None    Hospital Course:     Sheng Raymond is a 63 y o  male patient who originally presented to the hospital on 4/5/2021 due to abdominal pain, nausea from persistent vomiting and subsequent chest pain  Considering the history of PCI in the past patient was admitted for chest pain rule out [facility]  Along with some IV hydration and symptomatic treatment of nausea patient's symptoms significantly improved patient did complain of some left shoulder pain but reports that he has had chronic issue and has upcoming surgery for the same  Troponins were trended which were negative  No EKG changes noted  Patient was seen by Cardiology, they recommended that patient should follow up outpatient Cardiology in about 1-2 months  Patient expressed understanding  Pt was given a prescription for Zofran p r n  Magdalena Burow Condition at Discharge: fair     Discharge Day Visit / Exam:     Subjective:  Patient seen and examined at bedside  Patient currently reports significant improvement in overall symptoms including nausea, vomiting and chest discomfort  Patient tolerating diet well  Ambulating independently  Denies any complaints  Chaf[place] Mew to go home  Vitals: Blood Pressure: 121/77 (04/06/21 1359)  Pulse: 83 (04/06/21 1359)  Temperature: 98 3 °F (36 8 °C) (04/05/21 1830)  Temp Source: Oral (04/05/21 1830)  Respirations: 18 (04/06/21 1359)  Height: 5' 10" (177 8 cm) (04/06/21 0917)  Weight - Scale: 99 8 kg (220 lb) (04/06/21 0917)  SpO2: 98 % (04/06/21 1359)  Exam:   Physical Exam  Constitutional:       General: He is not in acute distress  Appearance: He is not ill-appearing or toxic-appearing  HENT:      Head: Normocephalic and atraumatic  Mouth/Throat:      Mouth: Mucous membranes are moist    Eyes:      Pupils: Pupils are equal, round, and reactive to light  Cardiovascular:      Rate and Rhythm: Normal rate and regular rhythm  Pulses: Normal pulses  Heart sounds: No murmur  No gallop  Pulmonary:      Effort: Pulmonary effort is normal  No respiratory distress  Breath sounds: No wheezing or rales  Abdominal:      General: Abdomen is flat  There is no distension  Tenderness: There is no abdominal tenderness  Musculoskeletal:      Right lower leg: No edema  Left lower leg: No edema  Skin:     General: Skin is warm  Capillary Refill: Capillary refill takes less than 2 seconds  Neurological:      General: No focal deficit present  Mental Status: He is alert and oriented to person, place, and time  Psychiatric:         Mood and Affect: Mood normal          Behavior: Behavior normal        Discharge instructions/Information to patient and family:   See after visit summary for information provided to patient and family  Provisions for Follow-Up Care:  See after visit summary for information related to follow-up care and any pertinent home health orders  Planned Readmission: None     Discharge Medications:  See after visit summary for reconciled discharge medications provided to patient and family        ** Please Note: This note has been constructed using a voice recognition system **

## 2021-04-06 NOTE — TELEPHONE ENCOUNTER
Patient is calling to discuss the scheduling of his surgery    Call was transferred to [facility] at [phone]

## 2021-04-06 NOTE — ASSESSMENT & PLAN NOTE
· Patient presented with left-sided chest pain following multiple bouts of vomiting  · Patient reports the sensation is a dull ache, intermittent  · Patient with history of PCI x2 in 2019  · TREVON score 3  · Troponin x3 negative  · No ekg changes  · Pain improved overnight  · Pain very atypical for cardiac origin  · Cardio seen the patient - recommend 2 month outpt fup

## 2021-04-06 NOTE — DISCHARGE INSTR - AVS FIRST PAGE
Follow-up with your primary care provider in about 1 week  Discuss the events of the hospitalization  Follow-up with the cardiologist about 1-2 months  Please call the office to make appointment  Use Zofran as needed, 1 tablet under the tongue every 8 hours for nausea or vomiting  Prescription has been called into the pharmacy    Continue to follow-up with your gastroenterologist

## 2021-04-06 NOTE — CONSULTS
Consultation - Cardiology   Reese Cook 63 y o  male MRN: [id number]  Unit/Bed#: ED 07 Encounter: [id number]  04/06/21  1:08 PM    Assessment/ Plan:  (1) Acute Chest Pain: less likely acute coronary syndrome in etiology  Chest pain atypical in nature  Patient does have a TREVON score of 3, with significant cardiovascular history, inclusive of CAD with 2 stents in 2019, however troponins negative and EKG showed no acute ischemic changes  Patient mentions multiple episodes of emesis in association with the onset of his symptoms with additional history of gastrointestinal disorders  (I) Continue symptomatic management for gastrointestinal symptoms  (ii) Continue management as per coronary disease  (2) Coronary Artery Disease: This is less likely the source of his acute presentation  (I) Continue pre-admission aspirin  and high-intensity statin  (ii) Of note, patient not on beta-blocker due to history of hypotension with       initiation  (3) Hypertension:  Blood pressure elevated during his hospital course  (I) Continue pre-admission amlodipine  (ii) provide hydralazine 5 mg IV q 6h p r n  For SBP greater than 180 mmHg  History of Present Illness   Physician Requesting Consult: Joycelyn Roman MD    Reason for Consult / Principal Problem:  Sudden onset chest pain  HPI: Reese Cook is a 63y o  year old male who presents with left-sided chest pain, described as a dull pressure-like sensation and moderate severity  Associated symptoms of nausea, vomiting and foul-smelling bowel movements or reported  Patient reported no relieving or aggravating factors  Patient noted for medical history significant for Diaz's esophagitis, microscopic colitis, colonic polyps, coronary artery disease with 2 stents placed in 2019, GERD, hyperlipidemia and hypertension  Inpatient consult to Cardiology  Consult performed by:  Deanna Stokes MD  Consult ordered by: Chloe Thompson PA-C          EKG: No acute ischemic changes reported  Review of Systems   Constitutional: Negative for chills and fever  HENT: Negative for ear pain and sore throat  Eyes: Negative for pain and visual disturbance  Respiratory: Negative for cough and shortness of breath  Cardiovascular: Positive for chest pain  Negative for palpitations and leg swelling  Gastrointestinal: Positive for diarrhea, nausea and vomiting  Negative for abdominal pain  Genitourinary: Negative for dysuria and hematuria  Musculoskeletal: Negative for arthralgias and back pain  Skin: Negative for color change and rash  Neurological: Negative for seizures and syncope  Psychiatric/Behavioral: Negative for agitation and behavioral problems  All other systems reviewed and are negative  Historical Information   Past Medical History:   Diagnosis Date    Diaz's esophagus     Colon polyp     Coronary artery disease     Depression     Diverticulitis     GERD (gastroesophageal reflux disease)     Hemorrhoid     History of heart artery stent     Hyperlipidemia     Hypertension     Microscopic colitis      Past Surgical History:   Procedure Laterality Date    ABDOMINAL SURGERY      radio frequency ablation    BONE GRAFT Left 2018    CARPAL TUNNEL RELEASE Right     COLONOSCOPY      EGD AND COLONOSCOPY      ESOPHAGOGASTRODUODENOSCOPY N/A 2/15/2018    Procedure: ESOPHAGOGASTRODUODENOSCOPY (EGD); Surgeon: Delmis Huston MD;  Location: [facility];  Service: Gastroenterology    ESOPHAGOGASTRODUODENOSCOPY N/A 12/10/2018    Procedure: ESOPHAGOGASTRODUODENOSCOPY (EGD); Surgeon: María Elena Weathers MD;  Location: [facility];   Service: Gastroenterology    TONSILLECTOMY       Social History     Substance and Sexual Activity   Alcohol Use Not Currently    Frequency: Never    Comment: quit 5 years     Social History     Substance and Sexual Activity   Drug Use Yes    Frequency: 3 0 times per week    Types: Marijuana Social History     Tobacco Use   Smoking Status Former Smoker    Packs/day: 0 50    Quit date: 2007    Years since quittin 2   Smokeless Tobacco Former User    Quit date: 1998   Tobacco Comment    quit 10 yrs ago       Family History:   Family History   Problem Relation Age of Onset    Heart disease Father     Melanoma Father     Cancer Sister     Skin cancer Sister     Skin cancer Mother        Meds/Allergies   current meds:   Current Facility-Administered Medications   Medication Dose Route Frequency    acetaminophen (TYLENOL) tablet 650 mg  650 mg Oral Q6H PRN    aluminum-magnesium hydroxide-simethicone (MYLANTA) oral suspension 30 mL  30 mL Oral Q4H PRN    amLODIPine (NORVASC) tablet 5 mg  5 mg Oral Daily    aspirin (ECOTRIN LOW STRENGTH) EC tablet 81 mg  81 mg Oral Daily    atorvastatin (LIPITOR) tablet 80 mg  80 mg Oral Daily    buPROPion (WELLBUTRIN XL) 24 hr tablet 300 mg  300 mg Oral Daily    clonazePAM (KlonoPIN) tablet 0 5 mg  0 5 mg Oral Q12H [address]    dicyclomine (BENTYL) tablet 20 mg  20 mg Oral BID PRN    enoxaparin (LOVENOX) subcutaneous injection 40 mg  40 mg Subcutaneous Daily    hydrALAZINE (APRESOLINE) injection 5 mg  5 mg Intravenous Q6H PRN    morphine (PF) 4 mg/mL injection 4 mg  4 mg Intravenous Q4H PRN    NON FORMULARY   Oral Daily    ondansetron (ZOFRAN) injection 4 mg  4 mg Intravenous Q4H PRN    pantoprazole (PROTONIX) injection 40 mg  40 mg Intravenous Q24H MIYA    sertraline (ZOLOFT) tablet 100 mg  100 mg Oral Daily    sodium chloride (PF) 0 9 % injection 3 mL  3 mL Intravenous Q1H PRN    sodium chloride 0 9 % infusion  75 mL/hr Intravenous Continuous     Allergies   Allergen Reactions    No Active Allergies     Rosuvastatin Myalgia       Objective   Vitals: Blood pressure 122/77, pulse 78, temperature 98 3 °F (36 8 °C), temperature source Oral, resp  rate 18, height 5' 10" (1 778 m), weight 99 8 kg (220 lb), SpO2 95 %  , Body mass index is 31 57 kg/m² ,   Orthostatic Blood Pressures      Most Recent Value   Blood Pressure  122/77 filed at 04/06/2021 1100   Patient Position - Orthostatic VS  Lying filed at 04/06/2021 6584          Systolic (03JGD), KJF:454 , Min:114 , GKZ:598     Diastolic (32HSD), MWP:00, Min:72, Max:100        Intake/Output Summary (Last 24 hours) at 4/6/2021 1308  Last data filed at 4/5/2021 1832  Gross per 24 hour   Intake 1832 ml   Output --   Net 1832 ml       Invasive Devices     Peripheral Intravenous Line            Peripheral IV 04/05/21 Left Antecubital less than 1 day                    Physical Exam:  GEN: Alert and oriented x 3, in no acute distress  Well appearing and well nourished  HEENT: Sclera anicteric, conjunctivae pink, mucous membranes moist  Oropharynx clear  NECK: Supple, no carotid bruits, no significant JVD  Trachea midline, no thyromegaly  HEART: Regular rhythm, normal S1 and S2, no murmurs, clicks, gallops or rubs  PMI nondisplaced, no thrills  LUNGS: Clear to auscultation bilaterally; no wheezes, rales, or rhonchi  No increased work of breathing or signs of respiratory distress  No tenderness on palpation of the thorax  ABDOMEN: Soft, nontender, nondistended, normoactive bowel sounds  EXTREMITIES: Skin warm and well perfused, no clubbing, cyanosis, or edema  NEURO: No focal findings  Normal speech  Mood and affect normal    SKIN: Normal without suspicious lesions on exposed skin        Lab Results:     Troponins:   Results from last 7 days   Lab Units 04/06/21 0223 04/05/21 2032 04/05/21  1828   TROPONIN I ng/mL <0 02 <0 02 <0 02       CBC with diff:   Results from last 7 days   Lab Units 04/06/21  0526 04/05/21 2032 04/05/21  1511   WBC Thousand/uL 10 35*  --  14 32*   HEMOGLOBIN g/dL 12 0  --  14 2   HEMATOCRIT % 36 4*  --  43 8   MCV fL 87  --  87   PLATELETS Thousands/uL 280 310 324   MCH pg 28 5  --  28 1   MCHC g/dL 33 0  --  32 4   RDW % 13 5  --  13 3   MPV fL 9 4 9 3 9 2   NRBC AUTO /100 WBCs  --   --  0         CMP:   Results from last 7 days   Lab Units 04/05/21  1511   POTASSIUM mmol/L 4 1   CHLORIDE mmol/L 103   CO2 mmol/L 27   BUN mg/dL 18   CREATININE mg/dL 1 08   CALCIUM mg/dL 9 0   AST U/L 11   ALT U/L 29   ALK PHOS U/L 75   EGFR ml/min/1 73sq m 76

## 2021-04-07 ENCOUNTER — OFFICE VISIT (OUTPATIENT)
Dept: INTERNAL MEDICINE CLINIC | Facility: CLINIC | Age: 56
End: 2021-04-07
Payer: MEDICARE

## 2021-04-07 ENCOUNTER — APPOINTMENT (OUTPATIENT)
Dept: PREADMISSION TESTING | Facility: HOSPITAL | Age: 56
End: 2021-04-07
Payer: MEDICARE

## 2021-04-07 VITALS
BODY MASS INDEX: 31.21 KG/M2 | HEIGHT: 70 IN | SYSTOLIC BLOOD PRESSURE: 118 MMHG | OXYGEN SATURATION: 98 % | DIASTOLIC BLOOD PRESSURE: 80 MMHG | TEMPERATURE: 98 F | RESPIRATION RATE: 14 BRPM | WEIGHT: 218 LBS | HEART RATE: 108 BPM

## 2021-04-07 DIAGNOSIS — F32.A ANXIETY AND DEPRESSION: ICD-10-CM

## 2021-04-07 DIAGNOSIS — E78.5 DYSLIPIDEMIA: ICD-10-CM

## 2021-04-07 DIAGNOSIS — Z13.6 ENCOUNTER FOR SCREENING FOR CARDIOVASCULAR DISORDERS: ICD-10-CM

## 2021-04-07 DIAGNOSIS — F41.9 ANXIETY AND DEPRESSION: ICD-10-CM

## 2021-04-07 DIAGNOSIS — K22.711 BARRETT'S ESOPHAGUS WITH HIGH GRADE DYSPLASIA: ICD-10-CM

## 2021-04-07 DIAGNOSIS — I10 ESSENTIAL HYPERTENSION: Primary | ICD-10-CM

## 2021-04-07 DIAGNOSIS — R73.9 ELEVATED BLOOD SUGAR: ICD-10-CM

## 2021-04-07 DIAGNOSIS — I25.119 CORONARY ARTERY DISEASE WITH ANGINA PECTORIS, UNSPECIFIED VESSEL OR LESION TYPE, UNSPECIFIED WHETHER NATIVE OR TRANSPLANTED HEART (HCC): ICD-10-CM

## 2021-04-07 LAB
CHOLEST SERPL-MCNC: 201 MG/DL (ref 50–200)
HDLC SERPL-MCNC: 55 MG/DL
LDLC SERPL CALC-MCNC: 109 MG/DL (ref 0–100)
NONHDLC SERPL-MCNC: 146 MG/DL
TRIGL SERPL-MCNC: 187 MG/DL

## 2021-04-07 PROCEDURE — 99204 OFFICE O/P NEW MOD 45 MIN: CPT | Performed by: INTERNAL MEDICINE

## 2021-04-07 RX ORDER — ONDANSETRON 4 MG/1
4 TABLET, ORALLY DISINTEGRATING ORAL EVERY 6 HOURS PRN
Qty: 20 TABLET | Refills: 0 | Status: SHIPPED | OUTPATIENT
Start: 2021-04-07

## 2021-04-07 NOTE — PROGRESS NOTES
Assessment/Plan:    1  Preventative screening: Colonoscopy done in 2019 and was normal    2  H/O CAD S/P KARLA x 2 to distal and proximal RCA 4/2019; no complaints today  No cp or sob  Continue aspirin and statin  He is not on BB for h/o bradycardia  Recently seen by cards while hospitalized  3  H/O anxiety and depression; continue Wellbutrin, Zoloft, and klonopin  No complaints  4  H/o rotator cuff injury; follows orthopedics  Has an appointment with ortho next week  Tylenol prn   5  H/O Diaz's esophagus; regularly follows with GI  Continue Prilosec  Labs reviewed from previous hospitalization  F/u with me in 6 months and prn  Patient is having rotator cuff surgery tomorrow  Hold aspirin on day of surgery  optimized for surgery  Can take atorvastatin, Wellbutrin, bentyl, Zoloft, and Prilosec  Hold the glucosamine  BMI Counseling: Body mass index is 31 28 kg/m²  The BMI is above normal  Nutrition recommendations include encouraging healthy choices of fruits and vegetables and reducing intake of saturated and trans fat  No pharmacotherapy was ordered  Patient referred to PCP due to patient being overweight  Depression Screening and Follow-up Plan: Patient's depression screening was positive with a PHQ-2 score of 2  Their PHQ-9 score was 2  Patient assessed for underlying major depression  Brief counseling provided and recommend additional follow-up/re-evaluation next office visit  No problem-specific Assessment & Plan notes found for this encounter  Diagnoses and all orders for this visit:    Essential hypertension    Dyslipidemia    Diaz's esophagus with high grade dysplasia  -     ondansetron (ZOFRAN-ODT) 4 mg disintegrating tablet;  Take 1 tablet (4 mg total) by mouth every 6 (six) hours as needed for nausea or vomiting    Coronary artery disease with angina pectoris, unspecified vessel or lesion type, unspecified whether native or transplanted heart ([facility] )    Encounter for screening for cardiovascular disorders  -     Lipid panel; Future    Elevated blood sugar  -     HEMOGLOBIN A1C W/ EAG ESTIMATION; Future    Anxiety and depression          Subjective:      Patient ID: Ellen Porras is a 63 y o  male  Patient is a very pleasant 63year old male presenting to the clinic to establish care  He is a  and served in [employer]  He was previously seen at the [facility]  Doing well today  He was recently hospitalized for c/o cp and abdominal pain  cardiac cause was ruled out  He has a h/o hinojosa's esophagus and microscopic colitis  He has chronic abdominal pain  Treats it symptomatically  Follows with GI closely  He has a h/o CAD s/p KARLA to RCA back in 2019  He was seeing Dr Altagracia Yarbrough  He denies any cp, sob, palpitations, orthopnea  The following portions of the patient's history were reviewed and updated as appropriate:   He  has a past medical history of Hinojosa's esophagus, Colon polyp, Coronary artery disease, Depression, Diverticulitis, GERD (gastroesophageal reflux disease), Hemorrhoid, History of heart artery stent, Hyperlipidemia, Hypertension, and Microscopic colitis    He   Patient Active Problem List    Diagnosis Date Noted    Hypokalemia 02/10/2020    Depression     Hyperlipidemia     Hypertension     GERD (gastroesophageal reflux disease)     Coronary artery disease 03/29/2019    Atelectasis 02/25/2019    Diverticulitis 02/23/2019    Microscopic colitis 02/23/2019    Chest pain 02/20/2019    Non-intractable vomiting with nausea 12/08/2018    Sinus bradycardia 12/08/2018    Leukocytosis 12/08/2018    Abnormal CT of the abdomen 12/08/2018    Vomiting 12/08/2018    Abdominal pain 02/15/2018    Anxiety and depression 02/15/2018    Hinojosa's esophagus 02/15/2018    Essential hypertension 02/15/2018    Dyslipidemia 02/15/2018    Obesity (BMI 30 0-34 9) 02/15/2018    Ulcerative colitis without complications ([facility] ) 99/41/7493    Gastroesophageal reflux disease with esophagitis 02/15/2018     He  has a past surgical history that includes Abdominal surgery; Colonoscopy; EGD AND COLONOSCOPY; Tonsillectomy; Esophagogastroduodenoscopy (N/A, 2/15/2018); Esophagogastroduodenoscopy (N/A, 12/10/2018); Carpal tunnel release (Right); and Bone graft (Left, 2018)  His family history includes Cancer in his sister; Heart disease in his father; Melanoma in his father; Skin cancer in his mother and sister  He  reports that he quit smoking about 14 years ago  He smoked 0 50 packs per day  He quit smokeless tobacco use about 23 years ago  He reports previous alcohol use  He reports current drug use  Frequency: 3 00 times per week  Drug: Marijuana    Current Outpatient Medications   Medication Sig Dispense Refill    aspirin (ECOTRIN LOW STRENGTH) 81 mg EC tablet Take 1 tablet (81 mg total) by mouth daily  0    atorvastatin (LIPITOR) 80 mg tablet Take 80 mg by mouth daily      buPROPion (WELLBUTRIN SR) 150 mg 12 hr tablet TAKE 2 TABLETS BY MOUTH DAILY 60 tablet 3    clonazePAM (KLONOPIN) 0 5 mg tablet Take 1 5 mg by mouth daily at bedtime       dicyclomine (BENTYL) 20 mg tablet Take 1 tablet (20 mg total) by mouth 2 (two) times a day as needed (pain) 20 tablet 0    nitroglycerin (NITROSTAT) 0 4 mg SL tablet Place 1 tablet (0 4 mg total) under the tongue every 5 (five) minutes as needed for chest pain 30 tablet 3    omeprazole (PriLOSEC) 20 mg delayed release capsule Take 1 capsule (20 mg total) by mouth daily 60 capsule 3    sertraline (ZOLOFT) 100 mg tablet Take 100 mg by mouth daily       Diclofenac Sodium (VOLTAREN) 1 % Apply 2 g topically 4 (four) times a day for 7 days (Patient not taking: Reported on 3/23/2021) 1 Tube 0    Glucosamine-Chondroitin (OSTEO BI-FLEX REGULAR STRENGTH PO) Take by mouth 2 (two) times a day      ondansetron (ZOFRAN-ODT) 4 mg disintegrating tablet Take 1 tablet (4 mg total) by mouth every 6 (six) hours as needed for nausea or vomiting 20 tablet 0     No current facility-administered medications for this visit        Current Outpatient Medications on File Prior to Visit   Medication Sig    aspirin (ECOTRIN LOW STRENGTH) 81 mg EC tablet Take 1 tablet (81 mg total) by mouth daily    atorvastatin (LIPITOR) 80 mg tablet Take 80 mg by mouth daily    buPROPion (WELLBUTRIN SR) 150 mg 12 hr tablet TAKE 2 TABLETS BY MOUTH DAILY    clonazePAM (KLONOPIN) 0 5 mg tablet Take 1 5 mg by mouth daily at bedtime     dicyclomine (BENTYL) 20 mg tablet Take 1 tablet (20 mg total) by mouth 2 (two) times a day as needed (pain)    nitroglycerin (NITROSTAT) 0 4 mg SL tablet Place 1 tablet (0 4 mg total) under the tongue every 5 (five) minutes as needed for chest pain    omeprazole (PriLOSEC) 20 mg delayed release capsule Take 1 capsule (20 mg total) by mouth daily    sertraline (ZOLOFT) 100 mg tablet Take 100 mg by mouth daily     Diclofenac Sodium (VOLTAREN) 1 % Apply 2 g topically 4 (four) times a day for 7 days (Patient not taking: Reported on 3/23/2021)    Glucosamine-Chondroitin (OSTEO BI-FLEX REGULAR STRENGTH PO) Take by mouth 2 (two) times a day    [DISCONTINUED] dicyclomine (BENTYL) 20 mg tablet Take 1 tablet (20 mg total) by mouth every 6 (six) hours for 20 doses    [DISCONTINUED] ondansetron (ZOFRAN-ODT) 4 mg disintegrating tablet Take 1 tablet (4 mg total) by mouth every 8 (eight) hours as needed for nausea or vomiting (Patient not taking: Reported on 4/7/2021)    [DISCONTINUED] ondansetron (ZOFRAN-ODT) 4 mg disintegrating tablet Take 1 tablet (4 mg total) by mouth every 8 (eight) hours as needed for nausea or vomiting for up to 20 doses (Patient not taking: Reported on 4/7/2021)    [DISCONTINUED] ondansetron (ZOFRAN-ODT) 4 mg disintegrating tablet Take 1 tablet (4 mg total) by mouth every 6 (six) hours as needed for nausea or vomiting (Patient not taking: Reported on 4/7/2021)    [DISCONTINUED] prochlorperazine (COMPAZINE) 25 mg suppository Insert 1 suppository (25 mg total) into the rectum every 12 (twelve) hours as needed for nausea or vomiting (Patient not taking: Reported on 4/7/2021)     Current Facility-Administered Medications on File Prior to Visit   Medication    [DISCONTINUED] acetaminophen (TYLENOL) tablet 650 mg    [DISCONTINUED] aluminum-magnesium hydroxide-simethicone (MYLANTA) oral suspension 30 mL    [DISCONTINUED] amLODIPine (NORVASC) tablet 5 mg    [DISCONTINUED] aspirin (ECOTRIN LOW STRENGTH) EC tablet 81 mg    [DISCONTINUED] atorvastatin (LIPITOR) tablet 80 mg    [DISCONTINUED] buPROPion (WELLBUTRIN XL) 24 hr tablet 300 mg    [DISCONTINUED] clonazePAM (KlonoPIN) tablet 0 5 mg    [DISCONTINUED] dicyclomine (BENTYL) tablet 20 mg    [DISCONTINUED] enoxaparin (LOVENOX) subcutaneous injection 40 mg    [DISCONTINUED] hydrALAZINE (APRESOLINE) injection 5 mg    [DISCONTINUED] morphine (PF) 4 mg/mL injection 4 mg    [DISCONTINUED] NON FORMULARY    [DISCONTINUED] ondansetron (ZOFRAN) injection 4 mg    [DISCONTINUED] pantoprazole (PROTONIX) injection 40 mg    [DISCONTINUED] sertraline (ZOLOFT) tablet 100 mg    [DISCONTINUED] sodium chloride (PF) 0 9 % injection 3 mL    [DISCONTINUED] sodium chloride 0 9 % infusion     He is allergic to rosuvastatin       Review of Systems      Objective:      /80 (BP Location: Right arm, Patient Position: Sitting, Cuff Size: Standard)   Pulse (!) 108   Temp 98 °F (36 7 °C) (Tympanic)   Resp 14   Ht 5' 10" (1 778 m)   Wt 98 9 kg (218 lb)   SpO2 98%   BMI 31 28 kg/m²          Physical Exam

## 2021-04-08 LAB
EST. AVERAGE GLUCOSE BLD GHB EST-MCNC: 120 MG/DL
HBA1C MFR BLD: 5.8 %

## 2021-04-09 ENCOUNTER — TELEPHONE (OUTPATIENT)
Dept: GASTROENTEROLOGY | Facility: CLINIC | Age: 56
End: 2021-04-09

## 2021-04-09 NOTE — TELEPHONE ENCOUNTER
GABII: Spoke with patient  He is doing much better and will continue his medication regimen  Patient states he has not followed up with HIDA scan or GES study  He will plan on these

## 2021-04-09 NOTE — TELEPHONE ENCOUNTER
Lorene Mcdowell - patient called he has been in and out of hospital for Nausea, and pain  Does not want to end up in Hospital  Is there any thing we can do for patient   Please call Del Wellington at [phone] ty

## 2021-04-12 NOTE — PRE-PROCEDURE INSTRUCTIONS
Pre-Surgery Instructions:   Medication Instructions    aspirin (ECOTRIN LOW STRENGTH) 81 mg EC tablet Pt was instructed by Dr Ankita Trotter not to stop prior to surgery   atorvastatin (LIPITOR) 80 mg tablet Instructed patient to continue taking as prescribed up to and including DOS with sips of water   buPROPion (WELLBUTRIN SR) 150 mg 12 hr tablet Instructed patient to continue taking as prescribed up to and including DOS with sips of water   clonazePAM (KLONOPIN) 0 5 mg tablet Instructed patient to continue taking as prescribed up to and including DOS with sips of water   dicyclomine (BENTYL) 20 mg tablet Instructed patient to continue taking as prescribed up to but NOT including DOS   Glucosamine-Chondroitin (OSTEO BI-FLEX REGULAR STRENGTH PO) Instructed patient per Anesthesia Guidelines   omeprazole (PriLOSEC) 20 mg delayed release capsule Instructed patient to continue taking as prescribed up to and including DOS with sips of water   ondansetron (ZOFRAN-ODT) 4 mg disintegrating tablet Instructed patient to continue taking as prescribed up to but NOT including DOS   sertraline (ZOLOFT) 100 mg tablet Instructed patient to continue taking as prescribed up to and including DOS with sips of water  Med list reviewed as above  Also instructed pt not to start any new vitamins/supplements preoperatively and to avoid NSAID's  3 days prior to surgery  Tylenol is acceptable if needed  Pt has and/or is getting CHG surgical soap and verbalizes understanding of preoperative showering protocol  Reviewed [facility] current covid visitor restriction policy and pt understands that it may change at any time  All information within "My Surgical Experience" pamphlet reviewed and patient verbalizes understanding and compliance  All questions answered

## 2021-04-13 ENCOUNTER — TELEPHONE (OUTPATIENT)
Dept: OBGYN CLINIC | Facility: HOSPITAL | Age: 56
End: 2021-04-13

## 2021-04-13 ENCOUNTER — TELEPHONE (OUTPATIENT)
Dept: INTERNAL MEDICINE CLINIC | Facility: CLINIC | Age: 56
End: 2021-04-13

## 2021-04-13 NOTE — TELEPHONE ENCOUNTER
Patient sees Dr Lupe Soares    Patient called to see what time he was having surgery for tomorrow, I let patient know that someone will be calling him soon

## 2021-04-13 NOTE — TELEPHONE ENCOUNTER
Patient is having rotator cuff surgery tomorrow and was here on 04/07  Could his visit be addended to say he is cleared for surgery tomorrow?      Call back #[phone]   [facility]

## 2021-04-13 NOTE — TELEPHONE ENCOUNTER
S/w pt advised to hold glucosamine and asa for surgery  Pt verbalized understanding of same  Thao surgical organizer also aware note addemed

## 2021-04-14 ENCOUNTER — ANESTHESIA (OUTPATIENT)
Dept: PERIOP | Facility: HOSPITAL | Age: 56
End: 2021-04-14
Payer: MEDICARE

## 2021-04-14 ENCOUNTER — ANESTHESIA EVENT (OUTPATIENT)
Dept: PERIOP | Facility: HOSPITAL | Age: 56
End: 2021-04-14
Payer: MEDICARE

## 2021-04-14 ENCOUNTER — HOSPITAL ENCOUNTER (OUTPATIENT)
Facility: HOSPITAL | Age: 56
Setting detail: OUTPATIENT SURGERY
Discharge: HOME | End: 2021-04-14
Attending: ORTHOPAEDIC SURGERY | Admitting: ORTHOPAEDIC SURGERY
Payer: MEDICARE

## 2021-04-14 VITALS
HEIGHT: 70 IN | RESPIRATION RATE: 20 BRPM | DIASTOLIC BLOOD PRESSURE: 73 MMHG | WEIGHT: 214.73 LBS | BODY MASS INDEX: 30.74 KG/M2 | TEMPERATURE: 98.2 F | HEART RATE: 80 BPM | SYSTOLIC BLOOD PRESSURE: 128 MMHG | OXYGEN SATURATION: 91 %

## 2021-04-14 DIAGNOSIS — S46.012D TRAUMATIC INCOMPLETE TEAR OF LEFT ROTATOR CUFF, SUBSEQUENT ENCOUNTER: Primary | ICD-10-CM

## 2021-04-14 PROCEDURE — 29826 SHO ARTHRS SRG DECOMPRESSION: CPT | Performed by: PHYSICIAN ASSISTANT

## 2021-04-14 PROCEDURE — 29827 SHO ARTHRS SRG RT8TR CUF RPR: CPT | Performed by: ORTHOPAEDIC SURGERY

## 2021-04-14 PROCEDURE — 23430 REPAIR BICEPS TENDON: CPT | Performed by: ORTHOPAEDIC SURGERY

## 2021-04-14 PROCEDURE — C1713 ANCHOR/SCREW BN/BN,TIS/BN: HCPCS | Performed by: ORTHOPAEDIC SURGERY

## 2021-04-14 PROCEDURE — 29827 SHO ARTHRS SRG RT8TR CUF RPR: CPT | Performed by: PHYSICIAN ASSISTANT

## 2021-04-14 PROCEDURE — 29826 SHO ARTHRS SRG DECOMPRESSION: CPT | Performed by: ORTHOPAEDIC SURGERY

## 2021-04-14 PROCEDURE — C9290 INJ, BUPIVACAINE LIPOSOME: HCPCS | Performed by: ANESTHESIOLOGY

## 2021-04-14 PROCEDURE — 23430 REPAIR BICEPS TENDON: CPT | Performed by: PHYSICIAN ASSISTANT

## 2021-04-14 DEVICE — PROXIMAL TENODESIS IMPLANT SYSTEM REV: 0
Type: IMPLANTABLE DEVICE | Status: FUNCTIONAL
Brand: ARTHREX®

## 2021-04-14 DEVICE — BIO-COMPOSITE CRKSCRW 5.5X14.7MM
Type: IMPLANTABLE DEVICE | Site: SHOULDER | Status: FUNCTIONAL
Brand: ARTHREX®

## 2021-04-14 RX ORDER — FENTANYL CITRATE/PF 50 MCG/ML
25 SYRINGE (ML) INJECTION
Status: DISCONTINUED | OUTPATIENT
Start: 2021-04-14 | End: 2021-04-14 | Stop reason: HOSPADM

## 2021-04-14 RX ORDER — FENTANYL CITRATE 50 UG/ML
INJECTION, SOLUTION INTRAMUSCULAR; INTRAVENOUS AS NEEDED
Status: DISCONTINUED | OUTPATIENT
Start: 2021-04-14 | End: 2021-04-14

## 2021-04-14 RX ORDER — OXYCODONE HYDROCHLORIDE AND ACETAMINOPHEN 5; 325 MG/1; MG/1
1 TABLET ORAL EVERY 4 HOURS PRN
Qty: 20 TABLET | Refills: 0 | Status: SHIPPED | OUTPATIENT
Start: 2021-04-14 | End: 2021-09-17

## 2021-04-14 RX ORDER — ONDANSETRON 2 MG/ML
4 INJECTION INTRAMUSCULAR; INTRAVENOUS EVERY 8 HOURS PRN
Status: CANCELLED | OUTPATIENT
Start: 2021-04-14

## 2021-04-14 RX ORDER — HYDROMORPHONE HCL/PF 1 MG/ML
0.2 SYRINGE (ML) INJECTION
Status: DISCONTINUED | OUTPATIENT
Start: 2021-04-14 | End: 2021-04-14 | Stop reason: HOSPADM

## 2021-04-14 RX ORDER — OXYCODONE HYDROCHLORIDE AND ACETAMINOPHEN 5; 325 MG/1; MG/1
1 TABLET ORAL ONCE
Status: CANCELLED | OUTPATIENT
Start: 2021-04-14

## 2021-04-14 RX ORDER — CEFAZOLIN SODIUM 2 G/50ML
SOLUTION INTRAVENOUS AS NEEDED
Status: DISCONTINUED | OUTPATIENT
Start: 2021-04-14 | End: 2021-04-14

## 2021-04-14 RX ORDER — LIDOCAINE HYDROCHLORIDE 10 MG/ML
INJECTION, SOLUTION EPIDURAL; INFILTRATION; INTRACAUDAL; PERINEURAL AS NEEDED
Status: DISCONTINUED | OUTPATIENT
Start: 2021-04-14 | End: 2021-04-14

## 2021-04-14 RX ORDER — BUPIVACAINE HYDROCHLORIDE 5 MG/ML
INJECTION, SOLUTION EPIDURAL; INTRACAUDAL AS NEEDED
Status: DISCONTINUED | OUTPATIENT
Start: 2021-04-14 | End: 2021-04-14 | Stop reason: HOSPADM

## 2021-04-14 RX ORDER — SODIUM CHLORIDE, SODIUM LACTATE, POTASSIUM CHLORIDE, AND CALCIUM CHLORIDE .6; .31; .03; .02 G/100ML; G/100ML; G/100ML; G/100ML
IRRIGANT IRRIGATION AS NEEDED
Status: DISCONTINUED | OUTPATIENT
Start: 2021-04-14 | End: 2021-04-14 | Stop reason: HOSPADM

## 2021-04-14 RX ORDER — FENTANYL CITRATE 50 UG/ML
INJECTION, SOLUTION INTRAMUSCULAR; INTRAVENOUS
Status: COMPLETED | OUTPATIENT
Start: 2021-04-14 | End: 2021-04-14

## 2021-04-14 RX ORDER — SODIUM CHLORIDE, SODIUM LACTATE, POTASSIUM CHLORIDE, CALCIUM CHLORIDE 600; 310; 30; 20 MG/100ML; MG/100ML; MG/100ML; MG/100ML
125 INJECTION, SOLUTION INTRAVENOUS CONTINUOUS
Status: DISCONTINUED | OUTPATIENT
Start: 2021-04-14 | End: 2021-04-14 | Stop reason: HOSPADM

## 2021-04-14 RX ORDER — ONDANSETRON 2 MG/ML
INJECTION INTRAMUSCULAR; INTRAVENOUS AS NEEDED
Status: DISCONTINUED | OUTPATIENT
Start: 2021-04-14 | End: 2021-04-14

## 2021-04-14 RX ORDER — PROPOFOL 10 MG/ML
INJECTION, EMULSION INTRAVENOUS AS NEEDED
Status: DISCONTINUED | OUTPATIENT
Start: 2021-04-14 | End: 2021-04-14

## 2021-04-14 RX ORDER — CHLORHEXIDINE GLUCONATE 4 G/100ML
SOLUTION TOPICAL DAILY PRN
Status: DISCONTINUED | OUTPATIENT
Start: 2021-04-14 | End: 2021-04-14 | Stop reason: HOSPADM

## 2021-04-14 RX ORDER — DEXAMETHASONE SODIUM PHOSPHATE 4 MG/ML
INJECTION, SOLUTION INTRA-ARTICULAR; INTRALESIONAL; INTRAMUSCULAR; INTRAVENOUS; SOFT TISSUE AS NEEDED
Status: DISCONTINUED | OUTPATIENT
Start: 2021-04-14 | End: 2021-04-14

## 2021-04-14 RX ORDER — MEPERIDINE HYDROCHLORIDE 50 MG/ML
12.5 INJECTION INTRAMUSCULAR; INTRAVENOUS; SUBCUTANEOUS
Status: DISCONTINUED | OUTPATIENT
Start: 2021-04-14 | End: 2021-04-14 | Stop reason: HOSPADM

## 2021-04-14 RX ORDER — GLYCOPYRROLATE 0.2 MG/ML
INJECTION INTRAMUSCULAR; INTRAVENOUS AS NEEDED
Status: DISCONTINUED | OUTPATIENT
Start: 2021-04-14 | End: 2021-04-14

## 2021-04-14 RX ORDER — ONDANSETRON 2 MG/ML
4 INJECTION INTRAMUSCULAR; INTRAVENOUS ONCE AS NEEDED
Status: DISCONTINUED | OUTPATIENT
Start: 2021-04-14 | End: 2021-04-14 | Stop reason: HOSPADM

## 2021-04-14 RX ORDER — BUPIVACAINE HYDROCHLORIDE 5 MG/ML
INJECTION, SOLUTION PERINEURAL
Status: COMPLETED | OUTPATIENT
Start: 2021-04-14 | End: 2021-04-14

## 2021-04-14 RX ORDER — CEFAZOLIN SODIUM 2 G/50ML
2000 SOLUTION INTRAVENOUS ONCE
Status: DISCONTINUED | OUTPATIENT
Start: 2021-04-14 | End: 2021-04-14 | Stop reason: HOSPADM

## 2021-04-14 RX ORDER — MIDAZOLAM HYDROCHLORIDE 2 MG/2ML
INJECTION, SOLUTION INTRAMUSCULAR; INTRAVENOUS
Status: COMPLETED | OUTPATIENT
Start: 2021-04-14 | End: 2021-04-14

## 2021-04-14 RX ORDER — ROCURONIUM BROMIDE 10 MG/ML
INJECTION, SOLUTION INTRAVENOUS AS NEEDED
Status: DISCONTINUED | OUTPATIENT
Start: 2021-04-14 | End: 2021-04-14

## 2021-04-14 RX ADMIN — BUPIVACAINE HYDROCHLORIDE 15 ML: 5 INJECTION, SOLUTION PERINEURAL at 11:45

## 2021-04-14 RX ADMIN — FENTANYL CITRATE 25 MCG: 50 INJECTION, SOLUTION INTRAMUSCULAR; INTRAVENOUS at 15:24

## 2021-04-14 RX ADMIN — ROCURONIUM BROMIDE 10 MG: 10 INJECTION, SOLUTION INTRAVENOUS at 15:00

## 2021-04-14 RX ADMIN — LIDOCAINE HYDROCHLORIDE 60 MG: 10 INJECTION, SOLUTION EPIDURAL; INFILTRATION; INTRACAUDAL; PERINEURAL at 12:35

## 2021-04-14 RX ADMIN — SODIUM CHLORIDE, SODIUM LACTATE, POTASSIUM CHLORIDE, AND CALCIUM CHLORIDE 125 ML/HR: .6; .31; .03; .02 INJECTION, SOLUTION INTRAVENOUS at 11:24

## 2021-04-14 RX ADMIN — SODIUM CHLORIDE, SODIUM LACTATE, POTASSIUM CHLORIDE, AND CALCIUM CHLORIDE: .6; .31; .03; .02 INJECTION, SOLUTION INTRAVENOUS at 14:39

## 2021-04-14 RX ADMIN — MIDAZOLAM HYDROCHLORIDE 2 MG: 1 INJECTION, SOLUTION INTRAMUSCULAR; INTRAVENOUS at 11:45

## 2021-04-14 RX ADMIN — CEFAZOLIN SODIUM 2000 MG: 2 SOLUTION INTRAVENOUS at 12:40

## 2021-04-14 RX ADMIN — FENTANYL CITRATE 100 MCG: 50 INJECTION, SOLUTION INTRAMUSCULAR; INTRAVENOUS at 12:34

## 2021-04-14 RX ADMIN — GLYCOPYRROLATE 0.1 MG: 0.2 INJECTION, SOLUTION INTRAMUSCULAR; INTRAVENOUS at 12:40

## 2021-04-14 RX ADMIN — ROCURONIUM BROMIDE 20 MG: 10 INJECTION, SOLUTION INTRAVENOUS at 13:20

## 2021-04-14 RX ADMIN — SUGAMMADEX 400 MG: 100 INJECTION, SOLUTION INTRAVENOUS at 15:28

## 2021-04-14 RX ADMIN — PROPOFOL 200 MG: 10 INJECTION, EMULSION INTRAVENOUS at 12:36

## 2021-04-14 RX ADMIN — ROCURONIUM BROMIDE 10 MG: 10 INJECTION, SOLUTION INTRAVENOUS at 14:30

## 2021-04-14 RX ADMIN — GLYCOPYRROLATE 0.1 MG: 0.2 INJECTION, SOLUTION INTRAMUSCULAR; INTRAVENOUS at 13:09

## 2021-04-14 RX ADMIN — FENTANYL CITRATE 25 MCG: 50 INJECTION, SOLUTION INTRAMUSCULAR; INTRAVENOUS at 15:07

## 2021-04-14 RX ADMIN — FENTANYL CITRATE 100 MCG: 50 INJECTION, SOLUTION INTRAMUSCULAR; INTRAVENOUS at 11:45

## 2021-04-14 RX ADMIN — DEXAMETHASONE SODIUM PHOSPHATE 4 MG: 4 INJECTION, SOLUTION INTRAMUSCULAR; INTRAVENOUS at 13:10

## 2021-04-14 RX ADMIN — FENTANYL CITRATE 50 MCG: 50 INJECTION, SOLUTION INTRAMUSCULAR; INTRAVENOUS at 13:26

## 2021-04-14 RX ADMIN — ROCURONIUM BROMIDE 10 MG: 10 INJECTION, SOLUTION INTRAVENOUS at 14:00

## 2021-04-14 RX ADMIN — ROCURONIUM BROMIDE 50 MG: 10 INJECTION, SOLUTION INTRAVENOUS at 12:36

## 2021-04-14 RX ADMIN — SODIUM CHLORIDE, SODIUM LACTATE, POTASSIUM CHLORIDE, AND CALCIUM CHLORIDE: .6; .31; .03; .02 INJECTION, SOLUTION INTRAVENOUS at 14:38

## 2021-04-14 RX ADMIN — ONDANSETRON 4 MG: 2 INJECTION INTRAMUSCULAR; INTRAVENOUS at 15:09

## 2021-04-14 RX ADMIN — BUPIVACAINE 10 ML: 13.3 INJECTION, SUSPENSION, LIPOSOMAL INFILTRATION at 11:42

## 2021-04-14 NOTE — ANESTHESIA PROCEDURE NOTES
Peripheral Block    Patient location during procedure: holding area  Start time: 4/14/2021 11:42 AM  Reason for block: at surgeon's request and post-op pain management  Staffing  Anesthesiologist: Corazon Meek MD  Performed: anesthesiologist   Preanesthetic Checklist  Completed: patient identified, site marked, surgical consent, pre-op evaluation, timeout performed, IV checked, risks and benefits discussed and monitors and equipment checked  Peripheral Block  Patient position: supine  Prep: ChloraPrep  Patient monitoring: continuous pulse ox and frequent blood pressure checks  Block type: interscalene  Laterality: left  Injection technique: single-shot  Procedures: ultrasound guided, Ultrasound guidance required for the procedure to increase accuracy and safety of medication placement and decrease risk of complications    Ultrasound permanent image savedbupivacaine (MARCAINE) 0 5 % perineural infiltration, 15 mL  fentaNYL 50 mcg/mL IV, 100 mcg  midazolam (VERSED) 2 mg/2 mL IV, 2 mg  Needle  Needle type: Stimuplex   Needle gauge: 22 G  Needle length: 5 cm  Needle localization: anatomical landmarks and ultrasound guidance  Assessment  Injection assessment: incremental injection and local visualized surrounding nerve on ultrasound  Paresthesia pain: none  Heart rate change: no  Slow fractionated injection: yes  Post-procedure:  site cleaned  patient tolerated the procedure well with no immediate complications

## 2021-04-14 NOTE — INTERVAL H&P NOTE
H&P reviewed  After examining the patient I find no changes in the patients condition since the H&P had been written  Patient was seen and evaluated in the office were surgical and continue conservative treatment rotator cuff tear were discussed with patient  Patient would like to proceed with surgical intervention  Risks and benefits of surgery discussed with patient including but not limited to infection, injury to surrounding structure, continued pain, need for additional procedure, stiffness  The patient will go to the OR with Dr Gordy Cope on 04/14/2021 for left shoulder arthroscopy, rotator cuff repair, possible acromioplasty, possible biceps tenotomy versus tenodesis  [place]     Heart:  Regular rate rhythm, no murmurs appreciated  Lungs:  Clear to auscultation bilaterally    Vitals:    04/14/21 1106   BP: 141/81   Pulse: 79   Resp: 18   Temp: 97 7 °F (36 5 °C)   SpO2: 96%

## 2021-04-14 NOTE — ANESTHESIA PREPROCEDURE EVALUATION
Procedure:  REPAIR ROTATOR CUFF  ARTHROSCOPIC; POSSIBLE BICEPS TENOTOMY, POSSIBLE ACROMIOPLASTY (Left Shoulder)    Relevant Problems   CARDIO   (+) Chest pain   (+) Coronary artery disease   (+) Essential hypertension   (+) Hyperlipidemia   (+) Hypertension   (+) Sinus bradycardia      GI/HEPATIC   (+) GERD (gastroesophageal reflux disease)   (+) Gastroesophageal reflux disease with esophagitis      NEURO/PSYCH   (+) Anxiety and depression   (+) Depression        Physical Exam    Airway    Mallampati score: II  TM Distance: >3 FB  Neck ROM: full     Dental   Comment: Denies loose teeth,     Cardiovascular  Cardiovascular exam normal    Pulmonary  Pulmonary exam normal     Other Findings  Portions of exam deferred due to low yield and/or unknown COVID status      Anesthesia Plan  ASA Score- 3     Anesthesia Type- regional and general with ASA Monitors  Additional Monitors:   Airway Plan: ETT  Plan Factors-Exercise tolerance (METS): >4 METS  Chart reviewed  Existing labs reviewed  Patient summary reviewed  Patient is not a current smoker  Induction- intravenous  Postoperative Plan-     Informed Consent- Anesthetic plan and risks discussed with patient  I personally reviewed this patient with the CRNA  Discussed and agreed on the Anesthesia Plan with the CRNA  Raffaele Salazar

## 2021-04-14 NOTE — ANESTHESIA POSTPROCEDURE EVALUATION
Post-Op Assessment Note    CV Status:  Stable  Pain Score: 0    Pain management: adequate     Mental Status:  Alert   Hydration Status:  Stable   PONV Controlled:  Controlled   Airway Patency:  Patent  Airway: intubated      Post Op Vitals Reviewed: Yes      Staff: CRNA         No complications documented      BP   140/92   Temp 97 4   Pulse 88   Resp 18   SpO2 95% 3L FM

## 2021-04-14 NOTE — DISCHARGE INSTRUCTIONS
Shoulder Surgery:  Postoperative Instructions  Dr Gricelda Mead may resume your regular diet as soon as possible    Medication    Take the pain medication as prescribed   Take pain medication with food   While taking pain medications, you may NOT operate a vehicle, heavy machinery, or appliances   While taking pain medication, you may NOT drink alcoholic beverages   If you have any reactions to your medications, stop taking them and call my office   Please keep in mind that constipation is a very common side effect of taking narcotic pain medication  Take precautions to prevent constipation:  o Drink plenty of water (6-8 glasses of 8 oz  a day)  o Avoid alcohol, caffeine, and dairy products  o Eat plenty of fiber (fruits, vegetables, and whole grains)  o Take an over the counter stool softener (Colace or Dulcolax)  o Patients that have had upper extremity surgery should take frequent walks    Activity    Minimize activity the day of surgery    DO NOT USE HEAT    Please keep ice applied to the shoulder for at least the first 72 hours or as long as pain or swelling persists  Do not apply ice directly to skin, or allow water to leak on your dressing   Place a pillow behind your elbow while lying down or sleeping  Sleeping in a more upright position (recliner) may be more comfortable initially  You should NOT roll onto the operative shoulder   You are in an immobilizer or sling and it should remain on at all times except when showering until your first postoperative visit   Open and close your hand 10 times every day for about 1 minute  You may also flex and extend your elbow each day when your sling is removed for showering  Be sure to keep your elbow at your side   DO NOT actively (on your own) lift your operative arm away from the side of your body or rotate it out away from your body   DO NOT use exercise equipment unless otherwise instructed       Sling/ Immobilizer    Use the sling/immobilizer at all times and while sleeping until your next office appointment  Showering    You may shower 5 days after surgery unless told otherwise  DO NOT immerse the shoulder under water and DO NOT rub the incision  Place new Band-Aids over the sutures after showering   You may sponge bathe for the first 4 days, taking care to keep the dressing clean and dry  Dressing Care    It is normal to get some bloody wound seepage through the bandage  DO NOT BE ALARMED   If the dressing gets soaked with wound seepage, place more gauze over the dressing and secure with tape  If this soaks through remove the entire dressing and replace with STERILE gauze and tape    Remove all dressings at 4 days after surgery  If there is still some wound seepage, apply a fresh STERILE gauze over the incisions and secure with tape   DO NOT TOUCH OR REMOVE THE SUTURES!! Emergency/Follow-Up   Please notify my office at [phone] if you develop any fever (101 deg or above), unexpected warmth, redness or swelling  Please call if your fingers become cold, purple, numb, or there is excessive bleeding   Please call the office within 24 hours at [phone] to schedule a follow up appt within 7-10 days from surgery

## 2021-04-14 NOTE — OP NOTE
OPERATIVE REPORT  PATIENT NAME: Dave Dorsey    :  1965  MRN: [id number]  Pt Location: [facility] OR ROOM 04    SURGERY DATE: 2021    Surgeon(s) and Role:     * Lily Burnham, DO - Primary     * Ricardo Abad PA-C - Assisting    Preop Diagnosis:  Tear of left infraspinatus tendon, initial encounter [S46 812A], subacromial impingement    Post-Op Diagnosis Codes:     * Tear of left infraspinatus tendon, initial encounter [S46 812A], partial-thickness tear of supraspinatus tendon,  partial-thickness tearing of long head of biceps tendon, subacromial impingement    Procedure:  Left shoulder arthroscopic rotator cuff repair of infraspinatus, rotator cuff debridement of supraspinatus, acromioplasty, open subpectoral biceps tenodesis    Specimen(s):  * No specimens in log *    Estimated Blood Loss:   Minimal    Drains:  * No LDAs found *    Anesthesia Type:   General w/ Interscalene Block    Operative Indications:  Tear of left infraspinatus tendon, initial encounter [W62 719C]  Traumatic Full-thickness rotator cuff tear, partial-thickness tearing of long head of biceps tendon, subacromial impingement with several years of pain secondary to rotator cuff pathology which was refractory to conservative intervention, with recent acute trauma with associated full-thickness rotator cuff tear  Operative Findings:  Full-thickness tear of infraspinatus tendon with longitudinal split extending to musculotendinous junction, low-grade partial articular sided tearing of supraspinatus, partial-thickness tearing of long head of biceps tendon, large subacromial spur with reactive subacromial bursitis    Complications:   None    Procedure and Technique:  The patient was seen in the preoperative holding area and the appropriate site of surgery was confirmed the patient was marked  Upon bringing the patient back to the operating room he was placed supine on the operating room table  General anesthesia was provided    The patient was placed into the lateral decubitus position with the left side up and held into place with a beanbag  All bony prominences were well padded  Exam under anesthesia confirmed no obvious instability and near full passive range of motion  The left upper extremity was prepped and draped in usual sterile fashion  The left upper extremity was placed in 10 lb of in-line traction  A preoperative time-out was performed to once again confirm the site of surgery and procedure  An 11 blade was used to make a posterior viewing portal for the arthroscopic camera  Once inside the joint, an anterior portal was made under direct visualization using an 18 gauge spinal needle, just over the superior aspect of subscapularis tendon  An arthroscopic probe was inserted and a diagnostic arthroscopy was performed  Subscapularis was noted to be intact  There was noted to be mild fraying off of the anterior labrum  High-grade partial-thickness tearing of the long head of the biceps tendon just distal to the biceps anchor was noted with erythema extending distally into the extra-articular portion  There was low-grade undersurface fraying of the anterior supraspinatus  As the arthroscopy was taking posteriorly there was noted to be a small focal area of full-thickness tearing of the infraspinatus with surrounding partial-thickness tearing  There is a partial-thickness tearing was marked with a spinal needle and 0 PDS suture  At this time a meniscal biter was inserted to perform a biceps tenotomy  Biceps stump and anterior labral fraying was debrided with an arthroscopic shaver  Low-grade articular sided tearing of the supraspinatus was then debrided with the arthroscopic shaver  At this time the arthroscope was removed and the patient was taken out of traction  A 3 cm incision was made over the inferior aspect of the pectoralis major tendon    Dissection was taken down to the inferior aspect of the pectoralis major tendon after achieving hemostasis with Bovie cautery  Dissection was taken to the inferior aspect of the pectoralis major to bone  At this time the pack was retracted superiorly and the short head of the biceps was retracted medially to identify the long head of the biceps tendon  This was bluntly dissected and removed from the incision  At this time a whipstitch was placed in the musculotendinous junction extending proximally  The suture ends were loaded into a cortical button  At this time the humerus was once again identified and the appropriate site of tension was marked with a Bovie cautery  A spade tip drill bit was used to drill unicortically  At this time the cortical button was placed unicortically into the humerus and sutures were tensioned to bring the biceps to the appropriate position  Attention was once again confirmed  A free needle was used to place 1 more suture limb through the biceps tendon and a knot was tied to secure the tenodesis  The remaining stump of the biceps tendon was excised  The incision was copiously irrigated and fascia and subcutaneous tissue were closed with 2-0 Vicryl suture  The patient was placed back in to traction and the arthroscope was then placed into the subacromial space  Significant subacromial bursitis was noted upon insertion of the arthroscope  A lateral portal was made for working  An arthroscopic shaver was introduced to perform a bursectomy  At this time the rotator cuff tendon tear was identified  A small focal area of full-thickness tearing with surrounding near full-thickness tearing was appreciated  This was debrided with a shaver and at this time there was identified to be a longitudinal split extending medially from the rotator cuff tear  Arthroscopic Bovie was used to clear soft tissue off of the underside of the acromion  There was a large subacromial spur identified    At this time the camera was placed into the lateral portal and residual bursectomy was performed with the arthroscopic shaver via the posterior portal   An arthroscopic bur was then inserted to perform an acromioplasty  The bur was then used to decorticate the footprint of the infraspinatus on the greater tuberosity to aid in tendon healing  At this time an accessory portal was made off of the lateral aspect of the acromion and a tap was used for insertion of a triple loaded anchor  After insertion of the anchor a passing device was used to passed the corresponding limbs into the anterior and posterior leaflets of the tear respectively  Sutures were then individually retrieved and tied to form a side to side repair from the triple loaded anchor  Suture ends were cut  An arthroscopic probe was inserted to confirm proper reduction and tension of the rotator cuff repair  Final arthroscopic pictures were taken  Arthroscopic portals were closed with 3-0 nylon suture  4-0 Monocryl was used for closure of the biceps tenodesis skin incision  10 cc of 0 5% Marcaine were injected into the biceps tenodesis site  Steri-Strips and sterile dressing were applied  The patient was placed into a sling and abduction pillow  No complications were noted he was transferred to the PACU uneventfully     I was present for the entire procedure, A qualified resident physician was not available and A physician assistant was required during the procedure for retraction tissue handling,dissection and suturing    Patient Disposition:  PACU     SIGNATURE: Katty Lambert DO  DATE: April 14, 2021  TIME: 3:35 PM

## 2021-04-16 ENCOUNTER — TELEPHONE (OUTPATIENT)
Dept: OBGYN CLINIC | Facility: HOSPITAL | Age: 56
End: 2021-04-16

## 2021-04-16 DIAGNOSIS — M75.112 INCOMPLETE TEAR OF LEFT ROTATOR CUFF, UNSPECIFIED WHETHER TRAUMATIC: Primary | ICD-10-CM

## 2021-04-16 DIAGNOSIS — S46.812A TEAR OF LEFT INFRASPINATUS TENDON, INITIAL ENCOUNTER: Primary | ICD-10-CM

## 2021-04-16 RX ORDER — OXYCODONE HYDROCHLORIDE AND ACETAMINOPHEN 5; 325 MG/1; MG/1
1 TABLET ORAL EVERY 4 HOURS PRN
Qty: 20 TABLET | Refills: 0 | Status: SHIPPED | OUTPATIENT
Start: 2021-04-16 | End: 2021-09-17

## 2021-04-16 RX ORDER — NALOXONE HYDROCHLORIDE 4 MG/.1ML
SPRAY NASAL
Qty: 1 EACH | Refills: 1 | Status: SHIPPED | OUTPATIENT
Start: 2021-04-16 | End: 2021-09-17

## 2021-04-16 NOTE — TELEPHONE ENCOUNTER
Pain medication was sent to the pharmacy  May want to advise about tylenol and anti-inflammatories if not contra-indicated for this procedure and for the patient to help with pain control  Thank you

## 2021-04-16 NOTE — TELEPHONE ENCOUNTER
Spoke to patient again  Advised that if his pain is that severe, he needs to go to the ER  Advised that he will be made aware of any messages we get back from the clinical team regarding what further we can recommend for pain control  Patient verbalized understanding

## 2021-04-16 NOTE — TELEPHONE ENCOUNTER
Patient called in screaming demanding to speak to someone       I advised him that we sent the message to Dr Hannah Grant and Indira Blake     C/b # [phone]

## 2021-04-16 NOTE — TELEPHONE ENCOUNTER
Patient sees Dr Eric Castano  He had surgery on 4/14  He is calling stating that his pain is a 9/10  He is taking the Percocet but it is not working  He had trouble sleeping last night   Call back #[phone]

## 2021-04-16 NOTE — TELEPHONE ENCOUNTER
Spoke to patient  He reports 9/10 pain that kept him up through the night  Stated he is taking percocet 1 tab Q4h and it is not helping  Reports that the shoulder is throbbing  Patient stated he cannot take NSAIDs due to ulcerative colitis  Patient stated he used ice last night and it didn't help either  Advised continued ice 20 mins on 20 mins off  Reports no drainage  Denies any redness or heat t otuch that he can see  Patient would like to know what else to do for pain  Advised continued repeated  Ice 20 mins at a time to control swelling since he is unable to use NSAIDs  Advised that he can sit up in a reclined position for sleep as that may be the most comfortable in the PO period  Patient stated he needs something more for pain  Reports taking additional last night, 1 500 mg tablet  Advised that he can use additional tylenol he just has to be aware of the amount because there is 325 mg tylenol per percocet tablet, the max for tylenol in 24 hrs is 3000 mg  Patient verbalized understanding  Please advise

## 2021-04-16 NOTE — TELEPHONE ENCOUNTER
Spoke to patient again  He stated this is severe and uncontrolled at this time  Asking for a response on what to do at this time  Patient made aware Dr James Rivera is not in the office today but there should someone watching his inbasket  Advised that if his pain is severe and uncontrolled and he feels like he cannot wait until a response from the team he would need to proceed to the ER for evaluation  Patient stated that he will run out of medication over the weekend  He is asking for advice ASAP  Advised continued ice 20 mins on 20 mins off  Is he able to take 2 tablets of percocet x 2 doses then go back to the regular scheduled dosing and obtain a refill for the weekend? Please advise  Curettage Prior To Excision?: No

## 2021-04-16 NOTE — TELEPHONE ENCOUNTER
Patient is calling with uncontrolled pain after pain block wore off  Please advise if okay to let patient know to take Percocet 5/325mg 2 tabs every 4 hrs x 2 doses then go back to 1 tab every 4 hrs, ice 20 min on 20 min off  He is unable to NSAIDS due to ulcerative colitis  Please advise

## 2021-04-16 NOTE — TELEPHONE ENCOUNTER
Patient is calling back very upset, he has no pain medication since surgery  Spoke to nurse she is calling doctor on call to assist with medication     Uses [facility] in [address]

## 2021-04-16 NOTE — TELEPHONE ENCOUNTER
Patient is calling back again  He is in severe pain and would like a different medication  Patient is unable to get a ride to the ER  Spoke with triage nurse who is going to try to get in touch with someone  I advised patient a nurse will be calling him back        CB: [phone]

## 2021-04-16 NOTE — TELEPHONE ENCOUNTER
Patient just called and wants to know when someone is going to call him back, he is in lots of pain and doesn't have a ride to the ER, He can't go all weekend like this and the pain medication that he has isn't working      Taina  [phone]  [phone]

## 2021-04-19 ENCOUNTER — TELEPHONE (OUTPATIENT)
Dept: OBGYN CLINIC | Facility: HOSPITAL | Age: 56
End: 2021-04-19

## 2021-04-19 NOTE — TELEPHONE ENCOUNTER
Dr Smith [facility]    [phone]    Patient is requesting  a refill of Percocet 5-325 mg  He has 4 remaining  Please call into [facility] on file

## 2021-04-20 DIAGNOSIS — Z98.890 S/P ARTHROSCOPY OF SHOULDER: Primary | ICD-10-CM

## 2021-04-20 RX ORDER — OXYCODONE HYDROCHLORIDE AND ACETAMINOPHEN 5; 325 MG/1; MG/1
1 TABLET ORAL EVERY 6 HOURS PRN
Qty: 20 TABLET | Refills: 0 | Status: SHIPPED | OUTPATIENT
Start: 2021-04-20 | End: 2021-09-17

## 2021-04-20 NOTE — PROGRESS NOTES
Spoke to patient on phone  I instructed him that I will provide 1 more prescription for Percocet and he should start weaning down on his usage, with transition to over-the-counter Tylenol  The patient understands and does state that his pain is more well controlled at this point

## 2021-04-22 ENCOUNTER — APPOINTMENT (OUTPATIENT)
Dept: RADIOLOGY | Facility: CLINIC | Age: 56
End: 2021-04-22
Payer: MEDICARE

## 2021-04-22 ENCOUNTER — OFFICE VISIT (OUTPATIENT)
Dept: OBGYN CLINIC | Facility: CLINIC | Age: 56
End: 2021-04-22

## 2021-04-22 VITALS
DIASTOLIC BLOOD PRESSURE: 81 MMHG | HEIGHT: 70 IN | SYSTOLIC BLOOD PRESSURE: 119 MMHG | HEART RATE: 88 BPM | BODY MASS INDEX: 31.64 KG/M2 | RESPIRATION RATE: 20 BRPM | WEIGHT: 221 LBS

## 2021-04-22 DIAGNOSIS — Z98.890 S/P ARTHROSCOPY OF LEFT SHOULDER: Primary | ICD-10-CM

## 2021-04-22 DIAGNOSIS — Z98.890 S/P ARTHROSCOPY OF LEFT SHOULDER: ICD-10-CM

## 2021-04-22 PROCEDURE — 99024 POSTOP FOLLOW-UP VISIT: CPT | Performed by: ORTHOPAEDIC SURGERY

## 2021-04-22 PROCEDURE — 73030 X-RAY EXAM OF SHOULDER: CPT

## 2021-04-22 NOTE — PROGRESS NOTES
Patient Name:  Donna Trinh  MRN:  [id number]    Assessment & Plan     1  S/P arthroscopy of left shoulder       The patient has Status post left shoulder arthroscopic rotator cuff repair, acromioplasty, and open subpectoral biceps tenodesis performed on 04/14/2021  X-rays were reviewed in the office with the patient today and display   appropriately positioned tenodesis button  Sutures removed in the office today  We did discuss sling use  The patient admits to not wearing his sling the past 2 nights and has not been wearing his abduction pillow  The patient was once again instructed to the fact that he is supposed to be wearing his sling with abduction pillow at all times unless performing gentle passive range of motion exercises at home which were provided in the office today as well  The patient understands and states that he will resume his sling use including when going to bed  I have written a prescription for physical therapy to begin at postoperative week 5  I will see the patient back in 3-4 weeks for repeat evaluation  History of the Present Illness   Donna Trinh is a 63 y o  male   Status post left shoulder arthroscopic rotator cuff repair, acromioplasty, and open subpectoral biceps tenodesis performed on 04/14/2021  Pain is well controlled  He denies any numbness or tingling  He is not using his abduction pillow in the office today though he is wearing his sling  He states that the past 2 days he has not been sleeping with his sling but has otherwise been wearing it  No other new complaints  Review of Systems     Review of Systems   Constitutional: Negative for appetite change and unexpected weight change  HENT: Negative for congestion and trouble swallowing  Eyes: Negative for visual disturbance  Respiratory: Negative for cough and shortness of breath  Cardiovascular: Negative for chest pain and palpitations  Gastrointestinal: Negative for nausea and vomiting  Endocrine: Negative for cold intolerance and heat intolerance  Musculoskeletal: Negative for gait problem and myalgias  Skin: Negative for rash  Neurological: Negative for numbness  Physical Exam     Resp 20   Ht 5' 10" (1 778 m)   Wt 100 kg (221 lb)   BMI 31 71 kg/m²      Left Shoulder: Incisions are healing well for the postoperative course  Sutures removed in the office today  There is no erythema or drainage present  Range of motion of shoulder  not tested today due to postoperative course  Full active range of motion of elbow, wrist, and digits present  The patient is neurovascular intact distally  Data Review     I have personally reviewed pertinent films in PACS, and my interpretation follows  X-rays of the left shoulder taken in the office today display  no fracture or dislocation  Cortical button from biceps tenodesis well position      Social History     Tobacco Use    Smoking status: Former Smoker     Packs/day: 0 50     Quit date: 2007     Years since quittin 2    Smokeless tobacco: Former User     Quit date: 1998    Tobacco comment: quit 10 yrs ago   Substance Use Topics    Alcohol use: Not Currently     Frequency: Never     Comment: quit 5 years    Drug use: Yes     Frequency: 3 0 times per week     Types: Marijuana           Procedures    Ree Ear, DO

## 2021-05-10 ENCOUNTER — OFFICE VISIT (OUTPATIENT)
Dept: OBGYN CLINIC | Facility: CLINIC | Age: 56
End: 2021-05-10

## 2021-05-10 VITALS
RESPIRATION RATE: 20 BRPM | SYSTOLIC BLOOD PRESSURE: 115 MMHG | BODY MASS INDEX: 30.92 KG/M2 | HEART RATE: 88 BPM | HEIGHT: 70 IN | WEIGHT: 216 LBS | DIASTOLIC BLOOD PRESSURE: 78 MMHG

## 2021-05-10 DIAGNOSIS — Z98.890 S/P ARTHROSCOPY OF LEFT SHOULDER: Primary | ICD-10-CM

## 2021-05-10 DIAGNOSIS — S83.241A OTHER TEAR OF MEDIAL MENISCUS OF RIGHT KNEE AS CURRENT INJURY, INITIAL ENCOUNTER: ICD-10-CM

## 2021-05-10 PROCEDURE — 99024 POSTOP FOLLOW-UP VISIT: CPT | Performed by: ORTHOPAEDIC SURGERY

## 2021-05-10 NOTE — PROGRESS NOTES
Patient Name:  Shawn Tyler  MRN:  [id number]    Assessment & Plan     1  S/P arthroscopy of left shoulder    2  Other tear of medial meniscus of right knee as current injury, initial encounter        Shayna Han is a pleasant 64 yo male status post left arthroscopic rotator cuff repair with biceps tendonesis, acromioplasty performed in 4/14/21  Continue wearing the sling for another 2 more weeks  At the 6 week pascual active motion will begin  Continue pendulium and elbow and wrist range of motion  I will see the patient back for 4 weeks for a post op evaluation for the left shoulder  Continue icing the knee  We will refrain from another CSI as the most recent CSI was on 4/1/21  Advised in the future CSI could be considered  Refrain from strenuous activity  Home exercises were provided to the patient  Short arc quadriceps exercises and straight leg raises  I will see him in 4 weeks for a repeat evaluation  History of the Present Illness   Shawn Tyler is a 63 y o  male status post left arthroscopic rotator cuff repair with biceps tendonesis, acromioplasty performed in 4/14/21  Pain in well controlled  Pain in the knee is a 2-3  Patient stated his dog ate the abduction pillow 4-5 days ago  He also reports occasionally actively elevating his arm  to reach for different objects  Because he forgets that he has not supposed to do that  He reports when over exerting his arm and actively moving it he does feel pain  He does acknowledge the fact that he knows he is not suppose to do that  Patient states injection in the right knee was helpful until about 2 weeks ago with gradual return of pain and swelling  He denies any locking or mechanical symptoms  He does report intermittent weakness in his quadriceps muscle associated giving way  No other new complaints  Review of Systems     Review of Systems   Constitutional: Negative for appetite change and unexpected weight change     HENT: Negative for congestion and trouble swallowing  Eyes: Negative for visual disturbance  Respiratory: Negative for cough and shortness of breath  Cardiovascular: Negative for chest pain and palpitations  Gastrointestinal: Negative for nausea and vomiting  Endocrine: Negative for cold intolerance and heat intolerance  Musculoskeletal: Negative for gait problem and myalgias  Skin: Negative for rash  Neurological: Negative for numbness  Physical Exam     /78   Pulse 88   Resp 20   Ht 5' 10" (1 778 m)   Wt 98 kg (216 lb)   BMI 30 99 kg/m²     Left Shoulder: Incisions are well healed  There is no erythema or drainage present  Passive range of motion of shoulder 85 forward flexion passive 60 abduction  Active range of motion of shoulder not tested due to postoperative course  Full active range of motion of elbow, wrist, and digits present  The patient is neurovascular intact distally  Right Knee: Range of motion from 0 to 120  There is no crepitus with range of motion  There is mild effusion  There is no tenderness over the  Medial or lateral joint line  There is 4+/5 quadriceps strength and mildly decreased tone  The patient can perform a straight leg raise  Varus stress testing reveals   No pain or instability  Valgus stress testing reveals  No pain or instability  Ree's testing is negative  The patient is neurovascular intact distally  Data Review     I have personally reviewed pertinent films in PACS, and my interpretation follows      none    Social History     Tobacco Use    Smoking status: Former Smoker     Packs/day: 0 50     Quit date: 2007     Years since quittin 3    Smokeless tobacco: Former User     Quit date: 1998    Tobacco comment: quit 10 yrs ago   Substance Use Topics    Alcohol use: Not Currently     Frequency: Never     Comment: quit 5 years    Drug use: Yes     Frequency: 3 0 times per week     Types: Marijuana           Procedures   none    Scribe Attestation    I,:  Tracy Plata MA am acting as a scribe while in the presence of the attending physician :       I,:  Princess Meehan,  personally performed the services described in this documentation    as scribed in my presence :

## 2021-05-20 ENCOUNTER — EVALUATION (OUTPATIENT)
Dept: PHYSICAL THERAPY | Facility: CLINIC | Age: 56
End: 2021-05-20
Payer: MEDICARE

## 2021-05-20 DIAGNOSIS — Z98.890 S/P ARTHROSCOPY OF LEFT SHOULDER: ICD-10-CM

## 2021-05-20 DIAGNOSIS — Z98.890 STATUS POST ROTATOR CUFF REPAIR: Primary | ICD-10-CM

## 2021-05-20 PROCEDURE — 97161 PT EVAL LOW COMPLEX 20 MIN: CPT | Performed by: PHYSICAL THERAPIST

## 2021-05-20 PROCEDURE — 97140 MANUAL THERAPY 1/> REGIONS: CPT | Performed by: PHYSICAL THERAPIST

## 2021-05-20 PROCEDURE — 97110 THERAPEUTIC EXERCISES: CPT | Performed by: PHYSICAL THERAPIST

## 2021-05-20 NOTE — PROGRESS NOTES
PT Evaluation     Today's date: 2021  Patient name: Marcy Hussein  : 1965  MRN: [id number]  Referring provider: Alonso Ramirez DO  Dx:   Encounter Diagnosis     ICD-10-CM    1  Status post rotator cuff repair  Z98 890    2  S/P arthroscopy of left shoulder  Z98 890                   Assessment  Assessment details: Marcy Hussein is a pleasant 63 y o  male who presents with S/P RC Status post left shoulder arthroscopic rotator cuff repair and open subpectoral biceps tenodesis performed on 2021  The patient's greatest concerns are worry over not knowing what's wrong, concern at no signs of improvement, wanting to avoid painkillers and fear of not being able to keep active  The patient's surgery has left him with a primary movement problem of impaired ROM, and limiting his ability to care for self, dress independently, drive, exercise or recreation, perform household chores, perform yard work, reach overhead, sleep and wash behind the back  No further referral appears necessary at this time based upon examination results  Post operative protocol was extensively discussed with patient as he has admitted to utilizing his arm to carry things and reach with  It was discussed that the arm is not to be utilized for carrying objects or AROM  It was discussed with patient how to perform activities with being compliant with sling wearing and his ROM limitations  Pt verbalized and demonstrated understanding of post operative protocol and HEP/POC  Primary Impairments:  1) Impaired ROM  2) Impaired strength   3) Impaired posture       Etiologic factors include none recalled by the patient      Impairments: abnormal or restricted ROM, abnormal movement, activity intolerance, impaired physical strength, lacks appropriate home exercise program, pain with function, safety issue and poor body mechanics    Symptom irritability: moderateUnderstanding of Dx/Px/POC: good   Prognosis: fair  Prognosis details: Positive prognostic indicators include positive attitude toward recovery  Negative prognostic indicators include non compliant with post operative protocol  Goals  ST  Independent with HEP in 2 weeks  2  Pt will have verbal report of improvement in symptoms by >/=25% in 2 weeks     To be achieved by D/C  LT  Pt will improve FOTO score by >/=8 points in 6 weeks  2  Pt will improve FOTO score to >/=70 by visit # 17  3  Pt will be able to sleep through the night without reproduction of sxs   4  Pt will be able to perform overhead activities with no difficulty   5  Pt will be able to perform ADLs/IADLs with no difficulty   6  Pt will be able to drive with no difficulty   7  Pt will be able to return to recreational activities      Plan  Plan details: Prognosis above is given PT services 2x/week tapering to 1x/week over the next 2 months and home program adherence  Patient would benefit from: skilled physical therapy  Planned modality interventions: thermotherapy: hydrocollator packs  Planned therapy interventions: activity modification, joint mobilization, manual therapy, motor coordination training, neuromuscular re-education, patient education, self care, therapeutic activities, therapeutic exercise, graded activity, home exercise program and behavior modification  Frequency: 2x week  Duration in weeks: 16  Plan of Care beginning date: 2021  Treatment plan discussed with: patient        Subjective Evaluation    History of Present Illness  Mechanism of injury: Status post left shoulder arthroscopic rotator cuff repair, acromioplasty, and open subpectoral biceps tenodesis performed on 2021  Notes that when he uses his arm that he feels the pain       Pain  Current pain ratin  At best pain ratin  At worst pain ratin  Relieving factors: medications    Hand dominance: right    Patient Goals  Patient goals for therapy: decreased pain and increased motion  Patient goal: be able to do push ups, be able to drive, be able to go quadding, be able to work on cars,         Objective     Postural Observations  Seated posture: poor  Standing posture: poor        Active Range of Motion     Right Shoulder   Normal active range of motion    Additional Active Range of Motion Details  Unable to assess secondary to post operative protocol     Passive Range of Motion   Left Shoulder   Flexion: 70 degrees   External rotation 0°: 30 degrees     Strength/Myotome Testing     Right Shoulder   Normal muscle strength    Additional Strength Details  Unable to assess secondary to post operative protocol       Flowsheet Rows      Most Recent Value   PT/OT G-Codes   Current Score  43   Projected Score  70             Precautions: Status post left shoulder arthroscopic rotator cuff repair and open subpectoral biceps tenodesis performed on 04/14/2021   Please follow-up postoperative rotator cuff protocol   Begin formal physical therapy at postoperative week 5  No active strengthening of biceps until after 6 weeks and no active range of motion of shoulder until after 6 week pascual  Status post left shoulder arthroscopic rotator cuff repair and open subpectoral biceps tenodesis performed on 04/14/2021   Please follow-up postoperative rotator cuff protocol   Begin formal physical therapy at postoperative week 5  No active strengthening of biceps until after 6 weeks and no active range of motion of shoulder until after 6 week pascual        Manuals 5/20            GH mobilizations GR1-2             Self care, prognosis, HEP instruction KB                                      Neuro Re-Ed                                                                                                        Ther Ex             Digit flex             scap retraction  10s x10            Pendulums CCW/CW x30 ea            Wrist flex/ext             Wrist rad Tiffanie Morales dev             Elbow AAROM ** 6 weeks ONLY **             PROM  KB Ther Activity                                       Gait Training                                       Modalities

## 2021-05-24 ENCOUNTER — OFFICE VISIT (OUTPATIENT)
Dept: PHYSICAL THERAPY | Facility: CLINIC | Age: 56
End: 2021-05-24
Payer: MEDICARE

## 2021-05-24 DIAGNOSIS — Z98.890 STATUS POST ROTATOR CUFF REPAIR: Primary | ICD-10-CM

## 2021-05-24 DIAGNOSIS — Z98.890 S/P ARTHROSCOPY OF LEFT SHOULDER: ICD-10-CM

## 2021-05-24 PROCEDURE — 97110 THERAPEUTIC EXERCISES: CPT

## 2021-05-24 NOTE — PROGRESS NOTES
Daily Note     Today's date: 2021  Patient name: Ashish Luke  : 1965  MRN: [id number]  Referring provider: Pat Wheeler DO  Dx:   Encounter Diagnosis     ICD-10-CM    1  Status post rotator cuff repair  Z98 890    2  S/P arthroscopy of left shoulder  Z98 890        Start Time: 1151  Stop Time: 1230  Total time in clinic (min): 39 minutes    Subjective: Pt reports "trying not to use it" in reference to L shoulder  Objective: See treatment diary below      Assessment: Pt actively was using his UE during pendulums and when seated in the chair to move arm to arm rest; pt strongly educated t/o visit to avoid any active use of L shoulder at this time as structures are still healing; pt educated and was visually cued how to perform pendulums w/o AROM and how to use opp UE to help L UE move positions  Pt also educated to avoid any soaking of L shoulder at this time; incisions are healing nicely  Continuous cues to avoid guarding during PROM  Plan: Continue with current POC to address pt deficits  Precautions: Status post left shoulder arthroscopic rotator cuff repair and open subpectoral biceps tenodesis performed on 2021   Please follow-up postoperative rotator cuff protocol   Begin formal physical therapy at postoperative week 5  No active strengthening of biceps until after 6 weeks and no active range of motion of shoulder until after 6 week pascual  Status post left shoulder arthroscopic rotator cuff repair and open subpectoral biceps tenodesis performed on 2021   Please follow-up postoperative rotator cuff protocol   Begin formal physical therapy at postoperative week 5  No active strengthening of biceps until after 6 weeks and no active range of motion of shoulder until after 6 week pascual        Manuals            [facility] mobilizations GR1-2             Self care, prognosis, HEP instruction KB                                      Neuro Re-Ed Ther Ex             Pt ed   10' avoid AROM, positioning, avoid soaking UE, approx heal times           Digit flex  x30 red           scap retraction  10s x10 10"x10 w/cues            Pendulums CCW/CW x30 ea x30 a/p, x30 m/l w/continuous cues           Wrist flex/ext  x30           Wrist rad Carolyn Jeffrey dev  x30           Elbow AAROM ** 6 weeks ONLY **             PROM  KB TB           Gripping   20x red digiflex           Ther Activity                                       Gait Training                                       Modalities

## 2021-05-26 ENCOUNTER — OFFICE VISIT (OUTPATIENT)
Dept: PHYSICAL THERAPY | Facility: CLINIC | Age: 56
End: 2021-05-26
Payer: MEDICARE

## 2021-05-26 DIAGNOSIS — Z98.890 STATUS POST ROTATOR CUFF REPAIR: Primary | ICD-10-CM

## 2021-05-26 DIAGNOSIS — Z98.890 S/P ARTHROSCOPY OF LEFT SHOULDER: ICD-10-CM

## 2021-05-26 PROCEDURE — 97110 THERAPEUTIC EXERCISES: CPT

## 2021-05-26 NOTE — PROGRESS NOTES
Daily Note     Today's date: 2021  Patient name: Marisela Busch  : 1965  MRN: [id number]  Referring provider: Ani Burns DO  Dx:   Encounter Diagnosis     ICD-10-CM    1  Status post rotator cuff repair  Z98 890    2  S/P arthroscopy of left shoulder  Z98 890                   Subjective: Pt reports having fallen yesterday but fell on R hip and not his shoulder but had some pain from the impact, reports pain has lessened since then  He reports "using my arm as little as possible" in reference to L shoulder  Objective: See treatment diary below      Assessment: Pt was D/C from sling per protocol and was educated t/o visit to avoid active use of L UE at this time as things are still healing and pt verbalized understanding this visit  PROM to max ranges in all planes minus flexion but is approx 120 degrees at this point w/o pain  Added AAROM elbow flexion and scaption pulleys per protocol with good tolerance, visual/tactile cues to ensure correct exercise technique  Plan: Continue with current POC to address pt deficits  Precautions: Status post left shoulder arthroscopic rotator cuff repair and open subpectoral biceps tenodesis performed on 2021   Please follow-up postoperative rotator cuff protocol   Begin formal physical therapy at postoperative week 5  No active strengthening of biceps until after 6 weeks and no active range of motion of shoulder until after 6 week pascual  Status post left shoulder arthroscopic rotator cuff repair and open subpectoral biceps tenodesis performed on 2021   Please follow-up postoperative rotator cuff protocol   Begin formal physical therapy at postoperative week 5  No active strengthening of biceps until after 6 weeks and no active range of motion of shoulder until after 6 week pascual        Manuals           [facility] mobilizations GR1-2             Self care, prognosis, HEP instruction KB                                      Neuro Re-Ed                                                                                                        Ther Ex             Pt ed   10' avoid AROM, positioning, avoid soaking UE, approx heal times 5'          Digit flex  x30 red x30 red          scap retraction  10s x10 10"x10 w/cues  10"x10 w cues          Pendulums CCW/CW x30 ea x30 a/p, x30 m/l w/continuous cues x30 a/p, x30 m/l          Wrist flex/ext  x30 x30          Wrist rad Armaan Ravens dev  x30 x30          Elbow AAROM ** 6 weeks ONLY **   x20x AAROM          PROM  KB TB TB          scaption pulleys    x2 min          Gripping   20x red digiflex 30x red digiflex          Ther Activity                                       Gait Training                                       Modalities

## 2021-06-01 ENCOUNTER — OFFICE VISIT (OUTPATIENT)
Dept: PHYSICAL THERAPY | Facility: CLINIC | Age: 56
End: 2021-06-01
Payer: MEDICARE

## 2021-06-01 DIAGNOSIS — Z98.890 STATUS POST ROTATOR CUFF REPAIR: Primary | ICD-10-CM

## 2021-06-01 DIAGNOSIS — Z98.890 S/P ARTHROSCOPY OF LEFT SHOULDER: ICD-10-CM

## 2021-06-01 PROCEDURE — 97110 THERAPEUTIC EXERCISES: CPT

## 2021-06-01 NOTE — PROGRESS NOTES
Daily Note     Today's date: 2021  Patient name: Brandin Richardson  : 1965  MRN: [id number]  Referring provider: Jose Raul Morfin DO  Dx:   Encounter Diagnosis     ICD-10-CM    1  Status post rotator cuff repair  Z98 890    2  S/P arthroscopy of left shoulder  Z98 890                   Subjective: Pt reports falling off of tractor last Thursday and has visible bruising to L ribs, he reports no more pain than usual in L shoulder as he was wearing his sling, he said he avoided landing on his shoulder  Pt reports intermittent sharp pain that goes into L shoulder that has been ongoing since surgery  Objective: See treatment diary below      Assessment: Progressed pt program per protocol with good tolerance, no increases in pain, AAROM within ROM parameters  Noted fatigue post scaption wall walks, tactile cues to avoid compensation from UT  Pt denied any increases in pain during session and reported less discomfort Ended with CP to decrease soreness/inflammation  Plan: Continue with current POC to address pt deficits  Precautions: Status post left shoulder arthroscopic rotator cuff repair and open subpectoral biceps tenodesis performed on 2021   Please follow-up postoperative rotator cuff protocol   Begin formal physical therapy at postoperative week 5  No active strengthening of biceps until after 6 weeks and no active range of motion of shoulder until after 6 week pascual  Status post left shoulder arthroscopic rotator cuff repair and open subpectoral biceps tenodesis performed on 2021   Please follow-up postoperative rotator cuff protocol   Begin formal physical therapy at postoperative week 5  No active strengthening of biceps until after 6 weeks and no active range of motion of shoulder until after 6 week pascual        Manuals          GH mobilizations GR1-2             Self care, prognosis, HEP instruction KB                                      Neuro Re-Ed Ther Ex             UBE     2' fw, 2' bw         Pt ed   10' avoid AROM, positioning, avoid soaking UE, approx heal times 5'          Digit flex  x30 red x30 red          scap retraction  10s x10 10"x10 w/cues  10"x10 w cues          Pendulums CCW/CW x30 ea x30 a/p, x30 m/l w/continuous cues x30 a/p, x30 m/l          Wrist flex/ext  x30 x30          AAROM external rotation    wand 10"x10         AAROM flexion    w/opp UE 10"x10         Wrist rad Ami Cassette dev  x30 x30          Elbow AAROM ** 6 weeks ONLY **   x20x AAROM x20 AAROM         PROM  KB TB TB TB         Scaption wall walks    20x         scaption pulleys    x2 min x3 min         Gripping   20x red digiflex 30x red digiflex          Ther Activity                                       Gait Training                                       Modalities             CP    10'

## 2021-06-03 ENCOUNTER — OFFICE VISIT (OUTPATIENT)
Dept: PHYSICAL THERAPY | Facility: CLINIC | Age: 56
End: 2021-06-03
Payer: MEDICARE

## 2021-06-03 ENCOUNTER — TELEPHONE (OUTPATIENT)
Dept: OBGYN CLINIC | Facility: CLINIC | Age: 56
End: 2021-06-03

## 2021-06-03 DIAGNOSIS — Z98.890 S/P ARTHROSCOPY OF LEFT SHOULDER: ICD-10-CM

## 2021-06-03 DIAGNOSIS — Z98.890 STATUS POST ROTATOR CUFF REPAIR: Primary | ICD-10-CM

## 2021-06-03 PROCEDURE — 97530 THERAPEUTIC ACTIVITIES: CPT | Performed by: PHYSICAL THERAPIST

## 2021-06-03 PROCEDURE — 97110 THERAPEUTIC EXERCISES: CPT | Performed by: PHYSICAL THERAPIST

## 2021-06-03 PROCEDURE — 97140 MANUAL THERAPY 1/> REGIONS: CPT | Performed by: PHYSICAL THERAPIST

## 2021-06-03 NOTE — PROGRESS NOTES
Daily Note     Today's date: 6/3/2021  Patient name: Francie Guadalupe  : 1965  MRN: [id number]  Referring provider: Chio Staples DO  Dx:   Encounter Diagnosis     ICD-10-CM    1  Status post rotator cuff repair  Z98 890    2  S/P arthroscopy of left shoulder  Z98 890                   Subjective: Kathleen Dooley reports that he feels like he is making slow progress with his shoulder      Objective: See treatment diary below      Assessment: Tolerated treatment well  Patient demonstrated fatigue post treatment and would benefit from continued PT  Mobility is on track per protocol, increased tenderness and TP in UT but decreased following manual therapy  Plan: Continue per plan of care  Progress treatment as tolerated  Precautions: Status post left shoulder arthroscopic rotator cuff repair and open subpectoral biceps tenodesis performed on 2021   Please follow-up postoperative rotator cuff protocol   Begin formal physical therapy at postoperative week 5  No active strengthening of biceps until after 6 weeks and no active range of motion of shoulder until after 6 week pascual  Status post left shoulder arthroscopic rotator cuff repair and open subpectoral biceps tenodesis performed on 2021   Please follow-up postoperative rotator cuff protocol   Begin formal physical therapy at postoperative week 5  No active strengthening of biceps until after 6 weeks and no active range of motion of shoulder until after 6 week pascual        Manuals  6/2 6/3       GH mobilizations GR1-2      EG gr 3/       Self care, prognosis, HEP instruction KB            UT TPR/Posterior capsule      EG                    Neuro Re-Ed                                                                                                        Ther Ex             UBE     2' fw, 2' bw 3/3 3/3       Pt ed   10' avoid AROM, positioning, avoid soaking UE, approx heal times 5'          Digit flex  x30 red x30 red          scap retraction  10s x10 10"x10 w/cues  10"x10 w cues          Pendulums CCW/CW x30 ea x30 a/p, x30 m/l w/continuous cues x30 a/p, x30 m/l          Wrist flex/ext  x30 x30          AAROM external rotation    wand 10"x10  wand 10"x10       AAROM flexion    w/opp UE 10"x10  wand 10"x10       Wrist rad /ular dev  x30 x30          Elbow AAROM ** 6 weeks ONLY **   x20x AAROM x20 AAROM  wand 10"x10       PROM  KB TB TB TB         Scaption wall walks    20x         scaption pulleys    x2 min x3 min         Gripping   20x red digiflex 30x red digiflex          Ther Activity             keister row      6# 3x10       Bilateral ER      yellow 5"x10       Gait Training                                       Modalities             CP    10'

## 2021-06-03 NOTE — TELEPHONE ENCOUNTER
Spoke with this patient Dr Deuce Lindsey will not be available for his 06/10/2021 appointment @ 3:00pm   He will be coming @ 12:00 noon on 06/10/2021        Niki Sr can you please change to that time

## 2021-06-08 ENCOUNTER — OFFICE VISIT (OUTPATIENT)
Dept: PHYSICAL THERAPY | Facility: CLINIC | Age: 56
End: 2021-06-08
Payer: MEDICARE

## 2021-06-08 DIAGNOSIS — Z98.890 S/P ARTHROSCOPY OF LEFT SHOULDER: ICD-10-CM

## 2021-06-08 DIAGNOSIS — Z98.890 STATUS POST ROTATOR CUFF REPAIR: Primary | ICD-10-CM

## 2021-06-08 PROCEDURE — 97140 MANUAL THERAPY 1/> REGIONS: CPT

## 2021-06-08 PROCEDURE — 97110 THERAPEUTIC EXERCISES: CPT

## 2021-06-08 NOTE — PROGRESS NOTES
Daily Note     Today's date: 2021  Patient name: Yoav Strange  : 1965  MRN: [id number]  Referring provider: Tammy Gill DO  Dx:   Encounter Diagnosis     ICD-10-CM    1  Status post rotator cuff repair  Z98 890    2  S/P arthroscopy of left shoulder  Z98 890                   Subjective: Pt reports achness to L shoulder  Objective: See treatment diary below      Assessment: Initied AROM per protocol this visit, difficulty initially in supine but was then able to perform with more ease; cues to avoid any compensation from UT  Unable to perform all 10 repetitions of ER with YTB due to fatigue  Improvement in tissue quality post manuals to L UT, tricep, posterior capsule  Plan: Continue with current POC to address pt deficits  Precautions: Status post left shoulder arthroscopic rotator cuff repair and open subpectoral biceps tenodesis performed on 2021   Please follow-up postoperative rotator cuff protocol   Begin formal physical therapy at postoperative week 5  No active strengthening of biceps until after 6 weeks and no active range of motion of shoulder until after 6 week pascual  Status post left shoulder arthroscopic rotator cuff repair and open subpectoral biceps tenodesis performed on 2021   Please follow-up postoperative rotator cuff protocol   Begin formal physical therapy at postoperative week 5  No active strengthening of biceps until after 6 weeks and no active range of motion of shoulder until after 6 week pascual        Manuals  6/2 6/3 6/8      GH mobilizations GR1-2      EG gr 3/       Self care, prognosis, HEP instruction KB            UT TPR/Posterior capsule      EG TB                   Neuro Re-Ed                                                                                                        Ther Ex             UBE     2' fw, 2' bw 3/3 3/3 3/3      Pt ed   10' avoid AROM, positioning, avoid soaking UE, approx heal times 5' Digit flex  x30 red x30 red          scap retraction  10s x10 10"x10 w/cues  10"x10 w cues          Pendulums CCW/CW x30 ea x30 a/p, x30 m/l w/continuous cues x30 a/p, x30 m/l          Wrist flex/ext  x30 x30          AAROM external rotation    wand 10"x10  wand 10"x10 wand 10"x10      AAROM flexion    w/opp UE 10"x10  wand 10"x10 wand 10"x10      Wrist rad /ular dev  x30 x30          Elbow AAROM ** 6 weeks ONLY **   x20x AAROM x20 AAROM  wand 10"x10 wand 10"x10      PROM  KB TB TB TB   TB      AROM scaption to 90 deg       2x10 0      AROM flexion to 90 degrees        2x10 0#      Scaption wall walks    20x         scaption pulleys    x2 min x3 min         Gripping   20x red digiflex 30x red digiflex          Ther Activity             keister row      6# 3x10 6# 3x10      Bilateral ER      yellow 5"x10 YTB 5"x5 -fatigue      Gait Training                                       Modalities             CP    10'   10'

## 2021-06-10 ENCOUNTER — OFFICE VISIT (OUTPATIENT)
Dept: PHYSICAL THERAPY | Facility: CLINIC | Age: 56
End: 2021-06-10
Payer: MEDICARE

## 2021-06-10 ENCOUNTER — OFFICE VISIT (OUTPATIENT)
Dept: OBGYN CLINIC | Facility: CLINIC | Age: 56
End: 2021-06-10

## 2021-06-10 VITALS
BODY MASS INDEX: 30.64 KG/M2 | DIASTOLIC BLOOD PRESSURE: 73 MMHG | SYSTOLIC BLOOD PRESSURE: 108 MMHG | WEIGHT: 214 LBS | HEIGHT: 70 IN | HEART RATE: 85 BPM | RESPIRATION RATE: 20 BRPM

## 2021-06-10 DIAGNOSIS — Z98.890 S/P ARTHROSCOPY OF LEFT SHOULDER: Primary | ICD-10-CM

## 2021-06-10 DIAGNOSIS — Z98.890 S/P ARTHROSCOPY OF LEFT SHOULDER: ICD-10-CM

## 2021-06-10 DIAGNOSIS — Z98.890 STATUS POST ROTATOR CUFF REPAIR: Primary | ICD-10-CM

## 2021-06-10 PROCEDURE — 97110 THERAPEUTIC EXERCISES: CPT

## 2021-06-10 PROCEDURE — 97140 MANUAL THERAPY 1/> REGIONS: CPT

## 2021-06-10 PROCEDURE — 99024 POSTOP FOLLOW-UP VISIT: CPT | Performed by: ORTHOPAEDIC SURGERY

## 2021-06-10 NOTE — PROGRESS NOTES
Daily Note     Today's date: 6/10/2021  Patient name: Aman Light  : 1965  MRN: [id number]  Referring provider: Hever Quiroz DO  Dx:   Encounter Diagnosis     ICD-10-CM    1  Status post rotator cuff repair  Z98 890    2  S/P arthroscopy of left shoulder  Z98 890                   Subjective: Pt reports continued achiness in L shoulder, he had f/u with ortho and he was happy with his ROM at this point in time  He reports mild swelling from previous visit  Objective: See treatment diary below      Assessment: Did not progress too significantly secondary to pt having increased edema from previous visit  Unable to perform all repetitions of AROM scaption due to pain  Added pulley abduction with good tolerance and added extensions into program, visual cues to ensure correct exercise technique  Progress as able NV  Plan: Continue with current POC to address pt deficits  Precautions: Status post left shoulder arthroscopic rotator cuff repair and open subpectoral biceps tenodesis performed on 2021   Please follow-up postoperative rotator cuff protocol   Begin formal physical therapy at postoperative week 5  No active strengthening of biceps until after 6 weeks and no active range of motion of shoulder until after 6 week pascual  Status post left shoulder arthroscopic rotator cuff repair and open subpectoral biceps tenodesis performed on 2021   Please follow-up postoperative rotator cuff protocol   Begin formal physical therapy at postoperative week 5  No active strengthening of biceps until after 6 weeks and no active range of motion of shoulder until after 6 week pascual        Manuals  6/ 6/2 6/3 6/8 6/10     GH mobilizations GR1-2      EG gr 3/4       Self care, prognosis, HEP instruction KB            UT TPR/Posterior capsule      EG TB TB                  Neuro Re-Ed             Int/ext rotation with tband         2x10 ea b/l YTB     Prone row        NV     Prone extension        NV                                                         Ther Ex             UBE     2' fw, 2' bw 3/3 3/3 3/3 3/3 mod resistance     Pt ed   10' avoid AROM, positioning, avoid soaking UE, approx heal times 5'          Digit flex  x30 red x30 red          scap retraction  10s x10 10"x10 w/cues  10"x10 w cues          Pendulums CCW/CW x30 ea x30 a/p, x30 m/l w/continuous cues x30 a/p, x30 m/l          Wrist flex/ext  x30 x30          AAROM external rotation    wand 10"x10  wand 10"x10 wand 10"x10 wand 10"x10     AAROM flexion    w/opp UE 10"x10  wand 10"x10 wand 10"x10 wand 10"x10     Wrist rad /ular dev  x30 x30          Elbow AAROM ** 6 weeks ONLY **   x20x AAROM x20 AAROM  wand 10"x10 wand 10"x10      PROM  KB TB TB TB   TB TB     AROM scaption to 90 deg       2x10 0 10 0#     AROM flexion to 90 degrees        2x10 0# 2x10 0#      Scaption wall walks    20x         scaption pulleys    x2 min x3 min    2' scap 2' abd     Gripping   20x red digiflex 30x red digiflex          Ther Activity             keister row      6# 3x10 6# 3x10 6# 3x10 row, 6# 3x10 SA row      Bilateral ER      yellow 5"x10 YTB 5"x5 -fatigue      Gait Training                                       Modalities             CP    10'   10' 10'

## 2021-06-10 NOTE — PROGRESS NOTES
Patient Name:  Angelique Monge  MRN:  [id number]    Assessment & Plan     1  S/P arthroscopy of left shoulder          Patient doing well status post left arthroscopic rotator cuff repair with biceps tendonesis, acromioplasty performed on 4/14/21  Pain and range of motion are improving nicely  He is instructed to continue working with physical therapy according to his postoperative protocol and continue with home exercises as instructed  I will see the patient back in 4 weeks for repeat evaluation  History of the Present Illness   Angelique Monge is a 63 y o  male status post left arthroscopic rotator cuff repair with biceps tendonesis, acromioplasty performed on 4/14/21  Pain is well controlled  He states that his range of motion continue to improve with physical therapy  He denies any other new complaints  Review of Systems     Review of Systems   Constitutional: Negative for appetite change and unexpected weight change  HENT: Negative for congestion and trouble swallowing  Eyes: Negative for visual disturbance  Respiratory: Negative for cough and shortness of breath  Cardiovascular: Negative for chest pain and palpitations  Gastrointestinal: Negative for nausea and vomiting  Endocrine: Negative for cold intolerance and heat intolerance  Musculoskeletal: Negative for gait problem and myalgias  Skin: Negative for rash  Neurological: Negative for numbness  Physical Exam     /73   Pulse 85   Resp 20   Ht 5' 10" (1 778 m)   Wt 97 1 kg (214 lb)   BMI 30 71 kg/m²     Left Shoulder: Incisions are well healed  There is no erythema or drainage present  Active range of motion of shoulder 130 forward flexion passive 110 abduction  passive shoulder forward flexion to 150°  Full active range of motion of elbow, wrist, and digits present  The patient is neurovascular intact distally      Data Review     I have personally reviewed pertinent films in PACS, and my interpretation follows  No new imaging reviewed today      Social History     Tobacco Use    Smoking status: Former Smoker     Packs/day: 0 50     Quit date: 2007     Years since quittin 4    Smokeless tobacco: Former User     Quit date: 1998    Tobacco comment: quit 10 yrs ago   Substance Use Topics    Alcohol use: Not Currently     Frequency: Never     Comment: quit 5 years    Drug use: Yes     Frequency: 3 0 times per week     Types: Marijuana           Procedures    Naun General, DO

## 2021-06-15 ENCOUNTER — OFFICE VISIT (OUTPATIENT)
Dept: PHYSICAL THERAPY | Facility: CLINIC | Age: 56
End: 2021-06-15
Payer: MEDICARE

## 2021-06-15 DIAGNOSIS — Z98.890 S/P ARTHROSCOPY OF LEFT SHOULDER: ICD-10-CM

## 2021-06-15 DIAGNOSIS — Z98.890 STATUS POST ROTATOR CUFF REPAIR: Primary | ICD-10-CM

## 2021-06-15 PROCEDURE — 97140 MANUAL THERAPY 1/> REGIONS: CPT

## 2021-06-15 PROCEDURE — 97110 THERAPEUTIC EXERCISES: CPT

## 2021-06-15 NOTE — PROGRESS NOTES
Daily Note     Today's date: 6/15/2021  Patient name: J Carlos Chavez  : 1965  MRN: [id number]  Referring provider: Dama Dandy, DO  Dx:   Encounter Diagnosis     ICD-10-CM    1  Status post rotator cuff repair  Z98 890    2  S/P arthroscopy of left shoulder  Z98 890                   Subjective: Pt reports looseness to anterior L shoulder when edema goes down  He reports soreness but reports it is getting better  Objective: See treatment diary below      Assessment: Better AAROM/AROM with external rotation this visit; continues to be most challenged by resisted ext rot due to weakness at this time  Progressed pt program per protocal with good tolerance, tactile/visual cues to ensure correct exercise technique, unable to perform prone HA at this time secondary to weakness/pain  Improvement in tissue quality post manuals; progress as able per protocol  Plan: Continue with current POC to address pt deficits  Precautions: Status post left shoulder arthroscopic rotator cuff repair and open subpectoral biceps tenodesis performed on 2021   Please follow-up postoperative rotator cuff protocol   Begin formal physical therapy at postoperative week 5  No active strengthening of biceps until after 6 weeks and no active range of motion of shoulder until after 6 week pascual  Status post left shoulder arthroscopic rotator cuff repair and open subpectoral biceps tenodesis performed on 2021   Please follow-up postoperative rotator cuff protocol   Begin formal physical therapy at postoperative week 5  No active strengthening of biceps until after 6 weeks and no active range of motion of shoulder until after 6 week pascual        Manuals  6/ 6/2 6/3 6/8 6/10 6/15    GH mobilizations GR1-2      EG gr 3/4       Self care, prognosis, HEP instruction KB            UT TPR/Posterior capsule      EG TB TB TB                 Neuro Re-Ed             Int/ext rotation with tband         2x10 ea b/l YTB 2x10 ea b/l YTB    Prone row        NV 2x10    Prone extension        NV 2x10    Serratus punches supine          20x    HA prone         p!                               Ther Ex             UBE     2' fw, 2' bw 3/3 3/3 3/3 3/3 mod resistance 3/3    Pt ed   10' avoid AROM, positioning, avoid soaking UE, approx heal times 5'          Digit flex  x30 red x30 red          scap retraction  10s x10 10"x10 w/cues  10"x10 w cues          Pendulums CCW/CW x30 ea x30 a/p, x30 m/l w/continuous cues x30 a/p, x30 m/l          Wrist flex/ext  x30 x30          AAROM external rotation    wand 10"x10  wand 10"x10 wand 10"x10 wand 10"x10 wand 10"x10    AAROM flexion    w/opp UE 10"x10  wand 10"x10 wand 10"x10 wand 10"x10 Full ROM w/in ROM restrictions    Wrist rad /ular dev  x30 x30          Elbow AAROM ** 6 weeks ONLY **   x20x AAROM x20 AAROM  wand 10"x10 wand 10"x10      PROM  KB TB TB TB   TB TB TB    AROM scaption to 90 deg       2x10 0 10 0# NP-resume NV    AROM flexion to 90 degrees        2x10 0# 2x10 0#  2x10 0#    Scaption wall walks    20x         scaption pulleys    x2 min x3 min    2' scap 2' abd 2' scap, 2' abd    Gripping   20x red digiflex 30x red digiflex          Ther Activity             keister row      6# 3x10 6# 3x10 6# 3x10 row, 6# 3x10 SA row      Bilateral ER      yellow 5"x10 YTB 5"x5 -fatigue      Gait Training                                       Modalities             CP    10'   10' 10' 10'

## 2021-06-17 ENCOUNTER — EVALUATION (OUTPATIENT)
Dept: PHYSICAL THERAPY | Facility: CLINIC | Age: 56
End: 2021-06-17
Payer: MEDICARE

## 2021-06-17 DIAGNOSIS — Z98.890 STATUS POST ROTATOR CUFF REPAIR: Primary | ICD-10-CM

## 2021-06-17 DIAGNOSIS — Z98.890 S/P ARTHROSCOPY OF LEFT SHOULDER: ICD-10-CM

## 2021-06-17 PROCEDURE — 97140 MANUAL THERAPY 1/> REGIONS: CPT | Performed by: PHYSICAL THERAPIST

## 2021-06-17 PROCEDURE — 97110 THERAPEUTIC EXERCISES: CPT | Performed by: PHYSICAL THERAPIST

## 2021-06-17 PROCEDURE — 97530 THERAPEUTIC ACTIVITIES: CPT | Performed by: PHYSICAL THERAPIST

## 2021-06-17 NOTE — PROGRESS NOTES
PT Re-Evaluation     Today's date: 2021  Patient name: Angelique Monge  : 1965  MRN: [id number]  Referring provider: Sandor Hussein DO  Dx:   Encounter Diagnosis     ICD-10-CM    1  Status post rotator cuff repair  Z98 890    2  S/P arthroscopy of left shoulder  Z98 890                   Assessment  Assessment details: Angelique Monge is a pleasant 63 y o  male who has been receiving PT intervention for left shoulder RTC repair  The patient has demonstrated decreased pain, increased strength, increased ROM, improved body mechanics, improved posture  and increased activity tolerance since beginning treatment  Primary remaining impairments include:    1)  Decreased shoulder/RTC strength causing compensations    2)  Postural deficits causing increased pain with shoulder abduction    Impairments: abnormal or restricted ROM, activity intolerance, pain with function, scapular dyskinesis and poor body mechanics  Understanding of Dx/Px/POC: excellent  Goals  ST  Independent with HEP in 2 weeks -MET  2  Pt will have verbal report of improvement in symptoms by >/=25% in 2 weeks -MET    To be achieved by D/C  LT  Pt will improve FOTO score by >/=8 points in 6 weeks -MET  2  Pt will improve FOTO score to >/=70 by visit # 17 -PROGRESSING  3  Pt will be able to sleep through the night without reproduction of sxs  -MET  4  Pt will be able to perform overhead activities with no difficulty -PROGRESSING  5  Pt will be able to perform ADLs/IADLs with no difficulty -PROGRESSING  6  Pt will be able to drive with no difficulty -MET  7   Pt will be able to return to recreational activities -[id number]  Patient would benefit from: PT eval and skilled physical therapy  Planned modality interventions: low level laser therapy, TENS, thermotherapy: hydrocollator packs, ultrasound and cryotherapy  Planned therapy interventions: manual therapy, activity modification, behavior modification, neuromuscular re-education, therapeutic activities, therapeutic exercise, gait training, graded activity, graded exercise, patient education, home exercise program and sensory integrative techniques  Frequency: 2x week  Duration in weeks: 6  Treatment plan discussed with: patient        Subjective Evaluation    History of Present Illness  Mechanism of injury: No new complaints today  The patient reports improvement in symptoms since beginning skilled physical therapy  The patient reports 2/10 pain at it's worst over the past 24 hours, and reports 80% improvement in overall condition since beginning formal PT care  The patient's chief remaining concern is lifting the arm, getting back to his prior activity level  Objective     Postural Observations  Seated posture: fair  Standing posture: fair        Active Range of Motion   Left Shoulder   Flexion: 140 degrees   Abduction: 120 degrees     Right Shoulder   Normal active range of motion    Passive Range of Motion   Left Shoulder   Flexion: 150 degrees   Abduction: 120 degrees   External rotation 0°: 70 degrees     Strength/Myotome Testing     Left Shoulder     Planes of Motion   Flexion: 4+   Extension: 4+   Abduction: 4   Adduction: 4+   External rotation at 0°: 4-   Internal rotation at 0°: 4-     Right Shoulder   Normal muscle strength    Additional Strength Details  Unable to assess secondary to post operative protocol              Precautions: Status post left shoulder arthroscopic rotator cuff repair and open subpectoral biceps tenodesis performed on 04/14/2021   Please follow-up postoperative rotator cuff protocol   Begin formal physical therapy at postoperative week 5  No active strengthening of biceps until after 6 weeks and no active range of motion of shoulder until after 6 week pascual  Status post left shoulder arthroscopic rotator cuff repair and open subpectoral biceps tenodesis performed on 04/14/2021   Please follow-up postoperative rotator cuff protocol   Begin formal physical therapy at postoperative week 5  No active strengthening of biceps until after 6 weeks and no active range of motion of shoulder until after 6 week pascual  Manuals 5/20 5/24 5/26 6/1 6/2 6/3 6/8 6/10 6/15 6/17   GH mobilizations GR1-2      EG gr 3/4       Self care, prognosis, HEP instruction KB            UT TPR/Posterior capsule      EG TB TB TB EG                Neuro Re-Ed             Int/ext rotation with tband         2x10 ea b/l YTB 2x10 ea b/l YTB 2x10 ea b/l YTB   Prone row        NV 2x10 3x10   Prone extension        NV 2x10 3x10   Serratus punches supine          20x    HA prone         p!                               Ther Ex             UBE     2' fw, 2' bw 3/3 3/3 3/3 3/3 mod resistance 3/3 4/4   Pt ed   10' avoid AROM, positioning, avoid soaking UE, approx heal times 5'          Digit flex  x30 red x30 red          scap retraction  10s x10 10"x10 w/cues  10"x10 w cues          Pendulums CCW/CW x30 ea x30 a/p, x30 m/l w/continuous cues x30 a/p, x30 m/l          Wrist flex/ext  x30 x30          AAROM external rotation    wand 10"x10  wand 10"x10 wand 10"x10 wand 10"x10 wand 10"x10    AAROM flexion    w/opp UE 10"x10  wand 10"x10 wand 10"x10 wand 10"x10 Full ROM w/in ROM restrictions standing to 90 2x10   Wrist rad /ular dev  x30 x30          Elbow AAROM ** 6 weeks ONLY **   x20x AAROM x20 AAROM  wand 10"x10 wand 10"x10      PROM  KB TB TB TB   TB TB TB    AROM scaption to 90 deg       2x10 0 10 0# NP-resume NV    AROM flexion to 90 degrees        2x10 0# 2x10 0#  2x10 0# 2x10   Scaption wall walks    20x         scaption pulleys    x2 min x3 min    2' scap 2' abd 2' scap, 2' abd    Gripping   20x red digiflex 30x red digiflex          Ther Activity             keister row      6# 3x10 6# 3x10 6# 3x10 row, 6# 3x10 SA row      Bilateral ER      yellow 5"x10 YTB 5"x5 -fatigue      Gait Training                                       Modalities             CP    10'   10' 10' 10'

## 2021-06-22 ENCOUNTER — APPOINTMENT (OUTPATIENT)
Dept: PHYSICAL THERAPY | Facility: CLINIC | Age: 56
End: 2021-06-22
Payer: MEDICARE

## 2021-06-24 ENCOUNTER — OFFICE VISIT (OUTPATIENT)
Dept: PHYSICAL THERAPY | Facility: CLINIC | Age: 56
End: 2021-06-24
Payer: MEDICARE

## 2021-06-24 DIAGNOSIS — Z98.890 STATUS POST ROTATOR CUFF REPAIR: Primary | ICD-10-CM

## 2021-06-24 DIAGNOSIS — Z98.890 S/P ARTHROSCOPY OF LEFT SHOULDER: ICD-10-CM

## 2021-06-24 PROCEDURE — 97112 NEUROMUSCULAR REEDUCATION: CPT

## 2021-06-24 PROCEDURE — 97110 THERAPEUTIC EXERCISES: CPT

## 2021-06-24 NOTE — PROGRESS NOTES
Daily Note     Today's date: 2021  Patient name: Glenny Candelario  : 1965  MRN: [id number]  Referring provider: Dominguez Baez DO  Dx:   Encounter Diagnosis     ICD-10-CM    1  Status post rotator cuff repair  Z98 890    2  S/P arthroscopy of left shoulder  Z98 890        Start Time: 1110  Stop Time: 1201  Total time in clinic (min): 51 minutes    Subjective: Patient reports he is seeing progress in shoulder  Able to lift arm OH easier  Objective: See treatment diary below      Assessment: Tolerated treatment well  Patient demonstrated fatigue post treatment and would benefit from continued skilled PT  Progressed patient as per protocol w/ fatigue noted  Cueing for proper posture and technique t/o session  PROM is improving  Plan: Continue per plan of care  Precautions: Status post left shoulder arthroscopic rotator cuff repair and open subpectoral biceps tenodesis performed on 2021   Please follow-up postoperative rotator cuff protocol   Begin formal physical therapy at postoperative week 5  No active strengthening of biceps until after 6 weeks and no active range of motion of shoulder until after 6 week pascual  Status post left shoulder arthroscopic rotator cuff repair and open subpectoral biceps tenodesis performed on 2021   Please follow-up postoperative rotator cuff protocol   Begin formal physical therapy at postoperative week 5  No active strengthening of biceps until after 6 weeks and no active range of motion of shoulder until after 6 week pascual        Manuals       6 6/10 6/15 6   GH mobilizations GR1-2             Self care, prognosis, HEP instruction             UT TPR/Posterior capsule       TB TB TB EG                Neuro Re-Ed             Int/ext rotation with tband  2x10 YTB       2x10 ea b/l YTB 2x10 ea b/l YTB 2x10 ea b/l YTB   Prone row 2# 3x10       NV 2x10 3x10   Prone extension 3x10       NV 2x10 3x10   Serratus punches supine          20x    HA prone         p!                               Ther Ex             UBE  4/4      3/3 3/3 mod resistance 3/3 4/4   Pt ed              Digit flex             scap retraction              Pendulums CCW/CW             Wrist flex/ext             AAROM external rotation       wand 10"x10 wand 10"x10 wand 10"x10    AAROM flexion  20x      wand 10"x10 wand 10"x10 Full ROM w/in ROM restrictions standing to 90 2x10   Wrist rad /ular dev             Elbow AAROM ** 6 weeks ONLY **       wand 10"x10      PROM        TB TB TB    AROM scaption to 90 deg       2x10 0 10 0# NP-resume NV    AROM flexion to 90 degrees  2# 20x      2x10 0# 2x10 0#  2x10 0# 2x10   Scaption wall walks             scaption pulleys         2' scap 2' abd 2' scap, 2' abd    Gripping              Ther Activity             keister row       6# 3x10 6# 3x10 row, 6# 3x10 SA row      Bilateral ER       YTB 5"x5 -fatigue      Gait Training                                       Modalities             CP 5'      10' 10' 10'

## 2021-06-29 ENCOUNTER — OFFICE VISIT (OUTPATIENT)
Dept: PHYSICAL THERAPY | Facility: CLINIC | Age: 56
End: 2021-06-29
Payer: MEDICARE

## 2021-06-29 DIAGNOSIS — Z98.890 S/P ARTHROSCOPY OF LEFT SHOULDER: ICD-10-CM

## 2021-06-29 DIAGNOSIS — Z98.890 STATUS POST ROTATOR CUFF REPAIR: Primary | ICD-10-CM

## 2021-06-29 PROCEDURE — 97112 NEUROMUSCULAR REEDUCATION: CPT

## 2021-06-29 PROCEDURE — 97110 THERAPEUTIC EXERCISES: CPT

## 2021-06-29 NOTE — PROGRESS NOTES
Daily Note     Today's date: 2021  Patient name: Ashish Luke  : 1965  MRN: [id number]  Referring provider: Pat Wheeler DO  Dx:   Encounter Diagnosis     ICD-10-CM    1  Status post rotator cuff repair  Z98 890    2  S/P arthroscopy of left shoulder  Z98 890        Start Time: 1150  Stop Time: 1235  Total time in clinic (min): 45 minutes    Subjective: Bony Grey reports he was sore over the weekend due to Ekaterina and swimming  Objective: See treatment diary below      Assessment: Patient tolerated session well  Patient demonstrated fatigue post session and would benefit from continued skilled PT  Patient was challenged w/ progression of exercises  Difficulty w/ resisted ER today  Mobility is improving nicely  Plan: Continue with current POC to address pt deficits  Precautions: Status post left shoulder arthroscopic rotator cuff repair and open subpectoral biceps tenodesis performed on 2021   Please follow-up postoperative rotator cuff protocol   Begin formal physical therapy at postoperative week 5  No active strengthening of biceps until after 6 weeks and no active range of motion of shoulder until after 6 week pascual  Status post left shoulder arthroscopic rotator cuff repair and open subpectoral biceps tenodesis performed on 2021   Please follow-up postoperative rotator cuff protocol   Begin formal physical therapy at postoperative week 5  No active strengthening of biceps until after 6 weeks and no active range of motion of shoulder until after 6 week pascual        Manuals      6 6/10 6/15 6/17   GH mobilizations GR1-2             Self care, prognosis, HEP instruction             UT TPR/Posterior capsule       TB TB TB EG                Neuro Re-Ed             Int/ext rotation with tband  2x10 YTB YTB 2x15      2x10 ea b/l YTB 2x10 ea b/l YTB 2x10 ea b/l YTB   Prone row 2# 3x10 0# 30x      NV 2x10 3x10   Prone extension 3x10 2# 30x      NV 2x10 3x10   Serratus punches supine   2# 30x       20x    HA prone         p!     S/l ER                          Ther Ex             UBE  4/4 4/4     3/3 3/3 mod resistance 3/3 4/4   Pt ed              Digit flex             scap retraction              Pendulums CCW/CW             Wrist flex/ext             AAROM external rotation       wand 10"x10 wand 10"x10 wand 10"x10    AAROM flexion  20x 1# 20x + scaption      wand 10"x10 wand 10"x10 Full ROM w/in ROM restrictions standing to 90 2x10   Wrist rad /ular dev             Elbow AAROM ** 6 weeks ONLY **       wand 10"x10      PROM        TB TB TB    AROM scaption to 90 deg       2x10 0 10 0# NP-resume NV    AROM flexion to 90 degrees  2# 20x 2# 30x supine     2x10 0# 2x10 0#  2x10 0# 2x10   Scaption wall walks             scaption pulleys         2' scap 2' abd 2' scap, 2' abd    Gripping              Ther Activity             keister row       6# 3x10 6# 3x10 row, 6# 3x10 SA row      Bilateral ER       YTB 5"x5 -fatigue      Gait Training                                       Modalities             CP 5'      10' 10' 10'

## 2021-07-01 ENCOUNTER — OFFICE VISIT (OUTPATIENT)
Dept: PHYSICAL THERAPY | Facility: CLINIC | Age: 56
End: 2021-07-01
Payer: MEDICARE

## 2021-07-01 DIAGNOSIS — Z98.890 S/P ARTHROSCOPY OF LEFT SHOULDER: ICD-10-CM

## 2021-07-01 DIAGNOSIS — Z98.890 STATUS POST ROTATOR CUFF REPAIR: Primary | ICD-10-CM

## 2021-07-01 PROCEDURE — 97110 THERAPEUTIC EXERCISES: CPT

## 2021-07-01 PROCEDURE — 97112 NEUROMUSCULAR REEDUCATION: CPT

## 2021-07-01 NOTE — PROGRESS NOTES
Daily Note     Today's date: 2021  Patient name: Aiden Dias  : 1965  MRN: [id number]  Referring provider: Teresa Segura DO  Dx:   Encounter Diagnosis     ICD-10-CM    1  Status post rotator cuff repair  Z98 890    2  S/P arthroscopy of left shoulder  Z98 890                   Subjective: Pt reports intermediate pain in L shoulder but reports it is to be expected as he is using it more; he reports it is tolerable  Objective: See treatment diary below      Assessment: Progressed resistance with good tolerance this visit; visual cues to ensure correct exercise technique specifically with external rotation  Improvement in ROM in all planes post PROM  Progress as able  Plan: Continue with current POC to address pt deficits  Precautions: Status post left shoulder arthroscopic rotator cuff repair and open subpectoral biceps tenodesis performed on 2021   Please follow-up postoperative rotator cuff protocol   Begin formal physical therapy at postoperative week 5  No active strengthening of biceps until after 6 weeks and no active range of motion of shoulder until after 6 week pascual  Status post left shoulder arthroscopic rotator cuff repair and open subpectoral biceps tenodesis performed on 2021   Please follow-up postoperative rotator cuff protocol   Begin formal physical therapy at postoperative week 5  No active strengthening of biceps until after 6 weeks and no active range of motion of shoulder until after 6 week pascual        Manuals  7    6/8 6/10 6/15 6/17   GH mobilizations GR1-2             Self care, prognosis, HEP instruction             UT TPR/Posterior capsule       TB TB TB EG                Neuro Re-Ed             Int/ext rotation with tband  2x10 YTB YTB 2x15 YTB 2x15     2x10 ea b/l YTB 2x10 ea b/l YTB 2x10 ea b/l YTB   Prone row 2# 3x10 0# 30x 2# 30x     NV 2x10 3x10   Prone extension 3x10 2# 30x 2# 30x     NV 2x10 3x10   Serratus punches supine   2# 30x 2# 30x      20x    HA prone         p!     S/l ER             Ball on wall    20x cw/20x CCW          Ther Ex             UBE  4/4 4/4 4/4    3/3 3/3 mod resistance 3/3 4/4   Pt ed              Digit flex             scap retraction              Pendulums CCW/CW             Wrist flex/ext             AAROM external rotation       wand 10"x10 wand 10"x10 wand 10"x10    AAROM flexion  20x 1# 20x + scaption  1# scaption 3x10    wand 10"x10 wand 10"x10 Full ROM w/in ROM restrictions standing to 90 2x10   Wrist rad /ular dev             Elbow AAROM ** 6 weeks ONLY **       wand 10"x10      PROM    TB    TB TB TB    AROM scaption to 90 deg       2x10 0 10 0# NP-resume NV    AROM flexion to 90 degrees  2# 20x 2# 30x supine 2# 30x supine    2x10 0# 2x10 0#  2x10 0# 2x10   Scaption wall walks             scaption pulleys         2' scap 2' abd 2' scap, 2' abd    Gripping              Ther Activity             keister row   10# row 2x10, 8# SA row 2x10    6# 3x10 6# 3x10 row, 6# 3x10 SA row      Bilateral ER       YTB 5"x5 -fatigue      Gait Training                                       Modalities             CP 5'  10'    10' 10' 10'

## 2021-07-06 ENCOUNTER — OFFICE VISIT (OUTPATIENT)
Dept: PHYSICAL THERAPY | Facility: CLINIC | Age: 56
End: 2021-07-06
Payer: MEDICARE

## 2021-07-06 DIAGNOSIS — Z98.890 STATUS POST ROTATOR CUFF REPAIR: Primary | ICD-10-CM

## 2021-07-06 DIAGNOSIS — Z98.890 S/P ARTHROSCOPY OF LEFT SHOULDER: ICD-10-CM

## 2021-07-06 PROCEDURE — 97110 THERAPEUTIC EXERCISES: CPT

## 2021-07-06 PROCEDURE — 97112 NEUROMUSCULAR REEDUCATION: CPT

## 2021-07-06 PROCEDURE — 97530 THERAPEUTIC ACTIVITIES: CPT

## 2021-07-06 NOTE — PROGRESS NOTES
Daily Note     Today's date: 2021  Patient name: Kal Schaffer  : 1965  MRN: [id number]  Referring provider: Iris Montaño DO  Dx:   Encounter Diagnosis     ICD-10-CM    1  Status post rotator cuff repair  Z98 890    2  S/P arthroscopy of left shoulder  Z98 890                   Subjective: Pt reports L shoulder, doing well, he reports doing a lot with it over the weekend  Objective: See treatment diary below      Assessment: Added scar massaged to improve scar mobility with good tolerance and educated how to perform at home  Progressed repetitions/resistance with majority of program today with no increases in pain but was fatigued  Visual/tactile cues to ensure correct exercise technique  Plan: Continue with current POC to address pt deficits  Precautions: Status post left shoulder arthroscopic rotator cuff repair and open subpectoral biceps tenodesis performed on 2021   Please follow-up postoperative rotator cuff protocol   Begin formal physical therapy at postoperative week 5  No active strengthening of biceps until after 6 weeks and no active range of motion of shoulder until after 6 week pascual  Status post left shoulder arthroscopic rotator cuff repair and open subpectoral biceps tenodesis performed on 2021   Please follow-up postoperative rotator cuff protocol   Begin formal physical therapy at postoperative week 5  No active strengthening of biceps until after 6 weeks and no active range of motion of shoulder until after 6 week pascual        Manuals  7 7   6 6/10 6/15 6/17   GH mobilizations GR1-2             Self care, prognosis, HEP instruction             Scar massage     TB         UT TPR/Posterior capsule       TB TB TB EG                Neuro Re-Ed             Int/ext rotation with tband  2x10 YTB YTB 2x15 YTB 2x15     2x10 ea b/l YTB 2x10 ea b/l YTB 2x10 ea b/l YTB   Prone row 2# 3x10 0# 30x 2# 30x 2# 30x    NV 2x10 3x10   Prone extension 3x10 2# 30x 2# 30x 2# 30x    NV 2x10 3x10   Serratus punches supine   2# 30x 2# 30x 4# 30x     20x    HA prone         p!     S/l ER             Ball on wall    20x cw/20x CCW 20x cw/20x ccw 2#          Ther Ex             UBE  4/4 4/4 4/4 3/3 (due to time constraints)   3/3 3/3 mod resistance 3/3 4/4   Pt ed              Digit flex             scap retraction              Pendulums CCW/CW             Wrist flex/ext             AAROM external rotation       wand 10"x10 wand 10"x10 wand 10"x10    AAROM flexion  20x 1# 20x + scaption  1# scaption 3x10 2# scaption 3x10   wand 10"x10 wand 10"x10 Full ROM w/in ROM restrictions standing to 90 2x10   Wrist rad /ular dev             Elbow AAROM ** 6 weeks ONLY **       wand 10"x10      PROM    TB TB   TB TB TB    AROM scaption to 90 deg       2x10 0 10 0# NP-resume NV    AROM flexion to 90 degrees  2# 20x 2# 30x supine 2# 30x supine 2# 30x supine   2x10 0# 2x10 0#  2x10 0# 2x10   Scaption wall walks             scaption pulleys         2' scap 2' abd 2' scap, 2' abd    Gripping              Ther Activity             keister row   10# row 2x10, 8# SA row 2x10 15# row 3x10, 15# SA row 3x10   6# 3x10 6# 3x10 row, 6# 3x10 SA row      Bilateral ER    RTB 5"x20   YTB 5"x5 -fatigue      Gait Training                                       Modalities             CP 5'  10' 10'   10' 10' 10'

## 2021-07-08 ENCOUNTER — OFFICE VISIT (OUTPATIENT)
Dept: OBGYN CLINIC | Facility: CLINIC | Age: 56
End: 2021-07-08

## 2021-07-08 VITALS
HEIGHT: 70 IN | RESPIRATION RATE: 18 BRPM | WEIGHT: 216 LBS | DIASTOLIC BLOOD PRESSURE: 79 MMHG | BODY MASS INDEX: 30.92 KG/M2 | HEART RATE: 84 BPM | SYSTOLIC BLOOD PRESSURE: 114 MMHG

## 2021-07-08 DIAGNOSIS — M17.11 PRIMARY OSTEOARTHRITIS OF RIGHT KNEE: ICD-10-CM

## 2021-07-08 DIAGNOSIS — Z98.890 S/P ARTHROSCOPY OF LEFT SHOULDER: Primary | ICD-10-CM

## 2021-07-08 PROCEDURE — 99024 POSTOP FOLLOW-UP VISIT: CPT | Performed by: ORTHOPAEDIC SURGERY

## 2021-07-08 NOTE — PROGRESS NOTES
Patient Name:  Judson Rivera  MRN:  [id number]    Assessment & Plan     1  S/P arthroscopy of left shoulder    2  Primary osteoarthritis of right knee  -     Injection procedure prior authorization; Future        71-year-old male here for  Who is 12 weeks status post left shoulder arthroscopy rotator cuff repair and open subpectoral biceps tenodesis, 04/14/2021  He is to continue his physical therapy protocol starting with more strengthening  He is to continue working on his range of motion especially with external rotation and performing his range of motion exercises in a mirror so he can monitor his scapular hiking  Patient shows understanding and agrees with this plan of care  As for the patient's right knee pain and osteoarthritis it is recommended to try visco supplementation due to most of the symptoms being related to the arthritis and not the meniscal tear that was seen on MRI previously  Recommended that the patient try over-the-counter Voltaren gel 1% as needed for his right knee pain due to him not being able to take oral NSAIDs due to ulcerative colitis  Visco injections were pre started explained to the patient that he will be called once they are approved and in the office  Patient was provided some information on the authorization process in his AVS   We will follow up with the patient for his left shoulder 3 months which will lead him to be 6 months postop  History of the Present Illness   Judson Rivera is a 63 y o  male   Presents today for 4 week follow-up for his left shoulder  Patient is 12 weeks status post left shoulder arthroscopic rotator cuff repair and open subpectoral biceps tenodesis performed on 04/14/2021  He is currently in formal physical therapy working on range of motion along with added scar massage along the incisions  He notes that he does not have to take any medications for pain or discomfort  He has hx of ulcerative colitis     Patient is complaining of some right knee pain today  His last cortisone injection in the right knee was performed on 04/01/2021  Review of Systems     Review of Systems   Constitutional: Negative for chills, fever and unexpected weight change  HENT: Negative for hearing loss, nosebleeds and sore throat  Eyes: Negative for pain, redness and visual disturbance  Respiratory: Negative for cough, shortness of breath and wheezing  Cardiovascular: Negative for chest pain, palpitations and leg swelling  Gastrointestinal: Negative for abdominal pain, nausea and vomiting  Endocrine: Negative for polydipsia and polyuria  Genitourinary: Negative for dysuria and hematuria  Skin: Negative for rash and wound  Neurological: Negative for dizziness, light-headedness and headaches  Psychiatric/Behavioral: Negative for decreased concentration, dysphoric mood and suicidal ideas  The patient is not nervous/anxious  Physical Exam     /79   Pulse 84   Resp 18   Ht 5' 10" (1 778 m)   Wt 98 kg (216 lb)   BMI 30 99 kg/m²     Left Shoulder: Incisions are healing well for the postoperative course  There is no erythema or drainage present  Range of motion of shoulder forward flexion 150 with scapular compensation, abduction 150 with scapular compensation, external rotation 60, Internal rotation lower lumbar  Supraspinatus can perform light resistance, external rotation  Full active range of motion of elbow, wrist, and digits present  The patient is neurovascular intact distally  Right Knee: Range of motion from 0 to 120  There is mild patella crepitus with range of motion  There is trace effusion  There is tenderness over the medial joint line, mild on the lateral joint line  There is 5/5 quadriceps strength and good tone  The patient can perform a straight leg raise  Positive patellar grind test  Varus stress testing reveals no laxity  Valgus stress testing reveals no laxity  Ree's testing is negative    The patient is neurovascular intact distally  Data Review     I have personally reviewed pertinent films in PACS, and my interpretation follows  No images reviewed at today's visit      Social History     Tobacco Use    Smoking status: Former Smoker     Packs/day: 0 50     Quit date: 2007     Years since quittin 4    Smokeless tobacco: Former User     Quit date: 1998    Tobacco comment: quit 10 yrs ago   Vaping Use    Vaping Use: Never used   Substance Use Topics    Alcohol use: Not Currently     Comment: quit 5 years    Drug use: Yes     Frequency: 3 0 times per week     Types: Marijuana           Scribe Attestation    I,:  Robert Gates am acting as a scribe while in the presence of the attending physician :       I,:  Jamey Falcon, DO personally performed the services described in this documentation    as scribed in my presence :

## 2021-07-08 NOTE — PATIENT INSTRUCTIONS
Intra-articular Hyaluronic Acid Injection  Intra-articular Hyaluronic Acid Injection Brands  There are several types of Intra-articular Hyaluronic Acid Injections which vary in brand, dosage, and frequency  There are single dose injections as well as a series of injections  Your provider will choose the injection brand and series based on clinical factors as well as what your insurance company prefers or covers  Buy and Bill vs  Specialty Pharmacy  Injections may be obtained in two ways:   Buy and Bill: The insurance is requiring the office to buy the injection medication and bill the patients insurance for both the injection medication and the office visit   Specialty Pharmacy: The insurance is requiring the office to order the medication from the insurances Specialty Pharmacy and the specialty pharmacy will bill the patients insurance directly for the injection medication  When a specialty pharmacy is used, the office cannot bill for the injection medication, the office can only bill for the office visit  (Examples of a specialty pharmacy include, but not limited to, [facility] , [address] [facility], [address] [place] [address])  Time Line Expectations  Your care team will work to ensure the injection(s) being ordered for you are authorized by your insurance and is obtained by the insurance plans preferred pharmacy  Your insurance plan may dictate the brand and dosage of the series  Due to the nature of obtaining an insurance authorization as well as working with the pharmacy, the authorization process may take up to 14 business days, sometimes longer if your insurance plan denies the injection authorization or if the pharmacy has delays  If your insurance plan denies the authorization our team will submit an appeal which may lengthen the wait time for the approval of your injection      Our injection authorization team will be in contact with you throughout the injection authorization process, however, please note there may be times of silence while we wait for insurance and pharmacy updates  Your injection appointment(s) will be scheduled once our injection authorization team has received approval from your insurance plan and/or from the pharmacy  Please note: Specialty pharmacies are often delayed when obtaining authorizations and verifying benefits for injection medications  You may receive a phone call from the specialty pharmacy to authorize the medication; so it is important to accept calls from the specialty pharmacy to ensure your injections are not delayed  Patient can use over-the-counter Voltaren gel 1% (   Diclofenac sodium 1%) as needed on his right knee due to him not able to take oral NSAIDs due to ulcerative colitis

## 2021-07-11 ENCOUNTER — HOSPITAL ENCOUNTER (EMERGENCY)
Facility: HOSPITAL | Age: 56
Discharge: HOME | End: 2021-07-11
Attending: EMERGENCY MEDICINE
Payer: MEDICARE

## 2021-07-11 VITALS
TEMPERATURE: 98.8 F | SYSTOLIC BLOOD PRESSURE: 105 MMHG | DIASTOLIC BLOOD PRESSURE: 63 MMHG | RESPIRATION RATE: 19 BRPM | HEART RATE: 97 BPM | OXYGEN SATURATION: 92 %

## 2021-07-11 DIAGNOSIS — R11.15 CYCLICAL VOMITING: Primary | ICD-10-CM

## 2021-07-11 LAB
ALBUMIN SERPL BCP-MCNC: 4.3 G/DL (ref 3.5–5)
ALP SERPL-CCNC: 85 U/L (ref 46–116)
ALT SERPL W P-5'-P-CCNC: 27 U/L (ref 12–78)
ANION GAP SERPL CALCULATED.3IONS-SCNC: 11 MMOL/L (ref 4–13)
AST SERPL W P-5'-P-CCNC: 14 U/L (ref 5–45)
ATRIAL RATE: 93 BPM
BACTERIA UR QL AUTO: ABNORMAL /HPF
BASOPHILS # BLD AUTO: 0.02 THOUSANDS/ΜL (ref 0–0.1)
BASOPHILS NFR BLD AUTO: 0 % (ref 0–1)
BILIRUB SERPL-MCNC: 0.35 MG/DL (ref 0.2–1)
BILIRUB UR QL STRIP: NEGATIVE
BUN SERPL-MCNC: 14 MG/DL (ref 5–25)
CALCIUM SERPL-MCNC: 9.5 MG/DL (ref 8.3–10.1)
CHLORIDE SERPL-SCNC: 100 MMOL/L (ref 100–108)
CLARITY UR: CLEAR
CO2 SERPL-SCNC: 26 MMOL/L (ref 21–32)
COLOR UR: YELLOW
CREAT SERPL-MCNC: 1.47 MG/DL (ref 0.6–1.3)
EOSINOPHIL # BLD AUTO: 0 THOUSAND/ΜL (ref 0–0.61)
EOSINOPHIL NFR BLD AUTO: 0 % (ref 0–6)
ERYTHROCYTE [DISTWIDTH] IN BLOOD BY AUTOMATED COUNT: 13.5 % (ref 11.6–15.1)
GFR SERPL CREATININE-BSD FRML MDRD: 53 ML/MIN/1.73SQ M
GLUCOSE SERPL-MCNC: 256 MG/DL (ref 65–140)
GLUCOSE UR STRIP-MCNC: NEGATIVE MG/DL
HCT VFR BLD AUTO: 44 % (ref 36.5–49.3)
HGB BLD-MCNC: 14 G/DL (ref 12–17)
HGB UR QL STRIP.AUTO: NEGATIVE
HYALINE CASTS #/AREA URNS LPF: ABNORMAL /LPF
IMM GRANULOCYTES # BLD AUTO: 0.09 THOUSAND/UL (ref 0–0.2)
IMM GRANULOCYTES NFR BLD AUTO: 1 % (ref 0–2)
KETONES UR STRIP-MCNC: NEGATIVE MG/DL
LEUKOCYTE ESTERASE UR QL STRIP: NEGATIVE
LIPASE SERPL-CCNC: 38 U/L (ref 73–393)
LYMPHOCYTES # BLD AUTO: 0.82 THOUSANDS/ΜL (ref 0.6–4.47)
LYMPHOCYTES NFR BLD AUTO: 5 % (ref 14–44)
MCH RBC QN AUTO: 28.1 PG (ref 26.8–34.3)
MCHC RBC AUTO-ENTMCNC: 31.8 G/DL (ref 31.4–37.4)
MCV RBC AUTO: 88 FL (ref 82–98)
MONOCYTES # BLD AUTO: 0.28 THOUSAND/ΜL (ref 0.17–1.22)
MONOCYTES NFR BLD AUTO: 2 % (ref 4–12)
MUCOUS THREADS UR QL AUTO: ABNORMAL
NEUTROPHILS # BLD AUTO: 14.33 THOUSANDS/ΜL (ref 1.85–7.62)
NEUTS SEG NFR BLD AUTO: 92 % (ref 43–75)
NITRITE UR QL STRIP: NEGATIVE
NON-SQ EPI CELLS URNS QL MICRO: ABNORMAL /HPF
NRBC BLD AUTO-RTO: 0 /100 WBCS
P AXIS: 70 DEGREES
PH UR STRIP.AUTO: 6 [PH]
PLATELET # BLD AUTO: 326 THOUSANDS/UL (ref 149–390)
PMV BLD AUTO: 9.7 FL (ref 8.9–12.7)
POTASSIUM SERPL-SCNC: 3.5 MMOL/L (ref 3.5–5.3)
PR INTERVAL: 192 MS
PROT SERPL-MCNC: 7.9 G/DL (ref 6.4–8.2)
PROT UR STRIP-MCNC: ABNORMAL MG/DL
QRS AXIS: -22 DEGREES
QRSD INTERVAL: 88 MS
QT INTERVAL: 372 MS
QTC INTERVAL: 462 MS
RBC # BLD AUTO: 4.99 MILLION/UL (ref 3.88–5.62)
RBC #/AREA URNS AUTO: ABNORMAL /HPF
SODIUM SERPL-SCNC: 137 MMOL/L (ref 136–145)
SP GR UR STRIP.AUTO: >=1.03 (ref 1–1.03)
T WAVE AXIS: 55 DEGREES
UROBILINOGEN UR QL STRIP.AUTO: 0.2 E.U./DL
VENTRICULAR RATE: 93 BPM
WBC # BLD AUTO: 15.54 THOUSAND/UL (ref 4.31–10.16)
WBC #/AREA URNS AUTO: ABNORMAL /HPF

## 2021-07-11 PROCEDURE — 85025 COMPLETE CBC W/AUTO DIFF WBC: CPT | Performed by: EMERGENCY MEDICINE

## 2021-07-11 PROCEDURE — 96376 TX/PRO/DX INJ SAME DRUG ADON: CPT

## 2021-07-11 PROCEDURE — 81001 URINALYSIS AUTO W/SCOPE: CPT | Performed by: EMERGENCY MEDICINE

## 2021-07-11 PROCEDURE — 93005 ELECTROCARDIOGRAM TRACING: CPT

## 2021-07-11 PROCEDURE — 96375 TX/PRO/DX INJ NEW DRUG ADDON: CPT

## 2021-07-11 PROCEDURE — 93010 ELECTROCARDIOGRAM REPORT: CPT | Performed by: INTERNAL MEDICINE

## 2021-07-11 PROCEDURE — 96365 THER/PROPH/DIAG IV INF INIT: CPT

## 2021-07-11 PROCEDURE — 83690 ASSAY OF LIPASE: CPT | Performed by: EMERGENCY MEDICINE

## 2021-07-11 PROCEDURE — 96372 THER/PROPH/DIAG INJ SC/IM: CPT

## 2021-07-11 PROCEDURE — 99285 EMERGENCY DEPT VISIT HI MDM: CPT | Performed by: NURSE PRACTITIONER

## 2021-07-11 PROCEDURE — 36415 COLL VENOUS BLD VENIPUNCTURE: CPT | Performed by: EMERGENCY MEDICINE

## 2021-07-11 PROCEDURE — 80053 COMPREHEN METABOLIC PANEL: CPT | Performed by: EMERGENCY MEDICINE

## 2021-07-11 PROCEDURE — 99284 EMERGENCY DEPT VISIT MOD MDM: CPT

## 2021-07-11 PROCEDURE — 96366 THER/PROPH/DIAG IV INF ADDON: CPT

## 2021-07-11 RX ORDER — MORPHINE SULFATE 4 MG/ML
4 INJECTION, SOLUTION INTRAMUSCULAR; INTRAVENOUS ONCE
Status: COMPLETED | OUTPATIENT
Start: 2021-07-11 | End: 2021-07-11

## 2021-07-11 RX ORDER — ONDANSETRON 4 MG/1
4 TABLET, ORALLY DISINTEGRATING ORAL EVERY 8 HOURS PRN
Qty: 21 TABLET | Refills: 0 | Status: SHIPPED | OUTPATIENT
Start: 2021-07-11 | End: 2021-09-17

## 2021-07-11 RX ORDER — ONDANSETRON 2 MG/ML
4 INJECTION INTRAMUSCULAR; INTRAVENOUS ONCE
Status: COMPLETED | OUTPATIENT
Start: 2021-07-11 | End: 2021-07-11

## 2021-07-11 RX ORDER — SODIUM CHLORIDE, SODIUM GLUCONATE, SODIUM ACETATE, POTASSIUM CHLORIDE, MAGNESIUM CHLORIDE, SODIUM PHOSPHATE, DIBASIC, AND POTASSIUM PHOSPHATE .53; .5; .37; .037; .03; .012; .00082 G/100ML; G/100ML; G/100ML; G/100ML; G/100ML; G/100ML; G/100ML
2000 INJECTION, SOLUTION INTRAVENOUS ONCE
Status: COMPLETED | OUTPATIENT
Start: 2021-07-11 | End: 2021-07-11

## 2021-07-11 RX ORDER — HALOPERIDOL 5 MG/ML
5 INJECTION INTRAMUSCULAR ONCE
Status: COMPLETED | OUTPATIENT
Start: 2021-07-11 | End: 2021-07-11

## 2021-07-11 RX ADMIN — MORPHINE SULFATE 4 MG: 4 INJECTION INTRAVENOUS at 20:35

## 2021-07-11 RX ADMIN — SODIUM CHLORIDE, SODIUM GLUCONATE, SODIUM ACETATE, POTASSIUM CHLORIDE, MAGNESIUM CHLORIDE, SODIUM PHOSPHATE, DIBASIC, AND POTASSIUM PHOSPHATE 2000 ML: .53; .5; .37; .037; .03; .012; .00082 INJECTION, SOLUTION INTRAVENOUS at 20:37

## 2021-07-11 RX ADMIN — ONDANSETRON 4 MG: 2 INJECTION INTRAMUSCULAR; INTRAVENOUS at 20:35

## 2021-07-11 RX ADMIN — HALOPERIDOL LACTATE 5 MG: 5 INJECTION, SOLUTION INTRAMUSCULAR at 20:40

## 2021-07-11 RX ADMIN — MORPHINE SULFATE 4 MG: 4 INJECTION INTRAVENOUS at 21:05

## 2021-07-12 NOTE — ED PROVIDER NOTES
History  Chief Complaint   Patient presents with    Generalized Body Aches     Patient reports generalized body aches, headaches, vomiting, and fatigue     This is a 62-year-old male patient presents here with nausea vomiting  He admittedly has been suffering from cyclical vomiting syndrome for large portion of his life  He reports that every 6 months this seems to flare up  He reports that his vomiting is often associated with pain from retching  He cannot identify all his triggers but admittedly some is related to stress  No fever chills  Generalized Body Aches  Associated symptoms: abdominal pain, nausea and vomiting    Associated symptoms: no chest pain, no congestion, no cough, no diarrhea, no ear pain, no fatigue, no fever, no headaches, no rash, no shortness of breath, no sore throat and no wheezing        Prior to Admission Medications   Prescriptions Last Dose Informant Patient Reported? Taking? Glucosamine-Chondroitin (OSTEO BI-FLEX REGULAR STRENGTH PO)  Self Yes No   Sig: Take by mouth 2 (two) times a day   Patient not taking: Reported on 7/8/2021   aspirin (ECOTRIN LOW STRENGTH) 81 mg EC tablet  Self No No   Sig: Take 1 tablet (81 mg total) by mouth daily   atorvastatin (LIPITOR) 80 mg tablet  Self Yes No   Sig: Take 80 mg by mouth daily   buPROPion (WELLBUTRIN SR) 150 mg 12 hr tablet  Self No No   Sig: TAKE 2 TABLETS BY MOUTH DAILY   Patient taking differently: 150 mg daily    clonazePAM (KLONOPIN) 0 5 mg tablet  Self Yes No   Sig: Take 0 5 mg by mouth daily at bedtime    dicyclomine (BENTYL) 20 mg tablet  Self No No   Sig: Take 1 tablet (20 mg total) by mouth 2 (two) times a day as needed (pain)   naloxone (NARCAN) 4 mg/0 1 mL nasal spray  Self No No   Sig: Administer 1 spray into a nostril  If no response after 2-3 minutes, give another dose in the other nostril using a new spray     Patient not taking: Reported on 6/10/2021   omeprazole (PriLOSEC) 20 mg delayed release capsule  Self No No   Sig: Take 1 capsule (20 mg total) by mouth daily   ondansetron (ZOFRAN-ODT) 4 mg disintegrating tablet  Self No No   Sig: Take 1 tablet (4 mg total) by mouth every 6 (six) hours as needed for nausea or vomiting   oxyCODONE-acetaminophen (PERCOCET) 5-325 mg per tablet  Self No No   Sig: Take 1 tablet by mouth every 4 (four) hours as needed for moderate painMax Daily Amount: 6 tablets   Patient not taking: Reported on 5/10/2021   oxyCODONE-acetaminophen (PERCOCET) 5-325 mg per tablet  Self No No   Sig: Take 1 tablet by mouth every 4 (four) hours as needed for moderate painMax Daily Amount: 6 tablets   Patient not taking: Reported on 5/10/2021   oxyCODONE-acetaminophen (PERCOCET) 5-325 mg per tablet  Self No No   Sig: Take 1 tablet by mouth every 4 (four) hours as needed for moderate painMax Daily Amount: 6 tablets   Patient not taking: Reported on 5/10/2021   oxyCODONE-acetaminophen (PERCOCET) 5-325 mg per tablet  Self No No   Sig: Take 1 tablet by mouth every 6 (six) hours as needed for moderate painMax Daily Amount: 4 tablets   Patient not taking: Reported on 6/10/2021   sertraline (ZOLOFT) 100 mg tablet  Self Yes No   Sig: Take 100 mg by mouth daily       Facility-Administered Medications: None       Past Medical History:   Diagnosis Date    Diaz's esophagus     Colon polyp     Coronary artery disease     Depression     Diverticulitis     GERD (gastroesophageal reflux disease)     Hemorrhoid     History of heart artery stent     Hyperlipidemia     Hypertension     Microscopic colitis     Ulcerative colitis ([address] )        Past Surgical History:   Procedure Laterality Date    ABDOMINAL SURGERY      radio frequency ablation    BONE GRAFT Left 2018    CARPAL TUNNEL RELEASE Right     COLONOSCOPY      CORONARY ANGIOPLASTY WITH STENT PLACEMENT      EGD AND COLONOSCOPY      ESOPHAGOGASTRODUODENOSCOPY N/A 2/15/2018    Procedure: ESOPHAGOGASTRODUODENOSCOPY (EGD);   Surgeon: Deepti Gomes MD; Location: [facility];  Service: Gastroenterology    ESOPHAGOGASTRODUODENOSCOPY N/A 12/10/2018    Procedure: ESOPHAGOGASTRODUODENOSCOPY (EGD); Surgeon: Maryann Powell MD;  Location: [facility]; Service: Gastroenterology    HAND SURGERY Left     OK SHLDR ARTHROSCOP,SURG,W/ROTAT CUFF REPR Left 2021    Procedure: REPAIR ROTATOR CUFF  ARTHROSCOPIC; POSSIBLE BICEPS TENDONESIS, ACROMIOPLASTY;  Surgeon: Rashid Suarez DO;  Location: [facility];  Service: Orthopedics    TONSILLECTOMY         Family History   Problem Relation Age of Onset    Heart disease Father     Melanoma Father     Cancer Sister     Skin cancer Sister     Skin cancer Mother      I have reviewed and agree with the history as documented  E-Cigarette/Vaping    E-Cigarette Use Never User      E-Cigarette/Vaping Substances    Nicotine No     THC No     CBD No     Flavoring No     Other No     Unknown No      Social History     Tobacco Use    Smoking status: Former Smoker     Packs/day: 0 50     Quit date: 2007     Years since quittin 4    Smokeless tobacco: Former User     Quit date: 1998    Tobacco comment: quit 10 yrs ago   Vaping Use    Vaping Use: Never used   Substance Use Topics    Alcohol use: Not Currently     Comment: quit 5 years    Drug use: Yes     Frequency: 3 0 times per week     Types: Marijuana       Review of Systems   Constitutional: Negative for diaphoresis, fatigue and fever  HENT: Negative for congestion, ear pain, nosebleeds and sore throat  Eyes: Negative for photophobia, pain, discharge and visual disturbance  Respiratory: Negative for cough, choking, chest tightness, shortness of breath and wheezing  Cardiovascular: Negative for chest pain and palpitations  Gastrointestinal: Positive for abdominal pain, nausea and vomiting  Negative for abdominal distention and diarrhea  Genitourinary: Negative for dysuria, flank pain and frequency     Musculoskeletal: Negative for back pain, gait problem and joint swelling  Skin: Negative for color change and rash  Neurological: Negative for dizziness, syncope and headaches  Psychiatric/Behavioral: Negative for behavioral problems and confusion  The patient is not nervous/anxious  All other systems reviewed and are negative  Physical Exam  Physical Exam  Vitals and nursing note reviewed  Constitutional:       General: He is not in acute distress  Appearance: He is well-developed  HENT:      Head: Normocephalic and atraumatic  Eyes:      General:         Right eye: No discharge  Left eye: No discharge  Conjunctiva/sclera: Conjunctivae normal    Cardiovascular:      Rate and Rhythm: Normal rate  Pulmonary:      Effort: Pulmonary effort is normal  No respiratory distress  Abdominal:      General: There is no distension  Tenderness: There is no abdominal tenderness  There is no guarding  Musculoskeletal:         General: No deformity  Cervical back: Normal range of motion and neck supple  Skin:     General: Skin is warm and dry  Neurological:      Mental Status: He is alert and oriented to person, place, and time        Coordination: Coordination normal          Vital Signs  ED Triage Vitals   Temperature Pulse Respirations Blood Pressure SpO2   07/11/21 1928 07/11/21 1926 07/11/21 1926 07/11/21 1926 07/11/21 1926   98 8 °F (37 1 °C) 80 18 161/89 98 %      Temp Source Heart Rate Source Patient Position - Orthostatic VS BP Location FiO2 (%)   07/11/21 1928 07/11/21 1926 07/11/21 1926 07/11/21 1926 --   Temporal Monitor Sitting Left arm       Pain Score       07/11/21 2035       Worst Possible Pain           Vitals:    07/11/21 1926 07/11/21 2045 07/11/21 2100 07/11/21 2130   BP: 161/89 (!) 181/90 152/75 105/63   Pulse: 80 72 100 97   Patient Position - Orthostatic VS: Sitting Sitting Sitting Sitting         Visual Acuity      ED Medications  Medications   multi-electrolyte (PLASMALYTE-A/ISOLYTE-S PH 7 4) IV solution 2,000 mL (0 mL Intravenous Stopped 7/11/21 2232)   ondansetron (ZOFRAN) injection 4 mg (4 mg Intravenous Given 7/11/21 2035)   morphine (PF) 4 mg/mL injection 4 mg (4 mg Intravenous Given 7/11/21 2035)   haloperidol lactate (HALDOL) injection 5 mg (5 mg Intramuscular Given 7/11/21 2040)   morphine (PF) 4 mg/mL injection 4 mg (4 mg Intravenous Given 7/11/21 2105)       Diagnostic Studies  Results Reviewed     Procedure Component Value Units Date/Time    CBC and differential [[id number]]  (Abnormal) Collected: 07/11/21 1948    Lab Status: Final result Specimen: Blood from Arm, Left Updated: 07/11/21 2027     WBC 15 54 Thousand/uL      RBC 4 99 Million/uL      Hemoglobin 14 0 g/dL      Hematocrit 44 0 %      MCV 88 fL      MCH 28 1 pg      MCHC 31 8 g/dL      RDW 13 5 %      MPV 9 7 fL      Platelets 338 Thousands/uL      nRBC 0 /100 WBCs      Neutrophils Relative 92 %      Immat GRANS % 1 %      Lymphocytes Relative 5 %      Monocytes Relative 2 %      Eosinophils Relative 0 %      Basophils Relative 0 %      Neutrophils Absolute 14 33 Thousands/µL      Immature Grans Absolute 0 09 Thousand/uL      Lymphocytes Absolute 0 82 Thousands/µL      Monocytes Absolute 0 28 Thousand/µL      Eosinophils Absolute 0 00 Thousand/µL      Basophils Absolute 0 02 Thousands/µL     Comprehensive metabolic panel [[id number]]  (Abnormal) Collected: 07/11/21 1948    Lab Status: Final result Specimen: Blood from Arm, Left Updated: 07/11/21 2010     Sodium 137 mmol/L      Potassium 3 5 mmol/L      Chloride 100 mmol/L      CO2 26 mmol/L      ANION GAP 11 mmol/L      BUN 14 mg/dL      Creatinine 1 47 mg/dL      Glucose 256 mg/dL      Calcium 9 5 mg/dL      AST 14 U/L      ALT 27 U/L      Alkaline Phosphatase 85 U/L      Total Protein 7 9 g/dL      Albumin 4 3 g/dL      Total Bilirubin 0 35 mg/dL      eGFR 53 ml/min/1 73sq m     Narrative:      [place] guidelines for Chronic Kidney Disease (CKD):     Stage 1 with normal or high GFR (GFR > 90 mL/min/1 73 square meters)    Stage 2 Mild CKD (GFR = 60-89 mL/min/1 73 square meters)    Stage 3A Moderate CKD (GFR = 45-59 mL/min/1 73 square meters)    Stage 3B Moderate CKD (GFR = 30-44 mL/min/1 73 square meters)    Stage 4 Severe CKD (GFR = 15-29 mL/min/1 73 square meters)    Stage 5 End Stage CKD (GFR <15 mL/min/1 73 square meters)  Note: GFR calculation is accurate only with a steady state creatinine    Lipase [[id number]]  (Abnormal) Collected: 07/11/21 1948    Lab Status: Final result Specimen: Blood from Arm, Left Updated: 07/11/21 2010     Lipase 38 u/L     Urine Microscopic [[id number]]  (Abnormal) Collected: 07/11/21 1948    Lab Status: Final result Specimen: Urine, Clean Catch Updated: 07/11/21 2003     RBC, UA None Seen /hpf      WBC, UA 0-1 /hpf      Epithelial Cells None Seen /hpf      Bacteria, UA Occasional /hpf      Hyaline Casts, UA 1-2 /lpf      MUCUS THREADS Moderate    UA (URINE) with reflex to Scope [[id number]]  (Abnormal) Collected: 07/11/21 1948    Lab Status: Final result Specimen: Urine, Clean Catch Updated: 07/11/21 1956     Color, UA Yellow     Clarity, UA Clear     Specific Gravity, UA >=1 030     pH, UA 6 0     Leukocytes, UA Negative     Nitrite, UA Negative     Protein,  (2+) mg/dl      Glucose, UA Negative mg/dl      Ketones, UA Negative mg/dl      Urobilinogen, UA 0 2 E U /dl      Bilirubin, UA Negative     Blood, UA Negative                 No orders to display              Procedures  Procedures         ED Course                             SBIRT 22yo+      Most Recent Value   SBIRT (24 yo +)   In order to provide better care to our patients, we are screening all of our patients for alcohol and drug use  Would it be okay to ask you these screening questions? Yes Filed at: 07/11/2021 2025   Initial Alcohol Screen: US AUDIT-C    1   How often do you have a drink containing alcohol?  0 Filed at: 07/11/2021 2025   2  How many drinks containing alcohol do you have on a typical day you are drinking? 0 Filed at: 07/11/2021 2025   3a  Male UNDER 65: How often do you have five or more drinks on one occasion? 0 Filed at: 07/11/2021 2025   Audit-C Score  0 Filed at: 07/11/2021 2025   HERMINIA: How many times in the past year have you    Used an illegal drug or used a prescription medication for non-medical reasons? Never Filed at: 07/11/2021 2025                    MDM  Number of Diagnoses or Management Options  Cyclical vomiting: new and requires workup     Amount and/or Complexity of Data Reviewed  Clinical lab tests: reviewed and ordered  Tests in the radiology section of CPT®: reviewed and ordered  Independent visualization of images, tracings, or specimens: yes    Patient Progress  Patient progress: stable      Disposition  Final diagnoses:   Cyclical vomiting     Time reflects when diagnosis was documented in both MDM as applicable and the Disposition within this note     Time User Action Codes Description Comment    7/11/2021  8:53 PM Prabha Red Add [P83 58] Cyclical vomiting       ED Disposition     ED Disposition Condition Date/Time Comment    Discharge Stable Sun Jul 11, 2021  9:20 PM Kristopher Wallace discharge to home/self care              Follow-up Information     Follow up With Specialties Details Why Contact Info Additional Luigi Zuleta [facility] Schedule an appointment as soon as possible for a visit  For Continued Evaluation [facility] [address] [facility] [place], [facility] [address], [place], [address] [facility]    Angelina Beckman MD Internal Medicine Schedule an appointment as soon as possible for a visit  As needed [address]  [facility]  [phone]             Discharge Medication List as of 7/11/2021  9:21 PM      START taking these medications    Details   !! ondansetron (ZOFRAN-ODT) 4 mg disintegrating tablet Take 1 tablet (4 mg total) by mouth every 8 (eight) hours as needed for nausea or vomiting for up to 21 doses, Starting Sun 7/11/2021, Print       !! - Potential duplicate medications found  Please discuss with provider  CONTINUE these medications which have NOT CHANGED    Details   aspirin (ECOTRIN LOW STRENGTH) 81 mg EC tablet Take 1 tablet (81 mg total) by mouth daily, Starting Mon 2/10/2020, No Print      atorvastatin (LIPITOR) 80 mg tablet Take 80 mg by mouth daily, Historical Med      buPROPion (WELLBUTRIN SR) 150 mg 12 hr tablet TAKE 2 TABLETS BY MOUTH DAILY, Normal      clonazePAM (KLONOPIN) 0 5 mg tablet Take 0 5 mg by mouth daily at bedtime , Historical Med      dicyclomine (BENTYL) 20 mg tablet Take 1 tablet (20 mg total) by mouth 2 (two) times a day as needed (pain), Starting Wed 5/13/2020, Print      Glucosamine-Chondroitin (OSTEO BI-FLEX REGULAR STRENGTH PO) Take by mouth 2 (two) times a day, Historical Med      naloxone (NARCAN) 4 mg/0 1 mL nasal spray Administer 1 spray into a nostril   If no response after 2-3 minutes, give another dose in the other nostril using a new spray , Normal      omeprazole (PriLOSEC) 20 mg delayed release capsule Take 1 capsule (20 mg total) by mouth daily, Starting Fri 12/6/2019, Normal      !! ondansetron (ZOFRAN-ODT) 4 mg disintegrating tablet Take 1 tablet (4 mg total) by mouth every 6 (six) hours as needed for nausea or vomiting, Starting Wed 4/7/2021, Normal      !! oxyCODONE-acetaminophen (PERCOCET) 5-325 mg per tablet Take 1 tablet by mouth every 4 (four) hours as needed for moderate painMax Daily Amount: 6 tablets, Starting Wed 4/14/2021, Normal      !! oxyCODONE-acetaminophen (PERCOCET) 5-325 mg per tablet Take 1 tablet by mouth every 4 (four) hours as needed for moderate painMax Daily Amount: 6 tablets, Starting Fri 4/16/2021, Normal      !! oxyCODONE-acetaminophen (PERCOCET) 5-325 mg per tablet Take 1 tablet by mouth every 4 (four) hours as needed for moderate painMax Daily Amount: 6 tablets, Starting Fri 4/16/2021, Normal      !! oxyCODONE-acetaminophen (PERCOCET) 5-325 mg per tablet Take 1 tablet by mouth every 6 (six) hours as needed for moderate painMax Daily Amount: 4 tablets, Starting Tue 4/20/2021, Normal      sertraline (ZOLOFT) 100 mg tablet Take 100 mg by mouth daily , Historical Med       !! - Potential duplicate medications found  Please discuss with provider  No discharge procedures on file      PDMP Review       Value Time User    PDMP Reviewed  Yes 4/16/2021  4:54 PM Lv Wadsworth PA-C          ED Provider  Electronically Signed by           RICKEY Leo  07/12/21 0000

## 2021-07-14 ENCOUNTER — HOSPITAL ENCOUNTER (EMERGENCY)
Facility: HOSPITAL | Age: 56
Discharge: HOME | End: 2021-07-14
Attending: EMERGENCY MEDICINE | Admitting: EMERGENCY MEDICINE
Payer: MEDICARE

## 2021-07-14 VITALS
BODY MASS INDEX: 30.92 KG/M2 | TEMPERATURE: 97.8 F | RESPIRATION RATE: 20 BRPM | SYSTOLIC BLOOD PRESSURE: 179 MMHG | WEIGHT: 216 LBS | OXYGEN SATURATION: 96 % | HEIGHT: 70 IN | HEART RATE: 62 BPM | DIASTOLIC BLOOD PRESSURE: 85 MMHG

## 2021-07-14 DIAGNOSIS — R11.15 CYCLICAL VOMITING: Primary | ICD-10-CM

## 2021-07-14 LAB
ALBUMIN SERPL BCP-MCNC: 4.1 G/DL (ref 3.5–5)
ALP SERPL-CCNC: 79 U/L (ref 46–116)
ALT SERPL W P-5'-P-CCNC: 31 U/L (ref 12–78)
ANION GAP SERPL CALCULATED.3IONS-SCNC: 10 MMOL/L (ref 4–13)
AST SERPL W P-5'-P-CCNC: 20 U/L (ref 5–45)
BASOPHILS # BLD AUTO: 0.05 THOUSANDS/ΜL (ref 0–0.1)
BASOPHILS NFR BLD AUTO: 1 % (ref 0–1)
BILIRUB SERPL-MCNC: 0.39 MG/DL (ref 0.2–1)
BUN SERPL-MCNC: 17 MG/DL (ref 5–25)
CALCIUM SERPL-MCNC: 9.3 MG/DL (ref 8.3–10.1)
CHLORIDE SERPL-SCNC: 103 MMOL/L (ref 100–108)
CO2 SERPL-SCNC: 28 MMOL/L (ref 21–32)
CREAT SERPL-MCNC: 1.24 MG/DL (ref 0.6–1.3)
EOSINOPHIL # BLD AUTO: 0.18 THOUSAND/ΜL (ref 0–0.61)
EOSINOPHIL NFR BLD AUTO: 2 % (ref 0–6)
ERYTHROCYTE [DISTWIDTH] IN BLOOD BY AUTOMATED COUNT: 13.8 % (ref 11.6–15.1)
GFR SERPL CREATININE-BSD FRML MDRD: 65 ML/MIN/1.73SQ M
GLUCOSE SERPL-MCNC: 158 MG/DL (ref 65–140)
HCT VFR BLD AUTO: 43.3 % (ref 36.5–49.3)
HGB BLD-MCNC: 14 G/DL (ref 12–17)
IMM GRANULOCYTES # BLD AUTO: 0.05 THOUSAND/UL (ref 0–0.2)
IMM GRANULOCYTES NFR BLD AUTO: 1 % (ref 0–2)
LIPASE SERPL-CCNC: 94 U/L (ref 73–393)
LYMPHOCYTES # BLD AUTO: 1.41 THOUSANDS/ΜL (ref 0.6–4.47)
LYMPHOCYTES NFR BLD AUTO: 14 % (ref 14–44)
MCH RBC QN AUTO: 28.4 PG (ref 26.8–34.3)
MCHC RBC AUTO-ENTMCNC: 32.3 G/DL (ref 31.4–37.4)
MCV RBC AUTO: 88 FL (ref 82–98)
MONOCYTES # BLD AUTO: 0.7 THOUSAND/ΜL (ref 0.17–1.22)
MONOCYTES NFR BLD AUTO: 7 % (ref 4–12)
NEUTROPHILS # BLD AUTO: 8.09 THOUSANDS/ΜL (ref 1.85–7.62)
NEUTS SEG NFR BLD AUTO: 75 % (ref 43–75)
NRBC BLD AUTO-RTO: 0 /100 WBCS
PLATELET # BLD AUTO: 298 THOUSANDS/UL (ref 149–390)
PMV BLD AUTO: 9.8 FL (ref 8.9–12.7)
POTASSIUM SERPL-SCNC: 3.6 MMOL/L (ref 3.5–5.3)
PROT SERPL-MCNC: 7.5 G/DL (ref 6.4–8.2)
RBC # BLD AUTO: 4.93 MILLION/UL (ref 3.88–5.62)
SODIUM SERPL-SCNC: 141 MMOL/L (ref 136–145)
WBC # BLD AUTO: 10.48 THOUSAND/UL (ref 4.31–10.16)

## 2021-07-14 PROCEDURE — 96361 HYDRATE IV INFUSION ADD-ON: CPT

## 2021-07-14 PROCEDURE — 80053 COMPREHEN METABOLIC PANEL: CPT | Performed by: EMERGENCY MEDICINE

## 2021-07-14 PROCEDURE — 85025 COMPLETE CBC W/AUTO DIFF WBC: CPT | Performed by: EMERGENCY MEDICINE

## 2021-07-14 PROCEDURE — 36415 COLL VENOUS BLD VENIPUNCTURE: CPT | Performed by: EMERGENCY MEDICINE

## 2021-07-14 PROCEDURE — 99285 EMERGENCY DEPT VISIT HI MDM: CPT | Performed by: EMERGENCY MEDICINE

## 2021-07-14 PROCEDURE — 96372 THER/PROPH/DIAG INJ SC/IM: CPT

## 2021-07-14 PROCEDURE — 99283 EMERGENCY DEPT VISIT LOW MDM: CPT

## 2021-07-14 PROCEDURE — 83690 ASSAY OF LIPASE: CPT | Performed by: EMERGENCY MEDICINE

## 2021-07-14 PROCEDURE — 96374 THER/PROPH/DIAG INJ IV PUSH: CPT

## 2021-07-14 PROCEDURE — 96375 TX/PRO/DX INJ NEW DRUG ADDON: CPT

## 2021-07-14 RX ORDER — HYDROMORPHONE HCL/PF 1 MG/ML
1 SYRINGE (ML) INJECTION ONCE
Status: DISCONTINUED | OUTPATIENT
Start: 2021-07-14 | End: 2021-07-14 | Stop reason: HOSPADM

## 2021-07-14 RX ORDER — MORPHINE SULFATE 4 MG/ML
4 INJECTION, SOLUTION INTRAMUSCULAR; INTRAVENOUS ONCE
Status: COMPLETED | OUTPATIENT
Start: 2021-07-14 | End: 2021-07-14

## 2021-07-14 RX ORDER — ONDANSETRON 2 MG/ML
4 INJECTION INTRAMUSCULAR; INTRAVENOUS ONCE
Status: COMPLETED | OUTPATIENT
Start: 2021-07-14 | End: 2021-07-14

## 2021-07-14 RX ORDER — HALOPERIDOL 5 MG/ML
5 INJECTION INTRAMUSCULAR ONCE
Status: COMPLETED | OUTPATIENT
Start: 2021-07-14 | End: 2021-07-14

## 2021-07-14 RX ADMIN — SODIUM CHLORIDE 1000 ML: 0.9 INJECTION, SOLUTION INTRAVENOUS at 08:14

## 2021-07-14 RX ADMIN — ONDANSETRON 4 MG: 2 INJECTION INTRAMUSCULAR; INTRAVENOUS at 08:15

## 2021-07-14 RX ADMIN — MORPHINE SULFATE 4 MG: 4 INJECTION INTRAVENOUS at 08:15

## 2021-07-14 RX ADMIN — HALOPERIDOL LACTATE 5 MG: 5 INJECTION, SOLUTION INTRAMUSCULAR at 08:14

## 2021-07-14 NOTE — ED PROVIDER NOTES
History  Chief Complaint   Patient presents with    Vomiting     pt with long hx of uncontrolled vomiting presents today for the same     HPI  63year old male with PMH cyclical vomiting for many years presents with vomiting that started earlier this week, improved after treatment in ED and then recurred today  He has tried zofran without improvement  He has mild generalized pain from vomiting  No diarrhea, fevers, chest pain or shortness of breath  Prior to Admission Medications   Prescriptions Last Dose Informant Patient Reported? Taking? Glucosamine-Chondroitin (OSTEO BI-FLEX REGULAR STRENGTH PO)  Self Yes No   Sig: Take by mouth 2 (two) times a day   Patient not taking: Reported on 7/8/2021   aspirin (ECOTRIN LOW STRENGTH) 81 mg EC tablet  Self No No   Sig: Take 1 tablet (81 mg total) by mouth daily   atorvastatin (LIPITOR) 80 mg tablet  Self Yes No   Sig: Take 80 mg by mouth daily   buPROPion (WELLBUTRIN SR) 150 mg 12 hr tablet  Self No No   Sig: TAKE 2 TABLETS BY MOUTH DAILY   Patient taking differently: 150 mg daily    clonazePAM (KLONOPIN) 0 5 mg tablet  Self Yes No   Sig: Take 0 5 mg by mouth daily at bedtime    dicyclomine (BENTYL) 20 mg tablet  Self No No   Sig: Take 1 tablet (20 mg total) by mouth 2 (two) times a day as needed (pain)   naloxone (NARCAN) 4 mg/0 1 mL nasal spray  Self No No   Sig: Administer 1 spray into a nostril  If no response after 2-3 minutes, give another dose in the other nostril using a new spray     Patient not taking: Reported on 6/10/2021   omeprazole (PriLOSEC) 20 mg delayed release capsule  Self No No   Sig: Take 1 capsule (20 mg total) by mouth daily   ondansetron (ZOFRAN-ODT) 4 mg disintegrating tablet  Self No No   Sig: Take 1 tablet (4 mg total) by mouth every 6 (six) hours as needed for nausea or vomiting   ondansetron (ZOFRAN-ODT) 4 mg disintegrating tablet   No No   Sig: Take 1 tablet (4 mg total) by mouth every 8 (eight) hours as needed for nausea or vomiting for up to 21 doses   oxyCODONE-acetaminophen (PERCOCET) 5-325 mg per tablet  Self No No   Sig: Take 1 tablet by mouth every 4 (four) hours as needed for moderate painMax Daily Amount: 6 tablets   Patient not taking: Reported on 5/10/2021   oxyCODONE-acetaminophen (PERCOCET) 5-325 mg per tablet  Self No No   Sig: Take 1 tablet by mouth every 4 (four) hours as needed for moderate painMax Daily Amount: 6 tablets   Patient not taking: Reported on 5/10/2021   oxyCODONE-acetaminophen (PERCOCET) 5-325 mg per tablet  Self No No   Sig: Take 1 tablet by mouth every 4 (four) hours as needed for moderate painMax Daily Amount: 6 tablets   Patient not taking: Reported on 5/10/2021   oxyCODONE-acetaminophen (PERCOCET) 5-325 mg per tablet  Self No No   Sig: Take 1 tablet by mouth every 6 (six) hours as needed for moderate painMax Daily Amount: 4 tablets   Patient not taking: Reported on 6/10/2021   sertraline (ZOLOFT) 100 mg tablet  Self Yes No   Sig: Take 100 mg by mouth daily       Facility-Administered Medications: None       Past Medical History:   Diagnosis Date    Diaz's esophagus     Colon polyp     Coronary artery disease     Depression     Diverticulitis     GERD (gastroesophageal reflux disease)     Hemorrhoid     History of heart artery stent     Hyperlipidemia     Hypertension     Microscopic colitis     Ulcerative colitis ([address] )        Past Surgical History:   Procedure Laterality Date    ABDOMINAL SURGERY      radio frequency ablation    BONE GRAFT Left 2018    CARPAL TUNNEL RELEASE Right     COLONOSCOPY      CORONARY ANGIOPLASTY WITH STENT PLACEMENT      EGD AND COLONOSCOPY      ESOPHAGOGASTRODUODENOSCOPY N/A 2/15/2018    Procedure: ESOPHAGOGASTRODUODENOSCOPY (EGD); Surgeon: Andrei Field MD;  Location: [facility];  Service: Gastroenterology    ESOPHAGOGASTRODUODENOSCOPY N/A 12/10/2018    Procedure: ESOPHAGOGASTRODUODENOSCOPY (EGD);   Surgeon: Sanjeev Leslie MD;  Location: [facility]; Service: Gastroenterology    HAND SURGERY Left     DC SHLDR ARTHROSCOP,SURG,W/ROTAT CUFF REPR Left 2021    Procedure: REPAIR ROTATOR CUFF  ARTHROSCOPIC; POSSIBLE BICEPS TENDONESIS, ACROMIOPLASTY;  Surgeon: Marlin Leslie DO;  Location: [facility];  Service: Orthopedics    TONSILLECTOMY         Family History   Problem Relation Age of Onset    Heart disease Father     Melanoma Father     Cancer Sister     Skin cancer Sister     Skin cancer Mother      I have reviewed and agree with the history as documented  E-Cigarette/Vaping    E-Cigarette Use Never User      E-Cigarette/Vaping Substances    Nicotine No     THC No     CBD No     Flavoring No     Other No     Unknown No      Social History     Tobacco Use    Smoking status: Former Smoker     Packs/day: 0 50     Quit date: 2007     Years since quittin 5    Smokeless tobacco: Former User     Quit date: 1998    Tobacco comment: quit 10 yrs ago   Vaping Use    Vaping Use: Never used   Substance Use Topics    Alcohol use: Not Currently     Comment: quit 5 years    Drug use: Yes     Frequency: 3 0 times per week     Types: Marijuana       Review of Systems   Constitutional: Negative for chills and fever  HENT: Negative for dental problem and ear pain  Eyes: Negative for pain and redness  Respiratory: Negative for cough and shortness of breath  Cardiovascular: Negative for chest pain and palpitations  Gastrointestinal: Positive for abdominal pain, nausea and vomiting  Endocrine: Negative for polydipsia and polyphagia  Genitourinary: Negative for dysuria and frequency  Musculoskeletal: Negative for arthralgias and joint swelling  Skin: Negative for color change and rash  Neurological: Negative for dizziness and headaches  Psychiatric/Behavioral: Negative for behavioral problems and confusion  All other systems reviewed and are negative        Physical Exam  Physical Exam  Vitals and nursing note reviewed  Constitutional:       General: He is not in acute distress  Appearance: He is well-developed  He is not diaphoretic  HENT:      Head: Atraumatic  Right Ear: External ear normal       Left Ear: External ear normal       Nose: Nose normal    Eyes:      Conjunctiva/sclera: Conjunctivae normal       Pupils: Pupils are equal, round, and reactive to light  Neck:      Vascular: No JVD  Cardiovascular:      Rate and Rhythm: Normal rate and regular rhythm  Heart sounds: Normal heart sounds  No murmur heard  Pulmonary:      Effort: Pulmonary effort is normal  No respiratory distress  Breath sounds: Normal breath sounds  No wheezing  Abdominal:      General: Bowel sounds are normal  There is no distension  Palpations: Abdomen is soft  Tenderness: There is no abdominal tenderness  Musculoskeletal:         General: Normal range of motion  Cervical back: Normal range of motion and neck supple  Skin:     General: Skin is warm and dry  Capillary Refill: Capillary refill takes less than 2 seconds  Neurological:      Mental Status: He is alert and oriented to person, place, and time  Cranial Nerves: No cranial nerve deficit     Psychiatric:         Behavior: Behavior normal          Vital Signs  ED Triage Vitals   Temperature Pulse Respirations Blood Pressure SpO2   07/14/21 0739 07/14/21 0739 07/14/21 0739 07/14/21 0739 07/14/21 0739   97 8 °F (36 6 °C) 62 20 (!) 184/92 96 %      Temp Source Heart Rate Source Patient Position - Orthostatic VS BP Location FiO2 (%)   07/14/21 0739 07/14/21 0739 07/14/21 0739 07/14/21 0739 --   Oral Monitor Lying Right arm       Pain Score       07/14/21 0814       Worst Possible Pain           Vitals:    07/14/21 0739 07/14/21 0900   BP: (!) 184/92 (!) 179/85   Pulse: 62    Patient Position - Orthostatic VS: Lying Lying         Visual Acuity      ED Medications  Medications   sodium chloride 0 9 % bolus 1,000 mL (0 mL Intravenous Stopped 7/14/21 0914)   ondansetron (ZOFRAN) injection 4 mg (4 mg Intravenous Given 7/14/21 0815)   morphine (PF) 4 mg/mL injection 4 mg (4 mg Intravenous Given 7/14/21 0815)   haloperidol lactate (HALDOL) injection 5 mg (5 mg Intramuscular Given 7/14/21 0814)       Diagnostic Studies  Results Reviewed     Procedure Component Value Units Date/Time    Lipase [[id number]]  (Normal) Collected: 07/14/21 0818    Lab Status: Final result Specimen: Blood from Arm, Left Updated: 07/14/21 0854     Lipase 94 u/L     Comprehensive metabolic panel [[id number]]  (Abnormal) Collected: 07/14/21 0818    Lab Status: Final result Specimen: Blood from Arm, Left Updated: 07/14/21 0854     Sodium 141 mmol/L      Potassium 3 6 mmol/L      Chloride 103 mmol/L      CO2 28 mmol/L      ANION GAP 10 mmol/L      BUN 17 mg/dL      Creatinine 1 24 mg/dL      Glucose 158 mg/dL      Calcium 9 3 mg/dL      AST 20 U/L      ALT 31 U/L      Alkaline Phosphatase 79 U/L      Total Protein 7 5 g/dL      Albumin 4 1 g/dL      Total Bilirubin 0 39 mg/dL      eGFR 65 ml/min/1 73sq m     Narrative:      [place] guidelines for Chronic Kidney Disease (CKD):     Stage 1 with normal or high GFR (GFR > 90 mL/min/1 73 square meters)    Stage 2 Mild CKD (GFR = 60-89 mL/min/1 73 square meters)    Stage 3A Moderate CKD (GFR = 45-59 mL/min/1 73 square meters)    Stage 3B Moderate CKD (GFR = 30-44 mL/min/1 73 square meters)    Stage 4 Severe CKD (GFR = 15-29 mL/min/1 73 square meters)    Stage 5 End Stage CKD (GFR <15 mL/min/1 73 square meters)  Note: GFR calculation is accurate only with a steady state creatinine    CBC and differential [[id number]]  (Abnormal) Collected: 07/14/21 0818    Lab Status: Final result Specimen: Blood from Arm, Left Updated: 07/14/21 0839     WBC 10 48 Thousand/uL      RBC 4 93 Million/uL      Hemoglobin 14 0 g/dL      Hematocrit 43 3 %      MCV 88 fL      MCH 28 4 pg      MCHC 32 3 g/dL      RDW 13 8 % MPV 9 8 fL      Platelets 320 Thousands/uL      nRBC 0 /100 WBCs      Neutrophils Relative 75 %      Immat GRANS % 1 %      Lymphocytes Relative 14 %      Monocytes Relative 7 %      Eosinophils Relative 2 %      Basophils Relative 1 %      Neutrophils Absolute 8 09 Thousands/µL      Immature Grans Absolute 0 05 Thousand/uL      Lymphocytes Absolute 1 41 Thousands/µL      Monocytes Absolute 0 70 Thousand/µL      Eosinophils Absolute 0 18 Thousand/µL      Basophils Absolute 0 05 Thousands/µL                  No orders to display              Procedures  Procedures         ED Course                             SBIRT 20yo+      Most Recent Value   SBIRT (24 yo +)   In order to provide better care to our patients, we are screening all of our patients for alcohol and drug use  Would it be okay to ask you these screening questions? Yes Filed at: 07/14/2021 0745   Initial Alcohol Screen: US AUDIT-C    1  How often do you have a drink containing alcohol?  0 Filed at: 07/14/2021 0745   2  How many drinks containing alcohol do you have on a typical day you are drinking? 0 Filed at: 07/14/2021 0745   3a  Male UNDER 65: How often do you have five or more drinks on one occasion? 0 Filed at: 07/14/2021 0745   3b  FEMALE Any Age, or MALE 65+: How often do you have 4 or more drinks on one occassion? 0 Filed at: 07/14/2021 0745   Audit-C Score  0 Filed at: 07/14/2021 0745   HERMINIA: How many times in the past year have you    Used an illegal drug or used a prescription medication for non-medical reasons? Never Filed at: 07/14/2021 0745                    [facility]  Patient presents with vomiting consistent with prior cyclical vomiting  Labs reassuring and unremarkable in ED today  Symptoms improved with treatment, discussed follow up with primary care for recheck and return to ED for worsening    Disposition  Final diagnoses:   Cyclical vomiting     Time reflects when diagnosis was documented in both [facility] as applicable and the Disposition within this note     Time User Action Codes Description Comment    7/14/2021 10:20 AM Meli Cyr Add [O53 81] Cyclical vomiting       ED Disposition     ED Disposition Condition Date/Time Comment    Discharge Stable Wed Jul 14, 2021 10:20 AM Juliana Wellington discharge to home/self care  Follow-up Information     Follow up With Specialties Details Why Alfred Briseno MD Internal Medicine Schedule an appointment as soon as possible for a visit   [address] [place] [address]  [phone]            Discharge Medication List as of 7/14/2021 10:40 AM      CONTINUE these medications which have NOT CHANGED    Details   aspirin (ECOTRIN LOW STRENGTH) 81 mg EC tablet Take 1 tablet (81 mg total) by mouth daily, Starting Mon 2/10/2020, No Print      atorvastatin (LIPITOR) 80 mg tablet Take 80 mg by mouth daily, Historical Med      buPROPion (WELLBUTRIN SR) 150 mg 12 hr tablet TAKE 2 TABLETS BY MOUTH DAILY, Normal      clonazePAM (KLONOPIN) 0 5 mg tablet Take 0 5 mg by mouth daily at bedtime , Historical Med      dicyclomine (BENTYL) 20 mg tablet Take 1 tablet (20 mg total) by mouth 2 (two) times a day as needed (pain), Starting Wed 5/13/2020, Print      Glucosamine-Chondroitin (OSTEO BI-FLEX REGULAR STRENGTH PO) Take by mouth 2 (two) times a day, Historical Med      naloxone (NARCAN) 4 mg/0 1 mL nasal spray Administer 1 spray into a nostril   If no response after 2-3 minutes, give another dose in the other nostril using a new spray , Normal      omeprazole (PriLOSEC) 20 mg delayed release capsule Take 1 capsule (20 mg total) by mouth daily, Starting Fri 12/6/2019, Normal      !! ondansetron (ZOFRAN-ODT) 4 mg disintegrating tablet Take 1 tablet (4 mg total) by mouth every 6 (six) hours as needed for nausea or vomiting, Starting Wed 4/7/2021, Normal      !! ondansetron (ZOFRAN-ODT) 4 mg disintegrating tablet Take 1 tablet (4 mg total) by mouth every 8 (eight) hours as needed for nausea or vomiting for up to 21 doses, Starting Sun 7/11/2021, Print      !! oxyCODONE-acetaminophen (PERCOCET) 5-325 mg per tablet Take 1 tablet by mouth every 4 (four) hours as needed for moderate painMax Daily Amount: 6 tablets, Starting Wed 4/14/2021, Normal      !! oxyCODONE-acetaminophen (PERCOCET) 5-325 mg per tablet Take 1 tablet by mouth every 4 (four) hours as needed for moderate painMax Daily Amount: 6 tablets, Starting Fri 4/16/2021, Normal      !! oxyCODONE-acetaminophen (PERCOCET) 5-325 mg per tablet Take 1 tablet by mouth every 4 (four) hours as needed for moderate painMax Daily Amount: 6 tablets, Starting Fri 4/16/2021, Normal      !! oxyCODONE-acetaminophen (PERCOCET) 5-325 mg per tablet Take 1 tablet by mouth every 6 (six) hours as needed for moderate painMax Daily Amount: 4 tablets, Starting Tue 4/20/2021, Normal      sertraline (ZOLOFT) 100 mg tablet Take 100 mg by mouth daily , Historical Med       !! - Potential duplicate medications found  Please discuss with provider  No discharge procedures on file      PDMP Review       Value Time User    PDMP Reviewed  Yes 4/16/2021  4:54 PM Dora Platt PA-C          ED Provider  Electronically Signed by           Stephane Muñoz MD  07/14/21 1400

## 2021-07-23 ENCOUNTER — TELEPHONE (OUTPATIENT)
Dept: OBGYN CLINIC | Facility: CLINIC | Age: 56
End: 2021-07-23

## 2021-07-23 NOTE — TELEPHONE ENCOUNTER
Call to this patient as Dr Rogene Duverney will not be available at the time of your appointment on Wed 07/28/2021   Appointments changed to Mondays

## 2021-07-26 ENCOUNTER — PROCEDURE VISIT (OUTPATIENT)
Dept: OBGYN CLINIC | Facility: CLINIC | Age: 56
End: 2021-07-26
Payer: MEDICARE

## 2021-07-26 VITALS
HEART RATE: 84 BPM | HEIGHT: 70 IN | WEIGHT: 214 LBS | RESPIRATION RATE: 20 BRPM | SYSTOLIC BLOOD PRESSURE: 109 MMHG | BODY MASS INDEX: 30.64 KG/M2 | DIASTOLIC BLOOD PRESSURE: 71 MMHG

## 2021-07-26 DIAGNOSIS — M17.11 PRIMARY OSTEOARTHRITIS OF RIGHT KNEE: Primary | ICD-10-CM

## 2021-07-26 DIAGNOSIS — S83.241D OTHER TEAR OF MEDIAL MENISCUS OF RIGHT KNEE AS CURRENT INJURY, SUBSEQUENT ENCOUNTER: ICD-10-CM

## 2021-07-26 PROCEDURE — 99213 OFFICE O/P EST LOW 20 MIN: CPT | Performed by: ORTHOPAEDIC SURGERY

## 2021-07-26 PROCEDURE — 20610 DRAIN/INJ JOINT/BURSA W/O US: CPT | Performed by: ORTHOPAEDIC SURGERY

## 2021-07-26 RX ORDER — HYALURONATE SODIUM 10 MG/ML
20 SYRINGE (ML) INTRAARTICULAR
Status: COMPLETED | OUTPATIENT
Start: 2021-07-26 | End: 2021-07-26

## 2021-07-26 RX ADMIN — Medication 20 MG: at 13:31

## 2021-07-26 NOTE — PROGRESS NOTES
Patient Name:  Kristine Noble  MRN:  [id number]    Assessment & Plan     1  Primary osteoarthritis of right knee    2  Other tear of medial meniscus of right knee as current injury, subsequent encounter        58-year-old male presenting for 1st Euflexxa injection of right knee  After discussing the options for treatment, the patient elected to proceed forward with a Euflexxa injection to reduce pain  Risks of injection, including but not limited to, post-injection pain, swelling, pseudo septic reaction, skin rash, itching, and infection were discussed in detail  The patient understood, had no further questions and elected to proceed forward  After sterile preparation, the   First Euflexxa injection was injected into the  Right knee  The patient tolerated the procedure well no complications were noted  The patient was instructed ice and elevate the extremity, limit strenuous activity for the next 2-3 days, and to contact us if there were any questions or concerns prior to their follow-up appointment  I will see the patient back  1 week for repeat injection  History of the Present Illness   Kristine Noble is a 63 y o  male  Here for 1st right knee Euflexxa injection  Pain in his right knee is relatively unchanged his last visit  He reports left shoulder pain, range of motion, and strength continue to improve  He has not been going to physical therapy but does intend to restart  No new complaints  Review of Systems     Review of Systems   Constitutional: Negative for appetite change and unexpected weight change  HENT: Negative for congestion and trouble swallowing  Eyes: Negative for visual disturbance  Respiratory: Negative for cough and shortness of breath  Cardiovascular: Negative for chest pain and palpitations  Gastrointestinal: Negative for nausea and vomiting  Endocrine: Negative for cold intolerance and heat intolerance  Musculoskeletal: Negative for gait problem and myalgias  Skin: Negative for rash  Neurological: Negative for numbness  Physical Exam     /71   Pulse 84   Resp 20   Ht 5' 10" (1 778 m)   Wt 97 1 kg (214 lb)   BMI 30 71 kg/m²     Right Knee: Range of motion from 0 to 120  There is mild patella crepitus with range of motion  There is trace effusion  There is tenderness over the medial joint line, mild on the lateral joint line  There is 5/5 quadriceps strength and good tone  The patient can perform a straight leg raise  The patient is neurovascular intact distally  Data Review     I have personally reviewed pertinent films in PACS, and my interpretation follows  no new imaging reviewed today  Social History     Tobacco Use    Smoking status: Former Smoker     Packs/day: 0 50     Quit date: 2007     Years since quittin 5    Smokeless tobacco: Former User     Quit date: 1998    Tobacco comment: quit 10 yrs ago   Vaping Use    Vaping Use: Never used   Substance Use Topics    Alcohol use: Not Currently     Comment: quit 5 years    Drug use: Yes     Frequency: 3 0 times per week     Types: Marijuana           Large joint arthrocentesis: R knee  Universal Protocol:  Consent: Verbal consent obtained  Risks and benefits: risks, benefits and alternatives were discussed  Consent given by: patient  Time out: Immediately prior to procedure a "time out" was called to verify the correct patient, procedure, equipment, support staff and site/side marked as required  Patient understanding: patient states understanding of the procedure being performed  Patient consent: the patient's understanding of the procedure matches consent given  Radiology Images displayed and confirmed   If images not available, report reviewed: imaging studies available    Supporting Documentation  Indications: pain   Procedure Details  Location: knee - R knee  Needle size: 22 G  Ultrasound guidance: no  Approach: anterolateral  Medications administered: 20 mg Sodium Hyaluronate 20 MG/2ML    Patient tolerance: patient tolerated the procedure well with no immediate complications  Dressing:  Sterile dressing applied          Aubrey Lyles DO

## 2021-08-02 ENCOUNTER — PROCEDURE VISIT (OUTPATIENT)
Dept: OBGYN CLINIC | Facility: CLINIC | Age: 56
End: 2021-08-02
Payer: MEDICARE

## 2021-08-02 VITALS
WEIGHT: 216 LBS | DIASTOLIC BLOOD PRESSURE: 80 MMHG | RESPIRATION RATE: 18 BRPM | HEART RATE: 76 BPM | BODY MASS INDEX: 30.92 KG/M2 | HEIGHT: 70 IN | SYSTOLIC BLOOD PRESSURE: 118 MMHG

## 2021-08-02 DIAGNOSIS — M17.11 PRIMARY OSTEOARTHRITIS OF RIGHT KNEE: Primary | ICD-10-CM

## 2021-08-02 PROCEDURE — 20610 DRAIN/INJ JOINT/BURSA W/O US: CPT | Performed by: ORTHOPAEDIC SURGERY

## 2021-08-02 RX ORDER — HYALURONATE SODIUM 10 MG/ML
20 SYRINGE (ML) INTRAARTICULAR
Status: COMPLETED | OUTPATIENT
Start: 2021-08-02 | End: 2021-08-02

## 2021-08-02 RX ADMIN — Medication 20 MG: at 11:48

## 2021-08-02 NOTE — PROGRESS NOTES
Patient Name:  Cole Lebron  MRN:  [id number]    Assessment & Plan     There are no diagnoses linked to this encounter  48-year-old male presenting for 2nd Euflexxa injection for right knee  After discussing the options for treatment, the patient elected to proceed forward with a Euflexxa injection to reduce pain  Risks of injection, including but not limited to, post-injection pain, swelling, pseudo septic reaction, skin rash, itching, and infection were discussed in detail  The patient understood, had no further questions and elected to proceed forward  After sterile preparation, the 2nd Euflexxa injection was injected into the right knee  The patient tolerated the procedure well no complications were noted  The patient was instructed ice and elevate the extremity, limit strenuous activity for the next 2-3 days, and to contact us if there were any questions or concerns prior to their follow-up appointment  I will see the patient back in 1 week for 3rd and final Euflexxa injection  History of the Present Illness   Cole Lebron is a 63 y o  male presenting today for 2nd right knee Euflexxa injection  After last appointment, patient noted some right knee discomfort but symptoms did improve over the next couple days  Otherwise, denies any fevers, chills, swelling, erythema of right knee  Review of Systems     Review of Systems   Constitutional: Negative for chills and fever  HENT: Negative for ear pain and sore throat  Eyes: Negative for pain and visual disturbance  Respiratory: Negative for cough and shortness of breath  Cardiovascular: Negative for chest pain and palpitations  Gastrointestinal: Negative for abdominal pain and vomiting  Genitourinary: Negative for dysuria and hematuria  Musculoskeletal: Negative for arthralgias, back pain and gait problem  Skin: Negative for color change and rash  Neurological: Negative for seizures and syncope     All other systems reviewed and are negative  Physical Exam     /80   Pulse 76   Resp 18   Ht 5' 10" (1 778 m)   Wt 98 kg (216 lb)   BMI 30 99 kg/m²     Right Knee: Range of motion from 0 to 120  There is mild patellofemoral crepitus with range of motion  There is no effusion  There is tenderness over the medial joint line, minimal tenderness noted lateral joint line  There is 5/5 quadriceps strength and normal tone  The patient can perform a straight leg raise  The patient is neurovascular intact distally  Data Review     I have personally reviewed pertinent films in PACS, and my interpretation follows  No new images needed at today's appointment  Images performed 2021 of Right knee demonstrates mild medial joint space narrowing with mild subchondral sclerosis noted      Social History     Tobacco Use    Smoking status: Former Smoker     Packs/day: 0 50     Quit date: 2007     Years since quittin 5    Smokeless tobacco: Former User     Quit date: 1998    Tobacco comment: quit 10 yrs ago   Vaping Use    Vaping Use: Never used   Substance Use Topics    Alcohol use: Not Currently     Comment: quit 5 years    Drug use: Yes     Frequency: 3 0 times per week     Types: Marijuana           Large joint arthrocentesis: R knee  Universal Protocol:  Consent given by: patient  Patient identity confirmed: verbally with patient    Supporting Documentation  Indications: pain   Procedure Details  Location: knee - R knee  Needle size: 22 G  Approach: anterolateral  Medications administered: 20 mg Sodium Hyaluronate 20 MG/2ML    Patient tolerance: patient tolerated the procedure well with no immediate complications  Dressing:  Sterile dressing applied             Flaca Simon PA-C

## 2021-08-09 ENCOUNTER — PROCEDURE VISIT (OUTPATIENT)
Dept: OBGYN CLINIC | Facility: CLINIC | Age: 56
End: 2021-08-09
Payer: MEDICARE

## 2021-08-09 VITALS
BODY MASS INDEX: 30.92 KG/M2 | WEIGHT: 216 LBS | RESPIRATION RATE: 18 BRPM | SYSTOLIC BLOOD PRESSURE: 122 MMHG | DIASTOLIC BLOOD PRESSURE: 82 MMHG | HEIGHT: 70 IN | HEART RATE: 89 BPM

## 2021-08-09 DIAGNOSIS — M17.11 PRIMARY OSTEOARTHRITIS OF RIGHT KNEE: Primary | ICD-10-CM

## 2021-08-09 PROCEDURE — 20610 DRAIN/INJ JOINT/BURSA W/O US: CPT | Performed by: PHYSICIAN ASSISTANT

## 2021-08-09 RX ORDER — HYALURONATE SODIUM 10 MG/ML
20 SYRINGE (ML) INTRAARTICULAR
Status: COMPLETED | OUTPATIENT
Start: 2021-08-09 | End: 2021-08-09

## 2021-08-09 RX ADMIN — Medication 20 MG: at 13:48

## 2021-08-09 NOTE — PROGRESS NOTES
Patient Name:  Juliana Wellington  MRN:  [id number]    Assessment & Plan     1  Primary osteoarthritis of right knee      63year old male presenting for 3rd and final Euflexxa injection  Overall, patient reporting mild improvements from first two injections and happy with outcome thus far  After discussing the options for treatment, the patient elected to proceed forward with a Euflexxa injection to reduce pain  Risks of injection, including but not limited to, post-injection pain, swelling, pseudo septic reaction, skin rash, itching, and infection were discussed in detail  The patient understood, had no further questions and elected to proceed forward  After sterile preparation, the 3rd and final Euflexxa injection was injected into the Right knee  The patient tolerated the procedure well no complications were noted  The patient was instructed ice and elevate the extremity, limit strenuous activity for the next 2-3 days, and to contact us if there were any questions or concerns prior to their follow-up appointment  I will see the patient back in approximately 8 weeks for reevaluation  History of the Present Illness   Juliana Wellington is a 63 y o  male with Right knee osteoarthritis presenting for 3rd and final Euflexxa injection  Thus far, patient reports mild improvement from first two injections  He denies swelling, erythema or difficult ROM of Right knee since injections initiated  Overall, patient happy with outcome of injections thus far  Review of Systems     Review of Systems   Constitutional: Negative for chills and fever  HENT: Negative for ear pain and sore throat  Eyes: Negative for pain and visual disturbance  Respiratory: Negative for cough and shortness of breath  Cardiovascular: Negative for chest pain and palpitations  Gastrointestinal: Negative for abdominal pain and vomiting  Genitourinary: Negative for dysuria and hematuria     Musculoskeletal: Negative for arthralgias, back pain and gait problem  Skin: Negative for color change and rash  Neurological: Negative for seizures and syncope  All other systems reviewed and are negative  Physical Exam     /82   Pulse 89   Resp 18   Ht 5' 10" (1 778 m)   Wt 98 kg (216 lb)   BMI 30 99 kg/m²     Right Knee: Range of motion from 0 to 125  There is no crepitus with range of motion  There is no effusion  There is tenderness over the medial joint line  There is 5/5 quadriceps strength and intact tone  The patient can perform a straight leg raise  The patient is neurovascular intact distally  Data Review     I have personally reviewed pertinent films in PACS, and my interpretation follows  No new images needed at today's appointment       Social History     Tobacco Use    Smoking status: Former Smoker     Packs/day: 0 50     Quit date: 2007     Years since quittin 5    Smokeless tobacco: Former User     Quit date: 1998    Tobacco comment: quit 10 yrs ago   Vaping Use    Vaping Use: Never used   Substance Use Topics    Alcohol use: Not Currently     Comment: quit 5 years    Drug use: Yes     Frequency: 3 0 times per week     Types: Marijuana           Large joint arthrocentesis: R knee  Universal Protocol:  Consent given by: patient  Patient identity confirmed: verbally with patient    Supporting Documentation  Indications: pain   Procedure Details  Location: knee - R knee  Needle size: 22 G  Approach: anterolateral  Medications administered: 20 mg Sodium Hyaluronate 20 MG/2ML    Patient tolerance: patient tolerated the procedure well with no immediate complications  Dressing:  Sterile dressing applied          Zenia Wood PA-C

## 2021-09-01 ENCOUNTER — OFFICE VISIT (OUTPATIENT)
Dept: CARDIOLOGY CLINIC | Facility: CLINIC | Age: 56
End: 2021-09-01
Payer: MEDICARE

## 2021-09-01 VITALS
HEIGHT: 70 IN | SYSTOLIC BLOOD PRESSURE: 122 MMHG | BODY MASS INDEX: 31.21 KG/M2 | HEART RATE: 78 BPM | DIASTOLIC BLOOD PRESSURE: 80 MMHG | WEIGHT: 218 LBS

## 2021-09-01 DIAGNOSIS — E78.5 DYSLIPIDEMIA: ICD-10-CM

## 2021-09-01 DIAGNOSIS — R07.9 CHEST PAIN, UNSPECIFIED TYPE: Primary | ICD-10-CM

## 2021-09-01 DIAGNOSIS — I25.10 CORONARY ARTERY DISEASE INVOLVING NATIVE HEART WITHOUT ANGINA PECTORIS, UNSPECIFIED VESSEL OR LESION TYPE: ICD-10-CM

## 2021-09-01 PROBLEM — R00.1 SINUS BRADYCARDIA: Status: RESOLVED | Noted: 2018-12-08 | Resolved: 2021-09-01

## 2021-09-01 PROCEDURE — 99214 OFFICE O/P EST MOD 30 MIN: CPT | Performed by: INTERNAL MEDICINE

## 2021-09-01 PROCEDURE — 93000 ELECTROCARDIOGRAM COMPLETE: CPT | Performed by: INTERNAL MEDICINE

## 2021-09-01 NOTE — PROGRESS NOTES
Patient ID: Mio Thomas is a 63 y o  male  Plan:      Hyperlipidemia  Tolerating high-intensity statin therapy and will continue current regimen  Coronary artery disease  RCA stenting X 2 in 2019  No similar symptoms since  Dyslipidemia  Tolerating high-intensity statin therapy and will continue current regimen  Chest pain  Only atypical and not reminiscent of his angina pre stent  Follow up Plan/Other summary comments:  1 year ecg and f/u visit  HPI: Patient seen in f/u regarding the above issues  He has felt well since the last visit  There is occasional fleeting left-sided chest pain but nothing like the central chest pressure that he experienced prior to stenting  No recent change in exertional capacity  Patient had KARLA of distal and proximal RCA in 4/2019  Results for orders placed or performed in visit on 09/01/21   POCT ECG    Impression    NSR  WNL  Most recent or relevant cardiac/vascular testing:    Myoview 8/26/2020: WNL  Past Surgical History:   Procedure Laterality Date    ABDOMINAL SURGERY      radio frequency ablation    BONE GRAFT Left 2018    CARPAL TUNNEL RELEASE Right     COLONOSCOPY      CORONARY ANGIOPLASTY WITH STENT PLACEMENT      EGD AND COLONOSCOPY      ESOPHAGOGASTRODUODENOSCOPY N/A 2/15/2018    Procedure: ESOPHAGOGASTRODUODENOSCOPY (EGD); Surgeon: Kash Miller MD;  Location: [facility];  Service: Gastroenterology    ESOPHAGOGASTRODUODENOSCOPY N/A 12/10/2018    Procedure: ESOPHAGOGASTRODUODENOSCOPY (EGD); Surgeon: Kenn Schwartz MD;  Location: [facility];   Service: Gastroenterology    HAND SURGERY Left     MI SHLDR ARTHROSCOP,SURG,W/ROTAT CUFF REPR Left 4/14/2021    Procedure: REPAIR ROTATOR CUFF  ARTHROSCOPIC; POSSIBLE BICEPS TENDONESIS, ACROMIOPLASTY;  Surgeon: Shant Jacobo DO;  Location: [facility];  Service: Orthopedics    TONSILLECTOMY       CMP:   Lab Results   Component Value Date    K 3 6 07/14/2021     07/14/2021    CO2 28 07/14/2021    CO2 26 02/15/2018    BUN 17 07/14/2021    CREATININE 1 24 07/14/2021    GLUCOSE 158 (H) 02/15/2018    EGFR 65 07/14/2021       Lipid Profile:   Lab Results   Component Value Date    TRIG 187 (H) 04/05/2021    HDL 55 04/05/2021         Review of Systems   10  point ROS  was otherwise non pertinent or negative except as per HPI or as below  Gait: Normal         Objective:     /80   Pulse 78   Ht 5' 10" (1 778 m)   Wt 98 9 kg (218 lb)   BMI 31 28 kg/m²     PHYSICAL EXAM:    General:  Normal appearance in no distress  Eyes:  Anicteric  Oral mucosa:  Moist   Neck:  No JVD  Carotid upstrokes are brisk without bruits  No masses  Chest:  Clear to auscultation  Cardiac:  No palpable PMI  Normal S1 and S2  No murmur gallop or rub  Abdomen:  Soft and nontender  No palpable organomegaly or aortic enlargement  Extremities:  No peripheral edema  Musculoskeletal:  Symmetric  Vascular:  Femoral pulses are brisk without bruits  Popliteal pulses are intact bilaterally  Pedal pulses are intact  Neuro:  Grossly symmetric  Psych:  Alert and oriented x3          Current Outpatient Medications:     aspirin (ECOTRIN LOW STRENGTH) 81 mg EC tablet, Take 1 tablet (81 mg total) by mouth daily, Disp: , Rfl: 0    atorvastatin (LIPITOR) 80 mg tablet, Take 80 mg by mouth daily, Disp: , Rfl:     buPROPion (WELLBUTRIN SR) 150 mg 12 hr tablet, TAKE 2 TABLETS BY MOUTH DAILY (Patient taking differently: 150 mg daily ), Disp: 60 tablet, Rfl: 3    clonazePAM (KLONOPIN) 0 5 mg tablet, Take 0 5 mg by mouth daily at bedtime , Disp: , Rfl:     omeprazole (PriLOSEC) 20 mg delayed release capsule, Take 1 capsule (20 mg total) by mouth daily, Disp: 60 capsule, Rfl: 3    ondansetron (ZOFRAN-ODT) 4 mg disintegrating tablet, Take 1 tablet (4 mg total) by mouth every 6 (six) hours as needed for nausea or vomiting, Disp: 20 tablet, Rfl: 0    sertraline (ZOLOFT) 100 mg tablet, Take 100 mg by mouth daily , Disp: , Rfl:     dicyclomine (BENTYL) 20 mg tablet, Take 1 tablet (20 mg total) by mouth 2 (two) times a day as needed (pain) (Patient not taking: Reported on 9/1/2021), Disp: 20 tablet, Rfl: 0    Glucosamine-Chondroitin (OSTEO BI-FLEX REGULAR STRENGTH PO), Take by mouth 2 (two) times a day  (Patient not taking: Reported on 9/1/2021), Disp: , Rfl:     naloxone (NARCAN) 4 mg/0 1 mL nasal spray, Administer 1 spray into a nostril  If no response after 2-3 minutes, give another dose in the other nostril using a new spray   (Patient not taking: Reported on 9/1/2021), Disp: 1 each, Rfl: 1    ondansetron (ZOFRAN-ODT) 4 mg disintegrating tablet, Take 1 tablet (4 mg total) by mouth every 8 (eight) hours as needed for nausea or vomiting for up to 21 doses (Patient not taking: Reported on 9/1/2021), Disp: 21 tablet, Rfl: 0    oxyCODONE-acetaminophen (PERCOCET) 5-325 mg per tablet, Take 1 tablet by mouth every 4 (four) hours as needed for moderate painMax Daily Amount: 6 tablets (Patient not taking: Reported on 9/1/2021), Disp: 20 tablet, Rfl: 0    oxyCODONE-acetaminophen (PERCOCET) 5-325 mg per tablet, Take 1 tablet by mouth every 4 (four) hours as needed for moderate painMax Daily Amount: 6 tablets (Patient not taking: Reported on 9/1/2021), Disp: 20 tablet, Rfl: 0    oxyCODONE-acetaminophen (PERCOCET) 5-325 mg per tablet, Take 1 tablet by mouth every 4 (four) hours as needed for moderate painMax Daily Amount: 6 tablets (Patient not taking: Reported on 9/1/2021), Disp: 20 tablet, Rfl: 0    oxyCODONE-acetaminophen (PERCOCET) 5-325 mg per tablet, Take 1 tablet by mouth every 6 (six) hours as needed for moderate painMax Daily Amount: 4 tablets (Patient not taking: Reported on 9/1/2021), Disp: 20 tablet, Rfl: 0  Allergies   Allergen Reactions    Rosuvastatin Myalgia    Shellfish Allergy - Food Allergy Rash and Lip Swelling     Past Medical History:   Diagnosis Date    Diaz's esophagus  Colon polyp     Coronary artery disease     Depression     Diverticulitis     GERD (gastroesophageal reflux disease)     Hemorrhoid     History of heart artery stent     Hyperlipidemia     Hypertension     Microscopic colitis     Ulcerative colitis ([facility] [address] )            Social History     Tobacco Use   Smoking Status Former Smoker    Packs/day: 0 50    Quit date: 2007    Years since quittin 6   Smokeless Tobacco Former User    Quit date: 1998   Tobacco Comment    quit 10 yrs ago

## 2021-09-07 ENCOUNTER — TELEPHONE (OUTPATIENT)
Dept: OBGYN CLINIC | Facility: HOSPITAL | Age: 56
End: 2021-09-07

## 2021-09-07 ENCOUNTER — IMMUNIZATIONS (OUTPATIENT)
Dept: FAMILY MEDICINE CLINIC | Facility: HOSPITAL | Age: 56
End: 2021-09-07

## 2021-09-07 DIAGNOSIS — Z23 ENCOUNTER FOR IMMUNIZATION: Primary | ICD-10-CM

## 2021-09-07 PROCEDURE — 91300 SARS-COV-2 / COVID-19 MRNA VACCINE (PFIZER-BIONTECH) 30 MCG: CPT

## 2021-09-07 PROCEDURE — 0001A SARS-COV-2 / COVID-19 MRNA VACCINE (PFIZER-BIONTECH) 30 MCG: CPT

## 2021-09-07 NOTE — TELEPHONE ENCOUNTER
Patient sees Dr Herzog Alt    Patient is calling to see if we'd sent the prescription for oxycodone to [facility]  Patient sent a request via [address] on 9/3      Call back # [phone]

## 2021-09-07 NOTE — TELEPHONE ENCOUNTER
Called patient to discuss that will not be ordering additional narcotic pain medication at this time  Patient continued to complain that he needs something for his Right knee pain  He states he was feeling okay from the viscosupplementation injections, but relief has been declining  Offered OTC oral analgesics, but he reports history of UC and cannot administer and wants something stronger  Again advised will not be prescribing narcotic medication  Offered activity modification ideas, no deep squatting/lunging, ice, OTC gel application  Will try to get earlier appointment for evaluation  When is next appointment available ?

## 2021-09-09 NOTE — TELEPHONE ENCOUNTER
Call to this patient no answer, unable to leave a message as the mail box is full at this time and not able to accept messages

## 2021-09-14 ENCOUNTER — APPOINTMENT (EMERGENCY)
Dept: CT IMAGING | Facility: HOSPITAL | Age: 56
End: 2021-09-14
Payer: MEDICARE

## 2021-09-14 ENCOUNTER — HOSPITAL ENCOUNTER (EMERGENCY)
Facility: HOSPITAL | Age: 56
Discharge: HOME | End: 2021-09-14
Attending: EMERGENCY MEDICINE
Payer: MEDICARE

## 2021-09-14 VITALS
TEMPERATURE: 98.3 F | WEIGHT: 215 LBS | SYSTOLIC BLOOD PRESSURE: 161 MMHG | RESPIRATION RATE: 18 BRPM | HEART RATE: 58 BPM | HEIGHT: 70 IN | DIASTOLIC BLOOD PRESSURE: 82 MMHG | BODY MASS INDEX: 30.78 KG/M2 | OXYGEN SATURATION: 95 %

## 2021-09-14 DIAGNOSIS — R11.15 CYCLIC VOMITING SYNDROME: Primary | ICD-10-CM

## 2021-09-14 DIAGNOSIS — K31.89 DUODENAL MASS: ICD-10-CM

## 2021-09-14 LAB
ALBUMIN SERPL BCP-MCNC: 4.3 G/DL (ref 3.5–5)
ALP SERPL-CCNC: 87 U/L (ref 46–116)
ALT SERPL W P-5'-P-CCNC: 27 U/L (ref 12–78)
ANION GAP SERPL CALCULATED.3IONS-SCNC: 9 MMOL/L (ref 4–13)
AST SERPL W P-5'-P-CCNC: 16 U/L (ref 5–45)
ATRIAL RATE: 45 BPM
BASOPHILS # BLD AUTO: 0.04 THOUSANDS/ΜL (ref 0–0.1)
BASOPHILS NFR BLD AUTO: 0 % (ref 0–1)
BILIRUB SERPL-MCNC: 0.37 MG/DL (ref 0.2–1)
BUN SERPL-MCNC: 12 MG/DL (ref 5–25)
CALCIUM SERPL-MCNC: 8.9 MG/DL (ref 8.3–10.1)
CHLORIDE SERPL-SCNC: 104 MMOL/L (ref 100–108)
CO2 SERPL-SCNC: 28 MMOL/L (ref 21–32)
CREAT SERPL-MCNC: 1.21 MG/DL (ref 0.6–1.3)
EOSINOPHIL # BLD AUTO: 0.03 THOUSAND/ΜL (ref 0–0.61)
EOSINOPHIL NFR BLD AUTO: 0 % (ref 0–6)
ERYTHROCYTE [DISTWIDTH] IN BLOOD BY AUTOMATED COUNT: 13.9 % (ref 11.6–15.1)
GFR SERPL CREATININE-BSD FRML MDRD: 67 ML/MIN/1.73SQ M
GLUCOSE SERPL-MCNC: 165 MG/DL (ref 65–140)
HCT VFR BLD AUTO: 44.1 % (ref 36.5–49.3)
HGB BLD-MCNC: 14.7 G/DL (ref 12–17)
IMM GRANULOCYTES # BLD AUTO: 0.03 THOUSAND/UL (ref 0–0.2)
IMM GRANULOCYTES NFR BLD AUTO: 0 % (ref 0–2)
LIPASE SERPL-CCNC: 62 U/L (ref 73–393)
LYMPHOCYTES # BLD AUTO: 1.08 THOUSANDS/ΜL (ref 0.6–4.47)
LYMPHOCYTES NFR BLD AUTO: 9 % (ref 14–44)
MCH RBC QN AUTO: 29.5 PG (ref 26.8–34.3)
MCHC RBC AUTO-ENTMCNC: 33.3 G/DL (ref 31.4–37.4)
MCV RBC AUTO: 88 FL (ref 82–98)
MONOCYTES # BLD AUTO: 0.35 THOUSAND/ΜL (ref 0.17–1.22)
MONOCYTES NFR BLD AUTO: 3 % (ref 4–12)
NEUTROPHILS # BLD AUTO: 10.43 THOUSANDS/ΜL (ref 1.85–7.62)
NEUTS SEG NFR BLD AUTO: 88 % (ref 43–75)
NRBC BLD AUTO-RTO: 0 /100 WBCS
P AXIS: 32 DEGREES
PLATELET # BLD AUTO: 329 THOUSANDS/UL (ref 149–390)
PMV BLD AUTO: 9.7 FL (ref 8.9–12.7)
POTASSIUM SERPL-SCNC: 4.4 MMOL/L (ref 3.5–5.3)
PR INTERVAL: 172 MS
PROT SERPL-MCNC: 8 G/DL (ref 6.4–8.2)
QRS AXIS: 19 DEGREES
QRSD INTERVAL: 88 MS
QT INTERVAL: 456 MS
QTC INTERVAL: 394 MS
RBC # BLD AUTO: 4.99 MILLION/UL (ref 3.88–5.62)
SODIUM SERPL-SCNC: 141 MMOL/L (ref 136–145)
T WAVE AXIS: 21 DEGREES
TROPONIN I SERPL-MCNC: <0.02 NG/ML
VENTRICULAR RATE: 45 BPM
WBC # BLD AUTO: 11.96 THOUSAND/UL (ref 4.31–10.16)

## 2021-09-14 PROCEDURE — 99284 EMERGENCY DEPT VISIT MOD MDM: CPT

## 2021-09-14 PROCEDURE — 93005 ELECTROCARDIOGRAM TRACING: CPT

## 2021-09-14 PROCEDURE — 93010 ELECTROCARDIOGRAM REPORT: CPT | Performed by: INTERNAL MEDICINE

## 2021-09-14 PROCEDURE — 80053 COMPREHEN METABOLIC PANEL: CPT | Performed by: EMERGENCY MEDICINE

## 2021-09-14 PROCEDURE — 96374 THER/PROPH/DIAG INJ IV PUSH: CPT

## 2021-09-14 PROCEDURE — G1004 CDSM NDSC: HCPCS

## 2021-09-14 PROCEDURE — 99285 EMERGENCY DEPT VISIT HI MDM: CPT | Performed by: PHYSICIAN ASSISTANT

## 2021-09-14 PROCEDURE — 84484 ASSAY OF TROPONIN QUANT: CPT | Performed by: EMERGENCY MEDICINE

## 2021-09-14 PROCEDURE — 36415 COLL VENOUS BLD VENIPUNCTURE: CPT

## 2021-09-14 PROCEDURE — 83690 ASSAY OF LIPASE: CPT | Performed by: EMERGENCY MEDICINE

## 2021-09-14 PROCEDURE — 96361 HYDRATE IV INFUSION ADD-ON: CPT

## 2021-09-14 PROCEDURE — 74177 CT ABD & PELVIS W/CONTRAST: CPT

## 2021-09-14 PROCEDURE — 96375 TX/PRO/DX INJ NEW DRUG ADDON: CPT

## 2021-09-14 PROCEDURE — 85025 COMPLETE CBC W/AUTO DIFF WBC: CPT | Performed by: EMERGENCY MEDICINE

## 2021-09-14 PROCEDURE — C9113 INJ PANTOPRAZOLE SODIUM, VIA: HCPCS | Performed by: PHYSICIAN ASSISTANT

## 2021-09-14 RX ORDER — HYDROMORPHONE HCL/PF 1 MG/ML
0.5 SYRINGE (ML) INJECTION ONCE
Status: COMPLETED | OUTPATIENT
Start: 2021-09-14 | End: 2021-09-14

## 2021-09-14 RX ORDER — METOCLOPRAMIDE HYDROCHLORIDE 5 MG/ML
10 INJECTION INTRAMUSCULAR; INTRAVENOUS ONCE
Status: COMPLETED | OUTPATIENT
Start: 2021-09-14 | End: 2021-09-14

## 2021-09-14 RX ORDER — DIPHENHYDRAMINE HYDROCHLORIDE 50 MG/ML
25 INJECTION INTRAMUSCULAR; INTRAVENOUS ONCE
Status: COMPLETED | OUTPATIENT
Start: 2021-09-14 | End: 2021-09-14

## 2021-09-14 RX ORDER — ONDANSETRON 4 MG/1
4 TABLET, ORALLY DISINTEGRATING ORAL ONCE
Status: COMPLETED | OUTPATIENT
Start: 2021-09-14 | End: 2021-09-14

## 2021-09-14 RX ORDER — PANTOPRAZOLE SODIUM 40 MG/1
40 INJECTION, POWDER, FOR SOLUTION INTRAVENOUS ONCE
Status: COMPLETED | OUTPATIENT
Start: 2021-09-14 | End: 2021-09-14

## 2021-09-14 RX ADMIN — SODIUM CHLORIDE 1000 ML: 0.9 INJECTION, SOLUTION INTRAVENOUS at 14:44

## 2021-09-14 RX ADMIN — DIPHENHYDRAMINE HYDROCHLORIDE 25 MG: 50 INJECTION, SOLUTION INTRAMUSCULAR; INTRAVENOUS at 14:45

## 2021-09-14 RX ADMIN — METOCLOPRAMIDE HYDROCHLORIDE 10 MG: 5 INJECTION INTRAMUSCULAR; INTRAVENOUS at 14:45

## 2021-09-14 RX ADMIN — PANTOPRAZOLE SODIUM 40 MG: 40 INJECTION, POWDER, FOR SOLUTION INTRAVENOUS at 14:46

## 2021-09-14 RX ADMIN — HYDROMORPHONE HYDROCHLORIDE 0.5 MG: 1 INJECTION, SOLUTION INTRAMUSCULAR; INTRAVENOUS; SUBCUTANEOUS at 14:45

## 2021-09-14 RX ADMIN — IOHEXOL 100 ML: 350 INJECTION, SOLUTION INTRAVENOUS at 14:54

## 2021-09-14 RX ADMIN — ONDANSETRON 4 MG: 4 TABLET, ORALLY DISINTEGRATING ORAL at 12:03

## 2021-09-14 NOTE — ED PROVIDER NOTES
History  Chief Complaint   Patient presents with    Vomiting     Pt rpeorts abd pain N/and vomiting today beginning around 4 am  Pt reports hx of the same but unable to find a cause      63yo male with a history of cyclic vomiting syndrome, ulcerative colitis, Diaz's esophagus, coronary artery disease, hypertension, and hyperlipidemia presenting for evaluation of nausea and vomiting x 10 hours  Symptoms initially started with abdominal discomfort and then progressed to nausea and vomiting  He has had numerous episodes of NBNB vomiting since his symptoms began  He took multiple doses of Zofran at home without relief  Patient has a history of cyclic vomiting syndrome and states that no one has ever found the cause of his symptoms  Patient's current symptoms feel similar to his previous episodes of cyclic vomiting but his abdominal pain is more severe than usual  He does admit to marijuana usage  Gastroenterology notes mention a history of microscopic colitis but there is no mention of ulcerative colitis  No fevers or chills  History provided by:  Patient and medical records   used: No    Vomiting  Severity:  Moderate  Timing:  Constant  Quality:  Undigested food  Progression:  Worsening  Chronicity:  Recurrent  Recent urination:  Normal  Context: not post-tussive and not self-induced    Relieved by:  Nothing  Worsened by:  Nothing  Ineffective treatments:  Antiemetics  Associated symptoms: abdominal pain and diarrhea (chronic)    Associated symptoms: no arthralgias, no chills, no cough, no fever, no myalgias and no URI    Risk factors: no suspect food intake        Prior to Admission Medications   Prescriptions Last Dose Informant Patient Reported? Taking?    Glucosamine-Chondroitin (OSTEO BI-FLEX REGULAR STRENGTH PO)  Self Yes No   Sig: Take by mouth 2 (two) times a day    Patient not taking: Reported on 9/1/2021   aspirin (ECOTRIN LOW STRENGTH) 81 mg EC tablet  Self No No   Sig: Take 1 tablet (81 mg total) by mouth daily   atorvastatin (LIPITOR) 80 mg tablet  Self Yes No   Sig: Take 80 mg by mouth daily   buPROPion (WELLBUTRIN SR) 150 mg 12 hr tablet  Self No No   Sig: TAKE 2 TABLETS BY MOUTH DAILY   Patient taking differently: 150 mg daily    clonazePAM (KLONOPIN) 0 5 mg tablet  Self Yes No   Sig: Take 0 5 mg by mouth daily at bedtime    dicyclomine (BENTYL) 20 mg tablet  Self No No   Sig: Take 1 tablet (20 mg total) by mouth 2 (two) times a day as needed (pain)   Patient not taking: Reported on 9/1/2021   naloxone (NARCAN) 4 mg/0 1 mL nasal spray  Self No No   Sig: Administer 1 spray into a nostril  If no response after 2-3 minutes, give another dose in the other nostril using a new spray     Patient not taking: Reported on 9/1/2021   omeprazole (PriLOSEC) 20 mg delayed release capsule  Self No No   Sig: Take 1 capsule (20 mg total) by mouth daily   ondansetron (ZOFRAN-ODT) 4 mg disintegrating tablet  Self No No   Sig: Take 1 tablet (4 mg total) by mouth every 6 (six) hours as needed for nausea or vomiting   ondansetron (ZOFRAN-ODT) 4 mg disintegrating tablet  Self No No   Sig: Take 1 tablet (4 mg total) by mouth every 8 (eight) hours as needed for nausea or vomiting for up to 21 doses   Patient not taking: Reported on 9/1/2021   oxyCODONE-acetaminophen (PERCOCET) 5-325 mg per tablet  Self No No   Sig: Take 1 tablet by mouth every 4 (four) hours as needed for moderate painMax Daily Amount: 6 tablets   Patient not taking: Reported on 9/1/2021   oxyCODONE-acetaminophen (PERCOCET) 5-325 mg per tablet  Self No No   Sig: Take 1 tablet by mouth every 4 (four) hours as needed for moderate painMax Daily Amount: 6 tablets   Patient not taking: Reported on 9/1/2021   oxyCODONE-acetaminophen (PERCOCET) 5-325 mg per tablet  Self No No   Sig: Take 1 tablet by mouth every 4 (four) hours as needed for moderate painMax Daily Amount: 6 tablets   Patient not taking: Reported on 9/1/2021   oxyCODONE-acetaminophen (PERCOCET) 5-325 mg per tablet  Self No No   Sig: Take 1 tablet by mouth every 6 (six) hours as needed for moderate painMax Daily Amount: 4 tablets   Patient not taking: Reported on 9/1/2021   sertraline (ZOLOFT) 100 mg tablet  Self Yes No   Sig: Take 100 mg by mouth daily       Facility-Administered Medications: None       Past Medical History:   Diagnosis Date    Diaz's esophagus     Colon polyp     Coronary artery disease     Depression     Diverticulitis     GERD (gastroesophageal reflux disease)     Hemorrhoid     History of heart artery stent     Hyperlipidemia     Hypertension     Microscopic colitis     Ulcerative colitis ([facility] [address] )        Past Surgical History:   Procedure Laterality Date    ABDOMINAL SURGERY      radio frequency ablation    BONE GRAFT Left 2018    CARPAL TUNNEL RELEASE Right     COLONOSCOPY      CORONARY ANGIOPLASTY WITH STENT PLACEMENT      EGD AND COLONOSCOPY      ESOPHAGOGASTRODUODENOSCOPY N/A 2/15/2018    Procedure: ESOPHAGOGASTRODUODENOSCOPY (EGD); Surgeon: Leandra Blanca MD;  [facility]: [facility];  Service: Gastroenterology    ESOPHAGOGASTRODUODENOSCOPY N/A 12/10/2018    Procedure: ESOPHAGOGASTRODUODENOSCOPY (EGD); Surgeon: Robyn Berg MD;  Location: [facility]; Service: Gastroenterology    HAND SURGERY Left     VA SHLDR ARTHROSCOP,SURG,W/ROTAT CUFF REPR Left 4/14/2021    Procedure: REPAIR ROTATOR CUFF  ARTHROSCOPIC; POSSIBLE BICEPS TENDONESIS, ACROMIOPLASTY;  Surgeon: Neida Michelle DO;  [facility];  Service: Orthopedics    TONSILLECTOMY         Family History   Problem Relation Age of Onset    Heart disease Father     Melanoma Father     Cancer Sister     Skin cancer Sister     Skin cancer Mother      I have reviewed and agree with the history as documented      E-Cigarette/Vaping    E-Cigarette Use Never User      E-Cigarette/Vaping Substances    Nicotine No     THC No     CBD No     Flavoring No     Other No     Unknown No      Social History     Tobacco Use    Smoking status: Former Smoker     Packs/day: 0 50     Quit date: 2007     Years since quittin 6    Smokeless tobacco: Former User     Quit date: 1998    Tobacco comment: quit 10 yrs ago   Vaping Use    Vaping Use: Never used   Substance Use Topics    Alcohol use: Not Currently     Comment: quit 5 years    Drug use: Yes     Frequency: 3 0 times per week     Types: Marijuana       Review of Systems   Constitutional: Negative for chills and fever  HENT: Negative for drooling and voice change  Eyes: Negative for discharge and redness  Respiratory: Negative for cough, shortness of breath and stridor  Cardiovascular: Negative for chest pain and leg swelling  Gastrointestinal: Positive for abdominal pain, diarrhea (chronic), nausea and vomiting  Musculoskeletal: Negative for arthralgias, myalgias, neck pain and neck stiffness  Skin: Negative for color change and rash  Neurological: Negative for seizures and syncope  Psychiatric/Behavioral: Negative for confusion  The patient is not nervous/anxious  All other systems reviewed and are negative  Physical Exam  Physical Exam  Vitals and nursing note reviewed  Constitutional:       General: He is not in acute distress  Appearance: He is well-developed  He is not diaphoretic  HENT:      Head: Normocephalic and atraumatic  Right Ear: External ear normal       Left Ear: External ear normal    Eyes:      General: No scleral icterus  Right eye: No discharge  Left eye: No discharge  Conjunctiva/sclera: Conjunctivae normal    Cardiovascular:      Rate and Rhythm: Normal rate and regular rhythm  Heart sounds: Normal heart sounds  No murmur heard  Pulmonary:      Effort: Pulmonary effort is normal  No respiratory distress  Breath sounds: Normal breath sounds  No stridor  No wheezing or rales     Abdominal:      General: Bowel sounds are normal  There is no distension  Palpations: Abdomen is soft  Tenderness: There is generalized abdominal tenderness  There is no guarding  Musculoskeletal:         General: No deformity  Normal range of motion  Cervical back: Normal range of motion and neck supple  Skin:     General: Skin is warm and dry  Neurological:      General: No focal deficit present  Mental Status: He is alert  He is not disoriented  GCS: GCS eye subscore is 4  GCS verbal subscore is 5  GCS motor subscore is 6     Psychiatric:         Mood and Affect: Mood normal          Behavior: Behavior normal          Vital Signs  ED Triage Vitals   Temperature Pulse Respirations Blood Pressure SpO2   09/14/21 1159 09/14/21 1159 09/14/21 1159 09/14/21 1159 09/14/21 1159   98 3 °F (36 8 °C) 57 19 (!) 172/82 98 %      Temp Source Heart Rate Source Patient Position - Orthostatic VS BP Location FiO2 (%)   09/14/21 1159 09/14/21 1159 09/14/21 1159 09/14/21 1159 --   Oral Monitor Lying Right arm       Pain Score       09/14/21 1445       Worst Possible Pain           Vitals:    09/14/21 1159 09/14/21 1607   BP: (!) 172/82 161/82   Pulse: 57 58   Patient Position - Orthostatic VS: Lying Lying         Visual Acuity      ED Medications  Medications   ondansetron (ZOFRAN-ODT) dispersible tablet 4 mg (4 mg Oral Given 9/14/21 1203)   sodium chloride 0 9 % bolus 1,000 mL (0 mL Intravenous Stopped 9/14/21 1707)   metoclopramide (REGLAN) injection 10 mg (10 mg Intravenous Given 9/14/21 1445)   diphenhydrAMINE (BENADRYL) injection 25 mg (25 mg Intravenous Given 9/14/21 1445)   HYDROmorphone (DILAUDID) injection 0 5 mg (0 5 mg Intravenous Given 9/14/21 1445)   pantoprazole (PROTONIX) injection 40 mg (40 mg Intravenous Given 9/14/21 1446)   iohexol (OMNIPAQUE) 350 MG/ML injection (SINGLE-DOSE) 100 mL (100 mL Intravenous Given 9/14/21 1454)       Diagnostic Studies  Results Reviewed     Procedure Component Value Units Date/Time    Troponin I [[id number]] (Normal) Collected: 09/14/21 1210    Lab Status: Final result Specimen: Blood from Arm, Left Updated: 09/14/21 1235     Troponin I <0 02 ng/mL     Comprehensive metabolic panel [[id number]]  (Abnormal) Collected: 09/14/21 1210    Lab Status: Final result Specimen: Blood from Arm, Left Updated: 09/14/21 1231     Sodium 141 mmol/L      Potassium 4 4 mmol/L      Chloride 104 mmol/L      CO2 28 mmol/L      ANION GAP 9 mmol/L      BUN 12 mg/dL      Creatinine 1 21 mg/dL      Glucose 165 mg/dL      Calcium 8 9 mg/dL      AST 16 U/L      ALT 27 U/L      Alkaline Phosphatase 87 U/L      Total Protein 8 0 g/dL      Albumin 4 3 g/dL      Total Bilirubin 0 37 mg/dL      eGFR 67 ml/min/1 73sq m     Narrative:      [place] guidelines for Chronic Kidney Disease (CKD):     Stage 1 with normal or high GFR (GFR > 90 mL/min/1 73 square meters)    Stage 2 Mild CKD (GFR = 60-89 mL/min/1 73 square meters)    Stage 3A Moderate CKD (GFR = 45-59 mL/min/1 73 square meters)    Stage 3B Moderate CKD (GFR = 30-44 mL/min/1 73 square meters)    Stage 4 Severe CKD (GFR = 15-29 mL/min/1 73 square meters)    Stage 5 End Stage CKD (GFR <15 mL/min/1 73 square meters)  Note: GFR calculation is accurate only with a steady state creatinine    Lipase [[id number]]  (Abnormal) Collected: 09/14/21 1210    Lab Status: Final result Specimen: Blood from Arm, Left Updated: 09/14/21 1231     Lipase 62 u/L     CBC and differential [[id number]]  (Abnormal) Collected: 09/14/21 1210    Lab Status: Final result Specimen: Blood from Arm, Left Updated: 09/14/21 1219     WBC 11 96 Thousand/uL      RBC 4 99 Million/uL      Hemoglobin 14 7 g/dL      Hematocrit 44 1 %      MCV 88 fL      MCH 29 5 pg      MCHC 33 3 g/dL      RDW 13 9 %      MPV 9 7 fL      Platelets 326 Thousands/uL      nRBC 0 /100 WBCs      Neutrophils Relative 88 %      Immat GRANS % 0 %      Lymphocytes Relative 9 %      Monocytes Relative 3 %      Eosinophils Relative 0 % Basophils Relative 0 %      Neutrophils Absolute 10 43 Thousands/µL      Immature Grans Absolute 0 03 Thousand/uL      Lymphocytes Absolute 1 08 Thousands/µL      Monocytes Absolute 0 35 Thousand/µL      Eosinophils Absolute 0 03 Thousand/µL      Basophils Absolute 0 04 Thousands/µL                  CT abdomen pelvis with contrast   Final Result by Jesus Garcia MD (09/14 1533)      Colonic diverticulosis  Large prostate  Possible 2 3 cm soft tissue mass lesion within the second portion of the duodenum, correlate with nonemergent outpatient direct visualization  The study was marked in EPIC for significant notification  Workstation performed: [id number]                    Procedures  ECG 12 Lead Documentation Only    Date/Time: 9/14/2021 6:38 PM  Performed by: Liv Plasencia PA-C  Authorized by: Liv Plasencia PA-C     Indications / Diagnosis:  Nausea  ECG reviewed by me, the ED Provider: yes    Patient location:  ED  Rate:     ECG rate:  45    ECG rate assessment: bradycardic    Rhythm:     Rhythm: sinus bradycardia    Ectopy:     Ectopy: none    QRS:     QRS axis:  Normal  Conduction:     Conduction: normal    ST segments:     ST segments:  Normal  T waves:     T waves: non-specific               ED Course  ED Course as of Sep 14 2029   Carlyle Lopez Sep 14, 2021   1622 Patient tolerated ginger ale and feels well  Patient currently taking a nap and feels well for discharge  SBIRT 22yo+      Most Recent Value   SBIRT (22 yo +)   In order to provide better care to our patients, we are screening all of our patients for alcohol and drug use  Would it be okay to ask you these screening questions?   No Filed at: 09/14/2021 1413                    MDM  Number of Diagnoses or Management Options  Cyclic vomiting syndrome: new and requires workup  Duodenal mass: new and requires workup  Diagnosis management comments: 61yo male presenting for n/v and abdominal pain x 10 hours  Hx of cyclic vomiting and marijuana use  Abdominal pain more severe than usual  No f/c  Patient is afebrile and hemodynamically stable  No signs of peritonitis on abdominal exam   Differential diagnosis includes but is not limited to:  Cyclic vomiting syndrome, cannabinoid hyperemesis syndrome, gastroenteritis, bowel obstruction, kidney stone, dehydration, electrolyte abnormality, IRMA    Initial ED plan:  Abdominal labs, troponin, and EKG were checked in triage  Labs reveal a mild leukocytosis with a white count of 11  Remainder of labs unremarkable including normal electrolytes and renal function  Troponin normal and EKG has no ischemic changes  Given that his abdominal pain is worse usual, will proceed with CT  IV Reglan, Benadryl, Dilaudid, and fluid bolus for symptoms  Final assessment:  CT reveals a possible soft tissue mass within the 2nd portion of the duodenum  No other acute findings seen on CT  Patient was re-evaluated and he feels significantly improved  He was able to tolerate ginger ale  No indication for admission at this time  Patient has script for Zofran and Reglan at home  Supportive care discussed including hydration and bland diet  CT imaging findings were discussed with patient and he was informed of the possible mass seen on imaging and was provided with a copy of his CT scan report  Patient advised to follow-up with gastroenterology for further workup of this  ED return precautions discussed  Patient expressed understanding and is agreeable to plan  Patient discharged in stable condition           Amount and/or Complexity of Data Reviewed  Clinical lab tests: ordered and reviewed  Tests in the radiology section of CPT®: ordered and reviewed  Tests in the medicine section of CPT®: ordered and reviewed  Review and summarize past medical records: yes  Independent visualization of images, tracings, or specimens: yes    Risk of Complications, Morbidity, and/or Mortality  Presenting problems: moderate  Diagnostic procedures: moderate  Management options: moderate    Patient Progress  Patient progress: improved      Disposition  Final diagnoses:   Cyclic vomiting syndrome   Duodenal mass     Time reflects when diagnosis was documented in both MDM as applicable and the Disposition within this note     Time User Action Codes Description Comment    9/14/2021  4:23  [facility] [address] [place] [[id number]] Cyclic vomiting syndrome     9/14/2021  4:23  [facility] [place] [[id number]] Duodenal mass       ED Disposition     ED Disposition Condition Date/Time Comment    Discharge Stable Tue Sep 14, 2021  4:23 PM Kristopher Wallace discharge to home/self care  Follow-up Information     Follow up With Specialties Details Why [id number], DO Gastroenterology Schedule an appointment as soon as possible for a visit   [id number] [facility] Emergency Department Emergency Medicine  If symptoms worsen [address] Emergency Department, [address] [place] [address], [id number]          Discharge Medication List as of 9/14/2021  4:25 PM      CONTINUE these medications which have NOT CHANGED    Details   aspirin (ECOTRIN LOW STRENGTH) 81 mg EC tablet Take 1 tablet (81 mg total) by mouth daily, Starting Mon 2/10/2020, No Print      atorvastatin (LIPITOR) 80 mg tablet Take 80 mg by mouth daily, Historical Med      buPROPion (WELLBUTRIN SR) 150 mg 12 hr tablet TAKE 2 TABLETS BY MOUTH DAILY, Normal      clonazePAM (KLONOPIN) 0 5 mg tablet Take 0 5 mg by mouth daily at bedtime , Historical Med      dicyclomine (BENTYL) 20 mg tablet Take 1 tablet (20 mg total) by mouth 2 (two) times a day as needed (pain), Starting Wed 5/13/2020, Print      Glucosamine-Chondroitin (OSTEO BI-FLEX REGULAR STRENGTH PO) Take by mouth 2 (two) times a day , Historical Med      naloxone (NARCAN) 4 mg/0 1 mL nasal spray Administer 1 spray into a nostril  If no response after 2-3 minutes, give another dose in the other nostril using a new spray , Normal      omeprazole (PriLOSEC) 20 mg delayed release capsule Take 1 capsule (20 mg total) by mouth daily, Starting Fri 12/6/2019, Normal      !! ondansetron (ZOFRAN-ODT) 4 mg disintegrating tablet Take 1 tablet (4 mg total) by mouth every 6 (six) hours as needed for nausea or vomiting, Starting Wed 4/7/2021, Normal      !! ondansetron (ZOFRAN-ODT) 4 mg disintegrating tablet Take 1 tablet (4 mg total) by mouth every 8 (eight) hours as needed for nausea or vomiting for up to 21 doses, Starting Sun 7/11/2021, Print      !! oxyCODONE-acetaminophen (PERCOCET) 5-325 mg per tablet Take 1 tablet by mouth every 4 (four) hours as needed for moderate painMax Daily Amount: 6 tablets, Starting Wed 4/14/2021, Normal      !! oxyCODONE-acetaminophen (PERCOCET) 5-325 mg per tablet Take 1 tablet by mouth every 4 (four) hours as needed for moderate painMax Daily Amount: 6 tablets, Starting Fri 4/16/2021, Normal      !! oxyCODONE-acetaminophen (PERCOCET) 5-325 mg per tablet Take 1 tablet by mouth every 4 (four) hours as needed for moderate painMax Daily Amount: 6 tablets, Starting Fri 4/16/2021, Normal      !! oxyCODONE-acetaminophen (PERCOCET) 5-325 mg per tablet Take 1 tablet by mouth every 6 (six) hours as needed for moderate painMax Daily Amount: 4 tablets, Starting Tue 4/20/2021, Normal      sertraline (ZOLOFT) 100 mg tablet Take 100 mg by mouth daily , Historical Med       !! - Potential duplicate medications found  Please discuss with provider  No discharge procedures on file      PDMP Review       Value Time User    PDMP Reviewed  Yes 9/14/2021  2:16 PM [id number] Ramon Hua PA-C          ED Provider  Electronically Signed by           [id number] Ramon Hua PA-C  09/14/21 2034

## 2021-09-14 NOTE — ED NOTES
Patient transported to [facility], [address] [place] [address]  09/14/21 [id number]

## 2021-09-14 NOTE — DISCHARGE INSTRUCTIONS
Take Zofran and Reglan as needed for nausea  Drink small sips of fluids every 10-15 minutes to prevent dehydration  Advance slowly to a bland diet (rice, applesauce, toast, bananas)  Please follow-up with Dr Sujata Black regarding the mass seen on your CT scan  Return to the ER with any new or worsening symptoms  CT results:  Possible 2 3 cm soft tissue mass lesion within the second portion of the duodenum, correlate with nonemergent outpatient direct visualization

## 2021-09-15 ENCOUNTER — TELEPHONE (OUTPATIENT)
Dept: GASTROENTEROLOGY | Facility: CLINIC | Age: 56
End: 2021-09-15

## 2021-09-15 NOTE — TELEPHONE ENCOUNTER
Maru pt-  Patient was recently in the ER  Kenneth Shell He was informed that he may have a mass in his lower colon     He has scheduled an appt 10/08 with [facility] Shell He is very concerned and worried    Could he possibly be a direct schedule for a colonoscopy?   Please phone [phone]

## 2021-09-17 ENCOUNTER — OFFICE VISIT (OUTPATIENT)
Dept: GASTROENTEROLOGY | Facility: CLINIC | Age: 56
End: 2021-09-17
Payer: MEDICARE

## 2021-09-17 VITALS
BODY MASS INDEX: 31.21 KG/M2 | WEIGHT: 218 LBS | SYSTOLIC BLOOD PRESSURE: 130 MMHG | HEART RATE: 68 BPM | DIASTOLIC BLOOD PRESSURE: 76 MMHG | HEIGHT: 70 IN

## 2021-09-17 DIAGNOSIS — K52.838 OTHER MICROSCOPIC COLITIS: ICD-10-CM

## 2021-09-17 DIAGNOSIS — R11.15 CYCLICAL VOMITING SYNDROME: ICD-10-CM

## 2021-09-17 DIAGNOSIS — R93.5 ABNORMAL CT OF THE ABDOMEN: Primary | ICD-10-CM

## 2021-09-17 DIAGNOSIS — K22.70 BARRETT'S ESOPHAGUS WITHOUT DYSPLASIA: ICD-10-CM

## 2021-09-17 PROCEDURE — 99213 OFFICE O/P EST LOW 20 MIN: CPT | Performed by: PHYSICIAN ASSISTANT

## 2021-09-17 NOTE — PROGRESS NOTES
[facility] - Outpatient Follow-up Note  Romayne Flesher 63 y o  male MRN: [id number]  Encounter: [id number]          ASSESSMENT AND PLAN:      1  Abnormal CT of the abdomen  2 3cm soft tissue mass in the 2nd portion of the duodenum  Will plan EGD for investigation    2  Cyclical vomiting syndrome      3  Diaz's esophagus without dysplasia  Last EGD 2020 w/o dysplasia although he has a history of HGD s/p RFA  Continue PPI    4  Other microscopic colitis  S/p Budesonide course    ______________________________________________________________________    SUBJECTIVE:  59-year-old male with a multitude of gastrointestinal issues including cyclical vomiting syndrome, GERD complicated by Diaz's esophagus, microscopic colitis presents for ER follow-up  He had a recent flare of his cyclical vomiting  He went to the emergency room on September 14th  A CT scan of the abdomen and pelvis was performed  This suggested a 2 3 cm soft tissue mass in the 2nd portion of the duodenum  His last upper endoscopy was in February of 2020  This documented his Diaz's esophagus but otherwise was reported as normal   Biopsies of his esophagus, stomach and duodenum were all reported as normal   He does have a history of high-grade dysplasia in his Diaz's in the status post radiofrequency ablation  He is maintained on omeprazole 20 mg daily and does not have any heartburn at present  He has chronic abdominal pain  He has recurrent episodes of nausea and vomiting due to his cyclical vomiting syndrome  He was diagnosed with microscopic colitis last year  He is status post to budesonide course  He has a chronic lower abdominal pain that he complains about  He denies any recent unexpected weight loss  He reports a strong family history for gastrointestinal cancers including esophagus, stomach and colon  His last colonoscopy was in 2019 with 2 benign polyps removed        REVIEW OF SYSTEMS IS OTHERWISE NEGATIVE  Historical Information   Past Medical History:   Diagnosis Date    Diaz's esophagus     Colon polyp     Coronary artery disease     Depression     Diverticulitis     GERD (gastroesophageal reflux disease)     Hemorrhoid     History of heart artery stent     Hyperlipidemia     Hypertension     Microscopic colitis     Ulcerative colitis ([address] )      Past Surgical History:   Procedure Laterality Date    ABDOMINAL SURGERY      radio frequency ablation    BONE GRAFT Left 2018    CARPAL TUNNEL RELEASE Right     COLONOSCOPY      CORONARY ANGIOPLASTY WITH STENT PLACEMENT      EGD AND COLONOSCOPY      ESOPHAGOGASTRODUODENOSCOPY N/A 2/15/2018    Procedure: ESOPHAGOGASTRODUODENOSCOPY (EGD); Surgeon: Yolanda Alston MD;  Location: [facility];  Service: Gastroenterology    ESOPHAGOGASTRODUODENOSCOPY N/A 12/10/2018    Procedure: ESOPHAGOGASTRODUODENOSCOPY (EGD); Surgeon: Kali Lawson MD;  Location: [facility];   Service: Gastroenterology    HAND SURGERY Left     NH SHLDR ARTHROSCOP,SURG,W/ROTAT CUFF REPR Left 2021    Procedure: REPAIR ROTATOR CUFF  ARTHROSCOPIC; POSSIBLE BICEPS TENDONESIS, ACROMIOPLASTY;  Surgeon: Che Dunaway DO;  Location: [facility];  Service: Orthopedics    TONSILLECTOMY       Social History   Social History     Substance and Sexual Activity   Alcohol Use Not Currently    Comment: quit 5 years     Social History     Substance and Sexual Activity   Drug Use Yes    Frequency: 3 0 times per week    Types: Marijuana     Social History     Tobacco Use   Smoking Status Former Smoker    Packs/day: 0 50    Quit date: 2007    Years since quittin 6   Smokeless Tobacco Former User    Quit date: 1998   Tobacco Comment    quit 10 yrs ago     Family History   Problem Relation Age of Onset    Heart disease Father     Melanoma Father     Cancer Sister     Skin cancer Sister     Skin cancer Mother        Meds/Allergies       Current Outpatient Medications:     aspirin (ECOTRIN LOW STRENGTH) 81 mg EC tablet    atorvastatin (LIPITOR) 80 mg tablet    clonazePAM (KLONOPIN) 0 5 mg tablet    omeprazole (PriLOSEC) 20 mg delayed release capsule    ondansetron (ZOFRAN-ODT) 4 mg disintegrating tablet    sertraline (ZOLOFT) 100 mg tablet    buPROPion (WELLBUTRIN SR) 150 mg 12 hr tablet    naloxone (NARCAN) 4 mg/0 1 mL nasal spray    oxyCODONE-acetaminophen (PERCOCET) 5-325 mg per tablet    oxyCODONE-acetaminophen (PERCOCET) 5-325 mg per tablet    oxyCODONE-acetaminophen (PERCOCET) 5-325 mg per tablet    oxyCODONE-acetaminophen (PERCOCET) 5-325 mg per tablet    Allergies   Allergen Reactions    Rosuvastatin Myalgia    Shellfish Allergy - Food Allergy Rash and Lip Swelling           Objective     Blood pressure 130/76, pulse 68, height 5' 10" (1 778 m), weight 98 9 kg (218 lb)  Body mass index is 31 28 kg/m²  PHYSICAL EXAM:      General Appearance:   Alert, cooperative, no distress   HEENT:   Normocephalic, atraumatic, anicteric      Neck:  Supple, symmetrical, trachea midline   Lungs:   Clear to auscultation bilaterally; no rales, rhonchi or wheezing; respirations unlabored    Heart[de-identified]   Regular rate and rhythm; no murmur, rub, or gallop  Abdomen:   Soft, non-tender, non-distended; normal bowel sounds; no masses, no organomegaly    Genitalia:   Deferred    Rectal:   Deferred    Extremities:  No cyanosis, clubbing or edema    Pulses:  2+ and symmetric    Skin:  No jaundice, rashes, or lesions    Lymph nodes:  No palpable cervical lymphadenopathy        Lab Results:   No visits with results within 1 Day(s) from this visit     Latest known visit with results is:   Admission on 09/14/2021, Discharged on 09/14/2021   Component Date Value    WBC 09/14/2021 11 96*    RBC 09/14/2021 4 99     Hemoglobin 09/14/2021 14 7     Hematocrit 09/14/2021 44 1     MCV 09/14/2021 88     MCH 09/14/2021 29 5     MCHC 09/14/2021 33 3     RDW 09/14/2021 13 9     MPV 09/14/2021 9 7     Platelets 56/15/1847 329     nRBC 09/14/2021 0     Neutrophils Relative 09/14/2021 88*    Immat GRANS % 09/14/2021 0     Lymphocytes Relative 09/14/2021 9*    Monocytes Relative 09/14/2021 3*    Eosinophils Relative 09/14/2021 0     Basophils Relative 09/14/2021 0     Neutrophils Absolute 09/14/2021 10 43*    Immature Grans Absolute 09/14/2021 0 03     Lymphocytes Absolute 09/14/2021 1 08     Monocytes Absolute 09/14/2021 0 35     Eosinophils Absolute 09/14/2021 0 03     Basophils Absolute 09/14/2021 0 04     Sodium 09/14/2021 141     Potassium 09/14/2021 4 4     Chloride 09/14/2021 104     CO2 09/14/2021 28     ANION GAP 09/14/2021 9     BUN 09/14/2021 12     Creatinine 09/14/2021 1 21     Glucose 09/14/2021 165*    Calcium 09/14/2021 8 9     AST 09/14/2021 16     ALT 09/14/2021 27     Alkaline Phosphatase 09/14/2021 87     Total Protein 09/14/2021 8 0     Albumin 09/14/2021 4 3     Total Bilirubin 09/14/2021 0 37     eGFR 09/14/2021 67     Lipase 09/14/2021 62*    Troponin I 09/14/2021 <0 02     Ventricular Rate 09/14/2021 45     Atrial Rate 09/14/2021 45     HI Interval 09/14/2021 172     QRSD Interval 09/14/2021 88     QT Interval 09/14/2021 456     QTC Interval 09/14/2021 394     P Axis 09/14/2021 32     QRS Axis 09/14/2021 19     T Wave Axis 09/14/2021 21          Radiology Results:   CT abdomen pelvis with contrast    Result Date: 9/14/2021  Narrative: CT ABDOMEN AND PELVIS WITH IV CONTRAST INDICATION:   Abdominal pain, acute, nonlocalized Abdominal pain, vomiting, hx of ulcerative colitis  COMPARISON:  None  TECHNIQUE:  CT examination of the abdomen and pelvis was performed  Axial, sagittal, and coronal 2D reformatted images were created from the source data and submitted for interpretation  Radiation dose length product (DLP) for this visit:  658 mGy-cm     This examination, like all CT scans performed in the [facility], was performed utilizing techniques to minimize radiation dose exposure, including the use of iterative reconstruction and automated exposure control  IV Contrast:  100 mL of iohexol (OMNIPAQUE) Enteric Contrast:  Enteric contrast was not administered  FINDINGS: ABDOMEN LOWER CHEST:  No evidence of Covid 19 pneumonia LIVER/BILIARY TREE:  Unremarkable  GALLBLADDER:  No calcified gallstones  No pericholecystic inflammatory change  SPLEEN:  Unremarkable  PANCREAS:  Unremarkable  ADRENAL GLANDS:  Unremarkable  KIDNEYS/URETERS:  Unremarkable  No hydronephrosis  STOMACH AND BOWEL:  Possible 2 3 cm soft tissue mass lesion within the second portion of the duodenum, correlate with nonemergent outpatient direct visualization  APPENDIX:  No findings to suggest appendicitis  ABDOMINOPELVIC CAVITY:  No ascites  No pneumoperitoneum  No lymphadenopathy  VESSELS:  Unremarkable for patient's age  PELVIS REPRODUCTIVE ORGANS:  The prostate is enlarged  URINARY BLADDER:  Unremarkable  ABDOMINAL WALL/INGUINAL REGIONS:  Unremarkable  OSSEOUS STRUCTURES:  No acute fracture or destructive osseous lesion  Impression: Colonic diverticulosis  Large prostate  Possible 2 3 cm soft tissue mass lesion within the second portion of the duodenum, correlate with nonemergent outpatient direct visualization  The study was marked in EPIC for significant notification  Workstation performed: [id number]   Answers for HPI/ROS submitted by the patient on 9/16/2021  Chronicity: recurrent  Onset: more than 1 year ago  Onset quality: gradual  Frequency: daily  Episode duration: 12 months  Progression since onset: waxing and waning  Pain location: generalized abdominal region  Pain - numeric: 9/10  Pain quality: aching, cramping  anorexia: Yes  arthralgias: Yes  belching: No  constipation: No  diarrhea: Yes  dysuria: No  fever: Yes  flatus: No  frequency: Yes  headaches: Yes  nausea:  Yes  vomiting: Yes  Aggravated by: bowel movement, NSAIDs, vomiting  Relieved by: nothing

## 2021-09-22 ENCOUNTER — TELEPHONE (OUTPATIENT)
Dept: GASTROENTEROLOGY | Facility: CLINIC | Age: 56
End: 2021-09-22

## 2021-09-22 NOTE — TELEPHONE ENCOUNTER
Jayna Anne patient - Patient LMOM that he is sorry he missed getting an earlier appt time and would be open to any other time you may have  Patient is also saying he was in a lot of discomfort and pain Tuesday (yesterday) but it has subsided today  He would like a RTRN call to [phone]    Thx

## 2021-09-22 NOTE — TELEPHONE ENCOUNTER
I had offered this patient 9/21 last week but he didn't call back  Not sure if you have anything sooner than 9/30

## 2021-09-24 ENCOUNTER — HOSPITAL ENCOUNTER (EMERGENCY)
Facility: HOSPITAL | Age: 56
Discharge: HOME | End: 2021-09-24
Attending: EMERGENCY MEDICINE | Admitting: EMERGENCY MEDICINE
Payer: MEDICARE

## 2021-09-24 ENCOUNTER — APPOINTMENT (EMERGENCY)
Dept: CT IMAGING | Facility: HOSPITAL | Age: 56
End: 2021-09-24
Payer: MEDICARE

## 2021-09-24 ENCOUNTER — TELEPHONE (OUTPATIENT)
Dept: GASTROENTEROLOGY | Facility: HOSPITAL | Age: 56
End: 2021-09-24

## 2021-09-24 VITALS
BODY MASS INDEX: 31.21 KG/M2 | HEIGHT: 70 IN | SYSTOLIC BLOOD PRESSURE: 135 MMHG | HEART RATE: 75 BPM | RESPIRATION RATE: 19 BRPM | TEMPERATURE: 97.6 F | OXYGEN SATURATION: 98 % | DIASTOLIC BLOOD PRESSURE: 76 MMHG | WEIGHT: 218 LBS

## 2021-09-24 DIAGNOSIS — R10.9 ABDOMINAL PAIN: Primary | ICD-10-CM

## 2021-09-24 DIAGNOSIS — K31.89 DUODENAL MASS: ICD-10-CM

## 2021-09-24 LAB
ALBUMIN SERPL BCP-MCNC: 3.4 G/DL (ref 3.5–5)
ALP SERPL-CCNC: 73 U/L (ref 46–116)
ALT SERPL W P-5'-P-CCNC: 25 U/L (ref 12–78)
ANION GAP SERPL CALCULATED.3IONS-SCNC: 8 MMOL/L (ref 4–13)
AST SERPL W P-5'-P-CCNC: 14 U/L (ref 5–45)
BASOPHILS # BLD AUTO: 0.04 THOUSANDS/ΜL (ref 0–0.1)
BASOPHILS NFR BLD AUTO: 1 % (ref 0–1)
BILIRUB DIRECT SERPL-MCNC: 0.06 MG/DL (ref 0–0.2)
BILIRUB SERPL-MCNC: 0.18 MG/DL (ref 0.2–1)
BUN SERPL-MCNC: 10 MG/DL (ref 5–25)
CALCIUM SERPL-MCNC: 8.6 MG/DL (ref 8.3–10.1)
CHLORIDE SERPL-SCNC: 105 MMOL/L (ref 100–108)
CO2 SERPL-SCNC: 25 MMOL/L (ref 21–32)
CREAT SERPL-MCNC: 0.9 MG/DL (ref 0.6–1.3)
EOSINOPHIL # BLD AUTO: 0.18 THOUSAND/ΜL (ref 0–0.61)
EOSINOPHIL NFR BLD AUTO: 3 % (ref 0–6)
ERYTHROCYTE [DISTWIDTH] IN BLOOD BY AUTOMATED COUNT: 13.8 % (ref 11.6–15.1)
GFR SERPL CREATININE-BSD FRML MDRD: 95 ML/MIN/1.73SQ M
GLUCOSE SERPL-MCNC: 104 MG/DL (ref 65–140)
HCT VFR BLD AUTO: 37.3 % (ref 36.5–49.3)
HGB BLD-MCNC: 12.4 G/DL (ref 12–17)
IMM GRANULOCYTES # BLD AUTO: 0.02 THOUSAND/UL (ref 0–0.2)
IMM GRANULOCYTES NFR BLD AUTO: 0 % (ref 0–2)
LIPASE SERPL-CCNC: 95 U/L (ref 73–393)
LYMPHOCYTES # BLD AUTO: 1.47 THOUSANDS/ΜL (ref 0.6–4.47)
LYMPHOCYTES NFR BLD AUTO: 23 % (ref 14–44)
MCH RBC QN AUTO: 29 PG (ref 26.8–34.3)
MCHC RBC AUTO-ENTMCNC: 33.2 G/DL (ref 31.4–37.4)
MCV RBC AUTO: 87 FL (ref 82–98)
MONOCYTES # BLD AUTO: 0.5 THOUSAND/ΜL (ref 0.17–1.22)
MONOCYTES NFR BLD AUTO: 8 % (ref 4–12)
NEUTROPHILS # BLD AUTO: 4.15 THOUSANDS/ΜL (ref 1.85–7.62)
NEUTS SEG NFR BLD AUTO: 65 % (ref 43–75)
NRBC BLD AUTO-RTO: 0 /100 WBCS
PLATELET # BLD AUTO: 238 THOUSANDS/UL (ref 149–390)
PMV BLD AUTO: 9.5 FL (ref 8.9–12.7)
POTASSIUM SERPL-SCNC: 3.9 MMOL/L (ref 3.5–5.3)
PROT SERPL-MCNC: 6.5 G/DL (ref 6.4–8.2)
RBC # BLD AUTO: 4.28 MILLION/UL (ref 3.88–5.62)
SODIUM SERPL-SCNC: 138 MMOL/L (ref 136–145)
TROPONIN I SERPL-MCNC: <0.02 NG/ML
WBC # BLD AUTO: 6.36 THOUSAND/UL (ref 4.31–10.16)

## 2021-09-24 PROCEDURE — 85025 COMPLETE CBC W/AUTO DIFF WBC: CPT | Performed by: PHYSICIAN ASSISTANT

## 2021-09-24 PROCEDURE — 99285 EMERGENCY DEPT VISIT HI MDM: CPT | Performed by: PHYSICIAN ASSISTANT

## 2021-09-24 PROCEDURE — 36415 COLL VENOUS BLD VENIPUNCTURE: CPT | Performed by: PHYSICIAN ASSISTANT

## 2021-09-24 PROCEDURE — 80076 HEPATIC FUNCTION PANEL: CPT | Performed by: PHYSICIAN ASSISTANT

## 2021-09-24 PROCEDURE — 74177 CT ABD & PELVIS W/CONTRAST: CPT

## 2021-09-24 PROCEDURE — 80048 BASIC METABOLIC PNL TOTAL CA: CPT | Performed by: PHYSICIAN ASSISTANT

## 2021-09-24 PROCEDURE — 93005 ELECTROCARDIOGRAM TRACING: CPT

## 2021-09-24 PROCEDURE — 99284 EMERGENCY DEPT VISIT MOD MDM: CPT

## 2021-09-24 PROCEDURE — 83690 ASSAY OF LIPASE: CPT | Performed by: PHYSICIAN ASSISTANT

## 2021-09-24 PROCEDURE — 84484 ASSAY OF TROPONIN QUANT: CPT | Performed by: PHYSICIAN ASSISTANT

## 2021-09-24 PROCEDURE — 96374 THER/PROPH/DIAG INJ IV PUSH: CPT

## 2021-09-24 RX ORDER — HYDROMORPHONE HCL/PF 1 MG/ML
1 SYRINGE (ML) INJECTION ONCE
Status: COMPLETED | OUTPATIENT
Start: 2021-09-24 | End: 2021-09-24

## 2021-09-24 RX ORDER — LORAZEPAM 0.5 MG/1
0.5 TABLET ORAL ONCE
Status: COMPLETED | OUTPATIENT
Start: 2021-09-24 | End: 2021-09-24

## 2021-09-24 RX ADMIN — IOHEXOL 100 ML: 350 INJECTION, SOLUTION INTRAVENOUS at 09:47

## 2021-09-24 RX ADMIN — HYDROMORPHONE HYDROCHLORIDE 1 MG: 1 INJECTION, SOLUTION INTRAMUSCULAR; INTRAVENOUS; SUBCUTANEOUS at 09:03

## 2021-09-24 RX ADMIN — LORAZEPAM 0.5 MG: 0.5 TABLET ORAL at 11:20

## 2021-09-24 NOTE — ED PROVIDER NOTES
History  Chief Complaint   Patient presents with    Abdominal Pain     pt c/o generilized abd pain for a month, states its getting progressively worse  Koreen Burkitt is a 63year-old male with multiple gastrointestinal issues presenting to the emergency department with complaint of worsening epigastric pain  PMHx includes cyclical vomiting syndrome, GERD complicated by Diaz's esophagus, microscopic colitis, cad w/ h/o coronary stent  Patient was recently found to have a duodenal mass on imaging completed during prior EDV on 9/17/21  The patient states that he usually experiences lower abdominal pain which triggers nausea/vomiting and flares of his cyclic vomiting syndrome but he finds current symptoms are different as he is currently experiencing upper abdominal discomfort  He denies recent nausea or episodes of vomiting, poor oral intake, unintentional weight loss  Patient is concerned that this mass is causing his pain  He states that he read online that 49 out of 50 duodenal masses are malignant and the patient is very anxious about this given strong family history of GI cancers  Patient requesting that imaging is repeated today to determine the growth of the tumor  Also requesting pain medication though does not appear to be in significant distress  Will give one-time dose of pain medication while awaiting results of imaging  He is followed by [facility] and has an EGD scheduled on 9/30/21  Prior to Admission Medications   Prescriptions Last Dose Informant Patient Reported?  Taking?   aspirin (ECOTRIN LOW STRENGTH) 81 mg EC tablet  Self No No   Sig: Take 1 tablet (81 mg total) by mouth daily   atorvastatin (LIPITOR) 80 mg tablet  Self Yes No   Sig: Take 80 mg by mouth daily   clonazePAM (KLONOPIN) 0 5 mg tablet  Self Yes No   Sig: Take 0 5 mg by mouth daily at bedtime    omeprazole (PriLOSEC) 20 mg delayed release capsule  Self No No   Sig: Take 1 capsule (20 mg total) by mouth daily ondansetron (ZOFRAN-ODT) 4 mg disintegrating tablet  Self No No   Sig: Take 1 tablet (4 mg total) by mouth every 6 (six) hours as needed for nausea or vomiting   sertraline (ZOLOFT) 100 mg tablet  Self Yes No   Sig: Take 100 mg by mouth daily       Facility-Administered Medications: None       Past Medical History:   Diagnosis Date    Diaz's esophagus     Colon polyp     Coronary artery disease     Depression     Diverticulitis     GERD (gastroesophageal reflux disease)     Hemorrhoid     History of heart artery stent     Hyperlipidemia     Hypertension     Microscopic colitis     Ulcerative colitis ([facility] [address] )        Past Surgical History:   Procedure Laterality Date    ABDOMINAL SURGERY      radio frequency ablation    BONE GRAFT Left 2018    CARPAL TUNNEL RELEASE Right     COLONOSCOPY      CORONARY ANGIOPLASTY WITH STENT PLACEMENT      EGD AND COLONOSCOPY      ESOPHAGOGASTRODUODENOSCOPY N/A 2/15/2018    Procedure: ESOPHAGOGASTRODUODENOSCOPY (EGD); Surgeon: Yolanda Alston MD;  Location: [facility];  Service: Gastroenterology    ESOPHAGOGASTRODUODENOSCOPY N/A 12/10/2018    Procedure: ESOPHAGOGASTRODUODENOSCOPY (EGD); Surgeon: Kali Lawson MD;  Location: [facility]; Service: Gastroenterology    HAND SURGERY Left     [place] SHLDR ARTHROSCOP,SURG,W/ROTAT CUFF REPR Left 4/14/2021    Procedure: REPAIR ROTATOR CUFF  ARTHROSCOPIC; POSSIBLE BICEPS TENDONESIS, ACROMIOPLASTY;  Surgeon: Che Dunaway DO;  Location: [facility];  Service: Orthopedics    TONSILLECTOMY         Family History   Problem Relation Age of Onset    Heart disease Father     Melanoma Father     Cancer Sister     Skin cancer Sister     Skin cancer Mother      I have reviewed and agree with the history as documented      E-Cigarette/Vaping    E-Cigarette Use Never User      E-Cigarette/Vaping Substances    Nicotine No     THC No     CBD No     Flavoring No     Other No     Unknown No      Social History Tobacco Use    Smoking status: Former Smoker     Packs/day: 0 50     Quit date: 2007     Years since quittin 6    Smokeless tobacco: Former User     Quit date: 1998    Tobacco comment: quit 10 yrs ago   Vaping Use    Vaping Use: Never used   Substance Use Topics    Alcohol use: Not Currently     Comment: quit 5 years    Drug use: Yes     Frequency: 3 0 times per week     Types: Marijuana       Review of Systems   Constitutional: Negative for chills, diaphoresis, fatigue and fever  Respiratory: Negative for shortness of breath  Cardiovascular: Negative for chest pain  Gastrointestinal: Positive for abdominal pain  Negative for blood in stool, diarrhea, nausea and vomiting  Psychiatric/Behavioral: The patient is nervous/anxious  All other systems reviewed and are negative  Physical Exam  Physical Exam  Vitals and nursing note reviewed  Constitutional:       General: He is not in acute distress  Appearance: He is well-developed  He is not ill-appearing, toxic-appearing or diaphoretic  Comments: Anxious 63year-old male  Patient observed ambulating around the exam room without difficulty  Elevated BMI  HENT:      Head: Normocephalic and atraumatic  Eyes:      Conjunctiva/sclera: Conjunctivae normal       Pupils: Pupils are equal, round, and reactive to light  Cardiovascular:      Rate and Rhythm: Normal rate and regular rhythm  Heart sounds: Normal heart sounds  Pulmonary:      Effort: Pulmonary effort is normal  No respiratory distress  Breath sounds: Normal breath sounds  No wheezing  Abdominal:      General: There is no distension  Palpations: Abdomen is soft  Tenderness: There is no abdominal tenderness  There is no guarding or rebound  Musculoskeletal:         General: No tenderness  Normal range of motion  Cervical back: Normal range of motion and neck supple  Skin:     General: Skin is warm and dry        Capillary Refill: Capillary refill takes less than 2 seconds  Neurological:      Mental Status: He is alert           Vital Signs  ED Triage Vitals [09/24/21 0812]   Temperature Pulse Respirations Blood Pressure SpO2   97 6 °F (36 4 °C) 75 19 135/76 98 %      Temp Source Heart Rate Source Patient Position - Orthostatic VS BP Location FiO2 (%)   Temporal Monitor Sitting Right arm --      Pain Score       7           Vitals:    09/24/21 0812   BP: 135/76   Pulse: 75   Patient Position - Orthostatic VS: Sitting         Visual Acuity      ED Medications  Medications   HYDROmorphone (DILAUDID) injection 1 mg (1 mg Intravenous Given 9/24/21 0903)   iohexol (OMNIPAQUE) 350 MG/ML injection (SINGLE-DOSE) 100 mL (100 mL Intravenous Given 9/24/21 0947)   LORazepam (ATIVAN) tablet 0 5 mg (0 5 mg Oral Given 9/24/21 1120)       Diagnostic Studies  Results Reviewed     Procedure Component Value Units Date/Time    Troponin I [[id number]]  (Normal) Collected: 09/24/21 0902    Lab Status: Final result Specimen: Blood from Arm, Left Updated: 09/24/21 0938     Troponin I <0 02 ng/mL     Lipase [[id number]]  (Normal) Collected: 09/24/21 0902    Lab Status: Final result Specimen: Blood from Arm, Left Updated: 09/24/21 0936     Lipase 95 u/L     Hepatic function panel [[id number]]  (Abnormal) Collected: 09/24/21 0902    Lab Status: Final result Specimen: Blood from Arm, Left Updated: 09/24/21 0936     Total Bilirubin 0 18 mg/dL      Bilirubin, Direct 0 06 mg/dL      Alkaline Phosphatase 73 U/L      AST 14 U/L      ALT 25 U/L      Total Protein 6 5 g/dL      Albumin 3 4 g/dL     Basic metabolic panel [[id number]] Collected: 09/24/21 0902    Lab Status: Final result Specimen: Blood from Arm, Left Updated: 09/24/21 0936     Sodium 138 mmol/L      Potassium 3 9 mmol/L      Chloride 105 mmol/L      CO2 25 mmol/L      ANION GAP 8 mmol/L      BUN 10 mg/dL      Creatinine 0 90 mg/dL      Glucose 104 mg/dL      Calcium 8 6 mg/dL      eGFR 95 ml/min/1 73sq m Narrative:      National Kidney Disease Foundation guidelines for Chronic Kidney Disease (CKD):     Stage 1 with normal or high GFR (GFR > 90 mL/min/1 73 square meters)    Stage 2 Mild CKD (GFR = 60-89 mL/min/1 73 square meters)    Stage 3A Moderate CKD (GFR = 45-59 mL/min/1 73 square meters)    Stage 3B Moderate CKD (GFR = 30-44 mL/min/1 73 square meters)    Stage 4 Severe CKD (GFR = 15-29 mL/min/1 73 square meters)    Stage 5 End Stage CKD (GFR <15 mL/min/1 73 square meters)  Note: GFR calculation is accurate only with a steady state creatinine    CBC and differential [[id number]] Collected: 09/24/21 0902    Lab Status: Final result Specimen: Blood from Arm, Left Updated: 09/24/21 0917     WBC 6 36 Thousand/uL      RBC 4 28 Million/uL      Hemoglobin 12 4 g/dL      Hematocrit 37 3 %      MCV 87 fL      MCH 29 0 pg      MCHC 33 2 g/dL      RDW 13 8 %      MPV 9 5 fL      Platelets 122 Thousands/uL      nRBC 0 /100 WBCs      Neutrophils Relative 65 %      Immat GRANS % 0 %      Lymphocytes Relative 23 %      Monocytes Relative 8 %      Eosinophils Relative 3 %      Basophils Relative 1 %      Neutrophils Absolute 4 15 Thousands/µL      Immature Grans Absolute 0 02 Thousand/uL      Lymphocytes Absolute 1 47 Thousands/µL      Monocytes Absolute 0 50 Thousand/µL      Eosinophils Absolute 0 18 Thousand/µL      Basophils Absolute 0 04 Thousands/µL                  CT abdomen pelvis with contrast   Final Result by Stephy Kelley DO (09/24 5425)   1  Redemonstration of possible soft tissue mass within the 2nd portion of the duodenum measuring 2 2 cm, not significantly changed from prior exam   Correlation with nonemergent right visualization is again recommended  2   Colonic diverticulosis  3  Prostatomegaly  The study was marked in [facility] for immediate notification         Workstation performed: [id number]                    Procedures  ECG 12 Lead Documentation Only    Date/Time: 9/24/2021 9:12 AM  Performed by: Lelia Bagley PA-C  Authorized by: Lelia Bagley PA-C     Indications / Diagnosis:  Epigastric pain  Patient location:  ED  Previous ECG:     Previous ECG:  Compared to current    Comparison ECG info:  9/14/21  Interpretation:     Interpretation: normal    Rate:     ECG rate:  72    ECG rate assessment: normal    Rhythm:     Rhythm: sinus rhythm    Ectopy:     Ectopy: none    QRS:     QRS axis:  Normal    QRS intervals:  Normal  Conduction:     Conduction: normal    ST segments:     ST segments:  Normal  T waves:     T waves: normal                       ED Course                   SBIRT 20yo+      Most Recent Value   SBIRT (22 yo +)   In order to provide better care to our patients, we are screening all of our patients for alcohol and drug use  Would it be okay to ask you these screening questions? Yes Filed at: 09/24/2021 1108   Initial Alcohol Screen: US AUDIT-C    1  How often do you have a drink containing alcohol? 1 Filed at: 09/24/2021 1108   2  How many drinks containing alcohol do you have on a typical day you are drinking? 0 Filed at: 09/24/2021 1108   3a  Male UNDER 65: How often do you have five or more drinks on one occasion? 0 Filed at: 09/24/2021 1108   Audit-C Score  1 Filed at: 09/24/2021 1108   HERMINIA: How many times in the past year have you    Used an illegal drug or used a prescription medication for non-medical reasons? Never Filed at: 09/24/2021 1108                    MDM  Number of Diagnoses or Management Options  Abdominal pain: new and requires workup  Duodenal mass  Diagnosis management comments: This is a 61yo male, found to have duodenal mass on CT from prior EDV, presenting with request for repeat imaging to assess tumor growth  He has an EGD scheduled on 9/30/21      Differential diagnosis includes but is not limited to: esophagitis, gerd, gastritis, duodenitis, pancreatitis, hepatitis, biliary obstruction, duodenal mass, neoplasm, anxiety; anginal equivalent less likely but will check ecg/troponin today    Initial ED plan: patient aware that work up today will unlikely change his disposition and he would like to proceed with imaging; will obtain labs as well    Final ED Assessment: Vital signs reviewed on arrival, examination as above  The patient's abdominal exam is benign  All labs and imaging independently reviewed with imaging interpreted by the Radiologist   There were no significant lab findings  Imaging demonstrated stable findings  Labs and imaging report were reviewed at bedside with the patient  Patient advised of need for continued close GI follow-up and was encouraged to proceed with EGD as scheduled on 09/30/2021  We discussed indications for hospital return  Patient verbalized understanding and was agreeable with disposition and care plan  He is stable for discharge  Amount and/or Complexity of Data Reviewed  Clinical lab tests: ordered and reviewed  Tests in the radiology section of CPT®: ordered and reviewed  Review and summarize past medical records: yes  Independent visualization of images, tracings, or specimens: yes    Risk of Complications, Morbidity, and/or Mortality  Presenting problems: moderate  Diagnostic procedures: moderate  Management options: low    Patient Progress  Patient progress: stable      Disposition  Final diagnoses:   Abdominal pain   Duodenal mass     Time reflects when diagnosis was documented in both MDM as applicable and the Disposition within this note     Time User Action Codes Description Comment    9/24/2021 10:58 AM [place] Holiday Add [R10 9] Abdominal pain     9/24/2021 10:58 AM [place] [facility] Add [K31 89] Duodenal mass       ED Disposition     ED Disposition Condition Date/Time Comment    Discharge Stable Fri Sep 24, 2021 10:58 AM Romayne Flesher discharge to home/self care              Follow-up Information     Follow up With Specialties Details Why Contact Info Khushi Doll [address], [place] [facility]   [address]  [address]  [facility] [address]       [facility] Gastroenterology  As scheduled [address]  [place] [address]  [address] [facility], [address] [facility] [address], [place], [place], [address]     [address] [facility] [address] [facility] Emergency Department Emergency Medicine  If symptoms worsen [address] [facility] [address] [facility] Emergency Department, [address] [facility], [place], [place], [address] [facility] Surgical Oncology [facility] Surgical Oncology   [address] [facility] [address] [facility] [address] [phone] [facility], [address], [place], [place], [address] [facility]          Discharge Medication List as of 9/24/2021 11:00 AM      CONTINUE these medications which have NOT CHANGED    Details   aspirin (ECOTRIN LOW STRENGTH) 81 mg EC tablet Take 1 tablet (81 mg total) by mouth daily, Starting Mon 2/10/2020, No Print      atorvastatin (LIPITOR) 80 mg tablet Take 80 mg by mouth daily, Historical Med      clonazePAM (KLONOPIN) 0 5 mg tablet Take 0 5 mg by mouth daily at bedtime , Historical Med      omeprazole (PriLOSEC) 20 mg delayed release capsule Take 1 capsule (20 mg total) by mouth daily, Starting Fri 12/6/2019, Normal      ondansetron (ZOFRAN-ODT) 4 mg disintegrating tablet Take 1 tablet (4 mg total) by mouth every 6 (six) hours as needed for nausea or vomiting, Starting Wed 4/7/2021, Normal      sertraline (ZOLOFT) 100 mg tablet Take 100 mg by mouth daily , Historical Med           No discharge procedures on file      PDMP Review       Value Time User    PDMP Reviewed  Yes 9/14/2021  2:16 PM Amandeep Stokes PA-C          ED Provider  Electronically Signed by           Aravind Sanchez PA-C  09/24/21 9434

## 2021-09-24 NOTE — TELEPHONE ENCOUNTER
Patient is in ER right now wants a call from someone regarding the mass that was found   Can you call him @ [phone]

## 2021-09-24 NOTE — DISCHARGE INSTRUCTIONS
Please follow up with GI for endoscopy as scheduled on 9/30/21  Return immediately with any new or worsening symptoms

## 2021-09-25 LAB
ATRIAL RATE: 72 BPM
P AXIS: 68 DEGREES
PR INTERVAL: 178 MS
QRS AXIS: 7 DEGREES
QRSD INTERVAL: 92 MS
QT INTERVAL: 384 MS
QTC INTERVAL: 420 MS
T WAVE AXIS: 53 DEGREES
VENTRICULAR RATE: 72 BPM

## 2021-09-25 PROCEDURE — 93010 ELECTROCARDIOGRAM REPORT: CPT | Performed by: INTERNAL MEDICINE

## 2021-09-27 ENCOUNTER — ANESTHESIA EVENT (OUTPATIENT)
Dept: GASTROENTEROLOGY | Facility: HOSPITAL | Age: 56
End: 2021-09-27

## 2021-09-27 ENCOUNTER — ANESTHESIA (OUTPATIENT)
Dept: GASTROENTEROLOGY | Facility: HOSPITAL | Age: 56
End: 2021-09-27

## 2021-09-27 ENCOUNTER — HOSPITAL ENCOUNTER (OUTPATIENT)
Dept: GASTROENTEROLOGY | Facility: HOSPITAL | Age: 56
Setting detail: OUTPATIENT SURGERY
Discharge: HOME | End: 2021-09-27
Attending: INTERNAL MEDICINE | Admitting: INTERNAL MEDICINE
Payer: MEDICARE

## 2021-09-27 VITALS
DIASTOLIC BLOOD PRESSURE: 65 MMHG | RESPIRATION RATE: 20 BRPM | HEART RATE: 79 BPM | OXYGEN SATURATION: 98 % | TEMPERATURE: 97 F | SYSTOLIC BLOOD PRESSURE: 102 MMHG

## 2021-09-27 DIAGNOSIS — R93.5 ABNORMAL CT OF THE ABDOMEN: ICD-10-CM

## 2021-09-27 PROCEDURE — 88305 TISSUE EXAM BY PATHOLOGIST: CPT | Performed by: PATHOLOGY

## 2021-09-27 PROCEDURE — 44361 SMALL BOWEL ENDOSCOPY/BIOPSY: CPT | Performed by: INTERNAL MEDICINE

## 2021-09-27 RX ORDER — KETAMINE HYDROCHLORIDE 50 MG/ML
INJECTION, SOLUTION, CONCENTRATE INTRAMUSCULAR; INTRAVENOUS AS NEEDED
Status: DISCONTINUED | OUTPATIENT
Start: 2021-09-27 | End: 2021-09-27

## 2021-09-27 RX ORDER — PROPOFOL 10 MG/ML
INJECTION, EMULSION INTRAVENOUS AS NEEDED
Status: DISCONTINUED | OUTPATIENT
Start: 2021-09-27 | End: 2021-09-27

## 2021-09-27 RX ORDER — LIDOCAINE HYDROCHLORIDE 10 MG/ML
INJECTION, SOLUTION EPIDURAL; INFILTRATION; INTRACAUDAL; PERINEURAL AS NEEDED
Status: DISCONTINUED | OUTPATIENT
Start: 2021-09-27 | End: 2021-09-27

## 2021-09-27 RX ORDER — GLYCOPYRROLATE 0.2 MG/ML
INJECTION INTRAMUSCULAR; INTRAVENOUS AS NEEDED
Status: DISCONTINUED | OUTPATIENT
Start: 2021-09-27 | End: 2021-09-27

## 2021-09-27 RX ORDER — EPHEDRINE SULFATE 50 MG/ML
INJECTION INTRAVENOUS AS NEEDED
Status: DISCONTINUED | OUTPATIENT
Start: 2021-09-27 | End: 2021-09-27

## 2021-09-27 RX ORDER — SODIUM CHLORIDE, SODIUM LACTATE, POTASSIUM CHLORIDE, CALCIUM CHLORIDE 600; 310; 30; 20 MG/100ML; MG/100ML; MG/100ML; MG/100ML
INJECTION, SOLUTION INTRAVENOUS CONTINUOUS PRN
Status: DISCONTINUED | OUTPATIENT
Start: 2021-09-27 | End: 2021-09-27

## 2021-09-27 RX ADMIN — LIDOCAINE HYDROCHLORIDE 100 MG: 10 INJECTION, SOLUTION EPIDURAL; INFILTRATION; INTRACAUDAL; PERINEURAL at 14:12

## 2021-09-27 RX ADMIN — PROPOFOL 150 MG: 10 INJECTION, EMULSION INTRAVENOUS at 14:12

## 2021-09-27 RX ADMIN — SODIUM CHLORIDE, SODIUM LACTATE, POTASSIUM CHLORIDE, AND CALCIUM CHLORIDE: .6; .31; .03; .02 INJECTION, SOLUTION INTRAVENOUS at 13:56

## 2021-09-27 RX ADMIN — KETAMINE HYDROCHLORIDE 50 MG: 50 INJECTION INTRAMUSCULAR; INTRAVENOUS at 14:18

## 2021-09-27 RX ADMIN — PROPOFOL 50 MG: 10 INJECTION, EMULSION INTRAVENOUS at 14:17

## 2021-09-27 RX ADMIN — EPHEDRINE SULFATE 10 MG: 50 INJECTION, SOLUTION INTRAVENOUS at 14:57

## 2021-09-27 RX ADMIN — PROPOFOL 100 MG: 10 INJECTION, EMULSION INTRAVENOUS at 14:22

## 2021-09-27 RX ADMIN — PROPOFOL 50 MG: 10 INJECTION, EMULSION INTRAVENOUS at 14:15

## 2021-09-27 RX ADMIN — GLYCOPYRROLATE 0.2 MG: 0.2 INJECTION, SOLUTION INTRAMUSCULAR; INTRAVENOUS at 14:14

## 2021-09-27 RX ADMIN — EPHEDRINE SULFATE 10 MG: 50 INJECTION, SOLUTION INTRAVENOUS at 14:51

## 2021-09-27 RX ADMIN — PROPOFOL 50 MG: 10 INJECTION, EMULSION INTRAVENOUS at 14:16

## 2021-09-27 RX ADMIN — PROPOFOL 50 MG: 10 INJECTION, EMULSION INTRAVENOUS at 14:14

## 2021-09-27 RX ADMIN — GLUCAGON HYDROCHLORIDE 1 MG: KIT at 14:22

## 2021-09-27 RX ADMIN — PROPOFOL 100 MG: 10 INJECTION, EMULSION INTRAVENOUS at 14:20

## 2021-09-27 NOTE — ANESTHESIA PREPROCEDURE EVALUATION
Procedure:  EGD    Relevant Problems   CARDIO   (+) Chest pain   (+) Coronary artery disease   (+) Essential hypertension   (+) Hypertension      GI/HEPATIC   (+) GERD (gastroesophageal reflux disease)   (+) Gastroesophageal reflux disease with esophagitis      NEURO/PSYCH   (+) Anxiety and depression   (+) Depression        Physical Exam    Airway    Mallampati score: III  TM Distance: >3 FB  Neck ROM: full     Dental       Cardiovascular  Cardiovascular exam normal    Pulmonary  Pulmonary exam normal     Other Findings       History Comments History Comments   Hypertension  Diverticulitis    GERD (gastroesophageal reflux disease)  Diaz's esophagus    Hemorrhoid  Hyperlipidemia    Depression  Coronary artery disease    Microscopic colitis  History of heart artery stent    Colon polyp  Ulcerative colitis ([facility] [address] )    Surgical History     Current as of 21 1212  ABDOMINAL SURGERY COLONOSCOPY   EGD AND COLONOSCOPY TONSILLECTOMY   ESOPHAGOGASTRODUODENOSCOPY ESOPHAGOGASTRODUODENOSCOPY   CARPAL TUNNEL RELEASE BONE GRAFT   CORONARY ANGIOPLASTY WITH STENT PLACEMENT HAND SURGERY   [place] SHLDR ARTHROSCOP,SURG,W/ROTAT CUFF REPR    Substance History     Current as of 21 1212  Smoking Status: Former Smoker   Quit Smokin07   Smokeless Tobacco Status: Former User   Quit Smokeless Tobacco: 98   Alcohol use: Not Currently   Drug use: Marijuana; Times per week: 3         Anesthesia Plan  ASA Score- 3     Anesthesia Type- IV sedation with anesthesia with ASA Monitors  Additional Monitors:   Airway Plan:           Plan Factors-Exercise tolerance (METS): <4 METS  Chart reviewed  EKG reviewed  Imaging results reviewed  Existing labs reviewed  Patient summary reviewed  Patient is not a current smoker  Induction- intravenous  Postoperative Plan-     Informed Consent- Anesthetic plan and risks discussed with patient  I personally reviewed this patient with the CRNA   Discussed and agreed on the Anesthesia Plan with the CRNA               1  Abnormal CT of the abdomen  2 3cm soft tissue mass in the 2nd portion of the duodenum  Will plan EGD for investigation     2  Cyclical vomiting syndrome        3  Diaz's esophagus without dysplasia  Last EGD 2020 w/o dysplasia although he has a history of HGD s/p RFA  Continue PPI     4  Other microscopic colitis  S/p Budesonide course     ______________________________________________________________________     SUBJECTIVE:  50-year-old male with a multitude of gastrointestinal issues including cyclical vomiting syndrome, GERD complicated by Diaz's esophagus, microscopic colitis presents for ER follow-up  He had a recent flare of his cyclical vomiting  He went to the emergency room on September 14th  A CT scan of the abdomen and pelvis was performed  This suggested a 2 3 cm soft tissue mass in the 2nd portion of the duodenum  His last upper endoscopy was in February of 2020  This documented his Diaz's esophagus but otherwise was reported as normal   Biopsies of his esophagus, stomach and duodenum were all reported as normal   He does have a history of high-grade dysplasia in his Diaz's in the status post radiofrequency ablation  He is maintained on omeprazole 20 mg daily and does not have any heartburn at present  He has chronic abdominal pain  He has recurrent episodes of nausea and vomiting due to his cyclical vomiting syndrome  He was diagnosed with microscopic colitis last year  He is status post to budesonide course  He has a chronic lower abdominal pain that he complains about  He denies any recent unexpected weight loss  He reports a strong family history for gastrointestinal cancers including esophagus, stomach and colon    His last colonoscopy was in 2019 with 2 benign polyps removed

## 2021-09-27 NOTE — ANESTHESIA POSTPROCEDURE EVALUATION
Post-Op Assessment Note    CV Status:  Stable  Pain Score: 0    Pain management: adequate     Mental Status:  Alert and awake   Hydration Status:  Euvolemic   PONV Controlled:  Controlled   Airway Patency:  Patent      Post Op Vitals Reviewed: Yes      Staff: CRNA, Anesthesiologist   Comments: patient mentating aox3 with good pulses  but nibp running low  dr Wong Greenleaf in post with crna- given ephedrine x2 and bp up to 314/37        No complications documented      BP   107/61   Temp 97   Pulse 70   Resp 15   SpO2   98%

## 2021-09-28 ENCOUNTER — TELEPHONE (OUTPATIENT)
Dept: GASTROENTEROLOGY | Facility: CLINIC | Age: 56
End: 2021-09-28

## 2021-10-15 ENCOUNTER — APPOINTMENT (EMERGENCY)
Dept: CT IMAGING | Facility: HOSPITAL | Age: 56
End: 2021-10-15
Payer: MEDICARE

## 2021-10-15 ENCOUNTER — HOSPITAL ENCOUNTER (EMERGENCY)
Facility: HOSPITAL | Age: 56
Discharge: HOME | End: 2021-10-15
Attending: EMERGENCY MEDICINE | Admitting: EMERGENCY MEDICINE
Payer: MEDICARE

## 2021-10-15 ENCOUNTER — APPOINTMENT (EMERGENCY)
Dept: RADIOLOGY | Facility: HOSPITAL | Age: 56
End: 2021-10-15
Payer: MEDICARE

## 2021-10-15 VITALS
HEART RATE: 42 BPM | RESPIRATION RATE: 18 BRPM | DIASTOLIC BLOOD PRESSURE: 77 MMHG | OXYGEN SATURATION: 97 % | TEMPERATURE: 97.9 F | SYSTOLIC BLOOD PRESSURE: 159 MMHG

## 2021-10-15 DIAGNOSIS — K51.90 ULCERATIVE COLITIS (HCC): Primary | ICD-10-CM

## 2021-10-15 LAB
ALBUMIN SERPL BCP-MCNC: 4.3 G/DL (ref 3.5–5)
ALP SERPL-CCNC: 76 U/L (ref 46–116)
ALT SERPL W P-5'-P-CCNC: 25 U/L (ref 12–78)
ANION GAP SERPL CALCULATED.3IONS-SCNC: 14 MMOL/L (ref 4–13)
AST SERPL W P-5'-P-CCNC: 16 U/L (ref 5–45)
ATRIAL RATE: 47 BPM
BASOPHILS # BLD AUTO: 0.02 THOUSANDS/ΜL (ref 0–0.1)
BASOPHILS NFR BLD AUTO: 0 % (ref 0–1)
BILIRUB SERPL-MCNC: 0.46 MG/DL (ref 0.2–1)
BUN SERPL-MCNC: 16 MG/DL (ref 5–25)
CALCIUM SERPL-MCNC: 9.4 MG/DL (ref 8.3–10.1)
CHLORIDE SERPL-SCNC: 104 MMOL/L (ref 100–108)
CO2 SERPL-SCNC: 23 MMOL/L (ref 21–32)
CREAT SERPL-MCNC: 1.11 MG/DL (ref 0.6–1.3)
EOSINOPHIL # BLD AUTO: 0.01 THOUSAND/ΜL (ref 0–0.61)
EOSINOPHIL NFR BLD AUTO: 0 % (ref 0–6)
ERYTHROCYTE [DISTWIDTH] IN BLOOD BY AUTOMATED COUNT: 13.4 % (ref 11.6–15.1)
FLUAV RNA RESP QL NAA+PROBE: NEGATIVE
FLUBV RNA RESP QL NAA+PROBE: NEGATIVE
GFR SERPL CREATININE-BSD FRML MDRD: 73 ML/MIN/1.73SQ M
GLUCOSE SERPL-MCNC: 147 MG/DL (ref 65–140)
HCT VFR BLD AUTO: 43.1 % (ref 36.5–49.3)
HGB BLD-MCNC: 14.4 G/DL (ref 12–17)
IMM GRANULOCYTES # BLD AUTO: 0.04 THOUSAND/UL (ref 0–0.2)
IMM GRANULOCYTES NFR BLD AUTO: 0 % (ref 0–2)
LIPASE SERPL-CCNC: 147 U/L (ref 73–393)
LYMPHOCYTES # BLD AUTO: 0.85 THOUSANDS/ΜL (ref 0.6–4.47)
LYMPHOCYTES NFR BLD AUTO: 7 % (ref 14–44)
MCH RBC QN AUTO: 29.6 PG (ref 26.8–34.3)
MCHC RBC AUTO-ENTMCNC: 33.4 G/DL (ref 31.4–37.4)
MCV RBC AUTO: 89 FL (ref 82–98)
MONOCYTES # BLD AUTO: 0.35 THOUSAND/ΜL (ref 0.17–1.22)
MONOCYTES NFR BLD AUTO: 3 % (ref 4–12)
NEUTROPHILS # BLD AUTO: 10.68 THOUSANDS/ΜL (ref 1.85–7.62)
NEUTS SEG NFR BLD AUTO: 90 % (ref 43–75)
NRBC BLD AUTO-RTO: 0 /100 WBCS
P AXIS: 42 DEGREES
PLATELET # BLD AUTO: 307 THOUSANDS/UL (ref 149–390)
PMV BLD AUTO: 9.9 FL (ref 8.9–12.7)
POTASSIUM SERPL-SCNC: 4.3 MMOL/L (ref 3.5–5.3)
PR INTERVAL: 178 MS
PROT SERPL-MCNC: 7.7 G/DL (ref 6.4–8.2)
QRS AXIS: 23 DEGREES
QRSD INTERVAL: 92 MS
QT INTERVAL: 448 MS
QTC INTERVAL: 396 MS
RBC # BLD AUTO: 4.86 MILLION/UL (ref 3.88–5.62)
RSV RNA RESP QL NAA+PROBE: NEGATIVE
SARS-COV-2 RNA RESP QL NAA+PROBE: NEGATIVE
SODIUM SERPL-SCNC: 141 MMOL/L (ref 136–145)
T WAVE AXIS: 56 DEGREES
TROPONIN I SERPL-MCNC: <0.02 NG/ML
TROPONIN I SERPL-MCNC: <0.02 NG/ML
VENTRICULAR RATE: 47 BPM
WBC # BLD AUTO: 11.95 THOUSAND/UL (ref 4.31–10.16)

## 2021-10-15 PROCEDURE — 71045 X-RAY EXAM CHEST 1 VIEW: CPT

## 2021-10-15 PROCEDURE — 0241U HB NFCT DS VIR RESP RNA 4 TRGT: CPT | Performed by: EMERGENCY MEDICINE

## 2021-10-15 PROCEDURE — 96361 HYDRATE IV INFUSION ADD-ON: CPT

## 2021-10-15 PROCEDURE — 83690 ASSAY OF LIPASE: CPT | Performed by: EMERGENCY MEDICINE

## 2021-10-15 PROCEDURE — 74177 CT ABD & PELVIS W/CONTRAST: CPT

## 2021-10-15 PROCEDURE — 99285 EMERGENCY DEPT VISIT HI MDM: CPT

## 2021-10-15 PROCEDURE — 80053 COMPREHEN METABOLIC PANEL: CPT | Performed by: EMERGENCY MEDICINE

## 2021-10-15 PROCEDURE — 36415 COLL VENOUS BLD VENIPUNCTURE: CPT

## 2021-10-15 PROCEDURE — 99285 EMERGENCY DEPT VISIT HI MDM: CPT | Performed by: EMERGENCY MEDICINE

## 2021-10-15 PROCEDURE — C9113 INJ PANTOPRAZOLE SODIUM, VIA: HCPCS | Performed by: EMERGENCY MEDICINE

## 2021-10-15 PROCEDURE — 96375 TX/PRO/DX INJ NEW DRUG ADDON: CPT

## 2021-10-15 PROCEDURE — 96376 TX/PRO/DX INJ SAME DRUG ADON: CPT

## 2021-10-15 PROCEDURE — 96374 THER/PROPH/DIAG INJ IV PUSH: CPT

## 2021-10-15 PROCEDURE — 85025 COMPLETE CBC W/AUTO DIFF WBC: CPT | Performed by: EMERGENCY MEDICINE

## 2021-10-15 PROCEDURE — 84484 ASSAY OF TROPONIN QUANT: CPT | Performed by: EMERGENCY MEDICINE

## 2021-10-15 PROCEDURE — 93010 ELECTROCARDIOGRAM REPORT: CPT | Performed by: INTERNAL MEDICINE

## 2021-10-15 PROCEDURE — 93005 ELECTROCARDIOGRAM TRACING: CPT

## 2021-10-15 RX ORDER — PANTOPRAZOLE SODIUM 40 MG/1
40 INJECTION, POWDER, FOR SOLUTION INTRAVENOUS ONCE
Status: COMPLETED | OUTPATIENT
Start: 2021-10-15 | End: 2021-10-15

## 2021-10-15 RX ORDER — ONDANSETRON 2 MG/ML
4 INJECTION INTRAMUSCULAR; INTRAVENOUS ONCE
Status: COMPLETED | OUTPATIENT
Start: 2021-10-15 | End: 2021-10-15

## 2021-10-15 RX ORDER — MORPHINE SULFATE 4 MG/ML
4 INJECTION, SOLUTION INTRAMUSCULAR; INTRAVENOUS ONCE
Status: COMPLETED | OUTPATIENT
Start: 2021-10-15 | End: 2021-10-15

## 2021-10-15 RX ORDER — HYDROMORPHONE HCL/PF 1 MG/ML
0.5 SYRINGE (ML) INJECTION ONCE
Status: COMPLETED | OUTPATIENT
Start: 2021-10-15 | End: 2021-10-15

## 2021-10-15 RX ORDER — MORPHINE SULFATE 15 MG/1
15 TABLET ORAL EVERY 4 HOURS PRN
Qty: 12 TABLET | Refills: 0 | Status: SHIPPED | OUTPATIENT
Start: 2021-10-15 | End: 2022-02-08

## 2021-10-15 RX ADMIN — SODIUM CHLORIDE 1000 ML: 0.9 INJECTION, SOLUTION INTRAVENOUS at 15:59

## 2021-10-15 RX ADMIN — PANTOPRAZOLE SODIUM 40 MG: 40 INJECTION, POWDER, FOR SOLUTION INTRAVENOUS at 16:00

## 2021-10-15 RX ADMIN — HYDROMORPHONE HYDROCHLORIDE 0.5 MG: 1 INJECTION, SOLUTION INTRAMUSCULAR; INTRAVENOUS; SUBCUTANEOUS at 17:30

## 2021-10-15 RX ADMIN — MORPHINE SULFATE 4 MG: 4 INJECTION INTRAVENOUS at 16:01

## 2021-10-15 RX ADMIN — ONDANSETRON 4 MG: 2 INJECTION INTRAMUSCULAR; INTRAVENOUS at 17:31

## 2021-10-15 RX ADMIN — IOHEXOL 100 ML: 350 INJECTION, SOLUTION INTRAVENOUS at 15:47

## 2021-10-15 RX ADMIN — ONDANSETRON 4 MG: 2 INJECTION INTRAMUSCULAR; INTRAVENOUS at 16:00

## 2021-11-11 ENCOUNTER — OFFICE VISIT (OUTPATIENT)
Dept: INTERNAL MEDICINE CLINIC | Facility: CLINIC | Age: 56
End: 2021-11-11
Payer: MEDICARE

## 2021-11-11 VITALS
TEMPERATURE: 98 F | SYSTOLIC BLOOD PRESSURE: 116 MMHG | HEIGHT: 70 IN | DIASTOLIC BLOOD PRESSURE: 70 MMHG | BODY MASS INDEX: 30.49 KG/M2 | OXYGEN SATURATION: 97 % | WEIGHT: 213 LBS | HEART RATE: 80 BPM

## 2021-11-11 DIAGNOSIS — R94.8 ABNORMAL RESULTS OF FUNCTION STUDIES OF OTHER ORGANS AND SYSTEMS: ICD-10-CM

## 2021-11-11 DIAGNOSIS — I10 ESSENTIAL HYPERTENSION: Primary | ICD-10-CM

## 2021-11-11 DIAGNOSIS — Z00.00 MEDICARE ANNUAL WELLNESS VISIT, SUBSEQUENT: ICD-10-CM

## 2021-11-11 DIAGNOSIS — I25.119 CORONARY ARTERY DISEASE WITH ANGINA PECTORIS, UNSPECIFIED VESSEL OR LESION TYPE, UNSPECIFIED WHETHER NATIVE OR TRANSPLANTED HEART (HCC): ICD-10-CM

## 2021-11-11 DIAGNOSIS — Z11.4 ENCOUNTER FOR SCREENING FOR HUMAN IMMUNODEFICIENCY VIRUS (HIV): ICD-10-CM

## 2021-11-11 DIAGNOSIS — E78.5 DYSLIPIDEMIA: ICD-10-CM

## 2021-11-11 DIAGNOSIS — K22.711 BARRETT'S ESOPHAGUS WITH HIGH GRADE DYSPLASIA: ICD-10-CM

## 2021-11-11 DIAGNOSIS — F33.9 DEPRESSION, RECURRENT (HCC): ICD-10-CM

## 2021-11-11 DIAGNOSIS — F10.21 ALCOHOL DEPENDENCE, IN REMISSION (HCC): ICD-10-CM

## 2021-11-11 DIAGNOSIS — Z11.59 ENCOUNTER FOR HEPATITIS C SCREENING TEST FOR LOW RISK PATIENT: ICD-10-CM

## 2021-11-11 DIAGNOSIS — R73.09 ABNORMAL GLUCOSE: ICD-10-CM

## 2021-11-11 DIAGNOSIS — Z12.5 PROSTATE CANCER SCREENING: ICD-10-CM

## 2021-11-11 DIAGNOSIS — R93.5 ABNORMAL CT OF THE ABDOMEN: ICD-10-CM

## 2021-11-11 PROCEDURE — 99214 OFFICE O/P EST MOD 30 MIN: CPT | Performed by: INTERNAL MEDICINE

## 2021-11-11 PROCEDURE — G0439 PPPS, SUBSEQ VISIT: HCPCS | Performed by: INTERNAL MEDICINE

## 2022-01-10 ENCOUNTER — APPOINTMENT (OUTPATIENT)
Dept: RADIOLOGY | Facility: CLINIC | Age: 57
End: 2022-01-10
Payer: MEDICARE

## 2022-01-10 ENCOUNTER — OFFICE VISIT (OUTPATIENT)
Dept: OBGYN CLINIC | Facility: CLINIC | Age: 57
End: 2022-01-10
Payer: MEDICARE

## 2022-01-10 VITALS
BODY MASS INDEX: 30.75 KG/M2 | HEIGHT: 70 IN | HEART RATE: 78 BPM | DIASTOLIC BLOOD PRESSURE: 76 MMHG | WEIGHT: 214.8 LBS | SYSTOLIC BLOOD PRESSURE: 115 MMHG

## 2022-01-10 DIAGNOSIS — M25.511 ACUTE PAIN OF RIGHT SHOULDER: Primary | ICD-10-CM

## 2022-01-10 DIAGNOSIS — M24.811 INTERNAL DERANGEMENT OF RIGHT SHOULDER: ICD-10-CM

## 2022-01-10 DIAGNOSIS — M25.511 RIGHT SHOULDER PAIN, UNSPECIFIED CHRONICITY: ICD-10-CM

## 2022-01-10 PROCEDURE — 73030 X-RAY EXAM OF SHOULDER: CPT

## 2022-01-10 PROCEDURE — 99214 OFFICE O/P EST MOD 30 MIN: CPT | Performed by: ORTHOPAEDIC SURGERY

## 2022-01-10 NOTE — PROGRESS NOTES
Patient Name:  Dave Dorsey  MRN:  [id number]    Assessment & Plan     1  Acute pain of right shoulder  -     XR shoulder 2+ vw right; Future; Expected date: 01/10/2022  -     MRI shoulder right wo contrast; Future; Expected date: 01/10/2022    2  Internal derangement of right shoulder  -     MRI shoulder right wo contrast; Future; Expected date: 01/10/2022      21-year-old male with acute pain of right shoulder, suspect rotator cuff tear due to traumatic onset and night pain  He has had very minimal relief over time and with activity modifications and OTC pain relievers  His x-rays today do not show any acute fractures or dislocations or significant degenerative changes however he does have a possible enchondroma of the proximal humerus  At this point would like to obtain an MRI of the right shoulder for further evaluation of possible rotator cuff tear  In the meantime he should avoid any activities that aggravate his pain  He can take OTC Tylenol/NSAIDs if needed for pain  Will plan to see him back after the MRI to discuss results and treatment plan moving forward  History of the Present Illness   Dave Dorsey is a 61 y o  male with right shoulder pain  About 2 months ago patient took a fall onto his right shoulder after he tripped and fell while gardening  He has had increasing pain in the shoulder since then  His pain is worst at night and he also has significant pain with ROM  Denies numbness and tingling  He has had minimal improvement with activity modifications and OTC pain relievers  In regards to his left shoulder, he is doing very well, no complaints of pain or issues with ROM  Review of Systems     Review of Systems   Constitutional: Negative for chills and fever  HENT: Negative for sore throat  Respiratory: Negative for cough and shortness of breath  Musculoskeletal: Negative for gait problem  Skin: Negative for wound  Neurological: Negative for numbness         Physical Exam /76   Pulse 78   Ht 5' 10" (1 778 m)   Wt 97 4 kg (214 lb 12 8 oz)   BMI 30 82 kg/m²     Right shoulder  Skin intact  No ecchymosis  TTP greater tuberosity and biceps tendon  170 forward elevation  160 degrees abduction  50 degrees ER  IR midthoracic region, symmetric  Positive López  Positive empty can  Negative O'derek  Positive Speed's  Negatvie belly press  4+/5 ER  Neurovascularly intact distally      Data Review     I have personally reviewed pertinent films in PACS, and my interpretation follows  X-rays of the right shoulder performed today 1/10/2021 demonstrate no acute fractures or dislocations  No significant degenerative changes  Possible enchondroma of the proximal humerus  Social History     Tobacco Use    Smoking status: Former Smoker     Packs/day: 0 50     Quit date: 2007     Years since quittin 9    Smokeless tobacco: Former User     Quit date: 1998    Tobacco comment: quit 10 yrs ago   Vaping Use    Vaping Use: Never used   Substance Use Topics    Alcohol use: Not Currently     Comment: quit 5 years    Drug use: Yes     Frequency: 3 0 times per week     Types: Marijuana       Procedures   No procedures performed today      Scribe Attestation    I,:  Derik Millan am acting as a scribe while in the presence of the attending physician :       I,:  Nancy Gonsalez, DO personally performed the services described in this documentation    as scribed in my presence :

## 2022-01-11 ENCOUNTER — HOSPITAL ENCOUNTER (OUTPATIENT)
Dept: MRI IMAGING | Facility: HOSPITAL | Age: 57
Discharge: HOME | End: 2022-01-11
Attending: ORTHOPAEDIC SURGERY
Payer: MEDICARE

## 2022-01-11 DIAGNOSIS — M25.511 ACUTE PAIN OF RIGHT SHOULDER: ICD-10-CM

## 2022-01-11 DIAGNOSIS — M24.811 INTERNAL DERANGEMENT OF RIGHT SHOULDER: ICD-10-CM

## 2022-01-11 PROCEDURE — 73221 MRI JOINT UPR EXTREM W/O DYE: CPT

## 2022-01-13 ENCOUNTER — OFFICE VISIT (OUTPATIENT)
Dept: OBGYN CLINIC | Facility: CLINIC | Age: 57
End: 2022-01-13
Payer: MEDICARE

## 2022-01-13 VITALS
SYSTOLIC BLOOD PRESSURE: 114 MMHG | HEART RATE: 87 BPM | WEIGHT: 214 LBS | BODY MASS INDEX: 30.64 KG/M2 | HEIGHT: 70 IN | DIASTOLIC BLOOD PRESSURE: 73 MMHG

## 2022-01-13 DIAGNOSIS — S46.011D TRAUMATIC INCOMPLETE TEAR OF RIGHT ROTATOR CUFF, SUBSEQUENT ENCOUNTER: Primary | ICD-10-CM

## 2022-01-13 DIAGNOSIS — M75.21 BICEPS TENDINITIS OF RIGHT SHOULDER: ICD-10-CM

## 2022-01-13 PROCEDURE — 20610 DRAIN/INJ JOINT/BURSA W/O US: CPT | Performed by: ORTHOPAEDIC SURGERY

## 2022-01-13 PROCEDURE — 99214 OFFICE O/P EST MOD 30 MIN: CPT | Performed by: ORTHOPAEDIC SURGERY

## 2022-01-13 RX ORDER — LIDOCAINE HYDROCHLORIDE 10 MG/ML
2 INJECTION, SOLUTION INFILTRATION; PERINEURAL
Status: COMPLETED | OUTPATIENT
Start: 2022-01-13 | End: 2022-01-13

## 2022-01-13 RX ORDER — METHYLPREDNISOLONE ACETATE 80 MG/ML
1 INJECTION, SUSPENSION INTRA-ARTICULAR; INTRALESIONAL; INTRAMUSCULAR; SOFT TISSUE
Status: COMPLETED | OUTPATIENT
Start: 2022-01-13 | End: 2022-01-13

## 2022-01-13 RX ORDER — BUPIVACAINE HYDROCHLORIDE 2.5 MG/ML
2 INJECTION, SOLUTION INFILTRATION; PERINEURAL
Status: COMPLETED | OUTPATIENT
Start: 2022-01-13 | End: 2022-01-13

## 2022-01-13 RX ADMIN — BUPIVACAINE HYDROCHLORIDE 2 ML: 2.5 INJECTION, SOLUTION INFILTRATION; PERINEURAL at 13:35

## 2022-01-13 RX ADMIN — METHYLPREDNISOLONE ACETATE 1 ML: 80 INJECTION, SUSPENSION INTRA-ARTICULAR; INTRALESIONAL; INTRAMUSCULAR; SOFT TISSUE at 13:35

## 2022-01-13 RX ADMIN — LIDOCAINE HYDROCHLORIDE 2 ML: 10 INJECTION, SOLUTION INFILTRATION; PERINEURAL at 13:35

## 2022-01-13 NOTE — PROGRESS NOTES
Patient Name:  Francie Guadalupe  MRN:  [id number]    Assessment & Plan     1  Traumatic incomplete tear of right rotator cuff, subsequent encounter  -     Large joint arthrocentesis: R subacromial bursa  -     Ambulatory Referral to Physical Therapy; Future    2  Biceps tendinitis of right shoulder  -     Large joint arthrocentesis: R subacromial bursa  -     Ambulatory Referral to Physical Therapy; Future        Pleasant 61year old male with right rotator cuff tear and bicep tendinitis  MRI of the right shoulder was reviewed and discussed with the patient  Recommended conservative treatment with formal PT for ROM and strengthening as well as a corticosteroid injection  Risks and benefits of corticosteroid injection were discussed in detail  Risks including:  Post injection pain, elevation in blood sugar, skin discoloration, fatty atrophy, and infection were discussed in detail  The patient understood and elected to proceed forward  After sterile preparation the right shoulder was injected with 2 cc of 1% lidocaine, 2 cc of 0 25% bupivacaine, and 2 cc of Depo-Medrol  The patient tolerated the procedure and no immediate complications were noted  The patient was instructed to ice and elevate the injection site, limit strenuous activity for the next 24-48 hours, and contact us if there were any questions or concerns prior to their follow-up appointment  They were also instructed to contact their primary care physician to discuss elevated blood sugar  I will see the patient back in 8 weeks for repeat evaluation and PT  If he continues to have pain then we will discuss surgical intervention  History of the Present Illness   Francie Guadalupe is a 61 y o  male presents for a follow up evaluation of right shoulder pain and internal derangement  Patient was last seen on 1/10/22, at which time and MRI was ordered to rule out possible rotator cuff tear   He was advised to continue use of OTC NSAID's and Tylenol as needed for pain  He was advised to avoid any activities that aggravate the right shoulder  Today, the patient notes continued pain of the right shoulder  He notes pain is progressively getting worse  Pain is making sleeping difficult  Review of Systems     Review of Systems   Constitutional: Negative for appetite change and unexpected weight change  HENT: Negative for congestion and trouble swallowing  Eyes: Negative for visual disturbance  Respiratory: Negative for cough and shortness of breath  Cardiovascular: Negative for chest pain and palpitations  Gastrointestinal: Negative for nausea and vomiting  Endocrine: Negative for cold intolerance and heat intolerance  Musculoskeletal: Positive for arthralgias  Negative for gait problem and myalgias  Skin: Negative for rash  Neurological: Negative for numbness  Physical Exam     /73   Pulse 87   Ht 5' 10" (1 778 m)   Wt 97 1 kg (214 lb)   BMI 30 71 kg/m²     Right Shoulder: Active range of motion   170 degrees forward flexion  160 degrees abduction with pain  60 degrees external rotation   Lower throacic internal rotation      Passive range of motion   There is mild tenderness present over the greater tuberosity, biceps  There is 5/5 strength with external rotation testing at the side  Empty can testing is positive   Belly press test is negative  López test is negative   Hardin's test is negative    Speed's test is Positive  The patient is neurovascularly intact distally in the extremity  Data Review     I have personally reviewed pertinent films in PACS, and my interpretation follows  Mri of the right shoulder was obtained, which demonstrates partial high grade rotator cuff tear involving posterior supraspinatus and anterior infraspinatus, SLAP tear, Long head biceps tendinosis      Social History     Tobacco Use    Smoking status: Former Smoker     Packs/day: 0 50     Quit date: 1/16/2007     Years since quitting: 15 0    Smokeless tobacco: Former User     Quit date: 1/16/1998    Tobacco comment: quit 10 yrs ago   Vaping Use    Vaping Use: Never used   Substance Use Topics    Alcohol use: Not Currently     Comment: quit 5 years    Drug use: Yes     Frequency: 3 0 times per week     Types: Marijuana           Large joint arthrocentesis: R subacromial bursa  Universal Protocol:  Consent: Verbal consent obtained  Written consent not obtained  Risks and benefits: risks, benefits and alternatives were discussed  Consent given by: patient  Time out: Immediately prior to procedure a "time out" was called to verify the correct patient, procedure, equipment, support staff and site/side marked as required    Patient understanding: patient states understanding of the procedure being performed  Site marked: the operative site was marked  Patient identity confirmed: verbally with patient    Supporting Documentation  Indications: pain   Procedure Details  Location: shoulder - R subacromial bursa  Preparation: Patient was prepped and draped in the usual sterile fashion  Needle size: 22 G  Ultrasound guidance: no  Medications administered: 2 mL bupivacaine 0 25 %; 2 mL lidocaine 1 %; 1 mL methylPREDNISolone acetate 80 mg/mL    Patient tolerance: patient tolerated the procedure well with no immediate complications  Dressing:  Sterile dressing applied          Scribe Attestation    I,:  Chu Broussard MA am acting as a scribe while in the presence of the attending physician :       I,:  Mani Blandon DO personally performed the services described in this documentation    as scribed in my presence :

## 2022-01-18 ENCOUNTER — TELEPHONE (OUTPATIENT)
Dept: GASTROENTEROLOGY | Facility: CLINIC | Age: 57
End: 2022-01-18

## 2022-01-18 ENCOUNTER — TELEMEDICINE (OUTPATIENT)
Dept: INTERNAL MEDICINE CLINIC | Facility: CLINIC | Age: 57
End: 2022-01-18
Payer: MEDICARE

## 2022-01-18 VITALS — BODY MASS INDEX: 30.64 KG/M2 | HEIGHT: 70 IN | WEIGHT: 214 LBS

## 2022-01-18 DIAGNOSIS — M75.101 TEAR OF RIGHT ROTATOR CUFF, UNSPECIFIED TEAR EXTENT, UNSPECIFIED WHETHER TRAUMATIC: ICD-10-CM

## 2022-01-18 DIAGNOSIS — R10.9 ABDOMINAL PAIN, UNSPECIFIED ABDOMINAL LOCATION: ICD-10-CM

## 2022-01-18 DIAGNOSIS — R93.5 ABNORMAL CT OF THE ABDOMEN: Primary | ICD-10-CM

## 2022-01-18 DIAGNOSIS — K51.00 ULCERATIVE PANCOLITIS WITHOUT COMPLICATION (HCC): ICD-10-CM

## 2022-01-18 PROCEDURE — 99214 OFFICE O/P EST MOD 30 MIN: CPT | Performed by: INTERNAL MEDICINE

## 2022-01-18 NOTE — TELEPHONE ENCOUNTER
Spoke with patient  Patient continues to have concern about the duodenal mass that was seen on a CT scan, and not on the EGD we did ot the MRI the [address] [facility] had done  He wanted to continue to follow-up for this continued right side pain which has worsened in the last week below his ribs  Patients last discussion was with ben about a possible small bowel enterography and EUS  Any suggestions?

## 2022-01-18 NOTE — TELEPHONE ENCOUNTER
He did have the MRI? If so can we obtain the results? I will discuss any further workup with Dr Aisha Anne

## 2022-01-18 NOTE — TELEPHONE ENCOUNTER
Carissa Akhtar patient - Patient called and ask that We call to request copy of MRI and notes for abdomen  Call [place] Records Release ([phone]) (Last 4 of patient's  SS# [id number]) per patient and then they will fax all to us    Thx

## 2022-01-18 NOTE — TELEPHONE ENCOUNTER
Dr Jacinta Ram =  Patient called would like to speak with Dr Jacinta Ram or one of the Physician's Assistance about a pain patient is having that is in a weird area  OV was scheduled for 02/08/22 with Deepti Conrad  Patient did not want to wait until than    Please call Juan M Riggs at 21 [phone] ty

## 2022-01-18 NOTE — PROGRESS NOTES
Virtual Regular Visit    Verification of patient location:    Patient is located in the following state in which I hold an active license [place]      Assessment/Plan:    Problem List Items Addressed This Visit        Other    Abdominal pain    Abnormal CT of the abdomen - Primary          BMI Counseling: Body mass index is 30 71 kg/m²  The BMI is above normal  Nutrition recommendations include encouraging healthy choices of fruits and vegetables  Exercise recommendations include moderate physical activity 150 minutes/week  Rationale for BMI follow-up plan is due to patient being overweight or obese  Reason for visit is   Chief Complaint   Patient presents with    Abdominal Pain    Virtual Regular Visit        Encounter provider Tavon Evans MD    Provider located at [address] [place] [address]  [facility] [address]  [place] [address] [phone]      Recent Visits  No visits were found meeting these conditions  Showing recent visits within past 7 days and meeting all other requirements  Today's Visits  Date Type Provider Dept   01/18/22 Neha Arechiga MD [facility] [address] today's visits and meeting all other requirements  Future Appointments  No visits were found meeting these conditions  Showing future appointments within next 150 days and meeting all other requirements       The patient was identified by name and date of birth  Marcy Hussein was informed that this is a telemedicine visit and that the visit is being conducted through [facility] and patient was informed this is a secure, HIPAA-complaint platform  He agrees to proceed     My office door was closed  No one else was in the room  He acknowledged consent and understanding of privacy and security of the video platform  The patient has agreed to participate and understands they can discontinue the visit at any time      Patient is aware this is a billable service  Subjective  Jenn Weeks is a 61 y o  male    Pt is reporting RUQ pain; described as strain or gas bubble; non-radiating, 1-2/10 pain, + nausea, decreased appetite  No weight loss or bloody stools  We discussed this at his previous visit in November  CT revealed a 2 3 cm mass; EGD was done revealing distal esophageal inflammatory changes of formal duodenum but no duodenal mass  Patient then had a MRI at the [facility] [address] [facility] which showed no duodenal mass  He was to call back GI to arrange a possible small bowel enteroscopy vs EUS as discussed with ben from GI  He called GI and will need the MRI imaging to be sent to Dr Marilin Bermeo first  He is worried  He was reassured to the best of my abilities  He has an upcoming appointment with GI this February  Also with pain 2/2 rotator cuff tear to right shoulder  Following with ortho  Past Medical History:   Diagnosis Date    Diaz's esophagus     Colon polyp     Coronary artery disease     Depression     Diverticulitis     GERD (gastroesophageal reflux disease)     Hemorrhoid     History of heart artery stent     Hyperlipidemia     Hypertension     Microscopic colitis     Ulcerative colitis ([address] )        Past Surgical History:   Procedure Laterality Date    ABDOMINAL SURGERY      radio frequency ablation    BONE GRAFT Left 2018    CARPAL TUNNEL RELEASE Right     COLONOSCOPY      CORONARY ANGIOPLASTY WITH STENT PLACEMENT      EGD AND COLONOSCOPY      ESOPHAGOGASTRODUODENOSCOPY N/A 2/15/2018    Procedure: ESOPHAGOGASTRODUODENOSCOPY (EGD); Surgeon: Eleuterio Zendejas MD;  Location: [facility];  Service: Gastroenterology    ESOPHAGOGASTRODUODENOSCOPY N/A 12/10/2018    Procedure: ESOPHAGOGASTRODUODENOSCOPY (EGD); Surgeon: Nicho Raymond MD;  Location: [facility];   Service: Gastroenterology    HAND SURGERY Left     HI SHLDR ARTHROSCOP,SURG,W/ROTAT CUFF REPR Left 4/14/2021    Procedure: REPAIR ROTATOR CUFF  ARTHROSCOPIC; POSSIBLE BICEPS TENDONESIS, ACROMIOPLASTY;  Surgeon: Sharon Gonzales DO;  Location: [facility];  Service: Orthopedics    TONSILLECTOMY         Current Outpatient Medications   Medication Sig Dispense Refill    aspirin (ECOTRIN LOW STRENGTH) 81 mg EC tablet Take 1 tablet (81 mg total) by mouth daily  0    atorvastatin (LIPITOR) 80 mg tablet Take 80 mg by mouth daily      clonazePAM (KLONOPIN) 0 5 mg tablet Take 0 5 mg by mouth daily at bedtime       omeprazole (PriLOSEC) 20 mg delayed release capsule Take 1 capsule (20 mg total) by mouth daily 60 capsule 3    ondansetron (ZOFRAN-ODT) 4 mg disintegrating tablet Take 1 tablet (4 mg total) by mouth every 6 (six) hours as needed for nausea or vomiting 20 tablet 0    sertraline (ZOLOFT) 100 mg tablet Take 100 mg by mouth daily       morphine (MSIR) 15 mg tablet Take 1 tablet (15 mg total) by mouth every 4 (four) hours as needed for severe pain for up to 12 dosesMax Daily Amount: 90 mg (Patient not taking: Reported on 11/11/2021 ) 12 tablet 0     No current facility-administered medications for this visit  Allergies   Allergen Reactions    Rosuvastatin Myalgia    Shellfish Allergy - Food Allergy Rash and Lip Swelling       Review of Systems   Gastrointestinal: Positive for abdominal pain  All other systems reviewed and are negative  Video Exam    Vitals:    01/18/22 1101   Weight: 97 1 kg (214 lb)   Height: 5' 10" (1 778 m)       Physical Exam  Vitals reviewed  Pulmonary:      Effort: Pulmonary effort is normal    Neurological:      Mental Status: He is alert  Psychiatric:         Mood and Affect: Mood normal           I spent 15 minutes directly with the patient during this visit    VIRTUAL VISIT DISCLAIMER      Glenny Candelario verbally agrees to participate in [employer]   Pt is aware that [employer] could be limited without vital signs or the ability to perform a full hands-on physical Loanne Bulljuliann understands he or the provider may request at any time to terminate the video visit and request the patient to seek care or treatment in person

## 2022-01-24 ENCOUNTER — EVALUATION (OUTPATIENT)
Dept: PHYSICAL THERAPY | Facility: CLINIC | Age: 57
End: 2022-01-24
Payer: MEDICARE

## 2022-01-24 DIAGNOSIS — S46.011D TRAUMATIC INCOMPLETE TEAR OF RIGHT ROTATOR CUFF, SUBSEQUENT ENCOUNTER: Primary | ICD-10-CM

## 2022-01-24 DIAGNOSIS — M75.21 BICEPS TENDINITIS OF RIGHT SHOULDER: ICD-10-CM

## 2022-01-24 PROCEDURE — 97162 PT EVAL MOD COMPLEX 30 MIN: CPT | Performed by: PHYSICAL MEDICINE & REHABILITATION

## 2022-01-24 NOTE — PROGRESS NOTES
PT Evaluation     Today's date: 2022  Patient name: Shawn Tyler  : 1965  MRN: [id number]  Referring provider: Tracie Johnson DO  Dx:   Encounter Diagnosis     ICD-10-CM    1  Traumatic incomplete tear of right rotator cuff, subsequent encounter  S46 011D Ambulatory Referral to Physical Therapy   2  Biceps tendinitis of right shoulder  M75 21 Ambulatory Referral to Physical Therapy                  Assessment  Assessment details: Pt is a pleasant 61 y o  male presenting to outpatient physical therapy with sgs/sxs consistent with referring diagnoses including pain, decreased range of motion, decreased strength, and decreased tolerance to activity  Pt is a good candidate for outpatient physical therapy and would benefit from skilled physical therapy to address limitations and to achieve goals  Thank you for this referral    Impairments: abnormal coordination, abnormal or restricted ROM, activity intolerance, impaired physical strength and pain with function  Understanding of Dx/Px/POC: good   Prognosis: good    Goals  ST  Patient will report 25% decrease in pain in 4 weeks  2  Patient will demonstrate 25% improvement in ROM in 4 weeks  3  Patient will demonstrate 1/2 grade improvement in strength in 4 weeks  LT  Patient will be able to perform IADLS without restriction or pain by discharge  2  Patient will be independent in HEP by discharge  3  Patient will be able to return to recreational/work duties without restriction or pain by discharge        Plan  Patient would benefit from: PT eval and skilled PT  Planned modality interventions: cryotherapy and thermotherapy: hydrocollator packs  Planned therapy interventions: IADL retraining, body mechanics training, flexibility, functional ROM exercises, home exercise program, neuromuscular re-education, manual therapy, postural training, strengthening, stretching, therapeutic activities, therapeutic exercise and joint mobilization  Frequency: 2x week  Duration in visits: 12  Duration in weeks: 6  Treatment plan discussed with: patient        Subjective Evaluation    History of Present Illness  Mechanism of injury: Patient presents with c/o R shoulder pain present since November following a fall  Increased pain since onset, wakes 2-3x/night secondary to pain  Intermittent N/T into the R hand "almost like carpal tunnel " Ulcerative colitis limits medication use  Increased pain with push/pull, reaching away from body  Decreased pain with arm supported, CP/MHP, topical analgesics, medical marijuana  Patient notes recent improvement in sxs following CSI injection at last MD visit  Patient is hopeful participation in PT will help to avoid surgery    Pain  Current pain ratin  Pain scale at highest: A lot of pain     Hand dominance: right          Objective     Active Range of Motion     Additional Active Range of Motion Details  Minimal AROM deficit (less than 25%), however pain at end range in all planes    Passive Range of Motion     Additional Passive Range of Motion Details  As with AROM, pain at end range in all planes    General Comments:      Shoulder Comments   (+) Neer, crossbody adduction, López-Nic  (-) scap assist, no change in pain with manual assist  (+) TTP anterior GH space, UT, delt, posterior capsule, pec, axillary space  (+) Hypomoblity with GH mobs, discomfort       - hypomobile with scap glides also                  Precautions: MRI findings, h/o L RTC repair      Manuals             R shoulder PROM             GH and scap mobs             Rhythmic stabilization             D2 flex/ext             Neuro Re-Ed             RTC phyllis                                                                                           Ther Ex             Circles w/ ball on wall             Body blade             Scap punches                                                                              Ther Activity Gait Training                                       Modalities

## 2022-01-27 ENCOUNTER — OFFICE VISIT (OUTPATIENT)
Dept: PHYSICAL THERAPY | Facility: CLINIC | Age: 57
End: 2022-01-27
Payer: MEDICARE

## 2022-01-27 DIAGNOSIS — M75.21 BICEPS TENDINITIS OF RIGHT SHOULDER: ICD-10-CM

## 2022-01-27 DIAGNOSIS — S46.011D TRAUMATIC INCOMPLETE TEAR OF RIGHT ROTATOR CUFF, SUBSEQUENT ENCOUNTER: Primary | ICD-10-CM

## 2022-01-27 PROCEDURE — 97112 NEUROMUSCULAR REEDUCATION: CPT | Performed by: PHYSICAL MEDICINE & REHABILITATION

## 2022-01-27 PROCEDURE — 97140 MANUAL THERAPY 1/> REGIONS: CPT | Performed by: PHYSICAL MEDICINE & REHABILITATION

## 2022-01-27 PROCEDURE — 97110 THERAPEUTIC EXERCISES: CPT | Performed by: PHYSICAL MEDICINE & REHABILITATION

## 2022-01-27 NOTE — PROGRESS NOTES
Daily Note     Today's date: 2022  Patient name: Marisela Busch  : 1965  MRN: [id number]  Referring provider: Ani Burns DO  Dx:   Encounter Diagnosis     ICD-10-CM    1  Traumatic incomplete tear of right rotator cuff, subsequent encounter  S46 011D    2  Biceps tendinitis of right shoulder  M75 21                 Subjective: Patient notes he was able to chop wood yesterday with some soreness, believes recent injection is still making a big difference  Wants to proceed with surgery if symptoms continue  Objective: See treatment diary below    Assessment: Tolerated treatment well  VCs for form maintenance with good carryover  Patient demonstrated fatigue post treatment, exhibited good technique with therapeutic exercises and would benefit from continued PT  Plan: Continue per plan of care        Precautions: MRI findings, h/o L RTC repair    Manuals             R shoulder PROM LH            GH and scap mobs GH, LH            Rhythmic stabilization LH            D2 flex/ext             Neuro Re-Ed             RTC phyllis                                                                                           Ther Ex             Circles w/ ball on wall 20x ea, also alpha x1            Body blade 3x30"            Scap punches             UBE 5' retro            Push plus at wall 10x5"                                                   Ther Activity                                       Gait Training                                       Modalities

## 2022-01-31 ENCOUNTER — APPOINTMENT (OUTPATIENT)
Dept: PHYSICAL THERAPY | Facility: CLINIC | Age: 57
End: 2022-01-31
Payer: MEDICARE

## 2022-01-31 NOTE — PROGRESS NOTES
Daily Note     Today's date: 2022  Patient name: Marisela Busch  : 1965  MRN: [id number]  Referring provider: Ani Burns DO  Dx: No diagnosis found  Subjective: Pt  reports      Objective: See treatment diary below      Assessment: Tolerated treatment well  Patient would benefit from continued PT      Plan: Continue per plan of care        Precautions: MRI findings, h/o L RTC repair    Manuals            R shoulder PROM LH            GH and scap mobs GH, LH            Rhythmic stabilization LH            D2 flex/ext             Neuro Re-Ed            RTC phyllis                                                                                           Ther Ex            Circles w/ ball on wall 20x ea, also alpha x1            Body blade 3x30"            Scap punches             UBE 5' retro            Push plus at wall 10x5"                                                   Ther Activity                                       Gait Training                                       Modalities

## 2022-02-08 ENCOUNTER — OFFICE VISIT (OUTPATIENT)
Dept: GASTROENTEROLOGY | Facility: CLINIC | Age: 57
End: 2022-02-08
Payer: MEDICARE

## 2022-02-08 VITALS
BODY MASS INDEX: 29.49 KG/M2 | SYSTOLIC BLOOD PRESSURE: 124 MMHG | WEIGHT: 206 LBS | HEIGHT: 70 IN | DIASTOLIC BLOOD PRESSURE: 68 MMHG

## 2022-02-08 DIAGNOSIS — R10.84 GENERALIZED ABDOMINAL PAIN: ICD-10-CM

## 2022-02-08 DIAGNOSIS — R19.7 DIARRHEA, UNSPECIFIED TYPE: Primary | ICD-10-CM

## 2022-02-08 PROCEDURE — 99213 OFFICE O/P EST LOW 20 MIN: CPT | Performed by: PHYSICIAN ASSISTANT

## 2022-02-08 NOTE — PROGRESS NOTES
[facility] - Outpatient Follow-up Note  Mila Aleman 61 y o  male MRN: [id number]  Encounter: [id number]          ASSESSMENT AND PLAN:      1  Diarrhea, unspecified type  2  Generalized abdominal pain  Chronic  He has been diagnosed with microscopic colitis and reports he was on Budesonide for years - it helped but he was told he could not take it chronically  He is not taking anything right now for diarrhea - imodium was not helpful  He has been on fiber and probiotics  He has trialed dicyclomine without relief    He also has GERD/Barretts Esophagus/CVS  He is on Omeprazole 20mg daily and zofran prn    In September he had 2 CT scans suggested a mass in the second portion of the duodenum  Push enteroscopy in September was normal  MRI of the abdomen showed no mass in the duodenum but suggested filling defects of the ileum    Will proceed with VCE    Consider Xifaxan trial  Consider trial of Viberzi  Consider trial of Colestid    ______________________________________________________________________    SUBJECTIVE:  54-year-old male with a long history of diarrhea and abdominal pain as well as a history of microscopic colitis, GERD, Diaz's esophagus and cyclical vomiting syndrome presents for follow-up  He continues to report right-sided abdominal pain which is relatively consistent but worse on some days as well as daily diarrhea  He reports that some days he has multiple loose watery stools and other days he has 1 or 2  His stools are never formed  He denies any association between the pain and his diarrhea  He does not have any rectal bleeding  He was diagnosed with microscopic colitis in the distant past   He reports that he was maintained on budesonide for many years  Although it did help the symptom he was told that he could not continue it long-term  As soon as he stops his symptoms returned  He has taken Imodium in the past with minimal relief    He is taking a probiotic at present  He reports he has trialed fiber in the past but feels that always worsened his symptoms  In September he had 2 CT scans which showed a possible mass in the duodenum  He had a push enteroscopy in September which showed no evidence of mass or lesion  He had an MRI of the abdomen at the [facility] recently which did not show any evidence of mass in the duodenum but did suggest filling defects of the ileum  The thought by the radiologist was that this was ingested particles  He reports that his acid reflux is under relatively good control with omeprazole  He has no dysphagia  He does have a history of dysplasia in his Diaz's tissue status post RFA  His Diaz's was biopsied in September showing normal mucosa  Ruby Barrera REVIEW OF SYSTEMS IS OTHERWISE NEGATIVE  Historical Information   Past Medical History:   Diagnosis Date    Diaz's esophagus     Colon polyp     Coronary artery disease     Depression     Diverticulitis     GERD (gastroesophageal reflux disease)     Hemorrhoid     History of heart artery stent     Hyperlipidemia     Hypertension     Microscopic colitis     Ulcerative colitis ([facility] [address] )      Past Surgical History:   Procedure Laterality Date    ABDOMINAL SURGERY      radio frequency ablation    BONE GRAFT Left 2018    CARPAL TUNNEL RELEASE Right     COLONOSCOPY      CORONARY ANGIOPLASTY WITH STENT PLACEMENT      EGD AND COLONOSCOPY      ESOPHAGOGASTRODUODENOSCOPY N/A 2/15/2018    Procedure: ESOPHAGOGASTRODUODENOSCOPY (EGD); Surgeon: Sayra Coats MD;  Location: [facility];  Service: Gastroenterology    ESOPHAGOGASTRODUODENOSCOPY N/A 12/10/2018    Procedure: ESOPHAGOGASTRODUODENOSCOPY (EGD); Surgeon: Elan Villalta MD;  Location: [facility];   Service: Gastroenterology    HAND SURGERY Left     MS SHLDR ARTHROSCOP,SURG,W/ROTAT CUFF REPR Left 4/14/2021    Procedure: REPAIR ROTATOR CUFF  ARTHROSCOPIC; POSSIBLE BICEPS TENDONESIS, ACROMIOPLASTY;  Surgeon: Jocelin Nolan DO;  Location: [facility];  Service: Orthopedics    TONSILLECTOMY       Social History   Social History     Substance and Sexual Activity   Alcohol Use Not Currently    Comment: quit 5 years     Social History     Substance and Sexual Activity   Drug Use Yes    Frequency: 3 0 times per week    Types: Marijuana     Social History     Tobacco Use   Smoking Status Former Smoker    Packs/day: 0 50    Quit date: 1/16/2007    Years since quitting: 15 0   Smokeless Tobacco Former User    Quit date: 1/16/1998   Tobacco Comment    quit 10 yrs ago     Family History   Problem Relation Age of Onset    Heart disease Father     Melanoma Father     Cancer Sister     Skin cancer Sister     Skin cancer Mother        Meds/Allergies       Current Outpatient Medications:     aspirin (ECOTRIN LOW STRENGTH) 81 mg EC tablet    atorvastatin (LIPITOR) 80 mg tablet    clonazePAM (KLONOPIN) 0 5 mg tablet    omeprazole (PriLOSEC) 20 mg delayed release capsule    ondansetron (ZOFRAN-ODT) 4 mg disintegrating tablet    sertraline (ZOLOFT) 100 mg tablet    Diclofenac Sodium (VOLTAREN) 1 %    Allergies   Allergen Reactions    Rosuvastatin Myalgia    Shellfish Allergy - Food Allergy Rash and Lip Swelling           Objective     Blood pressure 124/68, height 5' 10" (1 778 m), weight 93 4 kg (206 lb)  Body mass index is 29 56 kg/m²  PHYSICAL EXAM:      General Appearance:   Alert, cooperative, no distress   HEENT:   Normocephalic, atraumatic, anicteric      Neck:  Supple, symmetrical, trachea midline   Lungs:   Clear to auscultation bilaterally; no rales, rhonchi or wheezing; respirations unlabored    Heart[de-identified]   Regular rate and rhythm; no murmur, rub, or gallop     Abdomen:   Soft, non-tender, non-distended; normal bowel sounds; no masses, no organomegaly    Genitalia:   Deferred    Rectal:   Deferred    Extremities:  No cyanosis, clubbing or edema    Pulses:  2+ and symmetric    Skin:  No jaundice, rashes, or lesions    Lymph nodes:  No palpable cervical lymphadenopathy        Lab Results:   No visits with results within 1 Day(s) from this visit     Latest known visit with results is:   Admission on 10/15/2021, Discharged on 10/15/2021   Component Date Value    WBC 10/15/2021 11 95*    RBC 10/15/2021 4 86     Hemoglobin 10/15/2021 14 4     Hematocrit 10/15/2021 43 1     MCV 10/15/2021 89     MCH 10/15/2021 29 6     MCHC 10/15/2021 33 4     RDW 10/15/2021 13 4     MPV 10/15/2021 9 9     Platelets 20/05/3659 307     nRBC 10/15/2021 0     Neutrophils Relative 10/15/2021 90*    Immat GRANS % 10/15/2021 0     Lymphocytes Relative 10/15/2021 7*    Monocytes Relative 10/15/2021 3*    Eosinophils Relative 10/15/2021 0     Basophils Relative 10/15/2021 0     Neutrophils Absolute 10/15/2021 10 68*    Immature Grans Absolute 10/15/2021 0 04     Lymphocytes Absolute 10/15/2021 0 85     Monocytes Absolute 10/15/2021 0 35     Eosinophils Absolute 10/15/2021 0 01     Basophils Absolute 10/15/2021 0 02     Sodium 10/15/2021 141     Potassium 10/15/2021 4 3     Chloride 10/15/2021 104     CO2 10/15/2021 23     ANION GAP 10/15/2021 14*    BUN 10/15/2021 16     Creatinine 10/15/2021 1 11     Glucose 10/15/2021 147*    Calcium 10/15/2021 9 4     AST 10/15/2021 16     ALT 10/15/2021 25     Alkaline Phosphatase 10/15/2021 76     Total Protein 10/15/2021 7 7     Albumin 10/15/2021 4 3     Total Bilirubin 10/15/2021 0 46     eGFR 10/15/2021 73     Lipase 10/15/2021 147     Troponin I 10/15/2021 <0 02     SARS-CoV-2 10/15/2021 Negative     INFLUENZA A PCR 10/15/2021 Negative     INFLUENZA B PCR 10/15/2021 Negative     RSV PCR 10/15/2021 Negative     Troponin I 10/15/2021 <0 02     Ventricular Rate 10/15/2021 47     Atrial Rate 10/15/2021 47     VA Interval 10/15/2021 178     QRSD Interval 10/15/2021 92     QT Interval 10/15/2021 448     QTC Interval 10/15/2021 396     P Axis 10/15/2021 42  QRS Axis 10/15/2021 23     T Wave Axis 10/15/2021 56          Radiology Results:   XR shoulder 2+ vw right    Result Date: 1/13/2022  Narrative: RIGHT SHOULDER INDICATION:   M25 511: Pain in right shoulder  COMPARISON:  None VIEWS:  XR SHOULDER 2+ VW RIGHT FINDINGS: There is no acute fracture or dislocation  Mild osteoarthritis of the glenohumeral and acromioclavicular joints  No lytic or blastic osseous lesion  Soft tissues are unremarkable  Impression: No acute osseous abnormality  Degenerative changes as described  Workstation performed: [id number]     MRI shoulder right wo contrast    Result Date: 1/12/2022  Narrative: MRI RIGHT SHOULDER INDICATION:   M25 511: Pain in right shoulder M24 811: Other specific joint derangements of right shoulder, not elsewhere classified  COMPARISON:  Right shoulder radiographs 1/10/2022 TECHNIQUE:   The following MR sequences were obtained of the right shoulder: Localizer, axial GRE/PD fat sat, oblique coronal T2 fat sat, oblique sagittal T1/T2 fat sat  Gadolinium was not used  FINDINGS: SUBCUTANEOUS TISSUES: Normal  JOINT EFFUSION: None  ACROMION PROCESS: Normal  ROTATOR CUFF: Supraspinatus and infraspinatus tendinosis with a near full-thickness (image #6/10), partial-width (11 mm anterior-posterior, image #7/25), interstitial insertional tear at the junction of the supraspinatus and infraspinatus tendons  Intact subscapularis and teres minor tendons  No muscle atrophy  SUBACROMIAL/SUBDELTOID BURSA: Normal  LONG HEAD OF BICEPS TENDON: Moderate tendinosis and partial tearing (image #5/19)  GLENOID LABRUM: Intact  GLENOHUMERAL JOINT: Superior tear extending anterior-posterior (for example, image #6/12)  ACROMIOCLAVICULAR JOINT: Mild osteoarthritis   BONES: Normal      Impression: Supraspinatus and infraspinatus tendinosis with a near full-thickness (image #6/10), partial-width (11 mm anterior-posterior, image #7/25), interstitial insertional tear at the junction of the supraspinatus and infraspinatus tendons  No muscle atrophy  Moderate long head biceps tendinosis and partial tearing  Glenoid labral SLAP tear   Workstation performed: [id number]

## 2022-02-10 ENCOUNTER — OFFICE VISIT (OUTPATIENT)
Dept: GASTROENTEROLOGY | Facility: CLINIC | Age: 57
End: 2022-02-10
Payer: MEDICARE

## 2022-02-10 DIAGNOSIS — R10.84 ABDOMINAL PAIN, GENERALIZED: ICD-10-CM

## 2022-02-14 PROCEDURE — 91110 GI TRC IMG INTRAL ESOPH-ILE: CPT | Performed by: INTERNAL MEDICINE

## 2022-02-15 ENCOUNTER — TELEPHONE (OUTPATIENT)
Dept: GASTROENTEROLOGY | Facility: CLINIC | Age: 57
End: 2022-02-15

## 2022-02-15 NOTE — TELEPHONE ENCOUNTER
As per Dr Love [place] the VCE was normal  Attempted to call pt to give result but pt's vm was full and was unable to leave a message  Will try calling back later

## 2022-02-28 ENCOUNTER — OFFICE VISIT (OUTPATIENT)
Dept: OBGYN CLINIC | Facility: CLINIC | Age: 57
End: 2022-02-28
Payer: MEDICARE

## 2022-02-28 VITALS
WEIGHT: 210.4 LBS | HEIGHT: 70 IN | HEART RATE: 84 BPM | DIASTOLIC BLOOD PRESSURE: 88 MMHG | SYSTOLIC BLOOD PRESSURE: 142 MMHG | BODY MASS INDEX: 30.12 KG/M2

## 2022-02-28 DIAGNOSIS — S46.011D TRAUMATIC INCOMPLETE TEAR OF RIGHT ROTATOR CUFF, SUBSEQUENT ENCOUNTER: Primary | ICD-10-CM

## 2022-02-28 DIAGNOSIS — M75.21 BICEPS TENDINITIS OF RIGHT SHOULDER: ICD-10-CM

## 2022-02-28 PROCEDURE — 99214 OFFICE O/P EST MOD 30 MIN: CPT | Performed by: ORTHOPAEDIC SURGERY

## 2022-02-28 RX ORDER — CHLORHEXIDINE GLUCONATE 4 G/100ML
SOLUTION TOPICAL DAILY PRN
Status: CANCELLED | OUTPATIENT
Start: 2022-02-28

## 2022-02-28 RX ORDER — CHLORHEXIDINE GLUCONATE 0.12 MG/ML
15 RINSE ORAL ONCE
Status: CANCELLED | OUTPATIENT
Start: 2022-02-28 | End: 2022-02-28

## 2022-02-28 NOTE — PROGRESS NOTES
Patient Name:  Lui Ramos  MRN:  [id number]    Assessment & Plan     1  Traumatic incomplete tear of right rotator cuff, subsequent encounter    2  Biceps tendinitis of right shoulder      61 y o  male with Right shoulder rotator cuff tear and proximal biceps tendonitis  In depth conversation had with patient regarding nonoperative vs surgical management of Right shoulder pain  Non operative treatment would include continued oral analgesics, activity modification, physical therapy, and consideration for repeat injection therapy  Risks of non operative treatment include persistent pain, decreased function, increased rotator cuff tear size with retraction and atrophy  In light of patients continued pain and night awakenings despite cortisone injection and outpatient PT, activity modification, topical and OTC oral analgesics, patient would like to move forward with Right shoulder arthroscopic rotator cuff repair, open subpectoral biceps tenodesis vs tenotomy, acromioplasty, all associated  Risks of surgery discussed in office with patient including infection, injury to surrounding structure, stiffness, continued pain, failure of repair, need for additional procedure  Detailed consent obtained in office  Patient will go to OR with Dr Anita Boswell on 03/16/2022  Patient obtained post op shoulder abduction sling in office today and instructed to bring date of surgery  History of the Present Illness   Lui Ramos is a 61 y o  male with Right shoulder rotator cuff tear and biceps tendonitis  At last appointment 01/13/2022, patient received corticosteroid injection of Right subacromial bursa and advised to continue with PT  Today, patient reports continued Right shoulder pain with multiple nighttime awakenings  He has tried outpatient PT and corticosteroid injection therapy without moderate relief of pain  He did admit to about 1-2 weeks of pain relief s/p corticosteroid injection but pain is now worsening   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  Review of Systems     Review of Systems   Constitutional: Negative for chills and fever  HENT: Negative for ear pain and sore throat  Eyes: Negative for pain and visual disturbance  Respiratory: Negative for cough and shortness of breath  Cardiovascular: Negative for chest pain and palpitations  Gastrointestinal: Negative for abdominal pain and vomiting  Genitourinary: Negative for dysuria and hematuria  Musculoskeletal: Negative for arthralgias and back pain  Skin: Negative for color change and rash  Neurological: Negative for seizures and syncope  All other systems reviewed and are negative  Physical Exam     Ht 5' 10" (1 778 m)   Wt 95 4 kg (210 lb 6 4 oz)   BMI 30 19 kg/m²     RIght Shoulder: Active range of motion   170 degrees forward flexion with pain  170 degrees abduction with pain  70 degrees external rotation   There is tenderness present over the anterior shoulder at proximal biceps tendon, pec minor, minimal tenderness greater tuberosity  There is 4+/5 strength with external rotation testing at the side  Empty can testing is positive with associated pain  Belly press test is negative  López test is negative   [place]'s test is negative    Speed's test is Positive  The patient is neurovascularly intact distally in the extremity  Data Review     I have personally reviewed pertinent films in PACS, and my interpretation follows      No new images     Social History     Tobacco Use    Smoking status: Former Smoker     Packs/day: 0 50     Quit date: 1/16/2007     Years since quitting: 15 1    Smokeless tobacco: Former User     Quit date: 1/16/1998    Tobacco comment: quit 10 yrs ago   Vaping Use    Vaping Use: Never used   Substance Use Topics    Alcohol use: Not Currently     Comment: quit 5 years    Drug use: Yes     Frequency: 3 0 times per week     Types: Marijuana           Procedures  None     Sherle Soja LUZMARIA Abad

## 2022-02-28 NOTE — H&P (VIEW-ONLY)
Patient Name:  Yoav Strange  MRN:  [id number]    Assessment & Plan     1  Traumatic incomplete tear of right rotator cuff, subsequent encounter    2  Biceps tendinitis of right shoulder      61 y o  male with Right shoulder rotator cuff tear and proximal biceps tendonitis  In depth conversation had with patient regarding nonoperative vs surgical management of Right shoulder pain  Non operative treatment would include continued oral analgesics, activity modification, physical therapy, and consideration for repeat injection therapy  Risks of non operative treatment include persistent pain, decreased function, increased rotator cuff tear size with retraction and atrophy  In light of patients continued pain and night awakenings despite cortisone injection and outpatient PT, activity modification, topical and OTC oral analgesics, patient would like to move forward with Right shoulder arthroscopic rotator cuff repair, open subpectoral biceps tenodesis vs tenotomy, acromioplasty, all associated  Risks of surgery discussed in office with patient including infection, injury to surrounding structure, stiffness, continued pain, failure of repair, need for additional procedure  Detailed consent obtained in office  Patient will go to OR with Dr Lupe Soares on 03/16/2022  Patient obtained post op shoulder abduction sling in office today and instructed to bring date of surgery  History of the Present Illness   Yoav Strange is a 61 y o  male with Right shoulder rotator cuff tear and biceps tendonitis  At last appointment 01/13/2022, patient received corticosteroid injection of Right subacromial bursa and advised to continue with PT  Today, patient reports continued Right shoulder pain with multiple nighttime awakenings  He has tried outpatient PT and corticosteroid injection therapy without moderate relief of pain  He did admit to about 1-2 weeks of pain relief s/p corticosteroid injection but pain is now worsening   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  Review of Systems     Review of Systems   Constitutional: Negative for chills and fever  HENT: Negative for ear pain and sore throat  Eyes: Negative for pain and visual disturbance  Respiratory: Negative for cough and shortness of breath  Cardiovascular: Negative for chest pain and palpitations  Gastrointestinal: Negative for abdominal pain and vomiting  Genitourinary: Negative for dysuria and hematuria  Musculoskeletal: Negative for arthralgias and back pain  Skin: Negative for color change and rash  Neurological: Negative for seizures and syncope  All other systems reviewed and are negative  Physical Exam     Ht 5' 10" (1 778 m)   Wt 95 4 kg (210 lb 6 4 oz)   BMI 30 19 kg/m²     RIght Shoulder: Active range of motion   170 degrees forward flexion with pain  170 degrees abduction with pain  70 degrees external rotation   There is tenderness present over the anterior shoulder at proximal biceps tendon, pec minor, minimal tenderness greater tuberosity  There is 4+/5 strength with external rotation testing at the side  Empty can testing is positive with associated pain  Belly press test is negative  López test is negative   [place]'s test is negative    Speed's test is Positive  The patient is neurovascularly intact distally in the extremity  Data Review     I have personally reviewed pertinent films in PACS, and my interpretation follows      No new images     Social History     Tobacco Use    Smoking status: Former Smoker     Packs/day: 0 50     Quit date: 1/16/2007     Years since quitting: 15 1    Smokeless tobacco: Former User     Quit date: 1/16/1998    Tobacco comment: quit 10 yrs ago   Vaping Use    Vaping Use: Never used   Substance Use Topics    Alcohol use: Not Currently     Comment: quit 5 years    Drug use: Yes     Frequency: 3 0 times per week     Types: Marijuana           Procedures  None     Magalys Shaffer LUZMARIA Abad

## 2022-03-01 ENCOUNTER — HOSPITAL ENCOUNTER (EMERGENCY)
Facility: HOSPITAL | Age: 57
Discharge: HOME | End: 2022-03-01
Attending: EMERGENCY MEDICINE | Admitting: EMERGENCY MEDICINE
Payer: MEDICARE

## 2022-03-01 ENCOUNTER — APPOINTMENT (EMERGENCY)
Dept: CT IMAGING | Facility: HOSPITAL | Age: 57
End: 2022-03-01
Payer: MEDICARE

## 2022-03-01 ENCOUNTER — TELEPHONE (OUTPATIENT)
Dept: GASTROENTEROLOGY | Facility: CLINIC | Age: 57
End: 2022-03-01

## 2022-03-01 VITALS
SYSTOLIC BLOOD PRESSURE: 123 MMHG | OXYGEN SATURATION: 95 % | RESPIRATION RATE: 20 BRPM | TEMPERATURE: 98 F | HEART RATE: 85 BPM | DIASTOLIC BLOOD PRESSURE: 63 MMHG

## 2022-03-01 DIAGNOSIS — R11.2 NON-INTRACTABLE VOMITING WITH NAUSEA, UNSPECIFIED VOMITING TYPE: ICD-10-CM

## 2022-03-01 DIAGNOSIS — R10.84 GENERALIZED ABDOMINAL PAIN: Primary | ICD-10-CM

## 2022-03-01 DIAGNOSIS — R11.0 NAUSEA: ICD-10-CM

## 2022-03-01 LAB
ALBUMIN SERPL BCP-MCNC: 4.3 G/DL (ref 3.5–5)
ALP SERPL-CCNC: 86 U/L (ref 46–116)
ALT SERPL W P-5'-P-CCNC: 20 U/L (ref 12–78)
ANION GAP SERPL CALCULATED.3IONS-SCNC: 10 MMOL/L (ref 4–13)
AST SERPL W P-5'-P-CCNC: 13 U/L (ref 5–45)
BASOPHILS # BLD AUTO: 0.01 THOUSANDS/ΜL (ref 0–0.1)
BASOPHILS NFR BLD AUTO: 0 % (ref 0–1)
BILIRUB SERPL-MCNC: 0.39 MG/DL (ref 0.2–1)
BUN SERPL-MCNC: 21 MG/DL (ref 5–25)
CALCIUM SERPL-MCNC: 9.4 MG/DL (ref 8.3–10.1)
CHLORIDE SERPL-SCNC: 101 MMOL/L (ref 100–108)
CO2 SERPL-SCNC: 26 MMOL/L (ref 21–32)
CREAT SERPL-MCNC: 1.19 MG/DL (ref 0.6–1.3)
EOSINOPHIL # BLD AUTO: 0 THOUSAND/ΜL (ref 0–0.61)
EOSINOPHIL NFR BLD AUTO: 0 % (ref 0–6)
ERYTHROCYTE [DISTWIDTH] IN BLOOD BY AUTOMATED COUNT: 14 % (ref 11.6–15.1)
GFR SERPL CREATININE-BSD FRML MDRD: 67 ML/MIN/1.73SQ M
GLUCOSE SERPL-MCNC: 159 MG/DL (ref 65–140)
HCT VFR BLD AUTO: 45.2 % (ref 36.5–49.3)
HGB BLD-MCNC: 15.2 G/DL (ref 12–17)
IMM GRANULOCYTES # BLD AUTO: 0.04 THOUSAND/UL (ref 0–0.2)
IMM GRANULOCYTES NFR BLD AUTO: 0 % (ref 0–2)
LIPASE SERPL-CCNC: 57 U/L (ref 73–393)
LYMPHOCYTES # BLD AUTO: 0.71 THOUSANDS/ΜL (ref 0.6–4.47)
LYMPHOCYTES NFR BLD AUTO: 6 % (ref 14–44)
MAGNESIUM SERPL-MCNC: 1.9 MG/DL (ref 1.6–2.6)
MCH RBC QN AUTO: 28.6 PG (ref 26.8–34.3)
MCHC RBC AUTO-ENTMCNC: 33.6 G/DL (ref 31.4–37.4)
MCV RBC AUTO: 85 FL (ref 82–98)
MONOCYTES # BLD AUTO: 0.27 THOUSAND/ΜL (ref 0.17–1.22)
MONOCYTES NFR BLD AUTO: 2 % (ref 4–12)
NEUTROPHILS # BLD AUTO: 11.9 THOUSANDS/ΜL (ref 1.85–7.62)
NEUTS SEG NFR BLD AUTO: 92 % (ref 43–75)
NRBC BLD AUTO-RTO: 0 /100 WBCS
PLATELET # BLD AUTO: 315 THOUSANDS/UL (ref 149–390)
PMV BLD AUTO: 9.7 FL (ref 8.9–12.7)
POTASSIUM SERPL-SCNC: 4 MMOL/L (ref 3.5–5.3)
PROT SERPL-MCNC: 7.8 G/DL (ref 6.4–8.2)
RBC # BLD AUTO: 5.32 MILLION/UL (ref 3.88–5.62)
SODIUM SERPL-SCNC: 137 MMOL/L (ref 136–145)
WBC # BLD AUTO: 12.93 THOUSAND/UL (ref 4.31–10.16)

## 2022-03-01 PROCEDURE — 36415 COLL VENOUS BLD VENIPUNCTURE: CPT

## 2022-03-01 PROCEDURE — 83690 ASSAY OF LIPASE: CPT

## 2022-03-01 PROCEDURE — 96374 THER/PROPH/DIAG INJ IV PUSH: CPT

## 2022-03-01 PROCEDURE — 96375 TX/PRO/DX INJ NEW DRUG ADDON: CPT

## 2022-03-01 PROCEDURE — 80053 COMPREHEN METABOLIC PANEL: CPT

## 2022-03-01 PROCEDURE — 74177 CT ABD & PELVIS W/CONTRAST: CPT

## 2022-03-01 PROCEDURE — 99285 EMERGENCY DEPT VISIT HI MDM: CPT

## 2022-03-01 PROCEDURE — 96361 HYDRATE IV INFUSION ADD-ON: CPT

## 2022-03-01 PROCEDURE — G1004 CDSM NDSC: HCPCS

## 2022-03-01 PROCEDURE — 85025 COMPLETE CBC W/AUTO DIFF WBC: CPT

## 2022-03-01 PROCEDURE — 83735 ASSAY OF MAGNESIUM: CPT

## 2022-03-01 PROCEDURE — 99284 EMERGENCY DEPT VISIT MOD MDM: CPT

## 2022-03-01 RX ORDER — ONDANSETRON 2 MG/ML
4 INJECTION INTRAMUSCULAR; INTRAVENOUS ONCE
Status: COMPLETED | OUTPATIENT
Start: 2022-03-01 | End: 2022-03-01

## 2022-03-01 RX ORDER — HYDROMORPHONE HCL/PF 1 MG/ML
0.5 SYRINGE (ML) INJECTION ONCE
Status: COMPLETED | OUTPATIENT
Start: 2022-03-01 | End: 2022-03-01

## 2022-03-01 RX ORDER — MORPHINE SULFATE 10 MG/ML
6 INJECTION, SOLUTION INTRAMUSCULAR; INTRAVENOUS ONCE
Status: COMPLETED | OUTPATIENT
Start: 2022-03-01 | End: 2022-03-01

## 2022-03-01 RX ORDER — KETOROLAC TROMETHAMINE 30 MG/ML
15 INJECTION, SOLUTION INTRAMUSCULAR; INTRAVENOUS ONCE
Status: COMPLETED | OUTPATIENT
Start: 2022-03-01 | End: 2022-03-01

## 2022-03-01 RX ADMIN — ONDANSETRON 4 MG: 2 INJECTION INTRAMUSCULAR; INTRAVENOUS at 15:27

## 2022-03-01 RX ADMIN — SODIUM CHLORIDE 1000 ML: 0.9 INJECTION, SOLUTION INTRAVENOUS at 17:25

## 2022-03-01 RX ADMIN — SODIUM CHLORIDE 1000 ML: 0.9 INJECTION, SOLUTION INTRAVENOUS at 15:46

## 2022-03-01 RX ADMIN — HYDROMORPHONE HYDROCHLORIDE 0.5 MG: 1 INJECTION, SOLUTION INTRAMUSCULAR; INTRAVENOUS; SUBCUTANEOUS at 17:25

## 2022-03-01 RX ADMIN — MORPHINE SULFATE 6 MG: 10 INJECTION INTRAVENOUS at 15:27

## 2022-03-01 RX ADMIN — IOHEXOL 100 ML: 350 INJECTION, SOLUTION INTRAVENOUS at 16:05

## 2022-03-01 RX ADMIN — KETOROLAC TROMETHAMINE 15 MG: 30 INJECTION, SOLUTION INTRAMUSCULAR at 15:26

## 2022-03-01 RX ADMIN — FAMOTIDINE 20 MG: 10 INJECTION, SOLUTION INTRAVENOUS at 16:59

## 2022-03-01 NOTE — ED PROVIDER NOTES
History  Chief Complaint   Patient presents with    Abdominal Pain     Patient c/o generalized abdominal pain, nausea, vomiting since 0900 this morning  Pt is a 60 yo male with a phmx of ulcerative colitis, and cyclic vomiting coming from home today for acute onset of his known nausea/vomiting/abdominal pain  Pt reports his history of UC leads him to have diarrhea constantly but reports has not had any bowel movements today, except for a non-typical small BM this morning  Pt denies blood in stool or at rectum  Pt denies urinary complaints, denies blood in urine  Pt reports his pain in his abdomen is generalized, and presenting with same cramping sensation as it has in the past  Pt reports no relief to nausea vomiting from ODT zofran, which he reports he "threw up " Outside of endoscopy and colonoscopy, pt states has not had prior surgeries of his abdomen  Pt reports has been to ED in past for similar episodes for pain relief and concerns for dehydration  Pt reports he has ongoing similar symptomatology since 1990s with only diagnosis being UC,and cyclical vomiting  Pt reports following with GI extensively for CT scan finding, including, "Swallowing a camera," MRI, and multiple "scopes " Pt reports no known etiology for nausea/vomiting/abdominal pain  Pt denies drinking alcohol for 6 5 years  Pt reports does smoke marijuana but these cyclic vomiting episodes were occurring prior to him starting to smoke marijuana  Pt feels marijuana use has been helpful         History provided by:  Patient   used: No    Abdominal Pain  Pain location:  Generalized  Pain radiates to:  Does not radiate  Pain severity:  Severe  Onset quality:  Gradual  Timing:  Constant  Progression:  Worsening  Chronicity:  Chronic  Context: not alcohol use, not diet changes, not eating and not recent illness    Relieved by:  Nothing  Worsened by:  Nothing  Ineffective treatments:  Vomiting and position changes (zofran)  Associated symptoms: nausea    Associated symptoms: no belching, no chest pain, no chills, no diarrhea, no dysuria, no fatigue, no fever, no flatus, no hematemesis, no hematochezia, no hematuria and no shortness of breath    Risk factors: no alcohol abuse, has not had multiple surgeries and no NSAID use        Prior to Admission Medications   Prescriptions Last Dose Informant Patient Reported? Taking?    Diclofenac Sodium (VOLTAREN) 1 %   No No   Sig: Apply 2 g topically 4 (four) times a day for 14 days   NON FORMULARY  Self Yes No   Sig: thc top cream   aspirin (ECOTRIN LOW STRENGTH) 81 mg EC tablet  Self No No   Sig: Take 1 tablet (81 mg total) by mouth daily   atorvastatin (LIPITOR) 80 mg tablet  Self Yes No   Sig: Take 80 mg by mouth daily   clonazePAM (KLONOPIN) 0 5 mg tablet  Self Yes No   Sig: Take 0 5 mg by mouth daily at bedtime    omeprazole (PriLOSEC) 20 mg delayed release capsule  Self No No   Sig: Take 1 capsule (20 mg total) by mouth daily   ondansetron (ZOFRAN-ODT) 4 mg disintegrating tablet  Self No No   Sig: Take 1 tablet (4 mg total) by mouth every 6 (six) hours as needed for nausea or vomiting   sertraline (ZOLOFT) 100 mg tablet  Self Yes No   Sig: Take 100 mg by mouth daily       Facility-Administered Medications: None       Past Medical History:   Diagnosis Date    Diaz's esophagus     Colon polyp     Coronary artery disease     Depression     Diverticulitis     GERD (gastroesophageal reflux disease)     Hemorrhoid     History of heart artery stent     Hyperlipidemia     Hypertension     Microscopic colitis     Ulcerative colitis ([address] )        Past Surgical History:   Procedure Laterality Date    ABDOMINAL SURGERY      radio frequency ablation    BONE GRAFT Left 2018    CARPAL TUNNEL RELEASE Right     COLONOSCOPY      CORONARY ANGIOPLASTY WITH STENT PLACEMENT      EGD AND COLONOSCOPY      ESOPHAGOGASTRODUODENOSCOPY N/A 2/15/2018    Procedure: ESOPHAGOGASTRODUODENOSCOPY (EGD); Surgeon: Vinh Manzanares MD;  Location: [facility];  Service: Gastroenterology    ESOPHAGOGASTRODUODENOSCOPY N/A 12/10/2018    Procedure: ESOPHAGOGASTRODUODENOSCOPY (EGD); Surgeon: Dakota Villagomez MD;  Location: [facility]; Service: Gastroenterology    HAND SURGERY Left     RI SHLDR ARTHROSCOP,SURG,W/ROTAT CUFF REPR Left 4/14/2021    Procedure: REPAIR ROTATOR CUFF  ARTHROSCOPIC; POSSIBLE BICEPS TENDONESIS, ACROMIOPLASTY;  Surgeon: Lily Burnham DO;  Location: [facility];  Service: Orthopedics    TONSILLECTOMY         Family History   Problem Relation Age of Onset    Heart disease Father     Melanoma Father     Cancer Sister     Skin cancer Sister     Skin cancer Mother      I have reviewed and agree with the history as documented  E-Cigarette/Vaping    E-Cigarette Use Never User      E-Cigarette/Vaping Substances    Nicotine No     THC No     CBD No     Flavoring No     Other No     Unknown No      Social History     Tobacco Use    Smoking status: Former Smoker     Packs/day: 0 50     Quit date: 1/16/2007     Years since quitting: 15 1    Smokeless tobacco: Former User     Quit date: 1/16/1998    Tobacco comment: quit 10 yrs ago   Vaping Use    Vaping Use: Never used   Substance Use Topics    Alcohol use: Not Currently     Comment: quit 5 years    Drug use: Yes     Frequency: 3 0 times per week     Types: Marijuana       Review of Systems   Constitutional: Positive for activity change and appetite change  Negative for chills, fatigue and fever  HENT: Negative  Eyes: Negative  Respiratory: Negative for shortness of breath  Cardiovascular: Negative for chest pain  Gastrointestinal: Positive for abdominal pain and nausea  Negative for diarrhea, flatus, hematemesis and hematochezia  Endocrine: Negative  Genitourinary: Negative for dysuria and hematuria  Musculoskeletal: Negative  Negative for arthralgias  Skin: Negative  Neurological: Negative for dizziness and numbness  Hematological: Negative  Psychiatric/Behavioral: Negative  Physical Exam  Physical Exam  Vitals and nursing note reviewed  Constitutional:       General: He is not in acute distress  Appearance: Normal appearance  He is well-developed and normal weight  He is not ill-appearing, toxic-appearing or diaphoretic  HENT:      Head: Normocephalic  Right Ear: External ear normal       Left Ear: External ear normal       Nose: Nose normal       Mouth/Throat:      Mouth: Mucous membranes are moist    Eyes:      General: No scleral icterus  Right eye: No discharge  Left eye: No discharge  Conjunctiva/sclera: Conjunctivae normal    Cardiovascular:      Rate and Rhythm: Normal rate and regular rhythm  Heart sounds: Normal heart sounds  Pulmonary:      Effort: Pulmonary effort is normal       Breath sounds: Normal breath sounds  Abdominal:      General: Abdomen is flat  Bowel sounds are normal  There is no distension  There are no signs of injury  Palpations: Abdomen is soft  Tenderness: There is generalized abdominal tenderness  Hernia: No hernia is present  Musculoskeletal:         General: Normal range of motion  Cervical back: Normal range of motion and neck supple  No rigidity  Skin:     General: Skin is warm and dry  Capillary Refill: Capillary refill takes less than 2 seconds  Neurological:      General: No focal deficit present  Mental Status: He is alert and oriented to person, place, and time     Psychiatric:         Mood and Affect: Mood normal          Vital Signs  ED Triage Vitals   Temperature Pulse Respirations Blood Pressure SpO2   03/01/22 1438 03/01/22 1438 03/01/22 1438 03/01/22 1438 03/01/22 1438   98 °F (36 7 °C) 62 20 (!) 179/87 99 %      Temp src Heart Rate Source Patient Position - Orthostatic VS BP Location FiO2 (%)   -- 03/01/22 1438 -- -- --    Monitor Pain Score       03/01/22 1526       8           Vitals:    03/01/22 1600 03/01/22 1615 03/01/22 1700 03/01/22 1800   BP:  140/66 (!) 171/79 123/63   Pulse: 55 96 60 85         Visual Acuity      ED Medications  Medications   ondansetron (ZOFRAN) injection 4 mg (4 mg Intravenous Given 3/1/22 1527)   ketorolac (TORADOL) injection 15 mg (15 mg Intravenous Given 3/1/22 1526)   morphine (PF) 10 mg/mL injection 6 mg (6 mg Intravenous Given 3/1/22 1527)   sodium chloride 0 9 % bolus 1,000 mL (0 mL Intravenous Stopped 3/1/22 1643)   iohexol (OMNIPAQUE) 350 MG/ML injection (SINGLE-DOSE) 100 mL (100 mL Intravenous Given 3/1/22 1605)   famotidine (PEPCID) injection 20 mg (20 mg Intravenous Given 3/1/22 1659)   sodium chloride 0 9 % bolus 1,000 mL (0 mL Intravenous Stopped 3/1/22 1854)   HYDROmorphone (DILAUDID) injection 0 5 mg (0 5 mg Intravenous Given 3/1/22 1725)       Diagnostic Studies  Results Reviewed     Procedure Component Value Units Date/Time    CBC and differential [[id number]]  (Abnormal) Collected: 03/01/22 1524    Lab Status: Final result Specimen: Blood from Arm, Left Updated: 03/01/22 1614     WBC 12 93 Thousand/uL      RBC 5 32 Million/uL      Hemoglobin 15 2 g/dL      Hematocrit 45 2 %      MCV 85 fL      MCH 28 6 pg      MCHC 33 6 g/dL      RDW 14 0 %      MPV 9 7 fL      Platelets 490 Thousands/uL      nRBC 0 /100 WBCs      Neutrophils Relative 92 %      Immat GRANS % 0 %      Lymphocytes Relative 6 %      Monocytes Relative 2 %      Eosinophils Relative 0 %      Basophils Relative 0 %      Neutrophils Absolute 11 90 Thousands/µL      Immature Grans Absolute 0 04 Thousand/uL      Lymphocytes Absolute 0 71 Thousands/µL      Monocytes Absolute 0 27 Thousand/µL      Eosinophils Absolute 0 00 Thousand/µL      Basophils Absolute 0 01 Thousands/µL     Narrative: This is an appended report  These results have been appended to a previously verified report      Magnesium [[id number]]  (Normal) Collected: 03/01/22 1524    Lab Status: Final result Specimen: Blood from Arm, Left Updated: 03/01/22 1547     Magnesium 1 9 mg/dL     CMP [[id number]]  (Abnormal) Collected: 03/01/22 1524    Lab Status: Final result Specimen: Blood from Arm, Left Updated: 03/01/22 1546     Sodium 137 mmol/L      Potassium 4 0 mmol/L      Chloride 101 mmol/L      CO2 26 mmol/L      ANION GAP 10 mmol/L      BUN 21 mg/dL      Creatinine 1 19 mg/dL      Glucose 159 mg/dL      Calcium 9 4 mg/dL      AST 13 U/L      ALT 20 U/L      Alkaline Phosphatase 86 U/L      Total Protein 7 8 g/dL      Albumin 4 3 g/dL      Total Bilirubin 0 39 mg/dL      eGFR 67 ml/min/1 73sq m     Narrative:      [place] guidelines for Chronic Kidney Disease (CKD):     Stage 1 with normal or high GFR (GFR > 90 mL/min/1 73 square meters)    Stage 2 Mild CKD (GFR = 60-89 mL/min/1 73 square meters)    Stage 3A Moderate CKD (GFR = 45-59 mL/min/1 73 square meters)    Stage 3B Moderate CKD (GFR = 30-44 mL/min/1 73 square meters)    Stage 4 Severe CKD (GFR = 15-29 mL/min/1 73 square meters)    Stage 5 End Stage CKD (GFR <15 mL/min/1 73 square meters)  Note: GFR calculation is accurate only with a steady state creatinine    Lipase [[id number]]  (Abnormal) Collected: 03/01/22 1524    Lab Status: Final result Specimen: Blood from Arm, Left Updated: 03/01/22 1546     Lipase 57 u/L                  CT Abdomen pelvis with contrast   Final Result by Ashleigh Law MD (03/01 1658)      No acute findings  No evidence of bowel obstruction  Nonemergent findings above  Workstation performed: [id number]                    Procedures  Procedures         ED Course  ED Course as of 03/01/22 2356   Tue Mar 01, 2022   1645 Pt requesting medication for his reflux to RN  Will write for IV Pepcid at this time  1721 Pt reporting pain is returning after it was controlled by morphine   Pt states that he has received dilaudid in the past with relief for "hours " Reviewed with pt that this is potent narcotic pain medication  Reviewed will review his previous visits for medications and dosages utilized with success to pain relief  1730 Pt requesting additional IV fluid NSS bolus, stating he always receives two liters during these episodes  1741 Pt reports his pain is resolved with hydromorphone  Pt smiling, and speaking with wife on phone upon entering room  Pt aware he needs a ride home after receiving two narcotic agents  Pt's second liter of fluid infusing at this time  Pt reports nausea is resolved  SBIRT 22yo+      Most Recent Value   SBIRT (22 yo +)    In order to provide better care to our patients, we are screening all of our patients for alcohol and drug use  Would it be okay to ask you these screening questions? Yes Filed at: 03/01/2022 1730   Initial Alcohol Screen: US AUDIT-C     1  How often do you have a drink containing alcohol? 1 Filed at: 03/01/2022 1730   2  How many drinks containing alcohol do you have on a typical day you are drinking? 0 Filed at: 03/01/2022 1730   3a  Male UNDER 65: How often do you have five or more drinks on one occasion? 0 Filed at: 03/01/2022 1730   3b  FEMALE Any Age, or MALE 65+: How often do you have 4 or more drinks on one occassion? 0 Filed at: 03/01/2022 1730   Audit-C Score 1 Filed at: 03/01/2022 1730                    MDM  Number of Diagnoses or Management Options  Generalized abdominal pain  Nausea  Non-intractable vomiting with nausea, unspecified vomiting type  Diagnosis management comments: DDx: UC flair, acute on chronic cyclical vomiting, SBO  Will check labs, rehydrate with 1000 mL NSS per, treat nausea/vomiting with zofran, will treat pain with 6 mg morphine, with bowel habit changing drastically today to having no BM today, will order CT scan to r/o SBO  CBC: Leukocytosis in presence of acute vomiting is likely inflammatory   No anemia, no thrombocytopenia  CMP: No electrolyte abnormalities to suggest dehydration, no kidney or liver dysfunction   Lipase: no signs of pancreatitis  Mg: WNL in presence of nausea/vomiting     CT Abdomen Pelvis: No acute findings  No evidence of bowel obstruction  Amount and/or Complexity of Data Reviewed  Clinical lab tests: ordered and reviewed  Tests in the radiology section of CPT®: ordered and reviewed    Risk of Complications, Morbidity, and/or Mortality  General comments: Reviewed CT abdomen pelvis with pt that no small bowel obstructions, no acute findings, however diverticulosis without secondary signs of diverticulitis was documented  Pt is aware of this diagnosis already  Pt has follow up with his GI specialist tomorrow  Pt reports his pain is relieved  Pt receiving ride from his wife after receiving narcotic pain medication  Reviewed reasons to return to ed  Patient verbalized understanding of diagnosis and agreement with discharge plan of care as well as understanding of reasons to return to ed       Patient Progress  Patient progress: improved      Disposition  Final diagnoses:   Generalized abdominal pain   Nausea   Non-intractable vomiting with nausea, unspecified vomiting type     Time reflects when diagnosis was documented in both MDM as applicable and the Disposition within this note     Time User Action Codes Description Comment    3/1/2022  5:42 PM Paulla Mask Add [R10 84] Generalized abdominal pain     3/1/2022  5:42 PM Paulla Mask Add [R11 0] Nausea     3/1/2022  5:42 PM Paulla Mask Add [R11 11] Non-intractable vomiting without nausea, unspecified vomiting type     3/1/2022  5:42 PM Paulla Mask Remove [R11 11] Non-intractable vomiting without nausea, unspecified vomiting type     3/1/2022  5:43 PM Paulla Mask Add [R11 2] Non-intractable vomiting with nausea, unspecified vomiting type       ED Disposition     ED Disposition Condition Date/Time Comment    Discharge Stable Tue Mar 1, 2022  5:42 PM Enedina Moore discharge to home/self care  Follow-up Information     Follow up With Specialties Details Why Amanda Meeks MD Internal Medicine Go to  For further evaluation of symptoms [address]   [phone]      GI specialist   Keep scheduled appointment           Discharge Medication List as of 3/1/2022  6:05 PM      CONTINUE these medications which have NOT CHANGED    Details   aspirin (ECOTRIN LOW STRENGTH) 81 mg EC tablet Take 1 tablet (81 mg total) by mouth daily, Starting Mon 2/10/2020, No Print      atorvastatin (LIPITOR) 80 mg tablet Take 80 mg by mouth daily, Historical Med      clonazePAM (KLONOPIN) 0 5 mg tablet Take 0 5 mg by mouth daily at bedtime , Historical Med      Diclofenac Sodium (VOLTAREN) 1 % Apply 2 g topically 4 (four) times a day for 14 days, Starting Tue 1/18/2022, Until Tue 2/1/2022, Normal      NON FORMULARY thc top cream, Historical Med      omeprazole (PriLOSEC) 20 mg delayed release capsule Take 1 capsule (20 mg total) by mouth daily, Starting Fri 12/6/2019, Normal      ondansetron (ZOFRAN-ODT) 4 mg disintegrating tablet Take 1 tablet (4 mg total) by mouth every 6 (six) hours as needed for nausea or vomiting, Starting Wed 4/7/2021, Normal      sertraline (ZOLOFT) 100 mg tablet Take 100 mg by mouth daily , Historical Med             No discharge procedures on file      PDMP Review       Value Time User    PDMP Reviewed  Yes 9/14/2021  2:16 PM Juliana Bauman PA-C          ED Provider  Electronically Signed by           RICKEY Wiley  03/01/22 0811

## 2022-03-01 NOTE — TELEPHONE ENCOUNTER
Maru pt-  Patient has been vomiting all day today  Humble Po He is dehydrated  Humble Delarosa He has a scheduled office appt tomorrow with Pratima Kolb     Uses: [phone]  Please phone [phone] to advise  Humble Delarosa

## 2022-03-02 ENCOUNTER — OFFICE VISIT (OUTPATIENT)
Dept: GASTROENTEROLOGY | Facility: CLINIC | Age: 57
End: 2022-03-02
Payer: MEDICARE

## 2022-03-02 VITALS
WEIGHT: 209 LBS | HEIGHT: 70 IN | HEART RATE: 81 BPM | SYSTOLIC BLOOD PRESSURE: 120 MMHG | DIASTOLIC BLOOD PRESSURE: 72 MMHG | BODY MASS INDEX: 29.92 KG/M2

## 2022-03-02 DIAGNOSIS — R11.15 CYCLICAL VOMITING: Primary | ICD-10-CM

## 2022-03-02 DIAGNOSIS — K21.9 GASTROESOPHAGEAL REFLUX DISEASE WITHOUT ESOPHAGITIS: ICD-10-CM

## 2022-03-02 DIAGNOSIS — K58.0 IRRITABLE BOWEL SYNDROME WITH DIARRHEA: ICD-10-CM

## 2022-03-02 DIAGNOSIS — K52.838 OTHER MICROSCOPIC COLITIS: ICD-10-CM

## 2022-03-02 PROCEDURE — 99213 OFFICE O/P EST LOW 20 MIN: CPT | Performed by: PHYSICIAN ASSISTANT

## 2022-03-02 RX ORDER — SUMATRIPTAN 50 MG/1
50 TABLET, FILM COATED ORAL ONCE AS NEEDED
Qty: 9 TABLET | Refills: 0 | Status: SHIPPED | OUTPATIENT
Start: 2022-03-02 | End: 2022-03-08

## 2022-03-02 RX ORDER — BUPROPION HYDROCHLORIDE 150 MG/1
150 TABLET, EXTENDED RELEASE ORAL 2 TIMES DAILY
COMMUNITY
End: 2022-03-15 | Stop reason: SDUPTHER

## 2022-03-02 NOTE — PROGRESS NOTES
[facility] - Outpatient Follow-up Note  Jovon Issa 61 y o  male MRN: [id number]  Encounter: [id number]          ASSESSMENT AND PLAN:      1  Cyclical vomiting  He had an episode yesterday and went to the ER  He is better today  Cannot use Amitripytline as he is on Bupropion and Sertraline - he is trying to wean off Sertraline - if he is successful we will start low dose  Trial of Imitrex PRN    2  Gastroesophageal reflux disease without esophagitis  Continue Omeprazole    3  Other microscopic colitis  Failed mesalamine  Budesonide helps but he cannot maintain on this long term  Will treat symptomatically    4  Irritable bowel syndrome with diarrhea  Xifaxan course  If diarrhea is persistent/returns start Viberzi 75mg BID  He does not drink alcohol  ______________________________________________________________________    SUBJECTIVE:  68-year-old male with GERD complicated by Diaz's esophagus, cyclical vomiting syndrome, microscopic colitis and irritable bowel syndrome presents for follow-up  Unfortunately after his last appointment he had another episode of vomiting and abdominal pain yesterday  He did up in the emergency room and does feel better today  He tried taking Zofran without any relief in the symptoms  He continues with diarrhea  He had a video capsule endoscopy due to question of small bowel mass and this was normal   He is currently on bupropion and sertraline for depression and anxiety  He is trying to wean off his sertraline  He remains on omeprazole  He has taken Imodium as needed for the diarrhea with some relief  REVIEW OF SYSTEMS IS OTHERWISE NEGATIVE        Historical Information   Past Medical History:   Diagnosis Date    Diaz's esophagus     Colon polyp     Coronary artery disease     Depression     Diverticulitis     GERD (gastroesophageal reflux disease)     Hemorrhoid     History of heart artery stent     Hyperlipidemia     Hypertension     Microscopic colitis     Ulcerative colitis ([facility] [address] )      Past Surgical History:   Procedure Laterality Date    ABDOMINAL SURGERY      radio frequency ablation    BONE GRAFT Left 2018    CARPAL TUNNEL RELEASE Right     COLONOSCOPY      CORONARY ANGIOPLASTY WITH STENT PLACEMENT      EGD AND COLONOSCOPY      ESOPHAGOGASTRODUODENOSCOPY N/A 2/15/2018    Procedure: ESOPHAGOGASTRODUODENOSCOPY (EGD); Surgeon: Bisi Nava MD;  [facility];  Service: Gastroenterology    ESOPHAGOGASTRODUODENOSCOPY N/A 12/10/2018    Procedure: ESOPHAGOGASTRODUODENOSCOPY (EGD); Surgeon: Soha Chung MD;  [facility];   Service: Gastroenterology    HAND SURGERY Left     MI SHLDR ARTHROSCOP,SURG,W/ROTAT CUFF REPR Left 4/14/2021    Procedure: REPAIR ROTATOR CUFF  ARTHROSCOPIC; POSSIBLE BICEPS TENDONESIS, ACROMIOPLASTY;  Surgeon: Susan Armijo DO;  [facility];  Service: Orthopedics    TONSILLECTOMY       Social History   Social History     Substance and Sexual Activity   Alcohol Use Not Currently    Comment: quit 5 years     Social History     Substance and Sexual Activity   Drug Use Yes    Frequency: 3 0 times per week    Types: Marijuana     Social History     Tobacco Use   Smoking Status Former Smoker    Packs/day: 0 50    Quit date: 1/16/2007    Years since quitting: 15 1   Smokeless Tobacco Former User    Quit date: 1/16/1998   Tobacco Comment    quit 10 yrs ago     Family History   Problem Relation Age of Onset    Heart disease Father     Melanoma Father     Cancer Sister     Skin cancer Sister     Skin cancer Mother        Meds/Allergies       Current Outpatient Medications:     aspirin (ECOTRIN LOW STRENGTH) 81 mg EC tablet    atorvastatin (LIPITOR) 80 mg tablet    buPROPion (WELLBUTRIN SR) 150 mg 12 hr tablet    clonazePAM (KLONOPIN) 0 5 mg tablet    NON FORMULARY    omeprazole (PriLOSEC) 20 mg delayed release capsule    ondansetron (ZOFRAN-ODT) 4 mg disintegrating tablet    sertraline (ZOLOFT) 100 mg tablet    Diclofenac Sodium (VOLTAREN) 1 %    Eluxadoline 75 MG TABS    rifaximin (XIFAXAN) 550 mg tablet    SUMAtriptan (Imitrex) 50 mg tablet  No current facility-administered medications for this visit  Allergies   Allergen Reactions    Rosuvastatin Myalgia    Shellfish Allergy - Food Allergy Rash and Lip Swelling           Objective     Blood pressure 120/72, pulse 81, height 5' 10" (1 778 m), weight 94 8 kg (209 lb)  Body mass index is 29 99 kg/m²  PHYSICAL EXAM:      General Appearance:   Alert, cooperative, no distress   HEENT:   Normocephalic, atraumatic, anicteric      Neck:  Supple, symmetrical, trachea midline   Lungs:   Clear to auscultation bilaterally; no rales, rhonchi or wheezing; respirations unlabored    Heart[de-identified]   Regular rate and rhythm; no murmur, rub, or gallop  Abdomen:   Soft, non-tender, non-distended; normal bowel sounds; no masses, no organomegaly    Genitalia:   Deferred    Rectal:   Deferred    Extremities:  No cyanosis, clubbing or edema    Pulses:  2+ and symmetric    Skin:  No jaundice, rashes, or lesions    Lymph nodes:  No palpable cervical lymphadenopathy        Lab Results:   No visits with results within 1 Day(s) from this visit     Latest known visit with results is:   Admission on 03/01/2022, Discharged on 03/01/2022   Component Date Value    WBC 03/01/2022 12 93*    RBC 03/01/2022 5 32     Hemoglobin 03/01/2022 15 2     Hematocrit 03/01/2022 45 2     MCV 03/01/2022 85     MCH 03/01/2022 28 6     MCHC 03/01/2022 33 6     RDW 03/01/2022 14 0     MPV 03/01/2022 9 7     Platelets 94/65/5774 315     nRBC 03/01/2022 0     Neutrophils Relative 03/01/2022 92*    Immat GRANS % 03/01/2022 0     Lymphocytes Relative 03/01/2022 6*    Monocytes Relative 03/01/2022 2*    Eosinophils Relative 03/01/2022 0     Basophils Relative 03/01/2022 0     Neutrophils Absolute 03/01/2022 11 90*    Immature Grans Absolute 03/01/2022 0 04     Lymphocytes Absolute 03/01/2022 0 71     Monocytes Absolute 03/01/2022 0 27     Eosinophils Absolute 03/01/2022 0 00     Basophils Absolute 03/01/2022 0 01     Sodium 03/01/2022 137     Potassium 03/01/2022 4 0     Chloride 03/01/2022 101     CO2 03/01/2022 26     ANION GAP 03/01/2022 10     BUN 03/01/2022 21     Creatinine 03/01/2022 1 19     Glucose 03/01/2022 159*    Calcium 03/01/2022 9 4     AST 03/01/2022 13     ALT 03/01/2022 20     Alkaline Phosphatase 03/01/2022 86     Total Protein 03/01/2022 7 8     Albumin 03/01/2022 4 3     Total Bilirubin 03/01/2022 0 39     eGFR 03/01/2022 67     Lipase 03/01/2022 57*    Magnesium 03/01/2022 1 9          Radiology Results:   CT Abdomen pelvis with contrast    Result Date: 3/1/2022  Narrative: CT ABDOMEN AND PELVIS WITH IV CONTRAST INDICATION:   Bowel obstruction suspected r/o obstruction  History of ulcerative colitis  History of abdominal radiofrequency ablation  COMPARISON:  10/15/2021 TECHNIQUE:  CT examination of the abdomen and pelvis was performed  Axial, sagittal, and coronal 2D reformatted images were created from the source data and submitted for interpretation  Radiation dose length product (DLP) for this visit:  718 mGy-cm   This examination, like all CT scans performed in the [facility], was performed utilizing techniques to minimize radiation dose exposure, including the use of iterative reconstruction and automated exposure control  IV Contrast:  100 mL of iohexol (OMNIPAQUE) Enteric Contrast:  Enteric contrast was not administered  FINDINGS: ABDOMEN LOWER CHEST:  No clinically significant abnormality identified in the visualized lower chest  LIVER/BILIARY TREE:  Within normal limits  GALLBLADDER:  Within normal limits  SPLEEN:  Within normal limits  PANCREAS:  Within normal limits  ADRENAL GLANDS:  Within normal limits  KIDNEYS/URETERS:  Within normal limits   STOMACH AND BOWEL:  No bowel dilatation, wall thickening or air-fluid levels  Diverticulosis without secondary signs of diverticulitis  Visualized esophagus and stomach are within normal limits  APPENDIX:  Within normal limits  ABDOMINOPELVIC CAVITY:  No abnormal air, fluid or enlarged lymph nodes  VESSELS:  Mild atherosclerosis  Replaced right hepatic artery from the aorta  No acute pathology  PELVIS: REPRODUCTIVE ORGANS:  prostate and seminal vesicles within normal limits  URINARY BLADDER:  Within normal limits  ABDOMINAL WALL/INGUINAL REGIONS:  Within normal limits  OSSEOUS STRUCTURES:  No acute or suspicious findings  Impression: No acute findings  No evidence of bowel obstruction  Nonemergent findings above   Workstation performed: [id number]

## 2022-03-03 ENCOUNTER — HOSPITAL ENCOUNTER (EMERGENCY)
Facility: HOSPITAL | Age: 57
Discharge: HOME | End: 2022-03-03
Attending: EMERGENCY MEDICINE
Payer: MEDICARE

## 2022-03-03 VITALS
SYSTOLIC BLOOD PRESSURE: 114 MMHG | TEMPERATURE: 98.5 F | DIASTOLIC BLOOD PRESSURE: 65 MMHG | HEART RATE: 74 BPM | RESPIRATION RATE: 20 BRPM | OXYGEN SATURATION: 95 %

## 2022-03-03 DIAGNOSIS — R10.84 GENERALIZED ABDOMINAL PAIN: Primary | ICD-10-CM

## 2022-03-03 LAB
2HR DELTA HS TROPONIN: 1 NG/L
ALBUMIN SERPL BCP-MCNC: 3.9 G/DL (ref 3.5–5)
ALP SERPL-CCNC: 74 U/L (ref 46–116)
ALT SERPL W P-5'-P-CCNC: 21 U/L (ref 12–78)
ANION GAP SERPL CALCULATED.3IONS-SCNC: 9 MMOL/L (ref 4–13)
AST SERPL W P-5'-P-CCNC: 15 U/L (ref 5–45)
ATRIAL RATE: 50 BPM
BASOPHILS # BLD MANUAL: 0 THOUSAND/UL (ref 0–0.1)
BASOPHILS NFR MAR MANUAL: 0 % (ref 0–1)
BILIRUB SERPL-MCNC: 0.36 MG/DL (ref 0.2–1)
BUN SERPL-MCNC: 15 MG/DL (ref 5–25)
CALCIUM SERPL-MCNC: 8.7 MG/DL (ref 8.3–10.1)
CARDIAC TROPONIN I PNL SERPL HS: 4 NG/L
CARDIAC TROPONIN I PNL SERPL HS: 5 NG/L
CHLORIDE SERPL-SCNC: 99 MMOL/L (ref 100–108)
CO2 SERPL-SCNC: 27 MMOL/L (ref 21–32)
CREAT SERPL-MCNC: 1.01 MG/DL (ref 0.6–1.3)
EOSINOPHIL # BLD MANUAL: 0.12 THOUSAND/UL (ref 0–0.4)
EOSINOPHIL NFR BLD MANUAL: 1 % (ref 0–6)
ERYTHROCYTE [DISTWIDTH] IN BLOOD BY AUTOMATED COUNT: 14 % (ref 11.6–15.1)
GFR SERPL CREATININE-BSD FRML MDRD: 82 ML/MIN/1.73SQ M
GLUCOSE SERPL-MCNC: 161 MG/DL (ref 65–140)
HCT VFR BLD AUTO: 42.2 % (ref 36.5–49.3)
HGB BLD-MCNC: 13.9 G/DL (ref 12–17)
LIPASE SERPL-CCNC: 115 U/L (ref 73–393)
LYMPHOCYTES # BLD AUTO: 0.69 THOUSAND/UL (ref 0.6–4.47)
LYMPHOCYTES # BLD AUTO: 6 % (ref 14–44)
MCH RBC QN AUTO: 28.1 PG (ref 26.8–34.3)
MCHC RBC AUTO-ENTMCNC: 32.9 G/DL (ref 31.4–37.4)
MCV RBC AUTO: 85 FL (ref 82–98)
MONOCYTES # BLD AUTO: 0 THOUSAND/UL (ref 0–1.22)
MONOCYTES NFR BLD: 0 % (ref 4–12)
NEUTROPHILS # BLD MANUAL: 10.72 THOUSAND/UL (ref 1.85–7.62)
NEUTS SEG NFR BLD AUTO: 93 % (ref 43–75)
P AXIS: 66 DEGREES
PLATELET # BLD AUTO: 301 THOUSANDS/UL (ref 149–390)
PLATELET BLD QL SMEAR: ADEQUATE
PMV BLD AUTO: 10.1 FL (ref 8.9–12.7)
POTASSIUM SERPL-SCNC: 3.5 MMOL/L (ref 3.5–5.3)
PR INTERVAL: 170 MS
PROT SERPL-MCNC: 7.1 G/DL (ref 6.4–8.2)
QRS AXIS: 8 DEGREES
QRSD INTERVAL: 86 MS
QT INTERVAL: 420 MS
QTC INTERVAL: 382 MS
RBC # BLD AUTO: 4.94 MILLION/UL (ref 3.88–5.62)
SODIUM SERPL-SCNC: 135 MMOL/L (ref 136–145)
T WAVE AXIS: 39 DEGREES
VENTRICULAR RATE: 50 BPM
WBC # BLD AUTO: 11.53 THOUSAND/UL (ref 4.31–10.16)

## 2022-03-03 PROCEDURE — 83690 ASSAY OF LIPASE: CPT | Performed by: INTERNAL MEDICINE

## 2022-03-03 PROCEDURE — 36415 COLL VENOUS BLD VENIPUNCTURE: CPT | Performed by: INTERNAL MEDICINE

## 2022-03-03 PROCEDURE — 80053 COMPREHEN METABOLIC PANEL: CPT | Performed by: INTERNAL MEDICINE

## 2022-03-03 PROCEDURE — 85007 BL SMEAR W/DIFF WBC COUNT: CPT | Performed by: INTERNAL MEDICINE

## 2022-03-03 PROCEDURE — 85027 COMPLETE CBC AUTOMATED: CPT | Performed by: INTERNAL MEDICINE

## 2022-03-03 PROCEDURE — 96375 TX/PRO/DX INJ NEW DRUG ADDON: CPT

## 2022-03-03 PROCEDURE — 99284 EMERGENCY DEPT VISIT MOD MDM: CPT | Performed by: EMERGENCY MEDICINE

## 2022-03-03 PROCEDURE — 93010 ELECTROCARDIOGRAM REPORT: CPT | Performed by: INTERNAL MEDICINE

## 2022-03-03 PROCEDURE — 93005 ELECTROCARDIOGRAM TRACING: CPT

## 2022-03-03 PROCEDURE — 99284 EMERGENCY DEPT VISIT MOD MDM: CPT

## 2022-03-03 PROCEDURE — 96365 THER/PROPH/DIAG IV INF INIT: CPT

## 2022-03-03 PROCEDURE — 84484 ASSAY OF TROPONIN QUANT: CPT | Performed by: INTERNAL MEDICINE

## 2022-03-03 RX ORDER — PANTOPRAZOLE SODIUM 40 MG/1
40 TABLET, DELAYED RELEASE ORAL
Status: DISCONTINUED | OUTPATIENT
Start: 2022-03-03 | End: 2022-03-03 | Stop reason: HOSPADM

## 2022-03-03 RX ORDER — SODIUM CHLORIDE, SODIUM LACTATE, POTASSIUM CHLORIDE, CALCIUM CHLORIDE 600; 310; 30; 20 MG/100ML; MG/100ML; MG/100ML; MG/100ML
125 INJECTION, SOLUTION INTRAVENOUS CONTINUOUS
Status: DISCONTINUED | OUTPATIENT
Start: 2022-03-03 | End: 2022-03-03 | Stop reason: HOSPADM

## 2022-03-03 RX ORDER — HYDROMORPHONE HCL/PF 1 MG/ML
0.5 SYRINGE (ML) INJECTION ONCE
Status: COMPLETED | OUTPATIENT
Start: 2022-03-03 | End: 2022-03-03

## 2022-03-03 RX ORDER — METOCLOPRAMIDE HYDROCHLORIDE 5 MG/ML
10 INJECTION INTRAMUSCULAR; INTRAVENOUS ONCE
Status: COMPLETED | OUTPATIENT
Start: 2022-03-03 | End: 2022-03-03

## 2022-03-03 RX ORDER — ONDANSETRON 2 MG/ML
4 INJECTION INTRAMUSCULAR; INTRAVENOUS ONCE
Status: COMPLETED | OUTPATIENT
Start: 2022-03-03 | End: 2022-03-03

## 2022-03-03 RX ADMIN — ONDANSETRON 4 MG: 2 INJECTION INTRAMUSCULAR; INTRAVENOUS at 10:51

## 2022-03-03 RX ADMIN — MORPHINE SULFATE 2 MG: 2 INJECTION, SOLUTION INTRAMUSCULAR; INTRAVENOUS at 10:51

## 2022-03-03 RX ADMIN — PANTOPRAZOLE SODIUM 40 MG: 40 TABLET, DELAYED RELEASE ORAL at 11:36

## 2022-03-03 RX ADMIN — SODIUM CHLORIDE, SODIUM LACTATE, POTASSIUM CHLORIDE, AND CALCIUM CHLORIDE 125 ML/HR: .6; .31; .03; .02 INJECTION, SOLUTION INTRAVENOUS at 13:49

## 2022-03-03 RX ADMIN — SODIUM CHLORIDE, SODIUM LACTATE, POTASSIUM CHLORIDE, AND CALCIUM CHLORIDE 1000 ML: .6; .31; .03; .02 INJECTION, SOLUTION INTRAVENOUS at 10:50

## 2022-03-03 RX ADMIN — METOCLOPRAMIDE HYDROCHLORIDE 10 MG: 5 INJECTION INTRAMUSCULAR; INTRAVENOUS at 12:53

## 2022-03-03 RX ADMIN — HYDROMORPHONE HYDROCHLORIDE 0.5 MG: 1 INJECTION, SOLUTION INTRAMUSCULAR; INTRAVENOUS; SUBCUTANEOUS at 12:39

## 2022-03-03 NOTE — ED PROVIDER NOTES
History  Chief Complaint   Patient presents with    Abdominal Pain     Pt reports abd pain for the last three days, recently seen here for same chief complaint  +N/V     Patient is 39-year-old male who presents today with primary complaints of worsening periumbilical pain, nausea vomiting which started early morning today  Reports that he was relatively asymptomatic when he woke up however soon after he noticed sudden onset periumbilical pain, cramping in nature 8/10 on intensity when worst   The pain has been fluctuating in intensity as well as exact location  This was accompanied by about 2-3 nonbloody nonbilious vomiting episodes, subsequently patient came into the ER  Patient also has diarrhea however reports that it has been a chronic issue, has close to 7-8 liquidy bowel movements every day however reports that it has been his baseline  Also complaining of some weird sensation in the chest, describes it set chest heaviness without any significant radiation  Of note patient was recently seen in the ER for similar complaints day before yesterday, improved after administration of IV narcotic pain medications as well as IV hydration  He had an appointment with his outpatient GI provider yesterday, was recommended to start Xifaxan 14 day course however patient has not been able to  his medications yet  At this time patient denies any hematemesis, BRBPR, melena, shortness of breath, cough, dizziness, lightheadedness, headache  Prior to Admission Medications   Prescriptions Last Dose Informant Patient Reported? Taking?    NON FORMULARY  Self Yes No   Sig: thc top cream   aspirin (ECOTRIN LOW STRENGTH) 81 mg EC tablet  Self No No   Sig: Take 1 tablet (81 mg total) by mouth daily   atorvastatin (LIPITOR) 80 mg tablet  Self Yes No   Sig: Take 80 mg by mouth daily   buPROPion (WELLBUTRIN SR) 150 mg 12 hr tablet   Yes No   Sig: Take 150 mg by mouth 2 (two) times a day   clonazePAM (KLONOPIN) 0 5 mg tablet  Self Yes No   Sig: Take 0 5 mg by mouth daily at bedtime    omeprazole (PriLOSEC) 20 mg delayed release capsule  Self No No   Sig: Take 1 capsule (20 mg total) by mouth daily   ondansetron (ZOFRAN-ODT) 4 mg disintegrating tablet  Self No No   Sig: Take 1 tablet (4 mg total) by mouth every 6 (six) hours as needed for nausea or vomiting      Facility-Administered Medications: None       Past Medical History:   Diagnosis Date    Diaz's esophagus     Colon polyp     Coronary artery disease     Depression     Diverticulitis     GERD (gastroesophageal reflux disease)     Hemorrhoid     History of heart artery stent     Hyperlipidemia     Hypertension     Microscopic colitis     Ulcerative colitis ([facility] [address] )        Past Surgical History:   Procedure Laterality Date    ABDOMINAL SURGERY      radio frequency ablation    BONE GRAFT Left 2018    CARPAL TUNNEL RELEASE Right     COLONOSCOPY      CORONARY ANGIOPLASTY WITH STENT PLACEMENT      x2    EGD AND COLONOSCOPY      ESOPHAGOGASTRODUODENOSCOPY N/A 2/15/2018    Procedure: ESOPHAGOGASTRODUODENOSCOPY (EGD); Surgeon: Helder Armando MD;  [facility];  Service: Gastroenterology    ESOPHAGOGASTRODUODENOSCOPY N/A 12/10/2018    Procedure: ESOPHAGOGASTRODUODENOSCOPY (EGD); Surgeon: Nataly Gonsalez MD;  [facility];   Service: Gastroenterology    HAND SURGERY Left     ID SHLDR ARTHROSCOP,SURG,W/ROTAT CUFF REPR Left 4/14/2021    Procedure: REPAIR ROTATOR CUFF  ARTHROSCOPIC; POSSIBLE BICEPS TENDONESIS, ACROMIOPLASTY;  Surgeon: Aura Sommers DO;  [facility];  Service: Orthopedics    ROTATOR CUFF REPAIR Left     SHOULDER ARTHROSCOPY Right 3/14/2022    Procedure: ARTHROSCOPY SHOULDER- Right shoulder arthroscopic rotator cuff repair, open subpectoral biceps tenodesis, subsacpular repair and extensive debridement;  Surgeon: Aura Sommers DO;  [facility];  Service: Orthopedics    TONSILLECTOMY Family History   Problem Relation Age of Onset    Heart disease Father     Melanoma Father     Cancer Sister     Skin cancer Sister     Skin cancer Mother      I have reviewed and agree with the history as documented  E-Cigarette/Vaping    E-Cigarette Use Never User      E-Cigarette/Vaping Substances    Nicotine No     THC No     CBD No     Flavoring No     Other No     Unknown No      Social History     Tobacco Use    Smoking status: Former Smoker     Packs/day: 0 50     Quit date: 1/16/2007     Years since quitting: 15 1    Smokeless tobacco: Former User     Quit date: 1/16/1998    Tobacco comment: quit 10 yrs ago   Vaping Use    Vaping Use: Never used   Substance Use Topics    Alcohol use: Not Currently     Comment: quit 5 years    Drug use: Yes     Frequency: 3 0 times per week     Types: Marijuana     Comment: advised not to smoke        Review of Systems   Constitutional: Positive for appetite change  Negative for fatigue and fever  HENT: Negative for congestion  Eyes: Negative for visual disturbance  Respiratory: Negative for cough, shortness of breath and wheezing  Cardiovascular: Negative for chest pain and leg swelling  Gastrointestinal: Positive for abdominal pain, diarrhea, nausea and vomiting  Negative for constipation  Endocrine: Negative for cold intolerance and heat intolerance  Genitourinary: Negative for decreased urine volume and difficulty urinating  Musculoskeletal: Negative for arthralgias  Skin: Negative for color change  Neurological: Negative for dizziness, light-headedness and headaches  Psychiatric/Behavioral: Negative for agitation and confusion         Physical Exam  ED Triage Vitals   Temperature Pulse Respirations Blood Pressure SpO2   03/03/22 0950 03/03/22 0950 03/03/22 0950 03/03/22 0950 03/03/22 0950   98 5 °F (36 9 °C) 80 20 (!) 191/97 99 %      Temp Source Heart Rate Source Patient Position - Orthostatic VS BP Location FiO2 (%) 03/03/22 0950 03/03/22 0950 03/03/22 1318 03/03/22 0950 --   Oral Monitor Lying Left arm       Pain Score       03/03/22 1051       8             Orthostatic Vital Signs  Vitals:    03/03/22 1200 03/03/22 1300 03/03/22 1318 03/03/22 1400   BP: 169/88 127/69 127/69 114/65   Pulse: (!) 50 90 82 74   Patient Position - Orthostatic VS:   Lying        Physical Exam  Vitals and nursing note reviewed  Constitutional:       General: He is not in acute distress  Appearance: He is not toxic-appearing  HENT:      Head: Normocephalic and atraumatic  Mouth/Throat:      Mouth: Mucous membranes are dry  Eyes:      General: No scleral icterus  Extraocular Movements: Extraocular movements intact  Cardiovascular:      Rate and Rhythm: Normal rate and regular rhythm  Pulmonary:      Effort: Pulmonary effort is normal  No respiratory distress  Breath sounds: No wheezing  Abdominal:      Palpations: Abdomen is soft  Tenderness: There is abdominal tenderness  There is no guarding or rebound  Musculoskeletal:      Right lower leg: No edema  Left lower leg: No edema  Skin:     General: Skin is warm  Capillary Refill: Capillary refill takes less than 2 seconds  Neurological:      General: No focal deficit present  Mental Status: He is alert and oriented to person, place, and time  Mental status is at baseline     Psychiatric:         Mood and Affect: Mood normal          Behavior: Behavior normal          ED Medications  Medications   ondansetron (ZOFRAN) injection 4 mg (4 mg Intravenous Given 3/3/22 1051)   morphine injection 2 mg (2 mg Intravenous Given 3/3/22 1051)   lactated ringers bolus 1,000 mL (0 mL Intravenous Stopped 3/3/22 1136)   HYDROmorphone (DILAUDID) injection 0 5 mg (0 5 mg Intravenous Given 3/3/22 1239)   metoclopramide (REGLAN) injection 10 mg (10 mg Intravenous Given 3/3/22 1253)       Diagnostic Studies  Results Reviewed     Procedure Component Value Units Date/Time    HS Troponin I 2hr [[id number]]  (Normal) Collected: 03/03/22 1341    Lab Status: Final result Specimen: Blood from Arm, Right Updated: 03/03/22 1415     hs TnI 2hr 5 ng/L      Delta 2hr hsTnI 1 ng/L     HS Troponin 0hr (reflex protocol) [[id number]]  (Normal) Collected: 03/03/22 1121    Lab Status: Final result Specimen: Blood Updated: 03/03/22 1146     hs TnI 0hr 4 ng/L     Manual Differential(PHLEBS Do Not Order) [[id number]]  (Abnormal) Collected: 03/03/22 1053    Lab Status: Final result Specimen: Blood from Arm, Left Updated: 03/03/22 1142     Segmented % 93 %      Lymphocytes % 6 %      Monocytes % 0 %      Eosinophils, % 1 %      Basophils % 0 %      Absolute Neutrophils 10 72 Thousand/uL      Lymphocytes Absolute 0 69 Thousand/uL      Monocytes Absolute 0 00 Thousand/uL      Eosinophils Absolute 0 12 Thousand/uL      Basophils Absolute 0 00 Thousand/uL      Total Counted --     Platelet Estimate Adequate    CBC and differential [[id number]]  (Abnormal) Collected: 03/03/22 1053    Lab Status: Final result Specimen: Blood from Arm, Left Updated: 03/03/22 1142     WBC 11 53 Thousand/uL      RBC 4 94 Million/uL      Hemoglobin 13 9 g/dL      Hematocrit 42 2 %      MCV 85 fL      MCH 28 1 pg      MCHC 32 9 g/dL      RDW 14 0 %      MPV 10 1 fL      Platelets 249 Thousands/uL     Narrative: This is an appended report  These results have been appended to a previously verified report      Lipase [[id number]]  (Normal) Collected: 03/03/22 1053    Lab Status: Final result Specimen: Blood from Arm, Left Updated: 03/03/22 1140     Lipase 115 u/L     Comprehensive metabolic panel [[id number]]  (Abnormal) Collected: 03/03/22 1053    Lab Status: Final result Specimen: Blood from Arm, Left Updated: 03/03/22 1116     Sodium 135 mmol/L      Potassium 3 5 mmol/L      Chloride 99 mmol/L      CO2 27 mmol/L      ANION GAP 9 mmol/L      BUN 15 mg/dL      Creatinine 1 01 mg/dL      Glucose 161 mg/dL      Calcium 8 7 mg/dL      AST 15 U/L      ALT 21 U/L      Alkaline Phosphatase 74 U/L      Total Protein 7 1 g/dL      Albumin 3 9 g/dL      Total Bilirubin 0 36 mg/dL      eGFR 82 ml/min/1 73sq m     Narrative:      [place] guidelines for Chronic Kidney Disease (CKD):     Stage 1 with normal or high GFR (GFR > 90 mL/min/1 73 square meters)    Stage 2 Mild CKD (GFR = 60-89 mL/min/1 73 square meters)    Stage 3A Moderate CKD (GFR = 45-59 mL/min/1 73 square meters)    Stage 3B Moderate CKD (GFR = 30-44 mL/min/1 73 square meters)    Stage 4 Severe CKD (GFR = 15-29 mL/min/1 73 square meters)    Stage 5 End Stage CKD (GFR <15 mL/min/1 73 square meters)  Note: GFR calculation is accurate only with a steady state creatinine                 No orders to display         Procedures  Procedures      ED Course  ED Course as of 03/15/22 1258   Thu Mar 03, 2022   1407 Abs Neutrophils(!): 10 72   1407 Monocytes Absolute: 0 00   1407 Absolute Eosinophils: 0 12   1407 Basophils Absolute: 0 00   1409 hs TnI 0hr: 4   1410 Manual Differential(PHLEBS Do Not Order)(!)        patient reported significant improvement in symptoms including nausea and abdominal pain with administration of IV Dilaudid, Reglan  He also reported some chest discomfort, EKG negative for any acute ST/T-wave changes, negative troponins x2  Also reported good tolerance to p o  Liquid diet  Subsequently he requested to be discharged  Patient is instructed to follow up with outpatient PCP and primary gastroenterologist in about 1 week's time and discuss the events of the ER visit  SBIRT 22yo+      Most Recent Value   SBIRT (24 yo +)    In order to provide better care to our patients, we are screening all of our patients for alcohol and drug use  Would it be okay to ask you these screening questions?  No Filed at: 03/03/2022 1115                MDM    Disposition  Final diagnoses:   Generalized abdominal pain     Time reflects when diagnosis was documented in both MDM as applicable and the Disposition within this note     Time User Action Codes Description Comment    3/3/2022  1:52 PM Mandy Irwin Add [R10 84] Generalized abdominal pain       ED Disposition     ED Disposition Condition Date/Time Comment    Discharge Stable Thu Mar 3, 2022  1:53 PM Ashish Luke discharge to home/self care              Follow-up Information     Follow up With Specialties Details Why Contact Info Additional Information    Rodolfo Hopson MD Internal Medicine Schedule an appointment as soon as possible for a visit in 1 week  [address] [facility] [address]       [address] [facility] Emergency Department Emergency Medicine  If symptoms worsen [address] Emergency Department, [address] [place] [address], [facility]          Discharge Medication List as of 3/3/2022  1:53 PM      CONTINUE these medications which have NOT CHANGED    Details   aspirin (ECOTRIN LOW STRENGTH) 81 mg EC tablet Take 1 tablet (81 mg total) by mouth daily, Starting Mon 2/10/2020, No Print      atorvastatin (LIPITOR) 80 mg tablet Take 80 mg by mouth daily, Historical Med      buPROPion (WELLBUTRIN SR) 150 mg 12 hr tablet Take 150 mg by mouth 2 (two) times a day, Historical Med      clonazePAM (KLONOPIN) 0 5 mg tablet Take 0 5 mg by mouth daily at bedtime , Historical Med      NON FORMULARY thc top cream, Historical Med      omeprazole (PriLOSEC) 20 mg delayed release capsule Take 1 capsule (20 mg total) by mouth daily, Starting Fri 12/6/2019, Normal      ondansetron (ZOFRAN-ODT) 4 mg disintegrating tablet Take 1 tablet (4 mg total) by mouth every 6 (six) hours as needed for nausea or vomiting, Starting Wed 4/7/2021, Normal      Diclofenac Sodium (VOLTAREN) 1 % Apply 2 g topically 4 (four) times a day for 14 days, Starting Tue 1/18/2022, Until Tue 2/1/2022, Normal      Eluxadoline 75 MG TABS Take 1 tablet (75 mg total) by mouth 2 (two) times a day, Starting Wed 3/2/2022, Normal      rifaximin (XIFAXAN) 550 mg tablet Take 1 tablet (550 mg total) by mouth every 8 (eight) hours for 14 days, Starting Wed 3/2/2022, Until Wed 3/16/2022, Normal      sertraline (ZOLOFT) 100 mg tablet Take 100 mg by mouth daily , Historical Med      SUMAtriptan (Imitrex) 50 mg tablet Take 1 tablet (50 mg total) by mouth once as needed for migraine for up to 1 dose, Starting Wed 3/2/2022, Normal           No discharge procedures on file  PDMP Review       Value Time User    PDMP Reviewed  Yes 9/14/2021  2:16 PM Janice Betancourt PA-C           ED Provider  Attending physically available and evaluated Best Ambrose I managed the patient along with the ED Attending      Electronically Signed by         Kali Santiago MD  03/03/22 [id number]       Kali Santiago MD  03/15/22 [id number]

## 2022-03-04 ENCOUNTER — TELEPHONE (OUTPATIENT)
Dept: GASTROENTEROLOGY | Facility: CLINIC | Age: 57
End: 2022-03-04
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
## 2022-03-04 NOTE — TELEPHONE ENCOUNTER
Andrea Galicia pt  Patient called left message, he is having trouble getting medication from [facility], [facility] cost $2000, [facility]  He doesn't know what to do, he feels he has an infection and will have to go back to the ED

## 2022-03-07 ENCOUNTER — OFFICE VISIT (OUTPATIENT)
Dept: CARDIOLOGY CLINIC | Facility: CLINIC | Age: 57
End: 2022-03-07
Payer: MEDICARE

## 2022-03-07 VITALS
SYSTOLIC BLOOD PRESSURE: 120 MMHG | HEART RATE: 88 BPM | HEIGHT: 70 IN | DIASTOLIC BLOOD PRESSURE: 80 MMHG | WEIGHT: 207 LBS | BODY MASS INDEX: 29.63 KG/M2

## 2022-03-07 DIAGNOSIS — Z01.810 PRE-OPERATIVE CARDIOVASCULAR EXAMINATION: Primary | ICD-10-CM

## 2022-03-07 DIAGNOSIS — I25.10 CORONARY ARTERY DISEASE INVOLVING NATIVE HEART WITHOUT ANGINA PECTORIS, UNSPECIFIED VESSEL OR LESION TYPE: ICD-10-CM

## 2022-03-07 DIAGNOSIS — I10 PRIMARY HYPERTENSION: ICD-10-CM

## 2022-03-07 PROCEDURE — 99215 OFFICE O/P EST HI 40 MIN: CPT | Performed by: INTERNAL MEDICINE

## 2022-03-07 PROCEDURE — 93000 ELECTROCARDIOGRAM COMPLETE: CPT | Performed by: INTERNAL MEDICINE

## 2022-03-07 NOTE — PROGRESS NOTES
Patient ID: Lui Ramos is a 61 y o  male  Plan:      Coronary artery disease  RCA stenting X 2 in 2019  No similar symptoms since  Negative ischemic testing 8/2020  Continue asa, statin    Hypertension  Blood pressure well controlled    Pre-operative cardiovascular examination  Stable to proceed with planned shoulder surgery  No indication for additional cardiac testing  Pt is an acceptable risk for low risk surgery       Follow up Plan/Summary Comments:  Rj Oconnell is stable from a cardiac perspective for his planned orthopedic surgery  There is no indication for additional cardiac testing at this time  HPI: I had the pleasure of meeting Rj Oconnell in the office today for a preop cardiovascular risk assessment  He is planning for shoulder surgery 03/16/2022  Prior cardiac history is significant for CAD for which he had stenting of the distal and proximal RCA 04/2019  He has not had any recurrent chest discomfort or changes in exertional capacity since that time  He denies any changes in breathing, palpitations, syncope  He does occasionally have some GI issues associated with his known GERD and cyclical vomiting syndrome and IBS  This morning, he was busy chopping wood for 2 hours without any reproduction of cardiac symptoms  Review of Systems   10  point ROS  was otherwise non pertinent or negative except as per HPI or as below  Gait: Normal      Most recent or relevant cardiac/vascular testing:    NM Stress 8/26/2020 normal study  Normal EF    Results for orders placed or performed in visit on 03/07/22   POCT ECG    Impression    SR, 83           Objective:     /80   Pulse 88   Ht 5' 10" (1 778 m)   Wt 93 9 kg (207 lb)   BMI 29 70 kg/m²     PHYSICAL EXAM:    General:  Normal appearance, no acute distress  Eyes:  Anicteric  Oral mucosa: Wearing a mask  Neck:  No JVD  Carotid upstrokes are brisk without bruits  No masses  Chest:  Clear to auscultation   Cardiac:  No palpable PMI  Normal S1 and S2  No murmur gallop or rub  Abdomen:  Soft and nontender  No palpable organomegaly or aortic enlargement  Extremities:  No peripheral edema  Musculoskeletal:  Symmetric  Vascular:  Pedal pulses are intact  Neuro:  Grossly symmetric  Psych:  Alert and oriented x3      Allergies   Allergen Reactions    Rosuvastatin Myalgia    Shellfish Allergy - Food Allergy Rash and Lip Swelling       Current Outpatient Medications:     aspirin (ECOTRIN LOW STRENGTH) 81 mg EC tablet, Take 1 tablet (81 mg total) by mouth daily, Disp: , Rfl: 0    atorvastatin (LIPITOR) 80 mg tablet, Take 80 mg by mouth daily, Disp: , Rfl:     buPROPion (WELLBUTRIN SR) 150 mg 12 hr tablet, Take 150 mg by mouth 2 (two) times a day, Disp: , Rfl:     clonazePAM (KLONOPIN) 0 5 mg tablet, Take 0 5 mg by mouth daily at bedtime , Disp: , Rfl:     NON FORMULARY, thc top cream, Disp: , Rfl:     omeprazole (PriLOSEC) 20 mg delayed release capsule, Take 1 capsule (20 mg total) by mouth daily, Disp: 60 capsule, Rfl: 3    ondansetron (ZOFRAN-ODT) 4 mg disintegrating tablet, Take 1 tablet (4 mg total) by mouth every 6 (six) hours as needed for nausea or vomiting, Disp: 20 tablet, Rfl: 0    Diclofenac Sodium (VOLTAREN) 1 %, Apply 2 g topically 4 (four) times a day for 14 days (Patient not taking: Reported on 3/7/2022 ), Disp: 112 g, Rfl: 0    Eluxadoline 75 MG TABS, Take 1 tablet (75 mg total) by mouth 2 (two) times a day (Patient not taking: Reported on 3/7/2022 ), Disp: 60 tablet, Rfl: 2    rifaximin (XIFAXAN) 550 mg tablet, Take 1 tablet (550 mg total) by mouth every 8 (eight) hours for 14 days (Patient not taking: Reported on 3/7/2022 ), Disp: 42 tablet, Rfl: 0    sertraline (ZOLOFT) 100 mg tablet, Take 100 mg by mouth daily  (Patient not taking: Reported on 3/7/2022 ), Disp: , Rfl:     SUMAtriptan (Imitrex) 50 mg tablet, Take 1 tablet (50 mg total) by mouth once as needed for migraine for up to 1 dose (Patient not taking: Reported on 3/7/2022 ), Disp: 9 tablet, Rfl: 0  Past Medical History:   Diagnosis Date    Diaz's esophagus     Colon polyp     Coronary artery disease     Depression     Diverticulitis     GERD (gastroesophageal reflux disease)     Hemorrhoid     History of heart artery stent     Hyperlipidemia     Hypertension     Microscopic colitis     Ulcerative colitis ([facility] [address] )      Past Surgical History:   Procedure Laterality Date    ABDOMINAL SURGERY      radio frequency ablation    BONE GRAFT Left 2018    CARPAL TUNNEL RELEASE Right     COLONOSCOPY      CORONARY ANGIOPLASTY WITH STENT PLACEMENT      EGD AND COLONOSCOPY      ESOPHAGOGASTRODUODENOSCOPY N/A 2/15/2018    Procedure: ESOPHAGOGASTRODUODENOSCOPY (EGD); Surgeon: Emmanuelle Bass MD;  [facility];  Service: Gastroenterology    ESOPHAGOGASTRODUODENOSCOPY N/A 12/10/2018    Procedure: ESOPHAGOGASTRODUODENOSCOPY (EGD); Surgeon: Isha Corado MD;  [facility];   Service: Gastroenterology    HAND SURGERY Left     DE SHLDR ARTHROSCOP,SURG,W/ROTAT CUFF REPR Left 4/14/2021    Procedure: REPAIR ROTATOR CUFF  ARTHROSCOPIC; POSSIBLE BICEPS TENDONESIS, ACROMIOPLASTY;  Surgeon: Shana Galeano DO;  [facility];  Service: Orthopedics    TONSILLECTOMY         CMP:   Lab Results   Component Value Date    K 3 5 03/03/2022    CL 99 (L) 03/03/2022    CO2 27 03/03/2022    CO2 26 02/15/2018    BUN 15 03/03/2022    CREATININE 1 01 03/03/2022    GLUCOSE 158 (H) 02/15/2018    EGFR 82 03/03/2022     Lipid Profile:    Lab Results   Component Value Date    TRIG 187 (H) 04/05/2021    HDL 55 04/05/2021         Social History     Tobacco Use   Smoking Status Former Smoker    Packs/day: 0 50    Quit date: 1/16/2007    Years since quitting: 15 1   Smokeless Tobacco Former User    Quit date: 1/16/1998   Tobacco Comment    quit 10 yrs ago

## 2022-03-07 NOTE — ASSESSMENT & PLAN NOTE
RCA stenting X 2 in 2019    No similar symptoms since  Negative ischemic testing 8/2020  Continue asa, statin

## 2022-03-07 NOTE — ASSESSMENT & PLAN NOTE
Stable to proceed with planned shoulder surgery  No indication for additional cardiac testing    Pt is an acceptable risk for low risk surgery

## 2022-03-08 ENCOUNTER — OFFICE VISIT (OUTPATIENT)
Dept: INTERNAL MEDICINE CLINIC | Facility: CLINIC | Age: 57
End: 2022-03-08
Payer: MEDICARE

## 2022-03-08 VITALS
SYSTOLIC BLOOD PRESSURE: 118 MMHG | HEART RATE: 84 BPM | WEIGHT: 205 LBS | TEMPERATURE: 98 F | DIASTOLIC BLOOD PRESSURE: 80 MMHG | OXYGEN SATURATION: 98 % | HEIGHT: 70 IN | BODY MASS INDEX: 29.35 KG/M2 | RESPIRATION RATE: 18 BRPM

## 2022-03-08 DIAGNOSIS — Z01.818 PREOP EXAM FOR INTERNAL MEDICINE: ICD-10-CM

## 2022-03-08 DIAGNOSIS — M75.101 TEAR OF RIGHT ROTATOR CUFF, UNSPECIFIED TEAR EXTENT, UNSPECIFIED WHETHER TRAUMATIC: Primary | ICD-10-CM

## 2022-03-08 PROCEDURE — 99214 OFFICE O/P EST MOD 30 MIN: CPT | Performed by: INTERNAL MEDICINE

## 2022-03-08 NOTE — PROGRESS NOTES
Presurgical Evaluation    Subjective:       Patient ID: Ashish Luke is a 61 y o  male  Chief Complaint   Patient presents with   Jayjay Wheatleyue is here for pre-op exam  He is planned for right total arthroscopy  Medically optimized for procedure  The following portions of the patient's history were reviewed and updated as appropriate: allergies, current medications, past family history, past medical history, past social history, past surgical history and problem list     Procedure date: 3/16/22  Surgeon:  Lenard Foy DO  Planned procedure:  Right shoulder arthroscopy rotator cuff repair  Diagnosis for procedure:  Traumatic incomplete tear of right rotator cuff; Biceps tendinitis of right shoulder  Prior anesthesia: Yes   General; Complications:  None / Tolerated well    CAD History: CAD    Pulmonary History: None    Renal history: None    Diabetes History:  None     Neurological History: None     On Immunosuppressant meds/biologics: No      Review of Systems   Musculoskeletal: Positive for arthralgias and back pain  All other systems reviewed and are negative  Current Outpatient Medications   Medication Sig Dispense Refill    aspirin (ECOTRIN LOW STRENGTH) 81 mg EC tablet Take 1 tablet (81 mg total) by mouth daily  0    atorvastatin (LIPITOR) 80 mg tablet Take 80 mg by mouth daily      buPROPion (WELLBUTRIN SR) 150 mg 12 hr tablet Take 150 mg by mouth 2 (two) times a day      clonazePAM (KLONOPIN) 0 5 mg tablet Take 0 5 mg by mouth daily at bedtime       NON FORMULARY thc top cream      omeprazole (PriLOSEC) 20 mg delayed release capsule Take 1 capsule (20 mg total) by mouth daily 60 capsule 3    ondansetron (ZOFRAN-ODT) 4 mg disintegrating tablet Take 1 tablet (4 mg total) by mouth every 6 (six) hours as needed for nausea or vomiting 20 tablet 0     No current facility-administered medications for this visit         Allergies on file:   Rosuvastatin and Shellfish allergy - food allergy    Patient Active Problem List   Diagnosis    Abdominal pain    Anxiety and depression    Diaz's esophagus    Essential hypertension    Dyslipidemia    Obesity (BMI 30 0-34  9)    Ulcerative colitis without complications (HCC)    Gastroesophageal reflux disease with esophagitis    Non-intractable vomiting with nausea    Leukocytosis    Abnormal CT of the abdomen    Vomiting    Chest pain    Diverticulitis    Microscopic colitis    Atelectasis    Coronary artery disease    Depression, recurrent (HCC)    Hypertension    GERD (gastroesophageal reflux disease)    Hypokalemia    Alcohol dependence, in remission ([facility] [address] )    Pre-operative cardiovascular examination        Past Medical History:   Diagnosis Date    Diaz's esophagus     Colon polyp     Coronary artery disease     Depression     Diverticulitis     GERD (gastroesophageal reflux disease)     Hemorrhoid     History of heart artery stent     Hyperlipidemia     Hypertension     Microscopic colitis     Ulcerative colitis ([facility] [address] )        Past Surgical History:   Procedure Laterality Date    ABDOMINAL SURGERY      radio frequency ablation    BONE GRAFT Left 2018    CARPAL TUNNEL RELEASE Right     COLONOSCOPY      CORONARY ANGIOPLASTY WITH STENT PLACEMENT      EGD AND COLONOSCOPY      ESOPHAGOGASTRODUODENOSCOPY N/A 2/15/2018    Procedure: ESOPHAGOGASTRODUODENOSCOPY (EGD); Surgeon: Loni Delgado MD;  [facility];  Service: Gastroenterology    ESOPHAGOGASTRODUODENOSCOPY N/A 12/10/2018    Procedure: ESOPHAGOGASTRODUODENOSCOPY (EGD); Surgeon: Susana Villalobos MD;  [facility];   Service: Gastroenterology    HAND SURGERY Left     CO SHLDR ARTHROSCOP,SURG,W/ROTAT CUFF REPR Left 4/14/2021    Procedure: REPAIR ROTATOR CUFF  ARTHROSCOPIC; POSSIBLE BICEPS TENDONESIS, ACROMIOPLASTY;  Surgeon: Fahad Borden DO;  [facility];  Service: Orthopedics    TONSILLECTOMY Family History   Problem Relation Age of Onset    Heart disease Father     Melanoma Father     Cancer Sister     Skin cancer Sister     Skin cancer Mother        Social History     Tobacco Use    Smoking status: Former Smoker     Packs/day: 0 50     Quit date: 1/16/2007     Years since quitting: 15 1    Smokeless tobacco: Former User     Quit date: 1/16/1998    Tobacco comment: quit 10 yrs ago   Vaping Use    Vaping Use: Never used   Substance Use Topics    Alcohol use: Not Currently     Comment: quit 5 years    Drug use: Yes     Frequency: 3 0 times per week     Types: Marijuana       Objective:    Vitals:    03/08/22 1512   BP: 118/80   BP Location: Left arm   Patient Position: Sitting   Cuff Size: Adult   Pulse: 84   Resp: 18   Temp: 98 °F (36 7 °C)   TempSrc: Temporal   SpO2: 98%   Weight: 93 kg (205 lb)   Height: 5' 10" (1 778 m)        Physical Exam  Vitals and nursing note reviewed  Constitutional:       Appearance: Normal appearance  HENT:      Head: Normocephalic and atraumatic  Mouth/Throat:      Mouth: Mucous membranes are moist    Cardiovascular:      Rate and Rhythm: Normal rate  Heart sounds: Normal heart sounds  Pulmonary:      Effort: Pulmonary effort is normal       Breath sounds: Normal breath sounds  Abdominal:      Palpations: Abdomen is soft  Musculoskeletal:         General: Normal range of motion  Cervical back: Normal range of motion  Skin:     General: Skin is warm and dry  Neurological:      General: No focal deficit present  Mental Status: He is alert and oriented to person, place, and time     Psychiatric:         Mood and Affect: Mood normal            Preop labs/testing available and reviewed: yes    eGFR   Date Value Ref Range Status   03/03/2022 82 ml/min/1 73sq m Final     WBC   Date Value Ref Range Status   03/03/2022 11 53 (H) 4 31 - 10 16 Thousand/uL Final          EKG yes    Echo yes    Stress test/cath no    PFT/Carlos no    Functional capacity: Eat, Dress, walk indoors:  1-3 Mets   Pick the highest level patient can comfortably perform   4 mets or greater for surgery    RCRI  High Risk surgery? 1 Point  CAD History:         1 Point   MI; Positive Stress Test; CP due to Mi;  Nitrate Usage to control Angina; Pathologic Q wave on EKG  CHF Active:         1 Point   Pulm Edema; Paroxysmal Nocturnal Dyspnea;  Bibasilar Rales (crackles);S3; CHF on CXR  Cerebrovascular Disease (TIA or CVA):     1 Point  DM on Insulin:        1 Point  Serum Creat >2 0 mg/dl:       1 Point          Total Points: 1     Scorin: Class I, Very Low Risk (0 4%)     1: Class II, Low risk (0 9%)     2: Class III Moderate (6 6%)     3: Class IV High (>11%)      IRMA Risk:  GFR:   eGFR   Date Value Ref Range Status   2022 82 ml/min/1 73sq m Final         For PCP:  If GFR>60, Hold ACE/ARB/Diuretic on the day of surgery, and NSAIDS 10 days before  If GFR<45, Consider PRE and POST op Nephrology Consult  If 46 <GFR> 59 : Has Patient had IRMA in last 6 Months? no   If YES: Preop Nephrology consult   If No:  [facility] Nephrology consult  Assessment/Plan:    Patient is medically optimized (cleared) for the planned procedure  Further testing/evaluation is not required      Postop concerns: no    Problem List Items Addressed This Visit     None      Visit Diagnoses     Tear of right rotator cuff, unspecified tear extent, unspecified whether traumatic    -  Primary    Preop exam for internal medicine               Diagnoses and all orders for this visit:    Tear of right rotator cuff, unspecified tear extent, unspecified whether traumatic    Preop exam for internal medicine          Pre-Surgery Instructions:   Medication Instructions    aspirin (ECOTRIN LOW STRENGTH) 81 mg EC tablet per anesthesia guidelines     atorvastatin (LIPITOR) 80 mg tablet per anesthesia guidelines     buPROPion (WELLBUTRIN SR) 150 mg 12 hr tablet per anesthesia guidelines     clonazePAM (KLONOPIN) 0 5 mg tablet per anesthesia guidelines     NON FORMULARY per anesthesia guidelines     omeprazole (PriLOSEC) 20 mg delayed release capsule per anesthesia guidelines     ondansetron (ZOFRAN-ODT) 4 mg disintegrating tablet per anesthesia guidelines         NOTE: Please use the above to review important meds for your specialty, the remainder "per anesthesia Guidelines "    NOTE: Please place an Inbasket message for "[facility]" pool for complicated patients

## 2022-03-10 ENCOUNTER — TELEPHONE (OUTPATIENT)
Dept: OBGYN CLINIC | Facility: CLINIC | Age: 57
End: 2022-03-10

## 2022-03-10 NOTE — TELEPHONE ENCOUNTER
Spoke with patient he is okay to move surgery date to 03/14/22     Patient is aware of new surgery date I have personally seen and examined this patient.  I have fully participated in the care of this patient. I have reviewed all pertinent clinical information, including history, physical exam, plan and the Resident’s note and agree except as noted.

## 2022-03-11 NOTE — PRE-PROCEDURE INSTRUCTIONS
Pre-Surgery Instructions:   Medication Instructions    atorvastatin (LIPITOR) 80 mg tablet Instructed patient per Anesthesia Guidelines  take 3/14    buPROPion (WELLBUTRIN SR) 150 mg 12 hr tablet Instructed patient per Anesthesia Guidelines  take 3/14    clonazePAM (KLONOPIN) 0 5 mg tablet Instructed patient per Anesthesia Guidelines  pm useage    Homeopathic Products (ARNICARE ARTHRITIS PO) Instructed patient per Anesthesia Guidelines  stop 3/11    omeprazole (PriLOSEC) 20 mg delayed release capsule Instructed patient per Anesthesia Guidelines  may take 3/14    ondansetron (ZOFRAN-ODT) 4 mg disintegrating tablet Instructed patient per Anesthesia Guidelines  may take   Pre procedure instructions given, verbalizes understanding NPO after MC  Bathing reviewed  Bring sling  Stop supplements/vitamins

## 2022-03-14 ENCOUNTER — HOSPITAL ENCOUNTER (OUTPATIENT)
Facility: HOSPITAL | Age: 57
Setting detail: OUTPATIENT SURGERY
Discharge: HOME | End: 2022-03-14
Attending: ORTHOPAEDIC SURGERY | Admitting: ORTHOPAEDIC SURGERY
Payer: MEDICARE

## 2022-03-14 ENCOUNTER — ANESTHESIA (OUTPATIENT)
Dept: PERIOP | Facility: HOSPITAL | Age: 57
End: 2022-03-14
Payer: MEDICARE

## 2022-03-14 ENCOUNTER — ANESTHESIA EVENT (OUTPATIENT)
Dept: PERIOP | Facility: HOSPITAL | Age: 57
End: 2022-03-14
Payer: MEDICARE

## 2022-03-14 VITALS
RESPIRATION RATE: 22 BRPM | BODY MASS INDEX: 29.19 KG/M2 | DIASTOLIC BLOOD PRESSURE: 88 MMHG | TEMPERATURE: 97.5 F | WEIGHT: 203.93 LBS | HEART RATE: 82 BPM | SYSTOLIC BLOOD PRESSURE: 158 MMHG | HEIGHT: 70 IN | OXYGEN SATURATION: 95 %

## 2022-03-14 DIAGNOSIS — M75.111 INCOMPLETE ROTATOR CUFF TEAR OR RUPTURE OF RIGHT SHOULDER, NOT SPECIFIED AS TRAUMATIC: Primary | ICD-10-CM

## 2022-03-14 PROCEDURE — 29823 SHO ARTHRS SRG XTNSV DBRDMT: CPT | Performed by: ORTHOPAEDIC SURGERY

## 2022-03-14 PROCEDURE — C9290 INJ, BUPIVACAINE LIPOSOME: HCPCS | Performed by: ANESTHESIOLOGY

## 2022-03-14 PROCEDURE — C1713 ANCHOR/SCREW BN/BN,TIS/BN: HCPCS | Performed by: ORTHOPAEDIC SURGERY

## 2022-03-14 PROCEDURE — 29827 SHO ARTHRS SRG RT8TR CUF RPR: CPT | Performed by: ORTHOPAEDIC SURGERY

## 2022-03-14 PROCEDURE — 23430 REPAIR BICEPS TENDON: CPT | Performed by: ORTHOPAEDIC SURGERY

## 2022-03-14 PROCEDURE — 23430 REPAIR BICEPS TENDON: CPT | Performed by: PHYSICIAN ASSISTANT

## 2022-03-14 PROCEDURE — 29823 SHO ARTHRS SRG XTNSV DBRDMT: CPT | Performed by: PHYSICIAN ASSISTANT

## 2022-03-14 PROCEDURE — 29827 SHO ARTHRS SRG RT8TR CUF RPR: CPT | Performed by: PHYSICIAN ASSISTANT

## 2022-03-14 DEVICE — BIO-COMPOSITE CRKSCRW 5.5X14.7MM
Type: IMPLANTABLE DEVICE | Site: SHOULDER | Status: FUNCTIONAL
Brand: ARTHREX®

## 2022-03-14 DEVICE — BIO-COMP SWVLK C, CLD 4.75X19.1MM
Type: IMPLANTABLE DEVICE | Site: SHOULDER | Status: FUNCTIONAL
Brand: ARTHREX®

## 2022-03-14 DEVICE — SYSTEM IMPLANT DEL PROX TENODSIS REPAIR: Type: IMPLANTABLE DEVICE | Site: SHOULDER | Status: FUNCTIONAL

## 2022-03-14 RX ORDER — LIDOCAINE HYDROCHLORIDE 10 MG/ML
INJECTION, SOLUTION EPIDURAL; INFILTRATION; INTRACAUDAL; PERINEURAL AS NEEDED
Status: DISCONTINUED | OUTPATIENT
Start: 2022-03-14 | End: 2022-03-14

## 2022-03-14 RX ORDER — SUCCINYLCHOLINE/SOD CL,ISO/PF 100 MG/5ML
SYRINGE (ML) INTRAVENOUS AS NEEDED
Status: DISCONTINUED | OUTPATIENT
Start: 2022-03-14 | End: 2022-03-14

## 2022-03-14 RX ORDER — FENTANYL CITRATE/PF 50 MCG/ML
25 SYRINGE (ML) INJECTION
Status: DISCONTINUED | OUTPATIENT
Start: 2022-03-14 | End: 2022-03-14 | Stop reason: HOSPADM

## 2022-03-14 RX ORDER — BUPIVACAINE HYDROCHLORIDE 5 MG/ML
INJECTION, SOLUTION PERINEURAL
Status: COMPLETED | OUTPATIENT
Start: 2022-03-14 | End: 2022-03-14

## 2022-03-14 RX ORDER — KETAMINE HCL IN NACL, ISO-OSM 100MG/10ML
SYRINGE (ML) INJECTION AS NEEDED
Status: DISCONTINUED | OUTPATIENT
Start: 2022-03-14 | End: 2022-03-14

## 2022-03-14 RX ORDER — HYDROMORPHONE HCL 110MG/55ML
0.5 PATIENT CONTROLLED ANALGESIA SYRINGE INTRAVENOUS
Status: COMPLETED | OUTPATIENT
Start: 2022-03-14 | End: 2022-03-14

## 2022-03-14 RX ORDER — ONDANSETRON 2 MG/ML
4 INJECTION INTRAMUSCULAR; INTRAVENOUS ONCE AS NEEDED
Status: DISCONTINUED | OUTPATIENT
Start: 2022-03-14 | End: 2022-03-14 | Stop reason: HOSPADM

## 2022-03-14 RX ORDER — DEXAMETHASONE SODIUM PHOSPHATE 10 MG/ML
INJECTION, SOLUTION INTRAMUSCULAR; INTRAVENOUS AS NEEDED
Status: DISCONTINUED | OUTPATIENT
Start: 2022-03-14 | End: 2022-03-14

## 2022-03-14 RX ORDER — OXYCODONE HYDROCHLORIDE AND ACETAMINOPHEN 5; 325 MG/1; MG/1
1 TABLET ORAL EVERY 4 HOURS PRN
Qty: 20 TABLET | Refills: 0 | Status: SHIPPED | OUTPATIENT
Start: 2022-03-14 | End: 2022-03-18 | Stop reason: SDUPTHER

## 2022-03-14 RX ORDER — CEFAZOLIN SODIUM 2 G/50ML
2000 SOLUTION INTRAVENOUS ONCE
Status: COMPLETED | OUTPATIENT
Start: 2022-03-14 | End: 2022-03-14

## 2022-03-14 RX ORDER — CHLORHEXIDINE GLUCONATE 4 G/100ML
SOLUTION TOPICAL DAILY PRN
Status: DISCONTINUED | OUTPATIENT
Start: 2022-03-14 | End: 2022-03-14 | Stop reason: HOSPADM

## 2022-03-14 RX ORDER — EPHEDRINE SULFATE 50 MG/ML
INJECTION INTRAVENOUS AS NEEDED
Status: DISCONTINUED | OUTPATIENT
Start: 2022-03-14 | End: 2022-03-14

## 2022-03-14 RX ORDER — ONDANSETRON 2 MG/ML
4 INJECTION INTRAMUSCULAR; INTRAVENOUS EVERY 6 HOURS PRN
Status: DISCONTINUED | OUTPATIENT
Start: 2022-03-14 | End: 2022-03-14 | Stop reason: HOSPADM

## 2022-03-14 RX ORDER — SODIUM CHLORIDE, SODIUM LACTATE, POTASSIUM CHLORIDE, AND CALCIUM CHLORIDE .6; .31; .03; .02 G/100ML; G/100ML; G/100ML; G/100ML
IRRIGANT IRRIGATION AS NEEDED
Status: DISCONTINUED | OUTPATIENT
Start: 2022-03-14 | End: 2022-03-14 | Stop reason: HOSPADM

## 2022-03-14 RX ORDER — SODIUM CHLORIDE, SODIUM LACTATE, POTASSIUM CHLORIDE, CALCIUM CHLORIDE 600; 310; 30; 20 MG/100ML; MG/100ML; MG/100ML; MG/100ML
125 INJECTION, SOLUTION INTRAVENOUS CONTINUOUS
Status: DISCONTINUED | OUTPATIENT
Start: 2022-03-14 | End: 2022-03-14 | Stop reason: HOSPADM

## 2022-03-14 RX ORDER — KETOROLAC TROMETHAMINE 30 MG/ML
15 INJECTION, SOLUTION INTRAMUSCULAR; INTRAVENOUS ONCE
Status: COMPLETED | OUTPATIENT
Start: 2022-03-14 | End: 2022-03-14

## 2022-03-14 RX ORDER — FENTANYL CITRATE 50 UG/ML
INJECTION, SOLUTION INTRAMUSCULAR; INTRAVENOUS
Status: COMPLETED | OUTPATIENT
Start: 2022-03-14 | End: 2022-03-14

## 2022-03-14 RX ORDER — SODIUM CHLORIDE, SODIUM LACTATE, POTASSIUM CHLORIDE, CALCIUM CHLORIDE 600; 310; 30; 20 MG/100ML; MG/100ML; MG/100ML; MG/100ML
20 INJECTION, SOLUTION INTRAVENOUS CONTINUOUS
Status: CANCELLED | OUTPATIENT
Start: 2022-03-14

## 2022-03-14 RX ORDER — GLYCOPYRROLATE 0.2 MG/ML
INJECTION INTRAMUSCULAR; INTRAVENOUS AS NEEDED
Status: DISCONTINUED | OUTPATIENT
Start: 2022-03-14 | End: 2022-03-14

## 2022-03-14 RX ORDER — MIDAZOLAM HYDROCHLORIDE 2 MG/2ML
INJECTION, SOLUTION INTRAMUSCULAR; INTRAVENOUS
Status: COMPLETED | OUTPATIENT
Start: 2022-03-14 | End: 2022-03-14

## 2022-03-14 RX ORDER — PROPOFOL 10 MG/ML
INJECTION, EMULSION INTRAVENOUS AS NEEDED
Status: DISCONTINUED | OUTPATIENT
Start: 2022-03-14 | End: 2022-03-14

## 2022-03-14 RX ORDER — ONDANSETRON 2 MG/ML
INJECTION INTRAMUSCULAR; INTRAVENOUS AS NEEDED
Status: DISCONTINUED | OUTPATIENT
Start: 2022-03-14 | End: 2022-03-14

## 2022-03-14 RX ORDER — NEOSTIGMINE METHYLSULFATE 1 MG/ML
INJECTION INTRAVENOUS AS NEEDED
Status: DISCONTINUED | OUTPATIENT
Start: 2022-03-14 | End: 2022-03-14

## 2022-03-14 RX ORDER — ROCURONIUM BROMIDE 10 MG/ML
INJECTION, SOLUTION INTRAVENOUS AS NEEDED
Status: DISCONTINUED | OUTPATIENT
Start: 2022-03-14 | End: 2022-03-14

## 2022-03-14 RX ORDER — CHLORHEXIDINE GLUCONATE 0.12 MG/ML
15 RINSE ORAL ONCE
Status: COMPLETED | OUTPATIENT
Start: 2022-03-14 | End: 2022-03-14

## 2022-03-14 RX ORDER — OXYCODONE HYDROCHLORIDE AND ACETAMINOPHEN 5; 325 MG/1; MG/1
1 TABLET ORAL ONCE
Status: COMPLETED | OUTPATIENT
Start: 2022-03-14 | End: 2022-03-14

## 2022-03-14 RX ADMIN — Medication 100 MG: at 08:20

## 2022-03-14 RX ADMIN — HYDROMORPHONE HYDROCHLORIDE 0.5 MG: 2 INJECTION, SOLUTION INTRAMUSCULAR; INTRAVENOUS; SUBCUTANEOUS at 13:08

## 2022-03-14 RX ADMIN — ONDANSETRON 4 MG: 2 INJECTION INTRAMUSCULAR; INTRAVENOUS at 10:04

## 2022-03-14 RX ADMIN — PROPOFOL 200 MG: 10 INJECTION, EMULSION INTRAVENOUS at 08:20

## 2022-03-14 RX ADMIN — Medication 30 MG: at 08:20

## 2022-03-14 RX ADMIN — HYDROMORPHONE HYDROCHLORIDE 0.5 MG: 2 INJECTION, SOLUTION INTRAMUSCULAR; INTRAVENOUS; SUBCUTANEOUS at 11:55

## 2022-03-14 RX ADMIN — CHLORHEXIDINE GLUCONATE 0.12% ORAL RINSE 15 ML: 1.2 LIQUID ORAL at 07:17

## 2022-03-14 RX ADMIN — FENTANYL CITRATE 25 MCG: 50 INJECTION, SOLUTION INTRAMUSCULAR; INTRAVENOUS at 12:08

## 2022-03-14 RX ADMIN — BUPIVACAINE HYDROCHLORIDE 7 ML: 5 INJECTION, SOLUTION PERINEURAL at 07:30

## 2022-03-14 RX ADMIN — DEXAMETHASONE SODIUM PHOSPHATE 8 MG: 10 INJECTION, SOLUTION INTRAMUSCULAR; INTRAVENOUS at 08:30

## 2022-03-14 RX ADMIN — MIDAZOLAM HYDROCHLORIDE 2 MG: 1 INJECTION, SOLUTION INTRAMUSCULAR; INTRAVENOUS at 07:30

## 2022-03-14 RX ADMIN — FENTANYL CITRATE 100 MCG: 50 INJECTION, SOLUTION INTRAMUSCULAR; INTRAVENOUS at 07:30

## 2022-03-14 RX ADMIN — BUPIVACAINE 15 ML: 13.3 INJECTION, SUSPENSION, LIPOSOMAL INFILTRATION at 07:30

## 2022-03-14 RX ADMIN — HYDROMORPHONE HYDROCHLORIDE 0.5 MG: 2 INJECTION, SOLUTION INTRAMUSCULAR; INTRAVENOUS; SUBCUTANEOUS at 12:12

## 2022-03-14 RX ADMIN — HYDROMORPHONE HYDROCHLORIDE 0.5 MG: 2 INJECTION, SOLUTION INTRAMUSCULAR; INTRAVENOUS; SUBCUTANEOUS at 12:48

## 2022-03-14 RX ADMIN — FENTANYL CITRATE 25 MCG: 50 INJECTION, SOLUTION INTRAMUSCULAR; INTRAVENOUS at 11:45

## 2022-03-14 RX ADMIN — FENTANYL CITRATE 25 MCG: 50 INJECTION, SOLUTION INTRAMUSCULAR; INTRAVENOUS at 11:40

## 2022-03-14 RX ADMIN — Medication 20 MG: at 10:56

## 2022-03-14 RX ADMIN — KETOROLAC TROMETHAMINE 15 MG: 30 INJECTION, SOLUTION INTRAMUSCULAR at 13:13

## 2022-03-14 RX ADMIN — OXYCODONE HYDROCHLORIDE AND ACETAMINOPHEN 1 TABLET: 5; 325 TABLET ORAL at 14:30

## 2022-03-14 RX ADMIN — HYDROMORPHONE HYDROCHLORIDE 0.5 MG: 2 INJECTION, SOLUTION INTRAMUSCULAR; INTRAVENOUS; SUBCUTANEOUS at 12:28

## 2022-03-14 RX ADMIN — HYDROMORPHONE HYDROCHLORIDE 0.5 MG: 2 INJECTION, SOLUTION INTRAMUSCULAR; INTRAVENOUS; SUBCUTANEOUS at 13:27

## 2022-03-14 RX ADMIN — NEOSTIGMINE METHYLSULFATE 2 MG: 1 INJECTION INTRAVENOUS at 11:15

## 2022-03-14 RX ADMIN — EPHEDRINE SULFATE 10 MG: 50 INJECTION, SOLUTION INTRAVENOUS at 09:03

## 2022-03-14 RX ADMIN — ROCURONIUM BROMIDE 10 MG: 10 INJECTION, SOLUTION INTRAVENOUS at 09:31

## 2022-03-14 RX ADMIN — SODIUM CHLORIDE, SODIUM LACTATE, POTASSIUM CHLORIDE, AND CALCIUM CHLORIDE 125 ML/HR: .6; .31; .03; .02 INJECTION, SOLUTION INTRAVENOUS at 07:18

## 2022-03-14 RX ADMIN — LIDOCAINE HYDROCHLORIDE 50 MG: 10 INJECTION, SOLUTION EPIDURAL; INFILTRATION; INTRACAUDAL; PERINEURAL at 08:20

## 2022-03-14 RX ADMIN — CEFAZOLIN SODIUM 2000 MG: 2 SOLUTION INTRAVENOUS at 08:12

## 2022-03-14 RX ADMIN — GLYCOPYRROLATE 0.4 MG: 0.2 INJECTION, SOLUTION INTRAMUSCULAR; INTRAVENOUS at 11:15

## 2022-03-14 RX ADMIN — ROCURONIUM BROMIDE 40 MG: 10 INJECTION, SOLUTION INTRAVENOUS at 08:35

## 2022-03-14 NOTE — INTERVAL H&P NOTE
H&P reviewed  After examining the patient I find no changes in the patients condition since the H&P had been written  Patient was seen and evaluated in office where continued nonoperative vs surgical management of Right shoulder rotator cuff tear was discussed with patient  In light of patients continued pain and weakness despite injections, outpatient PT, patient would like to move forward with surgery by means of Right shoulder arthroscopic rotator cuff repair, open subpectoral biceps tenodesis vs possible tenotomy, possible acromioplasty, all associated procedures  Risks of surgery discussed in office with patient and detailed consent obtained  14 point ROS in office with Right shoulder pain  Patient will go to OR with Dr Toi Woodard on 03/14/2022      Heart RRR  Lungs CTA BL    Vitals:    03/14/22 0703   BP: 118/67   Pulse: 83   Resp: 20   Temp: 98 °F (36 7 °C)

## 2022-03-14 NOTE — DISCHARGE INSTRUCTIONS
Discharge Instructions - Orthopedics  Reese Cook 61 y o  male MRN: [id number]  Unit/Bed#: [facility]    Weight Bearing Status:                                           Nonweightbearing Right upper extremity  Maintain sling at all times  No active shoulder ROM at this time  DVT prophylaxis  None    Pain:  Continue analgesics as directed    Dressing Instructions:   Please keep clean, dry and intact 4-5 days post operatively  After, can remove and shower  No soaking shoulder  Allow warm soapy water to run over surgical incisions  No not remove steri strips or suture  Place bandaid or dry dressing over incisions  Appt Instructions: Follow up in office with Dr Caroline Selby on 03/24/2022    Contact the office sooner if you experience any increased numbness/tingling in the extremities  Shoulder Arthroscopy   WHAT YOU SHOULD KNOW:   Shoulder arthroscopy is a surgery to examine or repair your damaged or diseased shoulder joint  AFTER YOU LEAVE:   Medicines:   · Pain medicine: You may be given a prescription medicine to decrease pain  Do not wait until the pain is severe before you take this medicine  · Take your medicine as directed  Call your healthcare provider if you think your medicine is not helping or if you have side effects  Tell him if you are allergic to any medicine  Keep a list of the medicines, vitamins, and herbs you take  Include the amounts, and when and why you take them  Bring the list or the pill bottles to follow-up visits  Carry your medicine list with you in case of an emergency  Follow up with your primary healthcare provider or orthopedic surgeon as directed: You will need to return to have your shoulder checked and stitches removed  Write down your questions so you remember to ask them during your visits  Wound care:   · Wash your hands before and after you care for your incision wound  This will help prevent an infection  · Carefully wash the wound as directed  Dry the area and put on new, clean bandages as directed  Change your bandages when they get wet or dirty  · If you have steri-strips (thin strips of tape) over the wound, do not pull them off  Let them fall off by themselves  · Keep the stitches clean and dry  Do not trim or shorten the ends of your stitches  If they are rubbing on your clothing, you can put a soft gauze bandage between the stitches and your clothes  Self-care:   · Use ice:  Ice helps decrease swelling and pain  Ice may also help prevent tissue damage  Use an ice pack, or put crushed ice in a plastic bag  Cover it with a towel and place it on your shoulder for 15 to 20 minutes every hour or as directed  · Use an arm sling or a brace as directed: You may need to wear a sling or brace to keep your shoulder close to your body and keep it from moving  This may help your shoulder heal faster and be more comfortable  Wear your sling or brace all the time, even while you sleep, until you are told it is okay to remove it  You can remove your sling or brace to bathe  · Ask about bathing:  Do not let your affected shoulder get wet unless your primary healthcare provider says it is okay  Ask your primary healthcare provider when you can bathe or swim  · Limit activities:  Do not use your arm to lift, pull, or push  Ask when you can return to sports or physical activity  Exercises:   · Home exercises:  After your surgery, you may be asked to do light and easy exercises at first  Do not bend forward from the waist or stretch your arm across your chest in front  Do not stretch your arm behind your back  You may be able to do more as you get stronger and as the pain decreases  Follow exercise instructions from your primary healthcare provider or orthopedic surgeon  · Physical therapy:  A physical therapist can teach you safe exercises to help improve movement and strength, and to decrease pain    Contact your primary healthcare provider or orthopedic surgeon if:   · You have a fever  · You have pain and swelling in your shoulder even after you take your medicines  · Your skin is itchy, swollen, or has a rash  · You have questions or concerns about your condition or care  Seek care immediately or call 911 if:   · You have chest pain or shortness of breath  · You fall and injure your shoulder  · Blood soaks through your bandage  · Any part of your arm is numb, tingly, cool to the touch, blue, or pale  · Your incision wounds are swollen, red, or have pus coming from them  · Your stitches come apart  © 2014 [address] is for End User's use only and may not be sold, redistributed or otherwise used for commercial purposes  All illustrations and images included in CareNotes® are the copyrighted property of A D A M [employer] Inc  or Mihir Castro  The above information is an  only  It is not intended as medical advice for individual conditions or treatments  Talk to your doctor, nurse or pharmacist before following any medical regimen to see if it is safe and effective for you

## 2022-03-14 NOTE — ANESTHESIA POSTPROCEDURE EVALUATION
Post-Op Assessment Note    CV Status:  Stable  Pain Score: 4    Pain management: adequate     Mental Status:  Alert and awake   Hydration Status:  Euvolemic   PONV Controlled:  Controlled   Airway Patency:  Patent      Post Op Vitals Reviewed: Yes      Staff: Anesthesiologist         No complications documented      BP (P) 125/68 (03/14/22 1130)    Temp (!) (P) 97 4 °F (36 3 °C) (03/14/22 1130)    Pulse (P) 80 (03/14/22 1130)   Resp (P) 18 (03/14/22 1130)    SpO2   94%

## 2022-03-14 NOTE — ANESTHESIA PREPROCEDURE EVALUATION
Procedure:  ARTHROSCOPY SHOULDER- Right shoulder arthroscopic rotator cuff repair, open subpectoral biceps tenodesis vs possible biceps tenotomy, possible acromioplasty, all associated procedures (Right Shoulder)    Relevant Problems   CARDIO   (+) Coronary artery disease   (+) Essential hypertension   (+) Hypertension      GI/HEPATIC   (+) GERD (gastroesophageal reflux disease)   (+) Gastroesophageal reflux disease with esophagitis      NEURO/PSYCH   (+) Anxiety and depression   (+) Depression, recurrent (HCC)      Other   (+) Alcohol dependence, in remission ([facility] )   (+) Diaz's esophagus   (+) Ulcerative colitis without complications ([facility] )      Coronary artery disease  · S/p KARLA x2 to distal and proximal RCA 4/2019     Physical Exam    Airway    Mallampati score: II  TM Distance: >3 FB  Neck ROM: full     Dental   Comment: Denies loose teeth,     Cardiovascular  Cardiovascular exam normal    Pulmonary  Pulmonary exam normal     Other Findings  Portions of exam deferred due to low yield and/or unknown COVID status      Anesthesia Plan  ASA Score- 3     Anesthesia Type- general with ASA Monitors  Additional Monitors:   Airway Plan:           Plan Factors-Exercise tolerance (METS): >4 METS  Chart reviewed  Existing labs reviewed  Patient summary reviewed  Patient is not a current smoker  Induction- intravenous  Postoperative Plan-     Informed Consent- Anesthetic plan and risks discussed with patient  I personally reviewed this patient with the CRNA  Discussed and agreed on the Anesthesia Plan with the CRNA  Karan Alanis

## 2022-03-14 NOTE — ANESTHESIA PROCEDURE NOTES
Peripheral Block    Patient location during procedure: holding area  Start time: 3/14/2022 7:30 AM  Reason for block: at surgeon's request and post-op pain management  Staffing  Performed: Anesthesiologist   Anesthesiologist: Demetra Dutta MD  Preanesthetic Checklist  Completed: patient identified, IV checked, site marked, risks and benefits discussed, surgical consent, monitors and equipment checked, pre-op evaluation and timeout performed  Peripheral Block  Patient position: sitting  Prep: ChloraPrep  Patient monitoring: continuous pulse ox and frequent blood pressure checks  Block type: interscalene  Laterality: right  Injection technique: single-shot  Procedures: ultrasound guided, Ultrasound guidance required for the procedure to increase accuracy and safety of medication placement and decrease risk of complications    Ultrasound permanent image savedbupivacaine (MARCAINE) 0 5 % perineural infiltration, 7 mL  midazolam (VERSED) 2 mg/2 mL IV, 2 mg  fentaNYL 50 mcg/mL IV, 100 mcg  Needle  Needle type: Stimuplex   Needle gauge: 22 G  Needle length: 5 cm  Needle localization: anatomical landmarks and ultrasound guidance  Assessment  Injection assessment: incremental injection and local visualized surrounding nerve on ultrasound  Paresthesia pain: none  Heart rate change: no  Slow fractionated injection: yes  Post-procedure:  site cleaned  patient tolerated the procedure well with no immediate complications

## 2022-03-15 DIAGNOSIS — F32.A ANXIETY AND DEPRESSION: Primary | ICD-10-CM

## 2022-03-15 DIAGNOSIS — F41.9 ANXIETY AND DEPRESSION: Primary | ICD-10-CM

## 2022-03-15 RX ORDER — BUPROPION HYDROCHLORIDE 150 MG/1
150 TABLET, EXTENDED RELEASE ORAL 2 TIMES DAILY
Qty: 60 TABLET | Refills: 5 | Status: SHIPPED | OUTPATIENT
Start: 2022-03-15 | End: 2022-06-07

## 2022-03-15 NOTE — OP NOTE
OPERATIVE REPORT  PATIENT NAME: Leena Dukes    :  1965  MRN: [id number]  Pt Location: [facility] OR ROOM 01    SURGERY DATE: 3/14/2022    Surgeon(s) and Role:     * Tyler De La Cruz DO - Primary     * Brown Abad PA-C - Assisting    Preop Diagnosis:  Traumatic incomplete tear of right rotator cuff, subsequent encounter [S46 011D]  Biceps tendinitis of right shoulder [M75 21]    Post-Op Diagnosis Codes:     * Traumatic incomplete tear of right rotator cuff, subsequent encounter [S46 011D]     * Biceps tendinitis of right shoulder [M75 21]  Near full-thickness tear posterior supraspinatus and anterior infraspinatus  Near full-thickness tear of superior subscapularis tendon of right shoulder  Type 2 slap tear    Procedure(s) (LRB):  ARTHROSCOPY SHOULDER- Right shoulder arthroscopic rotator cuff repair, open subpectoral biceps tenodesis, subsacpular repair and extensive debridement (Right)    Specimen(s):  * No specimens in log *    Estimated Blood Loss:   Minimal    Drains:  * No LDAs found *    Anesthesia Type:   Choice    Operative Indications:  Traumatic incomplete tear of right rotator cuff, subsequent encounter [S46 011D]  Biceps tendinitis of right shoulder [M75 21]  Persistent pain limiting activities of daily living despite corticosteroid injection, oral analgesics, home exercise program, and activity modification    Operative Findings:  See below    Complications:   None    Procedure and Technique:    Antibiotics:  2 g Ancef    Implants:  Arthrex 4 75 mm BioComposite SwiveLock, 5 5 mm BioComposite triple loaded anchor, 2 6 x 8 5 mm proximal biceps tenodesis button    The patient was seen in the preoperative holding area and the appropriate site of surgery was confirmed and the patient was marked  Upon bringing the patient back to the operating room he was placed supine on the operating room table and general anesthesia was provided    The patient was placed in the lateral decubitus position with the right side up  The patient was held in position with a beanbag reinforce with a seat belt and tape  All bony prominences were appropriately padded and the brachial plexus was offloaded with the appropriate ramp pillow functioning as an axillary roll  An exam under anesthesia was performed and displayed near full passive range of motion without instability  The right upper extremity was prepped and draped in the usual sterile fashion and placed in 10 lbs of in-line traction  A preoperative time-out was performed to once again confirm the site of surgery and procedure  A posterior arthroscopic viewing portal was made with an 11 blade  The arthroscopic camera was inserted  Upon entering the glenohumeral joint space an anterior portal was made under direct visualization with the aid of an 18 gauge spinal needle  An anterior cannula was inserted, followed by an arthroscopic probe, and a diagnostic arthroscopy was performed  Diagnostic arthroscopy revealed type 2 slap tear with degenerative longitudinal tearing of the proximal biceps tendon  Near full-thickness tearing of the superior 1/3 of the subscapularis tendon was noted with retraction to the glenoid  Grade 1 chondromalacia was present on the glenoid articular surface  Partial-thickness tearing on the articular side of the posterior supraspinatus and anterior infraspinatus also present  At this time a meniscal biter was inserted to release the biceps tendon  This was followed by an arthroscopic shaver used to debride the proximal biceps tendon stump, frayed edges of the superior labrum, torn edges of superior subscapularis tear, undersurface of supraspinatus and infraspinatus tendon torn edges, and rotator interval tissue  Radiofrequency device was subsequently inserted to further debride rotator interval tissue and reveal the coracoid process  Arthroscopic camera was removed and attention was turned to biceps tenodesis      Right upper extremity was taken out of traction and the inferior border the pectoralis major tendon was palpated  A 15 blade was used to make a longitudinal incision extending distally from the inferior aspect of the pectoralis major tendon  Bovie cautery was used to achieve hemostasis  Metzenbaum scissors were used to dissect down to fascia at the inferior aspect to the pectoralis major tendon which was subsequently dissected  Blunt dissection was performed down to the long head of the biceps tendon  After proper retraction the long head of the biceps tendon was retrieved  It was noted to be significantly degenerative and bolus in nature with partial-thickness tearing proximally  A FiberLoop suture was used to create a whipstitch extending proximally from the musculotendinous junction  Suture ends were loaded up appropriately into proximal biceps tenodesis button  After proper exposure of the subpectoralis region and identification of latissimus dorsi tendon, Bovie cautery was used to pascual appropriate position of tenodesis gauging tension in full elbow extension  Appropriate drill was used to drill unicortically  Button was inserted to the far cortex and deployed  Sutures were tensioned and proper tension was noted at the tenodesis site  Free needle was used to pass suture through the biceps tendon and a knot was tied securing the tenodesis site  The incision was copiously irrigated and closed in a layered fashion  The right upper extremity was placed back into 10 lb of traction and attention was turned to the subscapularis repair  Footprint of subscapularis tendon on lesser tuberosity was debrided with arthroscopic shaver followed by radiofrequency device to remove soft tissue  The remainder of the subscapularis with tendon was freed using arthroscopic shaver and elevator at anterior, posterior, and superior margins    Rotator cuff grasper was used to ensure for proper mobilization of the retracted subscapularis tendon after dissection  Arthroscopic bur was inserted to lightly decorticate the footprint on the lesser tuberosity  A scorpion suture Passer was used to place a FiberTape through the superior border of the retracted subscapularis tendon  This was subsequently loaded into a 4 75 mm SwiveLock  A 70 degree scope was inserted to properly view the lesser tuberosity  Arthroscopic tap was used to make a  hole for the SwiveLock  SwiveLock was appropriately inserted and suture tape ends were tensioned, visualizing mobilization of the subscapularis to repair site  After final insertion of the SwiveLock anchor the repair was probed and noted to be stable through internal and external rotation  Residual suture from the SwiveLock anchor was subsequently passed through a more lateral portion of the repair site and tied in place  Arthroscopic pictures of subscapularis repair were taken  Attention was then turned to partial-thickness tear of supraspinatus and infraspinatus tendons  An 18 gauge spinal needle was inserted through the area of partial-thickness rotator cuff tear at the posterior supraspinatus/anterior infraspinatus junction and marked with a PDS suture  Upon completion of arthroscopic work within the glenohumeral joint, the arthroscope was removed and subsequently placed into the subacromial space  A lateral portal was made 3 cm off of the lateral aspect of the acromion in line with the rotator cuff tear  The arthroscopic shaver was inserted and a bursectomy was performed  PDS suture was identified and corresponded to partial-thickness tearing at the bursal side of the rotator cuff tendons  Probing of the area noted minimal soft tissue remaining  Arthroscopic radiofrequency ablation device was used to remove the remaining soft tissue including complete the rotator cuff tear  At this time, the arthroscopic camera was placed in the lateral portal and and the tear was further visualized  Arthroscopic bur was inserted to lightly decorticate the footprint of the repair site  Next, an accessory portal was made off of the lateral acromion for placement of anchors  A 5 5 mm triple loaded anchor was inserted  At this time sutures were passed through the rotator cuff tendon near the musculotendinous junction  Sutures were subsequently tied and proper reduction of the tear was appreciated  Final repair was visualized and probed  Proper reduction and tensioning of the rotator cuff repair was confirmed and final arthroscopic pictures were taken  Repair was stable through internal and external rotation  Arthroscopic incisions were closed with 3-0 nylon suture  A sterile dressing was applied and the patient was placed in a sling and abduction pillow  The patient tolerated the procedure well and was transferred to the PACU without complication  I was present for the entire procedure, A qualified resident physician was not available and A physician assistant, Marjan Patel PA-C was required during the procedure for patient positioning, use of arthroscopic camera for assistance in visualization during this minimally invasive procedure, manipulation of arthroscopic instruments, insertion of arthroscopic anchors, and suturing      Patient Disposition:  PACU       SIGNATURE: Sana Brody DO  DATE: March 14, 2022  TIME: 9:50 PM

## 2022-03-15 NOTE — TELEPHONE ENCOUNTER
Bupropion 150 mg 12 hr tablet-take 150 mg by mouth 2 times a day    [facility]      Patient just had shoulder surgery and is out of this medication due to he didn't get to get this filled prior to surgery due to they moved the surgery up  Patient stopped the Sertraline 2 wks ago  Please advise  Fannie Kamara

## 2022-03-18 ENCOUNTER — TELEPHONE (OUTPATIENT)
Dept: OBGYN CLINIC | Facility: HOSPITAL | Age: 57
End: 2022-03-18

## 2022-03-18 NOTE — TELEPHONE ENCOUNTER
Patient called back in regard to refill  States he only has one pill and is hoping to get refill for weekend

## 2022-03-18 NOTE — TELEPHONE ENCOUNTER
Patient is calling back in regards to his medication  He is in severe pain  I sent a tiger text to  on call       CB# [phone]

## 2022-03-18 NOTE — TELEPHONE ENCOUNTER
Patient called back to know where his medication is why no fill his medicine  I calmly explained to him that our office requires 24-48 hrs for med refills he stated no one had told him that then he porceeded to keep swearing and yelling at me I told to not talk to me like that but still proceeded to keep talking nasty and swearing to me  I again asked him to stop or I will hang up the phone    He still proceeded to talk nasty and swearing to me I said good bye sire and hung up the phone

## 2022-03-18 NOTE — TELEPHONE ENCOUNTER
DR Kevin Shaw  RE: Refill    Patient called for refill    1 pill left      oxyCODONE-acetaminophen (Percocet) 5-325 mg per tablet [[id number]]     Order Details  Dose: 1 tablet Route: Oral Frequency: Every 4 hours PRN for moderate pain   Dispense Quantity: 20 tablet Refills: 0          Sig: Take 1 tablet by mouth every 4 (four) hours as needed for moderate pain Max Daily Amount: 6 tablets       [facility]/pharmacy #[id number]- [place] - [address]  [phone]

## 2022-03-24 ENCOUNTER — OFFICE VISIT (OUTPATIENT)
Dept: OBGYN CLINIC | Facility: CLINIC | Age: 57
End: 2022-03-24

## 2022-03-24 ENCOUNTER — APPOINTMENT (OUTPATIENT)
Dept: RADIOLOGY | Facility: CLINIC | Age: 57
End: 2022-03-24
Payer: MEDICARE

## 2022-03-24 VITALS
DIASTOLIC BLOOD PRESSURE: 73 MMHG | HEART RATE: 90 BPM | SYSTOLIC BLOOD PRESSURE: 121 MMHG | BODY MASS INDEX: 29.18 KG/M2 | HEIGHT: 70 IN | WEIGHT: 203.8 LBS

## 2022-03-24 DIAGNOSIS — Z48.89 AFTERCARE FOLLOWING SURGERY: ICD-10-CM

## 2022-03-24 DIAGNOSIS — Z48.89 AFTERCARE FOLLOWING SURGERY: Primary | ICD-10-CM

## 2022-03-24 PROCEDURE — 73030 X-RAY EXAM OF SHOULDER: CPT

## 2022-03-24 PROCEDURE — 99024 POSTOP FOLLOW-UP VISIT: CPT | Performed by: ORTHOPAEDIC SURGERY

## 2022-03-24 NOTE — PROGRESS NOTES
Patient Name:  Kayla Kumar  MRN:  [id number]    Assessment & Plan     1  Aftercare following surgery  -     XR shoulder 2+ vw right; Future; Expected date: 03/24/2022        61 y o  male approximately 10 day s/p right shoulder arthroscopic rotator cuff repair, open subpectoral biceps tenodesis, subscapular repair and extensive debridement performed on 03/14/2022  Sutures taken out today and steris placed on  Xrays of the right shoulder were obtained today and reviewed with the patient  The intra-op pictures were reviewed with the patient  The shoulder exercises were demonstrated and discussed with the patient in the office that include gentle table slides, pendulums, active elbow ROM and wrist ROM  We need to be more conservative with the external rotation because of the subscapularis repair  He can start formal PT post op week 5  He is to wear the sling with pillow all the time; including sleeping  Can take off for shower and home exercises  Follow up in 4 weeks     History of the Present Illness   Kayla Kumar is a 61 y o  male approximately 10 day s/p right shoulder arthroscopic rotator cuff repair, open subpectoral biceps tenodesis, subscapular repair and extensive debridement performed on 3/14/2022  He presents today in his sling with the abduction pillow  He states that he has been taking the Percocet sparingly and can not take NSAIDs due to his Ulcerative colitis  Review of Systems     Review of Systems   Constitutional: Negative for chills, fever and unexpected weight change  HENT: Negative for hearing loss, nosebleeds and sore throat  Eyes: Negative for pain, redness and visual disturbance  Respiratory: Negative for cough, shortness of breath and wheezing  Cardiovascular: Negative for chest pain, palpitations and leg swelling  Gastrointestinal: Negative for abdominal pain, nausea and vomiting  Endocrine: Negative for polydipsia and polyuria     Genitourinary: Negative for dysuria and hematuria  Skin: Negative for rash and wound  Neurological: Negative for dizziness, light-headedness and headaches  Psychiatric/Behavioral: Negative for decreased concentration, dysphoric mood and suicidal ideas  The patient is not nervous/anxious  Physical Exam     /73   Pulse 90   Ht 5' 10" (1 778 m)   Wt 92 4 kg (203 lb 12 8 oz)   BMI 29 24 kg/m²     Right Shoulder:   Surgical incisions is clear, dry and intact with no signs of infection   Active range of motion   Passive range of motion   45 degrees of forward flexion   40 degrees of external rotation   Elbow ROM +5-130  There is little tenderness present over the portal sites  There is no strength tested with external rotation testing at the side  Empty can testing is not tested secondary to surgery   The patient is neurovascularly intact distally in the extremity  Data Review     I have personally reviewed pertinent films in PACS, and my interpretation follows  Xrays of the right shoulder:  Proper positioning of biceps tenodesis button      Social History     Tobacco Use    Smoking status: Former Smoker     Packs/day: 0 50     Quit date: 1/16/2007     Years since quitting: 15 1    Smokeless tobacco: Former User     Quit date: 1/16/1998    Tobacco comment: quit 10 yrs ago   Vaping Use    Vaping Use: Never used   Substance Use Topics    Alcohol use: Not Currently     Comment: quit 5 years    Drug use: Yes     Frequency: 3 0 times per week     Types: Marijuana     Comment: advised not to smoke           Procedures     No procedure performed today    Scribe Attestation    I,:  Kemar Whitaker am acting as a scribe while in the presence of the attending physician :       I,:  George Panchal, DO personally performed the services described in this documentation    as scribed in my presence :

## 2022-03-29 ENCOUNTER — TELEPHONE (OUTPATIENT)
Dept: OBGYN CLINIC | Facility: HOSPITAL | Age: 57
End: 2022-03-29

## 2022-03-29 DIAGNOSIS — Z48.89 AFTERCARE FOLLOWING SURGERY: Primary | ICD-10-CM

## 2022-03-29 RX ORDER — TRAMADOL HYDROCHLORIDE 50 MG/1
50 TABLET ORAL EVERY 8 HOURS PRN
Qty: 20 TABLET | Refills: 0 | Status: SHIPPED | OUTPATIENT
Start: 2022-03-29 | End: 2022-06-04

## 2022-03-29 NOTE — TELEPHONE ENCOUNTER
Pt sees Dr Jose Rafael Beltre,     Pt is calling stating he had surgery 2 weeks ago and is in pain  Pt is asking if he can be prescribed tramadol   Pt would like a non narcotic medication for pain, but something stronger than tylenol    #[phone]

## 2022-04-21 ENCOUNTER — HOSPITAL ENCOUNTER (EMERGENCY)
Facility: HOSPITAL | Age: 57
Discharge: HOME | End: 2022-04-21
Attending: EMERGENCY MEDICINE | Admitting: EMERGENCY MEDICINE
Payer: MEDICARE

## 2022-04-21 ENCOUNTER — APPOINTMENT (EMERGENCY)
Dept: CT IMAGING | Facility: HOSPITAL | Age: 57
End: 2022-04-21
Payer: MEDICARE

## 2022-04-21 VITALS
BODY MASS INDEX: 29.1 KG/M2 | TEMPERATURE: 97.8 F | HEART RATE: 105 BPM | RESPIRATION RATE: 18 BRPM | SYSTOLIC BLOOD PRESSURE: 120 MMHG | WEIGHT: 202.82 LBS | DIASTOLIC BLOOD PRESSURE: 77 MMHG | OXYGEN SATURATION: 93 %

## 2022-04-21 DIAGNOSIS — R10.84 GENERALIZED ABDOMINAL PAIN: ICD-10-CM

## 2022-04-21 DIAGNOSIS — R11.2 NAUSEA AND VOMITING: Primary | ICD-10-CM

## 2022-04-21 LAB
ALBUMIN SERPL BCP-MCNC: 3.7 G/DL (ref 3.5–5)
ALP SERPL-CCNC: 76 U/L (ref 46–116)
ALT SERPL W P-5'-P-CCNC: 16 U/L (ref 12–78)
ANION GAP SERPL CALCULATED.3IONS-SCNC: 8 MMOL/L (ref 4–13)
AST SERPL W P-5'-P-CCNC: 10 U/L (ref 5–45)
BASOPHILS # BLD AUTO: 0.02 THOUSANDS/ΜL (ref 0–0.1)
BASOPHILS NFR BLD AUTO: 0 % (ref 0–1)
BILIRUB DIRECT SERPL-MCNC: 0.1 MG/DL (ref 0–0.2)
BILIRUB SERPL-MCNC: 0.43 MG/DL (ref 0.2–1)
BILIRUB UR QL STRIP: NEGATIVE
BUN SERPL-MCNC: 19 MG/DL (ref 5–25)
CALCIUM SERPL-MCNC: 9.4 MG/DL (ref 8.3–10.1)
CHLORIDE SERPL-SCNC: 98 MMOL/L (ref 100–108)
CLARITY UR: CLEAR
CO2 SERPL-SCNC: 29 MMOL/L (ref 21–32)
COLOR UR: YELLOW
CREAT SERPL-MCNC: 1.1 MG/DL (ref 0.6–1.3)
EOSINOPHIL # BLD AUTO: 0.01 THOUSAND/ΜL (ref 0–0.61)
EOSINOPHIL NFR BLD AUTO: 0 % (ref 0–6)
ERYTHROCYTE [DISTWIDTH] IN BLOOD BY AUTOMATED COUNT: 13.2 % (ref 11.6–15.1)
GFR SERPL CREATININE-BSD FRML MDRD: 74 ML/MIN/1.73SQ M
GLUCOSE SERPL-MCNC: 131 MG/DL (ref 65–140)
GLUCOSE UR STRIP-MCNC: NEGATIVE MG/DL
HCT VFR BLD AUTO: 42.7 % (ref 36.5–49.3)
HGB BLD-MCNC: 13.9 G/DL (ref 12–17)
HGB UR QL STRIP.AUTO: NEGATIVE
IMM GRANULOCYTES # BLD AUTO: 0.05 THOUSAND/UL (ref 0–0.2)
IMM GRANULOCYTES NFR BLD AUTO: 0 % (ref 0–2)
KETONES UR STRIP-MCNC: NEGATIVE MG/DL
LACTATE SERPL-SCNC: 1.4 MMOL/L (ref 0.5–2)
LEUKOCYTE ESTERASE UR QL STRIP: NEGATIVE
LIPASE SERPL-CCNC: 73 U/L (ref 73–393)
LYMPHOCYTES # BLD AUTO: 1.09 THOUSANDS/ΜL (ref 0.6–4.47)
LYMPHOCYTES NFR BLD AUTO: 9 % (ref 14–44)
MAGNESIUM SERPL-MCNC: 1.7 MG/DL (ref 1.6–2.6)
MCH RBC QN AUTO: 28.7 PG (ref 26.8–34.3)
MCHC RBC AUTO-ENTMCNC: 32.6 G/DL (ref 31.4–37.4)
MCV RBC AUTO: 88 FL (ref 82–98)
MONOCYTES # BLD AUTO: 0.44 THOUSAND/ΜL (ref 0.17–1.22)
MONOCYTES NFR BLD AUTO: 4 % (ref 4–12)
NEUTROPHILS # BLD AUTO: 9.96 THOUSANDS/ΜL (ref 1.85–7.62)
NEUTS SEG NFR BLD AUTO: 87 % (ref 43–75)
NITRITE UR QL STRIP: NEGATIVE
NRBC BLD AUTO-RTO: 0 /100 WBCS
PH UR STRIP.AUTO: 6.5 [PH]
PLATELET # BLD AUTO: 324 THOUSANDS/UL (ref 149–390)
PMV BLD AUTO: 9.3 FL (ref 8.9–12.7)
POTASSIUM SERPL-SCNC: 4 MMOL/L (ref 3.5–5.3)
PROT SERPL-MCNC: 7.3 G/DL (ref 6.4–8.2)
PROT UR STRIP-MCNC: NEGATIVE MG/DL
RBC # BLD AUTO: 4.84 MILLION/UL (ref 3.88–5.62)
SODIUM SERPL-SCNC: 135 MMOL/L (ref 136–145)
SP GR UR STRIP.AUTO: <=1.005 (ref 1–1.03)
UROBILINOGEN UR QL STRIP.AUTO: 0.2 E.U./DL
WBC # BLD AUTO: 11.57 THOUSAND/UL (ref 4.31–10.16)

## 2022-04-21 PROCEDURE — 83605 ASSAY OF LACTIC ACID: CPT | Performed by: EMERGENCY MEDICINE

## 2022-04-21 PROCEDURE — 74177 CT ABD & PELVIS W/CONTRAST: CPT

## 2022-04-21 PROCEDURE — C9113 INJ PANTOPRAZOLE SODIUM, VIA: HCPCS | Performed by: EMERGENCY MEDICINE

## 2022-04-21 PROCEDURE — 83690 ASSAY OF LIPASE: CPT | Performed by: EMERGENCY MEDICINE

## 2022-04-21 PROCEDURE — G1004 CDSM NDSC: HCPCS

## 2022-04-21 PROCEDURE — 81003 URINALYSIS AUTO W/O SCOPE: CPT | Performed by: EMERGENCY MEDICINE

## 2022-04-21 PROCEDURE — 85025 COMPLETE CBC W/AUTO DIFF WBC: CPT | Performed by: EMERGENCY MEDICINE

## 2022-04-21 PROCEDURE — 96375 TX/PRO/DX INJ NEW DRUG ADDON: CPT

## 2022-04-21 PROCEDURE — 83735 ASSAY OF MAGNESIUM: CPT | Performed by: EMERGENCY MEDICINE

## 2022-04-21 PROCEDURE — 36415 COLL VENOUS BLD VENIPUNCTURE: CPT | Performed by: EMERGENCY MEDICINE

## 2022-04-21 PROCEDURE — 96374 THER/PROPH/DIAG INJ IV PUSH: CPT

## 2022-04-21 PROCEDURE — 80048 BASIC METABOLIC PNL TOTAL CA: CPT | Performed by: EMERGENCY MEDICINE

## 2022-04-21 PROCEDURE — 99284 EMERGENCY DEPT VISIT MOD MDM: CPT | Performed by: EMERGENCY MEDICINE

## 2022-04-21 PROCEDURE — 99284 EMERGENCY DEPT VISIT MOD MDM: CPT

## 2022-04-21 PROCEDURE — 80076 HEPATIC FUNCTION PANEL: CPT | Performed by: EMERGENCY MEDICINE

## 2022-04-21 PROCEDURE — 96361 HYDRATE IV INFUSION ADD-ON: CPT

## 2022-04-21 RX ORDER — DIAPER,BRIEF,INFANT-TODD,DISP
EACH MISCELLANEOUS
COMMUNITY
Start: 2022-04-14 | End: 2022-06-04

## 2022-04-21 RX ORDER — KETOROLAC TROMETHAMINE 30 MG/ML
15 INJECTION, SOLUTION INTRAMUSCULAR; INTRAVENOUS ONCE
Status: COMPLETED | OUTPATIENT
Start: 2022-04-21 | End: 2022-04-21

## 2022-04-21 RX ORDER — DICYCLOMINE HCL 20 MG
20 TABLET ORAL ONCE
Status: COMPLETED | OUTPATIENT
Start: 2022-04-21 | End: 2022-04-21

## 2022-04-21 RX ORDER — DIPHENHYDRAMINE HYDROCHLORIDE 50 MG/ML
12.5 INJECTION INTRAMUSCULAR; INTRAVENOUS ONCE
Status: COMPLETED | OUTPATIENT
Start: 2022-04-21 | End: 2022-04-21

## 2022-04-21 RX ORDER — PROMETHAZINE HYDROCHLORIDE 25 MG/ML
25 INJECTION, SOLUTION INTRAMUSCULAR; INTRAVENOUS ONCE
Status: COMPLETED | OUTPATIENT
Start: 2022-04-21 | End: 2022-04-21

## 2022-04-21 RX ORDER — METOCLOPRAMIDE HYDROCHLORIDE 5 MG/ML
10 INJECTION INTRAMUSCULAR; INTRAVENOUS ONCE
Status: COMPLETED | OUTPATIENT
Start: 2022-04-21 | End: 2022-04-21

## 2022-04-21 RX ORDER — PANTOPRAZOLE SODIUM 40 MG/1
40 INJECTION, POWDER, FOR SOLUTION INTRAVENOUS ONCE
Status: COMPLETED | OUTPATIENT
Start: 2022-04-21 | End: 2022-04-21

## 2022-04-21 RX ORDER — METOCLOPRAMIDE 10 MG/1
10 TABLET ORAL EVERY 6 HOURS PRN
Qty: 20 TABLET | Refills: 0 | Status: SHIPPED | OUTPATIENT
Start: 2022-04-21 | End: 2022-06-04

## 2022-04-21 RX ORDER — DICYCLOMINE HCL 20 MG
20 TABLET ORAL EVERY 8 HOURS PRN
Qty: 20 TABLET | Refills: 0 | Status: SHIPPED | OUTPATIENT
Start: 2022-04-21 | End: 2022-06-07

## 2022-04-21 RX ADMIN — PROMETHAZINE HYDROCHLORIDE 25 MG: 25 INJECTION INTRAMUSCULAR; INTRAVENOUS at 15:33

## 2022-04-21 RX ADMIN — KETOROLAC TROMETHAMINE 15 MG: 30 INJECTION, SOLUTION INTRAMUSCULAR at 11:22

## 2022-04-21 RX ADMIN — METOCLOPRAMIDE HYDROCHLORIDE 10 MG: 5 INJECTION INTRAMUSCULAR; INTRAVENOUS at 11:22

## 2022-04-21 RX ADMIN — DIPHENHYDRAMINE HYDROCHLORIDE 12.5 MG: 50 INJECTION INTRAMUSCULAR; INTRAVENOUS at 11:22

## 2022-04-21 RX ADMIN — SODIUM CHLORIDE 1000 ML: 0.9 INJECTION, SOLUTION INTRAVENOUS at 11:31

## 2022-04-21 RX ADMIN — PANTOPRAZOLE SODIUM 40 MG: 40 INJECTION, POWDER, FOR SOLUTION INTRAVENOUS at 11:22

## 2022-04-21 RX ADMIN — DICYCLOMINE HYDROCHLORIDE 20 MG: 20 TABLET ORAL at 11:22

## 2022-04-21 RX ADMIN — IOHEXOL 100 ML: 350 INJECTION, SOLUTION INTRAVENOUS at 12:37

## 2022-04-21 NOTE — DISCHARGE INSTRUCTIONS
Acute Nausea and Vomiting   WHAT YOU NEED TO KNOW:   Acute nausea and vomiting start suddenly, worsen quickly, and last a short time  DISCHARGE INSTRUCTIONS:   Seek care immediately if:   · You see blood in your vomit or your bowel movements  · You have sudden, severe pain in your chest and upper abdomen after hard vomiting or retching  · You have swelling in your neck and chest      · You are dizzy, cold, and thirsty and your eyes and mouth are dry  · You are urinating very little or not at all  · You have muscle weakness, leg cramps, and trouble breathing  · Your heart is beating much faster than normal      · You continue to vomit for more than 48 hours  Contact your healthcare provider if:   · You have frequent dry heaves (vomiting but nothing comes out)  · Your nausea and vomiting does not get better or go away after you use medicine  · You have questions or concerns about your condition or treatment  Medicines: You may need any of the following:  · Medicines  may be given to calm your stomach and stop your vomiting  You may also need medicines to help you feel more relaxed or to stop nausea and vomiting caused by motion sickness  · Gastrointestinal stimulants  are used to help empty your stomach and bowels  This may help decrease nausea and vomiting  · Take your medicine as directed  Contact your healthcare provider if you think your medicine is not helping or if you have side effects  Tell him or her if you are allergic to any medicine  Keep a list of the medicines, vitamins, and herbs you take  Include the amounts, and when and why you take them  Bring the list or the pill bottles to follow-up visits  Carry your medicine list with you in case of an emergency  Prevent or manage acute nausea and vomiting:   · Do not drink alcohol  Alcohol may upset or irritate your stomach  Too much alcohol can also cause acute nausea and vomiting  · Control stress  Headaches due to stress may cause nausea and vomiting  Find ways to relax and manage your stress  Get more rest and sleep  · Drink more liquids as directed  Vomiting can lead to dehydration  It is important to drink more liquids to help replace lost body fluids  Ask your healthcare provider how much liquid to drink each day and which liquids are best for you  Your provider may recommend that you drink an oral rehydration solution (ORS)  ORS contains water, salts, and sugar that are needed to replace the lost body fluids  Ask what kind of ORS to use, how much to drink, and where to get it  · Eat smaller meals, more often  Eat small amounts of food every 2 to 3 hours, even if you are not hungry  Food in your stomach may decrease your nausea  · Talk to your healthcare provider before you take over-the-counter (OTC) medicines  These medicines can cause serious problems if you use certain other medicines, or you have a medical condition  You may have problems if you use too much or use them for longer than the label says  Follow directions on the label carefully  Follow up with your healthcare provider as directed:  Write down your questions so you remember to ask them during your visits  © Copyright [address] 2022 Information is for End User's use only and may not be sold, redistributed or otherwise used for commercial purposes  All illustrations and images included in CareNotes® are the copyrighted property of [employer]  or [employer]  The above information is an  only  It is not intended as medical advice for individual conditions or treatments  Talk to your doctor, nurse or pharmacist before following any medical regimen to see if it is safe and effective for you  Abdominal Pain   WHAT YOU NEED TO KNOW:   Abdominal pain can be dull, achy, or sharp  You may have pain in one area of your abdomen, or in your entire abdomen   Your pain may be caused by a condition such as constipation, food sensitivity or poisoning, infection, or a blockage  Abdominal pain can also be from a hernia, appendicitis, or an ulcer  Liver, gallbladder, or kidney conditions can also cause abdominal pain  The cause of your abdominal pain may not be known  DISCHARGE INSTRUCTIONS:   Call your local emergency number (911 in the [address]) if:   · You have new chest pain or shortness of breath  Return to the emergency department if:   · You have pulsing pain in your upper abdomen or lower back that suddenly becomes constant  · Your pain is in the right lower abdominal area and worsens with movement  · You have a fever over 100 4°F (38°C) or shaking chills  · You are vomiting and cannot keep food or liquids down  · Your pain does not improve or gets worse over the next 8 to 12 hours  · You see blood in your vomit or bowel movements, or they look black and tarry  · Your skin or the whites of your eyes turn yellow  · You are a woman and have a large amount of vaginal bleeding that is not your monthly period  Call your doctor if:   · You have pain in your lower back  · You are a man and have pain in your testicles  · You have pain when you urinate  · You have questions or concerns about your condition or care  Medicines:   · Prescription pain medicine  may be given  Ask your healthcare provider how to take this medicine safely  Some prescription pain medicines contain acetaminophen  Do not take other medicines that contain acetaminophen without talking to your healthcare provider  Too much acetaminophen may cause liver damage  Prescription pain medicine may cause constipation  Ask your healthcare provider how to prevent or treat constipation  · Medicines  may be given to calm your stomach or prevent vomiting  · Take your medicine as directed  Contact your healthcare provider if you think your medicine is not helping or if you have side effects   Tell him of her if you are allergic to any medicine  Keep a list of the medicines, vitamins, and herbs you take  Include the amounts, and when and why you take them  Bring the list or the pill bottles to follow-up visits  Carry your medicine list with you in case of an emergency  Manage your symptoms:   · Apply heat  on your abdomen for 20 to 30 minutes every 2 hours for as many days as directed  Heat helps decrease pain and muscle spasms  · Make changes to the food you eat, if needed  Do not eat foods that cause abdominal pain or other symptoms  Eat small meals more often  The following changes may also help:    ? Eat more high-fiber foods if you are constipated  High-fiber foods include fruits, vegetables, whole-grain foods, and legumes  ? Do not eat foods that cause gas if you have bloating  Examples include broccoli, cabbage, and cauliflower  Do not drink soda or carbonated drinks  These may also cause gas  ? Do not eat foods or drinks that contain sorbitol or fructose if you have diarrhea and bloating  Some examples are fruit juices, candy, jelly, and sugar-free gum  ? Do not eat high-fat foods  Examples include fried foods, cheeseburgers, hot dogs, and desserts  ? Limit or do not have caffeine  Caffeine may make symptoms such as heartburn or nausea worse  ? Drink more liquids to prevent dehydration from diarrhea or vomiting  Ask your healthcare provider how much liquid to drink each day and which liquids are best for you  · Keep a diary of your abdominal pain  A diary may help your healthcare provider learn what is causing your abdominal pain  Include when the pain happens, how long it lasts, and what the pain feels like  Write down any other symptoms you have with abdominal pain  Also write down what you eat, and what symptoms you have after you eat  · Manage your stress  Stress may cause abdominal pain   Your healthcare provider may recommend relaxation techniques and deep breathing exercises to help decrease your stress  Your healthcare provider may recommend you talk to someone about your stress or anxiety, such as a counselor or a trusted friend  Get plenty of sleep and exercise regularly  · Limit or do not drink alcohol  Alcohol can make your abdominal pain worse  Ask your healthcare provider if it is safe for you to drink alcohol  Also ask how much is safe for you to drink  · Do not smoke  Nicotine and other chemicals in cigarettes can damage your esophagus and stomach  Ask your healthcare provider for information if you currently smoke and need help to quit  E-cigarettes or smokeless tobacco still contain nicotine  Talk to your healthcare provider before you use these products  Follow up with your doctor within 24 hours or as directed:  Write down your questions so you remember to ask them during your visits  © Copyright [employer] 2022 Information is for End User's use only and may not be sold, redistributed or otherwise used for commercial purposes  All illustrations and images included in CareNotes® are the copyrighted property of [employer]  or [facility] [employer] Ro   The above information is an  only  It is not intended as medical advice for individual conditions or treatments  Talk to your doctor, nurse or pharmacist before following any medical regimen to see if it is safe and effective for you

## 2022-04-21 NOTE — ED PROVIDER NOTES
History  Chief Complaint   Patient presents with    Vomiting     abdominal pain and vomiting since saturday evening, notes sharp pain to area right about umbillicus      Patient is a 44-year-old male with past medical history significant for coronary artery disease status post stents, hypertension, hyperlipidemia, microscopic ulcerative colitis, Diaz's esophagus and esophagitis, GERD, depression, anxiety, prior diverticulitis, recent right rotator cuff tear status post surgery in shoulder immobilizer, presents to the emergency department for abdominal pain and vomiting that started on Sunday  Patient reports he has a history of chronic abdominal issues and frequent episodes of vomiting  He states on Sunday he began having abdominal pain localized to the right periumbilical region however now the pain is more generalized  Patient reports the pain comes and goes, is burning and crampy in nature  He reports having frequent vomiting episodes since the pain started, at least 5-10 episodes daily  He reports vomitus has bile in it and he has also noted streaks of dark brown/red blood which happened after he already started vomiting for several days  He reports chronic diarrhea but no worsening of this  He states he has had a subjective fever last night but did not take his temperature  He also reports feeling very weak generally with lack of energy  He also reports postural lightheadedness  He denies any headache, vertigo, neck pain or stiffness, cough, URI symptoms, chest pain, palpitations, dyspnea, abdominal distension, blood per rectum, melena, dysuria, change in frequency, hematuria, flank pain, skin rash or color change, extremity weakness or paresthesia or other focal neurologic deficits  Patient states he tried taking multiple doses of Zofran ODT at home without any relief        History provided by:  Patient and medical records   used: No    Vomiting  Associated symptoms: abdominal pain, diarrhea and fever    Associated symptoms: no chills, no cough, no headaches and no sore throat        Prior to Admission Medications   Prescriptions Last Dose Informant Patient Reported? Taking?    Homeopathic Products (ARNICARE ARTHRITIS PO)  Self Yes No   Sig: Take by mouth   NON FORMULARY  Self Yes No   Sig: thc top cream   aspirin (ECOTRIN LOW STRENGTH) 81 mg EC tablet  Self No No   Sig: Take 1 tablet (81 mg total) by mouth daily   atorvastatin (LIPITOR) 80 mg tablet  Self Yes No   Sig: Take 80 mg by mouth daily   bacitracin ointment   Yes Yes   Sig: APPLY MODERATE AMOUNT TOPICALLY TWICE A DAY TO AFFECTED AREA FOR SKIN CONDITION   buPROPion (WELLBUTRIN SR) 150 mg 12 hr tablet  Self No No   Sig: Take 1 tablet (150 mg total) by mouth 2 (two) times a day   clonazePAM (KLONOPIN) 0 5 mg tablet  Self Yes No   Sig: Take 0 5 mg by mouth daily at bedtime    omeprazole (PriLOSEC) 20 mg delayed release capsule  Self No No   Sig: Take 1 capsule (20 mg total) by mouth daily   ondansetron (ZOFRAN-ODT) 4 mg disintegrating tablet  Self No No   Sig: Take 1 tablet (4 mg total) by mouth every 6 (six) hours as needed for nausea or vomiting   oxyCODONE-acetaminophen (Percocet) 5-325 mg per tablet  Self No No   Sig: Take 1 tablet by mouth every 4 (four) hours as needed for moderate pain Max Daily Amount: 6 tablets   traMADol (Ultram) 50 mg tablet   No No   Sig: Take 1 tablet (50 mg total) by mouth every 8 (eight) hours as needed for moderate pain      Facility-Administered Medications: None       Past Medical History:   Diagnosis Date    Diaz's esophagus     Colon polyp     Coronary artery disease     Depression     Diverticulitis     GERD (gastroesophageal reflux disease)     Hemorrhoid     History of heart artery stent     Hyperlipidemia     Hypertension     Microscopic colitis     Ulcerative colitis ([address] )        Past Surgical History:   Procedure Laterality Date    ABDOMINAL SURGERY      radio frequency ablation    BONE GRAFT Left 2018    CARPAL TUNNEL RELEASE Right     COLONOSCOPY      CORONARY ANGIOPLASTY WITH STENT PLACEMENT      x2    EGD AND COLONOSCOPY      ESOPHAGOGASTRODUODENOSCOPY N/A 2/15/2018    Procedure: ESOPHAGOGASTRODUODENOSCOPY (EGD); Surgeon: Katharina Oh MD;  [facility];  Service: Gastroenterology    ESOPHAGOGASTRODUODENOSCOPY N/A 12/10/2018    Procedure: ESOPHAGOGASTRODUODENOSCOPY (EGD); Surgeon: Tremayne Tabares MD;  Location: [facility]; Service: Gastroenterology    HAND SURGERY Left     [place] SHLDR ARTHROSCOP,SURG,W/ROTAT CUFF REPR Left 4/14/2021    Procedure: REPAIR ROTATOR CUFF  ARTHROSCOPIC; POSSIBLE BICEPS TENDONESIS, ACROMIOPLASTY;  Surgeon: Magdy Andrade DO;  [facility];  Service: Orthopedics    ROTATOR CUFF REPAIR Left     SHOULDER ARTHROSCOPY Right 3/14/2022    Procedure: ARTHROSCOPY SHOULDER- Right shoulder arthroscopic rotator cuff repair, open subpectoral biceps tenodesis, subsacpular repair and extensive debridement;  Surgeon: Magdy Andrade DO;  [facility];  Service: Orthopedics    TONSILLECTOMY         Family History   Problem Relation Age of Onset    Heart disease Father     Melanoma Father     Cancer Sister     Skin cancer Sister     Skin cancer Mother      I have reviewed and agree with the history as documented      E-Cigarette/Vaping    E-Cigarette Use Never User      E-Cigarette/Vaping Substances    Nicotine No     THC No     CBD No     Flavoring No     Other No     Unknown No      Social History     Tobacco Use    Smoking status: Former Smoker     Packs/day: 0 50     Quit date: 1/16/2007     Years since quitting: 15 2    Smokeless tobacco: Former User     Quit date: 1/16/1998    Tobacco comment: quit 10 yrs ago   Vaping Use    Vaping Use: Never used   Substance Use Topics    Alcohol use: Not Currently     Comment: quit 5 years    Drug use: Yes     Frequency: 3 0 times per week     Types: Marijuana     Comment: advised not to smoke       Review of Systems   Constitutional: Positive for fever  Negative for chills  +Generalized weakness and malaise   HENT: Negative for congestion, ear pain, rhinorrhea and sore throat  Respiratory: Negative for cough, chest tightness, shortness of breath and wheezing  Cardiovascular: Negative for chest pain and palpitations  Gastrointestinal: Positive for abdominal pain, diarrhea, nausea and vomiting  Negative for abdominal distention, blood in stool and constipation  Genitourinary: Negative for dysuria, flank pain, frequency and hematuria  Musculoskeletal: Negative for back pain, neck pain and neck stiffness  Skin: Negative for color change, pallor, rash and wound  Allergic/Immunologic: Negative for immunocompromised state  Neurological: Positive for light-headedness  Negative for dizziness, syncope, weakness, numbness and headaches  Hematological: Negative for adenopathy  Psychiatric/Behavioral: Negative for confusion and decreased concentration  All other systems reviewed and are negative  Physical Exam  Physical Exam  Vitals and nursing note reviewed  Constitutional:       General: He is not in acute distress  Appearance: Normal appearance  He is well-developed  He is not ill-appearing, toxic-appearing or diaphoretic  HENT:      Head: Normocephalic and atraumatic  Right Ear: External ear normal       Left Ear: External ear normal       Mouth/Throat:      Comments: Orpharyngeal exam deferred at this time due to risk of exposure to COVID-19 during current pandemic  Patient has no oropharyngeal complaints  Eyes:      Extraocular Movements: Extraocular movements intact  Conjunctiva/sclera: Conjunctivae normal    Neck:      Vascular: No JVD  Cardiovascular:      Rate and Rhythm: Normal rate and regular rhythm  Pulses: Normal pulses  Heart sounds: Normal heart sounds  No murmur heard  No friction rub   No gallop  Pulmonary:      Effort: Pulmonary effort is normal  No respiratory distress  Breath sounds: Normal breath sounds  No wheezing, rhonchi or rales  Abdominal:      General: There is no distension  Palpations: Abdomen is soft  Tenderness: There is abdominal tenderness  There is no guarding or rebound  Comments: +Diffuse upper abdominal tenderness as well as tenderness in periumbilical region and RLQ  Musculoskeletal:         General: No swelling or tenderness  Normal range of motion  Cervical back: Normal range of motion and neck supple  No rigidity  Skin:     General: Skin is warm and dry  Coloration: Skin is not pale  Findings: No erythema or rash  Neurological:      General: No focal deficit present  Mental Status: He is alert and oriented to person, place, and time  Sensory: No sensory deficit  Motor: No weakness     Psychiatric:         Mood and Affect: Mood normal          Behavior: Behavior normal          Vital Signs  ED Triage Vitals [04/21/22 1048]   Temperature Pulse Respirations Blood Pressure SpO2   97 8 °F (36 6 °C) 84 17 168/96 98 %      Temp Source Heart Rate Source Patient Position - Orthostatic VS BP Location FiO2 (%)   Temporal Monitor Sitting Left arm --      Pain Score       8         Vitals:    04/21/22 1048 04/21/22 1259 04/21/22 1536   BP: 168/96  120/77   BP Location: Left arm     Pulse: 84  105   Resp: 17  18   Temp: 97 8 °F (36 6 °C)     TempSrc: Temporal     SpO2: 98%  93%   Weight:  92 kg (202 lb 13 2 oz)        Visual Acuity      ED Medications  Medications   sodium chloride 0 9 % bolus 1,000 mL (0 mL Intravenous Stopped 4/21/22 1258)   metoclopramide (REGLAN) injection 10 mg (10 mg Intravenous Given 4/21/22 1122)   diphenhydrAMINE (BENADRYL) injection 12 5 mg (12 5 mg Intravenous Given 4/21/22 1122)   pantoprazole (PROTONIX) injection 40 mg (40 mg Intravenous Given 4/21/22 1122)   dicyclomine (BENTYL) tablet 20 mg (20 mg Oral Given 4/21/22 1122)   ketorolac (TORADOL) injection 15 mg (15 mg Intravenous Given 4/21/22 1122)   iohexol (OMNIPAQUE) 350 MG/ML injection (SINGLE-DOSE) 100 mL (100 mL Intravenous Given 4/21/22 1237)   promethazine (PHENERGAN) injection 25 mg (25 mg Intravenous Given 4/21/22 1533)       Diagnostic Studies  Results Reviewed     Procedure Component Value Units Date/Time    UA (URINE) with reflex to Scope [[id number]] Collected: 04/21/22 1400    Lab Status: Final result Specimen: Urine, Clean Catch Updated: 04/21/22 1407     Color, UA Yellow     Clarity, UA Clear     Specific Gravity, UA <=1 005     pH, UA 6 5     Leukocytes, UA Negative     Nitrite, UA Negative     Protein, UA Negative mg/dl      Glucose, UA Negative mg/dl      Ketones, UA Negative mg/dl      Urobilinogen, UA 0 2 E U /dl      Bilirubin, UA Negative     Blood, UA Negative    Lactic acid [[id number]]  (Normal) Collected: 04/21/22 1120    Lab Status: Final result Specimen: Blood from Arm, Left Updated: 04/21/22 1148     LACTIC ACID 1 4 mmol/L     Narrative:      Result may be elevated if tourniquet was used during collection      Basic metabolic panel [[id number]]  (Abnormal) Collected: 04/21/22 1120    Lab Status: Final result Specimen: Blood from Arm, Left Updated: 04/21/22 1145     Sodium 135 mmol/L      Potassium 4 0 mmol/L      Chloride 98 mmol/L      CO2 29 mmol/L      ANION GAP 8 mmol/L      BUN 19 mg/dL      Creatinine 1 10 mg/dL      Glucose 131 mg/dL      Calcium 9 4 mg/dL      eGFR 74 ml/min/1 73sq m     Narrative:      [place] guidelines for Chronic Kidney Disease (CKD):     Stage 1 with normal or high GFR (GFR > 90 mL/min/1 73 square meters)    Stage 2 Mild CKD (GFR = 60-89 mL/min/1 73 square meters)    Stage 3A Moderate CKD (GFR = 45-59 mL/min/1 73 square meters)    Stage 3B Moderate CKD (GFR = 30-44 mL/min/1 73 square meters)    Stage 4 Severe CKD (GFR = 15-29 mL/min/1 73 square meters)    Stage 5 End Stage CKD (GFR <15 mL/min/1 73 square meters)  Note: GFR calculation is accurate only with a steady state creatinine    Hepatic function panel [[id number]]  (Normal) Collected: 04/21/22 1120    Lab Status: Final result Specimen: Blood from Arm, Left Updated: 04/21/22 1145     Total Bilirubin 0 43 mg/dL      Bilirubin, Direct 0 10 mg/dL      Alkaline Phosphatase 76 U/L      AST 10 U/L      ALT 16 U/L      Total Protein 7 3 g/dL      Albumin 3 7 g/dL     Lipase [[id number]]  (Normal) Collected: 04/21/22 1120    Lab Status: Final result Specimen: Blood from Arm, Left Updated: 04/21/22 1145     Lipase 73 u/L     Magnesium [[id number]]  (Normal) Collected: 04/21/22 1120    Lab Status: Final result Specimen: Blood from Arm, Left Updated: 04/21/22 1145     Magnesium 1 7 mg/dL     CBC and differential [[id number]]  (Abnormal) Collected: 04/21/22 1120    Lab Status: Final result Specimen: Blood from Arm, Left Updated: 04/21/22 1131     WBC 11 57 Thousand/uL      RBC 4 84 Million/uL      Hemoglobin 13 9 g/dL      Hematocrit 42 7 %      MCV 88 fL      MCH 28 7 pg      MCHC 32 6 g/dL      RDW 13 2 %      MPV 9 3 fL      Platelets 933 Thousands/uL      nRBC 0 /100 WBCs      Neutrophils Relative 87 %      Immat GRANS % 0 %      Lymphocytes Relative 9 %      Monocytes Relative 4 %      Eosinophils Relative 0 %      Basophils Relative 0 %      Neutrophils Absolute 9 96 Thousands/µL      Immature Grans Absolute 0 05 Thousand/uL      Lymphocytes Absolute 1 09 Thousands/µL      Monocytes Absolute 0 44 Thousand/µL      Eosinophils Absolute 0 01 Thousand/µL      Basophils Absolute 0 02 Thousands/µL                  CT abdomen pelvis with contrast   Final Result by Saundra Joseph MD (04/21 1308)      No acute inflammatory changes in the abdomen or pelvis              Workstation performed: [id number]                    Procedures  Procedures         ED Course  ED Course as of 04/22/22 1008   Thu Apr 21, 2022   1438 Patient updated of normal CT scan done essentially normal/stable blood work  Patient overall improved but starting to feel somewhat nauseous again  Will give a dose of Phenergan  He has not vomited since being in the ED  Will send home with scripts for Reglan and Bentyl and advised him to follow-up with his PCP and with his GI specialist   Discussed ED return parameters  SBIRT 22yo+      Most Recent Value   SBIRT (22 yo +)    In order to provide better care to our patients, we are screening all of our patients for alcohol and drug use  Would it be okay to ask you these screening questions? No Filed at: 04/21/2022 1115                    MDM  Number of Diagnoses or Management Options  Diagnosis management comments: 58-year-old male presents to the ED with generalized abdominal pain, vomiting, subjective fever that started Sunday  Patient has a history of chronic abdominal pain and vomiting episodes  He admits that the cause of his gastritis/gastroenteritis symptoms are not known  Patient does have ulcerative colitis, diverticulosis as well as GERD/esophagitis which all could be contributing to his symptoms  Cyclical vomiting, anxiety also considered  Other considerations include pancreatitis, biliary colic or cholecystitis, appendicitis, bowel obstruction  Will workup with abdominal labs, lactic acid, UA and CT abdomen and pelvis  Patient's last CT scan of the abdomen and pelvis on 3/1/2022 was unremarkable  Will provide IV fluid bolus, Reglan/Benadryl for nausea, Protonix, Bentyl and Toradol for symptom relief         Amount and/or Complexity of Data Reviewed  Clinical lab tests: ordered and reviewed  Tests in the radiology section of CPT®: ordered and reviewed  Review and summarize past medical records: yes  Independent visualization of images, tracings, or specimens: yes        Disposition  Final diagnoses:   Nausea and vomiting   Generalized abdominal pain     Time reflects when diagnosis was documented in both MDM as applicable and the Disposition within this note     Time User Action Codes Description Comment    4/21/2022  2:32 PM Nelia Dilcia E Add [R11 2] Nausea and vomiting     4/21/2022  2:32 PM Nelia Dilcia E Add [R10 84] Generalized abdominal pain     4/21/2022  2:33 PM Yovani Loredo Modify [R10 84] Generalized abdominal pain       ED Disposition     ED Disposition Condition Date/Time Comment    Discharge Stable Thu Apr 21, 2022  2:32 PM Claudio Rosales discharge to home/self care              Follow-up Information     Follow up With Specialties Details Why Contact Info Additional Information    Cheryl Ni MD Internal Medicine Schedule an appointment as soon as possible for a visit   [phone]  [address]NA 2  [facility] [phone]       [facility] G[facility]ology Schedule an appointment as soon as possible for a visit   [address]  [address] [facility] [id number]  [address] [place] [address] [facility]  [facility] [address] [facility], [facility], [address] [facility], [address]    5[address] [facility] Emergency Department Emergency Medicine Go to  If symptoms worsen 34 [facility] 109 [facility] Emergency Department, 819 [facility], [address] [place] [address], [id number]          Discharge Medication List as of 4/21/2022  2:34 PM      START taking these medications    Details   dicyclomine (BENTYL) 20 mg tablet Take 1 tablet (20 mg total) by mouth every 8 (eight) hours as needed (abdominal cramping), Starting Thu 4/21/2022, Normal      metoclopramide (Reglan) 10 mg tablet Take 1 tablet (10 mg total) by mouth every 6 (six) hours as needed (nausea or vomiting), Starting Thu 4/21/2022, Normal         CONTINUE these medications which have NOT CHANGED    Details   aspirin (ECOTRIN LOW STRENGTH) 81 mg EC tablet Take 1 tablet (81 mg total) by mouth daily, Starting Mon 2/10/2020, No Print      atorvastatin (LIPITOR) 80 mg tablet Take 80 mg by mouth daily, Historical Med      bacitracin ointment APPLY MODERATE AMOUNT TOPICALLY TWICE A DAY TO AFFECTED AREA FOR SKIN CONDITION, Historical Med      buPROPion (WELLBUTRIN SR) 150 mg 12 hr tablet Take 1 tablet (150 mg total) by mouth 2 (two) times a day, Starting Tue 3/15/2022, Normal      clonazePAM (KLONOPIN) 0 5 mg tablet Take 0 5 mg by mouth daily at bedtime , Historical Med      Homeopathic Products (ARNICARE ARTHRITIS PO) Take by mouth, Historical Med      NON FORMULARY thc top cream, Historical Med      omeprazole (PriLOSEC) 20 mg delayed release capsule Take 1 capsule (20 mg total) by mouth daily, Starting Fri 12/6/2019, Normal      ondansetron (ZOFRAN-ODT) 4 mg disintegrating tablet Take 1 tablet (4 mg total) by mouth every 6 (six) hours as needed for nausea or vomiting, Starting Wed 4/7/2021, Normal      oxyCODONE-acetaminophen (Percocet) 5-325 mg per tablet Take 1 tablet by mouth every 4 (four) hours as needed for moderate pain Max Daily Amount: 6 tablets, Starting Fri 3/18/2022, Normal      traMADol (Ultram) 50 mg tablet Take 1 tablet (50 mg total) by mouth every 8 (eight) hours as needed for moderate pain, Starting Tue 3/29/2022, Normal             No discharge procedures on file      PDMP Review       Value Time User    PDMP Reviewed  Yes 9/14/2021  2:16 PM Shayy Wilde PA-C          ED Provider  Electronically Signed by           Shirley Cartagena DO  04/22/22 2866

## 2022-04-23 ENCOUNTER — HOSPITAL ENCOUNTER (EMERGENCY)
Facility: HOSPITAL | Age: 57
Discharge: HOME | End: 2022-04-23
Attending: EMERGENCY MEDICINE
Payer: MEDICARE

## 2022-04-23 ENCOUNTER — APPOINTMENT (EMERGENCY)
Dept: CT IMAGING | Facility: HOSPITAL | Age: 57
End: 2022-04-23
Payer: MEDICARE

## 2022-04-23 VITALS
TEMPERATURE: 98.4 F | HEIGHT: 70 IN | BODY MASS INDEX: 28.92 KG/M2 | RESPIRATION RATE: 16 BRPM | DIASTOLIC BLOOD PRESSURE: 85 MMHG | OXYGEN SATURATION: 94 % | WEIGHT: 202 LBS | SYSTOLIC BLOOD PRESSURE: 148 MMHG | HEART RATE: 77 BPM

## 2022-04-23 DIAGNOSIS — R10.9 NONSPECIFIC ABDOMINAL PAIN: Primary | ICD-10-CM

## 2022-04-23 LAB
ALBUMIN SERPL BCP-MCNC: 3.9 G/DL (ref 3.5–5)
ALP SERPL-CCNC: 85 U/L (ref 46–116)
ALT SERPL W P-5'-P-CCNC: 13 U/L (ref 12–78)
ANION GAP SERPL CALCULATED.3IONS-SCNC: 9 MMOL/L (ref 4–13)
AST SERPL W P-5'-P-CCNC: 9 U/L (ref 5–45)
ATRIAL RATE: 69 BPM
BASOPHILS # BLD AUTO: 0.04 THOUSANDS/ΜL (ref 0–0.1)
BASOPHILS NFR BLD AUTO: 0 % (ref 0–1)
BILIRUB DIRECT SERPL-MCNC: 0.08 MG/DL (ref 0–0.2)
BILIRUB SERPL-MCNC: 0.29 MG/DL (ref 0.2–1)
BUN SERPL-MCNC: 22 MG/DL (ref 5–25)
CALCIUM SERPL-MCNC: 9.5 MG/DL (ref 8.3–10.1)
CARDIAC TROPONIN I PNL SERPL HS: <2 NG/L
CHLORIDE SERPL-SCNC: 101 MMOL/L (ref 100–108)
CO2 SERPL-SCNC: 28 MMOL/L (ref 21–32)
CREAT SERPL-MCNC: 1.2 MG/DL (ref 0.6–1.3)
EOSINOPHIL # BLD AUTO: 0.13 THOUSAND/ΜL (ref 0–0.61)
EOSINOPHIL NFR BLD AUTO: 1 % (ref 0–6)
ERYTHROCYTE [DISTWIDTH] IN BLOOD BY AUTOMATED COUNT: 13.2 % (ref 11.6–15.1)
GFR SERPL CREATININE-BSD FRML MDRD: 66 ML/MIN/1.73SQ M
GLUCOSE SERPL-MCNC: 153 MG/DL (ref 65–140)
HCT VFR BLD AUTO: 42.9 % (ref 36.5–49.3)
HGB BLD-MCNC: 14 G/DL (ref 12–17)
IMM GRANULOCYTES # BLD AUTO: 0.03 THOUSAND/UL (ref 0–0.2)
IMM GRANULOCYTES NFR BLD AUTO: 0 % (ref 0–2)
LIPASE SERPL-CCNC: 56 U/L (ref 73–393)
LYMPHOCYTES # BLD AUTO: 1.04 THOUSANDS/ΜL (ref 0.6–4.47)
LYMPHOCYTES NFR BLD AUTO: 11 % (ref 14–44)
MCH RBC QN AUTO: 29.2 PG (ref 26.8–34.3)
MCHC RBC AUTO-ENTMCNC: 32.6 G/DL (ref 31.4–37.4)
MCV RBC AUTO: 89 FL (ref 82–98)
MONOCYTES # BLD AUTO: 0.54 THOUSAND/ΜL (ref 0.17–1.22)
MONOCYTES NFR BLD AUTO: 6 % (ref 4–12)
NEUTROPHILS # BLD AUTO: 7.87 THOUSANDS/ΜL (ref 1.85–7.62)
NEUTS SEG NFR BLD AUTO: 82 % (ref 43–75)
NRBC BLD AUTO-RTO: 0 /100 WBCS
P AXIS: 69 DEGREES
PLATELET # BLD AUTO: 359 THOUSANDS/UL (ref 149–390)
PMV BLD AUTO: 9.6 FL (ref 8.9–12.7)
POTASSIUM SERPL-SCNC: 3.8 MMOL/L (ref 3.5–5.3)
PR INTERVAL: 180 MS
PROT SERPL-MCNC: 7.6 G/DL (ref 6.4–8.2)
QRS AXIS: -24 DEGREES
QRSD INTERVAL: 86 MS
QT INTERVAL: 384 MS
QTC INTERVAL: 411 MS
RBC # BLD AUTO: 4.8 MILLION/UL (ref 3.88–5.62)
SODIUM SERPL-SCNC: 138 MMOL/L (ref 136–145)
T WAVE AXIS: 44 DEGREES
VENTRICULAR RATE: 69 BPM
WBC # BLD AUTO: 9.65 THOUSAND/UL (ref 4.31–10.16)

## 2022-04-23 PROCEDURE — 93010 ELECTROCARDIOGRAM REPORT: CPT | Performed by: INTERNAL MEDICINE

## 2022-04-23 PROCEDURE — 96361 HYDRATE IV INFUSION ADD-ON: CPT

## 2022-04-23 PROCEDURE — 80076 HEPATIC FUNCTION PANEL: CPT | Performed by: EMERGENCY MEDICINE

## 2022-04-23 PROCEDURE — 96375 TX/PRO/DX INJ NEW DRUG ADDON: CPT

## 2022-04-23 PROCEDURE — 85025 COMPLETE CBC W/AUTO DIFF WBC: CPT | Performed by: EMERGENCY MEDICINE

## 2022-04-23 PROCEDURE — 80048 BASIC METABOLIC PNL TOTAL CA: CPT | Performed by: EMERGENCY MEDICINE

## 2022-04-23 PROCEDURE — 84484 ASSAY OF TROPONIN QUANT: CPT | Performed by: EMERGENCY MEDICINE

## 2022-04-23 PROCEDURE — 99285 EMERGENCY DEPT VISIT HI MDM: CPT | Performed by: EMERGENCY MEDICINE

## 2022-04-23 PROCEDURE — 36415 COLL VENOUS BLD VENIPUNCTURE: CPT | Performed by: EMERGENCY MEDICINE

## 2022-04-23 PROCEDURE — G1004 CDSM NDSC: HCPCS

## 2022-04-23 PROCEDURE — 74177 CT ABD & PELVIS W/CONTRAST: CPT

## 2022-04-23 PROCEDURE — 99284 EMERGENCY DEPT VISIT MOD MDM: CPT

## 2022-04-23 PROCEDURE — 83690 ASSAY OF LIPASE: CPT | Performed by: EMERGENCY MEDICINE

## 2022-04-23 PROCEDURE — 96374 THER/PROPH/DIAG INJ IV PUSH: CPT

## 2022-04-23 PROCEDURE — 93005 ELECTROCARDIOGRAM TRACING: CPT

## 2022-04-23 RX ORDER — OXYCODONE HYDROCHLORIDE AND ACETAMINOPHEN 5; 325 MG/1; MG/1
1 TABLET ORAL EVERY 6 HOURS PRN
Qty: 20 TABLET | Refills: 0 | Status: SHIPPED | OUTPATIENT
Start: 2022-04-23 | End: 2022-06-21

## 2022-04-23 RX ORDER — DIPHENHYDRAMINE HYDROCHLORIDE 50 MG/ML
25 INJECTION INTRAMUSCULAR; INTRAVENOUS ONCE
Status: COMPLETED | OUTPATIENT
Start: 2022-04-23 | End: 2022-04-23

## 2022-04-23 RX ORDER — HYDROMORPHONE HCL/PF 1 MG/ML
0.5 SYRINGE (ML) INJECTION ONCE
Status: COMPLETED | OUTPATIENT
Start: 2022-04-23 | End: 2022-04-23

## 2022-04-23 RX ORDER — METOCLOPRAMIDE HYDROCHLORIDE 5 MG/ML
10 INJECTION INTRAMUSCULAR; INTRAVENOUS ONCE
Status: COMPLETED | OUTPATIENT
Start: 2022-04-23 | End: 2022-04-23

## 2022-04-23 RX ORDER — PANTOPRAZOLE SODIUM 20 MG/1
20 TABLET, DELAYED RELEASE ORAL DAILY
Qty: 20 TABLET | Refills: 0 | Status: SHIPPED | OUTPATIENT
Start: 2022-04-23 | End: 2022-06-04

## 2022-04-23 RX ADMIN — SODIUM CHLORIDE 1000 ML: 0.9 INJECTION, SOLUTION INTRAVENOUS at 09:33

## 2022-04-23 RX ADMIN — DIPHENHYDRAMINE HYDROCHLORIDE 25 MG: 50 INJECTION INTRAMUSCULAR; INTRAVENOUS at 09:37

## 2022-04-23 RX ADMIN — IOHEXOL 100 ML: 350 INJECTION, SOLUTION INTRAVENOUS at 10:02

## 2022-04-23 RX ADMIN — METOCLOPRAMIDE HYDROCHLORIDE 10 MG: 5 INJECTION INTRAMUSCULAR; INTRAVENOUS at 09:35

## 2022-04-23 RX ADMIN — HYDROMORPHONE HYDROCHLORIDE 0.5 MG: 1 INJECTION, SOLUTION INTRAMUSCULAR; INTRAVENOUS; SUBCUTANEOUS at 09:38

## 2022-04-23 NOTE — ED PROVIDER NOTES
History  Chief Complaint   Patient presents with    Abdominal Pain     Pt  presents to ER with c/o abdominal pain, nausea and vomiting since last night  HPI patient is a 79-year-old male, reports a history of recurrent abdominal pain in the past   Patient was seen here on April 21st, had a CT scan that was normal   Patient reports that a CT scan showed diverticulitis and that he has an inflammation there  I reviewed the CT scan with the patient was read as normal   Pain reports the pain is been off and on over the last week  He reports that he woke today with fairly severe epigastric and periumbilical pain  Patient reports some vomiting this morning not improved with Zofran  Patient reports he called the [employer] today they advised him to come back to our emergency department  Describes a burning somewhat hot poker like searing epigastric pain without radiation to his back  Past medical history esophagitis, diverticulitis, hemorrhoids, hyperlipidemia hypertension  Family history noncontributory  Social history, former smoker no history of drug abuse    Prior to Admission Medications   Prescriptions Last Dose Informant Patient Reported? Taking?    Homeopathic Products ([facility] ARTHRITIS PO)  Self Yes No   Sig: Take by mouth   NON FORMULARY  Self Yes No   Sig: thc top cream   aspirin (ECOTRIN LOW STRENGTH) 81 mg EC tablet  Self No No   Sig: Take 1 tablet (81 mg total) by mouth daily   atorvastatin (LIPITOR) 80 mg tablet  Self Yes No   Sig: Take 80 mg by mouth daily   bacitracin ointment   Yes No   Sig: APPLY MODERATE AMOUNT TOPICALLY TWICE A DAY TO AFFECTED AREA FOR SKIN CONDITION   buPROPion (WELLBUTRIN SR) 150 mg 12 hr tablet  Self No No   Sig: Take 1 tablet (150 mg total) by mouth 2 (two) times a day   clonazePAM (KLONOPIN) 0 5 mg tablet  Self Yes No   Sig: Take 0 5 mg by mouth daily at bedtime    dicyclomine (BENTYL) 20 mg tablet   No No   Sig: Take 1 tablet (20 mg total) by mouth every 8 (eight) hours as needed (abdominal cramping)   metoclopramide (Reglan) 10 mg tablet   No No   Sig: Take 1 tablet (10 mg total) by mouth every 6 (six) hours as needed (nausea or vomiting)   omeprazole (PriLOSEC) 20 mg delayed release capsule  Self No No   Sig: Take 1 capsule (20 mg total) by mouth daily   ondansetron (ZOFRAN-ODT) 4 mg disintegrating tablet  Self No No   Sig: Take 1 tablet (4 mg total) by mouth every 6 (six) hours as needed for nausea or vomiting   oxyCODONE-acetaminophen (Percocet) 5-325 mg per tablet  Self No No   Sig: Take 1 tablet by mouth every 4 (four) hours as needed for moderate pain Max Daily Amount: 6 tablets   traMADol (Ultram) 50 mg tablet   No No   Sig: Take 1 tablet (50 mg total) by mouth every 8 (eight) hours as needed for moderate pain      Facility-Administered Medications: None       Past Medical History:   Diagnosis Date    Diaz's esophagus     Colon polyp     Coronary artery disease     Depression     Diverticulitis     GERD (gastroesophageal reflux disease)     Hemorrhoid     History of heart artery stent     Hyperlipidemia     Hypertension     Microscopic colitis     Ulcerative colitis ([facility] [address] )        Past Surgical History:   Procedure Laterality Date    ABDOMINAL SURGERY      radio frequency ablation    BONE GRAFT Left 2018    CARPAL TUNNEL RELEASE Right     COLONOSCOPY      CORONARY ANGIOPLASTY WITH STENT PLACEMENT      x2    EGD AND COLONOSCOPY      ESOPHAGOGASTRODUODENOSCOPY N/A 2/15/2018    Procedure: ESOPHAGOGASTRODUODENOSCOPY (EGD); Surgeon: Niurka Blancas MD;  [facility]: [facility];  Service: Gastroenterology    ESOPHAGOGASTRODUODENOSCOPY N/A 12/10/2018    Procedure: ESOPHAGOGASTRODUODENOSCOPY (EGD); Surgeon: Lev Lepe MD;  Location: [facility];   Service: Gastroenterology    HAND SURGERY Left     [place] SHLDR ARTHROSCOP,SURG,W/ROTAT CUFF REPR Left 4/14/2021    Procedure: REPAIR ROTATOR CUFF  ARTHROSCOPIC; POSSIBLE BICEPS TENDONESIS, ACROMIOPLASTY;  Surgeon: Morgan Mathias DO;  [facility]: [facility];  Service: Orthopedics    ROTATOR CUFF REPAIR Left     SHOULDER ARTHROSCOPY Right 3/14/2022    Procedure: ARTHROSCOPY SHOULDER- Right shoulder arthroscopic rotator cuff repair, open subpectoral biceps tenodesis, subsacpular repair and extensive debridement;  Surgeon: Morgan Mathias DO;  [facility];  Service: Orthopedics    TONSILLECTOMY         Family History   Problem Relation Age of Onset    Heart disease Father     Melanoma Father     Cancer Sister     Skin cancer Sister     Skin cancer Mother      I have reviewed and agree with the history as documented  E-Cigarette/Vaping    E-Cigarette Use Never User      E-Cigarette/Vaping Substances    Nicotine No     THC No     CBD No     Flavoring No     Other No     Unknown No      Social History     Tobacco Use    Smoking status: Former Smoker     Packs/day: 0 50     Quit date: 1/16/2007     Years since quitting: 15 2    Smokeless tobacco: Former User     Quit date: 1/16/1998    Tobacco comment: quit 10 yrs ago   Vaping Use    Vaping Use: Never used   Substance Use Topics    Alcohol use: Not Currently     Comment: quit 5 years    Drug use: Yes     Frequency: 3 0 times per week     Types: Marijuana     Comment: advised not to smoke       Review of Systems   Constitutional: Negative for diaphoresis, fatigue and fever  HENT: Negative for congestion, ear pain, nosebleeds and sore throat  Eyes: Negative for photophobia, pain, discharge and visual disturbance  Respiratory: Negative for cough, choking, chest tightness, shortness of breath and wheezing  Cardiovascular: Negative for chest pain and palpitations  Gastrointestinal: Positive for abdominal pain, nausea and vomiting  Negative for abdominal distention and diarrhea  Genitourinary: Negative for dysuria, flank pain and frequency  Musculoskeletal: Negative for back pain, gait problem and joint swelling  Skin: Negative for color change and rash  Neurological: Negative for dizziness, syncope and headaches  Psychiatric/Behavioral: Negative for behavioral problems and confusion  The patient is not nervous/anxious  All other systems reviewed and are negative  Physical Exam  Physical Exam  Vitals and nursing note reviewed  Constitutional:       Appearance: He is well-developed  HENT:      Head: Normocephalic  Right Ear: External ear normal       Left Ear: External ear normal       Nose: Nose normal    Eyes:      General: Lids are normal       Pupils: Pupils are equal, round, and reactive to light  Cardiovascular:      Rate and Rhythm: Normal rate and regular rhythm  Pulses: Normal pulses  Heart sounds: Normal heart sounds  Pulmonary:      Effort: Pulmonary effort is normal  No respiratory distress  Breath sounds: Normal breath sounds  Abdominal:      General: Abdomen is flat  Bowel sounds are normal       Tenderness: There is abdominal tenderness  Comments: There is epigastric tenderness without rebound or guarding   Musculoskeletal:         General: No deformity  Normal range of motion  Cervical back: Normal range of motion and neck supple  Skin:     General: Skin is warm and dry  Neurological:      Mental Status: He is alert and oriented to person, place, and time       Pulse oximetry 100% on room air adequate oxygenation, there is no hypoxia    Vital Signs  ED Triage Vitals [04/23/22 0837]   Temperature Pulse Respirations Blood Pressure SpO2   97 8 °F (36 6 °C) 63 18 (!) 172/84 100 %      Temp Source Heart Rate Source Patient Position - Orthostatic VS BP Location FiO2 (%)   Oral Monitor Sitting Left arm --      Pain Score       5           Vitals:    04/23/22 0837   BP: (!) 172/84   Pulse: 63   Patient Position - Orthostatic VS: Sitting         Visual Acuity      ED Medications  Medications   sodium chloride 0 9 % bolus 1,000 mL (0 mL Intravenous Stopped 4/23/22 1048)   metoclopramide (REGLAN) injection 10 mg (10 mg Intravenous Given 4/23/22 0935)   diphenhydrAMINE (BENADRYL) injection 25 mg (25 mg Intravenous Given 4/23/22 0937)   HYDROmorphone (DILAUDID) injection 0 5 mg (0 5 mg Intravenous Given 4/23/22 0938)   iohexol (OMNIPAQUE) 350 MG/ML injection (SINGLE-DOSE) 100 mL (100 mL Intravenous Given 4/23/22 1002)       Diagnostic Studies  Results Reviewed     Procedure Component Value Units Date/Time    HS Troponin 0hr (reflex protocol) [[id number]]  (Normal) Collected: 04/23/22 0928    Lab Status: Final result Specimen: Blood from Arm, Right Updated: 04/23/22 1019     hs TnI 0hr <2 ng/L     HS Troponin I 2hr [[id number]]     Lab Status: No result Specimen: Blood     Basic metabolic panel [[id number]]  (Abnormal) Collected: 04/23/22 0928    Lab Status: Final result Specimen: Blood from Arm, Right Updated: 04/23/22 1010     Sodium 138 mmol/L      Potassium 3 8 mmol/L      Chloride 101 mmol/L      CO2 28 mmol/L      ANION GAP 9 mmol/L      BUN 22 mg/dL      Creatinine 1 20 mg/dL      Glucose 153 mg/dL      Calcium 9 5 mg/dL      eGFR 66 ml/min/1 73sq m     Narrative:      [place] guidelines for Chronic Kidney Disease (CKD):     Stage 1 with normal or high GFR (GFR > 90 mL/min/1 73 square meters)    Stage 2 Mild CKD (GFR = 60-89 mL/min/1 73 square meters)    Stage 3A Moderate CKD (GFR = 45-59 mL/min/1 73 square meters)    Stage 3B Moderate CKD (GFR = 30-44 mL/min/1 73 square meters)    Stage 4 Severe CKD (GFR = 15-29 mL/min/1 73 square meters)    Stage 5 End Stage CKD (GFR <15 mL/min/1 73 square meters)  Note: GFR calculation is accurate only with a steady state creatinine    Hepatic function panel [[id number]]  (Normal) Collected: 04/23/22 0928    Lab Status: Final result Specimen: Blood from Arm, Right Updated: 04/23/22 1010     Total Bilirubin 0 29 mg/dL      Bilirubin, Direct 0 08 mg/dL      Alkaline Phosphatase 85 U/L      AST 9 U/L      ALT 13 U/L      Total Protein 7 6 g/dL      Albumin 3 9 g/dL     Lipase [[id number]]  (Abnormal) Collected: 04/23/22 0928    Lab Status: Final result Specimen: Blood from Arm, Right Updated: 04/23/22 1010     Lipase 56 u/L     CBC and differential [[id number]]  (Abnormal) Collected: 04/23/22 0928    Lab Status: Final result Specimen: Blood from Arm, Right Updated: 04/23/22 0936     WBC 9 65 Thousand/uL      RBC 4 80 Million/uL      Hemoglobin 14 0 g/dL      Hematocrit 42 9 %      MCV 89 fL      MCH 29 2 pg      MCHC 32 6 g/dL      RDW 13 2 %      MPV 9 6 fL      Platelets 261 Thousands/uL      nRBC 0 /100 WBCs      Neutrophils Relative 82 %      Immat GRANS % 0 %      Lymphocytes Relative 11 %      Monocytes Relative 6 %      Eosinophils Relative 1 %      Basophils Relative 0 %      Neutrophils Absolute 7 87 Thousands/µL      Immature Grans Absolute 0 03 Thousand/uL      Lymphocytes Absolute 1 04 Thousands/µL      Monocytes Absolute 0 54 Thousand/µL      Eosinophils Absolute 0 13 Thousand/µL      Basophils Absolute 0 04 Thousands/µL                  CT abdomen pelvis with contrast   Final Result by Ravi Mccallum MD (04/23 1015)      1  Stable exam   No acute intra-abdominal pathology                 Workstation performed: [id number]                    Procedures  ECG 12 Lead Documentation Only    Date/Time: 4/23/2022 9:20 AM  Performed by: Low Desai MD  Authorized by: Low Desai MD     Indications / Diagnosis:  Epigastric pain  ECG reviewed by me, the ED Provider: yes    Patient location:  ED  Previous ECG:     Previous ECG:  Compared to current    Comparison ECG info:  March 3, 2022    Similarity:  No change  Interpretation:     Interpretation: non-specific    Rate:     ECG rate:  Sixty-nine    ECG rate assessment: normal    Rhythm:     Rhythm: sinus rhythm    Comments:      Normal sinus rhythm, nonspecific ST-T wave changes some lateral artifact, no ST elevations, similar to EKG from March 2022             ED Course      results:  Cardiac troponin was normal no sign of cardiac ischemia,  Patient electrolytes within normal limits  Patient's liver functions were normal no sign of hepatitis or injury  Lipase was normal no sign of pancreatitis   White count was normal at 9 6 no sign of inflammation  Hemoglobin was normal at 14 no sign of anemia  CT was unchanged no acute pathology  I reviewed the patient given a copy the CT report  SBIRT 20yo+      Most Recent Value   SBIRT (22 yo +)    In order to provide better care to our patients, we are screening all of our patients for alcohol and drug use  Would it be okay to ask you these screening questions? Yes Filed at: 20228   Initial Alcohol Screen: US AUDIT-C     1  How often do you have a drink containing alcohol? 1 Filed at: 2022 1048   2  How many drinks containing alcohol do you have on a typical day you are drinking? 1 Filed at: 2022   3a  Male UNDER 65: How often do you have five or more drinks on one occasion? 0 Filed at: 2022   Audit-C Score 2 Filed at: 2022   HERMINIA: How many times in the past year have you    Used an illegal drug or used a prescription medication for non-medical reasons? Never Filed at: 2022 104                    MDM Medical decision-makin-year-old male, returns emergency department with recurrent abdominal pain, based on my review of the history, physical exam, laboratory testing and diagnostic CT scan; I believe the patient has Nonspecific abdominal pain at this time  No indication for further evaluation  No indication for hospitalization  The patient will follow-up as an outpatient  Discussed the need for further evaluation as the patient has been here in the emergency department twice in the last few days  Discussed the need for GI follow-up  Patient will follow-up he reports through [facility]      Disposition  Final diagnoses:   Nonspecific abdominal pain     Time reflects when diagnosis was documented in both MDM as applicable and the Disposition within this note     Time User Action Codes Description Comment    4/23/2022 10:42 AM Rayna Carr Add [R10 9] Nonspecific abdominal pain       ED Disposition     ED Disposition Condition Date/Time Comment    Discharge Stable Sat Apr 23, 2022 10:42 AM Cheryl Caceres discharge to home/self care  Follow-up Information     Follow up With Specialties Details Why Camelia Walker MD Internal Medicine   [address]  [facility] [address]  [place] [address]            Patient's Medications   Discharge Prescriptions    OXYCODONE-ACETAMINOPHEN (PERCOCET) 5-325 MG PER TABLET    Take 1 tablet by mouth every 6 (six) hours as needed for moderate pain or severe pain for up to 20 doses Max Daily Amount: 4 tablets       Start Date: 4/23/2022 End Date: --       Order Dose: 1 tablet       Quantity: 20 tablet    Refills: 0    PANTOPRAZOLE (PROTONIX) 20 MG TABLET    Take 1 tablet (20 mg total) by mouth daily       Start Date: 4/23/2022 End Date: --       Order Dose: 20 mg       Quantity: 20 tablet    Refills: 0       No discharge procedures on file      PDMP Review       Value Time User    PDMP Reviewed  Yes 9/14/2021  2:16 PM Timo Hernandes PA-C          ED Provider  Electronically Signed by           Crissy Baum MD  04/23/22 5864

## 2022-04-23 NOTE — DISCHARGE INSTRUCTIONS
Percocet 1 every 6 hours if needed for pain caution narcotic will make you sleepy  Protonix daily to reduce stomach acid  Reglan you have at home for nausea  Follow-up with your provider for possible further gastrointestinal evaluation endoscopy colonoscopy    Return worsening symptoms or any problems

## 2022-04-25 ENCOUNTER — EVALUATION (OUTPATIENT)
Dept: PHYSICAL THERAPY | Facility: CLINIC | Age: 57
End: 2022-04-25
Payer: MEDICARE

## 2022-04-25 DIAGNOSIS — M75.111 INCOMPLETE ROTATOR CUFF TEAR OR RUPTURE OF RIGHT SHOULDER, NOT SPECIFIED AS TRAUMATIC: ICD-10-CM

## 2022-04-25 PROCEDURE — 97140 MANUAL THERAPY 1/> REGIONS: CPT | Performed by: PHYSICAL THERAPIST

## 2022-04-25 PROCEDURE — 97110 THERAPEUTIC EXERCISES: CPT | Performed by: PHYSICAL THERAPIST

## 2022-04-25 PROCEDURE — 97162 PT EVAL MOD COMPLEX 30 MIN: CPT | Performed by: PHYSICAL THERAPIST

## 2022-04-25 NOTE — PROGRESS NOTES
PT Evaluation     Today's date: 2022  Patient name: Max Hearn  : 1965  MRN: [id number]  Referring provider: Laurent Russell PA-C  Dx:   Encounter Diagnosis     ICD-10-CM    1  Incomplete rotator cuff tear or rupture of right shoulder, not specified as traumatic  M75 111 Ambulatory Referral to Physical Therapy       Start Time: 1015  Stop Time: 1100  Total time in clinic (min): 45 minutes    Assessment  Assessment details: Patient is a 61 y o  male who presents to physical therapy s/p right shoulder arthroscopic rotator cuff repair, open subpectoral biceps tenodesis, subscapular repair and extensive debridement performed on 22  Patient presents to evaluation with pain, decreased range of motion, decreased strength, and decreased tolerance to activity  Patient demonstrates good tolerance to treatment and was provided with a written copy of their initial home exercise program focusing on gentle shoulder ROM and was encouraged to perform daily per tolerance  I discussed risks, benefits, and alternatives to treatment, and answered all patient questions to patient satisfaction  Patient presents with baseline FOTO score of 40 indicating limited tolerance/ability to complete ADLs  Patient is an appropriate candidate for skilled PT and would benefit from skilled PT services to address the aforementioned impairments, achieve goals, maximize function, and improve quality of life  Pt is in agreement with this plan       Patient Education: activity modifications as needed, pacing of activities, importance of HEP compliance, PT prognosis/POC    Impairments: abnormal or restricted ROM, activity intolerance, impaired physical strength, lacks appropriate home exercise program, pain with function, poor posture  and poor body mechanics    Goals  ST weeks  Pt will demonstrate good understanding and compliance with HEP  Pt will decrease pain to 4-5/10    Pt will demonstrate improved postural awareness and ability to self-correct without reliance on external cues    LT weeks  Pt will improve FOTO score to > or = to 63 to indicate improved functional abilities   Pt will decrease pain to 1-2/10   Pt will increase shoulder strength to 4+/5 for improved tolerance/independence with ADLs  Pt will increase PROM to WNL to allow for return of AROM of affected shoulder  Pt will increase shoulder flexion/abduction to 150 degrees to increase independence with ADLs   Pt will increase functional IR to be able to don/doff belt and wash back without pain   Pt will increase functional ER to be able to wash hair independently without pain      Plan  Patient would benefit from: PT eval and skilled physical therapy  Planned modality interventions: cryotherapy and thermotherapy: hydrocollator packs  Planned therapy interventions: ADL training, manual therapy, neuromuscular re-education, patient education, postural training, self care, strengthening, stretching, therapeutic activities, therapeutic exercise, home exercise program, graded activity, graded exercise, functional ROM exercises, flexibility and body mechanics training  Frequency: 2x week  Duration in weeks: 8  Plan of Care beginning date: 2022  Plan of Care expiration date: 2022  Treatment plan discussed with: patient        Subjective Evaluation    History of Present Illness  Mechanism of injury: Pt reports to PT s/p right shoulder arthroscopic rotator cuff repair, open subpectoral biceps tenodesis, subscapular repair and extensive debridement performed on 22  Pt is now 6 weeks out from surgery and has weaned from sling but had instinctively reached for something and had increase in pain and returned to sling for past week but is now out of the sling and doing better  Pt has been compliant with shoulder restrictions but having to fight trying to use his right arm during daily activities  Pt denies N/T into extremities     Pain  Current pain ratin  At best pain ratin  At worst pain ratin  Quality: dull ache, sharp and throbbing    Hand dominance: right    Patient Goals  Patient goals for therapy: decreased pain, increased motion, return to sport/leisure activities, independence with ADLs/IADLs and increased strength          Objective     General Comments:      Shoulder Comments   Posture: FHP, RS, increased thoracic kyphosis    Right shoulder PROM: Flex 120* Abd 90* IR 30* ER 45*     Right shoulder MMT/AROM deferred 2* to phase of healing    Light touch intact and symmetrical bilateral UEs  Inspection of right shoulder reveals well healed portal sites     FOTO: 40               Diagnosis: s/p right shoulder arthroscopic rotator cuff repair, open subpectoral biceps tenodesis, subscapular repair and extensive debridement performed on 22   Precautions: Post-op protocol attached to pt chart; CAD, HTN (controlled w/ meds)   POC Expires: 22   Re-evaluation Date: 22   FOTO Scores/Date: Goal - 63; 22 - 40   Visit Count 1/10       Manuals        R shoulder PROM all planes per tolerance KD within available ranges                                        Ther Ex        Table slides 3-way 10x3"        pendulums 20x ea       Scapular retractions 20x       Step back at counter 10x5"                                       HEP Creation/instruction       Neuro Re-Ed                                                                                                                       Ther Act                                         Modalities

## 2022-04-28 ENCOUNTER — OFFICE VISIT (OUTPATIENT)
Dept: PHYSICAL THERAPY | Facility: CLINIC | Age: 57
End: 2022-04-28
Payer: MEDICARE

## 2022-04-28 DIAGNOSIS — M75.111 INCOMPLETE ROTATOR CUFF TEAR OR RUPTURE OF RIGHT SHOULDER, NOT SPECIFIED AS TRAUMATIC: Primary | ICD-10-CM

## 2022-04-28 PROCEDURE — 97110 THERAPEUTIC EXERCISES: CPT

## 2022-04-28 PROCEDURE — 97140 MANUAL THERAPY 1/> REGIONS: CPT

## 2022-04-28 NOTE — PROGRESS NOTES
Daily Note     Today's date: 2022  Patient name: Jazz Vega  : 1965  MRN: [id number]  Referring provider: Piero Vargas PA-C  Dx:   Encounter Diagnosis     ICD-10-CM    1  Incomplete rotator cuff tear or rupture of right shoulder, not specified as traumatic  M75 111        Start Time:   Stop Time:   Total time in clinic (min): 40 minutes    Subjective: patient reports normal soreness post initial eval   Reports no longer wearing of sling  Objective: See treatment diary below      Assessment: patient was able to complete therapy with in tolerance and protocol with out reported onset of pain  Was able to introduce pullies performed in flexion and scaption to improve upon PROM  Cueing on relaxation during manual interventions to address muscle guarding along with upper trap compensations during scap retract    Plan: Continue per plan of care  Progress treatment as tolerated         Diagnosis: s/p right shoulder arthroscopic rotator cuff repair, open subpectoral biceps tenodesis, subscapular repair and extensive debridement performed on 22   Precautions: Post-op protocol attached to pt chart; CAD, HTN (controlled w/ meds)   POC Expires: 22   Re-evaluation Date: 22   FOTO Scores/Date: Goal - 63; 22 - 40   Visit Count 1/10 2/10      Manuals       R shoulder PROM all planes per tolerance KD within available ranges  AMADOR within protocol                                      Ther Ex       Table slides 3-way 10x3"        pendulums 20x ea 20x      Scapular retractions 20x 5"x20      Step back at counter 10x5" 5"x10      Pullies   Flex/Scap 5"x2 mins with in protocol      Supine Punches  AA to 90* x10                      HEP Creation/instruction       Neuro Re-Ed                                                                                                                       Ther Act                                         Modalities

## 2022-04-29 ENCOUNTER — RA CDI HCC (OUTPATIENT)
Dept: OTHER | Facility: HOSPITAL | Age: 57
End: 2022-04-29

## 2022-04-29 NOTE — PROGRESS NOTES
[address]  coding opportunities       Chart reviewed, no opportunity found: CHART REVIEWED, NO OPPORTUNITY FOUND        Patients Insurance     Medicare Insurance: Medicare

## 2022-05-02 ENCOUNTER — OFFICE VISIT (OUTPATIENT)
Dept: GASTROENTEROLOGY | Facility: CLINIC | Age: 57
End: 2022-05-02
Payer: MEDICARE

## 2022-05-02 ENCOUNTER — OFFICE VISIT (OUTPATIENT)
Dept: PHYSICAL THERAPY | Facility: CLINIC | Age: 57
End: 2022-05-02
Payer: MEDICARE

## 2022-05-02 VITALS
DIASTOLIC BLOOD PRESSURE: 64 MMHG | HEART RATE: 78 BPM | WEIGHT: 200 LBS | BODY MASS INDEX: 28.63 KG/M2 | SYSTOLIC BLOOD PRESSURE: 120 MMHG | HEIGHT: 70 IN | OXYGEN SATURATION: 99 %

## 2022-05-02 DIAGNOSIS — M75.111 INCOMPLETE ROTATOR CUFF TEAR OR RUPTURE OF RIGHT SHOULDER, NOT SPECIFIED AS TRAUMATIC: Primary | ICD-10-CM

## 2022-05-02 DIAGNOSIS — R11.15 CYCLICAL VOMITING: Primary | ICD-10-CM

## 2022-05-02 PROCEDURE — 97112 NEUROMUSCULAR REEDUCATION: CPT

## 2022-05-02 PROCEDURE — 97110 THERAPEUTIC EXERCISES: CPT

## 2022-05-02 PROCEDURE — 97140 MANUAL THERAPY 1/> REGIONS: CPT

## 2022-05-02 PROCEDURE — 99213 OFFICE O/P EST LOW 20 MIN: CPT | Performed by: PHYSICIAN ASSISTANT

## 2022-05-02 RX ORDER — SUMATRIPTAN 50 MG/1
50 TABLET, FILM COATED ORAL ONCE AS NEEDED
Qty: 9 TABLET | Refills: 0 | Status: SHIPPED | OUTPATIENT
Start: 2022-05-02 | End: 2022-06-04

## 2022-05-02 NOTE — PROGRESS NOTES
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  Reports swinging with L only  Objective: See treatment diary below      Assessment:  Patient was able to progress in therapy with in protocol without onset of reported pain  Was able to transition to supine active ROM with introduction of punches  Progress AAROM performing wall slides into flexion with cues on working with in tolerable ROM  Cues on scapular/postural re-ed on upper trap compensation and corrective t-band mechanics  Plan: Continue per plan of care  Progress treatment as tolerated         Diagnosis: s/p right shoulder arthroscopic rotator cuff repair, open subpectoral biceps tenodesis, subscapular repair and extensive debridement performed on 22   Precautions: Post-op protocol attached to pt chart; CAD, HTN (controlled w/ meds)   POC Expires: 22   Re-evaluation Date: 22   FOTO Scores/Date: Goal - 63; 22 - 40   Visit Count 1/10 2/10 3/10     Manuals  5/     R shoulder PROM all planes per tolerance KD within available ranges  AMADOR within protocol AMADOR w/in protocol                                     Ther Ex  5/2     Table slides 3-way 10x3"        pendulums 20x ea 20x      Scapular retractions 20x 5"x20      Step back at counter 10x5" 5"x10 5"10     Pullies   Flex/Scap 5"x2 mins with in protocol Flex/scap x 2 mins ea     Supine Punches  AA to 90* x10 Active w/protraction 3"x10     UBE    L1 2/2     Supine Cane Flex   5"x10     Supine Cane ER 5"x10     Wall Wash   Flex 5"x10             HEP Creation/instruction       Neuro Re-Ed   5/2     Scap Retraction    5"x20     T-band Rows/Ext   Pink 2x10 ea                                                                                                    Ther Act                                         Modalities

## 2022-05-05 ENCOUNTER — OFFICE VISIT (OUTPATIENT)
Dept: INTERNAL MEDICINE CLINIC | Facility: CLINIC | Age: 57
End: 2022-05-05
Payer: MEDICARE

## 2022-05-05 ENCOUNTER — OFFICE VISIT (OUTPATIENT)
Dept: PHYSICAL THERAPY | Facility: CLINIC | Age: 57
End: 2022-05-05
Payer: MEDICARE

## 2022-05-05 VITALS
SYSTOLIC BLOOD PRESSURE: 116 MMHG | WEIGHT: 200.4 LBS | BODY MASS INDEX: 28.69 KG/M2 | HEIGHT: 70 IN | OXYGEN SATURATION: 98 % | HEART RATE: 82 BPM | DIASTOLIC BLOOD PRESSURE: 70 MMHG

## 2022-05-05 DIAGNOSIS — Z12.5 PROSTATE CANCER SCREENING: ICD-10-CM

## 2022-05-05 DIAGNOSIS — I25.119 CORONARY ARTERY DISEASE WITH ANGINA PECTORIS, UNSPECIFIED VESSEL OR LESION TYPE, UNSPECIFIED WHETHER NATIVE OR TRANSPLANTED HEART (HCC): ICD-10-CM

## 2022-05-05 DIAGNOSIS — F10.21 ALCOHOL DEPENDENCE, IN REMISSION (HCC): ICD-10-CM

## 2022-05-05 DIAGNOSIS — R79.9 ABNORMAL FINDING OF BLOOD CHEMISTRY, UNSPECIFIED: ICD-10-CM

## 2022-05-05 DIAGNOSIS — K51.919 ULCERATIVE COLITIS WITH COMPLICATION, UNSPECIFIED LOCATION (HCC): ICD-10-CM

## 2022-05-05 DIAGNOSIS — N40.1 BENIGN PROSTATIC HYPERPLASIA WITH LOWER URINARY TRACT SYMPTOMS, SYMPTOM DETAILS UNSPECIFIED: ICD-10-CM

## 2022-05-05 DIAGNOSIS — M75.111 INCOMPLETE ROTATOR CUFF TEAR OR RUPTURE OF RIGHT SHOULDER, NOT SPECIFIED AS TRAUMATIC: Primary | ICD-10-CM

## 2022-05-05 DIAGNOSIS — I10 ESSENTIAL HYPERTENSION: Primary | ICD-10-CM

## 2022-05-05 DIAGNOSIS — F33.9 DEPRESSION, RECURRENT (HCC): ICD-10-CM

## 2022-05-05 PROBLEM — Z01.810 PRE-OPERATIVE CARDIOVASCULAR EXAMINATION: Status: RESOLVED | Noted: 2022-03-07 | Resolved: 2022-05-05

## 2022-05-05 PROBLEM — J98.11 ATELECTASIS: Status: RESOLVED | Noted: 2019-02-25 | Resolved: 2022-05-05

## 2022-05-05 PROBLEM — K57.92 DIVERTICULITIS: Status: RESOLVED | Noted: 2019-02-23 | Resolved: 2022-05-05

## 2022-05-05 PROBLEM — R11.2 NON-INTRACTABLE VOMITING WITH NAUSEA: Status: RESOLVED | Noted: 2018-12-08 | Resolved: 2022-05-05

## 2022-05-05 PROBLEM — E87.6 HYPOKALEMIA: Status: RESOLVED | Noted: 2020-02-10 | Resolved: 2022-05-05

## 2022-05-05 PROBLEM — R10.9 ABDOMINAL PAIN: Status: RESOLVED | Noted: 2018-02-15 | Resolved: 2022-05-05

## 2022-05-05 PROBLEM — D72.829 LEUKOCYTOSIS: Status: RESOLVED | Noted: 2018-12-08 | Resolved: 2022-05-05

## 2022-05-05 PROBLEM — R07.9 CHEST PAIN: Status: RESOLVED | Noted: 2019-02-20 | Resolved: 2022-05-05

## 2022-05-05 PROBLEM — K52.839 MICROSCOPIC COLITIS: Status: RESOLVED | Noted: 2019-02-23 | Resolved: 2022-05-05

## 2022-05-05 PROBLEM — R93.5 ABNORMAL CT OF THE ABDOMEN: Status: RESOLVED | Noted: 2018-12-08 | Resolved: 2022-05-05

## 2022-05-05 PROCEDURE — 99214 OFFICE O/P EST MOD 30 MIN: CPT | Performed by: INTERNAL MEDICINE

## 2022-05-05 PROCEDURE — 97140 MANUAL THERAPY 1/> REGIONS: CPT

## 2022-05-05 PROCEDURE — 97112 NEUROMUSCULAR REEDUCATION: CPT

## 2022-05-05 PROCEDURE — 97110 THERAPEUTIC EXERCISES: CPT

## 2022-05-05 NOTE — PROGRESS NOTES
Daily Note     Today's date: 2022  Patient name: Tori Ceja  : 1965  MRN: [id number]  Referring provider: Pradeep Leija PA-C  Dx:   Encounter Diagnosis     ICD-10-CM    1  Incomplete rotator cuff tear or rupture of right shoulder, not specified as traumatic  M75 111        Start Time: 1147  Stop Time: 4657  Total time in clinic (min): 48 minutes    Subjective:  Patient reports no new incidents or complaints  Denies any pain or soreness post last therapy session        Objective: See treatment diary below      Assessment:  Patient was able to complete therapy with out incident and working with in protocol  Cueing throughout on sequencing of exercises along with rep/hold counts  Heavy cues on upper trap compensations with scap retract and t-band rows/ext  Education on stage of heeling due to patient's reports of perform yard work and heavier chores  Plan: Continue per plan of care  Progress treatment as tolerated         Diagnosis: s/p right shoulder arthroscopic rotator cuff repair, open subpectoral biceps tenodesis, subscapular repair and extensive debridement performed on 22   Precautions: Post-op protocol attached to pt chart; CAD, HTN (controlled w/ meds)   POC Expires: 22   Re-evaluation Date: 22   FOTO Scores/Date: Goal - 63; 22 - 40   Visit Count 1/10 2/10 3/10 4/10    Manuals  5/2 5/5    R shoulder PROM all planes per tolerance KD within available ranges  AMADOR within protocol AMADOR w/in protocol AMADOR                                    Ther Ex  5/2 5/5    Table slides 3-way 10x3"        pendulums 20x ea 20x      Scapular retractions 20x 5"x20      Step back at counter 10x5" 5"x10 5"10 5'x10    Pullies   Flex/Scap 5"x2 mins with in protocol Flex/scap x 2 mins ea Flex/scap x 2 min ea    Supine Punches  AA to 90* x10 Active w/protraction 3"x10 x15    UBE    L1 2/2 L1 2/2    Supine Cane Flex   5"x10 5"x1    Supine Cane ER   5"x10 5"x10    Wall Wash Flex 5"x10 Flex 5"x10            HEP Creation/instruction       Neuro Re-Ed   5/2 5/5    Scap Retraction    5"x20 5"x20    T-band Rows/Ext   Pink 2x10 ea Pink 2x10 ea                                                                                                   Ther Act                                         Modalities

## 2022-05-05 NOTE — PROGRESS NOTES
Assessment/Plan:     Derik Middleton is here for routine follow up  Following with GI and cardiology  Denies cp, sob  Abdominal pain is at baseline  Labs were reviewed  He continues to have physical therapy for his rotator cuff injury  He is s/p surgery  He is up to date with screenings  PSA ordered  Quality Measures:     BMI Counseling: Body mass index is 28 75 kg/m²  The BMI is above normal  Nutrition recommendations include decreasing portion sizes and encouraging healthy choices of fruits and vegetables  Exercise recommendations include moderate physical activity 150 minutes/week  Rationale for BMI follow-up plan is due to patient being overweight or obese  Return in about 6 months (around 11/5/2022)  No problem-specific Assessment & Plan notes found for this encounter  Diagnoses and all orders for this visit:    Essential hypertension  -     CBC and differential; Future  -     Comprehensive metabolic panel; Future  -     Lipid Panel with Direct LDL reflex; Future  -     Hemoglobin A1C; Future    Alcohol dependence, in remission (HCC)    Depression, recurrent (HCC)    Ulcerative colitis with complication, unspecified location [facility])    Coronary artery disease with angina pectoris, unspecified vessel or lesion type, unspecified whether native or transplanted heart ([facility] [address] )  -     CBC and differential; Future  -     Comprehensive metabolic panel; Future  -     Lipid Panel with Direct LDL reflex; Future  -     Hemoglobin A1C; Future    Prostate cancer screening  -     PSA, total and free; Future    Abnormal finding of blood chemistry, unspecified   -     Hemoglobin A1C; Future    Benign prostatic hyperplasia with lower urinary tract symptoms, symptom details unspecified   -     PSA, total and free; Future          Subjective:      Patient ID: Alicia Orozco is a 61 y o  male  Derik Middleton is here for routine visit        ALLERGIES:  Allergies   Allergen Reactions    Rosuvastatin Myalgia       CURRENT MEDICATIONS:    Current Outpatient Medications:     aspirin (ECOTRIN LOW STRENGTH) 81 mg EC tablet, Take 1 tablet (81 mg total) by mouth daily, Disp: , Rfl: 0    atorvastatin (LIPITOR) 80 mg tablet, Take 80 mg by mouth daily, Disp: , Rfl:     bacitracin ointment, APPLY MODERATE AMOUNT TOPICALLY TWICE A DAY TO AFFECTED AREA FOR SKIN CONDITION, Disp: , Rfl:     buPROPion (WELLBUTRIN SR) 150 mg 12 hr tablet, Take 1 tablet (150 mg total) by mouth 2 (two) times a day, Disp: 60 tablet, Rfl: 5    clonazePAM (KLONOPIN) 0 5 mg tablet, Take 0 5 mg by mouth daily at bedtime , Disp: , Rfl:     dicyclomine (BENTYL) 20 mg tablet, Take 1 tablet (20 mg total) by mouth every 8 (eight) hours as needed (abdominal cramping), Disp: 20 tablet, Rfl: 0    Homeopathic Products (ARNICARE ARTHRITIS PO), Take by mouth, Disp: , Rfl:     metoclopramide (Reglan) 10 mg tablet, Take 1 tablet (10 mg total) by mouth every 6 (six) hours as needed (nausea or vomiting), Disp: 20 tablet, Rfl: 0    NON FORMULARY, thc top cream, Disp: , Rfl:     omeprazole (PriLOSEC) 20 mg delayed release capsule, Take 1 capsule (20 mg total) by mouth daily, Disp: 60 capsule, Rfl: 3    ondansetron (ZOFRAN-ODT) 4 mg disintegrating tablet, Take 1 tablet (4 mg total) by mouth every 6 (six) hours as needed for nausea or vomiting, Disp: 20 tablet, Rfl: 0    oxyCODONE-acetaminophen (Percocet) 5-325 mg per tablet, Take 1 tablet by mouth every 4 (four) hours as needed for moderate pain Max Daily Amount: 6 tablets, Disp: 20 tablet, Rfl: 0    oxyCODONE-acetaminophen (PERCOCET) 5-325 mg per tablet, Take 1 tablet by mouth every 6 (six) hours as needed for moderate pain or severe pain for up to 20 doses Max Daily Amount: 4 tablets, Disp: 20 tablet, Rfl: 0    pantoprazole (PROTONIX) 20 mg tablet, Take 1 tablet (20 mg total) by mouth daily, Disp: 20 tablet, Rfl: 0    SUMAtriptan (Imitrex) 50 mg tablet, Take 1 tablet (50 mg total) by mouth once as needed for migraine for up to 1 dose, Disp: 9 tablet, Rfl: 0    traMADol (Ultram) 50 mg tablet, Take 1 tablet (50 mg total) by mouth every 8 (eight) hours as needed for moderate pain, Disp: 20 tablet, Rfl: 0    ACTIVE PROBLEM LIST:  Patient Active Problem List   Diagnosis    Anxiety and depression    Diaz's esophagus    Essential hypertension    Dyslipidemia    Obesity (BMI 30 0-34  9)    Ulcerative colitis without complications (HCC)    Gastroesophageal reflux disease with esophagitis    Vomiting    Coronary artery disease with angina pectoris, unspecified vessel or lesion type, unspecified whether native or transplanted heart (HCC)    Depression, recurrent ([facility] )    Hypertension    GERD (gastroesophageal reflux disease)       PAST MEDICAL HISTORY:  Past Medical History:   Diagnosis Date    Diaz's esophagus     Colon polyp     Coronary artery disease     Depression     Diverticulitis     GERD (gastroesophageal reflux disease)     Hemorrhoid     History of heart artery stent     Hyperlipidemia     Hypertension     Microscopic colitis     Ulcerative colitis ([facility] )        PAST SURGICAL HISTORY:  Past Surgical History:   Procedure Laterality Date    ABDOMINAL SURGERY      radio frequency ablation    BONE GRAFT Left 2018    CARPAL TUNNEL RELEASE Right     COLONOSCOPY      CORONARY ANGIOPLASTY WITH STENT PLACEMENT      x2    EGD AND COLONOSCOPY      ESOPHAGOGASTRODUODENOSCOPY N/A 2/15/2018    Procedure: ESOPHAGOGASTRODUODENOSCOPY (EGD); Surgeon: Jocelyn Lowry MD;  [facility];  Service: Gastroenterology    ESOPHAGOGASTRODUODENOSCOPY N/A 12/10/2018    Procedure: ESOPHAGOGASTRODUODENOSCOPY (EGD); Surgeon: Ramírez Blanco MD;  [facility]: [facility];   Service: Gastroenterology    HAND SURGERY Left     AR SHLDR ARTHROSCOP,SURG,W/ROTAT CUFF REPR Left 4/14/2021    Procedure: REPAIR ROTATOR CUFF  ARTHROSCOPIC; POSSIBLE BICEPS TENDONESIS, ACROMIOPLASTY;  Surgeon: Quinn Nunez DO;  [facility];  Service: Orthopedics    ROTATOR CUFF REPAIR Left     SHOULDER ARTHROSCOPY Right 3/14/2022    Procedure: ARTHROSCOPY SHOULDER- Right shoulder arthroscopic rotator cuff repair, open subpectoral biceps tenodesis, subsacpular repair and extensive debridement;  Surgeon: Lani Armijo DO;  [facility];  Service: Orthopedics    TONSILLECTOMY         FAMILY HISTORY:  Family History   Problem Relation Age of Onset    Heart disease Father     Melanoma Father     Cancer Sister     Skin cancer Sister     Skin cancer Mother        SOCIAL HISTORY:  Social History     Socioeconomic History    Marital status: /Civil Union     Spouse name: Not on file    Number of children: Not on file    Years of education: Not on file    Highest education level: Not on file   Occupational History    Not on file   Tobacco Use    Smoking status: Former Smoker     Packs/day: 0 50     Quit date: 1/16/2007     Years since quitting: 15 3    Smokeless tobacco: Former User     Quit date: 1/16/1998    Tobacco comment: quit 10 yrs ago   Vaping Use    Vaping Use: Never used   Substance and Sexual Activity    Alcohol use: Not Currently     Comment: quit 5 years    Drug use: Yes     Frequency: 3 0 times per week     Types: Marijuana     Comment: advised not to smoke    Sexual activity: Not on file   Other Topics Concern    Not on file   Social History Narrative    Not on file     Social Determinants of Health     Financial Resource Strain: Not on file   Food Insecurity: Not on file   Transportation Needs: Not on file   Physical Activity: Not on file   Stress: Not on file   Social Connections: Not on file   Intimate Partner Violence: Not on file   Housing Stability: Not on file       Review of Systems   Musculoskeletal: Positive for arthralgias and back pain  All other systems reviewed and are negative          Objective:  Vitals:    05/05/22 1318   BP: 116/70   BP Location: Left arm   Patient Position: Sitting   Cuff Size: Adult   Pulse: 82   SpO2: 98%   Weight: 90 9 kg (200 lb 6 4 oz)   Height: 5' 10" (1 778 m)     Body mass index is 28 75 kg/m²  Physical Exam  Vitals and nursing note reviewed  Constitutional:       Appearance: Normal appearance  HENT:      Head: Normocephalic and atraumatic  Right Ear: Tympanic membrane normal       Left Ear: Tympanic membrane normal       Nose: Nose normal       Mouth/Throat:      Mouth: Mucous membranes are moist       Pharynx: Oropharynx is clear  Cardiovascular:      Rate and Rhythm: Normal rate and regular rhythm  Heart sounds: Normal heart sounds  Pulmonary:      Effort: Pulmonary effort is normal       Breath sounds: Normal breath sounds  Abdominal:      Palpations: Abdomen is soft  Musculoskeletal:         General: Tenderness and signs of injury present  Normal range of motion  Cervical back: Normal range of motion  Right lower leg: No edema  Left lower leg: No edema  Skin:     General: Skin is warm and dry  Neurological:      General: No focal deficit present  Mental Status: He is alert and oriented to person, place, and time  Mental status is at baseline     Psychiatric:         Mood and Affect: Mood normal            RESULTS:    Recent Results (from the past 1008 hour(s))   CBC and differential    Collection Time: 04/21/22 11:20 AM   Result Value Ref Range    WBC 11 57 (H) 4 31 - 10 16 Thousand/uL    RBC 4 84 3 88 - 5 62 Million/uL    Hemoglobin 13 9 12 0 - 17 0 g/dL    Hematocrit 42 7 36 5 - 49 3 %    MCV 88 82 - 98 fL    MCH 28 7 26 8 - 34 3 pg    MCHC 32 6 31 4 - 37 4 g/dL    RDW 13 2 11 6 - 15 1 %    MPV 9 3 8 9 - 12 7 fL    Platelets 367 182 - 242 Thousands/uL    nRBC 0 /100 WBCs    Neutrophils Relative 87 (H) 43 - 75 %    Immat GRANS % 0 0 - 2 %    Lymphocytes Relative 9 (L) 14 - 44 %    Monocytes Relative 4 4 - 12 %    Eosinophils Relative 0 0 - 6 %    Basophils Relative 0 0 - 1 %    Neutrophils Absolute 9 96 (H) 1 85 - 7 62 Thousands/µL    Immature Grans Absolute 0 05 0 00 - 0 20 Thousand/uL    Lymphocytes Absolute 1 09 0 60 - 4 47 Thousands/µL    Monocytes Absolute 0 44 0 17 - 1 22 Thousand/µL    Eosinophils Absolute 0 01 0 00 - 0 61 Thousand/µL    Basophils Absolute 0 02 0 00 - 0 10 Thousands/µL   Basic metabolic panel    Collection Time: 04/21/22 11:20 AM   Result Value Ref Range    Sodium 135 (L) 136 - 145 mmol/L    Potassium 4 0 3 5 - 5 3 mmol/L    Chloride 98 (L) 100 - 108 mmol/L    CO2 29 21 - 32 mmol/L    ANION GAP 8 4 - 13 mmol/L    BUN 19 5 - 25 mg/dL    Creatinine 1 10 0 60 - 1 30 mg/dL    Glucose 131 65 - 140 mg/dL    Calcium 9 4 8 3 - 10 1 mg/dL    eGFR 74 ml/min/1 73sq m   Hepatic function panel    Collection Time: 04/21/22 11:20 AM   Result Value Ref Range    Total Bilirubin 0 43 0 20 - 1 00 mg/dL    Bilirubin, Direct 0 10 0 00 - 0 20 mg/dL    Alkaline Phosphatase 76 46 - 116 U/L    AST 10 5 - 45 U/L    ALT 16 12 - 78 U/L    Total Protein 7 3 6 4 - 8 2 g/dL    Albumin 3 7 3 5 - 5 0 g/dL   Magnesium    Collection Time: 04/21/22 11:20 AM   Result Value Ref Range    Magnesium 1 7 1 6 - 2 6 mg/dL   Lipase    Collection Time: 04/21/22 11:20 AM   Result Value Ref Range    Lipase 73 73 - 393 u/L   Lactic acid    Collection Time: 04/21/22 11:20 AM   Result Value Ref Range    LACTIC ACID 1 4 0 5 - 2 0 mmol/L   UA (URINE) with reflex to Scope    Collection Time: 04/21/22  2:00 PM   Result Value Ref Range    Color, UA Yellow     Clarity, UA Clear     Specific Gravity, UA <=1 005 1 003 - 1 030    pH, UA 6 5 4 5, 5 0, 5 5, 6 0, 6 5, 7 0, 7 5, 8 0    Leukocytes, UA Negative Negative    Nitrite, UA Negative Negative    Protein, UA Negative Negative mg/dl    Glucose, UA Negative Negative mg/dl    Ketones, UA Negative Negative mg/dl    Urobilinogen, UA 0 2 0 2, 1 0 E U /dl E U /dl    Bilirubin, UA Negative Negative    Blood, UA Negative Negative   ECG 12 lead    Collection Time: 04/23/22  8:45 AM   Result Value Ref Range Ventricular Rate 69 BPM    Atrial Rate 69 BPM    MT Interval 180 ms    QRSD Interval 86 ms    QT Interval 384 ms    QTC Interval 411 ms    P [place] 69 degrees    QRS Axis -24 degrees    T Wave [place] 44 degrees   HS Troponin 0hr (reflex protocol)    Collection Time: 04/23/22  9:28 AM   Result Value Ref Range    hs TnI 0hr <2 "Refer to ACS Flowchart"- see link ng/L   CBC and differential    Collection Time: 04/23/22  9:28 AM   Result Value Ref Range    WBC 9 65 4 31 - 10 16 Thousand/uL    RBC 4 80 3 88 - 5 62 Million/uL    Hemoglobin 14 0 12 0 - 17 0 g/dL    Hematocrit 42 9 36 5 - 49 3 %    MCV 89 82 - 98 fL    MCH 29 2 26 8 - 34 3 pg    MCHC 32 6 31 4 - 37 4 g/dL    RDW 13 2 11 6 - 15 1 %    MPV 9 6 8 9 - 12 7 fL    Platelets 464 359 - 203 Thousands/uL    nRBC 0 /100 WBCs    Neutrophils Relative 82 (H) 43 - 75 %    Immat GRANS % 0 0 - 2 %    Lymphocytes Relative 11 (L) 14 - 44 %    Monocytes Relative 6 4 - 12 %    Eosinophils Relative 1 0 - 6 %    Basophils Relative 0 0 - 1 %    Neutrophils Absolute 7 87 (H) 1 85 - 7 62 Thousands/µL    Immature Grans Absolute 0 03 0 00 - 0 20 Thousand/uL    Lymphocytes Absolute 1 04 0 60 - 4 47 Thousands/µL    Monocytes Absolute 0 54 0 17 - 1 22 Thousand/µL    Eosinophils Absolute 0 13 0 00 - 0 61 Thousand/µL    Basophils Absolute 0 04 0 00 - 0 10 Thousands/µL   Basic metabolic panel    Collection Time: 04/23/22  9:28 AM   Result Value Ref Range    Sodium 138 136 - 145 mmol/L    Potassium 3 8 3 5 - 5 3 mmol/L    Chloride 101 100 - 108 mmol/L    CO2 28 21 - 32 mmol/L    ANION GAP 9 4 - 13 mmol/L    BUN 22 5 - 25 mg/dL    Creatinine 1 20 0 60 - 1 30 mg/dL    Glucose 153 (H) 65 - 140 mg/dL    Calcium 9 5 8 3 - 10 1 mg/dL    eGFR 66 ml/min/1 73sq m   Hepatic function panel    Collection Time: 04/23/22  9:28 AM   Result Value Ref Range    Total Bilirubin 0 29 0 20 - 1 00 mg/dL    Bilirubin, Direct 0 08 0 00 - 0 20 mg/dL    Alkaline Phosphatase 85 46 - 116 U/L    AST 9 5 - 45 U/L    ALT 13 12 - 78 U/L    Total Protein 7 6 6 4 - 8 2 g/dL    Albumin 3 9 3 5 - 5 0 g/dL   Lipase    Collection Time: 04/23/22  9:28 AM   Result Value Ref Range    Lipase 56 (L) 73 - 393 u/L       This note was created with voice recognition software  Phonic, grammatical and spelling errors may be present within the note as a result

## 2022-05-12 ENCOUNTER — OFFICE VISIT (OUTPATIENT)
Dept: PHYSICAL THERAPY | Facility: CLINIC | Age: 57
End: 2022-05-12
Payer: MEDICARE

## 2022-05-12 DIAGNOSIS — M75.111 INCOMPLETE ROTATOR CUFF TEAR OR RUPTURE OF RIGHT SHOULDER, NOT SPECIFIED AS TRAUMATIC: Primary | ICD-10-CM

## 2022-05-12 PROCEDURE — 97110 THERAPEUTIC EXERCISES: CPT

## 2022-05-12 PROCEDURE — 97112 NEUROMUSCULAR REEDUCATION: CPT

## 2022-05-12 PROCEDURE — 97140 MANUAL THERAPY 1/> REGIONS: CPT

## 2022-05-12 NOTE — PROGRESS NOTES
Daily Note     Today's date: 2022  Patient name: Chloe Cabrera  : 1965  MRN: [id number]  Referring provider: Nadeen Abarca PA-C  Dx:   Encounter Diagnosis     ICD-10-CM    1  Incomplete rotator cuff tear or rupture of right shoulder, not specified as traumatic  M75 111        Start Time: 0935          Subjective: pt reports he is still having a lot of soreness in the shoulder, it was bad earlier this week  Objective: See treatment diary below      Assessment: Tolerated treatment well  Patient would benefit from continued PT  Discussed with pt to avoid any strenuous work w/ shoulder such as push ups and pull ups, since pt was inquiring about that  Tactile cueing to avoid UT elevation w/ scap retraction  Plan: Continue per plan of care        Diagnosis: s/p right shoulder arthroscopic rotator cuff repair, open subpectoral biceps tenodesis, subscapular repair and extensive debridement performed on 22   Precautions: Post-op protocol attached to pt chart; CAD, HTN (controlled w/ meds)   POC Expires: 22   Re-evaluation Date: 22   FOTO Scores/Date: Goal - 63; 22 - 40   Visit Count /10 2/10 3/10 410 5/10   Manuals  5/2 5/   R shoulder PROM all planes per tolerance KD within available ranges  AMADOR within protocol AMADOR w/in protocol AMADOR [facility] THOMAS                                   Ther Ex  5/2 5/5    Table slides 3-way 10x3"        pendulums 20x ea 20x      Scapular retractions 20x 5"x20      Step back at counter 10x5" 5"x10 5"10 5'x10 5'x10   Pullies   Flex/Scap 5"x2 mins with in protocol Flex/scap x 2 mins ea Flex/scap x 2 min ea Flex/scap x 2 min ea   Supine Punches  AA to 90* x10 Active w/protraction 3"x10 x15 15x   UBE    L1 2/2 L1 2/2 L1 3/3   Supine Cane Flex   5"x10 5"x10 5"x10   Supine Cane ER   5"x10 5"x10 5"x10   Wall Wash   Flex 5"x10 Flex 5"x10 Flex 5"x10           HEP Creation/instruction       Neuro Re-Ed       Scap Retraction    5"x20 5"x20 5"x20   T-band Rows/Ext   Pink 2x10 ea Pink 2x10 ea Pink 2x10 ea                                                                                                  Ther Act                                         Modalities

## 2022-05-16 ENCOUNTER — OFFICE VISIT (OUTPATIENT)
Dept: PHYSICAL THERAPY | Facility: CLINIC | Age: 57
End: 2022-05-16
Payer: MEDICARE

## 2022-05-16 DIAGNOSIS — M75.111 INCOMPLETE ROTATOR CUFF TEAR OR RUPTURE OF RIGHT SHOULDER, NOT SPECIFIED AS TRAUMATIC: Primary | ICD-10-CM

## 2022-05-16 PROCEDURE — 97140 MANUAL THERAPY 1/> REGIONS: CPT

## 2022-05-16 PROCEDURE — 97112 NEUROMUSCULAR REEDUCATION: CPT

## 2022-05-16 PROCEDURE — 97110 THERAPEUTIC EXERCISES: CPT

## 2022-05-16 NOTE — PROGRESS NOTES
Daily Note     Today's date: 2022  Patient name: Marine Green  : 1965  MRN: [id number]  Referring provider: Servando Matute PA-C  Dx:   Encounter Diagnosis     ICD-10-CM    1  Incomplete rotator cuff tear or rupture of right shoulder, not specified as traumatic  M75 111        Start Time: 0650  Stop Time: 1017  Total time in clinic (min): 43 minutes    Subjective: patient reports difficulty sleeping last night  Reports using his R shoulder more with increased activity around his house  Objective: See treatment diary below      Assessment:  Patient is 9 weeks S/P  right shoulder arthroscopic rotator cuff repair, open subpectoral biceps tenodesis, subscapular repair and extensive debridement performed on 22  Patient was able to progress per protocol with AAROM and AROM  Cueing on corrective mechanics, positioning, and working with intolerance and not working into pain  Cueing with AAROM for use of OP UE to aid in motion  Plan: Continue per plan of care  Progress treatment as tolerated         Diagnosis: s/p right shoulder arthroscopic rotator cuff repair, open subpectoral biceps tenodesis, subscapular repair and extensive debridement performed on 22   Precautions: Post-op protocol attached to pt chart; CAD, HTN (controlled w/ meds)   POC Expires: 22   Re-evaluation Date: 22   FOTO Scores/Date: Goal - 63; 22 - 40   Visit Count 6/10 2/10 3/10 4/10 5/10   Manuals  5/2 5/5 5/12   R shoulder PROM all planes per tolerance Millie Grant within protocol AMADOR w/in protocol AMADOR [facility] THOMAS                                   Ther Ex  5/2 5/5    SL ABD x10       SL ER 3"2x10       Standing Cane 3 Way x10 ea       Step back at counter 10x5" 5"x10 5"10 5'x10 5'x10   Pullies   Flex/Scap 5"x2 mins with in protocol Flex/scap x 2 mins ea Flex/scap x 2 min ea Flex/scap x 2 min ea   Supine Punches W/protraction x10/2# x10 AA to 90* x10 Active w/protraction 3"x10 x15 15x   UBE L1 3/3  L1 2/2 L1 2/2 L1 3/3   Supine Cane Flex   5"x10 5"x10 5"x10   Supine Cane ER   5"x10 5"x10 5"x10   Wall Wash Flex/Scap 5'x10 ea  Flex 5"x10 Flex 5"x10 Flex 5"x10           HEP        Neuro Re-Ed 5/16  5/2 5/5    Scap Retraction    5"x20 5"x20 5"x20   T-band Rows/Ext YTB 2x10 ea  Pink 2x10 ea Pink 2x10 ea Pink 2x10 ea                                                                                                  Ther Act                                         Modalities

## 2022-05-23 ENCOUNTER — OFFICE VISIT (OUTPATIENT)
Dept: PHYSICAL THERAPY | Facility: CLINIC | Age: 57
End: 2022-05-23
Payer: MEDICARE

## 2022-05-23 DIAGNOSIS — M75.111 INCOMPLETE ROTATOR CUFF TEAR OR RUPTURE OF RIGHT SHOULDER, NOT SPECIFIED AS TRAUMATIC: Primary | ICD-10-CM

## 2022-05-23 PROCEDURE — 97110 THERAPEUTIC EXERCISES: CPT | Performed by: PHYSICAL THERAPIST

## 2022-05-23 PROCEDURE — 97140 MANUAL THERAPY 1/> REGIONS: CPT | Performed by: PHYSICAL THERAPIST

## 2022-05-23 NOTE — PROGRESS NOTES
PT Re-Evaluation     Today's date: 2022  Patient name: Pro Ochoa  : 1965  MRN: [id number]  Referring provider: Cipriano Martínez PA-C  Dx:   Encounter Diagnosis     ICD-10-CM    1  Incomplete rotator cuff tear or rupture of right shoulder, not specified as traumatic  M75 111        Start Time: 09  Stop Time: 1015  Total time in clinic (min): 40 minutes    Assessment  Assessment details: Patient is a 61 y o  male s/p right shoulder arthroscopic rotator cuff repair, open subpectoral biceps tenodesis, subscapular repair and extensive debridement performed on 22 and has completed 7 visits to date with improved FOTO score of 40 to 55 since initial evaluation  Patient demonstrates notable subjective/objective improvement since enrolling in PT to include improving ROM and pain, however continues to exhibit lingering deficits to include limited A/PROM, pain, weakness, and stability that continues to impact tolerance/ability to perform ADLs and recreational activities  Patient will benefit from continued skilled PT intervention to address the aforementioned impairments, achieve goals, maximize function, and improve quality of life  Pt is in agreement with this plan         Impairments: abnormal or restricted ROM, activity intolerance, impaired physical strength, lacks appropriate home exercise program, pain with function, poor posture  and poor body mechanics    Goals  ST weeks  Pt will demonstrate good understanding and compliance with HEP MET  Pt will decrease pain to 4-5/10  MET  Pt will demonstrate improved postural awareness and ability to self-correct without reliance on external cues MET    LT weeks  Pt will improve FOTO score to > or = to 63 to indicate improved functional abilities PROGRESSING  Pt will decrease pain to 1-2/10 PROGRESSING  Pt will increase shoulder strength to 4+/5 for improved tolerance/independence with ADLs PROGRESSING  Pt will increase PROM to WNL to allow for return of AROM of affected shoulder PROGRESSING  Pt will increase shoulder flexion/abduction to 150 degrees to increase independence with ADLs PROGRESSING  Pt will increase functional IR to be able to don/doff belt and wash back without pain PROGRESSING  Pt will increase functional ER to be able to wash hair independently without pain PROGRESSING      Plan  Plan details: Cont to tx per POC  Patient would benefit from: skilled physical therapy  Planned modality interventions: cryotherapy and thermotherapy: hydrocollator packs  Planned therapy interventions: ADL training, manual therapy, neuromuscular re-education, patient education, postural training, self care, strengthening, stretching, therapeutic activities, therapeutic exercise, home exercise program, graded activity, graded exercise, functional ROM exercises, flexibility and body mechanics training  Frequency: 2x week  Duration in weeks: 8  Plan of Care beginning date: 2022  Plan of Care expiration date: 2022  Treatment plan discussed with: patient        Subjective Evaluation    History of Present Illness  Mechanism of injury: Pt is s/p right shoulder arthroscopic rotator cuff repair, open subpectoral biceps tenodesis, subscapular repair and extensive debridement performed on 22 and now 10 weeks out from surgery  Pt overall feels he is doing better with improving motion but continues to have moderate amounts of pain  Pt states he is "doing much more than he should" at home but states he is not pushing through pain  Pt still having limitations with reaching overhead, behind his back, and out to the side, as well as feeling weakness     Pain  Current pain rating: 3  At best pain ratin  At worst pain ratin  Quality: dull ache    Hand dominance: right    Patient Goals  Patient goals for therapy: decreased pain, increased motion, return to sport/leisure activities, independence with ADLs/IADLs and increased strength          Objective General Comments:      Shoulder Comments   Posture: FHP, RS, increased thoracic kyphosis    Right shoulder PROM: Flex 170* Abd 170* IR 45* ER 70* (IR/ER at 90* abd) - minimal muscle guarding with pain at end ranges all planes    Right shoulder AROM: Flex 155* (pain) Abd 142* (pain) Functional IR to L2 (pain) Functional ER to C6 (pain)    Right shoulder MMT deferred 2* to limited and painful AROM but able to resist submaximally all planes    Light touch intact and symmetrical bilateral UEs     FOTO: 55 (improved from 40 at IE)                Diagnosis: s/p right shoulder arthroscopic rotator cuff repair, open subpectoral biceps tenodesis, subscapular repair and extensive debridement performed on 03/14/22   Precautions: Post-op protocol attached to pt chart; CAD, HTN (controlled w/ meds)   POC Expires: 6/20/22   Re-evaluation Date: 6/20/22   FOTO Scores/Date: Goal - 63; 4/25/22 - 40; 5/23/22 - 54   Visit Count 6/10 1/10 3/10 4/10 5/10   Manuals 5/16 5/23 5/2 5/5 5/12   R shoulder PROM all planes per tolerance AMADOR KD AMADOR w/in protocol [facility] THOMAS                                   Ther Ex 5/16 5/23 5/2 5/5    SL ABD x10       SL ER 3"2x10       Standing Cane 3 Way x10 ea       Step back at counter 10x5"  5"10 5'x10 5'x10   Pullies    Flex/scap x 2 mins ea Flex/scap x 2 min ea Flex/scap x 2 min ea   Supine Punches W/protraction x10/2# x10  Active w/protraction 3"x10 x15 15x   UBE  L1 3/3  L1 2/2 L1 2/2 L1 3/3   Supine Cane Flex   5"x10 5"x10 5"x10   Supine Cane ER   5"x10 5"x10 5"x10   Wall Wash Flex/Scap 5'x10 ea Flex/scap 10x3" ea Flex 5"x10 Flex 5"x10 Flex 5"x10   IR belt stretch  10x10"                                              HEP  Re-assessed R shoulder and FOTO: updated PT prognosis/POC and goals       Neuro Re-Ed 5/16 5/23 5/2 5/5    Scap Retraction    5"x20 5"x20 5"x20   T-band Rows/Ext YTB 2x10 ea  Pink 2x10 ea Pink 2x10 ea Pink 2x10 ea Ther Act                                         Modalities

## 2022-05-26 ENCOUNTER — OFFICE VISIT (OUTPATIENT)
Dept: PHYSICAL THERAPY | Facility: CLINIC | Age: 57
End: 2022-05-26
Payer: MEDICARE

## 2022-05-26 DIAGNOSIS — M75.111 INCOMPLETE ROTATOR CUFF TEAR OR RUPTURE OF RIGHT SHOULDER, NOT SPECIFIED AS TRAUMATIC: Primary | ICD-10-CM

## 2022-05-26 PROCEDURE — 97140 MANUAL THERAPY 1/> REGIONS: CPT | Performed by: PHYSICAL THERAPIST

## 2022-05-26 PROCEDURE — 97110 THERAPEUTIC EXERCISES: CPT | Performed by: PHYSICAL THERAPIST

## 2022-05-26 NOTE — PROGRESS NOTES
Daily Note     Today's date: 2022  Patient name: Claudio Rosales  : 1965  MRN: [id number]  Referring provider: Akbar Gonzales PA-C  Dx:   Encounter Diagnosis     ICD-10-CM    1  Incomplete rotator cuff tear or rupture of right shoulder, not specified as traumatic  M75 111                   Subjective: Pt reports that he is doing well  Objective: See treatment diary below      Assessment: Tolerated treatment well  Patient exhibited good technique with therapeutic exercises and would benefit from continued PT  He is most limited with IR during PROM  Continue to progress per protocol  Plan: Continue per plan of care  Progress treament per protocol        Diagnosis: s/p right shoulder arthroscopic rotator cuff repair, open subpectoral biceps tenodesis, subscapular repair and extensive debridement performed on 22   Precautions: Post-op protocol attached to pt chart; CAD, HTN (controlled w/ meds)   POC Expires: 22   Re-evaluation Date: 22   FOTO Scores/Date: Goal - 63; 22 - 36; 22 - 54   Visit Count 6/10 1/10 2/10  510   Manuals   5   R shoulder PROM all planes per tolerance AMADOR KD   [employer] [facility] THOMAS                                   Ther Ex       SL ABD x10  x15     SL ER 3"2x10  3"2x10     Standing Cane 3 Way x10 ea  x10 ea     Step back at counter 10x5"    5'x10   Pullies      Flex/scap x 2 min ea   Supine Punches W/protraction x10/2# x10  Serratus punches #3 x20  15x   UBE  L1 3/3  L1 3/3  L1 3/3   Supine Cane Flex     5"x10   Supine Cane ER     5"x10   Wall Wash Flex/Scap 5'x10 ea Flex/scap 10x3" ea Flex/scap 10x3" ea  Flex 5"x10   IR belt stretch  10x10" 10x10"                                             HEP  Re-assessed R shoulder and FOTO: updated PT prognosis/POC and goals       Neuro Re-Ed       Scap Retraction      5"x20   T-band Rows/Ext YTB 2x10 ea    Pink 2x10 ea Ther Act                                         Modalities

## 2022-05-31 NOTE — TELEPHONE ENCOUNTER
He does not have an infection  The patient has been evaluated a multitude of times for issues  He needs to not go to the emergency room this is not the answer to his problems  Find out what medication he is talking about and we can do a prior authorization    Find out what we need to do to get this approved PAST MEDICAL HISTORY:  Gout     Sickle cell anemia

## 2022-06-02 ENCOUNTER — OFFICE VISIT (OUTPATIENT)
Dept: PHYSICAL THERAPY | Facility: CLINIC | Age: 57
End: 2022-06-02
Payer: MEDICARE

## 2022-06-02 DIAGNOSIS — M75.111 INCOMPLETE ROTATOR CUFF TEAR OR RUPTURE OF RIGHT SHOULDER, NOT SPECIFIED AS TRAUMATIC: Primary | ICD-10-CM

## 2022-06-02 PROCEDURE — 97112 NEUROMUSCULAR REEDUCATION: CPT

## 2022-06-02 PROCEDURE — 97110 THERAPEUTIC EXERCISES: CPT

## 2022-06-02 NOTE — PROGRESS NOTES
Daily Note     Today's date: 2022  Patient name: Glen Ellis  : 1965  MRN: [id number]  Referring provider: Valencia De Santiago PA-C  Dx:   Encounter Diagnosis     ICD-10-CM    1  Incomplete rotator cuff tear or rupture of right shoulder, not specified as traumatic  M75 111        Start Time: 0900          Subjective: pt reports no new complaints pertaining to his shoulder  Objective: See treatment diary below      Assessment: Tolerated treatment well  Patient would benefit from continued PT  Progressed strengthening with good tolerance  Pt is planning on going down to once a week visits  Mobility is improving nicely, still limited w/ IR  Pt required cueing t/o for correct posture and to avoid over rotation w/ ER/IR  Plan: Continue per plan of care        Diagnosis: s/p right shoulder arthroscopic rotator cuff repair, open subpectoral biceps tenodesis, subscapular repair and extensive debridement performed on 22   Precautions: Post-op protocol attached to pt chart; CAD, HTN (controlled w/ meds)   POC Expires: 22   Re-evaluation Date: 22   FOTO Scores/Date: Goal - 63; 22 - 36; 22 - 54   Visit Count 6/10 1/10 210 3/10    Manuals  6    R shoulder PROM all planes per tolerance AMAD[facility] THOMAS                                    Ther Ex       SL ABD x10  x15     SL ER 3"2x10  3"2x10     Standing Cane 3 Way x10 ea  x10 ea     Step back at counter 10x5"       Pullies         Supine Punches W/protraction x10/2# x10  Serratus punches #3 x20     UBE  L1 3/3  L1 3/3 L1 3/3    Supine Cane Flex        Supine Cane ER        Wall Wash Flex/Scap 5'x10 ea Flex/scap 10x3" ea Flex/scap 10x3" ea Flex/scap 15x    IR belt stretch  10x10" 10x10" 10x10"    Shoulder ER/IR, ext    ytb 2x10                                    HEP  Re-assessed R shoulder and FOTO: updated PT prognosis/POC and goals       Neuro Re-Ed       Scap Retraction         T-band Rows/Ext YTB 2x10 ea   gtb 2x10 rows only                                                                                                   Ther Act                                      Modalities

## 2022-06-03 ENCOUNTER — APPOINTMENT (EMERGENCY)
Dept: CT IMAGING | Facility: HOSPITAL | Age: 57
End: 2022-06-03
Payer: MEDICARE

## 2022-06-03 ENCOUNTER — HOSPITAL ENCOUNTER (OUTPATIENT)
Facility: HOSPITAL | Age: 57
Setting detail: OBSERVATION
Discharge: HOME | End: 2022-06-04
Attending: EMERGENCY MEDICINE | Admitting: FAMILY MEDICINE
Payer: MEDICARE

## 2022-06-03 DIAGNOSIS — K92.2 GI BLEED: Primary | ICD-10-CM

## 2022-06-03 DIAGNOSIS — K22.70 BARRETT'S ESOPHAGUS WITHOUT DYSPLASIA: ICD-10-CM

## 2022-06-03 DIAGNOSIS — K21.9 GASTROESOPHAGEAL REFLUX DISEASE WITHOUT ESOPHAGITIS: ICD-10-CM

## 2022-06-03 LAB
ABO GROUP BLD: NORMAL
ALBUMIN SERPL BCP-MCNC: 3.7 G/DL (ref 3.5–5)
ALP SERPL-CCNC: 69 U/L (ref 46–116)
ALT SERPL W P-5'-P-CCNC: 16 U/L (ref 12–78)
ANION GAP SERPL CALCULATED.3IONS-SCNC: 10 MMOL/L (ref 4–13)
APTT PPP: 30 SECONDS (ref 23–37)
AST SERPL W P-5'-P-CCNC: 11 U/L (ref 5–45)
ATRIAL RATE: 93 BPM
BASOPHILS # BLD AUTO: 0.01 THOUSANDS/ΜL (ref 0–0.1)
BASOPHILS NFR BLD AUTO: 0 % (ref 0–1)
BILIRUB SERPL-MCNC: 0.33 MG/DL (ref 0.2–1)
BLD GP AB SCN SERPL QL: NEGATIVE
BUN SERPL-MCNC: 15 MG/DL (ref 5–25)
CALCIUM SERPL-MCNC: 8.6 MG/DL (ref 8.3–10.1)
CHLORIDE SERPL-SCNC: 105 MMOL/L (ref 100–108)
CO2 SERPL-SCNC: 26 MMOL/L (ref 21–32)
CREAT SERPL-MCNC: 1.02 MG/DL (ref 0.6–1.3)
EOSINOPHIL # BLD AUTO: 0 THOUSAND/ΜL (ref 0–0.61)
EOSINOPHIL NFR BLD AUTO: 0 % (ref 0–6)
ERYTHROCYTE [DISTWIDTH] IN BLOOD BY AUTOMATED COUNT: 13.2 % (ref 11.6–15.1)
GFR SERPL CREATININE-BSD FRML MDRD: 81 ML/MIN/1.73SQ M
GLUCOSE SERPL-MCNC: 151 MG/DL (ref 65–140)
HCT VFR BLD AUTO: 42.2 % (ref 36.5–49.3)
HGB BLD-MCNC: 13.9 G/DL (ref 12–17)
IMM GRANULOCYTES # BLD AUTO: 0.02 THOUSAND/UL (ref 0–0.2)
IMM GRANULOCYTES NFR BLD AUTO: 0 % (ref 0–2)
INR PPP: 0.96 (ref 0.84–1.19)
LYMPHOCYTES # BLD AUTO: 1.05 THOUSANDS/ΜL (ref 0.6–4.47)
LYMPHOCYTES NFR BLD AUTO: 9 % (ref 14–44)
MCH RBC QN AUTO: 28.7 PG (ref 26.8–34.3)
MCHC RBC AUTO-ENTMCNC: 32.9 G/DL (ref 31.4–37.4)
MCV RBC AUTO: 87 FL (ref 82–98)
MONOCYTES # BLD AUTO: 0.43 THOUSAND/ΜL (ref 0.17–1.22)
MONOCYTES NFR BLD AUTO: 4 % (ref 4–12)
NEUTROPHILS # BLD AUTO: 10.86 THOUSANDS/ΜL (ref 1.85–7.62)
NEUTS SEG NFR BLD AUTO: 87 % (ref 43–75)
NRBC BLD AUTO-RTO: 0 /100 WBCS
P AXIS: 67 DEGREES
PLATELET # BLD AUTO: 305 THOUSANDS/UL (ref 149–390)
PMV BLD AUTO: 10.1 FL (ref 8.9–12.7)
POTASSIUM SERPL-SCNC: 4 MMOL/L (ref 3.5–5.3)
PR INTERVAL: 184 MS
PROT SERPL-MCNC: 6.9 G/DL (ref 6.4–8.2)
PROTHROMBIN TIME: 12.5 SECONDS (ref 11.6–14.5)
QRS AXIS: -18 DEGREES
QRSD INTERVAL: 88 MS
QT INTERVAL: 358 MS
QTC INTERVAL: 445 MS
RBC # BLD AUTO: 4.85 MILLION/UL (ref 3.88–5.62)
RH BLD: POSITIVE
SODIUM SERPL-SCNC: 141 MMOL/L (ref 136–145)
SPECIMEN EXPIRATION DATE: NORMAL
T WAVE AXIS: 65 DEGREES
VENTRICULAR RATE: 93 BPM
WBC # BLD AUTO: 12.37 THOUSAND/UL (ref 4.31–10.16)

## 2022-06-03 PROCEDURE — 93010 ELECTROCARDIOGRAM REPORT: CPT | Performed by: INTERNAL MEDICINE

## 2022-06-03 PROCEDURE — 99220 PR INITIAL OBSERVATION CARE/DAY 70 MINUTES: CPT | Performed by: FAMILY MEDICINE

## 2022-06-03 PROCEDURE — G1004 CDSM NDSC: HCPCS

## 2022-06-03 PROCEDURE — 86900 BLOOD TYPING SEROLOGIC ABO: CPT | Performed by: EMERGENCY MEDICINE

## 2022-06-03 PROCEDURE — 36415 COLL VENOUS BLD VENIPUNCTURE: CPT | Performed by: EMERGENCY MEDICINE

## 2022-06-03 PROCEDURE — 74176 CT ABD & PELVIS W/O CONTRAST: CPT

## 2022-06-03 PROCEDURE — 86850 RBC ANTIBODY SCREEN: CPT | Performed by: EMERGENCY MEDICINE

## 2022-06-03 PROCEDURE — 85730 THROMBOPLASTIN TIME PARTIAL: CPT | Performed by: EMERGENCY MEDICINE

## 2022-06-03 PROCEDURE — 80053 COMPREHEN METABOLIC PANEL: CPT | Performed by: EMERGENCY MEDICINE

## 2022-06-03 PROCEDURE — 93005 ELECTROCARDIOGRAM TRACING: CPT

## 2022-06-03 PROCEDURE — 86901 BLOOD TYPING SEROLOGIC RH(D): CPT | Performed by: EMERGENCY MEDICINE

## 2022-06-03 PROCEDURE — 85025 COMPLETE CBC W/AUTO DIFF WBC: CPT | Performed by: EMERGENCY MEDICINE

## 2022-06-03 PROCEDURE — C9113 INJ PANTOPRAZOLE SODIUM, VIA: HCPCS | Performed by: EMERGENCY MEDICINE

## 2022-06-03 PROCEDURE — 96365 THER/PROPH/DIAG IV INF INIT: CPT

## 2022-06-03 PROCEDURE — 85610 PROTHROMBIN TIME: CPT | Performed by: EMERGENCY MEDICINE

## 2022-06-03 PROCEDURE — 99285 EMERGENCY DEPT VISIT HI MDM: CPT

## 2022-06-03 PROCEDURE — 99285 EMERGENCY DEPT VISIT HI MDM: CPT | Performed by: EMERGENCY MEDICINE

## 2022-06-03 RX ORDER — ONDANSETRON 2 MG/ML
4 INJECTION INTRAMUSCULAR; INTRAVENOUS ONCE
Status: COMPLETED | OUTPATIENT
Start: 2022-06-03 | End: 2022-06-03

## 2022-06-03 RX ORDER — BUPROPION HYDROCHLORIDE 150 MG/1
150 TABLET ORAL DAILY
Status: DISCONTINUED | OUTPATIENT
Start: 2022-06-04 | End: 2022-06-04 | Stop reason: HOSPADM

## 2022-06-03 RX ORDER — ACETAMINOPHEN 325 MG/1
650 TABLET ORAL EVERY 6 HOURS PRN
Status: DISCONTINUED | OUTPATIENT
Start: 2022-06-03 | End: 2022-06-04 | Stop reason: HOSPADM

## 2022-06-03 RX ORDER — HYDROMORPHONE HCL/PF 1 MG/ML
0.5 SYRINGE (ML) INJECTION EVERY 4 HOURS PRN
Status: DISCONTINUED | OUTPATIENT
Start: 2022-06-03 | End: 2022-06-04 | Stop reason: HOSPADM

## 2022-06-03 RX ORDER — CLONAZEPAM 0.5 MG/1
0.5 TABLET ORAL
Status: DISCONTINUED | OUTPATIENT
Start: 2022-06-03 | End: 2022-06-03

## 2022-06-03 RX ORDER — HYDROMORPHONE HCL IN WATER/PF 6 MG/30 ML
0.2 PATIENT CONTROLLED ANALGESIA SYRINGE INTRAVENOUS EVERY 4 HOURS PRN
Status: DISCONTINUED | OUTPATIENT
Start: 2022-06-03 | End: 2022-06-04 | Stop reason: HOSPADM

## 2022-06-03 RX ADMIN — HYDROMORPHONE HYDROCHLORIDE 0.5 MG: 1 INJECTION, SOLUTION INTRAMUSCULAR; INTRAVENOUS; SUBCUTANEOUS at 18:32

## 2022-06-03 RX ADMIN — CLONAZEPAM 0.5 MG: 0.5 TABLET ORAL at 21:15

## 2022-06-03 RX ADMIN — CLONAZEPAM 1.5 MG: 1 TABLET ORAL at 22:43

## 2022-06-03 RX ADMIN — SODIUM CHLORIDE 8 MG/HR: 9 INJECTION, SOLUTION INTRAVENOUS at 23:37

## 2022-06-03 RX ADMIN — SODIUM CHLORIDE 80 MG: 9 INJECTION, SOLUTION INTRAVENOUS at 16:38

## 2022-06-03 RX ADMIN — SODIUM CHLORIDE 8 MG/HR: 9 INJECTION, SOLUTION INTRAVENOUS at 17:05

## 2022-06-03 RX ADMIN — SODIUM CHLORIDE 1000 ML: 0.9 INJECTION, SOLUTION INTRAVENOUS at 16:13

## 2022-06-03 RX ADMIN — ONDANSETRON 4 MG: 2 INJECTION INTRAMUSCULAR; INTRAVENOUS at 18:32

## 2022-06-03 NOTE — ED PROVIDER NOTES
History  Chief Complaint   Patient presents with    Vomiting Blood     Pt c/o coffee ground vomit  since 4 am this morning     HPI patient is a 79-year-old male, presents emergency department  Apparently started with some nausea abdominal pain last night  Patient reports took some he believes Bentyl and tried to calm his abdominal pain down and then had an episode of bloody vomitus  Patient presents emergency department with a plastic bag full of bloody vomitus  Volume of the plastic bag was greater than 1 L  Patient reports some epigastric burning  Reports mostly a stinging type sensation epigastric region without radiation to his back  Denies any shortness of breath  There was no loss of consciousness  Past medical history esophagitis, coronary disease, depression  Family history noncontributory  Social history former smoker no history of drug abuse    Prior to Admission Medications   Prescriptions Last Dose Informant Patient Reported? Taking?    Homeopathic Products ([facility] ARTHRITIS PO)  Self Yes No   Sig: Take by mouth   NON FORMULARY  Self Yes No   Sig: thc top cream   SUMAtriptan (Imitrex) 50 mg tablet   No No   Sig: Take 1 tablet (50 mg total) by mouth once as needed for migraine for up to 1 dose   aspirin (ECOTRIN LOW STRENGTH) 81 mg EC tablet  Self No No   Sig: Take 1 tablet (81 mg total) by mouth daily   atorvastatin (LIPITOR) 80 mg tablet  Self Yes No   Sig: Take 80 mg by mouth daily   bacitracin ointment   Yes No   Sig: APPLY MODERATE AMOUNT TOPICALLY TWICE A DAY TO AFFECTED AREA FOR SKIN CONDITION   buPROPion (WELLBUTRIN SR) 150 mg 12 hr tablet  Self No No   Sig: Take 1 tablet (150 mg total) by mouth 2 (two) times a day   clonazePAM (KLONOPIN) 0 5 mg tablet  Self Yes No   Sig: Take 0 5 mg by mouth daily at bedtime    dicyclomine (BENTYL) 20 mg tablet   No No   Sig: Take 1 tablet (20 mg total) by mouth every 8 (eight) hours as needed (abdominal cramping)   metoclopramide (Reglan) 10 mg tablet No No   Sig: Take 1 tablet (10 mg total) by mouth every 6 (six) hours as needed (nausea or vomiting)   omeprazole (PriLOSEC) 20 mg delayed release capsule  Self No No   Sig: Take 1 capsule (20 mg total) by mouth daily   ondansetron (ZOFRAN-ODT) 4 mg disintegrating tablet  Self No No   Sig: Take 1 tablet (4 mg total) by mouth every 6 (six) hours as needed for nausea or vomiting   oxyCODONE-acetaminophen (PERCOCET) 5-325 mg per tablet   No No   Sig: Take 1 tablet by mouth every 6 (six) hours as needed for moderate pain or severe pain for up to 20 doses Max Daily Amount: 4 tablets   oxyCODONE-acetaminophen (Percocet) 5-325 mg per tablet  Self No No   Sig: Take 1 tablet by mouth every 4 (four) hours as needed for moderate pain Max Daily Amount: 6 tablets   pantoprazole (PROTONIX) 20 mg tablet   No No   Sig: Take 1 tablet (20 mg total) by mouth daily   traMADol (Ultram) 50 mg tablet   No No   Sig: Take 1 tablet (50 mg total) by mouth every 8 (eight) hours as needed for moderate pain      Facility-Administered Medications: None       Past Medical History:   Diagnosis Date    Diaz's esophagus     Colon polyp     Coronary artery disease     Depression     Diverticulitis     GERD (gastroesophageal reflux disease)     Hemorrhoid     History of heart artery stent     Hyperlipidemia     Hypertension     Microscopic colitis     Ulcerative colitis ([facility] [address] )        Past Surgical History:   Procedure Laterality Date    ABDOMINAL SURGERY      radio frequency ablation    BONE GRAFT Left 2018    CARPAL TUNNEL RELEASE Right     COLONOSCOPY      CORONARY ANGIOPLASTY WITH STENT PLACEMENT      x2    EGD AND COLONOSCOPY      ESOPHAGOGASTRODUODENOSCOPY N/A 2/15/2018    Procedure: ESOPHAGOGASTRODUODENOSCOPY (EGD); Surgeon: Evelyne Koroma MD;  Location: [facility];  Service: Gastroenterology    ESOPHAGOGASTRODUODENOSCOPY N/A 12/10/2018    Procedure: ESOPHAGOGASTRODUODENOSCOPY (EGD);   Surgeon: Jamarcus Edwards MD; Location: [facility]; Service: Gastroenterology    HAND SURGERY Left     IL SHLDR ARTHROSCOP,SURG,W/ROTAT CUFF REPR Left 4/14/2021    Procedure: REPAIR ROTATOR CUFF  ARTHROSCOPIC; POSSIBLE BICEPS TENDONESIS, ACROMIOPLASTY;  Surgeon: Jamal Pace DO;  [facility];  Service: Orthopedics    ROTATOR CUFF REPAIR Left     SHOULDER ARTHROSCOPY Right 3/14/2022    Procedure: ARTHROSCOPY SHOULDER- Right shoulder arthroscopic rotator cuff repair, open subpectoral biceps tenodesis, subsacpular repair and extensive debridement;  Surgeon: Jamal Pace DO;  [facility];  Service: Orthopedics    TONSILLECTOMY         Family History   Problem Relation Age of Onset    Heart disease Father     Melanoma Father     Cancer Sister     Skin cancer Sister     Skin cancer Mother      I have reviewed and agree with the history as documented  E-Cigarette/Vaping    E-Cigarette Use Never User      E-Cigarette/Vaping Substances    Nicotine No     THC No     CBD No     Flavoring No     Other No     Unknown No      Social History     Tobacco Use    Smoking status: Former Smoker     Packs/day: 0 50     Quit date: 1/16/2007     Years since quitting: 15 3    Smokeless tobacco: Former User     Quit date: 1/16/1998    Tobacco comment: quit 10 yrs ago   Vaping Use    Vaping Use: Never used   Substance Use Topics    Alcohol use: Not Currently     Comment: quit 5 years    Drug use: Yes     Frequency: 3 0 times per week     Types: Marijuana     Comment: advised not to smoke       Review of Systems   Constitutional: Negative for diaphoresis, fatigue and fever  HENT: Negative for congestion, ear pain, nosebleeds and sore throat  Eyes: Negative for photophobia, pain, discharge and visual disturbance  Respiratory: Negative for cough, choking, chest tightness, shortness of breath and wheezing  Cardiovascular: Negative for chest pain and palpitations  Gastrointestinal: Positive for vomiting  Negative for abdominal distention, abdominal pain and diarrhea  Genitourinary: Negative for dysuria, flank pain and frequency  Musculoskeletal: Negative for back pain, gait problem and joint swelling  Skin: Negative for color change and rash  Neurological: Negative for dizziness, syncope and headaches  Psychiatric/Behavioral: Negative for behavioral problems and confusion  The patient is not nervous/anxious  All other systems reviewed and are negative  vomited blood    Physical Exam  Physical Exam  Vitals and nursing note reviewed  Constitutional:       Appearance: He is well-developed  HENT:      Head: Normocephalic  Right Ear: External ear normal       Left Ear: External ear normal       Nose: Nose normal    Eyes:      General: Lids are normal       Pupils: Pupils are equal, round, and reactive to light  Cardiovascular:      Rate and Rhythm: Normal rate and regular rhythm  Pulses: Normal pulses  Heart sounds: Normal heart sounds  Pulmonary:      Effort: Pulmonary effort is normal  No respiratory distress  Breath sounds: Normal breath sounds  Abdominal:      General: Abdomen is flat  Bowel sounds are normal       Tenderness: There is abdominal tenderness  Comments: Mild epigastric tenderness without rebound or guarding   Musculoskeletal:         General: No deformity  Normal range of motion  Cervical back: Normal range of motion and neck supple  Skin:     General: Skin is warm and dry  Neurological:      Mental Status: He is alert and oriented to person, place, and time          Pulse oximetry 98% on room air adequate oxygenation, there is no hypoxia  Vital Signs  ED Triage Vitals [06/03/22 1532]   Temperature Pulse Respirations Blood Pressure SpO2   98 3 °F (36 8 °C) (!) 115 18 149/84 98 %      Temp Source Heart Rate Source Patient Position - Orthostatic VS BP Location FiO2 (%)   Tympanic Monitor Sitting Left arm --      Pain Score       -- Vitals:    06/03/22 1532 06/03/22 1607 06/03/22 1700 06/03/22 1730   BP: 149/84 144/86 117/84 139/85   Pulse: (!) 115 104 96 90   Patient Position - Orthostatic VS: Sitting            Visual Acuity      ED Medications  Medications   pantoprazole (PROTONIX) 80 mg in sodium chloride 0 9 % 100 mL IVPB (0 mg Intravenous Stopped 6/3/22 1705)     Followed by   pantoprazole (PROTONIX) 80 mg in sodium chloride 0 9 % 100 mL infusion (8 mg/hr Intravenous New Bag 6/3/22 1705)   buPROPion (WELLBUTRIN XL) 24 hr tablet 150 mg (has no administration in time range)   clonazePAM (KlonoPIN) tablet 0 5 mg (has no administration in time range)   HYDROmorphone HCl (DILAUDID) injection 0 2 mg (has no administration in time range)     And   HYDROmorphone (DILAUDID) injection 0 5 mg (has no administration in time range)   acetaminophen (TYLENOL) tablet 650 mg (has no administration in time range)   sodium chloride 0 9 % bolus 1,000 mL (1,000 mL Intravenous New Bag 6/3/22 1613)       Diagnostic Studies  Results Reviewed     Procedure Component Value Units Date/Time    Comprehensive metabolic panel [[id number]]  (Abnormal) Collected: 06/03/22 1611    Lab Status: Final result Specimen: Blood from Arm, Right Updated: 06/03/22 1653     Sodium 141 mmol/L      Potassium 4 0 mmol/L      Chloride 105 mmol/L      CO2 26 mmol/L      ANION GAP 10 mmol/L      BUN 15 mg/dL      Creatinine 1 02 mg/dL      Glucose 151 mg/dL      Calcium 8 6 mg/dL      AST 11 U/L      ALT 16 U/L      Alkaline Phosphatase 69 U/L      Total Protein 6 9 g/dL      Albumin 3 7 g/dL      Total Bilirubin 0 33 mg/dL      eGFR 81 ml/min/1 73sq m     Narrative:      [place] guidelines for Chronic Kidney Disease (CKD):     Stage 1 with normal or high GFR (GFR > 90 mL/min/1 73 square meters)    Stage 2 Mild CKD (GFR = 60-89 mL/min/1 73 square meters)    Stage 3A Moderate CKD (GFR = 45-59 mL/min/1 73 square meters)    Stage 3B Moderate CKD (GFR = 30-44 mL/min/1 73 square meters)    Stage 4 Severe CKD (GFR = 15-29 mL/min/1 73 square meters)    Stage 5 End Stage CKD (GFR <15 mL/min/1 73 square meters)  Note: GFR calculation is accurate only with a steady state creatinine    Protime-INR [[id number]]  (Normal) Collected: 06/03/22 1611    Lab Status: Final result Specimen: Blood from Arm, Right Updated: 06/03/22 1648     Protime 12 5 seconds      INR 0 96    APTT [[id number]]  (Normal) Collected: 06/03/22 1611    Lab Status: Final result Specimen: Blood from Arm, Right Updated: 06/03/22 1648     PTT 30 seconds     CBC and differential [[id number]]  (Abnormal) Collected: 06/03/22 1611    Lab Status: Final result Specimen: Blood from Arm, Right Updated: 06/03/22 1628     WBC 12 37 Thousand/uL      RBC 4 85 Million/uL      Hemoglobin 13 9 g/dL      Hematocrit 42 2 %      MCV 87 fL      MCH 28 7 pg      MCHC 32 9 g/dL      RDW 13 2 %      MPV 10 1 fL      Platelets 982 Thousands/uL      nRBC 0 /100 WBCs      Neutrophils Relative 87 %      Immat GRANS % 0 %      Lymphocytes Relative 9 %      Monocytes Relative 4 %      Eosinophils Relative 0 %      Basophils Relative 0 %      Neutrophils Absolute 10 86 Thousands/µL      Immature Grans Absolute 0 02 Thousand/uL      Lymphocytes Absolute 1 05 Thousands/µL      Monocytes Absolute 0 43 Thousand/µL      Eosinophils Absolute 0 00 Thousand/µL      Basophils Absolute 0 01 Thousands/µL                  CT abdomen pelvis wo contrast   Final Result by Mellissa García MD (06/03 1645)      Tiny hiatal hernia with mild wall thickening of the visualized distal esophagus may reflect mild esophagitis  Otherwise, no acute CT findings in the abdomen or pelvis  Additional findings as above              Workstation performed: [id number]                    Procedures  ECG 12 Lead Documentation Only    Date/Time: 6/3/2022 5:01 PM  Performed by: Melyssa Clemente MD  Authorized by: Melyssa Clemente MD     Indications / Diagnosis:  GI bleed epigastric pain  ECG reviewed by me, the ED Provider: yes    Patient location:  ED  Previous ECG:     Previous ECG:  Compared to current    Comparison ECG info:  April 23, 2022    Similarity:  No change  Interpretation:     Interpretation: non-specific    Rate:     ECG rate:  Ninety-three    ECG rate assessment: normal    Rhythm:     Rhythm: sinus rhythm    Comments:      Normal sinus rhythm no acute ST-T wave changes             ED Course        CT scan abdomen pelvis showed an esophagitis, small hiatal hernia, wall thickening       diagnostic testing showed normal electrolytes, no anion gap, blood sugar was 151 but not fasting  INR was normal   White count was elevated 12 3 nonspecific finding  Hemoglobin was 13 9 no sign of anemia  SBIRT 20yo+    Flowsheet Row Most Recent Value   SBIRT (25 yo +)    In order to provide better care to our patients, we are screening all of our patients for alcohol and drug use  Would it be okay to ask you these screening questions? No Filed at: 06/03/2022 1559                    [facility] medical decision making 54-year-old male presents emergency department after an episode of abdominal pain then vomiting last night  Patient vomited a large plastic bag full of blood  Not unstable here, discussed with hospital service will observe for significant change in his hemoglobin or vital sign changes  Discussed with patient agrees to observation      Disposition  Final diagnoses:   GI bleed     Time reflects when diagnosis was documented in both MDM as applicable and the Disposition within this note     Time User Action Codes Description Comment    6/3/2022  5:15 PM Tracy Range Add [K92 2] GI bleed       ED Disposition     ED Disposition   Admit    Condition   Stable    Date/Time   Fri Jay Jay 3, 2022  5:29 PM    Comment   Case was discussed with Hospitalist service  and the patient's admission status was agreed to be patient is status the service of Anette Thomas Follow-up Information    None         Patient's Medications   Discharge Prescriptions    No medications on file       No discharge procedures on file      PDMP Review       Value Time User    PDMP Reviewed  Yes 5/5/2022  1:16 PM Melba Cintron MD          ED Provider  Electronically Signed by           Roverto Xavier MD  06/03/22 2107

## 2022-06-03 NOTE — ASSESSMENT & PLAN NOTE
· No similar history in the past   Has history of sigmoid diverticulosis and diverticulitis  Patient came in with episode of bloody vomitus x2 that started this morning  The 1st 1 was about 6-8 oz and the 2nd 1 about 5 oz  · Continue Protonix drip  · GI consultation  · H&H q 8 hours    H&H on admission was stable and there have been no recurrent episodes of hematemesis  · CT scan shows esophagitis  · Clear liquid diet for now and NPO after midnight for the possibility of endoscopy Aleksander Palacios (DO), Orthopaedic Surgery  [address] [place] [address]  Phone: [phone]  Fax: [phone]

## 2022-06-03 NOTE — H&P
[address] [facility] 1965[facility] 61 [facility]  [facility] MRN: [id number]  Unit/Bed#: ED 13 Encounter: [id number]  Primary Care Provider: Krista Lopez MD   Date and time admitted to hospital: 6/3/2022  3:54 PM    * GI bleed  Assessment & Plan  · No similar history in the past   Has history of sigmoid diverticulosis and diverticulitis  Patient came in with episode of bloody vomitus x2 that started this morning  The 1st 1 was about 6-8 oz and the 2nd 1 about 5 oz  · Continue Protonix drip  · GI consultation  · H&H q 8 hours  H&H on admission was stable and there have been no recurrent episodes of hematemesis  · CT scan shows esophagitis  · Clear liquid diet for now and NPO after midnight for the possibility of endoscopy    Depression, recurrent ([address] )  Assessment & Plan  · Will continue Wellbutrin and Klonopin which are chronic medications for the patient    Hypertension  Assessment & Plan  /68 (BP Location: Left arm)   Pulse 68   Temp 98 3 °F (36 8 °C) (Tympanic)   Resp 21   Ht 5' 10" (1 778 m)   Wt 90 7 kg (200 lb)   SpO2 96%   BMI 28 70 kg/m²   Stable pressures    Ulcerative colitis without complications (HCC)  Assessment & Plan  · Also complains of chronic diarrhea  · Chronic abdominal pain  · No blood in stool  · Outpatient follow-up with GI    CAD:  Holding aspirin at this time secondary to perfuse upper GI bleed  SIRS: tachycardia and leukocytosis  No CT findings of diverticulitis  Also shows esophagitis  Monitor for signs of infection  VTE Pharmacologic Prophylaxis: VTE Score: 2 None secondary to GI bleed  Code Status: Level 1 - Full Code none due to GI bleed  Discussion with family: No family at bedside  A call will be made later on  Anticipated Length of Stay: Patient will be admitted on an observation basis with an anticipated length of stay of less than 2 midnights secondary to It will further investigation into GI bleed      Total Time for Visit, including Counseling / Coordination of Care: 30 minutes Greater than 50% of this total time spent on direct patient counseling and coordination of care  Chief Complaint:  Hematemesis    History of Present Illness:  Max Hearn is a 61 y o  male with a PMH of ulcerative colitis, diverticulosis, CAD who presents with bloody emesis x2  It started this morning and he has vomited coffee-grounds/dark blood twice  First 1 was about 8 oz and the 2nd 1 5 oz  Also complains of periumbilical abdominal discomfort  No blood in stool  Chronic diarrhea  No fever, chest pain, shortness of breath, syncope, nausea  Review of Systems:  Review of Systems   Constitutional: Negative for chills and fever  HENT: Negative for ear pain and sore throat  Eyes: Negative for pain and visual disturbance  Respiratory: Negative for cough and shortness of breath  Cardiovascular: Negative for chest pain and palpitations  Gastrointestinal: Positive for abdominal pain and vomiting  Bloody vomiting   Genitourinary: Negative for dysuria and hematuria  Musculoskeletal: Negative for arthralgias and back pain  Skin: Negative for color change and rash  Neurological: Negative for seizures and syncope  All other systems reviewed and are negative        Past Medical and Surgical History:   Past Medical History:   Diagnosis Date    Diaz's esophagus     Colon polyp     Coronary artery disease     Depression     Diverticulitis     GERD (gastroesophageal reflux disease)     Hemorrhoid     History of heart artery stent     Hyperlipidemia     Hypertension     Microscopic colitis     Ulcerative colitis ([facility] [address] )        Past Surgical History:   Procedure Laterality Date    ABDOMINAL SURGERY      radio frequency ablation    BONE GRAFT Left 2018    CARPAL TUNNEL RELEASE Right     COLONOSCOPY      CORONARY ANGIOPLASTY WITH STENT PLACEMENT      x2    EGD AND COLONOSCOPY      ESOPHAGOGASTRODUODENOSCOPY N/A 2/15/2018 Procedure: ESOPHAGOGASTRODUODENOSCOPY (EGD); Surgeon: John Razo MD;  Location: [facility];  Service: Gastroenterology    ESOPHAGOGASTRODUODENOSCOPY N/A 12/10/2018    Procedure: ESOPHAGOGASTRODUODENOSCOPY (EGD); Surgeon: Janie Rinaldi MD;  Location: [facility]; Service: Gastroenterology    HAND SURGERY Left     KS SHLDR ARTHROSCOP,SURG,W/ROTAT CUFF REPR Left 4/14/2021    Procedure: REPAIR ROTATOR CUFF  ARTHROSCOPIC; POSSIBLE BICEPS TENDONESIS, ACROMIOPLASTY;  Surgeon: Lalita Sotelo DO;  Location: [facility];  Service: Orthopedics    ROTATOR CUFF REPAIR Left     SHOULDER ARTHROSCOPY Right 3/14/2022    Procedure: ARTHROSCOPY SHOULDER- Right shoulder arthroscopic rotator cuff repair, open subpectoral biceps tenodesis, subsacpular repair and extensive debridement;  Surgeon: Lalita Sotelo DO;  Location: [facility];  Service: Orthopedics    TONSILLECTOMY         Meds/Allergies:  Prior to Admission medications    Medication Sig Start Date End Date Taking?  Authorizing Provider   aspirin (ECOTRIN LOW STRENGTH) 81 mg EC tablet Take 1 tablet (81 mg total) by mouth daily 2/10/20   Gloria Hart PA-C   atorvastatin (LIPITOR) 80 mg tablet Take 80 mg by mouth daily    Historical Provider, MD   bacitracin ointment APPLY MODERATE AMOUNT TOPICALLY TWICE A DAY TO AFFECTED AREA FOR SKIN CONDITION 4/14/22   Historical Provider, MD   buPROPion [facility] SR) 150 mg 12 hr tablet Take 1 tablet (150 mg total) by mouth 2 (two) times a day 3/15/22   Julianna Walker MD   clonazePAM (KLONOPIN) 0 5 mg tablet Take 0 5 mg by mouth daily at bedtime     Historical Provider, MD   dicyclomine (BENTYL) 20 mg tablet Take 1 tablet (20 mg total) by mouth every 8 (eight) hours as needed (abdominal cramping) 4/21/22   Rozelle Jeans, DO   Homeopathic Products (ARNICARE ARTHRITIS PO) Take by mouth    Historical Provider, MD   metoclopramide (Reglan) 10 mg tablet Take 1 tablet (10 mg total) by mouth every 6 (six) hours as needed (nausea or vomiting) 4/21/22   Vikki Rubalcava DO   NON FORMULARY thc top cream    Lexi Albert MD   omeprazole (PriLOSEC) 20 mg delayed release capsule Take 1 capsule (20 mg total) by mouth daily 12/6/19   Marion Fabry, PA-C   ondansetron (ZOFRAN-ODT) 4 mg disintegrating tablet Take 1 tablet (4 mg total) by mouth every 6 (six) hours as needed for nausea or vomiting 4/7/21   Esther Fuchs MD   oxyCODONE-acetaminophen (Percocet) 5-325 mg per tablet Take 1 tablet by mouth every 4 (four) hours as needed for moderate pain Max Daily Amount: 6 tablets 3/18/22   Raudel Nguyen DO   oxyCODONE-acetaminophen (PERCOCET) 5-325 mg per tablet Take 1 tablet by mouth every 6 (six) hours as needed for moderate pain or severe pain for up to 20 doses Max Daily Amount: 4 tablets 4/23/22   Silvia Mcgrath MD   pantoprazole (PROTONIX) 20 mg tablet Take 1 tablet (20 mg total) by mouth daily 4/23/22   Silvia Mcgrath MD   SUMAtriptan (Imitrex) 50 mg tablet Take 1 tablet (50 mg total) by mouth once as needed for migraine for up to 1 dose 5/2/22   Marion Fabry, PA-C   traMADol (Ultram) 50 mg tablet Take 1 tablet (50 mg total) by mouth every 8 (eight) hours as needed for moderate pain 3/29/22   Raudel Nguyen DO     I have reviewed home medications with patient personally  Allergies:    Allergies   Allergen Reactions    Rosuvastatin Myalgia       Social History:  Marital Status: /Civil Union     Substance Use History:   Social History     Substance and Sexual Activity   Alcohol Use Not Currently    Comment: quit 5 years     Social History     Tobacco Use   Smoking Status Former Smoker    Packs/day: 0 50    Quit date: 1/16/2007    Years since quitting: 15 3   Smokeless Tobacco Former User    Quit date: 1/16/1998   Tobacco Comment    quit 10 yrs ago     Social History     Substance and Sexual Activity   Drug Use Yes    Frequency: 3 0 times per week    Types: Marijuana    Comment: advised not to smoke       Family History:  Family History   Problem Relation Age of Onset    Heart disease Father     Melanoma Father     Cancer Sister     Skin cancer Sister     Skin cancer Mother        Physical Exam:     Vitals:   Blood Pressure: 142/68 (06/03/22 1800)  Pulse: 68 (06/03/22 1800)  Temperature: 98 3 °F (36 8 °C) (06/03/22 1532)  Temp Source: Tympanic (06/03/22 1532)  Respirations: 21 (06/03/22 1800)  Height: 5' 10" (177 8 cm) (06/03/22 1532)  Weight - Scale: 90 7 kg (200 lb) (06/03/22 1532)  SpO2: 96 % (06/03/22 1800)    Physical Exam General- Awake, alert and oriented x 3, looks comfortable  HEENT- Normocephalic, atraumatic, oral mucosa- moist  Neck- Supple, No carotid bruit, no JVD  CVS- Normal S1/ S2, Regular rate and rhythm, No murmur, No edema  Respiratory system- B/L clear breath sounds, no wheezing  Abdomen- Soft, Non distended, periumbilical tenderness, Bowel sound- present 4 quads  Genitourinary- No suprapubic tenderness, No CVA tenderness  Skin- No new bruise or rash  Musculoskeletal- No gross deformity  Psych- No acute psychosis  CNS- CN II- XII grossly intact, No acute focal neurologic deficit noted      Additional Data:     Lab Results:  Results from last 7 days   Lab Units 06/03/22  1611   WBC Thousand/uL 12 37*   HEMOGLOBIN g/dL 13 9   HEMATOCRIT % 42 2   PLATELETS Thousands/uL 305   NEUTROS PCT % 87*   LYMPHS PCT % 9*   MONOS PCT % 4   EOS PCT % 0     Results from last 7 days   Lab Units 06/03/22  1611   SODIUM mmol/L 141   POTASSIUM mmol/L 4 0   CHLORIDE mmol/L 105   CO2 mmol/L 26   BUN mg/dL 15   CREATININE mg/dL 1 02   ANION GAP mmol/L 10   CALCIUM mg/dL 8 6   ALBUMIN g/dL 3 7   TOTAL BILIRUBIN mg/dL 0 33   ALK PHOS U/L 69   ALT U/L 16   AST U/L 11   GLUCOSE RANDOM mg/dL 151*     Results from last 7 days   Lab Units 06/03/22  1611   INR  0 96                   Imaging: Reviewed radiology reports from this admission including: CT scan  CT abdomen pelvis wo contrast   Final Result by Henry Loo MD (06/03 6763)      Tiny hiatal hernia with mild wall thickening of the visualized distal esophagus may reflect mild esophagitis  Otherwise, no acute CT findings in the abdomen or pelvis  Additional findings as above  Workstation performed: [id number]             EKG and Other Studies Reviewed on Admission:   · EKG: Normal sinus rhythm  ** Please Note: This note has been constructed using a voice recognition system   **

## 2022-06-03 NOTE — ASSESSMENT & PLAN NOTE
· Also complains of chronic diarrhea  · Chronic abdominal pain  · No blood in stool  · Outpatient follow-up with GI

## 2022-06-03 NOTE — ASSESSMENT & PLAN NOTE
/68 (BP Location: Left arm)   Pulse 68   Temp 98 3 °F (36 8 °C) (Tympanic)   Resp 21   Ht 5' 10" (1 778 m)   Wt 90 7 kg (200 lb)   SpO2 96%   BMI 28 70 kg/m²   Stable pressures

## 2022-06-04 ENCOUNTER — ANESTHESIA (OUTPATIENT)
Dept: GASTROENTEROLOGY | Facility: HOSPITAL | Age: 57
End: 2022-06-04
Payer: MEDICARE

## 2022-06-04 ENCOUNTER — ANESTHESIA EVENT (OUTPATIENT)
Dept: GASTROENTEROLOGY | Facility: HOSPITAL | Age: 57
End: 2022-06-04
Payer: MEDICARE

## 2022-06-04 ENCOUNTER — APPOINTMENT (OUTPATIENT)
Dept: GASTROENTEROLOGY | Facility: HOSPITAL | Age: 57
End: 2022-06-04
Attending: INTERNAL MEDICINE
Payer: MEDICARE

## 2022-06-04 VITALS
BODY MASS INDEX: 28.63 KG/M2 | RESPIRATION RATE: 16 BRPM | OXYGEN SATURATION: 98 % | TEMPERATURE: 98.4 F | HEART RATE: 59 BPM | SYSTOLIC BLOOD PRESSURE: 113 MMHG | WEIGHT: 200 LBS | DIASTOLIC BLOOD PRESSURE: 69 MMHG | HEIGHT: 70 IN

## 2022-06-04 LAB
ANION GAP SERPL CALCULATED.3IONS-SCNC: 8 MMOL/L (ref 4–13)
BUN SERPL-MCNC: 12 MG/DL (ref 5–25)
CALCIUM SERPL-MCNC: 8.6 MG/DL (ref 8.3–10.1)
CHLORIDE SERPL-SCNC: 107 MMOL/L (ref 100–108)
CO2 SERPL-SCNC: 26 MMOL/L (ref 21–32)
CREAT SERPL-MCNC: 0.99 MG/DL (ref 0.6–1.3)
GFR SERPL CREATININE-BSD FRML MDRD: 84 ML/MIN/1.73SQ M
GLUCOSE P FAST SERPL-MCNC: 108 MG/DL (ref 65–99)
GLUCOSE SERPL-MCNC: 108 MG/DL (ref 65–140)
HCT VFR BLD AUTO: 35.7 % (ref 36.5–49.3)
HCT VFR BLD AUTO: 37.4 % (ref 36.5–49.3)
HGB BLD-MCNC: 11.5 G/DL (ref 12–17)
HGB BLD-MCNC: 12.2 G/DL (ref 12–17)
POTASSIUM SERPL-SCNC: 4 MMOL/L (ref 3.5–5.3)
SODIUM SERPL-SCNC: 141 MMOL/L (ref 136–145)

## 2022-06-04 PROCEDURE — 99217 PR OBSERVATION CARE DISCHARGE MANAGEMENT: CPT | Performed by: FAMILY MEDICINE

## 2022-06-04 PROCEDURE — 88305 TISSUE EXAM BY PATHOLOGIST: CPT | Performed by: PATHOLOGY

## 2022-06-04 PROCEDURE — 99215 OFFICE O/P EST HI 40 MIN: CPT | Performed by: INTERNAL MEDICINE

## 2022-06-04 PROCEDURE — 85014 HEMATOCRIT: CPT | Performed by: FAMILY MEDICINE

## 2022-06-04 PROCEDURE — 43239 EGD BIOPSY SINGLE/MULTIPLE: CPT | Performed by: INTERNAL MEDICINE

## 2022-06-04 PROCEDURE — 85018 HEMOGLOBIN: CPT | Performed by: FAMILY MEDICINE

## 2022-06-04 PROCEDURE — 80048 BASIC METABOLIC PNL TOTAL CA: CPT | Performed by: FAMILY MEDICINE

## 2022-06-04 PROCEDURE — 43255 EGD CONTROL BLEEDING ANY: CPT | Performed by: INTERNAL MEDICINE

## 2022-06-04 RX ORDER — PANTOPRAZOLE SODIUM 40 MG/1
40 TABLET, DELAYED RELEASE ORAL
Qty: 30 TABLET | Refills: 0 | Status: SHIPPED | OUTPATIENT
Start: 2022-06-04 | End: 2022-06-21 | Stop reason: SDUPTHER

## 2022-06-04 RX ORDER — LIDOCAINE HYDROCHLORIDE 20 MG/ML
INJECTION, SOLUTION EPIDURAL; INFILTRATION; INTRACAUDAL; PERINEURAL AS NEEDED
Status: DISCONTINUED | OUTPATIENT
Start: 2022-06-04 | End: 2022-06-04

## 2022-06-04 RX ORDER — PROPOFOL 10 MG/ML
INJECTION, EMULSION INTRAVENOUS AS NEEDED
Status: DISCONTINUED | OUTPATIENT
Start: 2022-06-04 | End: 2022-06-04

## 2022-06-04 RX ORDER — PANTOPRAZOLE SODIUM 40 MG/1
40 TABLET, DELAYED RELEASE ORAL
Status: DISCONTINUED | OUTPATIENT
Start: 2022-06-04 | End: 2022-06-04 | Stop reason: HOSPADM

## 2022-06-04 RX ADMIN — BUPROPION HYDROCHLORIDE 150 MG: 150 TABLET, EXTENDED RELEASE ORAL at 08:40

## 2022-06-04 RX ADMIN — LIDOCAINE HYDROCHLORIDE 100 MG: 20 INJECTION, SOLUTION EPIDURAL; INFILTRATION; INTRACAUDAL at 11:57

## 2022-06-04 RX ADMIN — PROPOFOL 50 MG: 10 INJECTION, EMULSION INTRAVENOUS at 12:03

## 2022-06-04 RX ADMIN — PROPOFOL 50 MG: 10 INJECTION, EMULSION INTRAVENOUS at 12:06

## 2022-06-04 RX ADMIN — PROPOFOL 150 MG: 10 INJECTION, EMULSION INTRAVENOUS at 11:57

## 2022-06-04 RX ADMIN — PANTOPRAZOLE SODIUM 40 MG: 40 TABLET, DELAYED RELEASE ORAL at 13:11

## 2022-06-04 RX ADMIN — PROPOFOL 50 MG: 10 INJECTION, EMULSION INTRAVENOUS at 12:00

## 2022-06-04 NOTE — DISCHARGE SUMMARY
[address] [facility]  Discharge- Jolly Dy 1965, 61 y o  male MRN: [id number]  Unit/Bed#: -Ana [facility]ina Encounter: [id number]  Primary Care Provider: Esther Fuchs MD   Date and time admitted to hospital: 6/3/2022  3:54 PM    * GI bleed  Assessment & Plan  58-year-old male with prior history of multiple GI issues including cyclical vomiting syndrome/history of Barretts esophagus/history of diverticulosis/hiatal hernia who presents to the ER with symptoms of abdominal pain/nausea/vomiting with coffee-ground emesis since yesterday  · No further hematemesis during hospitalization  · Hemoglobin stable at 11 5/patient hemodynamically stable  · CT abdomen pelvis noted evidence of esophagitis and tiny hiatal hernia  · patient was NPO and underwent EGD which noted evidence of Diaz's esophagus with biopsies obtained  Also noted erythema and spontaneous bleeding in the proximal portion of the antrum status post argon plasma coagulation and ablation  · Status post Protonix drip now being discharged on oral Protonix 40 mg daily  · Outpatient follow-up with GI regarding biopsy results  Hypertension  Assessment & Plan  /69   Pulse 59   Temp 98 4 °F (36 9 °C) (Temporal)   Resp 16   Ht 5' 10" (1 778 m)   Wt 90 7 kg (200 lb)   SpO2 98%   BMI 28 70 kg/m²   · Stable pressures  · Continue to monitor at home  Patient not on any chronic antihypertensive agents      Depression, recurrent ([facility] [address] )  Assessment & Plan  · Will continue Wellbutrin and Klonopin which are chronic medications for the patient    Ulcerative colitis without complications [facility])  Assessment & Plan  · Outpatient follow-up with GI      Discharging Physician / Practitioner: Edvin Matthew MD  PCP: Esther Fuchs MD  Admission Date:   Admission Orders (From admission, onward)     Ordered        06/03/22 1729  Place in Observation  Once                      Discharge Date: 06/04/22    Disposition:      home  Reason for Admission: coffee ground emesis    Discharge Diagnoses:     Please see assessment and plan section above for further details regarding discharge diagnoses  Medical Problems             Resolved Problems  Date Reviewed: 6/3/2022   None                 Consultations During Hospital Stay:  · GI    Procedures Performed:   · EGD     Medication Adjustments and Discharge Medications:  · Summary of Medication Adjustments made as a result of this hospitalization: see med rec  · Medication Dosing Tapers - Please refer to Discharge Medication List for details on any medication dosing tapers (if applicable to patient)  · Medications being temporarily held (include recommended restart time): see med rec  · Discharge Medication List: See after visit summary for reconciled discharge medications  Diet Recommendations at Discharge:  Diet -        Diet Orders   (From admission, onward)             Start     Ordered    06/04/22 1218  Diet Regular; Regular House  Diet effective now        References:    Nutrtion Support Algorithm Enteral vs  Parenteral   Question Answer Comment   Diet Type Regular    Regular Regular House    RD to adjust diet per protocol? Yes        06/04/22 1217                Hospital Course:     Kevin Chavarria is a 61 y o  male patient who originally presented to the hospital on 6/3/2022 due to complaints of coffee-ground emesis yesterday  Associated with lower abdominal pain  During hospitalization hemoglobin remains stable at 11 5 and patient hemodynamically stable  CT abdomen pelvis noted evidence of esophagitis and tiny hiatal hernia  Patient underwent EGD and was noted to have evidence of Diaz's esophagus with biopsies obtained  Also noted erythema spontaneous bleeding in the proximal portion of the antrum status post argon plasma coagulation and ablation  Patient was initially on Protonix drip now being transitioned to oral Protonix 40 daily at discharge    Discussed outpatient follow-up with GI regarding biopsy results  Patient remains asymptomatic currently and tolerating diet  Being discharged home  Condition at Discharge: good     Discharge Day Visit / Exam:     Vitals: Blood Pressure: 113/69 (06/04/22 1225)  Pulse: 59 (06/04/22 1225)  Temperature: 98 4 °F (36 9 °C) (06/04/22 1214)  Temp Source: Temporal (06/04/22 1214)  Respirations: 16 (06/04/22 1225)  Height: 5' 10" (177 8 cm) (06/03/22 1532)  Weight - Scale: 90 7 kg (200 lb) (06/03/22 1532)  SpO2: 98 % (06/04/22 1225)  Exam:   Physical Exam  Constitutional:       General: He is not in acute distress  Appearance: He is obese  He is not toxic-appearing  HENT:      Mouth/Throat:      Mouth: Mucous membranes are moist       Pharynx: Oropharynx is clear  Cardiovascular:      Rate and Rhythm: Normal rate and regular rhythm  Pulses: Normal pulses  Pulmonary:      Effort: Pulmonary effort is normal       Breath sounds: Normal breath sounds  Abdominal:      General: Abdomen is flat  Bowel sounds are normal       Palpations: Abdomen is soft  Musculoskeletal:      Right lower leg: No edema  Left lower leg: No edema  Neurological:      General: No focal deficit present  Mental Status: He is alert and oriented to person, place, and time  Goals of Care Discussions:  · Code Status at Discharge: Level 1 - Full Code    Discharge instructions/Information to patient and family:   See after visit summary section titled Discharge Instructions for information provided to patient and family  Discharge Statement:  I spent 40 minutes discharging the patient  This time was spent on the day of discharge  I had direct contact with the patient on the day of discharge  Greater than 50% of the total time was spent examining patient, answering all patient questions, arranging and discussing plan of care with patient as well as directly providing post-discharge instructions  Additional time then spent on discharge activities      ** Please Note: This note has been constructed using a voice recognition system **

## 2022-06-04 NOTE — ANESTHESIA POSTPROCEDURE EVALUATION
Post-Op Assessment Note    CV Status:  Stable  Pain Score: 0    Pain management: adequate     Mental Status:  Awake   Hydration Status:  Stable   PONV Controlled:  Controlled   Airway Patency:  Patent      Post Op Vitals Reviewed: Yes      Staff: CRNA         No complications documented      BP   120/79   Temp      Pulse  63   Resp   12   SpO2   99

## 2022-06-04 NOTE — QUICK NOTE
PCA saw that patient was back from procedure  Patient was getting dressed and said he was discharged  RN notified, patient refusing post opt vitals and said he will take his IV out himself and leave  Rn notified again

## 2022-06-04 NOTE — ASSESSMENT & PLAN NOTE
25-year-old male with prior history of multiple GI issues including cyclical vomiting syndrome/history of Barretts esophagus/history of diverticulosis/hiatal hernia who presents to the ER with symptoms of abdominal pain/nausea/vomiting with coffee-ground emesis since yesterday  · No further hematemesis during hospitalization  · Hemoglobin stable at 11 5/patient hemodynamically stable  · CT abdomen pelvis noted evidence of esophagitis and tiny hiatal hernia  · patient was NPO and underwent EGD which noted evidence of Diaz's esophagus with biopsies obtained  Also noted erythema and spontaneous bleeding in the proximal portion of the antrum status post argon plasma coagulation and ablation  · Status post Protonix drip now being discharged on oral Protonix 40 mg daily  · Outpatient follow-up with GI regarding biopsy results

## 2022-06-04 NOTE — NURSING NOTE
Pt discharged home as independent  No new skin issues  No complaints of pain  Continent of bowel and bladder  Pt removed his own IV  Med list and instructions reviewed and given to the patient  No questions or concerns at this time

## 2022-06-04 NOTE — ASSESSMENT & PLAN NOTE
/69   Pulse 59   Temp 98 4 °F (36 9 °C) (Temporal)   Resp 16   Ht 5' 10" (1 778 m)   Wt 90 7 kg (200 lb)   SpO2 98%   BMI 28 70 kg/m²   · Stable pressures  · Continue to monitor at home  Patient not on any chronic antihypertensive agents

## 2022-06-04 NOTE — ANESTHESIA PREPROCEDURE EVALUATION
Procedure:  EGD    Relevant Problems   CARDIO   (+) Essential hypertension   (+) Hypertension      GI/HEPATIC   (+) GERD (gastroesophageal reflux disease)   (+) GI bleed   (+) Gastroesophageal reflux disease with esophagitis      NEURO/PSYCH   (+) Anxiety and depression   (+) Depression, recurrent (HCC)      Hypertension    Diverticulitis    GERD (gastroesophageal reflux disease)    Diaz's esophagus    Hemorrhoid    Hyperlipidemia    Depression    Coronary artery disease    Microscopic colitis    History of heart artery stent    Colon polyp    Ulcerative colitis (HCC)        Physical Exam    Airway    Mallampati score: II  TM Distance: >3 FB  Neck ROM: full     Dental       Cardiovascular  Cardiovascular exam normal    Pulmonary  Pulmonary exam normal     Other Findings        Anesthesia Plan  ASA Score- 2 Emergent    Anesthesia Type- IV sedation with anesthesia with ASA Monitors  Additional Monitors:   Airway Plan:           Plan Factors-Exercise tolerance (METS): >4 METS  Chart reviewed  EKG reviewed  Imaging results reviewed  Existing labs reviewed  Patient summary reviewed  Induction- intravenous  Postoperative Plan-     Informed Consent- Anesthetic plan and risks discussed with patient  I personally reviewed this patient with the CRNA  Discussed and agreed on the Anesthesia Plan with the CRNA  Javier Gagnon

## 2022-06-04 NOTE — CONSULTS
Consultation - 126 [facility]  Ximena Octaviakobi 61 y o  male MRN: [id number]  Unit/Bed#: -01 Encounter: [id number]        Consults    Reason for Consult / Principal Problem: Coffee ground emesis    HPI:  50-year-old male well known to our office with a history of GERD complicated by Diaz's esophagus with high-grade dysplasia status post RFA treatment, cyclical vomiting syndrome and microscopic colitis as well as anxiety who presented to [facility] last evening with complaints of coffee-ground emesis  He reports that earlier in the day he began what he calls 1 of his episodes of abdominal pain and nausea  He took Zofran and his anti spasmodic as he has been advised to do with these episodes began however despite that the symptom worsened  He reports 2 episodes of vomiting what appeared to be dark blood before coming to the emergency room  He has never vomited blood before so of course he was concerned  He denies any black stools or rectal bleeding  Since admission the patient admits his symptoms have improved  He was given pain medication which resolved the abdominal pain and is getting IV nausea medication which seems to be helping as well  CT in the emergency room suggested mild distal esophageal wall thickening with a small hiatal hernia  His last upper endoscopy was in September of last year  Two tongues of possible metaplasia were noted and biopsies were taken  No intestinal metaplasia was identified on the pathology  He reports that he maintains on omeprazole 20 mg daily  He reports that for the most part he takes it once a day but will increase to twice a day if needed  For his recurrent episodes he also has Reglan, Zofran and dicyclomine  He admits that if he catches the episode early enough these medications will help otherwise the episodes progress any ends up in the emergency room    Unfortunately, the patient is maintained on a high dose of Wellbutrin and so use of amitriptyline has not been initiated in the past     REVIEW OF SYSTEMS:    CONSTITUTIONAL: Denies any fever, chills, or rigors  Good appetite, and no recent weight loss  HEENT: No earache or tinnitus  Denies hearing loss or visual disturbances  CARDIOVASCULAR: No chest pain or palpitations  RESPIRATORY: Denies any cough, hemoptysis, shortness of breath or dyspnea on exertion  GASTROINTESTINAL: As noted in the History of Present Illness  GENITOURINARY: No problems with urination  Denies any hematuria or dysuria  NEUROLOGIC: No dizziness or vertigo, denies headaches  MUSCULOSKELETAL: Denies any muscle or joint pain  SKIN: Denies skin rashes or itching  ENDOCRINE: Denies excessive thirst  Denies intolerance to heat or cold  PSYCHOSOCIAL: Denies depression or anxiety  Denies any recent memory loss  Historical Information   Past Medical History:   Diagnosis Date    Diaz's esophagus     Colon polyp     Coronary artery disease     Depression     Diverticulitis     GERD (gastroesophageal reflux disease)     Hemorrhoid     History of heart artery stent     Hyperlipidemia     Hypertension     Microscopic colitis     Ulcerative colitis ([facility] [address] )      Past Surgical History:   Procedure Laterality Date    ABDOMINAL SURGERY      radio frequency ablation    BONE GRAFT Left 2018    CARPAL TUNNEL RELEASE Right     COLONOSCOPY      CORONARY ANGIOPLASTY WITH STENT PLACEMENT      x2    EGD AND COLONOSCOPY      ESOPHAGOGASTRODUODENOSCOPY N/A 2/15/2018    Procedure: ESOPHAGOGASTRODUODENOSCOPY (EGD); Surgeon: Sherif Rodas MD;  [facility];  Service: Gastroenterology    ESOPHAGOGASTRODUODENOSCOPY N/A 12/10/2018    Procedure: ESOPHAGOGASTRODUODENOSCOPY (EGD); Surgeon: Yola Cervantes MD;  [facility];   Service: Gastroenterology    HAND SURGERY Left     [place] SHLDR ARTHROSCOP,SURG,W/ROTAT CUFF REPR Left 4/14/2021    Procedure: REPAIR ROTATOR CUFF ARTHROSCOPIC; POSSIBLE BICEPS TENDONESIS, ACROMIOPLASTY;  Surgeon: Shedrick Meigs, DO;  [facility];  Service: Orthopedics    ROTATOR CUFF REPAIR Left     SHOULDER ARTHROSCOPY Right 3/14/2022    Procedure: ARTHROSCOPY SHOULDER- Right shoulder arthroscopic rotator cuff repair, open subpectoral biceps tenodesis, subsacpular repair and extensive debridement;  Surgeon: Shedrick Meigs, DO;  [facility];  Service: Orthopedics    TONSILLECTOMY       Social History   Social History     Substance and Sexual Activity   Alcohol Use Not Currently    Comment: quit 5 years     Social History     Substance and Sexual Activity   Drug Use Yes    Frequency: 3 0 times per week    Types: Marijuana    Comment: advised not to smoke     Social History     Tobacco Use   Smoking Status Former Smoker    Packs/day: 0 50    Quit date: 1/16/2007    Years since quitting: 15 3   Smokeless Tobacco Former User    Quit date: 1/16/1998   Tobacco Comment    quit 10 yrs ago     Family History   Problem Relation Age of Onset    Heart disease Father     Melanoma Father     Cancer Sister     Skin cancer Sister     Skin cancer Mother        Meds/Allergies     Medications Prior to Admission   Medication    aspirin (ECOTRIN LOW STRENGTH) 81 mg EC tablet    atorvastatin (LIPITOR) 80 mg tablet    buPROPion (WELLBUTRIN SR) 150 mg 12 hr tablet    clonazePAM (KlonoPIN) 0 5 mg tablet    dicyclomine (BENTYL) 20 mg tablet    omeprazole (PriLOSEC) 20 mg delayed release capsule    ondansetron (ZOFRAN-ODT) 4 mg disintegrating tablet    oxyCODONE-acetaminophen (PERCOCET) 5-325 mg per tablet    pantoprazole (PROTONIX) 20 mg tablet    bacitracin ointment    Homeopathic Products (ARNICARE ARTHRITIS PO)    metoclopramide (Reglan) 10 mg tablet    NON FORMULARY    oxyCODONE-acetaminophen (Percocet) 5-325 mg per tablet    SUMAtriptan (Imitrex) 50 mg tablet    traMADol (Ultram) 50 mg tablet     Current Facility-Administered Medications   Medication Dose Route Frequency    acetaminophen (TYLENOL) tablet 650 mg  650 mg Oral Q6H PRN    buPROPion (WELLBUTRIN XL) 24 hr tablet 150 mg  150 mg Oral Daily    clonazePAM (KlonoPIN) tablet 1 5 mg  1 5 mg Oral HS    HYDROmorphone HCl (DILAUDID) injection 0 2 mg  0 2 mg Intravenous Q4H PRN    And    HYDROmorphone (DILAUDID) injection 0 5 mg  0 5 mg Intravenous Q4H PRN    pantoprazole (PROTONIX) 80 mg in sodium chloride 0 9 % 100 mL infusion  8 mg/hr Intravenous Continuous       Allergies   Allergen Reactions    Rosuvastatin Myalgia           Objective     Blood pressure 118/73, pulse 76, temperature 98 5 °F (36 9 °C), resp  rate 19, height 5' 10" (1 778 m), weight 90 7 kg (200 lb), SpO2 94 %  No intake or output data in the 24 hours ending 06/04/22 0917      PHYSICAL EXAM:      General Appearance:   Alert, cooperative, no distress, appears stated age    HEENT:   Normocephalic, atraumatic, anicteric      Neck:  Supple, symmetrical, trachea midline, no adenopathy;    thyroid: no enlargement/tenderness/nodules; no carotid  bruit or JVD    Lungs:   Clear to auscultation bilaterally; no rales, rhonchi or wheezing; respirations unlabored    Heart[de-identified]   S1 and S2 normal; regular rate and rhythm; no murmur, rub, or gallop     Abdomen:   Soft, tender to palpation generally, non-distended; normal bowel sounds; no masses, no organomegaly    Genitalia:   Deferred    Rectal:   Deferred    Extremities:  No cyanosis, clubbing or edema    Pulses:  2+ and symmetric all extremities    Skin:  Skin color, texture, turgor normal, no rashes or lesions    Lymph nodes:  No palpable cervical, axillary or inguinal lymphadenopathy        Lab Results:   Results from last 7 days   Lab Units 06/04/22  0857 06/04/22  0057 06/03/22  1611   WBC Thousand/uL  --   --  12 37*   HEMOGLOBIN g/dL 12 2   < > 13 9   HEMATOCRIT % 37 4   < > 42 2   PLATELETS Thousands/uL  --   --  305   NEUTROS PCT %  --   --  87*   LYMPHS PCT %  -- --  9*   MONOS PCT %  --   --  4   EOS PCT %  --   --  0    < > = values in this interval not displayed  Results from last 7 days   Lab Units 06/03/22  1611   POTASSIUM mmol/L 4 0   CHLORIDE mmol/L 105   CO2 mmol/L 26   BUN mg/dL 15   CREATININE mg/dL 1 02   CALCIUM mg/dL 8 6   ALK PHOS U/L 69   ALT U/L 16   AST U/L 11     Results from last 7 days   Lab Units 06/03/22  1611   INR  0 96           Imaging Studies: I have personally reviewed pertinent imaging studies  CT abdomen pelvis wo contrast    Result Date: 6/3/2022  Impression: Tiny hiatal hernia with mild wall thickening of the visualized distal esophagus may reflect mild esophagitis  Otherwise, no acute CT findings in the abdomen or pelvis  Additional findings as above  Workstation performed: [id number]       ASSESSMENT and PLAN:    Coffee ground emesis  Anemia  Abnormal CT scan suggesting esophageal wall thickening  Cyclical Vomiting Syndrome   - 2 episodes of coffee ground emesis prior to admission   - CT on admission suggested distal esophageal wall thickening   - Hgb/Hct dropped slightly overnight   - Plan for EGD today   - PPI BID   - Monitor Hgb/hct   - patient will need close outpatient follow-up to attempt to better manage his chronic symptoms        Patient was seen and examined by Dr Aston Bravo  All vargas medical decisions were made by Dr Aston Bravo  Thank you for allowing us to participate in the care of this present patient  We will follow-up with you closely

## 2022-06-04 NOTE — PLAN OF CARE
Problem: PAIN - ADULT  Goal: Verbalizes/displays adequate comfort level or baseline comfort level  Description: Interventions:  - Encourage patient to monitor pain and request assistance  - Assess pain using appropriate pain scale  - Administer analgesics based on type and severity of pain and evaluate response  - Implement non-pharmacological measures as appropriate and evaluate response  - Consider cultural and social influences on pain and pain management  - Notify physician/advanced practitioner if interventions unsuccessful or patient reports new pain  6/4/2022 1441 by Angy Starkey RN  Outcome: Adequate for Discharge  6/4/2022 0847 by Angy Starkey RN  Outcome: Progressing     Problem: DISCHARGE PLANNING  Goal: Discharge to home or other facility with appropriate resources  Description: INTERVENTIONS:  - Identify barriers to discharge w/patient and caregiver  - Arrange for needed discharge resources and transportation as appropriate  - Identify discharge learning needs (meds, wound care, etc )  - Arrange for interpretive services to assist at discharge as needed  - Refer to Case Management Department for coordinating discharge planning if the patient needs post-hospital services based on physician/advanced practitioner order or complex needs related to functional status, cognitive ability, or social support system  Outcome: Adequate for Discharge

## 2022-06-06 ENCOUNTER — TRANSITIONAL CARE MANAGEMENT (OUTPATIENT)
Dept: INTERNAL MEDICINE CLINIC | Facility: CLINIC | Age: 57
End: 2022-06-06

## 2022-06-07 ENCOUNTER — APPOINTMENT (OUTPATIENT)
Dept: LAB | Facility: HOSPITAL | Age: 57
End: 2022-06-07
Payer: MEDICARE

## 2022-06-07 ENCOUNTER — APPOINTMENT (OUTPATIENT)
Dept: PHYSICAL THERAPY | Facility: CLINIC | Age: 57
End: 2022-06-07
Payer: MEDICARE

## 2022-06-07 ENCOUNTER — OFFICE VISIT (OUTPATIENT)
Dept: INTERNAL MEDICINE CLINIC | Facility: CLINIC | Age: 57
End: 2022-06-07
Payer: MEDICARE

## 2022-06-07 VITALS
BODY MASS INDEX: 27.92 KG/M2 | HEART RATE: 82 BPM | DIASTOLIC BLOOD PRESSURE: 78 MMHG | SYSTOLIC BLOOD PRESSURE: 118 MMHG | OXYGEN SATURATION: 98 % | HEIGHT: 70 IN | RESPIRATION RATE: 14 BRPM | WEIGHT: 195 LBS

## 2022-06-07 DIAGNOSIS — K92.2 GASTROINTESTINAL HEMORRHAGE, UNSPECIFIED GASTROINTESTINAL HEMORRHAGE TYPE: Primary | ICD-10-CM

## 2022-06-07 DIAGNOSIS — R73.09 ABNORMAL GLUCOSE: ICD-10-CM

## 2022-06-07 DIAGNOSIS — Z12.5 PROSTATE CANCER SCREENING: ICD-10-CM

## 2022-06-07 DIAGNOSIS — I10 ESSENTIAL HYPERTENSION: ICD-10-CM

## 2022-06-07 DIAGNOSIS — E78.5 DYSLIPIDEMIA: ICD-10-CM

## 2022-06-07 DIAGNOSIS — R94.8 ABNORMAL RESULTS OF FUNCTION STUDIES OF OTHER ORGANS AND SYSTEMS: ICD-10-CM

## 2022-06-07 DIAGNOSIS — K22.711 BARRETT'S ESOPHAGUS WITH HIGH GRADE DYSPLASIA: ICD-10-CM

## 2022-06-07 DIAGNOSIS — Z11.4 ENCOUNTER FOR SCREENING FOR HUMAN IMMUNODEFICIENCY VIRUS (HIV): ICD-10-CM

## 2022-06-07 DIAGNOSIS — B37.0 THRUSH: ICD-10-CM

## 2022-06-07 DIAGNOSIS — Z11.59 ENCOUNTER FOR HEPATITIS C SCREENING TEST FOR LOW RISK PATIENT: ICD-10-CM

## 2022-06-07 DIAGNOSIS — K92.2 GASTROINTESTINAL HEMORRHAGE, UNSPECIFIED GASTROINTESTINAL HEMORRHAGE TYPE: ICD-10-CM

## 2022-06-07 LAB
ALBUMIN SERPL BCP-MCNC: 3.7 G/DL (ref 3.5–5)
ALP SERPL-CCNC: 70 U/L (ref 46–116)
ALT SERPL W P-5'-P-CCNC: 15 U/L (ref 12–78)
ANION GAP SERPL CALCULATED.3IONS-SCNC: 11 MMOL/L (ref 4–13)
AST SERPL W P-5'-P-CCNC: 11 U/L (ref 5–45)
BASOPHILS # BLD AUTO: 0.03 THOUSANDS/ΜL (ref 0–0.1)
BASOPHILS NFR BLD AUTO: 0 % (ref 0–1)
BILIRUB SERPL-MCNC: 0.33 MG/DL (ref 0.2–1)
BUN SERPL-MCNC: 13 MG/DL (ref 5–25)
CALCIUM SERPL-MCNC: 8.7 MG/DL (ref 8.3–10.1)
CHLORIDE SERPL-SCNC: 104 MMOL/L (ref 100–108)
CHOLEST SERPL-MCNC: 146 MG/DL
CO2 SERPL-SCNC: 25 MMOL/L (ref 21–32)
CREAT SERPL-MCNC: 0.93 MG/DL (ref 0.6–1.3)
EOSINOPHIL # BLD AUTO: 0.16 THOUSAND/ΜL (ref 0–0.61)
EOSINOPHIL NFR BLD AUTO: 2 % (ref 0–6)
ERYTHROCYTE [DISTWIDTH] IN BLOOD BY AUTOMATED COUNT: 13.4 % (ref 11.6–15.1)
GFR SERPL CREATININE-BSD FRML MDRD: 90 ML/MIN/1.73SQ M
GLUCOSE P FAST SERPL-MCNC: 95 MG/DL (ref 65–99)
HCT VFR BLD AUTO: 40.2 % (ref 36.5–49.3)
HCV AB SER QL: NORMAL
HDLC SERPL-MCNC: 52 MG/DL
HGB BLD-MCNC: 13.3 G/DL (ref 12–17)
IMM GRANULOCYTES # BLD AUTO: 0.01 THOUSAND/UL (ref 0–0.2)
IMM GRANULOCYTES NFR BLD AUTO: 0 % (ref 0–2)
LDLC SERPL CALC-MCNC: 62 MG/DL (ref 0–100)
LYMPHOCYTES # BLD AUTO: 1.87 THOUSANDS/ΜL (ref 0.6–4.47)
LYMPHOCYTES NFR BLD AUTO: 25 % (ref 14–44)
MCH RBC QN AUTO: 28.9 PG (ref 26.8–34.3)
MCHC RBC AUTO-ENTMCNC: 33.1 G/DL (ref 31.4–37.4)
MCV RBC AUTO: 87 FL (ref 82–98)
MONOCYTES # BLD AUTO: 0.55 THOUSAND/ΜL (ref 0.17–1.22)
MONOCYTES NFR BLD AUTO: 7 % (ref 4–12)
NEUTROPHILS # BLD AUTO: 4.92 THOUSANDS/ΜL (ref 1.85–7.62)
NEUTS SEG NFR BLD AUTO: 66 % (ref 43–75)
NONHDLC SERPL-MCNC: 94 MG/DL
NRBC BLD AUTO-RTO: 0 /100 WBCS
PLATELET # BLD AUTO: 285 THOUSANDS/UL (ref 149–390)
PMV BLD AUTO: 9.8 FL (ref 8.9–12.7)
POTASSIUM SERPL-SCNC: 3.8 MMOL/L (ref 3.5–5.3)
PROT SERPL-MCNC: 7.1 G/DL (ref 6.4–8.2)
RBC # BLD AUTO: 4.61 MILLION/UL (ref 3.88–5.62)
SODIUM SERPL-SCNC: 140 MMOL/L (ref 136–145)
TRIGL SERPL-MCNC: 158 MG/DL
WBC # BLD AUTO: 7.54 THOUSAND/UL (ref 4.31–10.16)

## 2022-06-07 PROCEDURE — 86803 HEPATITIS C AB TEST: CPT

## 2022-06-07 PROCEDURE — 80053 COMPREHEN METABOLIC PANEL: CPT

## 2022-06-07 PROCEDURE — 84153 ASSAY OF PSA TOTAL: CPT

## 2022-06-07 PROCEDURE — 36415 COLL VENOUS BLD VENIPUNCTURE: CPT

## 2022-06-07 PROCEDURE — 80061 LIPID PANEL: CPT

## 2022-06-07 PROCEDURE — 85025 COMPLETE CBC W/AUTO DIFF WBC: CPT

## 2022-06-07 PROCEDURE — 99496 TRANSJ CARE MGMT HIGH F2F 7D: CPT | Performed by: INTERNAL MEDICINE

## 2022-06-07 PROCEDURE — 87389 HIV-1 AG W/HIV-1&-2 AB AG IA: CPT

## 2022-06-07 PROCEDURE — 84154 ASSAY OF PSA FREE: CPT

## 2022-06-07 PROCEDURE — 83036 HEMOGLOBIN GLYCOSYLATED A1C: CPT

## 2022-06-07 NOTE — PROGRESS NOTES
Assessment/Plan:     Pattie Larkin is here for [facility] visit  Recently hospitalized for UGIB  EGD done revealing Diaz's esophagus which is a known dx  Few biopsies taken  He does not appear well today  He is ill appearing and is tired  Is having increased abdominal pain  Is tender on exam  Would like more oxycodone but will hold off for now  Recommended bowel rest and to increase fluids  He is taking Protonix daily  Sent nystatin for thrush  Obtain labs  Quality Measures:     BMI Counseling: Body mass index is 27 98 kg/m²  The BMI is above normal  Nutrition recommendations include decreasing portion sizes and encouraging healthy choices of fruits and vegetables  Exercise recommendations include moderate physical activity 150 minutes/week  Rationale for BMI follow-up plan is due to patient being overweight or obese  Return in about 2 weeks (around 6/21/2022)  No problem-specific Assessment & Plan notes found for this encounter  Diagnoses and all orders for this visit:    Gastrointestinal hemorrhage, unspecified gastrointestinal hemorrhage type  -     CBC and differential; Future  -     Comprehensive metabolic panel; Future    Diaz's esophagus with high grade dysplasia    Thrush  -     nystatin (MYCOSTATIN) 500,000 units/5 mL suspension; Apply 5 mL (500,000 Units total) to the mouth or throat 4 (four) times a day for 14 days          Subjective:      Patient ID: Pravin Contreras is a 61 y o  male  TCM Call (since 5/7/2022)     Date and time call was made  6/6/2022  9:38 AM    Hospital care reviewed  Records reviewed    Patient was hospitialized at  [facility]    Date of Admission  06/03/22    Date of discharge  06/04/22    Disposition  Home    Were the patients medications reviewed and updated  Yes    Current Symptoms  None      TCM Call (since 5/7/2022)     Post hospital issues  None    Should patient be enrolled in anticoag monitoring? No    Scheduled for follow up?   Yes    Did you obtain your prescribed medications  Yes    Do you need help managing your prescriptions or medications  No    Is transportation to your appointment needed  No    I have advised the patient to call PCP with any new or worsening symptoms    Angella Estevez RMA    Living Arrangements  Family members                 ALLERGIES:  Allergies   Allergen Reactions    Rosuvastatin Myalgia       CURRENT MEDICATIONS:    Current Outpatient Medications:     atorvastatin (LIPITOR) 80 mg tablet, Take 80 mg by mouth daily, Disp: , Rfl:     clonazePAM (KlonoPIN) 0 5 mg tablet, Take 0 5 mg by mouth daily at bedtime , Disp: , Rfl:     nystatin (MYCOSTATIN) 500,000 units/5 mL suspension, Apply 5 mL (500,000 Units total) to the mouth or throat 4 (four) times a day for 14 days, Disp: 280 mL, Rfl: 0    ondansetron (ZOFRAN-ODT) 4 mg disintegrating tablet, Take 1 tablet (4 mg total) by mouth every 6 (six) hours as needed for nausea or vomiting, Disp: 20 tablet, Rfl: 0    oxyCODONE-acetaminophen (PERCOCET) 5-325 mg per tablet, Take 1 tablet by mouth every 6 (six) hours as needed for moderate pain or severe pain for up to 20 doses Max Daily Amount: 4 tablets, Disp: 20 tablet, Rfl: 0    pantoprazole (PROTONIX) 40 mg tablet, Take 1 tablet (40 mg total) by mouth daily in the early morning, Disp: 30 tablet, Rfl: 0    ACTIVE PROBLEM LIST:  Patient Active Problem List   Diagnosis    Anxiety and depression    Diaz's esophagus    Essential hypertension    Dyslipidemia    Obesity (BMI 30 0-34  9)    Ulcerative colitis without complications (HCC)    Gastroesophageal reflux disease with esophagitis    Vomiting    Coronary artery disease with angina pectoris, unspecified vessel or lesion type, unspecified whether native or transplanted heart (HCC)    Depression, recurrent ([facility] [address] )    Hypertension    GERD (gastroesophageal reflux disease)    GI bleed       PAST MEDICAL HISTORY:  Past Medical History:   Diagnosis Date    Diaz's esophagus  Colon polyp     Coronary artery disease     Depression     Diverticulitis     GERD (gastroesophageal reflux disease)     Hemorrhoid     History of heart artery stent     Hyperlipidemia     Hypertension     Microscopic colitis     Ulcerative colitis ([facility] [address] )        PAST SURGICAL HISTORY:  Past Surgical History:   Procedure Laterality Date    ABDOMINAL SURGERY      radio frequency ablation    BONE GRAFT Left 2018    CARPAL TUNNEL RELEASE Right     COLONOSCOPY      CORONARY ANGIOPLASTY WITH STENT PLACEMENT      x2    EGD AND COLONOSCOPY      ESOPHAGOGASTRODUODENOSCOPY N/A 2/15/2018    Procedure: ESOPHAGOGASTRODUODENOSCOPY (EGD); Surgeon: Juan Mendez MD;  [facility];  Service: Gastroenterology    ESOPHAGOGASTRODUODENOSCOPY N/A 12/10/2018    Procedure: ESOPHAGOGASTRODUODENOSCOPY (EGD); Surgeon: Agata Lira MD;  [facility];   Service: Gastroenterology    HAND SURGERY Left     [place] SHLDR ARTHROSCOP,SURG,W/ROTAT CUFF REPR Left 4/14/2021    Procedure: REPAIR ROTATOR CUFF  ARTHROSCOPIC; POSSIBLE BICEPS TENDONESIS, ACROMIOPLASTY;  Surgeon: Lani Armijo DO;  [facility];  Service: Orthopedics    ROTATOR CUFF REPAIR Left     SHOULDER ARTHROSCOPY Right 3/14/2022    Procedure: ARTHROSCOPY SHOULDER- Right shoulder arthroscopic rotator cuff repair, open subpectoral biceps tenodesis, subsacpular repair and extensive debridement;  Surgeon: Lani Armijo DO;  [facility];  Service: Orthopedics    TONSILLECTOMY         FAMILY HISTORY:  Family History   Problem Relation Age of Onset    Heart disease Father     Melanoma Father     Cancer Sister     Skin cancer Sister     Skin cancer Mother        SOCIAL HISTORY:  Social History     Socioeconomic History    Marital status: /Civil Union     Spouse name: Not on file    Number of children: Not on file    Years of education: Not on file    Highest education level: Not on file   Occupational History    Not on file   Tobacco Use    Smoking status: Former Smoker     Packs/day: 0 50     Quit date: 1/16/2007     Years since quitting: 15 4    Smokeless tobacco: Former User     Quit date: 1/16/1998    Tobacco comment: quit 10 yrs ago   Vaping Use    Vaping Use: Never used   Substance and Sexual Activity    Alcohol use: Not Currently     Comment: quit 5 years    Drug use: Yes     Frequency: 3 0 times per week     Types: Marijuana     Comment: advised not to smoke    Sexual activity: Not Currently   Other Topics Concern    Not on file   Social History Narrative    Not on file     Social Determinants of Health     Financial Resource Strain: Not on file   Food Insecurity: Not on file   Transportation Needs: Not on file   Physical Activity: Not on file   Stress: Not on file   Social Connections: Not on file   Intimate Partner Violence: Not on file   Housing Stability: Not on file       Review of Systems   Constitutional: Positive for activity change and fatigue  Gastrointestinal: Positive for abdominal pain  Psychiatric/Behavioral: The patient is nervous/anxious  All other systems reviewed and are negative  Objective:  Vitals:    06/07/22 0942   BP: 118/78   BP Location: Left arm   Patient Position: Sitting   Pulse: 82   Resp: 14   SpO2: 98%   Weight: 88 5 kg (195 lb)   Height: 5' 10" (1 778 m)     Body mass index is 27 98 kg/m²  Physical Exam  Vitals and nursing note reviewed  Constitutional:       Appearance: Normal appearance  He is ill-appearing  HENT:      Head: Normocephalic and atraumatic  Mouth/Throat:      Mouth: Mucous membranes are dry  Cardiovascular:      Rate and Rhythm: Normal rate  Abdominal:      General: Bowel sounds are normal       Palpations: Abdomen is soft  Tenderness: There is abdominal tenderness  Musculoskeletal:         General: Normal range of motion  Cervical back: Normal range of motion  Skin:     General: Skin is warm and dry  Neurological:      General: No focal deficit present  Mental Status: He is alert and oriented to person, place, and time  Mental status is at baseline     Psychiatric:         Mood and Affect: Mood normal            RESULTS:    Recent Results (from the past 1008 hour(s))   CBC and differential    Collection Time: 06/03/22  4:11 PM   Result Value Ref Range    WBC 12 37 (H) 4 31 - 10 16 Thousand/uL    RBC 4 85 3 88 - 5 62 Million/uL    Hemoglobin 13 9 12 0 - 17 0 g/dL    Hematocrit 42 2 36 5 - 49 3 %    MCV 87 82 - 98 fL    MCH 28 7 26 8 - 34 3 pg    MCHC 32 9 31 4 - 37 4 g/dL    RDW 13 2 11 6 - 15 1 %    MPV 10 1 8 9 - 12 7 fL    Platelets 080 155 - 313 Thousands/uL    nRBC 0 /100 WBCs    Neutrophils Relative 87 (H) 43 - 75 %    Immat GRANS % 0 0 - 2 %    Lymphocytes Relative 9 (L) 14 - 44 %    Monocytes Relative 4 4 - 12 %    Eosinophils Relative 0 0 - 6 %    Basophils Relative 0 0 - 1 %    Neutrophils Absolute 10 86 (H) 1 85 - 7 62 Thousands/µL    Immature Grans Absolute 0 02 0 00 - 0 20 Thousand/uL    Lymphocytes Absolute 1 05 0 60 - 4 47 Thousands/µL    Monocytes Absolute 0 43 0 17 - 1 22 Thousand/µL    Eosinophils Absolute 0 00 0 00 - 0 61 Thousand/µL    Basophils Absolute 0 01 0 00 - 0 10 Thousands/µL   Comprehensive metabolic panel    Collection Time: 06/03/22  4:11 PM   Result Value Ref Range    Sodium 141 136 - 145 mmol/L    Potassium 4 0 3 5 - 5 3 mmol/L    Chloride 105 100 - 108 mmol/L    CO2 26 21 - 32 mmol/L    ANION GAP 10 4 - 13 mmol/L    BUN 15 5 - 25 mg/dL    Creatinine 1 02 0 60 - 1 30 mg/dL    Glucose 151 (H) 65 - 140 mg/dL    Calcium 8 6 8 3 - 10 1 mg/dL    AST 11 5 - 45 U/L    ALT 16 12 - 78 U/L    Alkaline Phosphatase 69 46 - 116 U/L    Total Protein 6 9 6 4 - 8 2 g/dL    Albumin 3 7 3 5 - 5 0 g/dL    Total Bilirubin 0 33 0 20 - 1 00 mg/dL    eGFR 81 ml/min/1 73sq m   Protime-INR    Collection Time: 06/03/22  4:11 PM   Result Value Ref Range    Protime 12 5 11 6 - 14 5 seconds    INR 0 96 0 84 - 1 19   Type and screen    Collection Time: 06/03/22  4:11 PM   Result Value Ref Range    ABO Grouping O     Rh Factor Positive     Antibody Screen Negative     Specimen Expiration Date [id number]    APTT    Collection Time: 06/03/22  4:11 PM   Result Value Ref Range    PTT 30 23 - 37 seconds   ECG 12 lead    Collection Time: 06/03/22  4:20 PM   Result Value Ref Range    Ventricular Rate 93 BPM    Atrial Rate 93 BPM    TX Interval 184 ms    QRSD Interval 88 ms    QT Interval 358 ms    QTC Interval 445 ms    P [place] 67 degrees    QRS Axis -18 degrees    T Wave Axis 65 degrees   Hemoglobin and hematocrit, blood    Collection Time: 06/04/22 12:57 AM   Result Value Ref Range    Hemoglobin 11 5 (L) 12 0 - 17 0 g/dL    Hematocrit 35 7 (L) 36 5 - 49 3 %   Basic metabolic panel    Collection Time: 06/04/22  8:57 AM   Result Value Ref Range    Sodium 141 136 - 145 mmol/L    Potassium 4 0 3 5 - 5 3 mmol/L    Chloride 107 100 - 108 mmol/L    CO2 26 21 - 32 mmol/L    ANION GAP 8 4 - 13 mmol/L    BUN 12 5 - 25 mg/dL    Creatinine 0 99 0 60 - 1 30 mg/dL    Glucose 108 65 - 140 mg/dL    Glucose, Fasting 108 (H) 65 - 99 mg/dL    Calcium 8 6 8 3 - 10 1 mg/dL    eGFR 84 ml/min/1 73sq m   Hemoglobin and hematocrit, blood    Collection Time: 06/04/22  8:57 AM   Result Value Ref Range    Hemoglobin 12 2 12 0 - 17 0 g/dL    Hematocrit 37 4 36 5 - 49 3 %       This note was created with voice recognition software  Phonic, grammatical and spelling errors may be present within the note as a result

## 2022-06-08 ENCOUNTER — TELEPHONE (OUTPATIENT)
Dept: GASTROENTEROLOGY | Facility: CLINIC | Age: 57
End: 2022-06-08

## 2022-06-08 LAB
EST. AVERAGE GLUCOSE BLD GHB EST-MCNC: 120 MG/DL
HBA1C MFR BLD: 5.8 %
HIV 1+2 AB+HIV1 P24 AG SERPL QL IA: NORMAL

## 2022-06-08 NOTE — TELEPHONE ENCOUNTER
Spoke with patient  History of cyclical vomiting, IBS-D     Patient c/o continue upper mid abdominal pain/cramping, and a sour stomach  Patient started pantoprazole 40mg daily  Passing normal formed BM  Denies vomiting, fever, chills, SOB, weakness, black or bloody stools  Advised patient to start bentyl PRN, zofran PRN, and utilize his promethazine suppositories  He will go to ED if hematemesis restarts  Any other suggestions?

## 2022-06-08 NOTE — TELEPHONE ENCOUNTER
Petrona Platt - patient called lmom he had a procedure at [facility] on 06/04/22  Patient is not feeling well  In a lot of pain   Please call Dorcas Price at 21  ty

## 2022-06-09 ENCOUNTER — APPOINTMENT (OUTPATIENT)
Dept: PHYSICAL THERAPY | Facility: CLINIC | Age: 57
End: 2022-06-09
Payer: MEDICARE

## 2022-06-09 LAB
PSA FREE MFR SERPL: 26.4 %
PSA FREE SERPL-MCNC: 0.29 NG/ML
PSA SERPL-MCNC: 1.1 NG/ML (ref 0–4)

## 2022-06-14 ENCOUNTER — APPOINTMENT (OUTPATIENT)
Dept: PHYSICAL THERAPY | Facility: CLINIC | Age: 57
End: 2022-06-14
Payer: MEDICARE

## 2022-06-14 ENCOUNTER — TELEPHONE (OUTPATIENT)
Dept: GASTROENTEROLOGY | Facility: CLINIC | Age: 57
End: 2022-06-14

## 2022-06-14 NOTE — TELEPHONE ENCOUNTER
Ally patient - Ongoing diarrhea, stomach pain lower abdomen, vomiting issues, same symptoms and feels his quality of life is not good, nauseous and patient wants to know about surgery to remove damaged area of colon? Please RTRN call to 21 [phone]    Thx

## 2022-06-16 ENCOUNTER — APPOINTMENT (OUTPATIENT)
Dept: PHYSICAL THERAPY | Facility: CLINIC | Age: 57
End: 2022-06-16
Payer: MEDICARE

## 2022-06-20 ENCOUNTER — TELEPHONE (OUTPATIENT)
Dept: GASTROENTEROLOGY | Facility: CLINIC | Age: 57
End: 2022-06-20

## 2022-06-20 NOTE — TELEPHONE ENCOUNTER
Called pt and LMOM with results stating Biopsies of the stomach were benign and negative for H pylori or for cancer  Biopsies of the EG junction did show some intestinal metaplasia and short-segment Diaz's Esophagus  Repeat EGD in 3 years

## 2022-06-20 NOTE — TELEPHONE ENCOUNTER
----- Message from Sarahy Gaitan DO sent at 6/18/2022 12:47 PM EDT -----  Please call the patient with the biopsy results  Biopsies the stomach were benign and negative for Helicobacter pylori or for cancer  Biopsies the EG junction did show some intestinal metaplasia and short-segment Diaz's esophagus    The patient is due for repeat esophagogastroduodenoscopy in 3 years

## 2022-06-21 ENCOUNTER — OFFICE VISIT (OUTPATIENT)
Dept: INTERNAL MEDICINE CLINIC | Facility: CLINIC | Age: 57
End: 2022-06-21
Payer: MEDICARE

## 2022-06-21 VITALS
SYSTOLIC BLOOD PRESSURE: 114 MMHG | DIASTOLIC BLOOD PRESSURE: 72 MMHG | OXYGEN SATURATION: 98 % | WEIGHT: 197 LBS | BODY MASS INDEX: 28.2 KG/M2 | HEART RATE: 85 BPM | HEIGHT: 70 IN

## 2022-06-21 DIAGNOSIS — K92.2 GI BLEED: ICD-10-CM

## 2022-06-21 DIAGNOSIS — E78.5 DYSLIPIDEMIA: ICD-10-CM

## 2022-06-21 DIAGNOSIS — H53.60 NIGHT VISION LOSS: Primary | ICD-10-CM

## 2022-06-21 DIAGNOSIS — H66.005 RECURRENT ACUTE SUPPURATIVE OTITIS MEDIA WITHOUT SPONTANEOUS RUPTURE OF LEFT TYMPANIC MEMBRANE: ICD-10-CM

## 2022-06-21 PROCEDURE — 99214 OFFICE O/P EST MOD 30 MIN: CPT | Performed by: INTERNAL MEDICINE

## 2022-06-21 RX ORDER — ATORVASTATIN CALCIUM 80 MG/1
80 TABLET, FILM COATED ORAL DAILY
Qty: 90 TABLET | Refills: 3 | Status: SHIPPED | OUTPATIENT
Start: 2022-06-21 | End: 2022-09-19

## 2022-06-21 RX ORDER — PANTOPRAZOLE SODIUM 40 MG/1
40 TABLET, DELAYED RELEASE ORAL
Qty: 90 TABLET | Refills: 3 | Status: SHIPPED | OUTPATIENT
Start: 2022-06-21 | End: 2022-09-19

## 2022-06-21 NOTE — PROGRESS NOTES
Patient called and wished to be discharged due to other ongoing health concerns  Patient states he has been performing HEP and is able to continue on own    FOTO provided over phone scoring 70

## 2022-06-21 NOTE — PROGRESS NOTES
Assessment/Plan:      Quality Measures:       Return in about 4 months (around 10/21/2022)  No problem-specific Assessment & Plan notes found for this encounter  Diagnoses and all orders for this visit:    Night vision loss  -     Ambulatory Referral to Ophthalmology; Future    Recurrent acute suppurative otitis media without spontaneous rupture of left tympanic membrane  -     Discontinue: neomycin-polymyxin-hydrocortisone (CORTISPORIN) otic solution; Administer 4 drops into the left ear every 6 (six) hours  -     neomycin-polymyxin-hydrocortisone (CORTISPORIN) otic solution; Administer 4 drops into the left ear every 6 (six) hours    GI bleed  -     pantoprazole (PROTONIX) 40 mg tablet; Take 1 tablet (40 mg total) by mouth daily in the early morning    Dyslipidemia  -     atorvastatin (LIPITOR) 80 mg tablet; Take 1 tablet (80 mg total) by mouth daily          Subjective:      Patient ID: Susan Zafar is a 61 y o  male  Angelinasalinas Gabrielom is here with ear pain, dc        ALLERGIES:  Allergies   Allergen Reactions    Rosuvastatin Myalgia       CURRENT MEDICATIONS:    Current Outpatient Medications:     atorvastatin (LIPITOR) 80 mg tablet, Take 1 tablet (80 mg total) by mouth daily, Disp: 90 tablet, Rfl: 3    clonazePAM (KlonoPIN) 0 5 mg tablet, Take 0 5 mg by mouth daily at bedtime , Disp: , Rfl:     neomycin-polymyxin-hydrocortisone (CORTISPORIN) otic solution, Administer 4 drops into the left ear every 6 (six) hours, Disp: 10 mL, Rfl: 0    ondansetron (ZOFRAN-ODT) 4 mg disintegrating tablet, Take 1 tablet (4 mg total) by mouth every 6 (six) hours as needed for nausea or vomiting, Disp: 20 tablet, Rfl: 0    pantoprazole (PROTONIX) 40 mg tablet, Take 1 tablet (40 mg total) by mouth daily in the early morning, Disp: 90 tablet, Rfl: 3    ACTIVE PROBLEM LIST:  Patient Active Problem List   Diagnosis    Anxiety and depression    Diaz's esophagus    Essential hypertension    Dyslipidemia    Obesity (BMI 30 0-34  9)    Ulcerative colitis without complications (HCC)    Gastroesophageal reflux disease with esophagitis    Vomiting    Coronary artery disease with angina pectoris, unspecified vessel or lesion type, unspecified whether native or transplanted heart (HCC)    Depression, recurrent ([facility] )    Hypertension    GERD (gastroesophageal reflux disease)    GI bleed       PAST MEDICAL HISTORY:  Past Medical History:   Diagnosis Date    Diaz's esophagus     Colon polyp     Coronary artery disease     Depression     Diverticulitis     GERD (gastroesophageal reflux disease)     Hemorrhoid     History of heart artery stent     Hyperlipidemia     Hypertension     Microscopic colitis     Ulcerative colitis ([facility] )        PAST SURGICAL HISTORY:  Past Surgical History:   Procedure Laterality Date    ABDOMINAL SURGERY      radio frequency ablation    BONE GRAFT Left 2018    CARPAL TUNNEL RELEASE Right     COLONOSCOPY      CORONARY ANGIOPLASTY WITH STENT PLACEMENT      x2    EGD AND COLONOSCOPY      ESOPHAGOGASTRODUODENOSCOPY N/A 2/15/2018    Procedure: ESOPHAGOGASTRODUODENOSCOPY (EGD); Surgeon: Isauro Markham MD;  [facility];  Service: Gastroenterology    ESOPHAGOGASTRODUODENOSCOPY N/A 12/10/2018    Procedure: ESOPHAGOGASTRODUODENOSCOPY (EGD); Surgeon: Aj Gallardo MD;  [facility]: [facility];   Service: Gastroenterology    HAND SURGERY Left     MA SHLDR ARTHROSCOP,SURG,W/ROTAT CUFF REPR Left 4/14/2021    Procedure: REPAIR ROTATOR CUFF  ARTHROSCOPIC; POSSIBLE BICEPS TENDONESIS, ACROMIOPLASTY;  Surgeon: Jose Ramirez DO;  [facility];  Service: Orthopedics    ROTATOR CUFF REPAIR Left     SHOULDER ARTHROSCOPY Right 3/14/2022    Procedure: ARTHROSCOPY SHOULDER- Right shoulder arthroscopic rotator cuff repair, open subpectoral biceps tenodesis, subsacpular repair and extensive debridement;  Surgeon: Jose Ramirez DO;  [facility];  Service: Orthopedics  TONSILLECTOMY         FAMILY HISTORY:  Family History   Problem Relation Age of Onset    Heart disease Father     Melanoma Father     Cancer Sister     Skin cancer Sister     Skin cancer Mother        SOCIAL HISTORY:  Social History     Socioeconomic History    Marital status: /Civil Union     Spouse name: Not on file    Number of children: Not on file    Years of education: Not on file    Highest education level: Not on file   Occupational History    Not on file   Tobacco Use    Smoking status: Former Smoker     Packs/day: 0 50     Quit date: 1/16/2007     Years since quitting: 15 4    Smokeless tobacco: Former User     Quit date: 1/16/1998    Tobacco comment: quit 10 yrs ago   Vaping Use    Vaping Use: Never used   Substance and Sexual Activity    Alcohol use: Not Currently     Comment: quit 5 years    Drug use: Yes     Frequency: 3 0 times per week     Types: Marijuana     Comment: advised not to smoke    Sexual activity: Not Currently   Other Topics Concern    Not on file   Social History Narrative    Not on file     Social Determinants of Health     Financial Resource Strain: Not on file   Food Insecurity: Not on file   Transportation Needs: Not on file   Physical Activity: Not on file   Stress: Not on file   Social Connections: Not on file   Intimate Partner Violence: Not on file   Housing Stability: Not on file       Review of Systems   HENT: Positive for ear discharge and ear pain  All other systems reviewed and are negative  Objective:  Vitals:    06/21/22 0947   BP: 114/72   BP Location: Left arm   Patient Position: Sitting   Pulse: 85   SpO2: 98%   Weight: 89 4 kg (197 lb)   Height: 5' 10" (1 778 m)     Body mass index is 28 27 kg/m²  Physical Exam  Vitals and nursing note reviewed  Constitutional:       Appearance: Normal appearance  HENT:      Head: Normocephalic and atraumatic  Right Ear: Decreased hearing noted  Tympanic membrane is injected  Mouth/Throat:      Mouth: Mucous membranes are moist    Cardiovascular:      Rate and Rhythm: Normal rate  Pulmonary:      Effort: Pulmonary effort is normal    Musculoskeletal:         General: Normal range of motion  Cervical back: Normal range of motion  Skin:     General: Skin is warm and dry  Neurological:      Mental Status: He is alert and oriented to person, place, and time  Mental status is at baseline     Psychiatric:         Mood and Affect: Mood normal            RESULTS:    Recent Results (from the past 1008 hour(s))   CBC and differential    Collection Time: 06/03/22  4:11 PM   Result Value Ref Range    WBC 12 37 (H) 4 31 - 10 16 Thousand/uL    RBC 4 85 3 88 - 5 62 Million/uL    Hemoglobin 13 9 12 0 - 17 0 g/dL    Hematocrit 42 2 36 5 - 49 3 %    MCV 87 82 - 98 fL    MCH 28 7 26 8 - 34 3 pg    MCHC 32 9 31 4 - 37 4 g/dL    RDW 13 2 11 6 - 15 1 %    MPV 10 1 8 9 - 12 7 fL    Platelets 645 436 - 667 Thousands/uL    nRBC 0 /100 WBCs    Neutrophils Relative 87 (H) 43 - 75 %    Immat GRANS % 0 0 - 2 %    Lymphocytes Relative 9 (L) 14 - 44 %    Monocytes Relative 4 4 - 12 %    Eosinophils Relative 0 0 - 6 %    Basophils Relative 0 0 - 1 %    Neutrophils Absolute 10 86 (H) 1 85 - 7 62 Thousands/µL    Immature Grans Absolute 0 02 0 00 - 0 20 Thousand/uL    Lymphocytes Absolute 1 05 0 60 - 4 47 Thousands/µL    Monocytes Absolute 0 43 0 17 - 1 22 Thousand/µL    Eosinophils Absolute 0 00 0 00 - 0 61 Thousand/µL    Basophils Absolute 0 01 0 00 - 0 10 Thousands/µL   Comprehensive metabolic panel    Collection Time: 06/03/22  4:11 PM   Result Value Ref Range    Sodium 141 136 - 145 mmol/L    Potassium 4 0 3 5 - 5 3 mmol/L    Chloride 105 100 - 108 mmol/L    CO2 26 21 - 32 mmol/L    ANION GAP 10 4 - 13 mmol/L    BUN 15 5 - 25 mg/dL    Creatinine 1 02 0 60 - 1 30 mg/dL    Glucose 151 (H) 65 - 140 mg/dL    Calcium 8 6 8 3 - 10 1 mg/dL    AST 11 5 - 45 U/L    ALT 16 12 - 78 U/L    Alkaline Phosphatase 69 46 - 116 U/L    Total Protein 6 9 6 4 - 8 2 g/dL    Albumin 3 7 3 5 - 5 0 g/dL    Total Bilirubin 0 33 0 20 - 1 00 mg/dL    eGFR 81 ml/min/1 73sq m   Protime-INR    Collection Time: 06/03/22  4:11 PM   Result Value Ref Range    Protime 12 5 11 6 - 14 5 seconds    INR 0 96 0 84 - 1 19   Type and screen    Collection Time: 06/03/22  4:11 PM   Result Value Ref Range    ABO Grouping O     Rh Factor Positive     Antibody Screen Negative     Specimen Expiration Date [id number]    APTT    Collection Time: 06/03/22  4:11 PM   Result Value Ref Range    PTT 30 23 - 37 seconds   ECG 12 lead    Collection Time: 06/03/22  4:20 PM   Result Value Ref Range    Ventricular Rate 93 BPM    Atrial Rate 93 BPM    KS Interval 184 ms    QRSD Interval 88 ms    QT Interval 358 ms    QTC Interval 445 ms    P [place] 67 degrees    QRS Axis -18 degrees    T Wave Axis 65 degrees   Hemoglobin and hematocrit, blood    Collection Time: 06/04/22 12:57 AM   Result Value Ref Range    Hemoglobin 11 5 (L) 12 0 - 17 0 g/dL    Hematocrit 35 7 (L) 36 5 - 49 3 %   Basic metabolic panel    Collection Time: 06/04/22  8:57 AM   Result Value Ref Range    Sodium 141 136 - 145 mmol/L    Potassium 4 0 3 5 - 5 3 mmol/L    Chloride 107 100 - 108 mmol/L    CO2 26 21 - 32 mmol/L    ANION GAP 8 4 - 13 mmol/L    BUN 12 5 - 25 mg/dL    Creatinine 0 99 0 60 - 1 30 mg/dL    Glucose 108 65 - 140 mg/dL    Glucose, Fasting 108 (H) 65 - 99 mg/dL    Calcium 8 6 8 3 - 10 1 mg/dL    eGFR 84 ml/min/1 73sq m   Hemoglobin and hematocrit, blood    Collection Time: 06/04/22  8:57 AM   Result Value Ref Range    Hemoglobin 12 2 12 0 - 17 0 g/dL    Hematocrit 37 4 36 5 - 49 3 %   Tissue Exam    Collection Time: 06/04/22 12:07 PM   Result Value Ref Range    Case Report       Surgical Pathology Report                         Case: [id number]                                   Authorizing Provider:  Columba Hood DO          Collected:           06/04/2022 1207              Ordering Location:     [facility] Received:            06/04/2022 [address] [facility]                                     3rd Floor Med Surg Unit                                                      Pathologist:           Beth Hernandez MD                                                                 Specimens:   A) - Stomach                                                                                        B) - Esophagus, GE junction 39cm                                                           Final Diagnosis       A  Stomach, biopsy:  - Gastric antral and oxyntic mucosa with no significant pathologic alteration   - Negative for intestinal metaplasia, dysplasia or carcinoma  - No Helicobacter pylori is identified on H&E stained slide  B   GE junction at 39 cm, biopsy:  - Esophageal and adjacent cardiac mucosa with intestinal metaplasia, see comment   - Negative for dysplasia  Comment: This biopsy shows gastric-type mucosa with scattered goblet cells  The diagnosis in this case depends on the location of this biopsy  If this biopsy was taken from the tubular esophagus at least 1 cm above the gastric folds, it represents Diaz mucosa of the distinctive type  If this biopsy was taken from the gastric cardia, it represents intestinal metaplasia of the gastric cardia  Reference: Monse TELLO; [employer] [facility] Gastroenter[facility]  [facility] Clinical Guideline: Diagnosis and Management of Diaz's Esophagus  Inga Vincent  2016 [place];[id number]  Additional Information       All reported additional testing was performed with appropriately reactive controls  These tests were developed and their performance characteristics determined by [facility] or appropriate performing facility, though some tests may be performed on tissues which have not been validated for performance characteristics (such as staining performed on alcohol exposed cell blocks and decalcified tissues)  Results should be interpreted with caution and in the context of the patients clinical condition  These tests may not be cleared or approved by the [place]  Food and Drug Administration, though the FDA has determined that such clearance or approval is not necessary  These tests are used for clinical purposes and they should not be regarded as investigational or for research  This laboratory has been approved by [place] [id number], designated as a high-complexity laboratory and is qualified to perform these tests  Mita Blood Description          A  The specimen is received in formalin, labeled with the patient's name and hospital number, and is designated "stomach  The specimen consists of 5 tan-pink, soft tissue fragments ranging from 0 2 cm to 0 5 cm in greatest dimension  Entirely submitted  One screened cassette  B  The specimen is received in formalin, labeled with the patient's name and hospital number, and is designated "GE junction at 39 cm  The specimen consists of 3 white tan-pink, soft tissue fragments ranging from 0 3 cm to 0 5 cm in greatest dimension  Entirely submitted  One screened cassette  Note: The estimated total formalin fixation time based upon information provided by the submitting clinician and the standard processing schedule is under 72 hours    [place]        Clinical Information Cold biopsies r/o H Pylori    CBC and differential    Collection Time: 06/07/22 12:38 PM   Result Value Ref Range    WBC 7 54 4 31 - 10 16 Thousand/uL    RBC 4 61 3 88 - 5 62 Million/uL    Hemoglobin 13 3 12 0 - 17 0 g/dL    Hematocrit 40 2 36 5 - 49 3 %    MCV 87 82 - 98 fL    MCH 28 9 26 8 - 34 3 pg    MCHC 33 1 31 4 - 37 4 g/dL    RDW 13 4 11 6 - 15 1 %    MPV 9 8 8 9 - 12 7 fL    Platelets 449 050 - 739 Thousands/uL    nRBC 0 /100 WBCs    Neutrophils Relative 66 43 - 75 %    Immat GRANS % 0 0 - 2 %    Lymphocytes Relative 25 14 - 44 %    Monocytes Relative 7 4 - 12 %    Eosinophils Relative 2 0 - 6 % Basophils Relative 0 0 - 1 %    Neutrophils Absolute 4 92 1 85 - 7 62 Thousands/µL    Immature Grans Absolute 0 01 0 00 - 0 20 Thousand/uL    Lymphocytes Absolute 1 87 0 60 - 4 47 Thousands/µL    Monocytes Absolute 0 55 0 17 - 1 22 Thousand/µL    Eosinophils Absolute 0 16 0 00 - 0 61 Thousand/µL    Basophils Absolute 0 03 0 00 - 0 10 Thousands/µL   Lipid panel    Collection Time: 06/07/22 12:38 PM   Result Value Ref Range    Cholesterol 146 See Comment mg/dL    Triglycerides 158 (H) See Comment mg/dL    HDL, Direct 52 >=40 mg/dL    LDL Calculated 62 0 - 100 mg/dL    Non-HDL-Chol (CHOL-HDL) 94 mg/dl   Comprehensive metabolic panel    Collection Time: 06/07/22 12:38 PM   Result Value Ref Range    Sodium 140 136 - 145 mmol/L    Potassium 3 8 3 5 - 5 3 mmol/L    Chloride 104 100 - 108 mmol/L    CO2 25 21 - 32 mmol/L    ANION GAP 11 4 - 13 mmol/L    BUN 13 5 - 25 mg/dL    Creatinine 0 93 0 60 - 1 30 mg/dL    Glucose, Fasting 95 65 - 99 mg/dL    Calcium 8 7 8 3 - 10 1 mg/dL    AST 11 5 - 45 U/L    ALT 15 12 - 78 U/L    Alkaline Phosphatase 70 46 - 116 U/L    Total Protein 7 1 6 4 - 8 2 g/dL    Albumin 3 7 3 5 - 5 0 g/dL    Total Bilirubin 0 33 0 20 - 1 00 mg/dL    eGFR 90 ml/min/1 73sq m   HEMOGLOBIN A1C W/ EAG ESTIMATION    Collection Time: 06/07/22 12:38 PM   Result Value Ref Range    Hemoglobin A1C 5 8 (H) Normal 3 8-5 6%; PreDiabetic 5 7-6 4%;  Diabetic >=6 5%; Glycemic control for adults with diabetes <7 0% %     mg/dl   PSA, total and free    Collection Time: 06/07/22 12:38 PM   Result Value Ref Range    Prostate Specific Antigen Total 1 1 0 0 - 4 0 ng/mL    PSA, Free 0 29 N/A ng/mL    PSA, Free Pct 26 4 %   Hepatitis C antibody    Collection Time: 06/07/22 12:38 PM   Result Value Ref Range    Hepatitis C Ab Non-reactive Non-reactive   HIV 1/2 Antigen/Antibody (4th Generation) w Reflex [facility]    Collection Time: 06/07/22 12:38 PM   Result Value Ref Range    HIV-1/HIV-2 Ab Non-Reactive Non-Reactive       This note was created with voice recognition software  Phonic, grammatical and spelling errors may be present within the note as a result

## 2022-06-23 ENCOUNTER — APPOINTMENT (OUTPATIENT)
Dept: PHYSICAL THERAPY | Facility: CLINIC | Age: 57
End: 2022-06-23
Payer: MEDICARE

## 2022-07-26 ENCOUNTER — APPOINTMENT (EMERGENCY)
Dept: CT IMAGING | Facility: HOSPITAL | Age: 57
End: 2022-07-26
Payer: COMMERCIAL

## 2022-07-26 ENCOUNTER — HOSPITAL ENCOUNTER (EMERGENCY)
Facility: HOSPITAL | Age: 57
Discharge: HOME | End: 2022-07-26
Payer: COMMERCIAL

## 2022-07-26 VITALS
SYSTOLIC BLOOD PRESSURE: 164 MMHG | DIASTOLIC BLOOD PRESSURE: 84 MMHG | OXYGEN SATURATION: 99 % | TEMPERATURE: 97.2 F | HEART RATE: 73 BPM | RESPIRATION RATE: 18 BRPM

## 2022-07-26 DIAGNOSIS — R11.2 NAUSEA AND VOMITING: Primary | ICD-10-CM

## 2022-07-26 LAB
ABO GROUP BLD: NORMAL
ALBUMIN SERPL BCP-MCNC: 4 G/DL (ref 3.5–5)
ALP SERPL-CCNC: 69 U/L (ref 46–116)
ALT SERPL W P-5'-P-CCNC: 17 U/L (ref 12–78)
ANION GAP SERPL CALCULATED.3IONS-SCNC: 11 MMOL/L (ref 4–13)
APTT PPP: 27 SECONDS (ref 23–37)
AST SERPL W P-5'-P-CCNC: 16 U/L (ref 5–45)
BASOPHILS # BLD AUTO: 0.05 THOUSANDS/ΜL (ref 0–0.1)
BASOPHILS NFR BLD AUTO: 0 % (ref 0–1)
BILIRUB SERPL-MCNC: 0.38 MG/DL (ref 0.2–1)
BLD GP AB SCN SERPL QL: NEGATIVE
BUN SERPL-MCNC: 14 MG/DL (ref 5–25)
CALCIUM SERPL-MCNC: 9.1 MG/DL (ref 8.3–10.1)
CHLORIDE SERPL-SCNC: 105 MMOL/L (ref 96–108)
CO2 SERPL-SCNC: 24 MMOL/L (ref 21–32)
CREAT SERPL-MCNC: 1.17 MG/DL (ref 0.6–1.3)
EOSINOPHIL # BLD AUTO: 0.1 THOUSAND/ΜL (ref 0–0.61)
EOSINOPHIL NFR BLD AUTO: 1 % (ref 0–6)
ERYTHROCYTE [DISTWIDTH] IN BLOOD BY AUTOMATED COUNT: 14.1 % (ref 11.6–15.1)
GFR SERPL CREATININE-BSD FRML MDRD: 68 ML/MIN/1.73SQ M
GLUCOSE SERPL-MCNC: 138 MG/DL (ref 65–140)
HCT VFR BLD AUTO: 43.9 % (ref 36.5–49.3)
HGB BLD-MCNC: 14.1 G/DL (ref 12–17)
IMM GRANULOCYTES # BLD AUTO: 0.07 THOUSAND/UL (ref 0–0.2)
IMM GRANULOCYTES NFR BLD AUTO: 1 % (ref 0–2)
INR PPP: 0.93 (ref 0.84–1.19)
LIPASE SERPL-CCNC: 69 U/L (ref 73–393)
LYMPHOCYTES # BLD AUTO: 1.43 THOUSANDS/ΜL (ref 0.6–4.47)
LYMPHOCYTES NFR BLD AUTO: 11 % (ref 14–44)
MCH RBC QN AUTO: 28.4 PG (ref 26.8–34.3)
MCHC RBC AUTO-ENTMCNC: 32.1 G/DL (ref 31.4–37.4)
MCV RBC AUTO: 89 FL (ref 82–98)
MONOCYTES # BLD AUTO: 0.55 THOUSAND/ΜL (ref 0.17–1.22)
MONOCYTES NFR BLD AUTO: 4 % (ref 4–12)
NEUTROPHILS # BLD AUTO: 11.34 THOUSANDS/ΜL (ref 1.85–7.62)
NEUTS SEG NFR BLD AUTO: 83 % (ref 43–75)
NRBC BLD AUTO-RTO: 0 /100 WBCS
PLATELET # BLD AUTO: 326 THOUSANDS/UL (ref 149–390)
PMV BLD AUTO: 9.2 FL (ref 8.9–12.7)
POTASSIUM SERPL-SCNC: 3.7 MMOL/L (ref 3.5–5.3)
PROT SERPL-MCNC: 7.3 G/DL (ref 6.4–8.4)
PROTHROMBIN TIME: 12.3 SECONDS (ref 11.6–14.5)
RBC # BLD AUTO: 4.96 MILLION/UL (ref 3.88–5.62)
RH BLD: POSITIVE
SODIUM SERPL-SCNC: 140 MMOL/L (ref 135–147)
SPECIMEN EXPIRATION DATE: NORMAL
WBC # BLD AUTO: 13.54 THOUSAND/UL (ref 4.31–10.16)

## 2022-07-26 PROCEDURE — 96375 TX/PRO/DX INJ NEW DRUG ADDON: CPT

## 2022-07-26 PROCEDURE — 83690 ASSAY OF LIPASE: CPT | Performed by: NURSE PRACTITIONER

## 2022-07-26 PROCEDURE — 85610 PROTHROMBIN TIME: CPT | Performed by: NURSE PRACTITIONER

## 2022-07-26 PROCEDURE — 86850 RBC ANTIBODY SCREEN: CPT | Performed by: NURSE PRACTITIONER

## 2022-07-26 PROCEDURE — 86900 BLOOD TYPING SEROLOGIC ABO: CPT | Performed by: NURSE PRACTITIONER

## 2022-07-26 PROCEDURE — 99284 EMERGENCY DEPT VISIT MOD MDM: CPT

## 2022-07-26 PROCEDURE — 85025 COMPLETE CBC W/AUTO DIFF WBC: CPT | Performed by: NURSE PRACTITIONER

## 2022-07-26 PROCEDURE — 36415 COLL VENOUS BLD VENIPUNCTURE: CPT | Performed by: NURSE PRACTITIONER

## 2022-07-26 PROCEDURE — 96374 THER/PROPH/DIAG INJ IV PUSH: CPT

## 2022-07-26 PROCEDURE — 74176 CT ABD & PELVIS W/O CONTRAST: CPT

## 2022-07-26 PROCEDURE — 96372 THER/PROPH/DIAG INJ SC/IM: CPT

## 2022-07-26 PROCEDURE — 85730 THROMBOPLASTIN TIME PARTIAL: CPT | Performed by: NURSE PRACTITIONER

## 2022-07-26 PROCEDURE — 86901 BLOOD TYPING SEROLOGIC RH(D): CPT | Performed by: NURSE PRACTITIONER

## 2022-07-26 PROCEDURE — C9113 INJ PANTOPRAZOLE SODIUM, VIA: HCPCS | Performed by: NURSE PRACTITIONER

## 2022-07-26 PROCEDURE — 99285 EMERGENCY DEPT VISIT HI MDM: CPT | Performed by: NURSE PRACTITIONER

## 2022-07-26 PROCEDURE — 80053 COMPREHEN METABOLIC PANEL: CPT | Performed by: NURSE PRACTITIONER

## 2022-07-26 RX ORDER — HYDROMORPHONE HCL/PF 1 MG/ML
0.5 SYRINGE (ML) INJECTION ONCE
Status: COMPLETED | OUTPATIENT
Start: 2022-07-26 | End: 2022-07-26

## 2022-07-26 RX ORDER — HALOPERIDOL 5 MG/ML
5 INJECTION INTRAMUSCULAR ONCE
Status: COMPLETED | OUTPATIENT
Start: 2022-07-26 | End: 2022-07-26

## 2022-07-26 RX ORDER — MAGNESIUM HYDROXIDE/ALUMINUM HYDROXICE/SIMETHICONE 120; 1200; 1200 MG/30ML; MG/30ML; MG/30ML
30 SUSPENSION ORAL ONCE
Status: DISCONTINUED | OUTPATIENT
Start: 2022-07-26 | End: 2022-07-26 | Stop reason: HOSPADM

## 2022-07-26 RX ORDER — ONDANSETRON 2 MG/ML
4 INJECTION INTRAMUSCULAR; INTRAVENOUS ONCE
Status: COMPLETED | OUTPATIENT
Start: 2022-07-26 | End: 2022-07-26

## 2022-07-26 RX ORDER — PANTOPRAZOLE SODIUM 40 MG/10ML
40 INJECTION, POWDER, LYOPHILIZED, FOR SOLUTION INTRAVENOUS ONCE
Status: COMPLETED | OUTPATIENT
Start: 2022-07-26 | End: 2022-07-26

## 2022-07-26 RX ORDER — OXYCODONE HYDROCHLORIDE AND ACETAMINOPHEN 5; 325 MG/1; MG/1
1 TABLET ORAL ONCE
Status: DISCONTINUED | OUTPATIENT
Start: 2022-07-26 | End: 2022-07-26 | Stop reason: HOSPADM

## 2022-07-26 RX ADMIN — PANTOPRAZOLE SODIUM 40 MG: 40 INJECTION, POWDER, FOR SOLUTION INTRAVENOUS at 07:35

## 2022-07-26 RX ADMIN — HALOPERIDOL LACTATE 5 MG: 5 INJECTION, SOLUTION INTRAMUSCULAR at 07:35

## 2022-07-26 RX ADMIN — ONDANSETRON 4 MG: 2 INJECTION INTRAMUSCULAR; INTRAVENOUS at 07:23

## 2022-07-26 RX ADMIN — HYDROMORPHONE HYDROCHLORIDE 0.5 MG: 1 INJECTION, SOLUTION INTRAMUSCULAR; INTRAVENOUS; SUBCUTANEOUS at 07:21

## 2022-07-26 NOTE — ED PROVIDER NOTES
History  Chief Complaint   Patient presents with    Vomiting    Abdominal Pain     Patient reports having severe abdominal pain more so in the middle of his stomach since 2 am and vomiting  Patient adds that he is now vomiting bright red blood after vomiting several times  This is a 65-year-old male presenting here with sudden onset of abdominal cramping and discomfort with associated nausea and vomiting around 2:00 a m  He reports vomiting and retching for several hours and then started to notice some blood streaking which raised his concern  He does have a history of cyclical vomiting syndrome as well as barretts and has had previous admissions related to hematemesis  Vomiting  Associated symptoms: abdominal pain    Associated symptoms: no arthralgias, no chills, no cough, no fever and no sore throat    Abdominal Pain  Associated symptoms: vomiting    Associated symptoms: no chest pain, no chills, no cough, no dysuria, no fever, no hematuria, no shortness of breath and no sore throat        Prior to Admission Medications   Prescriptions Last Dose Informant Patient Reported?  Taking?   atorvastatin (LIPITOR) 80 mg tablet   No No   Sig: Take 1 tablet (80 mg total) by mouth daily   ciprofloxacin-dexamethasone (CIPRODEX) otic suspension   Yes No   Sig: Administer into ears   Patient not taking: Reported on 7/13/2022   clonazePAM (KlonoPIN) 0 5 mg tablet  Self Yes No   Sig: Take 0 5 mg by mouth daily at bedtime    neomycin-polymyxin-hydrocortisone (CORTISPORIN) otic solution   No No   Sig: Administer 4 drops into the left ear every 6 (six) hours   ondansetron (ZOFRAN-ODT) 4 mg disintegrating tablet  Self No No   Sig: Take 1 tablet (4 mg total) by mouth every 6 (six) hours as needed for nausea or vomiting   pantoprazole (PROTONIX) 40 mg tablet   No No   Sig: Take 1 tablet (40 mg total) by mouth daily in the early morning      Facility-Administered Medications: None       Past Medical History:   Diagnosis Date    Diaz's esophagus     Colon polyp     Coronary artery disease     Depression     Diverticulitis     Ear problems     GERD (gastroesophageal reflux disease)     Hemorrhoid     History of heart artery stent     Hyperlipidemia     Hypertension     Microscopic colitis     Ulcerative colitis ([address] )        Past Surgical History:   Procedure Laterality Date    ABDOMINAL SURGERY      radio frequency ablation    BONE GRAFT Left 2018    CARPAL TUNNEL RELEASE Right     COLONOSCOPY      CORONARY ANGIOPLASTY WITH STENT PLACEMENT      x2    EGD AND COLONOSCOPY      ESOPHAGOGASTRODUODENOSCOPY N/A 2/15/2018    Procedure: ESOPHAGOGASTRODUODENOSCOPY (EGD); Surgeon: Sheryl Hood MD;  Location: [facility];  Service: Gastroenterology    ESOPHAGOGASTRODUODENOSCOPY N/A 12/10/2018    Procedure: ESOPHAGOGASTRODUODENOSCOPY (EGD); Surgeon: Rissa Shin MD;  Location: [facility]; Service: Gastroenterology    HAND SURGERY Left     [place] SHLDR ARTHROSCOP,SURG,W/ROTAT CUFF REPR Left 4/14/2021    Procedure: REPAIR ROTATOR CUFF  ARTHROSCOPIC; POSSIBLE BICEPS TENDONESIS, ACROMIOPLASTY;  Surgeon: Ron Jeffries DO;  Location: [facility];  Service: Orthopedics    ROTATOR CUFF REPAIR Left     SHOULDER ARTHROSCOPY Right 3/14/2022    Procedure: ARTHROSCOPY SHOULDER- Right shoulder arthroscopic rotator cuff repair, open subpectoral biceps tenodesis, subsacpular repair and extensive debridement;  Surgeon: Ron Jeffries DO;  [facility];  Service: Orthopedics    TONSILLECTOMY         Family History   Problem Relation Age of Onset    Heart disease Father     Melanoma Father     Cancer Sister     Skin cancer Sister     Skin cancer Mother      I have reviewed and agree with the history as documented      E-Cigarette/Vaping    E-Cigarette Use Never User      E-Cigarette/Vaping Substances    Nicotine No     THC No     CBD No     Flavoring No     Other No     Unknown No      Social History Tobacco Use    Smoking status: Former Smoker     Packs/day: 0 50     Quit date: 1/16/2007     Years since quitting: 15 5    Smokeless tobacco: Former User     Quit date: 1/16/1998    Tobacco comment: quit 10 yrs ago   Vaping Use    Vaping Use: Never used   Substance Use Topics    Alcohol use: Not Currently     Comment: quit 5 years    Drug use: Yes     Frequency: 3 0 times per week     Types: Marijuana     Comment: advised not to smoke       Review of Systems   Constitutional: Negative for chills and fever  HENT: Negative for ear pain and sore throat  Eyes: Negative for pain and visual disturbance  Respiratory: Negative for cough and shortness of breath  Cardiovascular: Negative for chest pain and palpitations  Gastrointestinal: Positive for abdominal pain and vomiting  Genitourinary: Negative for dysuria and hematuria  Musculoskeletal: Negative for arthralgias and back pain  Skin: Negative for color change and rash  Neurological: Negative for seizures and syncope  All other systems reviewed and are negative  Physical Exam  Physical Exam  Vitals and nursing note reviewed  Constitutional:       General: He is not in acute distress  Appearance: He is well-developed  HENT:      Head: Normocephalic and atraumatic  Eyes:      General:         Right eye: No discharge  Left eye: No discharge  Conjunctiva/sclera: Conjunctivae normal    Cardiovascular:      Rate and Rhythm: Normal rate  Pulmonary:      Effort: Pulmonary effort is normal  No respiratory distress  Abdominal:      General: There is no distension  Tenderness: There is no abdominal tenderness  There is no guarding  Musculoskeletal:         General: No deformity  Cervical back: Normal range of motion and neck supple  Skin:     General: Skin is warm and dry  Neurological:      Mental Status: He is alert and oriented to person, place, and time        Coordination: Coordination normal  Vital Signs  ED Triage Vitals [07/26/22 0701]   Temperature Pulse Respirations Blood Pressure SpO2   (!) 97 2 °F (36 2 °C) 73 18 164/84 99 %      Temp Source Heart Rate Source Patient Position - Orthostatic VS BP Location FiO2 (%)   Tympanic Monitor Sitting Left arm --      Pain Score       8           Vitals:    07/26/22 0701   BP: 164/84   Pulse: 73   Patient Position - Orthostatic VS: Sitting         Visual Acuity      ED Medications  Medications   ondansetron (ZOFRAN) injection 4 mg (4 mg Intravenous Given 7/26/22 0723)   pantoprazole (PROTONIX) injection 40 mg (40 mg Intravenous Given 7/26/22 0735)   haloperidol lactate (HALDOL) injection 5 mg (5 mg Intramuscular Given 7/26/22 0735)   HYDROmorphone (DILAUDID) injection 0 5 mg (0 5 mg Intravenous Given 7/26/22 0721)       Diagnostic Studies  Results Reviewed     Procedure Component Value Units Date/Time    [facility] [[id number]] Collected: 07/26/22 0726    Lab Status: Final result Specimen: Blood Updated: 07/26/22 0747     Sodium 140 mmol/L      Potassium 3 7 mmol/L      Chloride 105 mmol/L      CO2 24 mmol/L      ANION GAP 11 mmol/L      BUN 14 mg/dL      Creatinine 1 17 mg/dL      Glucose 138 mg/dL      Calcium 9 1 mg/dL      AST 16 U/L      ALT 17 U/L      Alkaline Phosphatase 69 U/L      Total Protein 7 3 g/dL      Albumin 4 0 g/dL      Total Bilirubin 0 38 mg/dL      eGFR 68 ml/min/1 73sq m     Narrative:      [place] guidelines for Chronic Kidney Disease (CKD):     Stage 1 with normal or high GFR (GFR > 90 mL/min/1 73 square meters)    Stage 2 Mild CKD (GFR = 60-89 mL/min/1 73 square meters)    Stage 3A Moderate CKD (GFR = 45-59 mL/min/1 73 square meters)    Stage 3B Moderate CKD (GFR = 30-44 mL/min/1 73 square meters)    Stage 4 Severe CKD (GFR = 15-29 mL/min/1 73 square meters)    Stage 5 End Stage CKD (GFR <15 mL/min/1 73 square meters)  Note: GFR calculation is accurate only with a steady state creatinine    Lipase [[id number]]  (Abnormal) Collected: 07/26/22 0726    Lab Status: Final result Specimen: Blood Updated: 07/26/22 0747     Lipase 69 u/L     Protime-INR [[id number]]  (Normal) Collected: 07/26/22 0726    Lab Status: Final result Specimen: Blood Updated: 07/26/22 0744     Protime 12 3 seconds      INR 0 93    APTT [[id number]]  (Normal) Collected: 07/26/22 0726    Lab Status: Final result Specimen: Blood Updated: 07/26/22 0744     PTT 27 seconds     CBC and differential [[id number]]  (Abnormal) Collected: 07/26/22 0726    Lab Status: Final result Specimen: Blood Updated: 07/26/22 0729     WBC 13 54 Thousand/uL      RBC 4 96 Million/uL      Hemoglobin 14 1 g/dL      Hematocrit 43 9 %      MCV 89 fL      MCH 28 4 pg      MCHC 32 1 g/dL      RDW 14 1 %      MPV 9 2 fL      Platelets 258 Thousands/uL      nRBC 0 /100 WBCs      Neutrophils Relative 83 %      Immat GRANS % 1 %      Lymphocytes Relative 11 %      Monocytes Relative 4 %      Eosinophils Relative 1 %      Basophils Relative 0 %      Neutrophils Absolute 11 34 Thousands/µL      Immature Grans Absolute 0 07 Thousand/uL      Lymphocytes Absolute 1 43 Thousands/µL      Monocytes Absolute 0 55 Thousand/µL      Eosinophils Absolute 0 10 Thousand/µL      Basophils Absolute 0 05 Thousands/µL                  CT abdomen pelvis wo contrast   Final Result by Tanvir Floyd MD (07/26 9403)      No acute pathology  Colonic diverticulosis without diverticulitis  Workstation performed: [id number]                    Procedures  Procedures         ED Course                               SBIRT 22yo+    Flowsheet Row Most Recent Value   SBIRT (25 yo +)    In order to provide better care to our patients, we are screening all of our patients for alcohol and drug use  Would it be okay to ask you these screening questions?  Unable to answer at this time Filed at: 07/26/2022 9248                    MDM  Number of Diagnoses or Management Options  Nausea and vomiting: new and requires workup  Diagnosis management comments: Patient feels significantly better after haloperidol in fluid rehydration  Amount and/or Complexity of Data Reviewed  Clinical lab tests: ordered and reviewed  Independent visualization of images, tracings, or specimens: yes    Patient Progress  Patient progress: stable      Disposition  Final diagnoses:   Nausea and vomiting     Time reflects when diagnosis was documented in both MDM as applicable and the Disposition within this note     Time User Action Codes Description Comment    7/26/2022  9:16 AM Tami Flores Add [R11 2] Nausea and vomiting       ED Disposition     ED Disposition   Discharge    Condition   Stable    Date/Time   Tue Jul 26, 2022  9:16 AM    Comment   Mila Aleman discharge to home/self care                 Follow-up Information     Follow up With Specialties Details Why Cheyenne Sidhu MD Internal Medicine  As needed [address] [facility] [address] [facility] [address]  [phone]            Discharge Medication List as of 7/26/2022  9:16 AM      CONTINUE these medications which have NOT CHANGED    Details   atorvastatin (LIPITOR) 80 mg tablet Take 1 tablet (80 mg total) by mouth daily, Starting Tue 6/21/2022, Until Mon 9/19/2022, Normal      ciprofloxacin-dexamethasone (CIPRODEX) otic suspension Administer into ears, Starting Wed 6/22/2022, Historical Med      clonazePAM (KlonoPIN) 0 5 mg tablet Take 0 5 mg by mouth daily at bedtime , Historical Med      neomycin-polymyxin-hydrocortisone (CORTISPORIN) otic solution Administer 4 drops into the left ear every 6 (six) hours, Starting Tue 6/21/2022, Normal      ondansetron (ZOFRAN-ODT) 4 mg disintegrating tablet Take 1 tablet (4 mg total) by mouth every 6 (six) hours as needed for nausea or vomiting, Starting Wed 4/7/2021, Normal      pantoprazole (PROTONIX) 40 mg tablet Take 1 tablet (40 mg total) by mouth daily in the early morning, Starting Tue 6/21/2022, Until Mon 9/19/2022, Normal             No discharge procedures on file      PDMP Review       Value Time User    PDMP Reviewed  Yes 6/21/2022  9:49 AM Emy Murillo MD          ED Provider  Electronically Signed by           RICKEY Jay  07/26/22 1591

## 2022-08-19 ENCOUNTER — HOSPITAL ENCOUNTER (EMERGENCY)
Facility: HOSPITAL | Age: 57
Discharge: HOME | End: 2022-08-19
Attending: EMERGENCY MEDICINE
Payer: COMMERCIAL

## 2022-08-19 ENCOUNTER — APPOINTMENT (EMERGENCY)
Dept: CT IMAGING | Facility: HOSPITAL | Age: 57
End: 2022-08-19
Payer: COMMERCIAL

## 2022-08-19 VITALS
RESPIRATION RATE: 17 BRPM | OXYGEN SATURATION: 96 % | TEMPERATURE: 98.4 F | DIASTOLIC BLOOD PRESSURE: 90 MMHG | SYSTOLIC BLOOD PRESSURE: 154 MMHG | HEART RATE: 65 BPM

## 2022-08-19 DIAGNOSIS — F12.91 HISTORY OF MARIJUANA USE: ICD-10-CM

## 2022-08-19 DIAGNOSIS — R10.9 ABDOMINAL PAIN: Primary | ICD-10-CM

## 2022-08-19 DIAGNOSIS — R11.2 NAUSEA AND VOMITING: ICD-10-CM

## 2022-08-19 DIAGNOSIS — K52.9 ENTERITIS: ICD-10-CM

## 2022-08-19 LAB
ALBUMIN SERPL BCP-MCNC: 4.5 G/DL (ref 3.5–5)
ALP SERPL-CCNC: 71 U/L (ref 46–116)
ALT SERPL W P-5'-P-CCNC: 20 U/L (ref 12–78)
ANION GAP SERPL CALCULATED.3IONS-SCNC: 11 MMOL/L (ref 4–13)
AST SERPL W P-5'-P-CCNC: 23 U/L (ref 5–45)
BASOPHILS # BLD AUTO: 0.02 THOUSANDS/ΜL (ref 0–0.1)
BASOPHILS NFR BLD AUTO: 0 % (ref 0–1)
BILIRUB SERPL-MCNC: 0.39 MG/DL (ref 0.2–1)
BUN SERPL-MCNC: 16 MG/DL (ref 5–25)
CALCIUM SERPL-MCNC: 9.7 MG/DL (ref 8.3–10.1)
CHLORIDE SERPL-SCNC: 103 MMOL/L (ref 96–108)
CO2 SERPL-SCNC: 25 MMOL/L (ref 21–32)
CREAT SERPL-MCNC: 1.07 MG/DL (ref 0.6–1.3)
EOSINOPHIL # BLD AUTO: 0.01 THOUSAND/ΜL (ref 0–0.61)
EOSINOPHIL NFR BLD AUTO: 0 % (ref 0–6)
ERYTHROCYTE [DISTWIDTH] IN BLOOD BY AUTOMATED COUNT: 13.6 % (ref 11.6–15.1)
GFR SERPL CREATININE-BSD FRML MDRD: 76 ML/MIN/1.73SQ M
GLUCOSE SERPL-MCNC: 138 MG/DL (ref 65–140)
HCT VFR BLD AUTO: 46.5 % (ref 36.5–49.3)
HGB BLD-MCNC: 14.7 G/DL (ref 12–17)
IMM GRANULOCYTES # BLD AUTO: 0.04 THOUSAND/UL (ref 0–0.2)
IMM GRANULOCYTES NFR BLD AUTO: 0 % (ref 0–2)
LIPASE SERPL-CCNC: 264 U/L (ref 73–393)
LYMPHOCYTES # BLD AUTO: 1.17 THOUSANDS/ΜL (ref 0.6–4.47)
LYMPHOCYTES NFR BLD AUTO: 10 % (ref 14–44)
MCH RBC QN AUTO: 27.8 PG (ref 26.8–34.3)
MCHC RBC AUTO-ENTMCNC: 31.6 G/DL (ref 31.4–37.4)
MCV RBC AUTO: 88 FL (ref 82–98)
MONOCYTES # BLD AUTO: 0.34 THOUSAND/ΜL (ref 0.17–1.22)
MONOCYTES NFR BLD AUTO: 3 % (ref 4–12)
NEUTROPHILS # BLD AUTO: 10.26 THOUSANDS/ΜL (ref 1.85–7.62)
NEUTS SEG NFR BLD AUTO: 87 % (ref 43–75)
NRBC BLD AUTO-RTO: 0 /100 WBCS
PLATELET # BLD AUTO: 334 THOUSANDS/UL (ref 149–390)
PMV BLD AUTO: 9.6 FL (ref 8.9–12.7)
POTASSIUM SERPL-SCNC: 5.2 MMOL/L (ref 3.5–5.3)
PROT SERPL-MCNC: 8.3 G/DL (ref 6.4–8.4)
RBC # BLD AUTO: 5.29 MILLION/UL (ref 3.88–5.62)
SODIUM SERPL-SCNC: 139 MMOL/L (ref 135–147)
WBC # BLD AUTO: 11.84 THOUSAND/UL (ref 4.31–10.16)

## 2022-08-19 PROCEDURE — 36415 COLL VENOUS BLD VENIPUNCTURE: CPT

## 2022-08-19 PROCEDURE — 99285 EMERGENCY DEPT VISIT HI MDM: CPT | Performed by: EMERGENCY MEDICINE

## 2022-08-19 PROCEDURE — C9113 INJ PANTOPRAZOLE SODIUM, VIA: HCPCS | Performed by: EMERGENCY MEDICINE

## 2022-08-19 PROCEDURE — 74177 CT ABD & PELVIS W/CONTRAST: CPT

## 2022-08-19 PROCEDURE — 96365 THER/PROPH/DIAG IV INF INIT: CPT

## 2022-08-19 PROCEDURE — 96366 THER/PROPH/DIAG IV INF ADDON: CPT

## 2022-08-19 PROCEDURE — 80053 COMPREHEN METABOLIC PANEL: CPT

## 2022-08-19 PROCEDURE — 96375 TX/PRO/DX INJ NEW DRUG ADDON: CPT

## 2022-08-19 PROCEDURE — 99284 EMERGENCY DEPT VISIT MOD MDM: CPT

## 2022-08-19 PROCEDURE — G1004 CDSM NDSC: HCPCS

## 2022-08-19 PROCEDURE — 83690 ASSAY OF LIPASE: CPT

## 2022-08-19 PROCEDURE — 85025 COMPLETE CBC W/AUTO DIFF WBC: CPT

## 2022-08-19 RX ORDER — PANTOPRAZOLE SODIUM 40 MG/10ML
40 INJECTION, POWDER, LYOPHILIZED, FOR SOLUTION INTRAVENOUS ONCE
Status: COMPLETED | OUTPATIENT
Start: 2022-08-19 | End: 2022-08-19

## 2022-08-19 RX ORDER — HALOPERIDOL 5 MG/ML
5 INJECTION INTRAMUSCULAR ONCE
Status: COMPLETED | OUTPATIENT
Start: 2022-08-19 | End: 2022-08-19

## 2022-08-19 RX ORDER — ONDANSETRON 2 MG/ML
4 INJECTION INTRAMUSCULAR; INTRAVENOUS ONCE
Status: COMPLETED | OUTPATIENT
Start: 2022-08-19 | End: 2022-08-19

## 2022-08-19 RX ADMIN — PANTOPRAZOLE SODIUM 40 MG: 40 INJECTION, POWDER, FOR SOLUTION INTRAVENOUS at 12:35

## 2022-08-19 RX ADMIN — HALOPERIDOL LACTATE 5 MG: 5 INJECTION, SOLUTION INTRAMUSCULAR at 13:34

## 2022-08-19 RX ADMIN — IOHEXOL 70 ML: 350 INJECTION, SOLUTION INTRAVENOUS at 13:00

## 2022-08-19 RX ADMIN — MORPHINE SULFATE 2 MG: 2 INJECTION, SOLUTION INTRAMUSCULAR; INTRAVENOUS at 12:32

## 2022-08-19 RX ADMIN — ONDANSETRON 4 MG: 2 INJECTION INTRAMUSCULAR; INTRAVENOUS at 11:31

## 2022-08-19 RX ADMIN — SODIUM CHLORIDE, SODIUM LACTATE, POTASSIUM CHLORIDE, AND CALCIUM CHLORIDE 1000 ML: .6; .31; .03; .02 INJECTION, SOLUTION INTRAVENOUS at 12:38

## 2022-08-19 NOTE — DISCHARGE INSTRUCTIONS
Take your medications as per the instructions  Drink plenty of fluids, and eat as you are able to tolerate  Avoid marijuana use as this may be exacerbating symptoms  Follow up with PCP and GI doctor for further evaluation of your symptoms  Return if you are unable tolerate fluids despite the nausea medication, or for any other concerns

## 2022-08-19 NOTE — ED PROVIDER NOTES
History  Chief Complaint   Patient presents with    Abdominal Pain    Vomiting     Pt reports lower quad abd pain that began overnight  +N/V Extensive GI hx     HPI    Prior to Admission Medications   Prescriptions Last Dose Informant Patient Reported? Taking?   atorvastatin (LIPITOR) 80 mg tablet   No No   Sig: Take 1 tablet (80 mg total) by mouth daily   ciprofloxacin-dexamethasone (CIPRODEX) otic suspension   Yes No   Sig: Administer into ears   Patient not taking: Reported on 7/13/2022   clonazePAM (KlonoPIN) 0 5 mg tablet  Self Yes No   Sig: Take 0 5 mg by mouth daily at bedtime    neomycin-polymyxin-hydrocortisone (CORTISPORIN) otic solution   No No   Sig: Administer 4 drops into the left ear every 6 (six) hours   ondansetron (ZOFRAN-ODT) 4 mg disintegrating tablet  Self No No   Sig: Take 1 tablet (4 mg total) by mouth every 6 (six) hours as needed for nausea or vomiting   pantoprazole (PROTONIX) 40 mg tablet   No No   Sig: Take 1 tablet (40 mg total) by mouth daily in the early morning      Facility-Administered Medications: None       Past Medical History:   Diagnosis Date    Diaz's esophagus     Colon polyp     Coronary artery disease     Depression     Diverticulitis     Ear problems     GERD (gastroesophageal reflux disease)     Hemorrhoid     History of heart artery stent     Hyperlipidemia     Hypertension     Microscopic colitis     Ulcerative colitis ([address] )        Past Surgical History:   Procedure Laterality Date    ABDOMINAL SURGERY      radio frequency ablation    BONE GRAFT Left 2018    CARPAL TUNNEL RELEASE Right     COLONOSCOPY      CORONARY ANGIOPLASTY WITH STENT PLACEMENT      x2    EGD AND COLONOSCOPY      ESOPHAGOGASTRODUODENOSCOPY N/A 2/15/2018    Procedure: ESOPHAGOGASTRODUODENOSCOPY (EGD);   Surgeon: Aj Hansen MD;  [facility];  Service: Gastroenterology    ESOPHAGOGASTRODUODENOSCOPY N/A 12/10/2018    Procedure: ESOPHAGOGASTRODUODENOSCOPY (EGD); Surgeon: Anabell Machado MD;  [facility]; Service: Gastroenterology    HAND SURGERY Left     [place] SHLDR ARTHROSCOP,SURG,W/ROTAT CUFF REPR Left 4/14/2021    Procedure: REPAIR ROTATOR CUFF  ARTHROSCOPIC; POSSIBLE BICEPS TENDONESIS, ACROMIOPLASTY;  Surgeon: Tabatha Diaz DO;  [facility];  Service: Orthopedics    ROTATOR CUFF REPAIR Left     SHOULDER ARTHROSCOPY Right 3/14/2022    Procedure: ARTHROSCOPY SHOULDER- Right shoulder arthroscopic rotator cuff repair, open subpectoral biceps tenodesis, subsacpular repair and extensive debridement;  Surgeon: Tabatha Diaz DO;  [facility];  Service: Orthopedics    TONSILLECTOMY         Family History   Problem Relation Age of Onset    Heart disease Father     Melanoma Father     Cancer Sister     Skin cancer Sister     Skin cancer Mother      I have reviewed and agree with the history as documented  E-Cigarette/Vaping    E-Cigarette Use Never User      E-Cigarette/Vaping Substances    Nicotine No     THC No     CBD No     Flavoring No     Other No     Unknown No      Social History     Tobacco Use    Smoking status: Former Smoker     Packs/day: 0 50     Quit date: 1/16/2007     Years since quitting: 15 6    Smokeless tobacco: Former User     Quit date: 1/16/1998    Tobacco comment: quit 10 yrs ago   Vaping Use    Vaping Use: Never used   Substance Use Topics    Alcohol use: Not Currently     Comment: quit 5 years    Drug use: Yes     Frequency: 3 0 times per week     Types: Marijuana     Comment: advised not to smoke       Review of Systems    Physical Exam  Physical Exam  Vitals and nursing note reviewed  Constitutional:       General: He is not in acute distress  Appearance: He is well-developed  Comments: Hypertensive in triage, improves on repeat   HENT:      Head: Normocephalic and atraumatic     Eyes:      Conjunctiva/sclera: Conjunctivae normal       Pupils: Pupils are equal, round, and reactive to light  Neck:      Trachea: No tracheal deviation  Cardiovascular:      Rate and Rhythm: Normal rate and regular rhythm  Heart sounds: Normal heart sounds  Pulmonary:      Effort: Pulmonary effort is normal  No respiratory distress  Breath sounds: Normal breath sounds  Abdominal:      General: Bowel sounds are normal  There is no distension  Palpations: Abdomen is soft  Tenderness: There is generalized abdominal tenderness  There is no guarding or rebound  Musculoskeletal:      Cervical back: Normal range of motion  Skin:     General: Skin is warm and dry  Neurological:      Mental Status: He is alert and oriented to person, place, and time  GCS: GCS eye subscore is 4  GCS verbal subscore is 5  GCS motor subscore is 6     Psychiatric:         Behavior: Behavior normal          Vital Signs  ED Triage Vitals   Temperature Pulse Respirations Blood Pressure SpO2   08/19/22 1045 08/19/22 1044 08/19/22 1044 08/19/22 1044 08/19/22 1044   98 4 °F (36 9 °C) 55 16 (!) 198/87 99 %      Temp Source Heart Rate Source Patient Position - Orthostatic VS BP Location FiO2 (%)   08/19/22 1044 08/19/22 1044 08/19/22 1044 08/19/22 1044 --   Oral Monitor Sitting Left arm       Pain Score       08/19/22 1232       9           Vitals:    08/19/22 1044 08/19/22 1240   BP: (!) 198/87 154/90   Pulse: 55 65   Patient Position - Orthostatic VS: Sitting Sitting         Visual Acuity      ED Medications  Medications   ondansetron (ZOFRAN) injection 4 mg (4 mg Intravenous Given 8/19/22 1131)   morphine injection 2 mg (2 mg Intravenous Given 8/19/22 1232)   lactated ringers bolus 1,000 mL (1,000 mL Intravenous New Bag 8/19/22 1238)   pantoprazole (PROTONIX) injection 40 mg (40 mg Intravenous Given 8/19/22 1235)   iohexol (OMNIPAQUE) 350 MG/ML injection (MULTI-DOSE) 70 mL (70 mL Intravenous Given 8/19/22 1300)   haloperidol lactate (HALDOL) injection 5 mg (5 mg Intravenous Given 8/19/22 1334)       Diagnostic Studies  Results Reviewed     Procedure Component Value Units Date/Time    Comprehensive metabolic panel [[id number]] Collected: 08/19/22 1130    Lab Status: Final result Specimen: Blood from Arm, Right Updated: 08/19/22 1200     Sodium 139 mmol/L      Potassium 5 2 mmol/L      Chloride 103 mmol/L      CO2 25 mmol/L      ANION GAP 11 mmol/L      BUN 16 mg/dL      Creatinine 1 07 mg/dL      Glucose 138 mg/dL      Calcium 9 7 mg/dL      AST 23 U/L      ALT 20 U/L      Alkaline Phosphatase 71 U/L      Total Protein 8 3 g/dL      Albumin 4 5 g/dL      Total Bilirubin 0 39 mg/dL      eGFR 76 ml/min/1 73sq m     Narrative:      [place] guidelines for Chronic Kidney Disease (CKD):     Stage 1 with normal or high GFR (GFR > 90 mL/min/1 73 square meters)    Stage 2 Mild CKD (GFR = 60-89 mL/min/1 73 square meters)    Stage 3A Moderate CKD (GFR = 45-59 mL/min/1 73 square meters)    Stage 3B Moderate CKD (GFR = 30-44 mL/min/1 73 square meters)    Stage 4 Severe CKD (GFR = 15-29 mL/min/1 73 square meters)    Stage 5 End Stage CKD (GFR <15 mL/min/1 73 square meters)  Note: GFR calculation is accurate only with a steady state creatinine    Lipase [[id number]]  (Normal) Collected: 08/19/22 1130    Lab Status: Final result Specimen: Blood from Arm, Right Updated: 08/19/22 1200     Lipase 264 u/L     CBC and differential [[id number]]  (Abnormal) Collected: 08/19/22 1130    Lab Status: Final result Specimen: Blood from Arm, Right Updated: 08/19/22 1140     WBC 11 84 Thousand/uL      RBC 5 29 Million/uL      Hemoglobin 14 7 g/dL      Hematocrit 46 5 %      MCV 88 fL      MCH 27 8 pg      MCHC 31 6 g/dL      RDW 13 6 %      MPV 9 6 fL      Platelets 992 Thousands/uL      nRBC 0 /100 WBCs      Neutrophils Relative 87 %      Immat GRANS % 0 %      Lymphocytes Relative 10 %      Monocytes Relative 3 %      Eosinophils Relative 0 %      Basophils Relative 0 %      Neutrophils Absolute 10 26 Thousands/µL Immature Grans Absolute 0 04 Thousand/uL      Lymphocytes Absolute 1 17 Thousands/µL      Monocytes Absolute 0 34 Thousand/µL      Eosinophils Absolute 0 01 Thousand/µL      Basophils Absolute 0 02 Thousands/µL                  CT abdomen pelvis with contrast   Final Result by Naz Mead MD (08/19 0258)   Mildly prominent small bowel loops in the upper abdomen with the few fluid-filled small bowel loops, nonspecific may be due to enteritis      Diverticulosis         Workstation performed: [id number]                    Procedures  Procedures         ED Course                               SBIRT 20yo+    Flowsheet Row Most Recent Value   SBIRT (23 yo +)    In order to provide better care to our patients, we are screening all of our patients for alcohol and drug use  Would it be okay to ask you these screening questions? Yes Filed at: 08/19/2022 1320   Initial Alcohol Screen: US AUDIT-C     1  How often do you have a drink containing alcohol? 1 Filed at: 08/19/2022 1320   2  How many drinks containing alcohol do you have on a typical day you are drinking? 0 Filed at: 08/19/2022 1320   3a  Male UNDER 65: How often do you have five or more drinks on one occasion? 0 Filed at: 08/19/2022 1320   Audit-C Score 1 Filed at: 08/19/2022 1320   HERMINIA: How many times in the past year have you    Used an illegal drug or used a prescription medication for non-medical reasons? Never Filed at: 08/19/2022 1320                    MDM  Number of Diagnoses or Management Options  Abdominal pain: new and requires workup  Enteritis: new and requires workup  History of marijuana use: new and requires workup  Nausea and vomiting: new and requires workup  Diagnosis management comments: This is a 23-year-old male presenting today with abdominal pain, nausea and vomiting  He says it started on the left side but is now diffuse  He says the pain makes him nauseous and has contributed to vomiting    He feels like he has to move his bowel despite having normal bowel movements  He denies diarrhea, blood in his stools  He says he has vomited about 20 times since this morning  He does have a history of diverticulitis and had a slice of tomato on his trilogy standard yesterday which he thinks triggered his symptoms  He says after multiple episodes of vomiting he is having small streaks of blood in his emesis  He denies dark red blood, coffee-ground emesis, blood in his stool  He denies fevers or URI symptoms  He tries taking his home ODT Zofran but vomited it back up  He has been using heating pad as it helps his pain, and he says with history of ulcerative colitis he is unable to take oral medications that they interact  He does use marijuana "infrequently and "and denies that use of marijuana corresponds with when he gets recurrent symptoms  He denies prior abdominal surgeries  He has been seen by GI previously, diagnosed with cyclical vomiting, GERD, other microscopic colitis, and IBS with diarrhea  ROS: Otherwise negative, unless stated as above  He is well-appearing, in no acute distress  He does have diffuse abdominal tenderness without peritoneal findings  Exam is otherwise unremarkable  Differentials include exacerbation of his chronic underlying GI symptoms, possible marijuana induced cyclical vomiting syndrome/hyperemesis, unrelated acute enteritis, diverticulitis, colitis  Small amount of blood is from IP to evaluate there are repetitive vomiting, as opposed to being from ulcer, given as it occurred after multiple episodes of vomiting  We will check lab work and CT scan to evaluate, and treat his symptoms  He has a mild leukocytosis  Lab work is otherwise unremarkable  Hemoglobin is normal at 14 7  CT scan shows nonspecific findings which may be due to enteritis, but no other acute abnormalities  He had minimal improvement with symptoms initially, but had resolution of symptoms after Haldol    I discussed that there is concern that symptoms are related to his marijuana use  I discussed continued management at home, follow up and indications for return, and he expresses understanding with this plan  Amount and/or Complexity of Data Reviewed  Clinical lab tests: ordered and reviewed  Tests in the radiology section of CPT®: ordered and reviewed  Independent visualization of images, tracings, or specimens: yes        Disposition  Final diagnoses:   Abdominal pain   Nausea and vomiting   Enteritis   History of marijuana use     Time reflects when diagnosis was documented in both MDM as applicable and the Disposition within this note     Time User Action Codes Description Comment    8/19/2022  3:45 PM Geraldo Gee Add [R10 9] Abdominal pain     8/19/2022  3:45 PM Geraldo Gee Add [R11 2] Nausea and vomiting     8/19/2022  3:46 PM Geraldo Gee Add [K52 9] Enteritis     8/19/2022  3:46 PM Geraldo Gee Add [E41 443] History of marijuana use       ED Disposition     ED Disposition   Discharge    Condition   Stable    Date/Time   Fri Aug 19, 2022 [facility] discharge to home/self care           Follow-up Information     Follow up With Specialties Details Why Contact Info Additional Information    Jordan Adams MD Internal Medicine Schedule an appointment as soon as possible for a visit  to follow up on your symptoms [address]  [address]  [facility] [address], [facility] Schedule an appointment as soon as possible for a visit  to follow up on your symptoms [address]  [facility] [address]  David Tran [facility] [address]  [phone], [place], [place], [address] [phone]           Patient's Medications   Discharge Prescriptions    No medications on file       No discharge procedures on file      PDMP Review       Value Time User    PDMP Reviewed  Yes 6/21/2022  9:49 AM Deya Mendoza MD          ED Provider  Electronically Signed by           Javon Jang MD  08/19/22 4064

## 2022-10-22 ENCOUNTER — HOSPITAL ENCOUNTER (EMERGENCY)
Facility: HOSPITAL | Age: 57
Discharge: HOME | End: 2022-10-22
Attending: EMERGENCY MEDICINE
Payer: COMMERCIAL

## 2022-10-22 VITALS
RESPIRATION RATE: 19 BRPM | WEIGHT: 190 LBS | HEART RATE: 95 BPM | SYSTOLIC BLOOD PRESSURE: 145 MMHG | BODY MASS INDEX: 27.2 KG/M2 | DIASTOLIC BLOOD PRESSURE: 82 MMHG | HEIGHT: 70 IN | OXYGEN SATURATION: 94 % | TEMPERATURE: 98.4 F

## 2022-10-22 DIAGNOSIS — F13.939 BENZODIAZEPINE WITHDRAWAL (HCC): Primary | ICD-10-CM

## 2022-10-22 PROCEDURE — 99284 EMERGENCY DEPT VISIT MOD MDM: CPT | Performed by: EMERGENCY MEDICINE

## 2022-10-22 PROCEDURE — 93005 ELECTROCARDIOGRAM TRACING: CPT

## 2022-10-22 PROCEDURE — 99284 EMERGENCY DEPT VISIT MOD MDM: CPT

## 2022-10-22 RX ORDER — CLONAZEPAM 0.5 MG/1
1.5 TABLET ORAL ONCE
Status: COMPLETED | OUTPATIENT
Start: 2022-10-22 | End: 2022-10-22

## 2022-10-22 RX ORDER — CLONAZEPAM 0.5 MG/1
1.5 TABLET ORAL
Qty: 20 TABLET | Refills: 0 | Status: SHIPPED | OUTPATIENT
Start: 2022-10-22 | End: 2022-10-25 | Stop reason: SDUPTHER

## 2022-10-22 RX ADMIN — CLONAZEPAM 1.5 MG: 0.5 TABLET ORAL at 17:01

## 2022-10-22 NOTE — DISCHARGE INSTRUCTIONS
You were seen here for evaluation of withdrawal from benzodiazepines  This could ultimately be fatal   You also endorsed intermittent use of alcohol  Please understand that using both of these substances at once can have serious side effects and I insist that you do not do so  Will provide with a prescription of your benzodiazepine for approximately 1 week  Please follow-up with the resources provided with you here today to establish PCP for further management  Please also refer to the resources provided to today for any assistance with substance abuse and mental health resources  I welcome and encourage you to return to the emergency department at any time if she needs further assistance

## 2022-10-23 LAB
ATRIAL RATE: 80 BPM
P AXIS: 71 DEGREES
PR INTERVAL: 178 MS
QRS AXIS: 38 DEGREES
QRSD INTERVAL: 90 MS
QT INTERVAL: 350 MS
QTC INTERVAL: 403 MS
T WAVE AXIS: 68 DEGREES
VENTRICULAR RATE: 80 BPM

## 2022-10-25 ENCOUNTER — OFFICE VISIT (OUTPATIENT)
Dept: INTERNAL MEDICINE CLINIC | Facility: CLINIC | Age: 57
End: 2022-10-25

## 2022-10-25 VITALS
TEMPERATURE: 98.2 F | HEIGHT: 70 IN | RESPIRATION RATE: 16 BRPM | HEART RATE: 80 BPM | OXYGEN SATURATION: 97 % | WEIGHT: 196.2 LBS | SYSTOLIC BLOOD PRESSURE: 116 MMHG | BODY MASS INDEX: 28.09 KG/M2 | DIASTOLIC BLOOD PRESSURE: 74 MMHG

## 2022-10-25 DIAGNOSIS — F13.939 BENZODIAZEPINE WITHDRAWAL (HCC): ICD-10-CM

## 2022-10-25 DIAGNOSIS — K22.711 BARRETT'S ESOPHAGUS WITH HIGH GRADE DYSPLASIA: ICD-10-CM

## 2022-10-25 DIAGNOSIS — I10 ESSENTIAL HYPERTENSION: ICD-10-CM

## 2022-10-25 DIAGNOSIS — E78.5 DYSLIPIDEMIA: Primary | ICD-10-CM

## 2022-10-25 DIAGNOSIS — Z12.5 PROSTATE CANCER SCREENING: ICD-10-CM

## 2022-10-25 DIAGNOSIS — I25.119 CORONARY ARTERY DISEASE WITH ANGINA PECTORIS, UNSPECIFIED VESSEL OR LESION TYPE, UNSPECIFIED WHETHER NATIVE OR TRANSPLANTED HEART (HCC): ICD-10-CM

## 2022-10-25 DIAGNOSIS — K51.919 ULCERATIVE COLITIS WITH COMPLICATION, UNSPECIFIED LOCATION (HCC): ICD-10-CM

## 2022-10-25 PROBLEM — K21.00 GASTROESOPHAGEAL REFLUX DISEASE WITH ESOPHAGITIS: Status: RESOLVED | Noted: 2018-02-15 | Resolved: 2022-10-25

## 2022-10-25 PROBLEM — K92.2 GI BLEED: Status: RESOLVED | Noted: 2022-06-03 | Resolved: 2022-10-25

## 2022-10-25 PROBLEM — R11.10 VOMITING: Status: RESOLVED | Noted: 2018-12-08 | Resolved: 2022-10-25

## 2022-10-25 RX ORDER — CLONAZEPAM 0.5 MG/1
1.5 TABLET ORAL
Qty: 90 TABLET | Refills: 0 | Status: SHIPPED | OUTPATIENT
Start: 2022-10-25 | End: 2022-11-24

## 2022-10-25 NOTE — PROGRESS NOTES
Assessment/Plan:     Yari Chatterjee is here for follow up  Has had trouble with his wife recently after returning to alcohol; he has a h/o alcoholism; determined to not drink anymore; was seen in the ER for benzo withdrawal; he was getting a prescription from the [place] but I agreed to give him a 30 day Rx until he finds a psychiatrist;     Other chronic problems stable  Denies any major complaints  Quality Measures:     BMI Counseling: Body mass index is 28 15 kg/m²  The BMI is above normal  Nutrition recommendations include decreasing portion sizes and encouraging healthy choices of fruits and vegetables  Exercise recommendations include moderate physical activity 150 minutes/week  Rationale for BMI follow-up plan is due to patient being overweight or obese  Return in about 6 months (around 4/25/2023)  No problem-specific Assessment & Plan notes found for this encounter  Diagnoses and all orders for this visit:    Dyslipidemia    Diaz's esophagus with high grade dysplasia    Essential hypertension  -     CBC and differential; Future  -     Comprehensive metabolic panel; Future  -     TSH, 3rd generation with Free T4 reflex; Future  -     Hemoglobin A1C; Future  -     Lipid Panel with Direct LDL reflex; Future    Ulcerative colitis with complication, unspecified location [facility])    Prostate cancer screening  -     PSA, total and free; Future    Coronary artery disease with angina pectoris, unspecified vessel or lesion type, unspecified whether native or transplanted heart (HCC)    Benzodiazepine withdrawal ([address] )  -     clonazePAM (KlonoPIN) 0 5 mg tablet; Take 3 tablets (1 5 mg total) by mouth daily at bedtime          Subjective:      Patient ID: Silvana Klein is a 61 y o  male  Here for follow up        ALLERGIES:  Allergies   Allergen Reactions   • Rosuvastatin Myalgia       CURRENT MEDICATIONS:    Current Outpatient Medications:   •  atorvastatin (LIPITOR) 80 mg tablet, Take 1 tablet (80 mg total) by mouth daily, Disp: 90 tablet, Rfl: 3  •  clonazePAM (KlonoPIN) 0 5 mg tablet, Take 3 tablets (1 5 mg total) by mouth daily at bedtime, Disp: 90 tablet, Rfl: 0  •  neomycin-polymyxin-hydrocortisone (CORTISPORIN) otic solution, Administer 4 drops into the left ear every 6 (six) hours, Disp: 10 mL, Rfl: 0  •  ondansetron (ZOFRAN-ODT) 4 mg disintegrating tablet, Take 1 tablet (4 mg total) by mouth every 6 (six) hours as needed for nausea or vomiting, Disp: 20 tablet, Rfl: 0  •  pantoprazole (PROTONIX) 40 mg tablet, Take 1 tablet (40 mg total) by mouth daily in the early morning, Disp: 90 tablet, Rfl: 3    ACTIVE PROBLEM LIST:  Patient Active Problem List   Diagnosis   • Anxiety and depression   • Diaz's esophagus   • Essential hypertension   • Dyslipidemia   • Obesity (BMI 30 0-34  9)   • Ulcerative colitis without complications (HCC)   • Coronary artery disease with angina pectoris, unspecified vessel or lesion type, unspecified whether native or transplanted heart ([facility] [address] )   • Depression, recurrent ([facility] )   • Hypertension   • GERD (gastroesophageal reflux disease)       PAST MEDICAL HISTORY:  Past Medical History:   Diagnosis Date   • Diaz's esophagus    • Colon polyp    • Coronary artery disease    • Depression    • Diverticulitis    • Ear problems    • GERD (gastroesophageal reflux disease)    • Hemorrhoid    • History of heart artery stent    • Hyperlipidemia    • Hypertension    • Microscopic colitis    • Ulcerative colitis ([facility] )        PAST SURGICAL HISTORY:  Past Surgical History:   Procedure Laterality Date   • ABDOMINAL SURGERY      radio frequency ablation   • BONE GRAFT Left 2018   • CARPAL TUNNEL RELEASE Right    • COLONOSCOPY     • CORONARY ANGIOPLASTY WITH STENT PLACEMENT      x2   • EGD AND COLONOSCOPY     • ESOPHAGOGASTRODUODENOSCOPY N/A 2/15/2018    Procedure: ESOPHAGOGASTRODUODENOSCOPY (EGD);   Surgeon: Benjamin Mishra MD;  [facility];  Service: Gastroenterology   • ESOPHAGOGASTRODUODENOSCOPY N/A 12/10/2018    Procedure: ESOPHAGOGASTRODUODENOSCOPY (EGD); Surgeon: Amauri Flores MD;  [facility];   Service: Gastroenterology   • HAND SURGERY Left    • [place] SHLDR ARTHROSCOP,SURG,W/ROTAT CUFF REPR Left 4/14/2021    Procedure: REPAIR ROTATOR CUFF  ARTHROSCOPIC; POSSIBLE BICEPS TENDONESIS, ACROMIOPLASTY;  Surgeon: Ramona Lopez DO;  [facility];  Service: Orthopedics   • ROTATOR CUFF REPAIR Left    • SHOULDER ARTHROSCOPY Right 3/14/2022    Procedure: ARTHROSCOPY SHOULDER- Right shoulder arthroscopic rotator cuff repair, open subpectoral biceps tenodesis, subsacpular repair and extensive debridement;  Surgeon: Ramona Lopez DO;  [facility];  Service: Orthopedics   • TONSILLECTOMY         FAMILY HISTORY:  Family History   Problem Relation Age of Onset   • Heart disease Father    • Melanoma Father    • Cancer Sister    • Skin cancer Sister    • Skin cancer Mother        SOCIAL HISTORY:  Social History     Socioeconomic History   • Marital status: /Civil Union     Spouse name: Not on file   • Number of children: Not on file   • Years of education: Not on file   • Highest education level: Not on file   Occupational History   • Not on file   Tobacco Use   • Smoking status: Former Smoker     Packs/day: 0 50     Quit date: 1/16/2007     Years since quitting: 15 7   • Smokeless tobacco: Former User     Quit date: 1/16/1998   • Tobacco comment: quit 10 yrs ago   Vaping Use   • Vaping Use: Never used   Substance and Sexual Activity   • Alcohol use: Not Currently     Comment: quit 5 years   • Drug use: Yes     Frequency: 3 0 times per week     Types: Marijuana     Comment: advised not to smoke   • Sexual activity: Not Currently   Other Topics Concern   • Not on file   Social History Narrative   • Not on file     Social Determinants of Health     Financial Resource Strain: Not on file   Food Insecurity: Not on file   Transportation Needs: Not on file Physical Activity: Not on file   Stress: Not on file   Social Connections: Not on file   Intimate Partner Violence: Not on file   Housing Stability: Not on file       Review of Systems   Psychiatric/Behavioral: Positive for dysphoric mood  The patient is nervous/anxious  All other systems reviewed and are negative  Objective:  Vitals:    10/25/22 0957   BP: 116/74   BP Location: Left arm   Patient Position: Sitting   Cuff Size: Adult   Pulse: 80   Resp: 16   Temp: 98 2 °F (36 8 °C)   TempSrc: Tympanic   SpO2: 97%   Weight: 89 kg (196 lb 3 2 oz)   Height: 5' 10" (1 778 m)     Body mass index is 28 15 kg/m²  Physical Exam  Vitals and nursing note reviewed  Constitutional:       Appearance: Normal appearance  HENT:      Head: Normocephalic and atraumatic  Cardiovascular:      Rate and Rhythm: Normal rate and regular rhythm  Heart sounds: Normal heart sounds  Pulmonary:      Effort: Pulmonary effort is normal       Breath sounds: Normal breath sounds  Abdominal:      Palpations: Abdomen is soft  Musculoskeletal:         General: Normal range of motion  Cervical back: Normal range of motion  Right lower leg: No edema  Left lower leg: No edema  Lymphadenopathy:      Cervical: No cervical adenopathy  Skin:     General: Skin is warm and dry  Neurological:      General: No focal deficit present  Mental Status: He is alert and oriented to person, place, and time  Mental status is at baseline     Psychiatric:         Mood and Affect: Mood normal          Behavior: Behavior normal            RESULTS:    Recent Results (from the past 1008 hour(s))   ECG 12 lead    Collection Time: 10/22/22  4:12 PM   Result Value Ref Range    Ventricular Rate 80 BPM    Atrial Rate 80 BPM    NE Interval 178 ms    QRSD Interval 90 ms    QT Interval 350 ms    QTC Interval 403 ms    P Axis 71 degrees    QRS Axis 38 degrees    T Wave Axis 68 degrees       This note was created with voice recognition software  Phonic, grammatical and spelling errors may be present within the note as a result

## 2022-10-26 NOTE — ED PROVIDER NOTES
History  Chief Complaint   Patient presents with   • Withdrawal - Drug     Withdrawal from benzo medication for 48 hours      48-year-old male presenting to the emergency department for evaluation of possible drug withdrawal   Patient states he has been on long-acting benzos, 1 5 mg of Klonopin daily for past several years  Patient follows with the [facility], patient also take SSRIs  Patient takes medications for depression anxiety and PTSD  Patient denies any HI or SI  Patient states that his doctor refilled his SSRI but would not refill the benzos  He states that he took his last dose about 48 hours ago, he is feeling shaky anxious  He also has endorsed some self-medication with alcohol, several beers in the past 24 hours  He has history of alcohol abuse as well but has not had anything to drink prior to this in many months  He denies any history of alcohol withdrawal seizures  He is not currently intoxicated  Prior to Admission Medications   Prescriptions Last Dose Informant Patient Reported?  Taking?   atorvastatin (LIPITOR) 80 mg tablet   No No   Sig: Take 1 tablet (80 mg total) by mouth daily   neomycin-polymyxin-hydrocortisone (CORTISPORIN) otic solution   No No   Sig: Administer 4 drops into the left ear every 6 (six) hours   ondansetron (ZOFRAN-ODT) 4 mg disintegrating tablet  Self No No   Sig: Take 1 tablet (4 mg total) by mouth every 6 (six) hours as needed for nausea or vomiting   pantoprazole (PROTONIX) 40 mg tablet   No No   Sig: Take 1 tablet (40 mg total) by mouth daily in the early morning      Facility-Administered Medications: None       Past Medical History:   Diagnosis Date   • Diaz's esophagus    • Colon polyp    • Coronary artery disease    • Depression    • Diverticulitis    • Ear problems    • GERD (gastroesophageal reflux disease)    • Hemorrhoid    • History of heart artery stent    • Hyperlipidemia    • Hypertension    • Microscopic colitis    • Ul[facility] ([facility] ) Past Surgical History:   Procedure Laterality Date   • ABDOMINAL SURGERY      radio frequency ablation   • BONE GRAFT Left 2018   • CARPAL TUNNEL RELEASE Right    • COLONOSCOPY     • CORONARY ANGIOPLASTY WITH STENT PLACEMENT      x2   • EGD AND COLONOSCOPY     • ESOPHAGOGASTRODUODENOSCOPY N/A 2/15/2018    Procedure: ESOPHAGOGASTRODUODENOSCOPY (EGD); Surgeon: Darlene Swift MD;  Location: [facility];  Service: Gastroenterology   • ESOPHAGOGASTRODUODENOSCOPY N/A 12/10/2018    Procedure: ESOPHAGOGASTRODUODENOSCOPY (EGD); Surgeon: Karlee Mcneill MD;  Location: [facility]; Service: Gastroenterology   • HAND SURGERY Left    • AL SHLDR ARTHROSCOP,SURG,W/ROTAT CUFF REPR Left 4/14/2021    Procedure: REPAIR ROTATOR CUFF  ARTHROSCOPIC; POSSIBLE BICEPS TENDONESIS, ACROMIOPLASTY;  Surgeon: Emeterio Hicks DO;  Location: [facility];  Service: Orthopedics   • ROTATOR CUFF REPAIR Left    • SHOULDER ARTHROSCOPY Right 3/14/2022    Procedure: ARTHROSCOPY SHOULDER- Right shoulder arthroscopic rotator cuff repair, open subpectoral biceps tenodesis, subsacpular repair and extensive debridement;  Surgeon: Emeterio Hicks DO;  Location: [facility];  Service: Orthopedics   • TONSILLECTOMY         Family History   Problem Relation Age of Onset   • Heart disease Father    • Melanoma Father    • Cancer Sister    • Skin cancer Sister    • Skin cancer Mother      I have reviewed and agree with the history as documented      E-Cigarette/Vaping   • E-Cigarette Use Never User      E-Cigarette/Vaping Substances   • Nicotine No    • THC No    • CBD No    • Flavoring No    • Other No    • Unknown No      Social History     Tobacco Use   • Smoking status: Former Smoker     Packs/day: 0 50     Quit date: 1/16/2007     Years since quitting: 15 7   • Smokeless tobacco: Former User     Quit date: 1/16/1998   • Tobacco comment: quit 10 yrs ago   Vaping Use   • Vaping Use: Never used   Substance Use Topics   • Alcohol use: Not Currently Comment: quit 5 years   • Drug use: Yes     Frequency: 3 0 times per week     Types: Marijuana     Comment: advised not to smoke       Review of Systems   Constitutional: Negative for appetite change, chills, fatigue and fever  HENT: Negative for sneezing and sore throat  Eyes: Negative for visual disturbance  Respiratory: Negative for cough, choking, chest tightness, shortness of breath and wheezing  Cardiovascular: Negative for chest pain and palpitations  Gastrointestinal: Negative for abdominal pain, constipation, diarrhea, nausea and vomiting  Genitourinary: Negative for difficulty urinating and dysuria  Neurological: Positive for tremors  Negative for dizziness, weakness, light-headedness, numbness and headaches  Psychiatric/Behavioral: The patient is nervous/anxious  All other systems reviewed and are negative  Physical Exam  Physical Exam  Vitals and nursing note reviewed  Constitutional:       General: He is not in acute distress  Appearance: He is well-developed  He is not diaphoretic  HENT:      Head: Normocephalic and atraumatic  Eyes:      Pupils: Pupils are equal, round, and reactive to light  Neck:      Vascular: No JVD  Trachea: No tracheal deviation  Cardiovascular:      Rate and Rhythm: Normal rate and regular rhythm  Heart sounds: Normal heart sounds  No murmur heard  No friction rub  No gallop  Pulmonary:      Effort: Pulmonary effort is normal  No respiratory distress  Breath sounds: Normal breath sounds  No wheezing or rales  Abdominal:      General: Bowel sounds are normal  There is no distension  Palpations: Abdomen is soft  Tenderness: There is no abdominal tenderness  There is no guarding or rebound  Skin:     General: Skin is warm and dry  Coloration: Skin is not pale  Neurological:      Mental Status: He is alert and oriented to person, place, and time  Cranial Nerves: No cranial nerve deficit  Motor: No abnormal muscle tone  Psychiatric:         Behavior: Behavior normal          Vital Signs  ED Triage Vitals [10/22/22 1608]   Temperature Pulse Respirations Blood Pressure SpO2   98 4 °F (36 9 °C) 95 19 145/82 94 %      Temp Source Heart Rate Source Patient Position - Orthostatic VS BP Location FiO2 (%)   Temporal Monitor Sitting Left arm --      Pain Score       --           Vitals:    10/22/22 1608   BP: 145/82   Pulse: 95   Patient Position - Orthostatic VS: Sitting         Visual Acuity      ED Medications  Medications   clonazePAM (KlonoPIN) tablet 1 5 mg (1 5 mg Oral Given 10/22/22 1701)       Diagnostic Studies  Results Reviewed     None                 No orders to display              Procedures  Procedures         ED Course                                             MDM  Number of Diagnoses or Management Options  Benzodiazepine withdrawal ([facility] )  Diagnosis management comments: 50-year-old male with signs and symptoms of mild benzo withdrawal   Had a lengthy discussion with the patient including discussing the long-term affects of both alcohol and benzodiazepine use  He expresses understanding  We also discussed in length the dangers of combining benzos and alcohol which he fully understands  He states that he wishes not to drink, that he did so only to treat his withdrawal symptoms  I will give him a short prescription for benzos as well as referral to outpatient [facility] resources as well as substance abuse resources  The patient is amenable to the plan  He understands he is welcome and encouraged to return to the emergency department if his symptoms worsen or if he wishes to speak with someone and or be evaluated for inpatient detox        Disposition  Final diagnoses:   Benzodiazepine withdrawal ([facility] )     Time reflects when diagnosis was documented in both MDM as applicable and the Disposition within this note     Time User Action Codes Description Comment    10/22/2022  4:49 PM Karolina Francis Add [[id number]] Benzodiazepine withdrawal [facility])       ED Disposition     ED Disposition   Discharge    Condition   Stable    Date/Time   Sat Oct 22, 2022  4:49 PM    Comment   Jennifer Found discharge to home/self care  Follow-up Information     Follow up With Specialties Details Why Contact Info    [employer]   Call to establish [phone]            Discharge Medication List as of 10/22/2022  4:53 PM      START taking these medications    Details   !! clonazePAM (KlonoPIN) 0 5 mg tablet Take 3 tablets (1 5 mg total) by mouth daily at bedtime for 7 days, Starting Sat 10/22/2022, Until Sat 10/29/2022, Normal       !! - Potential duplicate medications found  Please discuss with provider  CONTINUE these medications which have NOT CHANGED    Details   atorvastatin (LIPITOR) 80 mg tablet Take 1 tablet (80 mg total) by mouth daily, Starting Tue 6/21/2022, Until Mon 9/19/2022, Normal      neomycin-polymyxin-hydrocortisone (CORTISPORIN) otic solution Administer 4 drops into the left ear every 6 (six) hours, Starting Tue 6/21/2022, Normal      ondansetron (ZOFRAN-ODT) 4 mg disintegrating tablet Take 1 tablet (4 mg total) by mouth every 6 (six) hours as needed for nausea or vomiting, Starting Wed 4/7/2021, Normal      pantoprazole (PROTONIX) 40 mg tablet Take 1 tablet (40 mg total) by mouth daily in the early morning, Starting Tue 6/21/2022, Until Mon 9/19/2022, Normal      ciprofloxacin-dexamethasone (CIPRODEX) otic suspension Administer into ears, Starting Wed 6/22/2022, Historical Med      !! clonazePAM (KlonoPIN) 0 5 mg tablet Take 0 5 mg by mouth daily at bedtime , Historical Med       !! - Potential duplicate medications found  Please discuss with provider  No discharge procedures on file      PDMP Review       Value Time User    PDMP Reviewed  Yes 10/25/2022 10:08 AM Celeste Zuniga MD          ED Provider  Electronically Signed by           Leonel Keith MD  10/25/22 8874

## 2022-12-07 DIAGNOSIS — F13.939 BENZODIAZEPINE WITHDRAWAL (HCC): ICD-10-CM

## 2022-12-07 RX ORDER — CLONAZEPAM 0.5 MG/1
1.5 TABLET ORAL
Qty: 90 TABLET | Refills: 0 | Status: SHIPPED | OUTPATIENT
Start: 2022-12-07 | End: 2023-01-06

## 2022-12-07 NOTE — TELEPHONE ENCOUNTER
Patient couldn't get an appt with a psychiatrist due to they would not take his insurance  Patient said that he needs his medication by tomorrow  Patient said he only has one dose left      Patient will getting a new insurance as of 1/1/2023    Patient needs a current list of psychiatrist

## 2022-12-28 ENCOUNTER — TELEPHONE (OUTPATIENT)
Dept: GASTROENTEROLOGY | Facility: CLINIC | Age: 57
End: 2022-12-28

## 2022-12-28 NOTE — TELEPHONE ENCOUNTER
Patients GI provider:  ADE Shaver    Number to return call: [phone]     Reason for call: Pt called and scheduled f/u appt   Pt has [employer] for [place], please obtain authorization number if necessary if pt needs referral      Scheduled procedure/appointment date if applicable: Appt: 1/93/0942

## 2023-01-01 NOTE — DISCHARGE INSTRUCTIONS
Take zofran as prescribed previously and call your primary care doctor for recheck [place] Discharge Summary      Rahel is a 2 day old female twin A infant, delivered at Gestational Age: 37w0d.    SUBJECTIVE:    Feeding: Natural Human Milk and Formula     [place] Screen: pending.    Immunizations:   Most Recent Immunizations   Administered Date(s) Administered   • Hep B, adolescent or pediatric 2023         Hearing Test Machine: Auditory Brainstem Response (Algo) (23 0900)  [place] Hearing Test Results: Pass R, Pass L (23 0900)    TCB Result: 8.5 (23 2330)  TCB Site: Head   Hours of age-Transcutaneous Biliribin: 32 Hrs    Screening complete: Done (23 1649)  Right hand reading %: 98 %  Foot reading %: 99 %  Foot tested: Left  CHD: Normal    Weight change since birth: -6%      OBJECTIVE:    Vitals:   Visit Vitals  Pulse 155   Temp 98.7 °F (37.1 °C) (Axillary)   Resp 52   Ht 20\" (50.8 cm) Comment: Filed from Delivery Summary   Wt 2.615 kg (5 lb 12.2 oz)   HC 33 cm (12.99\") Comment: Filed from Delivery Summary   BMI 10.13 kg/m²         GENERAL:Baby Girl ELMER León is an alert, vigorous female in no acute distress.  No dysmorphic features.  SKIN: Warm, pink, with no lesions. No rash. No significant jaundice.  HEAD: Normocephalic. Fontanelles soft and flat.   EYES: Conjunctivae clear with neither icterus nor subconjunctival hemorrhage.   Red reflex present bilaterally.  EARS: External ears have normal set and shape.  NOSE: Nares patent bilaterally.  THROAT:  Oropharynx clear with no cleft palate.  NECK: Supple without masses.  LUNGS: Clear to auscultation with symmetrical aeration.  No grunting or retractions.  HEART: Regular rate and rhythm.  S1 and S2 are normal. No murmurs. The femoral pulses are normal.  ABDOMEN: Soft, nontender, nondistended.  No mass or hepatosplenomegaly appreciated.  The umbilical cord stump is normal.   GENITALIA: Mario Alberto 1, normal female genitalia  RECTAL: Anus patent.  EXTREMITIES: Moving all 4 extremities. Negative Castillo or Ortolani at  the hips.  Normal hip abduction.  Spine is straight without sacral dimple or hair tuft.  NEUROLOGIC: Normal tone throughout. Normal  reflexes with symmetrical Seth.     Procedures: None    Consults: None       ASSESSMENT: Well 2 day old female infant.  Normal growth and development.    PLAN:  Baby will be discharged home with mom.  Baby's mom has been given discharge instructions.  Outpatient assessment in 2 days. Follow up with Pediatrician at 2 weeks of age.       Signed by: Zoë Whittaker MD   2023

## 2023-01-09 ENCOUNTER — TELEPHONE (OUTPATIENT)
Dept: PSYCHIATRY | Facility: CLINIC | Age: 58
End: 2023-01-09

## 2023-01-09 DIAGNOSIS — F13.939 BENZODIAZEPINE WITHDRAWAL (HCC): ICD-10-CM

## 2023-01-09 RX ORDER — CLONAZEPAM 0.5 MG/1
1.5 TABLET ORAL
Qty: 90 TABLET | Refills: 0 | Status: SHIPPED | OUTPATIENT
Start: 2023-01-09 | End: 2023-02-08

## 2023-01-11 ENCOUNTER — TELEPHONE (OUTPATIENT)
Dept: GASTROENTEROLOGY | Facility: CLINIC | Age: 58
End: 2023-01-11

## 2023-01-12 ENCOUNTER — HOSPITAL ENCOUNTER (EMERGENCY)
Facility: HOSPITAL | Age: 58
Discharge: HOME | End: 2023-01-12
Attending: EMERGENCY MEDICINE

## 2023-01-12 VITALS
TEMPERATURE: 97.9 F | DIASTOLIC BLOOD PRESSURE: 82 MMHG | HEART RATE: 56 BPM | RESPIRATION RATE: 20 BRPM | OXYGEN SATURATION: 98 % | SYSTOLIC BLOOD PRESSURE: 180 MMHG

## 2023-01-12 DIAGNOSIS — R11.2 NAUSEA AND VOMITING: ICD-10-CM

## 2023-01-12 DIAGNOSIS — R11.15 CYCLICAL VOMITING SYNDROME: Primary | ICD-10-CM

## 2023-01-12 DIAGNOSIS — R10.9 ABDOMINAL PAIN: ICD-10-CM

## 2023-01-12 LAB
2HR DELTA HS TROPONIN: >0 NG/L
ALBUMIN SERPL BCP-MCNC: 4.5 G/DL (ref 3.5–5)
ALP SERPL-CCNC: 79 U/L (ref 46–116)
ALT SERPL W P-5'-P-CCNC: 23 U/L (ref 12–78)
ANION GAP SERPL CALCULATED.3IONS-SCNC: 13 MMOL/L (ref 4–13)
AST SERPL W P-5'-P-CCNC: 13 U/L (ref 5–45)
ATRIAL RATE: 54 BPM
BASOPHILS # BLD AUTO: 0.03 THOUSANDS/ÂΜL (ref 0–0.1)
BASOPHILS NFR BLD AUTO: 0 % (ref 0–1)
BILIRUB SERPL-MCNC: 0.39 MG/DL (ref 0.2–1)
BUN SERPL-MCNC: 15 MG/DL (ref 5–25)
CALCIUM SERPL-MCNC: 9.4 MG/DL (ref 8.3–10.1)
CARDIAC TROPONIN I PNL SERPL HS: 2 NG/L
CARDIAC TROPONIN I PNL SERPL HS: <2 NG/L
CHLORIDE SERPL-SCNC: 103 MMOL/L (ref 96–108)
CO2 SERPL-SCNC: 24 MMOL/L (ref 21–32)
CREAT SERPL-MCNC: 1.18 MG/DL (ref 0.6–1.3)
EOSINOPHIL # BLD AUTO: 0.01 THOUSAND/ÂΜL (ref 0–0.61)
EOSINOPHIL NFR BLD AUTO: 0 % (ref 0–6)
ERYTHROCYTE [DISTWIDTH] IN BLOOD BY AUTOMATED COUNT: 13.1 % (ref 11.6–15.1)
FLUAV RNA RESP QL NAA+PROBE: NEGATIVE
FLUBV RNA RESP QL NAA+PROBE: NEGATIVE
GFR SERPL CREATININE-BSD FRML MDRD: 67 ML/MIN/1.73SQ M
GLUCOSE SERPL-MCNC: 149 MG/DL (ref 65–140)
HCT VFR BLD AUTO: 45.2 % (ref 36.5–49.3)
HGB BLD-MCNC: 14.8 G/DL (ref 12–17)
IMM GRANULOCYTES # BLD AUTO: 0.04 THOUSAND/UL (ref 0–0.2)
IMM GRANULOCYTES NFR BLD AUTO: 0 % (ref 0–2)
LIPASE SERPL-CCNC: 91 U/L (ref 73–393)
LYMPHOCYTES # BLD AUTO: 0.79 THOUSANDS/ÂΜL (ref 0.6–4.47)
LYMPHOCYTES NFR BLD AUTO: 5 % (ref 14–44)
MCH RBC QN AUTO: 29 PG (ref 26.8–34.3)
MCHC RBC AUTO-ENTMCNC: 32.7 G/DL (ref 31.4–37.4)
MCV RBC AUTO: 89 FL (ref 82–98)
MONOCYTES # BLD AUTO: 0.38 THOUSAND/ÂΜL (ref 0.17–1.22)
MONOCYTES NFR BLD AUTO: 3 % (ref 4–12)
NEUTROPHILS # BLD AUTO: 14.05 THOUSANDS/ÂΜL (ref 1.85–7.62)
NEUTS SEG NFR BLD AUTO: 92 % (ref 43–75)
NRBC BLD AUTO-RTO: 0 /100 WBCS
P AXIS: 51 DEGREES
PLATELET # BLD AUTO: 326 THOUSANDS/UL (ref 149–390)
PMV BLD AUTO: 9.2 FL (ref 8.9–12.7)
POTASSIUM SERPL-SCNC: 4.3 MMOL/L (ref 3.5–5.3)
PR INTERVAL: 178 MS
PROT SERPL-MCNC: 8.1 G/DL (ref 6.4–8.4)
QRS AXIS: -25 DEGREES
QRSD INTERVAL: 84 MS
QT INTERVAL: 426 MS
QTC INTERVAL: 403 MS
RBC # BLD AUTO: 5.11 MILLION/UL (ref 3.88–5.62)
RSV RNA RESP QL NAA+PROBE: NEGATIVE
SARS-COV-2 RNA RESP QL NAA+PROBE: NEGATIVE
SODIUM SERPL-SCNC: 140 MMOL/L (ref 135–147)
T WAVE AXIS: -4 DEGREES
VENTRICULAR RATE: 54 BPM
WBC # BLD AUTO: 15.3 THOUSAND/UL (ref 4.31–10.16)

## 2023-01-12 RX ORDER — HYDROMORPHONE HCL/PF 1 MG/ML
0.5 SYRINGE (ML) INJECTION ONCE
Status: COMPLETED | OUTPATIENT
Start: 2023-01-12 | End: 2023-01-12

## 2023-01-12 RX ORDER — MORPHINE SULFATE 4 MG/ML
4 INJECTION, SOLUTION INTRAMUSCULAR; INTRAVENOUS ONCE
Status: COMPLETED | OUTPATIENT
Start: 2023-01-12 | End: 2023-01-12

## 2023-01-12 RX ORDER — ONDANSETRON 4 MG/1
4 TABLET, ORALLY DISINTEGRATING ORAL ONCE
Status: DISCONTINUED | OUTPATIENT
Start: 2023-01-12 | End: 2023-01-12

## 2023-01-12 RX ORDER — ONDANSETRON 2 MG/ML
4 INJECTION INTRAMUSCULAR; INTRAVENOUS ONCE
Status: COMPLETED | OUTPATIENT
Start: 2023-01-12 | End: 2023-01-12

## 2023-01-12 RX ADMIN — HYDROMORPHONE HYDROCHLORIDE 0.5 MG: 1 INJECTION, SOLUTION INTRAMUSCULAR; INTRAVENOUS; SUBCUTANEOUS at 16:44

## 2023-01-12 RX ADMIN — ONDANSETRON 4 MG: 2 INJECTION INTRAMUSCULAR; INTRAVENOUS at 15:49

## 2023-01-12 RX ADMIN — MORPHINE SULFATE 4 MG: 4 INJECTION INTRAVENOUS at 16:20

## 2023-01-12 RX ADMIN — SODIUM CHLORIDE 1000 ML: 0.9 INJECTION, SOLUTION INTRAVENOUS at 15:49

## 2023-01-12 NOTE — ED PROVIDER NOTES
History  Chief Complaint   Patient presents with   • Vomiting     Pt reports vomiting since 5 am, pt states he has SOB, abd pain, and started with chest pain on the drive in      [id number] male with history of reported Daiz's esophagus and numerous polyps and reported history of ulcerative colitis but is not taking anything for, who reports that he also has a history of having an unknown diagnosed etiology of a cyclical vomiting syndrome that he reports that he has had for decades that he gets this 100s of times he reports that he will get a diffuse cramping abdominal pain and then just get waves of nausea vomiting and just repeatedly vomit and then cannot keep anything down for a day or so  Sometimes he will vomit so much so aggressively it will then sometimes turn into hematemesis  From this he has gotten reflux and Diaz's esophagus and he is got numerous EGDs and colonoscopies and he follows with GI  He says he has a upcoming appointment with GI in just a week or 2  He said he has had numerous CAT scans and wants to avoid any further radiation because he is convinced that many of his abdominal issues are related to radiation exposure from his time in the [employer] in the 90s  He says this feels like all of his other episodes of cyclical vomiting syndrome  He states he usually comes in and gets IV fluids, IV Zofran and IV pain medication and it usually helps eliminate and resolve his symptoms  Depending on how severe his episode is sometimes he has to stay but if he catches it early he can usually get hydrated and stop the vomiting and go        History provided by:  Patient  Vomiting  Severity:  Moderate  Duration:  12 hours  Timing:  Intermittent  Quality:  Stomach contents  Able to tolerate:  Liquids  Progression:  Unchanged  Chronicity:  Recurrent  Recent urination:  Decreased  Context: not post-tussive    Relieved by:  Nothing  Worsened by:  Nothing  Ineffective treatments:  Antiemetics (has zofran)  Associated symptoms: abdominal pain (diffuse cramps) and sore throat (from vomiting)    Associated symptoms: no fever, no headaches and no myalgias    Risk factors: no diabetes        Prior to Admission Medications   Prescriptions Last Dose Informant Patient Reported? Taking?   atorvastatin (LIPITOR) 80 mg tablet   No No   Sig: Take 1 tablet (80 mg total) by mouth daily   clonazePAM (KlonoPIN) 0 5 mg tablet   No No   Sig: Take 3 tablets (1 5 mg total) by mouth daily at bedtime   neomycin-polymyxin-hydrocortisone (CORTISPORIN) otic solution   No No   Sig: Administer 4 drops into the left ear every 6 (six) hours   ondansetron (ZOFRAN-ODT) 4 mg disintegrating tablet   No No   Sig: Take 1 tablet (4 mg total) by mouth every 6 (six) hours as needed for nausea or vomiting   pantoprazole (PROTONIX) 40 mg tablet   No No   Sig: Take 1 tablet (40 mg total) by mouth daily in the early morning      Facility-Administered Medications: None       Past Medical History:   Diagnosis Date   • Diaz's esophagus    • Colon polyp    • Coronary artery disease    • Depression    • Diverticulitis    • Ear problems    • GERD (gastroesophageal reflux disease)    • Hemorrhoid    • History of heart artery stent    • Hyperlipidemia    • Hypertension    • Microscopic colitis    • Ulcerative colitis ([facility] [address] )        Past Surgical History:   Procedure Laterality Date   • ABDOMINAL SURGERY      radio frequency ablation   • BONE GRAFT Left 2018   • CARPAL TUNNEL RELEASE Right    • COLONOSCOPY     • CORONARY ANGIOPLASTY WITH STENT PLACEMENT      x2   • EGD AND COLONOSCOPY     • ESOPHAGOGASTRODUODENOSCOPY N/A 2/15/2018    Procedure: ESOPHAGOGASTRODUODENOSCOPY (EGD); Surgeon: Andrés Oakley MD;  Location: [facility];  Service: Gastroenterology   • ESOPHAGOGASTRODUODENOSCOPY N/A 12/10/2018    Procedure: ESOPHAGOGASTRODUODENOSCOPY (EGD); Surgeon: Eric Dockery MD;  Location: [facility];   Service: Gastroenterology   • HAND SURGERY Left • NM SURGICAL ARTHROSCOPY SHOULDER W/ROTATOR CUFF RPR Left 2021    Procedure: REPAIR ROTATOR CUFF  ARTHROSCOPIC; POSSIBLE BICEPS TENDONESIS, ACROMIOPLASTY;  Surgeon: Lily Burnham DO;  Location: [facility];  Service: Orthopedics   • ROTATOR CUFF REPAIR Left    • SHOULDER ARTHROSCOPY Right 3/14/2022    Procedure: ARTHROSCOPY SHOULDER- Right shoulder arthroscopic rotator cuff repair, open subpectoral biceps tenodesis, subsacpular repair and extensive debridement;  Surgeon: Lily Burnham DO;  [facility];  Service: Orthopedics   • TONSILLECTOMY         Family History   Problem Relation Age of Onset   • Heart disease Father    • Melanoma Father    • Cancer Sister    • Skin cancer Sister    • Skin cancer Mother      I have reviewed and agree with the history as documented  E-Cigarette/Vaping   • E-Cigarette Use Never User      E-Cigarette/Vaping Substances   • Nicotine No    • THC No    • CBD No    • Flavoring No    • Other No    • Unknown No      Social History     Tobacco Use   • Smoking status: Former     Packs/day: 0 50     Types: Cigarettes     Quit date: 2007     Years since quittin 0   • Smokeless tobacco: Former     Quit date: 1998   • Tobacco comments:     quit 10 yrs ago   Vaping Use   • Vaping Use: Never used   Substance Use Topics   • Alcohol use: Not Currently     Comment: quit 5 years   • Drug use: Yes     Frequency: 3 0 times per week     Types: Marijuana     Comment: advised not to smoke       Review of Systems   Constitutional: Negative for fever  HENT: Positive for sore throat (from vomiting)  Gastrointestinal: Positive for abdominal pain (diffuse cramps) and vomiting  Musculoskeletal: Negative for myalgias  Neurological: Negative for headaches  All other systems reviewed and are negative  Physical Exam  Physical Exam  Vitals and nursing note reviewed  Constitutional:       General: He is not in acute distress       Appearance: He is well-developed  He is not ill-appearing or diaphoretic  HENT:      Head: Normocephalic and atraumatic  Nose: Nose normal    Eyes:      Conjunctiva/sclera: Conjunctivae normal    Cardiovascular:      Rate and Rhythm: Regular rhythm  Bradycardia present  Heart sounds: Normal heart sounds  Pulmonary:      Effort: Pulmonary effort is normal  No respiratory distress  Breath sounds: Normal breath sounds  Abdominal:      General: There is no distension  Palpations: Abdomen is soft  Tenderness: There is no abdominal tenderness  Musculoskeletal:         General: Normal range of motion  Cervical back: Normal range of motion and neck supple  Skin:     General: Skin is warm and dry  Neurological:      General: No focal deficit present  Mental Status: He is alert and oriented to person, place, and time  Mental status is at baseline  Cranial Nerves: No cranial nerve deficit  Motor: No weakness           Vital Signs  ED Triage Vitals   Temperature Pulse Respirations Blood Pressure SpO2   01/12/23 1425 01/12/23 1425 01/12/23 1425 01/12/23 1425 01/12/23 1425   97 9 °F (36 6 °C) 56 20 (!) 180/82 98 %      Temp src Heart Rate Source Patient Position - Orthostatic VS BP Location FiO2 (%)   -- -- -- -- --             Pain Score       01/12/23 1620       10 - Worst Possible Pain           Vitals:    01/12/23 1425   BP: (!) 180/82   Pulse: 56         Visual Acuity      ED Medications  Medications   ondansetron (ZOFRAN) injection 4 mg (4 mg Intravenous Given 1/12/23 1549)   sodium chloride 0 9 % bolus 1,000 mL (0 mL Intravenous Stopped 1/12/23 1712)   morphine injection 4 mg (4 mg Intravenous Given 1/12/23 1620)   HYDROmorphone (DILAUDID) injection 0 5 mg (0 5 mg Intravenous Given 1/12/23 1644)       Diagnostic Studies  Results Reviewed     Procedure Component Value Units Date/Time    HS Troponin I 2hr [[id number]]  (Normal) Collected: 01/12/23 1622    Lab Status: Final result Specimen: Blood from Arm, Right Updated: 01/12/23 1653     hs TnI 2hr 2 ng/L      Delta 2hr hsTnI >0 ng/L     CBC and differential [[id number]]  (Abnormal) Collected: 01/12/23 1435    Lab Status: Final result Specimen: Blood from Arm, Right Updated: 01/12/23 1605     WBC 15 30 Thousand/uL      RBC 5 11 Million/uL      Hemoglobin 14 8 g/dL      Hematocrit 45 2 %      MCV 89 fL      MCH 29 0 pg      MCHC 32 7 g/dL      RDW 13 1 %      MPV 9 2 fL      Platelets 323 Thousands/uL      nRBC 0 /100 WBCs      Neutrophils Relative 92 %      Immat GRANS % 0 %      Lymphocytes Relative 5 %      Monocytes Relative 3 %      Eosinophils Relative 0 %      Basophils Relative 0 %      Neutrophils Absolute 14 05 Thousands/µL      Immature Grans Absolute 0 04 Thousand/uL      Lymphocytes Absolute 0 79 Thousands/µL      Monocytes Absolute 0 38 Thousand/µL      Eosinophils Absolute 0 01 Thousand/µL      Basophils Absolute 0 03 Thousands/µL     Narrative: This is an appended report  These results have been appended to a previously verified report  FLU/RSV/COVID - if FLU/RSV clinically relevant [[id number]]  (Normal) Collected: 01/12/23 1435    Lab Status: Final result Specimen: Nares from Nose Updated: 01/12/23 1551     SARS-CoV-2 Negative     INFLUENZA A PCR Negative     INFLUENZA B PCR Negative     RSV PCR Negative    Narrative:      FOR PEDIATRIC PATIENTS - copy/paste COVID Guidelines URL to browser: [email or web address]    SARS-CoV-2 assay is a Nucleic Acid Amplification assay intended for the  qualitative detection of nucleic acid from SARS-CoV-2 in nasopharyngeal  swabs  Results are for the presumptive identification of SARS-CoV-2 RNA  Positive results are indicative of infection with SARS-CoV-2, the virus  causing COVID-19, but do not rule out bacterial infection or co-infection  with other viruses   Laboratories within the [place] and its  territories are required to report all positive results to the appropriate  public health authorities  Negative results do not preclude SARS-CoV-2  infection and should not be used as the sole basis for treatment or other  patient management decisions  Negative results must be combined with  clinical observations, patient history, and epidemiological information  This test has not been FDA cleared or approved  This test has been authorized by FDA under an Emergency Use Authorization  (EUA)  This test is only authorized for the duration of time the  declaration that circumstances exist justifying the authorization of the  emergency use of an in vitro diagnostic tests for detection of SARS-CoV-2  virus and/or diagnosis of COVID-19 infection under section 564(b)(1) of  the Act, 21 U  S C  573AVO-0(Q)(9), unless the authorization is terminated  or revoked sooner  The test has been validated but independent review by FDA  and CLIA is pending  Test performed using [employer] GeneXpert: This RT-PCR assay targets N2,  a region unique to SARS-CoV-2  A conserved region in the E-gene was chosen  for pan-Sarbecovirus detection which includes SARS-CoV-2  According to CMS-2020-01-R, this platform meets the definition of high-throughput technology      HS Troponin 0hr (reflex protocol) [[id number]]  (Normal) Collected: 01/12/23 1435    Lab Status: Final result Specimen: Blood from Arm, Right Updated: 01/12/23 1514     hs TnI 0hr <2 ng/L     Lipase [[id number]]  (Normal) Collected: 01/12/23 1435    Lab Status: Final result Specimen: Blood from Arm, Right Updated: 01/12/23 1506     Lipase 91 u/L     Comprehensive metabolic panel [[id number]]  (Abnormal) Collected: 01/12/23 1435    Lab Status: Final result Specimen: Blood from Arm, Right Updated: 01/12/23 1506     Sodium 140 mmol/L      Potassium 4 3 mmol/L      Chloride 103 mmol/L      CO2 24 mmol/L      ANION GAP 13 mmol/L      BUN 15 mg/dL      Creatinine 1 18 mg/dL      Glucose 149 mg/dL Calcium 9 4 mg/dL      AST 13 U/L      ALT 23 U/L      Alkaline Phosphatase 79 U/L      Total Protein 8 1 g/dL      Albumin 4 5 g/dL      Total Bilirubin 0 39 mg/dL      eGFR 67 ml/min/1 73sq m     Narrative:      [place] guidelines for Chronic Kidney Disease (CKD):   •  Stage 1 with normal or high GFR (GFR > 90 mL/min/1 73 square meters)  •  Stage 2 Mild CKD (GFR = 60-89 mL/min/1 73 square meters)  •  Stage 3A Moderate CKD (GFR = 45-59 mL/min/1 73 square meters)  •  Stage 3B Moderate CKD (GFR = 30-44 mL/min/1 73 square meters)  •  Stage 4 Severe CKD (GFR = 15-29 mL/min/1 73 square meters)  •  Stage 5 End Stage CKD (GFR <15 mL/min/1 73 square meters)  Note: GFR calculation is accurate only with a steady state creatinine                 No orders to display              Procedures  ECG 12 Lead Documentation Only    Date/Time: 1/12/2023 9:54 PM  Performed by: Silvia Cole MD  Authorized by: Silvia Cole MD     Indications / Diagnosis:  Epigastric pain  Rate:     ECG rate:  54    ECG rate assessment: bradycardic    Rhythm:     Rhythm: sinus bradycardia    Other findings:     Other findings: LVH               ED Course  ED Course as of 01/12/23 1735   Thu Jan 12, 2023   1733 Patient reports that he has had cyclical vomiting syndrome in the past reports that its not from marijuana because he had this before he started smoking marijuana  Patient has had numerous scopes and colonoscopies  Patient has been admitted for this in the past   Patient attributes this to radiation exposure in the [employer] [facility] [employer] in the 90s  Patient does not want any more unnecessary radiation or CAT scans and feel like this is his normal usual presentation so is declining a CAT scan  He is feeling better with the fluids Zofran and pain medicine    Reviewed his prior records and has been here numerous times for similar and at times when he has more severe presentations he is sometimes admitted especially when he has GI bleed associated with it  He had no associated blood with vomiting at this time  Has only had symptoms for less than 12 hours and was improved and has not had any vomiting while here  Had some chest pain with vomiting but has 2 negative troponins and is likely from esophagitis from vomiting  SBIRT 20yo+    Flowsheet Row Most Recent Value   SBIRT (25 yo +)    In order to provide better care to our patients, we are screening all of our patients for alcohol and drug use  Would it be okay to ask you these screening questions? Yes Filed at: 01/12/2023 1552   Initial Alcohol Screen: US AUDIT-C     1  How often do you have a drink containing alcohol? 1 Filed at: 01/12/2023 1552   2  How many drinks containing alcohol do you have on a typical day you are drinking? 0 Filed at: 01/12/2023 1552   3a  Male UNDER 65: How often do you have five or more drinks on one occasion? 0 Filed at: 01/12/2023 1552   Audit-C Score 1 Filed at: 01/12/2023 1552   HERMINIA: How many times in the past year have you    Used an illegal drug or used a prescription medication for non-medical reasons? Never Filed at: 01/12/2023 1552                    Medical Decision Making  Patient with complaint of recurrent episode of nausea vomiting with diffuse abdominal pain  He has diffuse crampy abdominal pain  He has no history of surgery on his abdomen  Has been seen multiple times and imaged multiple times for this in the past   Has had GI bleeds within the past but is denying complaints with this this time  He had symptomatic resolution and improvement with parenteral medications and fluids  He is saying he does not have worsening of abdominal pain to the point that he needs CT scan and does not want one at this time because he does not want additional radiation  His abdomen does not have any focal area of abdominal tenderness and he does not have any fever    He has a mildly elevated white blood cell count which could be from dehydration and vomiting  He complained of some chest discomfort but he has troponins which are negative after 2 delta troponins  He does have history of having stents in the past but epigastric and chest discomfort likely from esophagitis from repetitive nausea vomiting  Cyclical vomiting syndrome: acute illness or injury  Nausea and vomiting: acute illness or injury  Amount and/or Complexity of Data Reviewed  Labs: ordered  Risk  Prescription drug management  Disposition  Final diagnoses:   Cyclical vomiting syndrome   Nausea and vomiting   Abdominal pain     Time reflects when diagnosis was documented in both MDM as applicable and the Disposition within this note     Time User Action Codes Description Comment    1/12/2023  5:32 PM Selestine Harini Add [X13 75] Cyclical vomiting syndrome     1/12/2023  5:32 PM [address], [address] [R11 2] Nausea and vomiting     1/12/2023  5:32 PM Clara RAMOS Add [R10 9] Abdominal pain       ED Disposition     ED Disposition   Discharge    Condition   Stable    Date/Time   Thu Jan 12, 2023  5:32 PM    Comment   Sunny Grossman discharge to home/self care  Follow-up Information     Follow up With Specialties Details Why Contact Info Additional Vania Meek MD Internal Medicine Go to  If symptoms worsen [address]  [address]  [facility] [address] [facility] Gastroenterology Call in 1 day For follow-up appointment  [address] [phone]  [address] [facility], [address] [facility] [place], [place], [place], [address]           Patient's Medications   Discharge Prescriptions    No medications on file       No discharge procedures on file      PDMP Review       Value Time User    PDMP Reviewed  Yes 1/9/2023  1:56 PM Laura Narayan Gayatri Mercado MD          ED Provider  Electronically Signed by           Camacho Gu MD  01/12/23 7468

## 2023-02-01 ENCOUNTER — OFFICE VISIT (OUTPATIENT)
Dept: INTERNAL MEDICINE CLINIC | Facility: CLINIC | Age: 58
End: 2023-02-01

## 2023-02-01 VITALS
HEIGHT: 70 IN | OXYGEN SATURATION: 96 % | HEART RATE: 85 BPM | RESPIRATION RATE: 16 BRPM | SYSTOLIC BLOOD PRESSURE: 118 MMHG | BODY MASS INDEX: 30.52 KG/M2 | DIASTOLIC BLOOD PRESSURE: 72 MMHG | WEIGHT: 213.2 LBS

## 2023-02-01 DIAGNOSIS — H11.32 CONJUNCTIVAL HEMORRHAGE, LEFT EYE: ICD-10-CM

## 2023-02-01 DIAGNOSIS — K51.919 ULCERATIVE COLITIS WITH COMPLICATION, UNSPECIFIED LOCATION (HCC): ICD-10-CM

## 2023-02-01 DIAGNOSIS — F33.42 RECURRENT MAJOR DEPRESSIVE DISORDER, IN FULL REMISSION (HCC): ICD-10-CM

## 2023-02-01 DIAGNOSIS — N40.1 BENIGN PROSTATIC HYPERPLASIA WITH LOWER URINARY TRACT SYMPTOMS, SYMPTOM DETAILS UNSPECIFIED: ICD-10-CM

## 2023-02-01 DIAGNOSIS — E78.5 DYSLIPIDEMIA: ICD-10-CM

## 2023-02-01 DIAGNOSIS — Z00.00 MEDICARE ANNUAL WELLNESS VISIT, SUBSEQUENT: Primary | ICD-10-CM

## 2023-02-01 DIAGNOSIS — I10 ESSENTIAL HYPERTENSION: ICD-10-CM

## 2023-02-01 DIAGNOSIS — I25.119 CORONARY ARTERY DISEASE WITH ANGINA PECTORIS, UNSPECIFIED VESSEL OR LESION TYPE, UNSPECIFIED WHETHER NATIVE OR TRANSPLANTED HEART (HCC): ICD-10-CM

## 2023-02-01 DIAGNOSIS — Z12.5 PROSTATE CANCER SCREENING: ICD-10-CM

## 2023-02-01 NOTE — PATIENT INSTRUCTIONS
Medicare Preventive Visit Patient Instructions  Thank you for completing your Welcome to Medicare Visit or Medicare Annual Wellness Visit today  Your next wellness visit will be due in one year (2/2/2024)  The screening/preventive services that you may require over the next 5-10 years are detailed below  Some tests may not apply to you based off risk factors and/or age  Screening tests ordered at today's visit but not completed yet may show as past due  Also, please note that scanned in results may not display below  Preventive Screenings:  Service Recommendations Previous Testing/Comments   Colorectal Cancer Screening  · Colonoscopy    · Fecal Occult Blood Test (FOBT)/Fecal Immunochemical Test (FIT)  · Fecal DNA/Cologuard Test  · Flexible Sigmoidoscopy Age: 39-70 years old   Colonoscopy: every 10 years (May be performed more frequently if at higher risk)  OR  FOBT/FIT: every 1 year  OR  Cologuard: every 3 years  OR  Sigmoidoscopy: every 5 years  Screening may be recommended earlier than age 39 if at higher risk for colorectal cancer  Also, an individualized decision between you and your healthcare provider will decide whether screening between the ages of 74-80 would be appropriate   Colonoscopy: 10/01/2019  FOBT/FIT: Not on file  Cologuard: Not on file  Sigmoidoscopy: Not on file    Screening Current     Prostate Cancer Screening Individualized decision between patient and health care provider in men between ages of 53-78   Medicare will cover every 12 months beginning on the day after your 50th birthday PSA: 1 1 ng/mL     Screening Current     Hepatitis C Screening Once for adults born between 1945 and 1965  More frequently in patients at high risk for Hepatitis C Hep C Antibody: 06/07/2022    Screening Current   Diabetes Screening 1-2 times per year if you're at risk for diabetes or have pre-diabetes Fasting glucose: 95 mg/dL (6/7/2022)  A1C: 5 8 % (6/7/2022)  Screening Current   Cholesterol Screening Once every 5 years if you don't have a lipid disorder  May order more often based on risk factors  Lipid panel: 06/07/2022  Screening Current      Other Preventive Screenings Covered by Medicare:  1  Abdominal Aortic Aneurysm (AAA) Screening: covered once if your at risk  You're considered to be at risk if you have a family history of AAA or a male between the age of 73-68 who smoking at least 100 cigarettes in your lifetime  2  Lung Cancer Screening: covers low dose CT scan once per year if you meet all of the following conditions: (1) Age 50-69; (2) No signs or symptoms of lung cancer; (3) Current smoker or have quit smoking within the last 15 years; (4) You have a tobacco smoking history of at least 20 pack years (packs per day x number of years you smoked); (5) You get a written order from a healthcare provider  3  Glaucoma Screening: covered annually if you're considered high risk: (1) You have diabetes OR (2) Family history of glaucoma OR (3)  aged 48 and older OR (3)  American aged 72 and older  3  Osteoporosis Screening: covered every 2 years if you meet one of the following conditions: (1) Have a vertebral abnormality; (2) On glucocorticoid therapy for more than 3 months; (3) Have primary hyperparathyroidism; (4) On osteoporosis medications and need to assess response to drug therapy  5  HIV Screening: covered annually if you're between the age of 12-76  Also covered annually if you are younger than 13 and older than 72 with risk factors for HIV infection  For pregnant patients, it is covered up to 3 times per pregnancy      Immunizations:  Immunization Recommendations   Influenza Vaccine Annual influenza vaccination during flu season is recommended for all persons aged >= 6 months who do not have contraindications   Pneumococcal Vaccine   * Pneumococcal conjugate vaccine = PCV13 (Prevnar 13), PCV15 (Vaxneuvance), PCV20 (Prevnar 20)  * Pneumococcal polysaccharide vaccine = PPSV23 (Pneumovax) Adults 2364 years old: 1-3 doses may be recommended based on certain risk factors  Adults 72 years old: 1-2 doses may be recommended based off what pneumonia vaccine you previously received   Hepatitis B Vaccine 3 dose series if at intermediate or high risk (ex: diabetes, end stage renal disease, liver disease)   Tetanus (Td) Vaccine - COST NOT COVERED BY MEDICARE PART B Following completion of primary series, a booster dose should be given every 10 years to maintain immunity against tetanus  Td may also be given as tetanus wound prophylaxis  Tdap Vaccine - COST NOT COVERED BY MEDICARE PART B Recommended at least once for all adults  For pregnant patients, recommended with each pregnancy  Shingles Vaccine (Shingrix) - COST NOT COVERED BY MEDICARE PART B  2 shot series recommended in those aged 48 and above     Health Maintenance Due:      Topic Date Due   • Colorectal Cancer Screening  10/01/2024   • HIV Screening  Completed   • Hepatitis C Screening  Completed     Immunizations Due:      Topic Date Due   • COVID-19 Vaccine (4 - Booster for Landis Peter series) 11/02/2021     Advance Directives   What are advance directives? Advance directives are legal documents that state your wishes and plans for medical care  These plans are made ahead of time in case you lose your ability to make decisions for yourself  Advance directives can apply to any medical decision, such as the treatments you want, and if you want to donate organs  What are the types of advance directives? There are many types of advance directives, and each state has rules about how to use them  You may choose a combination of any of the following:  · Living will: This is a written record of the treatment you want  You can also choose which treatments you do not want, which to limit, and which to stop at a certain time  This includes surgery, medicine, IV fluid, and tube feedings     · Durable power of  for healthcare [facility]): This is a written record that states who you want to make healthcare choices for you when you are unable to make them for yourself  This person, called a proxy, is usually a family member or a friend  You may choose more than 1 proxy  · Do not resuscitate (DNR) order:  A DNR order is used in case your heart stops beating or you stop breathing  It is a request not to have certain forms of treatment, such as CPR  A DNR order may be included in other types of advance directives  · Medical directive: This covers the care that you want if you are in a coma, near death, or unable to make decisions for yourself  You can list the treatments you want for each condition  Treatment may include pain medicine, surgery, blood transfusions, dialysis, IV or tube feedings, and a ventilator (breathing machine)  · Values history: This document has questions about your views, beliefs, and how you feel and think about life  This information can help others choose the care that you would choose  Why are advance directives important? An advance directive helps you control your care  Although spoken wishes may be used, it is better to have your wishes written down  Spoken wishes can be misunderstood, or not followed  Treatments may be given even if you do not want them  An advance directive may make it easier for your family to make difficult choices about your care  Weight Management   Why it is important to manage your weight:  Being overweight increases your risk of health conditions such as heart disease, high blood pressure, type 2 diabetes, and certain types of cancer  It can also increase your risk for osteoarthritis, sleep apnea, and other respiratory problems  Aim for a slow, steady weight loss  Even a small amount of weight loss can lower your risk of health problems  How to lose weight safely:  A safe and healthy way to lose weight is to eat fewer calories and get regular exercise   You can lose up about 1 pound a week by decreasing the number of calories you eat by 500 calories each day  Healthy meal plan for weight management:  A healthy meal plan includes a variety of foods, contains fewer calories, and helps you stay healthy  A healthy meal plan includes the following:  · Eat whole-grain foods more often  A healthy meal plan should contain fiber  Fiber is the part of grains, fruits, and vegetables that is not broken down by your body  Whole-grain foods are healthy and provide extra fiber in your diet  Some examples of whole-grain foods are whole-wheat breads and pastas, oatmeal, brown rice, and bulgur  · Eat a variety of vegetables every day  Include dark, leafy greens such as spinach, kale, gio greens, and mustard greens  Eat yellow and orange vegetables such as carrots, sweet potatoes, and winter squash  · Eat a variety of fruits every day  Choose fresh or canned fruit (canned in its own juice or light syrup) instead of juice  Fruit juice has very little or no fiber  · Eat low-fat dairy foods  Drink fat-free (skim) milk or 1% milk  Eat fat-free yogurt and low-fat cottage cheese  Try low-fat cheeses such as mozzarella and other reduced-fat cheeses  · Choose meat and other protein foods that are low in fat  Choose beans or other legumes such as split peas or lentils  Choose fish, skinless poultry (chicken or turkey), or lean cuts of red meat (beef or pork)  Before you cook meat or poultry, cut off any visible fat  · Use less fat and oil  Try baking foods instead of frying them  Add less fat, such as margarine, sour cream, regular salad dressing and mayonnaise to foods  Eat fewer high-fat foods  Some examples of high-fat foods include french fries, doughnuts, ice cream, and cakes  · Eat fewer sweets  Limit foods and drinks that are high in sugar  This includes candy, cookies, regular soda, and sweetened drinks  Exercise:  Exercise at least 30 minutes per day on most days of the week   Some examples of exercise include walking, biking, dancing, and swimming  You can also fit in more physical activity by taking the stairs instead of the elevator or parking farther away from stores  Ask your healthcare provider about the best exercise plan for you  Narcotic (Opioid) Safety    Use narcotics safely:  · Take prescribed narcotics exactly as directed  · Do not give narcotics to others or take narcotics that belong to someone else  · Do not mix narcotics without medicines or alcohol  · Do not drive or operate heavy machinery after you take the narcotic  · Monitor for side effects and notify your healthcare provider if you experienced side effects such as nausea, sleepiness, itching, or trouble thinking clearly  Manage constipation:    Constipation is the most common side effect of narcotic medicine  Constipation is when you have hard, dry bowel movements, or you go longer than usual between bowel movements  Tell your healthcare provider about all changes in your bowel movements while you are taking narcotics  He or she may recommend laxative medicine to help you have a bowel movement  He or she may also change the kind of narcotic you are taking, or change when you take it  The following are more ways you can prevent or relieve constipation:    · Drink liquids as directed  You may need to drink extra liquids to help soften and move your bowels  Ask how much liquid to drink each day and which liquids are best for you  · Eat high-fiber foods  This may help decrease constipation by adding bulk to your bowel movements  High-fiber foods include fruits, vegetables, whole-grain breads and cereals, and beans  Your healthcare provider or dietitian can help you create a high-fiber meal plan  Your provider may also recommend a fiber supplement if you cannot get enough fiber from food  · Exercise regularly  Regular physical activity can help stimulate your intestines   Walking is a good exercise to prevent or relieve constipation  Ask which exercises are best for you  · Schedule a time each day to have a bowel movement  This may help train your body to have regular bowel movements  Bend forward while you are on the toilet to help move the bowel movement out  Sit on the toilet for at least 10 minutes, even if you do not have a bowel movement  Store narcotics safely:   · Store narcotics where others cannot easily get them  Keep them in a locked cabinet or secure area  Do not  keep them in a purse or other bag you carry with you  A person may be looking for something else and find the narcotics  · Make sure narcotics are stored out of the reach of children  A child can easily overdose on narcotics  Narcotics may look like candy to a small child  The best way to dispose of narcotics: The laws vary by country and area  In the [place], the best way is to return the narcotics through a take-back program  This program is offered by the [employer] ([employer])  The following are options for using the program:  · Take the narcotics to a [facility]RIANE collection site  The site is often a law enforcement center  Call your local law enforcement center for scheduled take-back days in your area  You will be given information on where to go if the collection site is in a different location  · Take the narcotics to an approved pharmacy or hospital   A pharmacy or hospital may be set up as a collection site  You will need to ask if it is a [facility]RIANE collection site if you were not directed there  A pharmacy or doctor's office may not be able to take back narcotics unless it is a ADRIANE site  · Use a mail-back system  This means you are given containers to put the narcotics into  You will then mail them in the containers  · Use a take-back drop box  This is a place to leave the narcotics at any time  People and animals will not be able to get into the box   Your local law enforcement agency can tell you where to find a drop box in your area  Other ways to manage pain:   · Ask your healthcare provider about non-narcotic medicines to control pain  Nonprescription medicines include NSAIDs (such as ibuprofen) and acetaminophen  Prescription medicines include muscle relaxers, antidepressants, and steroids  · Pain may be managed without any medicines  Some ways to relieve pain include massage, aromatherapy, or meditation  Physical or occupational therapy may also help  For more information:   · Drug Enforcement Administration  [facility] [address]  Phone: 7- 029 - [phone]  Web Address: [facility]/drug_disposal/    · [address] and Drug Administration  [facility] , [address] [facility]  Phone: 5- 388 - [phone]  Web Address: http://[employer]/     © Copyright [employer] 2018 Information is for End User's use only and may not be sold, redistributed or otherwise used for commercial purposes   All illustrations and images included in CareNotes® are the copyrighted property of A D A M , Inc  or [address] [place] [address]

## 2023-02-01 NOTE — PROGRESS NOTES
Assessment and Plan:     Problem List Items Addressed This Visit        Cardiovascular and Mediastinum    Essential hypertension    Relevant Orders    CBC and differential    Comprehensive metabolic panel    TSH, 3rd generation with Free T4 reflex    Coronary artery disease with angina pectoris, unspecified vessel or lesion type, unspecified whether native or transplanted heart ([facility] )       Other    Dyslipidemia    Relevant Orders    Lipid Panel with Direct LDL reflex    Hemoglobin A1C   Other Visit Diagnoses     Medicare annual wellness visit, subsequent    -  Primary    Recurrent major depressive disorder, in full remission ([facility] )        Relevant Medications    sertraline (ZOLOFT) 50 mg tablet    Ulcerative colitis with complication, unspecified location (HCC)        Relevant Medications    OMEPRAZOLE PO    Prostate cancer screening        Benign prostatic hyperplasia with lower urinary tract symptoms, symptom details unspecified        Relevant Orders    PSA, total and free    Conjunctival hemorrhage, left eye            BMI Counseling: Body mass index is 30 59 kg/m²  The BMI is above normal  Nutrition recommendations include decreasing portion sizes and encouraging healthy choices of fruits and vegetables  Exercise recommendations include moderate physical activity 150 minutes/week  Rationale for BMI follow-up plan is due to patient being overweight or obese  Preventive health issues were discussed with patient, and age appropriate screening tests were ordered as noted in patient's After Visit Summary  Personalized health advice and appropriate referrals for health education or preventive services given if needed, as noted in patient's After Visit Summary  History of Present Illness:     Patient presents for a Medicare Wellness Visit    Here for [facility] reports eye redness to his left eye; appears to be a conjunctival hemorrhage; denies pain, tenderness, or dc; will keep an eye on this and let me know if anything changes;     Reports depression; on Zoloft from the [place]; on klonopin  Waiting on psychiatry  Had knee injections at the [place]  PCP with Dr J Carlos Huang  Gained some weight; discussed weight loss; needs to watch his diet  Patient Care Team:  David Collins MD as PCP - General (Internal Medicine)  Nabor Mendoza DO (Gastroenterology)  Gregg Barertt PA-C as Physician Assistant (Physician Assistant)  Kee Gonzalez PA-C as Physician Assistant (Physician Assistant)     Review of Systems:     Review of Systems   Eyes: Positive for redness  Gastrointestinal:        Chronic abdominal pain   Musculoskeletal: Positive for arthralgias and back pain  Psychiatric/Behavioral: Positive for decreased concentration and dysphoric mood  The patient is nervous/anxious  All other systems reviewed and are negative  Problem List:     Patient Active Problem List   Diagnosis   • Anxiety and depression   • Diaz's esophagus   • Essential hypertension   • Dyslipidemia   • Obesity (BMI 30 0-34  9)   • Ulcerative colitis without complications ([facility])   • Coronary artery disease with angina pectoris, unspecified vessel or lesion type, unspecified whether native or transplanted heart ([facility] )   • Depression, recurrent ([facility] )   • Hypertension   • GERD (gastroesophageal reflux disease)      Past Medical and Surgical History:     Past Medical History:   Diagnosis Date   • Diaz's esophagus    • Colon polyp    • Coronary artery disease    • Depression    • Diverticulitis    • Ear problems    • GERD (gastroesophageal reflux disease)    • Hemorrhoid    • History of heart artery stent    • Hyperlipidemia    • Hypertension    • Microscopic colitis    • Ulcerative colitis ([facility] [address] )      Past Surgical History:   Procedure Laterality Date   • ABDOMINAL SURGERY      radio frequency ablation   • BONE GRAFT Left 2018   • CARPAL TUNNEL RELEASE Right    • COLONOSCOPY     • CORONARY ANGIOPLASTY WITH STENT PLACEMENT      x2 • EGD AND COLONOSCOPY     • ESOPHAGOGASTRODUODENOSCOPY N/A 2/15/2018    Procedure: ESOPHAGOGASTRODUODENOSCOPY (EGD); Surgeon: Conchis De La Fuente MD;  [facility];  Service: Gastroenterology   • ESOPHAGOGASTRODUODENOSCOPY N/A 12/10/2018    Procedure: ESOPHAGOGASTRODUODENOSCOPY (EGD); Surgeon: Jace Saleem MD;  [facility];   Service: Gastroenterology   • HAND SURGERY Left    • SD SURGICAL ARTHROSCOPY SHOULDER W/ROTATOR CUFF RPR Left 2021    Procedure: REPAIR ROTATOR CUFF  ARTHROSCOPIC; POSSIBLE BICEPS TENDONESIS, ACROMIOPLASTY;  Surgeon: Palak Solitario DO;  [facility];  Service: Orthopedics   • ROTATOR CUFF REPAIR Left    • SHOULDER ARTHROSCOPY Right 3/14/2022    Procedure: ARTHROSCOPY SHOULDER- Right shoulder arthroscopic rotator cuff repair, open subpectoral biceps tenodesis, subsacpular repair and extensive debridement;  Surgeon: Palak Solitario DO;  [facility];  Service: Orthopedics   • TONSILLECTOMY        Family History:     Family History   Problem Relation Age of Onset   • Heart disease Father    • Melanoma Father    • Cancer Sister    • Skin cancer Sister    • Skin cancer Mother       Social History:     Social History     Socioeconomic History   • Marital status: /Civil Union     Spouse name: None   • Number of children: None   • Years of education: None   • Highest education level: None   Occupational History   • None   Tobacco Use   • Smoking status: Former     Packs/day: 0 50     Types: Cigarettes     Quit date: 2007     Years since quittin 0   • Smokeless tobacco: Former     Quit date: 1998   • Tobacco comments:     quit 10 yrs ago   Vaping Use   • Vaping Use: Never used   Substance and Sexual Activity   • Alcohol use: Not Currently     Comment: quit 5 years   • Drug use: Yes     Frequency: 3 0 times per week     Types: Marijuana     Comment: advised not to smoke   • Sexual activity: Not Currently   Other Topics Concern   • None   Social History Narrative   • None     Social Determinants of Health     Financial Resource Strain: Not on file   Food Insecurity: Not on file   Transportation Needs: Not on file   Physical Activity: Not on file   Stress: Not on file   Social Connections: Not on file   Intimate Partner Violence: Not on file   Housing Stability: Not on file      Medications and Allergies:     Current Outpatient Medications   Medication Sig Dispense Refill   • atorvastatin (LIPITOR) 80 mg tablet Take 1 tablet (80 mg total) by mouth daily 90 tablet 3   • clonazePAM (KlonoPIN) 0 5 mg tablet Take 3 tablets (1 5 mg total) by mouth daily at bedtime 90 tablet 0   • OMEPRAZOLE PO Take 20 mg by mouth in the morning     • ondansetron (ZOFRAN-ODT) 4 mg disintegrating tablet Take 1 tablet (4 mg total) by mouth every 6 (six) hours as needed for nausea or vomiting 20 tablet 0   • sertraline (ZOLOFT) 50 mg tablet Take 50 mg by mouth daily       No current facility-administered medications for this visit       Allergies   Allergen Reactions   • Rosuvastatin Myalgia      Immunizations:     Immunization History   Administered Date(s) Administered   • COVID-19 PFIZER VACCINE 0 3 ML IM 12/22/2020, 01/10/2021, 09/07/2021   • H1N1, All Formulations 12/04/2009   • INFLUENZA 10/25/2007, 10/24/2008, 09/08/2009, 10/03/2013, 09/25/2014, 10/09/2018, 11/01/2019   • Influenza Quadrivalent Preservative Free 3 years and older IM 10/09/2018, 11/01/2019   • Influenza, seasonal, injectable, preservative free 10/26/2016   • Pneumococcal Polysaccharide PPV23 07/10/2018   • Td (adult), Unspecified 07/03/2012   • Tdap 08/19/2011, 07/03/2012, 11/05/2019   • Tetanus Toxoid, Unspecified 09/01/2005   • Tuberculin Skin Test-PPD Intradermal 08/19/2011      Health Maintenance:         Topic Date Due   • Colorectal Cancer Screening  10/01/2024   • HIV Screening  Completed   • Hepatitis C Screening  Completed     There are no preventive care reminders to display for this patient  Medicare Screening Tests and Risk Assessments:     Kwesi Guerrier is here for his Subsequent Wellness visit  Last Medicare Wellness visit information reviewed, patient interviewed and updates made to the record today  Health Risk Assessment:   Patient rates overall health as fair  Patient feels that their physical health rating is slightly better  Patient is satisfied with their life  Eyesight was rated as slightly worse  Hearing was rated as same  Patient feels that their emotional and mental health rating is same  Patients states they are never, rarely angry  Patient states they are often unusually tired/fatigued  Pain experienced in the last 7 days has been some  Patient's pain rating has been 3/10  Patient states that he has experienced no weight loss or gain in last 6 months  Depression Screening:   PHQ-9 Score: 6      Fall Risk Screening: In the past year, patient has experienced: history of falling in past year    Number of falls: 2 or more  Injured during fall?: No    Feels unsteady when standing or walking?: No    Worried about falling?: No      Home Safety:  Patient does not have trouble with stairs inside or outside of their home  Patient has working smoke alarms and has working carbon monoxide detector  Home safety hazards include: none  Nutrition:   Current diet is Regular  Medications:   Patient is currently taking over-the-counter supplements  OTC medications include: see medication list  Patient is able to manage medications  Activities of Daily Living (ADLs)/Instrumental Activities of Daily Living (IADLs):   Walk and transfer into and out of bed and chair?: Yes  Dress and groom yourself?: Yes    Bathe or shower yourself?: Yes    Feed yourself?  Yes  Do your laundry/housekeeping?: Yes  Manage your money, pay your bills and track your expenses?: Yes  Make your own meals?: Yes    Do your own shopping?: Yes    Previous Hospitalizations:   Any hospitalizations or ED visits within the last 12 months?: Yes    How many hospitalizations have you had in the last year?: 1-2    Hospitalization Comments: ER    Advance Care Planning:   Living will: No    Advanced directive: No      PREVENTIVE SCREENINGS      Cardiovascular Screening:    General: Screening Current      Diabetes Screening:     General: Screening Current      Colorectal Cancer Screening:     General: Screening Current      Prostate Cancer Screening:    General: Screening Current      Osteoporosis Screening:    General: Screening Not Indicated      Abdominal Aortic Aneurysm (AAA) Screening:    Risk factors include: tobacco use        General: Screening Not Indicated      Lung Cancer Screening:     General: Screening Not Indicated      Hepatitis C Screening:    General: Screening Current    Screening, Brief Intervention, and Referral to Treatment (SBIRT)    Screening  Typical number of drinks in a day: 0  Typical number of drinks in a week: 0  Interpretation: Low risk drinking behavior  AUDIT-C Screenin) How often did you have a drink containing alcohol in the past year? never  2) How many drinks did you have on a typical day when you were drinking in the past year? 0  3) How often did you have 6 or more drinks on one occasion in the past year? never    AUDIT-C Score: 0  Interpretation: Score 0-3 (male): Negative screen for alcohol misuse    Brief Intervention  Alcohol & drug use screenings were reviewed  No concerns regarding substance use disorder identified  Review of Current Opioid Use    Opioid Risk Tool (ORT) Interpretation: Complete Opioid Risk Tool (ORT)    [place]MP or [place]  reviewed  No red flags were identified    Other Counseling Topics:   Car/seat belt/driving safety, skin self-exam, sunscreen and calcium and vitamin D intake and regular weightbearing exercise  No results found       Physical Exam:     /72 (BP Location: Left arm, Patient Position: Sitting, Cuff Size: Adult)   Pulse 85   Resp 16   Ht 5' 10" (1 778 m)   Wt 96 7 kg (213 lb 3 2 oz)   SpO2 96%   BMI 30 59 kg/m²     Physical Exam  Vitals and nursing note reviewed  Constitutional:       Appearance: Normal appearance  HENT:      Head: Normocephalic and atraumatic  Eyes:      General: Lids are normal  No scleral icterus  Right eye: No foreign body, discharge or hordeolum  Left eye: No foreign body, discharge or hordeolum  Conjunctiva/sclera:      Right eye: Hemorrhage present  Pupils: Pupils are equal, round, and reactive to light  Cardiovascular:      Rate and Rhythm: Normal rate and regular rhythm  Heart sounds: Normal heart sounds  Pulmonary:      Effort: Pulmonary effort is normal       Breath sounds: Normal breath sounds  Musculoskeletal:         General: Normal range of motion  Cervical back: Normal range of motion  Right lower leg: No edema  Left lower leg: No edema  Lymphadenopathy:      Cervical: No cervical adenopathy  Skin:     General: Skin is warm and dry  Neurological:      General: No focal deficit present  Mental Status: He is alert and oriented to person, place, and time  Mental status is at baseline     Psychiatric:         Mood and Affect: Mood normal          Behavior: Behavior normal           Chuck Packer MD

## 2023-02-13 DIAGNOSIS — F13.939 BENZODIAZEPINE WITHDRAWAL (HCC): ICD-10-CM

## 2023-02-13 RX ORDER — CLONAZEPAM 0.5 MG/1
1.5 TABLET ORAL
Qty: 90 TABLET | Refills: 0 | Status: SHIPPED | OUTPATIENT
Start: 2023-02-13 | End: 2023-03-15

## 2023-02-13 NOTE — TELEPHONE ENCOUNTER
Cesario, my name is Raj Warner is my birthday to live at the 79 [address], [place] [address]  I need to get refilled clonazepam  My doctor is Doctor Lupe Bentley and if she you should know it's there  I do have an appointment with her psychiatrist since it's a controlled medicine and it's a pain in the butt  So I'm just about out of them  I've got like one day left and I've been on them for about 25 years  So if you can give me a phone call, let me know  I'm at 20 [phone]  And my name is Tawanda Vaz  Look forward to hearing from you guys and basically need a rewrite for a prescription for clonazepam  I got like a bear or two left Thank you  All right  And I'll look forward to seeing how this post, I was not able to do it on my [facility], my chart  I started trying to deal with it and do it, but no luck  OK  So thanks   Bye

## 2023-02-14 ENCOUNTER — OFFICE VISIT (OUTPATIENT)
Dept: GASTROENTEROLOGY | Facility: CLINIC | Age: 58
End: 2023-02-14

## 2023-02-14 VITALS
WEIGHT: 216 LBS | OXYGEN SATURATION: 96 % | SYSTOLIC BLOOD PRESSURE: 140 MMHG | HEART RATE: 82 BPM | HEIGHT: 70 IN | BODY MASS INDEX: 30.92 KG/M2 | DIASTOLIC BLOOD PRESSURE: 78 MMHG

## 2023-02-14 DIAGNOSIS — K22.70 BARRETT'S ESOPHAGUS WITHOUT DYSPLASIA: ICD-10-CM

## 2023-02-14 DIAGNOSIS — D12.6 ADENOMATOUS POLYP OF COLON, UNSPECIFIED PART OF COLON: ICD-10-CM

## 2023-02-14 DIAGNOSIS — R11.15 CYCLICAL VOMITING: Primary | ICD-10-CM

## 2023-02-14 DIAGNOSIS — K22.711 BARRETT'S ESOPHAGUS WITH HIGH GRADE DYSPLASIA: ICD-10-CM

## 2023-02-14 RX ORDER — METOCLOPRAMIDE 10 MG/1
10 TABLET ORAL EVERY 6 HOURS PRN
Qty: 60 TABLET | Refills: 3 | Status: SHIPPED | OUTPATIENT
Start: 2023-02-14

## 2023-02-14 RX ORDER — ONDANSETRON 4 MG/1
4 TABLET, ORALLY DISINTEGRATING ORAL EVERY 6 HOURS PRN
Qty: 60 TABLET | Refills: 3 | Status: SHIPPED | OUTPATIENT
Start: 2023-02-14

## 2023-02-14 RX ORDER — OMEPRAZOLE 40 MG/1
40 CAPSULE, DELAYED RELEASE ORAL 2 TIMES DAILY
Qty: 60 CAPSULE | Refills: 6 | Status: SHIPPED | OUTPATIENT
Start: 2023-02-14

## 2023-02-14 NOTE — PROGRESS NOTES
[facility] - Outpatient Follow-up Note  Rosemarie Jaeger 61 y o  male MRN: [id number]  Encounter: [id number]          ASSESSMENT AND PLAN:      1  Cyclical vomiting  2  Diaz's esophagus without dysplasia  He is due for an EGD and Colonoscopy this year  Will schedule  Continue Omeprazole 40mg BID  Zofran or Reglan PRN    He has many concerns about how his time in the [facility] may be playing a role in his symptoms - he was a missle technician    ______________________________________________________________________    SUBJECTIVE: 69-year-old male with a history of Diaz's esophagus and cyclical vomiting syndrome as well as a history of microscopic colitis diagnosed and treated at an outside facility who presents for routine follow-up  He continues to struggle with episodes of abdominal pain nausea and vomiting  Sometimes he is able to take Zofran which improves the symptom other times he ends up in the emergency room  He currently takes omeprazole 40 mg 1-2 times a day and Zofran as needed  For the most part his acid reflux is controlled  His last upper endoscopy was while he was hospitalized in June of last year  He had noted Diaz's esophagus with intestinal metaplasia but no dysplasia  He reports a history of high-grade dysplasia in his Diaz's requiring RFA treated by a doctor in [place]  He has been doing extensive research into his time in the [facility] and exposure to radiation and how this has caused or at least affected his gastrointestinal symptoms  His weight is stable  He denies any recent hematemesis or melena  Overall his bowel movements are okay  REVIEW OF SYSTEMS IS OTHERWISE NEGATIVE        Historical Information   Past Medical History:   Diagnosis Date   • Diaz's esophagus    • Colon polyp    • Coronary artery disease    • Depression    • Diverticulitis    • Ear problems    • GERD (gastroesophageal reflux disease)    • Hemorrhoid    • History of heart artery stent    • Hyperlipidemia    • Hypertension    • Microscopic colitis    • Ulcerative colitis [facility])      Past Surgical History:   Procedure Laterality Date   • ABDOMINAL SURGERY      radio frequency ablation   • BONE GRAFT Left 2018   • CARPAL TUNNEL RELEASE Right    • COLONOSCOPY     • CORONARY ANGIOPLASTY WITH STENT PLACEMENT      x2   • EGD AND COLONOSCOPY     • ESOPHAGOGASTRODUODENOSCOPY N/A 2/15/2018    Procedure: ESOPHAGOGASTRODUODENOSCOPY (EGD); Surgeon: Jaye Esposito MD;  [facility];  Service: Gastroenterology   • ESOPHAGOGASTRODUODENOSCOPY N/A 12/10/2018    Procedure: ESOPHAGOGASTRODUODENOSCOPY (EGD); Surgeon: Hiro Linares MD;  [facility];   Service: Gastroenterology   • HAND SURGERY Left    • [place] SURGICAL ARTHROSCOPY SHOULDER W/ROTATOR CUFF RPR Left 2021    Procedure: REPAIR ROTATOR CUFF  ARTHROSCOPIC; POSSIBLE BICEPS TENDONESIS, ACROMIOPLASTY;  Surgeon: Mario Christine DO;  [facility];  Service: Orthopedics   • ROTATOR CUFF REPAIR Left    • SHOULDER ARTHROSCOPY Right 3/14/2022    Procedure: ARTHROSCOPY SHOULDER- Right shoulder arthroscopic rotator cuff repair, open subpectoral biceps tenodesis, subsacpular repair and extensive debridement;  Surgeon: Mario Christine DO;  [facility];  Service: Orthopedics   • TONSILLECTOMY       Social History   Social History     Substance and Sexual Activity   Alcohol Use Not Currently    Comment: quit 5 years     Social History     Substance and Sexual Activity   Drug Use Yes   • Frequency: 3 0 times per week   • Types: Marijuana    Comment: advised not to smoke     Social History     Tobacco Use   Smoking Status Former   • Packs/day: 0 50   • Types: Cigarettes   • Quit date: 2007   • Years since quittin 0   Smokeless Tobacco Former   • Quit date: 1998   Tobacco Comments    quit 10 yrs ago     Family History   Problem Relation Age of Onset   • Heart disease Father    • Melanoma Father • Cancer Sister    • Skin cancer Sister    • Skin cancer Mother        Meds/Allergies       Current Outpatient Medications:   •  atorvastatin (LIPITOR) 80 mg tablet  •  clonazePAM (KlonoPIN) 0 5 mg tablet  •  OMEPRAZOLE PO  •  ondansetron (ZOFRAN-ODT) 4 mg disintegrating tablet  •  sertraline (ZOLOFT) 50 mg tablet    Allergies   Allergen Reactions   • Rosuvastatin Myalgia           Objective     Pulse 82, height 5' 10" (1 778 m), weight 98 kg (216 lb), SpO2 96 %  Body mass index is 30 99 kg/m²  PHYSICAL EXAM:      General Appearance:   Alert, cooperative, no distress   HEENT:   Normocephalic, atraumatic, anicteric      Neck:  Supple, symmetrical, trachea midline   Lungs:   Clear to auscultation bilaterally; no rales, rhonchi or wheezing; respirations unlabored    Heart[de-identified]   Regular rate and rhythm; no murmur, rub, or gallop  Abdomen:   Soft, non-tender, non-distended; normal bowel sounds; no masses, no organomegaly    Genitalia:   Deferred    Rectal:   Deferred    Extremities:  No cyanosis, clubbing or edema    Pulses:  2+ and symmetric    Skin:  No jaundice, rashes, or lesions    Lymph nodes:  No palpable cervical lymphadenopathy        Lab Results:   No visits with results within 1 Day(s) from this visit  Latest known visit with results is:   Admission on 01/12/2023, Discharged on 01/12/2023   Component Date Value   • WBC 01/12/2023 15 30 (H)    • RBC 01/12/2023 5  11    • Hemoglobin 01/12/2023 14 8    • Hematocrit 01/12/2023 45 2    • MCV 01/12/2023 89    • MCH 01/12/2023 29 0    • MCHC 01/12/2023 32 7    • RDW 01/12/2023 13 1    • MPV 01/12/2023 9 2    • Platelets 37/40/2462 326    • nRBC 01/12/2023 0    • Neutrophils Relative 01/12/2023 92 (H)    • Immat GRANS % 01/12/2023 0    • Lymphocytes Relative 01/12/2023 5 (L)    • Monocytes Relative 01/12/2023 3 (L)    • Eosinophils Relative 01/12/2023 0    • Basophils Relative 01/12/2023 0    • Neutrophils Absolute 01/12/2023 14 05 (H)    • Immature Grans Absolute 01/12/2023 0 04    • Lymphocytes Absolute 01/12/2023 0 79    • Monocytes Absolute 01/12/2023 0 38    • Eosinophils Absolute 01/12/2023 0 01    • Basophils Absolute 01/12/2023 0 03    • Sodium 01/12/2023 140    • Potassium 01/12/2023 4 3    • Chloride 01/12/2023 103    • CO2 01/12/2023 24    • ANION GAP 01/12/2023 13    • BUN 01/12/2023 15    • Creatinine 01/12/2023 1 18    • Glucose 01/12/2023 149 (H)    • Calcium 01/12/2023 9 4    • AST 01/12/2023 13    • ALT 01/12/2023 23    • Alkaline Phosphatase 01/12/2023 79    • Total Protein 01/12/2023 8 1    • Albumin 01/12/2023 4 5    • Total Bilirubin 01/12/2023 0 39    • eGFR 01/12/2023 67    • Lipase 01/12/2023 91    • hs TnI 0hr 01/12/2023 <2    • SARS-CoV-2 01/12/2023 Negative    • INFLUENZA A PCR 01/12/2023 Negative    • INFLUENZA B PCR 01/12/2023 Negative    • RSV PCR 01/12/2023 Negative    • Ventricular Rate 01/12/2023 54    • Atrial Rate 01/12/2023 54    • OK Interval 01/12/2023 178    • QRSD Interval 01/12/2023 84    • QT Interval 01/12/2023 426    • QTC Interval 01/12/2023 403    • P Axis 01/12/2023 51    • QRS Axis 01/12/2023 -25    • T Wave Axis 01/12/2023 -4    • hs TnI 2hr 01/12/2023 2    • Delta 2hr hsTnI 01/12/2023 >0          Radiology Results:   No results found

## 2023-02-14 NOTE — PATIENT INSTRUCTIONS
Scheduled date of EGD/colonoscopy (as of today):5/16/23  Physician performing EGD/colonoscopy:Maru  Location of EGD/colonoscopy:[place]  Desired bowel prep reviewed with patient:Margoth/Miralax  Instructions reviewed with patient by:Guzman kelly  Clearances:   none

## 2023-02-22 DIAGNOSIS — F32.A ANXIETY AND DEPRESSION: Primary | ICD-10-CM

## 2023-02-22 DIAGNOSIS — F41.9 ANXIETY AND DEPRESSION: Primary | ICD-10-CM

## 2023-03-03 ENCOUNTER — TELEPHONE (OUTPATIENT)
Dept: INTERNAL MEDICINE CLINIC | Facility: CLINIC | Age: 58
End: 2023-03-03

## 2023-03-03 DIAGNOSIS — R05.2 SUBACUTE COUGH: Primary | ICD-10-CM

## 2023-03-03 RX ORDER — BENZONATATE 200 MG/1
200 CAPSULE ORAL 3 TIMES DAILY PRN
Qty: 20 CAPSULE | Refills: 0 | Status: SHIPPED | OUTPATIENT
Start: 2023-03-03 | End: 2023-05-09 | Stop reason: ALTCHOICE

## 2023-03-03 NOTE — TELEPHONE ENCOUNTER
Vaishali Candelario called in with c/o a non productive cough that he's had for about 2 weeks  He says the cough is now starting to hurt his chest  He denies fevers/sob/ or any Covid exposure  hes tried mucinex with no relief  Is there anything that can be sent in for him?

## 2023-03-14 DIAGNOSIS — F13.939 BENZODIAZEPINE WITHDRAWAL (HCC): ICD-10-CM

## 2023-03-14 RX ORDER — CLONAZEPAM 0.5 MG/1
1.5 TABLET ORAL
Qty: 90 TABLET | Refills: 0 | Status: SHIPPED | OUTPATIENT
Start: 2023-03-14 | End: 2023-04-13

## 2023-03-24 ENCOUNTER — TELEPHONE (OUTPATIENT)
Dept: DERMATOLOGY | Facility: CLINIC | Age: 58
End: 2023-03-24

## 2023-04-26 ENCOUNTER — OFFICE VISIT (OUTPATIENT)
Dept: CARDIOLOGY CLINIC | Facility: CLINIC | Age: 58
End: 2023-04-26

## 2023-04-26 VITALS
BODY MASS INDEX: 32.21 KG/M2 | DIASTOLIC BLOOD PRESSURE: 82 MMHG | SYSTOLIC BLOOD PRESSURE: 118 MMHG | HEIGHT: 70 IN | WEIGHT: 225 LBS | HEART RATE: 80 BPM

## 2023-04-26 DIAGNOSIS — I25.118 CORONARY ARTERY DISEASE OF NATIVE ARTERY OF NATIVE HEART WITH STABLE ANGINA PECTORIS (HCC): Primary | ICD-10-CM

## 2023-04-26 DIAGNOSIS — E66.9 OBESITY (BMI 30.0-34.9): ICD-10-CM

## 2023-04-26 DIAGNOSIS — E78.5 DYSLIPIDEMIA: ICD-10-CM

## 2023-04-26 PROBLEM — I10 ESSENTIAL HYPERTENSION: Status: RESOLVED | Noted: 2018-02-15 | Resolved: 2023-04-26

## 2023-04-26 PROBLEM — I25.758 CORONARY ARTERY DISEASE OF NATIVE ARTERY OF TRANSPLANTED HEART WITH STABLE ANGINA PECTORIS (HCC): Status: ACTIVE | Noted: 2019-03-29

## 2023-04-26 NOTE — ASSESSMENT & PLAN NOTE
Recent CP reminiscent of symptoms prior to RCA stent but not with effort    Will obtain a stress test

## 2023-04-26 NOTE — PROGRESS NOTES
Patient ID: Hedy Nieves is a 61 y o  male  Plan:      Coronary artery disease of native artery of native heart with stable angina pectoris ([address] )  Recent CP reminiscent of symptoms prior to RCA stent but not with effort  Will obtain a stress test     Essential hypertension  Well controlled and would continue current regimen  Dyslipidemia  Tolerating high-intensity statin therapy and will continue current regimen  Obesity (BMI 30 0-34  9)  We spoke about weight gain and carb consciousness  Follow up Plan/Other summary comments:  Return in about 1 year (around 4/26/2024)  HPI:  Patient seen in f/u regarding the above issues  Various CP only some of which seem similar to CAD  He remains active  Patient had KARLA of distal and proximal RCA in 4/2019  Most recent or relevant cardiac/vascular testing:    Myoview 8/26/2020: WNL  Past Surgical History:   Procedure Laterality Date   • ABDOMINAL SURGERY      radio frequency ablation   • BONE GRAFT Left 2018   • CARPAL TUNNEL RELEASE Right    • COLONOSCOPY     • CORONARY ANGIOPLASTY WITH STENT PLACEMENT      x2   • EGD AND COLONOSCOPY     • ESOPHAGOGASTRODUODENOSCOPY N/A 2/15/2018    Procedure: ESOPHAGOGASTRODUODENOSCOPY (EGD); Surgeon: Huber Elmroe MD;  [facility];  Service: Gastroenterology   • ESOPHAGOGASTRODUODENOSCOPY N/A 12/10/2018    Procedure: ESOPHAGOGASTRODUODENOSCOPY (EGD); Surgeon: Fabiola Elias MD;  Location: [facility];   Service: Gastroenterology   • HAND SURGERY Left    • [place] SURGICAL ARTHROSCOPY SHOULDER W/ROTATOR CUFF RPR Left 4/14/2021    Procedure: REPAIR ROTATOR CUFF  ARTHROSCOPIC; POSSIBLE BICEPS TENDONESIS, ACROMIOPLASTY;  Surgeon: Maggi Briggs DO;  [facility];  Service: Orthopedics   • ROTATOR CUFF REPAIR Left    • SHOULDER ARTHROSCOPY Right 3/14/2022    Procedure: ARTHROSCOPY SHOULDER- Right shoulder arthroscopic rotator cuff repair, open subpectoral biceps tenodesis, "subsacpular repair and extensive debridement;  Surgeon: Dixie Dowd DO;  [facility];  Service: Orthopedics   • TONSILLECTOMY         Lipid Profile:   Lab Results   Component Value Date    TRIG 158 (H) 06/07/2022    HDL 52 06/07/2022         Review of Systems   10  point ROS  was otherwise non pertinent or negative except as per HPI or as below  Gait:  Normal         Objective:     /82   Pulse 80   Ht 5' 10\" (1 778 m)   Wt 102 kg (225 lb)   BMI 32 28 kg/m²     PHYSICAL EXAM:    General:  Normal appearance in no distress  Eyes:  Anicteric  Oral mucosa:  Moist   Neck:  No JVD  Carotid upstrokes are brisk without bruits  No masses  Chest:  Clear to auscultation  Cardiac:  No palpable PMI  Normal S1 and S2  No murmur gallop or rub  Abdomen:  Soft and nontender  No palpable organomegaly or aortic enlargement  Extremities:  No peripheral edema  Musculoskeletal:  Symmetric  Vascular:  Femoral pulses are brisk without bruits  Popliteal pulses are intact bilaterally  Pedal pulses are intact  Neuro:  Grossly symmetric  Psych:  Alert and oriented x3          Current Outpatient Medications:   •  atorvastatin (LIPITOR) 80 mg tablet, Take 1 tablet (80 mg total) by mouth daily, Disp: 90 tablet, Rfl: 0  •  clonazePAM (KlonoPIN) 0 5 mg tablet, Take 3 tablets (1 5 mg total) by mouth daily at bedtime, Disp: 90 tablet, Rfl: 0  •  metoclopramide (Reglan) 10 mg tablet, Take 1 tablet (10 mg total) by mouth every 6 (six) hours as needed (nausea/vomiting), Disp: 60 tablet, Rfl: 3  •  omeprazole (PriLOSEC) 40 MG capsule, TAKE 1 CAPSULE BY MOUTH TWICE A DAY, Disp: 180 capsule, Rfl: 1  •  ondansetron (ZOFRAN-ODT) 4 mg disintegrating tablet, Take 1 tablet (4 mg total) by mouth every 6 (six) hours as needed for nausea or vomiting, Disp: 60 tablet, Rfl: 3  •  sertraline (ZOLOFT) 50 mg tablet, Take 1 tablet (50 mg total) by mouth daily, Disp: 90 tablet, Rfl: 3  •  benzonatate (TESSALON) 200 MG capsule, " Take 1 capsule (200 mg total) by mouth 3 (three) times a day as needed for cough (Patient not taking: Reported on 2023), Disp: 20 capsule, Rfl: 0  Allergies   Allergen Reactions   • Rosuvastatin Myalgia     Past Medical History:   Diagnosis Date   • Diaz's esophagus    • Colon polyp    • Coronary artery disease 2019    RCA stenting X 2 in 2019   • Depression    • Diverticulitis    • Ear problems    • GERD (gastroesophageal reflux disease)    • Hemorrhoid    • History of heart artery stent    • Hyperlipidemia    • Hypertension    • Microscopic colitis    • Ulcerative colitis ([facility] )            Social History     Tobacco Use   Smoking Status Former   • Packs/day: 0 50   • Types: Cigarettes   • Quit date: 2007   • Years since quittin 2   Smokeless Tobacco Former   • Quit date: 1998   Tobacco Comments    quit 10 yrs ago

## 2023-05-09 ENCOUNTER — OFFICE VISIT (OUTPATIENT)
Dept: INTERNAL MEDICINE CLINIC | Facility: CLINIC | Age: 58
End: 2023-05-09

## 2023-05-09 ENCOUNTER — APPOINTMENT (OUTPATIENT)
Dept: LAB | Facility: HOSPITAL | Age: 58
End: 2023-05-09

## 2023-05-09 VITALS
WEIGHT: 220.8 LBS | RESPIRATION RATE: 16 BRPM | HEIGHT: 70 IN | SYSTOLIC BLOOD PRESSURE: 122 MMHG | OXYGEN SATURATION: 95 % | HEART RATE: 93 BPM | DIASTOLIC BLOOD PRESSURE: 82 MMHG | BODY MASS INDEX: 31.61 KG/M2 | TEMPERATURE: 97.4 F

## 2023-05-09 DIAGNOSIS — K22.70 BARRETT'S ESOPHAGUS WITHOUT DYSPLASIA: ICD-10-CM

## 2023-05-09 DIAGNOSIS — F32.A ANXIETY AND DEPRESSION: ICD-10-CM

## 2023-05-09 DIAGNOSIS — F41.9 ANXIETY AND DEPRESSION: ICD-10-CM

## 2023-05-09 DIAGNOSIS — E78.5 DYSLIPIDEMIA: Primary | ICD-10-CM

## 2023-05-09 DIAGNOSIS — F10.21 ALCOHOL DEPENDENCE, IN REMISSION (HCC): ICD-10-CM

## 2023-05-09 DIAGNOSIS — F13.939 BENZODIAZEPINE WITHDRAWAL (HCC): ICD-10-CM

## 2023-05-09 DIAGNOSIS — Z12.5 PROSTATE CANCER SCREENING: ICD-10-CM

## 2023-05-09 DIAGNOSIS — Z80.8 FAMILY HISTORY OF MELANOMA: ICD-10-CM

## 2023-05-09 DIAGNOSIS — I10 ESSENTIAL HYPERTENSION: ICD-10-CM

## 2023-05-09 DIAGNOSIS — R73.03 PREDIABETES: ICD-10-CM

## 2023-05-09 DIAGNOSIS — E78.5 DYSLIPIDEMIA: ICD-10-CM

## 2023-05-09 DIAGNOSIS — I25.119 CORONARY ARTERY DISEASE WITH ANGINA PECTORIS, UNSPECIFIED VESSEL OR LESION TYPE, UNSPECIFIED WHETHER NATIVE OR TRANSPLANTED HEART (HCC): ICD-10-CM

## 2023-05-09 DIAGNOSIS — K51.919 ULCERATIVE COLITIS WITH COMPLICATION, UNSPECIFIED LOCATION (HCC): ICD-10-CM

## 2023-05-09 DIAGNOSIS — N40.1 BENIGN PROSTATIC HYPERPLASIA WITH LOWER URINARY TRACT SYMPTOMS, SYMPTOM DETAILS UNSPECIFIED: ICD-10-CM

## 2023-05-09 LAB
ALBUMIN SERPL BCP-MCNC: 4.5 G/DL (ref 3.5–5)
ALP SERPL-CCNC: 55 U/L (ref 34–104)
ALT SERPL W P-5'-P-CCNC: 15 U/L (ref 7–52)
ANION GAP SERPL CALCULATED.3IONS-SCNC: 6 MMOL/L (ref 4–13)
AST SERPL W P-5'-P-CCNC: 13 U/L (ref 13–39)
BASOPHILS # BLD AUTO: 0.04 THOUSANDS/ÂΜL (ref 0–0.1)
BASOPHILS NFR BLD AUTO: 1 % (ref 0–1)
BILIRUB SERPL-MCNC: 0.4 MG/DL (ref 0.2–1)
BUN SERPL-MCNC: 11 MG/DL (ref 5–25)
CALCIUM SERPL-MCNC: 9.7 MG/DL (ref 8.4–10.2)
CHLORIDE SERPL-SCNC: 107 MMOL/L (ref 96–108)
CHOLEST SERPL-MCNC: 195 MG/DL
CO2 SERPL-SCNC: 27 MMOL/L (ref 21–32)
CREAT SERPL-MCNC: 1.03 MG/DL (ref 0.6–1.3)
EOSINOPHIL # BLD AUTO: 0.09 THOUSAND/ÂΜL (ref 0–0.61)
EOSINOPHIL NFR BLD AUTO: 1 % (ref 0–6)
ERYTHROCYTE [DISTWIDTH] IN BLOOD BY AUTOMATED COUNT: 14.2 % (ref 11.6–15.1)
EST. AVERAGE GLUCOSE BLD GHB EST-MCNC: 120 MG/DL
GFR SERPL CREATININE-BSD FRML MDRD: 79 ML/MIN/1.73SQ M
GLUCOSE P FAST SERPL-MCNC: 105 MG/DL (ref 65–99)
HBA1C MFR BLD: 5.8 %
HCT VFR BLD AUTO: 43.1 % (ref 36.5–49.3)
HDLC SERPL-MCNC: 68 MG/DL
HGB BLD-MCNC: 13.9 G/DL (ref 12–17)
IMM GRANULOCYTES # BLD AUTO: 0.03 THOUSAND/UL (ref 0–0.2)
IMM GRANULOCYTES NFR BLD AUTO: 0 % (ref 0–2)
LDLC SERPL CALC-MCNC: 99 MG/DL (ref 0–100)
LYMPHOCYTES # BLD AUTO: 1.98 THOUSANDS/ÂΜL (ref 0.6–4.47)
LYMPHOCYTES NFR BLD AUTO: 28 % (ref 14–44)
MCH RBC QN AUTO: 28 PG (ref 26.8–34.3)
MCHC RBC AUTO-ENTMCNC: 32.3 G/DL (ref 31.4–37.4)
MCV RBC AUTO: 87 FL (ref 82–98)
MONOCYTES # BLD AUTO: 0.46 THOUSAND/ÂΜL (ref 0.17–1.22)
MONOCYTES NFR BLD AUTO: 6 % (ref 4–12)
NEUTROPHILS # BLD AUTO: 4.56 THOUSANDS/ÂΜL (ref 1.85–7.62)
NEUTS SEG NFR BLD AUTO: 64 % (ref 43–75)
NRBC BLD AUTO-RTO: 0 /100 WBCS
PLATELET # BLD AUTO: 302 THOUSANDS/UL (ref 149–390)
PMV BLD AUTO: 9.6 FL (ref 8.9–12.7)
POTASSIUM SERPL-SCNC: 4.4 MMOL/L (ref 3.5–5.3)
PROT SERPL-MCNC: 7 G/DL (ref 6.4–8.4)
RBC # BLD AUTO: 4.96 MILLION/UL (ref 3.88–5.62)
SODIUM SERPL-SCNC: 140 MMOL/L (ref 135–147)
TRIGL SERPL-MCNC: 139 MG/DL
TSH SERPL DL<=0.05 MIU/L-ACNC: 2.33 UIU/ML (ref 0.45–4.5)
WBC # BLD AUTO: 7.16 THOUSAND/UL (ref 4.31–10.16)

## 2023-05-09 RX ORDER — CLONAZEPAM 0.5 MG/1
1.5 TABLET ORAL
Qty: 90 TABLET | Refills: 0 | Status: CANCELLED | OUTPATIENT
Start: 2023-05-09 | End: 2023-06-08

## 2023-05-09 NOTE — PROGRESS NOTES
Assessment/Plan:     Yanick Frost Is here for routine f/u; dealing with chronic abdominal pain, has been in and out of the ER, but imaging has been normal; he is working with the [facility] because he reports being exposed to a lot of chemicals and radiation; also following with GI for h/o Diaz's esophagus; he has an EGD coming up;    Needs updated labs; ordered today; will send klonopin next week- too early today; derm visit coming up  Has a stress test coming up, following with cardiology; reports of intermittent chest pain; reports his wife may be leaving him; stressed; gained weight and thinks it is from Zoloft he restarted  Quality Measures:     BMI Counseling: Body mass index is 31 68 kg/m²  The BMI is above normal  Nutrition recommendations include decreasing portion sizes and encouraging healthy choices of fruits and vegetables  Exercise recommendations include moderate physical activity 150 minutes/week  Rationale for BMI follow-up plan is due to patient being overweight or obese  Return in about 4 months (around 9/9/2023)  No problem-specific Assessment & Plan notes found for this encounter  Diagnoses and all orders for this visit:    Dyslipidemia    Benzodiazepine withdrawal (HCC)    Anxiety and depression  -     TSH, 3rd generation with Free T4 reflex; Future  -     Lipid Panel with Direct LDL reflex; Future    Coronary artery disease with angina pectoris, unspecified vessel or lesion type, unspecified whether native or transplanted heart ([facility] [address] )    Essential hypertension    Prediabetes  -     Hemoglobin A1C; Future    Diaz's esophagus without dysplasia    Prostate cancer screening  -     PSA, total and free; Future    Alcohol dependence, in remission ([facility] [address] )    Ulcerative colitis with complication, unspecified location [facility])    Family history of melanoma          Subjective:      Patient ID: Apryl Bruno is a 61 y o  male  Here for routine f/u        ALLERGIES:  Allergies   Allergen Reactions   • Rosuvastatin Myalgia       CURRENT MEDICATIONS:    Current Outpatient Medications:   •  atorvastatin (LIPITOR) 80 mg tablet, Take 1 tablet (80 mg total) by mouth daily, Disp: 90 tablet, Rfl: 0  •  clonazePAM (KlonoPIN) 0 5 mg tablet, Take 3 tablets (1 5 mg total) by mouth daily at bedtime, Disp: 90 tablet, Rfl: 0  •  metoclopramide (Reglan) 10 mg tablet, Take 1 tablet (10 mg total) by mouth every 6 (six) hours as needed (nausea/vomiting), Disp: 60 tablet, Rfl: 3  •  omeprazole (PriLOSEC) 40 MG capsule, TAKE 1 CAPSULE BY MOUTH TWICE A DAY, Disp: 180 capsule, Rfl: 1  •  ondansetron (ZOFRAN-ODT) 4 mg disintegrating tablet, Take 1 tablet (4 mg total) by mouth every 6 (six) hours as needed for nausea or vomiting, Disp: 60 tablet, Rfl: 3  •  sertraline (ZOLOFT) 50 mg tablet, Take 1 tablet (50 mg total) by mouth daily, Disp: 90 tablet, Rfl: 3    ACTIVE PROBLEM LIST:  Patient Active Problem List   Diagnosis   • Anxiety and depression   • Diaz's esophagus   • Dyslipidemia   • Obesity (BMI 30 0-34  9)   • Coronary artery disease of native artery of native heart with stable angina pectoris ([facility] )   • Depression, recurrent ([facility] )   • GERD (gastroesophageal reflux disease)       PAST MEDICAL HISTORY:  Past Medical History:   Diagnosis Date   • Diaz's esophagus    • Colon polyp    • Coronary artery disease 2019    RCA stenting X 2 in 2019   • Depression    • Diverticulitis    • Ear problems    • GERD (gastroesophageal reflux disease)    • Hemorrhoid    • History of heart artery stent    • Hyperlipidemia    • Hypertension    • Microscopic colitis    • Ulcerative colitis ([facility] )        PAST SURGICAL HISTORY:  Past Surgical History:   Procedure Laterality Date   • ABDOMINAL SURGERY      radio frequency ablation   • BONE GRAFT Left 2018   • CARPAL TUNNEL RELEASE Right    • COLONOSCOPY     • CORONARY ANGIOPLASTY WITH STENT PLACEMENT      x2   • EGD AND COLONOSCOPY     • ESOPHAGOGASTRODUODENOSCOPY N/A 2/15/2018 Procedure: ESOPHAGOGASTRODUODENOSCOPY (EGD); Surgeon: Colten Orozco MD;  [facility];  Service: Gastroenterology   • ESOPHAGOGASTRODUODENOSCOPY N/A 12/10/2018    Procedure: ESOPHAGOGASTRODUODENOSCOPY (EGD); Surgeon: Caye Canavan, MD;  [facility];   Service: Gastroenterology   • HAND SURGERY Left    • [place] SURGICAL ARTHROSCOPY SHOULDER W/ROTATOR CUFF RPR Left 2021    Procedure: REPAIR ROTATOR CUFF  ARTHROSCOPIC; POSSIBLE BICEPS TENDONESIS, ACROMIOPLASTY;  Surgeon: Candy Moralez DO;  [facility];  Service: Orthopedics   • ROTATOR CUFF REPAIR Left    • SHOULDER ARTHROSCOPY Right 3/14/2022    Procedure: ARTHROSCOPY SHOULDER- Right shoulder arthroscopic rotator cuff repair, open subpectoral biceps tenodesis, subsacpular repair and extensive debridement;  Surgeon: Candy Moralez DO;  [facility];  Service: Orthopedics   • TONSILLECTOMY         FAMILY HISTORY:  Family History   Problem Relation Age of Onset   • Heart disease Father    • Melanoma Father    • Cancer Sister    • Skin cancer Sister    • Skin cancer Mother        SOCIAL HISTORY:  Social History     Socioeconomic History   • Marital status: /Civil Union     Spouse name: Not on file   • Number of children: Not on file   • Years of education: Not on file   • Highest education level: Not on file   Occupational History   • Not on file   Tobacco Use   • Smoking status: Former     Packs/day: 0 50     Types: Cigarettes     Quit date: 2007     Years since quittin 3   • Smokeless tobacco: Former     Quit date: 1998   • Tobacco comments:     quit 10 yrs ago   Vaping Use   • Vaping Use: Never used   Substance and Sexual Activity   • Alcohol use: Not Currently     Comment: quit 5 years   • Drug use: Yes     Frequency: 3 0 times per week     Types: Marijuana     Comment: advised not to smoke   • Sexual activity: Not Currently   Other Topics Concern   • Not on file   Social History Narrative   • "Not on file     Social Determinants of Health     Financial Resource Strain: Not on file   Food Insecurity: Not on file   Transportation Needs: Not on file   Physical Activity: Not on file   Stress: Not on file   Social Connections: Not on file   Intimate Partner Violence: Not on file   Housing Stability: Not on file       Review of Systems   Constitutional: Negative for chills and fever  HENT: Negative for ear pain and sore throat  Eyes: Negative for pain and visual disturbance  Respiratory: Negative for cough and shortness of breath  Cardiovascular: Negative for chest pain and palpitations  Gastrointestinal: Negative for abdominal pain (chronic abdominal pain) and vomiting  Genitourinary: Negative for dysuria and hematuria  Musculoskeletal: Negative for arthralgias and back pain  Skin: Negative for color change and rash  Neurological: Negative for seizures and syncope  All other systems reviewed and are negative  Objective:  Vitals:    05/09/23 0911   BP: 122/82   BP Location: Right arm   Patient Position: Sitting   Cuff Size: Adult   Pulse: 93   Resp: 16   Temp: (!) 97 4 °F (36 3 °C)   TempSrc: Tympanic   SpO2: 95%   Weight: 100 kg (220 lb 12 8 oz)   Height: 5' 10\" (1 778 m)     Body mass index is 31 68 kg/m²  Physical Exam  Vitals and nursing note reviewed  Constitutional:       Appearance: Normal appearance  He is obese  HENT:      Head: Normocephalic and atraumatic  Cardiovascular:      Rate and Rhythm: Normal rate and regular rhythm  Heart sounds: Normal heart sounds  Pulmonary:      Effort: Pulmonary effort is normal       Breath sounds: Normal breath sounds  Musculoskeletal:         General: Normal range of motion  Cervical back: Normal range of motion  Lymphadenopathy:      Cervical: No cervical adenopathy  Skin:     General: Skin is warm and dry  Neurological:      General: No focal deficit present        Mental Status: He is alert and oriented to " person, place, and time  Mental status is at baseline  Psychiatric:         Mood and Affect: Mood normal            RESULTS:    In chart    This note was created with voice recognition software  Phonic, grammatical and spelling errors may be present within the note as a result

## 2023-05-10 DIAGNOSIS — F13.939 BENZODIAZEPINE WITHDRAWAL (HCC): ICD-10-CM

## 2023-05-10 DIAGNOSIS — K22.711 BARRETT'S ESOPHAGUS WITH HIGH GRADE DYSPLASIA: ICD-10-CM

## 2023-05-10 RX ORDER — CLONAZEPAM 0.5 MG/1
1.5 TABLET ORAL
Qty: 90 TABLET | Refills: 0 | Status: SHIPPED | OUTPATIENT
Start: 2023-05-10 | End: 2023-06-09

## 2023-05-10 RX ORDER — ONDANSETRON 4 MG/1
4 TABLET, ORALLY DISINTEGRATING ORAL EVERY 6 HOURS PRN
Qty: 60 TABLET | Refills: 0 | Status: SHIPPED | OUTPATIENT
Start: 2023-05-10

## 2023-05-11 ENCOUNTER — TELEPHONE (OUTPATIENT)
Dept: OTHER | Facility: OTHER | Age: 58
End: 2023-05-11

## 2023-05-11 ENCOUNTER — ANESTHESIA EVENT (OUTPATIENT)
Dept: PREADMISSION TESTING | Facility: HOSPITAL | Age: 58
End: 2023-05-11

## 2023-05-11 ENCOUNTER — TELEPHONE (OUTPATIENT)
Dept: GASTROENTEROLOGY | Facility: CLINIC | Age: 58
End: 2023-05-11

## 2023-05-11 ENCOUNTER — ANESTHESIA (OUTPATIENT)
Dept: PREADMISSION TESTING | Facility: HOSPITAL | Age: 58
End: 2023-05-11

## 2023-05-11 ENCOUNTER — PREP FOR PROCEDURE (OUTPATIENT)
Dept: GASTROENTEROLOGY | Facility: CLINIC | Age: 58
End: 2023-05-11

## 2023-05-11 DIAGNOSIS — R11.15 CYCLICAL VOMITING: Primary | ICD-10-CM

## 2023-05-11 DIAGNOSIS — K22.70 BARRETT'S ESOPHAGUS WITHOUT DYSPLASIA: ICD-10-CM

## 2023-05-11 DIAGNOSIS — D12.6 ADENOMATOUS POLYP OF COLON, UNSPECIFIED PART OF COLON: ICD-10-CM

## 2023-05-11 LAB
PSA FREE MFR SERPL: 28.7 %
PSA FREE SERPL-MCNC: 0.43 NG/ML
PSA SERPL-MCNC: 1.5 NG/ML (ref 0–4)

## 2023-05-11 NOTE — TELEPHONE ENCOUNTER
L/M for patient to call back to reschl his procedures with Dr Kyle Latif - stress test needs to be completed before procedures are done

## 2023-05-11 NOTE — TELEPHONE ENCOUNTER
Called and rescheduled   egd/colon with Dr Raj Sneed  6/6/23  reviewed and My Charted prep Instructions

## 2023-05-11 NOTE — TELEPHONE ENCOUNTER
L/M  For patient to call back  Patient needs to reschedule his egd/colon with Dr Yessi Klein after his stress test is completed

## 2023-05-11 NOTE — TELEPHONE ENCOUNTER
L/M for patient to call back  he will need to reshl his procedures for after his Stress test is done

## 2023-05-17 ENCOUNTER — HOSPITAL ENCOUNTER (OUTPATIENT)
Dept: NON INVASIVE DIAGNOSTICS | Facility: CLINIC | Age: 58
Discharge: HOME | End: 2023-05-17

## 2023-05-17 VITALS
HEIGHT: 70 IN | BODY MASS INDEX: 31.5 KG/M2 | WEIGHT: 220 LBS | OXYGEN SATURATION: 99 % | SYSTOLIC BLOOD PRESSURE: 136 MMHG | DIASTOLIC BLOOD PRESSURE: 82 MMHG | HEART RATE: 81 BPM

## 2023-05-17 DIAGNOSIS — I25.118 CORONARY ARTERY DISEASE OF NATIVE ARTERY OF NATIVE HEART WITH STABLE ANGINA PECTORIS (HCC): ICD-10-CM

## 2023-05-17 DIAGNOSIS — E78.5 DYSLIPIDEMIA: ICD-10-CM

## 2023-05-17 DIAGNOSIS — E66.9 OBESITY (BMI 30.0-34.9): ICD-10-CM

## 2023-05-17 LAB
MAX HR PERCENT: 98 %
MAX HR: 160 BPM
RATE PRESSURE PRODUCT: NORMAL
SL CV STRESS RECOVERY BP: NORMAL MMHG
SL CV STRESS RECOVERY HR: 108 BPM
SL CV STRESS RECOVERY O2 SAT: 97 %
SL CV STRESS STAGE REACHED: 3
STRESS ANGINA INDEX: 0
STRESS BASELINE BP: NORMAL MMHG
STRESS BASELINE HR: 81 BPM
STRESS O2 SAT REST: 99 %
STRESS PEAK HR: 160 BPM
STRESS POST ESTIMATED WORKLOAD: 10.1 METS
STRESS POST EXERCISE DUR MIN: 9 MIN
STRESS POST O2 SAT PEAK: 97 %
STRESS POST PEAK BP: 166 MMHG

## 2023-05-19 LAB
CHEST PAIN STATEMENT: NORMAL
CHEST PAIN STATEMENT: NORMAL
MAX DIASTOLIC BP: 102 MMHG
MAX DIASTOLIC BP: 102 MMHG
MAX HEART RATE: 160 BPM
MAX HEART RATE: 160 BPM
MAX PREDICTED HEART RATE: 162 BPM
MAX PREDICTED HEART RATE: 162 BPM
MAX. SYSTOLIC BP: 166 MMHG
MAX. SYSTOLIC BP: 166 MMHG
PROTOCOL NAME: NORMAL
PROTOCOL NAME: NORMAL
TARGET HR FORMULA: NORMAL
TARGET HR FORMULA: NORMAL
TEST INDICATION: NORMAL
TEST INDICATION: NORMAL
TIME IN EXERCISE PHASE: NORMAL
TIME IN EXERCISE PHASE: NORMAL

## 2023-05-25 ENCOUNTER — OFFICE VISIT (OUTPATIENT)
Dept: DERMATOLOGY | Facility: CLINIC | Age: 58
End: 2023-05-25

## 2023-05-25 VITALS — BODY MASS INDEX: 30.78 KG/M2 | WEIGHT: 215 LBS | TEMPERATURE: 97.5 F | HEIGHT: 70 IN

## 2023-05-25 DIAGNOSIS — D18.01 CHERRY ANGIOMA: ICD-10-CM

## 2023-05-25 DIAGNOSIS — L85.3 XEROSIS OF SKIN: ICD-10-CM

## 2023-05-25 DIAGNOSIS — L81.4 LENTIGO: ICD-10-CM

## 2023-05-25 DIAGNOSIS — L82.1 SEBORRHEIC KERATOSIS: ICD-10-CM

## 2023-05-25 DIAGNOSIS — D22.9 MULTIPLE BENIGN MELANOCYTIC NEVI: Primary | ICD-10-CM

## 2023-05-25 NOTE — PROGRESS NOTES
"[facility] Note     Patient Name: Haley Chung  Encounter Date: 5/25/23     Have you been cared for by a [place] [id number] Dermatologist in the last 3 years and, if so, which description applies to you? Yes  I have been here within the last 3 years, and my medical history has NOT changed since that time  I am MALE/not capable of bearing children  REVIEW OF SYSTEMS:  Have you recently had or currently have any of the following? · No changes in my recent health  PAST MEDICAL HISTORY:  Have you personally ever had or currently have any of the following? If \"YES,\" then please provide more detail  · No changes in my medical history  FAMILY HISTORY:  Any \"first degree relatives\" (parent, brother, sister, or child) with the following? • No changes in my family's known health  PATIENT EXPERIENCE:    • Do you want the Dermatologist to perform a COMPLETE skin exam today including a clinical examination under the \"bra and underwear\" areas? Yes  • If necessary, do we have your permission to call and leave a detailed message on your Preferred Phone number that includes your specific medical information?   Yes      Allergies   Allergen Reactions   • Rosuvastatin Myalgia      Current Outpatient Medications:   •  atorvastatin (LIPITOR) 80 mg tablet, Take 1 tablet (80 mg total) by mouth daily, Disp: 90 tablet, Rfl: 0  •  clonazePAM (KlonoPIN) 0 5 mg tablet, Take 3 tablets (1 5 mg total) by mouth daily at bedtime, Disp: 90 tablet, Rfl: 0  •  metoclopramide (Reglan) 10 mg tablet, Take 1 tablet (10 mg total) by mouth every 6 (six) hours as needed (nausea/vomiting), Disp: 60 tablet, Rfl: 3  •  omeprazole (PriLOSEC) 40 MG capsule, TAKE 1 CAPSULE BY MOUTH TWICE A DAY, Disp: 180 capsule, Rfl: 1  •  ondansetron (ZOFRAN-ODT) 4 mg disintegrating tablet, Take 1 tablet (4 mg total) by mouth every 6 (six) hours as needed for nausea or vomiting, Disp: 60 tablet, Rfl: 0  •  sertraline (ZOLOFT) 50 mg tablet, Take 1 " "tablet (50 mg total) by mouth daily, Disp: 90 tablet, Rfl: 3          • Whom besides the patient is providing clinical information about today's encounter?   o NO ADDITIONAL HISTORIAN (patient alone provided history)    Physical Exam and Assessment/Plan by Diagnosis:    MELANOCYTIC NEVI (\"Moles\")    Physical Exam:  • Anatomic Location Affected:   Mostly on sun-exposed areas of the trunk and extremities  • Morphological Description:  Scattered, 1-4mm round to ovoid, symmetrical-appearing, even bordered, skin colored to dark brown macules/papules, mostly in sun-exposed areas  • Pertinent Positives:  • Pertinent Negatives: Additional History of Present Condition:      Assessment and Plan:  Based on a thorough discussion of this condition and the management approach to it (including a comprehensive discussion of the known risks, side effects and potential benefits of treatment), the patient (family) agrees to implement the following specific plan:  • When outside we recommend using a wide brim hat, sunglasses, long sleeve and pants, sunscreen with SPF 16+ with reapplication every 2 hours, or SPF specific clothing   • Benign, reassured  • Annual skin check     Melanocytic Nevi  Melanocytic nevi (\"moles\") are tan or brown, raised or flat areas of the skin which have an increased number of melanocytes  Melanocytes are the cells in our body which make pigment and account for skin color  Some moles are present at birth (I e , \"congenital nevi\"), while others come up later in life (i e , \"acquired nevi\")  The sun can stimulate the body to make more moles  Sunburns are not the only thing that triggers more moles  Chronic sun exposure can do it too  Clinically distinguishing a healthy mole from melanoma may be difficult, even for experienced dermatologists  The \"ABCDE's\" of moles have been suggested as a means of helping to alert a person to a suspicious mole and the possible increased risk of melanoma    The " "suggestions for raising alert are as follows:    Asymmetry: Healthy moles tend to be symmetric, while melanomas are often asymmetric  Asymmetry means if you draw a line through the mole, the two halves do not match in color, size, shape, or surface texture  Asymmetry can be a result of rapid enlargement of a mole, the development of a raised area on a previously flat lesion, scaling, ulceration, bleeding or scabbing within the mole  Any mole that starts to demonstrate \"asymmetry\" should be examined promptly by a board certified dermatologist      Border: Healthy moles tend to have discrete, even borders  The border of a melanoma often blends into the normal skin and does not sharply delineate the mole from normal skin  Any mole that starts to demonstrate \"uneven borders\" should be examined promptly by a board certified dermatologist      Color: Healthy moles tend to be one color throughout  Melanomas tend to be made up of different colors ranging from dark black, blue, white, or red  Any mole that demonstrates a color change should be examined promptly by a board certified dermatologist      Diameter: Healthy moles tend to be smaller than 0 6 cm in size; an exception are \"congenital nevi\" that can be larger  Melanomas tend to grow and can often be greater than 0 6 cm (1/4 of an inch, or the size of a pencil eraser)  This is only a guideline, and many normal moles may be larger than 0 6 cm without being unhealthy  Any mole that starts to change in size (small to bigger or bigger to smaller) should be examined promptly by a board certified dermatologist      Evolving: Healthy moles tend to \"stay the same  \"  Melanomas may often show signs of change or evolution such as a change in size, shape, color, or elevation    Any mole that starts to itch, bleed, crust, burn, hurt, or ulcerate or demonstrate a change or evolution should be examined promptly by a board certified dermatologist         Solis Douglas    Physical " Exam:  • Anatomic Location Affected:  trunk, arms  • Morphological Description:  Light brown macules  • Pertinent Positives:  • Pertinent Negatives: Additional History of Present Condition:      Assessment and Plan:  Based on a thorough discussion of this condition and the management approach to it (including a comprehensive discussion of the known risks, side effects and potential benefits of treatment), the patient (family) agrees to implement the following specific plan:  • When outside we recommend using a wide brim hat, sunglasses, long sleeve and pants, sunscreen with SPF 60+ with reapplication every 2 hours, or SPF specific clothing       What is a lentigo? A lentigo is a pigmented flat or slightly raised lesion with a clearly defined edge  Unlike an ephelis (freckle), it does not fade in the winter months  There are several kinds of lentigo  The name lentigo originally referred to its appearance resembling a small lentil  The plural of lentigo is lentigines, although “lentigos” is also in common use  Who gets lentigines? Lentigines can affect males and females of all ages and races  Solar lentigines are especially prevalent in fair skinned adults  Lentigines associated with syndromes are present at birth or arise during childhood  What causes lentigines? Common forms of lentigo are due to exposure to ultraviolet radiation:  • Sun damage including sunburn   • Indoor tanning   • Phototherapy, especially photochemotherapy (PUVA)    Ionizing radiation, eg radiation therapy, can also cause lentigines  Several familial syndromes associated with widespread lentigines originate from mutations in Catracho-MAP kinase, mTOR signaling and PTEN pathways  What is the treatment for lentigines? Most lentigines are left alone  Attempts to lighten them may not be successful   The following approaches are used:  • SPF 50+ broad-spectrum sunscreen   • Hydroquinone bleaching cream   • Alpha hydroxy acids   • Vitamin "C   • Retinoids   • Azelaic acid   • Chemical peels  Individual lesions can be permanently removed using:  • Cryotherapy   • Intense pulsed light   • Pigment lasers    How can lentigines be prevented? Lentigines associated with exposure ultraviolet radiation can be prevented by very careful sun protection  Clothing is more successful at preventing new lentigines than are sunscreens  What is the outlook for lentigines? Lentigines usually persist  They may increase in number with age and sun exposure  Some in sun-protected sites may fade and disappear  KAUR ANGIOMAS    Physical Exam:  • Anatomic Location Affected:  trunk  • Morphological Description:  Scattered cherry red, 1-4 mm papules  • Pertinent Positives:  • Pertinent Negatives: Additional History of Present Condition:      Assessment and Plan:  Based on a thorough discussion of this condition and the management approach to it (including a comprehensive discussion of the known risks, side effects and potential benefits of treatment), the patient (family) agrees to implement the following specific plan:  • Monitor for changes  • Benign, reassured  •     Assessment and Plan:    Cherry angioma, also known as Carbon Cecelia spots, are benign vascular skin lesions  A \"cherry angioma\" is a firm red, blue or purple papule, 0 1-1 cm in diameter  When thrombosed, they can appear black in colour until evaluated with a dermatoscope when the red or purple colour is more easily seen  Cherry angioma may develop on any part of the body but most often appear on the scalp, face, lips and trunk  An angioma is due to proliferating endothelial cells; these are the cells that line the inside of a blood vessel  Angiomas can arise in early life or later in life; the most common type of angioma is a cherry angioma  Cherry angiomas are very common in males and females of any age or race  They are more noticeable in white skin than in skin of colour   They markedly " "increase in number from about the age of 36  There may be a family history of similar lesions  Eruptive cherry angiomas have been rarely reported to be associated with internal malignancy  The cause of angiomas is unknown  Genetic analysis of cherry angiomas has shown that they frequently carry specific somatic missense mutations in the GNAQ and GNA11 (Q209H) genes, which are involved in other vascular and melanocytic proliferations  SEBORRHEIC KERATOSIS; NON-INFLAMED    Physical Exam:  • Anatomic Location Affected:  Trunk, right thigh   • Morphological Description:  Flat and raised, waxy, smooth to warty textured, yellow to brownish-grey to dark brown to blackish, discrete, \"stuck-on\" appearing papules  • Pertinent Positives:  • Pertinent Negatives: Additional History of Present Condition:      Assessment and Plan:  Based on a thorough discussion of this condition and the management approach to it (including a comprehensive discussion of the known risks, side effects and potential benefits of treatment), the patient (family) agrees to implement the following specific plan:  • Monitor for changes  • Benign, reassured  •     Seborrheic Keratosis  A seborrheic keratosis is a harmless warty spot that appears during adult life as a common sign of skin aging  Seborrheic keratoses can arise on any area of skin, covered or uncovered, with the usual exception of the palms and soles  They do not arise from mucous membranes  Seborrheic keratoses can have highly variable appearance  Seborrheic keratoses are extremely common  It has been estimated that over 90% of adults over the age of 61 years have one or more of them  They occur in males and females of all races, typically beginning to erupt in the 35s or 45s  They are uncommon under the age of 21 years  The precise cause of seborrhoeic keratoses is not known  Seborrhoeic keratoses are considered degenerative in nature   As time goes by, seborrheic keratoses " "tend to become more numerous  Some people inherit a tendency to develop a very large number of them; some people may have hundreds of them  There is no easy way to remove multiple lesions on a single occasion  Unless a specific lesion is \"inflamed\" and is causing pain or stinging/burning or is bleeding, most insurance companies do not authorize treatment  XEROSIS (\"DRY SKIN\")    Physical Exam:  • Anatomic Location Affected:  diffuse  • Morphological Description:  xerosis  • Pertinent Positives:  • Pertinent Negatives: Additional History of Present Condition:      Assessment and Plan:  Based on a thorough discussion of this condition and the management approach to it (including a comprehensive discussion of the known risks, side effects and potential benefits of treatment), the patient (family) agrees to implement the following specific plan:  • Use moisturizer like Eucerin,Cerave or Aveeno Cream 3 times a day for the dry skin   •   •    •     Dry skin refers to skin that feels dry to touch  Dry skin has a dull surface with a rough, scaly quality  The skin is less pliable and cracked  When dryness is severe, the skin may become inflamed and fissured  Although any body site can be dry, dry skin tends to affect the shins more than any other site  Dry skin is lacking moisture in the outer horny cell layer (stratum corneum) and this results in cracks in the skin surface  Dry skin is also called xerosis, xeroderma or asteatosis (lack of fat)  It can affect males and females of all ages  There is some racial variability in water and lipid content of the skin  • Dry skin that starts in early childhood may be one of about 20 types of ichthyosis (fish-scale skin)  There is often a family history of dry skin  • Dry skin is commonly seen in people with atopic dermatitis  • Nearly everyone > 60 years has dry skin      Dry skin that begins later may be seen in people with certain diseases and " conditions  • Postmenopausal women  • Hypothyroidism  • Chronic renal disease   • Malnutrition and weight loss   • Subclinical dermatitis   • Treatment with certain drugs such as oral retinoids, diuretics and epidermal growth factor receptor inhibitors      What is the treatment for dry skin? The mainstay of treatment of dry skin and ichthyosis is moisturisers/emollients  They should be applied liberally and often enough to:  • Reduce itch   • Improve the barrier function   • Prevent entry of irritants, bacteria   • Reduce transepidermal water loss  How can dry skin be prevented? Eliminate aggravating factors:  • Reduce the frequency of bathing  • A humidifier in winter and air conditioner in summer   • Compare having a short shower with a prolonged soak in a bath  • Use lukewarm, not hot, water  • Replace standard soap with a substitute such as a synthetic detergent cleanser, water-miscible emollient, bath oil, anti-pruritic tar oil, colloidal oatmeal etc    • Apply an emollient liberally and often, particularly shortly after bathing, and when itchy  The drier the skin, the thicker this should be, especially on the hands  What is the outlook for dry skin? A tendency to dry skin may persist life-long, or it may improve once contributing factors are controlled      Scribe Attestation    I,:  Avelino Shaffer am acting as a scribe while in the presence of the attending physician :       I,:  Morales Gracia MD personally performed the services described in this documentation    as scribed in my presence :

## 2023-05-25 NOTE — PATIENT INSTRUCTIONS
"MELANOCYTIC NEVI (\"Moles\")    Assessment and Plan:  Based on a thorough discussion of this condition and the management approach to it (including a comprehensive discussion of the known risks, side effects and potential benefits of treatment), the patient (family) agrees to implement the following specific plan:  When outside we recommend using a wide brim hat, sunglasses, long sleeve and pants, sunscreen with SPF 04+ with reapplication every 2 hours, or SPF specific clothing   Benign, reassured  Annual skin check     Melanocytic Nevi  Melanocytic nevi (\"moles\") are tan or brown, raised or flat areas of the skin which have an increased number of melanocytes  Melanocytes are the cells in our body which make pigment and account for skin color  Some moles are present at birth (I e , \"congenital nevi\"), while others come up later in life (i e , \"acquired nevi\")  The sun can stimulate the body to make more moles  Sunburns are not the only thing that triggers more moles  Chronic sun exposure can do it too  Clinically distinguishing a healthy mole from melanoma may be difficult, even for experienced dermatologists  The \"ABCDE's\" of moles have been suggested as a means of helping to alert a person to a suspicious mole and the possible increased risk of melanoma  The suggestions for raising alert are as follows:    Asymmetry: Healthy moles tend to be symmetric, while melanomas are often asymmetric  Asymmetry means if you draw a line through the mole, the two halves do not match in color, size, shape, or surface texture  Asymmetry can be a result of rapid enlargement of a mole, the development of a raised area on a previously flat lesion, scaling, ulceration, bleeding or scabbing within the mole  Any mole that starts to demonstrate \"asymmetry\" should be examined promptly by a board certified dermatologist      Border: Healthy moles tend to have discrete, even borders    The border of a melanoma often blends into " "the normal skin and does not sharply delineate the mole from normal skin  Any mole that starts to demonstrate \"uneven borders\" should be examined promptly by a board certified dermatologist      Color: Healthy moles tend to be one color throughout  Melanomas tend to be made up of different colors ranging from dark black, blue, white, or red  Any mole that demonstrates a color change should be examined promptly by a board certified dermatologist      Diameter: Healthy moles tend to be smaller than 0 6 cm in size; an exception are \"congenital nevi\" that can be larger  Melanomas tend to grow and can often be greater than 0 6 cm (1/4 of an inch, or the size of a pencil eraser)  This is only a guideline, and many normal moles may be larger than 0 6 cm without being unhealthy  Any mole that starts to change in size (small to bigger or bigger to smaller) should be examined promptly by a board certified dermatologist      Evolving: Healthy moles tend to \"stay the same  \"  Melanomas may often show signs of change or evolution such as a change in size, shape, color, or elevation  Any mole that starts to itch, bleed, crust, burn, hurt, or ulcerate or demonstrate a change or evolution should be examined promptly by a board certified dermatologist         Chelle Swan and Plan:  Based on a thorough discussion of this condition and the management approach to it (including a comprehensive discussion of the known risks, side effects and potential benefits of treatment), the patient (family) agrees to implement the following specific plan:  When outside we recommend using a wide brim hat, sunglasses, long sleeve and pants, sunscreen with SPF 66+ with reapplication every 2 hours, or SPF specific clothing       What is a lentigo? A lentigo is a pigmented flat or slightly raised lesion with a clearly defined edge  Unlike an ephelis (freckle), it does not fade in the winter months  There are several kinds of lentigo    The " name lentigo originally referred to its appearance resembling a small lentil  The plural of lentigo is lentigines, although “lentigos” is also in common use  Who gets lentigines? Lentigines can affect males and females of all ages and races  Solar lentigines are especially prevalent in fair skinned adults  Lentigines associated with syndromes are present at birth or arise during childhood  What causes lentigines? Common forms of lentigo are due to exposure to ultraviolet radiation:  Sun damage including sunburn   Indoor tanning   Phototherapy, especially photochemotherapy (PUVA)    Ionizing radiation, eg radiation therapy, can also cause lentigines  Several familial syndromes associated with widespread lentigines originate from mutations in Catracho-MAP kinase, mTOR signaling and PTEN pathways  What is the treatment for lentigines? Most lentigines are left alone  Attempts to lighten them may not be successful  The following approaches are used:  SPF 50+ broad-spectrum sunscreen   Hydroquinone bleaching cream   Alpha hydroxy acids   Vitamin C   Retinoids   Azelaic acid   Chemical peels  Individual lesions can be permanently removed using:  Cryotherapy   Intense pulsed light   Pigment lasers    How can lentigines be prevented? Lentigines associated with exposure ultraviolet radiation can be prevented by very careful sun protection  Clothing is more successful at preventing new lentigines than are sunscreens  What is the outlook for lentigines? Lentigines usually persist  They may increase in number with age and sun exposure  Some in sun-protected sites may fade and disappear      KAUR ANGIOMAS    Assessment and Plan:  Based on a thorough discussion of this condition and the management approach to it (including a comprehensive discussion of the known risks, side effects and potential benefits of treatment), the patient (family) agrees to implement the following specific plan:  Monitor for changes  Benign, "reassured      Assessment and Plan:    Cherry angioma, also known as [employer] spots, are benign vascular skin lesions  A \"cherry angioma\" is a firm red, blue or purple papule, 0 1-1 cm in diameter  When thrombosed, they can appear black in colour until evaluated with a dermatoscope when the red or purple colour is more easily seen  Cherry angioma may develop on any part of the body but most often appear on the scalp, face, lips and trunk  An angioma is due to proliferating endothelial cells; these are the cells that line the inside of a blood vessel  Angiomas can arise in early life or later in life; the most common type of angioma is a cherry angioma  Cherry angiomas are very common in males and females of any age or race  They are more noticeable in white skin than in skin of colour  They markedly increase in number from about the age of 36  There may be a family history of similar lesions  Eruptive cherry angiomas have been rarely reported to be associated with internal malignancy  The cause of angiomas is unknown  Genetic analysis of cherry angiomas has shown that they frequently carry specific somatic missense mutations in the GNAQ and GNA11 (Q209H) genes, which are involved in other vascular and melanocytic proliferations  SEBORRHEIC KERATOSIS; NON-INFLAMED    Assessment and Plan:  Based on a thorough discussion of this condition and the management approach to it (including a comprehensive discussion of the known risks, side effects and potential benefits of treatment), the patient (family) agrees to implement the following specific plan:  Monitor for changes  Benign, reassured      Seborrheic Keratosis  A seborrheic keratosis is a harmless warty spot that appears during adult life as a common sign of skin aging  Seborrheic keratoses can arise on any area of skin, covered or uncovered, with the usual exception of the palms and soles  They do not arise from mucous membranes   Seborrheic " "keratoses can have highly variable appearance  Seborrheic keratoses are extremely common  It has been estimated that over 90% of adults over the age of 61 years have one or more of them  They occur in males and females of all races, typically beginning to erupt in the 35s or 45s  They are uncommon under the age of 21 years  The precise cause of seborrhoeic keratoses is not known  Seborrhoeic keratoses are considered degenerative in nature  As time goes by, seborrheic keratoses tend to become more numerous  Some people inherit a tendency to develop a very large number of them; some people may have hundreds of them  There is no easy way to remove multiple lesions on a single occasion  Unless a specific lesion is \"inflamed\" and is causing pain or stinging/burning or is bleeding, most insurance companies do not authorize treatment  XEROSIS (\"DRY SKIN\")    Assessment and Plan:  Based on a thorough discussion of this condition and the management approach to it (including a comprehensive discussion of the known risks, side effects and potential benefits of treatment), the patient (family) agrees to implement the following specific plan:  Use moisturizer like Eucerin,Cerave or Aveeno Cream 3 times a day for the dry skin            Dry skin refers to skin that feels dry to touch  Dry skin has a dull surface with a rough, scaly quality  The skin is less pliable and cracked  When dryness is severe, the skin may become inflamed and fissured  Although any body site can be dry, dry skin tends to affect the shins more than any other site  Dry skin is lacking moisture in the outer horny cell layer (stratum corneum) and this results in cracks in the skin surface  Dry skin is also called xerosis, xeroderma or asteatosis (lack of fat)  It can affect males and females of all ages  There is some racial variability in water and lipid content of the skin    Dry skin that starts in early childhood may be one of about " 20 types of ichthyosis (fish-scale skin)  There is often a family history of dry skin  Dry skin is commonly seen in people with atopic dermatitis  Nearly everyone > 60 years has dry skin  Dry skin that begins later may be seen in people with certain diseases and conditions  Postmenopausal women  Hypothyroidism  Chronic renal disease   Malnutrition and weight loss   Subclinical dermatitis   Treatment with certain drugs such as oral retinoids, diuretics and epidermal growth factor receptor inhibitors      What is the treatment for dry skin? The mainstay of treatment of dry skin and ichthyosis is moisturisers/emollients  They should be applied liberally and often enough to:  Reduce itch   Improve the barrier function   Prevent entry of irritants, bacteria   Reduce transepidermal water loss  How can dry skin be prevented? Eliminate aggravating factors:  Reduce the frequency of bathing  A humidifier in winter and air conditioner in summer   Compare having a short shower with a prolonged soak in a bath  Use lukewarm, not hot, water  Replace standard soap with a substitute such as a synthetic detergent cleanser, water-miscible emollient, bath oil, anti-pruritic tar oil, colloidal oatmeal etc    Apply an emollient liberally and often, particularly shortly after bathing, and when itchy  The drier the skin, the thicker this should be, especially on the hands  What is the outlook for dry skin? A tendency to dry skin may persist life-long, or it may improve once contributing factors are controlled

## 2023-06-05 RX ORDER — SODIUM CHLORIDE, SODIUM LACTATE, POTASSIUM CHLORIDE, CALCIUM CHLORIDE 600; 310; 30; 20 MG/100ML; MG/100ML; MG/100ML; MG/100ML
100 INJECTION, SOLUTION INTRAVENOUS CONTINUOUS
Status: CANCELLED | OUTPATIENT
Start: 2023-06-05

## 2023-06-06 ENCOUNTER — ANESTHESIA (OUTPATIENT)
Dept: GASTROENTEROLOGY | Facility: HOSPITAL | Age: 58
End: 2023-06-06

## 2023-06-06 ENCOUNTER — ANESTHESIA EVENT (OUTPATIENT)
Dept: GASTROENTEROLOGY | Facility: HOSPITAL | Age: 58
End: 2023-06-06

## 2023-06-06 ENCOUNTER — HOSPITAL ENCOUNTER (OUTPATIENT)
Dept: GASTROENTEROLOGY | Facility: HOSPITAL | Age: 58
Setting detail: OUTPATIENT SURGERY
Discharge: HOME | End: 2023-06-06
Attending: INTERNAL MEDICINE
Payer: COMMERCIAL

## 2023-06-06 VITALS
RESPIRATION RATE: 22 BRPM | DIASTOLIC BLOOD PRESSURE: 84 MMHG | OXYGEN SATURATION: 96 % | SYSTOLIC BLOOD PRESSURE: 120 MMHG | TEMPERATURE: 97.5 F | BODY MASS INDEX: 31.93 KG/M2 | WEIGHT: 215.61 LBS | HEIGHT: 69 IN | HEART RATE: 81 BPM

## 2023-06-06 DIAGNOSIS — K22.70 BARRETT'S ESOPHAGUS WITHOUT DYSPLASIA: ICD-10-CM

## 2023-06-06 DIAGNOSIS — D12.6 ADENOMATOUS POLYP OF COLON, UNSPECIFIED PART OF COLON: ICD-10-CM

## 2023-06-06 DIAGNOSIS — R11.15 CYCLICAL VOMITING: ICD-10-CM

## 2023-06-06 PROCEDURE — 88305 TISSUE EXAM BY PATHOLOGIST: CPT | Performed by: PATHOLOGY

## 2023-06-06 RX ORDER — GLYCOPYRROLATE 0.2 MG/ML
INJECTION INTRAMUSCULAR; INTRAVENOUS AS NEEDED
Status: DISCONTINUED | OUTPATIENT
Start: 2023-06-06 | End: 2023-06-06

## 2023-06-06 RX ORDER — PROPOFOL 10 MG/ML
INJECTION, EMULSION INTRAVENOUS AS NEEDED
Status: DISCONTINUED | OUTPATIENT
Start: 2023-06-06 | End: 2023-06-06

## 2023-06-06 RX ORDER — SODIUM CHLORIDE, SODIUM LACTATE, POTASSIUM CHLORIDE, CALCIUM CHLORIDE 600; 310; 30; 20 MG/100ML; MG/100ML; MG/100ML; MG/100ML
INJECTION, SOLUTION INTRAVENOUS CONTINUOUS PRN
Status: DISCONTINUED | OUTPATIENT
Start: 2023-06-06 | End: 2023-06-06

## 2023-06-06 RX ORDER — LIDOCAINE HYDROCHLORIDE 20 MG/ML
INJECTION, SOLUTION EPIDURAL; INFILTRATION; INTRACAUDAL; PERINEURAL AS NEEDED
Status: DISCONTINUED | OUTPATIENT
Start: 2023-06-06 | End: 2023-06-06

## 2023-06-06 RX ADMIN — PROPOFOL 100 MG: 10 INJECTION, EMULSION INTRAVENOUS at 12:42

## 2023-06-06 RX ADMIN — SODIUM CHLORIDE, SODIUM LACTATE, POTASSIUM CHLORIDE, AND CALCIUM CHLORIDE: .6; .31; .03; .02 INJECTION, SOLUTION INTRAVENOUS at 12:25

## 2023-06-06 RX ADMIN — PROPOFOL 30 MG: 10 INJECTION, EMULSION INTRAVENOUS at 12:47

## 2023-06-06 RX ADMIN — PROPOFOL 50 MG: 10 INJECTION, EMULSION INTRAVENOUS at 12:31

## 2023-06-06 RX ADMIN — LIDOCAINE HYDROCHLORIDE 100 MG: 20 INJECTION, SOLUTION EPIDURAL; INFILTRATION; INTRACAUDAL at 12:28

## 2023-06-06 RX ADMIN — GLYCOPYRROLATE 0.2 MCG: 0.2 INJECTION, SOLUTION INTRAMUSCULAR; INTRAVENOUS at 12:41

## 2023-06-06 RX ADMIN — PROPOFOL 50 MG: 10 INJECTION, EMULSION INTRAVENOUS at 12:36

## 2023-06-06 RX ADMIN — PROPOFOL 30 MG: 10 INJECTION, EMULSION INTRAVENOUS at 12:55

## 2023-06-06 RX ADMIN — PROPOFOL 150 MG: 10 INJECTION, EMULSION INTRAVENOUS at 12:28

## 2023-06-06 RX ADMIN — PROPOFOL 30 MG: 10 INJECTION, EMULSION INTRAVENOUS at 12:51

## 2023-06-06 RX ADMIN — PROPOFOL 50 MG: 10 INJECTION, EMULSION INTRAVENOUS at 12:44

## 2023-06-06 NOTE — H&P
History and Physical - [facility]  Juan Manuel Grier 61 y o  male MRN: [id number]      HPI: Juan Manuel Grier is a 61y o  year old male who presents for evaluation of vomiting, Diaz's esophagus, history of colon polyp      REVIEW OF SYSTEMS: Per the HPI, and otherwise unremarkable  Historical Information   Past Medical History:   Diagnosis Date   • Diaz's esophagus    • Colon polyp    • Coronary artery disease 2019    RCA stenting X 2 in 2019   • Depression    • Diverticulitis    • Ear problems    • GERD (gastroesophageal reflux disease)    • Hemorrhoid    • History of heart artery stent    • Hyperlipidemia    • Hypertension    • Microscopic colitis    • Ulcerative colitis ([facility] [address] )      Past Surgical History:   Procedure Laterality Date   • ABDOMINAL SURGERY      radio frequency ablation   • BONE GRAFT Left 2018   • CARPAL TUNNEL RELEASE Right    • COLONOSCOPY     • CORONARY ANGIOPLASTY WITH STENT PLACEMENT      x2   • EGD AND COLONOSCOPY     • ESOPHAGOGASTRODUODENOSCOPY N/A 2/15/2018    Procedure: ESOPHAGOGASTRODUODENOSCOPY (EGD); Surgeon: Charline Rios MD;  Location: [facility];  Service: Gastroenterology   • ESOPHAGOGASTRODUODENOSCOPY N/A 12/10/2018    Procedure: ESOPHAGOGASTRODUODENOSCOPY (EGD); Surgeon: Taryn Ellison MD;  Location: [facility];   Service: Gastroenterology   • HAND SURGERY Left    • [place] SURGICAL ARTHROSCOPY SHOULDER W/ROTATOR CUFF RPR Left 4/14/2021    Procedure: REPAIR ROTATOR CUFF  ARTHROSCOPIC; POSSIBLE BICEPS TENDONESIS, ACROMIOPLASTY;  Surgeon: Prasanna Berry DO;  Location: [facility];  Service: Orthopedics   • ROTATOR CUFF REPAIR Left    • SHOULDER ARTHROSCOPY Right 3/14/2022    Procedure: ARTHROSCOPY SHOULDER- Right shoulder arthroscopic rotator cuff repair, open subpectoral biceps tenodesis, subsacpular repair and extensive debridement;  Surgeon: Prasanna Berry DO;  [facility];  Service: Orthopedics   • TONSILLECTOMY       Social History "  Social History     Substance and Sexual Activity   Alcohol Use Not Currently    Comment: quit 5 years     Social History     Substance and Sexual Activity   Drug Use Yes   • Frequency: 3 0 times per week   • Types: Marijuana    Comment: advised not to smoke     Social History     Tobacco Use   Smoking Status Former   • Packs/day: 0 50   • Types: Cigarettes   • Quit date: 2007   • Years since quittin 3   Smokeless Tobacco Former   • Quit date: 1998   Tobacco Comments    quit 10 yrs ago     Family History   Problem Relation Age of Onset   • Heart disease Father    • Melanoma Father    • Cancer Sister    • Skin cancer Sister    • Skin cancer Mother        Meds/Allergies     (Not in a hospital admission)      Allergies   Allergen Reactions   • Rosuvastatin Myalgia       Objective     Blood pressure 134/87, pulse 98, temperature (!) 97 °F (36 1 °C), temperature source Temporal, resp  rate 12, height 5' 9\" (1 753 m), weight 97 8 kg (215 lb 9 8 oz), SpO2 96 %  PHYSICAL EXAM    Gen: NAD  CV: RRR  CHEST: Clear  ABD: soft, NT/ND  EXT: no edema      ASSESSMENT/PLAN:  This is a 61y o  year old male here for EGD, colonoscopy, and he is stable and optimized for his procedure          "

## 2023-06-06 NOTE — ANESTHESIA PREPROCEDURE EVALUATION
Medical History    History Comments   Hypertension    Diverticulitis    GERD (gastroesophageal reflux disease)    Diaz's esophagus    Hemorrhoid    Hyperlipidemia    Depression    Coronary artery disease RCA stenting X 2 in 2019   Microscopic colitis    History of heart artery stent    Colon polyp    Ulcerative colitis ([facility] [address] )    Ear problems      Procedure:  COLONOSCOPY  EGD    Relevant Problems   ANESTHESIA (within normal limits)      CARDIO   (+) Coronary artery disease of native artery of native heart with stable angina pectoris (HCC)      GI/HEPATIC   (+) GERD (gastroesophageal reflux disease)      NEURO/PSYCH   (+) Anxiety and depression   (+) Depression, recurrent (HCC)        Physical Exam    Airway    Mallampati score: II  TM Distance: >3 FB  Neck ROM: full     Dental       Cardiovascular  Rate: normal,     Pulmonary  Pulmonary exam normal     Other Findings  Per pt denies anything remaining that is loose or removeable      Anesthesia Plan  ASA Score- 3     Anesthesia Type- IV sedation with anesthesia with ASA Monitors  Additional Monitors:   Airway Plan:     Comment: Per patient, appropriately NPO, denies active CP/SOB/wheezing/symptoms related to heartburn/nausea/vomiting  Plan Factors-Exercise tolerance (METS): >4 METS  Chart reviewed  Patient summary reviewed  Patient is a current smoker  Induction- intravenous  Postoperative Plan-     Informed Consent- Anesthetic plan and risks discussed with patient  I personally reviewed this patient with the CRNA  Discussed and agreed on the Anesthesia Plan with the CRNA  Jade Maciel

## 2023-06-06 NOTE — ANESTHESIA POSTPROCEDURE EVALUATION
Post-Op Assessment Note    CV Status:  Stable  Pain Score: 0    Pain management: adequate     Mental Status:  Alert and awake   Hydration Status:  Euvolemic   PONV Controlled:  Controlled   Airway Patency:  Patent      Post Op Vitals Reviewed: Yes      Staff: CRNA         There were no known notable events for this encounter      /78 (06/06/23 1258)    Temp 97 5 °F (36 4 °C) (06/06/23 1258)    Pulse 70 (06/06/23 1258)   Resp 20 (06/06/23 1258)    SpO2 96 % (06/06/23 1258)

## 2023-06-07 DIAGNOSIS — F13.939 BENZODIAZEPINE WITHDRAWAL (HCC): ICD-10-CM

## 2023-06-07 RX ORDER — CLONAZEPAM 0.5 MG/1
1.5 TABLET ORAL
Qty: 90 TABLET | Refills: 0 | Status: SHIPPED | OUTPATIENT
Start: 2023-06-07 | End: 2023-07-07

## 2023-06-09 PROCEDURE — 88305 TISSUE EXAM BY PATHOLOGIST: CPT | Performed by: PATHOLOGY

## 2023-06-23 ENCOUNTER — SOCIAL WORK (OUTPATIENT)
Dept: BEHAVIORAL/MENTAL HEALTH CLINIC | Facility: CLINIC | Age: 58
End: 2023-06-23
Payer: COMMERCIAL

## 2023-06-23 ENCOUNTER — DOCUMENTATION (OUTPATIENT)
Dept: BEHAVIORAL/MENTAL HEALTH CLINIC | Facility: CLINIC | Age: 58
End: 2023-06-23

## 2023-06-23 ENCOUNTER — TELEPHONE (OUTPATIENT)
Dept: BEHAVIORAL/MENTAL HEALTH CLINIC | Facility: CLINIC | Age: 58
End: 2023-06-23

## 2023-06-23 DIAGNOSIS — F32.A ANXIETY AND DEPRESSION: Primary | ICD-10-CM

## 2023-06-23 DIAGNOSIS — F41.9 ANXIETY AND DEPRESSION: Primary | ICD-10-CM

## 2023-06-23 PROCEDURE — H2021 COM WRAP-AROUND SV, 15 MIN: HCPCS | Performed by: PSYCHOLOGIST

## 2023-06-23 NOTE — PROGRESS NOTES
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  Patient History  Presenting Problems: The patient came to the [facility] reporting an increase in depression over the last month  Patient reported sleeping 2-3 hours peer night and a lack of motivation to meet basic needs  Patient has a history of severe depression, anxiety and PTSD  Patient stated that the increase in depression was triggered by his wife and 8year old son moving to [facility] a month and a half ago  Patient reports relationship struggles and lack of direction after retiring  Patient stated he is 9 years sober from alcohol, but reports a few “slips” this month and marijuana use  Patient spent 9 years in the [place] 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  He is experiencing several medical issues as a result of these trades  Patient is receiving medication from PCP and fells that a medication he started is no longer working and affecting his sleep  Patient was very animated and tearful at times during interview  No reports of SI, HI, AH or VH currently or previously    Treatment History: Previsous treatment through the [facility], denies any current treatment and unwilling to recieve further treatment from [facility]  Currently in Treatment: No  Medical Problems: Intestinal issues related to radiation exposure, 2x heart stints  Legal Issues: previous dui nothing current  Substance Abuse: Yes (See [address] [place] [address] Street History section for detail) (Alcoholism- 9 years sober, reports “a few slips over the last month” Marijuana use)  Mental Status Exam  Orientation Level: Oriented X4, Appropriate for age  Affect: Labile  Speech: Logical/coherent, Rapid  Mood: Anxious, Despairing  Thought Content: Appropriate  Hallucination Type: No problems reported or observed  Judgement: Fair  Impulse Control: Good  Attention Span: Appropriate for age  Memory: Intact  Appetite: Fair  Sleep: Poor  Total Hours of Sleep: 3-4 per night, sleeping during the day  Appear/Hygiene: Disheveled  ADL Comments: Patient reports a lack of motivation related to self care  Strengths and Limitations  Patient Strengths: Motivated, Financially secure, Cooperative, Family ties, Resourceful, Self reliant  Patient Limitations: Difficulty adapting, Limited motivation, Poor physical health, Limited support system, Limited family ties, Poor past treatment response  Ideations  Current Self Harm/Suicidal Ideation: No  Previous Self Harm/Suicidal Ideation: No  Current homicidal or violent thoughts toward another: Denies ideations within past 6 months  Previous Plans to Harm Another Person: No  Previous History of Violence to Others: No  Provisional Diagnosis  Axis I: Depression and Anxiety    C-SSRS         Patient was referred by: Self or Family    Visit start and stop times:    06/23/23  Start Time: 1310        Discharge and Safety    This writer discussed the patients current presentation and recommended discharge plan with follow up support through Therapy Anywhere and Psychiatry   The patient was Instructed to follow up with Therapy Anywhere and Psychiatry   The patient was provided with information about inpatient and outpatient services  This writer and the patient completed a safety plan   The patient was provided with a copy of their safety plan with encouragement to utilize the plan following discharge  In addition, the patient was instructed to call local [facility] crisis, other crisis services, 911 or to go to the nearest ER immediately if their situation changes at any time         This writer discussed discharge plans with the patient who agrees with and understands the discharge plans      SAFETY PLAN  Warning Signs (thoughts, images, mood, behavior, situations) of a potential crisis: Increased thought of no self worth, not attending to personal needs, avoiding contact with family members      Coping Skills (what can I do to take my mind off the problem, or to keep myself safe): AA meetings, walking dogs, family contact, Audible books      Outside Support (who can I reach out to for support and help): AA members, Friends, family        [place] Suicide Prevention Hotline:  9-914.364.9974    [facility] [address] [facility] [phone] - [facility] Crisis/Mobile Crisis   [address] [facility]: [facility]: [address] [facility] [address] [facility] [address] [facility] [place] [phone] - Crisis   [phone] - Peer Support Talk Line (1-9pm daily)  [phone] - Teen Support Talk Line (1-9pm daily)  [address] [facility] [address] [facility] [address] [facility] [address]  ([address] [facility]) [phone] - [address] [facility]

## 2023-06-23 NOTE — PATIENT INSTRUCTIONS
Discharge and Safety    This writer discussed the patients current presentation and recommended discharge plan with follow up support through [address] [place] [address] and Psychiatry   The patient was Instructed to follow up with [facility] Anywhere and Psychiatry   The patient was provided with information about inpatient and outpatient services  This writer and the patient completed a safety plan  The patient was provided with a copy of their safety plan with encouragement to utilize the plan following discharge  In addition, the patient was instructed to call local [facility] crisis, other crisis services, 911 or to go to the nearest ER immediately if their situation changes at any time  This writer discussed discharge plans with the patient who agrees with and understands the discharge plans       SAFETY PLAN  Warning Signs (thoughts, images, mood, behavior, situations) of a potential crisis: Increased thought of no self worth, not attending to personal needs, avoiding contact with family members      Coping Skills (what can I do to take my mind off the problem, or to keep myself safe): AA meetings, walking dogs, family contact, Audible books      Outside Support (who can I reach out to for support and help): AA members, Friends, family        [place] Suicide Prevention Hotline:  5-596.359.7968    [facility] [address] [facility] [phone] - [facility] Crisis/Mobile Crisis   [address]: [facility]: [address] [facility] [address] [facility] [address] [facility] [place] [phone] - Crisis   [phone] - Peer Support Talk Line (1-9pm daily)  [phone] - Teen Support Talk Line (1-9pm daily)  [address] [facility] [address] [facility] [address] [facility] [address] [facility]) [phone] - Family [address]

## 2023-06-27 ENCOUNTER — DOCUMENTATION (OUTPATIENT)
Dept: BEHAVIORAL/MENTAL HEALTH CLINIC | Facility: CLINIC | Age: 58
End: 2023-06-27

## 2023-06-27 ENCOUNTER — OFFICE VISIT (OUTPATIENT)
Dept: PSYCHIATRY | Facility: CLINIC | Age: 58
End: 2023-06-27
Payer: COMMERCIAL

## 2023-06-27 VITALS — HEART RATE: 76 BPM | SYSTOLIC BLOOD PRESSURE: 126 MMHG | DIASTOLIC BLOOD PRESSURE: 86 MMHG

## 2023-06-27 DIAGNOSIS — F10.10 ALCOHOL ABUSE, EPISODIC: ICD-10-CM

## 2023-06-27 DIAGNOSIS — F33.1 MDD (MAJOR DEPRESSIVE DISORDER), RECURRENT EPISODE, MODERATE (HCC): Primary | ICD-10-CM

## 2023-06-27 DIAGNOSIS — F43.10 PTSD (POST-TRAUMATIC STRESS DISORDER): ICD-10-CM

## 2023-06-27 DIAGNOSIS — F41.1 GAD (GENERALIZED ANXIETY DISORDER): ICD-10-CM

## 2023-06-27 DIAGNOSIS — E55.9 VITAMIN D DEFICIENCY: ICD-10-CM

## 2023-06-27 PROCEDURE — 90792 PSYCH DIAG EVAL W/MED SRVCS: CPT | Performed by: STUDENT IN AN ORGANIZED HEALTH CARE EDUCATION/TRAINING PROGRAM

## 2023-06-27 RX ORDER — CLONIDINE HYDROCHLORIDE 0.1 MG/1
0.1 TABLET ORAL EVERY 12 HOURS SCHEDULED
Qty: 60 TABLET | Refills: 0 | Status: SHIPPED | OUTPATIENT
Start: 2023-06-27 | End: 2023-07-27

## 2023-06-27 RX ORDER — DULOXETIN HYDROCHLORIDE 30 MG/1
30 CAPSULE, DELAYED RELEASE ORAL
Qty: 30 CAPSULE | Refills: 0 | Status: SHIPPED | OUTPATIENT
Start: 2023-06-27 | End: 2023-07-27

## 2023-06-27 NOTE — BH TREATMENT PLAN
TREATMENT PLAN (Medication Management Only)        [address]    Name and Date of Birth:  Melodie Meraz 61 y.o. 1965  Date of Treatment Plan: June 27, 2023  Diagnosis/Diagnoses:    1. MDD (major depressive disorder), recurrent episode, moderate ([address])    2. MADDY (generalized anxiety disorder)    3. PTSD (post-traumatic stress disorder)    4. Alcohol abuse, episodic      Strengths/Personal Resources for Self-Care: supportive family, supportive friends, taking medications as prescribed, ability to adapt to life changes, ability to communicate needs, ability to communicate well, ability to listen, ability to reason, ability to understand psychiatric illness, average or above intelligence, family ties, financial means, financial security, general fund of knowledge, good physical health, good understanding of illness, independence, motivation for treatment, ability to negotiate basic needs, [employer]ian[employer] affiliation, being resoureceful, self-reliance, sense of humor, special hobby/interest, well educated, willingness to work on problems, work skills. Area/Areas of need (in own words): anxiety symptoms, depressive symptoms, sleep problems  1. Long Term Goal: improve depression. Target Date:6 months - 12/27/2023  Person/Persons responsible for completion of goal: Nathalie Blackwell  2. Short Term Objective (s) - How will we reach this goal?:   A. Provider new recommended medication/dosage changes and/or continue medication(s): continue current medications as prescribed Zoloft, Cymbalta, Clonidine. B. Attend [address] [place] [address] meetings regularly. C. Attend psychotherapy regularly.   Target Date:6 months - 12/27/2023  Person/Persons Responsible for Completion of Goal: Demian  Progress Towards Goals: continuing treatment  Treatment Modality: medication management with psychotherapy every 4 week, referral for individual psychotherapy  Review due 180 days from date of this plan: 6 months - 12/27/2023  Expected length of service: maintenance  My Physician and I have developed this plan together and I agree to work on the goals and objectives. I understand the treatment goals that were developed for my treatment.

## 2023-06-27 NOTE — PSYCH
[address] [address]    Name and Date of Birth:  Radha Mcclure 61 y.o. 1965 MRN: [id number]    Date of Visit: June 27, 2023    Reason for visit: Initial psychiatric intake assessment    Chief complaint: I feel depressed. History of Present Illness (HPI):      Radha Mcclure is a 61 y.o., male, possessing a previous psychiatric history significant for PTSD, medically complicated by back and shoulder pain, presenting for intake assessment. Bushra Todd states he needs help with his mental health. He reported that his wife and his daughter are away from him in a different state which is [place] for the last 2 months and he is missing them. He reported that his wife had to travel to move to live beside her sister who suffers from morbid obesity and depression and has difficulty taking care of her children, he and his wife decided to sell their house in [place] and moved to [place]. His wife and daughter moved earlier and he is waiting to sell the house before he moves to the other state. he is currently on Zoloft 50 mg once a day, Klonopin 0.5 mg 3 times a day. He has history of depression and anxiety as well as PTSD. He retired from [employer] years ago. He had traumatic experience during exposure to wars. He is to be patient at the [facility]. He had a contact with a psychiatrist there because he admitted that he was drinking alcohol and taking Klonopin at the same time. He has history of drinking alcohol for long years but he claims to be sober in the last 5 years. He also admits to minor lapses here and there by drinking few sips of alcohol. He had dependence to Klonopin that he admits to and had difficulty taking the medication off. Currently Klonopin is prescribed by his primary care physician. He reports decreased energy and motivation, loss of interest and difficulty with sleep.   Also reports being anxious and restless. He have not tried other antidepressants from Zoloft and he is motivated to start the medication. He denies any history of giovanna or hypomania and denies any history of psychosis. Current Rating Scores:     None completed today. Psychiatric Review Of Systems:    Appetite: decreased  Adverse eating: no  Weight changes: weight gain 15 lb  Insomnia/sleeplessness: increased  Fatigue/anergy: yes  Anhedonia/lack of interest: yes  Attention/concentration: decreased  Psychomotor agitation/retardation: no  Somatic symptoms: yes  Anxiety/panic attack: worrying  Giovanna/hypomania: no  Hopelessness/helplessness/worthlessness: no  Self-injurious behavior/high-risk behavior: no  Suicidal ideation: no  Homicidal ideation: no  Auditory hallucinations: no  Visual hallucinations: no  Other perceptual disturbances: no  Delusional thinking: no  Obsessive/compulsive symptoms: no    Review Of Systems:    Constitutional feeling tired   ENT negative   Cardiovascular negative   Respiratory negative   Gastrointestinal abdominal discomfort and abdominal cramps   Genitourinary negative   Musculoskeletal back pain, neck pain and shoulder pain   Integumentary negative   Neurological negative   Endocrine negative   Other Symptoms none, all other systems are negative       Family Psychiatric History:     Family History   Problem Relation Age of Onset   • Heart disease Father    • Melanoma Father    • Cancer Sister    • Skin cancer Sister    • Skin cancer Mother            Past Psychiatric History:     Previous inpatient psychiatric admissions: none. Previous inpatient/outpatient substance abuse rehabilitation: Detox. AA meeting  [facility]  Present/previous psychotherapy: AA meeting, marriage counseling. History of suicidal attempts/gestures: none. History of violence/aggressive behaviors: while sleep from night villalobos.   Present/previous psychotropic medication use: Zoloft, Xanax, Klonopin. Substance Abuse History:    Patient Hx of heavy alcohol drinking   Sober for majority of last 10 years except for small slips   Bushra Cross does not apear under the influence or withdrawal of any psychoactive substance throughout today's examination. Social History:    Academic history: some college  Marital history:   Children: 2 children  Social support system: significant other  Residential history: Resides in house; lives with his wife  Vocational History: retired from [employer] 5 years ago  Access to firearms: has access to weapons/firearms. Legal History: [facility]    Traumatic History:     Abuse:none is reported   Other Traumatic Events: other traumatic events: from [employer] war     Past Medical History:    Past Medical History:   Diagnosis Date   • Diaz's esophagus    • Colon polyp    • Coronary artery disease 2019    RCA stenting X 2 in 2019   • Depression    • Diverticulitis    • Ear problems    • GERD (gastroesophageal reflux disease)    • Hemorrhoid    • History of heart artery stent    • Hyperlipidemia    • Hypertension    • Microscopic colitis    • Ulcerative colitis ([address])         Past Surgical History:   Procedure Laterality Date   • ABDOMINAL SURGERY      radio frequency ablation   • BONE GRAFT Left 2018   • CARPAL TUNNEL RELEASE Right    • COLONOSCOPY     • CORONARY ANGIOPLASTY WITH STENT PLACEMENT      x2   • EGD AND COLONOSCOPY     • ESOPHAGOGASTRODUODENOSCOPY N/A 2/15/2018    Procedure: ESOPHAGOGASTRODUODENOSCOPY (EGD); Surgeon: Miriam Holguin MD;  Location: [facility];  Service: Gastroenterology   • ESOPHAGOGASTRODUODENOSCOPY N/A 12/10/2018    Procedure: ESOPHAGOGASTRODUODENOSCOPY (EGD); Surgeon: Janet Cuenca MD;  Location: [facility];   Service: Gastroenterology   • HAND SURGERY Left    • MD SURGICAL ARTHROSCOPY SHOULDER W/ROTATOR CUFF RPR Left 4/14/2021    Procedure: REPAIR ROTATOR CUFF  ARTHROSCOPIC; POSSIBLE BICEPS TENDONESIS, ACROMIOPLASTY;  Surgeon: Britany Stanley Primo Gallegos DO;  Location: [facility];  Service: Orthopedics   • ROTATOR CUFF REPAIR Left    • SHOULDER ARTHROSCOPY Right 3/14/2022    Procedure: ARTHROSCOPY SHOULDER- Right shoulder arthroscopic rotator cuff repair, open subpectoral biceps tenodesis, subsacpular repair and extensive debridement;  Surgeon: Latasha Mcdowell DO;  Location: [facility];  Service: Orthopedics   • TONSILLECTOMY       Allergies   Allergen Reactions   • Rosuvastatin Myalgia       History Review:     The following portions of the patient's history were reviewed and updated as appropriate: allergies, current medications, past family history, past medical history, past social history, past surgical history and problem list.    OBJECTIVE:    Vital signs in last 24 hours:    Vitals:    06/27/23 1350   BP: 126/86   BP Location: Right arm   Patient Position: Sitting   Cuff Size: Standard   Pulse: 76       Mental Status Evaluation:    Appearance age appropriate, casually dressed   Behavior cooperative, calm   Speech normal rate, normal volume, normal pitch   Mood depressed, anxious   Affect normal range and intensity, appropriate   Thought Processes organized, goal directed   Associations intact associations   Thought Content no overt delusions   Perceptual Disturbances: no auditory hallucinations, no visual hallucinations   Abnormal Thoughts  Risk Potential Suicidal ideation - None  Homicidal ideation - None  Potential for aggression - No   Orientation oriented to person, place, time/date and situation   Memory recent and remote memory grossly intact   Consciousness alert and awake   Attention Span Concentration Span attention span and concentration are age appropriate   Intellect appears to be of average intelligence   Insight intact   Judgement intact   Muscle Strength and  Gait normal muscle strength and normal muscle tone, normal gait and normal balance   Motor Activity no abnormal movements   Language no difficulty naming common objects, no difficulty repeating a phrase, no difficulty writing a sentence   Fund of Knowledge adequate knowledge of current events  adequate fund of knowledge regarding past history  adequate fund of knowledge regarding vocabulary    Pain none   Pain Scale 0       Laboratory Results: I have personally reviewed all pertinent laboratory/tests results    Recent Labs (last 12 months):   Hospital Outpatient Visit on 06/06/2023   Component Date Value   • Case Report 06/06/2023                      Value:Surgical Pathology Report                         Case: [id number]                                   Authorizing Provider:  Allegra York DO          Collected:           06/06/2023 1242              Ordering Location:      [address] [place] [address]       Received:            06/06/2023 [facility]                                                             Pathologist:           Gonzalo Huerta MD                                                             Specimens:   A) - Duodenum                                                                                       B) - Stomach                                                                                        C) - Polyp, Colorectal, transverse                                                        • Final Diagnosis 06/06/2023                      Value: This result contains rich text formatting which cannot be displayed here. • Additional Information 06/06/2023                      Value: This result contains rich text formatting which cannot be displayed here. • Synoptic Checklist 06/06/2023                      Value:                            COLON/RECTUM POLYP FORM - GI - All Specimens                                                                                     :    Adenoma(s)     • Gross Description 06/06/2023                      Value: This result contains rich text formatting which cannot be displayed here.    • Clinical Information 06/06/2023                      Value:Cold bx eval celiac    IMPRESSION:  · Abnormal mucosa with erosion in the GE junction  · Small type II hiatal hernia  · The fundus of the stomach, body of the stomach, greater curve of the stomach, lesser curve of the stomach, incisura, antrum, prepyloric region and pylorus appeared normal. Performed 4 random biopsies to rule out H. pylori. · The duodenal bulb and 2nd part of the duodenum appeared normal. Performed 6 random biopsies to rule out celiac disease. IMPRESSION:  · Five or more small (grade 1) hemorrhoids  · Scattered diverticulosis of moderate severity in the proximal sigmoid colon, mid sigmoid colon and distal sigmoid colon  · 2 subcentimeter sessile polyps in the transverse colon were removed with cold forceps biopsy  · The cecum, ascending colon, hepatic flexure, transverse colon, splenic flexure, descending colon and rectosigmoid appeared normal.     Hospital Outpatient Visit on 05/17/2023   Component Date Value   • Baseline HR 05/17/2023 81    • Baseline BP 05/17/2023 136/82    • O2 sat rest 05/17/2023 99    • Stress peak HR 05/17/2023 160    • Post peak BP 05/17/2023 166    • Rate Pressure Product 05/17/2023 26,560.0    • O2 sat peak 05/17/2023 97    • Recovery HR 05/17/2023 108    • Recovery BP 05/17/2023 146/80    • O2 sat recovery 05/17/2023 97    • Max HR 05/17/2023 160    • Max HR Percent 05/17/2023 98    • Exercise duration (min) 05/17/2023 9    • Estimated workload 05/17/2023 10.1    • Angina Index 05/17/2023 0    • Stress Stage Reached 05/17/2023 3.0    • Protocol Name 05/17/2023 BERNARD    • Time In Exercise Phase 05/17/2023 00:09:00    • MAX.  SYSTOLIC BP 64/21/8001 875    • Max Diastolic Bp 30/87/9509 328    • Max Heart Rate 05/17/2023 160    • Max Predicted Heart Rate 05/17/2023 162    • Reason for Termination 05/17/2023                      Value:Target Heart Rate Achieved  Maximal exercise (symptom limited)     • Test Indication 05/17/2023 CAD    • Target Hr Formular 05/17/2023 (220 - Age)*85%    • Chest Pain Statement 05/17/2023 none    • Protocol Name 05/17/2023 BERNARD    • Time In Exercise Phase 05/17/2023 00:09:00    • MAX.  SYSTOLIC BP 52/36/3260 124    • Max Diastolic Bp 37/78/0096 902    • Max Heart Rate 05/17/2023 160    • Max Predicted Heart Rate 05/17/2023 162    • Reason for Termination 05/17/2023                      Value:Target Heart Rate Achieved  Maximal exercise (symptom limited)     • Test Indication 05/17/2023 CAD    • Target Hr Formular 05/17/2023 (220 - Age)*85%    • Chest Pain Statement 05/17/2023 none    Appointment on 05/09/2023   Component Date Value   • WBC 05/09/2023 7.16    • RBC 05/09/2023 4.96    • Hemoglobin 05/09/2023 13.9    • Hematocrit 05/09/2023 43.1    • MCV 05/09/2023 87    • MCH 05/09/2023 28.0    • MCHC 05/09/2023 32.3    • RDW 05/09/2023 14.2    • MPV 05/09/2023 9.6    • Platelets 37/76/6792 302    • nRBC 05/09/2023 0    • Neutrophils Relative 05/09/2023 64    • Immat GRANS % 05/09/2023 0    • Lymphocytes Relative 05/09/2023 28    • Monocytes Relative 05/09/2023 6    • Eosinophils Relative 05/09/2023 1    • Basophils Relative 05/09/2023 1    • Neutrophils Absolute 05/09/2023 4.56    • Immature Grans Absolute 05/09/2023 0.03    • Lymphocytes Absolute 05/09/2023 1.98    • Monocytes Absolute 05/09/2023 0.46    • Eosinophils Absolute 05/09/2023 0.09    • Basophils Absolute 05/09/2023 0.04    • Sodium 05/09/2023 140    • Potassium 05/09/2023 4.4    • Chloride 05/09/2023 107    • CO2 05/09/2023 27    • ANION GAP 05/09/2023 6    • BUN 05/09/2023 11    • Creatinine 05/09/2023 1.03    • Glucose, Fasting 05/09/2023 105 (H)    • Calcium 05/09/2023 9.7    • AST 05/09/2023 13    • ALT 05/09/2023 15    • Alkaline Phosphatase 05/09/2023 55    • Total Protein 05/09/2023 7.0    • Albumin 05/09/2023 4.5    • Total Bilirubin 05/09/2023 0.40    • eGFR 05/09/2023 79    • TSH 3RD GENERATON 05/09/2023 2.333    • Hemoglobin A1C 05/09/2023 5.8 (H)    • EAG 05/09/2023 120    • Cholesterol 05/09/2023 195    • Triglycerides 05/09/2023 139    • HDL, Direct 05/09/2023 68    • LDL Calculated 05/09/2023 99    • Prostate Specific Antige* 05/09/2023 1.5    • PSA, Free 05/09/2023 0.43    • PSA, Free Pct 05/09/2023 28.7    Admission on 04/18/2023, Discharged on 04/18/2023   Component Date Value   • WBC 04/18/2023 8.57    • RBC 04/18/2023 5.05    • Hemoglobin 04/18/2023 14.5    • Hematocrit 04/18/2023 43.8    • MCV 04/18/2023 87    • MCH 04/18/2023 28.7    • MCHC 04/18/2023 33.1    • RDW 04/18/2023 13.6    • MPV 04/18/2023 9.1    • Platelets 61/63/4337 330    • nRBC 04/18/2023 0    • Neutrophils Relative 04/18/2023 60    • Immat GRANS % 04/18/2023 0    • Lymphocytes Relative 04/18/2023 27    • Monocytes Relative 04/18/2023 10    • Eosinophils Relative 04/18/2023 2    • Basophils Relative 04/18/2023 1    • Neutrophils Absolute 04/18/2023 5.20    • Immature Grans Absolute 04/18/2023 0.03    • Lymphocytes Absolute 04/18/2023 2.31    • Monocytes Absolute 04/18/2023 0.83    • Eosinophils Absolute 04/18/2023 0.15    • Basophils Absolute 04/18/2023 0.05    • Sodium 04/18/2023 137    • Potassium 04/18/2023 4.0    • Chloride 04/18/2023 103    • CO2 04/18/2023 26    • ANION GAP 04/18/2023 8    • BUN 04/18/2023 15    • Creatinine 04/18/2023 1.11    • Glucose 04/18/2023 131    • Calcium 04/18/2023 9.5    • AST 04/18/2023 16    • ALT 04/18/2023 17    • Alkaline Phosphatase 04/18/2023 61    • Total Protein 04/18/2023 7.1    • Albumin 04/18/2023 4.4    • Total Bilirubin 04/18/2023 0.56    • eGFR 04/18/2023 72    • Lipase 04/18/2023 15    • Color, UA 04/18/2023 Yellow    • Clarity, UA 04/18/2023 Clear    • Specific Gravity, UA 04/18/2023 >=1.050 (H)    • pH, UA 04/18/2023 6.0    • Leukocytes, UA 04/18/2023 Negative    • Nitrite, UA 04/18/2023 Negative    • Protein, UA 04/18/2023 30 (1+) (A)    • Glucose, UA 04/18/2023 Negative    • Ketones, UA 04/18/2023 Negative • Urobilinogen, UA 04/18/2023 <2.0    • Bilirubin, UA 04/18/2023 Negative    • Occult Blood, UA 04/18/2023 Negative    • RBC, UA 04/18/2023 1-2    • WBC, UA 04/18/2023 1-2    • Epithelial Cells 04/18/2023 None Seen    • Bacteria, UA 04/18/2023 Occasional    • MUCUS THREADS 04/18/2023 Innumerable (A)    Admission on 04/16/2023, Discharged on 04/16/2023   Component Date Value   • WBC 04/16/2023 10.72 (H)    • RBC 04/16/2023 5.15    • Hemoglobin 04/16/2023 14.6    • Hematocrit 04/16/2023 44.1    • MCV 04/16/2023 86    • MCH 04/16/2023 28.3    • MCHC 04/16/2023 33.1    • RDW 04/16/2023 13.3    • MPV 04/16/2023 10.0    • Platelets 82/70/0161 233    • nRBC 04/16/2023 0    • Neutrophils Relative 04/16/2023 91 (H)    • Immat GRANS % 04/16/2023 0    • Lymphocytes Relative 04/16/2023 7 (L)    • Monocytes Relative 04/16/2023 2 (L)    • Eosinophils Relative 04/16/2023 0    • Basophils Relative 04/16/2023 0    • Neutrophils Absolute 04/16/2023 9.62 (H)    • Immature Grans Absolute 04/16/2023 0.03    • Lymphocytes Absolute 04/16/2023 0.79    • Monocytes Absolute 04/16/2023 0.23    • Eosinophils Absolute 04/16/2023 0.01    • Basophils Absolute 04/16/2023 0.04    • Sodium 04/16/2023 137    • Potassium 04/16/2023 5.1    • Chloride 04/16/2023 106    • CO2 04/16/2023 22    • ANION GAP 04/16/2023 9    • BUN 04/16/2023 20    • Creatinine 04/16/2023 1.15    • Glucose 04/16/2023 163 (H)    • Calcium 04/16/2023 9.7    • eGFR 04/16/2023 69    Admission on 01/12/2023, Discharged on 01/12/2023   Component Date Value   • WBC 01/12/2023 15.30 (H)    • RBC 01/12/2023 5. 11    • Hemoglobin 01/12/2023 14.8    • Hematocrit 01/12/2023 45.2    • MCV 01/12/2023 89    • MCH 01/12/2023 29.0    • MCHC 01/12/2023 32.7    • RDW 01/12/2023 13.1    • MPV 01/12/2023 9.2    • Platelets 54/27/8593 326    • nRBC 01/12/2023 0    • Neutrophils Relative 01/12/2023 92 (H)    • Immat GRANS % 01/12/2023 0    • Lymphocytes Relative 01/12/2023 5 (L)    • Monocytes Relative 01/12/2023 3 (L)    • Eosinophils Relative 01/12/2023 0    • Basophils Relative 01/12/2023 0    • Neutrophils Absolute 01/12/2023 14.05 (H)    • Immature Grans Absolute 01/12/2023 0.04    • Lymphocytes Absolute 01/12/2023 0.79    • Monocytes Absolute 01/12/2023 0.38    • Eosinophils Absolute 01/12/2023 0.01    • Basophils Absolute 01/12/2023 0.03    • Sodium 01/12/2023 140    • Potassium 01/12/2023 4.3    • Chloride 01/12/2023 103    • CO2 01/12/2023 24    • ANION GAP 01/12/2023 13    • BUN 01/12/2023 15    • Creatinine 01/12/2023 1.18    • Glucose 01/12/2023 149 (H)    • Calcium 01/12/2023 9.4    • AST 01/12/2023 13    • ALT 01/12/2023 23    • Alkaline Phosphatase 01/12/2023 79    • Total Protein 01/12/2023 8.1    • Albumin 01/12/2023 4.5    • Total Bilirubin 01/12/2023 0.39    • eGFR 01/12/2023 67    • Lipase 01/12/2023 91    • hs TnI 0hr 01/12/2023 <2    • SARS-CoV-2 01/12/2023 Negative    • INFLUENZA A PCR 01/12/2023 Negative    • INFLUENZA B PCR 01/12/2023 Negative    • RSV PCR 01/12/2023 Negative    • Ventricular Rate 01/12/2023 54    • Atrial Rate 01/12/2023 54    • VA Interval 01/12/2023 178    • QRSD Interval 01/12/2023 84    • QT Interval 01/12/2023 426    • QTC Interval 01/12/2023 403    • P Axis 01/12/2023 51    • QRS Axis 01/12/2023 -25    • T Wave Axis 01/12/2023 -4    • hs TnI 2hr 01/12/2023 2    • Delta 2hr hsTnI 01/12/2023 >0    Admission on 10/22/2022, Discharged on 10/22/2022   Component Date Value   • Ventricular Rate 10/22/2022 80    • Atrial Rate 10/22/2022 80    • VA Interval 10/22/2022 178    • QRSD Interval 10/22/2022 90    • QT Interval 10/22/2022 350    • QTC Interval 10/22/2022 403    • P Axis 10/22/2022 71    • QRS Axis 10/22/2022 38    • T Wave [place] 10/22/2022 68    Admission on 08/19/2022, Discharged on 08/19/2022   Component Date Value   • WBC 08/19/2022 11.84 (H)    • RBC 08/19/2022 5.29    • Hemoglobin 08/19/2022 14.7    • Hematocrit 08/19/2022 46.5    • MCV 08/19/2022 88 • MCH 08/19/2022 27.8    • MCHC 08/19/2022 31.6    • RDW 08/19/2022 13.6    • MPV 08/19/2022 9.6    • Platelets 72/80/0662 334    • nRBC 08/19/2022 0    • Neutrophils Relative 08/19/2022 87 (H)    • Immat GRANS % 08/19/2022 0    • Lymphocytes Relative 08/19/2022 10 (L)    • Monocytes Relative 08/19/2022 3 (L)    • Eosinophils Relative 08/19/2022 0    • Basophils Relative 08/19/2022 0    • Neutrophils Absolute 08/19/2022 10.26 (H)    • Immature Grans Absolute 08/19/2022 0.04    • Lymphocytes Absolute 08/19/2022 1.17    • Monocytes Absolute 08/19/2022 0.34    • Eosinophils Absolute 08/19/2022 0.01    • Basophils Absolute 08/19/2022 0.02    • Sodium 08/19/2022 139    • Potassium 08/19/2022 5.2    • Chloride 08/19/2022 103    • CO2 08/19/2022 25    • ANION GAP 08/19/2022 11    • BUN 08/19/2022 16    • Creatinine 08/19/2022 1.07    • Glucose 08/19/2022 138    • Calcium 08/19/2022 9.7    • AST 08/19/2022 23    • ALT 08/19/2022 20    • Alkaline Phosphatase 08/19/2022 71    • Total Protein 08/19/2022 8.3    • Albumin 08/19/2022 4.5    • Total Bilirubin 08/19/2022 0.39    • eGFR 08/19/2022 76    • Lipase 08/19/2022 264    Admission on 07/26/2022, Discharged on 07/26/2022   Component Date Value   • WBC 07/26/2022 13.54 (H)    • RBC 07/26/2022 4.96    • Hemoglobin 07/26/2022 14.1    • Hematocrit 07/26/2022 43.9    • MCV 07/26/2022 89    • MCH 07/26/2022 28.4    • MCHC 07/26/2022 32.1    • RDW 07/26/2022 14.1    • MPV 07/26/2022 9.2    • Platelets 41/09/2244 326    • nRBC 07/26/2022 0    • Neutrophils Relative 07/26/2022 83 (H)    • Immat GRANS % 07/26/2022 1    • Lymphocytes Relative 07/26/2022 11 (L)    • Monocytes Relative 07/26/2022 4    • Eosinophils Relative 07/26/2022 1    • Basophils Relative 07/26/2022 0    • Neutrophils Absolute 07/26/2022 11.34 (H)    • Immature Grans Absolute 07/26/2022 0.07    • Lymphocytes Absolute 07/26/2022 1.43    • Monocytes Absolute 07/26/2022 0.55    • Eosinophils Absolute 07/26/2022 0.10 • Basophils Absolute 07/26/2022 0.05    • Sodium 07/26/2022 140    • Potassium 07/26/2022 3.7    • Chloride 07/26/2022 105    • CO2 07/26/2022 24    • ANION GAP 07/26/2022 11    • BUN 07/26/2022 14    • Creatinine 07/26/2022 1.17    • Glucose 07/26/2022 138    • Calcium 07/26/2022 9.1    • AST 07/26/2022 16    • ALT 07/26/2022 17    • Alkaline Phosphatase 07/26/2022 69    • Total Protein 07/26/2022 7.3    • Albumin 07/26/2022 4.0    • Total Bilirubin 07/26/2022 0.38    • eGFR 07/26/2022 68    • Lipase 07/26/2022 69 (L)    • Protime 07/26/2022 12.3    • INR 07/26/2022 0.93    • PTT 07/26/2022 27    • ABO Grouping 07/26/2022 O    • Rh Factor 07/26/2022 Positive    • Antibody Screen 07/26/2022 Negative    • Specimen Expiration Date 07/26/2022 [id number]      Most Recent Labs:   Lab Results   Component Value Date    WBC 7.16 05/09/2023    RBC 4.96 05/09/2023    HGB 13.9 05/09/2023    HCT 43.1 05/09/2023     05/09/2023    RDW 14.2 05/09/2023    NEUTROABS 4.56 05/09/2023    SODIUM 140 05/09/2023    K 4.4 05/09/2023     05/09/2023    CO2 27 05/09/2023    BUN 11 05/09/2023    CREATININE 1.03 05/09/2023    GLUCOSE 158 (H) 02/15/2018    CALCIUM 9.7 05/09/2023    AST 13 05/09/2023    ALT 15 05/09/2023    ALKPHOS 55 05/09/2023    TP 7.0 05/09/2023    TBILI 0.40 05/09/2023    CHOLESTEROL 195 05/09/2023    TRIG 139 05/09/2023    HDL 68 05/09/2023    LDLCALC 99 05/09/2023    NONHDLC 94 06/07/2022    [id number] 2.333 05/09/2023     Most Recent Labs ([facility]):   Lab Results   Component Value Date    WBC 7.16 05/09/2023    RBC 4.96 05/09/2023    HGB 13.9 05/09/2023    HCT 43.1 05/09/2023     05/09/2023    RDW 14.2 05/09/2023    NEUTROABS 4.56 05/09/2023    SODIUM 140 05/09/2023    K 4.4 05/09/2023     05/09/2023    CO2 27 05/09/2023    BUN 11 05/09/2023    CREATININE 1.03 05/09/2023    GLUC 131 04/18/2023    CALCIUM 9.7 05/09/2023    AST 13 05/09/2023    ALT 15 05/09/2023    ALKPHOS 55 05/09/2023    TP 7.0 05/09/2023    TBILI 0.40 05/09/2023    CHOLESTEROL 195 05/09/2023    TRIG 139 05/09/2023    HDL 68 05/09/2023    LDLCALC 99 05/09/2023    [address] 06/07/2022    [id number] 2.333 05/09/2023       Suicide/Homicide Risk Assessment:    Risk of Harm to Self:  The following ratings are based on assessment at the time of the interview  Demographic risk factors include: ,   Historical Risk Factors include: chronic depression, history of depression  Recent Specific Risk Factors include: mental illness diagnosis  Protective Factors: no current suicidal ideation, ability to adapt to change, able to manage anger well, access to mental health treatment, being a parent, being , compliant with medications  Weapons: gun. The following steps have been taken to ensure weapons are properly secured: locked, secured  Based on today's assessment, Aman Gutierrez presents the following risk of harm to self: low    Risk of Harm to Others: The following ratings are based on assessment at the time of the interview  Demographic Risk Factors include: male. Historical Risk Factors include: alcohol abuse. Recent Specific Risk Factors include: access to weapons, concomitant mood disorder. Protective Factors: no current homicidal ideation, ability to adapt to change, able to manage anger well, access to mental health treatment, being a parent, being , compliant with medications  Weapons: gun. The following steps have been taken to ensure weapons are properly secured: locked, secured  Based on today's assessment, Aman Gutierrez presents the following risk of harm to others: low    The following interventions are recommended: no intervention changes needed. Although patient's acute lethality risk is low, long-term/chronic lethality risk is mildly elevated in the presence of anxiety and depressive symptoms.  At the current moment, Aman Gutierrez is future-oriented, forward-thinking, and demonstrates ability to act in a self-preserving manner as evidenced by volitionally presenting to the clinic today, seeking treatment. To mitigate future risk, patient should adhere to the recommendations of this writer, avoid alcohol/illicit substance use, utilize community-based resources and familiar support and prioritize mental health treatment. Presently, patient denies suicidal/homicidal ideation in addition to thoughts of self-injury; contracts for safety, see below for risk assessment. At conclusion of evaluation, patient is amenable to the recommendations of this writer including:  taking his medications as prescribed and attending psychotherapy. Also, patient is amenable to calling/contacting the outpatient office including this writer if any acute adverse effects of their medication regimen arise in addition to any comments or concerns pertaining to their psychiatric management. Patient is amenable to calling/contacting crisis and/or attending to the nearest emergency department if their clinical condition deteriorates to assure their safety and stability, stating that they are able to appropriately confide in their insight regarding their psychiatric state. Assessment/Plan:     61years old adult male patient was seen today for psychiatric evaluation. He suffers from anxiety and depression and would benefit from different antidepressant due to poor response to Zoloft at higher and lower doses. We discussed switching to Cymbalta as he drinks a different family of antidepressants and we will help with his chronic back and neck pain. I discussed with patient that I do not prescribe Klonopin for a long time and he decided to get the prescriptions from me to be there is a plan of taper off. Patient is okay getting his medication from his primary care physician and I discussed with patient that it is possible that his anxiety to be better controlled with clonidine and Cymbalta that would decrease his need to Klonopin in the future.   Also discussed with him that when he moved to a different state his future psychiatrist approved that medication and it is better that if he can find alternatives that are not habit forming. Also discussed with patient that since he has a history of alcohol use disorder being prescribed clonazepam for his high risk for dependence and tolerance. DSM-5 Diagnoses:     1. MDD (major depressive disorder), recurrent episode, moderate (HCC)  - DULoxetine (Cymbalta) 30 mg delayed release capsule; Take 1 capsule (30 mg total) by mouth daily after lunch  Dispense: 30 capsule; Refill: 0    2. MADDY (generalized anxiety disorder)  - DULoxetine (Cymbalta) 30 mg delayed release capsule; Take 1 capsule (30 mg total) by mouth daily after lunch  Dispense: 30 capsule; Refill: 0    3. PTSD (post-traumatic stress disorder)  - DULoxetine (Cymbalta) 30 mg delayed release capsule; Take 1 capsule (30 mg total) by mouth daily after lunch  Dispense: 30 capsule; Refill: 0  - cloNIDine (Catapres) 0.1 mg tablet; Take 1 tablet (0.1 mg total) by mouth every 12 (twelve) hours  Dispense: 60 tablet; Refill: 0    4. Alcohol abuse, episodic  - cloNIDine (Catapres) 0.1 mg tablet; Take 1 tablet (0.1 mg total) by mouth every 12 (twelve) hours  Dispense: 60 tablet; Refill: 0    5. Vitamin D deficiency  • - Vitamin D 25 hydroxy;  Future      Treatment Recommendations/Precautions:    • Medication management as above, check vitamin D level, referral for individual therapy  • Aware of 24 hour and weekend coverage for urgent situations accessed by calling [facility] main practice number    Medications Risks/Benefits:      Risks, Benefits And Possible Side Effects Of Medications:    Risks, benefits, and possible side effects of medications explained to Iban Seay including risk of suicidality and serotonin syndrome related to treatment with antidepressants, risks of misuse, abuse or dependence, sedation and respiratory depression related to treatment with benzodiazepine medications and risk of impaired next-day mental alertness, complex sleep-related behavior and dependence related to treatment with hypnotic medications. He verbalizes understanding and agreement for treatment. .    Controlled Medication Discussion:     Eun Friedman has been filling controlled prescriptions on time as prescribed according to [facility] Prescription Drug Monitoring Program    Treatment Plan:    Completed and signed during the session: Yes - with Eun Friedman    This note was not shared with the patient due to reasonable likelihood of causing patient harm    Visit Time    Visit Start Time: 1:30 pm  Visit Stop Time: 2:20 pm  Total Visit Duration: 50 minutes    Jim Zuleta MD 06/27/23

## 2023-07-06 DIAGNOSIS — F13.939 BENZODIAZEPINE WITHDRAWAL (HCC): ICD-10-CM

## 2023-07-06 DIAGNOSIS — E78.5 DYSLIPIDEMIA: ICD-10-CM

## 2023-07-06 RX ORDER — CLONAZEPAM 0.5 MG/1
1.5 TABLET ORAL
Qty: 90 TABLET | Refills: 0 | Status: SHIPPED | OUTPATIENT
Start: 2023-07-06 | End: 2023-08-05

## 2023-07-06 RX ORDER — ATORVASTATIN CALCIUM 80 MG/1
80 TABLET, FILM COATED ORAL DAILY
Qty: 90 TABLET | Refills: 1 | Status: SHIPPED | OUTPATIENT
Start: 2023-07-06 | End: 2023-10-04

## 2023-07-06 RX ORDER — ATORVASTATIN CALCIUM 80 MG/1
80 TABLET, FILM COATED ORAL DAILY
Qty: 90 TABLET | Refills: 0 | Status: CANCELLED | OUTPATIENT
Start: 2023-07-06 | End: 2023-10-04

## 2023-07-07 ENCOUNTER — OFFICE VISIT (OUTPATIENT)
Dept: INTERNAL MEDICINE CLINIC | Facility: CLINIC | Age: 58
End: 2023-07-07
Payer: COMMERCIAL

## 2023-07-07 VITALS
OXYGEN SATURATION: 94 % | HEIGHT: 69 IN | DIASTOLIC BLOOD PRESSURE: 76 MMHG | RESPIRATION RATE: 12 BRPM | BODY MASS INDEX: 32.88 KG/M2 | SYSTOLIC BLOOD PRESSURE: 120 MMHG | HEART RATE: 80 BPM | WEIGHT: 222 LBS

## 2023-07-07 DIAGNOSIS — H66.005 RECURRENT ACUTE SUPPURATIVE OTITIS MEDIA WITHOUT SPONTANEOUS RUPTURE OF LEFT TYMPANIC MEMBRANE: Primary | ICD-10-CM

## 2023-07-07 PROCEDURE — 99213 OFFICE O/P EST LOW 20 MIN: CPT | Performed by: INTERNAL MEDICINE

## 2023-07-07 RX ORDER — CIPROFLOXACIN AND DEXAMETHASONE 3; 1 MG/ML; MG/ML
4 SUSPENSION/ DROPS AURICULAR (OTIC) 2 TIMES DAILY
Qty: 3 ML | Refills: 0 | Status: SHIPPED | OUTPATIENT
Start: 2023-07-07 | End: 2023-07-10 | Stop reason: SDUPTHER

## 2023-07-07 NOTE — PROGRESS NOTES
Assessment/Plan:      Cristina Leon is here with c/o ear pain x 2 days, ear discharge, hearing loss; tympanic membrane is inflamed; no fever; no chills. Quality Measures:     BMI Counseling: There is no height or weight on file to calculate BMI. The BMI is above normal. Nutrition recommendations include decreasing portion sizes and encouraging healthy choices of fruits and vegetables. Exercise recommendations include moderate physical activity 150 minutes/week. Rationale for BMI follow-up plan is due to patient being overweight or obese. Return for Next scheduled follow up. No problem-specific Assessment & Plan notes found for this encounter. Diagnoses and all orders for this visit:    Recurrent acute suppurative otitis media without spontaneous rupture of left tympanic membrane  -     ciprofloxacin-dexamethasone (CIPRODEX) otic suspension; Administer 4 drops into the left ear 2 (two) times a day for 7 days          Subjective:      Patient ID: Derian Kemp is a 61 y.o. male. Here with acute complaints.       ALLERGIES:  Allergies   Allergen Reactions   • Acetaminophen-Codeine Anaphylaxis   • Niacin Anaphylaxis     Other reaction(s): Flushing   • Rosuvastatin Myalgia   • Oxycodone-Acetaminophen Hives and Rash     Per patient does not have a problem     • Shellfish Allergy - Food Allergy Rash and Lip Swelling       CURRENT MEDICATIONS:    Current Outpatient Medications:   •  atorvastatin (LIPITOR) 80 mg tablet, Take 1 tablet (80 mg total) by mouth daily, Disp: 90 tablet, Rfl: 1  •  clonazePAM (KlonoPIN) 0.5 mg tablet, Take 3 tablets (1.5 mg total) by mouth daily at bedtime, Disp: 90 tablet, Rfl: 0  •  cloNIDine (Catapres) 0.1 mg tablet, Take 1 tablet (0.1 mg total) by mouth every 12 (twelve) hours, Disp: 60 tablet, Rfl: 0  •  DULoxetine (Cymbalta) 30 mg delayed release capsule, Take 1 capsule (30 mg total) by mouth daily after lunch, Disp: 30 capsule, Rfl: 0  •  metoclopramide (Reglan) 10 mg tablet, Take 1 tablet (10 mg total) by mouth every 6 (six) hours as needed (nausea/vomiting), Disp: 60 tablet, Rfl: 3  •  omeprazole (PriLOSEC) 40 MG capsule, TAKE 1 CAPSULE BY MOUTH TWICE A DAY (Patient taking differently: daily), Disp: 180 capsule, Rfl: 1  •  ondansetron (ZOFRAN-ODT) 4 mg disintegrating tablet, Take 1 tablet (4 mg total) by mouth every 6 (six) hours as needed for nausea or vomiting, Disp: 60 tablet, Rfl: 0  •  sertraline (ZOLOFT) 50 mg tablet, Take 1 tablet (50 mg total) by mouth daily, Disp: 90 tablet, Rfl: 3  •  ciprofloxacin-dexamethasone (CIPRODEX) otic suspension, Administer 4 drops into the left ear 2 (two) times a day for 7 days, Disp: 3 mL, Rfl: 0    ACTIVE PROBLEM LIST:  Patient Active Problem List   Diagnosis   • Anxiety and depression   • Diaz's esophagus   • Dyslipidemia   • Obesity (BMI 30.0-34. 9)   • Coronary artery disease of native artery of native heart with stable angina pectoris ([address])   • Depression, recurrent ([address])   • GERD (gastroesophageal reflux disease)       PAST MEDICAL HISTORY:  Past Medical History:   Diagnosis Date   • Diaz's esophagus    • Colon polyp    • Coronary artery disease 2019    RCA stenting X 2 in 2019   • Depression    • Diverticulitis    • Ear problems    • GERD (gastroesophageal reflux disease)    • Hemorrhoid    • History of heart artery stent    • Hyperlipidemia    • Hypertension    • Microscopic colitis    • Ulcerative colitis ([address])        PAST SURGICAL HISTORY:  Past Surgical History:   Procedure Laterality Date   • ABDOMINAL SURGERY      radio frequency ablation   • BONE GRAFT Left 2018   • CARPAL TUNNEL RELEASE Right    • COLONOSCOPY     • CORONARY ANGIOPLASTY WITH STENT PLACEMENT      x2   • EGD AND COLONOSCOPY     • ESOPHAGOGASTRODUODENOSCOPY N/A 2/15/2018    Procedure: ESOPHAGOGASTRODUODENOSCOPY (EGD);   Surgeon: Princess Fan MD;  [facility];  Service: Gastroenterology   • ESOPHAGOGASTRODUODENOSCOPY N/A 12/10/2018    Procedure: ESOPHAGOGASTRODUODENOSCOPY (EGD); Surgeon: Doe Larson MD;  [facility];   Service: Gastroenterology   • HAND SURGERY Left    • OR SURGICAL ARTHROSCOPY SHOULDER W/ROTATOR CUFF RPR Left 2021    Procedure: REPAIR ROTATOR CUFF  ARTHROSCOPIC; POSSIBLE BICEPS TENDONESIS, ACROMIOPLASTY;  Surgeon: Rissa Grajeda DO;  [facility];  Service: Orthopedics   • ROTATOR CUFF REPAIR Left    • SHOULDER ARTHROSCOPY Right 3/14/2022    Procedure: ARTHROSCOPY SHOULDER- Right shoulder arthroscopic rotator cuff repair, open subpectoral biceps tenodesis, subsacpular repair and extensive debridement;  Surgeon: Rissa Grajeda DO;  [facility];  Service: Orthopedics   • TONSILLECTOMY         FAMILY HISTORY:  Family History   Problem Relation Age of Onset   • Heart disease Father    • Melanoma Father    • Cancer Sister    • Skin cancer Sister    • Skin cancer Mother        SOCIAL HISTORY:  Social History     Socioeconomic History   • Marital status: /Civil Union     Spouse name: Not on file   • Number of children: Not on file   • Years of education: Not on file   • Highest education level: Not on file   Occupational History   • Not on file   Tobacco Use   • Smoking status: Former     Packs/day: 0.50     Types: Cigarettes     Quit date: 2007     Years since quittin.4   • Smokeless tobacco: Former     Quit date: 1998   • Tobacco comments:     quit 10 yrs ago   Vaping Use   • Vaping Use: Never used   Substance and Sexual Activity   • Alcohol use: Not Currently     Comment: quit 5 years   • Drug use: Yes     Frequency: 3.0 times per week     Types: Marijuana     Comment: advised not to smoke   • Sexual activity: Not Currently   Other Topics Concern   • Not on file   Social History Narrative   • Not on file     Social Determinants of Health     Financial Resource Strain: Not on file   Food Insecurity: Not on file   Transportation Needs: Not on file   Physical Activity: Not on file   Stress: Not on file   Social Connections: Not on file   Intimate Partner Violence: Not on file   Housing Stability: Not on file       Review of Systems   Constitutional: Negative for chills and fever. HENT: Positive for ear pain. Negative for sore throat. Eyes: Negative for pain and visual disturbance. Respiratory: Negative for cough and shortness of breath. Cardiovascular: Negative for chest pain and palpitations. Gastrointestinal: Negative for abdominal pain and vomiting. Genitourinary: Negative for dysuria and hematuria. Musculoskeletal: Negative for arthralgias and back pain. Skin: Negative for color change and rash. Neurological: Negative for seizures and syncope. All other systems reviewed and are negative. Objective:  Vitals:    07/07/23 1330   BP: 120/76   BP Location: Left arm   Patient Position: Sitting   Pulse: 80   Resp: 12   SpO2: 94%   Weight: 101 kg (222 lb)   Height: 5' 9" (1.753 m)     Body mass index is 32.78 kg/m². Physical Exam  Vitals and nursing note reviewed. Constitutional:       Appearance: Normal appearance. He is obese. HENT:      Head: Normocephalic and atraumatic. Right Ear: Hearing and tympanic membrane normal.      Left Ear: Decreased hearing noted. Drainage, swelling and tenderness present. Tympanic membrane is erythematous. Tympanic membrane has decreased mobility. Cardiovascular:      Rate and Rhythm: Normal rate. Pulmonary:      Effort: Pulmonary effort is normal.   Musculoskeletal:         General: Normal range of motion. Cervical back: Normal range of motion. Skin:     General: Skin is warm and dry. Neurological:      General: No focal deficit present. Mental Status: He is alert and oriented to person, place, and time. Mental status is at baseline. Psychiatric:         Mood and Affect: Mood normal.           RESULTS:    In chart    This note was created with voice recognition software.   Phonic, grammatical and spelling errors may be present within the note as a result.

## 2023-07-10 DIAGNOSIS — H66.005 RECURRENT ACUTE SUPPURATIVE OTITIS MEDIA WITHOUT SPONTANEOUS RUPTURE OF LEFT TYMPANIC MEMBRANE: ICD-10-CM

## 2023-07-10 RX ORDER — CIPROFLOXACIN AND DEXAMETHASONE 3; 1 MG/ML; MG/ML
4 SUSPENSION/ DROPS AURICULAR (OTIC) 2 TIMES DAILY
Qty: 3 ML | Refills: 0 | Status: SHIPPED | OUTPATIENT
Start: 2023-07-10 | End: 2023-07-27

## 2023-07-18 ENCOUNTER — TELEMEDICINE (OUTPATIENT)
Dept: PSYCHIATRY | Facility: CLINIC | Age: 58
End: 2023-07-18
Payer: COMMERCIAL

## 2023-07-18 DIAGNOSIS — F33.9 DEPRESSION, RECURRENT (HCC): Primary | ICD-10-CM

## 2023-07-18 PROCEDURE — 90791 PSYCH DIAGNOSTIC EVALUATION: CPT | Performed by: STUDENT IN AN ORGANIZED HEALTH CARE EDUCATION/TRAINING PROGRAM
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
## 2023-07-20 DIAGNOSIS — F41.1 GAD (GENERALIZED ANXIETY DISORDER): ICD-10-CM

## 2023-07-20 DIAGNOSIS — F43.10 PTSD (POST-TRAUMATIC STRESS DISORDER): ICD-10-CM

## 2023-07-20 DIAGNOSIS — F10.10 ALCOHOL ABUSE, EPISODIC: ICD-10-CM

## 2023-07-20 DIAGNOSIS — F33.1 MDD (MAJOR DEPRESSIVE DISORDER), RECURRENT EPISODE, MODERATE (HCC): ICD-10-CM

## 2023-07-20 RX ORDER — DULOXETIN HYDROCHLORIDE 30 MG/1
CAPSULE, DELAYED RELEASE ORAL
Qty: 90 CAPSULE | Refills: 1 | OUTPATIENT
Start: 2023-07-20

## 2023-07-20 RX ORDER — CLONIDINE HYDROCHLORIDE 0.1 MG/1
0.1 TABLET ORAL EVERY 12 HOURS
Qty: 180 TABLET | Refills: 1 | OUTPATIENT
Start: 2023-07-20

## 2023-07-27 ENCOUNTER — TELEMEDICINE (OUTPATIENT)
Dept: PSYCHIATRY | Facility: CLINIC | Age: 58
End: 2023-07-27
Payer: COMMERCIAL

## 2023-07-27 DIAGNOSIS — F41.1 GAD (GENERALIZED ANXIETY DISORDER): ICD-10-CM

## 2023-07-27 DIAGNOSIS — F33.1 MDD (MAJOR DEPRESSIVE DISORDER), RECURRENT EPISODE, MODERATE (HCC): Primary | ICD-10-CM

## 2023-07-27 DIAGNOSIS — F43.10 PTSD (POST-TRAUMATIC STRESS DISORDER): ICD-10-CM

## 2023-07-27 DIAGNOSIS — F13.939 BENZODIAZEPINE WITHDRAWAL WITH COMPLICATION (HCC): ICD-10-CM

## 2023-07-27 PROCEDURE — 90833 PSYTX W PT W E/M 30 MIN: CPT | Performed by: STUDENT IN AN ORGANIZED HEALTH CARE EDUCATION/TRAINING PROGRAM

## 2023-07-27 PROCEDURE — 99214 OFFICE O/P EST MOD 30 MIN: CPT | Performed by: STUDENT IN AN ORGANIZED HEALTH CARE EDUCATION/TRAINING PROGRAM

## 2023-07-27 RX ORDER — DULOXETIN HYDROCHLORIDE 60 MG/1
60 CAPSULE, DELAYED RELEASE ORAL
Qty: 30 CAPSULE | Refills: 0 | Status: SHIPPED | OUTPATIENT
Start: 2023-07-27 | End: 2023-08-26

## 2023-07-27 RX ORDER — CLONAZEPAM 0.5 MG/1
1.25 TABLET ORAL 2 TIMES DAILY PRN
Qty: 90 TABLET | Refills: 0 | Status: SHIPPED
Start: 2023-07-27 | End: 2023-07-27

## 2023-07-27 RX ORDER — QUETIAPINE FUMARATE 50 MG/1
50 TABLET, FILM COATED ORAL
Qty: 30 TABLET | Refills: 0 | Status: SHIPPED | OUTPATIENT
Start: 2023-07-27 | End: 2023-08-26

## 2023-07-27 RX ORDER — CLONAZEPAM 0.5 MG/1
1.25 TABLET ORAL
Qty: 90 TABLET | Refills: 0 | Status: SHIPPED
Start: 2023-07-27 | End: 2023-08-04 | Stop reason: SDUPTHER

## 2023-07-27 NOTE — PSYCH
MEDICATION MANAGEMENT NOTE        [address] [facility]      Name and Date of Birth:  Jazzy Westfall 61 y.o. 1965 MRN: [id number]    Date of Visit: July 27, 2023    Reason for Visit: No chief complaint on file. Telemedicine consent    Patient: Jazzy Westfall  Provider: Tia Pedro MD  Provider located at  [address] [place] [id number] [phone]    The patient was identified by name and date of birth. Jazzy Westfall was informed that this is a telemedicine visit and that the visit is being conducted through the [employer]. He agrees to proceed. .  My office door was closed. No one else was in the room. He acknowledged consent and understanding of privacy and security of the video platform. The patient has agreed to participate and understands they can discontinue the visit at any time. Patient is aware this is a billable service. I spent 30 minutes with the patient during this visit. SUBJECTIVE:    Indio Shelton is seen today for a follow up for Major Depressive Disorder. He continues to do relatively well since the last visit. Patient is currently taking Cymbalta 30 mg once a day, Klonopin 1.5 mg at bedtime. He tapered himself off Zoloft and he tried taking clonidine few times but it worsened his anxiety. He reported that Cymbalta has been more helpful for his depression and anxiety than Zoloft and denies side effects of Cymbalta. He feels that the medication is helping with his energy and improve his motivation. Patient is still dependent on Klonopin at bedtime and has difficulty tapering it off. There has been changing his social circumstances. He was supposed to moved to live with his wife in [place] however his wife decided to come back to [place] after having arguments with her sister.   He is no longer going to sell his house and is waiting for wife to come back. He still drinks alcohol but occasionally not excessively. He started psychotherapy recently and is motivated to participate in counseling    He denies suicidal ideation, intent or plan at present; denies any homicidal ideation, intent or plan at present. He denies any visual hallucinations, denies any auditory hallucinations, denies any delusions. He denies any side effects from current psychiatric medications. HPI ROS Appetite Changes and Sleep:     He reports normal sleep, normal appetite, normal energy level      Review Of Systems:      Constitutional negative   ENT negative   Cardiovascular negative   Respiratory negative   Gastrointestinal negative   Genitourinary negative   Musculoskeletal negative   Integumentary negative   Neurological negative   Endocrine negative   Other Symptoms none, all other systems are negative         Past Medical History:    Past Medical History:   Diagnosis Date   • Diaz's esophagus    • Colon polyp    • Coronary artery disease 2019    RCA stenting X 2 in 2019   • Depression    • Diverticulitis    • Ear problems    • GERD (gastroesophageal reflux disease)    • Hemorrhoid    • History of heart artery stent    • Hyperlipidemia    • Hypertension    • Microscopic colitis    • Ulcerative colitis ([address])         Past Surgical History:   Procedure Laterality Date   • ABDOMINAL SURGERY      radio frequency ablation   • BONE GRAFT Left 2018   • CARPAL TUNNEL RELEASE Right    • COLONOSCOPY     • CORONARY ANGIOPLASTY WITH STENT PLACEMENT      x2   • EGD AND COLONOSCOPY     • ESOPHAGOGASTRODUODENOSCOPY N/A 2/15/2018    Procedure: ESOPHAGOGASTRODUODENOSCOPY (EGD); Surgeon: Michael Yuen MD;  Location: [facility];  Service: Gastroenterology   • ESOPHAGOGASTRODUODENOSCOPY N/A 12/10/2018    Procedure: ESOPHAGOGASTRODUODENOSCOPY (EGD); Surgeon: Jeff Leblanc MD;  Location: [facility];   Service: Gastroenterology   • HAND SURGERY Left    • PA SURGICAL ARTHROSCOPY SHOULDER W/ROTATOR CUFF RPR Left 2021    Procedure: REPAIR ROTATOR CUFF  ARTHROSCOPIC; POSSIBLE BICEPS TENDONESIS, ACROMIOPLASTY;  Surgeon: Jeanette Mcdonald DO;  Location[facility];  Service: Orthopedics   • ROTATOR CUFF REPAIR Left    • SHOULDER ARTHROSCOPY Right 3/14/2022    Procedure: ARTHROSCOPY SHOULDER- Right shoulder arthroscopic rotator cuff repair, open subpectoral biceps tenodesis, subsacpular repair and extensive debridement;  Surgeon: Jeanette Mcdonald DO;  [facility];  Service: Orthopedics   • TONSILLECTOMY       Allergies   Allergen Reactions   • Acetaminophen-Codeine Anaphylaxis   • Niacin Anaphylaxis     Other reaction(s): Flushing   • Rosuvastatin Myalgia   • Oxycodone-Acetaminophen Hives and Rash     Per patient does not have a problem     • Shellfish Allergy - Food Allergy Rash and Lip Swelling       Substance Abuse History:    Social History     Substance and Sexual Activity   Alcohol Use Not Currently    Comment: quit 5 years     Social History     Substance and Sexual Activity   Drug Use Yes   • Frequency: 3.0 times per week   • Types: Marijuana    Comment: advised not to smoke       Social History:    Social History     Socioeconomic History   • Marital status: /Civil Union     Spouse name: Not on file   • Number of children: Not on file   • Years of education: Not on file   • Highest education level: Not on file   Occupational History   • Not on file   Tobacco Use   • Smoking status: Former     Packs/day: 0.50     Types: Cigarettes     Quit date: 2007     Years since quittin.5   • Smokeless tobacco: Former     Quit date: 1998   • Tobacco comments:     quit 10 yrs ago   Vaping Use   • Vaping Use: Never used   Substance and Sexual Activity   • Alcohol use: Not Currently     Comment: quit 5 years   • Drug use: Yes     Frequency: 3.0 times per week     Types: Marijuana     Comment: advised not to smoke   • Sexual activity: Not Currently   Other Topics Concern   • Not on file   Social History Narrative   • Not on file     Social Determinants of Health     Financial Resource Strain: Not on file   Food Insecurity: Not on file   Transportation Needs: Not on file   Physical Activity: Not on file   Stress: Not on file   Social Connections: Not on file   Intimate Partner Violence: Not on file   Housing Stability: Not on file       Family Psychiatric History:     Family History   Problem Relation Age of Onset   • Heart disease Father    • Melanoma Father    • Cancer Sister    • Skin cancer Sister    • Skin cancer Mother        History Review: The following portions of the patient's history were reviewed and updated as appropriate: allergies, current medications, past family history, past medical history, past social history, past surgical history and problem list.         OBJECTIVE:     Vital signs in last 24 hours: There were no vitals filed for this visit.     Mental Status Evaluation:    Appearance age appropriate, casually dressed   Behavior cooperative, calm   Speech normal rate, normal volume, normal pitch   Mood improved   Affect normal range and intensity, appropriate   Thought Processes organized, goal directed   Associations intact associations   Thought Content no overt delusions   Perceptual Disturbances: no auditory hallucinations, no visual hallucinations   Abnormal Thoughts  Risk Potential Suicidal ideation - None  Homicidal ideation - None  Potential for aggression - No   Orientation oriented to person, place, time/date and situation   Memory recent and remote memory grossly intact   Consciousness alert and awake   Attention Span Concentration Span attention span and concentration are age appropriate   Intellect appears to be of average intelligence   Insight intact   Judgement intact   Muscle Strength and  Gait normal muscle strength and normal muscle tone, normal gait and normal balance   Motor activity no abnormal movements   Language no difficulty naming common objects, no difficulty repeating a phrase, no difficulty writing a sentence   Fund of Knowledge adequate knowledge of current events  adequate fund of knowledge regarding past history  adequate fund of knowledge regarding vocabulary    Pain none   Pain Scale 0       Laboratory Results: I have personally reviewed all pertinent laboratory/tests results    Recent Labs (last 12 months):   Hospital Outpatient Visit on 06/06/2023   Component Date Value   • Case Report 06/06/2023                      Value:Surgical Pathology Report                         Case: [id number]                                   Authorizing Provider:  Ayad Barkley DO          Collected:           06/06/2023 1242              Ordering Location:      [facility]       Received:            06/06/2023 [address] [place] [address] Endoscopy                                                             Pathologist:           Tara Kang MD                                                             Specimens:   A) - Duodenum                                                                                       B) - Stomach                                                                                        C) - Polyp, Colorectal, transverse                                                        • Final Diagnosis 06/06/2023                      Value: This result contains rich text formatting which cannot be displayed here. • Additional Information 06/06/2023                      Value: This result contains rich text formatting which cannot be displayed here. • Synoptic Checklist 06/06/2023                      Value:                            COLON/RECTUM POLYP FORM - GI - All Specimens                                                                                     :    Adenoma(s)     • Gross Description 06/06/2023                      Value: This result contains rich text formatting which cannot be displayed here. • Clinical Information 06/06/2023                      Value:Cold bx eval celiac    IMPRESSION:  · Abnormal mucosa with erosion in the GE junction  · Small type II hiatal hernia  · The fundus of the stomach, body of the stomach, greater curve of the stomach, lesser curve of the stomach, incisura, antrum, prepyloric region and pylorus appeared normal. Performed 4 random biopsies to rule out H. pylori. · The duodenal bulb and 2nd part of the duodenum appeared normal. Performed 6 random biopsies to rule out celiac disease. IMPRESSION:  · Five or more small (grade 1) hemorrhoids  · Scattered diverticulosis of moderate severity in the proximal sigmoid colon, mid sigmoid colon and distal sigmoid colon  · 2 subcentimeter sessile polyps in the transverse colon were removed with cold forceps biopsy  · The cecum, ascending colon, hepatic flexure, transverse colon, splenic flexure, descending colon and rectosigmoid appeared normal.     Hospital Outpatient Visit on 05/17/2023   Component Date Value   • Baseline HR 05/17/2023 81    • Baseline BP 05/17/2023 136/82    • O2 sat rest 05/17/2023 99    • Stress peak HR 05/17/2023 160    • Post peak BP 05/17/2023 166    • Rate Pressure Product 05/17/2023 26,560.0    • O2 sat peak 05/17/2023 97    • Recovery HR 05/17/2023 108    • Recovery BP 05/17/2023 146/80    • O2 sat recovery 05/17/2023 97    • Max HR 05/17/2023 160    • Max HR Percent 05/17/2023 98    • Exercise duration (min) 05/17/2023 9    • Estimated workload 05/17/2023 10.1    • Angina Index 05/17/2023 0    • Stress Stage Reached 05/17/2023 3.0    • Protocol Name 05/17/2023 BERNARD    • Time In Exercise Phase 05/17/2023 00:09:00    • MAX.  SYSTOLIC BP 69/68/9167 759    • Max Diastolic Bp 57/94/7612 643    • Max Heart Rate 05/17/2023 160    • Max Predicted Heart Rate 05/17/2023 162    • Reason for Termination 05/17/2023                      Value:Target Heart Rate Achieved  Maximal exercise (symptom limited)     • Test Indication 05/17/2023 CAD    • Target Hr Formular 05/17/2023 (220 - Age)*85%    • Chest Pain Statement 05/17/2023 none    • Protocol Name 05/17/2023 BERNARD    • Time In Exercise Phase 05/17/2023 00:09:00    • MAX.  SYSTOLIC BP 42/50/0867 033    • Max Diastolic Bp 35/83/4845 904    • Max Heart Rate 05/17/2023 160    • Max Predicted Heart Rate 05/17/2023 162    • Reason for Termination 05/17/2023                      Value:Target Heart Rate Achieved  Maximal exercise (symptom limited)     • Test Indication 05/17/2023 CAD    • Target Hr Formular 05/17/2023 (220 - Age)*85%    • Chest Pain Statement 05/17/2023 none    Appointment on 05/09/2023   Component Date Value   • WBC 05/09/2023 7.16    • RBC 05/09/2023 4.96    • Hemoglobin 05/09/2023 13.9    • Hematocrit 05/09/2023 43.1    • MCV 05/09/2023 87    • MCH 05/09/2023 28.0    • MCHC 05/09/2023 32.3    • RDW 05/09/2023 14.2    • MPV 05/09/2023 9.6    • Platelets 29/34/4859 302    • nRBC 05/09/2023 0    • Neutrophils Relative 05/09/2023 64    • Immat GRANS % 05/09/2023 0    • Lymphocytes Relative 05/09/2023 28    • Monocytes Relative 05/09/2023 6    • Eosinophils Relative 05/09/2023 1    • Basophils Relative 05/09/2023 1    • Neutrophils Absolute 05/09/2023 4.56    • Immature Grans Absolute 05/09/2023 0.03    • Lymphocytes Absolute 05/09/2023 1.98    • Monocytes Absolute 05/09/2023 0.46    • Eosinophils Absolute 05/09/2023 0.09    • Basophils Absolute 05/09/2023 0.04    • Sodium 05/09/2023 140    • Potassium 05/09/2023 4.4    • Chloride 05/09/2023 107    • CO2 05/09/2023 27    • ANION GAP 05/09/2023 6    • BUN 05/09/2023 11    • Creatinine 05/09/2023 1.03    • Glucose, Fasting 05/09/2023 105 (H)    • Calcium 05/09/2023 9.7    • AST 05/09/2023 13    • ALT 05/09/2023 15    • Alkaline Phosphatase 05/09/2023 55    • Total Protein 05/09/2023 7.0    • Albumin 05/09/2023 4.5    • Total Bilirubin 05/09/2023 0.40    • eGFR 05/09/2023 79    • TSH 3RD GENERATON 05/09/2023 2.333    • Hemoglobin A1C 05/09/2023 5.8 (H)    • EAG 05/09/2023 120    • Cholesterol 05/09/2023 195    • Triglycerides 05/09/2023 139    • HDL, Direct 05/09/2023 68    • LDL Calculated 05/09/2023 99    • Prostate Specific Antige* 05/09/2023 1.5    • PSA, Free 05/09/2023 0.43    • PSA, Free Pct 05/09/2023 28.7    Admission on 04/18/2023, Discharged on 04/18/2023   Component Date Value   • WBC 04/18/2023 8.57    • RBC 04/18/2023 5.05    • Hemoglobin 04/18/2023 14.5    • Hematocrit 04/18/2023 43.8    • MCV 04/18/2023 87    • MCH 04/18/2023 28.7    • MCHC 04/18/2023 33.1    • RDW 04/18/2023 13.6    • MPV 04/18/2023 9.1    • Platelets 19/62/4100 330    • nRBC 04/18/2023 0    • Neutrophils Relative 04/18/2023 60    • Immat GRANS % 04/18/2023 0    • Lymphocytes Relative 04/18/2023 27    • Monocytes Relative 04/18/2023 10    • Eosinophils Relative 04/18/2023 2    • Basophils Relative 04/18/2023 1    • Neutrophils Absolute 04/18/2023 5.20    • Immature Grans Absolute 04/18/2023 0.03    • Lymphocytes Absolute 04/18/2023 2.31    • Monocytes Absolute 04/18/2023 0.83    • Eosinophils Absolute 04/18/2023 0.15    • Basophils Absolute 04/18/2023 0.05    • Sodium 04/18/2023 137    • Potassium 04/18/2023 4.0    • Chloride 04/18/2023 103    • CO2 04/18/2023 26    • ANION GAP 04/18/2023 8    • BUN 04/18/2023 15    • Creatinine 04/18/2023 1.11    • Glucose 04/18/2023 131    • Calcium 04/18/2023 9.5    • AST 04/18/2023 16    • ALT 04/18/2023 17    • Alkaline Phosphatase 04/18/2023 61    • Total Protein 04/18/2023 7.1    • Albumin 04/18/2023 4.4    • Total Bilirubin 04/18/2023 0.56    • eGFR 04/18/2023 72    • Lipase 04/18/2023 15    • Color, UA 04/18/2023 Yellow    • Clarity, UA 04/18/2023 Clear    • Specific Gravity, UA 04/18/2023 >=1.050 (H)    • pH, UA 04/18/2023 6.0    • Leukocytes, UA 04/18/2023 Negative    • Nitrite, UA 04/18/2023 Negative    • Protein, UA 04/18/2023 30 (1+) (A)    • Glucose, UA 04/18/2023 Negative    • Ketones, UA 04/18/2023 Negative    • Urobilinogen, UA 04/18/2023 <2.0    • Bilirubin, UA 04/18/2023 Negative    • Occult Blood, UA 04/18/2023 Negative    • RBC, UA 04/18/2023 1-2    • WBC, UA 04/18/2023 1-2    • Epithelial Cells 04/18/2023 None Seen    • Bacteria, UA 04/18/2023 Occasional    • MUCUS THREADS 04/18/2023 Innumerable (A)    Admission on 04/16/2023, Discharged on 04/16/2023   Component Date Value   • WBC 04/16/2023 10.72 (H)    • RBC 04/16/2023 5.15    • Hemoglobin 04/16/2023 14.6    • Hematocrit 04/16/2023 44.1    • MCV 04/16/2023 86    • MCH 04/16/2023 28.3    • MCHC 04/16/2023 33.1    • RDW 04/16/2023 13.3    • MPV 04/16/2023 10.0    • Platelets 08/56/9387 233    • nRBC 04/16/2023 0    • Neutrophils Relative 04/16/2023 91 (H)    • Immat GRANS % 04/16/2023 0    • Lymphocytes Relative 04/16/2023 7 (L)    • Monocytes Relative 04/16/2023 2 (L)    • Eosinophils Relative 04/16/2023 0    • Basophils Relative 04/16/2023 0    • Neutrophils Absolute 04/16/2023 9.62 (H)    • Immature Grans Absolute 04/16/2023 0.03    • Lymphocytes Absolute 04/16/2023 0.79    • Monocytes Absolute 04/16/2023 0.23    • Eosinophils Absolute 04/16/2023 0.01    • Basophils Absolute 04/16/2023 0.04    • Sodium 04/16/2023 137    • Potassium 04/16/2023 5.1    • Chloride 04/16/2023 106    • CO2 04/16/2023 22    • ANION GAP 04/16/2023 9    • BUN 04/16/2023 20    • Creatinine 04/16/2023 1.15    • Glucose 04/16/2023 163 (H)    • Calcium 04/16/2023 9.7    • eGFR 04/16/2023 69    Admission on 01/12/2023, Discharged on 01/12/2023   Component Date Value   • WBC 01/12/2023 15.30 (H)    • RBC 01/12/2023 5. 11    • Hemoglobin 01/12/2023 14.8    • Hematocrit 01/12/2023 45.2    • MCV 01/12/2023 89    • MCH 01/12/2023 29.0    • MCHC 01/12/2023 32.7    • RDW 01/12/2023 13.1    • MPV 01/12/2023 9.2    • Platelets 77/06/1193 326    • nRBC 01/12/2023 0    • Neutrophils Relative 01/12/2023 92 (H)    • Immat GRANS % 01/12/2023 0    • Lymphocytes Relative 01/12/2023 5 (L)    • Monocytes Relative 01/12/2023 3 (L)    • Eosinophils Relative 01/12/2023 0    • Basophils Relative 01/12/2023 0    • Neutrophils Absolute 01/12/2023 14.05 (H)    • Immature Grans Absolute 01/12/2023 0.04    • Lymphocytes Absolute 01/12/2023 0.79    • Monocytes Absolute 01/12/2023 0.38    • Eosinophils Absolute 01/12/2023 0.01    • Basophils Absolute 01/12/2023 0.03    • Sodium 01/12/2023 140    • Potassium 01/12/2023 4.3    • Chloride 01/12/2023 103    • CO2 01/12/2023 24    • ANION GAP 01/12/2023 13    • BUN 01/12/2023 15    • Creatinine 01/12/2023 1.18    • Glucose 01/12/2023 149 (H)    • Calcium 01/12/2023 9.4    • AST 01/12/2023 13    • ALT 01/12/2023 23    • Alkaline Phosphatase 01/12/2023 79    • Total Protein 01/12/2023 8.1    • Albumin 01/12/2023 4.5    • Total Bilirubin 01/12/2023 0.39    • eGFR 01/12/2023 67    • Lipase 01/12/2023 91    • hs TnI 0hr 01/12/2023 <2    • SARS-CoV-2 01/12/2023 Negative    • INFLUENZA A PCR 01/12/2023 Negative    • INFLUENZA B PCR 01/12/2023 Negative    • RSV PCR 01/12/2023 Negative    • Ventricular Rate 01/12/2023 54    • Atrial Rate 01/12/2023 54    • VT Interval 01/12/2023 178    • QRSD Interval 01/12/2023 84    • QT Interval 01/12/2023 426    • QTC Interval 01/12/2023 403    • P Axis 01/12/2023 51    • QRS Axis 01/12/2023 -25    • T Wave Axis 01/12/2023 -4    • hs TnI 2hr 01/12/2023 2    • Delta 2hr hsTnI 01/12/2023 >0    Admission on 10/22/2022, Discharged on 10/22/2022   Component Date Value   • Ventricular Rate 10/22/2022 80    • Atrial Rate 10/22/2022 80    • [place] Interval 10/22/2022 178    • QRSD Interval 10/22/2022 90    • QT Interval 10/22/2022 350    • QTC Interval 10/22/2022 403    • P Axis 10/22/2022 71    • QRS Axis 10/22/2022 38    • T Wave [place] 10/22/2022 68    Admission on 08/19/2022, Discharged on 08/19/2022   Component Date Value   • WBC 08/19/2022 11.84 (H)    • RBC 08/19/2022 5.29    • Hemoglobin 08/19/2022 14.7 • Hematocrit 08/19/2022 46.5    • MCV 08/19/2022 88    • MCH 08/19/2022 27.8    • MCHC 08/19/2022 31.6    • RDW 08/19/2022 13.6    • MPV 08/19/2022 9.6    • Platelets 40/50/5635 334    • nRBC 08/19/2022 0    • Neutrophils Relative 08/19/2022 87 (H)    • Immat GRANS % 08/19/2022 0    • Lymphocytes Relative 08/19/2022 10 (L)    • Monocytes Relative 08/19/2022 3 (L)    • Eosinophils Relative 08/19/2022 0    • Basophils Relative 08/19/2022 0    • Neutrophils Absolute 08/19/2022 10.26 (H)    • Immature Grans Absolute 08/19/2022 0.04    • Lymphocytes Absolute 08/19/2022 1.17    • Monocytes Absolute 08/19/2022 0.34    • Eosinophils Absolute 08/19/2022 0.01    • Basophils Absolute 08/19/2022 0.02    • Sodium 08/19/2022 139    • Potassium 08/19/2022 5.2    • Chloride 08/19/2022 103    • CO2 08/19/2022 25    • ANION GAP 08/19/2022 11    • BUN 08/19/2022 16    • Creatinine 08/19/2022 1.07    • Glucose 08/19/2022 138    • Calcium 08/19/2022 9.7    • AST 08/19/2022 23    • ALT 08/19/2022 20    • Alkaline Phosphatase 08/19/2022 71    • Total Protein 08/19/2022 8.3    • Albumin 08/19/2022 4.5    • Total Bilirubin 08/19/2022 0.39    • eGFR 08/19/2022 76    • Lipase 08/19/2022 264        Suicide/Homicide Risk Assessment:    Risk of Harm to Self:  • The following ratings are based on assessment at the time of the interview  • Demographic risk factors include: ,   • Historical Risk Factors include: chronic depression, history of depression  • Recent Specific Risk Factors include: mental illness diagnosis  • Protective Factors: no current suicidal ideation, ability to adapt to change, able to manage anger well, access to mental health treatment, being a parent, being , compliant with medications  • Weapons: gun.  The following steps have been taken to ensure weapons are properly secured: locked, secured  • Based on today's assessment, Leonie Weiner presents the following risk of harm to self: low     Risk of Harm to Others:  • The following ratings are based on assessment at the time of the interview  • Demographic Risk Factors include: male. • Historical Risk Factors include: alcohol abuse. • Recent Specific Risk Factors include: access to weapons, concomitant mood disorder. • Protective Factors: no current homicidal ideation, ability to adapt to change, able to manage anger well, access to mental health treatment, being a parent, being , compliant with medications  • Weapons: gun. The following steps have been taken to ensure weapons are properly secured: locked, secured  • Based on today's assessment, Jaylene Franco presents the following risk of harm to others: low    The following interventions are recommended: no intervention changes needed    Assessment/Plan:       Diagnoses and all orders for this visit:    MDD (major depressive disorder), recurrent episode, moderate (HCC)  -     DULoxetine (CYMBALTA) 60 mg delayed release capsule; Take 1 capsule (60 mg total) by mouth daily after lunch  -     QUEtiapine (SEROquel) 50 mg tablet; Take 1 tablet (50 mg total) by mouth daily at bedtime    Benzodiazepine withdrawal with complication (HCC)  -     Discontinue: clonazePAM (KlonoPIN) 0.5 mg tablet; Take 2.5 tablets (1.25 mg total) by mouth 2 (two) times a day as needed  -     clonazePAM (KlonoPIN) 0.5 mg tablet; Take 2.5 tablets (1.25 mg total) by mouth daily at bedtime    PTSD (post-traumatic stress disorder)  -     DULoxetine (CYMBALTA) 60 mg delayed release capsule; Take 1 capsule (60 mg total) by mouth daily after lunch    MADDY (generalized anxiety disorder)  -     DULoxetine (CYMBALTA) 60 mg delayed release capsule; Take 1 capsule (60 mg total) by mouth daily after lunch          Treatment Recommendations/Precautions:    Medication changes: I have discontinued Demian Joe's sertraline, DULoxetine, cloNIDine, clonazePAM, and ciprofloxacin-dexamethasone.  I have also changed his clonazePAM. Additionally, I am having him start on DULoxetine and QUEtiapine. Lastly, I am having him maintain his metoclopramide, omeprazole, ondansetron, and atorvastatin. Current Outpatient Medications:   •  atorvastatin (LIPITOR) 80 mg tablet, Take 1 tablet (80 mg total) by mouth daily, Disp: 90 tablet, Rfl: 1  •  clonazePAM (KlonoPIN) 0.5 mg tablet, Take 2.5 tablets (1.25 mg total) by mouth daily at bedtime, Disp: 90 tablet, Rfl: 0  •  DULoxetine (CYMBALTA) 60 mg delayed release capsule, Take 1 capsule (60 mg total) by mouth daily after lunch, Disp: 30 capsule, Rfl: 0  •  metoclopramide (Reglan) 10 mg tablet, Take 1 tablet (10 mg total) by mouth every 6 (six) hours as needed (nausea/vomiting), Disp: 60 tablet, Rfl: 3  •  omeprazole (PriLOSEC) 40 MG capsule, TAKE 1 CAPSULE BY MOUTH TWICE A DAY (Patient taking differently: daily), Disp: 180 capsule, Rfl: 1  •  ondansetron (ZOFRAN-ODT) 4 mg disintegrating tablet, Take 1 tablet (4 mg total) by mouth every 6 (six) hours as needed for nausea or vomiting, Disp: 60 tablet, Rfl: 0  •  QUEtiapine (SEROquel) 50 mg tablet, Take 1 tablet (50 mg total) by mouth daily at bedtime, Disp: 30 tablet, Rfl: 0  Melonie White has a current medication list which includes the following prescription(s): atorvastatin, clonazepam, duloxetine, metoclopramide, omeprazole, ondansetron, and quetiapine. Aware of 24 hour and weekend coverage for urgent situations accessed by calling [facility] main practice number    Medications Risks/Benefits      Risks, Benefits And Possible Side Effects Of Medications:    Risks, benefits, and possible side effects of medications explained to Melonie White including risks of misuse, abuse or dependence, sedation and respiratory depression related to treatment with benzodiazepine medications and risk of impaired next-day mental alertness, complex sleep-related behavior and dependence related to treatment with hypnotic medications.  He verbalizes understanding and agreement for treatment. Controlled Medication Discussion:     [facility] has been filling controlled prescriptions on time as prescribed according to [address] [place] [address] Monitoring Program    Psychotherapy Provided:     Individual psychotherapy provided: Yes  Counseling was provided during the session today for 16 minutes. Medications, treatment progress and treatment plan reviewed with [facility]. Medication changes discussed with [facility]. Medication education provided to [facility]. Importance of medication and treatment compliance reviewed with [facility]. Educated on importance of medication and treatment compliance. Importance of follow up with family physician for medical issues reviewed with [facility]. Discussed with [facility] acceptance of mental illness diagnosis and need for ongoing psychiatric treatment. Supportive therapy provided. Reassurance and supportive therapy provided. Reoriented to reality and reassured. Treatment Plan:    Completed and signed during the session: Not applicable - Treatment Plan not due at this session    Note Share:     This note was not shared with the patient due to reasonable likelihood of causing patient harm    Visit start and stop times:    Start Time: 12:20 PM  Stop Time: 12:50 PM    I spent 30 minutes directly with the patient during this visit    Lily Gomez MD 07/27/23

## 2023-08-04 DIAGNOSIS — F13.939 BENZODIAZEPINE WITHDRAWAL WITH COMPLICATION (HCC): ICD-10-CM

## 2023-08-04 RX ORDER — CLONAZEPAM 0.5 MG/1
1.25 TABLET ORAL
Qty: 90 TABLET | Refills: 0 | Status: SHIPPED | OUTPATIENT
Start: 2023-08-04

## 2023-08-06 DIAGNOSIS — F13.939 BENZODIAZEPINE WITHDRAWAL WITH COMPLICATION (HCC): ICD-10-CM

## 2023-08-07 RX ORDER — CLONAZEPAM 0.5 MG/1
1.25 TABLET ORAL
Qty: 90 TABLET | Refills: 0 | Status: CANCELLED | OUTPATIENT
Start: 2023-08-07

## 2023-08-22 ENCOUNTER — TELEMEDICINE (OUTPATIENT)
Dept: PSYCHIATRY | Facility: CLINIC | Age: 58
End: 2023-08-22
Payer: COMMERCIAL

## 2023-08-22 DIAGNOSIS — F33.9 DEPRESSION, RECURRENT (HCC): Primary | ICD-10-CM

## 2023-08-22 DIAGNOSIS — F43.10 PTSD (POST-TRAUMATIC STRESS DISORDER): ICD-10-CM

## 2023-08-22 PROCEDURE — 90834 PSYTX W PT 45 MINUTES: CPT | Performed by: STUDENT IN AN ORGANIZED HEALTH CARE EDUCATION/TRAINING PROGRAM

## 2023-08-22 NOTE — BH TREATMENT PLAN
[address]  1965     Date of Initial Psychotherapy Assessment: 7/18/23   Date of Current Treatment Plan: 08/22/23  Treatment Plan Target Date: 02/22/24  Treatment Plan Expiration Date: 02/22/24  Diagnosis:   1. Depression, recurrent ([address])        2. PTSD (post-traumatic stress disorder)            Area(s) of Need: "I need to complete my projects (Selling my house). Long Term Goal 1 (in the client's own words): "I would like stability and things going well with my child's education. Feeling that I am making progress". Stage of Change: Preparation    Target Date for completion: 2/22/23     Anticipated therapeutic modalities: Client centered and CBT     People identified to complete this goal: Erika Serna LCSW      Objective 1: (identify the means of measuring success in meeting the objective): Lalit Ledesma will practice and implement positive coping skills to manage his feelings of frustration, anxiety, and depression". Objective 2: (identify the means of measuring success in meeting the objective): n/a      Long Term Goal 2 (in the client's own words): "Keep out exercising". Stage of Change: Action    Target Date for completion: 2/22/24     Anticipated therapeutic modalities: CBT and Mindfulness     People identified to complete this goal: Erika Bishop and Shara Serna LCSW      Objective 1: (identify the means of measuring success in meeting the objective): Lalit Ledesma will practice and implement CBT and mindfulness strategies to prioritize his health".        Objective 2: (identify the means of measuring success in meeting the objective): n/a     Long Term Goal 3 (in the client's own words): n/a    Stage of Change: n/a    Target Date for completion: n/a     Anticipated therapeutic modalities:  n/a     People identified to complete this goal: n/a      Objective 1: (identify the means of measuring success in meeting the objective): n/a      Objective 2: (identify the means of measuring success in meeting the objective): n/a     I am currently under the care of a [facility] psychiatric provider: yes    My [facility] psychiatric provider is: Dr. Mica Montano    I am currently taking psychiatric medications: Yes, as prescribed    I feel that I will be ready for discharge from mental health care when I reach the following (measurable goal/objective): "I don't know right now. I suffer from depression for a long time. When I am I feel everything is wonderful". For children and adults who have a legal guardian:   Has there been any change to custody orders and/or guardianship status? NA. If yes, attach updated documentation. I have created my Crisis Plan and have been offered a copy of this plan    [address]: Diagnosis and Treatment Plan explained to Kimberly Maki acknowledges an understanding of their diagnosis. Jacob Abida agrees to this treatment plan.     I have been offered a copy of this Treatment Plan. yes

## 2023-08-22 NOTE — PSYCH
Virtual Regular Visit    Verification of patient location:    Patient is located at Home in the following state in which I hold an active license [place]      Assessment/Plan:    Problem List Items Addressed This Visit        Other    Depression, recurrent ([address]) - Primary    PTSD (post-traumatic stress disorder)       Goals addressed in session: Goal 1          Reason for visit is No chief complaint on file. Encounter provider Vaughan Ahumada, LCSW    Provider located at [address] [place] [id number] [phone]      Recent Visits  Date Type Provider Dept   08/22/23 Telemedicine [facility]   Showing recent visits within past 7 days and meeting all other requirements  Future Appointments  No visits were found meeting these conditions. Showing future appointments within next 150 days and meeting all other requirements       The patient was identified by name and date of birth. Alejandro Robles was informed that this is a telemedicine visit and [facility] He agrees to proceed. .  My office door was closed. No one else was in the room. He acknowledged consent and understanding of privacy and security of the video platform. The patient has agreed to participate and understands they can discontinue the visit at any time. Patient is aware this is a billable service. Subjective  Alejandro Robles is a 61 y.o. male.       HPI     Past Medical History:   Diagnosis Date   • Diaz's esophagus    • Colon polyp    • Coronary artery disease 2019    RCA stenting X 2 in 2019   • Depression    • Diverticulitis    • Ear problems    • GERD (gastroesophageal reflux disease)    • Hemorrhoid    • History of heart artery stent    • Hyperlipidemia    • Hypertension    • Microscopic colitis    • Ulcerative colitis ([address]) Past Surgical History:   Procedure Laterality Date   • ABDOMINAL SURGERY      radio frequency ablation   • BONE GRAFT Left 2018   • CARPAL TUNNEL RELEASE Right    • COLONOSCOPY     • CORONARY ANGIOPLASTY WITH STENT PLACEMENT      x2   • EGD AND COLONOSCOPY     • ESOPHAGOGASTRODUODENOSCOPY N/A 2/15/2018    Procedure: ESOPHAGOGASTRODUODENOSCOPY (EGD); Surgeon: Isha Hensley MD;  Location: [facility];  Service: Gastroenterology   • ESOPHAGOGASTRODUODENOSCOPY N/A 12/10/2018    Procedure: ESOPHAGOGASTRODUODENOSCOPY (EGD); Surgeon: Elena Hernandez MD;  Location: [facility];   Service: Gastroenterology   • HAND SURGERY Left    • NV SURGICAL ARTHROSCOPY SHOULDER W/ROTATOR CUFF RPR Left 4/14/2021    Procedure: REPAIR ROTATOR CUFF  ARTHROSCOPIC; POSSIBLE BICEPS TENDONESIS, ACROMIOPLASTY;  Surgeon: Zack Cleaning DO;  Location: [facility];  Service: Orthopedics   • ROTATOR CUFF REPAIR Left    • SHOULDER ARTHROSCOPY Right 3/14/2022    Procedure: ARTHROSCOPY SHOULDER- Right shoulder arthroscopic rotator cuff repair, open subpectoral biceps tenodesis, subsacpular repair and extensive debridement;  Surgeon: Zack Cleaning DO;  Location: [facility];  Service: Orthopedics   • TONSILLECTOMY         Current Outpatient Medications   Medication Sig Dispense Refill   • atorvastatin (LIPITOR) 80 mg tablet Take 1 tablet (80 mg total) by mouth daily 90 tablet 1   • clonazePAM (KlonoPIN) 0.5 mg tablet Take 2.5 tablets (1.25 mg total) by mouth daily at bedtime 90 tablet 0   • DULoxetine (CYMBALTA) 60 mg delayed release capsule Take 1 capsule (60 mg total) by mouth daily after lunch 30 capsule 0   • metoclopramide (Reglan) 10 mg tablet Take 1 tablet (10 mg total) by mouth every 6 (six) hours as needed (nausea/vomiting) 60 tablet 3   • omeprazole (PriLOSEC) 40 MG capsule TAKE 1 CAPSULE BY MOUTH TWICE A DAY (Patient taking differently: daily) 180 capsule 1   • ondansetron (ZOFRAN-ODT) 4 mg disintegrating tablet Take 1 tablet (4 mg total) by mouth every 6 (six) hours as needed for nausea or vomiting 60 tablet 0   • QUEtiapine (SEROquel) 50 mg tablet Take 1 tablet (50 mg total) by mouth daily at bedtime 30 tablet 0     No current facility-administered medications for this visit. Allergies   Allergen Reactions   • Acetaminophen-Codeine Anaphylaxis   • Niacin Anaphylaxis     Other reaction(s): Flushing   • Rosuvastatin Myalgia   • Oxycodone-Acetaminophen Hives and Rash     Per patient does not have a problem     • Shellfish Allergy - Food Allergy Rash and Lip Swelling       Review of Systems    Video Exam    There were no vitals filed for this visit. Physical Exam     Behavioral Health Psychotherapy Progress Note    Psychotherapy Provided: Individual Psychotherapy     1. Depression, recurrent ([address])        2. PTSD (post-traumatic stress disorder)            Goals addressed in session: Goal 1     DATA: Treatment plan completed. Bar Singer talked about his family situation and concerns about his son's behavior. During this session, this clinician used the following therapeutic modalities: Client-centered Therapy    Substance Abuse was not addressed during this session. If the client is diagnosed with a co-occurring substance use disorder, please indicate any changes in the frequency or amount of use: N/A. Stage of change for addressing substance use diagnoses: No substance use/Not applicable    ASSESSMENT:  Nas Ludwig presents with a Euthymic/ normal mood. his affect is Normal range and intensity, which is congruent, with his mood and the content of the session. The client has made progress on their goals. Nas Ludwig presents with a none risk of suicide, none risk of self-harm, and none risk of harm to others. For any risk assessment that surpasses a "low" rating, a safety plan must be developed.     A safety plan was indicated: no  If yes, describe in detail N/A    PLAN: Between sessions, Nas Ludwig will use behavioral strategies to manage his son's behavior. At the next session, the therapist will use Client-centered Therapy and Cognitive Behavioral Therapy to address anxiety and depression. Behavioral Health Treatment Plan and Discharge Planning: Maria L Levin is aware of and agrees to continue to work on their treatment plan. They have identified and are working toward their discharge goals.  no    Visit start and stop times:    08/22/23  Start Time: 1130  Stop Time: 1220  Total Visit Time: 50 minutes

## 2023-08-28 ENCOUNTER — NURSE TRIAGE (OUTPATIENT)
Age: 58
End: 2023-08-28

## 2023-08-28 DIAGNOSIS — R19.7 DIARRHEA, UNSPECIFIED TYPE: Primary | ICD-10-CM

## 2023-08-28 NOTE — TELEPHONE ENCOUNTER
----- Message from Swathi Carpenter sent at 8/28/2023 11:36 AM EDT -----  Pt called in stating he is having a bad ulcerative colitis flare up. He has had diarrhea for over a week, he is now very weak and wiped out. No available appointments with Stacey Bernstein or Dominik Couch until 10/25. Pt asking for a call back with direction on what to do.

## 2023-08-28 NOTE — TELEPHONE ENCOUNTER
Last ov 2/14/23  Procedure 6/6/23 combo  Labs 5/9/23, Imaging CT 4/18/23    Answer Assessment - Initial Assessment Questions  1. DIARRHEA SEVERITY: "How bad is the diarrhea?" "How many extra stools have you had in the past 24 hours than normal?"     - NO DIARRHEA (SCALE 0)    - MILD (SCALE 1-3): Few loose or mushy BMs; increase of 1-3 stools over normal daily number of stools; mild increase in ostomy output. -  MODERATE (SCALE 4-7): Increase of 4-6 stools daily over normal; moderate increase in ostomy output. * SEVERE (SCALE 8-10; OR 'WORST POSSIBLE'): Increase of 7 or more stools daily over normal; moderate increase in ostomy output; incontinence. 8/10 day now down to 3 a day   2. ONSET: "When did the diarrhea begin?"       One week ago  3. BM CONSISTENCY: "How loose or watery is the diarrhea?"       water  4. VOMITING: "Are you also vomiting?" If Yes, ask: "How many times in the past 24 hours?"     Just nausea not vomiging  5. ABDOMINAL PAIN: "Are you having any abdominal pain?" If Yes, ask: "What does it feel like?" (e.g., crampy, dull, intermittent, constant)       Lower abdominal pain, left side worse than right  6. ABDOMINAL PAIN SEVERITY: If present, ask: "How bad is the pain?"  (e.g., Scale 1-10; mild, moderate, or severe)    - MILD (1-3): doesn't interfere with normal activities, abdomen soft and not tender to touch     - MODERATE (4-7): interferes with normal activities or awakens from sleep, tender to touch     - SEVERE (8-10): excruciating pain, doubled over, unable to do any normal activities      Pain level 8 with flare  7. ORAL INTAKE: If vomiting, "Have you been able to drink liquids?" "How much fluids have you had in the past 24 hours?"      Getting hydrated  8. HYDRATION: "Any signs of dehydration?" (e.g., dry mouth [not just dry lips], too weak to stand, dizziness, new weight loss) "When did you last urinate?"      Loss of energy  9.  EXPOSURE: "Have you traveled to a foreign country recently?" "Have you been exposed to anyone with diarrhea?" "Could you have eaten any food that was spoiled?"      No travel, no sick exposures, no dietary changes  10. ANTIBIOTIC USE: "Are you taking antibiotics now or have you taken antibiotics in the past 2 months?"       No antiotics  11. OTHER SYMPTOMS: "Do you have any other symptoms?" (e.g., fever, blood in stool)      No fever, no blood  12. PREGNANCY: "Is there any chance you are pregnant?" "When was your last menstrual period?"        N/A    Protocols used: DIARRHEA-ADULT-OH    Patient scheduled for appointment 9/5/23. Advised to keep hydrated, add fiber to try to bulk up stool. Placed on waitlist for earlier. Do you want any labs completed prior to appointment. Please advise.

## 2023-08-29 ENCOUNTER — APPOINTMENT (OUTPATIENT)
Dept: LAB | Facility: HOSPITAL | Age: 58
End: 2023-08-29
Attending: STUDENT IN AN ORGANIZED HEALTH CARE EDUCATION/TRAINING PROGRAM

## 2023-08-29 ENCOUNTER — NURSE TRIAGE (OUTPATIENT)
Age: 58
End: 2023-08-29

## 2023-08-29 ENCOUNTER — APPOINTMENT (OUTPATIENT)
Dept: LAB | Facility: HOSPITAL | Age: 58
End: 2023-08-29

## 2023-08-29 DIAGNOSIS — R19.7 DIARRHEA, UNSPECIFIED TYPE: ICD-10-CM

## 2023-08-29 DIAGNOSIS — E55.9 VITAMIN D DEFICIENCY: ICD-10-CM

## 2023-08-29 NOTE — TELEPHONE ENCOUNTER
SPOKE WITH PT, HX MICROSCOPIC COLITIS, CONTINUED SYMPTOMS FROM PREVIOUS TRIAGE, MULTIPLE EPISODES WATERY DIARRHEA WITH SOME MUCOUS 4-5 TIMES DAILY, 4-5 WAKING HIM UP IN THE NIGHT. REPORTS LOWER ABDOMINAL PAIN, MORE ON THE LEFT SIDE, TENDER TO TOUCH 7-8/10 WITH NAUSEA FOR WHICH HE TAKES ZOFRAN, HAS VOMITED 1-2 TIMES IN THE PAST 2 WEEKS. . DENIES FEVER, CHILLS, BLACK OR BLOODY STOOL. PT IS ABLE TO EAT AND HYDRATE WELL. PT INFORMED STOOL STUDIES HAVE BEEN ORDERED AND HE WILL GO TO THE LAB TODAY. ANY OTHER RECOMMENDATIONS?

## 2023-08-30 ENCOUNTER — HOSPITAL ENCOUNTER (EMERGENCY)
Facility: HOSPITAL | Age: 58
Discharge: HOME | End: 2023-08-30
Attending: EMERGENCY MEDICINE
Payer: OTHER GOVERNMENT

## 2023-08-30 ENCOUNTER — APPOINTMENT (EMERGENCY)
Dept: CT IMAGING | Facility: HOSPITAL | Age: 58
End: 2023-08-30
Payer: OTHER GOVERNMENT

## 2023-08-30 ENCOUNTER — APPOINTMENT (OUTPATIENT)
Dept: LAB | Facility: HOSPITAL | Age: 58
End: 2023-08-30
Payer: COMMERCIAL

## 2023-08-30 ENCOUNTER — APPOINTMENT (EMERGENCY)
Dept: RADIOLOGY | Facility: HOSPITAL | Age: 58
End: 2023-08-30
Payer: OTHER GOVERNMENT

## 2023-08-30 VITALS
RESPIRATION RATE: 20 BRPM | TEMPERATURE: 100.1 F | WEIGHT: 222 LBS | BODY MASS INDEX: 32.88 KG/M2 | HEIGHT: 69 IN | SYSTOLIC BLOOD PRESSURE: 128 MMHG | DIASTOLIC BLOOD PRESSURE: 82 MMHG | OXYGEN SATURATION: 93 % | HEART RATE: 75 BPM

## 2023-08-30 DIAGNOSIS — R19.7 DIARRHEA: ICD-10-CM

## 2023-08-30 DIAGNOSIS — U07.1 COVID-19: Primary | ICD-10-CM

## 2023-08-30 DIAGNOSIS — E83.42 HYPOMAGNESEMIA: ICD-10-CM

## 2023-08-30 DIAGNOSIS — E87.6 HYPOKALEMIA: ICD-10-CM

## 2023-08-30 LAB
2HR DELTA HS TROPONIN: 0 NG/L
ALBUMIN SERPL BCP-MCNC: 4.8 G/DL (ref 3.5–5)
ALP SERPL-CCNC: 52 U/L (ref 34–104)
ALT SERPL W P-5'-P-CCNC: 26 U/L (ref 7–52)
ANION GAP SERPL CALCULATED.3IONS-SCNC: 12 MMOL/L
AST SERPL W P-5'-P-CCNC: 22 U/L (ref 13–39)
BACTERIA UR QL AUTO: ABNORMAL /HPF
BASOPHILS # BLD AUTO: 0.04 THOUSANDS/ÂΜL (ref 0–0.1)
BASOPHILS NFR BLD AUTO: 1 % (ref 0–1)
BILIRUB SERPL-MCNC: 0.31 MG/DL (ref 0.2–1)
BILIRUB UR QL STRIP: NEGATIVE
BUN SERPL-MCNC: 9 MG/DL (ref 5–25)
CALCIUM SERPL-MCNC: 9.4 MG/DL (ref 8.4–10.2)
CARDIAC TROPONIN I PNL SERPL HS: 5 NG/L
CARDIAC TROPONIN I PNL SERPL HS: 5 NG/L
CHLORIDE SERPL-SCNC: 100 MMOL/L (ref 96–108)
CLARITY UR: CLEAR
CO2 SERPL-SCNC: 21 MMOL/L (ref 21–32)
COLOR UR: ABNORMAL
CREAT SERPL-MCNC: 1.29 MG/DL (ref 0.6–1.3)
EOSINOPHIL # BLD AUTO: 0.02 THOUSAND/ÂΜL (ref 0–0.61)
EOSINOPHIL NFR BLD AUTO: 0 % (ref 0–6)
ERYTHROCYTE [DISTWIDTH] IN BLOOD BY AUTOMATED COUNT: 14.2 % (ref 11.6–15.1)
FLUAV RNA RESP QL NAA+PROBE: NEGATIVE
FLUBV RNA RESP QL NAA+PROBE: NEGATIVE
GFR SERPL CREATININE-BSD FRML MDRD: 60 ML/MIN/1.73SQ M
GLUCOSE SERPL-MCNC: 90 MG/DL (ref 65–140)
GLUCOSE UR STRIP-MCNC: NEGATIVE MG/DL
HCT VFR BLD AUTO: 43.2 % (ref 36.5–49.3)
HGB BLD-MCNC: 14.6 G/DL (ref 12–17)
HGB UR QL STRIP.AUTO: ABNORMAL
HYALINE CASTS #/AREA URNS LPF: ABNORMAL /LPF
IMM GRANULOCYTES # BLD AUTO: 0.03 THOUSAND/UL (ref 0–0.2)
IMM GRANULOCYTES NFR BLD AUTO: 0 % (ref 0–2)
KETONES UR STRIP-MCNC: NEGATIVE MG/DL
LEUKOCYTE ESTERASE UR QL STRIP: NEGATIVE
LIPASE SERPL-CCNC: 8 U/L (ref 11–82)
LYMPHOCYTES # BLD AUTO: 1.06 THOUSANDS/ÂΜL (ref 0.6–4.47)
LYMPHOCYTES NFR BLD AUTO: 13 % (ref 14–44)
MAGNESIUM SERPL-MCNC: 1.6 MG/DL (ref 1.9–2.7)
MCH RBC QN AUTO: 29.1 PG (ref 26.8–34.3)
MCHC RBC AUTO-ENTMCNC: 33.8 G/DL (ref 31.4–37.4)
MCV RBC AUTO: 86 FL (ref 82–98)
MONOCYTES # BLD AUTO: 1.32 THOUSAND/ÂΜL (ref 0.17–1.22)
MONOCYTES NFR BLD AUTO: 16 % (ref 4–12)
NEUTROPHILS # BLD AUTO: 5.69 THOUSANDS/ÂΜL (ref 1.85–7.62)
NEUTS SEG NFR BLD AUTO: 70 % (ref 43–75)
NITRITE UR QL STRIP: NEGATIVE
NON-SQ EPI CELLS URNS QL MICRO: ABNORMAL /HPF
NRBC BLD AUTO-RTO: 0 /100 WBCS
PH UR STRIP.AUTO: 6 [PH]
PLATELET # BLD AUTO: 221 THOUSANDS/UL (ref 149–390)
PMV BLD AUTO: 9.6 FL (ref 8.9–12.7)
POTASSIUM SERPL-SCNC: 3.2 MMOL/L (ref 3.5–5.3)
PROT SERPL-MCNC: 7.6 G/DL (ref 6.4–8.4)
PROT UR STRIP-MCNC: ABNORMAL MG/DL
RBC # BLD AUTO: 5.01 MILLION/UL (ref 3.88–5.62)
RBC #/AREA URNS AUTO: ABNORMAL /HPF
RSV RNA RESP QL NAA+PROBE: NEGATIVE
SARS-COV-2 RNA RESP QL NAA+PROBE: POSITIVE
SODIUM SERPL-SCNC: 133 MMOL/L (ref 135–147)
SP GR UR STRIP.AUTO: 1.01 (ref 1–1.03)
UROBILINOGEN UR STRIP-ACNC: <2 MG/DL
WBC # BLD AUTO: 8.16 THOUSAND/UL (ref 4.31–10.16)
WBC #/AREA URNS AUTO: ABNORMAL /HPF

## 2023-08-30 PROCEDURE — 99285 EMERGENCY DEPT VISIT HI MDM: CPT

## 2023-08-30 PROCEDURE — 83735 ASSAY OF MAGNESIUM: CPT | Performed by: EMERGENCY MEDICINE

## 2023-08-30 PROCEDURE — 96365 THER/PROPH/DIAG IV INF INIT: CPT

## 2023-08-30 PROCEDURE — 87177 OVA AND PARASITES SMEARS: CPT

## 2023-08-30 PROCEDURE — 36415 COLL VENOUS BLD VENIPUNCTURE: CPT | Performed by: EMERGENCY MEDICINE

## 2023-08-30 PROCEDURE — 83690 ASSAY OF LIPASE: CPT | Performed by: EMERGENCY MEDICINE

## 2023-08-30 PROCEDURE — 87209 SMEAR COMPLEX STAIN: CPT

## 2023-08-30 PROCEDURE — 71046 X-RAY EXAM CHEST 2 VIEWS: CPT

## 2023-08-30 PROCEDURE — G1004 CDSM NDSC: HCPCS

## 2023-08-30 PROCEDURE — 96375 TX/PRO/DX INJ NEW DRUG ADDON: CPT

## 2023-08-30 PROCEDURE — 74177 CT ABD & PELVIS W/CONTRAST: CPT

## 2023-08-30 PROCEDURE — 87505 NFCT AGENT DETECTION GI: CPT

## 2023-08-30 PROCEDURE — 83993 ASSAY FOR CALPROTECTIN FECAL: CPT

## 2023-08-30 PROCEDURE — 81001 URINALYSIS AUTO W/SCOPE: CPT | Performed by: EMERGENCY MEDICINE

## 2023-08-30 PROCEDURE — 84484 ASSAY OF TROPONIN QUANT: CPT | Performed by: EMERGENCY MEDICINE

## 2023-08-30 PROCEDURE — 93005 ELECTROCARDIOGRAM TRACING: CPT

## 2023-08-30 PROCEDURE — 80053 COMPREHEN METABOLIC PANEL: CPT | Performed by: EMERGENCY MEDICINE

## 2023-08-30 PROCEDURE — 99285 EMERGENCY DEPT VISIT HI MDM: CPT | Performed by: EMERGENCY MEDICINE

## 2023-08-30 PROCEDURE — 0241U HB NFCT DS VIR RESP RNA 4 TRGT: CPT | Performed by: EMERGENCY MEDICINE

## 2023-08-30 PROCEDURE — 85025 COMPLETE CBC W/AUTO DIFF WBC: CPT | Performed by: EMERGENCY MEDICINE

## 2023-08-30 PROCEDURE — 96361 HYDRATE IV INFUSION ADD-ON: CPT

## 2023-08-30 PROCEDURE — 82653 EL-1 FECAL QUANTITATIVE: CPT

## 2023-08-30 RX ORDER — MAGNESIUM SULFATE HEPTAHYDRATE 40 MG/ML
2 INJECTION, SOLUTION INTRAVENOUS ONCE
Status: COMPLETED | OUTPATIENT
Start: 2023-08-30 | End: 2023-08-30

## 2023-08-30 RX ORDER — SODIUM CHLORIDE 9 MG/ML
3 INJECTION INTRAVENOUS
Status: DISCONTINUED | OUTPATIENT
Start: 2023-08-30 | End: 2023-08-31 | Stop reason: HOSPADM

## 2023-08-30 RX ORDER — POTASSIUM CHLORIDE 20 MEQ/1
40 TABLET, EXTENDED RELEASE ORAL ONCE
Status: COMPLETED | OUTPATIENT
Start: 2023-08-30 | End: 2023-08-30

## 2023-08-30 RX ORDER — KETOROLAC TROMETHAMINE 30 MG/ML
15 INJECTION, SOLUTION INTRAMUSCULAR; INTRAVENOUS ONCE
Status: COMPLETED | OUTPATIENT
Start: 2023-08-30 | End: 2023-08-30

## 2023-08-30 RX ADMIN — POTASSIUM CHLORIDE 40 MEQ: 1500 TABLET, EXTENDED RELEASE ORAL at 22:23

## 2023-08-30 RX ADMIN — SODIUM CHLORIDE 1000 ML: 0.9 INJECTION, SOLUTION INTRAVENOUS at 19:30

## 2023-08-30 RX ADMIN — MAGNESIUM SULFATE HEPTAHYDRATE 2 G: 40 INJECTION, SOLUTION INTRAVENOUS at 20:58

## 2023-08-30 RX ADMIN — IOHEXOL 100 ML: 350 INJECTION, SOLUTION INTRAVENOUS at 20:32

## 2023-08-30 RX ADMIN — KETOROLAC TROMETHAMINE 15 MG: 30 INJECTION, SOLUTION INTRAMUSCULAR at 19:46

## 2023-08-31 ENCOUNTER — TELEPHONE (OUTPATIENT)
Dept: INTERNAL MEDICINE CLINIC | Facility: CLINIC | Age: 58
End: 2023-08-31

## 2023-08-31 DIAGNOSIS — U07.1 COVID: Primary | ICD-10-CM

## 2023-08-31 LAB
ATRIAL RATE: 92 BPM
C DIFF TOX GENS STL QL NAA+PROBE: NEGATIVE
CAMPYLOBACTER DNA SPEC NAA+PROBE: NORMAL
P AXIS: 74 DEGREES
PR INTERVAL: 176 MS
QRS AXIS: -28 DEGREES
QRSD INTERVAL: 98 MS
QT INTERVAL: 348 MS
QTC INTERVAL: 430 MS
SALMONELLA DNA SPEC QL NAA+PROBE: NORMAL
SHIGA TOXIN STX GENE SPEC NAA+PROBE: NORMAL
SHIGELLA DNA SPEC QL NAA+PROBE: NORMAL
T WAVE AXIS: 69 DEGREES
VENTRICULAR RATE: 92 BPM

## 2023-08-31 PROCEDURE — 93010 ELECTROCARDIOGRAM REPORT: CPT | Performed by: INTERNAL MEDICINE

## 2023-08-31 RX ORDER — BENZONATATE 200 MG/1
200 CAPSULE ORAL 3 TIMES DAILY PRN
Qty: 20 CAPSULE | Refills: 0 | Status: SHIPPED | OUTPATIENT
Start: 2023-08-31

## 2023-08-31 RX ORDER — NIRMATRELVIR AND RITONAVIR 300-100 MG
3 KIT ORAL 2 TIMES DAILY
Qty: 30 TABLET | Refills: 0 | Status: SHIPPED | OUTPATIENT
Start: 2023-08-31 | End: 2023-09-05

## 2023-08-31 RX ORDER — GUAIFENESIN 600 MG/1
1200 TABLET, EXTENDED RELEASE ORAL EVERY 12 HOURS SCHEDULED
Qty: 56 TABLET | Refills: 0 | Status: SHIPPED | OUTPATIENT
Start: 2023-08-31 | End: 2023-09-14

## 2023-08-31 NOTE — TELEPHONE ENCOUNTER
Pt called, he was covid positive, and feeling very weak so he did end up going to the e/r for his condition,       fyi

## 2023-08-31 NOTE — ED PROVIDER NOTES
History  Chief Complaint   Patient presents with   • Flu Symptoms     Pt reports testing covid +, diarrhea x2 weeks. 58-year-old male, history of ulcerative colitis, presents to the emergency room for flulike symptoms. Patient states that he has had diarrhea over the last 2 weeks with intermittent lower abdominal cramping. States that he called his GI doctor who ordered stool samples which he got done today. States that he has not received the results. He reports that he does have a history of colitis and states that symptoms are similar. He also reports that yesterday he developed flulike symptoms. States that he has had a cough, body aches, headache, fever, loss of taste/smell since last night. States that today he took a home COVID test which was positive. Denies any fevers, nausea/vomiting, urinary symptoms, or rash. No known sick contacts. No other complaints. History provided by:  Patient  Flu Symptoms  Presenting symptoms: cough, diarrhea, headache, myalgias and sore throat    Presenting symptoms: no fever, no nausea, no shortness of breath and no vomiting    Severity:  Moderate  Onset quality:  Sudden  Progression:  Worsening  Chronicity:  New  Relieved by:  None tried  Worsened by:  Nothing  Ineffective treatments:  None tried  Associated symptoms: nasal congestion    Associated symptoms: no chills, no ear pain and no neck stiffness    Risk factors: no sick contacts        Prior to Admission Medications   Prescriptions Last Dose Informant Patient Reported? Taking?    DULoxetine (CYMBALTA) 60 mg delayed release capsule   No No   Sig: Take 1 capsule (60 mg total) by mouth daily after lunch   QUEtiapine (SEROquel) 50 mg tablet   No No   Sig: Take 1 tablet (50 mg total) by mouth daily at bedtime   atorvastatin (LIPITOR) 80 mg tablet   No No   Sig: Take 1 tablet (80 mg total) by mouth daily   clonazePAM (KlonoPIN) 0.5 mg tablet   No No   Sig: Take 2.5 tablets (1.25 mg total) by mouth daily at bedtime   metoclopramide (Reglan) 10 mg tablet   No No   Sig: Take 1 tablet (10 mg total) by mouth every 6 (six) hours as needed (nausea/vomiting)   omeprazole (PriLOSEC) 40 MG capsule   No No   Sig: TAKE 1 CAPSULE BY MOUTH TWICE A DAY   Patient taking differently: daily   ondansetron (ZOFRAN-ODT) 4 mg disintegrating tablet   No No   Sig: Take 1 tablet (4 mg total) by mouth every 6 (six) hours as needed for nausea or vomiting      Facility-Administered Medications: None       Past Medical History:   Diagnosis Date   • Diaz's esophagus    • Colon polyp    • Coronary artery disease 2019    RCA stenting X 2 in 2019   • Depression    • Diverticulitis    • Ear problems    • GERD (gastroesophageal reflux disease)    • Hemorrhoid    • History of heart artery stent    • Hyperlipidemia    • Hypertension    • Microscopic colitis    • Ulcerative colitis ([address])        Past Surgical History:   Procedure Laterality Date   • ABDOMINAL SURGERY      radio frequency ablation   • BONE GRAFT Left 2018   • CARPAL TUNNEL RELEASE Right    • COLONOSCOPY     • CORONARY ANGIOPLASTY WITH STENT PLACEMENT      x2   • EGD AND COLONOSCOPY     • ESOPHAGOGASTRODUODENOSCOPY N/A 2/15/2018    Procedure: ESOPHAGOGASTRODUODENOSCOPY (EGD); Surgeon: Isidro Junior MD;  Location: [facility];  Service: Gastroenterology   • ESOPHAGOGASTRODUODENOSCOPY N/A 12/10/2018    Procedure: ESOPHAGOGASTRODUODENOSCOPY (EGD); Surgeon: Stefan Veliz MD;  Location: [facility];   Service: Gastroenterology   • HAND SURGERY Left    • TX SURGICAL ARTHROSCOPY SHOULDER W/ROTATOR CUFF RPR Left 4/14/2021    Procedure: REPAIR ROTATOR CUFF  ARTHROSCOPIC; POSSIBLE BICEPS TENDONESIS, ACROMIOPLASTY;  Surgeon: Mehran Cobian DO;  Location: [facility];  Service: Orthopedics   • ROTATOR CUFF REPAIR Left    • SHOULDER ARTHROSCOPY Right 3/14/2022    Procedure: ARTHROSCOPY SHOULDER- Right shoulder arthroscopic rotator cuff repair, open subpectoral biceps tenodesis, subsacpular repair and extensive debridement;  Surgeon: Boo Edmondson DO;  Location: [facility];  Service: Orthopedics   • TONSILLECTOMY         Family History   Problem Relation Age of Onset   • Heart disease Father    • Melanoma Father    • Cancer Sister    • Skin cancer Sister    • Skin cancer Mother      I have reviewed and agree with the history as documented. E-Cigarette/Vaping   • E-Cigarette Use Never User      E-Cigarette/Vaping Substances   • Nicotine No    • THC No    • CBD No    • Flavoring No    • Other No    • Unknown No      Social History     Tobacco Use   • Smoking status: Former     Packs/day: 0.50     Types: Cigarettes     Quit date: 2007     Years since quittin.6   • Smokeless tobacco: Former     Quit date: 1998   • Tobacco comments:     quit 10 yrs ago   Vaping Use   • Vaping Use: Never used   Substance Use Topics   • Alcohol use: Not Currently     Comment: quit 5 years   • Drug use: Yes     Frequency: 3.0 times per week     Types: Marijuana     Comment: advised not to smoke       Review of Systems   Constitutional: Negative for chills and fever. HENT: Positive for congestion and sore throat. Negative for ear pain. Eyes: Negative for pain and visual disturbance. Respiratory: Positive for cough. Negative for shortness of breath and wheezing. Cardiovascular: Negative for chest pain and leg swelling. Gastrointestinal: Positive for diarrhea. Negative for abdominal pain, nausea and vomiting. Genitourinary: Negative for dysuria, frequency, hematuria and urgency. Musculoskeletal: Positive for myalgias. Negative for neck pain and neck stiffness. Skin: Negative for rash and wound. Neurological: Positive for headaches. Negative for weakness and numbness. Psychiatric/Behavioral: Negative for agitation and confusion. All other systems reviewed and are negative. Physical Exam  Physical Exam  Vitals and nursing note reviewed.    Constitutional:       Appearance: He is well-developed. HENT:      Head: Normocephalic and atraumatic. Eyes:      Pupils: Pupils are equal, round, and reactive to light. Cardiovascular:      Rate and Rhythm: Normal rate and regular rhythm. Pulmonary:      Effort: Pulmonary effort is normal.      Breath sounds: Normal breath sounds. Abdominal:      General: Bowel sounds are normal.      Palpations: Abdomen is soft. Tenderness: There is abdominal tenderness in the right lower quadrant, suprapubic area and left lower quadrant. Musculoskeletal:         General: Normal range of motion. Cervical back: Normal range of motion and neck supple. Skin:     General: Skin is warm and dry. Neurological:      Mental Status: He is alert and oriented to person, place, and time.       Comments: No focal deficits         Vital Signs  ED Triage Vitals   Temperature Pulse Respirations Blood Pressure SpO2   08/30/23 1854 08/30/23 1854 08/30/23 1854 08/30/23 1854 08/30/23 1854   100.1 °F (37.8 °C) 99 20 120/76 96 %      Temp Source Heart Rate Source Patient Position - Orthostatic VS BP Location FiO2 (%)   08/30/23 1854 08/30/23 2130 08/30/23 1854 08/30/23 1854 --   Temporal Monitor Sitting Left arm       Pain Score       08/30/23 1946       6           Vitals:    08/30/23 1854 08/30/23 2100 08/30/23 2130   BP: 120/76 134/74 128/82   Pulse: 99 81 75   Patient Position - Orthostatic VS: Sitting Sitting Sitting         Visual Acuity      ED Medications  Medications   sodium chloride 0.9 % bolus 1,000 mL (0 mL Intravenous Stopped 8/30/23 2120)   ketorolac (TORADOL) injection 15 mg (15 mg Intravenous Given 8/30/23 1946)   magnesium sulfate 2 g/50 mL IVPB (premix) 2 g (0 g Intravenous Stopped 8/30/23 2120)   iohexol (OMNIPAQUE) 350 MG/ML injection (MULTI-DOSE) 100 mL (100 mL Intravenous Given 8/30/23 2032)   potassium chloride (K-DUR,KLOR-CON) CR tablet 40 mEq (40 mEq Oral Given 8/30/23 2223)       Diagnostic Studies  Results Reviewed     Procedure Component Value Units Date/Time    HS Troponin I 2hr [[id number]]  (Normal) Collected: 08/30/23 2133    Lab Status: Final result Specimen: Blood from Arm, Right Updated: 08/30/23 2208     hs TnI 2hr 5 ng/L      Delta 2hr hsTnI 0 ng/L     Comprehensive metabolic panel [[id number]]  (Abnormal) Collected: 08/30/23 1930    Lab Status: Final result Specimen: Blood from Arm, Right Updated: 08/30/23 2016     Sodium 133 mmol/L      Potassium 3.2 mmol/L      Chloride 100 mmol/L      CO2 21 mmol/L      ANION GAP 12 mmol/L      BUN 9 mg/dL      Creatinine 1.29 mg/dL      Glucose 90 mg/dL      Calcium 9.4 mg/dL      AST 22 U/L      ALT 26 U/L      Alkaline Phosphatase 52 U/L      Total Protein 7.6 g/dL      Albumin 4.8 g/dL      Total Bilirubin 0.31 mg/dL      eGFR 60 ml/min/1.73sq m     Narrative:      [facility] guidelines for [facility] Kid[facility] Disease (CKD):   •  Stage 1 with normal or high GFR (GFR > 90 mL/min/1.73 square meters)  •  Stage 2 Mild CKD (GFR = 60-89 mL/min/1.73 square meters)  •  Stage 3A Moderate CKD (GFR = 45-59 mL/min/1.73 square meters)  •  Stage 3B Moderate CKD (GFR = 30-44 mL/min/1.73 square meters)  •  Stage 4 Severe CKD (GFR = 15-29 mL/min/1.73 square meters)  •  Stage 5 End Stage CKD (GFR <15 mL/min/1.73 square meters)  Note: GFR calculation is accurate only with a steady state creatinine    Lipase [[id number]]  (Abnormal) Collected: 08/30/23 1930    Lab Status: Final result Specimen: Blood from Arm, Right Updated: 08/30/23 2016     Lipase 8 u/L     Magnesium [[id number]]  (Abnormal) Collected: 08/30/23 1930    Lab Status: Final result Specimen: Blood from Arm, Right Updated: 08/30/23 2016     Magnesium 1.6 mg/dL     FLU/RSV/COVID - if FLU/RSV clinically relevant [[id number]]  (Abnormal) Collected: 08/30/23 1930    Lab Status: Final result Specimen: Nares from Nose Updated: 08/30/23 2014     SARS-CoV-2 Positive     INFLUENZA A PCR Negative     INFLUENZA B PCR Negative     RSV PCR Negative Narrative:      FOR PEDIATRIC PATIENTS - copy/paste COVID Guidelines URL to browser: [email or web address]    SARS-CoV-2 assay is a Nucleic Acid Amplification assay intended for the  qualitative detection of nucleic acid from SARS-CoV-2 in nasopharyngeal  swabs. Results are for the presumptive identification of SARS-CoV-2 RNA. Positive results are indicative of infection with SARS-CoV-2, the virus  causing COVID-19, but do not rule out bacterial infection or co-infection  with other viruses. Laboratories within [facility]  territories are required to report all positive results to the appropriate  public health authorities. Negative results do not preclude SARS-CoV-2  infection and should not be used as the sole basis for treatment or other  patient management decisions. Negative results must be combined with  clinical observations, patient history, and epidemiological information. This test has not been FDA cleared or approved. This test has been authorized by FDA under an Emergency Use Authorization  (EUA). This test is only authorized for the duration of time the  declaration that circumstances exist justifying the authorization of the  emergency use of an in vitro diagnostic tests for detection of SARS-CoV-2  virus and/or diagnosis of COVID-19 infection under section 564(b)(1) of  the Act, 21 U. S.C. 627ACY-1(Y)(2), unless the authorization is terminated  or revoked sooner. The test has been validated but independent review by FDA  and CLIA is pending. Test performed using [employer] GeneXpert: This RT-PCR assay targets N2,  a region unique to SARS-CoV-2. A conserved region in the E-gene was chosen  for pan-Sarbecovirus detection which includes SARS-CoV-2. According to CMS-2020-01-R, this platform meets the definition of high-throughput technology.     HS Troponin 0hr (reflex protocol) [[id number]]  (Normal) Collected: 08/30/23 1930 Lab Status: Final result Specimen: Blood from Arm, Right Updated: 08/30/23 2002     hs TnI 0hr 5 ng/L     Urine Microscopic [[id number]]  (Abnormal) Collected: 08/30/23 1931    Lab Status: Final result Specimen: Urine, Clean Catch Updated: 08/30/23 1944     RBC, UA 1-2 /hpf      WBC, UA 1-2 /hpf      Epithelial Cells Occasional /hpf      Bacteria, UA None Seen /hpf      Hyaline Casts, UA 3-5 /lpf     UA w Reflex to Microscopic w Reflex to Culture [[id number]]  (Abnormal) Collected: 08/30/23 1931    Lab Status: Final result Specimen: Urine, Clean Catch Updated: 08/30/23 1940     Color, UA Light Yellow     Clarity, UA Clear     Specific Gravity, UA 1.011     pH, UA 6.0     Leukocytes, UA Negative     Nitrite, UA Negative     Protein, UA Trace mg/dl      Glucose, UA Negative mg/dl      Ketones, UA Negative mg/dl      Urobilinogen, UA <2.0 mg/dl      Bilirubin, UA Negative     Occult Blood, UA Trace    CBC and differential [[id number]]  (Abnormal) Collected: 08/30/23 1930    Lab Status: Final result Specimen: Blood from Arm, Right Updated: 08/30/23 1938     WBC 8.16 Thousand/uL      RBC 5.01 Million/uL      Hemoglobin 14.6 g/dL      Hematocrit 43.2 %      MCV 86 fL      MCH 29.1 pg      MCHC 33.8 g/dL      RDW 14.2 %      MPV 9.6 fL      Platelets 360 Thousands/uL      nRBC 0 /100 WBCs      Neutrophils Relative 70 %      Immat GRANS % 0 %      Lymphocytes Relative 13 %      Monocytes Relative 16 %      Eosinophils Relative 0 %      Basophils Relative 1 %      Neutrophils Absolute 5.69 Thousands/µL      Immature Grans Absolute 0.03 Thousand/uL      Lymphocytes Absolute 1.06 Thousands/µL      Monocytes Absolute 1.32 Thousand/µL      Eosinophils Absolute 0.02 Thousand/µL      Basophils Absolute 0.04 Thousands/µL                  CT abdomen pelvis with contrast   Final Result by Vernell Dunn MD (08/30 2135)      No acute pathology visualized on CT of the abdomen and pelvis with IV without oral contrast.         Workstation performed: [id number]         X-ray chest 2 views    (Results Pending)              Procedures  ECG 12 Lead Documentation Only    Date/Time: 8/30/2023 7:35 PM    Performed by: Khadar Werner DO  Authorized by: Khadar Werner DO    Indications / Diagnosis:  Flu like symptoms   Patient location:  ED  Rate:     ECG rate:  92    ECG rate assessment: normal    Rhythm:     Rhythm: sinus rhythm    Ectopy:     Ectopy: none    QRS:     QRS axis:  Normal    QRS intervals:  Normal  ST segments:     ST segments:  Normal  T waves:     T waves: normal               ED Course  ED Course as of 08/31/23 0136   Wed Aug 30, 2023   1918 Diarrhea x 2 weeks. Lower abd pain. Has hx of UC. Developed flu like symptoms - Cough, body aches, headache, fever, loss of taste/ smell lastnight. Took a home covid test today which was positive. Called GI doctor and ordered stool samoples today, which he did - results pending. Has an appointment in 2 weeks with them    2023 SARS-COV-2(!): Positive                                             Medical Decision Making  53-year-old male with flulike symptoms-we will get labs, COVID testing, and CT abdomen/pelvis. Will reassess for dispo. Labs, CT scan, and COVID test reviewed. Electrolytes were repleted and fluids given. Patient feels improved to go home and was instructed to follow-up with his PCP and GI doctor. COVID-19: acute illness or injury  Diarrhea: acute illness or injury  Hypokalemia: acute illness or injury  Hypomagnesemia: acute illness or injury  Amount and/or Complexity of Data Reviewed  Labs: ordered. Decision-making details documented in ED Course. Radiology: ordered. Risk  Prescription drug management.           Disposition  Final diagnoses:   COVID-19   Diarrhea   Hypomagnesemia   Hypokalemia     Time reflects when diagnosis was documented in both MDM as applicable and the Disposition within this note     Time User Action Codes Description Comment    8/30/2023 10:21 PM Karin Antonio Add [U07.1] COVID-19     8/30/2023 10:21 PM Savanna Mahnaz A Add [R19.7] Diarrhea     8/30/2023 10:21 PM Savanna Mahnaz A Add [E83.42] Hypomagnesemia     8/30/2023 10:21 PM Savanna Mahnaz A Add [E87.6] Hypokalemia       ED Disposition     ED Disposition   Discharge    Condition   Stable    Date/Time   Wed Aug 30, 2023 10:20 PM    Comment   Gerald Nones discharge to home/self care.                Follow-up Information     Follow up With Specialties Details Why Contact Info Additional [address] MD Internal Medicine Call in 1 day For follow-up within 2 to 3 days [address] [facility] 2  [facility] Department Emergency Medicine Go to  Immediately for any new or worsening symptoms Brittany. [address] [facility] [address] [facility], [place], [place], [address]          Discharge Medication List as of 8/30/2023 10:35 PM      CONTINUE these medications which have NOT CHANGED    Details   atorvastatin (LIPITOR) 80 mg tablet Take 1 tablet (80 mg total) by mouth daily, Starting Thu 7/6/2023, Until Wed 10/4/2023, Normal      clonazePAM (KlonoPIN) 0.5 mg tablet Take 2.5 tablets (1.25 mg total) by mouth daily at bedtime, Starting Fri 8/4/2023, Normal      DULoxetine (CYMBALTA) 60 mg delayed release capsule Take 1 capsule (60 mg total) by mouth daily after lunch, Starting Thu 7/27/2023, Until Sat 8/26/2023, Normal      metoclopramide (Reglan) 10 mg tablet Take 1 tablet (10 mg total) by mouth every 6 (six) hours as needed (nausea/vomiting), Starting Tue 2/14/2023, Normal      omeprazole (PriLOSEC) 40 MG capsule TAKE 1 CAPSULE BY MOUTH TWICE A DAY, Normal      ondansetron (ZOFRAN-ODT) 4 mg disintegrating tablet Take 1 tablet (4 mg total) by mouth every 6 (six) hours as needed for nausea or vomiting, Starting Wed 5/10/2023, Normal QUEtiapine (SEROquel) 50 mg tablet Take 1 tablet (50 mg total) by mouth daily at bedtime, Starting Thu 7/27/2023, Until Sat 8/26/2023, Normal             No discharge procedures on file.     PDMP Review       Value Time User    PDMP Reviewed  Yes 8/4/2023  1:00 PM Eddi Coats MD          ED Provider  Electronically Signed by           Javon Watson DO  08/31/23 0136

## 2023-09-01 ENCOUNTER — TELEPHONE (OUTPATIENT)
Dept: GASTROENTEROLOGY | Facility: CLINIC | Age: 58
End: 2023-09-01

## 2023-09-01 LAB — CALPROTECTIN STL-MCNT: 7 UG/G (ref 0–120)

## 2023-09-01 NOTE — TELEPHONE ENCOUNTER
Called and spoke with pt in regards to his testing that came back negative as per N[facility] List. It can be due to the fact that he was diagnosed with COVID and it can cause GI issue.  Pt voiced understanding,

## 2023-09-01 NOTE — TELEPHONE ENCOUNTER
----- Message from Jose Armando Carrington PA-C sent at 8/31/2023  8:10 PM EDT -----  Please let patient know that his two infection testing was negative. I see that he was also recently diagnosed with Covid, which also could cause G.I. symptoms. Thank you!

## 2023-09-05 ENCOUNTER — TELEMEDICINE (OUTPATIENT)
Dept: GASTROENTEROLOGY | Facility: CLINIC | Age: 58
End: 2023-09-05
Payer: COMMERCIAL

## 2023-09-05 DIAGNOSIS — K52.838 OTHER MICROSCOPIC COLITIS: Primary | ICD-10-CM

## 2023-09-05 PROCEDURE — 99441 PR PHYS/QHP TELEPHONE EVALUATION 5-10 MIN: CPT | Performed by: PHYSICIAN ASSISTANT

## 2023-09-05 RX ORDER — BUDESONIDE 3 MG/1
CAPSULE, COATED PELLETS ORAL
Qty: 90 CAPSULE | Refills: 1 | Status: SHIPPED | OUTPATIENT
Start: 2023-09-05 | End: 2023-09-06 | Stop reason: SDUPTHER

## 2023-09-05 NOTE — PROGRESS NOTES
Virtual Brief Visit    This Visit is being completed by telephone. The Patient is located at Home and in the following state in which I hold an active license [place]    The patient was identified by name and date of birth. Makenna Cornejo was informed that this is a telemedicine visit and that the visit is being conducted through Telephone. My office door was closed. No one else was in the room. He acknowledged consent and understanding of privacy and security of the video platform. The patient has agreed to participate and understands they can discontinue the visit at any time. Patient is aware this is a billable service. Assessment/Plan:     69-year-old male with a history of microscopic colitis who presents with severe diarrhea for 3 to 4 weeks. He reports that the diarrhea was so severe that he has been resorting to wearing depends if he leaves the house. He has had abdominal discomfort but no rectal bleeding. Stool testing including fecal calprotectin, C. difficile and enteric pathogens was negative. Fecal elastase is pending. He took a home COVID test last week and was noted to be positive. He reports that any respiratory symptoms he was having are improving but the diarrhea is not. He notes that when he was first diagnosed with microscopic colitis many years ago he was successfully treated with a budesonide course. He had no reactions to it.     1. Other microscopic colitis  Budesonide course provided  We will follow-up fecal elastase  Continue to maintain hydration      Problem List Items Addressed This Visit    None  Visit Diagnoses     Other microscopic colitis    -  Primary    Relevant Medications    budesonide (ENTOCORT EC) 3 MG capsule          Recent Visits  Date Type Provider Dept   09/01/23 Telephone [facility] [address] [facility]   Showing recent visits within past 7 days and meeting all other requirements  Future Appointments  No visits were found meeting these conditions.   Showing future appointments within next 150 days and meeting all other requirements         Visit Time  Total Visit Duration: 10 minutes

## 2023-09-06 DIAGNOSIS — K52.838 OTHER MICROSCOPIC COLITIS: ICD-10-CM

## 2023-09-06 RX ORDER — BUDESONIDE 3 MG/1
CAPSULE, COATED PELLETS ORAL
Qty: 90 CAPSULE | Refills: 1 | Status: SHIPPED | OUTPATIENT
Start: 2023-09-06

## 2023-09-07 ENCOUNTER — RA CDI HCC (OUTPATIENT)
Dept: OTHER | Facility: HOSPITAL | Age: 58
End: 2023-09-07

## 2023-09-07 ENCOUNTER — TELEPHONE (OUTPATIENT)
Dept: GASTROENTEROLOGY | Facility: CLINIC | Age: 58
End: 2023-09-07

## 2023-09-07 LAB — ELASTASE PANC STL-MCNT: 407 UG ELAST./G

## 2023-09-07 NOTE — PROGRESS NOTES
[address] [facility] coding opportunities       Chart reviewed, no opportunity found: [id number]        Patients Insurance     Medicare Insurance: [employer]

## 2023-09-07 NOTE — TELEPHONE ENCOUNTER
----- Message from Jenise Beltran PA-C sent at 9/7/2023  7:13 AM EDT -----  Please let patient know that his still testing was all negative. Thank you.

## 2023-09-07 NOTE — TELEPHONE ENCOUNTER
Called and spoke with pt in regards to his testing that it came back negative. Pt voiced understanding.

## 2023-09-11 DIAGNOSIS — F13.939 BENZODIAZEPINE WITHDRAWAL WITH COMPLICATION (HCC): ICD-10-CM

## 2023-09-11 NOTE — TELEPHONE ENCOUNTER
He said he cant find the psychiatrist anymore? Name dr Kavitha Macias? Can we refill in meantime,  hes suppose to be weening off he says of clonozapam.  He is out of them at this point / getting over covid also     0.5  mg tablet he was down to during the weening. 2 tabs a day  = 1 mg a day     [facility] [place]  If [facility]

## 2023-09-12 ENCOUNTER — OFFICE VISIT (OUTPATIENT)
Dept: INTERNAL MEDICINE CLINIC | Facility: CLINIC | Age: 58
End: 2023-09-12
Payer: COMMERCIAL

## 2023-09-12 VITALS
HEIGHT: 69 IN | BODY MASS INDEX: 31.73 KG/M2 | SYSTOLIC BLOOD PRESSURE: 124 MMHG | HEART RATE: 96 BPM | WEIGHT: 214.2 LBS | DIASTOLIC BLOOD PRESSURE: 80 MMHG | OXYGEN SATURATION: 94 %

## 2023-09-12 DIAGNOSIS — F13.939 BENZODIAZEPINE WITHDRAWAL WITH COMPLICATION (HCC): ICD-10-CM

## 2023-09-12 DIAGNOSIS — K86.89 PANCREATIC DUCT DILATED: Primary | ICD-10-CM

## 2023-09-12 PROCEDURE — 99214 OFFICE O/P EST MOD 30 MIN: CPT | Performed by: INTERNAL MEDICINE

## 2023-09-12 RX ORDER — CLONAZEPAM 0.5 MG/1
1.25 TABLET ORAL
Qty: 90 TABLET | Refills: 0 | Status: SHIPPED | OUTPATIENT
Start: 2023-09-12 | End: 2023-09-12

## 2023-09-12 RX ORDER — CLONAZEPAM 0.5 MG/1
0.5 TABLET ORAL 2 TIMES DAILY
Qty: 60 TABLET | Refills: 0 | Status: SHIPPED | OUTPATIENT
Start: 2023-09-12 | End: 2023-10-12

## 2023-09-12 NOTE — PROGRESS NOTES
Assessment/Plan:     Here for 3 month f/u; we discussed chronic medical problems, he was recently seen in the er with pain, CT a/p with dilated pancreatic duct without clear cause noted, recommended non emergent MRI, ordered MRI today; He is working with new psychiatrist, now on Cymbalta and Seroquel; discussed following up with them; cut down on klonopin, now on 0.5 mg bid;     Quality Measures:     BMI Counseling: Body mass index is 31.63 kg/m². The BMI is above normal. Nutrition recommendations include decreasing portion sizes and encouraging healthy choices of fruits and vegetables. Exercise recommendations include moderate physical activity 150 minutes/week. Rationale for BMI follow-up plan is due to patient being overweight or obese. Return in about 2 weeks (around 9/26/2023). No problem-specific Assessment & Plan notes found for this encounter. Diagnoses and all orders for this visit:    Pancreatic duct dilated  -     MRI abdomen w wo contrast; Future    Benzodiazepine withdrawal with complication (HCC)  -     clonazePAM (KlonoPIN) 0.5 mg tablet; Take 1 tablet (0.5 mg total) by mouth 2 (two) times a day          Subjective:      Patient ID: Jerzy Sandra is a 61 y.o. male.     Here for 3 month f/u;      ALLERGIES:  Allergies   Allergen Reactions   • Rosuvastatin Myalgia       CURRENT MEDICATIONS:    Current Outpatient Medications:   •  atorvastatin (LIPITOR) 80 mg tablet, Take 1 tablet (80 mg total) by mouth daily, Disp: 90 tablet, Rfl: 1  •  benzonatate (TESSALON) 200 MG capsule, Take 1 capsule (200 mg total) by mouth 3 (three) times a day as needed for cough, Disp: 20 capsule, Rfl: 0  •  budesonide (ENTOCORT EC) 3 MG capsule, 3 PO daily x 6 weeks then 2 PO daily x 1 week then 1 PO daily x 1 weeks then stop, Disp: 90 capsule, Rfl: 1  •  clonazePAM (KlonoPIN) 0.5 mg tablet, Take 1 tablet (0.5 mg total) by mouth 2 (two) times a day, Disp: 60 tablet, Rfl: 0  •  DULoxetine (CYMBALTA) 60 mg delayed release capsule, Take 1 capsule (60 mg total) by mouth daily after lunch, Disp: 30 capsule, Rfl: 0  •  guaiFENesin (MUCINEX) 600 mg 12 hr tablet, Take 2 tablets (1,200 mg total) by mouth every 12 (twelve) hours for 14 days, Disp: 56 tablet, Rfl: 0  •  metoclopramide (Reglan) 10 mg tablet, Take 1 tablet (10 mg total) by mouth every 6 (six) hours as needed (nausea/vomiting), Disp: 60 tablet, Rfl: 3  •  omeprazole (PriLOSEC) 40 MG capsule, TAKE 1 CAPSULE BY MOUTH TWICE A DAY (Patient taking differently: daily), Disp: 180 capsule, Rfl: 1  •  ondansetron (ZOFRAN-ODT) 4 mg disintegrating tablet, Take 1 tablet (4 mg total) by mouth every 6 (six) hours as needed for nausea or vomiting, Disp: 60 tablet, Rfl: 0  •  QUEtiapine (SEROquel) 50 mg tablet, Take 1 tablet (50 mg total) by mouth daily at bedtime, Disp: 30 tablet, Rfl: 0    ACTIVE PROBLEM LIST:  Patient Active Problem List   Diagnosis   • Anxiety and depression   • Diaz's esophagus   • Dyslipidemia   • Obesity (BMI 30.0-34. 9)   • Coronary artery disease of native artery of native heart with stable angina pectoris (HCC)   • Depression, recurrent (HCC)   • GERD (gastroesophageal reflux disease)   • PTSD (post-traumatic stress disorder)       PAST MEDICAL HISTORY:  Past Medical History:   Diagnosis Date   • Diaz's esophagus    • Colon polyp    • Coronary artery disease 2019    RCA stenting X 2 in 2019   • Depression    • Diverticulitis    • Ear problems    • GERD (gastroesophageal reflux disease)    • Hemorrhoid    • History of heart artery stent    • Hyperlipidemia    • Hypertension    • Microscopic colitis    • Ulcerative colitis ([address])        PAST SURGICAL HISTORY:  Past Surgical History:   Procedure Laterality Date   • ABDOMINAL SURGERY      radio frequency ablation   • BONE GRAFT Left 2018   • CARPAL TUNNEL RELEASE Right    • COLONOSCOPY     • CORONARY ANGIOPLASTY WITH STENT PLACEMENT      x2   • EGD AND COLONOSCOPY     • ESOPHAGOGASTRODUODENOSCOPY N/A 2/15/2018    Procedure: ESOPHAGOGASTRODUODENOSCOPY (EGD); Surgeon: Gracy Preciado MD;  Location: [facility];  Service: Gastroenterology   • ESOPHAGOGASTRODUODENOSCOPY N/A 12/10/2018    Procedure: ESOPHAGOGASTRODUODENOSCOPY (EGD); Surgeon: Janell Alexander MD;  Location: [facility];   Service: Gastroenterology   • HAND SURGERY Left    • [place] SURGICAL ARTHROSCOPY SHOULDER W/ROTATOR CUFF RPR Left 2021    Procedure: REPAIR ROTATOR CUFF  ARTHROSCOPIC; POSSIBLE BICEPS TENDONESIS, ACROMIOPLASTY;  Surgeon: Bebo Walker DO;  Location: [facility];  Service: Orthopedics   • ROTATOR CUFF REPAIR Left    • SHOULDER ARTHROSCOPY Right 3/14/2022    Procedure: ARTHROSCOPY SHOULDER- Right shoulder arthroscopic rotator cuff repair, open subpectoral biceps tenodesis, subsacpular repair and extensive debridement;  Surgeon: Bebo Walker DO;  Location: [facility];  Service: Orthopedics   • TONSILLECTOMY         FAMILY HISTORY:  Family History   Problem Relation Age of Onset   • Heart disease Father    • Melanoma Father    • Cancer Sister    • Skin cancer Sister    • Skin cancer Mother        SOCIAL HISTORY:  Social History     Socioeconomic History   • Marital status: /Civil Union     Spouse name: Not on file   • Number of children: Not on file   • Years of education: Not on file   • Highest education level: Not on file   Occupational History   • Not on file   Tobacco Use   • Smoking status: Former     Packs/day: 0.50     Types: Cigarettes     Quit date: 2007     Years since quittin.6   • Smokeless tobacco: Former     Quit date: 1998   • Tobacco comments:     quit 10 yrs ago   Vaping Use   • Vaping Use: Never used   Substance and Sexual Activity   • Alcohol use: Not Currently     Comment: quit 5 years   • Drug use: Yes     Frequency: 3.0 times per week     Types: Marijuana     Comment: advised not to smoke   • Sexual activity: Not Currently   Other Topics Concern   • Not on file   Social History Narrative   • Not on file     Social Determinants of Health     Financial Resource Strain: Not on file   Food Insecurity: Not on file   Transportation Needs: Not on file   Physical Activity: Not on file   Stress: Not on file   Social Connections: Not on file   Intimate Partner Violence: Not on file   Housing Stability: Not on file       Review of Systems   Constitutional: Negative for chills and fever. HENT: Negative for ear pain and sore throat. Eyes: Negative for pain and visual disturbance. Respiratory: Negative for cough and shortness of breath. Cardiovascular: Negative for chest pain and palpitations. Gastrointestinal: Negative for abdominal pain and vomiting. Genitourinary: Negative for dysuria and hematuria. Musculoskeletal: Positive for arthralgias. Negative for back pain. Skin: Negative for color change and rash. Neurological: Negative for seizures and syncope. Psychiatric/Behavioral: Positive for decreased concentration and dysphoric mood. The patient is nervous/anxious. All other systems reviewed and are negative. Objective:  Vitals:    09/12/23 1149   BP: 124/80   BP Location: Left arm   Patient Position: Sitting   Cuff Size: Adult   Pulse: 96   SpO2: 94%   Weight: 97.2 kg (214 lb 3.2 oz)   Height: 5' 9" (1.753 m)     Body mass index is 31.63 kg/m². Physical Exam  Vitals and nursing note reviewed. Constitutional:       Appearance: Normal appearance. He is obese. HENT:      Head: Normocephalic and atraumatic. Cardiovascular:      Rate and Rhythm: Normal rate and regular rhythm. Heart sounds: Normal heart sounds. Pulmonary:      Effort: Pulmonary effort is normal.      Breath sounds: Normal breath sounds. Musculoskeletal:         General: Normal range of motion. Cervical back: Normal range of motion. Skin:     General: Skin is warm and dry. Neurological:      Mental Status: He is alert and oriented to person, place, and time.    Psychiatric: Mood and Affect: Mood normal.           RESULTS:  Hemoglobin A1C   Date/Time Value Ref Range Status   05/09/2023 10:58 AM 5.8 (H) Normal 3.8-5.6%; PreDiabetic 5.7-6.4%; Diabetic >=6.5%; Glycemic control for adults with diabetes <7.0% % Final     Cholesterol   Date/Time Value Ref Range Status   05/09/2023 10:58  See Comment mg/dL Final     Comment:     Cholesterol:         Pediatric <18 Years        Desirable          <170 mg/dL      Borderline High    170-199 mg/dL      High               >=200 mg/dL        Adult >=18 Years            Desirable        <200 mg/dL      Borderline High  200-239 mg/dL      High             >239 mg/dL       Triglycerides   Date/Time Value Ref Range Status   05/09/2023 10:58  See Comment mg/dL Final     Comment:     Triglyceride:     0-9Y            <75mg/dL     10Y-17Y         <90 mg/dL       >=18Y     Normal          <150 mg/dL     Borderline High 150-199 mg/dL     High            200-499 mg/dL        Very High       >499 mg/dL    Specimen collection should occur prior to N-Acetylcysteine or Metamizole administration due to the potential for falsely depressed results. HDL, Direct   Date/Time Value Ref Range Status   05/09/2023 10:58 AM 68 >=40 mg/dL Final     LDL Calculated   Date/Time Value Ref Range Status   05/09/2023 10:58 AM 99 0 - 100 mg/dL Final     Comment:     LDL Cholesterol:     Optimal           <100 mg/dl     Near Optimal      100-129 mg/dl     Above Optimal       Borderline High 130-159 mg/dl       High            160-189 mg/dl       Very High       >189 mg/dl         This screening LDL is a calculated result. It does not have the accuracy of the Direct Measured LDL in the monitoring of patients with hyperlipidemia and/or statin therapy. Direct Measure LDL (SME189) must be ordered separately in these patients.      Hemoglobin   Date/Time Value Ref Range Status   08/30/2023 07:30 PM 14.6 12.0 - 17.0 g/dL Final     Hematocrit   Date/Time Value Ref Range Status   08/30/2023 07:30 PM 43.2 36.5 - 49.3 % Final     Platelets   Date/Time Value Ref Range Status   08/30/2023 07:30  149 - 390 Thousands/uL Final     Prostate Specific Antigen Total   Date/Time Value Ref Range Status   05/09/2023 10:58 AM 1.5 0.0 - 4.0 ng/mL Final     Comment:     Roche ECLIA methodology. According to the American Urological Association, Serum PSA should  decrease and remain at undetectable levels after radical  prostatectomy. The AUA defines biochemical recurrence as an initial  PSA value 0.2 ng/mL or greater followed by a subsequent confirmatory  PSA value 0.2 ng/mL or greater. Values obtained with different assay methods or kits cannot be used  interchangeably. Results cannot be interpreted as absolute evidence  of the presence or absence of malignant disease. TSH 3RD GENERATON   Date/Time Value Ref Range Status   05/09/2023 10:58 AM 2.333 0.450 - 4.500 uIU/mL Final     Comment:     Adult TSH (3rd generation) reference range follows the recommended guidelines of the American Thyroid Association, January, 2020. Sodium   Date/Time Value Ref Range Status   08/30/2023 07:30  (L) 135 - 147 mmol/L Final     BUN   Date/Time Value Ref Range Status   08/30/2023 07:30 PM 9 5 - 25 mg/dL Final     Creatinine   Date/Time Value Ref Range Status   08/30/2023 07:30 PM 1.29 0.60 - 1.30 mg/dL Final     Comment:     Standardized to IDMS reference method      In chart    This note was created with voice recognition software. Phonic, grammatical and spelling errors may be present within the note as a result.

## 2023-09-16 ENCOUNTER — HOSPITAL ENCOUNTER (OUTPATIENT)
Dept: MRI IMAGING | Facility: HOSPITAL | Age: 58
Discharge: HOME | End: 2023-09-16
Attending: INTERNAL MEDICINE
Payer: COMMERCIAL

## 2023-09-16 DIAGNOSIS — K86.89 PANCREATIC DUCT DILATED: ICD-10-CM

## 2023-09-16 PROCEDURE — 74183 MRI ABD W/O CNTR FLWD CNTR: CPT

## 2023-09-16 PROCEDURE — G1004 CDSM NDSC: HCPCS

## 2023-09-16 PROCEDURE — A9585 GADOBUTROL INJECTION: HCPCS | Performed by: INTERNAL MEDICINE

## 2023-09-16 RX ORDER — GADOBUTROL 604.72 MG/ML
9 INJECTION INTRAVENOUS
Status: COMPLETED | OUTPATIENT
Start: 2023-09-16 | End: 2023-09-16

## 2023-09-16 RX ADMIN — GADOBUTROL 9 ML: 604.72 INJECTION INTRAVENOUS at 10:52

## 2023-09-18 ENCOUNTER — NURSE TRIAGE (OUTPATIENT)
Age: 58
End: 2023-09-18

## 2023-09-18 ENCOUNTER — TELEPHONE (OUTPATIENT)
Dept: INTERNAL MEDICINE CLINIC | Facility: CLINIC | Age: 58
End: 2023-09-18

## 2023-09-18 ENCOUNTER — TELEPHONE (OUTPATIENT)
Dept: GASTROENTEROLOGY | Facility: CLINIC | Age: 58
End: 2023-09-18

## 2023-09-18 DIAGNOSIS — F33.1 MDD (MAJOR DEPRESSIVE DISORDER), RECURRENT EPISODE, MODERATE (HCC): ICD-10-CM

## 2023-09-18 DIAGNOSIS — F41.1 GAD (GENERALIZED ANXIETY DISORDER): ICD-10-CM

## 2023-09-18 DIAGNOSIS — F43.10 PTSD (POST-TRAUMATIC STRESS DISORDER): ICD-10-CM

## 2023-09-18 RX ORDER — DULOXETIN HYDROCHLORIDE 60 MG/1
60 CAPSULE, DELAYED RELEASE ORAL
Qty: 30 CAPSULE | Refills: 0 | Status: SHIPPED | OUTPATIENT
Start: 2023-09-18 | End: 2023-10-10

## 2023-09-18 NOTE — TELEPHONE ENCOUNTER
Please advise     Patient calling in. Reports he went to the ED on 9/9/23 for n/v and abdominal pain, CT showed a dilated pancreatic duct measuring 8 mm and has MRI done on Saturday. He is still experiencing Diarrhea up to 20x a day with urgency and incontinence has not had a solid BM in about a month   No bloody stools     He was on budesonide for 2 days  but did not slow down diarrhea and then was in the ED for n/v and abdominal pain.  He wanted to know if Hardy BOOKER would like him to go back on medication and to be aware of MRI that was done on 9/ 16/23

## 2023-09-18 NOTE — TELEPHONE ENCOUNTER
Please inform patient he should remain on the medication until he has a follow-up appointment with Annie.   Thank you

## 2023-09-18 NOTE — TELEPHONE ENCOUNTER
Patient is asking if you would refill the duloxetine 60 mg delayed release capsule, take 1 capsule (60 mg total) by mouth daily after lunch. He cannot get a hold of  for refills and he has been out of this for about 5 days. Please advise. ...     [facility]/[place]    Call back #[phone]

## 2023-09-18 NOTE — TELEPHONE ENCOUNTER
Called and spoke to patient. Let patient know that as per fede he is to stay on medication. Follow up appointment was made with [facility] for 9/27/2023 @ 11 a.m. 2022 16:27

## 2023-09-20 ENCOUNTER — TELEPHONE (OUTPATIENT)
Dept: INTERNAL MEDICINE CLINIC | Facility: CLINIC | Age: 58
End: 2023-09-20

## 2023-09-20 NOTE — TELEPHONE ENCOUNTER
Patient would like a call when his MRI test is resulted. He is very worried about the results. Please advise. ..     Call back #[phone]

## 2023-09-26 ENCOUNTER — OFFICE VISIT (OUTPATIENT)
Dept: INTERNAL MEDICINE CLINIC | Facility: CLINIC | Age: 58
End: 2023-09-26
Payer: COMMERCIAL

## 2023-09-26 VITALS
DIASTOLIC BLOOD PRESSURE: 76 MMHG | WEIGHT: 226 LBS | HEIGHT: 69 IN | HEART RATE: 61 BPM | TEMPERATURE: 97 F | OXYGEN SATURATION: 97 % | BODY MASS INDEX: 33.47 KG/M2 | SYSTOLIC BLOOD PRESSURE: 128 MMHG

## 2023-09-26 DIAGNOSIS — F43.10 PTSD (POST-TRAUMATIC STRESS DISORDER): ICD-10-CM

## 2023-09-26 DIAGNOSIS — F32.A ANXIETY AND DEPRESSION: Primary | ICD-10-CM

## 2023-09-26 DIAGNOSIS — F41.9 ANXIETY AND DEPRESSION: Primary | ICD-10-CM

## 2023-09-26 PROCEDURE — 99213 OFFICE O/P EST LOW 20 MIN: CPT | Performed by: INTERNAL MEDICINE

## 2023-09-26 NOTE — PROGRESS NOTES
Assessment/Plan:      Here for follow up, says he is feeling better, recently had COVID; MRI was okay; will be seeing GI soon;    Call psych for f/u on PTSD and anxiety / depression; continue Cymbalta and klonopin, seroquel;    Quality Measures:     BMI Counseling: Body mass index is 33.37 kg/m². The BMI is above normal. Nutrition recommendations include decreasing portion sizes and encouraging healthy choices of fruits and vegetables. Exercise recommendations include moderate physical activity 150 minutes/week. Rationale for BMI follow-up plan is due to patient being overweight or obese. Return in about 4 months (around 1/26/2024) for Next scheduled follow up. No problem-specific Assessment & Plan notes found for this encounter. Diagnoses and all orders for this visit:    Anxiety and depression    PTSD (post-traumatic stress disorder)          Subjective:      Patient ID: Lemuel Seip is a 61 y.o. male. Here for 2 week f/u.       ALLERGIES:  Allergies   Allergen Reactions   • Rosuvastatin Myalgia       CURRENT MEDICATIONS:    Current Outpatient Medications:   •  atorvastatin (LIPITOR) 80 mg tablet, Take 1 tablet (80 mg total) by mouth daily, Disp: 90 tablet, Rfl: 1  •  budesonide (ENTOCORT EC) 3 MG capsule, 3 PO daily x 6 weeks then 2 PO daily x 1 week then 1 PO daily x 1 weeks then stop, Disp: 90 capsule, Rfl: 1  •  clonazePAM (KlonoPIN) 0.5 mg tablet, Take 1 tablet (0.5 mg total) by mouth 2 (two) times a day, Disp: 60 tablet, Rfl: 0  •  DULoxetine (CYMBALTA) 60 mg delayed release capsule, Take 1 capsule (60 mg total) by mouth daily after lunch, Disp: 30 capsule, Rfl: 0  •  metoclopramide (Reglan) 10 mg tablet, Take 1 tablet (10 mg total) by mouth every 6 (six) hours as needed (nausea/vomiting), Disp: 60 tablet, Rfl: 3  •  omeprazole (PriLOSEC) 40 MG capsule, TAKE 1 CAPSULE BY MOUTH TWICE A DAY (Patient taking differently: daily), Disp: 180 capsule, Rfl: 1  •  ondansetron (ZOFRAN-ODT) 4 mg disintegrating tablet, Take 1 tablet (4 mg total) by mouth every 6 (six) hours as needed for nausea or vomiting, Disp: 60 tablet, Rfl: 0  •  QUEtiapine (SEROquel) 50 mg tablet, Take 1 tablet (50 mg total) by mouth daily at bedtime, Disp: 30 tablet, Rfl: 0    ACTIVE PROBLEM LIST:  Patient Active Problem List   Diagnosis   • Anxiety and depression   • Diaz's esophagus   • Dyslipidemia   • Obesity (BMI 30.0-34. 9)   • Coronary artery disease of native artery of native heart with stable angina pectoris (HCC)   • Depression, recurrent (HCC)   • GERD (gastroesophageal reflux disease)   • PTSD (post-traumatic stress disorder)       PAST MEDICAL HISTORY:  Past Medical History:   Diagnosis Date   • Diaz's esophagus    • Colon polyp    • Coronary artery disease 2019    RCA stenting X 2 in 2019   • Depression    • Diverticulitis    • Ear problems    • GERD (gastroesophageal reflux disease)    • Hemorrhoid    • History of heart artery stent    • Hyperlipidemia    • Hypertension    • Microscopic colitis    • Ulcerative colitis ([address])        PAST SURGICAL HISTORY:  Past Surgical History:   Procedure Laterality Date   • ABDOMINAL SURGERY      radio frequency ablation   • BONE GRAFT Left 2018   • CARPAL TUNNEL RELEASE Right    • COLONOSCOPY     • CORONARY ANGIOPLASTY WITH STENT PLACEMENT      x2   • EGD AND COLONOSCOPY     • ESOPHAGOGASTRODUODENOSCOPY N/A 2/15/2018    Procedure: ESOPHAGOGASTRODUODENOSCOPY (EGD); Surgeon: Faith Ahumada, MD;  [facility];  Service: Gastroenterology   • ESOPHAGOGASTRODUODENOSCOPY N/A 12/10/2018    Procedure: ESOPHAGOGASTRODUODENOSCOPY (EGD); Surgeon: Jaylene Bueno MD;  [facility];   Service: Gastroenterology   • HAND SURGERY Left    • [place] SURGICAL ARTHROSCOPY SHOULDER W/ROTATOR CUFF RPR Left 4/14/2021    Procedure: REPAIR ROTATOR CUFF  ARTHROSCOPIC; POSSIBLE BICEPS TENDONESIS, ACROMIOPLASTY;  Surgeon: Kameron Mcqueen DO;  Location[facility];  Service: Orthopedics • ROTATOR CUFF REPAIR Left    • SHOULDER ARTHROSCOPY Right 3/14/2022    Procedure: ARTHROSCOPY SHOULDER- Right shoulder arthroscopic rotator cuff repair, open subpectoral biceps tenodesis, subsacpular repair and extensive debridement;  Surgeon: Azalia Cortes DO;  Location[facility];  Service: Orthopedics   • TONSILLECTOMY         FAMILY HISTORY:  Family History   Problem Relation Age of Onset   • Heart disease Father    • Melanoma Father    • Cancer Sister    • Skin cancer Sister    • Skin cancer Mother        SOCIAL HISTORY:  Social History     Socioeconomic History   • Marital status: /Civil Union     Spouse name: Not on file   • Number of children: Not on file   • Years of education: Not on file   • Highest education level: Not on file   Occupational History   • Not on file   Tobacco Use   • Smoking status: Former     Packs/day: 0.50     Types: Cigarettes     Quit date: 2007     Years since quittin.7   • Smokeless tobacco: Former     Quit date: 1998   • Tobacco comments:     quit 10 yrs ago   Vaping Use   • Vaping Use: Never used   Substance and Sexual Activity   • Alcohol use: Not Currently     Comment: quit 5 years   • Drug use: Yes     Frequency: 3.0 times per week     Types: Marijuana     Comment: advised not to smoke   • Sexual activity: Not Currently   Other Topics Concern   • Not on file   Social History Narrative   • Not on file     Social Determinants of Health     Financial Resource Strain: Not on file   Food Insecurity: Not on file   Transportation Needs: Not on file   Physical Activity: Not on file   Stress: Not on file   Social Connections: Not on file   Intimate Partner Violence: Not on file   Housing Stability: Not on file       Review of Systems   Constitutional: Negative for chills and fever. HENT: Negative for ear pain and sore throat. Eyes: Negative for pain and visual disturbance. Respiratory: Negative for cough and shortness of breath.     Cardiovascular: Negative for chest pain and palpitations. Gastrointestinal: Negative for abdominal pain and vomiting. Genitourinary: Negative for dysuria and hematuria. Musculoskeletal: Negative for arthralgias and back pain. Skin: Negative for color change and rash. Neurological: Negative for seizures and syncope. All other systems reviewed and are negative. Objective:  Vitals:    09/26/23 1157   BP: 128/76   BP Location: Right arm   Patient Position: Sitting   Cuff Size: Adult   Pulse: 61   Temp: (!) 97 °F (36.1 °C)   TempSrc: Tympanic   SpO2: 97%   Weight: 103 kg (226 lb)   Height: 5' 9" (1.753 m)     Body mass index is 33.37 kg/m². Physical Exam      RESULTS:  Hemoglobin A1C   Date/Time Value Ref Range Status   05/09/2023 10:58 AM 5.8 (H) Normal 3.8-5.6%; PreDiabetic 5.7-6.4%; Diabetic >=6.5%; Glycemic control for adults with diabetes <7.0% % Final     Cholesterol   Date/Time Value Ref Range Status   05/09/2023 10:58  See Comment mg/dL Final     Comment:     Cholesterol:         Pediatric <18 Years        Desirable          <170 mg/dL      Borderline High    170-199 mg/dL      High               >=200 mg/dL        Adult >=18 Years            Desirable        <200 mg/dL      Borderline High  200-239 mg/dL      High             >239 mg/dL       Triglycerides   Date/Time Value Ref Range Status   05/09/2023 10:58  See Comment mg/dL Final     Comment:     Triglyceride:     0-9Y            <75mg/dL     10Y-17Y         <90 mg/dL       >=18Y     Normal          <150 mg/dL     Borderline High 150-199 mg/dL     High            200-499 mg/dL        Very High       >499 mg/dL    Specimen collection should occur prior to N-Acetylcysteine or Metamizole administration due to the potential for falsely depressed results.      HDL, Direct   Date/Time Value Ref Range Status   05/09/2023 10:58 AM 68 >=40 mg/dL Final     LDL Calculated   Date/Time Value Ref Range Status   05/09/2023 10:58 AM 99 0 - 100 mg/dL Final Comment:     LDL Cholesterol:     Optimal           <100 mg/dl     Near Optimal      100-129 mg/dl     Above Optimal       Borderline High 130-159 mg/dl       High            160-189 mg/dl       Very High       >189 mg/dl         This screening LDL is a calculated result. It does not have the accuracy of the Direct Measured LDL in the monitoring of patients with hyperlipidemia and/or statin therapy. Direct Measure LDL (AVO234) must be ordered separately in these patients. Hemoglobin   Date/Time Value Ref Range Status   08/30/2023 07:30 PM 14.6 12.0 - 17.0 g/dL Final     Hematocrit   Date/Time Value Ref Range Status   08/30/2023 07:30 PM 43.2 36.5 - 49.3 % Final     Platelets   Date/Time Value Ref Range Status   08/30/2023 07:30  149 - 390 Thousands/uL Final     Prostate Specific Antigen Total   Date/Time Value Ref Range Status   05/09/2023 10:58 AM 1.5 0.0 - 4.0 ng/mL Final     Comment:     Roche ECLIA methodology. According to the American Urological Association, Serum PSA should  decrease and remain at undetectable levels after radical  prostatectomy. The AUA defines biochemical recurrence as an initial  PSA value 0.2 ng/mL or greater followed by a subsequent confirmatory  PSA value 0.2 ng/mL or greater. Values obtained with different assay methods or kits cannot be used  interchangeably. Results cannot be interpreted as absolute evidence  of the presence or absence of malignant disease. TSH 3RD GENERATON   Date/Time Value Ref Range Status   05/09/2023 10:58 AM 2.333 0.450 - 4.500 uIU/mL Final     Comment:     Adult TSH (3rd generation) reference range follows the recommended guidelines of the American Thyroid Association, January, 2020.      Sodium   Date/Time Value Ref Range Status   08/30/2023 07:30  (L) 135 - 147 mmol/L Final     BUN   Date/Time Value Ref Range Status   08/30/2023 07:30 PM 9 5 - 25 mg/dL Final     Creatinine   Date/Time Value Ref Range Status   08/30/2023 07:30 PM 1. 29 0.60 - 1.30 mg/dL Final     Comment:     Standardized to IDMS reference method      In chart    This note was created with voice recognition software. Phonic, grammatical and spelling errors may be present within the note as a result.

## 2023-09-27 ENCOUNTER — OFFICE VISIT (OUTPATIENT)
Dept: GASTROENTEROLOGY | Facility: CLINIC | Age: 58
End: 2023-09-27
Payer: COMMERCIAL

## 2023-09-27 VITALS
HEART RATE: 70 BPM | BODY MASS INDEX: 34.04 KG/M2 | SYSTOLIC BLOOD PRESSURE: 136 MMHG | HEIGHT: 69 IN | DIASTOLIC BLOOD PRESSURE: 85 MMHG | OXYGEN SATURATION: 96 % | WEIGHT: 229.8 LBS

## 2023-09-27 DIAGNOSIS — K52.838 OTHER MICROSCOPIC COLITIS: Primary | ICD-10-CM

## 2023-09-27 PROCEDURE — 99213 OFFICE O/P EST LOW 20 MIN: CPT | Performed by: PHYSICIAN ASSISTANT

## 2023-09-27 NOTE — PROGRESS NOTES
[facility] - Outpatient Follow-up Note  Valley Mortimer 61 y.o. male MRN: [id number]  Encounter: [id number]          ASSESSMENT AND PLAN:      1. Other microscopic colitis  Symptoms are improving on budesonide  Complete taper  Follow-up in 3 months or sooner if needed    ______________________________________________________________________    SUBJECTIVE: 79-year-old male with a history of microscopic colitis, GERD complicated by Diaz's esophagus, PTSD, coronary artery disease who presents for routine follow-up. At his last appointment earlier this month he was complaining of severe diarrhea. He was started on a budesonide taper. On September 16 he called the office reporting severe nausea and vomiting. He ended up going to the emergency room. At that point he admits he was only taking the budesonide for approximately 2 days. Evaluation in the emergency room revealed evidence of dehydration but was otherwise unremarkable and he was discharged home. He has not been on the budesonide consistently since that time and admits to improvement in his diarrhea symptoms. He reports it is still present but is definitely much improved. He continues to report some abdominal pain. He has had no rectal bleeding. He has had no further vomiting. All of his symptoms became exacerbated in August when he was diagnosed with COVID-19. REVIEW OF SYSTEMS IS OTHERWISE NEGATIVE.       Historical Information   Past Medical History:   Diagnosis Date   • Diaz's esophagus    • Colon polyp    • Coronary artery disease 2019    RCA stenting X 2 in 2019   • Depression    • Diverticulitis    • Ear problems    • GERD (gastroesophageal reflux disease)    • Hemorrhoid    • History of heart artery stent    • Hyperlipidemia    • Hypertension    • Microscopic colitis    • Ulcerative colitis ([address])      Past Surgical History:   Procedure Laterality Date   • ABDOMINAL SURGERY      radio frequency ablation   • BONE GRAFT Left    • CARPAL TUNNEL RELEASE Right    • COLONOSCOPY     • CORONARY ANGIOPLASTY WITH STENT PLACEMENT      x2   • EGD AND COLONOSCOPY     • ESOPHAGOGASTRODUODENOSCOPY N/A 2/15/2018    Procedure: ESOPHAGOGASTRODUODENOSCOPY (EGD); Surgeon: Isidro Junior MD;  Location: [facility];  Service: Gastroenterology   • ESOPHAGOGASTRODUODENOSCOPY N/A 12/10/2018    Procedure: ESOPHAGOGASTRODUODENOSCOPY (EGD); Surgeon: Setfan Veliz MD;  Location: [facility];   Service: Gastroenterology   • HAND SURGERY Left    • IN SURGICAL ARTHROSCOPY SHOULDER W/ROTATOR CUFF RPR Left 2021    Procedure: REPAIR ROTATOR CUFF  ARTHROSCOPIC; POSSIBLE BICEPS TENDONESIS, ACROMIOPLASTY;  Surgeon: Mehran Cobian DO;  Location: [facility];  Service: Orthopedics   • ROTATOR CUFF REPAIR Left    • SHOULDER ARTHROSCOPY Right 3/14/2022    Procedure: ARTHROSCOPY SHOULDER- Right shoulder arthroscopic rotator cuff repair, open subpectoral biceps tenodesis, subsacpular repair and extensive debridement;  Surgeon: Mehran Cobian DO;  Location: [facility];  Service: Orthopedics   • TONSILLECTOMY       Social History   Social History     Substance and Sexual Activity   Alcohol Use Not Currently    Comment: quit 5 years     Social History     Substance and Sexual Activity   Drug Use Yes   • Frequency: 3.0 times per week   • Types: Marijuana    Comment: advised not to smoke     Social History     Tobacco Use   Smoking Status Former   • Packs/day: 0.50   • Types: Cigarettes   • Quit date: 2007   • Years since quittin.7   Smokeless Tobacco Former   • Quit date: 1998   Tobacco Comments    quit 10 yrs ago     Family History   Problem Relation Age of Onset   • Heart disease Father    • Melanoma Father    • Cancer Sister    • Skin cancer Sister    • Skin cancer Mother        Meds/Allergies       Current Outpatient Medications:   •  atorvastatin (LIPITOR) 80 mg tablet  •  budesonide (ENTOCORT EC) 3 MG capsule  • clonazePAM (KlonoPIN) 0.5 mg tablet  •  DULoxetine (CYMBALTA) 60 mg delayed release capsule  •  metoclopramide (Reglan) 10 mg tablet  •  omeprazole (PriLOSEC) 40 MG capsule  •  ondansetron (ZOFRAN-ODT) 4 mg disintegrating tablet    Allergies   Allergen Reactions   • Rosuvastatin Myalgia           Objective     Blood pressure 136/85, pulse 70, height 5' 9" (1.753 m), weight 104 kg (229 lb 12.8 oz), SpO2 96 %. Body mass index is 33.94 kg/m². PHYSICAL EXAM:      General Appearance:   Alert, cooperative, no distress   HEENT:   Normocephalic, atraumatic, anicteric.     Neck:  Supple, symmetrical, trachea midline   Lungs:   Clear to auscultation bilaterally; no rales, rhonchi or wheezing; respirations unlabored    Heart[de-identified]   Regular rate and rhythm; no murmur, rub, or gallop. Abdomen:   Soft, non-tender, non-distended; normal bowel sounds; no masses, no organomegaly    Genitalia:   Deferred    Rectal:   Deferred    Extremities:  No cyanosis, clubbing or edema    Pulses:  2+ and symmetric    Skin:  No jaundice, rashes, or lesions    Lymph nodes:  No palpable cervical lymphadenopathy        Lab Results:   No visits with results within 1 Day(s) from this visit.    Latest known visit with results is:   Admission on 08/30/2023, Discharged on 08/30/2023   Component Date Value   • WBC 08/30/2023 8.16    • RBC 08/30/2023 5.01    • Hemoglobin 08/30/2023 14.6    • Hematocrit 08/30/2023 43.2    • MCV 08/30/2023 86    • MCH 08/30/2023 29.1    • MCHC 08/30/2023 33.8    • RDW 08/30/2023 14.2    • MPV 08/30/2023 9.6    • Platelets 79/49/8124 221    • nRBC 08/30/2023 0    • Neutrophils Relative 08/30/2023 70    • Immat GRANS % 08/30/2023 0    • Lymphocytes Relative 08/30/2023 13 (L)    • Monocytes Relative 08/30/2023 16 (H)    • Eosinophils Relative 08/30/2023 0    • Basophils Relative 08/30/2023 1    • Neutrophils Absolute 08/30/2023 5.69    • Immature Grans Absolute 08/30/2023 0.03    • Lymphocytes Absolute 08/30/2023 1.06    • Monocytes Absolute 08/30/2023 1.32 (H)    • Eosinophils Absolute 08/30/2023 0.02    • Basophils Absolute 08/30/2023 0.04    • hs TnI 0hr 08/30/2023 5    • Sodium 08/30/2023 133 (L)    • Potassium 08/30/2023 3.2 (L)    • Chloride 08/30/2023 100    • CO2 08/30/2023 21    • ANION GAP 08/30/2023 12    • BUN 08/30/2023 9    • Creatinine 08/30/2023 1.29    • Glucose 08/30/2023 90    • Calcium 08/30/2023 9.4    • AST 08/30/2023 22    • ALT 08/30/2023 26    • Alkaline Phosphatase 08/30/2023 52    • Total Protein 08/30/2023 7.6    • Albumin 08/30/2023 4.8    • Total Bilirubin 08/30/2023 0.31    • eGFR 08/30/2023 60    • Lipase 08/30/2023 8 (L)    • SARS-CoV-2 08/30/2023 Positive (A)    • INFLUENZA A PCR 08/30/2023 Negative    • INFLUENZA B PCR 08/30/2023 Negative    • RSV PCR 08/30/2023 Negative    • Color, UA 08/30/2023 Light Yellow    • Clarity, UA 08/30/2023 Clear    • Specific Gravity, UA 08/30/2023 1.011    • pH, UA 08/30/2023 6.0    • Leukocytes, UA 08/30/2023 Negative    • Nitrite, UA 08/30/2023 Negative    • Protein, UA 08/30/2023 Trace (A)    • Glucose, UA 08/30/2023 Negative    • Ketones, UA 08/30/2023 Negative    • Urobilinogen, UA 08/30/2023 <2.0    • Bilirubin, UA 08/30/2023 Negative    • Occult Blood, UA 08/30/2023 Trace (A)    • Magnesium 08/30/2023 1.6 (L)    • RBC, UA 08/30/2023 1-2    • WBC, UA 08/30/2023 1-2    • Epithelial Cells 08/30/2023 Occasional    • Bacteria, UA 08/30/2023 None Seen    • Hyaline Casts, UA 08/30/2023 3-5 (A)    • hs TnI 2hr 08/30/2023 5    • Delta 2hr hsTnI 08/30/2023 0    • Ventricular Rate 08/30/2023 92    • Atrial Rate 08/30/2023 92    • [place] Interval 08/30/2023 176    • QRSD Interval 08/30/2023 98    • QT Interval 08/30/2023 348    • QTC Interval 08/30/2023 430    • P Axis 08/30/2023 74    • QRS [place] 08/30/2023 -28    • T Wave Axis 08/30/2023 69          Radiology Results:   MRI abdomen w wo contrast    Result Date: 9/21/2023  Narrative: MRI - ABDOMEN - WITH AND WITHOUT CONTRAST INDICATION: K86.89: Other specified diseases of pancreas. Dilation of the main pancreatic duct. COMPARISON: Several prior CTs of the abdomen and pelvis, the most recent from 8/30/2023, and abdominal MRI from 2/25/2019 and 2/9/2020. TECHNIQUE:  Multiplanar/multisequence MRI of the abdomen was performed before and after administration of contrast.  3D MRCP images were obtained with radial thick slabs and projections. IV Contrast:  9 mL of Gadobutrol injection (SINGLE-DOSE) FINDINGS: LOWER CHEST: Unremarkable. LIVER: Normal liver morphology. No suspicious mass. The hepatic veins and portal veins are patent. BILE DUCTS:  No intrahepatic or extrahepatic bile duct dilation. Common bile duct is normal in caliber. No choledocholithiasis or biliary stricture. GALLBLADDER: No gallstones or sludge. No evidence of acute cholecystitis. PANCREAS:  The main pancreatic duct is minimally dilated up to 4 mm in diameter diffusely. In retrospect this is not significantly changed from the MRCP from February 2020. Otherwise unremarkable appearance of the pancreas with no discrete lesions. ADRENAL GLANDS:  Within normal limits. SPLEEN:  Within normal limits. KIDNEYS/PROXIMAL URETERS:  No hydroureteronephrosis. No suspicious renal mass. BOWEL: No dilated loops of bowel. Normal appendix. PERITONEUM/RETROPERITONEUM: No ascites. LYMPH NODES: No abdominal lymphadenopathy. VASCULAR STRUCTURES: No aneurysm. ABDOMINAL WALL: Unremarkable. OSSEOUS STRUCTURES: No suspicious osseous lesion. Impression: Main pancreatic duct minimally dilated up to 4 mm in diameter diffusely, however unchanged dating back to MRCP from February 2020. Otherwise unremarkable appearance of the pancreas with no discrete lesions.  Workstation performed: [id number]     CTA abdomen pelvis w wo contrast    Result Date: 9/9/2023  Narrative: EXAM: CT ABDOMEN PELVIS W IV CONTRAST ONLY   TECHNIQUE: Using helical technique, axial images were obtained through the abdomen and pelvis after the administration of 100 cc of Omnipaque 350 intravenous contrast. Coronal and sagittal reformations were performed.   COMPARISON: CT chest abdomen and pelvis 11/7/2015   HISTORY: Acute abdominal pain   FINDINGS: Lower chest: The imaged lung bases are clear.  No pleural or pericardial effusion. Liver: Normal contour and enhancement.    No hepatic lesion is seen.   Gallbladder/Bile ducts: No significant abnormality.   Spleen: Normal size.   Pancreas: Dilated pancreatic duct from ampulla to tail measuring up to 8 mm. This previously measured 5 mm on 11/7/2015. No pancreatic lesion is identified. Peripancreatic lymph nodes appear normal. No surrounding fat stranding. Adrenal glands: Normal.   Kidneys/Ureters: Symmetric bilateral enhancement.  No calcifications or hydronephrosis. Bowel: Normal caliber.    Colonic diverticulosis. Normal appendix. High-density fluid contents within the small bowel. Lymph nodes:  None pathologically enlarged or suspicious in appearance.   Vessels: No significant abnormality.   Variant vascular anatomy of the celiac trunk. Mesentery/retroperitoneum: No free fluid or free air.   Abdominal Wall: No significant abnormality.     Pelvic viscera: Enlarged prostate. Urinary Bladder: Normal.   MSK: No acute or aggressive osseous abnormality.      Impression: IMPRESSION: 1.  No acute abdominopelvic findings. 2.  Dilated pancreatic duct measuring up to 8 mm. The underlying cause is unclear. Recommend nonemergent contrasted MRI for further evaluation. Workstation:[id number]    X-ray chest 2 views    Result Date: 8/31/2023  Narrative: CHEST INDICATION:   chest pain. COVID-positive 8/30/2023. COMPARISON: Abdomen CT 8/30/2023, CXR 10/15/2021, chest CT 4/5/2021. EXAM PERFORMED/VIEWS:  XR CHEST PA & LATERAL. DUAL ENERGY SUBTRACTION. FINDINGS: Cardiomediastinal silhouette normal. Lungs clear. No effusion or pneumothorax. Upper abdomen normal.  Bones normal for age.      Impression: No acute cardiopulmonary disease. Workstation performed: [id number]     CT abdomen pelvis with contrast    Result Date: 8/30/2023  Narrative: CT ABDOMEN AND PELVIS WITH IV CONTRAST INDICATION:   Abdominal pain, acute, nonlocalized lower abd pain. COMPARISON: 4/18/2023 TECHNIQUE:  CT examination of the abdomen and pelvis was performed. Multiplanar 2D reformatted images were created from the source data. This examination, like all CT scans performed in the [facility], was performed utilizing techniques to minimize radiation dose exposure, including the use of iterative reconstruction and automated exposure control. Radiation dose length product (DLP) for this visit:  927 mGy-cm IV Contrast:  100 mL of iohexol (OMNIPAQUE) Enteric Contrast:  Enteric contrast was not administered. FINDINGS: ABDOMEN LOWER CHEST:  No clinically significant abnormality identified in the visualized lower chest. LIVER/BILIARY TREE: Liver is diffusely decreased in density consistent with fatty change. No CT evidence of suspicious hepatic mass. Normal hepatic contours. No biliary dilatation. GALLBLADDER:  No calcified gallstones. No pericholecystic inflammatory change. SPLEEN:  Unremarkable. PANCREAS:  Unremarkable. ADRENAL GLANDS:  Unremarkable. KIDNEYS/URETERS:  Unremarkable. No hydronephrosis. STOMACH AND BOWEL: There is colonic diverticulosis without evidence of acute diverticulitis. APPENDIX: A normal appendix was visualized. ABDOMINOPELVIC CAVITY:  No ascites. No pneumoperitoneum. No lymphadenopathy. VESSELS:  Unremarkable for patient's age. PELVIS REPRODUCTIVE ORGANS: The prostate is enlarged. URINARY BLADDER:  Unremarkable. ABDOMINAL WALL/INGUINAL REGIONS:  Unremarkable. OSSEOUS STRUCTURES:  No acute fracture or destructive osseous lesion.      Impression: No acute pathology visualized on CT of the abdomen and pelvis with IV without oral contrast. Workstation performed: [id number]

## 2023-10-04 NOTE — PLAN OF CARE
Problem: Nutrition/Hydration-ADULT  Goal: Nutrient/Hydration intake appropriate for improving, restoring or maintaining nutritional needs  Monitor and assess patient's nutrition/hydration status for malnutrition (ex- brittle hair, bruises, dry skin, pale skin and conjunctiva, muscle wasting, smooth red tongue, and disorientation)  Collaborate with interdisciplinary team and initiate plan and interventions as ordered  Monitor patient's weight and dietary intake as ordered or per policy  Utilize nutrition screening tool and intervene per policy  Determine patient's food preferences and provide high-protein, high-caloric foods as appropriate       INTERVENTIONS:  - Monitor oral intake, urinary output, labs, and treatment plans  - Assess nutrition and hydration status and recommend course of action  - Evaluate amount of meals eaten  - Assist patient with eating if necessary   - Allow adequate time for meals  - Recommend/ encourage appropriate diets, oral nutritional supplements, and vitamin/mineral supplements  - Order, calculate, and assess calorie counts as needed  - Recommend, monitor, and adjust tube feedings and TPN/PPN based on assessed needs  - Assess need for intravenous fluids  - Provide specific nutrition/hydration education as appropriate  - Include patient/family/caregiver in decisions related to nutrition   Outcome: Progressing      Problem: PAIN - ADULT  Goal: Verbalizes/displays adequate comfort level or baseline comfort level  Interventions:  - Encourage patient to monitor pain and request assistance  - Assess pain using appropriate pain scale  - Administer analgesics based on type and severity of pain and evaluate response  - Implement non-pharmacological measures as appropriate and evaluate response  - Consider cultural and social influences on pain and pain management  - Notify physician/advanced practitioner if interventions unsuccessful or patient reports new pain   Outcome: Progressing      Problem: Knowledge Deficit  Goal: Patient/family/caregiver demonstrates understanding of disease process, treatment plan, medications, and discharge instructions  Complete learning assessment and assess knowledge base  Interventions:  - Provide teaching at level of understanding  - Provide teaching via preferred learning methods   Outcome: Progressing      Problem: Potential for Falls  Goal: Patient will remain free of falls  INTERVENTIONS:  - Assess patient frequently for physical needs  -  Identify cognitive and physical deficits and behaviors that affect risk of falls    -  [place] fall precautions as indicated by assessment   - Educate patient/family on patient safety including physical limitations  - Instruct patient to call for assistance with activity based on assessment  - Modify environment to reduce risk of injury  - Consider OT/PT consult to assist with strengthening/mobility   Outcome: Progressing Oriented - self; Oriented - place; Oriented - time

## 2023-10-10 DIAGNOSIS — F43.10 PTSD (POST-TRAUMATIC STRESS DISORDER): ICD-10-CM

## 2023-10-10 DIAGNOSIS — F33.1 MDD (MAJOR DEPRESSIVE DISORDER), RECURRENT EPISODE, MODERATE (HCC): ICD-10-CM

## 2023-10-10 DIAGNOSIS — F41.1 GAD (GENERALIZED ANXIETY DISORDER): ICD-10-CM

## 2023-10-10 RX ORDER — DULOXETIN HYDROCHLORIDE 60 MG/1
60 CAPSULE, DELAYED RELEASE ORAL
Qty: 90 CAPSULE | Refills: 1 | Status: SHIPPED | OUTPATIENT
Start: 2023-10-10 | End: 2023-11-09

## 2023-10-24 DIAGNOSIS — F13.939 BENZODIAZEPINE WITHDRAWAL WITH COMPLICATION (HCC): ICD-10-CM

## 2023-10-24 RX ORDER — CLONAZEPAM 0.5 MG/1
0.5 TABLET ORAL 2 TIMES DAILY
Qty: 60 TABLET | Refills: 0 | Status: SHIPPED | OUTPATIENT
Start: 2023-10-24 | End: 2023-11-23

## 2023-10-30 DIAGNOSIS — K52.838 OTHER MICROSCOPIC COLITIS: ICD-10-CM

## 2023-10-30 RX ORDER — BUDESONIDE 3 MG/1
CAPSULE, COATED PELLETS ORAL
Qty: 90 CAPSULE | Refills: 0 | Status: SHIPPED | OUTPATIENT
Start: 2023-10-30

## 2023-11-01 ENCOUNTER — APPOINTMENT (OUTPATIENT)
Dept: LAB | Facility: HOSPITAL | Age: 58
End: 2023-11-01
Payer: COMMERCIAL

## 2023-11-01 ENCOUNTER — OFFICE VISIT (OUTPATIENT)
Dept: INTERNAL MEDICINE CLINIC | Facility: CLINIC | Age: 58
End: 2023-11-01
Payer: COMMERCIAL

## 2023-11-01 VITALS
BODY MASS INDEX: 33.33 KG/M2 | HEIGHT: 69 IN | DIASTOLIC BLOOD PRESSURE: 80 MMHG | OXYGEN SATURATION: 97 % | HEART RATE: 81 BPM | WEIGHT: 225 LBS | SYSTOLIC BLOOD PRESSURE: 124 MMHG

## 2023-11-01 DIAGNOSIS — S40.262A TICK BITE OF LEFT SHOULDER, INITIAL ENCOUNTER: Primary | ICD-10-CM

## 2023-11-01 DIAGNOSIS — S40.262A TICK BITE OF LEFT SHOULDER, INITIAL ENCOUNTER: ICD-10-CM
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
## 2023-11-01 LAB — 25(OH)D3 SERPL-MCNC: 29.7 NG/ML (ref 30–100)

## 2023-11-01 PROCEDURE — 87468 ANAPLSMA PHGCYTOPHLM AMP PRB: CPT

## 2023-11-01 PROCEDURE — 99213 OFFICE O/P EST LOW 20 MIN: CPT | Performed by: PHYSICIAN ASSISTANT

## 2023-11-01 PROCEDURE — 87469 BABESIA MICROTI AMP PRB: CPT

## 2023-11-01 PROCEDURE — 87478 BORRELIA MIYAMOTOI AMP PRB: CPT

## 2023-11-01 PROCEDURE — 87484 EHRLICHA CHAFFEENSIS AMP PRB: CPT

## 2023-11-01 RX ORDER — DOXYCYCLINE HYCLATE 100 MG/1
100 CAPSULE ORAL EVERY 12 HOURS SCHEDULED
Qty: 28 CAPSULE | Refills: 0 | Status: SHIPPED | OUTPATIENT
Start: 2023-11-01 | End: 2023-11-15

## 2023-11-01 NOTE — PROGRESS NOTES
Assessment/Plan:   Tick bite:  Patient reports tick bite about 2 weeks ago. Tick was fully removed. On physical exam, there is an erythematous oblong area below patient's left shoulder that appears to be cellulitis at the site of the tick bite. Will order tickborne disease panel, will treat for tic born disease with doxycycline 100 mg twice daily for 14 days for now. However, if tickborne panel negative, can shorten the duration of doxycycline to 7 to 10 days. Quality Measures:       Return if symptoms worsen or fail to improve. No problem-specific Assessment & Plan notes found for this encounter. Diagnoses and all orders for this visit:    Tick bite of left shoulder, initial encounter  -     TICK BORNE DISEASE, ACUTE MOLECULAR PANEL; Future  -     doxycycline hyclate (VIBRAMYCIN) 100 mg capsule; Take 1 capsule (100 mg total) by mouth every 12 (twelve) hours for 14 days    Other orders  -     Diclofenac Sodium (VOLTAREN) 1 %; Apply 4 g topically 4 (four) times a day (Patient not taking: Reported on 11/1/2023)          Subjective:      Patient ID: Chris Erwin is a 60 y.o. male. Patient is a pleasant 69-year-old male who presents in the office today for evaluation of tick bite. He reports he found a tick on his back about 2 weeks ago. He feels that the tick had been on him for a while as the tick was engorged to about half the size of his pinky fingernail. He reports minimal erythema at the site of the bite initially, however this has been progressively increasing each day. Otherwise he states he is feeling well, no systemic symptoms such as fever, chills, sweats, myalgias. He has not been evaluated or tried any treatments for this. Tick Bite  This is a new problem. The current episode started 1 to 4 weeks ago. Associated symptoms include a rash. Pertinent negatives include no arthralgias, chills, fever or myalgias.        ALLERGIES:  Allergies   Allergen Reactions    Rosuvastatin Myalgia CURRENT MEDICATIONS:    Current Outpatient Medications:     budesonide (ENTOCORT EC) 3 MG capsule, 3 PO daily x 6 weeks then 2 PO daily x 1 week then 1 PO daily x 1 weeks then stop, Disp: 90 capsule, Rfl: 0    clonazePAM (KlonoPIN) 0.5 mg tablet, Take 1 tablet (0.5 mg total) by mouth 2 (two) times a day, Disp: 60 tablet, Rfl: 0    doxycycline hyclate (VIBRAMYCIN) 100 mg capsule, Take 1 capsule (100 mg total) by mouth every 12 (twelve) hours for 14 days, Disp: 28 capsule, Rfl: 0    DULoxetine (CYMBALTA) 60 mg delayed release capsule, TAKE 1 CAPSULE (60 MG TOTAL) BY MOUTH DAILY AFTER LUNCH, Disp: 90 capsule, Rfl: 1    metoclopramide (Reglan) 10 mg tablet, Take 1 tablet (10 mg total) by mouth every 6 (six) hours as needed (nausea/vomiting), Disp: 60 tablet, Rfl: 3    omeprazole (PriLOSEC) 40 MG capsule, TAKE 1 CAPSULE BY MOUTH TWICE A DAY (Patient taking differently: daily), Disp: 180 capsule, Rfl: 1    ondansetron (ZOFRAN-ODT) 4 mg disintegrating tablet, Take 1 tablet (4 mg total) by mouth every 6 (six) hours as needed for nausea or vomiting, Disp: 60 tablet, Rfl: 0    atorvastatin (LIPITOR) 80 mg tablet, Take 1 tablet (80 mg total) by mouth daily, Disp: 90 tablet, Rfl: 1    Diclofenac Sodium (VOLTAREN) 1 %, Apply 4 g topically 4 (four) times a day (Patient not taking: Reported on 11/1/2023), Disp: , Rfl:     ACTIVE PROBLEM LIST:  Patient Active Problem List   Diagnosis    Anxiety and depression    Diaz's esophagus    Dyslipidemia    Obesity (BMI 30.0-34. 9)    Coronary artery disease of native artery of native heart with stable angina pectoris (HCC)    Depression, recurrent (HCC)    GERD (gastroesophageal reflux disease)    PTSD (post-traumatic stress disorder)       PAST MEDICAL HISTORY:  Past Medical History:   Diagnosis Date    Diaz's esophagus     Colon polyp     Coronary artery disease 2019    RCA stenting X 2 in 2019    Depression     Diverticulitis     Ear problems     GERD (gastroesophageal reflux disease)     Hemorrhoid     History of heart artery stent     Hyperlipidemia     Hypertension     Microscopic colitis     Ulcerative colitis ([address])        PAST SURGICAL HISTORY:  Past Surgical History:   Procedure Laterality Date    ABDOMINAL SURGERY      radio frequency ablation    BONE GRAFT Left 2018    CARPAL TUNNEL RELEASE Right     COLONOSCOPY      CORONARY ANGIOPLASTY WITH STENT PLACEMENT      x2    EGD AND COLONOSCOPY      ESOPHAGOGASTRODUODENOSCOPY N/A 2/15/2018    Procedure: ESOPHAGOGASTRODUODENOSCOPY (EGD); Surgeon: Sarah Abernathy MD;  Location: [facility];  Service: Gastroenterology    ESOPHAGOGASTRODUODENOSCOPY N/A 12/10/2018    Procedure: ESOPHAGOGASTRODUODENOSCOPY (EGD); Surgeon: Nicki Melendez MD;  Location: [facility];   Service: Gastroenterology    HAND SURGERY Left     [place] SURGICAL ARTHROSCOPY SHOULDER W/ROTATOR CUFF RPR Left 4/14/2021    Procedure: REPAIR ROTATOR CUFF  ARTHROSCOPIC; POSSIBLE BICEPS TENDONESIS, ACROMIOPLASTY;  Surgeon: Dora Moyer DO;  Location: [facility];  Service: Orthopedics    ROTATOR CUFF REPAIR Left     SHOULDER ARTHROSCOPY Right 3/14/2022    Procedure: ARTHROSCOPY SHOULDER- Right shoulder arthroscopic rotator cuff repair, open subpectoral biceps tenodesis, subsacpular repair and extensive debridement;  Surgeon: oDra Moyer DO;  Location: [facility];  Service: Orthopedics    TONSILLECTOMY         FAMILY HISTORY:  Family History   Problem Relation Age of Onset    Heart disease Father     Melanoma Father     Cancer Sister     Skin cancer Sister     Skin cancer Mother        SOCIAL HISTORY:  Social History     Socioeconomic History    Marital status: /Civil Union     Spouse name: Not on file    Number of children: Not on file    Years of education: Not on file    Highest education level: Not on file   Occupational History    Not on file   Tobacco Use    Smoking status: Former     Packs/day: 0.50     Types: Cigarettes     Quit date: 1/16/2007 Years since quittin.8    Smokeless tobacco: Former     Quit date: 1998    Tobacco comments:     quit 10 yrs ago   Vaping Use    Vaping Use: Never used   Substance and Sexual Activity    Alcohol use: Not Currently     Comment: quit 5 years    Drug use: Yes     Frequency: 3.0 times per week     Types: Marijuana     Comment: advised not to smoke    Sexual activity: Not Currently   Other Topics Concern    Not on file   Social History Narrative    Not on file     Social Determinants of Health     Financial Resource Strain: Not on file   Food Insecurity: Not on file   Transportation Needs: Not on file   Physical Activity: Not on file   Stress: Not on file   Social Connections: Not on file   Intimate Partner Violence: Not on file   Housing Stability: Not on file       Review of Systems   Constitutional:  Negative for chills and fever. Musculoskeletal:  Negative for arthralgias and myalgias. Skin:  Positive for rash. Objective:  Vitals:    23 1018   BP: 124/80   BP Location: Left arm   Patient Position: Sitting   Cuff Size: Adult   Pulse: 81   SpO2: 97%   Weight: 102 kg (225 lb)   Height: 5' 9" (1.753 m)     Body mass index is 33.23 kg/m². Physical Exam  Constitutional:       General: He is not in acute distress. Appearance: Normal appearance. He is not ill-appearing or toxic-appearing. HENT:      Nose: Nose normal.   Cardiovascular:      Rate and Rhythm: Normal rate and regular rhythm. Pulmonary:      Effort: Pulmonary effort is normal.      Breath sounds: Normal breath sounds. No wheezing. Skin:     General: Skin is warm and dry. Findings: Erythema and rash present. Neurological:      Mental Status: He is alert. Psychiatric:         Mood and Affect: Mood normal.         Behavior: Behavior normal.         Thought Content:  Thought content normal.         Judgment: Judgment normal.           RESULTS:  Hemoglobin A1C   Date/Time Value Ref Range Status   2023 10:58 AM 5.8 (H) Normal 3.8-5.6%; PreDiabetic 5.7-6.4%; Diabetic >=6.5%; Glycemic control for adults with diabetes <7.0% % Final     Cholesterol   Date/Time Value Ref Range Status   05/09/2023 10:58  See Comment mg/dL Final     Comment:     Cholesterol:         Pediatric <18 Years        Desirable          <170 mg/dL      Borderline High    170-199 mg/dL      High               >=200 mg/dL        Adult >=18 Years            Desirable        <200 mg/dL      Borderline High  200-239 mg/dL      High             >239 mg/dL       Triglycerides   Date/Time Value Ref Range Status   05/09/2023 10:58  See Comment mg/dL Final     Comment:     Triglyceride:     0-9Y            <75mg/dL     10Y-17Y         <90 mg/dL       >=18Y     Normal          <150 mg/dL     Borderline High 150-199 mg/dL     High            200-499 mg/dL        Very High       >499 mg/dL    Specimen collection should occur prior to N-Acetylcysteine or Metamizole administration due to the potential for falsely depressed results. HDL, Direct   Date/Time Value Ref Range Status   05/09/2023 10:58 AM 68 >=40 mg/dL Final     LDL Calculated   Date/Time Value Ref Range Status   05/09/2023 10:58 AM 99 0 - 100 mg/dL Final     Comment:     LDL Cholesterol:     Optimal           <100 mg/dl     Near Optimal      100-129 mg/dl     Above Optimal       Borderline High 130-159 mg/dl       High            160-189 mg/dl       Very High       >189 mg/dl         This screening LDL is a calculated result. It does not have the accuracy of the Direct Measured LDL in the monitoring of patients with hyperlipidemia and/or statin therapy. Direct Measure LDL (IAC999) must be ordered separately in these patients.      Hemoglobin   Date/Time Value Ref Range Status   08/30/2023 07:30 PM 14.6 12.0 - 17.0 g/dL Final     Hematocrit   Date/Time Value Ref Range Status   08/30/2023 07:30 PM 43.2 36.5 - 49.3 % Final     Platelets   Date/Time Value Ref Range Status   08/30/2023 07:30 PM 221 149 - 390 Thousands/uL Final     Prostate Specific Antigen Total   Date/Time Value Ref Range Status   05/09/2023 10:58 AM 1.5 0.0 - 4.0 ng/mL Final     Comment:     Roche ECLIA methodology. According to the American Urological Association, Serum PSA should  decrease and remain at undetectable levels after radical  prostatectomy. The AUA defines biochemical recurrence as an initial  PSA value 0.2 ng/mL or greater followed by a subsequent confirmatory  PSA value 0.2 ng/mL or greater. Values obtained with different assay methods or kits cannot be used  interchangeably. Results cannot be interpreted as absolute evidence  of the presence or absence of malignant disease. TSH 3RD GENERATON   Date/Time Value Ref Range Status   05/09/2023 10:58 AM 2.333 0.450 - 4.500 uIU/mL Final     Comment:     Adult TSH (3rd generation) reference range follows the recommended guidelines of the American Thyroid Association, January, 2020. Sodium   Date/Time Value Ref Range Status   08/30/2023 07:30  (L) 135 - 147 mmol/L Final     BUN   Date/Time Value Ref Range Status   08/30/2023 07:30 PM 9 5 - 25 mg/dL Final     Creatinine   Date/Time Value Ref Range Status   08/30/2023 07:30 PM 1.29 0.60 - 1.30 mg/dL Final     Comment:     Standardized to IDMS reference method      In chart    This note was created with voice recognition software. Phonic, grammatical and spelling errors may be present within the note as a result.

## 2023-11-03 LAB — MISCELLANEOUS LAB TEST RESULT: NORMAL

## 2023-11-20 DIAGNOSIS — R11.15 CYCLICAL VOMITING: ICD-10-CM

## 2023-11-20 DIAGNOSIS — K22.70 BARRETT'S ESOPHAGUS WITHOUT DYSPLASIA: ICD-10-CM

## 2023-11-20 DIAGNOSIS — F13.939 BENZODIAZEPINE WITHDRAWAL WITH COMPLICATION (HCC): ICD-10-CM

## 2023-11-20 DIAGNOSIS — K22.711 BARRETT'S ESOPHAGUS WITH HIGH GRADE DYSPLASIA: ICD-10-CM

## 2023-11-21 RX ORDER — OMEPRAZOLE 40 MG/1
40 CAPSULE, DELAYED RELEASE ORAL 2 TIMES DAILY
Qty: 180 CAPSULE | Refills: 1 | Status: SHIPPED | OUTPATIENT
Start: 2023-11-21

## 2023-11-21 RX ORDER — CLONAZEPAM 0.5 MG/1
0.5 TABLET ORAL 2 TIMES DAILY
Qty: 60 TABLET | Refills: 0 | Status: SHIPPED | OUTPATIENT
Start: 2023-11-21 | End: 2023-12-21

## 2023-12-05 ENCOUNTER — TELEPHONE (OUTPATIENT)
Dept: INTERNAL MEDICINE CLINIC | Facility: CLINIC | Age: 58
End: 2023-12-05

## 2023-12-05 NOTE — TELEPHONE ENCOUNTER
Patient tested positive for covid  again and is very sick. Patient did schedule a virtual with you on Thursday but didn't know if there is anything you can send in for him prior to that. Patient does NOT want Paxlovid again. Please advise. .. Hugo Unger

## 2023-12-06 ENCOUNTER — RA CDI HCC (OUTPATIENT)
Dept: OTHER | Facility: HOSPITAL | Age: 58
End: 2023-12-06

## 2023-12-06 NOTE — PROGRESS NOTES
[address] [facility] coding opportunities       Chart reviewed, no opportunity found: [id number]        Patients Insurance     Medicare Insurance: [employer]

## 2023-12-07 ENCOUNTER — TELEMEDICINE (OUTPATIENT)
Dept: INTERNAL MEDICINE CLINIC | Facility: CLINIC | Age: 58
End: 2023-12-07
Payer: COMMERCIAL

## 2023-12-07 VITALS — HEIGHT: 69 IN | BODY MASS INDEX: 33.23 KG/M2
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
## 2023-12-07 DIAGNOSIS — S40.262A TICK BITE OF LEFT SHOULDER, INITIAL ENCOUNTER: ICD-10-CM

## 2023-12-07 DIAGNOSIS — U07.1 COVID: Primary | ICD-10-CM

## 2023-12-07 PROCEDURE — 99213 OFFICE O/P EST LOW 20 MIN: CPT | Performed by: INTERNAL MEDICINE

## 2023-12-07 NOTE — PROGRESS NOTES
COVID-19 Outpatient Progress Note    Assessment/Plan:    Problem List Items Addressed This Visit    None  Visit Diagnoses       COVID    -  Primary           Disposition:     Patient with asymptomatic/mild COVID-19: They were recommended to isolate for at least 5 days (day 0 is the day symptoms appeared or the date the specimen was collected for the positive test for people who are asymptomatic). If they are asymptomatic or symptoms are improving with no fevers in the past 24 hours, isolation may be ended followed by 5 days of wearing a high quality mask when around others to minimize risk of infecting others. They should wear a mask through day 10 and a test-based strategy may be used to remove a mask sooner. I have spent a total time of 10 minutes on the day of the encounter for this patient including risks and benefits of treatment options. Encounter provider: Teresita Gentile MD     Provider located at: [address] [facility] [address] [facility]  [address] [facility]  [facility] 23  [address] [place] [address],[address] [id number] [phone]     Recent Visits  Date Type Provider Dept   12/05/23 Telephone [address]   Showing recent visits within past 7 days and meeting all other requirements  Today's Visits  Date Type Provider Dept   12/07/23 Telemedicine Teresita Gentile MD [facility]   Showing today's visits and meeting all other requirements  Future Appointments  No visits were found meeting these conditions. Showing future appointments within next 150 days and meeting all other requirements     This virtual check-in was done via [address] [place] [address] and patient was informed that this is a secure, HIPAA-compliant platform. He agrees to proceed. Patient agrees to participate in a virtual check in via telephone or video visit instead of presenting to the office to address urgent/immediate medical needs. Patient is aware this is a billable service.  He acknowledged consent and understanding of privacy and security of the video platform. The patient has agreed to participate and understands they can discontinue the visit at any time. After connecting through [facility], the patient was identified by name and date of birth. Maria L Levin was informed that this was a telemedicine visit and that the exam was being conducted confidentially over secure lines. My office door was closed. No one else was in the room. Maria L Levin acknowledged consent and understanding of privacy and security of the telemedicine visit. I informed the patient that I have reviewed his record in Epic and presented the opportunity for him to ask any questions regarding the visit today. The patient agreed to participate. Verification of patient location:  Patient is located in the following state in which I hold an active license: [place]    Subjective:   Maria L Levin is a 60 y.o. male who has been screened for COVID-19. Symptom change since last report: unchanged. Patient's symptoms include fatigue, cough and headache. Patient denies fever, chills, sore throat, shortness of breath, abdominal pain and vomiting.     - Date of symptom onset: 12/1/2023  - Date of positive COVID-19 test: 12/5/2023. Type of test: Home antigen. Patient with typical symptoms of COVID-19 and they attest that they were positive on home rapid antigen testing. Image of positive result is not able to be uploaded into their chart. COVID-19 vaccination status: Fully vaccinated with booster    Mickey Roche has been staying home and has isolated themselves in his home. He is taking care to not share personal items and is cleaning all surfaces that are touched often, like counters, tabletops, and doorknobs using household cleaning sprays or wipes. He is wearing a mask when he leaves his room.      Lab Results   Component Value Date    SARSCOV2 Positive (A) 08/30/2023    SARSCOV2 Not Detected 08/31/2020       Review of Systems Constitutional:  Positive for fatigue. Negative for chills and fever. HENT:  Negative for ear pain and sore throat. Eyes:  Negative for pain and visual disturbance. Respiratory:  Positive for cough. Negative for shortness of breath. Cardiovascular:  Negative for chest pain and palpitations. Gastrointestinal:  Negative for abdominal pain and vomiting. Genitourinary:  Negative for dysuria and hematuria. Musculoskeletal:  Negative for arthralgias and back pain. Skin:  Negative for color change and rash. Neurological:  Positive for headaches. Negative for seizures and syncope. All other systems reviewed and are negative. Current Outpatient Medications on File Prior to Visit   Medication Sig    budesonide (ENTOCORT EC) 3 MG capsule 3 PO daily x 6 weeks then 2 PO daily x 1 week then 1 PO daily x 1 weeks then stop    clonazePAM (KlonoPIN) 0.5 mg tablet Take 1 tablet (0.5 mg total) by mouth 2 (two) times a day    DULoxetine (CYMBALTA) 60 mg delayed release capsule TAKE 1 CAPSULE (60 MG TOTAL) BY MOUTH DAILY AFTER LUNCH    omeprazole (PriLOSEC) 40 MG capsule Take 1 capsule (40 mg total) by mouth 2 (two) times a day    atorvastatin (LIPITOR) 80 mg tablet Take 1 tablet (80 mg total) by mouth daily    Diclofenac Sodium (VOLTAREN) 1 % Apply 4 g topically 4 (four) times a day (Patient not taking: Reported on 11/1/2023)    metoclopramide (Reglan) 10 mg tablet Take 1 tablet (10 mg total) by mouth every 6 (six) hours as needed (nausea/vomiting) (Patient not taking: Reported on 12/7/2023)    [DISCONTINUED] ondansetron (ZOFRAN-ODT) 4 mg disintegrating tablet Take 1 tablet (4 mg total) by mouth every 6 (six) hours as needed for nausea or vomiting (Patient not taking: Reported on 12/7/2023)       Objective:    Ht 5' 9" (1.753 m)   BMI 33.23 kg/m²        Physical Exam  Vitals and nursing note reviewed.    Pulmonary:      Effort: Pulmonary effort is normal.   Neurological:      Mental Status: He is alert and oriented to person, place, and time.    Psychiatric:         Mood and Affect: Mood normal.       Floridalma Kessler MD

## 2023-12-12 ENCOUNTER — TELEPHONE (OUTPATIENT)
Dept: INTERNAL MEDICINE CLINIC | Facility: CLINIC | Age: 58
End: 2023-12-12

## 2023-12-12 DIAGNOSIS — U07.1 COVID: Primary | ICD-10-CM

## 2023-12-12 RX ORDER — METHYLPREDNISOLONE 4 MG/1
TABLET ORAL
Qty: 21 EACH | Refills: 0 | Status: SHIPPED | OUTPATIENT
Start: 2023-12-12

## 2023-12-12 NOTE — TELEPHONE ENCOUNTER
Patient still feeling a lot of sinus pressure and congestion. All other covid symptoms are gone. Says that he has "fluorescent green mucus" also, tinged with blood a few times. He cannot take antibiotics as it is rough on his digestive system. Wants to know if there is anything else he can do.

## 2023-12-12 NOTE — TELEPHONE ENCOUNTER
LMOM that Dr. Bret Liu can send in a steroid, but that can also be rough on the stomach, asked him to call the office back. Will also send a [email or web address] message.

## 2023-12-15 ENCOUNTER — OFFICE VISIT (OUTPATIENT)
Dept: GASTROENTEROLOGY | Facility: CLINIC | Age: 58
End: 2023-12-15
Payer: COMMERCIAL

## 2023-12-15 VITALS
HEART RATE: 85 BPM | OXYGEN SATURATION: 95 % | SYSTOLIC BLOOD PRESSURE: 130 MMHG | WEIGHT: 228.4 LBS | DIASTOLIC BLOOD PRESSURE: 80 MMHG | BODY MASS INDEX: 33.83 KG/M2 | HEIGHT: 69 IN

## 2023-12-15 DIAGNOSIS — K22.711 BARRETT'S ESOPHAGUS WITH HIGH GRADE DYSPLASIA: ICD-10-CM

## 2023-12-15 DIAGNOSIS — R11.15 CYCLICAL VOMITING: ICD-10-CM

## 2023-12-15 DIAGNOSIS — K58.0 IRRITABLE BOWEL SYNDROME WITH DIARRHEA: Primary | ICD-10-CM

## 2023-12-15 PROCEDURE — 99213 OFFICE O/P EST LOW 20 MIN: CPT | Performed by: PHYSICIAN ASSISTANT

## 2023-12-15 RX ORDER — DICYCLOMINE HYDROCHLORIDE 10 MG/1
10 CAPSULE ORAL EVERY 6 HOURS PRN
Qty: 90 CAPSULE | Refills: 3 | Status: SHIPPED | OUTPATIENT
Start: 2023-12-15

## 2023-12-15 RX ORDER — CHOLESTYRAMINE 4 G/9G
1 POWDER, FOR SUSPENSION ORAL
Qty: 90 PACKET | Refills: 3 | Status: SHIPPED | OUTPATIENT
Start: 2023-12-15

## 2023-12-15 NOTE — PROGRESS NOTES
[facility] - Outpatient Follow-up Note  Agatha Eastman 60 y.o. male MRN: [id number]  Encounter: [id number]          ASSESSMENT AND PLAN:      1. Irritable bowel syndrome with diarrhea  Patient developed severe diarrhea in August after COVID infection  History of microscopic colitis with positive response to budesonide and so started on a repeat budesonide taper  No improvement  Patient reports up to 20 bowel movements a day without formed  Imodium is not helpful    Start Questran 1 packet 3 times daily  Align probiotic  Low FODMAP diet- -handout provided    He admits dicyclomine helps his pain but causes severe drowsiness -given 10 mg to see if this is effective without the side effects    2. Cyclical vomiting  Metoclopramide PRN    3. Diaz's esophagus with high grade dysplasia  On Omeprazole 40mg BID  Will plan EGD in June as no biopsy was taken at last procedure    ______________________________________________________________________    SUBJECTIVE: 58-year-old male with a history of GERD complicated by Diaz's esophagus, cyclical vomiting syndrome and microscopic colitis who presents for routine follow-up. in August he developed severe diarrhea. Full workup was essentially unremarkable however given his history of microscopic colitis and his positive response in the past he was started on a budesonide taper. He denies any improvement in symptoms with this. He reports upwards of 20 loose to watery stools daily. He reports significant lower abdominal pain. He is having bowel movements at night. He denies any rectal bleeding. He is tolerating a diet. He uses Imodium but reports that it just causes blockage type symptoms. He reports episodes of nausea with vomiting. He takes Reglan as needed for this. He knows that this is cyclical vomiting. He remains on omeprazole 40 mg twice daily. His last EGD and colonoscopy were both in June of this year.   He has a history of Diaz's esophagus but no biopsies were taken of the esophagus at this time. REVIEW OF SYSTEMS IS OTHERWISE NEGATIVE. Historical Information   Past Medical History:   Diagnosis Date    Diaz's esophagus     Colon polyp     Coronary artery disease 2019    RCA stenting X 2 in 2019    Depression     Diverticulitis     Ear problems     GERD (gastroesophageal reflux disease)     Hemorrhoid     History of heart artery stent     Hyperlipidemia     Hypertension     Microscopic colitis     Ulcerative colitis ([address])      Past Surgical History:   Procedure Laterality Date    ABDOMINAL SURGERY      radio frequency ablation    BONE GRAFT Left 2018    CARPAL TUNNEL RELEASE Right     COLONOSCOPY      CORONARY ANGIOPLASTY WITH STENT PLACEMENT      x2    EGD AND COLONOSCOPY      ESOPHAGOGASTRODUODENOSCOPY N/A 2/15/2018    Procedure: ESOPHAGOGASTRODUODENOSCOPY (EGD); Surgeon: Sara Marcano MD;  Location: [facility];  Service: Gastroenterology    ESOPHAGOGASTRODUODENOSCOPY N/A 12/10/2018    Procedure: ESOPHAGOGASTRODUODENOSCOPY (EGD); Surgeon: Anant Baum MD;  Location: [facility];   Service: Gastroenterology    HAND SURGERY Left     KS SURGICAL ARTHROSCOPY SHOULDER W/ROTATOR CUFF RPR Left 4/14/2021    Procedure: REPAIR ROTATOR CUFF  ARTHROSCOPIC; POSSIBLE BICEPS TENDONESIS, ACROMIOPLASTY;  Surgeon: Warner Ross DO;  Location: [facility];  Service: Orthopedics    ROTATOR CUFF REPAIR Left     SHOULDER ARTHROSCOPY Right 3/14/2022    Procedure: ARTHROSCOPY SHOULDER- Right shoulder arthroscopic rotator cuff repair, open subpectoral biceps tenodesis, subsacpular repair and extensive debridement;  Surgeon: Warner Ross DO;  [facility];  Service: Orthopedics    TONSILLECTOMY       Social History   Social History     Substance and Sexual Activity   Alcohol Use Not Currently    Comment: quit 5 years     Social History     Substance and Sexual Activity   Drug Use Yes    Frequency: 3.0 times per week Types: Marijuana    Comment: advised not to smoke     Social History     Tobacco Use   Smoking Status Former    Current packs/day: 0.00    Types: Cigarettes    Quit date: 2007    Years since quittin.9   Smokeless Tobacco Former    Quit date: 1998   Tobacco Comments    quit 10 yrs ago     Family History   Problem Relation Age of Onset    Heart disease Father     Melanoma Father     Cancer Sister     Skin cancer Sister     Skin cancer Mother        Meds/Allergies       Current Outpatient Medications:     atorvastatin (LIPITOR) 80 mg tablet    budesonide (ENTOCORT EC) 3 MG capsule    clonazePAM (KlonoPIN) 0.5 mg tablet    DULoxetine (CYMBALTA) 60 mg delayed release capsule    methylPREDNISolone 4 MG tablet therapy pack    metoclopramide (Reglan) 10 mg tablet    omeprazole (PriLOSEC) 40 MG capsule    Diclofenac Sodium (VOLTAREN) 1 %    Allergies   Allergen Reactions    Rosuvastatin Myalgia           Objective     There were no vitals taken for this visit. There is no height or weight on file to calculate BMI. PHYSICAL EXAM:      General Appearance:   Alert, cooperative, no distress   HEENT:   Normocephalic, atraumatic, anicteric. Neck:  Supple, symmetrical, trachea midline   Lungs:   Clear to auscultation bilaterally; no rales, rhonchi or wheezing; respirations unlabored    Heart[de-identified]   Regular rate and rhythm; no murmur, rub, or gallop. Abdomen:   Soft, non-tender, non-distended; normal bowel sounds; no masses, no organomegaly    Genitalia:   Deferred    Rectal:   Deferred    Extremities:  No cyanosis, clubbing or edema    Pulses:  2+ and symmetric    Skin:  No jaundice, rashes, or lesions    Lymph nodes:  No palpable cervical lymphadenopathy        Lab Results:   No visits with results within 1 Day(s) from this visit.    Latest known visit with results is:   Appointment on 2023   Component Date Value    Miscellaneous Lab Test R* 2023 [facility], ACUTE MOLECULAR PANEL Radiology Results:   No results found.

## 2023-12-18 DIAGNOSIS — F13.939 BENZODIAZEPINE WITHDRAWAL WITH COMPLICATION (HCC): ICD-10-CM

## 2023-12-18 RX ORDER — CLONAZEPAM 0.5 MG/1
0.5 TABLET ORAL 2 TIMES DAILY
Qty: 60 TABLET | Refills: 0 | Status: SHIPPED | OUTPATIENT
Start: 2023-12-18 | End: 2024-01-17

## 2023-12-27 ENCOUNTER — TELEPHONE (OUTPATIENT)
Dept: PSYCHIATRY | Facility: CLINIC | Age: 58
End: 2023-12-27

## 2024-01-02 DIAGNOSIS — E78.5 DYSLIPIDEMIA: ICD-10-CM

## 2024-01-02 RX ORDER — ATORVASTATIN CALCIUM 80 MG/1
80 TABLET, FILM COATED ORAL DAILY
Qty: 90 TABLET | Refills: 0 | Status: SHIPPED | OUTPATIENT
Start: 2024-01-02 | End: 2024-06-30

## 2024-01-09 DIAGNOSIS — K58.0 IRRITABLE BOWEL SYNDROME WITH DIARRHEA: ICD-10-CM

## 2024-01-09 RX ORDER — CHOLESTYRAMINE 4 G/9G
POWDER, FOR SUSPENSION ORAL
Qty: 270 PACKET | Refills: 2 | Status: SHIPPED | OUTPATIENT
Start: 2024-01-09

## 2024-01-12 ENCOUNTER — TELEPHONE (OUTPATIENT)
Dept: PSYCHIATRY | Facility: CLINIC | Age: 59
End: 2024-01-12

## 2024-01-12 NOTE — TELEPHONE ENCOUNTER
DISCHARGE LETTER for [facility] (certified) placed in outgoing mail on 1/16/2024.    Article #:  [id number]    Address:  [address] [place], [place] [address]OOK[place] [id number]

## 2024-01-15 DIAGNOSIS — F13.939 BENZODIAZEPINE WITHDRAWAL WITH COMPLICATION (HCC): ICD-10-CM

## 2024-01-15 RX ORDER — CLONAZEPAM 0.5 MG/1
0.5 TABLET ORAL 2 TIMES DAILY
Qty: 60 TABLET | Refills: 0 | Status: SHIPPED | OUTPATIENT
Start: 2024-01-15 | End: 2024-02-14

## 2024-01-17 ENCOUNTER — APPOINTMENT (OUTPATIENT)
Dept: LAB | Facility: HOSPITAL | Age: 59
End: 2024-01-17
Payer: COMMERCIAL
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
## 2024-01-17 DIAGNOSIS — S40.262A TICK BITE OF LEFT SHOULDER, INITIAL ENCOUNTER: ICD-10-CM

## 2024-01-17 PROCEDURE — 86617 LYME DISEASE ANTIBODY: CPT

## 2024-01-17 PROCEDURE — 36415 COLL VENOUS BLD VENIPUNCTURE: CPT

## 2024-01-17 PROCEDURE — 86618 LYME DISEASE ANTIBODY: CPT

## 2024-01-18 LAB
B BURGDOR IGG SERPL QL IA: POSITIVE
B BURGDOR IGG+IGM SER QL IA: POSITIVE
B BURGDOR IGM SERPL QL IA: NEGATIVE

## 2024-01-22 ENCOUNTER — TELEPHONE (OUTPATIENT)
Dept: INTERNAL MEDICINE CLINIC | Facility: CLINIC | Age: 59
End: 2024-01-22

## 2024-01-22 NOTE — TELEPHONE ENCOUNTER
Patient called and said that he came in for Lyme's. Patient said that he was given medication for skin infection (with Bullseye). Patient said after 2 months he is still not feeling well. Patient said that he doesn't understand it. Patient said that he could lay in bed for days and this is not like him. Patient said that the lyme's test is still coming up pos.      Please advise.....

## 2024-01-31 DIAGNOSIS — F43.10 PTSD (POST-TRAUMATIC STRESS DISORDER): ICD-10-CM

## 2024-01-31 DIAGNOSIS — F41.1 GAD (GENERALIZED ANXIETY DISORDER): ICD-10-CM

## 2024-01-31 DIAGNOSIS — F33.1 MDD (MAJOR DEPRESSIVE DISORDER), RECURRENT EPISODE, MODERATE (HCC): ICD-10-CM

## 2024-01-31 RX ORDER — DULOXETIN HYDROCHLORIDE 60 MG/1
60 CAPSULE, DELAYED RELEASE ORAL
Qty: 90 CAPSULE | Refills: 0 | Status: SHIPPED | OUTPATIENT
Start: 2024-01-31 | End: 2024-03-01

## 2024-02-12 DIAGNOSIS — F13.939 BENZODIAZEPINE WITHDRAWAL WITH COMPLICATION (HCC): ICD-10-CM

## 2024-02-12 RX ORDER — CLONAZEPAM 0.5 MG/1
0.5 TABLET ORAL 2 TIMES DAILY
Qty: 60 TABLET | Refills: 0 | Status: SHIPPED | OUTPATIENT
Start: 2024-02-12 | End: 2024-03-13

## 2024-03-11 DIAGNOSIS — F13.939 BENZODIAZEPINE WITHDRAWAL WITH COMPLICATION (HCC): ICD-10-CM

## 2024-03-11 RX ORDER — CLONAZEPAM 0.5 MG/1
0.5 TABLET ORAL 2 TIMES DAILY
Qty: 60 TABLET | Refills: 0 | Status: SHIPPED | OUTPATIENT
Start: 2024-03-11 | End: 2024-04-10

## 2024-03-15 ENCOUNTER — RA CDI HCC (OUTPATIENT)
Dept: OTHER | Facility: HOSPITAL | Age: 59
End: 2024-03-15

## 2024-03-18 ENCOUNTER — TELEPHONE (OUTPATIENT)
Dept: GASTROENTEROLOGY | Facility: CLINIC | Age: 59
End: 2024-03-18

## 2024-03-18 NOTE — TELEPHONE ENCOUNTER
Spoke to patient regarding  [facility] referral, he tried to get referral and was told extension needs to come from provider, told him we will try and get it but if we can't we will use his [employer] Medicare coverage

## 2024-03-19 ENCOUNTER — OFFICE VISIT (OUTPATIENT)
Dept: GASTROENTEROLOGY | Facility: CLINIC | Age: 59
End: 2024-03-19
Payer: COMMERCIAL

## 2024-03-19 VITALS
DIASTOLIC BLOOD PRESSURE: 82 MMHG | SYSTOLIC BLOOD PRESSURE: 140 MMHG | TEMPERATURE: 97.8 F | OXYGEN SATURATION: 96 % | BODY MASS INDEX: 32.79 KG/M2 | HEIGHT: 69 IN | HEART RATE: 68 BPM | WEIGHT: 221.4 LBS

## 2024-03-19 DIAGNOSIS — K22.711 BARRETT'S ESOPHAGUS WITH HIGH GRADE DYSPLASIA: ICD-10-CM

## 2024-03-19 DIAGNOSIS — R11.15 CYCLICAL VOMITING: ICD-10-CM

## 2024-03-19 DIAGNOSIS — K58.0 IRRITABLE BOWEL SYNDROME WITH DIARRHEA: Primary | ICD-10-CM

## 2024-03-19 DIAGNOSIS — K51.919 ULCERATIVE COLITIS WITH COMPLICATION, UNSPECIFIED LOCATION (HCC): ICD-10-CM

## 2024-03-19 PROCEDURE — 99213 OFFICE O/P EST LOW 20 MIN: CPT | Performed by: PHYSICIAN ASSISTANT

## 2024-03-19 RX ORDER — AMITRIPTYLINE HYDROCHLORIDE 10 MG/1
10 TABLET, FILM COATED ORAL
Qty: 30 TABLET | Refills: 3 | Status: SHIPPED | OUTPATIENT
Start: 2024-03-19

## 2024-03-19 NOTE — PROGRESS NOTES
[facility] - Outpatient Follow-up Note  Demian Joe 58 y.o. male MRN: [id number]  Encounter: [id number]          ASSESSMENT AND PLAN:      1. Irritable bowel syndrome with diarrhea  His diarrhea has improved  He is on a low FODMAP diet and Align  He could not tolerate Questran powder    He continues to note a significant lower abdominal pain  It can be mild or quite severe  Dicyclomine is not helpful    2. Cyclical vomiting  With episodes increasing in frequency and intensity  He has been taken off Zoloft  Will trial low dose TCA - Amitriptyline 10mg qHS    3. Diaz's esophagus without dysplasia  Omeprazole 40mg BID without symptoms    ______________________________________________________________________    SUBJECTIVE: 58-year-old male with GERD complicated by Diaz's esophagus, irritable bowel syndrome, cyclical vomiting syndrome, microscopic colitis who presents for routine follow-up.  At his last appointment he had been reporting severe diarrhea.  He had tried and failed a budesonide course for his microscopic colitis.  He had previously been on mesalamine without relief.  He had been trying Imodium with no relief.  I prescribed Questran 1 packet 3 times daily however he could not tolerate the taste or texture of this.  He did start align.  He is also following a low FODMAP diet.  Overall he reports that his diarrhea is still present but much improved.  His biggest complaint today is that of lower abdominal pain.  He reports that this typically occurs in the morning.  It can be moderate or can be quite severe.  He reports that when it is severe it can bring him to his knees.  He also is having more frequent episodes of vomiting and upper abdominal pain.  These occur unpredictably.  If he catches the symptoms soon enough he can take Zofran with some relief.  We have discussed the use of amitriptyline with him in the past however he has been on several antidepressants and so this  was not reasonable.  He was recently taken off of his Zoloft.  He does remain on Cymbalta.      REVIEW OF SYSTEMS IS OTHERWISE NEGATIVE.      Historical Information   Past Medical History:   Diagnosis Date    Diaz's esophagus     Colon polyp     Coronary artery disease 2019    RCA stenting X 2 in 2019    Depression     Diverticulitis     Ear problems     GERD (gastroesophageal reflux disease)     Hemorrhoid     History of heart artery stent     Hyperlipidemia     Hypertension     Microscopic colitis     Ulcerative colitis (HCC)      Past Surgical History:   Procedure Laterality Date    ABDOMINAL SURGERY      radio frequency ablation    BONE GRAFT Left 2018    CARPAL TUNNEL RELEASE Right     COLONOSCOPY      CORONARY ANGIOPLASTY WITH STENT PLACEMENT      x2    EGD AND COLONOSCOPY      ESOPHAGOGASTRODUODENOSCOPY N/A 2/15/2018    Procedure: ESOPHAGOGASTRODUODENOSCOPY (EGD);  Surgeon: Agusto Romano MD;  [facility];  Service: Gastroenterology    ESOPHAGOGASTRODUODENOSCOPY N/A 12/10/2018    Procedure: ESOPHAGOGASTRODUODENOSCOPY (EGD);  Surgeon: Bar Pardo III, MD;  [facility];  Service: Gastroenterology    HAND SURGERY Left     [place] SURGICAL ARTHROSCOPY SHOULDER W/ROTATOR CUFF RPR Left 4/14/2021    Procedure: REPAIR ROTATOR CUFF  ARTHROSCOPIC; POSSIBLE BICEPS TENDONESIS, ACROMIOPLASTY;  Surgeon: Miko Solomon DO;  [facility];  Service: Orthopedics    ROTATOR CUFF REPAIR Left     SHOULDER ARTHROSCOPY Right 3/14/2022    Procedure: ARTHROSCOPY SHOULDER- Right shoulder arthroscopic rotator cuff repair, open subpectoral biceps tenodesis, subsacpular repair and extensive debridement;  Surgeon: Miko Solomon DO;  [facility];  Service: Orthopedics    TONSILLECTOMY       Social History   Social History     Substance and Sexual Activity   Alcohol Use Not Currently    Comment: quit 5 years     Social History     Substance and Sexual Activity   Drug Use Yes    Frequency: 3.0  "times per week    Types: Marijuana    Comment: advised not to smoke     Social History     Tobacco Use   Smoking Status Former    Current packs/day: 0.00    Types: Cigarettes    Quit date: 2007    Years since quittin.1   Smokeless Tobacco Former    Quit date: 1998   Tobacco Comments    quit 10 yrs ago     Family History   Problem Relation Age of Onset    Heart disease Father     Melanoma Father     Cancer Sister     Skin cancer Sister     Skin cancer Mother        Meds/Allergies       Current Outpatient Medications:     amitriptyline (ELAVIL) 10 mg tablet    atorvastatin (LIPITOR) 80 mg tablet    clonazePAM (KlonoPIN) 0.5 mg tablet    Diclofenac Sodium (VOLTAREN) 1 %    dicyclomine (BENTYL) 10 mg capsule    DULoxetine (CYMBALTA) 60 mg delayed release capsule    metoclopramide (Reglan) 10 mg tablet    omeprazole (PriLOSEC) 40 MG capsule    Allergies   Allergen Reactions    Rosuvastatin Myalgia           Objective     Blood pressure 140/82, pulse 68, temperature 97.8 °F (36.6 °C), height 5' 9\" (1.753 m), weight 100 kg (221 lb 6.4 oz), SpO2 96%. Body mass index is 32.7 kg/m².      PHYSICAL EXAM:      General Appearance:   Alert, cooperative, no distress   HEENT:   Normocephalic, atraumatic, anicteric.     Neck:  Supple, symmetrical, trachea midline   Lungs:   Clear to auscultation bilaterally; no rales, rhonchi or wheezing; respirations unlabored    Heart::   Regular rate and rhythm; no murmur, rub, or gallop.   Abdomen:   Soft, non-tender, non-distended; normal bowel sounds; no masses, no organomegaly    Genitalia:   Deferred    Rectal:   Deferred    Extremities:  No cyanosis, clubbing or edema    Pulses:  2+ and symmetric    Skin:  No jaundice, rashes, or lesions    Lymph nodes:  No palpable cervical lymphadenopathy        Lab Results:   No visits with results within 1 Day(s) from this visit.   Latest known visit with results is:   Appointment on 2024   Component Date Value    Lyme Total " Antibodies 01/17/2024 Positive (A)     Lyme IGM 01/17/2024 Negative     Lyme IGG 01/17/2024 Positive (A)          Radiology Results:   No results found.

## 2024-03-20 ENCOUNTER — OFFICE VISIT (OUTPATIENT)
Dept: INTERNAL MEDICINE CLINIC | Facility: CLINIC | Age: 59
End: 2024-03-20
Payer: COMMERCIAL

## 2024-03-20 VITALS
OXYGEN SATURATION: 99 % | WEIGHT: 222 LBS | SYSTOLIC BLOOD PRESSURE: 136 MMHG | DIASTOLIC BLOOD PRESSURE: 84 MMHG | HEART RATE: 70 BPM | RESPIRATION RATE: 16 BRPM | TEMPERATURE: 98.3 F | HEIGHT: 69 IN | BODY MASS INDEX: 32.88 KG/M2

## 2024-03-20 DIAGNOSIS — K58.0 IRRITABLE BOWEL SYNDROME WITH DIARRHEA: ICD-10-CM

## 2024-03-20 DIAGNOSIS — F32.A ANXIETY AND DEPRESSION: ICD-10-CM

## 2024-03-20 DIAGNOSIS — Z12.5 PROSTATE CANCER SCREENING: ICD-10-CM

## 2024-03-20 DIAGNOSIS — F33.1 MDD (MAJOR DEPRESSIVE DISORDER), RECURRENT EPISODE, MODERATE (HCC): ICD-10-CM

## 2024-03-20 DIAGNOSIS — F13.939 BENZODIAZEPINE WITHDRAWAL WITH COMPLICATION (HCC): ICD-10-CM

## 2024-03-20 DIAGNOSIS — R39.14 FEELING OF INCOMPLETE BLADDER EMPTYING: ICD-10-CM

## 2024-03-20 DIAGNOSIS — F41.9 ANXIETY AND DEPRESSION: ICD-10-CM

## 2024-03-20 DIAGNOSIS — E78.5 DYSLIPIDEMIA: ICD-10-CM

## 2024-03-20 DIAGNOSIS — R73.01 IMPAIRED FASTING GLUCOSE: ICD-10-CM

## 2024-03-20 DIAGNOSIS — I25.118 CORONARY ARTERY DISEASE OF NATIVE ARTERY OF NATIVE HEART WITH STABLE ANGINA PECTORIS (HCC): ICD-10-CM

## 2024-03-20 DIAGNOSIS — Z00.00 MEDICARE ANNUAL WELLNESS VISIT, SUBSEQUENT: Primary | ICD-10-CM

## 2024-03-20 DIAGNOSIS — F33.9 DEPRESSION, RECURRENT (HCC): ICD-10-CM

## 2024-03-20 DIAGNOSIS — K22.70 BARRETT'S ESOPHAGUS WITHOUT DYSPLASIA: ICD-10-CM

## 2024-03-20 PROCEDURE — 99214 OFFICE O/P EST MOD 30 MIN: CPT | Performed by: INTERNAL MEDICINE

## 2024-03-20 PROCEDURE — G0439 PPPS, SUBSEQ VISIT: HCPCS | Performed by: INTERNAL MEDICINE

## 2024-03-20 RX ORDER — DICYCLOMINE HYDROCHLORIDE 10 MG/1
10 CAPSULE ORAL EVERY 6 HOURS PRN
Status: SHIPPED
Start: 2024-03-20

## 2024-03-20 NOTE — PATIENT INSTRUCTIONS
Medicare Preventive Visit Patient Instructions  Thank you for completing your Welcome to Medicare Visit or Medicare Annual Wellness Visit today. Your next wellness visit will be due in one year (3/21/2025).  The screening/preventive services that you may require over the next 5-10 years are detailed below. Some tests may not apply to you based off risk factors and/or age. Screening tests ordered at today's visit but not completed yet may show as past due. Also, please note that scanned in results may not display below.  Preventive Screenings:  Service Recommendations Previous Testing/Comments   Colorectal Cancer Screening  Colonoscopy    Fecal Occult Blood Test (FOBT)/Fecal Immunochemical Test (FIT)  Fecal DNA/Cologuard Test  Flexible Sigmoidoscopy Age: 45-75 years old   Colonoscopy: every 10 years (May be performed more frequently if at higher risk)  OR  FOBT/FIT: every 1 year  OR  Cologuard: every 3 years  OR  Sigmoidoscopy: every 5 years  Screening may be recommended earlier than age 45 if at higher risk for colorectal cancer. Also, an individualized decision between you and your healthcare provider will decide whether screening between the ages of 76-85 would be appropriate. Colonoscopy: 06/06/2023  FOBT/FIT: Not on file  Cologuard: Not on file  Sigmoidoscopy: Not on file    Screening Current     Prostate Cancer Screening Individualized decision between patient and health care provider in men between ages of 55-69   Medicare will cover every 12 months beginning on the day after your 50th birthday PSA: 1.5 ng/mL     Screening Current     Hepatitis C Screening Once for adults born between 1945 and 1965  More frequently in patients at high risk for Hepatitis C Hep C Antibody: 06/07/2022    Screening Current   Diabetes Screening 1-2 times per year if you're at risk for diabetes or have pre-diabetes Fasting glucose: 105 mg/dL (5/9/2023)  A1C: 5.8 % (5/9/2023)  Screening Current   Cholesterol Screening Once every 5  years if you don't have a lipid disorder. May order more often based on risk factors. Lipid panel: 05/09/2023  Screening Current      Other Preventive Screenings Covered by Medicare:  Abdominal Aortic Aneurysm (AAA) Screening: covered once if your at risk. You're considered to be at risk if you have a family history of AAA or a male between the age of 65-75 who smoking at least 100 cigarettes in your lifetime.  Lung Cancer Screening: covers low dose CT scan once per year if you meet all of the following conditions: (1) Age 55-77; (2) No signs or symptoms of lung cancer; (3) Current smoker or have quit smoking within the last 15 years; (4) You have a tobacco smoking history of at least 20 pack years (packs per day x number of years you smoked); (5) You get a written order from a healthcare provider.  Glaucoma Screening: covered annually if you're considered high risk: (1) You have diabetes OR (2) Family history of glaucoma OR (3)  aged 50 and older OR (4)  American aged 65 and older  Osteoporosis Screening: covered every 2 years if you meet one of the following conditions: (1) Have a vertebral abnormality; (2) On glucocorticoid therapy for more than 3 months; (3) Have primary hyperparathyroidism; (4) On osteoporosis medications and need to assess response to drug therapy.  HIV Screening: covered annually if you're between the age of 15-65. Also covered annually if you are younger than 15 and older than 65 with risk factors for HIV infection. For pregnant patients, it is covered up to 3 times per pregnancy.    Immunizations:  Immunization Recommendations   Influenza Vaccine Annual influenza vaccination during flu season is recommended for all persons aged >= 6 months who do not have contraindications   Pneumococcal Vaccine   * Pneumococcal conjugate vaccine = PCV13 (Prevnar 13), PCV15 (Vaxneuvance), PCV20 (Prevnar 20)  * Pneumococcal polysaccharide vaccine = PPSV23 (Pneumovax) Adults 19-65 yo  with certain risk factors or if 65+ yo  If never received any pneumonia vaccine: recommend Prevnar 20 (PCV20)  Give PCV20 if previously received 1 dose of PCV13 or PPSV23   Hepatitis B Vaccine 3 dose series if at intermediate or high risk (ex: diabetes, end stage renal disease, liver disease)   Respiratory syncytial virus (RSV) Vaccine - COVERED BY MEDICARE PART D  * RSVPreF3 (Arexvy) CDC recommends that adults 60 years of age and older may receive a single dose of RSV vaccine using shared clinical decision-making (SCDM)   Tetanus (Td) Vaccine - COST NOT COVERED BY MEDICARE PART B Following completion of primary series, a booster dose should be given every 10 years to maintain immunity against tetanus. Td may also be given as tetanus wound prophylaxis.   Tdap Vaccine - COST NOT COVERED BY MEDICARE PART B Recommended at least once for all adults. For pregnant patients, recommended with each pregnancy.   Shingles Vaccine (Shingrix) - COST NOT COVERED BY MEDICARE PART B  2 shot series recommended in those 19 years and older who have or will have weakened immune systems or those 50 years and older     Health Maintenance Due:      Topic Date Due   • Colorectal Cancer Screening  06/04/2028   • HIV Screening  Completed   • Hepatitis C Screening  Completed     Immunizations Due:      Topic Date Due   • Hepatitis A Vaccine (1 of 2 - Risk 2-dose series) Never done   • Pneumococcal Vaccine: Pediatrics (0 to 5 Years) and At-Risk Patients (6 to 64 Years) (2 of 2 - PCV) 07/10/2019   • Influenza Vaccine (1) 09/01/2023   • COVID-19 Vaccine (5 - 2023-24 season) 09/01/2023     Advance Directives   What are advance directives?  Advance directives are legal documents that state your wishes and plans for medical care. These plans are made ahead of time in case you lose your ability to make decisions for yourself. Advance directives can apply to any medical decision, such as the treatments you want, and if you want to donate organs.    What are the types of advance directives?  There are many types of advance directives, and each state has rules about how to use them. You may choose a combination of any of the following:  Living will:  This is a written record of the treatment you want. You can also choose which treatments you do not want, which to limit, and which to stop at a certain time. This includes surgery, medicine, IV fluid, and tube feedings.   Durable power of  for healthcare (DPAHC):  This is a written record that states who you want to make healthcare choices for you when you are unable to make them for yourself. This person, called a proxy, is usually a family member or a friend. You may choose more than 1 proxy.  Do not resuscitate (DNR) order:  A DNR order is used in case your heart stops beating or you stop breathing. It is a request not to have certain forms of treatment, such as CPR. A DNR order may be included in other types of advance directives.  Medical directive:  This covers the care that you want if you are in a coma, near death, or unable to make decisions for yourself. You can list the treatments you want for each condition. Treatment may include pain medicine, surgery, blood transfusions, dialysis, IV or tube feedings, and a ventilator (breathing machine).  Values history:  This document has questions about your views, beliefs, and how you feel and think about life. This information can help others choose the care that you would choose.  Why are advance directives important?  An advance directive helps you control your care. Although spoken wishes may be used, it is better to have your wishes written down. Spoken wishes can be misunderstood, or not followed. Treatments may be given even if you do not want them. An advance directive may make it easier for your family to make difficult choices about your care.   Weight Management   Why it is important to manage your weight:  Being overweight increases your risk  of health conditions such as heart disease, high blood pressure, type 2 diabetes, and certain types of cancer. It can also increase your risk for osteoarthritis, sleep apnea, and other respiratory problems. Aim for a slow, steady weight loss. Even a small amount of weight loss can lower your risk of health problems.  How to lose weight safely:  A safe and healthy way to lose weight is to eat fewer calories and get regular exercise. You can lose up about 1 pound a week by decreasing the number of calories you eat by 500 calories each day.   Healthy meal plan for weight management:  A healthy meal plan includes a variety of foods, contains fewer calories, and helps you stay healthy. A healthy meal plan includes the following:  Eat whole-grain foods more often.  A healthy meal plan should contain fiber. Fiber is the part of grains, fruits, and vegetables that is not broken down by your body. Whole-grain foods are healthy and provide extra fiber in your diet. Some examples of whole-grain foods are whole-wheat breads and pastas, oatmeal, brown rice, and bulgur.  Eat a variety of vegetables every day.  Include dark, leafy greens such as spinach, kale, gio greens, and mustard greens. Eat yellow and orange vegetables such as carrots, sweet potatoes, and winter squash.   Eat a variety of fruits every day.  Choose fresh or canned fruit (canned in its own juice or light syrup) instead of juice. Fruit juice has very little or no fiber.  Eat low-fat dairy foods.  Drink fat-free (skim) milk or 1% milk. Eat fat-free yogurt and low-fat cottage cheese. Try low-fat cheeses such as mozzarella and other reduced-fat cheeses.  Choose meat and other protein foods that are low in fat.  Choose beans or other legumes such as split peas or lentils. Choose fish, skinless poultry (chicken or turkey), or lean cuts of red meat (beef or pork). Before you cook meat or poultry, cut off any visible fat.   Use less fat and oil.  Try baking foods  instead of frying them. Add less fat, such as margarine, sour cream, regular salad dressing and mayonnaise to foods. Eat fewer high-fat foods. Some examples of high-fat foods include french fries, doughnuts, ice cream, and cakes.  Eat fewer sweets.  Limit foods and drinks that are high in sugar. This includes candy, cookies, regular soda, and sweetened drinks.  Exercise:  Exercise at least 30 minutes per day on most days of the week. Some examples of exercise include walking, biking, dancing, and swimming. You can also fit in more physical activity by taking the stairs instead of the elevator or parking farther away from stores. Ask your healthcare provider about the best exercise plan for you.      © Copyright [employer] 2018 Information is for End User's use only and may not be sold, redistributed or otherwise used for commercial purposes. All illustrations and images included in CareNotes® are the copyrighted property of [employer] or [employer]

## 2024-03-20 NOTE — PROGRESS NOTES
Assessment and Plan:     Problem List Items Addressed This Visit       Anxiety and depression    Relevant Orders    TSH, 3rd generation with Free T4 reflex    Diaz's esophagus    Relevant Medications    dicyclomine (BENTYL) 10 mg capsule    Dyslipidemia    Relevant Orders    Lipid Panel with Direct LDL reflex    Coronary artery disease of native artery of native heart with stable angina pectoris (HCC)    Relevant Orders    CBC and differential    Comprehensive metabolic panel    Depression, recurrent (HCC)    Benzodiazepine withdrawal with complication (HCC)    MDD (major depressive disorder), recurrent episode, moderate (HCC)     Other Visit Diagnoses       Medicare annual wellness visit, subsequent    -  Primary    Prostate cancer screening        Relevant Orders    PSA, total and free    Irritable bowel syndrome with diarrhea        Relevant Medications    dicyclomine (BENTYL) 10 mg capsule    Impaired fasting glucose        Relevant Orders    Hemoglobin A1C    Feeling of incomplete bladder emptying        Relevant Orders    PSA, total and free            Depression Screening and Follow-up Plan: Patient was screened for depression during today's encounter. They screened negative with a PHQ-9 score of 2.  Preventive health issues were discussed with patient, and age appropriate screening tests were ordered as noted in patient's After Visit Summary.  Personalized health advice and appropriate referrals for health education or preventive services given if needed, as noted in patient's After Visit Summary.     History of Present Illness:     Patient presents for a Medicare Wellness Visit    Patient is here for routine follow-up.  We reviewed his chronic medical problems.  Reviewed recent blood work.  Ordered labs for next time including CBC CMP TSH A1c lipid PSA.    Call psych for f/u on PTSD and anxiety / depression; continue Cymbalta and klonopin; PDMP reviewed.    H/o IBS with diarrhea, cyclical vomiting  syndrome, didn't start amitriptyline yet, ordered by GI; on bentyl. On probiotics.     Continue statin for HLD. Check lipid and A1c.     H/o Diaz's esophagus, on PPI.           Patient Care Team:  Zina Jamison MD as PCP - General (Internal Medicine)  Neil Mahoney DO (Gastroenterology)  Ally Brock PA-C as Physician Assistant (Physician Assistant)  Gayatri Cuello PA-C as Physician Assistant (Physician Assistant)     Review of Systems:     Review of Systems   Constitutional:  Negative for chills and fever.   HENT:  Negative for ear pain and sore throat.    Eyes:  Positive for visual disturbance. Negative for pain.   Respiratory:  Negative for cough and shortness of breath.    Cardiovascular:  Negative for chest pain and palpitations.   Gastrointestinal:  Positive for diarrhea. Negative for abdominal pain and vomiting.   Genitourinary:  Negative for dysuria and hematuria.   Musculoskeletal:  Positive for arthralgias and gait problem. Negative for back pain.   Skin:  Positive for color change. Negative for rash.   Neurological:  Negative for seizures and syncope.   All other systems reviewed and are negative.       Problem List:     Patient Active Problem List   Diagnosis   • Anxiety and depression   • Diaz's esophagus   • Dyslipidemia   • Obesity (BMI 30.0-34.9)   • Coronary artery disease of native artery of native heart with stable angina pectoris (HCC)   • Depression, recurrent (HCC)   • GERD (gastroesophageal reflux disease)   • PTSD (post-traumatic stress disorder)   • Ulcerative colitis with complication, unspecified location (HCC)   • Benzodiazepine withdrawal with complication (HCC)   • MDD (major depressive disorder), recurrent episode, moderate (HCC)      Past Medical and Surgical History:     Past Medical History:   Diagnosis Date   • Diaz's esophagus    • Colon polyp    • Coronary artery disease 2019    RCA stenting X 2 in 2019   • Depression    • Diverticulitis    • Ear problems     • GERD (gastroesophageal reflux disease)    • Hemorrhoid    • History of heart artery stent    • Hyperlipidemia    • Hypertension    • Microscopic colitis    • Ulcerative colitis (HCC)      Past Surgical History:   Procedure Laterality Date   • ABDOMINAL SURGERY      radio frequency ablation   • BONE GRAFT Left 2018   • CARPAL TUNNEL RELEASE Right    • COLONOSCOPY     • CORONARY ANGIOPLASTY WITH STENT PLACEMENT      x2   • EGD AND COLONOSCOPY     • ESOPHAGOGASTRODUODENOSCOPY N/A 2/15/2018    Procedure: ESOPHAGOGASTRODUODENOSCOPY (EGD);  Surgeon: Agusto Romano MD;  Location: [facility];  Service: Gastroenterology   • ESOPHAGOGASTRODUODENOSCOPY N/A 12/10/2018    Procedure: ESOPHAGOGASTRODUODENOSCOPY (EGD);  Surgeon: Bar Pardo III, MD;  Location: [facility];  Service: Gastroenterology   • HAND SURGERY Left    • RI SURGICAL ARTHROSCOPY SHOULDER W/ROTATOR CUFF RPR Left 4/14/2021    Procedure: REPAIR ROTATOR CUFF  ARTHROSCOPIC; POSSIBLE BICEPS TENDONESIS, ACROMIOPLASTY;  Surgeon: Miko Solomon DO;  Location: [facility];  Service: Orthopedics   • ROTATOR CUFF REPAIR Left    • SHOULDER ARTHROSCOPY Right 3/14/2022    Procedure: ARTHROSCOPY SHOULDER- Right shoulder arthroscopic rotator cuff repair, open subpectoral biceps tenodesis, subsacpular repair and extensive debridement;  Surgeon: Miko Solomon DO;  Location: [facility];  Service: Orthopedics   • TONSILLECTOMY        Family History:     Family History   Problem Relation Age of Onset   • Heart disease Father    • Melanoma Father    • Cancer Sister    • Skin cancer Sister    • Skin cancer Mother       Social History:     Social History     Socioeconomic History   • Marital status: /Civil Union     Spouse name: None   • Number of children: None   • Years of education: None   • Highest education level: None   Occupational History   • None   Tobacco Use   • Smoking status: Former     Current packs/day: 0.00     Types: Cigarettes     Quit date:  2007     Years since quittin.1   • Smokeless tobacco: Former     Quit date: 1998   • Tobacco comments:     quit 10 yrs ago   Vaping Use   • Vaping status: Never Used   Substance and Sexual Activity   • Alcohol use: Not Currently     Comment: quit 5 years   • Drug use: Yes     Frequency: 3.0 times per week     Types: Marijuana     Comment: advised not to smoke   • Sexual activity: Not Currently   Other Topics Concern   • None   Social History Narrative   • None     Social Determinants of Health     Financial Resource Strain: Not on file   Food Insecurity: No Food Insecurity (3/20/2024)    Hunger Vital Sign    • Worried About Running Out of Food in the Last Year: Never true    • Ran Out of Food in the Last Year: Never true   Transportation Needs: No Transportation Needs (3/20/2024)    PRAPARE - Transportation    • Lack of Transportation (Medical): No    • Lack of Transportation (Non-Medical): No   Physical Activity: Not on file   Stress: Not on file   Social Connections: Not on file   Intimate Partner Violence: Not on file   Housing Stability: Unknown (3/20/2024)    Housing Stability Vital Sign    • Unable to Pay for Housing in the Last Year: No    • Number of Places Lived in the Last Year: Not on file    • Unstable Housing in the Last Year: No      Medications and Allergies:     Current Outpatient Medications   Medication Sig Dispense Refill   • amitriptyline (ELAVIL) 10 mg tablet Take 1 tablet (10 mg total) by mouth daily at bedtime 30 tablet 3   • atorvastatin (LIPITOR) 80 mg tablet Take 1 tablet (80 mg total) by mouth daily 90 tablet 0   • clonazePAM (KlonoPIN) 0.5 mg tablet Take 1 tablet (0.5 mg total) by mouth 2 (two) times a day 60 tablet 0   • Diclofenac Sodium (VOLTAREN) 1 % Apply 4 g topically 4 (four) times a day prn     • dicyclomine (BENTYL) 10 mg capsule Take 1 capsule (10 mg total) by mouth every 6 (six) hours as needed (abdominal pain) As needed     • DULoxetine (CYMBALTA) 60 mg delayed  release capsule Take 1 capsule (60 mg total) by mouth daily after lunch 90 capsule 0   • omeprazole (PriLOSEC) 40 MG capsule Take 1 capsule (40 mg total) by mouth 2 (two) times a day 180 capsule 1     No current facility-administered medications for this visit.     Allergies   Allergen Reactions   • Rosuvastatin Myalgia      Immunizations:     Immunization History   Administered Date(s) Administered   • COVID-19 PFIZER VACCINE 0.3 ML IM 12/22/2020, 01/10/2021, 09/07/2021   • COVID-19 Pfizer Vac BIVALENT Tadeo-sucrose 12 Yr+ IM 12/12/2022   • H1N1, All Formulations 12/04/2009   • INFLUENZA 10/25/2007, 10/24/2008, 09/08/2009, 10/03/2013, 09/25/2014, 10/09/2018, 11/01/2019   • Influenza Quadrivalent Preservative Free 3 years and older IM 10/09/2018, 11/01/2019   • Influenza, seasonal, injectable, preservative free 10/26/2016   • Pneumococcal Polysaccharide PPV23 07/10/2018   • Td (adult), Unspecified 07/03/2012   • Tdap 08/19/2011, 07/03/2012, 11/05/2019   • Tetanus Toxoid, Unspecified 09/01/2005   • Tuberculin Skin Test-PPD Intradermal 08/19/2011      Health Maintenance:         Topic Date Due   • Colorectal Cancer Screening  06/04/2028   • HIV Screening  Completed   • Hepatitis C Screening  Completed         Topic Date Due   • Hepatitis A Vaccine (1 of 2 - Risk 2-dose series) Never done   • Pneumococcal Vaccine: Pediatrics (0 to 5 Years) and At-Risk Patients (6 to 64 Years) (2 of 2 - PCV) 07/10/2019   • Influenza Vaccine (1) 09/01/2023   • COVID-19 Vaccine (5 - 2023-24 season) 09/01/2023      Medicare Screening Tests and Risk Assessments:     Demian is here for his Subsequent Wellness visit. Last Medicare Wellness visit information reviewed, patient interviewed and updates made to the record today.      Health Risk Assessment:   Patient rates overall health as good. Patient feels that their physical health rating is same. Patient is satisfied with their life. Eyesight was rated as slightly worse. Hearing was rated as  same. Patient feels that their emotional and mental health rating is same. Patients states they are never, rarely angry. Patient states they are never, rarely unusually tired/fatigued. Pain experienced in the last 7 days has been none. Patient states that he has experienced no weight loss or gain in last 6 months.     Depression Screening:   PHQ-9 Score: 2      Fall Risk Screening:   In the past year, patient has experienced: no history of falling in past year      Home Safety:  Patient does not have trouble with stairs inside or outside of their home. Patient has working smoke alarms and has working carbon monoxide detector. Home safety hazards include: none.     Nutrition:   Current diet is Regular.     Medications:   Patient is currently taking over-the-counter supplements. OTC medications include: see medication list. Patient is able to manage medications.     Activities of Daily Living (ADLs)/Instrumental Activities of Daily Living (IADLs):   Walk and transfer into and out of bed and chair?: Yes  Dress and groom yourself?: Yes    Bathe or shower yourself?: Yes    Feed yourself? Yes  Do your laundry/housekeeping?: Yes  Manage your money, pay your bills and track your expenses?: Yes  Make your own meals?: Yes    Do your own shopping?: Yes    Previous Hospitalizations:   Any hospitalizations or ED visits within the last 12 months?: No      Advance Care Planning:   Living will: No    Durable POA for healthcare: No    Advanced directive: No      Cognitive Screening:   Provider or family/friend/caregiver concerned regarding cognition?: No    PREVENTIVE SCREENINGS      Cardiovascular Screening:    General: Screening Current      Diabetes Screening:     General: Screening Current      Colorectal Cancer Screening:     General: Screening Current      Prostate Cancer Screening:    General: Screening Current      Osteoporosis Screening:    General: Screening Not Indicated      Abdominal Aortic Aneurysm (AAA) Screening:     "Risk factors include: tobacco use        General: Screening Not Indicated      Lung Cancer Screening:     General: Screening Not Indicated      Hepatitis C Screening:    General: Screening Current    Screening, Brief Intervention, and Referral to Treatment (SBIRT)    Screening      Single Item Drug Screening:  How often have you used an illegal drug (including marijuana) or a prescription medication for non-medical reasons in the past year? never    Single Item Drug Screen Score: 0  Interpretation: Negative screen for possible drug use disorder    Brief Intervention  Alcohol & drug use screenings were reviewed. No concerns regarding substance use disorder identified.     Other Counseling Topics:   Car/seat belt/driving safety.     No results found.     Physical Exam:     /84 (BP Location: Left arm, Patient Position: Sitting, Cuff Size: Standard)   Pulse 70   Temp 98.3 °F (36.8 °C) (Tympanic)   Resp 16   Ht 5' 9\" (1.753 m)   Wt 101 kg (222 lb)   SpO2 99%   BMI 32.78 kg/m²     Physical Exam  Vitals and nursing note reviewed.   Constitutional:       General: He is not in acute distress.     Appearance: Normal appearance. He is well-developed.   HENT:      Head: Normocephalic and atraumatic.   Eyes:      Conjunctiva/sclera: Conjunctivae normal.   Cardiovascular:      Rate and Rhythm: Normal rate and regular rhythm.      Heart sounds: Normal heart sounds. No murmur heard.  Pulmonary:      Effort: Pulmonary effort is normal. No respiratory distress.      Breath sounds: Normal breath sounds.   Abdominal:      Tenderness: There is no abdominal tenderness.   Musculoskeletal:         General: No swelling. Normal range of motion.      Cervical back: Normal range of motion.      Right lower leg: No edema.      Left lower leg: No edema.   Lymphadenopathy:      Cervical: No cervical adenopathy.   Skin:     General: Skin is warm and dry.   Neurological:      General: No focal deficit present.      Mental Status: He is " alert and oriented to person, place, and time.   Psychiatric:         Mood and Affect: Mood normal.        Zina Jamison MD

## 2024-03-21 ENCOUNTER — TELEPHONE (OUTPATIENT)
Age: 59
End: 2024-03-21

## 2024-03-21 NOTE — TELEPHONE ENCOUNTER
Pt of Dr Griffin calling for FU appt with her regarding new possible AK spots on head    Advised she is booked out, offered an opening next Monday with Dr Martinez (also closer in location to his home). Pt accepted appt with Dr VALLECILLO.    Pt stated Dr Griffin told him in the past, his AK may be related to his time in the [employer] as he had a lot of exposure to nuclear weapons    Pt wanted to know if he can get her opinion on this in writing, possibly a statement that his AK was caused by ionizing radiation    Please call pt back at [phone]

## 2024-03-25 ENCOUNTER — OFFICE VISIT (OUTPATIENT)
Age: 59
End: 2024-03-25
Payer: COMMERCIAL

## 2024-03-25 VITALS — TEMPERATURE: 97.8 F | HEIGHT: 69 IN | WEIGHT: 215 LBS | BODY MASS INDEX: 31.84 KG/M2

## 2024-03-25 DIAGNOSIS — L57.0 KERATOSIS, ACTINIC: ICD-10-CM

## 2024-03-25 DIAGNOSIS — D22.9 MULTIPLE NEVI: ICD-10-CM

## 2024-03-25 DIAGNOSIS — L81.4 LENTIGINES: ICD-10-CM

## 2024-03-25 DIAGNOSIS — D18.01 CHERRY ANGIOMA: ICD-10-CM

## 2024-03-25 DIAGNOSIS — Z13.89 SCREENING FOR SKIN CONDITION: Primary | ICD-10-CM

## 2024-03-25 DIAGNOSIS — L82.1 SEBORRHEIC KERATOSIS: ICD-10-CM

## 2024-03-25 PROCEDURE — 99213 OFFICE O/P EST LOW 20 MIN: CPT

## 2024-03-25 PROCEDURE — 17000 DESTRUCT PREMALG LESION: CPT

## 2024-03-25 PROCEDURE — 17003 DESTRUCT PREMALG LES 2-14: CPT

## 2024-03-25 NOTE — TELEPHONE ENCOUNTER
Phone call to patient to advise of message sent in My Chart. Patient will go into My Chart and read it. Patient is currently on his way to his 9:20 am appointment with Dian and told me he will be about 10 minutes late. I did advise of the late policy. Patient said he should be there by 9:30 am.

## 2024-03-25 NOTE — PATIENT INSTRUCTIONS
"Treatment with Cryotherapy    The doctor has treated your skin with nitrogen, which is 320 degrees Fahrenheit below zero.  He has given the treated area \"frostbite.\"    Stinging should subside within a few hours.  You can take Tylenol for pain, if needed.  Over the next few days, it is normal if the area becomes reddened, a blood blister, or swollen with fluid.  If the lesion treated was near the eye - you could get a swollen eye over the next few days.  Do not panic!  This is all temporary, and will resolve with time.    There is no special treatment - just keep the area clean.  Makeup and BandAids can be used, if preferred.    When the area starts to dry up and peel off, using Vaseline can help healing.    It usually takes up to a month for it to heal.  Some lesions are recurrent and may require repeat treatments.  If a lesion has not healed in one month, please don't hesitate to contact us.      If you have any further questions that are not answered here, please call us.  [phone].    Thank you for allowing us to care for you.      When outside we recommend using a wide brim hat, sunglasses, long sleeve and pants, sunscreen with SPF 30+ with reapplication every 2 hours, or SPF specific clothing     ACTINIC KERATOSIS    Actinic keratoses are very common on sites repeatedly exposed to the sun, especially the backs of the hands and the face, most often affecting the ears, nose, cheeks, upper lip, vermilion of the lower lip, temples, forehead and balding scalp. In severely chronically sun-damaged individuals, they may also be found on the upper trunk, upper and lower limbs, and dorsum of feet.    We discussed the theoretical premalignant (“pre-cancerous”) nature and etiology of these growths.  We discussed the prevailing notion that actinic keratoses are a reflection of abnormal skin cell development due to DNA damage by short wavelength UVB.  They are more likely to appear if the immune function is poor, due to " aging, recent sun exposure, predisposing disease or certain drugs.    We discussed that the main concern is that actinic keratoses may predispose to squamous cell carcinoma. It is rare for a solitary actinic keratosis to evolve to squamous cell carcinoma (SCC), but the risk of SCC occurring at some stage in a patient with more than 10 actinic keratoses is thought to be about 10 to 15%. A tender, thickened, ulcerated or enlarging actinic keratosis is suspicious of SCC.    Actinic keratoses may be prevented by strict sun protection. If already present, keratoses may improve with a very high sun protection factor (50+) broad-spectrum sunscreen applied at least daily to affected areas, year-round.  We recommend that UPF-rated clothing and hats and sunglasses be worn whenever possible and that a sunscreen-moisturizer combination product such as Neutrogena Daily Defense be applied at least three times a day.    We performed a thorough discussion of treatment options and specific risk/benefits/alternatives including but not limited to medical “field” treatment with medications such as the following:    Topical “field area” medications such as 5-fluorouracil or Aldara (specifically, the trouble with long-term compliance, blistering and local skin reaction versus the convenience of at-home therapy and that field therapy “gets what is not yet seen”).    Cryotherapy (specifically, local pain, scarring, dyspigmentation, blistering, possible superinfection, and treats “only what we see” versus directed treatment today).    Photodynamic therapy (specifically, local pain, scarring, dyspigmentation, blistering, possible superinfection, need to schedule for a later date, and time spent in the office versus field therapy that “gets what is not yet seen”).      BASAL CELL CARCINOMA    What is basal cell carcinoma?  Basal cell carcinoma (BCC) is a common, locally invasive, keratinocytic, or non-melanoma, skin cancer. It is also known  as rodent ulcer and basalioma. Patients with BCC often develop multiple primary tumours over time.    Who gets basal cell carcinoma?  Risk factors for BCC include:  Age and sex: BCCs are particularly prevalent in elderly males. However, they also affect females and younger adults   Previous BCC or other form of skin cancer (squamous cell carcinoma, melanoma)   Sun damage (photoaging, actinic keratoses)   Repeated prior episodes of sunburn   Fair skin, blue eyes and blond or red hair--note; BCC can also affect darker skin types   Previous cutaneous injury, thermal burn, disease (eg cutaneous lupus, sebaceous naevus)   Inherited syndromes: BCC is a particular problem for families with basal cell naevus syndrome (Gorlin syndrome), Yalay-Ldozé-Duriygjo syndrome, Rombo syndrome, Oley syndrome and xeroderma pigmentosum   Other risk factors include ionising radiation, exposure to arsenic, and immune suppression due to disease or medicines    What causes basal cell carcinoma?  The cause of BCC is multifactorial.  Most often, there are DNA mutations in the patched (PTCH) tumour suppressor gene, part of hedgehog signaling pathway   These may be triggered by exposure to ultraviolet radiation   Various spontaneous and inherited gene defects predispose to BCC    What are the clinical features of basal cell carcinoma?  BCC is a locally invasive skin tumour. The main characteristics are:  Slowly growing plaque or nodule   Skin coloured, pink or pigmented   Varies in size from a few millimetres to several centimetres in diameter   Spontaneous bleeding or ulceration  BCC is very rarely a threat to life. A tiny proportion of BCCs grow rapidly, invade deeply, and/or metastasise to local lymph nodes.    Types of basal cell carcinoma  There are several distinct clinical types of BCC, and over 20 histological growth patterns of BCC.  Nodular BCC  Most common type of facial BCC   Shiny or pearly nodule with a smooth surface   May have  central depression or ulceration, so its edges appear rolled   Blood vessels cross its surface   Cystic variant is soft, with jelly-like contents   Micronodular, microcystic and infiltrative types are potentially aggressive subtypes   Also known as nodulocystic carcinoma  Superficial BCC  Most common type in younger adults   Most common type on upper trunk and shoulders   Slightly scaly, irregular plaque   Thin, translucent rolled border   Multiple microerosions  Morphoeaform BCC  Usually found in mid-facial sites   Waxy, scar-like plaque with indistinct borders   Wide and deep subclinical extension   May infiltrate cutaneous nerves (perineural spread)   Also known as morpheic, morphoeiform or sclerosing BCC  Basosquamous carcinoma  Mixed basal cell carcinoma (BCC) and squamous cell carcinoma (SCC)   Infiltrative growth pattern   Potentially more aggressive than other forms of BCC   Also known as basisquamous carcinoma and mixed basal-squamous cell carcinoma       Complications of basal cell carcinoma    Recurrent BCC  Recurrence of BCC after initial treatment is not uncommon. Characteristics of recurrent BCC often include:  Incomplete excision or narrow margins at primary excision   Morphoeic, micronodular, and infiltrative subtypes   Location on head and neck    Advanced BCC  Advanced BCCs are large, often neglected tumours.  They may be several centimetres in diameter   They may be deeply infiltrating into tissues below the skin   They are difficult or impossible to treat surgically    Metastatic BCC  Very rare   Primary tumour is often large, neglected or recurrent, located on head and neck, with aggressive subtype   May have had multiple prior treatments   May arise in site exposed to ionising radiation   Can be fatal    How is basal cell carcinoma diagnosed?  BCC is diagnosed clinically by the presence of a slowly enlarging skin lesion with typical appearance. The diagnosis and  by a diagnostic biopsy or  following excision.  Some typical superficial BCCs on trunk and limbs are clinically diagnosed and have non-surgical treatment without histology.    What is the treatment for primary basal cell carcinoma?  The treatment for a BCC depends on its type, size and location, the number to be treated, patient factors, and the preference or expertise of the doctor. Most BCCs are treated surgically. Long-term follow-up is recommended to check for new lesions and recurrence; the latter may be unnecessary if histology has reported wide clear margins.    Excision biopsy  Excision means the lesion is cut out and the skin stitched up.  Most appropriate treatment for nodular, infiltrative and morphoeic BCCs   Should include 3 to 5 mm margin of normal skin around the tumour   Very large lesions may require flap or skin graft to repair the defect   Pathologist will report deep and lateral margins   Further surgery is recommended for lesions that are incompletely excised    Mohs micrographically controlled excision  Mohs micrographically controlled surgery involves examining carefully marked excised tissue under the microscope, layer by layer, to ensure complete excision.  Very high cure rates achieved by trained Mohs surgeons   Used in high-risk areas of the face around eyes, lips and nose   Suitable for ill-defined, morphoeic, infiltrative and recurrent subtypes   Large defects are repaired by flap or skin graft    Superficial skin surgery  Superficial skin surgery comprises shave, curettage, and electrocautery. It is a rapid technique using local anaesthesia and does not require sutures.  Suitable for small, well-defined nodular or superficial BCCs   Lesions are usually located on trunk or limbs   Wound is left open to heal by secondary intention   Moist wound dressings lead to healing within a few weeks   Eventual scar quality variable    Cryotherapy  Cryotherapy is the treatment of a superficial skin lesion by freezing it,  usually with liquid nitrogen.  Suitable for small superficial BCCs on covered areas of trunk and limbs   Best avoided for BCCs on head and neck, and distal to knees   Double freeze-thaw technique   Results in a blister that crusts over and heals within several weeks.   Leaves permanent white pascual    Photodynamic therapy  Photodynamic therapy (PDT) refers to a technique in which BCC is treated with a photosensitising chemical, and exposed to light several hours later.  Topical photosensitisers include aminolevulinic acid lotion and methyl aminolevulinate cream   Suitable for low-risk small, superficial BCCs   Best avoided if tumour in site at high risk of recurrence   Results in inflammatory reaction, maximal 3-4 days after procedure   Treatment repeated 7 days after initial treatment   Excellent cosmetic results    Imiquimod cream  Imiquimod is an immune response modifier.  Best used for superficial BCCs less than 2 cm diameter   Applied three to five times each week, for 6-16 weeks   Results in a variable inflammatory reaction, maximal at three weeks   Minimal scarring is usual    Fluorouracil cream  5-Fluorouracil cream is a topical cytotoxic agent.  Used to treat small superficial basal cell carcinomas   Requires prolonged course, eg twice daily for 6-12 weeks   Causes inflammatory reaction   Has high recurrence rates    Radiotherapy  Radiotherapy or X-ray treatment can be used to treat primary BCCs or as adjunctive treatment if margins are incomplete.  Mainly used if surgery is not suitable   Best avoided in young patients and in genetic conditions predisposing to skin cancer   Best cosmetic results achieved using multiple fractions   Typically, patient attends once-weekly for several weeks   Causes inflammatory reaction followed by scar   Risk of radiodermatitis, late recurrence, and new tumours    What is the treatment for advanced or metastatic basal cell carcinoma?  Locally advanced primary, recurrent or  metastatic BCC requires multidisciplinary consultation. Often a combination of treatments is used.  Surgery   Radiotherapy   Targeted therapy  Targeted therapy refers to the hedgehog signalling pathway inhibitors, vismodegib and sonidegib. These drugs have some important risks and side effects.    How can basal cell carcinoma be prevented?  The most important way to prevent BCC is to avoid sunburn. This is especially important in childhood and early life. Fair skinned individuals and those with a personal or family history of BCC should protect their skin from sun exposure daily, year-round and lifelong.  Stay indoors or under the shade in the middle of the day   Wear covering clothing   Apply high protection factor SPF50+ broad-spectrum sunscreens generously to exposed skin if outdoors   Avoid indoor tanning (sun beds, solaria)  Oral nicotinamide (vitamin B3) in a dose of 500 mg twice daily may reduce the number and severity of BCCs.    What is the outlook for basal cell carcinoma?  Most BCCs are cured by treatment. Cure is most likely if treatment is undertaken when the lesion is small.  About 50% of people with BCC develop a second one within 3 years of the first. They are also at increased risk of other skin cancers, especially melanoma. Regular self-skin examinations and long-term annual skin checks by an experienced health professional are recommended.        SQUAMOUS CELL CARCINOMA    What is cutaneous squamous cell carcinoma?  Cutaneous squamous cell carcinoma (SCC) is a common type of keratinocyte or non-melanoma skin cancer. It is derived from cells within the epidermis that make keratin -- the horny protein that makes up skin, hair and nails.  Cutaneous SCC is an invasive disease, referring to cancer cells that have grown beyond the epidermis. SCC can sometimes metastasise and may prove fatal.  Intraepidermal carcinoma (cutaneous SCC in situ) and mucosal SCC are considered elsewhere.    Who gets cutaneous  squamous cell carcinoma?  Risk factors for cutaneous SCC include:  Age and sex: SCCs are particularly prevalent in elderly males. However, they also affect females and younger adults.   Previous SCC or another form of skin cancer (basal cell carcinoma, melanoma) are a strong predictor for further skin cancers.   Actinic keratoses   Outdoor occupation or recreation   Smoking   Fair skin, blue eyes and blond or red hair   Previous cutaneous injury, thermal burn, disease (eg cutaneous lupus, epidermolysis bullosa, leg ulcer)   Inherited syndromes: SCC is a particular problem for families with xeroderma pigmentosum and albinism   Other risk factors include ionising radiation, exposure to arsenic, and immune suppression due to disease (eg chronic lymphocytic leukaemia) or medicines. Organ transplant recipients have a massively increased risk of developing SCC.    What causes cutaneous squamous cell carcinoma?  More than 90% of cases of SCC are associated with numerous DNA mutations in multiple somatic genes. Mutations in the p53 tumour suppressor gene are caused by exposure to ultraviolet radiation (UV), especially UVB (known as signature 7). Other signature mutations relate to cigarette smoking, ageing and immune suppression (eg, to drugs such as azathioprine). Mutations in signalling pathways affect the epidermal growth factor receptor, RAVEN, Fyn, and [id number] signalling.   Beta-genus human papillomaviruses (wart virus) are thought to play a role in SCC arising in immune-suppressed populations. ?-HPV and HPV subtypes 5, 8, 17, 20, 24, and 38 have also been associated with an increased risk of cutaneous SCC in immunocompetent individuals.     What are the clinical features of cutaneous squamous cell carcinoma?  Cutaneous SCCs present as enlarging scaly or crusted lumps. They usually arise within pre-existing actinic keratosis or intraepidermal carcinoma.  They grow over weeks to months   They may ulcerate   They are  often tender or painful   Located on sun-exposed sites, particularly the face, lips, ears, hands, forearms and lower legs   Size varies from a few millimetres to several centimetres in diameter.    Types of cutaneous squamous cell carcinoma  Distinct clinical types of invasive cutaneous SCC include:  Cutaneous horn -- the horn is due to excessive production of keratin   Keratoacanthoma (KA) -- a rapidly growing keratinising nodule that may resolve without treatment   Carcinoma cuniculatum (‘verrucous carcinoma’), a slow-growing, warty tumour on the sole of the foot.   Multiple eruptive SCC/KA-like lesions arising in syndromes, such as multiple self-healing squamous epitheliomas of Matute-Smith and Grzybowski syndrome  The pathologist may classify a tumour as well differentiated, moderately well differentiated, poorly differentiated or anaplastic cutaneous SCC. There are other variants.    Classification of squamous cell carcinoma by risk  Cutaneous SCC is classified as low-risk or high-risk, depending on the chance of tumour recurrence and metastasis. Characteristics of high-risk SCC include:  High-risk cutaneous squamous cell carcinoma has the following characteristics:  Diameter greater than or equal to 2 cm   Location on the ear, vermilion of the lip, central face, hands, feet, genitalia   Arising in elderly or immune suppressed patient   Histological thickness greater than 2 mm, poorly differentiated histology, or with the invasion of the subcutaneous tissue, nerves and blood vessels  Metastatic SCC is found in regional lymph nodes (80%), lungs, liver, brain, bones and skin.    Staging cutaneous squamous cell carcinoma  In 2011, the American Joint Committee on Cancer (AJCC) published a new staging systemic for cutaneous SCC for the 7th Edition of the AJCC manual. This evaluates the dimensions of the original primary tumour (T) and its metastases to lymph nodes (N).    Tumour staging for cutaneous SCC  TX: Th  Primary tumour cannot be assessed  T0: No evidence of a primary tumour  Tis: Carcinoma in situ  T1: Tumour ? 2cm without high-risk features  T2: Tumour ? 2cm; or; Tumour ? 2 cm with high-risk features  T3: Tumour with the invasion of maxilla, mandible, orbit or temporal bone  T4: Tumour with the invasion of axial or appendicular skeleton or perineural invasion of skull base    Kendra staging for cutaneous SCC  NX: Regional lymph nodes cannot be assessed  N0: No regional lymph node metastasis  N1: Metastasis in one local lymph node ? 3cm  N2: Metastasis in one local lymph node ? 3cm; or; Metastasis in >1 local lymph node ? 6cm  N3: Metastasis in lymph node ? 6cm    How is squamous cell carcinoma diagnosed?  Diagnosis of cutaneous SCC is based on clinical features. The diagnosis and histological subtype are confirmed pathologically by diagnostic biopsy or following excision. See squamous cell carcinoma - pathology.  Patients with high-risk SCC may also undergo staging investigations to determine whether it has spread to lymph nodes or elsewhere. These may include:  Imaging using ultrasound scan, X-rays, CT scans, MRI scans   Lymph node or other tissue biopsies    What is the treatment for cutaneous squamous cell carcinoma?  Cutaneous SCC is nearly always treated surgically. Most cases are excised with a 3-10 mm margin of normal tissue around a visible tumour. A flap or skin graft may be needed to repair the defect.  Other methods of removal include:  Shave, curettage, and electrocautery for low-risk tumours on trunk and limbs   Aggressive cryotherapy for very small, thin, low-risk tumours   Mohs micrographic surgery for large facial lesions with indistinct margins or recurrent tumours   Radiotherapy for an inoperable tumour, patients unsuitable for surgery, or as adjuvant    What is the treatment for advanced or metastatic squamous cell carcinoma?  Locally advanced primary, recurrent or metastatic SCC requires  multidisciplinary consultation. Often a combination of treatments is used.  Surgery   Radiotherapy   Cemiplimab   Experimental targeted therapy using epidermal growth factor receptor inhibitors    How can cutaneous squamous cell carcinoma be prevented?  There is a great deal of evidence to show that very careful sun protection at any time of life reduces the number of SCCs. This is particularly important in ageing, sun-damaged, fair skin; in patients that are immune suppressed; and in those who already have actinic keratoses or previous SCC.  Stay indoors or under the shade in the middle of the day   Wear covering clothing   Apply high protection factor SPF50+ broad-spectrum sunscreens generously to exposed skin if outdoors   Avoid indoor tanning (sun beds, solaria)    Oral nicotinamide (vitamin B3) in a dose of 500 mg twice daily may reduce the number and severity of SCCs in people at high risk.  Patients with multiple squamous cell carcinomas may be prescribed an oral retinoid (acitretin or isotretinoin). These reduce the number of tumours but have some nuisance side effects.    What is the outlook for cutaneous squamous cell carcinoma?  Most SCCs are cured by treatment. A cure is most likely if treatment is undertaken when the lesion is small. The risk of recurrence or disease-associated death is greater for tumours that are > 20 mm in diameter and/or > 2 mm in thickness at the time of surgical excision.  About 50% of people at high risk of SCC develop a second one within 5 years of the first. They are also at increased risk of other skin cancers, especially melanoma. Regular self-skin examinations and long-term annual skin checks by an experienced health professional are recommended.    What is a lentigo?  A lentigo is a pigmented flat or slightly raised lesion with a clearly defined edge. Unlike an ephelis (freckle), it does not fade in the winter months. There are several kinds of lentigo.  The name lentigo  originally referred to its appearance resembling a small lentil. The plural of lentigo is lentigines, although “lentigos” is also in common use.    Who gets lentigines?  Lentigines can affect males and females of all ages and races. Solar lentigines are especially prevalent in fair skinned adults. Lentigines associated with syndromes are present at birth or arise during childhood.    What causes lentigines?  Common forms of lentigo are due to exposure to ultraviolet radiation:  Sun damage including sunburn   Indoor tanning   Phototherapy, especially photochemotherapy (PUVA)    Ionizing radiation, eg radiation therapy, can also cause lentigines.  Several familial syndromes associated with widespread lentigines originate from mutations in Catracho-MAP kinase, mTOR signaling and PTEN pathways.    What are the clinical features of lentigines?  Lentigines have been classified into several different types depending on what they look like, where they appear on the body, causative factors, and whether they are associated to other diseases or conditions.  Lentigines may be solitary or more often, multiple. Most lentigines are smaller than 5 mm in diameter.    Lentigo simplex  A precursor to junctional naevus   Arises during childhood and early adult life   Found on trunk and limbs   Small brown round or oval macule or thin plaque   Jagged or smooth edge   May have a dry surface   May disappear in time  Solar lentigo  A precursor to seborrhoeic keratosis   Found on chronically sun exposed sites such as hands, face, lower legs   May also follow sunburn to shoulders   Yellow, light or dark brown regular or irregular macule or thin plaque   May have a dry surface   Often has moth-eaten outline   Can slowly enlarge to several centimeters in diameter   May disappear, often through the process known as lichenoid keratosis   When atypical in appearance, may be difficult to distinguish from melanoma in situ  Ink spot lentigo  Also known  as reticulated lentigo   Few in number compared to solar lentigines   Follows sunburn in very fair skinned individuals   Dark brown to black irregular ink spot-like macule  PUVA lentigo  Similar to ink spot lentigo but follows photochemotherapy (PUVA)   Location anywhere exposed to PUVA  Tanning bed lentigo  Similar to ink spot lentigo but follows indoor tanning   Location anywhere exposed to tanning bed  Radiation lentigo  Occurs in site of irradiation (accidental or therapeutic)   Associated with late-stage radiation dermatitis: epidermal atrophy, subcutaneous fibrosis, keratosis, telangiectasias  Melanotic macule  Mucosal surfaces or adjacent glabrous skin eg lip, vulva, penis, anus   Light to dark brown   Also called mucosal melanosis  Generalised lentigines  Found on any exposed or covered site from early childhood   Small macules may merge to form larger patches   Not associated with a syndrome   Also called lentigines profusa, multiple lentigines  Agminated lentigines  Naevoid eruption of lentigos confined to a single segmental area   Sharp demarcation in midline   May have associated neurological and developmental abnormalities  Patterned lentigines  Inherited tendency to lentigines on face, lips, buttocks, palms, soles   Recognised mainly in people of  ethnicity  Centrofacial neurodysraphic lentiginosis  Associated with mental retardation  Lentiginosis syndromes  Syndromes include LEOPARD/Catie, Peutz-Jeghers, Laugier-Hunziker, Moynahan, Xeroderma pigmentosum, myxoma syndromes (CASTANEDA, NAME, Leslie), Ruvalcaba-Myhre-Santiago, Bannayan-Zonnana syndrome, Cowden disease (multiple hamartoma syndrome )   Inheritance is autosomal dominant; sporadic cases common   Widespread lentigines present at birth or arise in early childhood   Associated with neural, endocrine, and mesenchymal tumors    How is the diagnosis made?  Lentigines are usually diagnosed clinically by their typical appearance. Concern regarding  possibility of melanoma may lead to:  Dermatoscopy   Diagnostic excision biopsy    Histopathology of a lentigo shows:  Thickened epidermis   An increased number of melanocytes along the basal layer of epidermis   Unlike junctional melanocytic naevus, there are no nests of melanocytes   Increased melanin pigment within the keratinocytes   Additional features depending on type of lentigo    In contrast, an ephelis (freckle) shows sun-induced increased melanin within the keratinocytes, without an increase in number of cells.  What is the treatment for lentigines?  Most lentigines are left alone. Attempts to lighten them may not be successful. The following approaches are used:  SPF 50+ broad-spectrum sunscreen   Hydroquinone bleaching cream   Alpha hydroxy acids   Vitamin C   Retinoids   Azelaic acid   Chemical peels  Individual lesions can be permanently removed using:  Cryotherapy   Intense pulsed light   Pigment lasers    How can lentigines be prevented?  Lentigines associated with exposure ultraviolet radiation can be prevented by very careful sun protection. Clothing is more successful at preventing new lentigines than are sunscreens.    What is the outlook for lentigines?  Lentigines usually persist. They may increase in number with age and sun exposure. Some in sun-protected sites may fade and disappear.

## 2024-03-25 NOTE — PROGRESS NOTES
"[facility] Note     Patient Name: Demian Joe  Encounter Date: March 25, 2024     Have you been cared for by a [facility] Dermatologist in the last 3 years and, if so, which description applies to you?    Yes.  I have been here within the last 3 years, and my medical history has NOT changed since that time.  I am MALE/not capable of bearing children.    REVIEW OF SYSTEMS:  Have you recently had or currently have any of the following? No changes in my recent health.   PAST MEDICAL HISTORY:  Have you personally ever had or currently have any of the following?  If \"YES,\" then please provide more detail. No changes in my medical history.   HISTORY OF IMMUNOSUPPRESSION: Do you have a history of any of the following:  Systemic Immunosuppression such as Diabetes, Biologic or Immunotherapy, Chemotherapy, Organ Transplantation, Bone Marrow Transplantation?  No     Answering \"YES\" requires the addition of the dotphrase \"IMMUNOSUPPRESSED\" as the first diagnosis of the patient's visit.   FAMILY HISTORY:  Any \"first degree relatives\" (parent, brother, sister, or child) with the following?    No changes in my family's known health.   PATIENT EXPERIENCE:    Do you want the Dermatologist to perform a COMPLETE skin exam today including a clinical examination under the \"bra and underwear\" areas?  Yes  If necessary, do we have your permission to call and leave a detailed message on your Preferred Phone number that includes your specific medical information?  Yes      Allergies   Allergen Reactions    Rosuvastatin Myalgia      Current Outpatient Medications:     amitriptyline (ELAVIL) 10 mg tablet, Take 1 tablet (10 mg total) by mouth daily at bedtime, Disp: 30 tablet, Rfl: 3    atorvastatin (LIPITOR) 80 mg tablet, Take 1 tablet (80 mg total) by mouth daily, Disp: 90 tablet, Rfl: 0    clonazePAM (KlonoPIN) 0.5 mg tablet, Take 1 tablet (0.5 mg total) by mouth 2 (two) times a day, Disp: 60 tablet, Rfl: 0    dicyclomine " (BENTYL) 10 mg capsule, Take 1 capsule (10 mg total) by mouth every 6 (six) hours as needed (abdominal pain) As needed, Disp: , Rfl:     DULoxetine (CYMBALTA) 60 mg delayed release capsule, Take 1 capsule (60 mg total) by mouth daily after lunch, Disp: 90 capsule, Rfl: 0    omeprazole (PriLOSEC) 40 MG capsule, Take 1 capsule (40 mg total) by mouth 2 (two) times a day, Disp: 180 capsule, Rfl: 1    Diclofenac Sodium (VOLTAREN) 1 %, Apply 4 g topically 4 (four) times a day prn (Patient not taking: Reported on 3/25/2024), Disp: , Rfl:           Whom besides the patient is providing clinical information about today's encounter?   NO ADDITIONAL HISTORIAN (patient alone provided history)    Physical Exam and Assessment/Plan by Diagnosis:    Chief complaint: Patient is a 59 y/o male present for a routine skin exam without history of skin cancer and has some spots of concern on the right and left scalp.    ACTINIC KERATOSIS    Physical Exam:  Anatomic Location Affected:  Scalp  Morphological Description:  scaly erythematous papules    Physical Exam  HENT:      Head:           Additional History of Present Condition:  Present on exam. Patient states the area on the right scalp was treated with cryotherapy around 2 years ago and resolved.     Assessment and Plan:  Based on a thorough discussion of this condition and the management approach to it (including a comprehensive discussion of the known risks, side effects and potential benefits of treatment), the patient (family) agrees to implement the following specific plan:  Treated with cryotherapy today. Discussed risks of hypo and hyperpigmentation and scarring.   Advised if lesions do not resolve in 4-6 weeks, recommend follow-up for possible re-treatment or biopsy depending on clinical findings.     PROCEDURE:  DESTRUCTION OF PRE-MALIGNANT LESIONS  After a thorough discussion of treatment options and risk/benefits/alternatives (including but not limited to local pain,  "scarring, dyspigmentation, blistering, and possible superinfection), verbal and written consent were obtained and the aforementioned lesions were treated on with cryotherapy using liquid nitrogen x 1 cycle for 5-10 seconds.  TOTAL NUMBER of 4 pre-malignant lesions were treated today on the ANATOMIC LOCATION: Scalp.   The patient tolerated the procedure well, and after-care instructions were provided.       MELANOCYTIC NEVI (\"Moles\")    Physical Exam:  Anatomic Location Affected:   Mostly on sun-exposed areas of the trunk and extremities  Morphological Description:  Scattered, 1-4mm round to ovoid, symmetrical-appearing, even bordered, skin colored to dark brown macules/papules    Additional History of Present Condition:  Present on exam. Denies any pain, itch, bleeding. Present for years. Denies actively changing or growing moles.     Assessment and Plan:  Based on a thorough discussion of this condition and the management approach to it (including a comprehensive discussion of the known risks, side effects and potential benefits of treatment), the patient (family) agrees to implement the following specific plan:  Reassured benign.   Monitor for changes. ABCDE's of melanoma handout provided.   Practice sun protection. Apply broad spectrum (UVA and UVB) sunscreen, SPF 30 or higher every 2 hours. Wear sun protective clothing, hats, and sunglasses.     LENTIGO    Physical Exam:  Anatomic Location Affected:  sun exposed areas of trunk and extremities  Morphological Description:  multiple scattered tan to brown evenly pigmented macules    Additional History of Present Condition:  Present on exam.     Assessment and Plan:  Based on a thorough discussion of this condition and the management approach to it (including a comprehensive discussion of the known risks, side effects and potential benefits of treatment), the patient (family) agrees to implement the following specific plan:  Reassured benign.   Practice sun " "protection. Apply broad spectrum (UVA and UVB) sunscreen, SPF 30 or higher every 2 hours. Wear sun protective clothing, hats, and sunglasses.     SEBORRHEIC KERATOSIS; NON-INFLAMED    Physical Exam:  Anatomic Location Affected:  trunk and extremities  Morphological Description:  Flat and raised, waxy, smooth to warty textured, yellow to brownish-grey to dark brown to blackish, discrete, \"stuck-on\" appearing papules.    Additional History of Present Condition:  Present on exam. Patient denies any itching.     Assessment and Plan:  Based on a thorough discussion of this condition and the management approach to it (including a comprehensive discussion of the known risks, side effects and potential benefits of treatment), the patient (family) agrees to implement the following specific plan:  Reassured benign.     ANGIOMA (\"CHERRY ANGIOMA\")     Physical Exam:  Anatomic Location: scattered across trunk and extremities   Morphologic Description: 1-2 mm firm red to maroon to purples to blue discrete papule, on dermoscopy lacunae  by white septae seen     Additional History of Present Condition:  Present on exam.     Plan:  Reassured benign.       Follow-up: 1 year for skin exam or sooner as needed.    Scribe Attestation      I,:  Sandy Gee am acting as a scribe while in the presence of the attending physician.:       I,:  Dian Bernal PA-C personally performed the services described in this documentation    as scribed in my presence.:                  "

## 2024-03-25 NOTE — TELEPHONE ENCOUNTER
[place]-- Please call with my unread message:    Dian and Dr. Martinez-- claude, looks like you're seeing him today 3/25/24

## 2024-04-03 ENCOUNTER — NURSE TRIAGE (OUTPATIENT)
Age: 59
End: 2024-04-03

## 2024-04-03 NOTE — TELEPHONE ENCOUNTER
"Last OV: 3/19/24   Hx:IBS-D, cyclical vomiting     Pt calling in, report he is having significant abdominal pain still whenever he eats anything solid. He went on liquid diet for 3 days and pain eased up after. Yesterday tried to eat a light meal- lean cusine and 6-8 hours later pain came back about 2-3 inches below navel. No solid BM in 2-3 years     He reports the amitriptyline has been working for him and is still on bentyl 10 mg.     Pt is not sure what is causing this pain and last CT scan was in August. He is not sure if imaging needed again and reports if this pain comes back will go to the ED for CT.     Any recs for pt?   Reason for Disposition   Abdominal pain is a chronic symptom (recurrent or ongoing AND lasting > 4 weeks)    Answer Assessment - Initial Assessment Questions  1. LOCATION: \"Where does it hurt?\"       Below navel   2. RADIATION: \"Does the pain shoot anywhere else?\" (e.g., chest, back)      No   3. ONSET: \"When did the pain begin?\" (Minutes, hours or days ago)       Ongoing   4. SUDDEN: \"Gradual or sudden onset?\"      Sudden   5. PATTERN \"Does the pain come and go, or is it constant?\"     - If constant: \"Is it getting better, staying the same, or worsening?\"       (Note: Constant means the pain never goes away completely; most serious pain is constant and it progresses)      - If intermittent: \"How long does it last?\" \"Do you have pain now?\"      (Note: Intermittent means the pain goes away completely between bouts)      Intermittent   6. SEVERITY: \"How bad is the pain?\"  (e.g., Scale 1-10; mild, moderate, or severe)     - MILD (1-3): doesn't interfere with normal activities, abdomen soft and not tender to touch      - MODERATE (4-7): interferes with normal activities or awakens from sleep, tender to touch      - SEVERE (8-10): excruciating pain, doubled over, unable to do any normal activities        Mild to severe   7. RECURRENT SYMPTOM: \"Have you ever had this type of stomach pain " "before?\" If Yes, ask: \"When was the last time?\" and \"What happened that time?\"       Yes   8. CAUSE: \"What do you think is causing the stomach pain?\"      Unknown   9. RELIEVING/AGGRAVATING FACTORS: \"What makes it better or worse?\" (e.g., movement, antacids, bowel movement)      Eating worsens pain   10. OTHER SYMPTOMS: \"Has there been any vomiting, diarrhea, constipation, or urine problems?\"        Loose stools    Protocols used: Abdominal Pain - Male-ADULT-[place]    "

## 2024-04-10 DIAGNOSIS — R11.15 CYCLICAL VOMITING: ICD-10-CM

## 2024-04-10 DIAGNOSIS — F13.939 BENZODIAZEPINE WITHDRAWAL WITH COMPLICATION (HCC): ICD-10-CM

## 2024-04-10 DIAGNOSIS — K58.0 IRRITABLE BOWEL SYNDROME WITH DIARRHEA: ICD-10-CM

## 2024-04-10 RX ORDER — AMITRIPTYLINE HYDROCHLORIDE 10 MG/1
10 TABLET, FILM COATED ORAL
Qty: 90 TABLET | Refills: 1 | Status: SHIPPED | OUTPATIENT
Start: 2024-04-10

## 2024-04-10 RX ORDER — CLONAZEPAM 0.5 MG/1
0.5 TABLET ORAL 2 TIMES DAILY
Qty: 60 TABLET | Refills: 0 | Status: SHIPPED | OUTPATIENT
Start: 2024-04-10 | End: 2024-05-10

## 2024-05-02 DIAGNOSIS — E78.5 DYSLIPIDEMIA: ICD-10-CM

## 2024-05-04 RX ORDER — ATORVASTATIN CALCIUM 80 MG/1
80 TABLET, FILM COATED ORAL DAILY
Qty: 90 TABLET | Refills: 0 | Status: SHIPPED | OUTPATIENT
Start: 2024-05-04 | End: 2024-10-31

## 2024-05-08 ENCOUNTER — OFFICE VISIT (OUTPATIENT)
Dept: CARDIOLOGY CLINIC | Facility: CLINIC | Age: 59
End: 2024-05-08
Payer: COMMERCIAL

## 2024-05-08 VITALS
SYSTOLIC BLOOD PRESSURE: 140 MMHG | BODY MASS INDEX: 31.55 KG/M2 | HEIGHT: 69 IN | WEIGHT: 213 LBS | DIASTOLIC BLOOD PRESSURE: 80 MMHG | HEART RATE: 88 BPM

## 2024-05-08 DIAGNOSIS — F13.939 BENZODIAZEPINE WITHDRAWAL WITH COMPLICATION (HCC): ICD-10-CM

## 2024-05-08 DIAGNOSIS — E78.5 DYSLIPIDEMIA: ICD-10-CM

## 2024-05-08 DIAGNOSIS — K51.919 ULCERATIVE COLITIS WITH COMPLICATION, UNSPECIFIED LOCATION (HCC): ICD-10-CM

## 2024-05-08 DIAGNOSIS — I25.10 CORONARY ARTERY DISEASE INVOLVING NATIVE CORONARY ARTERY OF NATIVE HEART WITHOUT ANGINA PECTORIS: Primary | ICD-10-CM

## 2024-05-08 PROCEDURE — 99214 OFFICE O/P EST MOD 30 MIN: CPT | Performed by: INTERNAL MEDICINE

## 2024-05-08 NOTE — PROGRESS NOTES
Patient ID: Demian Joe is a 58 y.o. male.        Plan:      Coronary artery disease involving native coronary artery of native heart without angina pectoris  No symptoms since the last visit.    Dyslipidemia  Tolerating high-intensity statin therapy and will continue current regimen.      Ulcerative colitis with complication, unspecified location (HCC)  Apparently related to submarine radiation exposure as per patient.       Follow up Plan/Other summary comments:  Return in about 1 year (around 5/8/2025).    HPI: Patient seen in f/u regarding the above issues.  No CP since the last visit. Stress test last  year for CP was normal.  He has had no change in exertional capacity.    Patient had KARLA of distal and proximal RCA in 4/2019.        Most recent or relevant cardiac/vascular testing:    Myoview 8/26/2020: WNL.  Stress-echo 5/17/2023: WNL.      Past Surgical History:   Procedure Laterality Date    ABDOMINAL SURGERY      radio frequency ablation    BONE GRAFT Left 2018    CARPAL TUNNEL RELEASE Right     COLONOSCOPY      CORONARY ANGIOPLASTY WITH STENT PLACEMENT      x2    EGD AND COLONOSCOPY      ESOPHAGOGASTRODUODENOSCOPY N/A 2/15/2018    Procedure: ESOPHAGOGASTRODUODENOSCOPY (EGD);  Surgeon: Agusto Romano MD;  Location: [facility];  Service: Gastroenterology    ESOPHAGOGASTRODUODENOSCOPY N/A 12/10/2018    Procedure: ESOPHAGOGASTRODUODENOSCOPY (EGD);  Surgeon: Bar Pardo III, MD;  Location: [facility];  Service: Gastroenterology    HAND SURGERY Left     SC SURGICAL ARTHROSCOPY SHOULDER W/ROTATOR CUFF RPR Left 4/14/2021    Procedure: REPAIR ROTATOR CUFF  ARTHROSCOPIC; POSSIBLE BICEPS TENDONESIS, ACROMIOPLASTY;  Surgeon: Miko Solomon DO;  Location: [facility];  Service: Orthopedics    ROTATOR CUFF REPAIR Left     SHOULDER ARTHROSCOPY Right 3/14/2022    Procedure: ARTHROSCOPY SHOULDER- Right shoulder arthroscopic rotator cuff repair, open subpectoral biceps tenodesis, subsacpular repair and extensive  "debridement;  Surgeon: Miko Solomon DO;  Location: [facility];  Service: Orthopedics    TONSILLECTOMY         Lipid Profile: Reviewed      Review of Systems   10  point ROS  was otherwise non pertinent or negative except as per HPI or as below.   Gait: Normal.        Objective:     /80   Pulse 88   Ht 5' 9\" (1.753 m)   Wt 96.6 kg (213 lb)   BMI 31.45 kg/m²     PHYSICAL EXAM:    General:  Normal appearance in no distress.  Eyes:  Anicteric.  Oral mucosa:  Moist.  Neck:  No JVD. Carotid upstrokes are brisk without bruits.  No masses.  Chest:  Clear to auscultation.  Cardiac:  No palpable PMI.  Normal S1 and S2.  No murmur gallop or rub.  Abdomen:  Soft and nontender. No palpable organomegaly or aortic enlargement.  Extremities:  No peripheral edema.  Musculoskeletal:  Symmetric.   Vascular:  Femoral pulses are brisk without bruits.  Popliteal pulses are intact bilaterally.   Pedal pulses are intact.  Neuro:  Grossly symmetric.  Psych:  Alert and oriented x3.      Meds reviewed.    Past Medical History:   Diagnosis Date    Diaz's esophagus     Colon polyp     Coronary artery disease 2019    RCA stenting X 2 in 2019    Depression     Diverticulitis     Ear problems     GERD (gastroesophageal reflux disease)     Hemorrhoid     History of heart artery stent     Hyperlipidemia     Hypertension     Microscopic colitis     Ulcerative colitis (HCC)            Social History     Tobacco Use   Smoking Status Former    Current packs/day: 0.00    Types: Cigarettes    Quit date: 2007    Years since quittin.3   Smokeless Tobacco Former    Quit date: 1998   Tobacco Comments    quit 10 yrs ago             "

## 2024-05-09 RX ORDER — CLONAZEPAM 0.5 MG/1
0.5 TABLET ORAL 2 TIMES DAILY
Qty: 60 TABLET | Refills: 0 | Status: SHIPPED | OUTPATIENT
Start: 2024-05-09 | End: 2024-06-08

## 2024-06-05 DIAGNOSIS — K22.70 BARRETT'S ESOPHAGUS WITHOUT DYSPLASIA: ICD-10-CM

## 2024-06-05 DIAGNOSIS — F13.939 BENZODIAZEPINE WITHDRAWAL WITH COMPLICATION (HCC): ICD-10-CM

## 2024-06-05 DIAGNOSIS — K22.711 BARRETT'S ESOPHAGUS WITH HIGH GRADE DYSPLASIA: ICD-10-CM

## 2024-06-05 DIAGNOSIS — R11.15 CYCLICAL VOMITING: ICD-10-CM

## 2024-06-05 RX ORDER — OMEPRAZOLE 40 MG/1
40 CAPSULE, DELAYED RELEASE ORAL 2 TIMES DAILY
Qty: 180 CAPSULE | Refills: 1 | Status: SHIPPED | OUTPATIENT
Start: 2024-06-05

## 2024-06-06 RX ORDER — CLONAZEPAM 0.5 MG/1
0.5 TABLET ORAL 2 TIMES DAILY
Qty: 60 TABLET | Refills: 0 | Status: SHIPPED | OUTPATIENT
Start: 2024-06-06 | End: 2024-07-06

## 2024-06-17 ENCOUNTER — TELEPHONE (OUTPATIENT)
Dept: GASTROENTEROLOGY | Facility: CLINIC | Age: 59
End: 2024-06-17

## 2024-06-17 NOTE — TELEPHONE ENCOUNTER
Called pt and LMOM reminding the pt to do lab work done before coming to the office.  Office # provided to call  us back if any questions.

## 2024-06-18 ENCOUNTER — DOCUMENTATION (OUTPATIENT)
Dept: PSYCHIATRY | Facility: CLINIC | Age: 59
End: 2024-06-18

## 2024-06-18 DIAGNOSIS — F41.1 GAD (GENERALIZED ANXIETY DISORDER): Primary | ICD-10-CM

## 2024-06-18 DIAGNOSIS — F43.10 PTSD (POST-TRAUMATIC STRESS DISORDER): ICD-10-CM

## 2024-06-18 DIAGNOSIS — F33.1 MDD (MAJOR DEPRESSIVE DISORDER), RECURRENT EPISODE, MODERATE (HCC): ICD-10-CM

## 2024-06-18 NOTE — PROGRESS NOTES
Psychotherapy Discharge Summary    Preferred Name: Demian Joe  YOB: 1965    Admission date to psychotherapy: 7/18/23    Referred by: Self/PCP    Presenting Problem: Anxiety/Depression    Course of treatment included : individual therapy     Progress/Outcome of Treatment Goals (brief summary of course of treatment) Limited to no progress    Treatment Complications (if any): N/A    Treatment Progress: fair    Current [facility] Psychiatric Provider: See chart    Discharge Medications include: in chart    Discharge Date: 1/12/24    Discharge Diagnosis:   1. MADDY (generalized anxiety disorder)        2. PTSD (post-traumatic stress disorder)        3. MDD (major depressive disorder), recurrent episode, moderate (HCC)            Criteria for Discharge: two or more unexcused absences for services    Aftercare recommendations include (include specific referral names and phone numbers, if appropriate): n/a    Prognosis: fair

## 2024-06-19 ENCOUNTER — TELEPHONE (OUTPATIENT)
Dept: NEPHROLOGY | Facility: CLINIC | Age: 59
End: 2024-06-19

## 2024-06-19 NOTE — TELEPHONE ENCOUNTER
Patient had an appointment today 06/19/2024 with Annie that patient cancel due to patient [facility] due to patient is a  100% [facility]. Patient had [employer]  that is require a copayment per patient he did not wanted to pay his copayment. Patient cancel appointment and will call back once patient sees the [facility]

## 2024-07-02 DIAGNOSIS — F13.939 BENZODIAZEPINE WITHDRAWAL WITH COMPLICATION (HCC): ICD-10-CM

## 2024-07-03 RX ORDER — CLONAZEPAM 0.5 MG/1
0.5 TABLET ORAL 2 TIMES DAILY
Qty: 60 TABLET | Refills: 0 | Status: SHIPPED | OUTPATIENT
Start: 2024-07-03 | End: 2024-08-02

## 2024-07-15 ENCOUNTER — RA CDI HCC (OUTPATIENT)
Dept: OTHER | Facility: HOSPITAL | Age: 59
End: 2024-07-15

## 2024-07-30 DIAGNOSIS — F13.939 BENZODIAZEPINE WITHDRAWAL WITH COMPLICATION (HCC): ICD-10-CM

## 2024-07-31 RX ORDER — CLONAZEPAM 0.5 MG/1
0.5 TABLET ORAL 2 TIMES DAILY
Qty: 60 TABLET | Refills: 0 | Status: SHIPPED | OUTPATIENT
Start: 2024-07-31 | End: 2024-08-30

## 2024-08-13 DIAGNOSIS — E78.5 DYSLIPIDEMIA: ICD-10-CM

## 2024-08-14 RX ORDER — ATORVASTATIN CALCIUM 80 MG/1
80 TABLET, FILM COATED ORAL DAILY
Qty: 30 TABLET | Refills: 0 | Status: SHIPPED | OUTPATIENT
Start: 2024-08-14 | End: 2025-02-10

## 2024-08-28 DIAGNOSIS — K22.70 BARRETT'S ESOPHAGUS WITHOUT DYSPLASIA: ICD-10-CM

## 2024-08-28 DIAGNOSIS — K22.711 BARRETT'S ESOPHAGUS WITH HIGH GRADE DYSPLASIA: ICD-10-CM

## 2024-08-28 DIAGNOSIS — R11.15 CYCLICAL VOMITING: ICD-10-CM

## 2024-08-28 DIAGNOSIS — F13.939 BENZODIAZEPINE WITHDRAWAL WITH COMPLICATION (HCC): ICD-10-CM

## 2024-08-29 RX ORDER — OMEPRAZOLE 40 MG/1
40 CAPSULE, DELAYED RELEASE ORAL 2 TIMES DAILY
Qty: 180 CAPSULE | Refills: 1 | Status: SHIPPED | OUTPATIENT
Start: 2024-08-29

## 2024-08-29 RX ORDER — CLONAZEPAM 0.5 MG/1
0.5 TABLET ORAL 2 TIMES DAILY
Qty: 60 TABLET | Refills: 0 | Status: SHIPPED | OUTPATIENT
Start: 2024-08-29 | End: 2024-09-28

## 2024-09-13 DIAGNOSIS — E78.5 DYSLIPIDEMIA: ICD-10-CM

## 2024-09-13 RX ORDER — ATORVASTATIN CALCIUM 80 MG/1
80 TABLET, FILM COATED ORAL DAILY
Qty: 90 TABLET | Refills: 1 | Status: SHIPPED | OUTPATIENT
Start: 2024-09-13

## 2024-09-20 ENCOUNTER — RA CDI HCC (OUTPATIENT)
Dept: OTHER | Facility: HOSPITAL | Age: 59
End: 2024-09-20

## 2024-09-27 ENCOUNTER — OFFICE VISIT (OUTPATIENT)
Dept: INTERNAL MEDICINE CLINIC | Facility: CLINIC | Age: 59
End: 2024-09-27
Payer: COMMERCIAL

## 2024-09-27 VITALS
RESPIRATION RATE: 16 BRPM | SYSTOLIC BLOOD PRESSURE: 126 MMHG | OXYGEN SATURATION: 97 % | WEIGHT: 223.4 LBS | BODY MASS INDEX: 33.09 KG/M2 | HEART RATE: 93 BPM | HEIGHT: 69 IN | DIASTOLIC BLOOD PRESSURE: 82 MMHG

## 2024-09-27 DIAGNOSIS — Z86.0100 HISTORY OF COLONIC POLYPS: ICD-10-CM

## 2024-09-27 DIAGNOSIS — F33.1 MDD (MAJOR DEPRESSIVE DISORDER), RECURRENT EPISODE, MODERATE (HCC): ICD-10-CM

## 2024-09-27 DIAGNOSIS — Z86.010 HISTORY OF COLONIC POLYPS: ICD-10-CM

## 2024-09-27 DIAGNOSIS — Z23 NEED FOR VACCINATION: ICD-10-CM

## 2024-09-27 DIAGNOSIS — K86.89 PANCREATIC DUCT DILATED: ICD-10-CM

## 2024-09-27 DIAGNOSIS — I25.10 CORONARY ARTERY DISEASE INVOLVING NATIVE CORONARY ARTERY OF NATIVE HEART WITHOUT ANGINA PECTORIS: Primary | ICD-10-CM

## 2024-09-27 DIAGNOSIS — F43.10 PTSD (POST-TRAUMATIC STRESS DISORDER): ICD-10-CM

## 2024-09-27 DIAGNOSIS — E78.5 DYSLIPIDEMIA: ICD-10-CM

## 2024-09-27 DIAGNOSIS — F32.A ANXIETY AND DEPRESSION: ICD-10-CM

## 2024-09-27 DIAGNOSIS — F41.9 ANXIETY AND DEPRESSION: ICD-10-CM

## 2024-09-27 DIAGNOSIS — K22.70 BARRETT'S ESOPHAGUS WITHOUT DYSPLASIA: ICD-10-CM

## 2024-09-27 DIAGNOSIS — N52.01 ERECTILE DYSFUNCTION DUE TO ARTERIAL INSUFFICIENCY: ICD-10-CM

## 2024-09-27 DIAGNOSIS — Z77.29 EXPOSURE TO POTENTIALLY HAZARDOUS SUBSTANCE: ICD-10-CM

## 2024-09-27 DIAGNOSIS — E66.9 OBESITY (BMI 30.0-34.9): ICD-10-CM

## 2024-09-27 DIAGNOSIS — Z12.5 PROSTATE CANCER SCREENING: ICD-10-CM

## 2024-09-27 DIAGNOSIS — E66.811 OBESITY (BMI 30.0-34.9): ICD-10-CM

## 2024-09-27 DIAGNOSIS — R73.03 PREDIABETES: ICD-10-CM

## 2024-09-27 DIAGNOSIS — R39.11 HESITANCY OF MICTURITION: ICD-10-CM

## 2024-09-27 DIAGNOSIS — K21.9 GASTROESOPHAGEAL REFLUX DISEASE WITHOUT ESOPHAGITIS: ICD-10-CM

## 2024-09-27 DIAGNOSIS — F13.939 BENZODIAZEPINE WITHDRAWAL WITH COMPLICATION (HCC): ICD-10-CM

## 2024-09-27 PROCEDURE — 99214 OFFICE O/P EST MOD 30 MIN: CPT | Performed by: INTERNAL MEDICINE

## 2024-09-27 PROCEDURE — G2211 COMPLEX E/M VISIT ADD ON: HCPCS | Performed by: INTERNAL MEDICINE

## 2024-09-27 RX ORDER — CLONAZEPAM 0.5 MG/1
0.5 TABLET ORAL 2 TIMES DAILY
Qty: 60 TABLET | Refills: 0 | Status: SHIPPED | OUTPATIENT
Start: 2024-09-27 | End: 2024-10-27

## 2024-09-27 RX ORDER — NALTREXONE HYDROCHLORIDE 50 MG/1
25 TABLET, FILM COATED ORAL
COMMUNITY
Start: 2024-07-24 | End: 2024-09-27

## 2024-09-27 RX ORDER — SILDENAFIL 50 MG/1
50 TABLET, FILM COATED ORAL DAILY PRN
Qty: 10 TABLET | Refills: 0 | Status: SHIPPED | OUTPATIENT
Start: 2024-09-27

## 2024-09-27 NOTE — ASSESSMENT & PLAN NOTE
Orders:    CBC and differential; Future    Basic metabolic panel; Future    TSH, 3rd generation with Free T4 reflex; Future    See above.

## 2024-09-27 NOTE — PROGRESS NOTES
Ambulatory Visit  Name: Demian Joe      : 1965      MRN: [id number]  Encounter Provider: Zina Jamison MD  Encounter Date: 2024   Encounter department: [facility] INTERNAL MEDICINE Home    Assessment & Plan  Coronary artery disease involving native coronary artery of native heart without angina pectoris    Orders:    CBC and differential; Future    Basic metabolic panel; Future    TSH, 3rd generation with Free T4 reflex; Future     Patient had KARLA of distal and proximal RCA in 2019. Follows with cardiology, Dr. Sidhu from [facility].  Need for vaccination       refusing flu vaccine.  Gastroesophageal reflux disease without esophagitis       Continue PPI. [facility]  Diaz's esophagus without dysplasia    Continue PPI. Now following with gastro at [facility].         MDD (major depressive disorder), recurrent episode, moderate (HCC)         Continue psych follow up. Continue regimen.  PTSD (post-traumatic stress disorder)       see above.  Anxiety and depression      Orders:    CBC and differential; Future    Basic metabolic panel; Future    TSH, 3rd generation with Free T4 reflex; Future    See above.  Obesity (BMI 30.0-34.9)       discussed diet and exercise.  Dyslipidemia    Orders:    Lipid Panel with Direct LDL reflex; Future    The 10-year ASCVD risk score (Yanick DK, et al., 2019) is: 6%    Values used to calculate the score:      Age: 58 years      Sex: Male      Is Non- : No      Diabetic: No      Tobacco smoker: No      Systolic Blood Pressure: 126 mmHg      Is BP treated: No      HDL Cholesterol: 68 mg/dL      Total Cholesterol: 195 mg/dL    Pancreatic duct dilated       Main pancreatic duct minimally dilated up to 4 mm in diameter diffusely, however unchanged dating back to MRCP from 2020. Otherwise unremarkable appearance of the pancreas with no discrete lesions. Plan on preforming EUS as per [facility].  Prostate cancer  screening    Orders:    PSA, total and free; Future  denies any complaints.  Prediabetes    Orders:    Hemoglobin A1C; Future    Check updated A1c.  Hesitancy of micturition    Orders:    PSA, total and free; Future    Erectile dysfunction due to arterial insufficiency    Orders:    sildenafil (VIAGRA) 50 MG tablet; Take 1 tablet (50 mg total) by mouth daily as needed for erectile dysfunction    Exposure to potentially hazardous substance  [facility]         History of colonic polyps  Noted on last crc screen. Now following with [facility].           Depression Screening and Follow-up Plan: Patient was screened for depression during today's encounter. They screened negative with a PHQ-9 score of 0.      History of Present Illness   Patient is here for routine follow up, reviewed chronic medical problems, ordered labs for next visit including CBC CMP TSH A1C LIPID. Reviewed labs for this visit.        History obtained from : patient  Review of Systems   Constitutional:  Negative for chills and fever.   HENT:  Negative for ear pain and sore throat.    Eyes:  Negative for pain and visual disturbance.   Respiratory:  Negative for cough and shortness of breath.    Cardiovascular:  Negative for chest pain and palpitations.   Gastrointestinal:  Negative for abdominal pain and vomiting.   Genitourinary:  Negative for dysuria and hematuria.   Musculoskeletal:  Negative for arthralgias and back pain.   Skin:  Negative for color change and rash.   Neurological:  Negative for seizures and syncope.   All other systems reviewed and are negative.    Past Medical History   Past Medical History:   Diagnosis Date    Diaz's esophagus     Colon polyp     Coronary artery disease 2019    RCA stenting X 2 in 2019    Depression     Diverticulitis     Ear problems     GERD (gastroesophageal reflux disease)     Hemorrhoid     History of heart artery stent     Hyperlipidemia     Hypertension     Microscopic colitis     Ulcerative colitis  (HCC)      Past Surgical History:   Procedure Laterality Date    ABDOMINAL SURGERY      radio frequency ablation    BONE GRAFT Left 2018    CARPAL TUNNEL RELEASE Right     COLONOSCOPY      CORONARY ANGIOPLASTY WITH STENT PLACEMENT      x2    EGD AND COLONOSCOPY      ESOPHAGOGASTRODUODENOSCOPY N/A 2/15/2018    Procedure: ESOPHAGOGASTRODUODENOSCOPY (EGD);  Surgeon: Agusto Romano MD;  Location: [facility];  Service: Gastroenterology    ESOPHAGOGASTRODUODENOSCOPY N/A 12/10/2018    Procedure: ESOPHAGOGASTRODUODENOSCOPY (EGD);  Surgeon: Bar Pardo III, MD;  Location: [facility];  Service: Gastroenterology    HAND SURGERY Left     VT SURGICAL ARTHROSCOPY SHOULDER W/ROTATOR CUFF RPR Left 4/14/2021    Procedure: REPAIR ROTATOR CUFF  ARTHROSCOPIC; POSSIBLE BICEPS TENDONESIS, ACROMIOPLASTY;  Surgeon: Miko Solomon DO;  Location: [facility];  Service: Orthopedics    ROTATOR CUFF REPAIR Left     SHOULDER ARTHROSCOPY Right 3/14/2022    Procedure: ARTHROSCOPY SHOULDER- Right shoulder arthroscopic rotator cuff repair, open subpectoral biceps tenodesis, subsacpular repair and extensive debridement;  Surgeon: Miko Solomon DO;  [facility]: [facility];  Service: Orthopedics    TONSILLECTOMY       Family History   Problem Relation Age of Onset    Skin cancer Mother     Heart disease Father     Melanoma Father     Cancer Sister     Skin cancer Sister      Current Outpatient Medications on File Prior to Visit   Medication Sig Dispense Refill    amitriptyline (ELAVIL) 10 mg tablet TAKE 1 TABLET BY MOUTH DAILY AT BEDTIME (Patient taking differently: Take 10 mg by mouth daily at bedtime PRN) 90 tablet 1    atorvastatin (LIPITOR) 80 mg tablet TAKE 1 TABLET BY MOUTH EVERY DAY 90 tablet 1    clonazePAM (KlonoPIN) 0.5 mg tablet Take 1 tablet (0.5 mg total) by mouth 2 (two) times a day 60 tablet 0    DULoxetine (CYMBALTA) 60 mg delayed release capsule Take 1 capsule (60 mg total) by mouth daily after lunch 90 capsule 0     "DULoxetine HCl 30 MG CSDR Take 90 mg by mouth daily      omeprazole (PriLOSEC) 40 MG capsule Take 1 capsule (40 mg total) by mouth 2 (two) times a day 180 capsule 1    [DISCONTINUED] Diclofenac Sodium (VOLTAREN) 1 % Apply 4 g topically 4 (four) times a day prn (Patient not taking: Reported on 3/25/2024)      [DISCONTINUED] dicyclomine (BENTYL) 10 mg capsule Take 1 capsule (10 mg total) by mouth every 6 (six) hours as needed (abdominal pain) As needed (Patient not taking: Reported on 5/8/2024)      [DISCONTINUED] naltrexone (REVIA) 50 mg tablet Take 25 mg by mouth (Patient not taking: Reported on 9/27/2024)       No current facility-administered medications on file prior to visit.     Allergies   Allergen Reactions    Rosuvastatin Myalgia          Objective   /82 (BP Location: Left arm, Patient Position: Sitting, Cuff Size: Adult)   Pulse 93   Resp 16   Ht 5' 9\" (1.753 m)   Wt 101 kg (223 lb 6.4 oz)   SpO2 97%   BMI 32.99 kg/m²     Physical Exam  Vitals and nursing note reviewed.   Constitutional:       General: He is not in acute distress.     Appearance: He is well-developed.   HENT:      Head: Normocephalic and atraumatic.   Eyes:      Conjunctiva/sclera: Conjunctivae normal.   Cardiovascular:      Rate and Rhythm: Normal rate and regular rhythm.      Heart sounds: No murmur heard.  Pulmonary:      Effort: Pulmonary effort is normal. No respiratory distress.      Breath sounds: Normal breath sounds.   Abdominal:      Palpations: Abdomen is soft.      Tenderness: There is no abdominal tenderness.   Musculoskeletal:         General: No swelling.      Cervical back: Neck supple.   Skin:     General: Skin is warm and dry.      Capillary Refill: Capillary refill takes less than 2 seconds.   Neurological:      Mental Status: He is alert.   Psychiatric:         Mood and Affect: Mood normal.       I have spent a total time of 15 minutes in caring for this patient on the day of the visit/encounter including " Patient and family education, Risk factor reductions, Counseling / Coordination of care, and Reviewing / ordering tests, medicine, procedures  .

## 2024-09-27 NOTE — ASSESSMENT & PLAN NOTE
Orders:    CBC and differential; Future    Basic metabolic panel; Future    TSH, 3rd generation with Free T4 reflex; Future     Patient had KARLA of distal and proximal RCA in 4/2019. Follows with cardiology, Dr. Sidhu from [facility].

## 2024-09-27 NOTE — ASSESSMENT & PLAN NOTE
Orders:    Lipid Panel with Direct LDL reflex; Future    The 10-year ASCVD risk score (Yanick TRIVEDI, et al., 2019) is: 6%    Values used to calculate the score:      Age: 58 years      Sex: Male      Is Non- : No      Diabetic: No      Tobacco smoker: No      Systolic Blood Pressure: 126 mmHg      Is BP treated: No      HDL Cholesterol: 68 mg/dL      Total Cholesterol: 195 mg/dL

## 2024-10-21 ENCOUNTER — TELEPHONE (OUTPATIENT)
Age: 59
End: 2024-10-21

## 2024-10-21 ENCOUNTER — NURSE TRIAGE (OUTPATIENT)
Age: 59
End: 2024-10-21

## 2024-10-21 ENCOUNTER — OFFICE VISIT (OUTPATIENT)
Dept: URGENT CARE | Facility: CLINIC | Age: 59
End: 2024-10-21
Payer: COMMERCIAL

## 2024-10-21 VITALS
TEMPERATURE: 98.5 F | DIASTOLIC BLOOD PRESSURE: 82 MMHG | HEART RATE: 92 BPM | SYSTOLIC BLOOD PRESSURE: 126 MMHG | RESPIRATION RATE: 18 BRPM | OXYGEN SATURATION: 97 %

## 2024-10-21 DIAGNOSIS — H60.503 ACUTE OTITIS EXTERNA OF BOTH EARS, UNSPECIFIED TYPE: Primary | ICD-10-CM

## 2024-10-21 PROCEDURE — 99213 OFFICE O/P EST LOW 20 MIN: CPT

## 2024-10-21 RX ORDER — NALTREXONE HYDROCHLORIDE 50 MG/1
25 TABLET, FILM COATED ORAL
COMMUNITY
Start: 2024-07-24

## 2024-10-21 RX ORDER — NEOMYCIN SULFATE, POLYMYXIN B SULFATE, HYDROCORTISONE 3.5; 10000; 1 MG/ML; [USP'U]/ML; MG/ML
4 SOLUTION/ DROPS AURICULAR (OTIC) EVERY 6 HOURS SCHEDULED
Qty: 10 ML | Refills: 0 | Status: SHIPPED | OUTPATIENT
Start: 2024-10-21 | End: 2024-10-28

## 2024-10-21 NOTE — PROGRESS NOTES
[facility]        NAME: Demian Joe is a 59 y.o. male  : 1965    MRN: [id number]  DATE: 2024  TIME: 2:16 PM    Assessment and Plan   Acute otitis externa of both ears, unspecified type [H60.503]  1. Acute otitis externa of both ears, unspecified type  neomycin-polymyxin-hydrocortisone (CORTISPORIN) otic solution            Patient Instructions     Use Ofloxacin drops as prescribed  Avoid Q-Tip use  Tylenol/Ibuprofen for discomfort  Fluids  Change pillowcase daily  Avoid using ear buds until treatment finished. Clean ear buds often.  Avoid submerging head under water until finished with drops, or wear ear plugs when swimming.    Return to clinic if ear canal is too swollen for drops to be instilled for possible ear wic insertion.   Follow up with PCP in 3-5 days.  Proceed to the ER with worsening symptoms.    Chief Complaint     Chief Complaint   Patient presents with    Earache     Pt with B/L ear pain for about a week. Was swimming recently.           History of Present Illness       The patient presents today with complaints of R>L ear pain x 1 week. Denies fever/chills, cough/congestion, drainage. Reports he was swimming recently and has a history of ear infections. Has been taking tylenol as needed with minimal relief.         Review of Systems   Review of Systems   Constitutional:  Negative for chills and fever.   HENT:  Positive for ear pain (bilateral). Negative for congestion, ear discharge, postnasal drip, rhinorrhea and sore throat.    Respiratory:  Negative for cough.          Current Medications       Current Outpatient Medications:     atorvastatin (LIPITOR) 80 mg tablet, TAKE 1 TABLET BY MOUTH EVERY DAY, Disp: 90 tablet, Rfl: 1    clonazePAM (KlonoPIN) 0.5 mg tablet, Take 1 tablet (0.5 mg total) by mouth 2 (two) times a day, Disp: 60 tablet, Rfl: 0    DULoxetine (CYMBALTA) 60 mg delayed release capsule, Take 1 capsule (60 mg total) by mouth daily after lunch, Disp: 90  capsule, Rfl: 0    neomycin-polymyxin-hydrocortisone (CORTISPORIN) otic solution, Administer 4 drops into both ears every 6 (six) hours for 7 days, Disp: 10 mL, Rfl: 0    omeprazole (PriLOSEC) 40 MG capsule, Take 1 capsule (40 mg total) by mouth 2 (two) times a day, Disp: 180 capsule, Rfl: 1    sildenafil (VIAGRA) 50 MG tablet, Take 1 tablet (50 mg total) by mouth daily as needed for erectile dysfunction, Disp: 10 tablet, Rfl: 0    amitriptyline (ELAVIL) 10 mg tablet, TAKE 1 TABLET BY MOUTH DAILY AT BEDTIME (Patient not taking: Reported on 10/21/2024), Disp: 90 tablet, Rfl: 1    DULoxetine HCl 30 MG CSDR, Take 90 mg by mouth daily (Patient not taking: Reported on 10/21/2024), Disp: , Rfl:     naltrexone (REVIA) 50 mg tablet, Take 25 mg by mouth (Patient not taking: Reported on 10/21/2024), Disp: , Rfl:     Current Allergies     Allergies as of 10/21/2024 - Reviewed 10/21/2024   Allergen Reaction Noted    Rosuvastatin Myalgia 07/22/2010            The following portions of the patient's history were reviewed and updated as appropriate: allergies, current medications, past family history, past medical history, past social history, past surgical history and problem list.     Past Medical History:   Diagnosis Date    Diaz's esophagus     Colon polyp     Coronary artery disease 2019    RCA stenting X 2 in 2019    Depression     Diverticulitis     Ear problems     GERD (gastroesophageal reflux disease)     Hemorrhoid     History of heart artery stent     Hyperlipidemia     Hypertension     Microscopic colitis     Ulcerative colitis (HCC)        Past Surgical History:   Procedure Laterality Date    ABDOMINAL SURGERY      radio frequency ablation    BONE GRAFT Left 2018    CARPAL TUNNEL RELEASE Right     COLONOSCOPY      CORONARY ANGIOPLASTY WITH STENT PLACEMENT      x2    EGD AND COLONOSCOPY      ESOPHAGOGASTRODUODENOSCOPY N/A 2/15/2018    Procedure: ESOPHAGOGASTRODUODENOSCOPY (EGD);  Surgeon: Agusto Romano MD;   [facility]: [facility];  Service: Gastroenterology    ESOPHAGOGASTRODUODENOSCOPY N/A 12/10/2018    Procedure: ESOPHAGOGASTRODUODENOSCOPY (EGD);  Surgeon: Bar Pardo III, MD;  [facility];  Service: Gastroenterology    HAND SURGERY Left     [place] SURGICAL ARTHROSCOPY SHOULDER W/ROTATOR CUFF RPR Left 4/14/2021    Procedure: REPAIR ROTATOR CUFF  ARTHROSCOPIC; POSSIBLE BICEPS TENDONESIS, ACROMIOPLASTY;  Surgeon: Miko Solomon DO;  [facility];  Service: Orthopedics    ROTATOR CUFF REPAIR Left     SHOULDER ARTHROSCOPY Right 3/14/2022    Procedure: ARTHROSCOPY SHOULDER- Right shoulder arthroscopic rotator cuff repair, open subpectoral biceps tenodesis, subsacpular repair and extensive debridement;  Surgeon: Miko Solomon DO;  [facility];  Service: Orthopedics    TONSILLECTOMY         Family History   Problem Relation Age of Onset    Skin cancer Mother     Heart disease Father     Melanoma Father     Cancer Sister     Skin cancer Sister          Medications have been verified.        Objective   /82   Pulse 92   Temp 98.5 °F (36.9 °C)   Resp 18   SpO2 97%        Physical Exam     Physical Exam  Vitals and nursing note reviewed.   Constitutional:       General: He is not in acute distress.     Appearance: Normal appearance.   HENT:      Head: Normocephalic and atraumatic.      Right Ear: Tympanic membrane and external ear normal. Swelling and tenderness present. No drainage. There is no impacted cerumen. No foreign body. No mastoid tenderness. Tympanic membrane is not perforated, erythematous or bulging.      Left Ear: Tympanic membrane and external ear normal. Swelling and tenderness present. No drainage. There is no impacted cerumen. No foreign body. No mastoid tenderness. Tympanic membrane is not perforated, erythematous or bulging.      Ears:      Comments: BL external ears with TTP     Mouth/Throat:      Mouth: Mucous membranes are moist.   Eyes:      Pupils: Pupils are  equal, round, and reactive to light.   Cardiovascular:      Rate and Rhythm: Normal rate.   Pulmonary:      Effort: Pulmonary effort is normal.   Skin:     General: Skin is warm and dry.   Neurological:      Mental Status: He is alert and oriented to person, place, and time. Mental status is at baseline.   Psychiatric:         Mood and Affect: Mood normal.         Behavior: Behavior normal.

## 2024-10-21 NOTE — TELEPHONE ENCOUNTER
"Pt called with c/o ear pain in B/L ears. Pt states he was swimming and in the ocean and had just been using swimmers ear drops and ignoring it. Pain is worse now. Pt states he would like some drops.     Recommended pt to come into office however, no available appointments. Recommended pt go to  today and pt agreed.            Reason for Disposition   Patient wants to be seen    Answer Assessment - Initial Assessment Questions  1. LOCATION: \"Which ear is involved?\"      B/L ear pain, but the R is worse   2. ONSET: \"When did the ear start hurting\"       1 week ago   3. SEVERITY: \"How bad is the pain?\"  (Scale 1-10; mild, moderate or severe)    - MILD (1-3): doesn't interfere with normal activities     - MODERATE (4-7): interferes with normal activities or awakens from sleep     - SEVERE (8-10): excruciating pain, unable to do any normal activities       7/10  4. URI SYMPTOMS: \"Do you have a runny nose or cough?\"      Denies   5. FEVER: \"Do you have a fever?\" If Yes, ask: \"What is your temperature, how was it measured, and when did it start?\"      Denies   6. CAUSE: \"Have you been swimming recently?\", \"How often do you use Q-TIPS?\", \"Have you had any recent air travel or scuba diving?\"      Yes   7. OTHER SYMPTOMS: \"Do you have any other symptoms?\" (e.g., headache, stiff neck, dizziness, vomiting, runny nose, decreased hearing)      Denies    Protocols used: Earache-ADULT-[place]    "

## 2024-10-21 NOTE — PATIENT INSTRUCTIONS
Use Ofloxacin drops as prescribed  Avoid Q-Tip use  Tylenol/Ibuprofen for discomfort  Fluids  Change pillowcase daily  Avoid using ear buds until treatment finished. Clean ear buds often.  Avoid submerging head under water until finished with drops, or wear ear plugs when swimming.    Return to clinic if ear canal is too swollen for drops to be instilled for possible ear wic insertion.   Follow up with PCP in 3-5 days.  Proceed to the ER with worsening symptoms.    Swimmer's Ear   AMBULATORY CARE:   Swimmer's ear , also called otitis externa, is an infection in the outer ear canal. This canal goes from the outside of your ear to your eardrum. Swimmer's ear most often occurs when water remains in your ear after you swim. This creates a moist area for bacteria to grow. Scratches or damage from your fingers, cotton swabs, or other objects can also cause swimmer's ear.       Common signs and symptoms:   Ear pain    Red, swollen, itchy ear canal    Fluid or pus draining from your ear    A plugged ear and trouble hearing    Seek care immediately if:   You have severe ear pain.    You are suddenly not able to hear.    You have new swelling in your face, behind your ears, or in your neck.    You suddenly cannot move part of your face, or it feels numb.    Call your doctor if:   You have a fever.    Your symptoms get worse or do not go away, even after treatment.    You have questions or concerns about your condition or care.    Treatment for swimmer's ear  may include cleaning your outer ear canal first. This will help clean any ear wax, flaky skin, or other discharge. You may then need any of the following:  Medicines:      Ear drops  help fight infection and decrease redness and swelling.    Acetaminophen  decreases pain and fever. It is available without a doctor's order. Ask how much to take and how often to take it. Follow directions. Read the labels of all other medicines you are using to see if they also contain  acetaminophen, or ask your doctor or pharmacist. Acetaminophen can cause liver damage if not taken correctly. Do not use more than 4 grams (4,000 milligrams) total of acetaminophen in one day.     NSAIDs , such as ibuprofen, help decrease swelling, pain, and fever. This medicine is available with or without a doctor's order. NSAIDs can cause stomach bleeding or kidney problems in certain people. If you take blood thinner medicine, always ask your healthcare provider if NSAIDs are safe for you. Always read the medicine label and follow directions.    An ear wick  may be used if your ear canal is blocked. A wick (small tube) made of cotton or gauze is placed in your ear. The wick helps pull extra fluid out of your ear canal. Dao also help draw medicine into your ear canal.    How to use ear drops:   Warm the bottle of ear drops in your hands  for a few minutes.    Lie down on your side with your infected ear facing up.  This will help the medicine travel completely through your ear canal.    Gently pull the ear up and back.  Carefully drip the correct number of ear drops into your ear. Have another person help you if possible.         For children younger than 3 years,  gently pull and hold the ear down and back.         For children older than 3 years,  gently pull and hold the ear up and back.         Stay in the same position  for 3 to 5 minutes to let the medicine soak in.    Prevent swimmer's ear:   Dry your ears completely after you swim or bathe.  Gently wipe your outer ear with a soft towel or cloth. Use ear plugs when you swim.    Do not put cotton swabs or other objects in your ears.  These can scratch or damage your ear. They can also push ear wax deeper in and irritate your ear.    Put cotton balls gently in your outer ear  when you apply hair spray, hair dye, or perfume.    Follow up with your doctor as directed:  Write down your questions so you remember to ask them during your visits.  © Copyright [employer] 2022 Information is for End User's use only and may not be sold, redistributed or otherwise used for commercial purposes. All illustrations and images included in CareNotes® are the copyrighted property of [employer] or [employer]  The above information is an  only. It is not intended as medical advice for individual conditions or treatments. Talk to your doctor, nurse or pharmacist before following any medical regimen to see if it is safe and effective for you.

## 2024-10-21 NOTE — TELEPHONE ENCOUNTER
Pt called c/o bilateral ear pain x 1 week and wanting to start ear drops today.  No appointments available today due to one provider in office and completely booked.  CTS did reachout to office clerical as well due to an alert when trying to schedule patient saying a prior auth was required. Clerical staff (Radha) was able to help with that issue.      Warm transfer to Nurse triage for further assistance with ear pain.

## 2024-10-28 DIAGNOSIS — F13.939 BENZODIAZEPINE WITHDRAWAL WITH COMPLICATION (HCC): ICD-10-CM

## 2024-10-28 RX ORDER — CLONAZEPAM 0.5 MG/1
0.5 TABLET ORAL 2 TIMES DAILY
Qty: 60 TABLET | Refills: 0 | Status: SHIPPED | OUTPATIENT
Start: 2024-10-28 | End: 2024-11-27

## 2024-11-06 ENCOUNTER — NURSE TRIAGE (OUTPATIENT)
Age: 59
End: 2024-11-06

## 2024-11-06 DIAGNOSIS — E78.2 MIXED HYPERLIPIDEMIA: Primary | ICD-10-CM

## 2024-11-06 NOTE — TELEPHONE ENCOUNTER
"Reason for Conversation: [facility] and my triglycerides are 3x the high normal and I am on Atorvastatin 80mg.    Trigs 354, total cholesterol 228    Patient stated he did not fast prior to the lab draw. Would you recommend retesting and have him fast this time?    Please advise.    VS/Weight: (Note: Please include date/time vitals/weight were measured)  NA     Pain: No    Risk Factors: Other CAD, stents, dyslipidemia    Recent relevant testing and date of testing: Labs October 31st    Medication: Atorvastatin 80mg daily    Upcoming Office Visit: No    Last Office Visit: 5-8-24        Reason for Disposition   Requesting lab results and adult stable (no new symptoms, not getting worse)     [facility]. Triglycerides 354, total cholesterol 228. Currently on atorvastatin 80mg daily. Patient did not fast prior to lab draw.    Answer Assessment - Initial Assessment Questions  1. REASON FOR CALL: \"What is the main reason for your call?\" or \"How can I best help you?\"      [facility] and my doctor said my Triglycerides are 3x higher than they should be, was advised to contact Dr Sidhu    2. SYMPTOMS : \"Do you have any symptoms?\"       Denies    3. OTHER QUESTIONS: \"Do you have any other questions?\"      States he is on Atorvastatin 80mg daily; states he did not fast prior to blood work being drawn.    Protocols used: Information Only Call - No Triage-Adult-[place]    "

## 2024-11-06 NOTE — TELEPHONE ENCOUNTER
Called and spoke to patient and gave him Dr Sidhu's recommendations regarding diet changes.  Order placed to check triglyceride level in three months per Dr Sidhu.

## 2024-11-26 DIAGNOSIS — F13.939 BENZODIAZEPINE WITHDRAWAL WITH COMPLICATION (HCC): ICD-10-CM

## 2024-11-26 RX ORDER — CLONAZEPAM 0.5 MG/1
0.5 TABLET ORAL 2 TIMES DAILY
Qty: 60 TABLET | Refills: 0 | Status: SHIPPED | OUTPATIENT
Start: 2024-11-26 | End: 2024-12-26

## 2024-12-02 DIAGNOSIS — E78.5 DYSLIPIDEMIA: ICD-10-CM

## 2024-12-04 RX ORDER — ATORVASTATIN CALCIUM 80 MG/1
80 TABLET, FILM COATED ORAL DAILY
Qty: 90 TABLET | Refills: 0 | Status: SHIPPED | OUTPATIENT
Start: 2024-12-04

## 2024-12-23 DIAGNOSIS — F13.939 BENZODIAZEPINE WITHDRAWAL WITH COMPLICATION (HCC): ICD-10-CM

## 2024-12-24 RX ORDER — CLONAZEPAM 0.5 MG/1
0.5 TABLET ORAL 2 TIMES DAILY
Qty: 60 TABLET | Refills: 0 | Status: SHIPPED | OUTPATIENT
Start: 2024-12-24 | End: 2025-01-23

## 2025-01-10 DIAGNOSIS — F33.1 MDD (MAJOR DEPRESSIVE DISORDER), RECURRENT EPISODE, MODERATE (HCC): ICD-10-CM

## 2025-01-10 DIAGNOSIS — F41.1 GAD (GENERALIZED ANXIETY DISORDER): ICD-10-CM

## 2025-01-10 DIAGNOSIS — F43.10 PTSD (POST-TRAUMATIC STRESS DISORDER): ICD-10-CM

## 2025-01-10 DIAGNOSIS — E78.5 DYSLIPIDEMIA: ICD-10-CM

## 2025-01-13 RX ORDER — ATORVASTATIN CALCIUM 80 MG/1
80 TABLET, FILM COATED ORAL DAILY
Qty: 30 TABLET | Refills: 0 | Status: SHIPPED | OUTPATIENT
Start: 2025-01-13

## 2025-01-13 RX ORDER — DULOXETIN HYDROCHLORIDE 60 MG/1
60 CAPSULE, DELAYED RELEASE ORAL
Qty: 30 CAPSULE | Refills: 0 | Status: SHIPPED | OUTPATIENT
Start: 2025-01-13 | End: 2025-02-12

## 2025-01-20 DIAGNOSIS — F13.939 BENZODIAZEPINE WITHDRAWAL WITH COMPLICATION (HCC): ICD-10-CM

## 2025-01-20 RX ORDER — CLONAZEPAM 0.5 MG/1
0.5 TABLET ORAL 2 TIMES DAILY
Qty: 60 TABLET | Refills: 0 | Status: SHIPPED | OUTPATIENT
Start: 2025-01-20 | End: 2025-02-19

## 2025-01-21 ENCOUNTER — TELEPHONE (OUTPATIENT)
Age: 60
End: 2025-01-21

## 2025-01-21 ENCOUNTER — OFFICE VISIT (OUTPATIENT)
Dept: INTERNAL MEDICINE CLINIC | Facility: CLINIC | Age: 60
End: 2025-01-21
Payer: COMMERCIAL

## 2025-01-21 VITALS
TEMPERATURE: 99.8 F | DIASTOLIC BLOOD PRESSURE: 82 MMHG | OXYGEN SATURATION: 98 % | HEART RATE: 103 BPM | BODY MASS INDEX: 32.93 KG/M2 | HEIGHT: 69 IN | SYSTOLIC BLOOD PRESSURE: 132 MMHG

## 2025-01-21 DIAGNOSIS — F13.939 BENZODIAZEPINE WITHDRAWAL WITH COMPLICATION (HCC): ICD-10-CM

## 2025-01-21 DIAGNOSIS — I25.118 CORONARY ARTERY DISEASE OF NATIVE ARTERY OF NATIVE HEART WITH STABLE ANGINA PECTORIS (HCC): ICD-10-CM

## 2025-01-21 DIAGNOSIS — R50.9 FEVER, UNSPECIFIED FEVER CAUSE: ICD-10-CM

## 2025-01-21 DIAGNOSIS — U07.1 COVID: Primary | ICD-10-CM

## 2025-01-21 DIAGNOSIS — F33.1 MDD (MAJOR DEPRESSIVE DISORDER), RECURRENT EPISODE, MODERATE (HCC): ICD-10-CM

## 2025-01-21 DIAGNOSIS — K51.919 ULCERATIVE COLITIS WITH COMPLICATION, UNSPECIFIED LOCATION (HCC): ICD-10-CM

## 2025-01-21 LAB
SARS-COV-2 AG UPPER RESP QL IA: POSITIVE
SL AMB POCT RAPID FLU A: NEGATIVE
SL AMB POCT RAPID FLU B: NEGATIVE
VALID CONTROL: ABNORMAL

## 2025-01-21 PROCEDURE — G2211 COMPLEX E/M VISIT ADD ON: HCPCS | Performed by: INTERNAL MEDICINE

## 2025-01-21 PROCEDURE — 87811 SARS-COV-2 COVID19 W/OPTIC: CPT | Performed by: INTERNAL MEDICINE

## 2025-01-21 PROCEDURE — 87804 INFLUENZA ASSAY W/OPTIC: CPT | Performed by: INTERNAL MEDICINE

## 2025-01-21 PROCEDURE — 99214 OFFICE O/P EST MOD 30 MIN: CPT | Performed by: INTERNAL MEDICINE

## 2025-01-21 NOTE — ASSESSMENT & PLAN NOTE
Patient had KARLA of distal and proximal RCA in 4/2019. Follows with cardiology, Dr. Sidhu from [facility].

## 2025-01-21 NOTE — TELEPHONE ENCOUNTER
Pt called requesting an appointment today with PCP. Pt c/o cough, headache, sore throat, malaise and congestion.      Pt scheduled for today at 3:20 pm.  States insurance requires a prior auth before scheduling. CTS spoke with office clerical who states it is alright to bypass that and they will call patient if any questions or issues. States this warning populated to due patient having [facility].    Pt made aware and will keep appointment at 3:20 for today

## 2025-01-21 NOTE — PROGRESS NOTES
Name: Demian Joe      : 1965      MRN: [id number]  Encounter Provider: Zina Jamison MD  Encounter Date: 2025   Encounter department: [facility] INTERNAL MEDICINE [place]  :  Assessment & Plan  COVID  Symptom management.  He is declining Paxlovid.  Lungs clear.       Fever, unspecified fever cause    Orders:    POCT Rapid Covid Ag    POCT rapid flu A and B    Benzodiazepine withdrawal with complication (HCC)  Continue current regimen.         MDD (major depressive disorder), recurrent episode, moderate (HCC)           Continue psych follow up. Continue regimen.  Ulcerative colitis with complication, unspecified location (HCC)  Apparently related to submarine radiation exposure as per patient.         Coronary artery disease of native artery of native heart with stable angina pectoris (HCC)            Patient had KARLA of distal and proximal RCA in 2019. Follows with cardiology, Dr. Sidhu from [facility].        Depression Screening and Follow-up Plan:   Patient with underlying depression and was advised to continue current medications as prescribed.     History of Present Illness   Her with sick complaints.      Review of Systems   Constitutional:  Negative for chills and fever.   HENT:  Positive for congestion and sore throat. Negative for ear pain.    Eyes:  Negative for pain and visual disturbance.   Respiratory:  Positive for cough. Negative for shortness of breath.    Cardiovascular:  Negative for chest pain and palpitations.   Gastrointestinal:  Negative for abdominal pain and vomiting.   Genitourinary:  Negative for dysuria and hematuria.   Musculoskeletal:  Negative for arthralgias and back pain.   Skin:  Negative for color change and rash.   Neurological:  Positive for headaches. Negative for seizures and syncope.   All other systems reviewed and are negative.      Objective   /82 (BP Location: Left arm, Patient Position: Sitting, Cuff Size: Standard)   Pulse 103   Temp 99.8  "°F (37.7 °C)   Ht 5' 9\" (1.753 m)   SpO2 98%   BMI 32.93 kg/m²      Physical Exam  Vitals and nursing note reviewed.   Constitutional:       General: He is not in acute distress.     Appearance: He is well-developed. He is ill-appearing.   HENT:      Head: Normocephalic and atraumatic.   Eyes:      Conjunctiva/sclera: Conjunctivae normal.   Cardiovascular:      Rate and Rhythm: Normal rate.      Heart sounds: No murmur heard.  Pulmonary:      Effort: Pulmonary effort is normal. No respiratory distress.      Breath sounds: Normal breath sounds.   Abdominal:      Tenderness: There is no abdominal tenderness.   Musculoskeletal:         General: No swelling.   Skin:     General: Skin is warm and dry.      Capillary Refill: Capillary refill takes less than 2 seconds.   Neurological:      Mental Status: He is alert.   Psychiatric:         Mood and Affect: Mood normal.       "

## 2025-01-23 ENCOUNTER — RA CDI HCC (OUTPATIENT)
Dept: OTHER | Facility: HOSPITAL | Age: 60
End: 2025-01-23

## 2025-02-18 DIAGNOSIS — K22.70 BARRETT'S ESOPHAGUS WITHOUT DYSPLASIA: ICD-10-CM

## 2025-02-18 DIAGNOSIS — F13.939 BENZODIAZEPINE WITHDRAWAL WITH COMPLICATION (HCC): ICD-10-CM

## 2025-02-18 DIAGNOSIS — R11.15 CYCLICAL VOMITING: ICD-10-CM

## 2025-02-18 DIAGNOSIS — K22.711 BARRETT'S ESOPHAGUS WITH HIGH GRADE DYSPLASIA: ICD-10-CM

## 2025-02-18 RX ORDER — OMEPRAZOLE 40 MG/1
40 CAPSULE, DELAYED RELEASE ORAL 2 TIMES DAILY
Qty: 180 CAPSULE | Refills: 1 | Status: SHIPPED | OUTPATIENT
Start: 2025-02-18

## 2025-02-19 RX ORDER — CLONAZEPAM 0.5 MG/1
0.5 TABLET ORAL 2 TIMES DAILY
Qty: 60 TABLET | Refills: 0 | Status: SHIPPED | OUTPATIENT
Start: 2025-02-19 | End: 2025-03-21

## 2025-03-03 ENCOUNTER — RA CDI HCC (OUTPATIENT)
Dept: OTHER | Facility: HOSPITAL | Age: 60
End: 2025-03-03

## 2025-03-10 ENCOUNTER — APPOINTMENT (OUTPATIENT)
Dept: LAB | Facility: HOSPITAL | Age: 60
End: 2025-03-10
Payer: COMMERCIAL

## 2025-03-10 ENCOUNTER — OFFICE VISIT (OUTPATIENT)
Dept: INTERNAL MEDICINE CLINIC | Facility: CLINIC | Age: 60
End: 2025-03-10
Payer: COMMERCIAL

## 2025-03-10 VITALS
HEIGHT: 69 IN | SYSTOLIC BLOOD PRESSURE: 136 MMHG | BODY MASS INDEX: 34.8 KG/M2 | RESPIRATION RATE: 18 BRPM | WEIGHT: 235 LBS | HEART RATE: 94 BPM | DIASTOLIC BLOOD PRESSURE: 84 MMHG | OXYGEN SATURATION: 96 %

## 2025-03-10 DIAGNOSIS — I25.10 CORONARY ARTERY DISEASE INVOLVING NATIVE CORONARY ARTERY OF NATIVE HEART WITHOUT ANGINA PECTORIS: ICD-10-CM

## 2025-03-10 DIAGNOSIS — I25.118 CORONARY ARTERY DISEASE OF NATIVE ARTERY OF NATIVE HEART WITH STABLE ANGINA PECTORIS (HCC): ICD-10-CM

## 2025-03-10 DIAGNOSIS — F43.10 PTSD (POST-TRAUMATIC STRESS DISORDER): ICD-10-CM

## 2025-03-10 DIAGNOSIS — R73.03 PREDIABETES: ICD-10-CM

## 2025-03-10 DIAGNOSIS — I25.118 CORONARY ARTERY DISEASE OF NATIVE ARTERY OF NATIVE HEART WITH STABLE ANGINA PECTORIS (HCC): Primary | ICD-10-CM

## 2025-03-10 DIAGNOSIS — F41.9 ANXIETY AND DEPRESSION: ICD-10-CM

## 2025-03-10 DIAGNOSIS — E78.5 DYSLIPIDEMIA: ICD-10-CM

## 2025-03-10 DIAGNOSIS — K21.9 GASTROESOPHAGEAL REFLUX DISEASE WITHOUT ESOPHAGITIS: ICD-10-CM

## 2025-03-10 DIAGNOSIS — F13.939 BENZODIAZEPINE WITHDRAWAL WITH COMPLICATION (HCC): ICD-10-CM

## 2025-03-10 DIAGNOSIS — F32.A ANXIETY AND DEPRESSION: ICD-10-CM

## 2025-03-10 DIAGNOSIS — K51.919 ULCERATIVE COLITIS WITH COMPLICATION, UNSPECIFIED LOCATION (HCC): ICD-10-CM

## 2025-03-10 DIAGNOSIS — E66.811 CLASS 1 OBESITY DUE TO EXCESS CALORIES WITH SERIOUS COMORBIDITY AND BODY MASS INDEX (BMI) OF 34.0 TO 34.9 IN ADULT: ICD-10-CM

## 2025-03-10 DIAGNOSIS — F33.1 MDD (MAJOR DEPRESSIVE DISORDER), RECURRENT EPISODE, MODERATE (HCC): ICD-10-CM

## 2025-03-10 DIAGNOSIS — R39.11 HESITANCY OF MICTURITION: ICD-10-CM

## 2025-03-10 DIAGNOSIS — Z12.5 PROSTATE CANCER SCREENING: ICD-10-CM

## 2025-03-10 DIAGNOSIS — E66.09 CLASS 1 OBESITY DUE TO EXCESS CALORIES WITH SERIOUS COMORBIDITY AND BODY MASS INDEX (BMI) OF 34.0 TO 34.9 IN ADULT: ICD-10-CM

## 2025-03-10 DIAGNOSIS — K22.70 BARRETT'S ESOPHAGUS WITHOUT DYSPLASIA: ICD-10-CM

## 2025-03-10 DIAGNOSIS — Z86.0100 HISTORY OF COLONIC POLYPS: ICD-10-CM

## 2025-03-10 DIAGNOSIS — Z77.29 EXPOSURE TO POTENTIALLY HAZARDOUS SUBSTANCE: ICD-10-CM

## 2025-03-10 LAB
ANION GAP SERPL CALCULATED.3IONS-SCNC: 7 MMOL/L (ref 4–13)
BASOPHILS # BLD AUTO: 0.05 THOUSANDS/ÂΜL (ref 0–0.1)
BASOPHILS NFR BLD AUTO: 1 % (ref 0–1)
BUN SERPL-MCNC: 15 MG/DL (ref 5–25)
CALCIUM SERPL-MCNC: 8.8 MG/DL (ref 8.4–10.2)
CHLORIDE SERPL-SCNC: 104 MMOL/L (ref 96–108)
CHOLEST SERPL-MCNC: 181 MG/DL (ref ?–200)
CO2 SERPL-SCNC: 27 MMOL/L (ref 21–32)
CREAT SERPL-MCNC: 0.97 MG/DL (ref 0.6–1.3)
EOSINOPHIL # BLD AUTO: 0.15 THOUSAND/ÂΜL (ref 0–0.61)
EOSINOPHIL NFR BLD AUTO: 2 % (ref 0–6)
ERYTHROCYTE [DISTWIDTH] IN BLOOD BY AUTOMATED COUNT: 13.3 % (ref 11.6–15.1)
EST. AVERAGE GLUCOSE BLD GHB EST-MCNC: 126 MG/DL
GFR SERPL CREATININE-BSD FRML MDRD: 84 ML/MIN/1.73SQ M
GLUCOSE P FAST SERPL-MCNC: 124 MG/DL (ref 65–99)
HBA1C MFR BLD: 6 %
HCT VFR BLD AUTO: 43.9 % (ref 36.5–49.3)
HDLC SERPL-MCNC: 63 MG/DL
HGB BLD-MCNC: 13.9 G/DL (ref 12–17)
IMM GRANULOCYTES # BLD AUTO: 0.04 THOUSAND/UL (ref 0–0.2)
IMM GRANULOCYTES NFR BLD AUTO: 1 % (ref 0–2)
LDLC SERPL CALC-MCNC: 76 MG/DL (ref 0–100)
LYMPHOCYTES # BLD AUTO: 1.81 THOUSANDS/ÂΜL (ref 0.6–4.47)
LYMPHOCYTES NFR BLD AUTO: 26 % (ref 14–44)
MCH RBC QN AUTO: 28.7 PG (ref 26.8–34.3)
MCHC RBC AUTO-ENTMCNC: 31.7 G/DL (ref 31.4–37.4)
MCV RBC AUTO: 91 FL (ref 82–98)
MONOCYTES # BLD AUTO: 0.56 THOUSAND/ÂΜL (ref 0.17–1.22)
MONOCYTES NFR BLD AUTO: 8 % (ref 4–12)
NEUTROPHILS # BLD AUTO: 4.39 THOUSANDS/ÂΜL (ref 1.85–7.62)
NEUTS SEG NFR BLD AUTO: 62 % (ref 43–75)
NRBC BLD AUTO-RTO: 0 /100 WBCS
PLATELET # BLD AUTO: 301 THOUSANDS/UL (ref 149–390)
PMV BLD AUTO: 9.6 FL (ref 8.9–12.7)
POTASSIUM SERPL-SCNC: 3.8 MMOL/L (ref 3.5–5.3)
PSA FREE MFR SERPL: 18.43 %
PSA FREE SERPL-MCNC: 0.38 NG/ML
PSA SERPL-MCNC: 2.08 NG/ML (ref 0–4)
RBC # BLD AUTO: 4.85 MILLION/UL (ref 3.88–5.62)
SODIUM SERPL-SCNC: 138 MMOL/L (ref 135–147)
TRIGL SERPL-MCNC: 211 MG/DL (ref ?–150)
TSH SERPL DL<=0.05 MIU/L-ACNC: 4.15 UIU/ML (ref 0.45–4.5)
WBC # BLD AUTO: 7 THOUSAND/UL (ref 4.31–10.16)

## 2025-03-10 PROCEDURE — 83036 HEMOGLOBIN GLYCOSYLATED A1C: CPT

## 2025-03-10 PROCEDURE — 80061 LIPID PANEL: CPT

## 2025-03-10 PROCEDURE — 80048 BASIC METABOLIC PNL TOTAL CA: CPT

## 2025-03-10 PROCEDURE — 36415 COLL VENOUS BLD VENIPUNCTURE: CPT

## 2025-03-10 PROCEDURE — 84154 ASSAY OF PSA FREE: CPT

## 2025-03-10 PROCEDURE — 84443 ASSAY THYROID STIM HORMONE: CPT

## 2025-03-10 PROCEDURE — 99214 OFFICE O/P EST MOD 30 MIN: CPT | Performed by: INTERNAL MEDICINE

## 2025-03-10 PROCEDURE — 85025 COMPLETE CBC W/AUTO DIFF WBC: CPT

## 2025-03-10 PROCEDURE — 84153 ASSAY OF PSA TOTAL: CPT

## 2025-03-10 PROCEDURE — G2211 COMPLEX E/M VISIT ADD ON: HCPCS | Performed by: INTERNAL MEDICINE

## 2025-03-10 RX ORDER — TIRZEPATIDE 2.5 MG/.5ML
2.5 INJECTION, SOLUTION SUBCUTANEOUS WEEKLY
Qty: 2 ML | Refills: 0 | Status: SHIPPED | OUTPATIENT
Start: 2025-03-10 | End: 2025-03-10

## 2025-03-10 RX ORDER — TIRZEPATIDE 2.5 MG/.5ML
2.5 INJECTION, SOLUTION SUBCUTANEOUS WEEKLY
Qty: 2 ML | Refills: 0 | Status: SHIPPED | OUTPATIENT
Start: 2025-03-10 | End: 2025-03-11

## 2025-03-10 NOTE — ASSESSMENT & PLAN NOTE
Continue PPI. Now following with gastro at [facility].

## 2025-03-10 NOTE — ASSESSMENT & PLAN NOTE
Orders:    tirzepatide (Zepbound) 2.5 mg/0.5 mL auto-injector; Inject 0.5 mL (2.5 mg total) under the skin once a week for 28 days    Patient had KARLA of distal and proximal RCA in 4/2019. Follows with cardiology, Dr. Sidhu from [facility].

## 2025-03-10 NOTE — PROGRESS NOTES
Name: Demian Joe      : 1965      MRN: [id number]  Encounter Provider: Zina Jamison MD  Encounter Date: 3/10/2025   Encounter department: [facility] INTERNAL MEDICINE [place]  :  Assessment & Plan  Coronary artery disease of native artery of native heart with stable angina pectoris (HCC)    Orders:    tirzepatide (Zepbound) 2.5 mg/0.5 mL auto-injector; Inject 0.5 mL (2.5 mg total) under the skin once a week for 28 days    Patient had KARLA of distal and proximal RCA in 2019. Follows with cardiology, Dr. Sidhu from [facility].  Ulcerative colitis with complication, unspecified location (HCC)  Apparently related to submarine radiation exposure as per patient.           Gastroesophageal reflux disease without esophagitis         Continue PPI. Now following with gastro at [facility].  Diaz's esophagus without dysplasia    Continue PPI. Now following with gastro at [facility].           PTSD (post-traumatic stress disorder)         see above.  MDD (major depressive disorder), recurrent episode, moderate (HCC)             Continue psych follow up. Continue regimen.  Anxiety and depression             See above.  History of colonic polyps  Noted on last crc screen. Now following with [facility].           Exposure to potentially hazardous substance  [facility]           Dyslipidemia    Orders:    tirzepatide (Zepbound) 2.5 mg/0.5 mL auto-injector; Inject 0.5 mL (2.5 mg total) under the skin once a week for 28 days      The 10-year ASCVD risk score (Yanick TRIVEDI, et al., 2019) is: 7.4%    Values used to calculate the score:      Age: 59 years      Sex: Male      Is Non- : No      Diabetic: No      Tobacco smoker: No      Systolic Blood Pressure: 136 mmHg      Is BP treated: No      HDL Cholesterol: 63 mg/dL      Total Cholesterol: 181 mg/dL    Benzodiazepine withdrawal with complication (HCC)  Continue current regimen.           Class 1 obesity due to excess calories with serious  comorbidity and body mass index (BMI) of 34.0 to 34.9 in adult  Prior Authorization Clinical Questions for Weight Management Pharmacotherapy    1. Does the patient have a contrainidcation to medication prescribed for weight management?: No  2. Does the patient have a diagnosis of obesity, confirmed by a BMI greater than or equal to 30 kg/m^2?: Yes  3. Does the patient have a BMI of greater than or equal to 27 kg/m^2 with at least one weight-related comorbidity/risk factor/complication (e.g. diabetes, dyslipidemia, coronary artery disease)?: Yes  4. Weight-related co-morbidities/risk factors: prediabetes, metabolic syndrome, dyslipidemia, cardiovascular disease, GERD, asthma/reactive airway disease  5. WEGOVY CVA Indication: Does patient have established documented cardiovascular disease (history of a prior heart attack (myocardial infarction), stroke, or symptomatic peripheral arterial disease (PAD)?: N/A  6. ZEPBOUND LYDIA Indication: Does patient have documented LYDIA diagnosed via sleep study (insurance will require copy of sleep study results for approval)?: No  7. Has the patient been on a weight loss regimen of low-calorie diet, increased physical activity, and lifestyle modifications for a minimum of 6 months?: Yes  8. Has the patient completed a comprehensive weight loss program (ie, Weight Watchers, Noom, Bariatrics, other vidal on phone)? If so, what?: No  9. Does the patient have a history of type 2 diabetes?: No  10. Has the member tried and failed other weight loss medication within the past 12 months?: No  11. Will the member use requested medication in combination with another GLP agonist or weight loss drug?: No  12. Is the medication a controlled substance?: No     Baseline weight (in pounds): 234 lbs         Orders:    tirzepatide (Zepbound) 2.5 mg/0.5 mL auto-injector; Inject 0.5 mL (2.5 mg total) under the skin once a week for 28 days          Depression Screening and Follow-up Plan: Patient was  "screened for depression during today's encounter. They screened negative with a PHQ-9 score of 0.        History of Present Illness   Patient is here for routine follow up, reviewed chronic medical problems, ordered labs for next visit including CBC CMP TSH A1C LIPID. Reviewed labs for this visit.      Review of Systems   Constitutional:  Negative for chills and fever.   HENT:  Negative for ear pain and sore throat.    Eyes:  Negative for pain and visual disturbance.   Respiratory:  Negative for cough and shortness of breath.    Cardiovascular:  Negative for chest pain and palpitations.   Gastrointestinal:  Negative for abdominal pain and vomiting.   Genitourinary:  Negative for dysuria and hematuria.   Musculoskeletal:  Negative for arthralgias and back pain.   Skin:  Negative for color change and rash.   Neurological:  Negative for seizures and syncope.   All other systems reviewed and are negative.      Objective   /84 (BP Location: Left arm, Patient Position: Sitting, Cuff Size: Adult)   Pulse 94   Resp 18   Ht 5' 9\" (1.753 m)   Wt 107 kg (235 lb)   SpO2 96%   BMI 34.70 kg/m²      Physical Exam  Vitals and nursing note reviewed.   Constitutional:       General: He is not in acute distress.     Appearance: He is well-developed.   HENT:      Head: Normocephalic and atraumatic.   Cardiovascular:      Rate and Rhythm: Normal rate and regular rhythm.      Heart sounds: No murmur heard.  Pulmonary:      Effort: Pulmonary effort is normal. No respiratory distress.      Breath sounds: Normal breath sounds.   Abdominal:      Tenderness: There is no abdominal tenderness.   Musculoskeletal:         General: No swelling.      Cervical back: Neck supple.   Skin:     General: Skin is warm and dry.      Capillary Refill: Capillary refill takes less than 2 seconds.   Neurological:      Mental Status: He is alert.   Psychiatric:         Mood and Affect: Mood normal.         "

## 2025-03-10 NOTE — ASSESSMENT & PLAN NOTE
SmartLink not supported outside of the Encounter Diagnoses SmartSection.  discussed diet and exercise.

## 2025-03-10 NOTE — ASSESSMENT & PLAN NOTE
Orders:    tirzepatide (Zepbound) 2.5 mg/0.5 mL auto-injector; Inject 0.5 mL (2.5 mg total) under the skin once a week for 28 days      The 10-year ASCVD risk score (Yanick TRIVEDI, et al., 2019) is: 7.4%    Values used to calculate the score:      Age: 59 years      Sex: Male      Is Non- : No      Diabetic: No      Tobacco smoker: No      Systolic Blood Pressure: 136 mmHg      Is BP treated: No      HDL Cholesterol: 63 mg/dL      Total Cholesterol: 181 mg/dL

## 2025-03-11 RX ORDER — TIRZEPATIDE 2.5 MG/.5ML
2.5 INJECTION, SOLUTION SUBCUTANEOUS WEEKLY
Qty: 2 ML | Refills: 0 | Status: SHIPPED | OUTPATIENT
Start: 2025-03-11 | End: 2025-03-20 | Stop reason: SDUPTHER

## 2025-03-12 ENCOUNTER — TELEPHONE (OUTPATIENT)
Age: 60
End: 2025-03-12

## 2025-03-12 DIAGNOSIS — E66.09 CLASS 1 OBESITY DUE TO EXCESS CALORIES WITH SERIOUS COMORBIDITY AND BODY MASS INDEX (BMI) OF 34.0 TO 34.9 IN ADULT: ICD-10-CM

## 2025-03-12 DIAGNOSIS — E66.811 CLASS 1 OBESITY DUE TO EXCESS CALORIES WITH SERIOUS COMORBIDITY AND BODY MASS INDEX (BMI) OF 34.0 TO 34.9 IN ADULT: ICD-10-CM

## 2025-03-12 DIAGNOSIS — E78.5 DYSLIPIDEMIA: ICD-10-CM

## 2025-03-12 DIAGNOSIS — E66.09 CLASS 1 OBESITY DUE TO EXCESS CALORIES WITH SERIOUS COMORBIDITY AND BODY MASS INDEX (BMI) OF 34.0 TO 34.9 IN ADULT: Primary | ICD-10-CM

## 2025-03-12 DIAGNOSIS — R06.83 SNORING: ICD-10-CM

## 2025-03-12 DIAGNOSIS — I25.118 CORONARY ARTERY DISEASE OF NATIVE ARTERY OF NATIVE HEART WITH STABLE ANGINA PECTORIS (HCC): ICD-10-CM

## 2025-03-12 DIAGNOSIS — E66.811 CLASS 1 OBESITY DUE TO EXCESS CALORIES WITH SERIOUS COMORBIDITY AND BODY MASS INDEX (BMI) OF 34.0 TO 34.9 IN ADULT: Primary | ICD-10-CM

## 2025-03-12 RX ORDER — TIRZEPATIDE 2.5 MG/.5ML
2.5 INJECTION, SOLUTION SUBCUTANEOUS WEEKLY
Refills: 0 | OUTPATIENT
Start: 2025-03-12 | End: 2025-04-09

## 2025-03-12 NOTE — TELEPHONE ENCOUNTER
Pa needed     Name from pharmacy: ZEPBOUND 2.5 MG/0.5 ML PEN       Will file in chart as: Zepbound 2.5 MG/0.5ML auto-injector   Sig: INJECT 0.5 ML (2.5 MG TOTAL) UNDER THE SKIN ONCE A WEEK FOR 28 DAYS   Disp: Not specified (Pharmacy requested: 2 each)    Refills: 0   Start: 3/12/2025   Class: Normal   Non-formulary For: Coronary artery disease of native artery of native heart with stable angina pectoris (HCC), Dyslipidemia, Class 1 obesity due to excess calories with serious comorbidity and body mass index (BMI) of 34.0 to 34.9 in adult   Last ordered: Yesterday (3/11/2025) by Zina Jamison MD   Last refill: 3/12/2025   Rx #: [id number]   Pharmacy comment: Alternative Requested:NOT COVERED.     Endocrinology:  Diabetes - GLP-1 Receptor Agonists Jdzrvo2103/12/2025 08:24 AM  Protocol Details Valid encounter within last 6 months  [facility] Embedded [id number]/12/2025 08:24 AM  This is a duplicate request of medication ordered 2025-03-11. Check to see if the patient is requesting a refill from a new pharmacy.    This request has changes from the previous prescription.  To be filled at: [facility]/pharmacy #[id number] - [place] - [address]

## 2025-03-13 NOTE — TELEPHONE ENCOUNTER
PA for Zepbound 2.5mg SUBMITTED to [facility]    via    []CMM-KEY:    [x]Surescripts-Case ID # [id number]   []Availity-Auth ID #  NDC #    []Faxed to plan   []Other website    []Phone call Case ID #      [x]PA sent as URGENT    All office notes, labs and other pertaining documents and studies sent. Clinical questions answered. Awaiting determination from insurance company.     Turnaround time for your insurance to make a decision on your Prior Authorization can take 7-21 business days.

## 2025-03-14 DIAGNOSIS — E78.5 DYSLIPIDEMIA: ICD-10-CM

## 2025-03-14 RX ORDER — ATORVASTATIN CALCIUM 80 MG/1
80 TABLET, FILM COATED ORAL DAILY
Qty: 90 TABLET | Refills: 1 | Status: SHIPPED | OUTPATIENT
Start: 2025-03-14

## 2025-03-14 NOTE — TELEPHONE ENCOUNTER
PA for Zepbound 2.5mg  DENIED    Reason:(Screenshot if applicable)        Message sent to office clinical pool Yes    Denial letter scanned into Media Yes    Appeal started No (Provider will need to decide if appeal is warranted and send clinical documentation to Prior Authorization Team for initiation.)    **Please follow up with your patient regarding denial and next steps**

## 2025-03-18 NOTE — TELEPHONE ENCOUNTER
Patient calls stating he spoke to his insurance company who acknowledged the recent denial of zepbound. He relayed they told him if another PA is submitted, it will be considered again but be the final decision. He was unaware that a PA was already submitted and denied.

## 2025-03-20 RX ORDER — TIRZEPATIDE 2.5 MG/.5ML
2.5 INJECTION, SOLUTION SUBCUTANEOUS WEEKLY
Qty: 2 ML | Refills: 0 | Status: SHIPPED | OUTPATIENT
Start: 2025-03-20 | End: 2025-04-17

## 2025-03-20 NOTE — TELEPHONE ENCOUNTER
Spoke with patient. He states he is snoring a lot, per his wife. He is asking for a referral to sleep medicine to do the study. [facility]; medication mended.

## 2025-03-20 NOTE — TELEPHONE ENCOUNTER
Patient called to follow up in regards to the decision from the insurance denial for the Zepbound. Patient wants to ask Dr. Jamison if the following 3 options would be a possibility to consider:    Getting a sleep study to check for LYDIA and resubmit PA to insurance if he has LYDIA  Having the [facility]epbound [facility] [place] to see if they have any coverage  Patient would shop around to see if he can pay out of pocket.    Please review and advise.

## 2025-03-20 NOTE — TELEPHONE ENCOUNTER
Lolis [facility] the [facility] medical [facility] called to state that patient needs to be enrolled in a diet or weight management program prior to being approved for Zepbound prescription. She states if he sees joins a program, or sees weight management for 1 visit and they re submit the request he will be able to have this medication approved.  Please advise.

## 2025-03-21 DIAGNOSIS — F13.939 BENZODIAZEPINE WITHDRAWAL WITH COMPLICATION (HCC): ICD-10-CM

## 2025-03-21 RX ORDER — CLONAZEPAM 0.5 MG/1
0.5 TABLET ORAL 2 TIMES DAILY
Qty: 60 TABLET | Refills: 0 | Status: SHIPPED | OUTPATIENT
Start: 2025-03-21 | End: 2025-04-20

## 2025-03-25 ENCOUNTER — CLINICAL SUPPORT (OUTPATIENT)
Dept: BARIATRICS | Facility: CLINIC | Age: 60
End: 2025-03-25

## 2025-03-25 VITALS — WEIGHT: 235.8 LBS | BODY MASS INDEX: 34.93 KG/M2 | HEIGHT: 69 IN

## 2025-03-25 DIAGNOSIS — E66.09 CLASS 1 OBESITY DUE TO EXCESS CALORIES WITH SERIOUS COMORBIDITY AND BODY MASS INDEX (BMI) OF 34.0 TO 34.9 IN ADULT: ICD-10-CM

## 2025-03-25 DIAGNOSIS — E66.811 CLASS 1 OBESITY DUE TO EXCESS CALORIES WITH SERIOUS COMORBIDITY AND BODY MASS INDEX (BMI) OF 34.0 TO 34.9 IN ADULT: ICD-10-CM

## 2025-03-25 PROCEDURE — WMDI30

## 2025-03-25 PROCEDURE — RECHECK

## 2025-03-25 NOTE — PROGRESS NOTES
Weight Management Medical Nutrition Assessment  Demian was here today for medical meal planning.  Today he weighs 235.8 lbs and has a goal to weigh 200 or less lbs.  He has a history of GI issues - diverticulitis, diverticulosis, ulcerative colitis, ulcers and various food intolerances.  He aslo has a history of radioaction exposure while in the .  He is may be approved for Zepbound and will be followed by his PCP fi so.  Reviewed his calorie carb and protein needs. Recommend that he start logging his food.  Not only to be accountable for his calories, but so he can also learn about his intolerances as well.  Recommend that he ask his GI doctor if there is an RD in their practice that he could also see that could help him with a FODMAP diet recommend something specific for his GI issues.  Along with my recommendations.     Patient seen by Medical Provider in past 6 months:  yes  Requested to schedule appointment with Medical Provider: No      Anthropometric Measurements  Start Weight (#) & Date: 235.8 lbs  Current Weight (#): 235.8   TBW % Change from start weight:0%  Ideal Body Weight (#):160  Goal Weight (#):200 lbs  Highest: current  Lowest: 170 lbs    Weight Loss History  Previous weight loss attempts: Self Created Diets (Portion Control, Healthy Food Choices, etc.)    Food and Nutrition Related History  Wake up: 8 am  ( not sleeping well)   Bed Time:10 pm    Food Recall  Breakfast:tea with sugar and half and half skips most times or will have spam or szymanski and potatoes    Snack:waffle   Lunch:burger cheese steak or calzone or tuna sandwich   Snack:seafood salad  Dinner: frozen dinner or frozen pizza   Snack:ritz crackers       Beverages: water and coffee/tea  Volume of beverage intake: 60 - 80     Weekends: Same  Cravings: carbs  Trouble area of day:night    Frequency of Eating out: every 3 days  Food restrictions:tomatoes/sauce   Cooking: self   Food Shopping: self    Physical Activity  Intake  Activity:none currently   Frequency: none currently  Physical limitations/barriers to exercise: none    Estimated Needs  Energy    Josiane Tamayo Energy Needs: BMR : 1870   1-2# loss weekly sedentary:  1244 - 1744             1-2# loss weekly lightly active:1571 - 2071  Maintenance calories for sedentary activity level: 2244  Protein:87 - 109      (1.2-1.5g/kg IBW)  Fluid Requirement Calculator   Total Fluid: 109.5   oz  (Jon-Segar Method)  Free Fluid: 21.9 oz ([place]-Segar Method - 20%)    Nutrition Diagnosis  Yes;    Overweight/obesity  related to Excess energy intake as evidenced by  BMI more than normative standard for age and sex (obesity-grade I 30-34.9)       Nutrition Intervention    Nutrition Prescription  Calories:1600 - 1800  Protein:87 - 109  Fluid:87.6    Meal Plan (Brad/Pro/Carb)  Breakfast: 400/20/30  Snack:  Lunch:400/28/30  Snack:100/10/15  Dinner:600/28/30  Snack:100/10/15    Nutrition Education:    Calorie controlled menu  Lean protein food choices  Healthy snack options  Food journaling tips      Nutrition Counseling:  Strategies: meal planning, portion sizes, healthy snack choices, hydration, fiber intake, protein intake, exercise, food journal      Monitoring and Evaluation:  Evaluation criteria:  Energy Intake  Meet protein needs  Maintain adequate hydration  Monitor weekly weight  Meal planning/preparation  Food journal   Decreased portions at mealtimes and snacks  Physical activity     Barriers to learning:none  Readiness to change: Action:  (Changing behavior)  Comprehension: fair  Expected Compliance: fair

## 2025-04-04 ENCOUNTER — PATIENT MESSAGE (OUTPATIENT)
Dept: INTERNAL MEDICINE CLINIC | Facility: CLINIC | Age: 60
End: 2025-04-04

## 2025-04-04 DIAGNOSIS — F13.939 BENZODIAZEPINE WITHDRAWAL WITH COMPLICATION (HCC): Primary | ICD-10-CM

## 2025-04-14 DIAGNOSIS — E78.5 DYSLIPIDEMIA: ICD-10-CM

## 2025-04-14 DIAGNOSIS — I25.118 CORONARY ARTERY DISEASE OF NATIVE ARTERY OF NATIVE HEART WITH STABLE ANGINA PECTORIS (HCC): ICD-10-CM

## 2025-04-14 DIAGNOSIS — F13.939 BENZODIAZEPINE WITHDRAWAL WITH COMPLICATION (HCC): ICD-10-CM

## 2025-04-14 DIAGNOSIS — E66.09 CLASS 1 OBESITY DUE TO EXCESS CALORIES WITH SERIOUS COMORBIDITY AND BODY MASS INDEX (BMI) OF 34.0 TO 34.9 IN ADULT: ICD-10-CM

## 2025-04-14 DIAGNOSIS — E66.811 CLASS 1 OBESITY DUE TO EXCESS CALORIES WITH SERIOUS COMORBIDITY AND BODY MASS INDEX (BMI) OF 34.0 TO 34.9 IN ADULT: ICD-10-CM

## 2025-04-14 RX ORDER — TIRZEPATIDE 2.5 MG/.5ML
2.5 INJECTION, SOLUTION SUBCUTANEOUS WEEKLY
Qty: 2 ML | Refills: 0 | Status: CANCELLED | OUTPATIENT
Start: 2025-04-14 | End: 2025-05-12

## 2025-04-15 RX ORDER — TIRZEPATIDE 5 MG/.5ML
5 INJECTION, SOLUTION SUBCUTANEOUS WEEKLY
Qty: 2 ML | Refills: 0 | Status: SHIPPED | OUTPATIENT
Start: 2025-04-15

## 2025-04-15 RX ORDER — CLONAZEPAM 0.5 MG/1
0.5 TABLET ORAL 2 TIMES DAILY
Qty: 60 TABLET | Refills: 0 | Status: SHIPPED | OUTPATIENT
Start: 2025-04-15 | End: 2025-05-15

## 2025-04-15 NOTE — TELEPHONE ENCOUNTER
Spoke with patient is regards to the Zepbound. He states he is doing well on the 2.5mg dose. He states he has lost about 4 pounds, but feels like it is starting to slow down. He is asking for the next dose.

## 2025-04-17 ENCOUNTER — TELEPHONE (OUTPATIENT)
Age: 60
End: 2025-04-17

## 2025-04-17 RX ORDER — CLONAZEPAM 0.5 MG/1
0.5 TABLET ORAL 2 TIMES DAILY
Qty: 6 TABLET | Refills: 0 | Status: SHIPPED | OUTPATIENT
Start: 2025-04-17 | End: 2025-04-20

## 2025-04-17 NOTE — TELEPHONE ENCOUNTER
Pt called stating that [facility] about when to [facility] his Clonazepam and they said he won't be receiving it till maybe Monday or Tuesday.  Pt states that he only has 2 and a half days worth left and [facility] told him there is nothing they can do to expedite it since was already picked up.    Pt is asking if PCP could please send 5 days worth of Clonazepam to local [facility] to cover pt since he will be out of medication starting Sunday.  Please review and advise.  Preferred pharmacy pending.  Pt would like call back with update.

## 2025-05-19 DIAGNOSIS — E66.811 CLASS 1 OBESITY DUE TO EXCESS CALORIES WITH SERIOUS COMORBIDITY AND BODY MASS INDEX (BMI) OF 34.0 TO 34.9 IN ADULT: ICD-10-CM

## 2025-05-19 DIAGNOSIS — I25.118 CORONARY ARTERY DISEASE OF NATIVE ARTERY OF NATIVE HEART WITH STABLE ANGINA PECTORIS (HCC): ICD-10-CM

## 2025-05-19 DIAGNOSIS — E78.5 DYSLIPIDEMIA: ICD-10-CM

## 2025-05-19 DIAGNOSIS — E66.09 CLASS 1 OBESITY DUE TO EXCESS CALORIES WITH SERIOUS COMORBIDITY AND BODY MASS INDEX (BMI) OF 34.0 TO 34.9 IN ADULT: ICD-10-CM

## 2025-05-19 RX ORDER — TIRZEPATIDE 5 MG/.5ML
5 INJECTION, SOLUTION SUBCUTANEOUS WEEKLY
Qty: 2 ML | Refills: 0 | Status: SHIPPED | OUTPATIENT
Start: 2025-05-19

## 2025-05-21 DIAGNOSIS — F41.1 GAD (GENERALIZED ANXIETY DISORDER): ICD-10-CM

## 2025-05-21 DIAGNOSIS — F13.939 BENZODIAZEPINE WITHDRAWAL WITH COMPLICATION (HCC): ICD-10-CM

## 2025-05-21 DIAGNOSIS — E78.5 DYSLIPIDEMIA: ICD-10-CM

## 2025-05-21 DIAGNOSIS — F33.1 MDD (MAJOR DEPRESSIVE DISORDER), RECURRENT EPISODE, MODERATE (HCC): ICD-10-CM

## 2025-05-21 DIAGNOSIS — F43.10 PTSD (POST-TRAUMATIC STRESS DISORDER): ICD-10-CM

## 2025-05-22 RX ORDER — CLONAZEPAM 0.5 MG/1
0.5 TABLET ORAL 2 TIMES DAILY
Qty: 60 TABLET | Refills: 0 | Status: SHIPPED | OUTPATIENT
Start: 2025-05-22 | End: 2025-06-21

## 2025-05-22 RX ORDER — ATORVASTATIN CALCIUM 80 MG/1
80 TABLET, FILM COATED ORAL DAILY
Qty: 90 TABLET | Refills: 1 | Status: SHIPPED | OUTPATIENT
Start: 2025-05-22

## 2025-05-22 RX ORDER — DULOXETIN HYDROCHLORIDE 60 MG/1
60 CAPSULE, DELAYED RELEASE ORAL
Qty: 30 CAPSULE | Refills: 5 | Status: SHIPPED | OUTPATIENT
Start: 2025-05-22 | End: 2025-06-21

## 2025-06-13 DIAGNOSIS — F43.10 PTSD (POST-TRAUMATIC STRESS DISORDER): ICD-10-CM

## 2025-06-13 DIAGNOSIS — F33.1 MDD (MAJOR DEPRESSIVE DISORDER), RECURRENT EPISODE, MODERATE (HCC): ICD-10-CM

## 2025-06-13 DIAGNOSIS — F41.1 GAD (GENERALIZED ANXIETY DISORDER): ICD-10-CM

## 2025-06-13 RX ORDER — DULOXETIN HYDROCHLORIDE 60 MG/1
60 CAPSULE, DELAYED RELEASE ORAL
Qty: 90 CAPSULE | Refills: 1 | Status: SHIPPED | OUTPATIENT
Start: 2025-06-13 | End: 2025-07-13

## 2025-06-19 DIAGNOSIS — F13.939 BENZODIAZEPINE WITHDRAWAL WITH COMPLICATION (HCC): ICD-10-CM

## 2025-06-19 RX ORDER — CLONAZEPAM 0.5 MG/1
0.5 TABLET ORAL 2 TIMES DAILY
Qty: 60 TABLET | Refills: 0 | Status: SHIPPED | OUTPATIENT
Start: 2025-06-19 | End: 2025-07-19

## 2025-06-26 ENCOUNTER — TELEPHONE (OUTPATIENT)
Age: 60
End: 2025-06-26

## 2025-06-26 DIAGNOSIS — E78.5 DYSLIPIDEMIA: ICD-10-CM

## 2025-06-26 DIAGNOSIS — E66.811 CLASS 1 OBESITY DUE TO EXCESS CALORIES WITH SERIOUS COMORBIDITY AND BODY MASS INDEX (BMI) OF 34.0 TO 34.9 IN ADULT: ICD-10-CM

## 2025-06-26 DIAGNOSIS — I25.118 CORONARY ARTERY DISEASE OF NATIVE ARTERY OF NATIVE HEART WITH STABLE ANGINA PECTORIS (HCC): ICD-10-CM

## 2025-06-26 DIAGNOSIS — E66.09 CLASS 1 OBESITY DUE TO EXCESS CALORIES WITH SERIOUS COMORBIDITY AND BODY MASS INDEX (BMI) OF 34.0 TO 34.9 IN ADULT: ICD-10-CM

## 2025-06-26 RX ORDER — TIRZEPATIDE 7.5 MG/.5ML
7.5 INJECTION, SOLUTION SUBCUTANEOUS WEEKLY
Refills: 0 | OUTPATIENT
Start: 2025-06-26

## 2025-06-26 RX ORDER — TIRZEPATIDE 7.5 MG/.5ML
7.5 INJECTION, SOLUTION SUBCUTANEOUS WEEKLY
Qty: 2 ML | Refills: 0 | Status: SHIPPED | OUTPATIENT
Start: 2025-06-26

## 2025-06-30 DIAGNOSIS — I25.118 CORONARY ARTERY DISEASE OF NATIVE ARTERY OF NATIVE HEART WITH STABLE ANGINA PECTORIS (HCC): ICD-10-CM

## 2025-06-30 DIAGNOSIS — E66.811 CLASS 1 OBESITY DUE TO EXCESS CALORIES WITH SERIOUS COMORBIDITY AND BODY MASS INDEX (BMI) OF 34.0 TO 34.9 IN ADULT: ICD-10-CM

## 2025-06-30 DIAGNOSIS — E78.5 DYSLIPIDEMIA: ICD-10-CM

## 2025-06-30 DIAGNOSIS — E66.09 CLASS 1 OBESITY DUE TO EXCESS CALORIES WITH SERIOUS COMORBIDITY AND BODY MASS INDEX (BMI) OF 34.0 TO 34.9 IN ADULT: ICD-10-CM

## 2025-06-30 RX ORDER — TIRZEPATIDE 7.5 MG/.5ML
7.5 INJECTION, SOLUTION SUBCUTANEOUS WEEKLY
Qty: 2 ML | Refills: 0 | Status: SHIPPED | OUTPATIENT
Start: 2025-06-30

## 2025-06-30 NOTE — TELEPHONE ENCOUNTER
Send to wrong pharmacy will send as ordered to [facility] pharmacy E-Prescribing Status: Sent to pharmacy (6/30/2025 11:27 AM EDT)

## 2025-07-03 ENCOUNTER — RA CDI HCC (OUTPATIENT)
Dept: OTHER | Facility: HOSPITAL | Age: 60
End: 2025-07-03

## 2025-07-10 DIAGNOSIS — Z00.6 ENCOUNTER FOR EXAMINATION FOR NORMAL COMPARISON OR CONTROL IN CLINICAL RESEARCH PROGRAM: ICD-10-CM

## 2025-07-21 DIAGNOSIS — F13.939 BENZODIAZEPINE WITHDRAWAL WITH COMPLICATION (HCC): ICD-10-CM

## 2025-07-22 RX ORDER — CLONAZEPAM 0.5 MG/1
0.5 TABLET ORAL 2 TIMES DAILY
Qty: 60 TABLET | Refills: 0 | Status: SHIPPED | OUTPATIENT
Start: 2025-07-22 | End: 2025-08-21

## 2025-07-23 ENCOUNTER — OFFICE VISIT (OUTPATIENT)
Dept: INTERNAL MEDICINE CLINIC | Facility: CLINIC | Age: 60
End: 2025-07-23
Payer: COMMERCIAL

## 2025-07-23 VITALS
HEIGHT: 69 IN | BODY MASS INDEX: 31.55 KG/M2 | OXYGEN SATURATION: 98 % | HEART RATE: 70 BPM | RESPIRATION RATE: 17 BRPM | SYSTOLIC BLOOD PRESSURE: 116 MMHG | DIASTOLIC BLOOD PRESSURE: 78 MMHG | WEIGHT: 213 LBS

## 2025-07-23 DIAGNOSIS — Z77.29 EXPOSURE TO POTENTIALLY HAZARDOUS SUBSTANCE: ICD-10-CM

## 2025-07-23 DIAGNOSIS — Z00.00 MEDICARE ANNUAL WELLNESS VISIT, SUBSEQUENT: Primary | ICD-10-CM

## 2025-07-23 DIAGNOSIS — F33.1 MDD (MAJOR DEPRESSIVE DISORDER), RECURRENT EPISODE, MODERATE (HCC): ICD-10-CM

## 2025-07-23 DIAGNOSIS — F43.10 PTSD (POST-TRAUMATIC STRESS DISORDER): ICD-10-CM

## 2025-07-23 DIAGNOSIS — K22.70 BARRETT'S ESOPHAGUS WITHOUT DYSPLASIA: ICD-10-CM

## 2025-07-23 DIAGNOSIS — F13.939 BENZODIAZEPINE WITHDRAWAL WITH COMPLICATION (HCC): ICD-10-CM

## 2025-07-23 DIAGNOSIS — E66.811 OBESITY (BMI 30.0-34.9): ICD-10-CM

## 2025-07-23 DIAGNOSIS — I25.118 CORONARY ARTERY DISEASE OF NATIVE ARTERY OF NATIVE HEART WITH STABLE ANGINA PECTORIS (HCC): ICD-10-CM

## 2025-07-23 DIAGNOSIS — B35.3 TINEA PEDIS OF BOTH FEET: ICD-10-CM

## 2025-07-23 DIAGNOSIS — K21.9 GASTROESOPHAGEAL REFLUX DISEASE WITHOUT ESOPHAGITIS: ICD-10-CM

## 2025-07-23 PROBLEM — M54.50 LOW BACK PAIN: Status: ACTIVE | Noted: 2025-07-23

## 2025-07-23 PROBLEM — G47.00 INSOMNIA: Status: ACTIVE | Noted: 2025-07-23

## 2025-07-23 PROBLEM — F10.20 ALCOHOL DEPENDENCE (HCC): Status: ACTIVE | Noted: 2025-07-23

## 2025-07-23 PROBLEM — M19.249 LOCALIZED, SECONDARY OSTEOARTHRITIS OF THE HAND: Status: ACTIVE | Noted: 2025-07-23

## 2025-07-23 PROBLEM — K02.62 DENTAL CARIES ON SMOOTH SURFACE PENETRATING INTO DENTIN: Status: ACTIVE | Noted: 2025-07-23

## 2025-07-23 PROBLEM — K03.6 DEPOSITS (ACCRETIONS) ON TEETH: Status: ACTIVE | Noted: 2025-07-23

## 2025-07-23 PROBLEM — Z95.5 STENTED CORONARY ARTERY: Status: ACTIVE | Noted: 2025-07-23

## 2025-07-23 PROBLEM — K58.0 IRRITABLE BOWEL SYNDROME WITH DIARRHEA: Status: ACTIVE | Noted: 2025-07-23

## 2025-07-23 PROBLEM — E78.5 HYPERLIPIDEMIA: Status: ACTIVE | Noted: 2021-09-01

## 2025-07-23 PROBLEM — I10 HTN, GOAL BELOW 140/90: Status: ACTIVE | Noted: 2023-04-26

## 2025-07-23 PROBLEM — M17.0 OSTEOARTHRITIS OF BOTH KNEES: Status: ACTIVE | Noted: 2025-07-23

## 2025-07-23 PROCEDURE — G0439 PPPS, SUBSEQ VISIT: HCPCS | Performed by: INTERNAL MEDICINE

## 2025-07-23 PROCEDURE — 99214 OFFICE O/P EST MOD 30 MIN: CPT | Performed by: INTERNAL MEDICINE

## 2025-07-23 PROCEDURE — G2211 COMPLEX E/M VISIT ADD ON: HCPCS | Performed by: INTERNAL MEDICINE

## 2025-07-23 NOTE — ASSESSMENT & PLAN NOTE
Patient had KARLA of distal and proximal RCA in 4/2019. Follows with cardiology, Dr. Sidhu from [facility].

## 2025-07-23 NOTE — PROGRESS NOTES
Name: Demian Joe      : 1965      MRN: [id number]  Encounter Provider: Zina Jamison MD  Encounter Date: 2025   Encounter department: [facility] INTERNAL MEDICINE [place]  :  Assessment & Plan  Medicare annual wellness visit, subsequent  Completed. Reports he put off his cataract surgery.       Coronary artery disease of native artery of native heart with stable angina pectoris (HCC)      Patient had KARLA of distal and proximal RCA in 2019. Follows with cardiology, Dr. Sidhu from [facility].        Gastroesophageal reflux disease without esophagitis      Continue PPI. Now following with gastro at [facility].        Diaz's esophagus without dysplasia    Continue PPI. Now following with gastro at [facility].              PTSD (post-traumatic stress disorder)      see above.        MDD (major depressive disorder), recurrent episode, moderate (HCC)          Continue psych follow up. Continue regimen.        Obesity (BMI 30.0-34.9)    discussed diet and exercise.        Exposure to potentially hazardous substance  [facility]              Benzodiazepine withdrawal with complication (HCC)  Continue current regimen.              Tinea pedis of both feet  Continue to use OTC tx         Depression Screening and Follow-up Plan: Patient was screened for depression during today's encounter. They screened negative with a PHQ-9 score of 1.        Preventive health issues were discussed with patient, and age appropriate screening tests were ordered as noted in patient's After Visit Summary. Personalized health advice and appropriate referrals for health education or preventive services given if needed, as noted in patient's After Visit Summary.    History of Present Illness      Routine f/u and AWV       Patient Care Team:  Zina Jamison MD as PCP - General (Internal Medicine)  Neil Mahoney DO (Gastroenterology)  Ally Brock PA-C as Physician Assistant (Physician Assistant)  Gayatri  LUZMARIA Cuello as Physician Assistant (Physician Assistant)    Review of Systems   Constitutional:  Negative for chills and fever.   HENT:  Negative for ear pain and sore throat.    Eyes:  Negative for pain and visual disturbance.   Respiratory:  Negative for cough and shortness of breath.    Cardiovascular:  Negative for chest pain and palpitations.   Gastrointestinal:  Negative for abdominal pain and vomiting.   Genitourinary:  Negative for dysuria and hematuria.   Musculoskeletal:  Negative for arthralgias and back pain.   Skin:  Negative for color change and rash.   Neurological:  Negative for seizures and syncope.   All other systems reviewed and are negative.    Medical History Reviewed by provider this encounter:  Tobacco  Allergies  Meds  Problems  Med Hx  Surg Hx  Fam Hx       Annual Wellness Visit Questionnaire   Demian is here for his Subsequent Wellness visit. Last Medicare Wellness visit information reviewed, patient interviewed and updates made to the record today.      Health Risk Assessment:   Patient rates overall health as fair. Patient feels that their physical health rating is same. Patient is satisfied with their life. Eyesight was rated as slightly worse. Hearing was rated as slightly worse. Patient feels that their emotional and mental health rating is same. Patients states they are never, rarely angry. Patient states they are often unusually tired/fatigued. Pain experienced in the last 7 days has been a lot. Patient's pain rating has been 8/10. Patient states that he has experienced no weight loss or gain in last 6 months.     Depression Screening:   PHQ-9 Score: 1      Fall Risk Screening:   In the past year, patient has experienced: history of falling in past year    Number of falls: 2 or more  Injured during fall?: No    Feels unsteady when standing or walking?: No    Worried about falling?: No      Home Safety:  Patient does not have trouble with stairs inside or outside of their  home. Patient has working smoke alarms and has working carbon monoxide detector. Home safety hazards include: loose rugs on the floor, household clutter and uneven floors.     Nutrition:   Current diet is Low Carb, Low Cholesterol and Limited junk food.     Medications:   Patient is able to manage medications.     Activities of Daily Living (ADLs)/Instrumental Activities of Daily Living (IADLs):   Walk and transfer into and out of bed and chair?: Yes  Dress and groom yourself?: Yes    Bathe or shower yourself?: Yes    Feed yourself? Yes  Do your laundry/housekeeping?: Yes  Manage your money, pay your bills and track your expenses?: Yes  Make your own meals?: Yes    Do your own shopping?: Yes    Previous Hospitalizations:   Any hospitalizations or ED visits within the last 12 months?: Yes    How many hospitalizations have you had in the last year?: 1-2    Advance Care Planning:   Living will: No    Durable POA for healthcare: No    Advanced directive: No      Cognitive Screening:   Provider or family/friend/caregiver concerned regarding cognition?: No    Preventive Screenings      Cardiovascular Screening:    General: History Lipid Disorder and Screening Current      Diabetes Screening:     General: Screening Current      Colorectal Cancer Screening:     General: Screening Current      Prostate Cancer Screening:    General: Screening Current      Osteoporosis Screening:    General: Screening Not Indicated      Abdominal Aortic Aneurysm (AAA) Screening:    Risk factors include: tobacco use        General: Screening Not Indicated      Lung Cancer Screening:     General: Screening Not Indicated      Hepatitis C Screening:    General: Screening Current    Immunizations:  - Immunizations due: Prevnar 20 and Zoster (Shingrix)    Screening, Brief Intervention, and Referral to Treatment (SBIRT)     Screening  Typical number of drinks in a day: 0  Typical number of drinks in a week: 0  Interpretation: Low risk drinking  "behavior.    Single Item Drug Screening:  How often have you used an illegal drug (including marijuana) or a prescription medication for non-medical reasons in the past year? never    Single Item Drug Screen Score: 0  Interpretation: Negative screen for possible drug use disorder    Brief Intervention  Alcohol & drug use screenings were reviewed. No concerns regarding substance use disorder identified.     Other Counseling Topics:   Car/seat belt/driving safety and calcium and vitamin D intake.     Social Drivers of Health     Food Insecurity: No Food Insecurity (7/23/2025)    Nursing - Inadequate Food Risk Classification     Worried About Running Out of Food in the Last Year: Never true     Ran Out of Food in the Last Year: Never true   Transportation Needs: No Transportation Needs (7/23/2025)    PRAPARE - Transportation     Lack of Transportation (Medical): No     Lack of Transportation (Non-Medical): No   Housing Stability: Unknown (7/23/2025)    Housing Stability Vital Sign     Unable to Pay for Housing in the Last Year: No     Homeless in the Last Year: No   Utilities: Not At Risk (7/23/2025)    [facility] Utilities     Threatened with loss of utilities: No     No results found.    Objective   /78 (BP Location: Left arm, Patient Position: Sitting, Cuff Size: Adult)   Pulse 70   Resp 17   Ht 5' 9\" (1.753 m)   Wt 96.6 kg (213 lb)   SpO2 98%   BMI 31.45 kg/m²     Physical Exam  Vitals and nursing note reviewed.   Constitutional:       General: He is not in acute distress.     Appearance: Normal appearance. He is well-developed. He is obese.   HENT:      Head: Normocephalic and atraumatic.     Cardiovascular:      Rate and Rhythm: Normal rate and regular rhythm.      Heart sounds: No murmur heard.  Pulmonary:      Effort: Pulmonary effort is normal. No respiratory distress.      Breath sounds: Normal breath sounds.   Abdominal:      Tenderness: There is no abdominal tenderness.     Musculoskeletal:         " General: No swelling.     Skin:     General: Skin is warm and dry.     Neurological:      Mental Status: He is alert.

## 2025-07-23 NOTE — PATIENT INSTRUCTIONS
Medicare Preventive Visit Patient Instructions  Thank you for completing your Welcome to Medicare Visit or Medicare Annual Wellness Visit today. Your next wellness visit will be due in one year (7/24/2026).  The screening/preventive services that you may require over the next 5-10 years are detailed below. Some tests may not apply to you based off risk factors and/or age. Screening tests ordered at today's visit but not completed yet may show as past due. Also, please note that scanned in results may not display below.  Preventive Screenings:  Service Recommendations Previous Testing/Comments   Colorectal Cancer Screening  Colonoscopy    Fecal Occult Blood Test (FOBT)/Fecal Immunochemical Test (FIT)  Fecal DNA/Cologuard Test  Flexible Sigmoidoscopy Age: 45-75 years old   Colonoscopy: every 10 years (May be performed more frequently if at higher risk)  OR  FOBT/FIT: every 1 year  OR  Cologuard: every 3 years  OR  Sigmoidoscopy: every 5 years  Screening may be recommended earlier than age 45 if at higher risk for colorectal cancer. Also, an individualized decision between you and your healthcare provider will decide whether screening between the ages of 76-85 would be appropriate. Colonoscopy: 06/06/2023  FOBT/FIT: Not on file  Cologuard: Not on file  Sigmoidoscopy: Not on file    Screening Current     Prostate Cancer Screening Individualized decision between patient and health care provider in men between ages of 55-69   Medicare will cover every 12 months beginning on the day after your 50th birthday PSA: 2.084 ng/mL     Screening Current     Hepatitis C Screening Once for adults born between 1945 and 1965  More frequently in patients at high risk for Hepatitis C Hep C Antibody: 06/07/2022    Screening Current   Diabetes Screening 1-2 times per year if you're at risk for diabetes or have pre-diabetes Fasting glucose: 124 mg/dL (3/10/2025)  A1C: 6.0 % (3/10/2025)  Screening Current   Cholesterol Screening Once every  5 years if you don't have a lipid disorder. May order more often based on risk factors. Lipid panel: 03/10/2025  Screening Not Indicated  History Lipid Disorder      Other Preventive Screenings Covered by Medicare:  Abdominal Aortic Aneurysm (AAA) Screening: covered once if your at risk. You're considered to be at risk if you have a family history of AAA or a male between the age of 65-75 who smoking at least 100 cigarettes in your lifetime.  Lung Cancer Screening: covers low dose CT scan once per year if you meet all of the following conditions: (1) Age 55-77; (2) No signs or symptoms of lung cancer; (3) Current smoker or have quit smoking within the last 15 years; (4) You have a tobacco smoking history of at least 20 pack years (packs per day x number of years you smoked); (5) You get a written order from a healthcare provider.  Glaucoma Screening: covered annually if you're considered high risk: (1) You have diabetes OR (2) Family history of glaucoma OR (3)  aged 50 and older OR (4)  American aged 65 and older  Osteoporosis Screening: covered every 2 years if you meet one of the following conditions: (1) Have a vertebral abnormality; (2) On glucocorticoid therapy for more than 3 months; (3) Have primary hyperparathyroidism; (4) On osteoporosis medications and need to assess response to drug therapy.  HIV Screening: covered annually if you're between the age of 15-65. Also covered annually if you are younger than 15 and older than 65 with risk factors for HIV infection. For pregnant patients, it is covered up to 3 times per pregnancy.    Immunizations:  Immunization Recommendations   Influenza Vaccine Annual influenza vaccination during flu season is recommended for all persons aged >= 6 months who do not have contraindications   Pneumococcal Vaccine   * Pneumococcal conjugate vaccine = PCV13 (Prevnar 13), PCV15 (Vaxneuvance), PCV20 (Prevnar 20)  * Pneumococcal polysaccharide vaccine =  PPSV23 (Pneumovax) Adults 19-65 yo with certain risk factors or if 65+ yo  If never received any pneumonia vaccine: recommend Prevnar 20 (PCV20)  Give PCV20 if previously received 1 dose of PCV13 or PPSV23   Hepatitis B Vaccine 3 dose series if at intermediate or high risk (ex: diabetes, end stage renal disease, liver disease)   Respiratory syncytial virus (RSV) Vaccine - COVERED BY MEDICARE PART D  * RSVPreF3 (Arexvy) CDC recommends that adults 60 years of age and older may receive a single dose of RSV vaccine using shared clinical decision-making (SCDM)   Tetanus (Td) Vaccine - COST NOT COVERED BY MEDICARE PART B Following completion of primary series, a booster dose should be given every 10 years to maintain immunity against tetanus. Td may also be given as tetanus wound prophylaxis.   Tdap Vaccine - COST NOT COVERED BY MEDICARE PART B Recommended at least once for all adults. For pregnant patients, recommended with each pregnancy.   Shingles Vaccine (Shingrix) - COST NOT COVERED BY MEDICARE PART B  2 shot series recommended in those 19 years and older who have or will have weakened immune systems or those 50 years and older     Health Maintenance Due:      Topic Date Due   • Colorectal Cancer Screening  06/04/2028   • HIV Screening  Completed   • Hepatitis C Screening  Completed     Immunizations Due:      Topic Date Due   • Pneumococcal Vaccine: 50+ Years (2 of 2 - PCV) 07/10/2019   • COVID-19 Vaccine (5 - 2024-25 season) 09/01/2024   • Influenza Vaccine (1) 09/01/2025     Advance Directives   What are advance directives?  Advance directives are legal documents that state your wishes and plans for medical care. These plans are made ahead of time in case you lose your ability to make decisions for yourself. Advance directives can apply to any medical decision, such as the treatments you want, and if you want to donate organs.   What are the types of advance directives?  There are many types of advance  directives, and each state has rules about how to use them. You may choose a combination of any of the following:  Living will:  This is a written record of the treatment you want. You can also choose which treatments you do not want, which to limit, and which to stop at a certain time. This includes surgery, medicine, IV fluid, and tube feedings.   Durable power of  for healthcare (DPAHC):  This is a written record that states who you want to make healthcare choices for you when you are unable to make them for yourself. This person, called a proxy, is usually a family member or a friend. You may choose more than 1 proxy.  Do not resuscitate (DNR) order:  A DNR order is used in case your heart stops beating or you stop breathing. It is a request not to have certain forms of treatment, such as CPR. A DNR order may be included in other types of advance directives.  Medical directive:  This covers the care that you want if you are in a coma, near death, or unable to make decisions for yourself. You can list the treatments you want for each condition. Treatment may include pain medicine, surgery, blood transfusions, dialysis, IV or tube feedings, and a ventilator (breathing machine).  Values history:  This document has questions about your views, beliefs, and how you feel and think about life. This information can help others choose the care that you would choose.  Why are advance directives important?  An advance directive helps you control your care. Although spoken wishes may be used, it is better to have your wishes written down. Spoken wishes can be misunderstood, or not followed. Treatments may be given even if you do not want them. An advance directive may make it easier for your family to make difficult choices about your care.   Weight Management   Why it is important to manage your weight:  Being overweight increases your risk of health conditions such as heart disease, high blood pressure, type 2  diabetes, and certain types of cancer. It can also increase your risk for osteoarthritis, sleep apnea, and other respiratory problems. Aim for a slow, steady weight loss. Even a small amount of weight loss can lower your risk of health problems.  How to lose weight safely:  A safe and healthy way to lose weight is to eat fewer calories and get regular exercise. You can lose up about 1 pound a week by decreasing the number of calories you eat by 500 calories each day.   Healthy meal plan for weight management:  A healthy meal plan includes a variety of foods, contains fewer calories, and helps you stay healthy. A healthy meal plan includes the following:  Eat whole-grain foods more often.  A healthy meal plan should contain fiber. Fiber is the part of grains, fruits, and vegetables that is not broken down by your body. Whole-grain foods are healthy and provide extra fiber in your diet. Some examples of whole-grain foods are whole-wheat breads and pastas, oatmeal, brown rice, and bulgur.  Eat a variety of vegetables every day.  Include dark, leafy greens such as spinach, kale, gio greens, and mustard greens. Eat yellow and orange vegetables such as carrots, sweet potatoes, and winter squash.   Eat a variety of fruits every day.  Choose fresh or canned fruit (canned in its own juice or light syrup) instead of juice. Fruit juice has very little or no fiber.  Eat low-fat dairy foods.  Drink fat-free (skim) milk or 1% milk. Eat fat-free yogurt and low-fat cottage cheese. Try low-fat cheeses such as mozzarella and other reduced-fat cheeses.  Choose meat and other protein foods that are low in fat.  Choose beans or other legumes such as split peas or lentils. Choose fish, skinless poultry (chicken or turkey), or lean cuts of red meat (beef or pork). Before you cook meat or poultry, cut off any visible fat.   Use less fat and oil.  Try baking foods instead of frying them. Add less fat, such as margarine, sour cream,  regular salad dressing and mayonnaise to foods. Eat fewer high-fat foods. Some examples of high-fat foods include french fries, doughnuts, ice cream, and cakes.  Eat fewer sweets.  Limit foods and drinks that are high in sugar. This includes candy, cookies, regular soda, and sweetened drinks.  Exercise:  Exercise at least 30 minutes per day on most days of the week. Some examples of exercise include walking, biking, dancing, and swimming. You can also fit in more physical activity by taking the stairs instead of the elevator or parking farther away from stores. Ask your healthcare provider about the best exercise plan for you.    © Copyright [employer] 2018 Information is for End User's use only and may not be sold, redistributed or otherwise used for commercial purposes. All illustrations and images included in CareNotes® are the copyrighted property of [employer] or [employer]

## 2025-07-29 DIAGNOSIS — E66.811 CLASS 1 OBESITY DUE TO EXCESS CALORIES WITH SERIOUS COMORBIDITY AND BODY MASS INDEX (BMI) OF 34.0 TO 34.9 IN ADULT: ICD-10-CM

## 2025-07-29 DIAGNOSIS — E78.5 DYSLIPIDEMIA: ICD-10-CM

## 2025-07-29 DIAGNOSIS — I25.118 CORONARY ARTERY DISEASE OF NATIVE ARTERY OF NATIVE HEART WITH STABLE ANGINA PECTORIS (HCC): ICD-10-CM

## 2025-07-29 DIAGNOSIS — E66.09 CLASS 1 OBESITY DUE TO EXCESS CALORIES WITH SERIOUS COMORBIDITY AND BODY MASS INDEX (BMI) OF 34.0 TO 34.9 IN ADULT: ICD-10-CM

## 2025-07-31 RX ORDER — TIRZEPATIDE 7.5 MG/.5ML
7.5 INJECTION, SOLUTION SUBCUTANEOUS WEEKLY
Qty: 2 ML | Refills: 0 | Status: SHIPPED | OUTPATIENT
Start: 2025-07-31

## 2025-08-16 DIAGNOSIS — K22.70 BARRETT'S ESOPHAGUS WITHOUT DYSPLASIA: ICD-10-CM

## 2025-08-16 DIAGNOSIS — R11.15 CYCLICAL VOMITING: ICD-10-CM

## 2025-08-16 DIAGNOSIS — K22.711 BARRETT'S ESOPHAGUS WITH HIGH GRADE DYSPLASIA: ICD-10-CM

## 2025-08-18 DIAGNOSIS — F13.939 BENZODIAZEPINE WITHDRAWAL WITH COMPLICATION (HCC): ICD-10-CM

## 2025-08-18 RX ORDER — OMEPRAZOLE 40 MG/1
40 CAPSULE, DELAYED RELEASE ORAL 2 TIMES DAILY
Qty: 180 CAPSULE | Refills: 1 | Status: SHIPPED | OUTPATIENT
Start: 2025-08-18

## 2025-08-19 RX ORDER — CLONAZEPAM 0.5 MG/1
0.5 TABLET ORAL 2 TIMES DAILY
Qty: 60 TABLET | Refills: 0 | Status: SHIPPED | OUTPATIENT
Start: 2025-08-19 | End: 2025-09-18

## (undated) DEVICE — NEEDLE SPINAL18G X 3.5 IN QUINCKE

## (undated) DEVICE — TUBING SUCTION 5MM X 12 FT

## (undated) DEVICE — OCCLUSIVE GAUZE STRIP,3% BISMUTH TRIBROMOPHENATE IN PETROLATUM BLEND: Brand: XEROFORM

## (undated) DEVICE — SHOULDER SUSPENSION KIT 6 PER BOX

## (undated) DEVICE — SUT PDS II 0 CT-1 27 IN Z340H

## (undated) DEVICE — DRAPE SHEET THREE QUARTER

## (undated) DEVICE — BETHLEHEM UNIVERSAL  ARTHRO PK: Brand: CARDINAL HEALTH

## (undated) DEVICE — THREADED CLEAR CANNULA WITH OBTURATOR 8.5MM X 75MM

## (undated) DEVICE — CURITY NON-ADHERENT STRIPS: Brand: CURITY

## (undated) DEVICE — BLADE SHAVER EXCALIBUR 4MM 13CM COOLCUT

## (undated) DEVICE — NEEDLE SUT SCORPION MULTIFIRE

## (undated) DEVICE — ABDOMINAL PAD: Brand: DERMACEA

## (undated) DEVICE — DRAPE EQUIPMENT RF WAND

## (undated) DEVICE — Device

## (undated) DEVICE — LIGHT HANDLE COVER SLEEVE DISP BLUE STELLAR

## (undated) DEVICE — PLUMEPEN PRO 10FT

## (undated) DEVICE — INTENDED FOR TISSUE SEPARATION, AND OTHER PROCEDURES THAT REQUIRE A SHARP SURGICAL BLADE TO PUNCTURE OR CUT.: Brand: BARD-PARKER ® CARBON RIB-BACK BLADES

## (undated) DEVICE — CHLORAPREP HI-LITE 26ML ORANGE

## (undated) DEVICE — BURR  OVAL 4MM 13CM 8 FLUTE COOLCUT

## (undated) DEVICE — SUT ETHILON 3-0 PS-1 18 IN 1663H

## (undated) DEVICE — SUT 2 FIBERLOOP AR-7234

## (undated) DEVICE — 3M™ STERI-STRIP™ REINFORCED ADHESIVE SKIN CLOSURES, R1547, 1/2 IN X 4 IN (12 MM X 100 MM), 6 STRIPS/ENVELOPE: Brand: 3M™ STERI-STRIP™

## (undated) DEVICE — PROBE ABLATION  APOLLO RF 90 DEG MULTI PORT

## (undated) DEVICE — GAUZE SPONGES,16 PLY: Brand: CURITY

## (undated) DEVICE — ARTHROSCOPY FLOOR MAT

## (undated) DEVICE — FIBERTAPE 2MM X 7IN AR-7237-7

## (undated) DEVICE — U-DRAPE: Brand: CONVERTORS

## (undated) DEVICE — 3M™ MICROFOAM™ SURGICAL TAPE 4 ROLLS/CARTON 6 CARTONS/CASE 1528-3: Brand: 3M™ MICROFOAM™

## (undated) DEVICE — SUTURE GRASPER 60 DEGREES

## (undated) DEVICE — SCD SEQUENTIAL COMPRESSION COMFORT SLEEVE MEDIUM KNEE LENGTH: Brand: KENDALL SCD

## (undated) DEVICE — IMPERVIOUS STOCKINETTE: Brand: DEROYAL

## (undated) DEVICE — TUBING ARTHROSCOPIC WAVE  MAIN PUMP

## (undated) DEVICE — LIGHT GLOVE GREEN

## (undated) DEVICE — BLADE SHAVER DISSECTOR 3.5MM 13CM COOLCUT